# Patient Record
Sex: MALE | Race: WHITE | NOT HISPANIC OR LATINO | Employment: FULL TIME | URBAN - METROPOLITAN AREA
[De-identification: names, ages, dates, MRNs, and addresses within clinical notes are randomized per-mention and may not be internally consistent; named-entity substitution may affect disease eponyms.]

---

## 2019-03-26 ENCOUNTER — LAB VISIT (OUTPATIENT)
Dept: LAB | Facility: HOSPITAL | Age: 39
End: 2019-03-26
Attending: INTERNAL MEDICINE
Payer: COMMERCIAL

## 2019-03-26 ENCOUNTER — OFFICE VISIT (OUTPATIENT)
Dept: INTERNAL MEDICINE | Facility: CLINIC | Age: 39
End: 2019-03-26
Payer: COMMERCIAL

## 2019-03-26 VITALS
WEIGHT: 257.69 LBS | BODY MASS INDEX: 42.89 KG/M2 | HEART RATE: 89 BPM | DIASTOLIC BLOOD PRESSURE: 76 MMHG | RESPIRATION RATE: 16 BRPM | SYSTOLIC BLOOD PRESSURE: 114 MMHG | TEMPERATURE: 99 F | OXYGEN SATURATION: 94 %

## 2019-03-26 DIAGNOSIS — Z00.00 ANNUAL PHYSICAL EXAM: ICD-10-CM

## 2019-03-26 DIAGNOSIS — Z00.00 ANNUAL PHYSICAL EXAM: Primary | ICD-10-CM

## 2019-03-26 DIAGNOSIS — Z23 NEED FOR DIPHTHERIA-TETANUS-PERTUSSIS (TDAP) VACCINE: ICD-10-CM

## 2019-03-26 DIAGNOSIS — J45.20 MILD INTERMITTENT ASTHMA WITHOUT COMPLICATION: ICD-10-CM

## 2019-03-26 DIAGNOSIS — H57.9 EYE DISORDER: ICD-10-CM

## 2019-03-26 DIAGNOSIS — G47.33 OSA (OBSTRUCTIVE SLEEP APNEA): ICD-10-CM

## 2019-03-26 DIAGNOSIS — Z76.89 ENCOUNTER TO ESTABLISH CARE: ICD-10-CM

## 2019-03-26 DIAGNOSIS — K21.9 GASTROESOPHAGEAL REFLUX DISEASE, ESOPHAGITIS PRESENCE NOT SPECIFIED: ICD-10-CM

## 2019-03-26 LAB
ALBUMIN SERPL BCP-MCNC: 4 G/DL (ref 3.5–5.2)
ALP SERPL-CCNC: 96 U/L (ref 55–135)
ALT SERPL W/O P-5'-P-CCNC: 129 U/L (ref 10–44)
ANION GAP SERPL CALC-SCNC: 9 MMOL/L (ref 8–16)
AST SERPL-CCNC: 62 U/L (ref 10–40)
BASOPHILS # BLD AUTO: 0.08 K/UL (ref 0–0.2)
BASOPHILS NFR BLD: 1.1 % (ref 0–1.9)
BILIRUB SERPL-MCNC: 0.7 MG/DL (ref 0.1–1)
BUN SERPL-MCNC: 13 MG/DL (ref 6–20)
CALCIUM SERPL-MCNC: 10.1 MG/DL (ref 8.7–10.5)
CHLORIDE SERPL-SCNC: 105 MMOL/L (ref 95–110)
CHOLEST SERPL-MCNC: 236 MG/DL (ref 120–199)
CHOLEST/HDLC SERPL: 5.8 {RATIO} (ref 2–5)
CO2 SERPL-SCNC: 27 MMOL/L (ref 23–29)
CREAT SERPL-MCNC: 0.8 MG/DL (ref 0.5–1.4)
DIFFERENTIAL METHOD: ABNORMAL
EOSINOPHIL # BLD AUTO: 0.3 K/UL (ref 0–0.5)
EOSINOPHIL NFR BLD: 4.2 % (ref 0–8)
ERYTHROCYTE [DISTWIDTH] IN BLOOD BY AUTOMATED COUNT: 12.8 % (ref 11.5–14.5)
EST. GFR  (AFRICAN AMERICAN): >60 ML/MIN/1.73 M^2
EST. GFR  (NON AFRICAN AMERICAN): >60 ML/MIN/1.73 M^2
ESTIMATED AVG GLUCOSE: 120 MG/DL (ref 68–131)
GLUCOSE SERPL-MCNC: 104 MG/DL (ref 70–110)
HBA1C MFR BLD HPLC: 5.8 % (ref 4–5.6)
HCT VFR BLD AUTO: 50.7 % (ref 40–54)
HDLC SERPL-MCNC: 41 MG/DL (ref 40–75)
HDLC SERPL: 17.4 % (ref 20–50)
HGB BLD-MCNC: 16.8 G/DL (ref 14–18)
IMM GRANULOCYTES # BLD AUTO: 0.05 K/UL (ref 0–0.04)
IMM GRANULOCYTES NFR BLD AUTO: 0.7 % (ref 0–0.5)
LDLC SERPL CALC-MCNC: ABNORMAL MG/DL (ref 63–159)
LYMPHOCYTES # BLD AUTO: 2.1 K/UL (ref 1–4.8)
LYMPHOCYTES NFR BLD: 27.6 % (ref 18–48)
MCH RBC QN AUTO: 30.2 PG (ref 27–31)
MCHC RBC AUTO-ENTMCNC: 33.1 G/DL (ref 32–36)
MCV RBC AUTO: 91 FL (ref 82–98)
MONOCYTES # BLD AUTO: 0.8 K/UL (ref 0.3–1)
MONOCYTES NFR BLD: 10.3 % (ref 4–15)
NEUTROPHILS # BLD AUTO: 4.3 K/UL (ref 1.8–7.7)
NEUTROPHILS NFR BLD: 56.1 % (ref 38–73)
NONHDLC SERPL-MCNC: 195 MG/DL
NRBC BLD-RTO: 0 /100 WBC
PLATELET # BLD AUTO: 244 K/UL (ref 150–350)
PMV BLD AUTO: 10.7 FL (ref 9.2–12.9)
POTASSIUM SERPL-SCNC: 4.3 MMOL/L (ref 3.5–5.1)
PROT SERPL-MCNC: 7.4 G/DL (ref 6–8.4)
RBC # BLD AUTO: 5.56 M/UL (ref 4.6–6.2)
SODIUM SERPL-SCNC: 141 MMOL/L (ref 136–145)
TRIGL SERPL-MCNC: 448 MG/DL (ref 30–150)
TSH SERPL DL<=0.005 MIU/L-ACNC: 1.48 UIU/ML (ref 0.4–4)
WBC # BLD AUTO: 7.6 K/UL (ref 3.9–12.7)

## 2019-03-26 PROCEDURE — 99385 PR PREVENTIVE VISIT,NEW,18-39: ICD-10-PCS | Mod: 25,S$GLB,, | Performed by: INTERNAL MEDICINE

## 2019-03-26 PROCEDURE — 90686 IIV4 VACC NO PRSV 0.5 ML IM: CPT | Mod: S$GLB,,, | Performed by: INTERNAL MEDICINE

## 2019-03-26 PROCEDURE — 90471 FLU VACCINE (QUAD) GREATER THAN OR EQUAL TO 3YO PRESERVATIVE FREE IM: ICD-10-PCS | Mod: S$GLB,,, | Performed by: INTERNAL MEDICINE

## 2019-03-26 PROCEDURE — 99385 PREV VISIT NEW AGE 18-39: CPT | Mod: 25,S$GLB,, | Performed by: INTERNAL MEDICINE

## 2019-03-26 PROCEDURE — 90686 FLU VACCINE (QUAD) GREATER THAN OR EQUAL TO 3YO PRESERVATIVE FREE IM: ICD-10-PCS | Mod: S$GLB,,, | Performed by: INTERNAL MEDICINE

## 2019-03-26 PROCEDURE — 36415 COLL VENOUS BLD VENIPUNCTURE: CPT | Mod: PO

## 2019-03-26 PROCEDURE — 80061 LIPID PANEL: CPT

## 2019-03-26 PROCEDURE — 85025 COMPLETE CBC W/AUTO DIFF WBC: CPT

## 2019-03-26 PROCEDURE — 84443 ASSAY THYROID STIM HORMONE: CPT

## 2019-03-26 PROCEDURE — 99999 PR PBB SHADOW E&M-EST. PATIENT-LVL III: CPT | Mod: PBBFAC,,, | Performed by: INTERNAL MEDICINE

## 2019-03-26 PROCEDURE — 99999 PR PBB SHADOW E&M-EST. PATIENT-LVL III: ICD-10-PCS | Mod: PBBFAC,,, | Performed by: INTERNAL MEDICINE

## 2019-03-26 PROCEDURE — 80053 COMPREHEN METABOLIC PANEL: CPT

## 2019-03-26 PROCEDURE — 90471 IMMUNIZATION ADMIN: CPT | Mod: S$GLB,,, | Performed by: INTERNAL MEDICINE

## 2019-03-26 PROCEDURE — 83036 HEMOGLOBIN GLYCOSYLATED A1C: CPT

## 2019-03-26 RX ORDER — PANTOPRAZOLE SODIUM 40 MG/1
TABLET, DELAYED RELEASE ORAL
Refills: 2 | COMMUNITY
Start: 2019-03-14 | End: 2019-03-26 | Stop reason: SDUPTHER

## 2019-03-26 RX ORDER — LOTEPREDNOL ETABONATE 5 MG/G
GEL OPHTHALMIC
Refills: 1 | COMMUNITY
Start: 2019-02-02 | End: 2020-02-06

## 2019-03-26 RX ORDER — PANTOPRAZOLE SODIUM 40 MG/1
40 TABLET, DELAYED RELEASE ORAL
Qty: 90 TABLET | Refills: 3 | Status: SHIPPED | OUTPATIENT
Start: 2019-03-26 | End: 2019-07-10 | Stop reason: SDUPTHER

## 2019-03-26 RX ORDER — CLONAZEPAM 0.5 MG/1
TABLET ORAL
Refills: 2 | COMMUNITY
Start: 2019-03-11 | End: 2020-02-06

## 2019-03-26 RX ORDER — OLOPATADINE HYDROCHLORIDE 1 MG/ML
SOLUTION/ DROPS OPHTHALMIC
Refills: 1 | COMMUNITY
Start: 2019-01-07 | End: 2020-02-06

## 2019-03-26 RX ORDER — OMEPRAZOLE 40 MG/1
40 CAPSULE, DELAYED RELEASE ORAL DAILY
COMMUNITY
End: 2019-03-26

## 2019-03-26 RX ORDER — ALBUTEROL SULFATE 90 UG/1
1-2 AEROSOL, METERED RESPIRATORY (INHALATION) EVERY 4 HOURS PRN
Qty: 18 G | Refills: 11 | Status: SHIPPED | OUTPATIENT
Start: 2019-03-26 | End: 2020-04-08

## 2019-03-26 NOTE — PROGRESS NOTES
Subjective:       Patient ID: Hermelindo Garza III is a 38 y.o. male.    Chief Complaint: Annual Exam and Establish Care    HPI    38 y.o. male here for annual exam. Previous PCP was Dr. Lehman    Health Maintenance:   Lipid disorders/ASCVD risk: due    DM: A1c due      Vaccines:   Influenza (yearly) due   Tetanus (every 10 yrs - 1st tdap) due          Medical Problems:  1. LUBA: CPAP 12  2. GERD: pantoprazole 40mg daily  3. Asthma: intermittent, albuterol prn    GI: Dr. Steve Ace with [facility] - GERD  Psych: Dr. Daynaa Robertson- ADHD  Eye doctor: Dr. Xander Salinas w/ [facility]  Past Medical History:   Diagnosis Date    ADD (attention deficit disorder) 5/9/2012    Asthma 5/9/2012    Esophageal ulcer     GERD (gastroesophageal reflux disease) 5/9/2012    Kidney stones 5/2014    Lumbar disc disease 5/9/2012    LUBA (obstructive sleep apnea)     Renal calculi 5/9/2012     Past Surgical History:   Procedure Laterality Date    APPENDECTOMY  2006    COLONOSCOPY  01/22/2019    internal hemorrhoids, o/w normal - repeat at age 52    EGD (ESOPHAGOGASTRODUODENOSCOPY) N/A 5/29/2013    Performed by Robert Perea MD at [facility] (4TH FLR)    ESOPHAGOGASTRODUODENOSCOPY  01/22/2019    LA grade B esophagitis; esophageal stenosis- dilated; gastritis; H pylori pathology negative    LUMBAR LAMINECTOMY  2010     Family History   Problem Relation Age of Onset    Hypertension Mother     Transient ischemic attack Mother     Sleep apnea Mother     Chronic back pain Father     MARTITA disease Father     Sleep apnea Father     Brain cancer Maternal Grandmother     Diabetes Maternal Grandfather     Breast cancer Paternal Grandmother      Social History     Socioeconomic History    Marital status:      Spouse name: Not on file    Number of children: Not on file    Years of education: Not on file    Highest education level: Not on file   Occupational History    Not on file   Social Needs    Financial  resource strain: Not on file    Food insecurity:     Worry: Not on file     Inability: Not on file    Transportation needs:     Medical: Not on file     Non-medical: Not on file   Tobacco Use    Smoking status: Passive Smoke Exposure - Never Smoker    Smokeless tobacco: Never Used   Substance and Sexual Activity    Alcohol use: Yes     Alcohol/week: 0.0 oz     Comment: Occasional    Drug use: No    Sexual activity: Yes   Lifestyle    Physical activity:     Days per week: Not on file     Minutes per session: Not on file    Stress: Not on file   Relationships    Social connections:     Talks on phone: Not on file     Gets together: Not on file     Attends Hinduism service: Not on file     Active member of club or organization: Not on file     Attends meetings of clubs or organizations: Not on file     Relationship status: Not on file    Intimate partner violence:     Fear of current or ex partner: Not on file     Emotionally abused: Not on file     Physically abused: Not on file     Forced sexual activity: Not on file   Other Topics Concern    Not on file   Social History Narrative    Not on file     Review of patient's allergies indicates:  No Known Allergies    Current Outpatient Medications:     clonazePAM (KLONOPIN) 0.5 MG tablet, TK 1 T PO QHS PRF SLP, Disp: , Rfl: 2    dexmethylphenidate (FOCALIN) 10 MG tablet, Take 10 mg by mouth 3 (three) times daily. , Disp: , Rfl: 0    pantoprazole (PROTONIX) 40 MG tablet, Take 1 tablet (40 mg total) by mouth before breakfast., Disp: 90 tablet, Rfl: 3    albuterol (VENTOLIN HFA) 90 mcg/actuation inhaler, Inhale 1-2 puffs into the lungs every 4 (four) hours as needed for Wheezing or Shortness of Breath. Rescue, Disp: 18 g, Rfl: 11    diphth,pertus,acell,,tetanus (BOOSTRIX) 2.5-8-5 Lf-mcg-Lf/0.5mL Susp, Inject 0.5 mLs into the muscle once. for 1 dose, Disp: 0.5 mL, Rfl: 0    LOTEMAX 0.5 % DrpG, INT 1 DROP IN OU QID, Disp: , Rfl: 1    olopatadine (PATANOL)  0.1 % ophthalmic solution, INT 1 GTT INTO OU BID, Disp: , Rfl: 1    Review of Systems   Constitutional: Negative for chills and fever.   HENT: Negative for sinus pain and trouble swallowing.    Eyes: Positive for discharge, redness and itching.   Respiratory: Negative for cough and shortness of breath.    Cardiovascular: Negative for chest pain and leg swelling.   Gastrointestinal: Negative for abdominal pain, blood in stool, constipation and diarrhea.   Genitourinary: Negative for difficulty urinating, dysuria and frequency.   Musculoskeletal: Negative for gait problem and joint swelling.   Skin: Negative for pallor, rash and wound.   Neurological: Negative for numbness and headaches.       Objective:        Vitals:    03/26/19 0752   BP: 114/76   Pulse: 89   Resp: 16   Temp: 99.3 °F (37.4 °C)   TempSrc: Oral   SpO2: (!) 94%   Weight: 116.9 kg (257 lb 11.5 oz)       Body mass index is 42.89 kg/m².    Physical Exam   Constitutional: He is oriented to person, place, and time. He appears well-developed. No distress.   HENT:   Head: Normocephalic and atraumatic.   Right Ear: Tympanic membrane normal.   Left Ear: Tympanic membrane normal.   Nose: Nose normal.   Mouth/Throat: Oropharynx is clear and moist.   Eyes: Conjunctivae and EOM are normal.   Neck: Normal range of motion. Neck supple. No tracheal deviation present. No thyromegaly present.   Cardiovascular: Normal rate, regular rhythm, normal heart sounds and intact distal pulses.   Pulmonary/Chest: Effort normal and breath sounds normal.   Abdominal: Soft. Bowel sounds are normal. He exhibits no distension. There is no tenderness.   Musculoskeletal: Normal range of motion. He exhibits no edema.   Lymphadenopathy:     He has no cervical adenopathy.   Neurological: He is alert and oriented to person, place, and time. No sensory deficit.   Skin: Skin is warm and dry. He is not diaphoretic. No cyanosis. Nails show no clubbing.   Psychiatric: He has a normal mood and  affect. His behavior is normal. Judgment normal.       Assessment:     1. Annual physical exam    2. Encounter to establish care    3. Gastroesophageal reflux disease, esophagitis presence not specified    4. Mild intermittent asthma without complication    5. LUBA (obstructive sleep apnea)    6. Need for diphtheria-tetanus-pertussis (Tdap) vaccine    7. Eye disorder           Plan:         1. Annual physical exam  - CBC auto differential; Future  - Comprehensive metabolic panel; Future  - Hemoglobin A1c; Future  - Lipid panel; Future  - TSH; Future  - Influenza - Quadrivalent (3 years & older) (PF)    2. Encounter to establish care  - reviewed healthcare maintenance and pt's chronic medical problems.   - records from PCP and eye doctor requested. Reviewed records from colonoscopy and EGD.     3. Gastroesophageal reflux disease, esophagitis presence not specified  - pantoprazole (PROTONIX) 40 MG tablet; Take 1 tablet (40 mg total) by mouth before breakfast.  Dispense: 90 tablet; Refill: 3    4. Mild intermittent asthma without complication  - albuterol (VENTOLIN HFA) 90 mcg/actuation inhaler; Inhale 1-2 puffs into the lungs every 4 (four) hours as needed for Wheezing or Shortness of Breath. Rescue  Dispense: 18 g; Refill: 11    5. LUBA (obstructive sleep apnea)  - continue CPAP    6. Need for diphtheria-tetanus-pertussis (Tdap) vaccine  - diphth,pertus,acell,,tetanus (BOOSTRIX) 2.5-8-5 Lf-mcg-Lf/0.5mL Susp; Inject 0.5 mLs into the muscle once. for 1 dose  Dispense: 0.5 mL; Refill: 0    7. Eye disorder  - records requested from eye doctor. He has tried different eye drops without relief, CT showed orbital fat and he reports thyroid testing was normal. He continues to have redness, discharge, itching. Will review records and pursue further testing as indicated.   - He denies CPAP mask leakage and reports intermittent nature of eye symptoms would not correlate if there was CPAP mask leakage anyway.  - consider autoimmune  testing if not already performed.       rtc 3 mo for f/u eye disorder, GERD     Patient note was created using MModal Dictation.  Any errors in syntax or even information may not have been identified and edited on initial review prior to signing this note.

## 2019-03-27 ENCOUNTER — PATIENT MESSAGE (OUTPATIENT)
Dept: INTERNAL MEDICINE | Facility: CLINIC | Age: 39
End: 2019-03-27

## 2019-03-27 DIAGNOSIS — E78.1 HYPERTRIGLYCERIDEMIA: ICD-10-CM

## 2019-03-27 DIAGNOSIS — R74.8 ELEVATED LIVER ENZYMES: ICD-10-CM

## 2019-03-27 DIAGNOSIS — E78.2 MIXED HYPERLIPIDEMIA: ICD-10-CM

## 2019-03-27 DIAGNOSIS — R73.03 PREDIABETES: ICD-10-CM

## 2019-03-27 NOTE — PATIENT INSTRUCTIONS
Prediabetes  You have been diagnosed with prediabetes. This means that the level of sugar (glucose) in your blood is too high. If you have prediabetes, you are at risk for developing type 2 diabetes. Type 2 diabetes is diagnosed when the level of glucose in the blood reaches a certain high level. With prediabetes, it hasnt reached this point yet, but it is higher than normal. It is vital to make lifestyle changes to lower your blood sugar, improve your health, and prevent diabetes. This sheet will tell you more.      Why worry about prediabetes?  Prediabetes is a disease where the bodys cells have trouble using glucose in the blood for energy. As a result, too much glucose stays in the blood and can affect how your heart and blood vessels work. Without changes in diet and lifestyle, the problem can get worse. Once you have type 2 diabetes, it is chronic (ongoing) and needs to be managed for the rest of your life. Diabetes can harm the body and your health by damaging organs, such as your eyes and kidneys. It makes you more likely to have heart disease. And it can damage nerves and blood vessels.  Who is a risk for prediabetes?  The exact cause of prediabetes is not clear. But certain risk factors make a person more likely to have it. These include:  · A family history of type 2 diabetes  · Being overweight  · Being over age 45  · Have hypertension or elevated cholesterol   · Having had gestational diabetes  · Not being physically active  · Being ,  American, , , , or   Diagnosing prediabetes  Prediabetes may have no symptoms or you may have some of the symptoms of diabetes. The diagnosis is made with a blood test. You may have one or more of these blood tests:   · Fasting glucose test. Blood is taken and tested after you have fasted (not eaten) for at least 8 hours. A normal test result is 99 milligrams per deciliter (mg/dL) or lower.  Prediabetes is 100 mg/dL to 125 mg/dL. Diabetes is 126 mg/dL or higher.  · Glucose tolerance test. Your blood sugar is measured before and after you drink a very sugary liquid. A normal test result is 139 milligrams per deciliter (mg/dL) or lower. Prediabetes is 140 mg/dL to 199 mg/dL. Diabetes is 200 mg/dL or higher.  · Hemoglobin A1c (HbA1c). Your HbA1c is normal if it is below 5.7%. Prediabetes is 5.7% to 6.4%. Diabetes is 6.5% or higher.   Treating prediabetes  The best way to treat prediabetes is to lose at least 5% to 7% of your current weight and be more physically active by getting at least 150 minutes a week of physical activity. When sitting for long periods of time, get up for short sessions of light activity every 30 minutes. These changes help the bodys cells use blood sugar better. Even a small amount of weight loss can help. Work with your healthcare provider to make a plan to eat well and be more active. Keep in mind that small changes can add up. Other changes in your lifestyle (or even taking certain medicines, such as metformin) may make you less likely to develop diabetes. Your healthcare provider can talk with you about these.  Follow-up  If it is untreated, prediabetes can turn into diabetes. This is a serious health condition. Take steps to stop this from happening. Follow the treatment plan you have been given. You may have your blood glucose tested again in about 12 to 18 months.  Symptoms of diabetes  Let your healthcare provider know if you have any of the following:  · Always feel very tired  · Feel very thirsty or hungry much of the time  · Have to urinate often  · Lose weight for no reason  · Feel numbness or tingling in your fingers or toes  · Have cuts or bruises that dont seem to heal  · Have blurry vision   Date Last Reviewed: 5/1/2016  © 0724-7437 [employer]. [address] [place] [address], [place] [address]. All rights reserved. This information is not intended as a  substitute for professional medical care. Always follow your healthcare professional's instructions.

## 2019-03-27 NOTE — TELEPHONE ENCOUNTER
Please have referral coordinator schedule liver ultrasound.  Repeat fasting labs prior to 3 mo f/u

## 2019-03-28 ENCOUNTER — PATIENT MESSAGE (OUTPATIENT)
Dept: INTERNAL MEDICINE | Facility: CLINIC | Age: 39
End: 2019-03-28

## 2019-04-03 ENCOUNTER — PATIENT MESSAGE (OUTPATIENT)
Dept: INTERNAL MEDICINE | Facility: CLINIC | Age: 39
End: 2019-04-03

## 2019-04-03 ENCOUNTER — HOSPITAL ENCOUNTER (OUTPATIENT)
Dept: RADIOLOGY | Facility: HOSPITAL | Age: 39
Discharge: HOME | End: 2019-04-03
Attending: INTERNAL MEDICINE
Payer: COMMERCIAL

## 2019-04-03 ENCOUNTER — TELEPHONE (OUTPATIENT)
Dept: INTERNAL MEDICINE | Facility: CLINIC | Age: 39
End: 2019-04-03

## 2019-04-03 DIAGNOSIS — H16.209 KERATOCONJUNCTIVITIS, UNSPECIFIED LATERALITY: Primary | ICD-10-CM

## 2019-04-03 DIAGNOSIS — R74.8 ELEVATED LIVER ENZYMES: ICD-10-CM

## 2019-04-03 DIAGNOSIS — K76.9 HEPATIC LESION: Primary | ICD-10-CM

## 2019-04-03 PROCEDURE — 76705 ECHO EXAM OF ABDOMEN: CPT | Mod: TC

## 2019-04-03 PROCEDURE — 76705 ECHO EXAM OF ABDOMEN: CPT | Mod: 26,,, | Performed by: RADIOLOGY

## 2019-04-03 PROCEDURE — 76705 US ABDOMEN LIMITED: ICD-10-PCS | Mod: 26,,, | Performed by: RADIOLOGY

## 2019-05-31 ENCOUNTER — OFFICE VISIT (OUTPATIENT)
Dept: URGENT CARE | Facility: CLINIC | Age: 39
End: 2019-05-31
Payer: COMMERCIAL

## 2019-05-31 VITALS
TEMPERATURE: 97 F | WEIGHT: 257 LBS | HEART RATE: 65 BPM | DIASTOLIC BLOOD PRESSURE: 87 MMHG | BODY MASS INDEX: 42.82 KG/M2 | OXYGEN SATURATION: 97 % | HEIGHT: 65 IN | SYSTOLIC BLOOD PRESSURE: 124 MMHG

## 2019-05-31 DIAGNOSIS — S93.602A FOOT SPRAIN, LEFT, INITIAL ENCOUNTER: Primary | ICD-10-CM

## 2019-05-31 DIAGNOSIS — M79.672 LEFT FOOT PAIN: ICD-10-CM

## 2019-05-31 PROCEDURE — 99214 PR OFFICE/OUTPT VISIT, EST, LEVL IV, 30-39 MIN: ICD-10-PCS | Mod: S$GLB,,, | Performed by: FAMILY MEDICINE

## 2019-05-31 PROCEDURE — 3008F PR BODY MASS INDEX (BMI) DOCUMENTED: ICD-10-PCS | Mod: CPTII,S$GLB,, | Performed by: FAMILY MEDICINE

## 2019-05-31 PROCEDURE — 73630 X-RAY EXAM OF FOOT: CPT | Mod: FY,LT,S$GLB, | Performed by: RADIOLOGY

## 2019-05-31 PROCEDURE — 99214 OFFICE O/P EST MOD 30 MIN: CPT | Mod: S$GLB,,, | Performed by: FAMILY MEDICINE

## 2019-05-31 PROCEDURE — 73630 XR FOOT COMPLETE 3 VIEW LEFT: ICD-10-PCS | Mod: FY,LT,S$GLB, | Performed by: RADIOLOGY

## 2019-05-31 PROCEDURE — 3008F BODY MASS INDEX DOCD: CPT | Mod: CPTII,S$GLB,, | Performed by: FAMILY MEDICINE

## 2019-05-31 RX ORDER — IBUPROFEN 800 MG/1
800 TABLET ORAL 3 TIMES DAILY
Qty: 30 TABLET | Refills: 0 | Status: SHIPPED | OUTPATIENT
Start: 2019-05-31 | End: 2019-06-10

## 2019-05-31 NOTE — PATIENT INSTRUCTIONS
Foot Sprain    A sprain is a stretching or tearing of the ligaments that hold a joint together. There are no broken bones. Sprains generally take from 3-6 weeks to heal. A sprain may be treated with a splint, walking cast, or special boot. Mild sprains may not need any additional support.  Home care  The following guidelines will help you care for your injury at home:  · Keep your leg elevated when sitting or lying down. This is very important during the first 48 hours to reduce swelling. Stay off the injured foot as much as possible until you can walk on it without pain. If needed, you may use crutches during the first week for this purpose. Crutches can be rented at many pharmacies or surgical/orthopedic supply stores.  · You may be given a cast shoe to wear to prevent movement in your foot. If not, you can use a sandal or any shoe that does not put pressure on the injured area until the swelling and pain go away. If using a sandal, be careful not to hit your foot against anything, since another injury could make the sprain worse.  · Apply an ice pack over the injured area for 15 to 20 minutes every 3 to 6 hours. You should do this for the first 24 to 48 hours. You can make an ice pack by filling a plastic bag that seals at the top with ice cubes and then wrapping it with a thin towel. Continue to use ice packs for relief of pain and swelling as needed. As the ice melts, avoid getting any wrap, splint, or cast wet. After 48 hours, apply heat from a warm shower or bath for 20 minutes several times daily. Alternating ice and heat may also be helpful.  · You may use over-the-counter pain medicine to control pain, unless another medicine was prescribed. If you have chronic liver or kidney disease or ever had a stomach ulcer or GI bleeding, talk with your healthcare provider before using these medicines.  · If you were given a splint or cast, keep it dry. Bathe with your splint or cast well out of the water,  protected with 2 large plastic bags, rubber-banded at the top end. If a fiberglass splint or cast gets wet, you can dry it with a hair dryer.  · You may return to sports after healing, when you can run without pain.  Follow-up care  Follow up with your healthcare provider as directed. Sometimes fractures dont show up on the first X-ray. Bruises and sprains can sometimes hurt as much as a fracture. These injuries can take time to heal completely. If your symptoms dont improve or they get worse, talk with your healthcare provider. You may need a repeat X-ray.  When to seek medical advice  Call your healthcare provider right away if any of these occur:  · The plaster cast or splint gets wet or soft  · The fiberglass cast or splint gets wet and does not dry for 24 hours  · Pain or swelling increases, or redness appears  · A bad odor comes from within the cast  · Fever of 100.4°F (38°C) or above lasting for 24 to 48 hours  · Toes on the injured foot become cold, blue, numb, or tingly  Date Last Reviewed: 11/20/2015  © 6453-0141 [employer]. [address] [place] [address]. All rights reserved. This information is not intended as a substitute for professional medical care. Always follow your healthcare professional's instructions.    Follow up with your doctor in a few days as needed.  Return to the urgent care or go to the ER if symptoms get worse.    Memo Yuen MD

## 2019-05-31 NOTE — PROGRESS NOTES
"Subjective:       Patient ID: Hermelindo Garza III is a 38 y.o. male.    Vitals:  height is 5' 5" (1.651 m) and weight is 116.6 kg (257 lb). His oral temperature is 97.3 °F (36.3 °C). His blood pressure is 124/87 and his pulse is 65. His oxygen saturation is 97%.     Chief Complaint: Foot Injury    Foot Injury    The incident occurred 1 to 3 hours ago. The incident occurred at home. The injury mechanism was an inversion injury. The pain is present in the left foot. The quality of the pain is described as burning. The pain is at a severity of 6/10. The pain is moderate. The pain has been fluctuating since onset. He reports no foreign bodies present. The symptoms are aggravated by palpation, weight bearing and movement. He has tried ice for the symptoms. The treatment provided no relief.       Constitution: Negative for fatigue.   HENT: Negative for facial swelling and facial trauma.    Neck: Negative for neck stiffness.   Cardiovascular: Negative for chest trauma.   Eyes: Negative for eye trauma, double vision and blurred vision.   Gastrointestinal: Negative for abdominal trauma, abdominal pain and rectal bleeding.   Genitourinary: Negative for hematuria, genital trauma and pelvic pain.   Musculoskeletal: Positive for pain and trauma. Negative for joint swelling, abnormal ROM of joint and pain with walking.   Skin: Negative for color change, wound, abrasion, laceration and erythema.   Neurological: Negative for dizziness, history of vertigo, light-headedness, coordination disturbances, altered mental status and loss of consciousness.   Hematologic/Lymphatic: Negative for history of bleeding disorder.   Psychiatric/Behavioral: Negative for altered mental status.       Objective:      Physical Exam   Constitutional: He is oriented to person, place, and time. He appears well-developed and well-nourished.   HENT:   Head: Normocephalic and atraumatic.   Eyes: Pupils are equal, round, and reactive to light. EOM are " normal.   Neck: Normal range of motion. Neck supple.   Cardiovascular: Normal rate, regular rhythm and normal heart sounds. Exam reveals no gallop and no friction rub.   No murmur heard.  Pulmonary/Chest: Breath sounds normal. No respiratory distress. He has no wheezes. He has no rales.   Abdominal: Soft. Bowel sounds are normal. He exhibits no distension. There is no tenderness.   Musculoskeletal:        Feet:    Left foot has swelling, tenderness, mild warmth at the left 4th and 5th cuboid metatarsal joints.   Lymphadenopathy:     He has no cervical adenopathy.   Neurological: He is alert and oriented to person, place, and time. No cranial nerve deficit. He exhibits normal muscle tone. Coordination normal.   Skin: Skin is warm. Capillary refill takes less than 2 seconds. No rash noted. No erythema. No pallor.   Psychiatric: He has a normal mood and affect. His behavior is normal. Judgment and thought content normal.   Vitals reviewed.      Assessment:       1. Foot sprain, left, initial encounter    2. Left foot pain        Plan:         Foot sprain, left, initial encounter  -     ORTHOPEDIC BRACING FOR HOME USE - LOWER EXTREMITY  -     ibuprofen (ADVIL,MOTRIN) 800 MG tablet; Take 1 tablet (800 mg total) by mouth 3 (three) times daily. for 10 days  Dispense: 30 tablet; Refill: 0    Left foot pain  -     X-Ray Foot Complete Left; Future; Expected date: 05/31/2019  -     ibuprofen (ADVIL,MOTRIN) 800 MG tablet; Take 1 tablet (800 mg total) by mouth 3 (three) times daily. for 10 days  Dispense: 30 tablet; Refill: 0          Patient Instructions     Foot Sprain    A sprain is a stretching or tearing of the ligaments that hold a joint together. There are no broken bones. Sprains generally take from 3-6 weeks to heal. A sprain may be treated with a splint, walking cast, or special boot. Mild sprains may not need any additional support.  Home care  The following guidelines will help you care for your injury at  home:  · Keep your leg elevated when sitting or lying down. This is very important during the first 48 hours to reduce swelling. Stay off the injured foot as much as possible until you can walk on it without pain. If needed, you may use crutches during the first week for this purpose. Crutches can be rented at many pharmacies or surgical/orthopedic supply stores.  · You may be given a cast shoe to wear to prevent movement in your foot. If not, you can use a sandal or any shoe that does not put pressure on the injured area until the swelling and pain go away. If using a sandal, be careful not to hit your foot against anything, since another injury could make the sprain worse.  · Apply an ice pack over the injured area for 15 to 20 minutes every 3 to 6 hours. You should do this for the first 24 to 48 hours. You can make an ice pack by filling a plastic bag that seals at the top with ice cubes and then wrapping it with a thin towel. Continue to use ice packs for relief of pain and swelling as needed. As the ice melts, avoid getting any wrap, splint, or cast wet. After 48 hours, apply heat from a warm shower or bath for 20 minutes several times daily. Alternating ice and heat may also be helpful.  · You may use over-the-counter pain medicine to control pain, unless another medicine was prescribed. If you have chronic liver or kidney disease or ever had a stomach ulcer or GI bleeding, talk with your healthcare provider before using these medicines.  · If you were given a splint or cast, keep it dry. Bathe with your splint or cast well out of the water, protected with 2 large plastic bags, rubber-banded at the top end. If a fiberglass splint or cast gets wet, you can dry it with a hair dryer.  · You may return to sports after healing, when you can run without pain.  Follow-up care  Follow up with your healthcare provider as directed. Sometimes fractures dont show up on the first X-ray. Bruises and sprains can sometimes  hurt as much as a fracture. These injuries can take time to heal completely. If your symptoms dont improve or they get worse, talk with your healthcare provider. You may need a repeat X-ray.  When to seek medical advice  Call your healthcare provider right away if any of these occur:  · The plaster cast or splint gets wet or soft  · The fiberglass cast or splint gets wet and does not dry for 24 hours  · Pain or swelling increases, or redness appears  · A bad odor comes from within the cast  · Fever of 100.4°F (38°C) or above lasting for 24 to 48 hours  · Toes on the injured foot become cold, blue, numb, or tingly  Date Last Reviewed: 11/20/2015  © 7022-3922 [employer]. [address] [place] [address], [place] [address]. All rights reserved. This information is not intended as a substitute for professional medical care. Always follow your healthcare professional's instructions.    Follow up with your doctor in a few days as needed.  Return to the urgent care or go to the ER if symptoms get worse.    Memo Yuen MD

## 2019-06-25 ENCOUNTER — LAB VISIT (OUTPATIENT)
Dept: LAB | Facility: HOSPITAL | Age: 39
End: 2019-06-25
Attending: INTERNAL MEDICINE
Payer: COMMERCIAL

## 2019-06-25 DIAGNOSIS — E78.1 HYPERTRIGLYCERIDEMIA: ICD-10-CM

## 2019-06-25 DIAGNOSIS — R73.03 PREDIABETES: ICD-10-CM

## 2019-06-25 DIAGNOSIS — R74.8 ELEVATED LIVER ENZYMES: ICD-10-CM

## 2019-06-25 DIAGNOSIS — E78.2 MIXED HYPERLIPIDEMIA: ICD-10-CM

## 2019-06-25 DIAGNOSIS — H16.209 KERATOCONJUNCTIVITIS, UNSPECIFIED LATERALITY: ICD-10-CM

## 2019-06-25 LAB
ALBUMIN SERPL BCP-MCNC: 3.9 G/DL (ref 3.5–5.2)
ALP SERPL-CCNC: 102 U/L (ref 55–135)
ALT SERPL W/O P-5'-P-CCNC: 101 U/L (ref 10–44)
ANION GAP SERPL CALC-SCNC: 10 MMOL/L (ref 8–16)
AST SERPL-CCNC: 37 U/L (ref 10–40)
BILIRUB SERPL-MCNC: 0.4 MG/DL (ref 0.1–1)
BUN SERPL-MCNC: 13 MG/DL (ref 6–20)
CALCIUM SERPL-MCNC: 9.2 MG/DL (ref 8.7–10.5)
CHLORIDE SERPL-SCNC: 103 MMOL/L (ref 95–110)
CHOLEST SERPL-MCNC: 210 MG/DL (ref 120–199)
CHOLEST/HDLC SERPL: 5 {RATIO} (ref 2–5)
CO2 SERPL-SCNC: 26 MMOL/L (ref 23–29)
CREAT SERPL-MCNC: 0.8 MG/DL (ref 0.5–1.4)
EST. GFR  (AFRICAN AMERICAN): >60 ML/MIN/1.73 M^2
EST. GFR  (NON AFRICAN AMERICAN): >60 ML/MIN/1.73 M^2
ESTIMATED AVG GLUCOSE: 111 MG/DL (ref 68–131)
GLUCOSE SERPL-MCNC: 113 MG/DL (ref 70–110)
HBA1C MFR BLD HPLC: 5.5 % (ref 4–5.6)
HDLC SERPL-MCNC: 42 MG/DL (ref 40–75)
HDLC SERPL: 20 % (ref 20–50)
LDLC SERPL CALC-MCNC: 109.4 MG/DL (ref 63–159)
NONHDLC SERPL-MCNC: 168 MG/DL
POTASSIUM SERPL-SCNC: 3.7 MMOL/L (ref 3.5–5.1)
PROT SERPL-MCNC: 7 G/DL (ref 6–8.4)
SODIUM SERPL-SCNC: 139 MMOL/L (ref 136–145)
TRIGL SERPL-MCNC: 293 MG/DL (ref 30–150)

## 2019-06-25 PROCEDURE — 83701 LIPOPROTEIN BLD HR FRACTION: CPT

## 2019-06-25 PROCEDURE — 86038 ANTINUCLEAR ANTIBODIES: CPT

## 2019-06-25 PROCEDURE — 80053 COMPREHEN METABOLIC PANEL: CPT

## 2019-06-25 PROCEDURE — 86235 NUCLEAR ANTIGEN ANTIBODY: CPT | Mod: 59

## 2019-06-25 PROCEDURE — 83036 HEMOGLOBIN GLYCOSYLATED A1C: CPT

## 2019-06-25 PROCEDURE — 36415 COLL VENOUS BLD VENIPUNCTURE: CPT | Mod: PO

## 2019-06-25 PROCEDURE — 86235 NUCLEAR ANTIGEN ANTIBODY: CPT

## 2019-06-25 PROCEDURE — 80061 LIPID PANEL: CPT

## 2019-06-26 LAB
ANA SER QL IF: NORMAL
ANTI-SSA ANTIBODY: 0.69 EU (ref 0–19.99)
ANTI-SSA INTERPRETATION: NEGATIVE
ANTI-SSB ANTIBODY: 1.98 EU (ref 0–19.99)
ANTI-SSB INTERPRETATION: NEGATIVE

## 2019-06-27 LAB — LDLC SERPL-MCNC: 132 MG/DL

## 2019-07-10 ENCOUNTER — OFFICE VISIT (OUTPATIENT)
Dept: INTERNAL MEDICINE | Facility: CLINIC | Age: 39
End: 2019-07-10
Payer: COMMERCIAL

## 2019-07-10 VITALS
OXYGEN SATURATION: 94 % | TEMPERATURE: 99 F | HEART RATE: 74 BPM | RESPIRATION RATE: 15 BRPM | WEIGHT: 246.06 LBS | BODY MASS INDEX: 40.94 KG/M2 | SYSTOLIC BLOOD PRESSURE: 116 MMHG | DIASTOLIC BLOOD PRESSURE: 72 MMHG

## 2019-07-10 DIAGNOSIS — R73.03 PREDIABETES: ICD-10-CM

## 2019-07-10 DIAGNOSIS — R16.2 HEPATOSPLENOMEGALY: ICD-10-CM

## 2019-07-10 DIAGNOSIS — E66.01 MORBID OBESITY: ICD-10-CM

## 2019-07-10 DIAGNOSIS — K21.9 GASTROESOPHAGEAL REFLUX DISEASE, ESOPHAGITIS PRESENCE NOT SPECIFIED: ICD-10-CM

## 2019-07-10 DIAGNOSIS — L21.9 SEBORRHEIC DERMATITIS OF SCALP: ICD-10-CM

## 2019-07-10 DIAGNOSIS — R74.8 ELEVATED LIVER ENZYMES: ICD-10-CM

## 2019-07-10 DIAGNOSIS — K75.81 NASH (NONALCOHOLIC STEATOHEPATITIS): ICD-10-CM

## 2019-07-10 DIAGNOSIS — E78.1 HYPERTRIGLYCERIDEMIA: Primary | ICD-10-CM

## 2019-07-10 PROCEDURE — 3008F PR BODY MASS INDEX (BMI) DOCUMENTED: ICD-10-PCS | Mod: CPTII,S$GLB,, | Performed by: INTERNAL MEDICINE

## 2019-07-10 PROCEDURE — 3008F BODY MASS INDEX DOCD: CPT | Mod: CPTII,S$GLB,, | Performed by: INTERNAL MEDICINE

## 2019-07-10 PROCEDURE — 99214 OFFICE O/P EST MOD 30 MIN: CPT | Mod: S$GLB,,, | Performed by: INTERNAL MEDICINE

## 2019-07-10 PROCEDURE — 99214 PR OFFICE/OUTPT VISIT, EST, LEVL IV, 30-39 MIN: ICD-10-PCS | Mod: S$GLB,,, | Performed by: INTERNAL MEDICINE

## 2019-07-10 PROCEDURE — 99999 PR PBB SHADOW E&M-EST. PATIENT-LVL IV: CPT | Mod: PBBFAC,,, | Performed by: INTERNAL MEDICINE

## 2019-07-10 PROCEDURE — 99999 PR PBB SHADOW E&M-EST. PATIENT-LVL IV: ICD-10-PCS | Mod: PBBFAC,,, | Performed by: INTERNAL MEDICINE

## 2019-07-10 RX ORDER — KETOCONAZOLE 20 MG/ML
SHAMPOO, SUSPENSION TOPICAL
Qty: 120 ML | Refills: 3 | Status: SHIPPED | OUTPATIENT
Start: 2019-07-11 | End: 2020-02-07 | Stop reason: SDUPTHER

## 2019-07-10 RX ORDER — PANTOPRAZOLE SODIUM 40 MG/1
40 TABLET, DELAYED RELEASE ORAL 2 TIMES DAILY
Qty: 180 TABLET | Refills: 3 | Status: SHIPPED | OUTPATIENT
Start: 2019-07-10 | End: 2020-02-09 | Stop reason: ALTCHOICE

## 2019-07-10 NOTE — PROGRESS NOTES
Subjective:       Patient ID: Hermelindo Garza III is a 39 y.o. male.    Chief Complaint: Follow-up    HPI    39 y.o. male w/ morbid obesity presents for follow up of chronic medical problems:     1. LUBA: CPAP 12  2. GERD: pantoprazole 40mg daily  3. Asthma: intermittent, albuterol prn  4. Pre-DM: improved on recent labs  5: SHEIKH: liver enzymes improving.   6. Elevated triglycerides: improved  7. Morbid obesity: he has lost 11 pounds since last clinic visit. He was walking daily, until he sprained ankle. He plans to resume his walking regimen.      Review of Systems   Constitutional: Negative for activity change and unexpected weight change.   HENT: Negative for hearing loss, rhinorrhea and trouble swallowing.    Eyes: Positive for discharge. Negative for visual disturbance.   Respiratory: Negative for chest tightness and wheezing.    Cardiovascular: Negative for chest pain and palpitations.   Gastrointestinal: Negative for blood in stool, constipation, diarrhea and vomiting.   Endocrine: Negative for polydipsia and polyuria.   Genitourinary: Negative for difficulty urinating, hematuria and urgency.   Musculoskeletal: Negative for arthralgias, joint swelling and neck pain.   Neurological: Negative for weakness and headaches.   Psychiatric/Behavioral: Negative for confusion and dysphoric mood.       Objective:        Vitals:    07/10/19 0732   BP: 116/72   Pulse: 74   Resp: 15   Temp: 98.6 °F (37 °C)   TempSrc: Oral   SpO2: (!) 94%   Weight: 111.6 kg (246 lb 0.5 oz)       Body mass index is 40.94 kg/m².    Physical Exam   Constitutional: He is oriented to person, place, and time. He appears well-developed and well-nourished. No distress.   HENT:   Head: Normocephalic and atraumatic.   Nose: Nose normal.   Eyes: Conjunctivae and EOM are normal. Right eye exhibits no discharge. Left eye exhibits no discharge.   Neck: Normal range of motion. Neck supple.   Cardiovascular: Normal rate, regular rhythm, normal heart  sounds and intact distal pulses.   Pulmonary/Chest: Effort normal and breath sounds normal.   Abdominal: Soft. Bowel sounds are normal.   Musculoskeletal: He exhibits no edema.   Neurological: He is alert and oriented to person, place, and time.   Skin: Skin is warm and dry. He is not diaphoretic. No erythema.   Psychiatric: He has a normal mood and affect. His behavior is normal. Thought content normal.       Assessment:     1. Hypertriglyceridemia    2. Elevated liver enzymes    3. Hepatosplenomegaly    4. SHEIKH (nonalcoholic steatohepatitis)    5. Gastroesophageal reflux disease, esophagitis presence not specified    6. Seborrheic dermatitis of scalp    7. Morbid obesity    8. Prediabetes           Plan:         1. Hypertriglyceridemia  - Ambulatory Referral to Bariatric Medicine  - Lipid panel; Future    2. Elevated liver enzymes  - Ambulatory Referral to Hepatology  - CBC auto differential; Future  - Comprehensive metabolic panel; Future    3. Hepatosplenomegaly  - Ambulatory Referral to Hepatology  - CBC auto differential; Future  - Comprehensive metabolic panel; Future    4. SHEIKH (nonalcoholic steatohepatitis)  - Ambulatory Referral to Hepatology  - Ambulatory Referral to Bariatric Medicine  - CBC auto differential; Future  - Comprehensive metabolic panel; Future    5. Gastroesophageal reflux disease, esophagitis presence not specified  - Ambulatory Referral to Hepatology  - pantoprazole (PROTONIX) 40 MG tablet; Take 1 tablet (40 mg total) by mouth 2 (two) times daily.  Dispense: 180 tablet; Refill: 3- discussed r/b of long term PPI- ideally we wean as symptoms improve  - CBC auto differential; Future    6. Seborrheic dermatitis of scalp  - ketoconazole (NIZORAL) 2 % shampoo; Apply topically twice a week.  Dispense: 120 mL; Refill: 3    7. Morbid obesity  - Ambulatory Referral to Bariatric Medicine    8. Prediabetes  - resolved on recent labs  - Hemoglobin A1c; Future    rtc 6 mo w/ fasting labs  prior       Patient note was created using MModal Dictation.  Any errors in syntax or even information may not have been identified and edited on initial review prior to signing this note.

## 2019-07-12 ENCOUNTER — DOCUMENTATION ONLY (OUTPATIENT)
Dept: TRANSPLANT | Facility: CLINIC | Age: 39
End: 2019-07-12

## 2019-07-12 NOTE — LETTER
July 12, 2019    Hermelindo Garza III  [address] [place] [id number]      Dear Hermelindo Garza III:    Your doctor has referred you to the [facility]. We are sending this letter to remind you to make an appointment with us to complete the referral process.     Please call us at [phone] to schedule an appointment. We look forward to seeing you soon.     If you received a call and have been scheduled, please disregard this letter.       Sincerely,        [facility]   [address] [place] [id number]  [phone]

## 2019-07-12 NOTE — NURSING
Pt records reviewed.   Pt will be referred to Hepatology.  Elevated liver enzymes  Hepatosplenomegaly  SHEIKH (nonalcoholic steatohepatitis)  Gastroesophageal reflux disease, esophagitis presence not specified  Initial referral received  from the workque.   Referring Provider/diagnosis  Melody Beck MD      Referral letter sent to patient.

## 2019-07-17 ENCOUNTER — PATIENT MESSAGE (OUTPATIENT)
Dept: INTERNAL MEDICINE | Facility: CLINIC | Age: 39
End: 2019-07-17

## 2019-07-18 ENCOUNTER — DOCUMENTATION ONLY (OUTPATIENT)
Dept: BARIATRICS | Facility: CLINIC | Age: 39
End: 2019-07-18

## 2019-07-19 NOTE — PROGRESS NOTES
[facility] KEY Staff             Please cancel 08/07/2019 appointment.     Thanks    Previous Messages      ----- Message -----   From: Emilie Sellers   Sent: 7/10/2019  10:55 AM   To: Osvaldo Buckley   Subject: MRN [id number]                                       ref#[id number] [email or web address] Bariatric benefits are excluded under this plan.

## 2019-07-25 ENCOUNTER — TELEPHONE (OUTPATIENT)
Dept: HEPATOLOGY | Facility: CLINIC | Age: 39
End: 2019-07-25

## 2019-07-25 NOTE — TELEPHONE ENCOUNTER
----- Message from Aleksandra Moon MA sent at 7/23/2019  4:56 PM CDT -----  Contact: Pt      ----- Message -----  From: Antoinette MUELLER August  Sent: 7/23/2019   4:53 PM  To: Jesus Galvan Staff        Reason for call: Calling to Reschedule appt scheduled on 08/01/2019        Communication Preference: [phone]    Additional Information:

## 2019-07-25 NOTE — TELEPHONE ENCOUNTER
MA called patient back reschedule his appt per patient request mailed new appt reminder to patient.

## 2019-07-30 ENCOUNTER — PATIENT MESSAGE (OUTPATIENT)
Dept: INTERNAL MEDICINE | Facility: CLINIC | Age: 39
End: 2019-07-30

## 2019-07-30 ENCOUNTER — OFFICE VISIT (OUTPATIENT)
Dept: URGENT CARE | Facility: CLINIC | Age: 39
End: 2019-07-30
Payer: COMMERCIAL

## 2019-07-30 VITALS
HEIGHT: 65 IN | TEMPERATURE: 98 F | RESPIRATION RATE: 15 BRPM | SYSTOLIC BLOOD PRESSURE: 135 MMHG | WEIGHT: 246 LBS | DIASTOLIC BLOOD PRESSURE: 84 MMHG | OXYGEN SATURATION: 96 % | BODY MASS INDEX: 40.98 KG/M2 | HEART RATE: 89 BPM
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
## 2019-07-30 PROCEDURE — 99214 PR OFFICE/OUTPT VISIT, EST, LEVL IV, 30-39 MIN: ICD-10-PCS | Mod: S$GLB,,, | Performed by: NURSE PRACTITIONER

## 2019-07-30 PROCEDURE — 99214 OFFICE O/P EST MOD 30 MIN: CPT | Mod: S$GLB,,, | Performed by: NURSE PRACTITIONER

## 2019-07-30 PROCEDURE — 3008F PR BODY MASS INDEX (BMI) DOCUMENTED: ICD-10-PCS | Mod: CPTII,S$GLB,, | Performed by: NURSE PRACTITIONER

## 2019-07-30 PROCEDURE — 3008F BODY MASS INDEX DOCD: CPT | Mod: CPTII,S$GLB,, | Performed by: NURSE PRACTITIONER

## 2019-07-30 RX ORDER — CEPHALEXIN 500 MG/1
500 CAPSULE ORAL EVERY 6 HOURS
Qty: 28 CAPSULE | Refills: 0 | Status: ON HOLD | OUTPATIENT
Start: 2019-07-30 | End: 2019-08-03 | Stop reason: HOSPADM

## 2019-07-30 NOTE — PATIENT INSTRUCTIONS
Infected Insect Bite or Sting    When an insect stings you, it injects venom. When an insect bites you, it does not. Stings and bites may cause a local reaction. Or they may cause a reaction that affects your whole body. Bites and stings may become infected. Signs of infection include redness, warmth, pain, drainage of pus, and swelling. Infections will need treatment with antibiotics and should get better over the next 10 days.  Home care  The following will help you care for your bite or sting at home:  · If a stinger is still in your skin, it will need to be removed. Don't use tweezers. Gently scrape the stinger from the side with a firm object such as the side of a credit card. This will loosen it and remove it from your skin.  · If itching is a problem, applying ice packs to the sting area will help.  · Wash the area with soap and water at least 3 times a day. Apply a topical antibiotic cream or ointment.  · You can use an over-the counter antihistamine unless your doctor has given you a prescription antihistamine. You may use antihistamines to reduce itching if large areas of the skin are involved. Use lower doses during the daytime and higher doses at bedtime since the drug may make you sleepy. Don't use an antihistamine if you have glaucoma or if you are a man with trouble urinating due to an enlarged prostate. Some antihistamines cause less drowsiness and are a good choice for daytime use.  · If oral antibiotics have been prescribed, be sure to take them as directed until they are all finished.  · You may use over-the-counter pain medicine to control pain, unless another pain medicine was prescribed. Talk with your doctor before using acetaminophen or ibuprofen if you have chronic liver or kidney disease. Also talk with your doctor if you have ever had a stomach ulcer or gastrointestinal bleeding.  Follow-up care  Follow up with your healthcare provider, or as advised if you don't get better over the next  2 days or if your symptoms get worse.  Call 911  Call 911 if any of these occur:  · Swelling of the face, eyelids, mouth, throat, or tongue  · Difficulty swallowing or breathing  When to seek medical advice  Call your healthcare provider right away if any of these occur:  · Spreading areas of redness or swelling  · Fever of 100.4°F (38°C) or higher, or as directed by your healthcare provider  · Increased local pain  · Headache, fever, chills, muscle or joint aching, or vomiting,  · New rash  Date Last Reviewed: 10/1/2016  © 5739-4162 [employer]. [address] [place] [address]. All rights reserved. This information is not intended as a substitute for professional medical care. Always follow your healthcare professional's instructions.      -7 days of antibiotics.  -NSAIDS as needed for pain and swelling.  -Ice and compression to the affected area.  -Follow up with Internal medicine doctor.  Please follow up with your Primary care provider within 2-5 days if your signs and symptoms have not resolved or worsen.     If your condition worsens or fails to improve we recommend that you receive another evaluation at the emergency room immediately or contact your primary medical clinic to discuss your concerns.   You must understand that you have received an Urgent Care treatment only and that you may be released before all of your medical problems are known or treated. You, the patient, will arrange for follow up care as instructed.

## 2019-07-30 NOTE — PROGRESS NOTES
"Subjective:       Patient ID: Hermelindo Garza III is a 39 y.o. male.    Vitals:  height is 5' 5" (1.651 m) and weight is 111.6 kg (246 lb). His oral temperature is 97.6 °F (36.4 °C). His blood pressure is 135/84 and his pulse is 89. His respiration is 15 and oxygen saturation is 96%.     Chief Complaint: Insect Bite    The patient presents to clinic today with complaints of worsening left forearm swelling. Patient is unsure for possible bug bit him.  Patient has been in touch with Internal Medicine doctor was advised to come in for further evaluation.  Patient reports that the symptoms have been intermittently worsening over the last 2 days.  Patient is concerned about the worsening redness and swelling to the left lateral elbow.  Patient has full range of motion in the left elbow.  Denies any fever or chills.  Patient denies any trauma to the left elbow.    Insect Bite   This is a new problem. The current episode started in the past 7 days. The problem occurs constantly. The problem has been gradually worsening. Pertinent negatives include no arthralgias, chills, coughing, fever, joint swelling or sore throat. The symptoms are aggravated by bending. He has tried nothing for the symptoms.       Constitution: Negative for chills and fever.   HENT: Negative for facial swelling and sore throat.    Neck: Negative for painful lymph nodes.   Eyes: Negative for eye itching and eyelid swelling.   Respiratory: Negative for cough.    Musculoskeletal: Negative for joint pain and joint swelling.   Skin: Positive for erythema. Negative for color change, pale, wound, abrasion, laceration, lesion, skin thickening/induration, puncture wound, abscess, avulsion and hives.   Allergic/Immunologic: Negative for environmental allergies, immunocompromised state and hives.   Hematologic/Lymphatic: Negative for swollen lymph nodes.       Objective:      Physical Exam   Constitutional: He is oriented to person, place, and time. He " appears well-developed and well-nourished.   HENT:   Head: Normocephalic and atraumatic. Head is without abrasion, without contusion and without laceration.   Right Ear: External ear normal.   Left Ear: External ear normal.   Nose: Nose normal.   Mouth/Throat: Oropharynx is clear and moist.   Eyes: Pupils are equal, round, and reactive to light. Conjunctivae, EOM and lids are normal.   Neck: Trachea normal, full passive range of motion without pain and phonation normal. Neck supple.   Cardiovascular: Normal rate, regular rhythm and normal heart sounds.   Pulses:       Radial pulses are 2+ on the right side, and 2+ on the left side.   Pulmonary/Chest: Effort normal and breath sounds normal. No stridor. No respiratory distress.   Musculoskeletal: Normal range of motion.   Neurological: He is alert and oriented to person, place, and time.   Skin: Skin is warm, dry and intact. Capillary refill takes less than 2 seconds. No abrasion, no bruising, no burn, no ecchymosis, no laceration, no lesion and no rash noted. There is erythema.        No fluctuance present to the left lateral elbow.  There is an area of induration.  The area is TTP.  There is surrounding erythema and swelling extending down the left forearm.  Patient has full range of motion in the left elbow.   Psychiatric: He has a normal mood and affect. His speech is normal and behavior is normal. Judgment and thought content normal. Cognition and memory are normal.   Nursing note and vitals reviewed.      Assessment:       1. Bug bite with infection, initial encounter        Plan:       Concerns about a possible left arm cellulitis.  Differential diagnosis include tendinitis.  The patient is started on antibiotics due to concern of worsening infection.  Patient is advised on the use of NSAIDs and ice with compression to the affected elbow.  Advised follow-up with primary care doctor.  Verbalizes understanding and is in agreement.    Bug bite with infection,  initial encounter  -     cephALEXin (KEFLEX) 500 MG capsule; Take 1 capsule (500 mg total) by mouth every 6 (six) hours. for 7 days  Dispense: 28 capsule; Refill: 0      Patient Instructions     Infected Insect Bite or Sting    When an insect stings you, it injects venom. When an insect bites you, it does not. Stings and bites may cause a local reaction. Or they may cause a reaction that affects your whole body. Bites and stings may become infected. Signs of infection include redness, warmth, pain, drainage of pus, and swelling. Infections will need treatment with antibiotics and should get better over the next 10 days.  Home care  The following will help you care for your bite or sting at home:  · If a stinger is still in your skin, it will need to be removed. Don't use tweezers. Gently scrape the stinger from the side with a firm object such as the side of a credit card. This will loosen it and remove it from your skin.  · If itching is a problem, applying ice packs to the sting area will help.  · Wash the area with soap and water at least 3 times a day. Apply a topical antibiotic cream or ointment.  · You can use an over-the counter antihistamine unless your doctor has given you a prescription antihistamine. You may use antihistamines to reduce itching if large areas of the skin are involved. Use lower doses during the daytime and higher doses at bedtime since the drug may make you sleepy. Don't use an antihistamine if you have glaucoma or if you are a man with trouble urinating due to an enlarged prostate. Some antihistamines cause less drowsiness and are a good choice for daytime use.  · If oral antibiotics have been prescribed, be sure to take them as directed until they are all finished.  · You may use over-the-counter pain medicine to control pain, unless another pain medicine was prescribed. Talk with your doctor before using acetaminophen or ibuprofen if you have chronic liver or kidney disease. Also talk  with your doctor if you have ever had a stomach ulcer or gastrointestinal bleeding.  Follow-up care  Follow up with your healthcare provider, or as advised if you don't get better over the next 2 days or if your symptoms get worse.  Call 911  Call 911 if any of these occur:  · Swelling of the face, eyelids, mouth, throat, or tongue  · Difficulty swallowing or breathing  When to seek medical advice  Call your healthcare provider right away if any of these occur:  · Spreading areas of redness or swelling  · Fever of 100.4°F (38°C) or higher, or as directed by your healthcare provider  · Increased local pain  · Headache, fever, chills, muscle or joint aching, or vomiting,  · New rash  Date Last Reviewed: 10/1/2016  © 6428-0843 [employer]. [address] [place] [address], [place] [address]. All rights reserved. This information is not intended as a substitute for professional medical care. Always follow your healthcare professional's instructions.      -7 days of antibiotics.  -NSAIDS as needed for pain and swelling.  -Ice and compression to the affected area.  -Follow up with Internal medicine doctor.  Please follow up with your Primary care provider within 2-5 days if your signs and symptoms have not resolved or worsen.     If your condition worsens or fails to improve we recommend that you receive another evaluation at the emergency room immediately or contact your primary medical clinic to discuss your concerns.   You must understand that you have received an Urgent Care treatment only and that you may be released before all of your medical problems are known or treated. You, the patient, will arrange for follow up care as instructed.

## 2019-07-31 ENCOUNTER — TELEPHONE (OUTPATIENT)
Dept: INTERNAL MEDICINE | Facility: CLINIC | Age: 39
End: 2019-07-31

## 2019-07-31 NOTE — TELEPHONE ENCOUNTER
----- Message from Zoë Brownlee sent at 7/31/2019  7:23 AM CDT -----  Contact: patient [phone] or work [phone]  Pt went to urgent care with a possible bug bite on top area of left arm. He was given antibiotics and this morning, it isn't any better and looks the redness may have spread to lower part of left arm.  It is sensitive and warm to the touch. Patient wants to know if you think he needs to be seen today or should he wait another day to see if it gets better.

## 2019-07-31 NOTE — TELEPHONE ENCOUNTER
Give the antibiotics some time to work. If redness continues to spread, will see him in clinic tomorrow. Apply ice to affected area. Take ibuprofen or naproxen for anti-inflammatory and pain relief.   ER if severe pain, swelling worsens, or develops numbness/tingling.

## 2019-08-01 ENCOUNTER — OFFICE VISIT (OUTPATIENT)
Dept: INTERNAL MEDICINE | Facility: CLINIC | Age: 39
End: 2019-08-01
Payer: COMMERCIAL

## 2019-08-01 ENCOUNTER — HOSPITAL ENCOUNTER (OUTPATIENT)
Facility: HOSPITAL | Age: 39
Discharge: HOME | End: 2019-08-03
Attending: EMERGENCY MEDICINE | Admitting: INTERNAL MEDICINE
Payer: COMMERCIAL

## 2019-08-01 ENCOUNTER — NURSE TRIAGE (OUTPATIENT)
Dept: ADMINISTRATIVE | Facility: CLINIC | Age: 39
End: 2019-08-01

## 2019-08-01 VITALS
SYSTOLIC BLOOD PRESSURE: 128 MMHG | WEIGHT: 250.25 LBS | HEART RATE: 104 BPM | BODY MASS INDEX: 41.69 KG/M2 | DIASTOLIC BLOOD PRESSURE: 98 MMHG | OXYGEN SATURATION: 94 % | HEIGHT: 65 IN

## 2019-08-01 DIAGNOSIS — L03.114 CELLULITIS OF LEFT ARM: Primary | ICD-10-CM

## 2019-08-01 DIAGNOSIS — L03.114 CELLULITIS OF LEFT ARM: ICD-10-CM

## 2019-08-01 DIAGNOSIS — G47.33 OSA (OBSTRUCTIVE SLEEP APNEA): ICD-10-CM

## 2019-08-01 LAB
ALBUMIN SERPL BCP-MCNC: 4.3 G/DL (ref 3.5–5.2)
ALP SERPL-CCNC: 109 U/L (ref 55–135)
ALT SERPL W/O P-5'-P-CCNC: 68 U/L (ref 10–44)
ANION GAP SERPL CALC-SCNC: 11 MMOL/L (ref 8–16)
AST SERPL-CCNC: 29 U/L (ref 10–40)
BASOPHILS # BLD AUTO: 0.06 K/UL (ref 0–0.2)
BASOPHILS NFR BLD: 0.6 % (ref 0–1.9)
BILIRUB SERPL-MCNC: 0.8 MG/DL (ref 0.1–1)
BUN SERPL-MCNC: 12 MG/DL (ref 6–20)
CALCIUM SERPL-MCNC: 10.5 MG/DL (ref 8.7–10.5)
CHLORIDE SERPL-SCNC: 102 MMOL/L (ref 95–110)
CO2 SERPL-SCNC: 27 MMOL/L (ref 23–29)
CREAT SERPL-MCNC: 0.8 MG/DL (ref 0.5–1.4)
DIFFERENTIAL METHOD: NORMAL
EOSINOPHIL # BLD AUTO: 0.1 K/UL (ref 0–0.5)
EOSINOPHIL NFR BLD: 1.1 % (ref 0–8)
ERYTHROCYTE [DISTWIDTH] IN BLOOD BY AUTOMATED COUNT: 13.1 % (ref 11.5–14.5)
EST. GFR  (AFRICAN AMERICAN): >60 ML/MIN/1.73 M^2
EST. GFR  (NON AFRICAN AMERICAN): >60 ML/MIN/1.73 M^2
GLUCOSE SERPL-MCNC: 92 MG/DL (ref 70–110)
HCT VFR BLD AUTO: 50.5 % (ref 40–54)
HGB BLD-MCNC: 16.5 G/DL (ref 14–18)
IMM GRANULOCYTES # BLD AUTO: 0.01 K/UL (ref 0–0.04)
IMM GRANULOCYTES NFR BLD AUTO: 0.1 % (ref 0–0.5)
LACTATE SERPL-SCNC: 1.1 MMOL/L (ref 0.5–2.2)
LYMPHOCYTES # BLD AUTO: 2 K/UL (ref 1–4.8)
LYMPHOCYTES NFR BLD: 20.7 % (ref 18–48)
MCH RBC QN AUTO: 30.2 PG (ref 27–31)
MCHC RBC AUTO-ENTMCNC: 32.7 G/DL (ref 32–36)
MCV RBC AUTO: 92 FL (ref 82–98)
MONOCYTES # BLD AUTO: 0.8 K/UL (ref 0.3–1)
MONOCYTES NFR BLD: 7.8 % (ref 4–15)
NEUTROPHILS # BLD AUTO: 6.8 K/UL (ref 1.8–7.7)
NEUTROPHILS NFR BLD: 69.7 % (ref 38–73)
NRBC BLD-RTO: 0 /100 WBC
PLATELET # BLD AUTO: 216 K/UL (ref 150–350)
PMV BLD AUTO: 10 FL (ref 9.2–12.9)
POTASSIUM SERPL-SCNC: 3.8 MMOL/L (ref 3.5–5.1)
PROT SERPL-MCNC: 8.6 G/DL (ref 6–8.4)
RBC # BLD AUTO: 5.47 M/UL (ref 4.6–6.2)
SODIUM SERPL-SCNC: 140 MMOL/L (ref 136–145)
WBC # BLD AUTO: 9.72 K/UL (ref 3.9–12.7)

## 2019-08-01 PROCEDURE — 99285 EMERGENCY DEPT VISIT HI MDM: CPT | Mod: 25

## 2019-08-01 PROCEDURE — 99213 OFFICE O/P EST LOW 20 MIN: CPT | Mod: S$GLB,,, | Performed by: STUDENT IN AN ORGANIZED HEALTH CARE EDUCATION/TRAINING PROGRAM

## 2019-08-01 PROCEDURE — 80053 COMPREHEN METABOLIC PANEL: CPT

## 2019-08-01 PROCEDURE — 63600175 PHARM REV CODE 636 W HCPCS: Performed by: INTERNAL MEDICINE

## 2019-08-01 PROCEDURE — 85025 COMPLETE CBC W/AUTO DIFF WBC: CPT

## 2019-08-01 PROCEDURE — 99213 PR OFFICE/OUTPT VISIT, EST, LEVL III, 20-29 MIN: ICD-10-PCS | Mod: S$GLB,,, | Performed by: STUDENT IN AN ORGANIZED HEALTH CARE EDUCATION/TRAINING PROGRAM

## 2019-08-01 PROCEDURE — G0378 HOSPITAL OBSERVATION PER HR: HCPCS

## 2019-08-01 PROCEDURE — 87040 BLOOD CULTURE FOR BACTERIA: CPT

## 2019-08-01 PROCEDURE — 99284 EMERGENCY DEPT VISIT MOD MDM: CPT | Mod: ,,, | Performed by: EMERGENCY MEDICINE

## 2019-08-01 PROCEDURE — 3008F BODY MASS INDEX DOCD: CPT | Mod: CPTII,S$GLB,, | Performed by: STUDENT IN AN ORGANIZED HEALTH CARE EDUCATION/TRAINING PROGRAM

## 2019-08-01 PROCEDURE — 99999 PR PBB SHADOW E&M-EST. PATIENT-LVL III: ICD-10-PCS | Mod: PBBFAC,,, | Performed by: STUDENT IN AN ORGANIZED HEALTH CARE EDUCATION/TRAINING PROGRAM

## 2019-08-01 PROCEDURE — 96375 TX/PRO/DX INJ NEW DRUG ADDON: CPT

## 2019-08-01 PROCEDURE — 83605 ASSAY OF LACTIC ACID: CPT

## 2019-08-01 PROCEDURE — 63600175 PHARM REV CODE 636 W HCPCS: Performed by: EMERGENCY MEDICINE

## 2019-08-01 PROCEDURE — 99999 PR PBB SHADOW E&M-EST. PATIENT-LVL III: CPT | Mod: PBBFAC,,, | Performed by: STUDENT IN AN ORGANIZED HEALTH CARE EDUCATION/TRAINING PROGRAM

## 2019-08-01 PROCEDURE — 96365 THER/PROPH/DIAG IV INF INIT: CPT

## 2019-08-01 PROCEDURE — 25000003 PHARM REV CODE 250: Performed by: INTERNAL MEDICINE

## 2019-08-01 PROCEDURE — 3008F PR BODY MASS INDEX (BMI) DOCUMENTED: ICD-10-PCS | Mod: CPTII,S$GLB,, | Performed by: STUDENT IN AN ORGANIZED HEALTH CARE EDUCATION/TRAINING PROGRAM

## 2019-08-01 PROCEDURE — 99284 PR EMERGENCY DEPT VISIT,LEVEL IV: ICD-10-PCS | Mod: ,,, | Performed by: EMERGENCY MEDICINE

## 2019-08-01 RX ORDER — IPRATROPIUM BROMIDE AND ALBUTEROL SULFATE 2.5; .5 MG/3ML; MG/3ML
3 SOLUTION RESPIRATORY (INHALATION) EVERY 4 HOURS PRN
Status: DISCONTINUED | OUTPATIENT
Start: 2019-08-01 | End: 2019-08-03 | Stop reason: HOSPADM

## 2019-08-01 RX ORDER — VANCOMYCIN 2 GRAM/500 ML IN 0.9 % SODIUM CHLORIDE INTRAVENOUS
2000
Status: DISCONTINUED | OUTPATIENT
Start: 2019-08-02 | End: 2019-08-03 | Stop reason: HOSPADM

## 2019-08-01 RX ORDER — CETIRIZINE HYDROCHLORIDE 10 MG/1
10 TABLET ORAL DAILY
Status: DISCONTINUED | OUTPATIENT
Start: 2019-08-01 | End: 2019-08-03 | Stop reason: HOSPADM

## 2019-08-01 RX ORDER — ACETAMINOPHEN 325 MG/1
650 TABLET ORAL EVERY 4 HOURS PRN
Status: DISCONTINUED | OUTPATIENT
Start: 2019-08-01 | End: 2019-08-03 | Stop reason: HOSPADM

## 2019-08-01 RX ORDER — IBUPROFEN 200 MG
16 TABLET ORAL
Status: DISCONTINUED | OUTPATIENT
Start: 2019-08-01 | End: 2019-08-03 | Stop reason: HOSPADM

## 2019-08-01 RX ORDER — LOTEPREDNOL ETABONATE 5 MG/ML
1 SUSPENSION/ DROPS OPHTHALMIC 4 TIMES DAILY
Status: DISCONTINUED | OUTPATIENT
Start: 2019-08-02 | End: 2019-08-03 | Stop reason: HOSPADM

## 2019-08-01 RX ORDER — OXYCODONE HYDROCHLORIDE 5 MG/1
5 TABLET ORAL EVERY 6 HOURS PRN
Status: DISCONTINUED | OUTPATIENT
Start: 2019-08-01 | End: 2019-08-03 | Stop reason: HOSPADM

## 2019-08-01 RX ORDER — PROMETHAZINE HYDROCHLORIDE 25 MG/1
25 TABLET ORAL EVERY 6 HOURS PRN
Status: DISCONTINUED | OUTPATIENT
Start: 2019-08-01 | End: 2019-08-03 | Stop reason: HOSPADM

## 2019-08-01 RX ORDER — IBUPROFEN 200 MG
24 TABLET ORAL
Status: DISCONTINUED | OUTPATIENT
Start: 2019-08-01 | End: 2019-08-03 | Stop reason: HOSPADM

## 2019-08-01 RX ORDER — AMOXICILLIN 250 MG
1 CAPSULE ORAL 2 TIMES DAILY
Status: DISCONTINUED | OUTPATIENT
Start: 2019-08-01 | End: 2019-08-03 | Stop reason: HOSPADM

## 2019-08-01 RX ORDER — GLUCAGON 1 MG
1 KIT INJECTION
Status: DISCONTINUED | OUTPATIENT
Start: 2019-08-01 | End: 2019-08-03 | Stop reason: HOSPADM

## 2019-08-01 RX ORDER — ENOXAPARIN SODIUM 100 MG/ML
40 INJECTION SUBCUTANEOUS EVERY 24 HOURS
Status: DISCONTINUED | OUTPATIENT
Start: 2019-08-01 | End: 2019-08-03 | Stop reason: HOSPADM

## 2019-08-01 RX ORDER — OLOPATADINE HYDROCHLORIDE 1 MG/ML
1 SOLUTION/ DROPS OPHTHALMIC 2 TIMES DAILY
Status: DISCONTINUED | OUTPATIENT
Start: 2019-08-01 | End: 2019-08-02 | Stop reason: CLARIF

## 2019-08-01 RX ORDER — PANTOPRAZOLE SODIUM 40 MG/1
40 TABLET, DELAYED RELEASE ORAL 2 TIMES DAILY
Status: DISCONTINUED | OUTPATIENT
Start: 2019-08-01 | End: 2019-08-03 | Stop reason: HOSPADM

## 2019-08-01 RX ORDER — DEXTROSE MONOHYDRATE 100 MG/ML
12.5 INJECTION, SOLUTION INTRAVENOUS
Status: DISCONTINUED | OUTPATIENT
Start: 2019-08-01 | End: 2019-08-03 | Stop reason: HOSPADM

## 2019-08-01 RX ORDER — DIPHENHYDRAMINE HCL 25 MG
25 CAPSULE ORAL EVERY 6 HOURS PRN
Status: DISCONTINUED | OUTPATIENT
Start: 2019-08-01 | End: 2019-08-03 | Stop reason: HOSPADM

## 2019-08-01 RX ORDER — DEXTROSE MONOHYDRATE 100 MG/ML
25 INJECTION, SOLUTION INTRAVENOUS
Status: DISCONTINUED | OUTPATIENT
Start: 2019-08-01 | End: 2019-08-03 | Stop reason: HOSPADM

## 2019-08-01 RX ORDER — RAMELTEON 8 MG/1
8 TABLET ORAL NIGHTLY PRN
Status: DISCONTINUED | OUTPATIENT
Start: 2019-08-01 | End: 2019-08-03 | Stop reason: HOSPADM

## 2019-08-01 RX ORDER — ONDANSETRON 8 MG/1
8 TABLET, ORALLY DISINTEGRATING ORAL EVERY 8 HOURS PRN
Status: DISCONTINUED | OUTPATIENT
Start: 2019-08-01 | End: 2019-08-03 | Stop reason: HOSPADM

## 2019-08-01 RX ORDER — CLONAZEPAM 0.5 MG/1
0.5 TABLET ORAL 2 TIMES DAILY PRN
Status: DISCONTINUED | OUTPATIENT
Start: 2019-08-01 | End: 2019-08-03 | Stop reason: HOSPADM

## 2019-08-01 RX ORDER — SODIUM CHLORIDE 0.9 % (FLUSH) 0.9 %
10 SYRINGE (ML) INJECTION
Status: DISCONTINUED | OUTPATIENT
Start: 2019-08-01 | End: 2019-08-03 | Stop reason: HOSPADM

## 2019-08-01 RX ORDER — METHYLPHENIDATE HYDROCHLORIDE 5 MG/1
10 TABLET ORAL
Status: DISCONTINUED | OUTPATIENT
Start: 2019-08-02 | End: 2019-08-02

## 2019-08-01 RX ORDER — SODIUM CHLORIDE 0.9 % (FLUSH) 0.9 %
10 SYRINGE (ML) INJECTION
Status: DISCONTINUED | OUTPATIENT
Start: 2019-08-01 | End: 2019-08-01

## 2019-08-01 RX ORDER — DIPHENHYDRAMINE HYDROCHLORIDE 50 MG/ML
25 INJECTION INTRAMUSCULAR; INTRAVENOUS ONCE
Status: COMPLETED | OUTPATIENT
Start: 2019-08-01 | End: 2019-08-01

## 2019-08-01 RX ORDER — VANCOMYCIN 2 GRAM/500 ML IN 0.9 % SODIUM CHLORIDE INTRAVENOUS
2000
Status: COMPLETED | OUTPATIENT
Start: 2019-08-01 | End: 2019-08-01

## 2019-08-01 RX ADMIN — DIPHENHYDRAMINE HYDROCHLORIDE 25 MG: 50 INJECTION INTRAMUSCULAR; INTRAVENOUS at 09:08

## 2019-08-01 RX ADMIN — CEFTRIAXONE SODIUM 2 G: 2 INJECTION, POWDER, FOR SOLUTION INTRAMUSCULAR; INTRAVENOUS at 06:08

## 2019-08-01 RX ADMIN — PANTOPRAZOLE SODIUM 40 MG: 40 TABLET, DELAYED RELEASE ORAL at 10:08

## 2019-08-01 RX ADMIN — SENNOSIDES,DOCUSATE SODIUM 1 TABLET: 8.6; 5 TABLET, FILM COATED ORAL at 10:08

## 2019-08-01 RX ADMIN — CETIRIZINE HYDROCHLORIDE 10 MG: 10 TABLET, FILM COATED ORAL at 10:08

## 2019-08-01 RX ADMIN — OLOPATADINE HYDROCHLORIDE 1 DROP: 1 SOLUTION/ DROPS OPHTHALMIC at 10:08

## 2019-08-01 RX ADMIN — VANCOMYCIN HYDROCHLORIDE 2000 MG: 100 INJECTION, POWDER, LYOPHILIZED, FOR SOLUTION INTRAVENOUS at 07:08

## 2019-08-01 NOTE — ED PROVIDER NOTES
Encounter Date: 8/1/2019    SCRIBE #1 NOTE: I, Jacob Mitchell, am scribing for, and in the presence of,  Dr. Kimbrough. I have scribed the entire note.       History     Chief Complaint   Patient presents with    Cellulitis     to left arm. Swelling, pain and redness began Tuesday. Sent for abx.     Patient is a 39 year old male who presents to the ED with a complaint of cellulitis to the left arm. States swelling, pain and redness. Symptoms noticed Tuesday. Today noticed that had began to swell to his hand and that redness increased. Patient has no drug allergies. Has been taking antibiotics since the incident. Denies trouble swallowing. No previous episodes with these symptoms. Pmhx of GERD, renal calculi, ADD, lumbar disc disease, esophageal ulcer, kidney stones, and LUBA.    The history is provided by the patient.     Review of patient's allergies indicates:  No Known Allergies  Past Medical History:   Diagnosis Date    ADD (attention deficit disorder) 5/9/2012    Asthma 5/9/2012    Esophageal ulcer     GERD (gastroesophageal reflux disease) 5/9/2012    Kidney stones 5/2014    Lumbar disc disease 5/9/2012    LUBA (obstructive sleep apnea)     Renal calculi 5/9/2012     Past Surgical History:   Procedure Laterality Date    APPENDECTOMY  2006    COLONOSCOPY  01/22/2019    internal hemorrhoids, o/w normal - repeat at age 52    EGD (ESOPHAGOGASTRODUODENOSCOPY) N/A 5/29/2013    [facility] ([facility]    ESOPHAGOGASTRODUODENOSCOPY  01/22/2019    LA grade B esophagitis; esophageal stenosis- dilated; gastritis; H pylori pathology negative    LUMBAR LAMINECTOMY  2010     Family History   Problem Relation Age of Onset    Hypertension Mother     Transient ischemic attack Mother     Sleep apnea Mother     Chronic back pain Father     MARTITA disease Father     Sleep apnea Father     Brain cancer Maternal Grandmother     Diabetes Maternal Grandfather     Breast cancer Paternal  Grandmother      Social History     Tobacco Use    Smoking status: Passive Smoke Exposure - Never Smoker    Smokeless tobacco: Never Used   Substance Use Topics    Alcohol use: Yes     Alcohol/week: 0.0 oz     Frequency: 2-3 times a week     Drinks per session: 1 or 2     Binge frequency: Less than monthly     Comment: Occasional    Drug use: No     Review of Systems   HENT: Negative for trouble swallowing.    Musculoskeletal:        Left arm pain.   Skin: Positive for color change (erythema).   All other systems reviewed and are negative.      Physical Exam     Initial Vitals [08/01/19 1648]   BP Pulse Resp Temp SpO2   (!) 150/103 96 14 98.9 °F (37.2 °C) 96 %      MAP       --         Physical Exam    Vitals reviewed.  Constitutional: He appears well-developed and well-nourished. No distress.   HENT:   Head: Normocephalic and atraumatic.   Mouth/Throat: No oral lesions.   Eyes: EOM are normal. Pupils are equal, round, and reactive to light.   Neck: No tracheal deviation present. No JVD present.   Cardiovascular: Normal rate, regular rhythm, normal heart sounds and intact distal pulses.   Pulmonary/Chest: Breath sounds normal. No stridor. No respiratory distress.   Abdominal: Soft. Bowel sounds are normal. He exhibits no distension. There is no tenderness.   Obese   Musculoskeletal: Normal range of motion. He exhibits no edema.   Neurological: He is alert and oriented to person, place, and time. GCS score is 15. GCS eye subscore is 4. GCS verbal subscore is 5. GCS motor subscore is 6.   Skin:   Well demarcated erythema noted to track from proximal upper arm to wrist with associated swelling with mild tenderness and without associated abcess or bony deformity.   Psychiatric: His behavior is normal. Thought content normal.         ED Course   Procedures  Labs Reviewed   COMPREHENSIVE METABOLIC PANEL - Abnormal; Notable for the following components:       Result Value    Total Protein 8.6 (*)     ALT 68 (*)     All  other components within normal limits   CULTURE, BLOOD   CULTURE, BLOOD   CBC W/ AUTO DIFFERENTIAL   LACTIC ACID, PLASMA             Medical Decision Making:   History:   Old Medical Records: I decided to obtain old medical records.  Differential Diagnosis:   Cellulitis vs bacteremia vs hyperglycemia vs rash  Clinical Tests:   Lab Tests: Ordered and Reviewed            Scribe Attestation:   Scribe #1: I performed the above scribed service and the documentation accurately describes the services I performed. I attest to the accuracy of the note.    Attending Attestation:             Attending ED Notes:   Laboratory evaluation does not reveal evidence of significant underlying hematologic or metabolic disorder.  Blood cultures have been obtained, and IV doses of Rocephin and vancomycin have been administered.  Findings and concerns have been discussed with internal medicine on call, and the patient will be placed in observation under the care in fair condition for further therapy and management.             Clinical Impression:       ICD-10-CM ICD-9-CM   1. Cellulitis of left arm L03.114 682.3         Disposition:   Disposition: Placed in Observation  Condition: Fair  IM                        Samir Kimbrough MD  08/01/19 6427

## 2019-08-01 NOTE — PROGRESS NOTES
I have seen the patient, reviewed the house staff's history and physical, assessment and plan. I have personally interviewed and reexamined the patient at bedside and agree with the findings, assessment and plan.     On examination patient's left arm is erythematous from approximately 2 in proximal to the olecranon and extending distally to the fingers.  Approximately 3/4 circumferential and seems to be most concentrated or at least possibly initiated at the olecranon bursa.  There is some bogginess posterior laterally.  He has full range of motion to the wrist and elbow.  Since he has been on oral antibiotics and this is worsening I recommended treatment at the emergency room to consider a dose of IV antibiotics, additionally since his pulse is slightly elevated.  Another consideration would be gout, patient has no history there of.

## 2019-08-01 NOTE — PROGRESS NOTES
Hermelindo Garza III  1980        Subjective     Chief Complaint: Left arm rash for 1 week.    History of Present Illness:  Mr. Hermelindo Garza III is a 39 y.o. male with PMH of asthma, LUBA on CPAP, morbid obesity and pre DM, SHEIKH and GERD came in to the clinic for evaluation of Lt arm rash that started 1 week ago.    Patient was at his usual state of health until 7 days ago when she noticed Lt forearm rash. It started below his Lt elbow, about 4 cm in diameter, non itchy and non tender. He reports initial improvement of that rash then it started to worsen with increasing red rash involving his Lt forearm. That prompted him to visit Urgent clinic on 7/30 and was thought he had infected bug bite and was prescribed cephALEXin (KEFLEX) 500 MG capsule by mouth every 6 (six) hours for a 7 day-course.  Patient reports no improvement of his symptoms and decided to come today to the clinic as he started noticing increased temperature of his Forearm, and increasing sweating of Lt forearm and hand. He still reports no itchiness, tenderness, joint pain, fever, chills, SOB, night sweats, or lightheadedness.     Patient reports no recent travel however, he admits felting he was bit on the day of symptoms start by a possible rash but not sure. He also admits swimming at his friend pool in [place] the day of symptoms start. Reports clean water and never had any issues swimming there before.       Review of Systems   Constitutional: Negative for chills, diaphoresis, fever and malaise/fatigue.   HENT: Negative for congestion, sinus pain and sore throat.    Eyes: Negative for blurred vision and double vision.   Respiratory: Negative for cough, sputum production, shortness of breath and wheezing.    Cardiovascular: Negative for chest pain, orthopnea and leg swelling.   Gastrointestinal: Negative for abdominal pain, constipation, diarrhea, nausea and vomiting.   Genitourinary: Negative for dysuria, flank pain,  hematuria and urgency.   Musculoskeletal: Negative for joint pain, myalgias and neck pain.   Skin: Positive for rash. Negative for itching.   Neurological: Positive for sensory change (decrease sensation over left hand). Negative for dizziness, tremors, focal weakness, loss of consciousness and headaches.   Psychiatric/Behavioral: Negative for depression. The patient is nervous/anxious.        PAST HISTORY:     Past Medical History:   Diagnosis Date    ADD (attention deficit disorder) 5/9/2012    Asthma 5/9/2012    Esophageal ulcer     GERD (gastroesophageal reflux disease) 5/9/2012    Kidney stones 5/2014    Lumbar disc disease 5/9/2012    LUBA (obstructive sleep apnea)     Renal calculi 5/9/2012       Past Surgical History:   Procedure Laterality Date    APPENDECTOMY  2006    COLONOSCOPY  01/22/2019    internal hemorrhoids, o/w normal - repeat at age 52    EGD (ESOPHAGOGASTRODUODENOSCOPY) N/A 5/29/2013    Performed by [facility] at [facility] ([facility] F[facility])    ESOPHAGOGASTRODUODENOSCOPY  01/22/2019    [place] grade B esophagitis; esophageal stenosis- dilated; gastritis; H pylori pathology negative    LUMBAR LAMINECTOMY  2010       Family History   Problem Relation Age of Onset    Hypertension Mother     Transient ischemic attack Mother     Sleep apnea Mother     Chronic back pain Father     MARTITA disease Father     Sleep apnea Father     Brain cancer Maternal Grandmother     Diabetes Maternal Grandfather     Breast cancer Paternal Grandmother        Social History     Socioeconomic History    Marital status:      Spouse name: Not on file    Number of children: Not on file    Years of education: Not on file    Highest education level: Not on file   Occupational History    Not on file   Social Needs    Financial resource strain: Not very hard    Food insecurity:     Worry: Never true     Inability: Never true    Transportation needs:     Medical: No     Non-medical: No   Tobacco Use     Smoking status: Passive Smoke Exposure - Never Smoker    Smokeless tobacco: Never Used   Substance and Sexual Activity    Alcohol use: Yes     Alcohol/week: 0.0 oz     Frequency: 2-3 times a week     Drinks per session: 1 or 2     Binge frequency: Less than monthly     Comment: Occasional    Drug use: No    Sexual activity: Yes   Lifestyle    Physical activity:     Days per week: 3 days     Minutes per session: 30 min    Stress: Only a little   Relationships    Social connections:     Talks on phone: More than three times a week     Gets together: Once a week     Attends Jew service: Not on file     Active member of club or organization: No     Attends meetings of clubs or organizations: Never     Relationship status:    Other Topics Concern    Not on file   Social History Narrative    Not on file       MEDICATIONS & ALLERGIES:     Current Outpatient Medications on File Prior to Visit   Medication Sig    albuterol (VENTOLIN HFA) 90 mcg/actuation inhaler Inhale 1-2 puffs into the lungs every 4 (four) hours as needed for Wheezing or Shortness of Breath. Rescue    cephALEXin (KEFLEX) 500 MG capsule Take 1 capsule (500 mg total) by mouth every 6 (six) hours. for 7 days    clonazePAM (KLONOPIN) 0.5 MG tablet TK 1 T PO QHS PRF SLP    dexmethylphenidate (FOCALIN) 10 MG tablet Take 10 mg by mouth 3 (three) times daily.     ketoconazole (NIZORAL) 2 % shampoo Apply topically twice a week.    LOTEMAX 0.5 % DrpG INT 1 DROP IN OU QID    olopatadine (PATANOL) 0.1 % ophthalmic solution INT 1 GTT INTO OU BID    pantoprazole (PROTONIX) 40 MG tablet Take 1 tablet (40 mg total) by mouth 2 (two) times daily.     No current facility-administered medications on file prior to visit.        Review of patient's allergies indicates:  No Known Allergies    OBJECTIVE:     Vital Signs:  Vitals:    08/01/19 1550   BP: (!) 128/98   BP Location: Right arm   Patient Position: Sitting   BP Method: Large (Manual)  "  Pulse: 104   SpO2: (!) 94%   Weight: 113.5 kg (250 lb 3.6 oz)   Height: 5' 5" (1.651 m)       Body mass index is 41.64 kg/m².     Physical Exam:  General:  Well developed, well nourished, no acute distress  Head: Normocephalic, atraumatic  Eyes: PERRL, EOMI, Red conjunctiva    Throat: No posterior pharyngeal erythema or exudate, no tonsillar exudate  Neck: supple, normal ROM, no thyromegaly   CVS:  RRR, S1 and S2 normal, no murmurs, rubs, gallops, 2+ peripheral pulses  Resp:  Lungs clear to auscultation, no wheezes, rales, rhonchi, cough  GI:  Abdomen soft, non-tender, non-distended, normoactive bowel sounds  MSK:  No muscle atrophy, cyanosis, peripheral edema   Skin:  No rashes, ulcers, erythema  Neuro:  CNII-XII grossly intact, no focal deficits noted  Psych:  Appropriate mood and affect, normal judgement    Left upper extremity: Positive for redness involving entire LUE from the lower half of arm downward with swelling (pitting). No tenderness noted. Redness is blanchable. Increased warmth. Intact pulses and intact ROM of entire joints. No skin breaks.    Laboratory  Lab Results   Component Value Date    WBC 7.60 03/26/2019    HGB 16.8 03/26/2019    HCT 50.7 03/26/2019    MCV 91 03/26/2019     03/26/2019     Lab Results   Component Value Date     (H) 06/25/2019     06/25/2019    K 3.7 06/25/2019     06/25/2019    CO2 26 06/25/2019    BUN 13 06/25/2019    CREATININE 0.8 06/25/2019    CALCIUM 9.2 06/25/2019    MG 2.1 08/28/2013     No results found for: INR, PROTIME  Lab Results   Component Value Date    HGBA1C 5.5 06/25/2019         ASSESSMENT & PLAN:   Mr. Hermelindo Garza III is a 39 y.o. male with PMH of asthma, LUBA on CPAP, morbid obesity and pre DM, SHEIKH and GERD came in to the clinic for evaluation of Lt arm rash that started 1 week ago.    Cellulitis of left arm   Patient presented with Lt forearm redness and swelling that started 1 week ago. Symptoms worsened quickly " despite taking Cephalexin 2 days ago. Rash is uniform and swelling is pitting. Pulses are intact. No fever. Vitals are stable with only abnormal HR at 104. No recent travel, hospital admissions, skin breaks or diabetes.    -     Refer to Emergency Dept for possible IV antibiotics          Saundra Low MD  Internal Medicine PGY1  [facility]  [address] [place] [address]  [phone]

## 2019-08-01 NOTE — ED TRIAGE NOTES
Patient is a 40 yo white male who present from PCP for IV antibiotics after failed outpatient therapy for cellulitis. Patient reports he was bitten or scratched by something last Thursday (unsure what really happened or when) and noticed some redness on his elbow that worsened over the weekend. States he saw his PCP and was started on antibiotics and was taking them as directed, but today redness and swelling expanded to his entire forearm and states his arm became hot. States he called his PCP today for follow-up and was told to come to the ER for antibiotic therapy and was told he may need admitted. Patient denies any chest pain, abdominal pain, nausea, vomiting, fever, or chills.

## 2019-08-01 NOTE — TELEPHONE ENCOUNTER
"Was seen in [facility] on Tuesday for a bump on his elbow, he was placed on abx, He started the Keflex on Tuesday evening, estimates he's had 5-6 doses at this point.    Rates pain 1/10, more tingly and sensitive skin than painful, and better than yesterday.  It is warm to touch and has an indurated area about 1 inch, which is smaller than previously, but the redness and swelling are extending down his arm into his wrist and hand now, he describes a red line.  Unable to put his watch on due to swelling.  Advised he be evaluated within 4 hours, scheduled appt at "[facility] for 3:30 today, advised ice and otc antiinflammatory until seen by md.    Reason for Disposition   [1] Taking antibiotic > 24 hours AND [2] red streak (or line) runs from area of infection    Protocols used: CELLULITIS ON ANTIBIOTIC FOLLOW-UP CALL-A-      "

## 2019-08-02 PROBLEM — Z88.9 HISTORY OF MULTIPLE ALLERGIES: Status: ACTIVE | Noted: 2019-08-02

## 2019-08-02 PROBLEM — J45.909 ASTHMA: Status: ACTIVE | Noted: 2019-08-02

## 2019-08-02 LAB
ALBUMIN SERPL BCP-MCNC: 3.7 G/DL (ref 3.5–5.2)
ALP SERPL-CCNC: 91 U/L (ref 55–135)
ALT SERPL W/O P-5'-P-CCNC: 63 U/L (ref 10–44)
ANION GAP SERPL CALC-SCNC: 10 MMOL/L (ref 8–16)
AST SERPL-CCNC: 28 U/L (ref 10–40)
BASOPHILS # BLD AUTO: 0.07 K/UL (ref 0–0.2)
BASOPHILS NFR BLD: 0.9 % (ref 0–1.9)
BILIRUB SERPL-MCNC: 0.4 MG/DL (ref 0.1–1)
BUN SERPL-MCNC: 12 MG/DL (ref 6–20)
CALCIUM SERPL-MCNC: 9.6 MG/DL (ref 8.7–10.5)
CHLORIDE SERPL-SCNC: 105 MMOL/L (ref 95–110)
CO2 SERPL-SCNC: 23 MMOL/L (ref 23–29)
CREAT SERPL-MCNC: 0.8 MG/DL (ref 0.5–1.4)
DIFFERENTIAL METHOD: NORMAL
EOSINOPHIL # BLD AUTO: 0.3 K/UL (ref 0–0.5)
EOSINOPHIL NFR BLD: 3.3 % (ref 0–8)
ERYTHROCYTE [DISTWIDTH] IN BLOOD BY AUTOMATED COUNT: 13.2 % (ref 11.5–14.5)
EST. GFR  (AFRICAN AMERICAN): >60 ML/MIN/1.73 M^2
EST. GFR  (NON AFRICAN AMERICAN): >60 ML/MIN/1.73 M^2
ESTIMATED AVG GLUCOSE: 114 MG/DL (ref 68–131)
GLUCOSE SERPL-MCNC: 119 MG/DL (ref 70–110)
HBA1C MFR BLD HPLC: 5.6 % (ref 4–5.6)
HCT VFR BLD AUTO: 46 % (ref 40–54)
HGB BLD-MCNC: 15 G/DL (ref 14–18)
HIV 1+2 AB+HIV1 P24 AG SERPL QL IA: NEGATIVE
IMM GRANULOCYTES # BLD AUTO: 0.02 K/UL (ref 0–0.04)
IMM GRANULOCYTES NFR BLD AUTO: 0.3 % (ref 0–0.5)
LYMPHOCYTES # BLD AUTO: 1.9 K/UL (ref 1–4.8)
LYMPHOCYTES NFR BLD: 25.7 % (ref 18–48)
MAGNESIUM SERPL-MCNC: 2.3 MG/DL (ref 1.6–2.6)
MCH RBC QN AUTO: 30.3 PG (ref 27–31)
MCHC RBC AUTO-ENTMCNC: 32.6 G/DL (ref 32–36)
MCV RBC AUTO: 93 FL (ref 82–98)
MONOCYTES # BLD AUTO: 0.9 K/UL (ref 0.3–1)
MONOCYTES NFR BLD: 12.1 % (ref 4–15)
NEUTROPHILS # BLD AUTO: 4.3 K/UL (ref 1.8–7.7)
NEUTROPHILS NFR BLD: 57.7 % (ref 38–73)
NRBC BLD-RTO: 0 /100 WBC
PHOSPHATE SERPL-MCNC: 3 MG/DL (ref 2.7–4.5)
PLATELET # BLD AUTO: 211 K/UL (ref 150–350)
PMV BLD AUTO: 10 FL (ref 9.2–12.9)
POTASSIUM SERPL-SCNC: 4.1 MMOL/L (ref 3.5–5.1)
PROT SERPL-MCNC: 7.2 G/DL (ref 6–8.4)
RBC # BLD AUTO: 4.95 M/UL (ref 4.6–6.2)
SODIUM SERPL-SCNC: 138 MMOL/L (ref 136–145)
WBC # BLD AUTO: 7.51 K/UL (ref 3.9–12.7)

## 2019-08-02 PROCEDURE — 85025 COMPLETE CBC W/AUTO DIFF WBC: CPT

## 2019-08-02 PROCEDURE — 83036 HEMOGLOBIN GLYCOSYLATED A1C: CPT

## 2019-08-02 PROCEDURE — 83735 ASSAY OF MAGNESIUM: CPT

## 2019-08-02 PROCEDURE — 63700000 PHARM REV CODE 250 ALT 637 W/O HCPCS: Performed by: INTERNAL MEDICINE

## 2019-08-02 PROCEDURE — 25000003 PHARM REV CODE 250: Performed by: PHYSICIAN ASSISTANT

## 2019-08-02 PROCEDURE — 86060 ANTISTREPTOLYSIN O TITER: CPT

## 2019-08-02 PROCEDURE — 99226 PR SUBSEQUENT OBSERVATION CARE,LEVEL III: ICD-10-PCS | Mod: ,,, | Performed by: PHYSICIAN ASSISTANT

## 2019-08-02 PROCEDURE — 84100 ASSAY OF PHOSPHORUS: CPT

## 2019-08-02 PROCEDURE — 63600175 PHARM REV CODE 636 W HCPCS: Performed by: INTERNAL MEDICINE

## 2019-08-02 PROCEDURE — G0378 HOSPITAL OBSERVATION PER HR: HCPCS

## 2019-08-02 PROCEDURE — 80053 COMPREHEN METABOLIC PANEL: CPT

## 2019-08-02 PROCEDURE — 63600175 PHARM REV CODE 636 W HCPCS: Performed by: PHYSICIAN ASSISTANT

## 2019-08-02 PROCEDURE — 25000003 PHARM REV CODE 250: Performed by: INTERNAL MEDICINE

## 2019-08-02 PROCEDURE — 36415 COLL VENOUS BLD VENIPUNCTURE: CPT

## 2019-08-02 PROCEDURE — 99226 PR SUBSEQUENT OBSERVATION CARE,LEVEL III: CPT | Mod: ,,, | Performed by: PHYSICIAN ASSISTANT

## 2019-08-02 PROCEDURE — 86703 HIV-1/HIV-2 1 RESULT ANTBDY: CPT

## 2019-08-02 RX ORDER — OLOPATADINE HYDROCHLORIDE 2 MG/ML
1 SOLUTION/ DROPS OPHTHALMIC DAILY
Status: DISCONTINUED | OUTPATIENT
Start: 2019-08-02 | End: 2019-08-03 | Stop reason: HOSPADM

## 2019-08-02 RX ORDER — DIPHENHYDRAMINE HYDROCHLORIDE 50 MG/ML
25 INJECTION INTRAMUSCULAR; INTRAVENOUS ONCE
Status: COMPLETED | OUTPATIENT
Start: 2019-08-02 | End: 2019-08-02

## 2019-08-02 RX ADMIN — SENNOSIDES,DOCUSATE SODIUM 1 TABLET: 8.6; 5 TABLET, FILM COATED ORAL at 10:08

## 2019-08-02 RX ADMIN — LOTEPREDNOL ETABONATE 1 DROP: 5 SUSPENSION/ DROPS OPHTHALMIC at 05:08

## 2019-08-02 RX ADMIN — DIPHENHYDRAMINE HYDROCHLORIDE 25 MG: 50 INJECTION, SOLUTION INTRAMUSCULAR; INTRAVENOUS at 11:08

## 2019-08-02 RX ADMIN — VANCOMYCIN HYDROCHLORIDE 2000 MG: 100 INJECTION, POWDER, LYOPHILIZED, FOR SOLUTION INTRAVENOUS at 10:08

## 2019-08-02 RX ADMIN — LOTEPREDNOL ETABONATE 1 DROP: 5 SUSPENSION/ DROPS OPHTHALMIC at 11:08

## 2019-08-02 RX ADMIN — PANTOPRAZOLE SODIUM 40 MG: 40 TABLET, DELAYED RELEASE ORAL at 08:08

## 2019-08-02 RX ADMIN — CEFTRIAXONE 2 G: 2 INJECTION, POWDER, FOR SOLUTION INTRAMUSCULAR; INTRAVENOUS at 05:08

## 2019-08-02 RX ADMIN — RAMELTEON 8 MG: 8 TABLET, FILM COATED ORAL at 08:08

## 2019-08-02 RX ADMIN — PANTOPRAZOLE SODIUM 40 MG: 40 TABLET, DELAYED RELEASE ORAL at 10:08

## 2019-08-02 RX ADMIN — OLOPATADINE HYDROCHLORIDE 1 DROP: 2 SOLUTION OPHTHALMIC at 01:08

## 2019-08-02 RX ADMIN — DIPHENHYDRAMINE HYDROCHLORIDE 25 MG: 25 CAPSULE ORAL at 08:08

## 2019-08-02 RX ADMIN — ACETAMINOPHEN 650 MG: 325 TABLET ORAL at 06:08

## 2019-08-02 RX ADMIN — CETIRIZINE HYDROCHLORIDE 10 MG: 10 TABLET, FILM COATED ORAL at 10:08

## 2019-08-02 RX ADMIN — SENNOSIDES,DOCUSATE SODIUM 1 TABLET: 8.6; 5 TABLET, FILM COATED ORAL at 08:08

## 2019-08-02 RX ADMIN — VANCOMYCIN HYDROCHLORIDE 2000 MG: 100 INJECTION, POWDER, LYOPHILIZED, FOR SOLUTION INTRAVENOUS at 11:08

## 2019-08-02 RX ADMIN — LOTEPREDNOL ETABONATE 1 DROP: 5 SUSPENSION/ DROPS OPHTHALMIC at 08:08

## 2019-08-02 NOTE — ASSESSMENT & PLAN NOTE
Asthma  -olopatadine 0.1 % ophthalmic solution 1 drop, 1 drop, Both Eyes, BID  -loteprednol 0.5 % ophthalmic suspension 1 drop, 1 drop, Both Eyes, QID  -diphenhydrAMINE capsule 25 mg, 25 mg, Oral, Q6H PRN  -cetirizine tablet 10 mg, 10 mg, Oral, Daily  -albuterol-ipratropium 2.5 mg-0.5 mg/3 mL nebulizer solution 3 mL, 3 mL, Nebulization, Q4H PRN

## 2019-08-02 NOTE — HPI
Mr. Garza is a very nice 40yo man with no major past medical history except asthma and LUBA, exacerbated by frequent allergies.  He was doing well until this past Monday when he noticed some mild discomfort and redness to his left arm just below the elbow. He frequently puts his elbows on the table and scrapes them, so he didn't give it much thought.  Around this same time he had also been swimming in a pool (chlorinated - no exposure to fresh, salty or brackish water).  Tuesday he awoke with diffuse swelling and redness to his distal arm with more pain and discomfort.  He has not ever had any fever or chills, however.  He went to the urgent care clinic that Tuesday and was given a Rx for Keflex, which he has been taking as prescribed since that time.  Despite taking the antibiotic, the redness has progressively worsened and become more circumferential.  He called his PCP for advice, who recommended he come to the ED.

## 2019-08-02 NOTE — PLAN OF CARE
Problem: Adult Inpatient Plan of Care  Goal: Plan of Care Review  Outcome: Ongoing (interventions implemented as appropriate)  Plan of care reviewed. Pt verbalized understanding. Prn benadryl given as ordered. Bed in low position. Call bell within reach. Side rails up appropriately. No acute events overnight. Arm elevated per orders. Nadn; will report off to oncoming shift.

## 2019-08-02 NOTE — PLAN OF CARE
08/02/19 1403   Discharge Assessment   Assessment Type Discharge Planning Assessment   Confirmed/corrected address and phone number on facesheet? Yes   Assessment information obtained from? Patient;Medical Record   Expected Length of Stay (days) 1   Communicated expected length of stay with patient/caregiver yes   Prior to hospitilization cognitive status: Alert/Oriented   Prior to hospitalization functional status: Independent   Current cognitive status: Alert/Oriented   Current Functional Status: Independent   Lives With spouse   Able to Return to Prior Arrangements yes   Is patient able to care for self after discharge? Yes   Patient's perception of discharge disposition home or selfcare   Readmission Within the Last 30 Days no previous admission in last 30 days   Patient currently being followed by outpatient case management? No   Patient currently receives any other outside agency services? No   Equipment Currently Used at Home CPAP   Do you have any problems affording any of your prescribed medications? TBD   Is the patient taking medications as prescribed? yes   Does the patient have transportation home? Yes   Transportation Anticipated car, drives self   Does the patient receive services at the Coumadin Clinic? No   Discharge Plan A Home with family   DME Needed Upon Discharge  none   Patient/Family in Agreement with Plan yes   Placed in Obs for L Arm cellulitis. Lives with spouse and is independent in his ADLs. Plan is to DC home. No DC needs identified.

## 2019-08-02 NOTE — ASSESSMENT & PLAN NOTE
-cefTRIAXone (ROCEPHIN) 2 g in dextrose 5 % 50 mL IVPB, 2 g, Intravenous, Q24H  -Vancomycin 2 g in 0.9% sodium chloride 500 mL IVPB, 2,000 mg, Intravenous, Q12H  -oxyCODONE immediate release tablet 5 mg, 5 mg, Oral, Q6H PRN  -HIV negative and ASO Ab pending  -Elevate arm  -No systemic signs of sepsis

## 2019-08-02 NOTE — ED NOTES
"Right eye redness and swelling noted, nasal drainage noted, pt states " I have really bad allergies and I have been having a bad attack today. "   "

## 2019-08-02 NOTE — PROGRESS NOTES
[facility] Hospital Medicine  Progress Note    Patient Name: Hermelindo Garza III  MRN: [id number]  Patient Class: OP- Observation   Admission Date: 8/1/2019  Length of Stay: 0 days  Attending Physician: MIKE Davis MD  Primary Care Provider: Melody Beck MD    [facility] Medicine Team: [facility] F Luciana Garcia PA-C    Subjective:     Principal Problem:Cellulitis of left arm      HPI:  Mr. Garza is a very nice 40yo man with no major past medical history except asthma and LUBA, exacerbated by frequent allergies.  He was doing well until this past Monday when he noticed some mild discomfort and redness to his left arm just below the elbow. He frequently puts his elbows on the table and scrapes them, so he didn't give it much thought.  Around this same time he had also been swimming in a pool (chlorinated - no exposure to fresh, salty or brackish water).  Tuesday he awoke with diffuse swelling and redness to his distal arm with more pain and discomfort.  He has not ever had any fever or chills, however.  He went to the urgent care clinic that Tuesday and was given a Rx for Keflex, which he has been taking as prescribed since that time.  Despite taking the antibiotic, the redness has progressively worsened and become more circumferential.  He called his PCP for advice, who recommended he come to the ED.      Overview/Hospital Course:  Patient was started on Rocephin and Vancomycin for worsening cellulitis. CBC, CMP, A1c, and lactate were not significant for any acute infection or electrolyte abnormalities. HIV Ab, ASO Ab, and blood culture are pending. Patient reports worsening eye pain, redness, and teary discharge for which home prescriptions were ordered.     Dispo: Possibly discharge tomorrow pending improvement of the cellulitis    Interval History: Patient reports the cellulitis is improving. He noted decreased swelling of the left arm and hand. Patient does reports  worsening eye pain, swelling, and teary discharge.     Review of Systems   Constitutional: Negative for chills and fever.   HENT: Positive for congestion, rhinorrhea and sinus pressure. Negative for ear pain, sinus pain, sneezing and sore throat.    Eyes: Positive for photophobia, pain, discharge, redness and itching. Negative for visual disturbance.   Respiratory: Negative for cough and shortness of breath.    Cardiovascular: Negative for chest pain and palpitations.   Skin: Positive for color change and rash.        Red, warm, rash on the left arm (improving)   Neurological: Negative for dizziness and light-headedness.     Objective:     Vital Signs (Most Recent):  Temp: 98.3 °F (36.8 °C) (08/02/19 0708)  Pulse: 87 (08/02/19 0708)  Resp: 17 (08/02/19 0708)  BP: 125/64 (08/02/19 0708)  SpO2: (!) 94 % (08/02/19 0708) Vital Signs (24h Range):  Temp:  [98.3 °F (36.8 °C)-98.9 °F (37.2 °C)] 98.3 °F (36.8 °C)  Pulse:  [] 87  Resp:  [14-18] 17  SpO2:  [94 %-96 %] 94 %  BP: (121-160)/() 125/64     Weight: 113.2 kg (249 lb 9 oz)  Body mass index is 41.53 kg/m².    Intake/Output Summary (Last 24 hours) at 8/2/2019 1043  Last data filed at 8/2/2019 0800  Gross per 24 hour   Intake 240 ml   Output --   Net 240 ml      Physical Exam   Constitutional: He is oriented to person, place, and time. He appears well-developed and well-nourished. No distress.   Eyes: EOM are normal. Right eye exhibits discharge. Left eye exhibits discharge. Right conjunctiva is injected. Left conjunctiva is injected.   Bilateral eyes swollen and injected   Cardiovascular: Normal rate, regular rhythm, normal heart sounds and intact distal pulses.   Pulmonary/Chest: Effort normal and breath sounds normal.   Abdominal: He exhibits no distension. There is no tenderness.   Neurological: He is alert and oriented to person, place, and time.   Skin: Skin is warm. Rash noted. He is not diaphoretic. There is erythema.   Patient's left arm from the  elbow down to the dorsum of the hand is erythematous, edematous, and warm. There is no decrease in range of motion of the elbow, wrist, or fingers noted. No tenderness on exam.    Psychiatric: He has a normal mood and affect. His behavior is normal. Judgment and thought content normal.       Significant Labs:   A1C:   Recent Labs   Lab 03/26/19  0904 06/25/19  0726 08/02/19  0606   HGBA1C 5.8* 5.5 5.6     CBC:   Recent Labs   Lab 08/01/19  1802 08/02/19  0606   WBC 9.72 7.51   HGB 16.5 15.0   HCT 50.5 46.0    211     CMP:   Recent Labs   Lab 08/01/19  1802 08/02/19  0606    138   K 3.8 4.1    105   CO2 27 23   GLU 92 119*   BUN 12 12   CREATININE 0.8 0.8   CALCIUM 10.5 9.6   PROT 8.6* 7.2   ALBUMIN 4.3 3.7   BILITOT 0.8 0.4   ALKPHOS 109 91   AST 29 28   ALT 68* 63*   ANIONGAP 11 10   EGFRNONAA >60.0 >60.0     Lactic Acid:   Recent Labs   Lab 08/01/19  1803   LACTATE 1.1     All pertinent labs within the past 24 hours have been reviewed.    Significant Imaging: I have reviewed all pertinent imaging results/findings within the past 24 hours.      Assessment/Plan:      * Cellulitis of left arm  -cefTRIAXone (ROCEPHIN) 2 g in dextrose 5 % 50 mL IVPB, 2 g, Intravenous, Q24H  -Vancomycin 2 g in 0.9% sodium chloride 500 mL IVPB, 2,000 mg, Intravenous, Q12H  -oxyCODONE immediate release tablet 5 mg, 5 mg, Oral, Q6H PRN  -HIV negative and ASO Ab pending  -Elevate arm  -No systemic signs of sepsis    History of multiple allergies  Asthma  -olopatadine 0.1 % ophthalmic solution 1 drop, 1 drop, Both Eyes, BID  -loteprednol 0.5 % ophthalmic suspension 1 drop, 1 drop, Both Eyes, QID  -diphenhydrAMINE capsule 25 mg, 25 mg, Oral, Q6H PRN  -cetirizine tablet 10 mg, 10 mg, Oral, Daily  -albuterol-ipratropium 2.5 mg-0.5 mg/3 mL nebulizer solution 3 mL, 3 mL, Nebulization, Q4H PRN    LUBA (obstructive sleep apnea)  Obesity  -Encourage weight loss    ADD (attention deficit disorder)  -methylphenidate HCl tablet 10 mg,  10 mg, Oral, TID WM  -clonazePAM tablet 0.5 mg, 0.5 mg, Oral, BID PRN    GERD (gastroesophageal reflux disease)  -pantoprazole EC tablet 40 mg, 40 mg, Oral, BID      VTE Risk Mitigation (From admission, onward)        Ordered     enoxaparin injection 40 mg  Daily      08/01/19 2137     Place DOMINGO hose  Until discontinued      08/01/19 2137     Place sequential compression device  Until discontinued      08/01/19 2137     IP VTE HIGH RISK PATIENT  Once      08/01/19 2137                Luciana Garcia PA-C  Department of Hospital Medicine   [facility]

## 2019-08-02 NOTE — HOSPITAL COURSE
Patient was started on Rocephin and Vancomycin for worsening cellulitis. CBC, CMP, A1c, and lactate were not significant for any acute infection or electrolyte abnormalities. HIV Ab, ASO Ab, and blood culture are pending. Patient reports worsening eye pain, redness, and teary discharge for which home prescriptions were ordered. Eye symptoms improved with drops. Patient transitioned to Bactrim DS to complete 10 days of antibiotic therapy on discharge.

## 2019-08-02 NOTE — PROGRESS NOTES
Pharmacokinetic Initial Assessment: IV Vancomycin    Assessment/Plan:    Patient received at 2000mg dose in the ED followed by a maintenance dose of vancomycin 2000mg IV every 12 hours  Desired empiric serum trough concentration is 10 to 20 mcg/mL.  Draw vancomycin trough level 30 min prior to fourth dose on 8/03 at approximately 0930  Pharmacy will continue to follow and monitor vancomycin.      Please contact pharmacy at extension [phone] with any questions regarding this assessment.     Thank you for the consult,   Apple Henry     Patient brief summary:  Hermelindo Garza III is a 39 y.o. male initiated on antimicrobial therapy with IV Vancomycin for treatment of suspected skin & soft tissue    Drug Allergies:   Review of patient's allergies indicates:  No Known Allergies    Actual Body Weight:   113.2kg    Renal Function:   Estimated Creatinine Clearance: 147 mL/min (based on SCr of 0.8 mg/dL).,     Dialysis Method (if applicable):  NA    CBC (last 72 hours):  Recent Labs   Lab Result Units 08/01/19  1802   WBC K/uL 9.72   Hemoglobin g/dL 16.5   Hematocrit % 50.5   Platelets K/uL 216   Gran% % 69.7   Lymph% % 20.7   Mono% % 7.8   Eosinophil% % 1.1   Basophil% % 0.6   Differential Method  Automated       Metabolic Panel (last 72 hours):  Recent Labs   Lab Result Units 08/01/19  1802   Sodium mmol/L 140   Potassium mmol/L 3.8   Chloride mmol/L 102   CO2 mmol/L 27   Glucose mg/dL 92   BUN, Bld mg/dL 12   Creatinine mg/dL 0.8   Albumin g/dL 4.3   Total Bilirubin mg/dL 0.8   Alkaline Phosphatase U/L 109   AST U/L 29   ALT U/L 68*       Drug levels (last 3 results):  No results for input(s): VANCOMYCINRA, VANCOMYCINPE, VANCOMYCINTR in the last 72 hours.    Microbiologic Results:  Microbiology Results (last 7 days)     Procedure Component Value Units Date/Time    Blood culture #1 [[id number]] Collected:  08/01/19 1802    Order Status:  Sent Specimen:  Blood from Peripheral, Antecubital, Right Updated:  08/01/19  1812    Blood culture #2 [[id number]] Collected:  08/01/19 1802    Order Status:  Sent Specimen:  Blood from Peripheral, Hand, Right Updated:  08/01/19 1812

## 2019-08-02 NOTE — NURSING
Rec'd pt to floor via wheelchair. Pt placed in bed. Vss. Pt denies cp, sob, or nausea at this time. No direct needs voiced at this time. Left arm swollen and red. Marked for reference. No direct needs voiced at this time. Nadn; will monitor. See full assessment.

## 2019-08-02 NOTE — ASSESSMENT & PLAN NOTE
-methylphenidate HCl tablet 10 mg, 10 mg, Oral, TID WM  -clonazePAM tablet 0.5 mg, 0.5 mg, Oral, BID PRN

## 2019-08-02 NOTE — H&P
[facility] Medicine  History & Physical    Patient Name: Hermelindo Garza III  MRN: [id number]  Admission Date: 8/1/2019  Attending Physician: MIKE Davis MD   Primary Care Provider: Melody Beck MD    [facility] Medicine Team: [facility] F TREVIN Davis MD     Patient information was obtained from patient, past medical records and ER records.     Subjective:     Principal Problem: Left arm cellulitis    Chief Complaint:   Chief Complaint   Patient presents with    Cellulitis     to left arm. Swelling, pain and redness began Tuesday. Sent for abx.        HPI: Mr. Garza is a very nice 40yo man with no major past medical history except asthma and LUBA, exacerbated by frequent allergies.  He was doing well until this past Monday when he noticed some mild discomfort and redness to his left arm just below the elbow. He frequently puts his elbows on the table and scrapes them, so he didn't give it much thought.  Around this same time he had also been swimming in a pool (chlorinated - no exposure to fresh, salty or brackish water).  Tuesday he awoke with diffuse swelling and redness to his distal arm with more pain and discomfort.  He has not ever had any fever or chills, however.  He went to the urgent care clinic that Tuesday and was given a Rx for Keflex, which he has been taking as prescribed since that time.  Despite taking the antibiotic, the redness has progressively worsened and become more circumferential.  He called his PCP for advice, who recommended he come to the ED.      Past Medical History:   Diagnosis Date    ADD (attention deficit disorder) 5/9/2012    Asthma 5/9/2012    Esophageal ulcer     GERD (gastroesophageal reflux disease) 5/9/2012    Kidney stones 5/2014    Lumbar disc disease 5/9/2012    LUBA (obstructive sleep apnea)     Renal calculi 5/9/2012       Past Surgical History:   Procedure Laterality Date    APPENDECTOMY  2006    COLONOSCOPY   01/22/2019    internal hemorrhoids, o/w normal - repeat at age 52    EGD (ESOPHAGOGASTRODUODENOSCOPY) N/A 5/29/2013    Performed by Robert Perea MD at [facility] ([address] [place] [address])    ESOPHAGOGASTRODUODENOSCOPY  01/22/2019    LA grade B esophagitis; esophageal stenosis- dilated; gastritis; H pylori pathology negative    LUMBAR LAMINECTOMY  2010       Review of patient's allergies indicates:  No Known Allergies    No current facility-administered medications on file prior to encounter.      Current Outpatient Medications on File Prior to Encounter   Medication Sig    cephALEXin (KEFLEX) 500 MG capsule Take 1 capsule (500 mg total) by mouth every 6 (six) hours. for 7 days    pantoprazole (PROTONIX) 40 MG tablet Take 1 tablet (40 mg total) by mouth 2 (two) times daily.    albuterol (VENTOLIN HFA) 90 mcg/actuation inhaler Inhale 1-2 puffs into the lungs every 4 (four) hours as needed for Wheezing or Shortness of Breath. Rescue    clonazePAM (KLONOPIN) 0.5 MG tablet TK 1 T PO QHS PRF SLP    dexmethylphenidate (FOCALIN) 10 MG tablet Take 10 mg by mouth 3 (three) times daily.     ketoconazole (NIZORAL) 2 % shampoo Apply topically twice a week.    LOTEMAX 0.5 % DrpG INT 1 DROP IN OU QID    olopatadine (PATANOL) 0.1 % ophthalmic solution INT 1 GTT INTO OU BID     Family History     Problem Relation (Age of Onset)    Brain cancer Maternal Grandmother    Breast cancer Paternal Grandmother    Chronic back pain Father    Diabetes Maternal Grandfather    MARTITA disease Father    Hypertension Mother    Sleep apnea Mother, Father    Transient ischemic attack Mother        Tobacco Use    Smoking status: Passive Smoke Exposure - Never Smoker    Smokeless tobacco: Never Used   Substance and Sexual Activity    Alcohol use: Yes     Alcohol/week: 0.0 oz     Frequency: 2-3 times a week     Drinks per session: 1 or 2     Binge frequency: Less than monthly     Comment: Occasional    Drug use: No    Sexual activity: Yes     Review  of Systems   Constitutional: Negative for chills, fatigue and fever.   HENT: Positive for congestion, sinus pressure, sinus pain, sneezing, sore throat and voice change.    Eyes: Positive for redness and itching.   Respiratory: Negative for cough and shortness of breath.    Cardiovascular: Negative for chest pain.   Gastrointestinal: Negative for abdominal pain, constipation, diarrhea, nausea and vomiting.   Endocrine: Positive for heat intolerance. Negative for cold intolerance.   Genitourinary: Negative for dysuria.   Musculoskeletal: Negative for myalgias.   Skin: Positive for color change and rash.   Allergic/Immunologic: Negative for immunocompromised state.   Neurological: Negative for dizziness and weakness.   Hematological: Does not bruise/bleed easily.   Psychiatric/Behavioral: Negative for confusion. The patient is not nervous/anxious.      Objective:     Vital Signs (Most Recent):  Temp: 98.4 °F (36.9 °C) (08/01/19 2127)  Pulse: 100 (08/01/19 2127)  Resp: 18 (08/01/19 2127)  BP: (!) 160/98 (08/01/19 2127)  SpO2: 95 % (08/01/19 2127) Vital Signs (24h Range):  Temp:  [98.4 °F (36.9 °C)-98.9 °F (37.2 °C)] 98.4 °F (36.9 °C)  Pulse:  [] 100  Resp:  [14-18] 18  SpO2:  [94 %-96 %] 95 %  BP: (128-160)/() 160/98     Weight: 113.2 kg (249 lb 9 oz)  Body mass index is 41.53 kg/m².    Physical Exam   Constitutional: He is oriented to person, place, and time. He appears well-developed and well-nourished.  Non-toxic appearance. He does not have a sickly appearance. He does not appear ill. No distress.   HENT:   Head: Normocephalic and atraumatic.   Nose: Nose normal.   Mouth/Throat: Oropharynx is clear and moist. No oropharyngeal exudate.   Diffuse facial flushing and plethoric    Eyes: Pupils are equal, round, and reactive to light. EOM are normal. Right eye exhibits no discharge. Left eye exhibits no discharge. Right conjunctiva is injected. Left conjunctiva is injected. No scleral icterus.   Neck: Normal  range of motion. Neck supple. No JVD present. No tracheal deviation present. No thyromegaly present.   Cardiovascular: Normal rate, regular rhythm, normal heart sounds and intact distal pulses. Exam reveals no gallop and no friction rub.   No murmur heard.  Pulmonary/Chest: Effort normal and breath sounds normal. No accessory muscle usage or stridor. No tachypnea and no bradypnea. No respiratory distress. He has no wheezes. He has no rhonchi. He has no rales. He exhibits no tenderness.   Abdominal: Soft. Bowel sounds are normal. He exhibits no distension and no mass. There is no tenderness. There is no rebound and no guarding.   Genitourinary:   Genitourinary Comments: No [place] in place   Musculoskeletal: Normal range of motion. He exhibits no edema or tenderness.   Lymphadenopathy:     He has no cervical adenopathy.   Diffuse edema to left arm   Neurological: He is alert and oriented to person, place, and time. He displays normal reflexes. No cranial nerve deficit. He exhibits normal muscle tone. Coordination normal. GCS eye subscore is 4. GCS verbal subscore is 5. GCS motor subscore is 6.   Skin: Skin is warm and dry. No rash noted. No erythema. No pallor.   His left arm has diffuse erythema to the entire forearm extending from the elbow region to the wrist and over the dorsum of the hand.  He has no david erythema over the elbow, however, and no decreased or painful range of motion to the wrist, fingers or elbow.  The entire left arm is edematous.   Psychiatric: He has a normal mood and affect. His speech is normal and behavior is normal. Judgment and thought content normal. Cognition and memory are normal.   Nursing note and vitals reviewed.        CRANIAL NERVES     CN III, IV, VI   Pupils are equal, round, and reactive to light.  Extraocular motions are normal.       Significant Labs:   Recent Results (from the past 24 hour(s))   CBC auto differential    Collection Time: 08/01/19  6:02 PM   Result Value Ref  Range    WBC 9.72 3.90 - 12.70 K/uL    RBC 5.47 4.60 - 6.20 M/uL    Hemoglobin 16.5 14.0 - 18.0 g/dL    Hematocrit 50.5 40.0 - 54.0 %    Mean Corpuscular Volume 92 82 - 98 fL    Mean Corpuscular Hemoglobin 30.2 27.0 - 31.0 pg    Mean Corpuscular Hemoglobin Conc 32.7 32.0 - 36.0 g/dL    RDW 13.1 11.5 - 14.5 %    Platelets 216 150 - 350 K/uL    MPV 10.0 9.2 - 12.9 fL    Immature Granulocytes 0.1 0.0 - 0.5 %    Gran # (ANC) 6.8 1.8 - 7.7 K/uL    Immature Grans (Abs) 0.01 0.00 - 0.04 K/uL    Lymph # 2.0 1.0 - 4.8 K/uL    Mono # 0.8 0.3 - 1.0 K/uL    Eos # 0.1 0.0 - 0.5 K/uL    Baso # 0.06 0.00 - 0.20 K/uL    nRBC 0 0 /100 WBC    Gran% 69.7 38.0 - 73.0 %    Lymph% 20.7 18.0 - 48.0 %    Mono% 7.8 4.0 - 15.0 %    Eosinophil% 1.1 0.0 - 8.0 %    Basophil% 0.6 0.0 - 1.9 %    Differential Method Automated    Comprehensive metabolic panel    Collection Time: 08/01/19  6:02 PM   Result Value Ref Range    Sodium 140 136 - 145 mmol/L    Potassium 3.8 3.5 - 5.1 mmol/L    Chloride 102 95 - 110 mmol/L    CO2 27 23 - 29 mmol/L    Glucose 92 70 - 110 mg/dL    BUN, Bld 12 6 - 20 mg/dL    Creatinine 0.8 0.5 - 1.4 mg/dL    Calcium 10.5 8.7 - 10.5 mg/dL    Total Protein 8.6 (H) 6.0 - 8.4 g/dL    Albumin 4.3 3.5 - 5.2 g/dL    Total Bilirubin 0.8 0.1 - 1.0 mg/dL    Alkaline Phosphatase 109 55 - 135 U/L    AST 29 10 - 40 U/L    ALT 68 (H) 10 - 44 U/L    Anion Gap 11 8 - 16 mmol/L    eGFR if African American >60.0 >60 mL/min/1.73 m^2    eGFR if non African American >60.0 >60 mL/min/1.73 m^2   Lactic acid, plasma    Collection Time: 08/01/19  6:03 PM   Result Value Ref Range    Lactate (Lactic Acid) 1.1 0.5 - 2.2 mmol/L       Significant Imaging:   No radiographs done in the ED    Assessment/Plan:      Cellulitis of the left arm  -cefTRIAXone (ROCEPHIN) 2 g in dextrose 5 % 50 mL IVPB, 2 g, Intravenous, Q24H  -ancomycin 2 g in 0.9% sodium chloride 500 mL IVPB, 2,000 mg, Intravenous, Q12H  -oxyCODONE immediate release tablet 5 mg, 5 mg, Oral, Q6H  PRN  -Check HIV and ASO Ab  -Elevate arm  -No systemic signs of sepsis    Obesity  Obstructive sleep apnea  -Encourage weight loss    GERD  -pantoprazole EC tablet 40 mg, 40 mg, Oral, BID    Allergies and asthma  -olopatadine 0.1 % ophthalmic solution 1 drop, 1 drop, Both Eyes, BID  -loteprednol 0.5 % ophthalmic suspension 1 drop, 1 drop, Both Eyes, QID  -diphenhydrAMINE capsule 25 mg, 25 mg, Oral, Q6H PRN  -cetirizine tablet 10 mg, 10 mg, Oral, Daily  -albuterol-ipratropium 2.5 mg-0.5 mg/3 mL nebulizer solution 3 mL, 3 mL, Nebulization, Q4H PRN    ADHD  -methylphenidate HCl tablet 10 mg, 10 mg, Oral, TID WM  -clonazePAM tablet 0.5 mg, 0.5 mg, Oral, BID PRN    VTE Risk Mitigation (From admission, onward)        Ordered     enoxaparin injection 40 mg  Daily      08/01/19 2137     Place DOMINGO hose  Until discontinued      08/01/19 2137     Place sequential compression device  Until discontinued      08/01/19 2137     IP VTE HIGH RISK PATIENT  Once      08/01/19 2137            TREVIN Davis MD  Department of Hospital Medicine   [facility]

## 2019-08-02 NOTE — SUBJECTIVE & OBJECTIVE
Interval History: Patient reports the cellulitis is improving. He noted decreased swelling of the left arm and hand. Patient does reports worsening eye pain, swelling, and teary discharge.     Review of Systems   Constitutional: Negative for chills and fever.   HENT: Positive for congestion, rhinorrhea and sinus pressure. Negative for ear pain, sinus pain, sneezing and sore throat.    Eyes: Positive for photophobia, pain, discharge, redness and itching. Negative for visual disturbance.   Respiratory: Negative for cough and shortness of breath.    Cardiovascular: Negative for chest pain and palpitations.   Skin: Positive for color change and rash.        Red, warm, rash on the left arm (improving)   Neurological: Negative for dizziness and light-headedness.     Objective:     Vital Signs (Most Recent):  Temp: 98.3 °F (36.8 °C) (08/02/19 0708)  Pulse: 87 (08/02/19 0708)  Resp: 17 (08/02/19 0708)  BP: 125/64 (08/02/19 0708)  SpO2: (!) 94 % (08/02/19 0708) Vital Signs (24h Range):  Temp:  [98.3 °F (36.8 °C)-98.9 °F (37.2 °C)] 98.3 °F (36.8 °C)  Pulse:  [] 87  Resp:  [14-18] 17  SpO2:  [94 %-96 %] 94 %  BP: (121-160)/() 125/64     Weight: 113.2 kg (249 lb 9 oz)  Body mass index is 41.53 kg/m².    Intake/Output Summary (Last 24 hours) at 8/2/2019 1043  Last data filed at 8/2/2019 0800  Gross per 24 hour   Intake 240 ml   Output --   Net 240 ml      Physical Exam   Constitutional: He is oriented to person, place, and time. He appears well-developed and well-nourished. No distress.   Eyes: EOM are normal. Right eye exhibits discharge. Left eye exhibits discharge. Right conjunctiva is injected. Left conjunctiva is injected.   Bilateral eyes swollen and injected   Cardiovascular: Normal rate, regular rhythm, normal heart sounds and intact distal pulses.   Pulmonary/Chest: Effort normal and breath sounds normal.   Abdominal: He exhibits no distension. There is no tenderness.   Neurological: He is alert and oriented  to person, place, and time.   Skin: Skin is warm. Rash noted. He is not diaphoretic. There is erythema.   Patient's left arm from the elbow down to the dorsum of the hand is erythematous, edematous, and warm. There is no decrease in range of motion of the elbow, wrist, or fingers noted. No tenderness on exam.    Psychiatric: He has a normal mood and affect. His behavior is normal. Judgment and thought content normal.       Significant Labs:   A1C:   Recent Labs   Lab 03/26/19  0904 06/25/19  0726 08/02/19  0606   HGBA1C 5.8* 5.5 5.6     CBC:   Recent Labs   Lab 08/01/19  1802 08/02/19  0606   WBC 9.72 7.51   HGB 16.5 15.0   HCT 50.5 46.0    211     CMP:   Recent Labs   Lab 08/01/19  1802 08/02/19  0606    138   K 3.8 4.1    105   CO2 27 23   GLU 92 119*   BUN 12 12   CREATININE 0.8 0.8   CALCIUM 10.5 9.6   PROT 8.6* 7.2   ALBUMIN 4.3 3.7   BILITOT 0.8 0.4   ALKPHOS 109 91   AST 29 28   ALT 68* 63*   ANIONGAP 11 10   EGFRNONAA >60.0 >60.0     Lactic Acid:   Recent Labs   Lab 08/01/19  1803   LACTATE 1.1     All pertinent labs within the past 24 hours have been reviewed.    Significant Imaging: I have reviewed all pertinent imaging results/findings within the past 24 hours.

## 2019-08-03 VITALS
HEIGHT: 65 IN | RESPIRATION RATE: 18 BRPM | SYSTOLIC BLOOD PRESSURE: 145 MMHG | BODY MASS INDEX: 41.58 KG/M2 | DIASTOLIC BLOOD PRESSURE: 80 MMHG | OXYGEN SATURATION: 96 % | HEART RATE: 82 BPM | WEIGHT: 249.56 LBS | TEMPERATURE: 99 F

## 2019-08-03 LAB
ALBUMIN SERPL BCP-MCNC: 3.3 G/DL (ref 3.5–5.2)
ALP SERPL-CCNC: 91 U/L (ref 55–135)
ALT SERPL W/O P-5'-P-CCNC: 66 U/L (ref 10–44)
ANION GAP SERPL CALC-SCNC: 9 MMOL/L (ref 8–16)
AST SERPL-CCNC: 34 U/L (ref 10–40)
BASOPHILS # BLD AUTO: 0.05 K/UL (ref 0–0.2)
BASOPHILS NFR BLD: 0.7 % (ref 0–1.9)
BILIRUB SERPL-MCNC: 0.5 MG/DL (ref 0.1–1)
BUN SERPL-MCNC: 12 MG/DL (ref 6–20)
CALCIUM SERPL-MCNC: 9.3 MG/DL (ref 8.7–10.5)
CHLORIDE SERPL-SCNC: 107 MMOL/L (ref 95–110)
CO2 SERPL-SCNC: 24 MMOL/L (ref 23–29)
CREAT SERPL-MCNC: 0.8 MG/DL (ref 0.5–1.4)
DIFFERENTIAL METHOD: NORMAL
EOSINOPHIL # BLD AUTO: 0.3 K/UL (ref 0–0.5)
EOSINOPHIL NFR BLD: 4.9 % (ref 0–8)
ERYTHROCYTE [DISTWIDTH] IN BLOOD BY AUTOMATED COUNT: 13.2 % (ref 11.5–14.5)
EST. GFR  (AFRICAN AMERICAN): >60 ML/MIN/1.73 M^2
EST. GFR  (NON AFRICAN AMERICAN): >60 ML/MIN/1.73 M^2
GLUCOSE SERPL-MCNC: 104 MG/DL (ref 70–110)
HCT VFR BLD AUTO: 47.1 % (ref 40–54)
HGB BLD-MCNC: 15.2 G/DL (ref 14–18)
IMM GRANULOCYTES # BLD AUTO: 0.01 K/UL (ref 0–0.04)
IMM GRANULOCYTES NFR BLD AUTO: 0.1 % (ref 0–0.5)
LYMPHOCYTES # BLD AUTO: 2 K/UL (ref 1–4.8)
LYMPHOCYTES NFR BLD: 28.4 % (ref 18–48)
MAGNESIUM SERPL-MCNC: 2.2 MG/DL (ref 1.6–2.6)
MCH RBC QN AUTO: 30.5 PG (ref 27–31)
MCHC RBC AUTO-ENTMCNC: 32.3 G/DL (ref 32–36)
MCV RBC AUTO: 95 FL (ref 82–98)
MONOCYTES # BLD AUTO: 0.8 K/UL (ref 0.3–1)
MONOCYTES NFR BLD: 11.1 % (ref 4–15)
NEUTROPHILS # BLD AUTO: 3.8 K/UL (ref 1.8–7.7)
NEUTROPHILS NFR BLD: 54.8 % (ref 38–73)
NRBC BLD-RTO: 0 /100 WBC
PHOSPHATE SERPL-MCNC: 3.5 MG/DL (ref 2.7–4.5)
PLATELET # BLD AUTO: 224 K/UL (ref 150–350)
PMV BLD AUTO: 10.2 FL (ref 9.2–12.9)
POTASSIUM SERPL-SCNC: 4.4 MMOL/L (ref 3.5–5.1)
PROT SERPL-MCNC: 6.8 G/DL (ref 6–8.4)
RBC # BLD AUTO: 4.98 M/UL (ref 4.6–6.2)
SODIUM SERPL-SCNC: 140 MMOL/L (ref 136–145)
VANCOMYCIN TROUGH SERPL-MCNC: 11.7 UG/ML (ref 10–22)
WBC # BLD AUTO: 6.93 K/UL (ref 3.9–12.7)

## 2019-08-03 PROCEDURE — 84100 ASSAY OF PHOSPHORUS: CPT

## 2019-08-03 PROCEDURE — 83735 ASSAY OF MAGNESIUM: CPT

## 2019-08-03 PROCEDURE — 63600175 PHARM REV CODE 636 W HCPCS: Performed by: INTERNAL MEDICINE

## 2019-08-03 PROCEDURE — 99217 PR OBSERVATION CARE DISCHARGE: CPT | Mod: ,,, | Performed by: PHYSICIAN ASSISTANT

## 2019-08-03 PROCEDURE — 85025 COMPLETE CBC W/AUTO DIFF WBC: CPT

## 2019-08-03 PROCEDURE — G0378 HOSPITAL OBSERVATION PER HR: HCPCS

## 2019-08-03 PROCEDURE — 80053 COMPREHEN METABOLIC PANEL: CPT

## 2019-08-03 PROCEDURE — 25000003 PHARM REV CODE 250: Performed by: INTERNAL MEDICINE

## 2019-08-03 PROCEDURE — 80202 ASSAY OF VANCOMYCIN: CPT

## 2019-08-03 PROCEDURE — 36415 COLL VENOUS BLD VENIPUNCTURE: CPT

## 2019-08-03 PROCEDURE — 99217 PR OBSERVATION CARE DISCHARGE: ICD-10-PCS | Mod: ,,, | Performed by: PHYSICIAN ASSISTANT

## 2019-08-03 PROCEDURE — 63700000 PHARM REV CODE 250 ALT 637 W/O HCPCS: Performed by: INTERNAL MEDICINE

## 2019-08-03 RX ORDER — SULFAMETHOXAZOLE AND TRIMETHOPRIM 800; 160 MG/1; MG/1
1 TABLET ORAL 2 TIMES DAILY
Qty: 16 TABLET | Refills: 0 | Status: SHIPPED | OUTPATIENT
Start: 2019-08-03 | End: 2019-08-11

## 2019-08-03 RX ADMIN — PANTOPRAZOLE SODIUM 40 MG: 40 TABLET, DELAYED RELEASE ORAL at 08:08

## 2019-08-03 RX ADMIN — VANCOMYCIN HYDROCHLORIDE 2000 MG: 100 INJECTION, POWDER, LYOPHILIZED, FOR SOLUTION INTRAVENOUS at 11:08

## 2019-08-03 RX ADMIN — LOTEPREDNOL ETABONATE 1 DROP: 5 SUSPENSION/ DROPS OPHTHALMIC at 08:08

## 2019-08-03 RX ADMIN — SENNOSIDES,DOCUSATE SODIUM 1 TABLET: 8.6; 5 TABLET, FILM COATED ORAL at 08:08

## 2019-08-03 RX ADMIN — OLOPATADINE HYDROCHLORIDE 1 DROP: 2 SOLUTION OPHTHALMIC at 08:08

## 2019-08-03 RX ADMIN — CETIRIZINE HYDROCHLORIDE 10 MG: 10 TABLET, FILM COATED ORAL at 08:08

## 2019-08-03 NOTE — PLAN OF CARE
Problem: Adult Inpatient Plan of Care  Goal: Plan of Care Review  Outcome: Ongoing (interventions implemented as appropriate)  Pt with no falls or injuries this shift. Pt afebrile. Continues on iv antibiotics for cellulitis to LUE.

## 2019-08-03 NOTE — NURSING
Pt verbalized understanding to discharge instruction; removed PIV; pt awaiting family for departure.

## 2019-08-03 NOTE — PROGRESS NOTES
Pharmacokinetic Assessment Follow Up: IV Vancomycin    Vancomycin serum concentration assessment(s):    The trough level was drawned correctly and can be used to guide therapy at this time.  Vancomycin Trough =11.7    Vancomycin Regimen Plan    Continue regimen to Vancomycin 2000 mg mg IV every 12hour with next serum trough concentration measured at 0930 prior to next dose on 8/5/19    Pharmacy will continue to follow and monitor vancomycin.    Please contact pharmacy at extension [phone] for questions regarding this assessment.    Thank you for the consult,   Shivani Vizcaino     Patient brief summary:  Hermelindo Garza III is a 39 y.o. male initiated on antimicrobial therapy with IV Vancomycin for treatment of suspected skin & soft tissue  (cellulitis of left arm)    Drug Allergies:   Review of patient's allergies indicates:  No Known Allergies    Actual Body Weight:   113.2 kg    Renal Function:   Estimated Creatinine Clearance: 147 mL/min (based on SCr of 0.8 mg/dL).,       CBC (last 72 hours):  Recent Labs   Lab Result Units 08/01/19 1802 08/02/19 0606 08/03/19  0440   WBC K/uL 9.72 7.51 6.93   Hemoglobin g/dL 16.5 15.0 15.2   Hemoglobin A1C %  --  5.6  --    Hematocrit % 50.5 46.0 47.1   Platelets K/uL 216 211 224   Gran% % 69.7 57.7 54.8   Lymph% % 20.7 25.7 28.4   Mono% % 7.8 12.1 11.1   Eosinophil% % 1.1 3.3 4.9   Basophil% % 0.6 0.9 0.7   Differential Method  Automated Automated Automated       Metabolic Panel (last 72 hours):  Recent Labs   Lab Result Units 08/01/19 1802 08/02/19  0606 08/03/19  0440   Sodium mmol/L 140 138 140   Potassium mmol/L 3.8 4.1 4.4   Chloride mmol/L 102 105 107   CO2 mmol/L 27 23 24   Glucose mg/dL 92 119* 104   BUN, Bld mg/dL 12 12 12   Creatinine mg/dL 0.8 0.8 0.8   Albumin g/dL 4.3 3.7 3.3*   Total Bilirubin mg/dL 0.8 0.4 0.5   Alkaline Phosphatase U/L 109 91 91   AST U/L 29 28 34   ALT U/L 68* 63* 66*   Magnesium mg/dL  --  2.3 2.2   Phosphorus mg/dL  --  3.0 3.5        Vancomycin Administrations:  vancomycin given in the last 96 hours                   vancomycin 2 g in 0.9% sodium chloride 500 mL IVPB (mg) 2,000 mg New Bag 08/03/19 1131     2,000 mg New Bag 08/02/19 2258     2,000 mg New Bag  1135    vancomycin 2 g in 0.9% sodium chloride 500 mL IVPB (mg) 2,000 mg New Bag 08/01/19 1942                Drug levels (last 3 results):  Recent Labs   Lab Result Units 08/03/19  1028   Vancomycin-Trough ug/mL 11.7       Microbiologic Results:  Microbiology Results (last 7 days)     Procedure Component Value Units Date/Time    Blood culture #2 [[id number]] Collected:  08/01/19 1802    Order Status:  Completed Specimen:  Blood from Peripheral, Hand, Right Updated:  08/02/19 2012     Blood Culture, Routine No Growth to date      No Growth to date    Narrative:       Blood Culture #2    Blood culture #1 [[id number]] Collected:  08/01/19 1802    Order Status:  Completed Specimen:  Blood from Peripheral, Antecubital, Right Updated:  08/02/19 2012     Blood Culture, Routine No Growth to date      No Growth to date    Narrative:       Blood Culture #1

## 2019-08-03 NOTE — NURSING
Pt transferred to room 347A via w/c. Pt accompanied by PCT and family. All personal belongings, chart and meds sent with pt. Report given to Suzan MONTES.

## 2019-08-03 NOTE — PLAN OF CARE
Problem: Adult Inpatient Plan of Care  Goal: Plan of Care Review  Outcome: Ongoing (interventions implemented as appropriate)  Will continue to monitor pt's safety, s/s of infection.

## 2019-08-04 ENCOUNTER — PATIENT MESSAGE (OUTPATIENT)
Dept: INTERNAL MEDICINE | Facility: CLINIC | Age: 39
End: 2019-08-04

## 2019-08-04 NOTE — ASSESSMENT & PLAN NOTE
-cefTRIAXone (ROCEPHIN) 2 g in dextrose 5 % 50 mL IVPB, 2 g, Intravenous, Q24H  -Vancomycin 2 g in 0.9% sodium chloride 500 mL IVPB, 2,000 mg, Intravenous, Q12H  -oxyCODONE immediate release tablet 5 mg, 5 mg, Oral, Q6H PRN  -HIV negative and ASO Ab pending  -Elevate arm  -No systemic signs of sepsis  - transitioned to Bactrim DS for 10 days of antibiotic therapy  - improved

## 2019-08-04 NOTE — DISCHARGE SUMMARY
[facility] Hospital Medicine  Discharge Summary      Patient Name: Hermelindo Garza III  MRN: [id number]  Admission Date: 8/1/2019  Hospital Length of Stay: 0 days  Discharge Date and Time: 8/3/2019  2:00 PM  Attending Physician: No att. providers found   Discharging Provider: Luciana Garcia PA-C  Primary Care Provider: Melody Beck MD  [facility] Medicine Team: [facility] HOSP MED F Luciana Garcia PA-C    HPI:   Mr. Garza is a very nice 40yo man with no major past medical history except asthma and LUBA, exacerbated by frequent allergies.  He was doing well until this past Monday when he noticed some mild discomfort and redness to his left arm just below the elbow. He frequently puts his elbows on the table and scrapes them, so he didn't give it much thought.  Around this same time he had also been swimming in a pool (chlorinated - no exposure to fresh, salty or brackish water).  Tuesday he awoke with diffuse swelling and redness to his distal arm with more pain and discomfort.  He has not ever had any fever or chills, however.  He went to the urgent care clinic that Tuesday and was given a Rx for Keflex, which he has been taking as prescribed since that time.  Despite taking the antibiotic, the redness has progressively worsened and become more circumferential.  He called his PCP for advice, who recommended he come to the ED.      * No surgery found *      Hospital Course:   Patient was started on Rocephin and Vancomycin for worsening cellulitis. CBC, CMP, A1c, and lactate were not significant for any acute infection or electrolyte abnormalities. HIV Ab, ASO Ab, and blood culture are pending. Patient reports worsening eye pain, redness, and teary discharge for which home prescriptions were ordered. Eye symptoms improved with drops. Patient transitioned to Bactrim DS to complete 10 days of antibiotic therapy on discharge.     Consults:     * Cellulitis of left arm  -cefTRIAXone  (ROCEPHIN) 2 g in dextrose 5 % 50 mL IVPB, 2 g, Intravenous, Q24H  -Vancomycin 2 g in 0.9% sodium chloride 500 mL IVPB, 2,000 mg, Intravenous, Q12H  -oxyCODONE immediate release tablet 5 mg, 5 mg, Oral, Q6H PRN  -HIV negative and ASO Ab pending  -Elevate arm  -No systemic signs of sepsis  - transitioned to Bactrim DS for 10 days of antibiotic therapy  - improved    History of multiple allergies  Asthma  -olopatadine 0.1 % ophthalmic solution 1 drop, 1 drop, Both Eyes, BID  -loteprednol 0.5 % ophthalmic suspension 1 drop, 1 drop, Both Eyes, QID  -diphenhydrAMINE capsule 25 mg, 25 mg, Oral, Q6H PRN  -cetirizine tablet 10 mg, 10 mg, Oral, Daily  -albuterol-ipratropium 2.5 mg-0.5 mg/3 mL nebulizer solution 3 mL, 3 mL, Nebulization, Q4H PRN    LUBA (obstructive sleep apnea)  Obesity  -Encourage weight loss    ADD (attention deficit disorder)  -methylphenidate HCl tablet 10 mg, 10 mg, Oral, TID WM  -clonazePAM tablet 0.5 mg, 0.5 mg, Oral, BID PRN    GERD (gastroesophageal reflux disease)  -pantoprazole EC tablet 40 mg, 40 mg, Oral, BID      Final Active Diagnoses:    Diagnosis Date Noted POA    PRINCIPAL PROBLEM:  Cellulitis of left arm [L03.114] 08/01/2019 Yes    History of multiple allergies [Z91.89] 08/02/2019 Yes    Asthma [J45.909] 08/02/2019 Yes    LUBA (obstructive sleep apnea) [G47.33] 08/12/2013 Yes    ADD (attention deficit disorder) [F98.8] 05/09/2012 Yes    GERD (gastroesophageal reflux disease) [K21.9] 05/09/2012 Yes      Problems Resolved During this Admission:       Discharged Condition: good    Disposition: Home or Self Care    Follow Up:  Follow-up Information     Melody Beck MD.    Specialty:  Internal Medicine  Why:  If symptoms worsen  Contact information:  [address] [facility]  [place] [address]  [phone]                 Patient Instructions:      Notify your health care provider if you experience any of the following:  persistent nausea and vomiting or diarrhea     Notify your health care  provider if you experience any of the following:  severe uncontrolled pain     Notify your health care provider if you experience any of the following:  redness, tenderness, or signs of infection (pain, swelling, redness, odor or green/yellow discharge around incision site)     Notify your health care provider if you experience any of the following:  temperature >100.4     Activity as tolerated       Significant Diagnostic Studies: Labs: All labs within the past 24 hours have been reviewed    Pending Diagnostic Studies:     Procedure Component Value Units Date/Time    Antistreptolysin O titer [[id number]] Collected:  08/02/19 0606    Order Status:  Sent Lab Status:  In process Updated:  08/02/19 0636    Specimen:  Blood     VANCOMYCIN, TROUGH before 3rd dose [[id number]] Collected:  08/03/19 1028    Order Status:  Sent Lab Status:  In process Updated:  08/03/19 1028    Specimen:  Blood          Medications:  Reconciled Home Medications:      Medication List      START taking these medications    sulfamethoxazole-trimethoprim 800-160mg 800-160 mg Tab  Commonly known as:  BACTRIM DS  Take 1 tablet by mouth 2 (two) times daily. for 8 days        CONTINUE taking these medications    albuterol 90 mcg/actuation inhaler  Commonly known as:  VENTOLIN HFA  Inhale 1-2 puffs into the lungs every 4 (four) hours as needed for Wheezing or Shortness of Breath. Rescue     clonazePAM 0.5 MG tablet  Commonly known as:  KLONOPIN  TK 1 T PO QHS PRF SLP     dexmethylphenidate 10 MG tablet  Commonly known as:  FOCALIN  Take 10 mg by mouth 3 (three) times daily.     ketoconazole 2 % shampoo  Commonly known as:  NIZORAL  Apply topically twice a week.     LOTEMAX 0.5 % Drpg  Generic drug:  loteprednol etabonate  INT 1 DROP IN OU QID     olopatadine 0.1 % ophthalmic solution  Commonly known as:  PATANOL  INT 1 GTT INTO OU BID     pantoprazole 40 MG tablet  Commonly known as:  PROTONIX  Take 1 tablet (40 mg total) by mouth 2 (two) times  daily.        STOP taking these medications    cephALEXin 500 MG capsule  Commonly known as:  KEFLEX            Indwelling Lines/Drains at time of discharge:   Lines/Drains/Airways          None          Time spent on the discharge of patient: 32 minutes  Patient was seen and examined on the date of discharge and determined to be suitable for discharge.         Luciana Garcia PA-C  Department of Hospital Medicine  [facility]

## 2019-08-05 LAB — ASO AB SERPL-ACNC: 75 IU/ML

## 2019-08-06 LAB
BACTERIA BLD CULT: NORMAL
BACTERIA BLD CULT: NORMAL

## 2019-12-13 ENCOUNTER — PATIENT MESSAGE (OUTPATIENT)
Dept: INTERNAL MEDICINE | Facility: CLINIC | Age: 39
End: 2019-12-13

## 2019-12-14 RX ORDER — KETOCONAZOLE 20 MG/G
CREAM TOPICAL 2 TIMES DAILY
Qty: 1 TUBE | Refills: 0 | Status: SHIPPED | OUTPATIENT
Start: 2019-12-14 | End: 2020-04-23

## 2019-12-27 ENCOUNTER — PATIENT MESSAGE (OUTPATIENT)
Dept: INTERNAL MEDICINE | Facility: CLINIC | Age: 39
End: 2019-12-27

## 2020-01-02 ENCOUNTER — OFFICE VISIT (OUTPATIENT)
Dept: URGENT CARE | Facility: CLINIC | Age: 40
End: 2020-01-02
Payer: COMMERCIAL

## 2020-01-02 VITALS
TEMPERATURE: 97 F | OXYGEN SATURATION: 97 % | DIASTOLIC BLOOD PRESSURE: 80 MMHG | HEIGHT: 65 IN | BODY MASS INDEX: 41.65 KG/M2 | WEIGHT: 250 LBS | SYSTOLIC BLOOD PRESSURE: 136 MMHG | HEART RATE: 92 BPM

## 2020-01-02 DIAGNOSIS — J06.9 ACUTE URI: Primary | ICD-10-CM

## 2020-01-02 DIAGNOSIS — J01.90 ACUTE NON-RECURRENT SINUSITIS, UNSPECIFIED LOCATION: ICD-10-CM

## 2020-01-02 LAB
CTP QC/QA: YES
CTP QC/QA: YES
FLUAV AG NPH QL: NEGATIVE
FLUBV AG NPH QL: NEGATIVE
S PYO RRNA THROAT QL PROBE: NEGATIVE

## 2020-01-02 PROCEDURE — 99214 OFFICE O/P EST MOD 30 MIN: CPT | Mod: 25,S$GLB,, | Performed by: FAMILY MEDICINE

## 2020-01-02 PROCEDURE — 87804 INFLUENZA ASSAY W/OPTIC: CPT | Mod: QW,S$GLB,, | Performed by: FAMILY MEDICINE

## 2020-01-02 PROCEDURE — 87880 POCT RAPID STREP A: ICD-10-PCS | Mod: QW,S$GLB,, | Performed by: FAMILY MEDICINE

## 2020-01-02 PROCEDURE — 96372 PR INJECTION,THERAP/PROPH/DIAG2ST, IM OR SUBCUT: ICD-10-PCS | Mod: S$GLB,,, | Performed by: FAMILY MEDICINE

## 2020-01-02 PROCEDURE — 87804 POCT INFLUENZA A/B: ICD-10-PCS | Mod: QW,S$GLB,, | Performed by: FAMILY MEDICINE

## 2020-01-02 PROCEDURE — 99214 PR OFFICE/OUTPT VISIT, EST, LEVL IV, 30-39 MIN: ICD-10-PCS | Mod: 25,S$GLB,, | Performed by: FAMILY MEDICINE

## 2020-01-02 PROCEDURE — 87880 STREP A ASSAY W/OPTIC: CPT | Mod: QW,S$GLB,, | Performed by: FAMILY MEDICINE

## 2020-01-02 PROCEDURE — 96372 THER/PROPH/DIAG INJ SC/IM: CPT | Mod: S$GLB,,, | Performed by: FAMILY MEDICINE

## 2020-01-02 RX ORDER — AMOXICILLIN 875 MG/1
875 TABLET, FILM COATED ORAL 2 TIMES DAILY
Qty: 20 TABLET | Refills: 0 | Status: SHIPPED | OUTPATIENT
Start: 2020-01-02 | End: 2020-01-12

## 2020-01-02 RX ORDER — BETAMETHASONE SODIUM PHOSPHATE AND BETAMETHASONE ACETATE 3; 3 MG/ML; MG/ML
6 INJECTION, SUSPENSION INTRA-ARTICULAR; INTRALESIONAL; INTRAMUSCULAR; SOFT TISSUE
Status: COMPLETED | OUTPATIENT
Start: 2020-01-02 | End: 2020-01-02

## 2020-01-02 RX ADMIN — BETAMETHASONE SODIUM PHOSPHATE AND BETAMETHASONE ACETATE 6 MG: 3; 3 INJECTION, SUSPENSION INTRA-ARTICULAR; INTRALESIONAL; INTRAMUSCULAR; SOFT TISSUE at 09:01

## 2020-01-02 NOTE — PATIENT INSTRUCTIONS
PLEASE READ YOUR DISCHARGE INSTRUCTIONS ENTIRELY AS IT CONTAINS IMPORTANT INFORMATION.    Please return here or go to the Emergency Department for any concerns or worsening of condition.    If you were prescribed antibiotics, please take them to completion.      If not allergic, please take over the counter Tylenol (Acetaminophen) and/or Motrin (Ibuprofen) as directed for control of pain and/or fever.  Please follow up with your primary care doctor or specialist as needed.  Soak wound as discussed and apply warm compresses frequently    If you smoke, please stop smoking.    Please return or see your primary care doctor if you develop new or worsening symptoms.     Please arrange follow up with your primary medical clinic as soon as possible. You must understand that you've received an Urgent Care treatment only and that you may be released before all of your medical problems are known or treated. You, the patient, will arrange for follow up as instructed. If your symptoms worsen or fail to improve you should go to the Emergency Room.  Sinusitis (Antibiotic Treatment)    The sinuses are air-filled spaces within the bones of the face. They connect to the inside of the nose. Sinusitis is an inflammation of the tissue lining the sinus cavity. Sinus inflammation can occur during a cold. It can also be due to allergies to pollens and other particles in the air. Sinusitis can cause symptoms of sinus congestion and fullness. A sinus infection causes fever, headache and facial pain. There is often green or yellow drainage from the nose or into the back of the throat (post-nasal drip). You have been given antibiotics to treat this condition.  Home care:  · Take the full course of antibiotics as instructed. Do not stop taking them, even if you feel better.  · Drink plenty of water, hot tea, and other liquids. This may help thin mucus. It also may promote sinus drainage.  · Heat may help soothe painful areas of the face. Use a  towel soaked in hot water. Or,  the shower and direct the hot spray onto your face. Using a vaporizer along with a menthol rub at night may also help.   · An expectorant containing guaifenesin may help thin the mucus and promote drainage from the sinuses.  · Over-the-counter decongestants may be used unless a similar medicine was prescribed. Nasal sprays work the fastest. Use one that contains phenylephrine or oxymetazoline. First blow the nose gently. Then use the spray. Do not use these medicines more often than directed on the label or symptoms may get worse. You may also use tablets containing pseudoephedrine. Avoid products that combine ingredients, because side effects may be increased. Read labels. You can also ask the pharmacist for help. (NOTE: Persons with high blood pressure should not use decongestants. They can raise blood pressure.)  · Over-the-counter antihistamines may help if allergies contributed to your sinusitis.    · Do not use nasal rinses or irrigation during an acute sinus infection, unless told to by your health care provider. Rinsing may spread the infection to other sinuses.  · Use acetaminophen or ibuprofen to control pain, unless another pain medicine was prescribed. (If you have chronic liver or kidney disease or ever had a stomach ulcer, talk with your doctor before using these medicines. Aspirin should never be used in anyone under 18 years of age who is ill with a fever. It may cause severe liver damage.)  · Don't smoke. This can worsen symptoms.  Follow-up care  Follow up with your healthcare provider or our staff if you are not improving within the next week.  When to seek medical advice  Call your healthcare provider if any of these occur:  · Facial pain or headache becoming more severe  · Stiff neck  · Unusual drowsiness or confusion  · Swelling of the forehead or eyelids  · Vision problems, including blurred or double vision  · Fever of 100.4ºF (38ºC) or higher, or as  directed by your healthcare provider  · Seizure  · Breathing problems  · Symptoms not resolving within 10 days  Date Last Reviewed: 4/13/2015  © 8721-5571 [employer]. [address] [place] [address], [place] [address]. All rights reserved. This information is not intended as a substitute for professional medical care. Always follow your healthcare professional's instructions.

## 2020-01-02 NOTE — PROGRESS NOTES
"Subjective:       Patient ID: Hermelindo Garza III is a 39 y.o. male.    Vitals:  height is 5' 5" (1.651 m) and weight is 113.4 kg (250 lb). His oral temperature is 97.1 °F (36.2 °C). His blood pressure is 136/80 and his pulse is 92. His oxygen saturation is 97%.     Chief Complaint: URI    [age] years old male came with complaint of sore throat, running nose, cough, body aches, mild fever/chills for 1 day.  Denies chest pain, shortness of breath, severe headache, dizziness, weakness, abdominal pain, nausea/vomiting    URI    This is a new problem. The current episode started yesterday. The problem has been unchanged. There has been no fever. Associated symptoms include congestion, coughing, headaches, a plugged ear sensation, rhinorrhea, sinus pain, sneezing and a sore throat. Pertinent negatives include no abdominal pain, chest pain, diarrhea, dysuria, ear pain, joint pain, joint swelling, nausea, neck pain, rash, swollen glands, vomiting or wheezing. He has tried nothing for the symptoms.       Constitution: Negative for chills, sweating, fatigue and fever.   HENT: Positive for congestion, sinus pain and sore throat. Negative for ear pain, sinus pressure and voice change.    Neck: Negative for neck pain and painful lymph nodes.   Cardiovascular: Negative for chest pain.   Eyes: Negative for eye redness.   Respiratory: Positive for cough. Negative for chest tightness, sputum production, bloody sputum, COPD, shortness of breath, stridor, wheezing and asthma.    Gastrointestinal: Negative for abdominal pain, nausea, vomiting and diarrhea.   Genitourinary: Negative for dysuria.   Musculoskeletal: Negative for muscle ache.   Skin: Negative for rash.   Allergic/Immunologic: Positive for sneezing. Negative for seasonal allergies and asthma.   Neurological: Positive for headaches.   Hematologic/Lymphatic: Negative for swollen lymph nodes.       Objective:      Physical Exam   Constitutional: He is oriented to " person, place, and time. He appears well-developed and well-nourished. He is cooperative.  Non-toxic appearance. He does not appear ill. No distress.   HENT:   Head: Normocephalic and atraumatic.   Right Ear: Hearing, tympanic membrane, external ear and ear canal normal.   Left Ear: Hearing, tympanic membrane, external ear and ear canal normal.   Nose: No mucosal edema, rhinorrhea or nasal deformity. No epistaxis. Right sinus exhibits no maxillary sinus tenderness and no frontal sinus tenderness. Left sinus exhibits no maxillary sinus tenderness and no frontal sinus tenderness.   Mouth/Throat: Uvula is midline and mucous membranes are normal. No trismus in the jaw. Normal dentition. No uvula swelling. No posterior oropharyngeal erythema.   Positive clear rhinorrhea.  Positive mild erythem pharyngeal without tonsillar swelling or exudates.  No lymph   Eyes: Conjunctivae and lids are normal. Right eye exhibits no discharge. Left eye exhibits no discharge. No scleral icterus.   Neck: Trachea normal, normal range of motion, full passive range of motion without pain and phonation normal. Neck supple.   Cardiovascular: Normal rate, regular rhythm, normal heart sounds, intact distal pulses and normal pulses.   Pulmonary/Chest: Effort normal and breath sounds normal. No stridor. No respiratory distress. He has no wheezes. He has no rales. He exhibits no tenderness.   Abdominal: Soft. Normal appearance and bowel sounds are normal. He exhibits no distension, no pulsatile midline mass and no mass. There is no tenderness.   Musculoskeletal: Normal range of motion. He exhibits no edema or deformity.   Neurological: He is alert and oriented to person, place, and time. He exhibits normal muscle tone. Coordination normal.   Skin: Skin is warm, dry, intact, not diaphoretic and not pale.   Psychiatric: He has a normal mood and affect. His speech is normal and behavior is normal. Judgment and thought content normal. Cognition and  memory are normal.   Nursing note and vitals reviewed.        Assessment:       1. Acute URI    2. Acute non-recurrent sinusitis, unspecified location        Plan:         Acute URI  -     POCT rapid strep A  -     POCT Influenza A/B    Acute non-recurrent sinusitis, unspecified location    Other orders  -     betamethasone acetate-betamethasone sodium phosphate injection 6 mg  -     amoxicillin (AMOXIL) 875 MG tablet; Take 1 tablet (875 mg total) by mouth 2 (two) times daily. for 10 days  Dispense: 20 tablet; Refill: 0        PLEASE READ YOUR DISCHARGE INSTRUCTIONS ENTIRELY AS IT CONTAINS IMPORTANT INFORMATION.    Please return here or go to the Emergency Department for any concerns or worsening of condition.    If you were prescribed antibiotics, please take them to completion.      If not allergic, please take over the counter Tylenol (Acetaminophen) and/or Motrin (Ibuprofen) as directed for control of pain and/or fever.  Please follow up with your primary care doctor or specialist as needed.  Soak wound as discussed and apply warm compresses frequently    If you smoke, please stop smoking.    Please return or see your primary care doctor if you develop new or worsening symptoms.     Please arrange follow up with your primary medical clinic as soon as possible. You must understand that you've received an Urgent Care treatment only and that you may be released before all of your medical problems are known or treated. You, the patient, will arrange for follow up as instructed. If your symptoms worsen or fail to improve you should go to the Emergency Room.  Sinusitis (Antibiotic Treatment)    The sinuses are air-filled spaces within the bones of the face. They connect to the inside of the nose. Sinusitis is an inflammation of the tissue lining the sinus cavity. Sinus inflammation can occur during a cold. It can also be due to allergies to pollens and other particles in the air. Sinusitis can cause symptoms of sinus  congestion and fullness. A sinus infection causes fever, headache and facial pain. There is often green or yellow drainage from the nose or into the back of the throat (post-nasal drip). You have been given antibiotics to treat this condition.  Home care:  · Take the full course of antibiotics as instructed. Do not stop taking them, even if you feel better.  · Drink plenty of water, hot tea, and other liquids. This may help thin mucus. It also may promote sinus drainage.  · Heat may help soothe painful areas of the face. Use a towel soaked in hot water. Or,  the shower and direct the hot spray onto your face. Using a vaporizer along with a menthol rub at night may also help.   · An expectorant containing guaifenesin may help thin the mucus and promote drainage from the sinuses.  · Over-the-counter decongestants may be used unless a similar medicine was prescribed. Nasal sprays work the fastest. Use one that contains phenylephrine or oxymetazoline. First blow the nose gently. Then use the spray. Do not use these medicines more often than directed on the label or symptoms may get worse. You may also use tablets containing pseudoephedrine. Avoid products that combine ingredients, because side effects may be increased. Read labels. You can also ask the pharmacist for help. (NOTE: Persons with high blood pressure should not use decongestants. They can raise blood pressure.)  · Over-the-counter antihistamines may help if allergies contributed to your sinusitis.    · Do not use nasal rinses or irrigation during an acute sinus infection, unless told to by your health care provider. Rinsing may spread the infection to other sinuses.  · Use acetaminophen or ibuprofen to control pain, unless another pain medicine was prescribed. (If you have chronic liver or kidney disease or ever had a stomach ulcer, talk with your doctor before using these medicines. Aspirin should never be used in anyone under 18 years of age who is  ill with a fever. It may cause severe liver damage.)  · Don't smoke. This can worsen symptoms.  Follow-up care  Follow up with your healthcare provider or our staff if you are not improving within the next week.  When to seek medical advice  Call your healthcare provider if any of these occur:  · Facial pain or headache becoming more severe  · Stiff neck  · Unusual drowsiness or confusion  · Swelling of the forehead or eyelids  · Vision problems, including blurred or double vision  · Fever of 100.4ºF (38ºC) or higher, or as directed by your healthcare provider  · Seizure  · Breathing problems  · Symptoms not resolving within 10 days  Date Last Reviewed: 4/13/2015  © 9511-2056 [employer]. [address] [place] [address], [place] [address]. All rights reserved. This information is not intended as a substitute for professional medical care. Always follow your healthcare professional's instructions.      Follow up with PCP within next 2-5 days.  If symptoms get worse, go to ER for further evaluation.

## 2020-02-06 ENCOUNTER — TELEPHONE (OUTPATIENT)
Dept: INTERNAL MEDICINE | Facility: CLINIC | Age: 40
End: 2020-02-06

## 2020-02-06 ENCOUNTER — OFFICE VISIT (OUTPATIENT)
Dept: INTERNAL MEDICINE | Facility: CLINIC | Age: 40
End: 2020-02-06
Payer: COMMERCIAL

## 2020-02-06 VITALS
RESPIRATION RATE: 16 BRPM | TEMPERATURE: 99 F | HEART RATE: 95 BPM | DIASTOLIC BLOOD PRESSURE: 76 MMHG | WEIGHT: 262.13 LBS | SYSTOLIC BLOOD PRESSURE: 134 MMHG | BODY MASS INDEX: 43.67 KG/M2 | HEIGHT: 65 IN

## 2020-02-06 DIAGNOSIS — K75.81 NASH (NONALCOHOLIC STEATOHEPATITIS): Primary | ICD-10-CM

## 2020-02-06 DIAGNOSIS — Z00.00 ANNUAL PHYSICAL EXAM: Primary | ICD-10-CM

## 2020-02-06 DIAGNOSIS — R68.89 FLU-LIKE SYMPTOMS: ICD-10-CM

## 2020-02-06 DIAGNOSIS — E78.2 MIXED HYPERLIPIDEMIA: ICD-10-CM

## 2020-02-06 DIAGNOSIS — E66.01 MORBID OBESITY: ICD-10-CM

## 2020-02-06 DIAGNOSIS — R73.03 PREDIABETES: ICD-10-CM

## 2020-02-06 DIAGNOSIS — K52.9 GASTROENTERITIS: ICD-10-CM

## 2020-02-06 LAB
INFLUENZA A, MOLECULAR: NEGATIVE
INFLUENZA B, MOLECULAR: NEGATIVE
SPECIMEN SOURCE: NORMAL

## 2020-02-06 PROCEDURE — 3008F BODY MASS INDEX DOCD: CPT | Mod: CPTII,S$GLB,, | Performed by: INTERNAL MEDICINE

## 2020-02-06 PROCEDURE — 99214 OFFICE O/P EST MOD 30 MIN: CPT | Mod: S$GLB,,, | Performed by: INTERNAL MEDICINE

## 2020-02-06 PROCEDURE — 99999 PR PBB SHADOW E&M-EST. PATIENT-LVL III: ICD-10-PCS | Mod: PBBFAC,,, | Performed by: INTERNAL MEDICINE

## 2020-02-06 PROCEDURE — 99999 PR PBB SHADOW E&M-EST. PATIENT-LVL III: CPT | Mod: PBBFAC,,, | Performed by: INTERNAL MEDICINE

## 2020-02-06 PROCEDURE — 3008F PR BODY MASS INDEX (BMI) DOCUMENTED: ICD-10-PCS | Mod: CPTII,S$GLB,, | Performed by: INTERNAL MEDICINE

## 2020-02-06 PROCEDURE — 87502 INFLUENZA DNA AMP PROBE: CPT | Mod: PO

## 2020-02-06 PROCEDURE — 99214 PR OFFICE/OUTPT VISIT, EST, LEVL IV, 30-39 MIN: ICD-10-PCS | Mod: S$GLB,,, | Performed by: INTERNAL MEDICINE

## 2020-02-06 RX ORDER — ONDANSETRON 4 MG/1
4 TABLET, FILM COATED ORAL EVERY 6 HOURS PRN
Qty: 30 TABLET | Refills: 0 | Status: SHIPPED | OUTPATIENT
Start: 2020-02-06 | End: 2021-01-26

## 2020-02-06 NOTE — TELEPHONE ENCOUNTER
----- Message from Melody Beck MD sent at 2/6/2020  3:39 PM CST -----  Results released to patient portal.

## 2020-02-06 NOTE — TELEPHONE ENCOUNTER
----- Message from Apple White sent at 2/6/2020  2:03 PM CST -----  Doctor appointment and lab have been scheduled.  Please link lab orders to the lab appointment.  Date of doctor appointment:  8/6  Date of lab appointment:  7/30  Physical or EP: Physical  Comments:

## 2020-02-06 NOTE — PROGRESS NOTES
"Subjective:       Patient ID: Hermelindo Garza III is a 39 y.o. male.    Chief Complaint: Follow-up; Dizziness; Generalized Body Aches; Nausea; and Emesis    HPI    39 y.o. male w/ morbid obesity presents for follow up of chronic medical problems:     1. LUBA: CPAP 12  2. GERD: pantoprazole 40mg daily  3. Asthma: intermittent, albuterol prn  4. Pre-DM: improved on recent labs  5: SHEIKH: liver enzymes improving.   6. Elevated triglycerides: improved  7. Morbid obesity: he reports gaining back weight, especially this past month. He plans to resume a healthy diet and exercise.     He also reports N/V/D started this morning. It is associated with chills, body aches, and fatigue. He denies blood in stool. He did receive the flu vaccine this season. He denies eating unusual foods.      Review of Systems   Constitutional: Positive for chills, fatigue and unexpected weight change. Negative for activity change.   HENT: Positive for congestion. Negative for hearing loss, rhinorrhea and trouble swallowing.    Eyes: Positive for discharge. Negative for visual disturbance.   Respiratory: Negative for chest tightness and wheezing.    Cardiovascular: Negative for chest pain and palpitations.   Gastrointestinal: Positive for diarrhea, nausea and vomiting. Negative for blood in stool and constipation.   Endocrine: Negative for polydipsia and polyuria.   Genitourinary: Negative for difficulty urinating, hematuria and urgency.   Musculoskeletal: Positive for myalgias. Negative for arthralgias, joint swelling and neck pain.   Neurological: Positive for dizziness. Negative for weakness and headaches.   Psychiatric/Behavioral: Negative for confusion and dysphoric mood.       Objective:        Vitals:    02/06/20 1342 02/06/20 1352   BP: (!) 140/78 134/76   Pulse: 95    Resp: 16    Temp: 98.9 °F (37.2 °C)    TempSrc: Oral    Weight: 118.9 kg (262 lb 2 oz)    Height: 5' 5" (1.651 m)        Body mass index is 43.62 kg/m².    Physical " Exam   Constitutional: He is oriented to person, place, and time. He appears well-developed and well-nourished. No distress.   HENT:   Head: Normocephalic and atraumatic.   Nose: Nose normal.   Eyes: Conjunctivae and EOM are normal. Right eye exhibits no discharge. Left eye exhibits no discharge.   Neck: Normal range of motion. Neck supple.   Cardiovascular: Normal rate, regular rhythm, normal heart sounds and intact distal pulses.   Pulmonary/Chest: Effort normal and breath sounds normal.   Abdominal: Soft. Bowel sounds are normal. He exhibits no distension. There is no tenderness. There is no guarding.   Musculoskeletal: Normal range of motion. He exhibits no edema.   Neurological: He is alert and oriented to person, place, and time.   Skin: Skin is warm and dry. He is not diaphoretic. No erythema.   Psychiatric: He has a normal mood and affect. His behavior is normal. Thought content normal.       Assessment:     1. SHEIKH (nonalcoholic steatohepatitis)    2. Prediabetes    3. Mixed hyperlipidemia    4. Flu-like symptoms    5. Gastroenteritis    6. Morbid obesity           Plan:         1. SHEIKH (nonalcoholic steatohepatitis)  - Comprehensive metabolic panel; Future    2. Prediabetes  - Hemoglobin A1c; Future    3. Mixed hyperlipidemia  - Lipid panel; Future    4. Flu-like symptoms  - Influenza A & B by Molecular    5. Gastroenteritis  - encouraged oral hydration. [place] food as tolerated.   - ondansetron (ZOFRAN) 4 MG tablet; Take 1 tablet (4 mg total) by mouth every 6 (six) hours as needed for Nausea.  Dispense: 30 tablet; Refill: 0    6. Morbid obesity  - encouraged exercise and diet, he plans to resume healthy lifestyle interventions.     rtc 6 mo EPP and f/u above       Patient note was created using [employer] Dictation.  Any errors in syntax or even information may not have been identified and edited on initial review prior to signing this note.

## 2020-02-06 NOTE — PATIENT INSTRUCTIONS

## 2020-02-07 ENCOUNTER — PATIENT MESSAGE (OUTPATIENT)
Dept: INTERNAL MEDICINE | Facility: CLINIC | Age: 40
End: 2020-02-07

## 2020-02-07 DIAGNOSIS — L21.9 SEBORRHEIC DERMATITIS OF SCALP: ICD-10-CM

## 2020-02-07 DIAGNOSIS — K21.9 GASTROESOPHAGEAL REFLUX DISEASE, ESOPHAGITIS PRESENCE NOT SPECIFIED: Primary | ICD-10-CM

## 2020-02-09 RX ORDER — ESOMEPRAZOLE MAGNESIUM 40 MG/1
40 CAPSULE, DELAYED RELEASE ORAL
Qty: 60 CAPSULE | Refills: 5 | Status: SHIPPED | OUTPATIENT
Start: 2020-02-09 | End: 2020-02-11 | Stop reason: ALTCHOICE

## 2020-02-09 RX ORDER — KETOCONAZOLE 20 MG/ML
SHAMPOO, SUSPENSION TOPICAL
Qty: 120 ML | Refills: 3 | Status: SHIPPED | OUTPATIENT
Start: 2020-02-10 | End: 2021-03-30

## 2020-02-10 ENCOUNTER — PATIENT MESSAGE (OUTPATIENT)
Dept: INTERNAL MEDICINE | Facility: CLINIC | Age: 40
End: 2020-02-10

## 2020-02-10 DIAGNOSIS — K21.9 GASTROESOPHAGEAL REFLUX DISEASE, ESOPHAGITIS PRESENCE NOT SPECIFIED: Primary | ICD-10-CM

## 2020-02-11 RX ORDER — ESOMEPRAZOLE MAGNESIUM 40 MG/1
40 GRANULE, DELAYED RELEASE ORAL
Qty: 60 EACH | Refills: 11 | Status: ON HOLD | OUTPATIENT
Start: 2020-02-11 | End: 2023-09-27 | Stop reason: HOSPADM

## 2020-02-11 NOTE — TELEPHONE ENCOUNTER
Sent msg via portal that Dr Beck sent different nexium Rx to pharm to trigger a PA to be done. Let him know that he can use otc nexium in the mean time.

## 2020-02-11 NOTE — TELEPHONE ENCOUNTER
We received a denial for pt's nexium and he is asking for something that his ins may cover    Please advise

## 2020-02-11 NOTE — TELEPHONE ENCOUNTER
nexium prescription changed to a different capsule, this should trigger prior auth with his insurance. Recommend otc nexium in the mean time

## 2020-02-16 ENCOUNTER — PATIENT MESSAGE (OUTPATIENT)
Dept: INTERNAL MEDICINE | Facility: CLINIC | Age: 40
End: 2020-02-16

## 2020-02-19 ENCOUNTER — LAB VISIT (OUTPATIENT)
Dept: LAB | Facility: HOSPITAL | Age: 40
End: 2020-02-19
Attending: INTERNAL MEDICINE
Payer: COMMERCIAL

## 2020-02-19 ENCOUNTER — PATIENT MESSAGE (OUTPATIENT)
Dept: INTERNAL MEDICINE | Facility: CLINIC | Age: 40
End: 2020-02-19

## 2020-02-19 DIAGNOSIS — K21.9 GASTROESOPHAGEAL REFLUX DISEASE, ESOPHAGITIS PRESENCE NOT SPECIFIED: ICD-10-CM

## 2020-02-19 DIAGNOSIS — E78.2 MIXED HYPERLIPIDEMIA: ICD-10-CM

## 2020-02-19 DIAGNOSIS — K75.81 NASH (NONALCOHOLIC STEATOHEPATITIS): ICD-10-CM

## 2020-02-19 DIAGNOSIS — R73.03 PREDIABETES: ICD-10-CM

## 2020-02-19 LAB
ALBUMIN SERPL BCP-MCNC: 3.9 G/DL (ref 3.5–5.2)
ALP SERPL-CCNC: 88 U/L (ref 55–135)
ALT SERPL W/O P-5'-P-CCNC: 102 U/L (ref 10–44)
ANION GAP SERPL CALC-SCNC: 10 MMOL/L (ref 8–16)
AST SERPL-CCNC: 49 U/L (ref 10–40)
BILIRUB SERPL-MCNC: 0.7 MG/DL (ref 0.1–1)
BUN SERPL-MCNC: 16 MG/DL (ref 6–20)
CALCIUM SERPL-MCNC: 9.1 MG/DL (ref 8.7–10.5)
CHLORIDE SERPL-SCNC: 107 MMOL/L (ref 95–110)
CHOLEST SERPL-MCNC: 205 MG/DL (ref 120–199)
CHOLEST/HDLC SERPL: 5.5 {RATIO} (ref 2–5)
CO2 SERPL-SCNC: 26 MMOL/L (ref 23–29)
CREAT SERPL-MCNC: 0.9 MG/DL (ref 0.5–1.4)
EST. GFR  (AFRICAN AMERICAN): >60 ML/MIN/1.73 M^2
EST. GFR  (NON AFRICAN AMERICAN): >60 ML/MIN/1.73 M^2
ESTIMATED AVG GLUCOSE: 123 MG/DL (ref 68–131)
GLUCOSE SERPL-MCNC: 110 MG/DL (ref 70–110)
HBA1C MFR BLD HPLC: 5.9 % (ref 4–5.6)
HDLC SERPL-MCNC: 37 MG/DL (ref 40–75)
HDLC SERPL: 18 % (ref 20–50)
LDLC SERPL CALC-MCNC: 91 MG/DL (ref 63–159)
MAGNESIUM SERPL-MCNC: 2.1 MG/DL (ref 1.6–2.6)
NONHDLC SERPL-MCNC: 168 MG/DL
POTASSIUM SERPL-SCNC: 4.6 MMOL/L (ref 3.5–5.1)
PROT SERPL-MCNC: 7 G/DL (ref 6–8.4)
SODIUM SERPL-SCNC: 143 MMOL/L (ref 136–145)
TRIGL SERPL-MCNC: 385 MG/DL (ref 30–150)
VIT B12 SERPL-MCNC: 585 PG/ML (ref 210–950)

## 2020-02-19 PROCEDURE — 80053 COMPREHEN METABOLIC PANEL: CPT

## 2020-02-19 PROCEDURE — 83036 HEMOGLOBIN GLYCOSYLATED A1C: CPT

## 2020-02-19 PROCEDURE — 82607 VITAMIN B-12: CPT

## 2020-02-19 PROCEDURE — 83735 ASSAY OF MAGNESIUM: CPT

## 2020-02-19 PROCEDURE — 36415 COLL VENOUS BLD VENIPUNCTURE: CPT | Mod: PO

## 2020-02-19 PROCEDURE — 80061 LIPID PANEL: CPT

## 2020-04-05 ENCOUNTER — PATIENT MESSAGE (OUTPATIENT)
Dept: INTERNAL MEDICINE | Facility: CLINIC | Age: 40
End: 2020-04-05

## 2020-04-06 ENCOUNTER — PATIENT MESSAGE (OUTPATIENT)
Dept: INTERNAL MEDICINE | Facility: CLINIC | Age: 40
End: 2020-04-06

## 2020-04-08 DIAGNOSIS — J45.20 MILD INTERMITTENT ASTHMA WITHOUT COMPLICATION: ICD-10-CM

## 2020-04-08 RX ORDER — ALBUTEROL SULFATE 90 UG/1
AEROSOL, METERED RESPIRATORY (INHALATION)
Qty: 18 G | Refills: 11 | Status: SHIPPED | OUTPATIENT
Start: 2020-04-08 | End: 2020-11-16 | Stop reason: SDUPTHER

## 2020-04-09 ENCOUNTER — PATIENT MESSAGE (OUTPATIENT)
Dept: INTERNAL MEDICINE | Facility: CLINIC | Age: 40
End: 2020-04-09

## 2020-04-10 ENCOUNTER — OFFICE VISIT (OUTPATIENT)
Dept: PRIMARY CARE CLINIC | Facility: CLINIC | Age: 40
End: 2020-04-10
Payer: COMMERCIAL

## 2020-04-10 ENCOUNTER — PATIENT MESSAGE (OUTPATIENT)
Dept: PRIMARY CARE CLINIC | Facility: CLINIC | Age: 40
End: 2020-04-10

## 2020-04-10 DIAGNOSIS — J01.90 ACUTE NON-RECURRENT SINUSITIS, UNSPECIFIED LOCATION: ICD-10-CM

## 2020-04-10 DIAGNOSIS — H92.01 RIGHT EAR PAIN: Primary | ICD-10-CM

## 2020-04-10 PROCEDURE — 99213 PR OFFICE/OUTPT VISIT, EST, LEVL III, 20-29 MIN: ICD-10-PCS | Mod: GT,,, | Performed by: INTERNAL MEDICINE

## 2020-04-10 PROCEDURE — 99213 OFFICE O/P EST LOW 20 MIN: CPT | Mod: GT,,, | Performed by: INTERNAL MEDICINE

## 2020-04-10 RX ORDER — PREDNISONE 20 MG/1
20 TABLET ORAL 2 TIMES DAILY
Qty: 8 TABLET | Refills: 0 | Status: SHIPPED | OUTPATIENT
Start: 2020-04-10 | End: 2020-04-14

## 2020-04-10 RX ORDER — AMOXICILLIN AND CLAVULANATE POTASSIUM 875; 125 MG/1; MG/1
1 TABLET, FILM COATED ORAL EVERY 12 HOURS
Qty: 20 TABLET | Refills: 0 | Status: SHIPPED | OUTPATIENT
Start: 2020-04-10 | End: 2020-05-31

## 2020-04-10 NOTE — PROGRESS NOTES
Subjective:    The patient location is: home  The chief complaint leading to consultation is: rt ear pain  Visit type: Virtual visit with synchronous audio and video  Total time spent with patient: 10 minutes  Each patient to whom he or she provides medical services by telemedicine is:  (1) informed of the relationship between the physician and patient and the respective role of any other health care provider with respect to management of the patient; and (2) notified that he or she may decline to receive medical services by telemedicine and may withdraw from such care at any time.    Notes:   Patient ID: Hermelindo Garza III is a 39 y.o. male.  Patient was seen telemedicine physical exam limited and the blood pressure and temperature not available  Chief Complaint: No chief complaint on file.    HPI  patient without any major medical problem he complained that the right side of his face in the area of day posterior right jaw anterior right ear with the discomfort not severe he also complained of mild sore throat no coughing no fever no short of breath or chest pain he deny worsening of his symptom when biting on his teeth or pulling on the right ear lobe  Review of Systems    Objective:      Physical Exam   Constitutional: He is oriented to person, place, and time. He appears well-developed and well-nourished. No distress.   HENT:   Subjective tenderness patient point to the area that causing discomfort in the posterior of the mandible and anterior to the right ear lobe no erythema no swelling   Neck: Neck supple.   Pulmonary/Chest: Effort normal.   Neurological: He is alert and oriented to person, place, and time.   Psychiatric: He has a normal mood and affect. His behavior is normal. Judgment and thought content normal.       Assessment:       1. Right ear pain    2. Acute non-recurrent sinusitis, unspecified location        Plan:       Right ear pain  Comments:  Could be TMJ or parotitis will try p.o.  antibiotic and a short course of oral steroid  Orders:  -     predniSONE (DELTASONE) 20 MG tablet; Take 1 tablet (20 mg total) by mouth 2 (two) times daily. for 4 days  Dispense: 8 tablet; Refill: 0    Acute non-recurrent sinusitis, unspecified location  -     amoxicillin-clavulanate 875-125mg (AUGMENTIN) 875-125 mg per tablet; Take 1 tablet by mouth every 12 (twelve) hours.  Dispense: 20 tablet; Refill: 0

## 2020-04-18 ENCOUNTER — PATIENT MESSAGE (OUTPATIENT)
Dept: INTERNAL MEDICINE | Facility: CLINIC | Age: 40
End: 2020-04-18

## 2020-04-18 ENCOUNTER — OFFICE VISIT (OUTPATIENT)
Dept: FAMILY MEDICINE | Facility: CLINIC | Age: 40
End: 2020-04-18
Payer: COMMERCIAL

## 2020-04-18 DIAGNOSIS — L23.7 ALLERGIC CONTACT DERMATITIS DUE TO PLANT: Primary | ICD-10-CM

## 2020-04-18 PROCEDURE — 99214 PR OFFICE/OUTPT VISIT, EST, LEVL IV, 30-39 MIN: ICD-10-PCS | Mod: 95,,, | Performed by: NURSE PRACTITIONER

## 2020-04-18 PROCEDURE — 99214 OFFICE O/P EST MOD 30 MIN: CPT | Mod: 95,,, | Performed by: NURSE PRACTITIONER

## 2020-04-18 RX ORDER — HYDROCORTISONE 25 MG/G
CREAM TOPICAL 2 TIMES DAILY
Qty: 28 G | Refills: 0 | Status: SHIPPED | OUTPATIENT
Start: 2020-04-18 | End: 2021-02-21 | Stop reason: SDUPTHER

## 2020-04-18 RX ORDER — HYDROXYZINE HYDROCHLORIDE 25 MG/1
25 TABLET, FILM COATED ORAL 3 TIMES DAILY PRN
Qty: 30 TABLET | Refills: 0 | Status: SHIPPED | OUTPATIENT
Start: 2020-04-18 | End: 2020-04-28

## 2020-04-18 NOTE — PATIENT INSTRUCTIONS
"  Contact Dermatitis  Contact dermatitis is a skin rash caused by something that touches the skin and makes it irritated and inflamed. Your skin may be red, swollen, dry, and may be cracked. Blisters may form and ooze. The rash will itch.  Contact dermatitis can form on the face and neck, backs of hands, forearms, genitals, and lower legs.  People can get contact dermatitis from lots of sources. These include:  · Plants such as poison ivy, oak, or sumac  · Chemicals in hair dyes and rinses, soaps, solvents, waxes, fingernail polish, and deodorants   · Jewelry or watchbands made of nickel  Contact dermatitis is not passed from person to person.  Talk with your healthcare provider about what may have caused the rash. A type of allergy testing called "patch testing" may be used to discover what you are allergic to. You will need to avoid the source of your rash in the future to prevent it from coming back.  Treatment is done to relieve itching and prevent the rash from coming back. The rash should go away in a few days to a few weeks.  Home care  Your healthcare provider may prescribe medicine to relieve swelling and itching. Follow all instructions when using these medicines.  General care:  · Avoid anything that heats up your skin, such as hot showers or baths, or direct sunlight. This can make itching worse.  · Apply cold compresses to soothe your sores to help relieve your symptoms. Do this for 30 minutes 3 to 4 times a day. You can make a cold compress by soaking a cloth in cold water. Squeeze out excess water. You can add colloidal oatmeal to the water to help reduce itching. For severe itching in a small area, apply an ice pack wrapped in a thin towel. Do this for 20 minutes 3 to 4 times a day.  · You can also try wet dressings. One way to do this is to wear a wet piece of clothing under a dry one. Wear a damp shirt under a dry shirt if your upper body is affected. This can relieve itching and prevent you from " scratching the affected area.  · You can also help relieve large areas of itching by taking a lukewarm bath with colloidal oatmeal added to the water.  · Use hydrocortisone cream for redness and irritation, unless another medicine was prescribed. You can also use benzocaine anesthetic cream or spray. Calamine lotion can also relieve mild symptoms.  · Use oral diphenhydramine to help reduce itching. You can buy this antihistamine at drug and grocery stores. It can make you sleepy, so use lower doses during the daytime. Or you can use loratadine. This is an antihistamine that will not make you sleepy. Do not use diphenhydramine if you have glaucoma or have trouble urinating due to an enlarged prostate.  · If a plant causes your rash, make sure to wash your skin and the clothes you were wearing when you came into contact with the plant. This is to wash away the plant oils that gave you the rash and prevent more or worse symptoms.  · Stay away from the substance or object that causes your symptoms. If you cant avoid it, wear gloves or some other type of protection.  Follow-up care  Follow up with your healthcare provider, or as advised.    When to seek medical advice at the Urgent Care:    Call your healthcare provider right away if any of these occur:  · Spreading of the rash to other parts of your body  · Redness or swelling that gets worse  · Pain that gets worse  · Foul-smelling fluid leaking from the skin  · Yellow-brown crusts on the open blisters    When to call 911 or go to the ER:  · Severe swelling of your face, eyelids, mouth, throat or tongue  · Trouble urinating due to swelling in the genital area  · Fever of 100.4°F (38°C) or higher    Date Last Reviewed: 9/1/2016  © 4877-1503 [employer]. [address] [place] [address], [place] [address]. All rights reserved. This information is not intended as a substitute for professional medical care. Always follow your healthcare professional's  instructions.    Use the hydrocortisone cream twice a day after applying a cool compress to the skin to soften the surface of the rash and allow it to absorb the cream better.      If you like, you can use benadryl (diphenydramine) cream, as well.  Separate the applications by 2 hours.  Again apply a cool compress to the rash before applying the cream.    Use the hydroxyzine 3x daily as needed for itching.  If it makes you too drowsy to take during the daytime, use it at bedtime to try to reduce the scratching and reduce the risk of infection.    If you are not better in 3-4 days, if worse or you have concerns or questions, please do not hesitate to call.  If you have any questions, please do not hesitate to call.  You can reach us at [phone] Monday through Friday 7 a.m. to 6:30 p.m., Saturday 9 a.m. to 4:30 p.m. and Sunday 9 a.m to 2:30 p.m.  Or call Dr. Ramírez's office.    Thank you for using the [facility]!    ELISABETH Colorado, CNP, FNP-BC  [facility]    To rate your experience with JUAN CARLOS [place], click on the link below:      [email or web address]

## 2020-04-18 NOTE — Clinical Note
Melody Beck MD,  I saw your patient today in [facility]. If you have any questions, please do not hesitate to contact me.  Thank you!  JUAN CARLOS [place], [facility]

## 2020-04-18 NOTE — TELEPHONE ENCOUNTER
Recommend virtual visit as picture is indicative of possible infection and would be best to visualize to determine diagnosis and tx plan

## 2020-04-18 NOTE — TELEPHONE ENCOUNTER
PLease advise re: pt's hand ( redness & line)  Picture included. Please advise if he needs a visit.  Thank you

## 2020-04-18 NOTE — PROGRESS NOTES
Hermelindo Garza III is a 39 y.o. male patient of Melody Beck MD who presents to the clinic today for No chief complaint on file.  .    HPI    Pt, who is not known to me, reports a new problem to me:  Rash on the side of the left hand that's itchy and red.  He is concerned because it's spreading, meaning the redness is getting wider, but it is not increasing in length.  She describes the rash as looking like a little string of tiny beads in a red line.  He is not ill.  He has not had any new exposures, except picking herbs from the garden.  He is concerned because he had cellulitis a year ago on the left elbow and was in the hospital for it.    These symptoms began 3 days ago and status is worse, widening in width.     Pt denies the following symptoms:  Fever, feeling ill    Aggravating factors include scratching .    Relieving factors include nothing .    OTC Medications tried are hydrocortisone and tea tree oil.    Prescription medications taken for symptoms are none.    Pertinent medical history:  H/o cellulitis of the left elbow a year ago    ROS    Constitutional:  No fever, not ill.    Skin:  See chief complaint/HPI.    All other ROS neg.      Past Medical History:   Diagnosis Date    ADD (attention deficit disorder) 5/9/2012    Asthma 5/9/2012    Esophageal ulcer     GERD (gastroesophageal reflux disease) 5/9/2012    Kidney stones 5/2014    Lumbar disc disease 5/9/2012    LUBA (obstructive sleep apnea)     Renal calculi 5/9/2012       Current Outpatient Medications:     amoxicillin-clavulanate 875-125mg (AUGMENTIN) 875-125 mg per tablet, Take 1 tablet by mouth every 12 (twelve) hours., Disp: 20 tablet, Rfl: 0    dexmethylphenidate (FOCALIN) 10 MG tablet, Take 10 mg by mouth 3 (three) times daily. , Disp: , Rfl: 0    esomeprazole (NEXIUM) 40 mg GrPS, Take 40 mg by mouth 2 (two) times daily before meals., Disp: 60 each, Rfl: 11    ketoconazole (NIZORAL) 2 % cream, Apply topically 2 (two)  times daily., Disp: 1 Tube, Rfl: 0    ketoconazole (NIZORAL) 2 % shampoo, Apply topically twice a week., Disp: 120 mL, Rfl: 3    ondansetron (ZOFRAN) 4 MG tablet, Take 1 tablet (4 mg total) by mouth every 6 (six) hours as needed for Nausea., Disp: 30 tablet, Rfl: 0    VENTOLIN HFA 90 mcg/actuation inhaler, INHALE 1 TO 2 PUFFS INTO THE LUNGS EVERY 4 HOURS AS NEEDED FOR WHEEZING SHORTNESS OF BREATH, OR RESCUE, Disp: 18 g, Rfl: 11    Pt is not a smoker.    PHYSICAL EXAM    Alert, coop 39 y.o. male patient in no acute distress, is not ill-appearing.    There were no vitals filed for this visit.  Medications & PMH all reviewed today.    PE  Vitals: Not taken per patient  Gen:   Well developed, well-nourished in NAD  Psych:   Normal behavior, normal affect.  HENT:   Normocephalic, atraumatic,  Resp:   Normal respiratory effort  No retractions  No increased work of breathing  No audible wheezes  CV:   No selene oral cyanosis  Skin:   No observed bruises    Skin:  Color on fingertips pink.            A/an erythematous, papular rash with pruritis and small round lesions is located on the medial side of the left hand.  There is a/an erythematous base.  Lesions are distributed in a linear fashion over an area small area vertically on the medial side of the left hand.         Psych:  Responds appropriately throughout the visit.               Relaxed.  Well-groomed    Allergic contact dermatitis due to plant  -     hydrocortisone 2.5 % cream; Apply topically 2 (two) times daily. for 10 days  Dispense: 28 g; Refill: 0  -     hydroxyzine HCL (ATARAX) 25 MG tablet; Take 1 tablet (25 mg total) by mouth 3 (three) times daily as needed for Itching.  Dispense: 30 tablet; Refill: 0      Pt today presents with an erythematous, pruritic rash of very small papules in a linear distribution consistent with allergic contact dermatitis, possibly from a plant contact..    This is a new problem to me.  No work up is planned.        Pt advised  to perform comfort measures recommended on patient instruction sheet .    If not better in 3-4 days, the patient is advised to call here, the PCP office, [facility] or go to URGENT CARE/ER.  If worse or concerns, the patient is advised to call here, the PCP office, [facility] or go to URGENT CARE/ER.  Explained exam findings, diagnosis and treatment plan to patient .  Questions answered and patient states understanding.

## 2020-04-23 RX ORDER — KETOCONAZOLE 20 MG/G
CREAM TOPICAL 2 TIMES DAILY
Qty: 15 G | Refills: 0 | Status: SHIPPED | OUTPATIENT
Start: 2020-04-23 | End: 2021-02-21 | Stop reason: SDUPTHER

## 2020-05-06 ENCOUNTER — PATIENT MESSAGE (OUTPATIENT)
Dept: INTERNAL MEDICINE | Facility: CLINIC | Age: 40
End: 2020-05-06

## 2020-05-06 RX ORDER — LOTEPREDNOL ETABONATE 5 MG/G
GEL OPHTHALMIC
Refills: 1 | OUTPATIENT
Start: 2020-05-06

## 2020-05-06 NOTE — TELEPHONE ENCOUNTER
Dr kumar has never prescribed these drops; he should contact his eye doc for a refill. We do not generally prescribe steroidal eye drops

## 2020-05-23 ENCOUNTER — PATIENT MESSAGE (OUTPATIENT)
Dept: INTERNAL MEDICINE | Facility: CLINIC | Age: 40
End: 2020-05-23

## 2020-05-23 DIAGNOSIS — E66.01 MORBID OBESITY WITH BMI OF 40.0-44.9, ADULT: Primary | ICD-10-CM

## 2020-05-31 ENCOUNTER — OFFICE VISIT (OUTPATIENT)
Dept: INTERNAL MEDICINE | Facility: CLINIC | Age: 40
End: 2020-05-31
Payer: COMMERCIAL

## 2020-05-31 DIAGNOSIS — H92.01 RIGHT EAR PAIN: Primary | ICD-10-CM

## 2020-05-31 PROCEDURE — 99213 PR OFFICE/OUTPT VISIT, EST, LEVL III, 20-29 MIN: ICD-10-PCS | Mod: 95,,, | Performed by: FAMILY MEDICINE

## 2020-05-31 PROCEDURE — 99213 OFFICE O/P EST LOW 20 MIN: CPT | Mod: 95,,, | Performed by: FAMILY MEDICINE

## 2020-05-31 RX ORDER — AMOXICILLIN AND CLAVULANATE POTASSIUM 875; 125 MG/1; MG/1
1 TABLET, FILM COATED ORAL 2 TIMES DAILY
Qty: 20 TABLET | Refills: 0 | Status: SHIPPED | OUTPATIENT
Start: 2020-05-31 | End: 2020-06-10

## 2020-05-31 RX ORDER — FLUTICASONE PROPIONATE 50 MCG
2 SPRAY, SUSPENSION (ML) NASAL DAILY
Qty: 16 G | Refills: 11 | Status: SHIPPED | OUTPATIENT
Start: 2020-05-31 | End: 2020-11-16 | Stop reason: SDUPTHER

## 2020-05-31 NOTE — PROGRESS NOTES
Subjective:       Patient ID: Hermelindo Garza III is a 39 y.o. male.    Chief Complaint: Otalgia    The patient location is: Home  The chief complaint leading to consultation is:  Ear pain; sinus pain    Visit type: audiovisual    Face to Face time with patient:  10 min  20 minutes of total time spent on the encounter, which includes face to face time and non-face to face time preparing to see the patient (eg, review of tests), Obtaining and/or reviewing separately obtained history, Documenting clinical information in the electronic or other health record, Independently interpreting results (not separately reported) and communicating results to the patient/family/caregiver, or Care coordination (not separately reported).         Each patient to whom he or she provides medical services by telemedicine is:  (1) informed of the relationship between the physician and patient and the respective role of any other health care provider with respect to management of the patient; and (2) notified that he or she may decline to receive medical services by telemedicine and may withdraw from such care at any time.    Notes:  39-year-old white male patient of Dr. Bcek but new patient to me is evaluated through telemedicine secondary to concerns of possible sinus infection verses ear infection.  He reports that approximately 1 month ago he began noticing ear pain that was exacerbated by jaw movement.  He was evaluated by his dentist as he believes he does grind his teeth and was given a bite guard which did help improve symptoms; however, the pain persisted and he was seen and diagnosed with an ear infection which did improve with Augmentin.  Yesterday after swimming he noted some ear pain and after using swimmer's ear drops he noted a large amount of brown wax draining from his ear.  He has also noted increased nasal congestion, scratchy throat, and sneezing for the past week.  He uses Allegra or Benadryl rarely for these  symptoms.    Review of Systems   Constitutional: Negative for appetite change, chills, fatigue and fever.   HENT: Positive for ear pain (right), postnasal drip, rhinorrhea, sneezing and sore throat (scratchy). Negative for congestion, hearing loss, sinus pressure and tinnitus.    Eyes: Negative for redness, itching and visual disturbance.   Respiratory: Negative for cough, chest tightness and shortness of breath.    Cardiovascular: Negative for chest pain and palpitations.   Gastrointestinal: Negative for abdominal pain, constipation, diarrhea, nausea and vomiting.   Genitourinary: Negative for decreased urine volume, difficulty urinating, dysuria, frequency, hematuria and urgency.   Musculoskeletal: Negative for back pain, myalgias, neck pain and neck stiffness.   Skin: Negative for rash.   Neurological: Negative for dizziness, light-headedness and headaches.   Psychiatric/Behavioral: Negative.        Objective:      Visit performed through video.  No physical exam performed.  Assessment:       1. Right ear pain        Plan:         Right ear pain  -     amoxicillin-clavulanate 875-125mg (AUGMENTIN) 875-125 mg per tablet; Take 1 tablet by mouth 2 (two) times daily. for 10 days  Dispense: 20 tablet; Refill: 0  -     fluticasone propionate (FLONASE) 50 mcg/actuation nasal spray; 2 sprays (100 mcg total) by Each Nostril route once daily.  Dispense: 16 g; Refill: 11      Continue Allegra nightly.  Tylenol or ibuprofen as needed for pain.  Follow-up as needed if symptoms persist or worsen.

## 2020-06-16 ENCOUNTER — PATIENT MESSAGE (OUTPATIENT)
Dept: INTERNAL MEDICINE | Facility: CLINIC | Age: 40
End: 2020-06-16

## 2020-06-21 ENCOUNTER — PATIENT MESSAGE (OUTPATIENT)
Dept: INTERNAL MEDICINE | Facility: CLINIC | Age: 40
End: 2020-06-21

## 2020-06-22 ENCOUNTER — PATIENT MESSAGE (OUTPATIENT)
Dept: INTERNAL MEDICINE | Facility: CLINIC | Age: 40
End: 2020-06-22

## 2020-06-29 ENCOUNTER — PATIENT OUTREACH (OUTPATIENT)
Dept: ADMINISTRATIVE | Facility: OTHER | Age: 40
End: 2020-06-29

## 2020-06-30 ENCOUNTER — OFFICE VISIT (OUTPATIENT)
Dept: SLEEP MEDICINE | Facility: CLINIC | Age: 40
End: 2020-06-30
Payer: COMMERCIAL

## 2020-06-30 DIAGNOSIS — R63.5 WEIGHT GAIN: ICD-10-CM

## 2020-06-30 DIAGNOSIS — E66.01 SEVERE OBESITY (BMI >= 40): ICD-10-CM

## 2020-06-30 DIAGNOSIS — G47.33 OSA (OBSTRUCTIVE SLEEP APNEA): Primary | ICD-10-CM

## 2020-06-30 PROCEDURE — 99202 PR OFFICE/OUTPT VISIT, NEW, LEVL II, 15-29 MIN: ICD-10-PCS | Mod: 95,,, | Performed by: NURSE PRACTITIONER

## 2020-06-30 PROCEDURE — 99202 OFFICE O/P NEW SF 15 MIN: CPT | Mod: 95,,, | Performed by: NURSE PRACTITIONER

## 2020-06-30 NOTE — PROGRESS NOTES
Chart reviewed.   Immunizations: Triggered Imm Registry     Orders placed: n/a  Upcoming appts to satisfy KATEY topics: n/a

## 2020-06-30 NOTE — Clinical Note
Babs, pt has very severe LUBA and having breakthrough symptoms on current CPAP. Would you mind helping expedite his set up at \[facility]\"" for new APAP? Thank you, sv

## 2020-06-30 NOTE — PATIENT INSTRUCTIONS
Pending insurance approval/authorization, [facility] will call you regarding cost (if any) and scheduling set up appointment in approximately 7 - 10 business days. If you have not heard from them after that time frame, please call them directly at [phone] Ext [phone] to follow-up     After initial set up of CPAP, you have a 30-day window to exchange your first mask at no additional cost.     After obtaining your CPAP machine, please make a follow-up appointment to see me back in Sleep Clinic approximately 5 weeks from the date you received your machine. This appointment is recommended no sooner than 31 days from set up date, but no later than 90 days.     Please bring your CPAP machine, cord, and mask to all Sleep Clinic appointments. The reason for this is so I can gather information from the machine and make adjustments to either mask and/or machine in real time should you be having any issues, without having to return at a later time to address the problem.

## 2020-06-30 NOTE — PROGRESS NOTES
"The patient location is: home   The chief complaint leading to consultation is: "Need new cpap. Been told I am snoring again wearing old one".    Visit type: audiovisual    Face to Face time with patient: 19 minutes of total time spent on the encounter, which includes face to face time and non-face to face time preparing to see the patient (eg, review of tests), Obtaining and/or reviewing separately obtained history, Documenting clinical information in the electronic or other health record, Independently interpreting results (not separately reported) and communicating results to the patient/family/caregiver, or Care coordination (not separately reported).     Each patient to whom he or she provides medical services by telemedicine is:  (1) informed of the relationship between the physician and patient and the respective role of any other health care provider with respect to management of the patient; and (2) notified that he or she may decline to receive medical services by telemedicine and may withdraw from such care at any time.    Heremlindo Garza III  was seen as a new patient for the management of obstructive sleep apnea.    06/30/2020 LINDA Hendricks NP: Initial HISTORY OF PRESENT ILLNESS: Hermelindo Garza III is a 40 y.o. male is here for sleep evaluation.       Previously established pt of [facility], last seen 09/2013. Was set up with CPAP after split PSG. Unaware follow-ups recommended.   Reports nightly use of CPAP 12 cm which resolves air gasping and unrefreshing sleep    reporting breakthrough snoring, despite pt keeping up with changing mask/headgear regularly  Reports 50-lb weight gain since 2013 PSG and experiencing air hunger     Current PAP machine does not have up to date data on [employer], last upload 2013.     07/29/2013 Split  lb The overall AHI was 77.0 with an oxygen irvin of 77.0%. Effective control of sleep disordered breathing was seen during supine REM stage sleep at a " pressure of 12 cm of water.       PAST MEDICAL HISTORY:    Active Ambulatory Problems     Diagnosis Date Noted    GERD (gastroesophageal reflux disease) 05/09/2012    Renal calculi 05/09/2012    Mild intermittent asthma without complication 05/09/2012    ADD (attention deficit disorder) 05/09/2012    Lumbar disc disease 05/09/2012    LUBA (obstructive sleep apnea) 08/12/2013    Prediabetes 03/27/2019    Mixed hyperlipidemia 03/27/2019    Hypertriglyceridemia 03/27/2019    Elevated liver enzymes 03/27/2019    SHEIKH (nonalcoholic steatohepatitis) 07/10/2019    Hepatosplenomegaly 07/10/2019    Cellulitis of left arm 08/01/2019    History of multiple allergies 08/02/2019    Asthma 08/02/2019    Morbid obesity 02/06/2020     Resolved Ambulatory Problems     Diagnosis Date Noted    Dysphagia, oropharyngeal phase 05/28/2013     Past Medical History:   Diagnosis Date    Asthma 5/9/2012    Esophageal ulcer     Kidney stones 5/2014                PAST SURGICAL HISTORY:    Past Surgical History:   Procedure Laterality Date    APPENDECTOMY  2006    COLONOSCOPY  01/22/2019    internal hemorrhoids, o/w normal - repeat at age 52    ESOPHAGOGASTRODUODENOSCOPY  01/22/2019    LA grade B esophagitis; esophageal stenosis- dilated; gastritis; H pylori pathology negative    LUMBAR LAMINECTOMY  2010         FAMILY HISTORY:                Family History   Problem Relation Age of Onset    Hypertension Mother     Transient ischemic attack Mother     Sleep apnea Mother     Chronic back pain Father     MARTITA disease Father     Sleep apnea Father     Brain cancer Maternal Grandmother     Diabetes Maternal Grandfather     Breast cancer Paternal Grandmother        SOCIAL HISTORY:          Tobacco:   Social History     Tobacco Use   Smoking Status Passive Smoke Exposure - Never Smoker   Smokeless Tobacco Never Used       Alcohol use:    Social History     Substance and Sexual Activity   Alcohol Use Yes     Alcohol/week: 0.0 standard drinks    Frequency: 2-3 times a week    Drinks per session: 1 or 2    Binge frequency: Never    Comment: Occasional                 ALLERGIES:  Review of patient's allergies indicates:  No Known Allergies    CURRENT MEDICATIONS:    Current Outpatient Medications   Medication Sig Dispense Refill    dexmethylphenidate (FOCALIN) 10 MG tablet Take 10 mg by mouth 3 (three) times daily.   0    esomeprazole (NEXIUM) 40 mg GrPS Take 40 mg by mouth 2 (two) times daily before meals. 60 each 11    fluticasone propionate (FLONASE) 50 mcg/actuation nasal spray 2 sprays (100 mcg total) by Each Nostril route once daily. 16 g 11    hydrocortisone 2.5 % cream Apply topically 2 (two) times daily. for 10 days 28 g 0    ketoconazole (NIZORAL) 2 % cream Apply topically 2 (two) times daily. Apply to to affected area bid 15 g 0    ketoconazole (NIZORAL) 2 % shampoo Apply topically twice a week. 120 mL 3    ondansetron (ZOFRAN) 4 MG tablet Take 1 tablet (4 mg total) by mouth every 6 (six) hours as needed for Nausea. 30 tablet 0    VENTOLIN HFA 90 mcg/actuation inhaler INHALE 1 TO 2 PUFFS INTO THE LUNGS EVERY 4 HOURS AS NEEDED FOR WHEEZING SHORTNESS OF BREATH, OR RESCUE 18 g 11     No current facility-administered medications for this visit.                   REVIEW OF SYSTEMS:     Sleep related symptoms as per HPI.  CONST:Reports weight gain - 50 lb since 2013 PSG     HEENT: Sometimes sinus congestion  PULM: Denies dyspnea  CARD:  Denies palpitations   GI:  Denies acid reflux  : Denies polyuria  NEURO: Denies headaches  PSYCH: Denies mood disturbance  HEME: Denies anemia    Otherwise, a balance of 10 systems reviewed is negative.                                   ASSESSMENT:    Obstructive sleep apnea, severe by AHI. The patient symptomatically has snoring, air gasping, and unrefreshing sleep which improves with nightly CPAP use. Currently having breakthrough snoring associated with air hunger on  fixed CPAP 12 cm after 50-lb weight gain.  The patient is subjectively adherent on CPAP and experiencing symptomatic benefit. Severe LUBA would best be treated by replacing current CPAP machine with auto-capable and AHI-capable unit vs dedicated titration at this time.     Medical co-morbidities: ADD, asthma, GERD, and obesity.      Obesity,  discussed relationship between LUBA and weight     Environmental allergies, stable, daily Flonase     PLAN:    Treatment:  -APAP 12 - 20 cm @ [facility]. RTC 31 - 90 days after PAP . Pt to make f/u appt approximately 5 weeks from set up date either via [facility] or by contacting Sleep Clinic  -If patient has continued LUBA breakthrough symptoms despite APAP, then consider an in-lab titration sleep study in order to determine optimal fixed CPAP setting.    Education: During our discussion today, we talked about the etiology of obstructive sleep apnea as well as the potential ramifications of untreated sleep apnea, which could include daytime sleepiness, hypertension, heart disease and/or stroke. We discussed potential treatment options, which could include weight loss, body positioning (pillow or [facility] [employer]), continuous positive airway pressure (CPAP), OA, EPAP, or referral for surgical consideration.     Precautions: The patient was advised to abstain from driving should they feel sleepy  or drowsy.

## 2020-07-06 ENCOUNTER — PATIENT MESSAGE (OUTPATIENT)
Dept: SLEEP MEDICINE | Facility: CLINIC | Age: 40
End: 2020-07-06

## 2020-07-30 ENCOUNTER — LAB VISIT (OUTPATIENT)
Dept: LAB | Facility: HOSPITAL | Age: 40
End: 2020-07-30
Attending: INTERNAL MEDICINE
Payer: COMMERCIAL

## 2020-07-30 DIAGNOSIS — Z00.00 ANNUAL PHYSICAL EXAM: ICD-10-CM

## 2020-07-30 LAB
25(OH)D3+25(OH)D2 SERPL-MCNC: 17 NG/ML (ref 30–96)
ALBUMIN SERPL BCP-MCNC: 3.8 G/DL (ref 3.5–5.2)
ALP SERPL-CCNC: 81 U/L (ref 55–135)
ALT SERPL W/O P-5'-P-CCNC: 126 U/L (ref 10–44)
ANION GAP SERPL CALC-SCNC: 11 MMOL/L (ref 8–16)
AST SERPL-CCNC: 61 U/L (ref 10–40)
BASOPHILS # BLD AUTO: 0.05 K/UL (ref 0–0.2)
BASOPHILS NFR BLD: 0.8 % (ref 0–1.9)
BILIRUB SERPL-MCNC: 0.6 MG/DL (ref 0.1–1)
BUN SERPL-MCNC: 16 MG/DL (ref 6–20)
CALCIUM SERPL-MCNC: 9.7 MG/DL (ref 8.7–10.5)
CHLORIDE SERPL-SCNC: 105 MMOL/L (ref 95–110)
CHOLEST SERPL-MCNC: 229 MG/DL (ref 120–199)
CHOLEST/HDLC SERPL: 5.9 {RATIO} (ref 2–5)
CO2 SERPL-SCNC: 24 MMOL/L (ref 23–29)
CREAT SERPL-MCNC: 0.8 MG/DL (ref 0.5–1.4)
DIFFERENTIAL METHOD: NORMAL
EOSINOPHIL # BLD AUTO: 0.3 K/UL (ref 0–0.5)
EOSINOPHIL NFR BLD: 5.2 % (ref 0–8)
ERYTHROCYTE [DISTWIDTH] IN BLOOD BY AUTOMATED COUNT: 13.3 % (ref 11.5–14.5)
EST. GFR  (AFRICAN AMERICAN): >60 ML/MIN/1.73 M^2
EST. GFR  (NON AFRICAN AMERICAN): >60 ML/MIN/1.73 M^2
ESTIMATED AVG GLUCOSE: 143 MG/DL (ref 68–131)
GLUCOSE SERPL-MCNC: 138 MG/DL (ref 70–110)
HBA1C MFR BLD HPLC: 6.6 % (ref 4–5.6)
HCT VFR BLD AUTO: 48.2 % (ref 40–54)
HDLC SERPL-MCNC: 39 MG/DL (ref 40–75)
HDLC SERPL: 17 % (ref 20–50)
HGB BLD-MCNC: 15.7 G/DL (ref 14–18)
IMM GRANULOCYTES # BLD AUTO: 0.03 K/UL (ref 0–0.04)
IMM GRANULOCYTES NFR BLD AUTO: 0.5 % (ref 0–0.5)
LDLC SERPL CALC-MCNC: ABNORMAL MG/DL (ref 63–159)
LYMPHOCYTES # BLD AUTO: 2.2 K/UL (ref 1–4.8)
LYMPHOCYTES NFR BLD: 35.4 % (ref 18–48)
MCH RBC QN AUTO: 30.3 PG (ref 27–31)
MCHC RBC AUTO-ENTMCNC: 32.6 G/DL (ref 32–36)
MCV RBC AUTO: 93 FL (ref 82–98)
MONOCYTES # BLD AUTO: 0.8 K/UL (ref 0.3–1)
MONOCYTES NFR BLD: 12.1 % (ref 4–15)
NEUTROPHILS # BLD AUTO: 2.9 K/UL (ref 1.8–7.7)
NEUTROPHILS NFR BLD: 46 % (ref 38–73)
NONHDLC SERPL-MCNC: 190 MG/DL
NRBC BLD-RTO: 0 /100 WBC
PLATELET # BLD AUTO: 221 K/UL (ref 150–350)
PMV BLD AUTO: 10.5 FL (ref 9.2–12.9)
POTASSIUM SERPL-SCNC: 4.3 MMOL/L (ref 3.5–5.1)
PROT SERPL-MCNC: 6.9 G/DL (ref 6–8.4)
RBC # BLD AUTO: 5.18 M/UL (ref 4.6–6.2)
SODIUM SERPL-SCNC: 140 MMOL/L (ref 136–145)
TRIGL SERPL-MCNC: 586 MG/DL (ref 30–150)
TSH SERPL DL<=0.005 MIU/L-ACNC: 2.3 UIU/ML (ref 0.4–4)
WBC # BLD AUTO: 6.3 K/UL (ref 3.9–12.7)

## 2020-07-30 PROCEDURE — 80061 LIPID PANEL: CPT

## 2020-07-30 PROCEDURE — 84443 ASSAY THYROID STIM HORMONE: CPT

## 2020-07-30 PROCEDURE — 82306 VITAMIN D 25 HYDROXY: CPT

## 2020-07-30 PROCEDURE — 85025 COMPLETE CBC W/AUTO DIFF WBC: CPT

## 2020-07-30 PROCEDURE — 83036 HEMOGLOBIN GLYCOSYLATED A1C: CPT

## 2020-07-30 PROCEDURE — 36415 COLL VENOUS BLD VENIPUNCTURE: CPT | Mod: PO

## 2020-07-30 PROCEDURE — 80053 COMPREHEN METABOLIC PANEL: CPT

## 2020-08-11 ENCOUNTER — PATIENT OUTREACH (OUTPATIENT)
Dept: ADMINISTRATIVE | Facility: OTHER | Age: 40
End: 2020-08-11

## 2020-08-12 ENCOUNTER — PATIENT MESSAGE (OUTPATIENT)
Dept: INTERNAL MEDICINE | Facility: CLINIC | Age: 40
End: 2020-08-12

## 2020-08-12 ENCOUNTER — OFFICE VISIT (OUTPATIENT)
Dept: SLEEP MEDICINE | Facility: CLINIC | Age: 40
End: 2020-08-12
Payer: COMMERCIAL

## 2020-08-12 DIAGNOSIS — G47.33 OSA (OBSTRUCTIVE SLEEP APNEA): Primary | ICD-10-CM

## 2020-08-12 PROCEDURE — 99212 PR OFFICE/OUTPT VISIT, EST, LEVL II, 10-19 MIN: ICD-10-PCS | Mod: 95,,, | Performed by: NURSE PRACTITIONER

## 2020-08-12 PROCEDURE — 99212 OFFICE O/P EST SF 10 MIN: CPT | Mod: 95,,, | Performed by: NURSE PRACTITIONER

## 2020-08-13 ENCOUNTER — PATIENT MESSAGE (OUTPATIENT)
Dept: INTERNAL MEDICINE | Facility: CLINIC | Age: 40
End: 2020-08-13

## 2020-08-17 ENCOUNTER — PATIENT MESSAGE (OUTPATIENT)
Dept: INTERNAL MEDICINE | Facility: CLINIC | Age: 40
End: 2020-08-17

## 2020-08-17 NOTE — TELEPHONE ENCOUNTER
He doesn't need testing right now since he is asymptomatic. If her results are positive and he still doesn't have symptoms then can get tested at community site. If he develops symptoms then will need testing

## 2020-10-18 ENCOUNTER — PATIENT OUTREACH (OUTPATIENT)
Dept: ADMINISTRATIVE | Facility: OTHER | Age: 40
End: 2020-10-18

## 2020-10-19 ENCOUNTER — OFFICE VISIT (OUTPATIENT)
Dept: DERMATOLOGY | Facility: CLINIC | Age: 40
End: 2020-10-19
Payer: COMMERCIAL

## 2020-10-19 DIAGNOSIS — L21.9 SEBORRHEIC DERMATITIS: ICD-10-CM

## 2020-10-19 DIAGNOSIS — L73.9 FOLLICULITIS: ICD-10-CM

## 2020-10-19 DIAGNOSIS — B07.9 WART OF FACE: Primary | ICD-10-CM

## 2020-10-19 DIAGNOSIS — L91.8 SKIN TAGS, MULTIPLE ACQUIRED: ICD-10-CM

## 2020-10-19 PROCEDURE — 99999 PR PBB SHADOW E&M-EST. PATIENT-LVL III: CPT | Mod: PBBFAC,,, | Performed by: DERMATOLOGY

## 2020-10-19 PROCEDURE — 17110 DESTRUCTION B9 LES UP TO 14: CPT | Mod: S$GLB,,, | Performed by: DERMATOLOGY

## 2020-10-19 PROCEDURE — 99203 OFFICE O/P NEW LOW 30 MIN: CPT | Mod: 25,S$GLB,, | Performed by: DERMATOLOGY

## 2020-10-19 PROCEDURE — 99999 PR PBB SHADOW E&M-EST. PATIENT-LVL III: ICD-10-PCS | Mod: PBBFAC,,, | Performed by: DERMATOLOGY

## 2020-10-19 PROCEDURE — 99203 PR OFFICE/OUTPT VISIT, NEW, LEVL III, 30-44 MIN: ICD-10-PCS | Mod: 25,S$GLB,, | Performed by: DERMATOLOGY

## 2020-10-19 PROCEDURE — 17110 PR DESTRUCTION BENIGN LESIONS UP TO 14: ICD-10-PCS | Mod: S$GLB,,, | Performed by: DERMATOLOGY

## 2020-10-19 NOTE — PROGRESS NOTES
Subjective:       Patient ID:  Hermelindo Garza III is a 40 y.o. male who presents for   Chief Complaint   Patient presents with    Growth     face     Patient is a 41 yo male present for skin tags on face    Also rash for years on the face and nose and beard.      Review of Systems   Constitutional: Negative for fever, chills, weight loss, weight gain, fatigue, night sweats and malaise.   Respiratory: Negative for cough and shortness of breath.    Gastrointestinal: Negative for nausea, vomiting, abdominal pain, diarrhea and constipation.   Skin: Positive for itching, rash, dry skin and activity-related sunscreen use. Negative for daily sunscreen use, recent sunburn and wears hat.   Hematologic/Lymphatic: Does not bruise/bleed easily.        Objective:    Physical Exam   Constitutional: He appears well-developed and well-nourished.   Eyes: No conjunctival no injection.   Neurological: He is alert and oriented to person, place, and time.   Psychiatric: He has a normal mood and affect.   Skin:   Areas Examined (abnormalities noted in diagram):   Scalp / Hair Palpated and Inspected  Head / Face Inspection Performed  RUE Inspected  LUE Inspection Performed                   Diagram Legend     Erythematous scaling macule/papule c/w actinic keratosis       Vascular papule c/w angioma      Pigmented verrucoid papule/plaque c/w seborrheic keratosis      Yellow umbilicated papule c/w sebaceous hyperplasia      Irregularly shaped tan macule c/w lentigo     1-2 mm smooth white papules consistent with Milia      Movable subcutaneous cyst with punctum c/w epidermal inclusion cyst      Subcutaneous movable cyst c/w pilar cyst      Firm pink to brown papule c/w dermatofibroma      Pedunculated fleshy papule(s) c/w skin tag(s)      Evenly pigmented macule c/w junctional nevus     Mildly variegated pigmented, slightly irregular-bordered macule c/w mildly atypical nevus      Flesh colored to evenly pigmented papule c/w  intradermal nevus       Pink pearly papule/plaque c/w basal cell carcinoma      Erythematous hyperkeratotic cursted plaque c/w SCC      Surgical scar with no sign of skin cancer recurrence      Open and closed comedones      Inflammatory papules and pustules      Verrucoid papule consistent consistent with wart     Erythematous eczematous patches and plaques     Dystrophic onycholytic nail with subungual debris c/w onychomycosis     Umbilicated papule    Erythematous-base heme-crusted tan verrucoid plaque consistent with inflamed seborrheic keratosis     Erythematous Silvery Scaling Plaque c/w Psoriasis     See annotation      Assessment / Plan:        Wart of face  Discussed with patient the etiology and pathogenesis of the disease or skin lesion(s) and possible treatments and aggravators.    Reviewed with patient different treatment options and associated risks.  Cryosurgery procedure note:    Verbal consent from the patient is obtained including, but not limited to, risk of hypopigmentation/hyperpigmentation, scar, recurrence of lesion. Liquid nitrogen cryosurgery is applied to 2 verruca with prior paring, as detailed in the physical exam, to produce a freeze injury. 2 consecutive freeze thaw cycles are applied to each verruca. The patient is aware that blisters (possibly blood blisters) may form.    Folliculitis  Discussed with patient the etiology and pathogenesis of the disease or skin lesion(s) and possible treatments and aggravators.    Reviewed with patient different treatment options and associated risks.  Proper application of medications and or care for affected area(s) and condition(s) reviewed.  Patient to start using sulfur bar soap for the face or affected area 1-2 x day.  For scalp usage lather from the soap to be applied to the roots of the scalp and allow to sit for 3-5 minutes regularly.  Same instructions for other affected areas.    Seborrheic dermatitis  Discussed with patient the etiology and  pathogenesis of the disease or skin lesion(s) and possible treatments and aggravators.    Chronic nature of this condition discussed with patient.  Proper application of medications and or care for affected area(s) and condition(s) reviewed.  Patient to start using sulfur bar soap for the face or affected area 1-2 x day.  For scalp usage lather from the soap to be applied to the roots of the scalp and allow to sit for 3-5 minutes regularly.  Same instructions for other affected areas.  Pt's keto cr qd-bid regularly.  Pt's hc prn flare for quick calming.  No hot water bathing reviewed.    Skin tags, multiple acquired  Discussed with patient the benign nature of these lesions and that no treatment is indicated.    Prn cosmetic hyfrecation of 3 tags on the l upper arm and r elbow crease.  Costs $150.  Scar and recurrence reviewed.      Written instructions provided to the patient or guardian today.  No Hot showers 2 Sulfur Bar Soap (least ingredients) scalp, Ears, Daily Let soap sit for 3 to 5 mins. Still wash face daily. Continue to use keto cream on stubborn areas when flared up.           Follow up if symptoms worsen or fail to improve.

## 2020-10-19 NOTE — LETTER
October 19, 2020      Melody Beck MD  [address] [facility]  [place] [address]           [facility] Dermatology [address]  [address]  [facility] [address]  Phone: [phone]  Fax: [phone]          Patient: Hermelindo Garza III   MR Number: [id number]   YOB: 1980   Date of Visit: 10/19/2020       Dear Dr. Melody Beck:    Thank you for referring Hermelindo Garza to me for evaluation. Attached you will find relevant portions of my assessment and plan of care.    If you have questions, please do not hesitate to call me. I look forward to following Hermelindo Garza along with you.    Sincerely,    Xander Terrell MD    Enclosure  CC:  No Recipients    If you would like to receive this communication electronically, please contact [email or web address] or [phone] to request more information on [employer] Link access.    For providers and/or their staff who would like to refer a patient to [facility], please contact us through our one-stop-shop provider referral line, [facility], at [phone].    If you feel you have received this communication in error or would no longer like to receive these types of communications, please e-mail [email or web address]

## 2020-10-19 NOTE — PATIENT INSTRUCTIONS
No Hot showers 2 Sulfur Bar Soap (least ingredients) scalp, Ears, Daily Let soap sit for 3 to 5 mins. Still wash face daily. Continue to use keto cream on stubborn areas when flared up.

## 2020-11-16 ENCOUNTER — PATIENT OUTREACH (OUTPATIENT)
Dept: ADMINISTRATIVE | Facility: HOSPITAL | Age: 40
End: 2020-11-16

## 2020-11-16 ENCOUNTER — OFFICE VISIT (OUTPATIENT)
Dept: INTERNAL MEDICINE | Facility: CLINIC | Age: 40
End: 2020-11-16
Payer: COMMERCIAL

## 2020-11-16 ENCOUNTER — TELEPHONE (OUTPATIENT)
Dept: INTERNAL MEDICINE | Facility: CLINIC | Age: 40
End: 2020-11-16

## 2020-11-16 ENCOUNTER — PATIENT MESSAGE (OUTPATIENT)
Dept: INTERNAL MEDICINE | Facility: CLINIC | Age: 40
End: 2020-11-16

## 2020-11-16 VITALS
TEMPERATURE: 98 F | BODY MASS INDEX: 46.8 KG/M2 | HEIGHT: 65 IN | RESPIRATION RATE: 18 BRPM | HEART RATE: 81 BPM | DIASTOLIC BLOOD PRESSURE: 88 MMHG | WEIGHT: 280.88 LBS | OXYGEN SATURATION: 98 % | SYSTOLIC BLOOD PRESSURE: 134 MMHG

## 2020-11-16 DIAGNOSIS — Z23 NEED FOR 23-POLYVALENT PNEUMOCOCCAL POLYSACCHARIDE VACCINE: ICD-10-CM

## 2020-11-16 DIAGNOSIS — E11.9 TYPE 2 DIABETES MELLITUS WITHOUT COMPLICATION, WITHOUT LONG-TERM CURRENT USE OF INSULIN: ICD-10-CM

## 2020-11-16 DIAGNOSIS — E11.9 TYPE 2 DIABETES MELLITUS WITHOUT COMPLICATION, WITHOUT LONG-TERM CURRENT USE OF INSULIN: Primary | ICD-10-CM

## 2020-11-16 DIAGNOSIS — K75.81 NASH (NONALCOHOLIC STEATOHEPATITIS): ICD-10-CM

## 2020-11-16 DIAGNOSIS — J45.20 MILD INTERMITTENT ASTHMA WITHOUT COMPLICATION: ICD-10-CM

## 2020-11-16 DIAGNOSIS — M62.830 BACK MUSCLE SPASM: Primary | ICD-10-CM

## 2020-11-16 DIAGNOSIS — E66.01 MORBID OBESITY: ICD-10-CM

## 2020-11-16 DIAGNOSIS — E78.2 MIXED HYPERLIPIDEMIA: ICD-10-CM

## 2020-11-16 DIAGNOSIS — Z88.9 HISTORY OF MULTIPLE ALLERGIES: ICD-10-CM

## 2020-11-16 DIAGNOSIS — Z00.00 ANNUAL PHYSICAL EXAM: Primary | ICD-10-CM

## 2020-11-16 DIAGNOSIS — E55.9 VITAMIN D INSUFFICIENCY: ICD-10-CM

## 2020-11-16 PROCEDURE — 90686 FLU VACCINE (QUAD) GREATER THAN OR EQUAL TO 3YO PRESERVATIVE FREE IM: ICD-10-PCS | Mod: S$GLB,,, | Performed by: INTERNAL MEDICINE

## 2020-11-16 PROCEDURE — 90732 PNEUMOCOCCAL POLYSACCHARIDE VACCINE 23-VALENT =>2YO SQ IM: ICD-10-PCS | Mod: S$GLB,,, | Performed by: INTERNAL MEDICINE

## 2020-11-16 PROCEDURE — 3044F HG A1C LEVEL LT 7.0%: CPT | Mod: CPTII,S$GLB,, | Performed by: INTERNAL MEDICINE

## 2020-11-16 PROCEDURE — 1126F PR PAIN SEVERITY QUANTIFIED, NO PAIN PRESENT: ICD-10-PCS | Mod: S$GLB,,, | Performed by: INTERNAL MEDICINE

## 2020-11-16 PROCEDURE — 3008F BODY MASS INDEX DOCD: CPT | Mod: CPTII,S$GLB,, | Performed by: INTERNAL MEDICINE

## 2020-11-16 PROCEDURE — 1126F AMNT PAIN NOTED NONE PRSNT: CPT | Mod: S$GLB,,, | Performed by: INTERNAL MEDICINE

## 2020-11-16 PROCEDURE — 90471 FLU VACCINE (QUAD) GREATER THAN OR EQUAL TO 3YO PRESERVATIVE FREE IM: ICD-10-PCS | Mod: S$GLB,,, | Performed by: INTERNAL MEDICINE

## 2020-11-16 PROCEDURE — 99395 PR PREVENTIVE VISIT,EST,18-39: ICD-10-PCS | Mod: 25,S$GLB,, | Performed by: INTERNAL MEDICINE

## 2020-11-16 PROCEDURE — 99395 PREV VISIT EST AGE 18-39: CPT | Mod: 25,S$GLB,, | Performed by: INTERNAL MEDICINE

## 2020-11-16 PROCEDURE — 90686 IIV4 VACC NO PRSV 0.5 ML IM: CPT | Mod: S$GLB,,, | Performed by: INTERNAL MEDICINE

## 2020-11-16 PROCEDURE — 90472 IMMUNIZATION ADMIN EACH ADD: CPT | Mod: S$GLB,,, | Performed by: INTERNAL MEDICINE

## 2020-11-16 PROCEDURE — 90732 PPSV23 VACC 2 YRS+ SUBQ/IM: CPT | Mod: S$GLB,,, | Performed by: INTERNAL MEDICINE

## 2020-11-16 PROCEDURE — 90471 IMMUNIZATION ADMIN: CPT | Mod: S$GLB,,, | Performed by: INTERNAL MEDICINE

## 2020-11-16 PROCEDURE — 3044F PR MOST RECENT HEMOGLOBIN A1C LEVEL <7.0%: ICD-10-PCS | Mod: CPTII,S$GLB,, | Performed by: INTERNAL MEDICINE

## 2020-11-16 PROCEDURE — 3008F PR BODY MASS INDEX (BMI) DOCUMENTED: ICD-10-PCS | Mod: CPTII,S$GLB,, | Performed by: INTERNAL MEDICINE

## 2020-11-16 PROCEDURE — 99999 PR PBB SHADOW E&M-EST. PATIENT-LVL V: CPT | Mod: PBBFAC,,, | Performed by: INTERNAL MEDICINE

## 2020-11-16 PROCEDURE — 90472 PNEUMOCOCCAL POLYSACCHARIDE VACCINE 23-VALENT =>2YO SQ IM: ICD-10-PCS | Mod: S$GLB,,, | Performed by: INTERNAL MEDICINE

## 2020-11-16 PROCEDURE — 99999 PR PBB SHADOW E&M-EST. PATIENT-LVL V: ICD-10-PCS | Mod: PBBFAC,,, | Performed by: INTERNAL MEDICINE

## 2020-11-16 RX ORDER — METHOCARBAMOL 500 MG/1
500 TABLET, FILM COATED ORAL 4 TIMES DAILY PRN
Qty: 60 TABLET | Refills: 2 | Status: SHIPPED | OUTPATIENT
Start: 2020-11-16 | End: 2021-09-02 | Stop reason: SDUPTHER

## 2020-11-16 RX ORDER — MONTELUKAST SODIUM 10 MG/1
10 TABLET ORAL NIGHTLY
Qty: 30 TABLET | Refills: 11 | Status: SHIPPED | OUTPATIENT
Start: 2020-11-16 | End: 2021-11-12

## 2020-11-16 RX ORDER — FLUTICASONE PROPIONATE 50 MCG
2 SPRAY, SUSPENSION (ML) NASAL DAILY
Qty: 16 G | Refills: 11 | Status: SHIPPED | OUTPATIENT
Start: 2020-11-16 | End: 2021-05-03

## 2020-11-16 RX ORDER — ALBUTEROL SULFATE 90 UG/1
AEROSOL, METERED RESPIRATORY (INHALATION)
Qty: 18 G | Refills: 11 | Status: SHIPPED | OUTPATIENT
Start: 2020-11-16 | End: 2021-09-02 | Stop reason: SDUPTHER

## 2020-11-16 NOTE — TELEPHONE ENCOUNTER
----- Message from Sathish Spaulding sent at 11/16/2020 11:19 AM CST -----  Good Morning,    Patient has 3 month follow up scheduled for 2/12/21  and labs scheduled for 2/5/21.

## 2020-11-16 NOTE — PROGRESS NOTES
Subjective:       Patient ID: Hermelindo Garza III is a 40 y.o. male.    Chief Complaint: Annual Exam and Immunizations (Flu Shot)    HPI    40 y.o. male here for annual exam.     Health Maintenance/ Screening:   Labs: annual utd, needs f/u labs    Vaccines:   Influenza: due   Tetanus: 2019    PPSV23: due     Medical Problems:  SHEIKH: low fat diet  DM2: new dx. [facility] eye.   HLD: lifestyle changes  Vit D insuff: on otc supplement, he thinks 1,000 IU daily      Past Medical History:   Diagnosis Date    ADD (attention deficit disorder) 5/9/2012    Asthma 5/9/2012    Esophageal ulcer     GERD (gastroesophageal reflux disease) 5/9/2012    Kidney stones 5/2014    Lumbar disc disease 5/9/2012    LUBA (obstructive sleep apnea)     Renal calculi 5/9/2012     Past Surgical History:   Procedure Laterality Date    APPENDECTOMY  2006    COLONOSCOPY  01/22/2019    internal hemorrhoids, o/w normal - repeat at age 52    ESOPHAGOGASTRODUODENOSCOPY  01/22/2019    LA grade B esophagitis; esophageal stenosis- dilated; gastritis; H pylori pathology negative    LUMBAR LAMINECTOMY  2010     Family History   Problem Relation Age of Onset    Hypertension Mother     Transient ischemic attack Mother     Sleep apnea Mother     Chronic back pain Father     MARTITA disease Father     Sleep apnea Father     Brain cancer Maternal Grandmother     Diabetes Maternal Grandfather     Breast cancer Paternal Grandmother      Social History     Socioeconomic History    Marital status:      Spouse name: Not on file    Number of children: Not on file    Years of education: Not on file    Highest education level: Not on file   Occupational History    Not on file   Social Needs    Financial resource strain: Not hard at all    Food insecurity     Worry: Never true     Inability: Never true    Transportation needs     Medical: No     Non-medical: No   Tobacco Use    Smoking status: Passive Smoke Exposure - Never Smoker     Smokeless tobacco: Never Used   Substance and Sexual Activity    Alcohol use: Yes     Alcohol/week: 0.0 standard drinks     Frequency: 2-3 times a week     Drinks per session: 1 or 2     Binge frequency: Never     Comment: Occasional    Drug use: No    Sexual activity: Yes   Lifestyle    Physical activity     Days per week: 2 days     Minutes per session: 30 min    Stress: To some extent   Relationships    Social connections     Talks on phone: More than three times a week     Gets together: Twice a week     Attends Buddhist service: Not on file     Active member of club or organization: No     Attends meetings of clubs or organizations: Patient refused     Relationship status:    Other Topics Concern    Not on file   Social History Narrative    Not on file     Review of patient's allergies indicates:  No Known Allergies    Current Outpatient Medications:     dexmethylphenidate (FOCALIN) 10 MG tablet, Take 10 mg by mouth 3 (three) times daily. , Disp: , Rfl: 0    esomeprazole (NEXIUM) 40 mg GrPS, Take 40 mg by mouth 2 (two) times daily before meals., Disp: 60 each, Rfl: 11    fluticasone propionate (FLONASE) 50 mcg/actuation nasal spray, 2 sprays (100 mcg total) by Each Nostril route once daily., Disp: 16 g, Rfl: 11    ketoconazole (NIZORAL) 2 % cream, Apply topically 2 (two) times daily. Apply to to affected area bid, Disp: 15 g, Rfl: 0    ketoconazole (NIZORAL) 2 % shampoo, Apply topically twice a week., Disp: 120 mL, Rfl: 3    VENTOLIN HFA 90 mcg/actuation inhaler, INHALE 1 TO 2 PUFFS INTO THE LUNGS EVERY 4 HOURS AS NEEDED FOR WHEEZING SHORTNESS OF BREATH, OR RESCUE, Disp: 18 g, Rfl: 11    hydrocortisone 2.5 % cream, Apply topically 2 (two) times daily. for 10 days, Disp: 28 g, Rfl: 0    montelukast (SINGULAIR) 10 mg tablet, Take 1 tablet (10 mg total) by mouth every evening., Disp: 30 tablet, Rfl: 11    ondansetron (ZOFRAN) 4 MG tablet, Take 1 tablet (4 mg total) by mouth every 6  "(six) hours as needed for Nausea. (Patient not taking: Reported on 11/16/2020), Disp: 30 tablet, Rfl: 0    semaglutide (OZEMPIC) 0.25 mg or 0.5 mg(2 mg/1.5 mL) PnIj, Inject 0.25 mg into the skin every 7 days., Disp: 1.5 mL, Rfl: 0      Review of Systems   Constitutional: Negative for diaphoresis and fever.   HENT: Negative for trouble swallowing.    Eyes: Positive for redness. Negative for discharge.   Respiratory: Negative for cough and shortness of breath.    Cardiovascular: Negative for chest pain and leg swelling.   Gastrointestinal: Negative for abdominal pain and blood in stool.   Genitourinary: Negative for difficulty urinating and dysuria.   Musculoskeletal: Negative for gait problem and joint swelling.   Skin: Negative for pallor and wound.   Allergic/Immunologic: Positive for environmental allergies.   Neurological: Negative for weakness and numbness.       Objective:        Vitals:    11/16/20 1021   BP: 134/88   BP Location: Right arm   Patient Position: Sitting   BP Method: Large (Manual)   Pulse: 81   Resp: 18   Temp: 97.9 °F (36.6 °C)   TempSrc: Temporal   SpO2: 98%   Weight: 127.4 kg (280 lb 13.9 oz)   Height: 5' 5" (1.651 m)       Body mass index is 46.74 kg/m².    Physical Exam  Constitutional:       General: He is not in acute distress.     Appearance: He is well-developed. He is not diaphoretic.   HENT:      Head: Normocephalic and atraumatic.      Right Ear: Tympanic membrane normal.      Left Ear: Tympanic membrane normal.   Eyes:      Conjunctiva/sclera: Conjunctivae normal.   Neck:      Musculoskeletal: Neck supple.      Thyroid: No thyromegaly.      Trachea: No tracheal deviation.   Cardiovascular:      Rate and Rhythm: Normal rate and regular rhythm.      Heart sounds: Normal heart sounds.   Pulmonary:      Effort: Pulmonary effort is normal.      Breath sounds: Normal breath sounds.   Abdominal:      General: Bowel sounds are normal. There is no distension.      Palpations: Abdomen is " soft.      Tenderness: There is no abdominal tenderness.   Musculoskeletal:      Right lower leg: No edema.      Left lower leg: No edema.   Lymphadenopathy:      Cervical: No cervical adenopathy.   Skin:     General: Skin is warm and dry.      Nails: There is no clubbing.     Neurological:      General: No focal deficit present.      Mental Status: He is alert. Mental status is at baseline.   Psychiatric:         Behavior: Behavior normal.         Judgment: Judgment normal.         Assessment:     1. Annual physical exam    2. Type 2 diabetes mellitus without complication, without long-term current use of insulin    3. SHEIKH (nonalcoholic steatohepatitis)    4. Mixed hyperlipidemia    5. Morbid obesity    6. Need for 23-polyvalent pneumococcal polysaccharide vaccine    7. Mild intermittent asthma without complication    8. History of multiple allergies           Plan:         1. Annual physical exam  - labs reviewed w/ pt  - flu vaccine today    2. Type 2 diabetes mellitus without complication, without long-term current use of insulin  - new dx  - eye exam w/ Dr. Salinas, but would like to see eye doctor with [facility].  - start low dose ozempic, pt to call in 2-4 weeks and if no side effects then can increase.   - Hemoglobin A1C; Future  - Microalbumin/Creatinine Ratio, Urine; Future  - semaglutide (OZEMPIC) 0.25 mg or 0.5 mg(2 mg/1.5 mL) PnIj; Inject 0.25 mg into the skin every 7 days.  Dispense: 1.5 mL; Refill: 0  - Ambulatory referral/consult to Diabetes Education; Future  - Ambulatory referral/consult to Optometry; Future    3. SHEIKH (nonalcoholic steatohepatitis)  - low fat diet  - Comprehensive Metabolic Panel; Future    4. Mixed hyperlipidemia  - will need statin w/ DM2 dx  - Lipid Panel; Future  - LDL Cholesterol, Direct Measurement; Future    5. Morbid obesity  - bariatric referral not covered by insurance.   - semaglutide (OZEMPIC) 0.25 mg or 0.5 mg(2 mg/1.5 mL) PnIj; Inject 0.25 mg into the skin every 7 days.   Dispense: 1.5 mL; Refill: 0    6. Need for 23-polyvalent pneumococcal polysaccharide vaccine  - Pneumococcal Polysaccharide Vaccine (23 Valent) (SQ/IM)    7. Mild intermittent asthma without complication  - VENTOLIN HFA 90 mcg/actuation inhaler; INHALE 1 TO 2 PUFFS INTO THE LUNGS EVERY 4 HOURS AS NEEDED FOR WHEEZING SHORTNESS OF BREATH, OR RESCUE  Dispense: 18 g; Refill: 11  - montelukast (SINGULAIR) 10 mg tablet; Take 1 tablet (10 mg total) by mouth every evening.  Dispense: 30 tablet; Refill: 11    8. History of multiple allergies  - fluticasone propionate (FLONASE) 50 mcg/actuation nasal spray; 2 sprays (100 mcg total) by Each Nostril route once daily.  Dispense: 16 g; Refill: 11  - montelukast (SINGULAIR) 10 mg tablet; Take 1 tablet (10 mg total) by mouth every evening.  Dispense: 30 tablet; Refill: 11    rtc 3 mo or sooner prn       Patient note was created using MModal Dictation.  Any errors in syntax or even information may not have been identified and edited on initial review prior to signing this note.

## 2020-11-16 NOTE — LETTER
AUTHORIZATION FOR RELEASE OF   CONFIDENTIAL INFORMATION    Dear Dr. Salinas,    We are seeing Hermelindo Garza III, date of birth 1980, in the clinic at [facility]. Melody Beck MD is the patient's PCP. Hermelindo Garza III has an outstanding lab/procedure at the time we reviewed his chart. In order to help keep his health information updated, he has authorized us to request the following medical record(s):        (  )  MAMMOGRAM                                      (  )  COLONOSCOPY      (  )  PAP SMEAR                                          (  )  OUTSIDE LAB RESULTS     (  )  DEXA SCAN                                          ( x )  EYE EXAM            (  )  FOOT EXAM                                          (  )  ENTIRE RECORD     (  )  OUTSIDE IMMUNIZATIONS                 (  )  _______________         Please fax records to [facility], Emily M Paulk, MD, [phone]     If you have any questions, please contact LUIS MIGUEL Curtis at [phone]          Patient Name: Hermelindo Garza III  : 1980  Patient Phone #: [phone]

## 2020-11-17 ENCOUNTER — PATIENT MESSAGE (OUTPATIENT)
Dept: INTERNAL MEDICINE | Facility: CLINIC | Age: 40
End: 2020-11-17

## 2020-11-20 ENCOUNTER — CLINICAL SUPPORT (OUTPATIENT)
Dept: DIABETES | Facility: CLINIC | Age: 40
End: 2020-11-20
Payer: COMMERCIAL

## 2020-11-20 ENCOUNTER — PATIENT MESSAGE (OUTPATIENT)
Dept: INTERNAL MEDICINE | Facility: CLINIC | Age: 40
End: 2020-11-20

## 2020-11-20 DIAGNOSIS — E11.9 TYPE 2 DIABETES MELLITUS WITHOUT COMPLICATION, WITHOUT LONG-TERM CURRENT USE OF INSULIN: ICD-10-CM

## 2020-11-20 PROCEDURE — 99999 PR PBB SHADOW E&M-EST. PATIENT-LVL II: CPT | Mod: PBBFAC,,, | Performed by: DIETITIAN, REGISTERED

## 2020-11-20 PROCEDURE — G0108 PR DIAB MANAGE TRN  PER INDIV: ICD-10-PCS | Mod: S$GLB,,, | Performed by: DIETITIAN, REGISTERED

## 2020-11-20 PROCEDURE — G0108 DIAB MANAGE TRN  PER INDIV: HCPCS | Mod: S$GLB,,, | Performed by: DIETITIAN, REGISTERED

## 2020-11-20 PROCEDURE — 99999 PR PBB SHADOW E&M-EST. PATIENT-LVL II: ICD-10-PCS | Mod: PBBFAC,,, | Performed by: DIETITIAN, REGISTERED

## 2020-11-20 NOTE — PROGRESS NOTES
Diabetes Education  Author: Sandhya Marquez RD, CDE  Date: 11/20/2020    Diabetes Care Management Summary  Diabetes Education Record Assessment/Progress: Initial     Diabetes Type  Diabetes Type : Type II    Diabetes History  Diabetes Diagnosis: 0-1 year  Current Treatment: Injectable    Health Maintenance was reviewed today with patient. Discussed with patient importance of routine eye exams, foot exams/foot care, blood work (i.e.: A1c, microalbumin, and lipid), dental visits, yearly flu vaccine, and pneumonia vaccine as indicated by PCP. Patient verbalized understanding.     Health Maintenance Topics with due status: Not Due       Topic Last Completion Date    TETANUS VACCINE 04/01/2019    Lipid Panel 07/30/2020     Health Maintenance Due   Topic Date Due    Hepatitis C Screening  1980       Nutrition  Meal Planning: 3 meals per day, snacks between meal, water, eats out seldom  What type of sweetener do you use?: none  What type of beverages do you drink?: water, diet soda/tea, other (see comments), milk(Coffee, u/s almond milk)    Monitoring   Self Monitoring : None - does not have a meter  Blood Glucose Logs: No  Do you use a personal continuous glucose monitor?: No  In the last month, how often have you had a low blood sugar reaction?: never  What are your symptoms of low blood sugar?: N/A  How do you treat low blood sugar?: N/A  Can you tell when your blood sugar is too high?: no  How do you treat high blood sugar?: None    Exercise   Exercise Type: walking(Walking the dog)    Current Diabetes Treatment   Current Treatment: Injectable    Social History  Preferred Learning Method: Face to Face  Primary Support: Self  Smoking Status: Never a Smoker  Alcohol Use: Rarely    Barriers to Change  Barriers to Change: None  Learning Challenges : None    Readiness to Learn   Readiness to Learn : Acceptance    Cultural Influences  Cultural Influences: No    Diabetes Education Assessment/Progress  Diabetes Disease  Process (diabetes disease process and treatment options): Discussion, Individual Session, Written Materials Provided, Comprehends Key Points  Nutrition (Incorporating nutritional management into one's lifestyle): Discussion, Individual Session, Written Materials Provided, Comprehends Key Points - reviewed CHO counting, label reading and addt'l resources to assist w/ CHO counting; plate method reviewed  Physical Activity (incorporating physical activity into one's lifestyle): Discussion, Individual Session, Written Materials Provided, Comprehends Key Points - reviewed goals and benefits  Medications (states correct name, dose, onset, peak, duration, side effects & timing of meds): Discussion, Individual Session, Written Materials Provided, Comprehends Key Points - reviewed medication regimen  Monitoring (monitoring blood glucose/other parameters & using results): Discussion, Individual Session, Written Materials Provided, Comprehends Key Points - reviewed SMBG schedule and BG goals; Rx request sent to PCP for meter/supplies  Acute Complications (preventing, detecting, and treating acute complications): Discussion, Individual Session, Written Materials Provided, Comprehends Key Points - reviewed s/s and treatment of hypoglycemia  Chronic Complications (preventing, detecting, and treating chronic complications): Discussion, Individual Session, Written Materials Provided, Comprehends Key Points - reviewed standards of care  Clinical (diabetes, other pertinent medical history, and relevant comorbidities reviewed during visit): Discussion, Individual Session, Comprehends Key Points  Cognitive (knowledge of self-management skills, functional health literacy): Discussion, Individual Session, Comprehends Key Points  Psychosocial (emotional response to diabetes): Discussion, Individual Session, Comprehends Key Points  Diabetes Distress and Support Systems: Not Covered/Deferred - new onset  Behavioral (readiness for change,  lifestyle practices, self-care behaviors): Discussion, Individual Session, Comprehends Key Points    Goals  Patient has selected/evaluated goals during today's session: Yes, selected  Monitoring: Set(Check BG 3-4 times a week)    Diabetes Care Plan/Intervention  Education Plan/Intervention: Individual Follow-Up DSMT    Diabetes Meal Plan  Carbohydrate Per Meal: 30-45g  Carbohydrate Per Snack : 15-20g    Today's Self-Management Care Plan was developed with the patient's input and is based on barriers identified during today's assessment.    The long and short-term goals in the care plan were written with the patient/caregiver's input. The patient has agreed to work toward these goals to improve his overall diabetes control.      The patient received a copy of today's self-management plan and verbalized understanding of the care plan, goals, and all of today's instructions.      The patient was encouraged to communicate with his physician and care team regarding his condition(s) and treatment.  I provided the patient with my contact information today and encouraged him to contact me via phone or patient portal as needed.     Education Units of Time   Time Spent: 60 min

## 2020-11-21 ENCOUNTER — LAB VISIT (OUTPATIENT)
Dept: LAB | Facility: HOSPITAL | Age: 40
End: 2020-11-21
Attending: INTERNAL MEDICINE
Payer: COMMERCIAL

## 2020-11-21 DIAGNOSIS — E78.2 MIXED HYPERLIPIDEMIA: ICD-10-CM

## 2020-11-21 DIAGNOSIS — K75.81 NASH (NONALCOHOLIC STEATOHEPATITIS): ICD-10-CM

## 2020-11-21 DIAGNOSIS — E11.9 TYPE 2 DIABETES MELLITUS WITHOUT COMPLICATION, WITHOUT LONG-TERM CURRENT USE OF INSULIN: ICD-10-CM

## 2020-11-21 LAB
ALBUMIN SERPL BCP-MCNC: 4.1 G/DL (ref 3.5–5.2)
ALP SERPL-CCNC: 83 U/L (ref 55–135)
ALT SERPL W/O P-5'-P-CCNC: 71 U/L (ref 10–44)
ANION GAP SERPL CALC-SCNC: 9 MMOL/L (ref 8–16)
AST SERPL-CCNC: 38 U/L (ref 10–40)
BILIRUB SERPL-MCNC: 0.5 MG/DL (ref 0.1–1)
BUN SERPL-MCNC: 18 MG/DL (ref 6–20)
CALCIUM SERPL-MCNC: 9.8 MG/DL (ref 8.7–10.5)
CHLORIDE SERPL-SCNC: 105 MMOL/L (ref 95–110)
CHOLEST SERPL-MCNC: 228 MG/DL (ref 120–199)
CHOLEST/HDLC SERPL: 6.5 {RATIO} (ref 2–5)
CO2 SERPL-SCNC: 28 MMOL/L (ref 23–29)
CREAT SERPL-MCNC: 1 MG/DL (ref 0.5–1.4)
EST. GFR  (AFRICAN AMERICAN): >60 ML/MIN/1.73 M^2
EST. GFR  (NON AFRICAN AMERICAN): >60 ML/MIN/1.73 M^2
ESTIMATED AVG GLUCOSE: 120 MG/DL (ref 68–131)
GLUCOSE SERPL-MCNC: 102 MG/DL (ref 70–110)
HBA1C MFR BLD HPLC: 5.8 % (ref 4–5.6)
HDLC SERPL-MCNC: 35 MG/DL (ref 40–75)
HDLC SERPL: 15.4 % (ref 20–50)
LDLC SERPL CALC-MCNC: 144.8 MG/DL (ref 63–159)
NONHDLC SERPL-MCNC: 193 MG/DL
POTASSIUM SERPL-SCNC: 4.2 MMOL/L (ref 3.5–5.1)
PROT SERPL-MCNC: 7.7 G/DL (ref 6–8.4)
SODIUM SERPL-SCNC: 142 MMOL/L (ref 136–145)
TRIGL SERPL-MCNC: 241 MG/DL (ref 30–150)

## 2020-11-21 PROCEDURE — 80053 COMPREHEN METABOLIC PANEL: CPT

## 2020-11-21 PROCEDURE — 83701 LIPOPROTEIN BLD HR FRACTION: CPT

## 2020-11-21 PROCEDURE — 83036 HEMOGLOBIN GLYCOSYLATED A1C: CPT

## 2020-11-21 PROCEDURE — 80061 LIPID PANEL: CPT

## 2020-11-21 PROCEDURE — 36415 COLL VENOUS BLD VENIPUNCTURE: CPT | Mod: PO

## 2020-11-22 ENCOUNTER — PATIENT MESSAGE (OUTPATIENT)
Dept: INTERNAL MEDICINE | Facility: CLINIC | Age: 40
End: 2020-11-22

## 2020-11-22 DIAGNOSIS — G25.81 RESTLESS LEG: ICD-10-CM

## 2020-11-22 DIAGNOSIS — E78.2 MIXED HYPERLIPIDEMIA: Primary | ICD-10-CM

## 2020-11-22 DIAGNOSIS — E11.9 TYPE 2 DIABETES MELLITUS WITHOUT COMPLICATION, WITHOUT LONG-TERM CURRENT USE OF INSULIN: ICD-10-CM

## 2020-11-22 RX ORDER — ATORVASTATIN CALCIUM 20 MG/1
20 TABLET, FILM COATED ORAL DAILY
Qty: 90 TABLET | Refills: 3 | Status: SHIPPED | OUTPATIENT
Start: 2020-11-22 | End: 2021-10-15 | Stop reason: SDUPTHER

## 2020-11-23 PROBLEM — G25.81 RESTLESS LEG: Status: ACTIVE | Noted: 2020-11-23

## 2020-11-23 RX ORDER — ROPINIROLE 0.25 MG/1
0.25 TABLET, FILM COATED ORAL NIGHTLY
Qty: 30 TABLET | Refills: 2 | Status: SHIPPED | OUTPATIENT
Start: 2020-11-23 | End: 2021-03-09

## 2020-11-25 LAB — LDLC SERPL-MCNC: 160 MG/DL

## 2020-11-30 ENCOUNTER — PATIENT MESSAGE (OUTPATIENT)
Dept: INTERNAL MEDICINE | Facility: CLINIC | Age: 40
End: 2020-11-30

## 2020-11-30 ENCOUNTER — OFFICE VISIT (OUTPATIENT)
Dept: OPTOMETRY | Facility: CLINIC | Age: 40
End: 2020-11-30
Payer: COMMERCIAL

## 2020-11-30 DIAGNOSIS — E66.01 MORBID OBESITY: ICD-10-CM

## 2020-11-30 DIAGNOSIS — E11.9 TYPE 2 DIABETES MELLITUS WITHOUT COMPLICATION, WITHOUT LONG-TERM CURRENT USE OF INSULIN: ICD-10-CM

## 2020-11-30 DIAGNOSIS — H02.886 MEIBOMIAN GLAND DYSFUNCTION (MGD) OF BOTH EYES: ICD-10-CM

## 2020-11-30 DIAGNOSIS — H10.45 CHRONIC ALLERGIC CONJUNCTIVITIS: ICD-10-CM

## 2020-11-30 DIAGNOSIS — H02.883 MEIBOMIAN GLAND DYSFUNCTION (MGD) OF BOTH EYES: ICD-10-CM

## 2020-11-30 DIAGNOSIS — L71.8 OCULAR ROSACEA: ICD-10-CM

## 2020-11-30 DIAGNOSIS — H40.033 ANATOMICAL NARROW ANGLE BORDERLINE GLAUCOMA OF BOTH EYES: Primary | ICD-10-CM

## 2020-11-30 PROBLEM — R73.03 PREDIABETES: Status: RESOLVED | Noted: 2019-03-27 | Resolved: 2020-11-30

## 2020-11-30 PROCEDURE — 92020 PR SPECIAL EYE EVAL,GONIOSCOPY: ICD-10-PCS | Mod: S$GLB,,, | Performed by: OPTOMETRIST

## 2020-11-30 PROCEDURE — 92004 COMPRE OPH EXAM NEW PT 1/>: CPT | Mod: S$GLB,,, | Performed by: OPTOMETRIST

## 2020-11-30 PROCEDURE — 99999 PR PBB SHADOW E&M-EST. PATIENT-LVL III: CPT | Mod: PBBFAC,,, | Performed by: OPTOMETRIST

## 2020-11-30 PROCEDURE — 99999 PR PBB SHADOW E&M-EST. PATIENT-LVL III: ICD-10-PCS | Mod: PBBFAC,,, | Performed by: OPTOMETRIST

## 2020-11-30 PROCEDURE — 92020 GONIOSCOPY: CPT | Mod: S$GLB,,, | Performed by: OPTOMETRIST

## 2020-11-30 PROCEDURE — 92004 PR EYE EXAM, NEW PATIENT,COMPREHESV: ICD-10-PCS | Mod: S$GLB,,, | Performed by: OPTOMETRIST

## 2020-11-30 RX ORDER — BRIMONIDINE TARTRATE 2 MG/ML
1 SOLUTION/ DROPS OPHTHALMIC 3 TIMES DAILY
Qty: 5 ML | Refills: 0 | Status: SHIPPED | OUTPATIENT
Start: 2020-11-30 | End: 2020-12-10

## 2020-11-30 NOTE — TELEPHONE ENCOUNTER
He can increase to ozempic 0.5mg weekly after the initial 4 doses of ozempic 0.25mg weekly. New prescription sent to pharmacy.

## 2020-11-30 NOTE — PROGRESS NOTES
HPI     Pt c/o severe redness, dryness, and irritation. Worsened by using CPAP   machine.   Pt unsure if he closes eyes full at night.   Pt reports occasionally feeling that he has to 'peel' his eye open   Noticing worse symptoms with high BP, when he has low water intake.   Used steroid drops in the past, reports these helped.     Type 2 DM, recently improved control  Hemoglobin A1C (%)       Date                     Value                 11/21/2020               5.8 (H)               07/30/2020               6.6 (H)               02/19/2020               5.9 (H)          ----------       Last edited by Em Sim, OD on 11/30/2020  4:55 PM. (History)         Assessment & Plan:   1. Anatomical narrow angle borderline glaucoma of both eyes  - Ambulatory referral/consult to Ophthalmology; Future  - brimonidine 0.2% (ALPHAGAN) 0.2 % Drop; Place 1 drop into both eyes 3 (three) times daily.  Dispense: 5 mL; Refill: 0    Narrow Angles on gonio today (faintly pigmented - difficult to discern structures), high IOP (24/28), normal ONH  Defer dilation today to avoid possible angle closure.   Start brimonidine TID for high pressures - avoiding Latanoprost d/t pt severe inflammatory dry eye complaints  Refer to Dr. Castro in 4-5 wks for narrow angle evaluation.     Gonioscopy     Gonioscopy (Zeiss, four mirror)       Right Left    Temporal SANDIE SANDIE    Nasal SANDIE SS    Superior SANDIE SANDIE    Inferior PTM CBB              2. Type 2 diabetes mellitus without complication, without long-term current use of insulin  - Ambulatory referral/consult to Optometry  No diabetic ret on exam today, but pt needs a full DFE after narrow angle evaluation.     3. Meibomian gland dysfunction (MGD) of both eyes  4. Chronic allergic conjunctivitis  5. Ocular rosacea  Meibomian gland dysfunction, Ocular rosacea, Lagophthalmos, and Chronic ocular allergies as primary cause of dryness, watering, itching, irritation, discomfort and redness complaints.    Possible auto-immune/Sjorgren's component, symptoms exacerbated by use of CPAP machine    Pt instructed to:  use gel/ointments at bedtime (recommended Celluvisc, Blink/Refresh/Systane GEL)  use OTC Artificial tears as needed (recommended Blink, Systane, or Refresh)  use warm compresses daily (recommended Andrea mask, Thermalon, TheraPearl)  increase Omega 3 intake (either via supplements or change in diet)  Use over the counter anti-histamine drops (recommended Alaway, Zaditor, Pataday)   Use a sleep mask/dry eye goggles at night    Monitor yearly or sooner PRN  Pt wishes to avoid Steroids if possible, though these have caused relief in the past. Consider adding in the future if necessary.       Addend 1/11/2021  Pt has been using PF ATs, warm compresses, and sleep goggles with limited improvement in symptoms.  Pt would benefit from starting Restasis (sister is already taking and noticing improvement).   Rx for Restasis entered today, pending prior authorization order.    ADDEND 1/25/2021  PA for Restasis denied. Pt wishes to continue with current therapy for the time being.   Can try again for Restasis in the future.

## 2020-11-30 NOTE — PATIENT INSTRUCTIONS
What Is Diabetic Retinopathy?    The retina is the light-sensitive part of the eye that allows you to see. High blood sugar can damage blood vessels of the retina and cause them to leak or bleed. This damage can lead to abnormal blood vessel growth. This condition is called diabetic retinopathy. You may not have symptoms early-on in the disease. Later in the disease process, you may notice floaters, blurred vision, or poor night vision. In the most severe cases, there may be partial or complete vision loss. Due to the severity of this condition, it is important to catch this disease early, before you notice any changes in your vision. We check for this disease through a yearly dilated eye exam.     Early cases of diabetic retinopathy can be treated by carefully controlling blood sugar, blood pressure, and cholesterol. Later, more severe, cases of diabetic retinopathy may be treated by laser treatments, surgery, or injections into the eye. Laser surgery can shrink abnormal blood vessels or close ones that are leaking. Medicines injected in the eye can help decrease swelling in the retina.    Managing Diabetes  · Take all medicines, including insulin or oral diabetic medicine, exactly as prescribed.  · Follow the diet advised by your healthcare provider. If you have high cholesterol, follow a low-fat, low-cholesterol diet.  · Monitor blood sugars as advised.  · Try to achieve your ideal weight.  · If you smoke, quit smoking. Tobacco use worsens the effect of diabetes on your blood vessels.  · If you have high blood pressure, consider buying an automatic blood pressure machine. These are available at most pharmacies. Use this to monitor your blood pressure. Report your blood pressure readings to your healthcare provider.  · Exercise regularly. Even taking frequent walks can help.     Further Reading:  American Academy of Ophthamology: Diabetic Retinopathy  [facility] [place]: Diabetic Retinopathy  [facility]:  Diabetic Retinopathy      Adapted from:  © 3032-1186 [employer], [employer]. [address] [place] [address], [place] [address]. All rights reserved. This information is not intended as a substitute for professional medical care. Always follow your healthcare professional's instructions.      What is Dry Eye and Meibomian Gland Deficiency?     The surface of our eyes are covered with a layer of tears. This tear film is composed of three layers: an oil (lipid) layer, a water layer, and a mucous layer. Dryness occurs when something goes wrong with any of these layers.                        If the first layer, the oil (lipid) layer, is affected the most common cause is meibomian gland deficiency. The meibomian glands are located in our eye lids and secrete the oil layer of the tear film. These glands secrete their contents most effectively each time we make a full blink. Every time we sit at a computer, or use a phone, we do not blink fully. These partial blinks do not allow the meibomian glands to secrete their oily contents. If they don't release their contents, the meibomian glands become clogged. Over long periods of time, this blockage can lead to atrophy and meibomian gland dropout. If the meibomian glands are clogged, there is no oil layer on the tear film. Without the oil layer, the water layer of the tears evaporates more quickly than usual. This rapid evaporation triggers reflex tearing, or watering, as the eye tries to quickly replace the lost tears.      If the second layer, the water layer, is affected the most common cause if an issue with the lacrimal gland. The lacrimal gland secretes the water portion of our tears. As we age, the lacrimal glands produces fewer and fewer tears. Other things, like autoimmune conditions, can also reduce the production of tears by the lacrimal gland.   If either our meibomian glands or our lacrimal gland are not working at their full potential, our eyes can often feel dry and  scratchy or irritated. Severe dryness can also cause blurry vision and redness. Dry eye is a chronic condition, which waxes and wanes throughout life. Constant management is needed to control symptoms.     Recommended Treatment   Treatment for Dry Eye differs for each patient. These are general recommendations which your doctor may discuss with you during your visit.      Artifical Tears  o Artifical tears, also called re-wetting or lubricating drops, are not prescribed medications and can be found over the counter. Feel free to use Artificial tears as much as you feel necessary. Frequent use of Artifical Tears rarely causes any problems. Any person with dry eye would benefit from using artificial tears at least three to four times a day. If using tears more than four times a day, please use a preservative-free artifical tear.     o Using a drop when first sitting down at the computer is a particularly good idea to prevent any discomfort. In general, using a drop before your eyes feel dry can also prevent the feelings of dryness and irritation. When looking for eye drops, name brands like Systane, Refresh, and Blink are always preferred. These brands contain fewer harmful preservatives and have been proven to be effective in treating Dry Eye.     o General Artifical Tear Recommendations (for use less than four times a day)            o Preservative-Free Artificial Tears (for use more than four times a day and for severe dry eye)              Warm Compresses  o Warm compresses placed on closed eyes for 5-10 minutes can help clear blocked meibomian glands and prevent meibomian gland dropout. After using a warm compress, you may notice your vision is slightly blurry. This is from the now melted and cleared debris, and should resolve quickly. Warm compresses work best when they are regular part of your daily routine, like brushing your teeth.   o Recommended brands:  Andrea, Thermalon, TheraPearl         The 20/20/20  Rule, or Take Breaks  o If you use computers all day, they could be contributing to your dry eye. Taking a break to blink, and look at a further distance, can help prevent dryness. This has the double benefit of helping with eye strain or computer vision syndrome.                  Omega-3 Fatty Acids  o There is research indicating that eating high levels of Omega-3 Fatty Acids can improve symptoms of severe dry eye. A good diet and exercise will improve your health overall, and adding some of these foods to your diet can improve symptoms of dryness. If changing your diet isn't an option, there are some over the counter nutritional supplements which will increase your Omega-3 levels and should help your dry eye symptoms.   o Foods with high levels of Omega-3's  - Fish ([place], Herring, Cod, Mackerel, Sardines)  - Oysters  - Flax seeds  - Dwaine seeds  - Walnuts  - Soybeans  - Brussel Sprouts  o Omega-3 Supplements: Nord[employer] Naturals     Gel Tears   o If you are waking up with dryness or irritation, it could be because your lids do not fully close at night. This small opening, called lagophthalmos, can lead to evaporation and dryness overnight. The best way to manage this dryness is to instill a gel or ointment drop at night. This drop is very thick and can leave some crustiness on your lids in the morning, but it will reduce morning discomfort and protect your eye over night.              Further Reading:   [employer]: Dry Eye Syndrome   American Optometric Association: Dry Eye   Dry Eye [place] (photo source)      What is Allergic Conjunctivitis or Ocular Allergies?     Just as allergens can irritate your lungs and nose, they can irritate the surface of your eyes. Ocular allergies can occur in conjunction with systemic allergies or on their own. Just like systemic allergies, ocular allergies are a chronic problem requiring regular maintenance and treatment. Symptoms of ocular allergies can include:  Redness, Itchiness, Burning, Watering and Swollen eyelids.     Recommended Treatment      Eye Drops: Over the Counter  o These are topical anti-histamines, similar to the one you take by mouth to treat allergies. It is best to avoid Visine, Clear eyes, and any other drop which contains Tetrahydrozoline HCl or Naphazoline as these drops may provide temporary relief without treating the underlying problem. If you have a severe round of allergic conjunctivitis, your doctor may prescribe a stronger eye drop not listed here to help control it.                           Lubricating drops or Artificial Tears  o Using over the counter Artificial Tears or Lubricating drops can also help by washing away allergens and preventing dry eye symptoms. For over the counter drops, name brands like Systane, Blink and Refresh typically work best.   Cool Compresses  o If your lids are swollen and itchy, a cool compress can provide temporary relief.    Remove allergens  o Just like with systemic allergens, frequent washing of pillow cases and use of air filters can help reduce ocular allergies.     Further Reading:   Allergic Conjuctivitis

## 2020-11-30 NOTE — PROGRESS NOTES
The patient location is: home   The chief complaint leading to consultation is: f/u after PAP      Visit type: audiovisual    Face to Face time with patient: 11  minutes of total time spent on the encounter, which includes face to face time and non-face to face time preparing to see the patient (eg, review of tests), Obtaining and/or reviewing separately obtained history, Documenting clinical information in the electronic or other health record, Independently interpreting results (not separately reported) and communicating results to the patient/family/caregiver, or Care coordination (not separately reported).     Each patient to whom he or she provides medical services by telemedicine is:  (1) informed of the relationship between the physician and patient and the respective role of any other health care provider with respect to management of the patient; and (2) notified that he or she may decline to receive medical services by telemedicine and may withdraw from such care at any time.    Hermelindo Garza III 40 y.o. year-old male returns for management of obstructive sleep apnea and CPAP equipment check.     08/12/2020 LINDA Hendricks NP: Pt returns after set up of PAP machine on 07/02/20 at [facility]. Breakthrough snoring and air gasping resolved with new APAP. Feels refreshed in AM. Likes new machine.  Denies mask leaks. Denies rainout, but if moisture > 3/5 has rainout, he prefers to have more moisture.  Denies pressure intolerance or air hunger. Denies congestion. Denies aerophagia.     CPAP Interrogation: Dreamstation APAP 12 - 20 cm, 30 days used, > 4 hrs: 100%, Predicted AHI: 0.8, 90%tile pressure: 16.1 cm    ESS - did not fill out.     06/30/2020 LINDA Hendricks NP: Initial HISTORY OF PRESENT ILLNESS: Hermelindo Garza III is a 40 y.o. male is here for sleep evaluation.        Previously established pt of Karthikey[facility] IRELAND, last seen 09/2013. Was set up with CPAP after split PSG. Unaware follow-ups  recommended.   Reports nightly use of CPAP 12 cm which resolves air gasping and unrefreshing sleep    reporting breakthrough snoring, despite pt keeping up with changing mask/headgear regularly  Reports 50-lb weight gain since 2013 PSG and experiencing air hunger      Current PAP machine does not have up to date data on [employer], last upload 2013.      07/29/2013 Split  lb The overall AHI was 77.0 with an oxygen irvin of 77.0%. Effective control of sleep disordered breathing was seen during supine REM stage sleep at a pressure of 12 cm of water.     Review of Systems: Sleep related symptoms as per HPI. Otherwise, a balance of 10 systems reviewed is negative.    Assessment:     Obstructive sleep apnea, severe by AHI. The patient symptomatically has snoring, air gasping, and unrefreshing sleep which resolves with nightly CPAP use. The patient is adherent on CPAP and experiencing symptomatic benefit.     Medical co-morbidities: ADD, asthma, GERD, and obesity.       Obesity,  previously discussed relationship between LUBA and weight      Environmental allergies, stable, daily Flonase         Plan:     Continue APAP 12-20 cm. Rx heated tubing for rainout.     Precautions: The patient was advised to abstain from driving should they feel sleepy or drowsy    RTC in 24 - < 36 months, sooner if needed.            Azathioprine Pregnancy And Lactation Text: This medication is Pregnancy Category D and isn't considered safe during pregnancy. It is unknown if this medication is excreted in breast milk.

## 2020-11-30 NOTE — Clinical Note
Hello,   I saw your patient for his Diabetic Eye Exam today. No Diabetic Retinopathy was noted on today's dilated eye exam.   Thank you,  -Em Sim OD

## 2020-11-30 NOTE — LETTER
November 30, 2020      Melody Beck MD  [address]  [place] [address]           [facility] [place] [facility] - Optometry 1st Fl [address].  [place] [address]  Phone: [phone]  Fax: [phone]          Patient: Hermelindo Garza III   MR Number: [id number]   YOB: 1980   Date of Visit: 11/30/2020       Dear Dr. Melody Beck:    Thank you for referring Hermelindo Garza to me for evaluation. Attached you will find relevant portions of my assessment and plan of care.    If you have questions, please do not hesitate to call me. I look forward to following Hermelindo Garza along with you.    Sincerely,    Em Sim, OD    Enclosure  CC:  No Recipients    If you would like to receive this communication electronically, please contact [email or web address] or [phone] to request more information on [employer] access.    For providers and/or their staff who would like to refer a patient to [facility], please contact us through our one-stop-shop provider referral line, [facility] , at [phone].    If you feel you have received this communication in error or would no longer like to receive these types of communications, please e-mail [email or web address]

## 2020-12-04 ENCOUNTER — PATIENT MESSAGE (OUTPATIENT)
Dept: INTERNAL MEDICINE | Facility: CLINIC | Age: 40
End: 2020-12-04

## 2020-12-04 NOTE — TELEPHONE ENCOUNTER
What range is he getting? Is he having symptoms of hypoglycemia (shakes, sweats, hunger, headache)? Is he fasting when he gets the low numbers or is it during the day such as after eating?  I added some information to the patient instructions tab

## 2020-12-04 NOTE — PATIENT INSTRUCTIONS
How to Check Your Blood Sugar    Keeping track of how much sugar (glucose) is in your blood is an important part of self-care when you have diabetes. This is also called self-monitoring of blood glucose (SMBG). To make sure your glucose and insulin are in balance, check your blood sugar as instructed by your healthcare provider. You may need to check your blood glucose levels at certain times every day. Or you may need to check them only a few times a week.  What you need  To check your blood sugar, make sure you have the following:   · A small pricking needle (lancing device)  · Test strips  · A glucose meter  · A notebook, chart, or log book and pen or pencil   Using a blood glucose meter  You can check your blood sugar at home, at work, and anywhere else. Your diabetes team will help you choose a blood glucose meter. A meter measures the amount of glucose in a tiny drop of blood. Youll use a device called a lancet to draw a drop of blood. Put the strip in the meter first. Then touch the test strip to the drop of blood. The meter then gives you a number (reading) that tells you the level of your blood sugar.  Aim for your target range  Your blood sugar should be in your target range--not too high and not too low. A target range is where your blood sugar level is healthiest. Staying in this range as much as possible will help lower your risk for health problems (complications). Your diabetes team will help you figure out the best target range for you. That range depends on many things. They include your age, other health problems, how well your diabetes is controlled, and how long you have had diabetes. In general, target ranges are:  · Control of blood glucose (hemoglobin A1c or A1c): generally, 7.0% or less. You will usually have this test at the lab.  · Before a meal (preprandial glucose): Between 80 and 130 mg/dL.  · One to 2 hours after a meal (postprandial glucose): Less than 180 mg/dL.  Track your  readings  Use a notebook, chart, or log book to keep track of your readings. Write down the date, time, and your blood sugar level numbers. This helps you see patterns, such as high blood sugar after eating certain foods. Take your log along when you see your healthcare provider. Your blood glucose levels will help your provider decide if he or she needs to make any changes to your management plan. To check your blood sugar, follow the steps below.  Step 1. Get ready  · Wash your hands with soap and warm (not hot) water.  · Follow all of the instructions that came with your glucometer. Be sure that your test strips were designed to be used with your meter and are not .   Step 2. Draw a drop of blood  · Prick the side of your finger with the lancet. Squeeze gently until you get a drop of blood. Squeezing too hard can cause an inaccurate reading.  · Put the lancet in a special sharps container. Ask your healthcare team where you can buy one or what you can use to throw away any sharps.  · If you are unable to get enough blood, hold your hand at your side and gently shake it.  Step 3. Place the drop on a strip  · Wait for the meter to show a message or symbol that it is time to test.  · Touch the test strip to the drop of blood.  · Be sure to follow the instructions included with meter.  Step 4. Read and record your results  · Wait for your meter to show the result.  · If you see an error message, recheck using a fresh strip and a fresh drop of blood. Also recheck if the glucose numbers aren't what you expect--too low without symptoms, or too high for no reason.  · Write the results in your log book.  Date Last Reviewed: 2016  © 7856-6098 [employer]. [address] [place] [address], [place] [address]. All rights reserved. This information is not intended as a substitute for professional medical care. Always follow your healthcare professional's  instructions.  ___________________________________________________________________________________      Hypoglycemia (Low Blood Sugar)     Fast-acting sugar includes a cup of nonfat milk.     Too little sugar (glucose) in your blood is called hypoglycemia or low blood sugar. Low blood sugar usually means anything lower than 70 mg/dL. Talk with your healthcare provider about your target range and what level is too low for you. Diabetes itself doesnt cause low blood sugar. But some of the treatments for diabetes, such as pills or insulin, may raise your risk for it. Low blood sugar may cause you to pass out or have a seizure. So always treat low blood sugar right away, but don't overeat.  Special note: Always carry a source of fast-acting sugar and a snack in case of hypoglycemia.   What you may notice  If you have low blood sugar, you may have one or more of these symptoms:  · Shakiness or dizziness  · Cold, clammy skin or sweating  · Feelings of hunger  · Headache  · Nervousness  · A hard, fast heartbeat  · Weakness  · Confusion or irritability  · Blurred vision  · Having nightmares or waking up confused or sweating  · Numbness or tingling in the lips or tongue  What you should do  Here are tips to follow if you have hypoglycemia:   · First check your blood sugar. If it is too low (out of your target range), eat or drink 15 to 20 grams of fast-acting sugar. This may be 3 to 4 glucose tablets, 4 ounces (half a cup) of fruit juice or regular (nondiet) soda, 8 ounces (1 cup) of fat-free milk, or 1 tablespoon of honey. Dont take more than this, or your blood sugar may go too high.  · Wait 15 minutes. Then recheck your blood sugar if you can.  · If your blood sugar is still too low, repeat the steps above and check your blood sugar again. If your blood sugar still has not returned to your target range, contact your healthcare provider or seek emergency care.  · Once your blood sugar returns to target range, eat a snack  or meal.  Preventing low blood sugar  Things you can do include the following:   · If your condition needs a strict treatment plan, eat your meals and snacks at the same times each day. Dont skip meals!  · If your treatment plan lets you change when you eat and what you eat, learn how to change the time and dose of your rapid-acting insulin to match this.   · Ask your healthcare provider if it is safe for you to drink alcohol. Never drink on an empty stomach.  · Take your medicine at the prescribed times.  · Always carry a source of fast-acting sugar and a snack when youre away from home.  Other things to do  Additional tips include the following:  · Carry a medical ID card, a compact USB drive, or wear a medical alert bracelet or necklace. It should say that you have diabetes. It should also say what to do if you pass out or have a seizure.  · Make sure your family, friends, and coworkers know the signs of low blood sugar. Tell them what to do if your blood sugar falls very low and you cant treat yourself.  · Keep a glucagon emergency kit handy. Be sure your family, friends, and coworkers know how and when to use it. Check it regularly and replace the glucagon before it expires.  · Talk with your health care team about other things you can do to prevent low blood sugar.     If you have unexplained hypoglycemia or hypoglycemia several times, call your healthcare provider.   Date Last Reviewed: 5/1/2016  © 2752-9809 [employer]. [address] [place] [address], [place] [address]. All rights reserved. This information is not intended as a substitute for professional medical care. Always follow your healthcare professional's instructions.      _____________________________________________________-      Using a Blood Sugar Log    You have diabetes. This means your body has trouble regulating a sugar called glucose. To help manage your diabetes, youll need to check your blood sugar level as directed by your  healthcare provider. Keeping a log of your blood sugar levels will help you track your blood sugar readings. Its a simple and easy way to see how well you are controlling your diabetes.  Checking your blood sugar level  You can check your blood sugar level with a blood glucose meter. Youll first prick the side of your finger with a tiny lancet to draw a tiny drop of blood onto the test strip. Some glucose meters let you use another place on your body to test. But these other places should not be used in some cases as they may be inaccurate. Follow the instructions for your glucose meter. And talk with your healthcare provider before doing the test on other places.  The strip goes into the meter first, then a drop of blood is placed on the tip of the strip. The meter then shows a reading that tells you the level of your blood sugar. Your readings should be in your target range as often as possible. This means not too high or too low. Staying in this range helps lower your risk for complications. Your healthcare provider will help you figure out the target range that is best for you.  Tracking your readings  Every time you check your blood sugar, use your log to keep track of your readings. Your meter will also probably have a memory feature that your healthcare provider can check at your next visit. You may be advised by your healthcare provider to check your blood sugar in the morning, at bedtime, and before and after meals. Be sure to write down all of your numbers. Also use your log to record things that might have affected your blood sugar. Some examples include being sick, certain medicines, being physically active, feeling stressed, or skipping meals.   Lessons learned from your readings  Tracking your blood sugar readings helps you see patterns. These patterns tell you how your actions affect your blood sugar. For instance, you may have higher numbers after eating certain foods or lower numbers after  exercise. They just help you understand how to stay in your target range more often, so that your diabetes remains in good control.  Sharing your log with your healthcare team  Bring your blood sugar log and glucose meter with you to all of your healthcare appointments. This can help your healthcare team make changes to your treatment plan, if needed. This may involve making changes in what you eat, what medicines you take, or how much you exercise.  To learn more  The resources below can help you learn more:  · American Diabetes Association 963-836-9121 www.diabetes.org  · [employer] 799-223-0985 www.lighthouse.org  · [employer] [place] 667-311-3096 www.nei.nih.gov  · [employer] 568-887-3472 www.hormone.org  Date Last Reviewed: 5/1/2016  © 6040-3283 [employer] [address] [place] [address], [place] [address]. All rights reserved. This information is not intended as a substitute for professional medical care. Always follow your healthcare professional's instructions.

## 2020-12-07 ENCOUNTER — PATIENT MESSAGE (OUTPATIENT)
Dept: OPTOMETRY | Facility: CLINIC | Age: 40
End: 2020-12-07

## 2020-12-07 ENCOUNTER — PATIENT MESSAGE (OUTPATIENT)
Dept: INTERNAL MEDICINE | Facility: CLINIC | Age: 40
End: 2020-12-07

## 2020-12-07 NOTE — TELEPHONE ENCOUNTER
ozempic is still on his med list. If his pharmacy is out of stock and unable to get supply in time for his next dose, I can send the rx to a different pharmacy - he can update me closer to that time.

## 2020-12-14 ENCOUNTER — PATIENT MESSAGE (OUTPATIENT)
Dept: INTERNAL MEDICINE | Facility: CLINIC | Age: 40
End: 2020-12-14

## 2020-12-23 ENCOUNTER — PATIENT MESSAGE (OUTPATIENT)
Dept: INTERNAL MEDICINE | Facility: CLINIC | Age: 40
End: 2020-12-23

## 2020-12-30 ENCOUNTER — PATIENT MESSAGE (OUTPATIENT)
Dept: INTERNAL MEDICINE | Facility: CLINIC | Age: 40
End: 2020-12-30

## 2020-12-31 NOTE — TELEPHONE ENCOUNTER
Pt needs apt in order for prescription medication. He can see any available provider in clinic today or can go to

## 2021-01-04 ENCOUNTER — PATIENT MESSAGE (OUTPATIENT)
Dept: INTERNAL MEDICINE | Facility: CLINIC | Age: 41
End: 2021-01-04

## 2021-01-07 ENCOUNTER — PATIENT MESSAGE (OUTPATIENT)
Dept: INTERNAL MEDICINE | Facility: CLINIC | Age: 41
End: 2021-01-07

## 2021-01-07 ENCOUNTER — PATIENT MESSAGE (OUTPATIENT)
Dept: OPTOMETRY | Facility: CLINIC | Age: 41
End: 2021-01-07

## 2021-01-07 DIAGNOSIS — E66.01 MORBID OBESITY: ICD-10-CM

## 2021-01-07 DIAGNOSIS — E11.9 TYPE 2 DIABETES MELLITUS WITHOUT COMPLICATION, WITHOUT LONG-TERM CURRENT USE OF INSULIN: ICD-10-CM

## 2021-01-07 RX ORDER — SEMAGLUTIDE 1.34 MG/ML
INJECTION, SOLUTION SUBCUTANEOUS
Qty: 1.5 ML | Refills: 0 | Status: SHIPPED | OUTPATIENT
Start: 2021-01-07 | End: 2021-01-11 | Stop reason: SDUPTHER

## 2021-01-11 ENCOUNTER — PATIENT MESSAGE (OUTPATIENT)
Dept: INTERNAL MEDICINE | Facility: CLINIC | Age: 41
End: 2021-01-11

## 2021-01-11 ENCOUNTER — OFFICE VISIT (OUTPATIENT)
Dept: SPORTS MEDICINE | Facility: CLINIC | Age: 41
End: 2021-01-11
Payer: COMMERCIAL

## 2021-01-11 ENCOUNTER — OFFICE VISIT (OUTPATIENT)
Dept: INTERNAL MEDICINE | Facility: CLINIC | Age: 41
End: 2021-01-11
Payer: COMMERCIAL

## 2021-01-11 ENCOUNTER — HOSPITAL ENCOUNTER (OUTPATIENT)
Dept: RADIOLOGY | Facility: HOSPITAL | Age: 41
Discharge: HOME | End: 2021-01-11
Attending: ORTHOPAEDIC SURGERY
Payer: COMMERCIAL

## 2021-01-11 ENCOUNTER — HOSPITAL ENCOUNTER (OUTPATIENT)
Dept: RADIOLOGY | Facility: HOSPITAL | Age: 41
Discharge: HOME | End: 2021-01-11
Attending: STUDENT IN AN ORGANIZED HEALTH CARE EDUCATION/TRAINING PROGRAM
Payer: COMMERCIAL

## 2021-01-11 VITALS
DIASTOLIC BLOOD PRESSURE: 76 MMHG | SYSTOLIC BLOOD PRESSURE: 122 MMHG | RESPIRATION RATE: 16 BRPM | HEIGHT: 65 IN | WEIGHT: 266.31 LBS | TEMPERATURE: 98 F | BODY MASS INDEX: 44.37 KG/M2 | HEART RATE: 72 BPM

## 2021-01-11 VITALS
WEIGHT: 267 LBS | BODY MASS INDEX: 44.48 KG/M2 | HEART RATE: 72 BPM | SYSTOLIC BLOOD PRESSURE: 122 MMHG | HEIGHT: 65 IN | DIASTOLIC BLOOD PRESSURE: 76 MMHG

## 2021-01-11 DIAGNOSIS — M54.2 NECK PAIN: ICD-10-CM

## 2021-01-11 DIAGNOSIS — M25.511 ACUTE PAIN OF RIGHT SHOULDER: Primary | ICD-10-CM

## 2021-01-11 DIAGNOSIS — M25.511 ACUTE PAIN OF RIGHT SHOULDER: ICD-10-CM

## 2021-01-11 DIAGNOSIS — R20.0 NUMBNESS AND TINGLING OF RIGHT ARM: ICD-10-CM

## 2021-01-11 DIAGNOSIS — M54.2 NECK PAIN: Primary | ICD-10-CM

## 2021-01-11 DIAGNOSIS — E66.01 MORBID OBESITY: ICD-10-CM

## 2021-01-11 DIAGNOSIS — E11.9 TYPE 2 DIABETES MELLITUS WITHOUT COMPLICATION, WITHOUT LONG-TERM CURRENT USE OF INSULIN: ICD-10-CM

## 2021-01-11 DIAGNOSIS — R20.2 NUMBNESS AND TINGLING OF RIGHT ARM: ICD-10-CM

## 2021-01-11 PROCEDURE — 73030 X-RAY EXAM OF SHOULDER: CPT | Mod: TC,RT

## 2021-01-11 PROCEDURE — 99999 PR PBB SHADOW E&M-EST. PATIENT-LVL V: CPT | Mod: PBBFAC,,, | Performed by: ORTHOPAEDIC SURGERY

## 2021-01-11 PROCEDURE — 99203 OFFICE O/P NEW LOW 30 MIN: CPT | Mod: S$GLB,,, | Performed by: ORTHOPAEDIC SURGERY

## 2021-01-11 PROCEDURE — 1125F PR PAIN SEVERITY QUANTIFIED, PAIN PRESENT: ICD-10-PCS | Mod: S$GLB,,, | Performed by: ORTHOPAEDIC SURGERY

## 2021-01-11 PROCEDURE — 72052 X-RAY EXAM NECK SPINE 6/>VWS: CPT | Mod: 26,,, | Performed by: RADIOLOGY

## 2021-01-11 PROCEDURE — 3008F BODY MASS INDEX DOCD: CPT | Mod: CPTII,S$GLB,, | Performed by: NURSE PRACTITIONER

## 2021-01-11 PROCEDURE — 1125F AMNT PAIN NOTED PAIN PRSNT: CPT | Mod: S$GLB,,, | Performed by: ORTHOPAEDIC SURGERY

## 2021-01-11 PROCEDURE — 3072F LOW RISK FOR RETINOPATHY: CPT | Mod: S$GLB,,, | Performed by: ORTHOPAEDIC SURGERY

## 2021-01-11 PROCEDURE — 72052 X-RAY EXAM NECK SPINE 6/>VWS: CPT | Mod: TC

## 2021-01-11 PROCEDURE — 3008F PR BODY MASS INDEX (BMI) DOCUMENTED: ICD-10-PCS | Mod: CPTII,S$GLB,, | Performed by: NURSE PRACTITIONER

## 2021-01-11 PROCEDURE — 99213 OFFICE O/P EST LOW 20 MIN: CPT | Mod: S$GLB,,, | Performed by: NURSE PRACTITIONER

## 2021-01-11 PROCEDURE — 99999 PR PBB SHADOW E&M-EST. PATIENT-LVL IV: ICD-10-PCS | Mod: PBBFAC,,, | Performed by: NURSE PRACTITIONER

## 2021-01-11 PROCEDURE — 1125F PR PAIN SEVERITY QUANTIFIED, PAIN PRESENT: ICD-10-PCS | Mod: S$GLB,,, | Performed by: NURSE PRACTITIONER

## 2021-01-11 PROCEDURE — 99999 PR PBB SHADOW E&M-EST. PATIENT-LVL V: ICD-10-PCS | Mod: PBBFAC,,, | Performed by: ORTHOPAEDIC SURGERY

## 2021-01-11 PROCEDURE — 99213 PR OFFICE/OUTPT VISIT, EST, LEVL III, 20-29 MIN: ICD-10-PCS | Mod: S$GLB,,, | Performed by: NURSE PRACTITIONER

## 2021-01-11 PROCEDURE — 99999 PR PBB SHADOW E&M-EST. PATIENT-LVL IV: CPT | Mod: PBBFAC,,, | Performed by: NURSE PRACTITIONER

## 2021-01-11 PROCEDURE — 3008F BODY MASS INDEX DOCD: CPT | Mod: CPTII,S$GLB,, | Performed by: ORTHOPAEDIC SURGERY

## 2021-01-11 PROCEDURE — 3072F LOW RISK FOR RETINOPATHY: CPT | Mod: S$GLB,,, | Performed by: NURSE PRACTITIONER

## 2021-01-11 PROCEDURE — 73030 XR SHOULDER COMPLETE 2 OR MORE VIEWS RIGHT: ICD-10-PCS | Mod: 26,RT,, | Performed by: RADIOLOGY

## 2021-01-11 PROCEDURE — 1125F AMNT PAIN NOTED PAIN PRSNT: CPT | Mod: S$GLB,,, | Performed by: NURSE PRACTITIONER

## 2021-01-11 PROCEDURE — 99203 PR OFFICE/OUTPT VISIT, NEW, LEVL III, 30-44 MIN: ICD-10-PCS | Mod: S$GLB,,, | Performed by: ORTHOPAEDIC SURGERY

## 2021-01-11 PROCEDURE — 3072F PR LOW RISK FOR RETINOPATHY: ICD-10-PCS | Mod: S$GLB,,, | Performed by: NURSE PRACTITIONER

## 2021-01-11 PROCEDURE — 73030 X-RAY EXAM OF SHOULDER: CPT | Mod: 26,RT,, | Performed by: RADIOLOGY

## 2021-01-11 PROCEDURE — 3008F PR BODY MASS INDEX (BMI) DOCUMENTED: ICD-10-PCS | Mod: CPTII,S$GLB,, | Performed by: ORTHOPAEDIC SURGERY

## 2021-01-11 PROCEDURE — 72052 XR CERVICAL SPINE 5 VIEW WITH FLEX AND EXT: ICD-10-PCS | Mod: 26,,, | Performed by: RADIOLOGY

## 2021-01-11 PROCEDURE — 3072F PR LOW RISK FOR RETINOPATHY: ICD-10-PCS | Mod: S$GLB,,, | Performed by: ORTHOPAEDIC SURGERY

## 2021-01-11 RX ORDER — SEMAGLUTIDE 1.34 MG/ML
INJECTION, SOLUTION SUBCUTANEOUS
Qty: 1.5 ML | Refills: 0 | Status: SHIPPED | OUTPATIENT
Start: 2021-01-11 | End: 2021-03-11 | Stop reason: SDUPTHER

## 2021-01-11 RX ORDER — TRAMADOL HYDROCHLORIDE 50 MG/1
50 TABLET ORAL EVERY 6 HOURS PRN
Qty: 28 EACH | Refills: 0 | Status: SHIPPED | OUTPATIENT
Start: 2021-01-11 | End: 2021-01-18

## 2021-01-11 RX ORDER — METHYLPREDNISOLONE 4 MG/1
TABLET ORAL
Qty: 1 PACKAGE | Refills: 0 | Status: SHIPPED | OUTPATIENT
Start: 2021-01-11 | End: 2021-01-26 | Stop reason: ALTCHOICE

## 2021-01-11 RX ORDER — CYCLOSPORINE 0.5 MG/ML
1 EMULSION OPHTHALMIC 2 TIMES DAILY
Qty: 60 EACH | Refills: 11 | Status: SHIPPED | OUTPATIENT
Start: 2021-01-11 | End: 2021-01-20 | Stop reason: SDUPTHER

## 2021-01-12 ENCOUNTER — HOSPITAL ENCOUNTER (OUTPATIENT)
Dept: RADIOLOGY | Facility: HOSPITAL | Age: 41
Discharge: HOME | End: 2021-01-12
Attending: STUDENT IN AN ORGANIZED HEALTH CARE EDUCATION/TRAINING PROGRAM
Payer: COMMERCIAL

## 2021-01-12 ENCOUNTER — TELEPHONE (OUTPATIENT)
Dept: SPORTS MEDICINE | Facility: CLINIC | Age: 41
End: 2021-01-12

## 2021-01-12 DIAGNOSIS — M54.12 CERVICAL RADICULOPATHY: Primary | ICD-10-CM

## 2021-01-12 DIAGNOSIS — M54.2 NECK PAIN: ICD-10-CM

## 2021-01-12 PROCEDURE — 72141 MRI NECK SPINE W/O DYE: CPT | Mod: TC

## 2021-01-12 PROCEDURE — 72141 MRI CERVICAL SPINE WITHOUT CONTRAST: ICD-10-PCS | Mod: 26,,, | Performed by: RADIOLOGY

## 2021-01-12 PROCEDURE — 72141 MRI NECK SPINE W/O DYE: CPT | Mod: 26,,, | Performed by: RADIOLOGY

## 2021-01-13 ENCOUNTER — PATIENT MESSAGE (OUTPATIENT)
Dept: SPORTS MEDICINE | Facility: CLINIC | Age: 41
End: 2021-01-13

## 2021-01-13 ENCOUNTER — PATIENT MESSAGE (OUTPATIENT)
Dept: OPTOMETRY | Facility: CLINIC | Age: 41
End: 2021-01-13

## 2021-01-18 ENCOUNTER — TELEPHONE (OUTPATIENT)
Dept: PHARMACY | Facility: CLINIC | Age: 41
End: 2021-01-18

## 2021-01-20 ENCOUNTER — PATIENT MESSAGE (OUTPATIENT)
Dept: ORTHOPEDICS | Facility: CLINIC | Age: 41
End: 2021-01-20

## 2021-01-20 ENCOUNTER — PATIENT MESSAGE (OUTPATIENT)
Dept: INTERNAL MEDICINE | Facility: CLINIC | Age: 41
End: 2021-01-20

## 2021-01-20 DIAGNOSIS — H02.883 MEIBOMIAN GLAND DYSFUNCTION (MGD) OF BOTH EYES: ICD-10-CM

## 2021-01-20 DIAGNOSIS — H02.886 MEIBOMIAN GLAND DYSFUNCTION (MGD) OF BOTH EYES: ICD-10-CM

## 2021-01-20 DIAGNOSIS — M54.12 CERVICAL RADICULAR PAIN: Primary | ICD-10-CM

## 2021-01-20 DIAGNOSIS — L71.8 OCULAR ROSACEA: ICD-10-CM

## 2021-01-20 DIAGNOSIS — H10.45 CHRONIC ALLERGIC CONJUNCTIVITIS: ICD-10-CM

## 2021-01-20 RX ORDER — GABAPENTIN 300 MG/1
300 CAPSULE ORAL NIGHTLY PRN
Qty: 30 CAPSULE | Refills: 1 | Status: SHIPPED | OUTPATIENT
Start: 2021-01-20 | End: 2021-02-12 | Stop reason: SDUPTHER

## 2021-01-22 RX ORDER — CYCLOSPORINE 0.5 MG/ML
1 EMULSION OPHTHALMIC 2 TIMES DAILY
Qty: 60 EACH | Refills: 11 | Status: SHIPPED | OUTPATIENT
Start: 2021-01-22 | End: 2021-05-03

## 2021-01-26 ENCOUNTER — PATIENT MESSAGE (OUTPATIENT)
Dept: OPHTHALMOLOGY | Facility: CLINIC | Age: 41
End: 2021-01-26

## 2021-01-26 ENCOUNTER — OFFICE VISIT (OUTPATIENT)
Dept: OPHTHALMOLOGY | Facility: CLINIC | Age: 41
End: 2021-01-26
Payer: COMMERCIAL

## 2021-01-26 DIAGNOSIS — H40.053 OCULAR HYPERTENSION, BILATERAL: Primary | ICD-10-CM

## 2021-01-26 DIAGNOSIS — H02.886 MEIBOMIAN GLAND DYSFUNCTION (MGD) OF BOTH EYES: ICD-10-CM

## 2021-01-26 DIAGNOSIS — H02.883 MEIBOMIAN GLAND DYSFUNCTION (MGD) OF BOTH EYES: ICD-10-CM

## 2021-01-26 DIAGNOSIS — H40.003 GLAUCOMA SUSPECT OF BOTH EYES: ICD-10-CM

## 2021-01-26 PROCEDURE — 92020 GONIOSCOPY: CPT | Mod: S$GLB,,, | Performed by: STUDENT IN AN ORGANIZED HEALTH CARE EDUCATION/TRAINING PROGRAM

## 2021-01-26 PROCEDURE — 2023F DILAT RTA XM W/O RTNOPTHY: CPT | Mod: S$GLB,,, | Performed by: OPHTHALMOLOGY

## 2021-01-26 PROCEDURE — 92250 FUNDUS PHOTOGRAPHY W/I&R: CPT | Mod: S$GLB,,, | Performed by: OPHTHALMOLOGY

## 2021-01-26 PROCEDURE — 92004 PR EYE EXAM, NEW PATIENT,COMPREHESV: ICD-10-PCS | Mod: S$GLB,,, | Performed by: OPHTHALMOLOGY

## 2021-01-26 PROCEDURE — 1126F AMNT PAIN NOTED NONE PRSNT: CPT | Mod: S$GLB,,, | Performed by: OPHTHALMOLOGY

## 2021-01-26 PROCEDURE — 2023F PR DILATED RETINAL EXAM W/O EVID OF RETINOPATHY: ICD-10-PCS | Mod: S$GLB,,, | Performed by: OPHTHALMOLOGY

## 2021-01-26 PROCEDURE — 92250 COLOR FUNDUS PHOTOGRAPHY - OU - BOTH EYES: ICD-10-PCS | Mod: S$GLB,,, | Performed by: OPHTHALMOLOGY

## 2021-01-26 PROCEDURE — 99999 PR PBB SHADOW E&M-EST. PATIENT-LVL III: ICD-10-PCS | Mod: PBBFAC,,, | Performed by: OPHTHALMOLOGY

## 2021-01-26 PROCEDURE — 76514 ECHO EXAM OF EYE THICKNESS: CPT | Mod: S$GLB,,, | Performed by: STUDENT IN AN ORGANIZED HEALTH CARE EDUCATION/TRAINING PROGRAM

## 2021-01-26 PROCEDURE — 92020 PR SPECIAL EYE EVAL,GONIOSCOPY: ICD-10-PCS | Mod: S$GLB,,, | Performed by: STUDENT IN AN ORGANIZED HEALTH CARE EDUCATION/TRAINING PROGRAM

## 2021-01-26 PROCEDURE — 76514 PR  US, EYE, FOR CORNEAL THICKNESS: ICD-10-PCS | Mod: S$GLB,,, | Performed by: STUDENT IN AN ORGANIZED HEALTH CARE EDUCATION/TRAINING PROGRAM

## 2021-01-26 PROCEDURE — 92004 COMPRE OPH EXAM NEW PT 1/>: CPT | Mod: S$GLB,,, | Performed by: OPHTHALMOLOGY

## 2021-01-26 PROCEDURE — 1126F PR PAIN SEVERITY QUANTIFIED, NO PAIN PRESENT: ICD-10-PCS | Mod: S$GLB,,, | Performed by: OPHTHALMOLOGY

## 2021-01-26 PROCEDURE — 99999 PR PBB SHADOW E&M-EST. PATIENT-LVL III: CPT | Mod: PBBFAC,,, | Performed by: OPHTHALMOLOGY

## 2021-01-26 RX ORDER — TIMOLOL MALEATE 5 MG/ML
1 SOLUTION/ DROPS OPHTHALMIC 2 TIMES DAILY
Qty: 15 ML | Refills: 1 | Status: SHIPPED | OUTPATIENT
Start: 2021-01-26 | End: 2021-03-30

## 2021-02-01 ENCOUNTER — OFFICE VISIT (OUTPATIENT)
Dept: ORTHOPEDICS | Facility: CLINIC | Age: 41
End: 2021-02-01
Payer: COMMERCIAL

## 2021-02-01 VITALS — BODY MASS INDEX: 43.49 KG/M2 | HEIGHT: 65 IN | WEIGHT: 261 LBS

## 2021-02-01 DIAGNOSIS — M54.12 CERVICAL RADICULOPATHY: ICD-10-CM

## 2021-02-01 PROCEDURE — 3044F PR MOST RECENT HEMOGLOBIN A1C LEVEL <7.0%: ICD-10-PCS | Mod: CPTII,S$GLB,, | Performed by: ORTHOPAEDIC SURGERY

## 2021-02-01 PROCEDURE — 99999 PR PBB SHADOW E&M-EST. PATIENT-LVL IV: ICD-10-PCS | Mod: PBBFAC,,, | Performed by: ORTHOPAEDIC SURGERY

## 2021-02-01 PROCEDURE — 3072F PR LOW RISK FOR RETINOPATHY: ICD-10-PCS | Mod: S$GLB,,, | Performed by: ORTHOPAEDIC SURGERY

## 2021-02-01 PROCEDURE — 1125F AMNT PAIN NOTED PAIN PRSNT: CPT | Mod: S$GLB,,, | Performed by: ORTHOPAEDIC SURGERY

## 2021-02-01 PROCEDURE — 1125F PR PAIN SEVERITY QUANTIFIED, PAIN PRESENT: ICD-10-PCS | Mod: S$GLB,,, | Performed by: ORTHOPAEDIC SURGERY

## 2021-02-01 PROCEDURE — 3072F LOW RISK FOR RETINOPATHY: CPT | Mod: S$GLB,,, | Performed by: ORTHOPAEDIC SURGERY

## 2021-02-01 PROCEDURE — 99204 OFFICE O/P NEW MOD 45 MIN: CPT | Mod: S$GLB,,, | Performed by: ORTHOPAEDIC SURGERY

## 2021-02-01 PROCEDURE — 99204 PR OFFICE/OUTPT VISIT, NEW, LEVL IV, 45-59 MIN: ICD-10-PCS | Mod: S$GLB,,, | Performed by: ORTHOPAEDIC SURGERY

## 2021-02-01 PROCEDURE — 3008F BODY MASS INDEX DOCD: CPT | Mod: CPTII,S$GLB,, | Performed by: ORTHOPAEDIC SURGERY

## 2021-02-01 PROCEDURE — 3044F HG A1C LEVEL LT 7.0%: CPT | Mod: CPTII,S$GLB,, | Performed by: ORTHOPAEDIC SURGERY

## 2021-02-01 PROCEDURE — 3008F PR BODY MASS INDEX (BMI) DOCUMENTED: ICD-10-PCS | Mod: CPTII,S$GLB,, | Performed by: ORTHOPAEDIC SURGERY

## 2021-02-01 PROCEDURE — 99999 PR PBB SHADOW E&M-EST. PATIENT-LVL IV: CPT | Mod: PBBFAC,,, | Performed by: ORTHOPAEDIC SURGERY

## 2021-02-02 ENCOUNTER — TELEPHONE (OUTPATIENT)
Dept: PAIN MEDICINE | Facility: OTHER | Age: 41
End: 2021-02-02

## 2021-02-02 ENCOUNTER — PATIENT MESSAGE (OUTPATIENT)
Dept: INTERNAL MEDICINE | Facility: CLINIC | Age: 41
End: 2021-02-02

## 2021-02-02 ENCOUNTER — PATIENT MESSAGE (OUTPATIENT)
Dept: PAIN MEDICINE | Facility: OTHER | Age: 41
End: 2021-02-02

## 2021-02-02 DIAGNOSIS — M54.12 CERVICAL RADICULOPATHY: Primary | ICD-10-CM

## 2021-02-05 ENCOUNTER — LAB VISIT (OUTPATIENT)
Dept: LAB | Facility: HOSPITAL | Age: 41
End: 2021-02-05
Attending: INTERNAL MEDICINE
Payer: COMMERCIAL

## 2021-02-05 ENCOUNTER — PATIENT MESSAGE (OUTPATIENT)
Dept: PAIN MEDICINE | Facility: OTHER | Age: 41
End: 2021-02-05

## 2021-02-05 DIAGNOSIS — E11.9 TYPE 2 DIABETES MELLITUS WITHOUT COMPLICATION, WITHOUT LONG-TERM CURRENT USE OF INSULIN: ICD-10-CM

## 2021-02-05 DIAGNOSIS — E55.9 VITAMIN D INSUFFICIENCY: ICD-10-CM

## 2021-02-05 DIAGNOSIS — K75.81 NASH (NONALCOHOLIC STEATOHEPATITIS): ICD-10-CM

## 2021-02-05 LAB
25(OH)D3+25(OH)D2 SERPL-MCNC: 50 NG/ML (ref 30–96)
ALBUMIN SERPL BCP-MCNC: 4.3 G/DL (ref 3.5–5.2)
ALP SERPL-CCNC: 126 U/L (ref 55–135)
ALT SERPL W/O P-5'-P-CCNC: 56 U/L (ref 10–44)
ANION GAP SERPL CALC-SCNC: 11 MMOL/L (ref 8–16)
AST SERPL-CCNC: 32 U/L (ref 10–40)
BILIRUB SERPL-MCNC: 0.6 MG/DL (ref 0.1–1)
BUN SERPL-MCNC: 15 MG/DL (ref 6–20)
CALCIUM SERPL-MCNC: 9.6 MG/DL (ref 8.7–10.5)
CHLORIDE SERPL-SCNC: 103 MMOL/L (ref 95–110)
CHOLEST SERPL-MCNC: 138 MG/DL (ref 120–199)
CHOLEST/HDLC SERPL: 4.2 {RATIO} (ref 2–5)
CO2 SERPL-SCNC: 26 MMOL/L (ref 23–29)
CREAT SERPL-MCNC: 0.8 MG/DL (ref 0.5–1.4)
EST. GFR  (AFRICAN AMERICAN): >60 ML/MIN/1.73 M^2
EST. GFR  (NON AFRICAN AMERICAN): >60 ML/MIN/1.73 M^2
ESTIMATED AVG GLUCOSE: 111 MG/DL (ref 68–131)
GLUCOSE SERPL-MCNC: 103 MG/DL (ref 70–110)
HBA1C MFR BLD: 5.5 % (ref 4–5.6)
HDLC SERPL-MCNC: 33 MG/DL (ref 40–75)
HDLC SERPL: 23.9 % (ref 20–50)
LDLC SERPL CALC-MCNC: 74 MG/DL (ref 63–159)
NONHDLC SERPL-MCNC: 105 MG/DL
POTASSIUM SERPL-SCNC: 4.9 MMOL/L (ref 3.5–5.1)
PROT SERPL-MCNC: 7.5 G/DL (ref 6–8.4)
SODIUM SERPL-SCNC: 140 MMOL/L (ref 136–145)
TRIGL SERPL-MCNC: 155 MG/DL (ref 30–150)

## 2021-02-05 PROCEDURE — 36415 COLL VENOUS BLD VENIPUNCTURE: CPT | Mod: PO

## 2021-02-05 PROCEDURE — 83036 HEMOGLOBIN GLYCOSYLATED A1C: CPT

## 2021-02-05 PROCEDURE — 82306 VITAMIN D 25 HYDROXY: CPT

## 2021-02-05 PROCEDURE — 80061 LIPID PANEL: CPT

## 2021-02-05 PROCEDURE — 80053 COMPREHEN METABOLIC PANEL: CPT

## 2021-02-09 ENCOUNTER — PATIENT MESSAGE (OUTPATIENT)
Dept: PAIN MEDICINE | Facility: OTHER | Age: 41
End: 2021-02-09

## 2021-02-10 ENCOUNTER — HOSPITAL ENCOUNTER (OUTPATIENT)
Facility: OTHER | Age: 41
Discharge: HOME | End: 2021-02-10
Attending: ANESTHESIOLOGY | Admitting: ANESTHESIOLOGY
Payer: COMMERCIAL

## 2021-02-10 ENCOUNTER — PATIENT MESSAGE (OUTPATIENT)
Dept: PAIN MEDICINE | Facility: OTHER | Age: 41
End: 2021-02-10

## 2021-02-10 VITALS
SYSTOLIC BLOOD PRESSURE: 160 MMHG | HEIGHT: 65 IN | HEART RATE: 78 BPM | BODY MASS INDEX: 42.49 KG/M2 | WEIGHT: 255 LBS | RESPIRATION RATE: 16 BRPM | OXYGEN SATURATION: 96 % | TEMPERATURE: 98 F | DIASTOLIC BLOOD PRESSURE: 80 MMHG

## 2021-02-10 DIAGNOSIS — G89.29 CHRONIC PAIN: ICD-10-CM

## 2021-02-10 DIAGNOSIS — M50.30 DDD (DEGENERATIVE DISC DISEASE), CERVICAL: Primary | ICD-10-CM

## 2021-02-10 DIAGNOSIS — M54.12 CERVICAL RADICULOPATHY: ICD-10-CM

## 2021-02-10 LAB — POCT GLUCOSE: 93 MG/DL (ref 70–110)

## 2021-02-10 PROCEDURE — 63600175 PHARM REV CODE 636 W HCPCS: Performed by: ANESTHESIOLOGY

## 2021-02-10 PROCEDURE — 62321 NJX INTERLAMINAR CRV/THRC: CPT | Performed by: ANESTHESIOLOGY

## 2021-02-10 PROCEDURE — 25000003 PHARM REV CODE 250: Performed by: STUDENT IN AN ORGANIZED HEALTH CARE EDUCATION/TRAINING PROGRAM

## 2021-02-10 PROCEDURE — 62321 NJX INTERLAMINAR CRV/THRC: CPT | Mod: ,,, | Performed by: ANESTHESIOLOGY

## 2021-02-10 PROCEDURE — 25000003 PHARM REV CODE 250: Performed by: ANESTHESIOLOGY

## 2021-02-10 PROCEDURE — 62321 PR INJ CERV/THORAC, W/GUIDANCE: ICD-10-PCS | Mod: ,,, | Performed by: ANESTHESIOLOGY

## 2021-02-10 PROCEDURE — 25500020 PHARM REV CODE 255: Performed by: ANESTHESIOLOGY

## 2021-02-10 PROCEDURE — 82947 ASSAY GLUCOSE BLOOD QUANT: CPT | Performed by: ANESTHESIOLOGY

## 2021-02-10 RX ORDER — MIDAZOLAM HYDROCHLORIDE 1 MG/ML
INJECTION INTRAMUSCULAR; INTRAVENOUS
Status: DISCONTINUED | OUTPATIENT
Start: 2021-02-10 | End: 2021-02-10 | Stop reason: HOSPADM

## 2021-02-10 RX ORDER — DEXAMETHASONE SODIUM PHOSPHATE 4 MG/ML
INJECTION, SOLUTION INTRA-ARTICULAR; INTRALESIONAL; INTRAMUSCULAR; INTRAVENOUS; SOFT TISSUE
Status: DISCONTINUED | OUTPATIENT
Start: 2021-02-10 | End: 2021-02-10 | Stop reason: HOSPADM

## 2021-02-10 RX ORDER — FENTANYL CITRATE 50 UG/ML
INJECTION, SOLUTION INTRAMUSCULAR; INTRAVENOUS
Status: DISCONTINUED | OUTPATIENT
Start: 2021-02-10 | End: 2021-02-10 | Stop reason: HOSPADM

## 2021-02-10 RX ORDER — LIDOCAINE HYDROCHLORIDE 10 MG/ML
INJECTION INFILTRATION; PERINEURAL
Status: DISCONTINUED | OUTPATIENT
Start: 2021-02-10 | End: 2021-02-10 | Stop reason: HOSPADM

## 2021-02-10 RX ORDER — SODIUM CHLORIDE 9 MG/ML
INJECTION, SOLUTION INTRAVENOUS CONTINUOUS
Status: DISCONTINUED | OUTPATIENT
Start: 2021-02-10 | End: 2021-02-10 | Stop reason: HOSPADM

## 2021-02-10 RX ORDER — LIDOCAINE HYDROCHLORIDE 5 MG/ML
INJECTION, SOLUTION INFILTRATION; INTRAVENOUS
Status: DISCONTINUED | OUTPATIENT
Start: 2021-02-10 | End: 2021-02-10 | Stop reason: HOSPADM

## 2021-02-10 RX ADMIN — SODIUM CHLORIDE: 0.9 INJECTION, SOLUTION INTRAVENOUS at 01:02

## 2021-02-12 ENCOUNTER — TELEPHONE (OUTPATIENT)
Dept: INTERNAL MEDICINE | Facility: CLINIC | Age: 41
End: 2021-02-12

## 2021-02-12 ENCOUNTER — OFFICE VISIT (OUTPATIENT)
Dept: INTERNAL MEDICINE | Facility: CLINIC | Age: 41
End: 2021-02-12
Payer: COMMERCIAL

## 2021-02-12 VITALS
DIASTOLIC BLOOD PRESSURE: 68 MMHG | HEIGHT: 65 IN | WEIGHT: 257.06 LBS | TEMPERATURE: 97 F | RESPIRATION RATE: 14 BRPM | HEART RATE: 68 BPM | SYSTOLIC BLOOD PRESSURE: 124 MMHG | BODY MASS INDEX: 42.83 KG/M2

## 2021-02-12 DIAGNOSIS — E78.2 MIXED HYPERLIPIDEMIA: ICD-10-CM

## 2021-02-12 DIAGNOSIS — M54.12 CERVICAL RADICULAR PAIN: ICD-10-CM

## 2021-02-12 DIAGNOSIS — E11.9 TYPE 2 DIABETES MELLITUS WITHOUT COMPLICATION, WITHOUT LONG-TERM CURRENT USE OF INSULIN: Primary | ICD-10-CM

## 2021-02-12 DIAGNOSIS — K75.81 NASH (NONALCOHOLIC STEATOHEPATITIS): ICD-10-CM

## 2021-02-12 DIAGNOSIS — E66.01 MORBID OBESITY: ICD-10-CM

## 2021-02-12 DIAGNOSIS — E55.9 VITAMIN D INSUFFICIENCY: Primary | ICD-10-CM

## 2021-02-12 DIAGNOSIS — E11.9 TYPE 2 DIABETES MELLITUS WITHOUT COMPLICATION, WITHOUT LONG-TERM CURRENT USE OF INSULIN: ICD-10-CM

## 2021-02-12 PROCEDURE — 99999 PR PBB SHADOW E&M-EST. PATIENT-LVL IV: CPT | Mod: PBBFAC,,, | Performed by: INTERNAL MEDICINE

## 2021-02-12 PROCEDURE — 99214 OFFICE O/P EST MOD 30 MIN: CPT | Mod: S$GLB,,, | Performed by: INTERNAL MEDICINE

## 2021-02-12 PROCEDURE — 3072F PR LOW RISK FOR RETINOPATHY: ICD-10-PCS | Mod: S$GLB,,, | Performed by: INTERNAL MEDICINE

## 2021-02-12 PROCEDURE — 3008F BODY MASS INDEX DOCD: CPT | Mod: CPTII,S$GLB,, | Performed by: INTERNAL MEDICINE

## 2021-02-12 PROCEDURE — 3044F PR MOST RECENT HEMOGLOBIN A1C LEVEL <7.0%: ICD-10-PCS | Mod: CPTII,S$GLB,, | Performed by: INTERNAL MEDICINE

## 2021-02-12 PROCEDURE — 1126F PR PAIN SEVERITY QUANTIFIED, NO PAIN PRESENT: ICD-10-PCS | Mod: S$GLB,,, | Performed by: INTERNAL MEDICINE

## 2021-02-12 PROCEDURE — 1126F AMNT PAIN NOTED NONE PRSNT: CPT | Mod: S$GLB,,, | Performed by: INTERNAL MEDICINE

## 2021-02-12 PROCEDURE — 99999 PR PBB SHADOW E&M-EST. PATIENT-LVL IV: ICD-10-PCS | Mod: PBBFAC,,, | Performed by: INTERNAL MEDICINE

## 2021-02-12 PROCEDURE — 3044F HG A1C LEVEL LT 7.0%: CPT | Mod: CPTII,S$GLB,, | Performed by: INTERNAL MEDICINE

## 2021-02-12 PROCEDURE — 99214 PR OFFICE/OUTPT VISIT, EST, LEVL IV, 30-39 MIN: ICD-10-PCS | Mod: S$GLB,,, | Performed by: INTERNAL MEDICINE

## 2021-02-12 PROCEDURE — 3072F LOW RISK FOR RETINOPATHY: CPT | Mod: S$GLB,,, | Performed by: INTERNAL MEDICINE

## 2021-02-12 PROCEDURE — 3008F PR BODY MASS INDEX (BMI) DOCUMENTED: ICD-10-PCS | Mod: CPTII,S$GLB,, | Performed by: INTERNAL MEDICINE

## 2021-02-12 RX ORDER — GABAPENTIN 300 MG/1
600 CAPSULE ORAL NIGHTLY PRN
Qty: 60 CAPSULE | Refills: 3 | Status: SHIPPED | OUTPATIENT
Start: 2021-02-12 | End: 2021-06-15

## 2021-02-21 ENCOUNTER — PATIENT MESSAGE (OUTPATIENT)
Dept: INTERNAL MEDICINE | Facility: CLINIC | Age: 41
End: 2021-02-21

## 2021-02-21 DIAGNOSIS — L23.7 ALLERGIC CONTACT DERMATITIS DUE TO PLANT: ICD-10-CM

## 2021-02-21 RX ORDER — KETOCONAZOLE 20 MG/G
CREAM TOPICAL 2 TIMES DAILY
Qty: 15 G | Refills: 0 | Status: SHIPPED | OUTPATIENT
Start: 2021-02-21 | End: 2021-05-03 | Stop reason: SDUPTHER

## 2021-02-21 RX ORDER — HYDROCORTISONE 25 MG/G
CREAM TOPICAL 2 TIMES DAILY
Qty: 28 G | Refills: 0 | Status: SHIPPED | OUTPATIENT
Start: 2021-02-21 | End: 2022-05-09

## 2021-03-02 ENCOUNTER — PATIENT MESSAGE (OUTPATIENT)
Dept: INTERNAL MEDICINE | Facility: CLINIC | Age: 41
End: 2021-03-02

## 2021-03-02 DIAGNOSIS — M25.522 LEFT ELBOW PAIN: Primary | ICD-10-CM

## 2021-03-09 ENCOUNTER — PATIENT MESSAGE (OUTPATIENT)
Dept: INTERNAL MEDICINE | Facility: CLINIC | Age: 41
End: 2021-03-09

## 2021-03-10 ENCOUNTER — PATIENT MESSAGE (OUTPATIENT)
Dept: INTERNAL MEDICINE | Facility: CLINIC | Age: 41
End: 2021-03-10

## 2021-03-11 ENCOUNTER — PATIENT MESSAGE (OUTPATIENT)
Dept: INTERNAL MEDICINE | Facility: CLINIC | Age: 41
End: 2021-03-11

## 2021-03-11 DIAGNOSIS — E11.9 TYPE 2 DIABETES MELLITUS WITHOUT COMPLICATION, WITHOUT LONG-TERM CURRENT USE OF INSULIN: ICD-10-CM

## 2021-03-11 DIAGNOSIS — E66.01 MORBID OBESITY: ICD-10-CM

## 2021-03-11 RX ORDER — SEMAGLUTIDE 1.34 MG/ML
INJECTION, SOLUTION SUBCUTANEOUS
Qty: 1.5 ML | Refills: 5 | Status: SHIPPED | OUTPATIENT
Start: 2021-03-11 | End: 2021-08-11

## 2021-03-12 DIAGNOSIS — M25.522 LEFT ELBOW PAIN: Primary | ICD-10-CM

## 2021-03-15 ENCOUNTER — HOSPITAL ENCOUNTER (OUTPATIENT)
Dept: RADIOLOGY | Facility: HOSPITAL | Age: 41
Discharge: HOME | End: 2021-03-15
Attending: ORTHOPAEDIC SURGERY
Payer: COMMERCIAL

## 2021-03-15 ENCOUNTER — OFFICE VISIT (OUTPATIENT)
Dept: ORTHOPEDICS | Facility: CLINIC | Age: 41
End: 2021-03-15
Payer: COMMERCIAL

## 2021-03-15 ENCOUNTER — PATIENT MESSAGE (OUTPATIENT)
Dept: ORTHOPEDICS | Facility: CLINIC | Age: 41
End: 2021-03-15

## 2021-03-15 VITALS — BODY MASS INDEX: 43.16 KG/M2 | HEIGHT: 65 IN | WEIGHT: 259.06 LBS

## 2021-03-15 DIAGNOSIS — G56.03 CARPAL TUNNEL SYNDROME, BILATERAL: ICD-10-CM

## 2021-03-15 DIAGNOSIS — M77.12 LEFT LATERAL EPICONDYLITIS: Primary | ICD-10-CM

## 2021-03-15 DIAGNOSIS — M25.522 LEFT ELBOW PAIN: ICD-10-CM

## 2021-03-15 PROCEDURE — 20526 PR INJECT CARPAL TUNNEL: ICD-10-PCS | Mod: RT,S$GLB,, | Performed by: ORTHOPAEDIC SURGERY

## 2021-03-15 PROCEDURE — 99204 OFFICE O/P NEW MOD 45 MIN: CPT | Mod: 25,S$GLB,, | Performed by: ORTHOPAEDIC SURGERY

## 2021-03-15 PROCEDURE — 3072F PR LOW RISK FOR RETINOPATHY: ICD-10-PCS | Mod: S$GLB,,, | Performed by: ORTHOPAEDIC SURGERY

## 2021-03-15 PROCEDURE — 73080 X-RAY EXAM OF ELBOW: CPT | Mod: TC,PO,LT

## 2021-03-15 PROCEDURE — 3072F LOW RISK FOR RETINOPATHY: CPT | Mod: S$GLB,,, | Performed by: ORTHOPAEDIC SURGERY

## 2021-03-15 PROCEDURE — 1125F AMNT PAIN NOTED PAIN PRSNT: CPT | Mod: S$GLB,,, | Performed by: ORTHOPAEDIC SURGERY

## 2021-03-15 PROCEDURE — 1125F PR PAIN SEVERITY QUANTIFIED, PAIN PRESENT: ICD-10-PCS | Mod: S$GLB,,, | Performed by: ORTHOPAEDIC SURGERY

## 2021-03-15 PROCEDURE — 99204 PR OFFICE/OUTPT VISIT, NEW, LEVL IV, 45-59 MIN: ICD-10-PCS | Mod: 25,S$GLB,, | Performed by: ORTHOPAEDIC SURGERY

## 2021-03-15 PROCEDURE — 3008F PR BODY MASS INDEX (BMI) DOCUMENTED: ICD-10-PCS | Mod: CPTII,S$GLB,, | Performed by: ORTHOPAEDIC SURGERY

## 2021-03-15 PROCEDURE — 73080 XR ELBOW COMPLETE 3 VIEW LEFT: ICD-10-PCS | Mod: 26,LT,, | Performed by: RADIOLOGY

## 2021-03-15 PROCEDURE — 20526 THER INJECTION CARP TUNNEL: CPT | Mod: RT,S$GLB,, | Performed by: ORTHOPAEDIC SURGERY

## 2021-03-15 PROCEDURE — 99999 PR PBB SHADOW E&M-EST. PATIENT-LVL V: CPT | Mod: PBBFAC,,, | Performed by: ORTHOPAEDIC SURGERY

## 2021-03-15 PROCEDURE — 99999 PR PBB SHADOW E&M-EST. PATIENT-LVL V: ICD-10-PCS | Mod: PBBFAC,,, | Performed by: ORTHOPAEDIC SURGERY

## 2021-03-15 PROCEDURE — 3008F BODY MASS INDEX DOCD: CPT | Mod: CPTII,S$GLB,, | Performed by: ORTHOPAEDIC SURGERY

## 2021-03-15 PROCEDURE — 73080 X-RAY EXAM OF ELBOW: CPT | Mod: 26,LT,, | Performed by: RADIOLOGY

## 2021-03-15 RX ADMIN — METHYLPREDNISOLONE ACETATE 40 MG: 40 INJECTION, SUSPENSION INTRA-ARTICULAR; INTRALESIONAL; INTRAMUSCULAR; SOFT TISSUE at 11:03

## 2021-03-18 ENCOUNTER — PATIENT MESSAGE (OUTPATIENT)
Dept: ORTHOPEDICS | Facility: CLINIC | Age: 41
End: 2021-03-18

## 2021-03-18 ENCOUNTER — CLINICAL SUPPORT (OUTPATIENT)
Dept: REHABILITATION | Facility: HOSPITAL | Age: 41
End: 2021-03-18
Attending: ORTHOPAEDIC SURGERY
Payer: COMMERCIAL

## 2021-03-18 DIAGNOSIS — M25.522 PAIN IN LEFT ELBOW: ICD-10-CM

## 2021-03-18 DIAGNOSIS — M77.12 LEFT LATERAL EPICONDYLITIS: ICD-10-CM

## 2021-03-18 PROCEDURE — 97535 SELF CARE MNGMENT TRAINING: CPT | Mod: PO

## 2021-03-18 PROCEDURE — 97140 MANUAL THERAPY 1/> REGIONS: CPT | Mod: PO

## 2021-03-18 PROCEDURE — 97165 OT EVAL LOW COMPLEX 30 MIN: CPT | Mod: PO

## 2021-03-21 PROBLEM — M25.522 PAIN IN LEFT ELBOW: Status: ACTIVE | Noted: 2021-03-18

## 2021-03-22 RX ORDER — METHYLPREDNISOLONE ACETATE 40 MG/ML
40 INJECTION, SUSPENSION INTRA-ARTICULAR; INTRALESIONAL; INTRAMUSCULAR; SOFT TISSUE
Status: DISCONTINUED | OUTPATIENT
Start: 2021-03-15 | End: 2021-03-22 | Stop reason: HOSPADM

## 2021-03-30 ENCOUNTER — CLINICAL SUPPORT (OUTPATIENT)
Dept: OPHTHALMOLOGY | Facility: CLINIC | Age: 41
End: 2021-03-30
Payer: COMMERCIAL

## 2021-03-30 ENCOUNTER — OFFICE VISIT (OUTPATIENT)
Dept: OPHTHALMOLOGY | Facility: CLINIC | Age: 41
End: 2021-03-30
Payer: COMMERCIAL

## 2021-03-30 DIAGNOSIS — H02.883 MEIBOMIAN GLAND DYSFUNCTION (MGD) OF BOTH EYES: ICD-10-CM

## 2021-03-30 DIAGNOSIS — H40.003 GLAUCOMA SUSPECT OF BOTH EYES: ICD-10-CM

## 2021-03-30 DIAGNOSIS — H40.053 OCULAR HYPERTENSION, BILATERAL: Primary | ICD-10-CM

## 2021-03-30 DIAGNOSIS — H02.886 MEIBOMIAN GLAND DYSFUNCTION (MGD) OF BOTH EYES: ICD-10-CM

## 2021-03-30 PROCEDURE — 92012 PR EYE EXAM, EST PATIENT,INTERMED: ICD-10-PCS | Mod: S$GLB,,, | Performed by: OPHTHALMOLOGY

## 2021-03-30 PROCEDURE — 92083 EXTENDED VISUAL FIELD XM: CPT | Mod: S$GLB,,, | Performed by: OPHTHALMOLOGY

## 2021-03-30 PROCEDURE — 2023F PR DILATED RETINAL EXAM W/O EVID OF RETINOPATHY: ICD-10-PCS | Mod: S$GLB,,, | Performed by: OPHTHALMOLOGY

## 2021-03-30 PROCEDURE — 99999 PR PBB SHADOW E&M-EST. PATIENT-LVL II: CPT | Mod: PBBFAC,,, | Performed by: OPHTHALMOLOGY

## 2021-03-30 PROCEDURE — 1126F AMNT PAIN NOTED NONE PRSNT: CPT | Mod: S$GLB,,, | Performed by: OPHTHALMOLOGY

## 2021-03-30 PROCEDURE — 92083 HUMPHREY VISUAL FIELD - OU - BOTH EYES: ICD-10-PCS | Mod: S$GLB,,, | Performed by: OPHTHALMOLOGY

## 2021-03-30 PROCEDURE — 2023F DILAT RTA XM W/O RTNOPTHY: CPT | Mod: S$GLB,,, | Performed by: OPHTHALMOLOGY

## 2021-03-30 PROCEDURE — 99999 PR PBB SHADOW E&M-EST. PATIENT-LVL II: ICD-10-PCS | Mod: PBBFAC,,, | Performed by: OPHTHALMOLOGY

## 2021-03-30 PROCEDURE — 92133 CPTRZD OPH DX IMG PST SGM ON: CPT | Mod: S$GLB,,, | Performed by: OPHTHALMOLOGY

## 2021-03-30 PROCEDURE — 92020 PR SPECIAL EYE EVAL,GONIOSCOPY: ICD-10-PCS | Mod: S$GLB,,, | Performed by: OPHTHALMOLOGY

## 2021-03-30 PROCEDURE — 92012 INTRM OPH EXAM EST PATIENT: CPT | Mod: S$GLB,,, | Performed by: OPHTHALMOLOGY

## 2021-03-30 PROCEDURE — 92133 POSTERIOR SEGMENT OCT OPTIC NERVE(OCULAR COHERENCE TOMOGRAPHY) - OU - BOTH EYES: ICD-10-PCS | Mod: S$GLB,,, | Performed by: OPHTHALMOLOGY

## 2021-03-30 PROCEDURE — 92020 GONIOSCOPY: CPT | Mod: S$GLB,,, | Performed by: OPHTHALMOLOGY

## 2021-03-30 PROCEDURE — 1126F PR PAIN SEVERITY QUANTIFIED, NO PAIN PRESENT: ICD-10-PCS | Mod: S$GLB,,, | Performed by: OPHTHALMOLOGY

## 2021-03-30 RX ORDER — LATANOPROST 50 UG/ML
1 SOLUTION/ DROPS OPHTHALMIC NIGHTLY
Qty: 3 BOTTLE | Refills: 12 | Status: SHIPPED | OUTPATIENT
Start: 2021-03-30 | End: 2022-05-12

## 2021-04-07 ENCOUNTER — CLINICAL SUPPORT (OUTPATIENT)
Dept: REHABILITATION | Facility: HOSPITAL | Age: 41
End: 2021-04-07
Attending: ORTHOPAEDIC SURGERY
Payer: COMMERCIAL

## 2021-04-07 ENCOUNTER — PATIENT MESSAGE (OUTPATIENT)
Dept: INTERNAL MEDICINE | Facility: CLINIC | Age: 41
End: 2021-04-07

## 2021-04-07 DIAGNOSIS — M25.522 PAIN IN LEFT ELBOW: ICD-10-CM

## 2021-04-07 PROCEDURE — 97530 THERAPEUTIC ACTIVITIES: CPT | Mod: PO

## 2021-04-13 ENCOUNTER — PATIENT MESSAGE (OUTPATIENT)
Dept: INTERNAL MEDICINE | Facility: CLINIC | Age: 41
End: 2021-04-13

## 2021-04-13 DIAGNOSIS — R11.2 NAUSEA AND VOMITING, INTRACTABILITY OF VOMITING NOT SPECIFIED, UNSPECIFIED VOMITING TYPE: Primary | ICD-10-CM

## 2021-04-13 RX ORDER — PROMETHAZINE HYDROCHLORIDE 25 MG/1
25 TABLET ORAL EVERY 6 HOURS PRN
Qty: 30 TABLET | Refills: 1 | Status: SHIPPED | OUTPATIENT
Start: 2021-04-13 | End: 2021-05-03

## 2021-04-18 ENCOUNTER — PATIENT MESSAGE (OUTPATIENT)
Dept: ORTHOPEDICS | Facility: CLINIC | Age: 41
End: 2021-04-18

## 2021-04-20 ENCOUNTER — CLINICAL SUPPORT (OUTPATIENT)
Dept: REHABILITATION | Facility: HOSPITAL | Age: 41
End: 2021-04-20
Attending: ORTHOPAEDIC SURGERY
Payer: COMMERCIAL

## 2021-04-20 DIAGNOSIS — M25.522 PAIN IN LEFT ELBOW: ICD-10-CM

## 2021-04-20 PROCEDURE — 97022 WHIRLPOOL THERAPY: CPT | Mod: PO

## 2021-04-20 PROCEDURE — 97110 THERAPEUTIC EXERCISES: CPT | Mod: PO

## 2021-04-20 PROCEDURE — 97140 MANUAL THERAPY 1/> REGIONS: CPT | Mod: PO

## 2021-05-03 ENCOUNTER — PATIENT MESSAGE (OUTPATIENT)
Dept: INTERNAL MEDICINE | Facility: CLINIC | Age: 41
End: 2021-05-03

## 2021-05-03 ENCOUNTER — OFFICE VISIT (OUTPATIENT)
Dept: ORTHOPEDICS | Facility: CLINIC | Age: 41
End: 2021-05-03
Payer: COMMERCIAL

## 2021-05-03 ENCOUNTER — PATIENT MESSAGE (OUTPATIENT)
Dept: ORTHOPEDICS | Facility: CLINIC | Age: 41
End: 2021-05-03

## 2021-05-03 VITALS — WEIGHT: 260.13 LBS | BODY MASS INDEX: 43.29 KG/M2

## 2021-05-03 DIAGNOSIS — L21.9 SEBORRHEIC DERMATITIS OF SCALP: ICD-10-CM

## 2021-05-03 DIAGNOSIS — M54.12 CERVICAL RADICULOPATHY: ICD-10-CM

## 2021-05-03 DIAGNOSIS — G56.03 CARPAL TUNNEL SYNDROME, BILATERAL: ICD-10-CM

## 2021-05-03 DIAGNOSIS — M25.522 LEFT ELBOW PAIN: ICD-10-CM

## 2021-05-03 DIAGNOSIS — M77.12 LEFT LATERAL EPICONDYLITIS: Primary | ICD-10-CM

## 2021-05-03 PROCEDURE — 99213 PR OFFICE/OUTPT VISIT, EST, LEVL III, 20-29 MIN: ICD-10-PCS | Mod: S$GLB,,, | Performed by: ORTHOPAEDIC SURGERY

## 2021-05-03 PROCEDURE — 3072F LOW RISK FOR RETINOPATHY: CPT | Mod: S$GLB,,, | Performed by: ORTHOPAEDIC SURGERY

## 2021-05-03 PROCEDURE — 99999 PR PBB SHADOW E&M-EST. PATIENT-LVL III: ICD-10-PCS | Mod: PBBFAC,,, | Performed by: ORTHOPAEDIC SURGERY

## 2021-05-03 PROCEDURE — 3072F PR LOW RISK FOR RETINOPATHY: ICD-10-PCS | Mod: S$GLB,,, | Performed by: ORTHOPAEDIC SURGERY

## 2021-05-03 PROCEDURE — 99999 PR PBB SHADOW E&M-EST. PATIENT-LVL III: CPT | Mod: PBBFAC,,, | Performed by: ORTHOPAEDIC SURGERY

## 2021-05-03 PROCEDURE — 3008F PR BODY MASS INDEX (BMI) DOCUMENTED: ICD-10-PCS | Mod: CPTII,S$GLB,, | Performed by: ORTHOPAEDIC SURGERY

## 2021-05-03 PROCEDURE — 99213 OFFICE O/P EST LOW 20 MIN: CPT | Mod: S$GLB,,, | Performed by: ORTHOPAEDIC SURGERY

## 2021-05-03 PROCEDURE — 1125F AMNT PAIN NOTED PAIN PRSNT: CPT | Mod: S$GLB,,, | Performed by: ORTHOPAEDIC SURGERY

## 2021-05-03 PROCEDURE — 3008F BODY MASS INDEX DOCD: CPT | Mod: CPTII,S$GLB,, | Performed by: ORTHOPAEDIC SURGERY

## 2021-05-03 PROCEDURE — 1125F PR PAIN SEVERITY QUANTIFIED, PAIN PRESENT: ICD-10-PCS | Mod: S$GLB,,, | Performed by: ORTHOPAEDIC SURGERY

## 2021-05-03 RX ORDER — KETOCONAZOLE 20 MG/G
CREAM TOPICAL 2 TIMES DAILY
Qty: 15 G | Refills: 0 | Status: SHIPPED | OUTPATIENT
Start: 2021-05-03 | End: 2022-05-09

## 2021-05-03 RX ORDER — KETOCONAZOLE 20 MG/ML
SHAMPOO, SUSPENSION TOPICAL
Qty: 120 ML | Refills: 3 | Status: SHIPPED | OUTPATIENT
Start: 2021-05-03 | End: 2022-12-06

## 2021-05-05 ENCOUNTER — PATIENT MESSAGE (OUTPATIENT)
Dept: INTERNAL MEDICINE | Facility: CLINIC | Age: 41
End: 2021-05-05

## 2021-05-06 ENCOUNTER — OFFICE VISIT (OUTPATIENT)
Dept: GASTROENTEROLOGY | Facility: CLINIC | Age: 41
End: 2021-05-06
Payer: COMMERCIAL

## 2021-05-06 ENCOUNTER — PATIENT MESSAGE (OUTPATIENT)
Dept: INTERNAL MEDICINE | Facility: CLINIC | Age: 41
End: 2021-05-06

## 2021-05-06 DIAGNOSIS — R11.0 NAUSEA: Primary | ICD-10-CM

## 2021-05-06 DIAGNOSIS — R79.89 ELEVATED LFTS: ICD-10-CM

## 2021-05-06 DIAGNOSIS — K62.5 RECTAL BLEEDING: ICD-10-CM

## 2021-05-06 PROCEDURE — 99204 PR OFFICE/OUTPT VISIT, NEW, LEVL IV, 45-59 MIN: ICD-10-PCS | Mod: 95,,, | Performed by: INTERNAL MEDICINE

## 2021-05-06 PROCEDURE — 3072F PR LOW RISK FOR RETINOPATHY: ICD-10-PCS | Mod: 95,,, | Performed by: INTERNAL MEDICINE

## 2021-05-06 PROCEDURE — 3072F LOW RISK FOR RETINOPATHY: CPT | Mod: 95,,, | Performed by: INTERNAL MEDICINE

## 2021-05-06 PROCEDURE — 99204 OFFICE O/P NEW MOD 45 MIN: CPT | Mod: 95,,, | Performed by: INTERNAL MEDICINE

## 2021-05-07 ENCOUNTER — PATIENT MESSAGE (OUTPATIENT)
Dept: GASTROENTEROLOGY | Facility: CLINIC | Age: 41
End: 2021-05-07

## 2021-05-07 ENCOUNTER — PATIENT MESSAGE (OUTPATIENT)
Dept: INTERNAL MEDICINE | Facility: CLINIC | Age: 41
End: 2021-05-07

## 2021-05-07 ENCOUNTER — CLINICAL SUPPORT (OUTPATIENT)
Dept: REHABILITATION | Facility: HOSPITAL | Age: 41
End: 2021-05-07
Attending: ORTHOPAEDIC SURGERY
Payer: COMMERCIAL

## 2021-05-07 DIAGNOSIS — Z01.818 PRE-OP TESTING: ICD-10-CM

## 2021-05-07 DIAGNOSIS — M54.2 NECK PAIN: Primary | ICD-10-CM

## 2021-05-07 DIAGNOSIS — M54.12 CERVICAL RADICULOPATHY: ICD-10-CM

## 2021-05-07 DIAGNOSIS — R52 REFERRED PAIN: ICD-10-CM

## 2021-05-07 DIAGNOSIS — K21.9 GASTROESOPHAGEAL REFLUX DISEASE, UNSPECIFIED WHETHER ESOPHAGITIS PRESENT: Primary | ICD-10-CM

## 2021-05-07 PROCEDURE — 97161 PT EVAL LOW COMPLEX 20 MIN: CPT | Mod: PO

## 2021-05-07 PROCEDURE — 97140 MANUAL THERAPY 1/> REGIONS: CPT | Mod: PO

## 2021-05-07 RX ORDER — ONDANSETRON 4 MG/1
4 TABLET, ORALLY DISINTEGRATING ORAL EVERY 6 HOURS PRN
Qty: 30 TABLET | Refills: 3 | Status: SHIPPED | OUTPATIENT
Start: 2021-05-07 | End: 2022-04-12 | Stop reason: SDUPTHER

## 2021-05-15 ENCOUNTER — LAB VISIT (OUTPATIENT)
Dept: INTERNAL MEDICINE | Facility: CLINIC | Age: 41
End: 2021-05-15
Payer: COMMERCIAL

## 2021-05-15 DIAGNOSIS — Z01.818 PRE-OP TESTING: ICD-10-CM

## 2021-05-15 PROCEDURE — U0005 INFEC AGEN DETEC AMPLI PROBE: HCPCS | Performed by: INTERNAL MEDICINE

## 2021-05-15 PROCEDURE — U0003 INFECTIOUS AGENT DETECTION BY NUCLEIC ACID (DNA OR RNA); SEVERE ACUTE RESPIRATORY SYNDROME CORONAVIRUS 2 (SARS-COV-2) (CORONAVIRUS DISEASE [COVID-19]), AMPLIFIED PROBE TECHNIQUE, MAKING USE OF HIGH THROUGHPUT TECHNOLOGIES AS DESCRIBED BY CMS-2020-01-R: HCPCS | Performed by: INTERNAL MEDICINE

## 2021-05-16 LAB — SARS-COV-2 RNA RESP QL NAA+PROBE: NOT DETECTED

## 2021-05-17 ENCOUNTER — PATIENT MESSAGE (OUTPATIENT)
Dept: ENDOSCOPY | Facility: HOSPITAL | Age: 41
End: 2021-05-17

## 2021-05-18 ENCOUNTER — ANESTHESIA EVENT (OUTPATIENT)
Dept: ENDOSCOPY | Facility: HOSPITAL | Age: 41
End: 2021-05-18
Payer: COMMERCIAL

## 2021-05-18 ENCOUNTER — ANESTHESIA (OUTPATIENT)
Dept: ENDOSCOPY | Facility: HOSPITAL | Age: 41
End: 2021-05-18
Payer: COMMERCIAL

## 2021-05-18 ENCOUNTER — HOSPITAL ENCOUNTER (OUTPATIENT)
Facility: HOSPITAL | Age: 41
Discharge: HOME | End: 2021-05-18
Attending: INTERNAL MEDICINE | Admitting: INTERNAL MEDICINE
Payer: COMMERCIAL

## 2021-05-18 DIAGNOSIS — K21.9 GERD (GASTROESOPHAGEAL REFLUX DISEASE): ICD-10-CM

## 2021-05-18 PROCEDURE — D9220A PRA ANESTHESIA: Mod: ANES,,, | Performed by: ANESTHESIOLOGY

## 2021-05-18 PROCEDURE — 63600175 PHARM REV CODE 636 W HCPCS: Performed by: REGISTERED NURSE

## 2021-05-18 PROCEDURE — 88305 TISSUE EXAM BY PATHOLOGIST: CPT | Mod: 59 | Performed by: PATHOLOGY

## 2021-05-18 PROCEDURE — 43239 PR EGD, FLEX, W/BIOPSY, SGL/MULTI: ICD-10-PCS | Mod: ,,, | Performed by: INTERNAL MEDICINE

## 2021-05-18 PROCEDURE — 25000003 PHARM REV CODE 250: Performed by: REGISTERED NURSE

## 2021-05-18 PROCEDURE — D9220A PRA ANESTHESIA: ICD-10-PCS | Mod: ANES,,, | Performed by: ANESTHESIOLOGY

## 2021-05-18 PROCEDURE — 25000003 PHARM REV CODE 250: Performed by: INTERNAL MEDICINE

## 2021-05-18 PROCEDURE — 88305 TISSUE EXAM BY PATHOLOGIST: ICD-10-PCS | Mod: 26,,, | Performed by: PATHOLOGY

## 2021-05-18 PROCEDURE — D9220A PRA ANESTHESIA: Mod: CRNA,,, | Performed by: REGISTERED NURSE

## 2021-05-18 PROCEDURE — 43239 EGD BIOPSY SINGLE/MULTIPLE: CPT | Mod: ,,, | Performed by: INTERNAL MEDICINE

## 2021-05-18 PROCEDURE — 37000008 HC ANESTHESIA 1ST 15 MINUTES: Performed by: INTERNAL MEDICINE

## 2021-05-18 PROCEDURE — D9220A PRA ANESTHESIA: ICD-10-PCS | Mod: CRNA,,, | Performed by: REGISTERED NURSE

## 2021-05-18 PROCEDURE — 88305 TISSUE EXAM BY PATHOLOGIST: CPT | Mod: 26,,, | Performed by: PATHOLOGY

## 2021-05-18 PROCEDURE — 43239 EGD BIOPSY SINGLE/MULTIPLE: CPT | Performed by: INTERNAL MEDICINE

## 2021-05-18 PROCEDURE — 27201012 HC FORCEPS, HOT/COLD, DISP: Performed by: INTERNAL MEDICINE

## 2021-05-18 RX ORDER — PROPOFOL 10 MG/ML
VIAL (ML) INTRAVENOUS
Status: DISCONTINUED | OUTPATIENT
Start: 2021-05-18 | End: 2021-05-18

## 2021-05-18 RX ORDER — SODIUM CHLORIDE 9 MG/ML
INJECTION, SOLUTION INTRAVENOUS CONTINUOUS
Status: DISCONTINUED | OUTPATIENT
Start: 2021-05-18 | End: 2021-05-18 | Stop reason: HOSPADM

## 2021-05-18 RX ORDER — LIDOCAINE HYDROCHLORIDE 20 MG/ML
INJECTION INTRAVENOUS
Status: DISCONTINUED | OUTPATIENT
Start: 2021-05-18 | End: 2021-05-18

## 2021-05-18 RX ORDER — FENTANYL CITRATE 50 UG/ML
INJECTION, SOLUTION INTRAMUSCULAR; INTRAVENOUS
Status: DISCONTINUED | OUTPATIENT
Start: 2021-05-18 | End: 2021-05-18

## 2021-05-18 RX ADMIN — PROPOFOL 50 MG: 10 INJECTION, EMULSION INTRAVENOUS at 09:05

## 2021-05-18 RX ADMIN — FENTANYL CITRATE 100 MCG: 50 INJECTION, SOLUTION INTRAMUSCULAR; INTRAVENOUS at 09:05

## 2021-05-18 RX ADMIN — PROPOFOL 70 MG: 10 INJECTION, EMULSION INTRAVENOUS at 09:05

## 2021-05-18 RX ADMIN — LIDOCAINE HYDROCHLORIDE 100 MG: 20 INJECTION, SOLUTION INTRAVENOUS at 09:05

## 2021-05-18 RX ADMIN — SODIUM CHLORIDE: 0.9 INJECTION, SOLUTION INTRAVENOUS at 08:05

## 2021-05-19 ENCOUNTER — PATIENT MESSAGE (OUTPATIENT)
Dept: GASTROENTEROLOGY | Facility: CLINIC | Age: 41
End: 2021-05-19

## 2021-05-19 ENCOUNTER — TELEPHONE (OUTPATIENT)
Dept: GASTROENTEROLOGY | Facility: CLINIC | Age: 41
End: 2021-05-19

## 2021-05-19 VITALS
WEIGHT: 255 LBS | TEMPERATURE: 99 F | OXYGEN SATURATION: 96 % | HEART RATE: 62 BPM | BODY MASS INDEX: 42.49 KG/M2 | SYSTOLIC BLOOD PRESSURE: 102 MMHG | DIASTOLIC BLOOD PRESSURE: 58 MMHG | HEIGHT: 65 IN | RESPIRATION RATE: 15 BRPM

## 2021-05-21 LAB
FINAL PATHOLOGIC DIAGNOSIS: NORMAL
GROSS: NORMAL
Lab: NORMAL

## 2021-05-24 ENCOUNTER — PATIENT MESSAGE (OUTPATIENT)
Dept: GASTROENTEROLOGY | Facility: CLINIC | Age: 41
End: 2021-05-24

## 2021-05-28 ENCOUNTER — TELEPHONE (OUTPATIENT)
Dept: ORTHOPEDICS | Facility: CLINIC | Age: 41
End: 2021-05-28

## 2021-06-22 ENCOUNTER — OFFICE VISIT (OUTPATIENT)
Dept: OPHTHALMOLOGY | Facility: CLINIC | Age: 41
End: 2021-06-22
Payer: COMMERCIAL

## 2021-06-22 DIAGNOSIS — H02.886 MEIBOMIAN GLAND DYSFUNCTION (MGD) OF BOTH EYES: ICD-10-CM

## 2021-06-22 DIAGNOSIS — H40.053 OCULAR HYPERTENSION, BILATERAL: ICD-10-CM

## 2021-06-22 DIAGNOSIS — H40.053 BORDERLINE GLAUCOMA OF BOTH EYES WITH OCULAR HYPERTENSION: Primary | ICD-10-CM

## 2021-06-22 DIAGNOSIS — H02.883 MEIBOMIAN GLAND DYSFUNCTION (MGD) OF BOTH EYES: ICD-10-CM

## 2021-06-22 PROCEDURE — 92012 PR EYE EXAM, EST PATIENT,INTERMED: ICD-10-PCS | Mod: S$GLB,,, | Performed by: OPHTHALMOLOGY

## 2021-06-22 PROCEDURE — 1126F AMNT PAIN NOTED NONE PRSNT: CPT | Mod: S$GLB,,, | Performed by: OPHTHALMOLOGY

## 2021-06-22 PROCEDURE — 1126F PR PAIN SEVERITY QUANTIFIED, NO PAIN PRESENT: ICD-10-PCS | Mod: S$GLB,,, | Performed by: OPHTHALMOLOGY

## 2021-06-22 PROCEDURE — 99999 PR PBB SHADOW E&M-EST. PATIENT-LVL III: CPT | Mod: PBBFAC,,, | Performed by: OPHTHALMOLOGY

## 2021-06-22 PROCEDURE — 99999 PR PBB SHADOW E&M-EST. PATIENT-LVL III: ICD-10-PCS | Mod: PBBFAC,,, | Performed by: OPHTHALMOLOGY

## 2021-06-22 PROCEDURE — 92012 INTRM OPH EXAM EST PATIENT: CPT | Mod: S$GLB,,, | Performed by: OPHTHALMOLOGY

## 2021-07-07 ENCOUNTER — OFFICE VISIT (OUTPATIENT)
Dept: ORTHOPEDICS | Facility: CLINIC | Age: 41
End: 2021-07-07
Payer: COMMERCIAL

## 2021-07-07 VITALS — BODY MASS INDEX: 43.22 KG/M2 | WEIGHT: 259.69 LBS

## 2021-07-07 DIAGNOSIS — M54.12 CERVICAL RADICULOPATHY: ICD-10-CM

## 2021-07-07 DIAGNOSIS — M77.12 LEFT LATERAL EPICONDYLITIS: ICD-10-CM

## 2021-07-07 DIAGNOSIS — G56.03 CARPAL TUNNEL SYNDROME, BILATERAL: Primary | ICD-10-CM

## 2021-07-07 PROCEDURE — 3008F BODY MASS INDEX DOCD: CPT | Mod: CPTII,S$GLB,, | Performed by: ORTHOPAEDIC SURGERY

## 2021-07-07 PROCEDURE — 99999 PR PBB SHADOW E&M-EST. PATIENT-LVL III: CPT | Mod: PBBFAC,,, | Performed by: ORTHOPAEDIC SURGERY

## 2021-07-07 PROCEDURE — 99999 PR PBB SHADOW E&M-EST. PATIENT-LVL III: ICD-10-PCS | Mod: PBBFAC,,, | Performed by: ORTHOPAEDIC SURGERY

## 2021-07-07 PROCEDURE — 99213 OFFICE O/P EST LOW 20 MIN: CPT | Mod: S$GLB,,, | Performed by: ORTHOPAEDIC SURGERY

## 2021-07-07 PROCEDURE — 1125F AMNT PAIN NOTED PAIN PRSNT: CPT | Mod: S$GLB,,, | Performed by: ORTHOPAEDIC SURGERY

## 2021-07-07 PROCEDURE — 1125F PR PAIN SEVERITY QUANTIFIED, PAIN PRESENT: ICD-10-PCS | Mod: S$GLB,,, | Performed by: ORTHOPAEDIC SURGERY

## 2021-07-07 PROCEDURE — 99213 PR OFFICE/OUTPT VISIT, EST, LEVL III, 20-29 MIN: ICD-10-PCS | Mod: S$GLB,,, | Performed by: ORTHOPAEDIC SURGERY

## 2021-07-07 PROCEDURE — 3008F PR BODY MASS INDEX (BMI) DOCUMENTED: ICD-10-PCS | Mod: CPTII,S$GLB,, | Performed by: ORTHOPAEDIC SURGERY

## 2021-07-07 PROCEDURE — 3072F PR LOW RISK FOR RETINOPATHY: ICD-10-PCS | Mod: S$GLB,,, | Performed by: ORTHOPAEDIC SURGERY

## 2021-07-07 PROCEDURE — 3072F LOW RISK FOR RETINOPATHY: CPT | Mod: S$GLB,,, | Performed by: ORTHOPAEDIC SURGERY

## 2021-07-12 ENCOUNTER — PATIENT MESSAGE (OUTPATIENT)
Dept: SLEEP MEDICINE | Facility: CLINIC | Age: 41
End: 2021-07-12

## 2021-08-11 DIAGNOSIS — E66.01 MORBID OBESITY: ICD-10-CM

## 2021-08-11 DIAGNOSIS — E11.9 TYPE 2 DIABETES MELLITUS WITHOUT COMPLICATION, WITHOUT LONG-TERM CURRENT USE OF INSULIN: ICD-10-CM

## 2021-08-11 RX ORDER — SEMAGLUTIDE 1.34 MG/ML
INJECTION, SOLUTION SUBCUTANEOUS
Qty: 2 PEN | Refills: 0 | Status: SHIPPED | OUTPATIENT
Start: 2021-08-11 | End: 2021-09-02 | Stop reason: SDUPTHER

## 2021-08-13 ENCOUNTER — LAB VISIT (OUTPATIENT)
Dept: LAB | Facility: HOSPITAL | Age: 41
End: 2021-08-13
Attending: INTERNAL MEDICINE
Payer: COMMERCIAL

## 2021-08-13 ENCOUNTER — TELEPHONE (OUTPATIENT)
Dept: INTERNAL MEDICINE | Facility: CLINIC | Age: 41
End: 2021-08-13

## 2021-08-13 DIAGNOSIS — E11.9 TYPE 2 DIABETES MELLITUS WITHOUT COMPLICATION, WITHOUT LONG-TERM CURRENT USE OF INSULIN: ICD-10-CM

## 2021-08-13 DIAGNOSIS — E55.9 VITAMIN D INSUFFICIENCY: ICD-10-CM

## 2021-08-13 DIAGNOSIS — K75.81 NASH (NONALCOHOLIC STEATOHEPATITIS): ICD-10-CM

## 2021-08-13 DIAGNOSIS — E78.2 MIXED HYPERLIPIDEMIA: ICD-10-CM

## 2021-08-13 LAB
25(OH)D3+25(OH)D2 SERPL-MCNC: 46 NG/ML (ref 30–96)
ALBUMIN SERPL BCP-MCNC: 4.4 G/DL (ref 3.5–5.2)
ALP SERPL-CCNC: 108 U/L (ref 55–135)
ALT SERPL W/O P-5'-P-CCNC: 61 U/L (ref 10–44)
ANION GAP SERPL CALC-SCNC: 9 MMOL/L (ref 8–16)
AST SERPL-CCNC: 38 U/L (ref 10–40)
BILIRUB SERPL-MCNC: 0.6 MG/DL (ref 0.1–1)
BUN SERPL-MCNC: 14 MG/DL (ref 6–20)
CALCIUM SERPL-MCNC: 9.9 MG/DL (ref 8.7–10.5)
CHLORIDE SERPL-SCNC: 103 MMOL/L (ref 95–110)
CHOLEST SERPL-MCNC: 140 MG/DL (ref 120–199)
CHOLEST/HDLC SERPL: 5 {RATIO} (ref 2–5)
CO2 SERPL-SCNC: 26 MMOL/L (ref 23–29)
CREAT SERPL-MCNC: 0.8 MG/DL (ref 0.5–1.4)
EST. GFR  (AFRICAN AMERICAN): >60 ML/MIN/1.73 M^2
EST. GFR  (NON AFRICAN AMERICAN): >60 ML/MIN/1.73 M^2
ESTIMATED AVG GLUCOSE: 103 MG/DL (ref 68–131)
GLUCOSE SERPL-MCNC: 104 MG/DL (ref 70–110)
HBA1C MFR BLD: 5.2 % (ref 4–5.6)
HDLC SERPL-MCNC: 28 MG/DL (ref 40–75)
HDLC SERPL: 20 % (ref 20–50)
LDLC SERPL CALC-MCNC: 70.8 MG/DL (ref 63–159)
NONHDLC SERPL-MCNC: 112 MG/DL
POTASSIUM SERPL-SCNC: 4.7 MMOL/L (ref 3.5–5.1)
PROT SERPL-MCNC: 7.6 G/DL (ref 6–8.4)
SODIUM SERPL-SCNC: 138 MMOL/L (ref 136–145)
TRIGL SERPL-MCNC: 206 MG/DL (ref 30–150)

## 2021-08-13 PROCEDURE — 36415 COLL VENOUS BLD VENIPUNCTURE: CPT | Mod: PO | Performed by: INTERNAL MEDICINE

## 2021-08-13 PROCEDURE — 83036 HEMOGLOBIN GLYCOSYLATED A1C: CPT | Performed by: INTERNAL MEDICINE

## 2021-08-13 PROCEDURE — 80061 LIPID PANEL: CPT | Performed by: INTERNAL MEDICINE

## 2021-08-13 PROCEDURE — 82306 VITAMIN D 25 HYDROXY: CPT | Performed by: INTERNAL MEDICINE

## 2021-08-13 PROCEDURE — 80053 COMPREHEN METABOLIC PANEL: CPT | Performed by: INTERNAL MEDICINE

## 2021-08-23 ENCOUNTER — OFFICE VISIT (OUTPATIENT)
Dept: SURGERY | Facility: CLINIC | Age: 41
End: 2021-08-23
Payer: COMMERCIAL

## 2021-08-23 VITALS
HEART RATE: 80 BPM | BODY MASS INDEX: 43.34 KG/M2 | DIASTOLIC BLOOD PRESSURE: 80 MMHG | OXYGEN SATURATION: 95 % | SYSTOLIC BLOOD PRESSURE: 127 MMHG | WEIGHT: 260.13 LBS | RESPIRATION RATE: 16 BRPM | HEIGHT: 65 IN

## 2021-08-23 DIAGNOSIS — K21.9 GERD (GASTROESOPHAGEAL REFLUX DISEASE): ICD-10-CM

## 2021-08-23 PROCEDURE — 1159F MED LIST DOCD IN RCRD: CPT | Mod: CPTII,S$GLB,, | Performed by: SURGERY

## 2021-08-23 PROCEDURE — 3079F PR MOST RECENT DIASTOLIC BLOOD PRESSURE 80-89 MM HG: ICD-10-PCS | Mod: CPTII,S$GLB,, | Performed by: SURGERY

## 2021-08-23 PROCEDURE — 3074F PR MOST RECENT SYSTOLIC BLOOD PRESSURE < 130 MM HG: ICD-10-PCS | Mod: CPTII,S$GLB,, | Performed by: SURGERY

## 2021-08-23 PROCEDURE — 3074F SYST BP LT 130 MM HG: CPT | Mod: CPTII,S$GLB,, | Performed by: SURGERY

## 2021-08-23 PROCEDURE — 99999 PR PBB SHADOW E&M-EST. PATIENT-LVL V: CPT | Mod: PBBFAC,,, | Performed by: SURGERY

## 2021-08-23 PROCEDURE — 3008F BODY MASS INDEX DOCD: CPT | Mod: CPTII,S$GLB,, | Performed by: SURGERY

## 2021-08-23 PROCEDURE — 99204 PR OFFICE/OUTPT VISIT, NEW, LEVL IV, 45-59 MIN: ICD-10-PCS | Mod: S$GLB,,, | Performed by: SURGERY

## 2021-08-23 PROCEDURE — 1126F AMNT PAIN NOTED NONE PRSNT: CPT | Mod: CPTII,S$GLB,, | Performed by: SURGERY

## 2021-08-23 PROCEDURE — 3044F HG A1C LEVEL LT 7.0%: CPT | Mod: CPTII,S$GLB,, | Performed by: SURGERY

## 2021-08-23 PROCEDURE — 1126F PR PAIN SEVERITY QUANTIFIED, NO PAIN PRESENT: ICD-10-PCS | Mod: CPTII,S$GLB,, | Performed by: SURGERY

## 2021-08-23 PROCEDURE — 3008F PR BODY MASS INDEX (BMI) DOCUMENTED: ICD-10-PCS | Mod: CPTII,S$GLB,, | Performed by: SURGERY

## 2021-08-23 PROCEDURE — 3072F PR LOW RISK FOR RETINOPATHY: ICD-10-PCS | Mod: CPTII,S$GLB,, | Performed by: SURGERY

## 2021-08-23 PROCEDURE — 1159F PR MEDICATION LIST DOCUMENTED IN MEDICAL RECORD: ICD-10-PCS | Mod: CPTII,S$GLB,, | Performed by: SURGERY

## 2021-08-23 PROCEDURE — 1160F PR REVIEW ALL MEDS BY PRESCRIBER/CLIN PHARMACIST DOCUMENTED: ICD-10-PCS | Mod: CPTII,S$GLB,, | Performed by: SURGERY

## 2021-08-23 PROCEDURE — 99999 PR PBB SHADOW E&M-EST. PATIENT-LVL V: ICD-10-PCS | Mod: PBBFAC,,, | Performed by: SURGERY

## 2021-08-23 PROCEDURE — 3044F PR MOST RECENT HEMOGLOBIN A1C LEVEL <7.0%: ICD-10-PCS | Mod: CPTII,S$GLB,, | Performed by: SURGERY

## 2021-08-23 PROCEDURE — 1160F RVW MEDS BY RX/DR IN RCRD: CPT | Mod: CPTII,S$GLB,, | Performed by: SURGERY

## 2021-08-23 PROCEDURE — 99204 OFFICE O/P NEW MOD 45 MIN: CPT | Mod: S$GLB,,, | Performed by: SURGERY

## 2021-08-23 PROCEDURE — 3079F DIAST BP 80-89 MM HG: CPT | Mod: CPTII,S$GLB,, | Performed by: SURGERY

## 2021-08-23 PROCEDURE — 3072F LOW RISK FOR RETINOPATHY: CPT | Mod: CPTII,S$GLB,, | Performed by: SURGERY

## 2021-08-26 ENCOUNTER — HOSPITAL ENCOUNTER (OUTPATIENT)
Dept: RADIOLOGY | Facility: HOSPITAL | Age: 41
Discharge: HOME | End: 2021-08-26
Attending: SURGERY
Payer: COMMERCIAL

## 2021-08-26 DIAGNOSIS — K21.9 GERD (GASTROESOPHAGEAL REFLUX DISEASE): ICD-10-CM

## 2021-08-26 PROCEDURE — A9698 NON-RAD CONTRAST MATERIALNOC: HCPCS | Performed by: SURGERY

## 2021-08-26 PROCEDURE — 74240 X-RAY XM UPR GI TRC 1CNTRST: CPT | Mod: TC,FY

## 2021-08-26 PROCEDURE — 74240 X-RAY XM UPR GI TRC 1CNTRST: CPT | Mod: 26,,, | Performed by: RADIOLOGY

## 2021-08-26 PROCEDURE — 74240 FL UPPER GI TO INCLUDE ESOPHAGRAM: ICD-10-PCS | Mod: 26,,, | Performed by: RADIOLOGY

## 2021-08-26 PROCEDURE — 25500020 PHARM REV CODE 255: Performed by: SURGERY

## 2021-08-26 RX ADMIN — BARIUM SULFATE 340 ML: 980 POWDER, FOR SUSPENSION ORAL at 09:08

## 2021-08-26 RX ADMIN — BARIUM SULFATE 710 ML: 0.6 SUSPENSION ORAL at 09:08

## 2021-08-27 ENCOUNTER — PATIENT MESSAGE (OUTPATIENT)
Dept: SURGERY | Facility: CLINIC | Age: 41
End: 2021-08-27

## 2021-09-01 ENCOUNTER — PATIENT MESSAGE (OUTPATIENT)
Dept: INTERNAL MEDICINE | Facility: CLINIC | Age: 41
End: 2021-09-01

## 2021-09-01 DIAGNOSIS — E66.01 MORBID OBESITY: ICD-10-CM

## 2021-09-01 DIAGNOSIS — M62.830 BACK MUSCLE SPASM: ICD-10-CM

## 2021-09-01 DIAGNOSIS — E11.9 TYPE 2 DIABETES MELLITUS WITHOUT COMPLICATION, WITHOUT LONG-TERM CURRENT USE OF INSULIN: ICD-10-CM

## 2021-09-01 DIAGNOSIS — J45.20 MILD INTERMITTENT ASTHMA WITHOUT COMPLICATION: ICD-10-CM

## 2021-09-01 DIAGNOSIS — M54.12 CERVICAL RADICULAR PAIN: ICD-10-CM

## 2021-09-02 RX ORDER — SEMAGLUTIDE 1.34 MG/ML
INJECTION, SOLUTION SUBCUTANEOUS
Qty: 2 PEN | Refills: 0 | Status: SHIPPED | OUTPATIENT
Start: 2021-09-02 | End: 2021-10-10

## 2021-09-02 RX ORDER — ALBUTEROL SULFATE 90 UG/1
AEROSOL, METERED RESPIRATORY (INHALATION)
Qty: 18 G | Refills: 11 | Status: SHIPPED | OUTPATIENT
Start: 2021-09-02 | End: 2021-10-25

## 2021-09-02 RX ORDER — METHOCARBAMOL 500 MG/1
500 TABLET, FILM COATED ORAL 4 TIMES DAILY PRN
Qty: 60 TABLET | Refills: 2 | Status: ON HOLD | OUTPATIENT
Start: 2021-09-02 | End: 2021-09-21 | Stop reason: HOSPADM

## 2021-09-02 RX ORDER — GABAPENTIN 300 MG/1
CAPSULE ORAL
Qty: 60 CAPSULE | Refills: 3 | Status: SHIPPED | OUTPATIENT
Start: 2021-09-02 | End: 2021-10-25

## 2021-09-14 ENCOUNTER — PATIENT MESSAGE (OUTPATIENT)
Dept: INTERNAL MEDICINE | Facility: CLINIC | Age: 41
End: 2021-09-14

## 2021-09-15 ENCOUNTER — PATIENT MESSAGE (OUTPATIENT)
Dept: INTERNAL MEDICINE | Facility: CLINIC | Age: 41
End: 2021-09-15

## 2021-09-17 ENCOUNTER — NURSE TRIAGE (OUTPATIENT)
Dept: ADMINISTRATIVE | Facility: CLINIC | Age: 41
End: 2021-09-17

## 2021-09-17 ENCOUNTER — PATIENT MESSAGE (OUTPATIENT)
Dept: INTERNAL MEDICINE | Facility: CLINIC | Age: 41
End: 2021-09-17

## 2021-09-17 ENCOUNTER — OFFICE VISIT (OUTPATIENT)
Dept: URGENT CARE | Facility: CLINIC | Age: 41
End: 2021-09-17
Payer: COMMERCIAL

## 2021-09-17 ENCOUNTER — PATIENT OUTREACH (OUTPATIENT)
Dept: ADMINISTRATIVE | Facility: OTHER | Age: 41
End: 2021-09-17

## 2021-09-17 VITALS
OXYGEN SATURATION: 96 % | DIASTOLIC BLOOD PRESSURE: 100 MMHG | WEIGHT: 260 LBS | HEIGHT: 65 IN | RESPIRATION RATE: 16 BRPM | BODY MASS INDEX: 43.32 KG/M2 | TEMPERATURE: 98 F | HEART RATE: 95 BPM | SYSTOLIC BLOOD PRESSURE: 168 MMHG

## 2021-09-17 DIAGNOSIS — M51.9 LUMBAR DISC DISEASE: Primary | ICD-10-CM

## 2021-09-17 DIAGNOSIS — M54.42 ACUTE LEFT-SIDED LOW BACK PAIN WITH LEFT-SIDED SCIATICA: Primary | ICD-10-CM

## 2021-09-17 PROCEDURE — 3008F BODY MASS INDEX DOCD: CPT | Mod: CPTII,S$GLB,, | Performed by: FAMILY MEDICINE

## 2021-09-17 PROCEDURE — 1159F PR MEDICATION LIST DOCUMENTED IN MEDICAL RECORD: ICD-10-PCS | Mod: CPTII,S$GLB,, | Performed by: FAMILY MEDICINE

## 2021-09-17 PROCEDURE — 3080F DIAST BP >= 90 MM HG: CPT | Mod: CPTII,S$GLB,, | Performed by: FAMILY MEDICINE

## 2021-09-17 PROCEDURE — 3044F PR MOST RECENT HEMOGLOBIN A1C LEVEL <7.0%: ICD-10-PCS | Mod: CPTII,S$GLB,, | Performed by: FAMILY MEDICINE

## 2021-09-17 PROCEDURE — 3077F SYST BP >= 140 MM HG: CPT | Mod: CPTII,S$GLB,, | Performed by: FAMILY MEDICINE

## 2021-09-17 PROCEDURE — 3080F PR MOST RECENT DIASTOLIC BLOOD PRESSURE >= 90 MM HG: ICD-10-PCS | Mod: CPTII,S$GLB,, | Performed by: FAMILY MEDICINE

## 2021-09-17 PROCEDURE — 99214 OFFICE O/P EST MOD 30 MIN: CPT | Mod: 25,S$GLB,, | Performed by: FAMILY MEDICINE

## 2021-09-17 PROCEDURE — 99214 PR OFFICE/OUTPT VISIT, EST, LEVL IV, 30-39 MIN: ICD-10-PCS | Mod: 25,S$GLB,, | Performed by: FAMILY MEDICINE

## 2021-09-17 PROCEDURE — 3077F PR MOST RECENT SYSTOLIC BLOOD PRESSURE >= 140 MM HG: ICD-10-PCS | Mod: CPTII,S$GLB,, | Performed by: FAMILY MEDICINE

## 2021-09-17 PROCEDURE — 3044F HG A1C LEVEL LT 7.0%: CPT | Mod: CPTII,S$GLB,, | Performed by: FAMILY MEDICINE

## 2021-09-17 PROCEDURE — 1160F RVW MEDS BY RX/DR IN RCRD: CPT | Mod: CPTII,S$GLB,, | Performed by: FAMILY MEDICINE

## 2021-09-17 PROCEDURE — 96372 THER/PROPH/DIAG INJ SC/IM: CPT | Mod: S$GLB,,, | Performed by: FAMILY MEDICINE

## 2021-09-17 PROCEDURE — 1159F MED LIST DOCD IN RCRD: CPT | Mod: CPTII,S$GLB,, | Performed by: FAMILY MEDICINE

## 2021-09-17 PROCEDURE — 3008F PR BODY MASS INDEX (BMI) DOCUMENTED: ICD-10-PCS | Mod: CPTII,S$GLB,, | Performed by: FAMILY MEDICINE

## 2021-09-17 PROCEDURE — 96372 PR INJECTION,THERAP/PROPH/DIAG2ST, IM OR SUBCUT: ICD-10-PCS | Mod: S$GLB,,, | Performed by: FAMILY MEDICINE

## 2021-09-17 PROCEDURE — 3072F PR LOW RISK FOR RETINOPATHY: ICD-10-PCS | Mod: CPTII,S$GLB,, | Performed by: FAMILY MEDICINE

## 2021-09-17 PROCEDURE — 3072F LOW RISK FOR RETINOPATHY: CPT | Mod: CPTII,S$GLB,, | Performed by: FAMILY MEDICINE

## 2021-09-17 PROCEDURE — 1160F PR REVIEW ALL MEDS BY PRESCRIBER/CLIN PHARMACIST DOCUMENTED: ICD-10-PCS | Mod: CPTII,S$GLB,, | Performed by: FAMILY MEDICINE

## 2021-09-17 RX ORDER — KETOROLAC TROMETHAMINE 30 MG/ML
60 INJECTION, SOLUTION INTRAMUSCULAR; INTRAVENOUS
Status: COMPLETED | OUTPATIENT
Start: 2021-09-17 | End: 2021-09-17

## 2021-09-17 RX ORDER — CYCLOBENZAPRINE HCL 10 MG
10 TABLET ORAL 3 TIMES DAILY PRN
Qty: 30 TABLET | Refills: 0 | Status: ON HOLD | OUTPATIENT
Start: 2021-09-17 | End: 2021-09-21 | Stop reason: HOSPADM

## 2021-09-17 RX ADMIN — KETOROLAC TROMETHAMINE 60 MG: 30 INJECTION, SOLUTION INTRAMUSCULAR; INTRAVENOUS at 02:09

## 2021-09-18 ENCOUNTER — PATIENT MESSAGE (OUTPATIENT)
Dept: INTERNAL MEDICINE | Facility: CLINIC | Age: 41
End: 2021-09-18

## 2021-09-19 ENCOUNTER — HOSPITAL ENCOUNTER (OUTPATIENT)
Facility: HOSPITAL | Age: 41
Discharge: HOME | DRG: 519 | End: 2021-09-21
Attending: EMERGENCY MEDICINE | Admitting: NEUROLOGICAL SURGERY
Payer: COMMERCIAL

## 2021-09-19 DIAGNOSIS — M51.9 LUMBAR DISC DISEASE: ICD-10-CM

## 2021-09-19 DIAGNOSIS — M51.16 LUMBAR DISC DISEASE WITH RADICULOPATHY: Primary | ICD-10-CM

## 2021-09-19 DIAGNOSIS — R00.0 TACHYCARDIA: ICD-10-CM

## 2021-09-19 PROBLEM — M54.16 RADICULOPATHY, LUMBAR REGION: Status: ACTIVE | Noted: 2021-09-14

## 2021-09-19 PROBLEM — E78.5 HYPERLIPIDEMIA, UNSPECIFIED: Status: ACTIVE | Noted: 2019-03-27

## 2021-09-19 PROBLEM — M50.10 CERVICAL DISC DISORDER WITH RADICULOPATHY, UNSPECIFIED CERVICAL REGION: Status: ACTIVE | Noted: 2021-09-14

## 2021-09-19 PROBLEM — E66.9 OBESITY, UNSPECIFIED: Status: ACTIVE | Noted: 2020-02-06

## 2021-09-19 PROBLEM — Z87.442 HISTORY OF URINARY STONE: Status: ACTIVE | Noted: 2021-09-14

## 2021-09-19 LAB
ABO + RH BLD: NORMAL
ALBUMIN SERPL BCP-MCNC: 4.5 G/DL (ref 3.5–5.2)
ALP SERPL-CCNC: 107 U/L (ref 55–135)
ALT SERPL W/O P-5'-P-CCNC: 47 U/L (ref 10–44)
ANION GAP SERPL CALC-SCNC: 13 MMOL/L (ref 8–16)
APTT BLDCRRT: 26.4 SEC (ref 21–32)
AST SERPL-CCNC: 19 U/L (ref 10–40)
BASOPHILS # BLD AUTO: 0.08 K/UL (ref 0–0.2)
BASOPHILS NFR BLD: 0.7 % (ref 0–1.9)
BILIRUB SERPL-MCNC: 0.7 MG/DL (ref 0.1–1)
BILIRUB UR QL STRIP: NEGATIVE
BLD GP AB SCN CELLS X3 SERPL QL: NORMAL
BLD PROD TYP BPU: NORMAL
BLOOD UNIT EXPIRATION DATE: NORMAL
BLOOD UNIT TYPE CODE: 600
BLOOD UNIT TYPE: NORMAL
BUN SERPL-MCNC: 22 MG/DL (ref 6–20)
CALCIUM SERPL-MCNC: 9.9 MG/DL (ref 8.7–10.5)
CHLORIDE SERPL-SCNC: 104 MMOL/L (ref 95–110)
CLARITY UR REFRACT.AUTO: CLEAR
CO2 SERPL-SCNC: 24 MMOL/L (ref 23–29)
CODING SYSTEM: NORMAL
COLOR UR AUTO: YELLOW
CREAT SERPL-MCNC: 0.8 MG/DL (ref 0.5–1.4)
CTP QC/QA: YES
DIFFERENTIAL METHOD: ABNORMAL
DISPENSE STATUS: NORMAL
EOSINOPHIL # BLD AUTO: 0 K/UL (ref 0–0.5)
EOSINOPHIL NFR BLD: 0.3 % (ref 0–8)
ERYTHROCYTE [DISTWIDTH] IN BLOOD BY AUTOMATED COUNT: 12.7 % (ref 11.5–14.5)
EST. GFR  (AFRICAN AMERICAN): >60 ML/MIN/1.73 M^2
EST. GFR  (NON AFRICAN AMERICAN): >60 ML/MIN/1.73 M^2
GLUCOSE SERPL-MCNC: 115 MG/DL (ref 70–110)
GLUCOSE UR QL STRIP: NEGATIVE
HCT VFR BLD AUTO: 55.3 % (ref 40–54)
HGB BLD-MCNC: 18.4 G/DL (ref 14–18)
HGB UR QL STRIP: NEGATIVE
IMM GRANULOCYTES # BLD AUTO: 0.06 K/UL (ref 0–0.04)
IMM GRANULOCYTES NFR BLD AUTO: 0.5 % (ref 0–0.5)
INR PPP: 1 (ref 0.8–1.2)
KETONES UR QL STRIP: NEGATIVE
LEUKOCYTE ESTERASE UR QL STRIP: NEGATIVE
LYMPHOCYTES # BLD AUTO: 2.8 K/UL (ref 1–4.8)
LYMPHOCYTES NFR BLD: 23.1 % (ref 18–48)
MCH RBC QN AUTO: 29.9 PG (ref 27–31)
MCHC RBC AUTO-ENTMCNC: 33.3 G/DL (ref 32–36)
MCV RBC AUTO: 90 FL (ref 82–98)
MONOCYTES # BLD AUTO: 0.8 K/UL (ref 0.3–1)
MONOCYTES NFR BLD: 7 % (ref 4–15)
NEUTROPHILS # BLD AUTO: 8.2 K/UL (ref 1.8–7.7)
NEUTROPHILS NFR BLD: 68.4 % (ref 38–73)
NITRITE UR QL STRIP: NEGATIVE
NRBC BLD-RTO: 0 /100 WBC
PH UR STRIP: 6 [PH] (ref 5–8)
PLATELET # BLD AUTO: 226 K/UL (ref 150–450)
PMV BLD AUTO: 9.7 FL (ref 9.2–12.9)
POTASSIUM SERPL-SCNC: 4 MMOL/L (ref 3.5–5.1)
PROT SERPL-MCNC: 7.8 G/DL (ref 6–8.4)
PROT UR QL STRIP: NEGATIVE
PROTHROMBIN TIME: 11.2 SEC (ref 9–12.5)
RBC # BLD AUTO: 6.16 M/UL (ref 4.6–6.2)
SARS-COV-2 RDRP RESP QL NAA+PROBE: NEGATIVE
SODIUM SERPL-SCNC: 141 MMOL/L (ref 136–145)
SP GR UR STRIP: 1.02 (ref 1–1.03)
TRANS PLATPHERESIS VOL PATIENT: NORMAL ML
URN SPEC COLLECT METH UR: NORMAL
WBC # BLD AUTO: 11.93 K/UL (ref 3.9–12.7)

## 2021-09-19 PROCEDURE — 86900 BLOOD TYPING SEROLOGIC ABO: CPT | Performed by: STUDENT IN AN ORGANIZED HEALTH CARE EDUCATION/TRAINING PROGRAM

## 2021-09-19 PROCEDURE — 96374 THER/PROPH/DIAG INJ IV PUSH: CPT

## 2021-09-19 PROCEDURE — 96375 TX/PRO/DX INJ NEW DRUG ADDON: CPT

## 2021-09-19 PROCEDURE — 93010 EKG 12-LEAD: ICD-10-PCS | Mod: ,,, | Performed by: INTERNAL MEDICINE

## 2021-09-19 PROCEDURE — 85610 PROTHROMBIN TIME: CPT | Performed by: EMERGENCY MEDICINE

## 2021-09-19 PROCEDURE — 81003 URINALYSIS AUTO W/O SCOPE: CPT | Performed by: PHYSICIAN ASSISTANT

## 2021-09-19 PROCEDURE — P9035 PLATELET PHERES LEUKOREDUCED: HCPCS | Performed by: STUDENT IN AN ORGANIZED HEALTH CARE EDUCATION/TRAINING PROGRAM

## 2021-09-19 PROCEDURE — 93005 ELECTROCARDIOGRAM TRACING: CPT

## 2021-09-19 PROCEDURE — 25000003 PHARM REV CODE 250: Performed by: STUDENT IN AN ORGANIZED HEALTH CARE EDUCATION/TRAINING PROGRAM

## 2021-09-19 PROCEDURE — 96376 TX/PRO/DX INJ SAME DRUG ADON: CPT

## 2021-09-19 PROCEDURE — G0378 HOSPITAL OBSERVATION PER HR: HCPCS

## 2021-09-19 PROCEDURE — 25000003 PHARM REV CODE 250: Performed by: PHYSICIAN ASSISTANT

## 2021-09-19 PROCEDURE — 96361 HYDRATE IV INFUSION ADD-ON: CPT

## 2021-09-19 PROCEDURE — 99285 PR EMERGENCY DEPT VISIT,LEVEL V: ICD-10-PCS | Mod: CS,,, | Performed by: PHYSICIAN ASSISTANT

## 2021-09-19 PROCEDURE — 63600175 PHARM REV CODE 636 W HCPCS: Performed by: PHYSICIAN ASSISTANT

## 2021-09-19 PROCEDURE — 36415 COLL VENOUS BLD VENIPUNCTURE: CPT | Performed by: NEUROLOGICAL SURGERY

## 2021-09-19 PROCEDURE — 63600175 PHARM REV CODE 636 W HCPCS: Performed by: STUDENT IN AN ORGANIZED HEALTH CARE EDUCATION/TRAINING PROGRAM

## 2021-09-19 PROCEDURE — 99285 EMERGENCY DEPT VISIT HI MDM: CPT | Mod: CS,,, | Performed by: PHYSICIAN ASSISTANT

## 2021-09-19 PROCEDURE — 93010 ELECTROCARDIOGRAM REPORT: CPT | Mod: ,,, | Performed by: INTERNAL MEDICINE

## 2021-09-19 PROCEDURE — 80053 COMPREHEN METABOLIC PANEL: CPT | Performed by: PHYSICIAN ASSISTANT

## 2021-09-19 PROCEDURE — U0002 COVID-19 LAB TEST NON-CDC: HCPCS | Performed by: PHYSICIAN ASSISTANT

## 2021-09-19 PROCEDURE — 36430 TRANSFUSION BLD/BLD COMPNT: CPT

## 2021-09-19 PROCEDURE — 99285 EMERGENCY DEPT VISIT HI MDM: CPT | Mod: 25

## 2021-09-19 PROCEDURE — 85025 COMPLETE CBC W/AUTO DIFF WBC: CPT | Performed by: PHYSICIAN ASSISTANT

## 2021-09-19 PROCEDURE — 11000001 HC ACUTE MED/SURG PRIVATE ROOM

## 2021-09-19 PROCEDURE — 85730 THROMBOPLASTIN TIME PARTIAL: CPT | Performed by: EMERGENCY MEDICINE

## 2021-09-19 RX ORDER — HYDROCODONE BITARTRATE AND ACETAMINOPHEN 5; 325 MG/1; MG/1
1 TABLET ORAL EVERY 4 HOURS PRN
Status: DISCONTINUED | OUTPATIENT
Start: 2021-09-19 | End: 2021-09-20

## 2021-09-19 RX ORDER — MORPHINE SULFATE 4 MG/ML
8 INJECTION, SOLUTION INTRAMUSCULAR; INTRAVENOUS
Status: COMPLETED | OUTPATIENT
Start: 2021-09-19 | End: 2021-09-19

## 2021-09-19 RX ORDER — FENTANYL CITRATE 50 UG/ML
100 INJECTION, SOLUTION INTRAMUSCULAR; INTRAVENOUS
Status: COMPLETED | OUTPATIENT
Start: 2021-09-19 | End: 2021-09-19

## 2021-09-19 RX ORDER — SODIUM CHLORIDE 9 MG/ML
INJECTION, SOLUTION INTRAVENOUS CONTINUOUS
Status: DISCONTINUED | OUTPATIENT
Start: 2021-09-20 | End: 2021-09-20

## 2021-09-19 RX ORDER — METHOCARBAMOL 500 MG/1
1000 TABLET, FILM COATED ORAL
Status: COMPLETED | OUTPATIENT
Start: 2021-09-19 | End: 2021-09-19

## 2021-09-19 RX ORDER — PREGABALIN 75 MG/1
75 CAPSULE ORAL 2 TIMES DAILY
Status: DISCONTINUED | OUTPATIENT
Start: 2021-09-20 | End: 2021-09-20

## 2021-09-19 RX ORDER — POLYETHYLENE GLYCOL 3350 17 G/17G
17 POWDER, FOR SOLUTION ORAL DAILY PRN
Status: DISCONTINUED | OUTPATIENT
Start: 2021-09-19 | End: 2021-09-21 | Stop reason: HOSPADM

## 2021-09-19 RX ORDER — HYDROMORPHONE HYDROCHLORIDE 1 MG/ML
1 INJECTION, SOLUTION INTRAMUSCULAR; INTRAVENOUS; SUBCUTANEOUS
Status: COMPLETED | OUTPATIENT
Start: 2021-09-19 | End: 2021-09-19

## 2021-09-19 RX ORDER — IBUPROFEN 200 MG
16 TABLET ORAL
Status: DISCONTINUED | OUTPATIENT
Start: 2021-09-19 | End: 2021-09-21 | Stop reason: HOSPADM

## 2021-09-19 RX ORDER — GLUCAGON 1 MG
1 KIT INJECTION
Status: DISCONTINUED | OUTPATIENT
Start: 2021-09-19 | End: 2021-09-21 | Stop reason: HOSPADM

## 2021-09-19 RX ORDER — AMOXICILLIN 250 MG
2 CAPSULE ORAL NIGHTLY
Status: DISCONTINUED | OUTPATIENT
Start: 2021-09-19 | End: 2021-09-21 | Stop reason: HOSPADM

## 2021-09-19 RX ORDER — MONTELUKAST SODIUM 10 MG/1
10 TABLET ORAL NIGHTLY
Status: DISCONTINUED | OUTPATIENT
Start: 2021-09-19 | End: 2021-09-21 | Stop reason: HOSPADM

## 2021-09-19 RX ORDER — ATORVASTATIN CALCIUM 20 MG/1
20 TABLET, FILM COATED ORAL DAILY
Status: DISCONTINUED | OUTPATIENT
Start: 2021-09-20 | End: 2021-09-21 | Stop reason: HOSPADM

## 2021-09-19 RX ORDER — METHOCARBAMOL 750 MG/1
750 TABLET, FILM COATED ORAL 3 TIMES DAILY PRN
Status: DISCONTINUED | OUTPATIENT
Start: 2021-09-19 | End: 2021-09-21 | Stop reason: HOSPADM

## 2021-09-19 RX ORDER — ALBUTEROL SULFATE 90 UG/1
1 AEROSOL, METERED RESPIRATORY (INHALATION) EVERY 6 HOURS PRN
Status: DISCONTINUED | OUTPATIENT
Start: 2021-09-19 | End: 2021-09-20

## 2021-09-19 RX ORDER — INSULIN ASPART 100 [IU]/ML
0-5 INJECTION, SOLUTION INTRAVENOUS; SUBCUTANEOUS
Status: DISCONTINUED | OUTPATIENT
Start: 2021-09-19 | End: 2021-09-21 | Stop reason: HOSPADM

## 2021-09-19 RX ORDER — TALC
6 POWDER (GRAM) TOPICAL NIGHTLY PRN
Status: DISCONTINUED | OUTPATIENT
Start: 2021-09-19 | End: 2021-09-19

## 2021-09-19 RX ORDER — MORPHINE SULFATE 15 MG/1
15 TABLET ORAL
Status: COMPLETED | OUTPATIENT
Start: 2021-09-19 | End: 2021-09-19

## 2021-09-19 RX ORDER — PREGABALIN 75 MG/1
75 CAPSULE ORAL
Status: COMPLETED | OUTPATIENT
Start: 2021-09-19 | End: 2021-09-19

## 2021-09-19 RX ORDER — HYDROCODONE BITARTRATE AND ACETAMINOPHEN 500; 5 MG/1; MG/1
TABLET ORAL
Status: DISCONTINUED | OUTPATIENT
Start: 2021-09-19 | End: 2021-09-21 | Stop reason: HOSPADM

## 2021-09-19 RX ORDER — TALC
9 POWDER (GRAM) TOPICAL NIGHTLY PRN
Status: DISCONTINUED | OUTPATIENT
Start: 2021-09-19 | End: 2021-09-21 | Stop reason: HOSPADM

## 2021-09-19 RX ORDER — DEXAMETHASONE SODIUM PHOSPHATE 4 MG/ML
8 INJECTION, SOLUTION INTRA-ARTICULAR; INTRALESIONAL; INTRAMUSCULAR; INTRAVENOUS; SOFT TISSUE
Status: COMPLETED | OUTPATIENT
Start: 2021-09-19 | End: 2021-09-19

## 2021-09-19 RX ORDER — ONDANSETRON 8 MG/1
8 TABLET, ORALLY DISINTEGRATING ORAL EVERY 6 HOURS PRN
Status: DISCONTINUED | OUTPATIENT
Start: 2021-09-19 | End: 2021-09-21 | Stop reason: HOSPADM

## 2021-09-19 RX ORDER — DIPHENHYDRAMINE HCL 25 MG
25 CAPSULE ORAL ONCE
Status: COMPLETED | OUTPATIENT
Start: 2021-09-19 | End: 2021-09-19

## 2021-09-19 RX ORDER — METHYLPREDNISOLONE 4 MG/1
TABLET ORAL
Status: ON HOLD | COMMUNITY
Start: 2021-09-14 | End: 2021-09-21 | Stop reason: HOSPADM

## 2021-09-19 RX ORDER — KETOROLAC TROMETHAMINE 30 MG/ML
10 INJECTION, SOLUTION INTRAMUSCULAR; INTRAVENOUS
Status: COMPLETED | OUTPATIENT
Start: 2021-09-19 | End: 2021-09-19

## 2021-09-19 RX ORDER — HYDROMORPHONE HYDROCHLORIDE 1 MG/ML
1 INJECTION, SOLUTION INTRAMUSCULAR; INTRAVENOUS; SUBCUTANEOUS
Status: DISCONTINUED | OUTPATIENT
Start: 2021-09-19 | End: 2021-09-21 | Stop reason: HOSPADM

## 2021-09-19 RX ORDER — IBUPROFEN 200 MG
24 TABLET ORAL
Status: DISCONTINUED | OUTPATIENT
Start: 2021-09-19 | End: 2021-09-21 | Stop reason: HOSPADM

## 2021-09-19 RX ORDER — LIDOCAINE 50 MG/G
PATCH TOPICAL
COMMUNITY
Start: 2021-09-14 | End: 2021-10-15

## 2021-09-19 RX ORDER — ONDANSETRON 2 MG/ML
8 INJECTION INTRAMUSCULAR; INTRAVENOUS
Status: COMPLETED | OUTPATIENT
Start: 2021-09-19 | End: 2021-09-19

## 2021-09-19 RX ORDER — ACETAMINOPHEN 500 MG
1000 TABLET ORAL
Status: COMPLETED | OUTPATIENT
Start: 2021-09-19 | End: 2021-09-19

## 2021-09-19 RX ADMIN — HYDROMORPHONE HYDROCHLORIDE 1 MG: 1 INJECTION, SOLUTION INTRAMUSCULAR; INTRAVENOUS; SUBCUTANEOUS at 01:09

## 2021-09-19 RX ADMIN — KETOROLAC TROMETHAMINE 10 MG: 30 INJECTION, SOLUTION INTRAMUSCULAR; INTRAVENOUS at 12:09

## 2021-09-19 RX ADMIN — DIPHENHYDRAMINE HYDROCHLORIDE 25 MG: 25 CAPSULE ORAL at 11:09

## 2021-09-19 RX ADMIN — DOCUSATE SODIUM 50MG AND SENNOSIDES 8.6MG 2 TABLET: 8.6; 5 TABLET, FILM COATED ORAL at 10:09

## 2021-09-19 RX ADMIN — PREGABALIN 75 MG: 75 CAPSULE ORAL at 06:09

## 2021-09-19 RX ADMIN — ACETAMINOPHEN 1000 MG: 500 TABLET ORAL at 03:09

## 2021-09-19 RX ADMIN — MORPHINE SULFATE 8 MG: 4 INJECTION INTRAVENOUS at 12:09

## 2021-09-19 RX ADMIN — ONDANSETRON 8 MG: 2 INJECTION INTRAMUSCULAR; INTRAVENOUS at 12:09

## 2021-09-19 RX ADMIN — METHOCARBAMOL 1000 MG: 500 TABLET ORAL at 06:09

## 2021-09-19 RX ADMIN — MORPHINE SULFATE 15 MG: 15 TABLET ORAL at 03:09

## 2021-09-19 RX ADMIN — FENTANYL CITRATE 100 MCG: 50 INJECTION INTRAMUSCULAR; INTRAVENOUS at 03:09

## 2021-09-19 RX ADMIN — DEXAMETHASONE SODIUM PHOSPHATE 8 MG: 4 INJECTION INTRA-ARTICULAR; INTRALESIONAL; INTRAMUSCULAR; INTRAVENOUS; SOFT TISSUE at 03:09

## 2021-09-19 RX ADMIN — HYDROMORPHONE HYDROCHLORIDE 1 MG: 1 INJECTION, SOLUTION INTRAMUSCULAR; INTRAVENOUS; SUBCUTANEOUS at 08:09

## 2021-09-19 RX ADMIN — HYDROCODONE BITARTRATE AND ACETAMINOPHEN 1 TABLET: 5; 325 TABLET ORAL at 10:09

## 2021-09-20 ENCOUNTER — ANESTHESIA (OUTPATIENT)
Dept: SURGERY | Facility: HOSPITAL | Age: 41
DRG: 519 | End: 2021-09-20
Payer: COMMERCIAL

## 2021-09-20 ENCOUNTER — ANESTHESIA EVENT (OUTPATIENT)
Dept: SURGERY | Facility: HOSPITAL | Age: 41
DRG: 519 | End: 2021-09-20
Payer: COMMERCIAL

## 2021-09-20 PROBLEM — M51.16 LUMBAR DISC DISEASE WITH RADICULOPATHY: Status: ACTIVE | Noted: 2021-09-20

## 2021-09-20 LAB
ANION GAP SERPL CALC-SCNC: 11 MMOL/L (ref 8–16)
BASOPHILS # BLD AUTO: 0.02 K/UL (ref 0–0.2)
BASOPHILS NFR BLD: 0.2 % (ref 0–1.9)
BUN SERPL-MCNC: 17 MG/DL (ref 6–20)
CALCIUM SERPL-MCNC: 9.3 MG/DL (ref 8.7–10.5)
CHLORIDE SERPL-SCNC: 106 MMOL/L (ref 95–110)
CO2 SERPL-SCNC: 21 MMOL/L (ref 23–29)
CREAT SERPL-MCNC: 0.7 MG/DL (ref 0.5–1.4)
DIFFERENTIAL METHOD: NORMAL
EOSINOPHIL # BLD AUTO: 0 K/UL (ref 0–0.5)
EOSINOPHIL NFR BLD: 0 % (ref 0–8)
ERYTHROCYTE [DISTWIDTH] IN BLOOD BY AUTOMATED COUNT: 12.6 % (ref 11.5–14.5)
EST. GFR  (AFRICAN AMERICAN): >60 ML/MIN/1.73 M^2
EST. GFR  (NON AFRICAN AMERICAN): >60 ML/MIN/1.73 M^2
GLUCOSE SERPL-MCNC: 86 MG/DL (ref 70–110)
HCT VFR BLD AUTO: 48.8 % (ref 40–54)
HGB BLD-MCNC: 16.5 G/DL (ref 14–18)
IMM GRANULOCYTES # BLD AUTO: 0.04 K/UL (ref 0–0.04)
IMM GRANULOCYTES NFR BLD AUTO: 0.4 % (ref 0–0.5)
LYMPHOCYTES # BLD AUTO: 2.4 K/UL (ref 1–4.8)
LYMPHOCYTES NFR BLD: 24.6 % (ref 18–48)
MCH RBC QN AUTO: 30 PG (ref 27–31)
MCHC RBC AUTO-ENTMCNC: 33.8 G/DL (ref 32–36)
MCV RBC AUTO: 89 FL (ref 82–98)
MONOCYTES # BLD AUTO: 0.8 K/UL (ref 0.3–1)
MONOCYTES NFR BLD: 8.6 % (ref 4–15)
NEUTROPHILS # BLD AUTO: 6.3 K/UL (ref 1.8–7.7)
NEUTROPHILS NFR BLD: 66.2 % (ref 38–73)
NRBC BLD-RTO: 0 /100 WBC
PLATELET # BLD AUTO: 241 K/UL (ref 150–450)
PMV BLD AUTO: 9.8 FL (ref 9.2–12.9)
POCT GLUCOSE: 90 MG/DL (ref 70–110)
POCT GLUCOSE: 92 MG/DL (ref 70–110)
POCT GLUCOSE: 97 MG/DL (ref 70–110)
POCT GLUCOSE: 98 MG/DL (ref 70–110)
POTASSIUM SERPL-SCNC: 4.1 MMOL/L (ref 3.5–5.1)
RBC # BLD AUTO: 5.5 M/UL (ref 4.6–6.2)
SODIUM SERPL-SCNC: 138 MMOL/L (ref 136–145)
WBC # BLD AUTO: 9.55 K/UL (ref 3.9–12.7)

## 2021-09-20 PROCEDURE — 99233 PR SUBSEQUENT HOSPITAL CARE,LEVL III: ICD-10-PCS | Mod: ,,, | Performed by: PHYSICIAN ASSISTANT

## 2021-09-20 PROCEDURE — 63047 PR LAMINEC/FACETECT/FORAMIN,LUMBAR 1 SEG: ICD-10-PCS | Mod: ,,, | Performed by: NEUROLOGICAL SURGERY

## 2021-09-20 PROCEDURE — 25000003 PHARM REV CODE 250: Performed by: NEUROLOGICAL SURGERY

## 2021-09-20 PROCEDURE — D9220A PRA ANESTHESIA: ICD-10-PCS | Mod: CRNA,,, | Performed by: NURSE ANESTHETIST, CERTIFIED REGISTERED

## 2021-09-20 PROCEDURE — 12000002 HC ACUTE/MED SURGE SEMI-PRIVATE ROOM

## 2021-09-20 PROCEDURE — 99233 SBSQ HOSP IP/OBS HIGH 50: CPT | Mod: ,,, | Performed by: PHYSICIAN ASSISTANT

## 2021-09-20 PROCEDURE — 63600175 PHARM REV CODE 636 W HCPCS: Performed by: NURSE ANESTHETIST, CERTIFIED REGISTERED

## 2021-09-20 PROCEDURE — 25000242 PHARM REV CODE 250 ALT 637 W/ HCPCS: Performed by: NURSE ANESTHETIST, CERTIFIED REGISTERED

## 2021-09-20 PROCEDURE — 71000039 HC RECOVERY, EACH ADD'L HOUR: Performed by: NEUROLOGICAL SURGERY

## 2021-09-20 PROCEDURE — 71000033 HC RECOVERY, INTIAL HOUR: Performed by: NEUROLOGICAL SURGERY

## 2021-09-20 PROCEDURE — 27201423 OPTIME MED/SURG SUP & DEVICES STERILE SUPPLY: Performed by: NEUROLOGICAL SURGERY

## 2021-09-20 PROCEDURE — 96376 TX/PRO/DX INJ SAME DRUG ADON: CPT | Performed by: EMERGENCY MEDICINE

## 2021-09-20 PROCEDURE — 25000003 PHARM REV CODE 250: Performed by: STUDENT IN AN ORGANIZED HEALTH CARE EDUCATION/TRAINING PROGRAM

## 2021-09-20 PROCEDURE — 63600175 PHARM REV CODE 636 W HCPCS: Performed by: ANESTHESIOLOGY

## 2021-09-20 PROCEDURE — G0378 HOSPITAL OBSERVATION PER HR: HCPCS

## 2021-09-20 PROCEDURE — D9220A PRA ANESTHESIA: Mod: CRNA,,, | Performed by: NURSE ANESTHETIST, CERTIFIED REGISTERED

## 2021-09-20 PROCEDURE — 37000008 HC ANESTHESIA 1ST 15 MINUTES: Performed by: NEUROLOGICAL SURGERY

## 2021-09-20 PROCEDURE — 63047 LAM FACETEC & FORAMOT LUMBAR: CPT | Mod: ,,, | Performed by: NEUROLOGICAL SURGERY

## 2021-09-20 PROCEDURE — 37000009 HC ANESTHESIA EA ADD 15 MINS: Performed by: NEUROLOGICAL SURGERY

## 2021-09-20 PROCEDURE — 36415 COLL VENOUS BLD VENIPUNCTURE: CPT | Performed by: STUDENT IN AN ORGANIZED HEALTH CARE EDUCATION/TRAINING PROGRAM

## 2021-09-20 PROCEDURE — 36000711: Performed by: NEUROLOGICAL SURGERY

## 2021-09-20 PROCEDURE — 71000016 HC POSTOP RECOV ADDL HR: Performed by: NEUROLOGICAL SURGERY

## 2021-09-20 PROCEDURE — 71000015 HC POSTOP RECOV 1ST HR: Performed by: NEUROLOGICAL SURGERY

## 2021-09-20 PROCEDURE — 82962 GLUCOSE BLOOD TEST: CPT | Performed by: NEUROLOGICAL SURGERY

## 2021-09-20 PROCEDURE — 80048 BASIC METABOLIC PNL TOTAL CA: CPT | Performed by: STUDENT IN AN ORGANIZED HEALTH CARE EDUCATION/TRAINING PROGRAM

## 2021-09-20 PROCEDURE — 63600175 PHARM REV CODE 636 W HCPCS: Performed by: STUDENT IN AN ORGANIZED HEALTH CARE EDUCATION/TRAINING PROGRAM

## 2021-09-20 PROCEDURE — 36000710: Performed by: NEUROLOGICAL SURGERY

## 2021-09-20 PROCEDURE — 63600175 PHARM REV CODE 636 W HCPCS: Performed by: NEUROLOGICAL SURGERY

## 2021-09-20 PROCEDURE — D9220A PRA ANESTHESIA: ICD-10-PCS | Mod: ANES,,, | Performed by: ANESTHESIOLOGY

## 2021-09-20 PROCEDURE — 85025 COMPLETE CBC W/AUTO DIFF WBC: CPT | Performed by: STUDENT IN AN ORGANIZED HEALTH CARE EDUCATION/TRAINING PROGRAM

## 2021-09-20 PROCEDURE — 25000003 PHARM REV CODE 250: Performed by: NURSE ANESTHETIST, CERTIFIED REGISTERED

## 2021-09-20 PROCEDURE — D9220A PRA ANESTHESIA: Mod: ANES,,, | Performed by: ANESTHESIOLOGY

## 2021-09-20 RX ORDER — ZOLPIDEM TARTRATE 5 MG/1
5 TABLET ORAL NIGHTLY PRN
Status: DISCONTINUED | OUTPATIENT
Start: 2021-09-20 | End: 2021-09-21 | Stop reason: HOSPADM

## 2021-09-20 RX ORDER — AMOXICILLIN 250 MG
2 CAPSULE ORAL NIGHTLY PRN
Status: DISCONTINUED | OUTPATIENT
Start: 2021-09-20 | End: 2021-09-21 | Stop reason: HOSPADM

## 2021-09-20 RX ORDER — MAG HYDROX/ALUMINUM HYD/SIMETH 200-200-20
30 SUSPENSION, ORAL (FINAL DOSE FORM) ORAL EVERY 4 HOURS PRN
Status: DISCONTINUED | OUTPATIENT
Start: 2021-09-20 | End: 2021-09-21 | Stop reason: HOSPADM

## 2021-09-20 RX ORDER — ALBUTEROL SULFATE 90 UG/1
AEROSOL, METERED RESPIRATORY (INHALATION)
Status: DISCONTINUED | OUTPATIENT
Start: 2021-09-20 | End: 2021-09-20

## 2021-09-20 RX ORDER — PHENYLEPHRINE HCL IN 0.9% NACL 1 MG/10 ML
SYRINGE (ML) INTRAVENOUS
Status: DISCONTINUED | OUTPATIENT
Start: 2021-09-20 | End: 2021-09-20

## 2021-09-20 RX ORDER — PROCHLORPERAZINE EDISYLATE 5 MG/ML
5 INJECTION INTRAMUSCULAR; INTRAVENOUS EVERY 6 HOURS PRN
Status: DISCONTINUED | OUTPATIENT
Start: 2021-09-20 | End: 2021-09-21 | Stop reason: HOSPADM

## 2021-09-20 RX ORDER — METHOCARBAMOL 750 MG/1
750 TABLET, FILM COATED ORAL 4 TIMES DAILY
Status: DISCONTINUED | OUTPATIENT
Start: 2021-09-21 | End: 2021-09-21 | Stop reason: HOSPADM

## 2021-09-20 RX ORDER — ONDANSETRON 2 MG/ML
INJECTION INTRAMUSCULAR; INTRAVENOUS
Status: DISCONTINUED | OUTPATIENT
Start: 2021-09-20 | End: 2021-09-20

## 2021-09-20 RX ORDER — SUCCINYLCHOLINE CHLORIDE 20 MG/ML
INJECTION INTRAMUSCULAR; INTRAVENOUS
Status: DISCONTINUED | OUTPATIENT
Start: 2021-09-20 | End: 2021-09-20

## 2021-09-20 RX ORDER — HYDROCODONE BITARTRATE AND ACETAMINOPHEN 10; 325 MG/1; MG/1
1 TABLET ORAL EVERY 4 HOURS PRN
Status: DISCONTINUED | OUTPATIENT
Start: 2021-09-20 | End: 2021-09-21 | Stop reason: HOSPADM

## 2021-09-20 RX ORDER — PREGABALIN 150 MG/1
150 CAPSULE ORAL 2 TIMES DAILY
Status: DISCONTINUED | OUTPATIENT
Start: 2021-09-20 | End: 2021-09-21 | Stop reason: HOSPADM

## 2021-09-20 RX ORDER — GENTAMICIN SULFATE 40 MG/ML
INJECTION, SOLUTION INTRAMUSCULAR; INTRAVENOUS
Status: DISCONTINUED | OUTPATIENT
Start: 2021-09-20 | End: 2021-09-20 | Stop reason: HOSPADM

## 2021-09-20 RX ORDER — FENTANYL CITRATE 50 UG/ML
INJECTION, SOLUTION INTRAMUSCULAR; INTRAVENOUS
Status: DISCONTINUED | OUTPATIENT
Start: 2021-09-20 | End: 2021-09-20

## 2021-09-20 RX ORDER — KETAMINE HCL IN 0.9 % NACL 50 MG/5 ML
SYRINGE (ML) INTRAVENOUS
Status: DISCONTINUED | OUTPATIENT
Start: 2021-09-20 | End: 2021-09-20

## 2021-09-20 RX ORDER — MUPIROCIN 20 MG/G
OINTMENT TOPICAL 2 TIMES DAILY
Status: DISCONTINUED | OUTPATIENT
Start: 2021-09-20 | End: 2021-09-21 | Stop reason: HOSPADM

## 2021-09-20 RX ORDER — PROPOFOL 10 MG/ML
VIAL (ML) INTRAVENOUS
Status: DISCONTINUED | OUTPATIENT
Start: 2021-09-20 | End: 2021-09-20

## 2021-09-20 RX ORDER — DEXMEDETOMIDINE HYDROCHLORIDE 100 UG/ML
INJECTION, SOLUTION INTRAVENOUS
Status: DISCONTINUED | OUTPATIENT
Start: 2021-09-20 | End: 2021-09-20

## 2021-09-20 RX ORDER — OXYCODONE AND ACETAMINOPHEN 10; 325 MG/1; MG/1
1 TABLET ORAL EVERY 4 HOURS PRN
Status: DISCONTINUED | OUTPATIENT
Start: 2021-09-20 | End: 2021-09-21 | Stop reason: HOSPADM

## 2021-09-20 RX ORDER — SODIUM CHLORIDE 9 MG/ML
INJECTION, SOLUTION INTRAVENOUS CONTINUOUS
Status: DISCONTINUED | OUTPATIENT
Start: 2021-09-20 | End: 2021-09-21 | Stop reason: HOSPADM

## 2021-09-20 RX ORDER — METHYLPREDNISOLONE ACETATE 40 MG/ML
INJECTION, SUSPENSION INTRA-ARTICULAR; INTRALESIONAL; INTRAMUSCULAR; SOFT TISSUE
Status: DISCONTINUED | OUTPATIENT
Start: 2021-09-20 | End: 2021-09-20 | Stop reason: HOSPADM

## 2021-09-20 RX ORDER — HEPARIN SODIUM 5000 [USP'U]/ML
5000 INJECTION, SOLUTION INTRAVENOUS; SUBCUTANEOUS EVERY 8 HOURS
Status: DISCONTINUED | OUTPATIENT
Start: 2021-09-20 | End: 2021-09-21 | Stop reason: HOSPADM

## 2021-09-20 RX ORDER — FENTANYL CITRATE 50 UG/ML
50 INJECTION, SOLUTION INTRAMUSCULAR; INTRAVENOUS EVERY 5 MIN PRN
Status: DISCONTINUED | OUTPATIENT
Start: 2021-09-20 | End: 2021-09-21 | Stop reason: HOSPADM

## 2021-09-20 RX ORDER — ONDANSETRON 2 MG/ML
4 INJECTION INTRAMUSCULAR; INTRAVENOUS DAILY PRN
Status: DISCONTINUED | OUTPATIENT
Start: 2021-09-20 | End: 2021-09-21 | Stop reason: HOSPADM

## 2021-09-20 RX ORDER — REMIFENTANIL HYDROCHLORIDE 1 MG/ML
INJECTION, POWDER, LYOPHILIZED, FOR SOLUTION INTRAVENOUS CONTINUOUS PRN
Status: DISCONTINUED | OUTPATIENT
Start: 2021-09-20 | End: 2021-09-20

## 2021-09-20 RX ORDER — SODIUM CHLORIDE 0.9 % (FLUSH) 0.9 %
3 SYRINGE (ML) INJECTION
Status: DISCONTINUED | OUTPATIENT
Start: 2021-09-20 | End: 2021-09-21 | Stop reason: HOSPADM

## 2021-09-20 RX ORDER — MIDAZOLAM HYDROCHLORIDE 1 MG/ML
INJECTION, SOLUTION INTRAMUSCULAR; INTRAVENOUS
Status: DISCONTINUED | OUTPATIENT
Start: 2021-09-20 | End: 2021-09-20

## 2021-09-20 RX ORDER — CEFAZOLIN SODIUM 1 G/3ML
INJECTION, POWDER, FOR SOLUTION INTRAMUSCULAR; INTRAVENOUS
Status: DISCONTINUED | OUTPATIENT
Start: 2021-09-20 | End: 2021-09-20

## 2021-09-20 RX ORDER — FAMOTIDINE 20 MG/1
20 TABLET, FILM COATED ORAL 2 TIMES DAILY
Status: DISCONTINUED | OUTPATIENT
Start: 2021-09-20 | End: 2021-09-21 | Stop reason: HOSPADM

## 2021-09-20 RX ORDER — OXYCODONE AND ACETAMINOPHEN 5; 325 MG/1; MG/1
1 TABLET ORAL EVERY 4 HOURS PRN
Status: DISCONTINUED | OUTPATIENT
Start: 2021-09-20 | End: 2021-09-21 | Stop reason: HOSPADM

## 2021-09-20 RX ORDER — MORPHINE SULFATE 2 MG/ML
2 INJECTION, SOLUTION INTRAMUSCULAR; INTRAVENOUS
Status: DISCONTINUED | OUTPATIENT
Start: 2021-09-20 | End: 2021-09-21 | Stop reason: HOSPADM

## 2021-09-20 RX ORDER — ROCURONIUM BROMIDE 10 MG/ML
INJECTION, SOLUTION INTRAVENOUS
Status: DISCONTINUED | OUTPATIENT
Start: 2021-09-20 | End: 2021-09-20

## 2021-09-20 RX ORDER — LIDOCAINE HYDROCHLORIDE 20 MG/ML
INJECTION, SOLUTION EPIDURAL; INFILTRATION; INTRACAUDAL; PERINEURAL
Status: DISCONTINUED | OUTPATIENT
Start: 2021-09-20 | End: 2021-09-20

## 2021-09-20 RX ORDER — HYDROMORPHONE HYDROCHLORIDE 1 MG/ML
0.2 INJECTION, SOLUTION INTRAMUSCULAR; INTRAVENOUS; SUBCUTANEOUS EVERY 5 MIN PRN
Status: DISCONTINUED | OUTPATIENT
Start: 2021-09-20 | End: 2021-09-21 | Stop reason: HOSPADM

## 2021-09-20 RX ADMIN — CEFAZOLIN 2 G: 330 INJECTION, POWDER, FOR SOLUTION INTRAMUSCULAR; INTRAVENOUS at 08:09

## 2021-09-20 RX ADMIN — ALBUTEROL SULFATE 4 PUFF: 108 INHALANT RESPIRATORY (INHALATION) at 08:09

## 2021-09-20 RX ADMIN — HYDROMORPHONE HYDROCHLORIDE 0.2 MG: 1 INJECTION, SOLUTION INTRAMUSCULAR; INTRAVENOUS; SUBCUTANEOUS at 11:09

## 2021-09-20 RX ADMIN — Medication 30 MG: at 08:09

## 2021-09-20 RX ADMIN — ATORVASTATIN CALCIUM 20 MG: 20 TABLET, FILM COATED ORAL at 08:09

## 2021-09-20 RX ADMIN — ROCURONIUM BROMIDE 5 MG: 10 INJECTION, SOLUTION INTRAVENOUS at 08:09

## 2021-09-20 RX ADMIN — DEXMEDETOMIDINE HYDROCHLORIDE 12 MCG: 100 INJECTION, SOLUTION INTRAVENOUS at 08:09

## 2021-09-20 RX ADMIN — HYDROCODONE BITARTRATE AND ACETAMINOPHEN 1 TABLET: 10; 325 TABLET ORAL at 11:09

## 2021-09-20 RX ADMIN — FENTANYL CITRATE 50 MCG: 50 INJECTION INTRAMUSCULAR; INTRAVENOUS at 08:09

## 2021-09-20 RX ADMIN — HYDROMORPHONE HYDROCHLORIDE 1 MG: 1 INJECTION, SOLUTION INTRAMUSCULAR; INTRAVENOUS; SUBCUTANEOUS at 01:09

## 2021-09-20 RX ADMIN — HYDROMORPHONE HYDROCHLORIDE 1 MG: 1 INJECTION, SOLUTION INTRAMUSCULAR; INTRAVENOUS; SUBCUTANEOUS at 04:09

## 2021-09-20 RX ADMIN — Medication 100 MCG: at 09:09

## 2021-09-20 RX ADMIN — Medication 16 G: at 12:09

## 2021-09-20 RX ADMIN — ONDANSETRON 4 MG: 2 INJECTION INTRAMUSCULAR; INTRAVENOUS at 10:09

## 2021-09-20 RX ADMIN — PROPOFOL 200 MG: 10 INJECTION, EMULSION INTRAVENOUS at 08:09

## 2021-09-20 RX ADMIN — Medication 100 MCG: at 08:09

## 2021-09-20 RX ADMIN — LIDOCAINE HYDROCHLORIDE 100 MG: 20 INJECTION, SOLUTION EPIDURAL; INFILTRATION; INTRACAUDAL at 08:09

## 2021-09-20 RX ADMIN — HYDROMORPHONE HYDROCHLORIDE 1 MG: 1 INJECTION, SOLUTION INTRAMUSCULAR; INTRAVENOUS; SUBCUTANEOUS at 05:09

## 2021-09-20 RX ADMIN — Medication 20 MG: at 09:09

## 2021-09-20 RX ADMIN — SODIUM CHLORIDE: 0.9 INJECTION, SOLUTION INTRAVENOUS at 11:09

## 2021-09-20 RX ADMIN — HYDROCODONE BITARTRATE AND ACETAMINOPHEN 1 TABLET: 5; 325 TABLET ORAL at 02:09

## 2021-09-20 RX ADMIN — HYDROCODONE BITARTRATE AND ACETAMINOPHEN 1 TABLET: 5; 325 TABLET ORAL at 07:09

## 2021-09-20 RX ADMIN — ALBUTEROL SULFATE 4 PUFF: 108 INHALANT RESPIRATORY (INHALATION) at 10:09

## 2021-09-20 RX ADMIN — SODIUM CHLORIDE: 0.9 INJECTION, SOLUTION INTRAVENOUS at 01:09

## 2021-09-20 RX ADMIN — METHOCARBAMOL 750 MG: 750 TABLET ORAL at 02:09

## 2021-09-20 RX ADMIN — REMIFENTANIL HYDROCHLORIDE 0.15 MCG/KG/MIN: 1 INJECTION, POWDER, LYOPHILIZED, FOR SOLUTION INTRAVENOUS at 08:09

## 2021-09-20 RX ADMIN — HYDROMORPHONE HYDROCHLORIDE 1 MG: 1 INJECTION, SOLUTION INTRAMUSCULAR; INTRAVENOUS; SUBCUTANEOUS at 10:09

## 2021-09-20 RX ADMIN — PROPOFOL 150 MCG/KG/MIN: 10 INJECTION, EMULSION INTRAVENOUS at 08:09

## 2021-09-20 RX ADMIN — PREGABALIN 75 MG: 75 CAPSULE ORAL at 08:09

## 2021-09-20 RX ADMIN — FAMOTIDINE 20 MG: 20 TABLET ORAL at 11:09

## 2021-09-20 RX ADMIN — MIDAZOLAM 2 MG: 1 INJECTION INTRAMUSCULAR; INTRAVENOUS at 08:09

## 2021-09-20 RX ADMIN — SUCCINYLCHOLINE CHLORIDE 200 MG: 20 INJECTION, SOLUTION INTRAMUSCULAR; INTRAVENOUS; PARENTERAL at 08:09

## 2021-09-20 RX ADMIN — OXYCODONE AND ACETAMINOPHEN 1 TABLET: 10; 325 TABLET ORAL at 04:09

## 2021-09-20 RX ADMIN — HYDROCODONE BITARTRATE AND ACETAMINOPHEN 1 TABLET: 5; 325 TABLET ORAL at 11:09

## 2021-09-20 RX ADMIN — METHOCARBAMOL 750 MG: 750 TABLET ORAL at 11:09

## 2021-09-21 ENCOUNTER — TELEPHONE (OUTPATIENT)
Dept: NEUROSURGERY | Facility: CLINIC | Age: 41
End: 2021-09-21

## 2021-09-21 ENCOUNTER — TELEPHONE (OUTPATIENT)
Dept: PHARMACY | Facility: CLINIC | Age: 41
End: 2021-09-21

## 2021-09-21 VITALS
HEIGHT: 65 IN | RESPIRATION RATE: 12 BRPM | OXYGEN SATURATION: 94 % | TEMPERATURE: 98 F | HEART RATE: 88 BPM | DIASTOLIC BLOOD PRESSURE: 73 MMHG | SYSTOLIC BLOOD PRESSURE: 138 MMHG | WEIGHT: 254.44 LBS | BODY MASS INDEX: 42.39 KG/M2

## 2021-09-21 LAB
POCT GLUCOSE: 136 MG/DL (ref 70–110)
POCT GLUCOSE: 142 MG/DL (ref 70–110)

## 2021-09-21 PROCEDURE — 99239 PR HOSPITAL DISCHARGE DAY,>30 MIN: ICD-10-PCS | Mod: ,,, | Performed by: PHYSICIAN ASSISTANT

## 2021-09-21 PROCEDURE — 94799 UNLISTED PULMONARY SVC/PX: CPT

## 2021-09-21 PROCEDURE — 97161 PT EVAL LOW COMPLEX 20 MIN: CPT

## 2021-09-21 PROCEDURE — 96376 TX/PRO/DX INJ SAME DRUG ADON: CPT | Performed by: EMERGENCY MEDICINE

## 2021-09-21 PROCEDURE — G0378 HOSPITAL OBSERVATION PER HR: HCPCS

## 2021-09-21 PROCEDURE — 63600175 PHARM REV CODE 636 W HCPCS: Performed by: STUDENT IN AN ORGANIZED HEALTH CARE EDUCATION/TRAINING PROGRAM

## 2021-09-21 PROCEDURE — 99900035 HC TECH TIME PER 15 MIN (STAT)

## 2021-09-21 PROCEDURE — 25000003 PHARM REV CODE 250: Performed by: STUDENT IN AN ORGANIZED HEALTH CARE EDUCATION/TRAINING PROGRAM

## 2021-09-21 PROCEDURE — 99239 HOSP IP/OBS DSCHRG MGMT >30: CPT | Mod: ,,, | Performed by: PHYSICIAN ASSISTANT

## 2021-09-21 PROCEDURE — 63600175 PHARM REV CODE 636 W HCPCS: Performed by: ANESTHESIOLOGY

## 2021-09-21 PROCEDURE — 97165 OT EVAL LOW COMPLEX 30 MIN: CPT

## 2021-09-21 PROCEDURE — 11000001 HC ACUTE MED/SURG PRIVATE ROOM

## 2021-09-21 PROCEDURE — 25000003 PHARM REV CODE 250: Performed by: NEUROLOGICAL SURGERY

## 2021-09-21 PROCEDURE — 97116 GAIT TRAINING THERAPY: CPT

## 2021-09-21 RX ORDER — METHOCARBAMOL 750 MG/1
750 TABLET, FILM COATED ORAL 3 TIMES DAILY PRN
Qty: 90 TABLET | Refills: 0 | Status: SHIPPED | OUTPATIENT
Start: 2021-09-21 | End: 2021-09-21

## 2021-09-21 RX ORDER — HYDROCODONE BITARTRATE AND ACETAMINOPHEN 10; 325 MG/1; MG/1
1 TABLET ORAL EVERY 4 HOURS PRN
Qty: 60 TABLET | Refills: 0 | Status: SHIPPED | OUTPATIENT
Start: 2021-09-21 | End: 2021-09-21

## 2021-09-21 RX ORDER — PREGABALIN 150 MG/1
150 CAPSULE ORAL 2 TIMES DAILY
Qty: 60 CAPSULE | Refills: 6 | Status: SHIPPED | OUTPATIENT
Start: 2021-09-21 | End: 2021-09-21

## 2021-09-21 RX ORDER — ZOLPIDEM TARTRATE 5 MG/1
5 TABLET ORAL NIGHTLY PRN
Qty: 5 TABLET | Refills: 0 | Status: SHIPPED | OUTPATIENT
Start: 2021-09-21 | End: 2021-10-15 | Stop reason: SDUPTHER

## 2021-09-21 RX ORDER — HYDROCODONE BITARTRATE AND ACETAMINOPHEN 10; 325 MG/1; MG/1
1 TABLET ORAL EVERY 4 HOURS PRN
Qty: 60 TABLET | Refills: 0 | Status: SHIPPED | OUTPATIENT
Start: 2021-09-21 | End: 2021-10-04 | Stop reason: DRUGHIGH

## 2021-09-21 RX ORDER — PREGABALIN 150 MG/1
150 CAPSULE ORAL 2 TIMES DAILY
Qty: 60 CAPSULE | Refills: 5 | Status: SHIPPED | OUTPATIENT
Start: 2021-09-21 | End: 2021-10-15

## 2021-09-21 RX ORDER — METHOCARBAMOL 750 MG/1
750 TABLET, FILM COATED ORAL 3 TIMES DAILY PRN
Qty: 90 TABLET | Refills: 0 | Status: SHIPPED | OUTPATIENT
Start: 2021-09-21 | End: 2021-12-07 | Stop reason: SDUPTHER

## 2021-09-21 RX ADMIN — FAMOTIDINE 20 MG: 20 TABLET ORAL at 09:09

## 2021-09-21 RX ADMIN — OXYCODONE AND ACETAMINOPHEN 1 TABLET: 10; 325 TABLET ORAL at 04:09

## 2021-09-21 RX ADMIN — METHOCARBAMOL 750 MG: 750 TABLET ORAL at 06:09

## 2021-09-21 RX ADMIN — HYDROCODONE BITARTRATE AND ACETAMINOPHEN 1 TABLET: 10; 325 TABLET ORAL at 11:09

## 2021-09-21 RX ADMIN — METHOCARBAMOL 750 MG: 750 TABLET ORAL at 12:09

## 2021-09-21 RX ADMIN — HYDROMORPHONE HYDROCHLORIDE 0.2 MG: 1 INJECTION, SOLUTION INTRAMUSCULAR; INTRAVENOUS; SUBCUTANEOUS at 12:09

## 2021-09-21 RX ADMIN — METHOCARBAMOL 750 MG: 750 TABLET ORAL at 09:09

## 2021-09-21 RX ADMIN — HYDROMORPHONE HYDROCHLORIDE 1 MG: 1 INJECTION, SOLUTION INTRAMUSCULAR; INTRAVENOUS; SUBCUTANEOUS at 09:09

## 2021-09-21 RX ADMIN — ZOLPIDEM TARTRATE 5 MG: 5 TABLET ORAL at 12:09

## 2021-09-21 RX ADMIN — OXYCODONE HYDROCHLORIDE AND ACETAMINOPHEN 1 TABLET: 5; 325 TABLET ORAL at 12:09

## 2021-09-21 RX ADMIN — HEPARIN SODIUM 5000 UNITS: 5000 INJECTION INTRAVENOUS; SUBCUTANEOUS at 05:09

## 2021-09-21 RX ADMIN — MUPIROCIN: 20 OINTMENT TOPICAL at 09:09

## 2021-09-21 RX ADMIN — PREGABALIN 150 MG: 150 CAPSULE ORAL at 09:09

## 2021-09-21 RX ADMIN — POLYETHYLENE GLYCOL 3350 17 G: 17 POWDER, FOR SOLUTION ORAL at 12:09

## 2021-09-21 RX ADMIN — ALUMINUM HYDROXIDE, MAGNESIUM HYDROXIDE, AND SIMETHICONE 30 ML: 200; 200; 20 SUSPENSION ORAL at 12:09

## 2021-09-21 RX ADMIN — ATORVASTATIN CALCIUM 20 MG: 20 TABLET, FILM COATED ORAL at 09:09

## 2021-09-23 ENCOUNTER — PATIENT MESSAGE (OUTPATIENT)
Dept: NEUROSURGERY | Facility: CLINIC | Age: 41
End: 2021-09-23

## 2021-10-01 ENCOUNTER — IMMUNIZATION (OUTPATIENT)
Dept: PRIMARY CARE CLINIC | Facility: CLINIC | Age: 41
End: 2021-10-01
Payer: COMMERCIAL

## 2021-10-01 DIAGNOSIS — Z23 NEED FOR VACCINATION: Primary | ICD-10-CM

## 2021-10-01 PROCEDURE — 91300 COVID-19, MRNA, LNP-S, PF, 30 MCG/0.3 ML DOSE VACCINE: CPT | Mod: PBBFAC | Performed by: INTERNAL MEDICINE

## 2021-10-01 PROCEDURE — 0003A COVID-19, MRNA, LNP-S, PF, 30 MCG/0.3 ML DOSE VACCINE: CPT | Mod: CV19,PBBFAC | Performed by: INTERNAL MEDICINE

## 2021-10-02 ENCOUNTER — PATIENT MESSAGE (OUTPATIENT)
Dept: SLEEP MEDICINE | Facility: CLINIC | Age: 41
End: 2021-10-02

## 2021-10-04 ENCOUNTER — CLINICAL SUPPORT (OUTPATIENT)
Dept: NEUROSURGERY | Facility: CLINIC | Age: 41
End: 2021-10-04
Payer: COMMERCIAL

## 2021-10-04 VITALS — HEART RATE: 83 BPM | TEMPERATURE: 99 F | SYSTOLIC BLOOD PRESSURE: 124 MMHG | DIASTOLIC BLOOD PRESSURE: 79 MMHG

## 2021-10-04 DIAGNOSIS — Z98.890 STATUS POST LAMINECTOMY: Primary | ICD-10-CM

## 2021-10-04 PROCEDURE — 99999 PR PBB SHADOW E&M-EST. PATIENT-LVL III: CPT | Mod: PBBFAC,,,

## 2021-10-04 PROCEDURE — 99999 PR PBB SHADOW E&M-EST. PATIENT-LVL III: ICD-10-PCS | Mod: PBBFAC,,,

## 2021-10-04 RX ORDER — HYDROCODONE BITARTRATE AND ACETAMINOPHEN 5; 325 MG/1; MG/1
1 TABLET ORAL EVERY 8 HOURS PRN
Qty: 30 TABLET | Refills: 0 | Status: SHIPPED | OUTPATIENT
Start: 2021-10-04 | End: 2021-10-15

## 2021-10-07 ENCOUNTER — NURSE TRIAGE (OUTPATIENT)
Dept: ADMINISTRATIVE | Facility: CLINIC | Age: 41
End: 2021-10-07

## 2021-10-09 DIAGNOSIS — E66.01 MORBID OBESITY: ICD-10-CM

## 2021-10-09 DIAGNOSIS — E11.9 TYPE 2 DIABETES MELLITUS WITHOUT COMPLICATION, WITHOUT LONG-TERM CURRENT USE OF INSULIN: ICD-10-CM

## 2021-10-10 RX ORDER — SEMAGLUTIDE 1.34 MG/ML
INJECTION, SOLUTION SUBCUTANEOUS
Qty: 1 PEN | Refills: 2 | Status: SHIPPED | OUTPATIENT
Start: 2021-10-10 | End: 2022-01-04

## 2021-10-15 ENCOUNTER — LAB VISIT (OUTPATIENT)
Dept: LAB | Facility: HOSPITAL | Age: 41
End: 2021-10-15
Attending: INTERNAL MEDICINE
Payer: COMMERCIAL

## 2021-10-15 ENCOUNTER — TELEPHONE (OUTPATIENT)
Dept: INTERNAL MEDICINE | Facility: CLINIC | Age: 41
End: 2021-10-15

## 2021-10-15 ENCOUNTER — OFFICE VISIT (OUTPATIENT)
Dept: INTERNAL MEDICINE | Facility: CLINIC | Age: 41
End: 2021-10-15
Payer: COMMERCIAL

## 2021-10-15 VITALS
BODY MASS INDEX: 40.73 KG/M2 | WEIGHT: 244.5 LBS | TEMPERATURE: 98 F | HEART RATE: 87 BPM | OXYGEN SATURATION: 95 % | SYSTOLIC BLOOD PRESSURE: 130 MMHG | RESPIRATION RATE: 17 BRPM | DIASTOLIC BLOOD PRESSURE: 78 MMHG | HEIGHT: 65 IN

## 2021-10-15 DIAGNOSIS — G47.33 OSA (OBSTRUCTIVE SLEEP APNEA): ICD-10-CM

## 2021-10-15 DIAGNOSIS — E78.2 MIXED HYPERLIPIDEMIA: ICD-10-CM

## 2021-10-15 DIAGNOSIS — Z00.00 ANNUAL PHYSICAL EXAM: Primary | ICD-10-CM

## 2021-10-15 DIAGNOSIS — K75.81 NASH (NONALCOHOLIC STEATOHEPATITIS): ICD-10-CM

## 2021-10-15 DIAGNOSIS — E11.9 TYPE 2 DIABETES MELLITUS WITHOUT COMPLICATION, WITHOUT LONG-TERM CURRENT USE OF INSULIN: Primary | ICD-10-CM

## 2021-10-15 DIAGNOSIS — G47.00 INSOMNIA, UNSPECIFIED TYPE: ICD-10-CM

## 2021-10-15 DIAGNOSIS — E55.9 VITAMIN D INSUFFICIENCY: ICD-10-CM

## 2021-10-15 DIAGNOSIS — E11.9 TYPE 2 DIABETES MELLITUS WITHOUT COMPLICATION, WITHOUT LONG-TERM CURRENT USE OF INSULIN: ICD-10-CM

## 2021-10-15 LAB
ALBUMIN/CREAT UR: 11.8 UG/MG (ref 0–30)
CREAT UR-MCNC: 119 MG/DL (ref 23–375)
MICROALBUMIN UR DL<=1MG/L-MCNC: 14 UG/ML

## 2021-10-15 PROCEDURE — 1160F RVW MEDS BY RX/DR IN RCRD: CPT | Mod: CPTII,S$GLB,, | Performed by: INTERNAL MEDICINE

## 2021-10-15 PROCEDURE — 3072F PR LOW RISK FOR RETINOPATHY: ICD-10-PCS | Mod: CPTII,S$GLB,, | Performed by: INTERNAL MEDICINE

## 2021-10-15 PROCEDURE — 99999 PR PBB SHADOW E&M-EST. PATIENT-LVL V: CPT | Mod: PBBFAC,,, | Performed by: INTERNAL MEDICINE

## 2021-10-15 PROCEDURE — 3008F BODY MASS INDEX DOCD: CPT | Mod: CPTII,S$GLB,, | Performed by: INTERNAL MEDICINE

## 2021-10-15 PROCEDURE — 3078F PR MOST RECENT DIASTOLIC BLOOD PRESSURE < 80 MM HG: ICD-10-PCS | Mod: CPTII,S$GLB,, | Performed by: INTERNAL MEDICINE

## 2021-10-15 PROCEDURE — 3072F LOW RISK FOR RETINOPATHY: CPT | Mod: CPTII,S$GLB,, | Performed by: INTERNAL MEDICINE

## 2021-10-15 PROCEDURE — 1159F MED LIST DOCD IN RCRD: CPT | Mod: CPTII,S$GLB,, | Performed by: INTERNAL MEDICINE

## 2021-10-15 PROCEDURE — 3044F HG A1C LEVEL LT 7.0%: CPT | Mod: CPTII,S$GLB,, | Performed by: INTERNAL MEDICINE

## 2021-10-15 PROCEDURE — 1160F PR REVIEW ALL MEDS BY PRESCRIBER/CLIN PHARMACIST DOCUMENTED: ICD-10-PCS | Mod: CPTII,S$GLB,, | Performed by: INTERNAL MEDICINE

## 2021-10-15 PROCEDURE — 3008F PR BODY MASS INDEX (BMI) DOCUMENTED: ICD-10-PCS | Mod: CPTII,S$GLB,, | Performed by: INTERNAL MEDICINE

## 2021-10-15 PROCEDURE — 3044F PR MOST RECENT HEMOGLOBIN A1C LEVEL <7.0%: ICD-10-PCS | Mod: CPTII,S$GLB,, | Performed by: INTERNAL MEDICINE

## 2021-10-15 PROCEDURE — 3075F PR MOST RECENT SYSTOLIC BLOOD PRESS GE 130-139MM HG: ICD-10-PCS | Mod: CPTII,S$GLB,, | Performed by: INTERNAL MEDICINE

## 2021-10-15 PROCEDURE — 3078F DIAST BP <80 MM HG: CPT | Mod: CPTII,S$GLB,, | Performed by: INTERNAL MEDICINE

## 2021-10-15 PROCEDURE — 1111F PR DISCHARGE MEDS RECONCILED W/ CURRENT OUTPATIENT MED LIST: ICD-10-PCS | Mod: CPTII,S$GLB,, | Performed by: INTERNAL MEDICINE

## 2021-10-15 PROCEDURE — 3075F SYST BP GE 130 - 139MM HG: CPT | Mod: CPTII,S$GLB,, | Performed by: INTERNAL MEDICINE

## 2021-10-15 PROCEDURE — 1111F DSCHRG MED/CURRENT MED MERGE: CPT | Mod: CPTII,S$GLB,, | Performed by: INTERNAL MEDICINE

## 2021-10-15 PROCEDURE — 99215 OFFICE O/P EST HI 40 MIN: CPT | Mod: S$GLB,,, | Performed by: INTERNAL MEDICINE

## 2021-10-15 PROCEDURE — 99215 PR OFFICE/OUTPT VISIT, EST, LEVL V, 40-54 MIN: ICD-10-PCS | Mod: S$GLB,,, | Performed by: INTERNAL MEDICINE

## 2021-10-15 PROCEDURE — 1159F PR MEDICATION LIST DOCUMENTED IN MEDICAL RECORD: ICD-10-PCS | Mod: CPTII,S$GLB,, | Performed by: INTERNAL MEDICINE

## 2021-10-15 PROCEDURE — 82570 ASSAY OF URINE CREATININE: CPT | Performed by: INTERNAL MEDICINE

## 2021-10-15 PROCEDURE — 99999 PR PBB SHADOW E&M-EST. PATIENT-LVL V: ICD-10-PCS | Mod: PBBFAC,,, | Performed by: INTERNAL MEDICINE

## 2021-10-15 RX ORDER — ZOLPIDEM TARTRATE 5 MG/1
5 TABLET ORAL NIGHTLY PRN
Qty: 30 TABLET | Refills: 1 | Status: SHIPPED | OUTPATIENT
Start: 2021-10-15 | End: 2022-09-09 | Stop reason: SDUPTHER

## 2021-10-15 RX ORDER — ATORVASTATIN CALCIUM 20 MG/1
20 TABLET, FILM COATED ORAL DAILY
Qty: 90 TABLET | Refills: 3 | Status: SHIPPED | OUTPATIENT
Start: 2021-10-15 | End: 2021-11-16

## 2021-10-19 ENCOUNTER — PATIENT MESSAGE (OUTPATIENT)
Dept: SLEEP MEDICINE | Facility: CLINIC | Age: 41
End: 2021-10-19
Payer: COMMERCIAL

## 2021-10-19 ENCOUNTER — PATIENT MESSAGE (OUTPATIENT)
Dept: INTERNAL MEDICINE | Facility: CLINIC | Age: 41
End: 2021-10-19

## 2021-10-24 DIAGNOSIS — M54.12 CERVICAL RADICULAR PAIN: ICD-10-CM

## 2021-10-24 DIAGNOSIS — J45.20 MILD INTERMITTENT ASTHMA WITHOUT COMPLICATION: ICD-10-CM

## 2021-10-25 ENCOUNTER — PATIENT MESSAGE (OUTPATIENT)
Dept: INTERNAL MEDICINE | Facility: CLINIC | Age: 41
End: 2021-10-25
Payer: COMMERCIAL

## 2021-10-25 RX ORDER — ALBUTEROL SULFATE 90 UG/1
AEROSOL, METERED RESPIRATORY (INHALATION)
Qty: 18 G | Refills: 11 | Status: SHIPPED | OUTPATIENT
Start: 2021-10-25 | End: 2022-01-12 | Stop reason: CLARIF

## 2021-10-25 RX ORDER — GABAPENTIN 300 MG/1
CAPSULE ORAL
Qty: 30 CAPSULE | Refills: 3 | Status: SHIPPED | OUTPATIENT
Start: 2021-10-25 | End: 2021-11-17

## 2021-10-26 ENCOUNTER — OFFICE VISIT (OUTPATIENT)
Dept: OPHTHALMOLOGY | Facility: CLINIC | Age: 41
End: 2021-10-26
Payer: COMMERCIAL

## 2021-10-26 DIAGNOSIS — H02.883 MEIBOMIAN GLAND DYSFUNCTION (MGD) OF BOTH EYES: ICD-10-CM

## 2021-10-26 DIAGNOSIS — H02.886 MEIBOMIAN GLAND DYSFUNCTION (MGD) OF BOTH EYES: ICD-10-CM

## 2021-10-26 DIAGNOSIS — H40.003 GLAUCOMA SUSPECT OF BOTH EYES: ICD-10-CM

## 2021-10-26 DIAGNOSIS — H40.053 OCULAR HYPERTENSION, BILATERAL: ICD-10-CM

## 2021-10-26 DIAGNOSIS — H40.053 BORDERLINE GLAUCOMA OF BOTH EYES WITH OCULAR HYPERTENSION: Primary | ICD-10-CM

## 2021-10-26 PROCEDURE — 3061F NEG MICROALBUMINURIA REV: CPT | Mod: CPTII,S$GLB,, | Performed by: OPHTHALMOLOGY

## 2021-10-26 PROCEDURE — 92012 INTRM OPH EXAM EST PATIENT: CPT | Mod: S$GLB,,, | Performed by: OPHTHALMOLOGY

## 2021-10-26 PROCEDURE — 92020 PR SPECIAL EYE EVAL,GONIOSCOPY: ICD-10-PCS | Mod: S$GLB,,, | Performed by: OPHTHALMOLOGY

## 2021-10-26 PROCEDURE — 3044F PR MOST RECENT HEMOGLOBIN A1C LEVEL <7.0%: ICD-10-PCS | Mod: CPTII,S$GLB,, | Performed by: OPHTHALMOLOGY

## 2021-10-26 PROCEDURE — 3061F PR NEG MICROALBUMINURIA RESULT DOCUMENTED/REVIEW: ICD-10-PCS | Mod: CPTII,S$GLB,, | Performed by: OPHTHALMOLOGY

## 2021-10-26 PROCEDURE — 1160F RVW MEDS BY RX/DR IN RCRD: CPT | Mod: CPTII,S$GLB,, | Performed by: OPHTHALMOLOGY

## 2021-10-26 PROCEDURE — 92020 GONIOSCOPY: CPT | Mod: S$GLB,,, | Performed by: OPHTHALMOLOGY

## 2021-10-26 PROCEDURE — 3066F PR DOCUMENTATION OF TREATMENT FOR NEPHROPATHY: ICD-10-PCS | Mod: CPTII,S$GLB,, | Performed by: OPHTHALMOLOGY

## 2021-10-26 PROCEDURE — 3066F NEPHROPATHY DOC TX: CPT | Mod: CPTII,S$GLB,, | Performed by: OPHTHALMOLOGY

## 2021-10-26 PROCEDURE — 3044F HG A1C LEVEL LT 7.0%: CPT | Mod: CPTII,S$GLB,, | Performed by: OPHTHALMOLOGY

## 2021-10-26 PROCEDURE — 1160F PR REVIEW ALL MEDS BY PRESCRIBER/CLIN PHARMACIST DOCUMENTED: ICD-10-PCS | Mod: CPTII,S$GLB,, | Performed by: OPHTHALMOLOGY

## 2021-10-26 PROCEDURE — 92012 PR EYE EXAM, EST PATIENT,INTERMED: ICD-10-PCS | Mod: S$GLB,,, | Performed by: OPHTHALMOLOGY

## 2021-10-26 PROCEDURE — 1159F MED LIST DOCD IN RCRD: CPT | Mod: CPTII,S$GLB,, | Performed by: OPHTHALMOLOGY

## 2021-10-26 PROCEDURE — 1159F PR MEDICATION LIST DOCUMENTED IN MEDICAL RECORD: ICD-10-PCS | Mod: CPTII,S$GLB,, | Performed by: OPHTHALMOLOGY

## 2021-10-26 PROCEDURE — 99999 PR PBB SHADOW E&M-EST. PATIENT-LVL III: ICD-10-PCS | Mod: PBBFAC,,, | Performed by: OPHTHALMOLOGY

## 2021-10-26 PROCEDURE — 99999 PR PBB SHADOW E&M-EST. PATIENT-LVL III: CPT | Mod: PBBFAC,,, | Performed by: OPHTHALMOLOGY

## 2021-11-01 ENCOUNTER — OFFICE VISIT (OUTPATIENT)
Dept: SLEEP MEDICINE | Facility: CLINIC | Age: 41
End: 2021-11-01
Payer: COMMERCIAL

## 2021-11-01 DIAGNOSIS — G47.00 INSOMNIA, UNSPECIFIED TYPE: ICD-10-CM

## 2021-11-01 DIAGNOSIS — G47.33 OSA (OBSTRUCTIVE SLEEP APNEA): Primary | ICD-10-CM

## 2021-11-01 PROCEDURE — 3044F HG A1C LEVEL LT 7.0%: CPT | Mod: CPTII,95,, | Performed by: NURSE PRACTITIONER

## 2021-11-01 PROCEDURE — 1160F PR REVIEW ALL MEDS BY PRESCRIBER/CLIN PHARMACIST DOCUMENTED: ICD-10-PCS | Mod: CPTII,95,, | Performed by: NURSE PRACTITIONER

## 2021-11-01 PROCEDURE — 1160F RVW MEDS BY RX/DR IN RCRD: CPT | Mod: CPTII,95,, | Performed by: NURSE PRACTITIONER

## 2021-11-01 PROCEDURE — 3066F PR DOCUMENTATION OF TREATMENT FOR NEPHROPATHY: ICD-10-PCS | Mod: CPTII,95,, | Performed by: NURSE PRACTITIONER

## 2021-11-01 PROCEDURE — 3061F NEG MICROALBUMINURIA REV: CPT | Mod: CPTII,95,, | Performed by: NURSE PRACTITIONER

## 2021-11-01 PROCEDURE — 3072F LOW RISK FOR RETINOPATHY: CPT | Mod: CPTII,95,, | Performed by: NURSE PRACTITIONER

## 2021-11-01 PROCEDURE — 3072F PR LOW RISK FOR RETINOPATHY: ICD-10-PCS | Mod: CPTII,95,, | Performed by: NURSE PRACTITIONER

## 2021-11-01 PROCEDURE — 1159F PR MEDICATION LIST DOCUMENTED IN MEDICAL RECORD: ICD-10-PCS | Mod: CPTII,95,, | Performed by: NURSE PRACTITIONER

## 2021-11-01 PROCEDURE — 99213 OFFICE O/P EST LOW 20 MIN: CPT | Mod: 95,,, | Performed by: NURSE PRACTITIONER

## 2021-11-01 PROCEDURE — 99213 PR OFFICE/OUTPT VISIT, EST, LEVL III, 20-29 MIN: ICD-10-PCS | Mod: 95,,, | Performed by: NURSE PRACTITIONER

## 2021-11-01 PROCEDURE — 1159F MED LIST DOCD IN RCRD: CPT | Mod: CPTII,95,, | Performed by: NURSE PRACTITIONER

## 2021-11-01 PROCEDURE — 3066F NEPHROPATHY DOC TX: CPT | Mod: CPTII,95,, | Performed by: NURSE PRACTITIONER

## 2021-11-01 PROCEDURE — 3044F PR MOST RECENT HEMOGLOBIN A1C LEVEL <7.0%: ICD-10-PCS | Mod: CPTII,95,, | Performed by: NURSE PRACTITIONER

## 2021-11-01 PROCEDURE — 3061F PR NEG MICROALBUMINURIA RESULT DOCUMENTED/REVIEW: ICD-10-PCS | Mod: CPTII,95,, | Performed by: NURSE PRACTITIONER

## 2021-11-08 ENCOUNTER — PATIENT MESSAGE (OUTPATIENT)
Dept: PSYCHIATRY | Facility: CLINIC | Age: 41
End: 2021-11-08
Payer: COMMERCIAL

## 2021-11-09 ENCOUNTER — PATIENT MESSAGE (OUTPATIENT)
Dept: PSYCHIATRY | Facility: CLINIC | Age: 41
End: 2021-11-09
Payer: COMMERCIAL

## 2021-11-17 ENCOUNTER — PATIENT MESSAGE (OUTPATIENT)
Dept: NEUROSURGERY | Facility: CLINIC | Age: 41
End: 2021-11-17

## 2021-11-17 ENCOUNTER — OFFICE VISIT (OUTPATIENT)
Dept: NEUROSURGERY | Facility: CLINIC | Age: 41
End: 2021-11-17
Payer: COMMERCIAL

## 2021-11-17 VITALS
SYSTOLIC BLOOD PRESSURE: 129 MMHG | HEART RATE: 87 BPM | BODY MASS INDEX: 41.53 KG/M2 | WEIGHT: 249.56 LBS | DIASTOLIC BLOOD PRESSURE: 84 MMHG | TEMPERATURE: 98 F

## 2021-11-17 DIAGNOSIS — M54.12 CERVICAL RADICULAR PAIN: ICD-10-CM

## 2021-11-17 DIAGNOSIS — Z98.890 S/P LUMBAR DISCECTOMY: Primary | ICD-10-CM

## 2021-11-17 PROBLEM — M54.16 RADICULOPATHY, LUMBAR REGION: Status: RESOLVED | Noted: 2021-09-14 | Resolved: 2021-11-17

## 2021-11-17 PROCEDURE — 3079F DIAST BP 80-89 MM HG: CPT | Mod: CPTII,S$GLB,, | Performed by: NEUROLOGICAL SURGERY

## 2021-11-17 PROCEDURE — 99999 PR PBB SHADOW E&M-EST. PATIENT-LVL III: CPT | Mod: PBBFAC,,, | Performed by: NEUROLOGICAL SURGERY

## 2021-11-17 PROCEDURE — 3066F NEPHROPATHY DOC TX: CPT | Mod: CPTII,S$GLB,, | Performed by: NEUROLOGICAL SURGERY

## 2021-11-17 PROCEDURE — 3074F PR MOST RECENT SYSTOLIC BLOOD PRESSURE < 130 MM HG: ICD-10-PCS | Mod: CPTII,S$GLB,, | Performed by: NEUROLOGICAL SURGERY

## 2021-11-17 PROCEDURE — 3061F PR NEG MICROALBUMINURIA RESULT DOCUMENTED/REVIEW: ICD-10-PCS | Mod: CPTII,S$GLB,, | Performed by: NEUROLOGICAL SURGERY

## 2021-11-17 PROCEDURE — 99999 PR PBB SHADOW E&M-EST. PATIENT-LVL III: ICD-10-PCS | Mod: PBBFAC,,, | Performed by: NEUROLOGICAL SURGERY

## 2021-11-17 PROCEDURE — 1159F PR MEDICATION LIST DOCUMENTED IN MEDICAL RECORD: ICD-10-PCS | Mod: CPTII,S$GLB,, | Performed by: NEUROLOGICAL SURGERY

## 2021-11-17 PROCEDURE — 99024 PR POST-OP FOLLOW-UP VISIT: ICD-10-PCS | Mod: S$GLB,,, | Performed by: NEUROLOGICAL SURGERY

## 2021-11-17 PROCEDURE — 1160F RVW MEDS BY RX/DR IN RCRD: CPT | Mod: CPTII,S$GLB,, | Performed by: NEUROLOGICAL SURGERY

## 2021-11-17 PROCEDURE — 3072F PR LOW RISK FOR RETINOPATHY: ICD-10-PCS | Mod: CPTII,S$GLB,, | Performed by: NEUROLOGICAL SURGERY

## 2021-11-17 PROCEDURE — 99024 POSTOP FOLLOW-UP VISIT: CPT | Mod: S$GLB,,, | Performed by: NEUROLOGICAL SURGERY

## 2021-11-17 PROCEDURE — 1160F PR REVIEW ALL MEDS BY PRESCRIBER/CLIN PHARMACIST DOCUMENTED: ICD-10-PCS | Mod: CPTII,S$GLB,, | Performed by: NEUROLOGICAL SURGERY

## 2021-11-17 PROCEDURE — 3044F PR MOST RECENT HEMOGLOBIN A1C LEVEL <7.0%: ICD-10-PCS | Mod: CPTII,S$GLB,, | Performed by: NEUROLOGICAL SURGERY

## 2021-11-17 PROCEDURE — 3008F PR BODY MASS INDEX (BMI) DOCUMENTED: ICD-10-PCS | Mod: CPTII,S$GLB,, | Performed by: NEUROLOGICAL SURGERY

## 2021-11-17 PROCEDURE — 3074F SYST BP LT 130 MM HG: CPT | Mod: CPTII,S$GLB,, | Performed by: NEUROLOGICAL SURGERY

## 2021-11-17 PROCEDURE — 3066F PR DOCUMENTATION OF TREATMENT FOR NEPHROPATHY: ICD-10-PCS | Mod: CPTII,S$GLB,, | Performed by: NEUROLOGICAL SURGERY

## 2021-11-17 PROCEDURE — 3061F NEG MICROALBUMINURIA REV: CPT | Mod: CPTII,S$GLB,, | Performed by: NEUROLOGICAL SURGERY

## 2021-11-17 PROCEDURE — 3008F BODY MASS INDEX DOCD: CPT | Mod: CPTII,S$GLB,, | Performed by: NEUROLOGICAL SURGERY

## 2021-11-17 PROCEDURE — 1159F MED LIST DOCD IN RCRD: CPT | Mod: CPTII,S$GLB,, | Performed by: NEUROLOGICAL SURGERY

## 2021-11-17 PROCEDURE — 3072F LOW RISK FOR RETINOPATHY: CPT | Mod: CPTII,S$GLB,, | Performed by: NEUROLOGICAL SURGERY

## 2021-11-17 PROCEDURE — 3044F HG A1C LEVEL LT 7.0%: CPT | Mod: CPTII,S$GLB,, | Performed by: NEUROLOGICAL SURGERY

## 2021-11-17 PROCEDURE — 3079F PR MOST RECENT DIASTOLIC BLOOD PRESSURE 80-89 MM HG: ICD-10-PCS | Mod: CPTII,S$GLB,, | Performed by: NEUROLOGICAL SURGERY

## 2021-11-17 RX ORDER — GABAPENTIN 300 MG/1
300 CAPSULE ORAL 3 TIMES DAILY
Qty: 90 CAPSULE | Refills: 3 | Status: SHIPPED | OUTPATIENT
Start: 2021-11-17 | End: 2022-05-27

## 2021-11-19 ENCOUNTER — PATIENT MESSAGE (OUTPATIENT)
Dept: NEUROSURGERY | Facility: CLINIC | Age: 41
End: 2021-11-19
Payer: COMMERCIAL

## 2021-11-27 ENCOUNTER — PATIENT MESSAGE (OUTPATIENT)
Dept: NEUROSURGERY | Facility: CLINIC | Age: 41
End: 2021-11-27
Payer: COMMERCIAL

## 2021-11-29 ENCOUNTER — OFFICE VISIT (OUTPATIENT)
Dept: PSYCHIATRY | Facility: CLINIC | Age: 41
End: 2021-11-29
Payer: COMMERCIAL

## 2021-11-29 DIAGNOSIS — G47.00 INSOMNIA, UNSPECIFIED TYPE: Primary | ICD-10-CM

## 2021-11-29 PROCEDURE — 3061F PR NEG MICROALBUMINURIA RESULT DOCUMENTED/REVIEW: ICD-10-PCS | Mod: CPTII,95,, | Performed by: PSYCHOLOGIST

## 2021-11-29 PROCEDURE — 3072F PR LOW RISK FOR RETINOPATHY: ICD-10-PCS | Mod: CPTII,95,, | Performed by: PSYCHOLOGIST

## 2021-11-29 PROCEDURE — 90791 PR PSYCHIATRIC DIAGNOSTIC EVALUATION: ICD-10-PCS | Mod: 95,,, | Performed by: PSYCHOLOGIST

## 2021-11-29 PROCEDURE — 3066F NEPHROPATHY DOC TX: CPT | Mod: CPTII,95,, | Performed by: PSYCHOLOGIST

## 2021-11-29 PROCEDURE — 3061F NEG MICROALBUMINURIA REV: CPT | Mod: CPTII,95,, | Performed by: PSYCHOLOGIST

## 2021-11-29 PROCEDURE — 3072F LOW RISK FOR RETINOPATHY: CPT | Mod: CPTII,95,, | Performed by: PSYCHOLOGIST

## 2021-11-29 PROCEDURE — 90791 PSYCH DIAGNOSTIC EVALUATION: CPT | Mod: 95,,, | Performed by: PSYCHOLOGIST

## 2021-11-29 PROCEDURE — 3066F PR DOCUMENTATION OF TREATMENT FOR NEPHROPATHY: ICD-10-PCS | Mod: CPTII,95,, | Performed by: PSYCHOLOGIST

## 2021-12-06 ENCOUNTER — OFFICE VISIT (OUTPATIENT)
Dept: PSYCHIATRY | Facility: CLINIC | Age: 41
End: 2021-12-06
Payer: COMMERCIAL

## 2021-12-06 DIAGNOSIS — G47.00 INSOMNIA, UNSPECIFIED TYPE: Primary | ICD-10-CM

## 2021-12-06 PROCEDURE — 3061F PR NEG MICROALBUMINURIA RESULT DOCUMENTED/REVIEW: ICD-10-PCS | Mod: CPTII,95,, | Performed by: PSYCHOLOGIST

## 2021-12-06 PROCEDURE — 90834 PR PSYCHOTHERAPY W/PATIENT, 45 MIN: ICD-10-PCS | Mod: 95,,, | Performed by: PSYCHOLOGIST

## 2021-12-06 PROCEDURE — 90834 PSYTX W PT 45 MINUTES: CPT | Mod: 95,,, | Performed by: PSYCHOLOGIST

## 2021-12-06 PROCEDURE — 3061F NEG MICROALBUMINURIA REV: CPT | Mod: CPTII,95,, | Performed by: PSYCHOLOGIST

## 2021-12-06 PROCEDURE — 3072F LOW RISK FOR RETINOPATHY: CPT | Mod: CPTII,95,, | Performed by: PSYCHOLOGIST

## 2021-12-06 PROCEDURE — 3066F PR DOCUMENTATION OF TREATMENT FOR NEPHROPATHY: ICD-10-PCS | Mod: CPTII,95,, | Performed by: PSYCHOLOGIST

## 2021-12-06 PROCEDURE — 3072F PR LOW RISK FOR RETINOPATHY: ICD-10-PCS | Mod: CPTII,95,, | Performed by: PSYCHOLOGIST

## 2021-12-06 PROCEDURE — 3066F NEPHROPATHY DOC TX: CPT | Mod: CPTII,95,, | Performed by: PSYCHOLOGIST

## 2021-12-07 ENCOUNTER — PATIENT MESSAGE (OUTPATIENT)
Dept: INTERNAL MEDICINE | Facility: CLINIC | Age: 41
End: 2021-12-07
Payer: COMMERCIAL

## 2021-12-07 RX ORDER — METHOCARBAMOL 750 MG/1
750 TABLET, FILM COATED ORAL 3 TIMES DAILY PRN
Qty: 90 TABLET | Refills: 0 | Status: SHIPPED | OUTPATIENT
Start: 2021-12-07 | End: 2022-02-20

## 2021-12-17 ENCOUNTER — OFFICE VISIT (OUTPATIENT)
Dept: PSYCHIATRY | Facility: CLINIC | Age: 41
End: 2021-12-17
Payer: COMMERCIAL

## 2021-12-17 DIAGNOSIS — G47.00 INSOMNIA, UNSPECIFIED TYPE: Primary | ICD-10-CM

## 2021-12-17 PROCEDURE — 3066F PR DOCUMENTATION OF TREATMENT FOR NEPHROPATHY: ICD-10-PCS | Mod: CPTII,95,, | Performed by: PSYCHOLOGIST

## 2021-12-17 PROCEDURE — 3061F PR NEG MICROALBUMINURIA RESULT DOCUMENTED/REVIEW: ICD-10-PCS | Mod: CPTII,95,, | Performed by: PSYCHOLOGIST

## 2021-12-17 PROCEDURE — 3072F LOW RISK FOR RETINOPATHY: CPT | Mod: CPTII,95,, | Performed by: PSYCHOLOGIST

## 2021-12-17 PROCEDURE — 90834 PSYTX W PT 45 MINUTES: CPT | Mod: 95,,, | Performed by: PSYCHOLOGIST

## 2021-12-17 PROCEDURE — 3072F PR LOW RISK FOR RETINOPATHY: ICD-10-PCS | Mod: CPTII,95,, | Performed by: PSYCHOLOGIST

## 2021-12-17 PROCEDURE — 3066F NEPHROPATHY DOC TX: CPT | Mod: CPTII,95,, | Performed by: PSYCHOLOGIST

## 2021-12-17 PROCEDURE — 3061F NEG MICROALBUMINURIA REV: CPT | Mod: CPTII,95,, | Performed by: PSYCHOLOGIST

## 2021-12-17 PROCEDURE — 90834 PR PSYCHOTHERAPY W/PATIENT, 45 MIN: ICD-10-PCS | Mod: 95,,, | Performed by: PSYCHOLOGIST

## 2021-12-21 ENCOUNTER — PATIENT MESSAGE (OUTPATIENT)
Dept: PSYCHIATRY | Facility: CLINIC | Age: 41
End: 2021-12-21
Payer: COMMERCIAL

## 2021-12-24 DIAGNOSIS — E11.9 DIABETES MELLITUS WITHOUT COMPLICATION: ICD-10-CM

## 2021-12-26 ENCOUNTER — PATIENT MESSAGE (OUTPATIENT)
Dept: INTERNAL MEDICINE | Facility: CLINIC | Age: 41
End: 2021-12-26
Payer: COMMERCIAL

## 2021-12-26 RX ORDER — CALCIUM CITRATE/VITAMIN D3 200MG-6.25
TABLET ORAL
Qty: 25 STRIP | Refills: 2 | Status: SHIPPED | OUTPATIENT
Start: 2021-12-26 | End: 2022-12-29

## 2021-12-29 ENCOUNTER — PATIENT MESSAGE (OUTPATIENT)
Dept: INTERNAL MEDICINE | Facility: CLINIC | Age: 41
End: 2021-12-29
Payer: COMMERCIAL

## 2021-12-29 DIAGNOSIS — L91.8 SKIN TAG: ICD-10-CM

## 2021-12-29 DIAGNOSIS — L30.9 ECZEMA, UNSPECIFIED TYPE: Primary | ICD-10-CM

## 2022-01-04 DIAGNOSIS — E11.9 TYPE 2 DIABETES MELLITUS WITHOUT COMPLICATION, WITHOUT LONG-TERM CURRENT USE OF INSULIN: ICD-10-CM

## 2022-01-04 DIAGNOSIS — E66.01 MORBID OBESITY: ICD-10-CM

## 2022-01-04 RX ORDER — SEMAGLUTIDE 1.34 MG/ML
INJECTION, SOLUTION SUBCUTANEOUS
Qty: 2 PEN | Refills: 0 | Status: SHIPPED | OUTPATIENT
Start: 2022-01-04 | End: 2022-02-07 | Stop reason: SDUPTHER

## 2022-01-04 NOTE — TELEPHONE ENCOUNTER
Refill Authorization Note   Hermelindo Garza  is requesting a refill authorization.  Brief Assessment and Rationale for Refill:  Approve     Medication Therapy Plan:       Medication Reconciliation Completed: No   Comments:   --->Care Gap information included below if applicable.   Orders Placed This Encounter    semaglutide (OZEMPIC) 0.25 mg or 0.5 mg(2 mg/1.5 mL) pen injector      Requested Prescriptions   Signed Prescriptions Disp Refills    semaglutide (OZEMPIC) 0.25 mg or 0.5 mg(2 mg/1.5 mL) pen injector 2 pen 0     Sig: INJECT 0.25 MG UNDER THE SKIN EVERY 7 DAYS       Endocrinology:  Diabetes - GLP-1 Receptor Agonists Passed - 1/4/2022  8:51 AM        Passed - Patient is at least 18 years old        Passed - Valid encounter within last 15 months     Recent Visits  Date Type Provider Dept   10/15/21 Office Visit Melody Beck MD [facility] Internal Medicine   02/12/21 Office Visit Melody Beck MD [facility] Internal Medicine   11/16/20 Office Visit Melody Beck MD [facility] Internal Medicine   02/06/20 Office Visit Mleody Beck MD [facility] Internal Medicine   Showing recent visits within past 720 days and meeting all other requirements  Future Appointments  No visits were found meeting these conditions.  Showing future appointments within next 150 days and meeting all other requirements      Future Appointments              In 2 days Coty Jean MD [place] [facility] - Dermatology, [place]    In 1 week LAB, [facility], [place]    In 2 weeks Melody Beck MD [facility] - Internal Medicine, [place]    In 1 month Naseem Mcnamara, DO [facility] - Neurosurgery 8th Fl, [facility]                Passed - HBA1C within 180 days     Lab Results   Component Value Date    HGBA1C 5.2 08/13/2021    HGBA1C 5.5 02/05/2021    HGBA1C 5.8 (H) 11/21/2020                  Appointments  past 12m or future 3m with PCP    Date Provider   Last Visit   10/15/2021 Melody Beck MD   Next Visit   1/18/2022 Melody MUELLER  MD [place]   ED visits in past 90 days: 0     Note composed:10:58 AM 01/04/2022

## 2022-01-04 NOTE — TELEPHONE ENCOUNTER
No new care gaps identified.  Powered by [employer]. Reference number: [id number].   1/04/2022 8:51:50 AM CST

## 2022-01-06 ENCOUNTER — OFFICE VISIT (OUTPATIENT)
Dept: PSYCHIATRY | Facility: CLINIC | Age: 42
End: 2022-01-06
Payer: COMMERCIAL

## 2022-01-06 ENCOUNTER — OFFICE VISIT (OUTPATIENT)
Dept: DERMATOLOGY | Facility: CLINIC | Age: 42
End: 2022-01-06
Payer: COMMERCIAL

## 2022-01-06 DIAGNOSIS — R20.9 SKIN SENSATION DISTURBANCE: Primary | ICD-10-CM

## 2022-01-06 DIAGNOSIS — Q82.8 KERATODERMA: ICD-10-CM

## 2022-01-06 DIAGNOSIS — G47.00 INSOMNIA, UNSPECIFIED TYPE: Primary | ICD-10-CM

## 2022-01-06 DIAGNOSIS — L21.9 SEBORRHEIC DERMATITIS, UNSPECIFIED: ICD-10-CM

## 2022-01-06 DIAGNOSIS — G25.81 RESTLESS LEG: ICD-10-CM

## 2022-01-06 DIAGNOSIS — L91.8 SKIN TAG: ICD-10-CM

## 2022-01-06 DIAGNOSIS — D48.5 NEOPLASM OF UNCERTAIN BEHAVIOR OF SKIN: ICD-10-CM

## 2022-01-06 PROCEDURE — 88305 TISSUE EXAM BY PATHOLOGIST: CPT | Mod: 26,,, | Performed by: PATHOLOGY

## 2022-01-06 PROCEDURE — 99999 PR PBB SHADOW E&M-EST. PATIENT-LVL IV: CPT | Mod: PBBFAC,,, | Performed by: DERMATOLOGY

## 2022-01-06 PROCEDURE — 1159F MED LIST DOCD IN RCRD: CPT | Mod: CPTII,S$GLB,, | Performed by: DERMATOLOGY

## 2022-01-06 PROCEDURE — 1160F PR REVIEW ALL MEDS BY PRESCRIBER/CLIN PHARMACIST DOCUMENTED: ICD-10-PCS | Mod: CPTII,S$GLB,, | Performed by: DERMATOLOGY

## 2022-01-06 PROCEDURE — 88342 CHG IMMUNOCYTOCHEMISTRY: ICD-10-PCS | Mod: 26,,, | Performed by: PATHOLOGY

## 2022-01-06 PROCEDURE — 1160F RVW MEDS BY RX/DR IN RCRD: CPT | Mod: CPTII,S$GLB,, | Performed by: DERMATOLOGY

## 2022-01-06 PROCEDURE — 11102 PR TANGENTIAL BIOPSY, SKIN, SINGLE LESION: ICD-10-PCS | Mod: 59,S$GLB,, | Performed by: DERMATOLOGY

## 2022-01-06 PROCEDURE — 88342 IMHCHEM/IMCYTCHM 1ST ANTB: CPT | Performed by: PATHOLOGY

## 2022-01-06 PROCEDURE — 99214 OFFICE O/P EST MOD 30 MIN: CPT | Mod: 25,S$GLB,, | Performed by: DERMATOLOGY

## 2022-01-06 PROCEDURE — 3072F PR LOW RISK FOR RETINOPATHY: ICD-10-PCS | Mod: CPTII,S$GLB,, | Performed by: DERMATOLOGY

## 2022-01-06 PROCEDURE — 11102 TANGNTL BX SKIN SINGLE LES: CPT | Mod: 59,S$GLB,, | Performed by: DERMATOLOGY

## 2022-01-06 PROCEDURE — 99214 PR OFFICE/OUTPT VISIT, EST, LEVL IV, 30-39 MIN: ICD-10-PCS | Mod: 25,S$GLB,, | Performed by: DERMATOLOGY

## 2022-01-06 PROCEDURE — 1159F PR MEDICATION LIST DOCUMENTED IN MEDICAL RECORD: ICD-10-PCS | Mod: CPTII,S$GLB,, | Performed by: DERMATOLOGY

## 2022-01-06 PROCEDURE — 88305 TISSUE EXAM BY PATHOLOGIST: ICD-10-PCS | Mod: 26,,, | Performed by: PATHOLOGY

## 2022-01-06 PROCEDURE — 90834 PR PSYCHOTHERAPY W/PATIENT, 45 MIN: ICD-10-PCS | Mod: 95,,, | Performed by: PSYCHOLOGIST

## 2022-01-06 PROCEDURE — 90834 PSYTX W PT 45 MINUTES: CPT | Mod: 95,,, | Performed by: PSYCHOLOGIST

## 2022-01-06 PROCEDURE — 99999 PR PBB SHADOW E&M-EST. PATIENT-LVL IV: ICD-10-PCS | Mod: PBBFAC,,, | Performed by: DERMATOLOGY

## 2022-01-06 PROCEDURE — 3072F LOW RISK FOR RETINOPATHY: CPT | Mod: CPTII,S$GLB,, | Performed by: DERMATOLOGY

## 2022-01-06 PROCEDURE — 11200 RMVL SKIN TAGS UP TO&INC 15: CPT | Mod: S$GLB,,, | Performed by: DERMATOLOGY

## 2022-01-06 PROCEDURE — 11200 PR REMOVAL OF SKIN TAGS, UP TO 15: ICD-10-PCS | Mod: S$GLB,,, | Performed by: DERMATOLOGY

## 2022-01-06 PROCEDURE — 3072F PR LOW RISK FOR RETINOPATHY: ICD-10-PCS | Mod: CPTII,95,, | Performed by: PSYCHOLOGIST

## 2022-01-06 PROCEDURE — 88342 IMHCHEM/IMCYTCHM 1ST ANTB: CPT | Mod: 26,,, | Performed by: PATHOLOGY

## 2022-01-06 PROCEDURE — 3072F LOW RISK FOR RETINOPATHY: CPT | Mod: CPTII,95,, | Performed by: PSYCHOLOGIST

## 2022-01-06 PROCEDURE — 88305 TISSUE EXAM BY PATHOLOGIST: CPT | Performed by: PATHOLOGY

## 2022-01-06 RX ORDER — KETOCONAZOLE 20 MG/ML
SHAMPOO, SUSPENSION TOPICAL
Qty: 120 ML | Refills: 5 | Status: SHIPPED | OUTPATIENT
Start: 2022-01-06 | End: 2022-12-06

## 2022-01-06 RX ORDER — FLUOCINONIDE TOPICAL SOLUTION USP, 0.05% 0.5 MG/ML
SOLUTION TOPICAL
Qty: 60 ML | Refills: 3 | Status: SHIPPED | OUTPATIENT
Start: 2022-01-06

## 2022-01-06 NOTE — PROGRESS NOTES
Individual Psychotherapy (PhD/LCSW)    1/6/2022    Site:  Telemed     The patient location is: [place], [place]   The chief complaint leading to consultation is: insomnia    Visit type: audiovisual    Face to Face time with patient: 40  45 minutes of total time spent on the encounter, which includes face to face time and non-face to face time preparing to see the patient (eg, review of tests), Obtaining and/or reviewing separately obtained history, Documenting clinical information in the electronic or other health record, Independently interpreting results (not separately reported) and communicating results to the patient/family/caregiver, or Care coordination (not separately reported).         Each patient to whom he or she provides medical services by telemedicine is:  (1) informed of the relationship between the physician and patient and the respective role of any other health care provider with respect to management of the patient; and (2) notified that he or she may decline to receive medical services by telemedicine and may withdraw from such care at any time.    Notes:     Therapeutic Intervention: Met with patient.  Outpatient - Behavior modifying psychotherapy 45 min - CPT code 11784    Chief complaint/reason for encounter: sleep     Interval history and content of current session:   Cognitive Behavioral Therapy for Insomnia (CBT-i), Session #3  Sleep Diary completed?  Yes  # of days completed:  7  SE:  95.6%  Session Focus:  Summarize and graph Sleep Diary  Discuss Sleep Restriction/Compression  Prescribed TTB: 10:30p  Prescribed TOB:  6a  Review progress with Stimulus Control and Sleep Hygiene  Introduce Relaxation Training     Practice Assignments:  1) Review treatment materials as needed.  2) Complete the Sleep Diary every day.  Fill it out within two hours of getting up.  3) Implement Stimulus Control and Sleep Hygiene strategies  4) Implement Sleep Restriction/Compression  5) Practice Relaxation  Training at least 20 minutes per day    Treatment plan:  · Target symptoms: insomnia  · Why chosen therapy is appropriate versus another modality: evidence based practice  · Outcome monitoring methods: self-report, checklist/rating scale  · Therapeutic intervention type: behavior modifying psychotherapy    Risk parameters:  Patient reports no suicidal ideation  Patient reports no homicidal ideation  Patient reports no self-injurious behavior  Patient reports no violent behavior    Verbal deficits: None    Patient's response to intervention:  The patient's response to intervention is accepting.    Progress toward goals and other mental status changes:  The patient's progress toward goals is good.    Diagnosis:     ICD-10-CM ICD-9-CM   1. Insomnia, unspecified type  G47.00 780.52       Plan:  individual psychotherapy    Return to clinic: 1 week    Length of Service (minutes): 45

## 2022-01-06 NOTE — PROGRESS NOTES
Subjective:       Patient ID:  Hermelindo Garza III is a 41 y.o. male who presents for   Chief Complaint   Patient presents with    Eczema     Scalp, face, and ears      Pt also has skin tags under arms and around  neck. Irritated by friction. Would like them removed  Does not like using creams all over.      Eczema - Initial  Affected locations: face, scalp, left ear and right ear  Duration: 3 years  Signs / symptoms: itching  Severity: mild  Timing: constant  Aggravated by: sweating (CPAP)  Relieving factors/Treatments tried: Rx topical steroids (sulfur soap worsened it)  Improvement on treatment: no relief        Review of Systems   Constitutional: Negative for fever, chills and fatigue.   Skin: Positive for itching, rash and dry skin. Negative for daily sunscreen use and recent sunburn.   Hematologic/Lymphatic: Does not bruise/bleed easily.        Objective:    Physical Exam   Constitutional: He appears well-developed and well-nourished. No distress.   Neurological: He is alert and oriented to person, place, and time. He is not disoriented.   Psychiatric: He has a normal mood and affect.   Skin:   Areas Examined (abnormalities noted in diagram):   Scalp / Hair Palpated and Inspected  Head / Face Inspection Performed  Abdomen Inspection Performed  Back Inspection Performed  LUE Inspection Performed                               Diagram Legend     Erythematous scaling macule/papule c/w actinic keratosis       Vascular papule c/w angioma      Pigmented verrucoid papule/plaque c/w seborrheic keratosis      Yellow umbilicated papule c/w sebaceous hyperplasia      Irregularly shaped tan macule c/w lentigo     1-2 mm smooth white papules consistent with Milia      Movable subcutaneous cyst with punctum c/w epidermal inclusion cyst      Subcutaneous movable cyst c/w pilar cyst      Firm pink to brown papule c/w dermatofibroma      Pedunculated fleshy papule(s) c/w skin tag(s)      Evenly pigmented macule c/w  junctional nevus     Mildly variegated pigmented, slightly irregular-bordered macule c/w mildly atypical nevus      Flesh colored to evenly pigmented papule c/w intradermal nevus       Pink pearly papule/plaque c/w basal cell carcinoma      Erythematous hyperkeratotic cursted plaque c/w SCC      Surgical scar with no sign of skin cancer recurrence      Open and closed comedones      Inflammatory papules and pustules      Verrucoid papule consistent consistent with wart     Erythematous eczematous patches and plaques     Dystrophic onycholytic nail with subungual debris c/w onychomycosis     Umbilicated papule    Erythematous-base heme-crusted tan verrucoid plaque consistent with inflamed seborrheic keratosis     Erythematous Silvery Scaling Plaque c/w Psoriasis     See annotation      Assessment / Plan:      Pathology Orders:     Normal Orders This Visit    Specimen to Pathology, Dermatology     Comments:    Number of Specimens:->1  ------------------------->-------------------------  Spec 1 Procedure:->Biopsy  Spec 1 Clinical Impression:->r/o atypical nevus  Spec 1 Source:->right mid back    Questions:    Procedure Type: Dermatology and skin neoplasms    Number of Specimens: 1    ------------------------: -------------------------    Spec 1 Procedure: Biopsy    Spec 1 Clinical Impression: r/o atypical nevus    Spec 1 Source: right mid back    Release to patient:         Skin sensation disturbance  Skin tag  -     Ambulatory referral/consult to Dermatology  Verbal consent obtained. 14 lesions removed with scissor snip removal after anesthesia with 1% lidocaine with epinephrine. Hemostasis achieved with aluminum chloride and hyfrecation. No complications.    Discussed with patient that this procedure may not be covered by insurance as it can be considered cosmetic and pt would like to pursue treatment.  He/she understands that he/she  may be responsible for the bill and agrees to pay.     Neoplasm of uncertain  behavior of skin   Shave biopsy procedure note:    Shave biopsy performed after verbal consent including risk of infection, scar, recurrence, need for additional treatment of site. Area prepped with alcohol, anesthetized with approximately 1.0cc of 1% lidocaine with epinephrine. Lesional tissue shaved with razor blade. Hemostasis achieved with application of aluminum chloride followed by hyfrecation. No complications. Dressing applied. Wound care explained.      -     Specimen to Pathology, Dermatology    Seborrheic dermatitis, unspecified  -     ketoconazole (NIZORAL) 2 % shampoo; Wash hair with medicated shampoo at least 2x/week - let sit on scalp at least 5 minutes prior to rinsing  Dispense: 120 mL; Refill: 5  -     fluocinonide (LIDEX) 0.05 % external solution; AAA scalp qday prn itching, scaling  Dispense: 60 mL; Refill: 3    Wash face with vanicream z bar    Keratoderma  Carmol 20 cream otc            Follow up for prn bx report, 2months skin check.

## 2022-01-07 RX ORDER — ROPINIROLE 0.25 MG/1
TABLET, FILM COATED ORAL
Qty: 30 TABLET | Refills: 2 | Status: SHIPPED | OUTPATIENT
Start: 2022-01-07 | End: 2022-10-13

## 2022-01-08 ENCOUNTER — PATIENT MESSAGE (OUTPATIENT)
Dept: DERMATOLOGY | Facility: CLINIC | Age: 42
End: 2022-01-08
Payer: COMMERCIAL

## 2022-01-11 ENCOUNTER — PATIENT MESSAGE (OUTPATIENT)
Dept: INTERNAL MEDICINE | Facility: CLINIC | Age: 42
End: 2022-01-11
Payer: COMMERCIAL

## 2022-01-12 RX ORDER — ALBUTEROL SULFATE 90 UG/1
2 AEROSOL, METERED RESPIRATORY (INHALATION) EVERY 6 HOURS PRN
Qty: 18 G | Refills: 5 | Status: SHIPPED | OUTPATIENT
Start: 2022-01-12 | End: 2022-06-20

## 2022-01-13 ENCOUNTER — OFFICE VISIT (OUTPATIENT)
Dept: PSYCHIATRY | Facility: CLINIC | Age: 42
End: 2022-01-13
Payer: COMMERCIAL

## 2022-01-13 DIAGNOSIS — G47.00 INSOMNIA, UNSPECIFIED TYPE: Primary | ICD-10-CM

## 2022-01-13 PROCEDURE — 3072F PR LOW RISK FOR RETINOPATHY: ICD-10-PCS | Mod: CPTII,95,, | Performed by: PSYCHOLOGIST

## 2022-01-13 PROCEDURE — 90834 PSYTX W PT 45 MINUTES: CPT | Mod: 95,,, | Performed by: PSYCHOLOGIST

## 2022-01-13 PROCEDURE — 3072F LOW RISK FOR RETINOPATHY: CPT | Mod: CPTII,95,, | Performed by: PSYCHOLOGIST

## 2022-01-13 PROCEDURE — 90834 PR PSYCHOTHERAPY W/PATIENT, 45 MIN: ICD-10-PCS | Mod: 95,,, | Performed by: PSYCHOLOGIST

## 2022-01-13 NOTE — PROGRESS NOTES
Individual Psychotherapy (PhD/LCSW)    1/13/2022    Site:  Telemed     The patient location is: [place], [place]   The chief complaint leading to consultation is: insomnia    Visit type: audiovisual    Face to Face time with patient: 40  45 minutes of total time spent on the encounter, which includes face to face time and non-face to face time preparing to see the patient (eg, review of tests), Obtaining and/or reviewing separately obtained history, Documenting clinical information in the electronic or other health record, Independently interpreting results (not separately reported) and communicating results to the patient/family/caregiver, or Care coordination (not separately reported).       Each patient to whom he or she provides medical services by telemedicine is:  (1) informed of the relationship between the physician and patient and the respective role of any other health care provider with respect to management of the patient; and (2) notified that he or she may decline to receive medical services by telemedicine and may withdraw from such care at any time.    Notes:     Therapeutic Intervention: Met with patient.  Outpatient - Behavior modifying psychotherapy 45 min - CPT code 84657    Chief complaint/reason for encounter: sleep     Interval history and content of current session:   Cognitive Behavioral Therapy for Insomnia (CBT-i), Session #4  Sleep Diary completed?  Yes  # of days completed: 7  SE:  93%    Session Focus:  Summarize and graph Sleep Diary  Discuss Sleep Restriction/Compression  Prescribed TTB:  11p  Prescribed TOB:  6a  Review progress with Stimulus Control and Sleep Hygiene  Review progress with Relaxation Training  Review Cognitive Restructuring     Practice Assignment:  1) Review treatment materials as needed.  2) Complete the Sleep Diary every day.  Fill it out within two hours of getting up.  3) Implement Stimulus Control and Sleep Hygiene strategies  4) Implement Sleep  Restriction/Compression  5) Practice Relaxation Training at least 20 minutes per day  6) Practice Cognitive Restructuring    Treatment plan:  · Target symptoms: insomnia  · Why chosen therapy is appropriate versus another modality: evidence based practice  · Outcome monitoring methods: self-report, checklist/rating scale  · Therapeutic intervention type: behavior modifying psychotherapy    Risk parameters:  Patient reports no suicidal ideation  Patient reports no homicidal ideation  Patient reports no self-injurious behavior  Patient reports no violent behavior    Verbal deficits: None    Patient's response to intervention:  The patient's response to intervention is accepting.    Progress toward goals and other mental status changes:  The patient's progress toward goals is good.    Diagnosis:     ICD-10-CM ICD-9-CM   1. Insomnia, unspecified type  G47.00 780.52     Plan:  individual psychotherapy    Return to clinic: 1 week    Length of Service (minutes): 45

## 2022-01-14 LAB
COMMENT: NORMAL
FINAL PATHOLOGIC DIAGNOSIS: NORMAL
GROSS: NORMAL
Lab: NORMAL
MICROSCOPIC EXAM: NORMAL

## 2022-01-17 ENCOUNTER — PATIENT MESSAGE (OUTPATIENT)
Dept: INTERNAL MEDICINE | Facility: CLINIC | Age: 42
End: 2022-01-17
Payer: COMMERCIAL

## 2022-01-19 ENCOUNTER — OFFICE VISIT (OUTPATIENT)
Dept: PSYCHIATRY | Facility: CLINIC | Age: 42
End: 2022-01-19
Payer: COMMERCIAL

## 2022-01-19 DIAGNOSIS — G47.00 INSOMNIA, UNSPECIFIED TYPE: Primary | ICD-10-CM

## 2022-01-19 PROCEDURE — 3072F PR LOW RISK FOR RETINOPATHY: ICD-10-PCS | Mod: CPTII,95,, | Performed by: PSYCHOLOGIST

## 2022-01-19 PROCEDURE — 90832 PSYTX W PT 30 MINUTES: CPT | Mod: 95,,, | Performed by: PSYCHOLOGIST

## 2022-01-19 PROCEDURE — 3072F LOW RISK FOR RETINOPATHY: CPT | Mod: CPTII,95,, | Performed by: PSYCHOLOGIST

## 2022-01-19 PROCEDURE — 90832 PR PSYCHOTHERAPY W/PATIENT, 30 MIN: ICD-10-PCS | Mod: 95,,, | Performed by: PSYCHOLOGIST

## 2022-01-19 NOTE — PROGRESS NOTES
Individual Psychotherapy (PhD/LCSW)    1/19/2022    Site:  Telemed     The patient location is: [place], [place]   The chief complaint leading to consultation is: insomnia    Visit type: audiovisual    Face to Face time with patient: 30  35 minutes of total time spent on the encounter, which includes face to face time and non-face to face time preparing to see the patient (eg, review of tests), Obtaining and/or reviewing separately obtained history, Documenting clinical information in the electronic or other health record, Independently interpreting results (not separately reported) and communicating results to the patient/family/caregiver, or Care coordination (not separately reported).       Each patient to whom he or she provides medical services by telemedicine is:  (1) informed of the relationship between the physician and patient and the respective role of any other health care provider with respect to management of the patient; and (2) notified that he or she may decline to receive medical services by telemedicine and may withdraw from such care at any time.    Notes:     Therapeutic Intervention: Met with patient.  Outpatient - Behavior modifying psychotherapy 30 min - CPT code 31995    Chief complaint/reason for encounter: sleep     Interval history and content of current session:   Cognitive Behavioral Therapy for Insomnia (CBT-i), Session #5  Sleep Diary completed?  Yes  # of days completed:  7  SE:  92%    Session Focus:  Summarize and graph Sleep Diary  Discuss Sleep Restriction/Compression  Review progress with Stimulus Control and Sleep Hygiene  Review progress with Relaxation Training  Review Cognitive Restructuring  Discuss Relapse Prevention    Plan:  1) Review and complete treatment materials as needed.  2) Contact Dr. Feldman or other provider for booster sessions if needed.  3) Treatment termination.  Follow-up with referring provider.    Treatment plan:  · Target symptoms: insomnia  · Why chosen  therapy is appropriate versus another modality: evidence based practice  · Outcome monitoring methods: self-report, checklist/rating scale  · Therapeutic intervention type: behavior modifying psychotherapy    Risk parameters:  Patient reports no suicidal ideation  Patient reports no homicidal ideation  Patient reports no self-injurious behavior  Patient reports no violent behavior    Verbal deficits: None    Patient's response to intervention:  The patient's response to intervention is accepting.    Progress toward goals and other mental status changes:  The patient's progress toward goals is good.    Diagnosis:     ICD-10-CM ICD-9-CM   1. Insomnia, unspecified type  G47.00 780.52     Plan:  individual psychotherapy    Return to clinic: as needed    Length of Service (minutes): 30

## 2022-01-20 ENCOUNTER — OFFICE VISIT (OUTPATIENT)
Dept: PSYCHIATRY | Facility: CLINIC | Age: 42
End: 2022-01-20
Payer: COMMERCIAL

## 2022-01-20 ENCOUNTER — PATIENT MESSAGE (OUTPATIENT)
Dept: PSYCHIATRY | Facility: CLINIC | Age: 42
End: 2022-01-20
Payer: COMMERCIAL

## 2022-01-20 ENCOUNTER — CLINICAL SUPPORT (OUTPATIENT)
Dept: PSYCHIATRY | Facility: CLINIC | Age: 42
End: 2022-01-20
Payer: COMMERCIAL

## 2022-01-20 VITALS
SYSTOLIC BLOOD PRESSURE: 134 MMHG | WEIGHT: 256.75 LBS | DIASTOLIC BLOOD PRESSURE: 63 MMHG | BODY MASS INDEX: 42.78 KG/M2 | HEIGHT: 65 IN | HEART RATE: 91 BPM

## 2022-01-20 DIAGNOSIS — R41.840 DIFFICULTY CONCENTRATING: Primary | ICD-10-CM

## 2022-01-20 DIAGNOSIS — G47.00 INSOMNIA, UNSPECIFIED TYPE: ICD-10-CM

## 2022-01-20 LAB
AMPHET+METHAMPHET UR QL: NEGATIVE
BARBITURATES UR QL SCN>200 NG/ML: NEGATIVE
BENZODIAZ UR QL SCN>200 NG/ML: NEGATIVE
BZE UR QL SCN: NEGATIVE
CANNABINOIDS UR QL SCN: NEGATIVE
CREAT UR-MCNC: 172 MG/DL (ref 23–375)
ETHANOL UR-MCNC: <10 MG/DL
METHADONE UR QL SCN>300 NG/ML: NEGATIVE
OPIATES UR QL SCN: NEGATIVE
PCP UR QL SCN>25 NG/ML: NEGATIVE
TOXICOLOGY INFORMATION: NORMAL

## 2022-01-20 PROCEDURE — 3072F LOW RISK FOR RETINOPATHY: CPT | Mod: CPTII,95,, | Performed by: NURSE PRACTITIONER

## 2022-01-20 PROCEDURE — 99999 PR PBB SHADOW E&M-EST. PATIENT-LVL II: CPT | Mod: PBBFAC,,,

## 2022-01-20 PROCEDURE — 90792 PR PSYCHIATRIC DIAGNOSTIC EVALUATION W/MEDICAL SERVICES: ICD-10-PCS | Mod: 95,,, | Performed by: NURSE PRACTITIONER

## 2022-01-20 PROCEDURE — 3072F PR LOW RISK FOR RETINOPATHY: ICD-10-PCS | Mod: CPTII,95,, | Performed by: NURSE PRACTITIONER

## 2022-01-20 PROCEDURE — 80307 DRUG TEST PRSMV CHEM ANLYZR: CPT | Performed by: NURSE PRACTITIONER

## 2022-01-20 PROCEDURE — 90792 PSYCH DIAG EVAL W/MED SRVCS: CPT | Mod: 95,,, | Performed by: NURSE PRACTITIONER

## 2022-01-20 PROCEDURE — 99999 PR PBB SHADOW E&M-EST. PATIENT-LVL II: ICD-10-PCS | Mod: PBBFAC,,,

## 2022-01-20 RX ORDER — DEXMETHYLPHENIDATE HYDROCHLORIDE 10 MG/1
10 TABLET ORAL 3 TIMES DAILY
Qty: 90 TABLET | Refills: 0 | Status: CANCELLED | OUTPATIENT
Start: 2022-01-20

## 2022-01-20 NOTE — PATIENT INSTRUCTIONS
Please call the department at [phone] to schedule a nurse visit for a urine drug screen with 48 to 72 hours of this visit - this can be completed at the 4th floor of the [facility] in the psychiatry department.     Please initial and sign the controlled substance agreement form that I have sent to you as well. Any further questions or concerns regarding this please let me know.               [facility] - DEPARTMENT OF PSYCHIATRY   NEW PATIENT ORIENTATION INFORMATION  OUTPATIENT SERVICES PSYCHIATRY CONTRACT    We appreciate the opportunity to participate in your medical care and hope the following protocols will make it easier for you to receive quality treatment in our department.    1. PUNCTUALITY: Your appointment is scheduled for a fixed amount of time reserved especially for you.  To get the benefit of your appointment, please arrive early enough to allow time for parking and registration.  If you are late for your appointment, your clinician is not able to offer additional time.  Please make every effort to be on time.  You may be asked to reschedule.    2. PAYMENT FOR SERVICES:   Payments are expected at the time of service.  Please contact [phone] if you need to resolve issues involving your account at [facility] or to set up a payment plan.    3. CANCELLATION / MISSED APPOINTMENTS:   In order to receive quality care, all appointments must be kept.  Appointment may be cancelled, ONLY by talking with an  at phone number [phone], between 8:00 a.m. and 5:00 p.m., Monday through Friday, at least 24 hours before your appointment time.  Your clinician reserves this time specifically for you, and if you will be unable to use it, it is necessary that you cancel in a timely manner.  If you do not give at least 24-hour notice of cancellation a fee may be assessed.  Please note that insurance does not cover no-show charges, so you will be billed directly.  If you are consistently  late, cancel, or do not show for your appointments, our department reserves the right to terminate treatment.    4. CALLING THE DEPARTMENT:   MESSAGES, SCHEDULE OR CANCEL APPOINTMENTS- In general you can reach the department by calling [phone], between 8:00 a.m. and 5:00 p.m., Monday through Friday, to schedule or cancel appointments or leave a message for your clinician.  It is advisable to schedule your visits far in advance to obtain the most convenient times for your appointments.   AFTER HOURS, WEEKEND OR HOLIDAYS- For urgent questions after hours, weekends and holidays, calling the department number [phone] will connect you to the [facility] On [facility] nursing staff or the Psychiatry Inpatient Unit.  The [facility] On [facility] nursing staff will speak with you and direct your call/care as necessary.   EMERGENCY-  In case of a crisis when there is a concern of harm to self or others, call 911 or the office [phone] between 8:00 a.m. and 5:00 p.m., Monday through Friday.  After hours, weekends or holidays, please call 911 or go to the Emergency Department where you can be thoroughly evaluated by a physician.    5. TEAM APPROACH:  Most patients receive therapy through our team system.  In the team system, your primary therapist will be a nurse practitioner, , psychologist, psychiatry resident or psychiatrist.  If your therapist is a , psychologist or psychiatry resident, please contact your primary therapist first in matters other than medications or acute medical problems.    6. PRESCRIPTION REFILLS:  Prescription refills must be done at your physician office visit.  You will be given a sufficient number of refills to last one extra month beyond your next appointment.  No additional refills will be approved beyond the original treatment plan.  After hours, sufficient medication may be approved to last until the next scheduled appointment. After hours requests for refills on  controlled substances will be declined by the psychiatrist on call, as he or she may not be familiar with your case.  Please work closely with your doctor so that you have sufficient medication until your next appointment.  Again, please note that no additional prescriptions will be approved per patient request over the phone.   No additional refills will be approved beyond the original treatment plan.   In certain exceptional situations, a phone consult appointment may be arranged, with appropriate charge, for the review and approval of prescription refills to last until the next scheduled appointment.    7. FOLLOW UP APPOINTMENTS:  Follow-up appointments can be made in person at the [facility] office, or by calling [phone], from 8am to 5pm, between Monday and Friday.  It is advisable to schedule your office visits far enough in advance to obtain the most convenient times for your appointments.    Revised Aug. 28, 2020 - F/OA1/Newpatient

## 2022-01-20 NOTE — PROGRESS NOTES
"1/20/2022 11:01 AM   Hermelindo Garza III   1980   [id number]           OUTPATIENT PSYCHIATRY INITIAL EVALUATION NOTE      Hermelindo Garza III, a 41 y.o. male, presenting for initial evaluation visit. Met with patient.    Reason for Encounter: Referral from Dr. Dayana Robertson. Patient complains of ADHD    History of Present Illness:     Today, Patient with hx of pre-glaucoma (eye drops for this), DM type 2 (well controlled with Ozempic), asthma (intermittent, mild), HLD, GERD, insomnia, sleep apnea, ADHD, lumbar discectomy, chronic back pain (managed with gabapentin), ozempic for weight loss.      Reports had previously been a patient of Dr. Dayana Robertson for past 12 to 13 years but was referred to this provider due to Dr. Robertson's [facility].     Patient reports a long history of ADHD, had trialed several medications per communications with Dr. Robertson however Focalin has been the most effective lately. Stopped this for a short while, but since working from home the need for this medication has increased.     Reports anxiety has been more increased since being depleted due to work constraints. Reports some worry late at night with rumination. Occurring randomly but "I do worry a lot."    Reports "I have a compulsion to control and things need to be the way I want them to be."     Also long history of LUBA, insomnia and recently completed BEBP program     Reports been implementing sleep hygiene via BEBP and CBT.     Reports 50# weight loss - this has helped with sleep apnea (settings have been reduced since losing weight).     Discussed to resume focalin IR at [facility] to [facility] pending UDS and CS form completion.    Denies SI/HI, AVH, racing thoughts, substance abuse    Psychosocial stressors: occupational, family, social pressures    Psychiatric Review Of Systems - Is patient experiencing or having changes in:    Symptoms of Depression: Reports no symptoms of depression Denies diminished mood or loss of " interest/anhedonia, irritability, diminished energy, change in sleep, change in appetite, diminished concentration or cognition or indecisiveness, excessive guilt or hopelessness or worthlessness and suicidal ideations -  Denies Passive/Active SI        Symptoms of GODWIN: Reports excessive anxiety/worry/fear, more days than not, about numerous issues, difficult to control, with restlessness, fatigue, poor concentration, irritability, muscle tension, sleep disturbance and causes functionally impairing distress Denies excessive anxiety/worry/fear and irritability  Onset was approximately several years ago. Symptoms have been gradually worsening since that time.      Symptoms of pradeep or hypomania: No elevated, expansive, or irritable mood with increased energy or activity; with inflated self-esteem or grandiosity, decreased need for sleep, increased rate of speech,  racing thoughts, distractibility, increased goal directed activity or PMA, risky/disinhibited behavior      Symptoms of psychosis: No hallucinations, delusions, disorganized thinking, disorganized behavior or abnormal motor behavior, or negative symptoms (diminshed emotional expression, avolition, anhedonia, alogia, asociality     Sleep:     Risk Parameters:  Patient reports no suicidal ideation  Patient reports no homicidal ideation  Patient reports no self-injurious behavior  Patient reports no violent behavior    Other symptoms    Symptoms of Panic Disorder: No recurrent panic attacks, precipitated or un-precipitated, source of worry and/or behavioral changes secondary; with or without agoraphobia    Symptoms of PTSD: No h/o trauma; re-experiencing/intrusive symptoms, avoidant behavior, negative alterations in cognition or mood, or hyperarousal symptoms; with or without dissociative symptoms.      Symptoms of OCD: No obsessions, compulsions or ruminations    Symptoms of Eating Disorders: No anorexia, bulimia or binging. Reports some issues with binge  "eating. Some emotional coping    Symptoms of ADHD: reports inattention or hyperactivity    Substance Use:   Denies       Psychotropic Medication Review:  Previous Trials-  Vyvanse - "didn't like it"  Concerta -   Adderall - "needed a higher dosage, negative impacts"  Focalin - has been effective since 2014  Current meds-    Previous Psychiatric Hospitalizations: none    HISTORY         Past Medical History:   Diagnosis Date    ADD (attention deficit disorder) 5/9/2012    Asthma 5/9/2012    Cervical disc disorder with radiculopathy, unspecified cervical region 9/14/2021    Esophageal ulcer     GERD (gastroesophageal reflux disease) 5/9/2012    Glaucoma     Kidney stones 5/2014    Lumbar disc disease 5/9/2012    Lumbar herniated disc 5/9/2012    LUBA (obstructive sleep apnea)     Radiculopathy, lumbar region 9/14/2021    Renal calculi 5/9/2012    Type 2 diabetes mellitus without complication, without long-term current use of insulin 11/16/2020         History of Seizures or TBI: denies    Past Surgical History:   Procedure Laterality Date    APPENDECTOMY  2006    COLONOSCOPY  01/22/2019    internal hemorrhoids, o/w normal - repeat at age 52    EPIDURAL STEROID INJECTION N/A 2/10/2021    Procedure: CERVICAL C6/7 NELI DIRECT REFERRAL;  Surgeon: Michi Escamilla MD;  Location: [facility];  Service: Pain Management;  Laterality: N/A;  NEEDS CONSENT    ESOPHAGOGASTRODUODENOSCOPY  01/22/2019    LA grade B esophagitis; esophageal stenosis- dilated; gastritis; H pylori pathology negative    ESOPHAGOGASTRODUODENOSCOPY N/A 5/18/2021    Procedure: EGD (ESOPHAGOGASTRODUODENOSCOPY);  Surgeon: Mariana Hector MD;  Location: [facility];  Service: Endoscopy;  Laterality: N/A;    LUMBAR LAMINECTOMY  2010    LUMBAR LAMINECTOMY WITH DISCECTOMY Left 9/20/2021    Procedure: LAMINECTOMY, SPINE, LUMBAR, WITH DISCECTOMY;  Surgeon: Naseem Mcnamara DO;  Location: [facility] OR [address] [place] [address];  Service: Neurosurgery;  Laterality: " Left;  MIS L3-4       Family History   Problem Relation Age of Onset    Hypertension Mother     Transient ischemic attack Mother     Sleep apnea Mother     Chronic back pain Father     MARTITA disease Father     Sleep apnea Father     No Known Problems Sister     No Known Problems Sister     Brain cancer Maternal Grandmother     Diabetes Maternal Grandfather     Breast cancer Paternal Grandmother     No Known Problems Brother     No Known Problems Maternal Aunt     No Known Problems Maternal Uncle     No Known Problems Paternal Aunt     No Known Problems Paternal Uncle     No Known Problems Paternal Grandfather     Amblyopia Neg Hx     Blindness Neg Hx     Cancer Neg Hx     Cataracts Neg Hx     Glaucoma Neg Hx     Macular degeneration Neg Hx     Retinal detachment Neg Hx     Strabismus Neg Hx     Stroke Neg Hx     Thyroid disease Neg Hx        Social History     Socioeconomic History    Marital status:    Tobacco Use    Smoking status: Passive Smoke Exposure - Never Smoker    Smokeless tobacco: Never Used   Substance and Sexual Activity    Alcohol use: Yes     Alcohol/week: 0.0 standard drinks     Comment: Occasional    Drug use: No    Sexual activity: Yes     Social Determinants of Health     Financial Resource Strain: Low Risk     Difficulty of Paying Living Expenses: Not very hard   Food Insecurity: No Food Insecurity    Worried About Running Out of Food in the Last Year: Never true    Ran Out of Food in the Last Year: Never true   Transportation Needs: No Transportation Needs    Lack of Transportation (Medical): No    Lack of Transportation (Non-Medical): No   Physical Activity: Insufficiently Active    Days of Exercise per Week: 5 days    Minutes of Exercise per Session: 20 min   Stress: Stress Concern Present    Feeling of Stress : Very much   Social Connections: Unknown    Frequency of Communication with Friends and Family: More than three times a week     Frequency of Social Gatherings with Friends and Family: More than three times a week    Active Member of Clubs or Organizations: No    Attends Club or Organization Meetings: Never    Marital Status:    Housing Stability: Low Risk     Unable to Pay for Housing in the Last Year: No    Number of Places Lived in the Last Year: 1    Unstable Housing in the Last Year: No       Psychiatric History:  ADHD was dx'd after college, assessment completed per Dr. Robertson about 12 to 13 years prior and has been on several medications since that time.     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 Normal childhood growing up.      since 2018, with partner since 2012. Happy marriage most of the time. Get along with mother and sisters.     No children. Currently live in home in [place]. ETOH twice weekly. No THC recently. No . No prior arrests or longterm time. Formal routine exercise, go to gym every weekday, walk, swim and light weight several times per week. One large cup of coffee (equivalent to 2 cups of coffee per day in the am).        Guns or other weapons in the home:      OBJECTIVE       Constitutional  Vitals:  Most recent vital signs, dated less than 90 days prior to this appointment, were reviewed.    There were no vitals filed for this visit.         Laboratory Data: Reviewed most recent     Medications:  Outpatient Encounter Medications as of 1/20/2022   Medication Sig Dispense Refill    albuterol (PROVENTIL HFA) 90 mcg/actuation inhaler Inhale 2 puffs into the lungs every 6 (six) hours as needed for Wheezing. Rescue 18 g 5     atorvastatin (LIPITOR) 20 MG tablet TAKE 1 TABLET(20 MG) BY MOUTH EVERY DAY 90 tablet 3    blood-glucose meter kit Use as instructed 1 each 0    ergocalciferol, vitamin D2, (VITAMIN D ORAL) Take 1 tablet by mouth every other day.      esomeprazole (NEXIUM) 40 mg GrPS Take 40 mg by mouth 2 (two) times daily before meals. 60 each 11    fluocinonide (LIDEX) 0.05 % external solution AAA scalp qday prn itching, scaling 60 mL 3    gabapentin (NEURONTIN) 300 MG capsule Take 1 capsule (300 mg total) by mouth 3 (three) times daily. 90 capsule 3    hydrocortisone 2.5 % cream Apply topically 2 (two) times daily. for 10 days 28 g 0    ketoconazole (NIZORAL) 2 % cream Apply topically 2 (two) times daily. Apply to to affected area bid 15 g 0    ketoconazole (NIZORAL) 2 % shampoo Apply topically twice a week. 120 mL 3    ketoconazole (NIZORAL) 2 % shampoo Wash hair with medicated shampoo at least 2x/week - let sit on scalp at least 5 minutes prior to rinsing 120 mL 5    lancets Misc 1 each by Misc.(Non-Drug; Combo Route) route Daily. 30 each 11    latanoprost 0.005 % ophthalmic solution Place 1 drop into both eyes every evening. This replaces the timolol 3 Bottle 12    montelukast (SINGULAIR) 10 mg tablet TAKE 1 TABLET(10 MG) BY MOUTH EVERY EVENING 30 tablet 11    ondansetron (ZOFRAN-ODT) 4 MG TbDL Take 1 tablet (4 mg total) by mouth every 6 (six) hours as needed (nausea). 30 tablet 3    rOPINIRole (REQUIP) 0.25 MG tablet TAKE 1 TABLET(0.25 MG) BY MOUTH EVERY EVENING 30 tablet 2    semaglutide (OZEMPIC) 0.25 mg or 0.5 mg(2 mg/1.5 mL) pen injector INJECT 0.25 MG UNDER THE SKIN EVERY 7 DAYS 2 pen 0    TRUE METRIX GLUCOSE TEST STRIP Strp USE TO TEST ONCE DAILY 25 strip 2    zolpidem (AMBIEN) 5 MG Tab Take 1 tablet (5 mg total) by mouth nightly as needed (insomnia). 30 tablet 1     No facility-administered encounter medications on file as of 1/20/2022.       Allergy:  Review of patient's allergies indicates:  No Known  "Allergies    Nutritional Screening: Considering the patient's height and weight, medications, medical history and preferences, should a referral be made to the dietitian? no    Review of Systems:  General: unremarkable, age appropriate  Resp:  No shortness of breath, hyperventilation or cough  Cvs:  No tachycardia or chest pain  Gi:  No nausea, vomiting, pain, constipation or diarrhea  Musculoskeletal:  No pain or stiffness of the joints  Muscle Strength/Tone:not examined  Neurological:  No weakness, sensory changes, seizures, confusion, memory loss, tremor or other abnormal movements   Gait & Station:non-ataxic    AIMS:  n/a *    Mental Status Exam:  Appearance: unremarkable, age appropriate, casually dressed  Behavior/Cooperation:appropriate friendly and cooperative   Speech: appropriate rate, volume and tone spontaneous   Language: uses words appropriately; NO aphasia or dysarthria  Mood: "  "  Affect:  full, congruent with mood and appropriate to situation/content.  Thought Process:  normal and logical  Thought Content: normal, no suicidality, no homicidality, delusions, or paranoia  Sensorium:  Awake  Alert and Oriented: x3 grossly intact  Memory: Intact to conversation both recent and remote  Attention/concentration: appropriate for age/education.   Insight: Intact  Judgment:Intact        Strengths and Liabilities: Strength: Patient accepts guidance/feedback, Strength: Patient is expressive/articulate., Strength: Patient is intelligent., Strength: Patient is motivated for change., Strength: Patient has positive support network., Strength: Patient has reasonable judgment., Liability: Patient has poor health., Liability: Patient lacks coping skills.    ASSESSMENT     Impression: ADHD, inattentive type                       LUBA                       Insomnia                       Anxiety and depression NOS        Treatment Goals:  Specify outcomes written in observable, behavioral terms:   Anxiety: acquiring " relapse prevention skills, reducing negative automatic thoughts, reducing physical symptoms of anxiety and reducing time spent worrying (<30 minutes/day)  ADHD: maintain focus, attention, emotional regulation, impulse control, improved work performance and improved self-confidence at home and in occupational setting    TREATMENT PLAN     · Medication Management:   · Continue Focalin IR 10 mg by mouth three times daily pending UDS and CS form completion  · Labs: reviewed most recent labs  · The treatment plan and follow up plan were reviewed with the patient.  · Discussed with patient informed consent, risks vs. benefits, alternative treatments, side effect profile and the inherent unpredictability of individual responses to these treatments. The patient expresses understanding of the above and displays the capacity to agree with this current plan and had no other questions.  · Encouraged Patient to keep future appointments.   · Take medications as prescribed and abstain from substance abuse.   · In the event of an emergency patient was advised to go to the emergency room.  · Referral for further treatment to social work team for psychotherapy    Return to Clinic:  3 months    > than 50% of total time spend on coordination of care and counseling   (which included pts differential diagnosis and prognosis for psychiatric conditions, risks, benefits of treatments, instructions and adherence to treatment plan, risk reduction, reviewing current psychiatric medication regimen, medical problems and social stressors. In addtion to possible discussion with other healthcare provider/s)    Add on Psychotherapy time: 0  Total Face to face time: 60mins    The patient location is: [place], at home  The chief complaint leading to consultation is: establish care    Visit type: audiovisual    Face to Face time with patient: 60 min  60 minutes of total time spent on the encounter, which includes face to face time and non-face to  face time preparing to see the patient (eg, review of tests), Obtaining and/or reviewing separately obtained history, Documenting clinical information in the electronic or other health record, Independently interpreting results (not separately reported) and communicating results to the patient/family/caregiver, or Care coordination (not separately reported).         Each patient to whom he or she provides medical services by telemedicine is:  (1) informed of the relationship between the physician and patient and the respective role of any other health care provider with respect to management of the patient; and (2) notified that he or she may decline to receive medical services by telemedicine and may withdraw from such care at any time.    Notes:     Tor Hernandez, MSN, APRN, PMHNP-BC  [facility]   1/20/2022 11:01 AM

## 2022-01-21 RX ORDER — DEXMETHYLPHENIDATE HYDROCHLORIDE 10 MG/1
10 TABLET ORAL 3 TIMES DAILY
Qty: 90 TABLET | Refills: 0 | Status: SHIPPED | OUTPATIENT
Start: 2022-01-21 | End: 2022-03-29

## 2022-01-21 RX ORDER — DEXMETHYLPHENIDATE HYDROCHLORIDE 10 MG/1
10 TABLET ORAL 3 TIMES DAILY
Qty: 90 TABLET | Refills: 0 | Status: SHIPPED | OUTPATIENT
Start: 2022-02-19 | End: 2022-03-29

## 2022-01-21 RX ORDER — DEXMETHYLPHENIDATE HYDROCHLORIDE 10 MG/1
10 TABLET ORAL 3 TIMES DAILY
Qty: 90 TABLET | Refills: 0 | Status: SHIPPED | OUTPATIENT
Start: 2022-03-17 | End: 2022-03-29

## 2022-02-02 DIAGNOSIS — E11.9 TYPE 2 DIABETES MELLITUS WITHOUT COMPLICATION, WITHOUT LONG-TERM CURRENT USE OF INSULIN: ICD-10-CM

## 2022-02-02 DIAGNOSIS — E66.01 MORBID OBESITY: ICD-10-CM

## 2022-02-03 NOTE — TELEPHONE ENCOUNTER
No new care gaps identified.  Powered by [employer]. Reference number: [id number].   2/02/2022 8:45:32 PM CST

## 2022-02-07 ENCOUNTER — PATIENT MESSAGE (OUTPATIENT)
Dept: INTERNAL MEDICINE | Facility: CLINIC | Age: 42
End: 2022-02-07
Payer: COMMERCIAL

## 2022-02-07 DIAGNOSIS — E11.9 TYPE 2 DIABETES MELLITUS WITHOUT COMPLICATION, WITHOUT LONG-TERM CURRENT USE OF INSULIN: ICD-10-CM

## 2022-02-07 DIAGNOSIS — E66.01 MORBID OBESITY: ICD-10-CM

## 2022-02-07 RX ORDER — SEMAGLUTIDE 1.34 MG/ML
INJECTION, SOLUTION SUBCUTANEOUS
Qty: 2 PEN | Refills: 2 | Status: SHIPPED | OUTPATIENT
Start: 2022-02-07 | End: 2022-02-08 | Stop reason: SDUPTHER

## 2022-02-07 NOTE — TELEPHONE ENCOUNTER
No new care gaps identified.  Powered by [employer]it. Reference number: [id number].   2/07/2022 9:15:29 AM CST

## 2022-02-08 ENCOUNTER — PATIENT MESSAGE (OUTPATIENT)
Dept: INTERNAL MEDICINE | Facility: CLINIC | Age: 42
End: 2022-02-08
Payer: COMMERCIAL

## 2022-02-08 DIAGNOSIS — E11.9 TYPE 2 DIABETES MELLITUS WITHOUT COMPLICATION, WITHOUT LONG-TERM CURRENT USE OF INSULIN: ICD-10-CM

## 2022-02-08 DIAGNOSIS — E66.01 MORBID OBESITY: ICD-10-CM

## 2022-02-08 RX ORDER — SEMAGLUTIDE 1.34 MG/ML
0.5 INJECTION, SOLUTION SUBCUTANEOUS
Qty: 2 PEN | Refills: 2 | Status: SHIPPED | OUTPATIENT
Start: 2022-02-08 | End: 2022-06-21 | Stop reason: SDUPTHER

## 2022-02-08 NOTE — TELEPHONE ENCOUNTER
No new care gaps identified.  Powered by [employer]. Reference number: [id number].   2/08/2022 11:59:26 AM CST

## 2022-02-08 NOTE — TELEPHONE ENCOUNTER
Please send pt's Ozempic Rx to [facility] so they can work on Prior Auth for this med

## 2022-02-10 ENCOUNTER — TELEPHONE (OUTPATIENT)
Dept: PHARMACY | Facility: CLINIC | Age: 42
End: 2022-02-10
Payer: COMMERCIAL

## 2022-02-11 ENCOUNTER — TELEPHONE (OUTPATIENT)
Dept: PHARMACY | Facility: CLINIC | Age: 42
End: 2022-02-11
Payer: COMMERCIAL

## 2022-02-14 RX ORDER — SEMAGLUTIDE 1.34 MG/ML
INJECTION, SOLUTION SUBCUTANEOUS
OUTPATIENT
Start: 2022-02-14

## 2022-02-14 NOTE — TELEPHONE ENCOUNTER
This medication has been reordered by the PCP already.  Will remove duplicate and close out encounter.   minimal

## 2022-02-16 ENCOUNTER — PATIENT MESSAGE (OUTPATIENT)
Dept: NEUROSURGERY | Facility: CLINIC | Age: 42
End: 2022-02-16

## 2022-02-17 ENCOUNTER — PATIENT MESSAGE (OUTPATIENT)
Dept: DERMATOLOGY | Facility: CLINIC | Age: 42
End: 2022-02-17
Payer: COMMERCIAL

## 2022-02-17 DIAGNOSIS — D03.59 MELANOMA IN SITU OF BACK: Primary | ICD-10-CM

## 2022-02-17 RX ORDER — DIAZEPAM 5 MG/1
5 TABLET ORAL EVERY 6 HOURS PRN
Qty: 2 TABLET | Refills: 0 | Status: SHIPPED | OUTPATIENT
Start: 2022-02-17 | End: 2022-05-12 | Stop reason: ALTCHOICE

## 2022-02-21 ENCOUNTER — PROCEDURE VISIT (OUTPATIENT)
Dept: DERMATOLOGY | Facility: CLINIC | Age: 42
End: 2022-02-21
Payer: COMMERCIAL

## 2022-02-21 VITALS
DIASTOLIC BLOOD PRESSURE: 82 MMHG | WEIGHT: 256 LBS | HEART RATE: 91 BPM | HEIGHT: 65 IN | SYSTOLIC BLOOD PRESSURE: 120 MMHG | BODY MASS INDEX: 42.65 KG/M2

## 2022-02-21 DIAGNOSIS — D03.59 MELANOMA IN SITU OF TRUNK: Primary | ICD-10-CM

## 2022-02-21 PROCEDURE — 13101 PR RECMPL WND TRUNK 2.6-7.5 CM: ICD-10-PCS | Mod: 51,S$GLB,, | Performed by: DERMATOLOGY

## 2022-02-21 PROCEDURE — 99499 NO LOS: ICD-10-PCS | Mod: S$GLB,,, | Performed by: DERMATOLOGY

## 2022-02-21 PROCEDURE — 13101 CMPLX RPR TRUNK 2.6-7.5 CM: CPT | Mod: 51,S$GLB,, | Performed by: DERMATOLOGY

## 2022-02-21 PROCEDURE — 11603 PR EXC SKIN MALIG 2.1-3 CM TRUNK,ARM,LEG: ICD-10-PCS | Mod: S$GLB,,, | Performed by: DERMATOLOGY

## 2022-02-21 PROCEDURE — 11603 EXC TR-EXT MAL+MARG 2.1-3 CM: CPT | Mod: S$GLB,,, | Performed by: DERMATOLOGY

## 2022-02-21 PROCEDURE — 99499 UNLISTED E&M SERVICE: CPT | Mod: S$GLB,,, | Performed by: DERMATOLOGY

## 2022-02-21 PROCEDURE — 88305 TISSUE EXAM BY PATHOLOGIST: CPT | Mod: 26,,, | Performed by: PATHOLOGY

## 2022-02-21 PROCEDURE — 88305 TISSUE EXAM BY PATHOLOGIST: CPT | Performed by: PATHOLOGY

## 2022-02-21 PROCEDURE — 88305 TISSUE EXAM BY PATHOLOGIST: ICD-10-PCS | Mod: 26,,, | Performed by: PATHOLOGY

## 2022-02-21 RX ORDER — HYDROCODONE BITARTRATE AND ACETAMINOPHEN 5; 325 MG/1; MG/1
1 TABLET ORAL EVERY 6 HOURS PRN
Qty: 10 TABLET | Refills: 0 | Status: SHIPPED | OUTPATIENT
Start: 2022-02-21 | End: 2022-07-02

## 2022-02-21 RX ORDER — CEPHALEXIN 500 MG/1
500 CAPSULE ORAL 3 TIMES DAILY
Qty: 30 CAPSULE | Refills: 0 | Status: SHIPPED | OUTPATIENT
Start: 2022-02-21 | End: 2022-03-03

## 2022-02-21 NOTE — PROGRESS NOTES
PROCEDURE: Wide local excision of melanoma in situ with complex linear repair    REFERRING MD: Coty Jean M.D.    SURGEON: Jerrod Leslie MD    ASSISTANTS: Dolores Yuen PA-C and Gisell Fleming Surg Charlene    ANESTHETIC: 21 cc 1% Lidocaine with Epinephrine 1:100,000    SURGICAL PREP: Hibiclens, prepped by Gisell Fleming Surg Charlene    PREOPERATIVE DIAGNOSIS: Melanoma in situ, path # OMS-    POSTOPERATIVE DIAGNOSIS: Melanoma in situ    PATHOLOGIC DIAGNOSIS: Pending    LOCATION: right mid back. Location verified with Dr. Jean's clinical photograph. Patient also verified location by looking at photo taken prior to procedure.     INITIAL LESION SIZE: 0.6 x 0.8 cm    DEFECT SIZE: 3.0 x 3.1 cm    THICKNESS OF MELANOMA: melanoma in situ    EXCISED MARGINS: 1 cm    DEPTH OF EXCISION DOWN TO FASCIA: Yes    PREPARATION:  The diagnosis, procedure, alternatives, benefits and risks, including but not limited to: drug reactions, pain, scar or cosmetic defect, local sensation disturbances, and/or recurrence of present condition were explained to the patient. The patient elected to proceed.    PROCEDURE:  The area involved by the melanoma was marked out with a surgical marking pen. The area of right mid back was prepped, draped, and anesthetized in the usual sterile fashion. Lesional tissue was carefully marked with at least 1 cm margins of clinically normal skin in all directions. A fusiform elliptical excision was performed with a #10 blade carried down completely through the dermis to the deep subcutaneous tissue plane just above fascia. A short suture was placed superiorly at the 12 o' clock and a long suture was placed right laterally at the 3 o' clock position. The lesion was then removed in total and submitted for histological margin evaluation. The operative site was then extensively undermined in all directions in the subcutaneous tissue plane. The wound was undermined to a distance at least the maximum width of the  "defect as measured perpendicular to the closure line along at least one entire edge of the defect, in this case 3 cm. Then, electrocoagulation was used to obtain hemostasis. Blood loss was minimal. The deep subcutaneous tissue plane was closed first with 3-0 Vicryl buried vertical mattress sutures in order to close off dead space. Then, a more superficial layer of 3-0 Vicryl and 4-0 Vicryl buried vertical mattress sutures was placed in the mid-dermal and subcutaneous tissue planes in order to approximate the wound edges. The cutaneous wound edges were closed using interrupted 4-0 Prolene sutures.    The patient tolerated the procedure well.    The area was cleaned and dressed appropriately and the patient was given wound care instructions, as well as an appointment for follow-up evaluation and suture removal in 14 days. Patient was placed on Norco 5-325 mg prn postop pain and Keflex 500 mg TID x 10 days.    LENGTH OF REPAIR: 7.3 cm    Vitals:    02/21/22 1331 02/21/22 1537   BP: 123/81 120/82   BP Location: Left arm Left arm   Patient Position: Sitting Sitting   BP Method: Large (Automatic) Large (Automatic)   Pulse: 77 91   Weight: 116.1 kg (256 lb)    Height: 5' 5" (1.651 m)          "

## 2022-02-25 LAB
FINAL PATHOLOGIC DIAGNOSIS: NORMAL
GROSS: NORMAL
Lab: NORMAL
MICROSCOPIC EXAM: NORMAL

## 2022-02-26 ENCOUNTER — PATIENT MESSAGE (OUTPATIENT)
Dept: DERMATOLOGY | Facility: CLINIC | Age: 42
End: 2022-02-26
Payer: COMMERCIAL

## 2022-02-26 ENCOUNTER — PATIENT MESSAGE (OUTPATIENT)
Dept: OPHTHALMOLOGY | Facility: CLINIC | Age: 42
End: 2022-02-26
Payer: COMMERCIAL

## 2022-02-28 ENCOUNTER — TELEPHONE (OUTPATIENT)
Dept: DERMATOLOGY | Facility: CLINIC | Age: 42
End: 2022-02-28
Payer: COMMERCIAL

## 2022-02-28 NOTE — TELEPHONE ENCOUNTER
Pt was informed with results from melanoma excision on back, all margins negative, no further tx needed. Pt verbally confirmed understanding with information given to him.

## 2022-02-28 NOTE — TELEPHONE ENCOUNTER
----- Message from Jerrod Leslie MD sent at 2/27/2022  5:02 PM CST -----  Regarding: Path results  Pl call patient with results of melanoma excision on back, all margins negative, no further treatment needed. Thanks

## 2022-03-07 ENCOUNTER — OFFICE VISIT (OUTPATIENT)
Dept: DERMATOLOGY | Facility: CLINIC | Age: 42
End: 2022-03-07
Payer: COMMERCIAL

## 2022-03-07 DIAGNOSIS — Z09 POSTOP CHECK: Primary | ICD-10-CM

## 2022-03-07 PROCEDURE — 99024 PR POST-OP FOLLOW-UP VISIT: ICD-10-PCS | Mod: S$GLB,,, | Performed by: DERMATOLOGY

## 2022-03-07 PROCEDURE — 99999 PR PBB SHADOW E&M-EST. PATIENT-LVL II: ICD-10-PCS | Mod: PBBFAC,,, | Performed by: DERMATOLOGY

## 2022-03-07 PROCEDURE — 99999 PR PBB SHADOW E&M-EST. PATIENT-LVL II: CPT | Mod: PBBFAC,,, | Performed by: DERMATOLOGY

## 2022-03-07 PROCEDURE — 3072F PR LOW RISK FOR RETINOPATHY: ICD-10-PCS | Mod: CPTII,S$GLB,, | Performed by: DERMATOLOGY

## 2022-03-07 PROCEDURE — 3072F LOW RISK FOR RETINOPATHY: CPT | Mod: CPTII,S$GLB,, | Performed by: DERMATOLOGY

## 2022-03-07 PROCEDURE — 99024 POSTOP FOLLOW-UP VISIT: CPT | Mod: S$GLB,,, | Performed by: DERMATOLOGY

## 2022-03-07 NOTE — PROGRESS NOTES
41 y.o. male patient is here for suture removal following Wide Local Excision.    Patient reports no problems.    WOUND PE:  The R mid back sutures intact. Wound healing well. Good skin edges. No signs or symptoms of infection.    IMPRESSION:  Healing operative site, MIS R mid back s/p Wide Local Excision with CLC, postop day #14, margins negative,     PLAN:  Sutures removed today by Gisell Fleming, Surg Tech. Steri-strips applied.  Continue wound care.  Keep moist with Aquaphor.  Disc negative margins and need for regular skin checks.     RTC:  In 3 months with Coty Jean M.D. for skin check or sooner if new concern arises.

## 2022-03-09 ENCOUNTER — PATIENT MESSAGE (OUTPATIENT)
Dept: NEUROSURGERY | Facility: CLINIC | Age: 42
End: 2022-03-09
Payer: COMMERCIAL

## 2022-03-16 ENCOUNTER — PATIENT MESSAGE (OUTPATIENT)
Dept: PSYCHIATRY | Facility: CLINIC | Age: 42
End: 2022-03-16
Payer: COMMERCIAL

## 2022-03-29 ENCOUNTER — OFFICE VISIT (OUTPATIENT)
Dept: PSYCHIATRY | Facility: CLINIC | Age: 42
End: 2022-03-29
Payer: COMMERCIAL

## 2022-03-29 DIAGNOSIS — G47.00 INSOMNIA, UNSPECIFIED TYPE: ICD-10-CM

## 2022-03-29 DIAGNOSIS — F90.0 ADHD (ATTENTION DEFICIT HYPERACTIVITY DISORDER), INATTENTIVE TYPE: Primary | ICD-10-CM

## 2022-03-29 PROCEDURE — 99214 PR OFFICE/OUTPT VISIT, EST, LEVL IV, 30-39 MIN: ICD-10-PCS | Mod: 95,,, | Performed by: NURSE PRACTITIONER

## 2022-03-29 PROCEDURE — 3072F LOW RISK FOR RETINOPATHY: CPT | Mod: CPTII,95,, | Performed by: NURSE PRACTITIONER

## 2022-03-29 PROCEDURE — 99214 OFFICE O/P EST MOD 30 MIN: CPT | Mod: 95,,, | Performed by: NURSE PRACTITIONER

## 2022-03-29 PROCEDURE — 3072F PR LOW RISK FOR RETINOPATHY: ICD-10-PCS | Mod: CPTII,95,, | Performed by: NURSE PRACTITIONER

## 2022-03-29 RX ORDER — TRAZODONE HYDROCHLORIDE 50 MG/1
50 TABLET ORAL NIGHTLY PRN
Qty: 30 TABLET | Refills: 2 | Status: SHIPPED | OUTPATIENT
Start: 2022-03-29 | End: 2022-06-23

## 2022-03-29 RX ORDER — DEXMETHYLPHENIDATE HYDROCHLORIDE 10 MG/1
10 TABLET ORAL 3 TIMES DAILY
Qty: 90 TABLET | Refills: 0 | Status: SHIPPED | OUTPATIENT
Start: 2022-04-15 | End: 2022-04-26

## 2022-03-29 RX ORDER — DEXMETHYLPHENIDATE HYDROCHLORIDE 10 MG/1
10 TABLET ORAL 3 TIMES DAILY
Qty: 90 TABLET | Refills: 0 | Status: SHIPPED | OUTPATIENT
Start: 2022-05-13 | End: 2022-06-23

## 2022-03-29 RX ORDER — DEXMETHYLPHENIDATE HYDROCHLORIDE 10 MG/1
10 TABLET ORAL 3 TIMES DAILY
Qty: 90 TABLET | Refills: 0 | Status: SHIPPED | OUTPATIENT
Start: 2022-06-11 | End: 2022-06-23

## 2022-03-29 NOTE — PROGRESS NOTES
"3/29/2022 11:37 AM   Hermelindo Garza III   1980   [id number]           OUTPATIENT PSYCHIATRY FOLLOW- UP VISIT    Reason for Encounter:  Hermelindo Garza III, a 41 y.o. male,who presents today for follow up of ADHD. Met with patient.    Interval History and Content of Current Session:    Today, Patient with hx of pre-glaucoma (eye drops for this), DM type 2 (well controlled with Ozempic), asthma (intermittent, mild), HLD, GERD, insomnia, sleep apnea, ADHD, lumbar discectomy, chronic back pain (managed with gabapentin), ozempic for weight loss.       Reports as follow up from last visit about 2 months prior - reports has been having some issues with struggling with sleep, "have to implement what I learned in therapy (BEBP)"    Reports work schedule has been difficult and "making me obsess about things." Reports short-staffed and "going 100 miles an hour one day and then crashing the next."     Reports "I have a compulsion to control and things need to be the way I want them to be."      Also long history of LUBA, insomnia and recently completed BEBP program      Reports been implementing sleep hygiene via BEBP and CBT.      Reports 50# weight loss - this has helped with sleep apnea (settings have been reduced since losing weight).     Discussed trial of trazodone for sleep, discussed risks, benefits, side effects, alternative options  - including priaprism discussed this visit.      Denies SI/HI, AVH, racing thoughts, substance abuse                    Psychosocial stressors:      Review Of Systems:     Medical Review Of Systems:  Constitutional: negative for fatigue and weight loss  Cardiovascular: negative  Gastrointestinal: negative    Psychiatric Review Of Systems:  sleep: no  appetite changes: no  weight changes: no  energy/anergy: no  interest/pleasure/anhedonia: no  somatic symptoms: no  libido: no  anxiety/panic: no  guilty/hopeless: no  S.I.B.s/risky behavior: no  any drugs: no  alcohol: no  "     Sleep: 6 to 7 hours per night    Risk Parameters:  Patient reports no suicidal ideation  Patient reports no homicidal ideation  Patient reports no self-injurious behavior  Patient reports no violent behavior      Current meds-    Compliance: yes    Side effects: None      Psychiatric, social, and family history reviewed this visit      Objective     ALL MEDICATIONS:    Current Outpatient Medications:     albuterol (PROVENTIL HFA) 90 mcg/actuation inhaler, Inhale 2 puffs into the lungs every 6 (six) hours as needed for Wheezing. Rescue, Disp: 18 g, Rfl: 5    atorvastatin (LIPITOR) 20 MG tablet, TAKE 1 TABLET(20 MG) BY MOUTH EVERY DAY, Disp: 90 tablet, Rfl: 3    blood-glucose meter kit, Use as instructed, Disp: 1 each, Rfl: 0    dexmethylphenidate (FOCALIN) 10 MG tablet, Take 1 tablet (10 mg total) by mouth 3 (three) times daily., Disp: 90 tablet, Rfl: 0    dexmethylphenidate (FOCALIN) 10 MG tablet, Take 1 tablet (10 mg total) by mouth 3 (three) times daily., Disp: 90 tablet, Rfl: 0    dexmethylphenidate (FOCALIN) 10 MG tablet, Take 1 tablet (10 mg total) by mouth 3 (three) times daily., Disp: 90 tablet, Rfl: 0    diazePAM (VALIUM) 5 MG tablet, Take 1 tablet (5 mg total) by mouth every 6 (six) hours as needed for Anxiety. Take 1 pill 30 minutes prior to surgery and bring remaining pill with you on day of surgery., Disp: 2 tablet, Rfl: 0    ergocalciferol, vitamin D2, (VITAMIN D ORAL), Take 1 tablet by mouth every other day., Disp: , Rfl:     esomeprazole (NEXIUM) 40 mg GrPS, Take 40 mg by mouth 2 (two) times daily before meals., Disp: 60 each, Rfl: 11    fluocinonide (LIDEX) 0.05 % external solution, AAA scalp qday prn itching, scaling, Disp: 60 mL, Rfl: 3    gabapentin (NEURONTIN) 300 MG capsule, Take 1 capsule (300 mg total) by mouth 3 (three) times daily., Disp: 90 capsule, Rfl: 3    HYDROcodone-acetaminophen (NORCO) 5-325 mg per tablet, Take 1 tablet by mouth every 6 (six) hours as needed for  Pain., Disp: 10 tablet, Rfl: 0    hydrocortisone 2.5 % cream, Apply topically 2 (two) times daily. for 10 days, Disp: 28 g, Rfl: 0    ketoconazole (NIZORAL) 2 % cream, Apply topically 2 (two) times daily. Apply to to affected area bid, Disp: 15 g, Rfl: 0    ketoconazole (NIZORAL) 2 % shampoo, Apply topically twice a week., Disp: 120 mL, Rfl: 3    ketoconazole (NIZORAL) 2 % shampoo, Wash hair with medicated shampoo at least 2x/week - let sit on scalp at least 5 minutes prior to rinsing, Disp: 120 mL, Rfl: 5    lancets Misc, 1 each by Misc.(Non-Drug; Combo Route) route Daily., Disp: 30 each, Rfl: 11    latanoprost 0.005 % ophthalmic solution, Place 1 drop into both eyes every evening. This replaces the timolol, Disp: 3 Bottle, Rfl: 12    methocarbamoL (ROBAXIN) 750 MG Tab, TAKE 1 TABLET(750 MG) BY MOUTH THREE TIMES DAILY AS NEEDED FOR MUSCLE SPASM, Disp: 90 tablet, Rfl: 0    montelukast (SINGULAIR) 10 mg tablet, TAKE 1 TABLET(10 MG) BY MOUTH EVERY EVENING, Disp: 30 tablet, Rfl: 11    ondansetron (ZOFRAN-ODT) 4 MG TbDL, Take 1 tablet (4 mg total) by mouth every 6 (six) hours as needed (nausea)., Disp: 30 tablet, Rfl: 3    rOPINIRole (REQUIP) 0.25 MG tablet, TAKE 1 TABLET(0.25 MG) BY MOUTH EVERY EVENING, Disp: 30 tablet, Rfl: 2    semaglutide (OZEMPIC) 0.25 mg or 0.5 mg(2 mg/1.5 mL) pen injector, Inject 0.5 mg into the skin every 7 days., Disp: 2 pen, Rfl: 2    TRUE METRIX GLUCOSE TEST STRIP Strp, USE TO TEST ONCE DAILY, Disp: 25 strip, Rfl: 2    zolpidem (AMBIEN) 5 MG Tab, Take 1 tablet (5 mg total) by mouth nightly as needed (insomnia)., Disp: 30 tablet, Rfl: 1    ALLERGIES:  Review of patient's allergies indicates:  No Known Allergies    RELEVANT LABS/STUDIES:    Lab Results   Component Value Date    WBC 9.55 09/20/2021    HGB 16.5 09/20/2021    HCT 48.8 09/20/2021    MCV 89 09/20/2021     09/20/2021     BMP  Lab Results   Component Value Date     09/20/2021    K 4.1 09/20/2021      09/20/2021    CO2 21 (L) 09/20/2021    BUN 17 09/20/2021    CREATININE 0.7 09/20/2021    CALCIUM 9.3 09/20/2021    ANIONGAP 11 09/20/2021    ESTGFRAFRICA >60.0 09/20/2021    EGFRNONAA >60.0 09/20/2021     Lab Results   Component Value Date    ALT 47 (H) 09/19/2021    AST 19 09/19/2021    ALKPHOS 107 09/19/2021    BILITOT 0.7 09/19/2021     Lab Results   Component Value Date    TSH 2.296 07/30/2020     Lab Results   Component Value Date    HGBA1C 5.2 08/13/2021       Constitutional  Vitals:  Most recent vital signs, dated less than 90 days prior to this appointment, were reviewed.    There were no vitals filed for this visit.         PHYSICAL EXAM  General: well developed, well nourished  Neurologic:   Gait: Normal   Psychomotor signs:  No involuntary movements or tremor  AIMS: none    PSYCHIATRIC EXAM:     Mental Status Exam:  Appearance: unremarkable, age appropriate  Behavior/Cooperation: normal, cooperative  Speech: normal tone, normal rate, normal pitch, normal volume  Language: uses words appropriately; NO aphasia or dysarthria  Mood: steady  Affect: full, congruent with mood and appropriate to content/situation  Thought Process: normal and logical  Thought Content: normal, no suicidality, no homicidality, delusions, or paranoia  Level of Consciousness: Alert and Oriented x3  Memory:  Intact  Attention/concentration: appropriate for age/education.   Fund of Knowledge: appears adequate  Insight: Intact  Judgment: Intact     Assessment and Diagnosis   Status/Progress: Based on the examination today, the patient's problem(s) is/are well controlled.  New problems have not been presented today.   Co-morbidities are complicating management of the primary condition.  There are no active rule-out diagnoses for this patient at this time.     General Impression:   ADHD, inattentive type  LUBA  Insomnia  Anxiety and depression NOS      Intervention/Counseling/Treatment Plan   · Medication Management:   · Continue Focalin  IR 10 mg by mouth three times daily  · Labs: reviewed most recent  · The treatment plan and follow up plan were reviewed with the patient.  · Discussed with patient informed consent, risks vs. benefits, alternative treatments, side effect profile and the inherent unpredictability of individual responses to these treatments. The patient expresses understanding of the above and displays the capacity to agree with this current plan and had no other questions.  · Encouraged Patient to keep future appointments.   · Take medications as prescribed and abstain from substance abuse.   · In the event of an emergency patient was advised to go to the emergency room.    Return to Clinic: 3 months    > than 50% of total time spend on coordination of care and counseling   (which included pts differential diagnosis and prognosis for psychiatric conditions, risks, benefits of treatments, instructions and adherence to treatment plan, risk reduction, reviewing current psychiatric medication regimen, medical problems and social stressors. In addtion to possible discussion with other healthcare provider/s)    Add on Psychotherapy time:0  Total Face time:20 min    The patient location is: [place], at home  The chief complaint leading to consultation is: med f/u    Visit type: audiovisual    Face to Face time with patient: 20 min  20 minutes of total time spent on the encounter, which includes face to face time and non-face to face time preparing to see the patient (eg, review of tests), Obtaining and/or reviewing separately obtained history, Documenting clinical information in the electronic or other health record, Independently interpreting results (not separately reported) and communicating results to the patient/family/caregiver, or Care coordination (not separately reported).         Each patient to whom he or she provides medical services by telemedicine is:  (1) informed of the relationship between the physician and patient and  the respective role of any other health care provider with respect to management of the patient; and (2) notified that he or she may decline to receive medical services by telemedicine and may withdraw from such care at any time.    Notes:     Tor Hernandez, MSN, APRN, PMHNP-BC [facility]   3/29/2022 11:37 AM

## 2022-04-01 ENCOUNTER — PATIENT MESSAGE (OUTPATIENT)
Dept: PSYCHIATRY | Facility: CLINIC | Age: 42
End: 2022-04-01
Payer: COMMERCIAL

## 2022-04-05 ENCOUNTER — LAB VISIT (OUTPATIENT)
Dept: LAB | Facility: HOSPITAL | Age: 42
End: 2022-04-05
Attending: INTERNAL MEDICINE
Payer: COMMERCIAL

## 2022-04-05 DIAGNOSIS — Z00.00 ANNUAL PHYSICAL EXAM: ICD-10-CM

## 2022-04-05 DIAGNOSIS — E55.9 VITAMIN D INSUFFICIENCY: ICD-10-CM

## 2022-04-05 LAB
25(OH)D3+25(OH)D2 SERPL-MCNC: 31 NG/ML (ref 30–96)
ALBUMIN SERPL BCP-MCNC: 4.1 G/DL (ref 3.5–5.2)
ALP SERPL-CCNC: 89 U/L (ref 55–135)
ALT SERPL W/O P-5'-P-CCNC: 43 U/L (ref 10–44)
ANION GAP SERPL CALC-SCNC: 12 MMOL/L (ref 8–16)
AST SERPL-CCNC: 32 U/L (ref 10–40)
BASOPHILS # BLD AUTO: 0.04 K/UL (ref 0–0.2)
BASOPHILS NFR BLD: 0.6 % (ref 0–1.9)
BILIRUB SERPL-MCNC: 0.9 MG/DL (ref 0.1–1)
BUN SERPL-MCNC: 13 MG/DL (ref 6–20)
CALCIUM SERPL-MCNC: 9.5 MG/DL (ref 8.7–10.5)
CHLORIDE SERPL-SCNC: 102 MMOL/L (ref 95–110)
CHOLEST SERPL-MCNC: 137 MG/DL (ref 120–199)
CHOLEST/HDLC SERPL: 3.9 {RATIO} (ref 2–5)
CO2 SERPL-SCNC: 23 MMOL/L (ref 23–29)
CREAT SERPL-MCNC: 0.7 MG/DL (ref 0.5–1.4)
DIFFERENTIAL METHOD: NORMAL
EOSINOPHIL # BLD AUTO: 0.2 K/UL (ref 0–0.5)
EOSINOPHIL NFR BLD: 3.1 % (ref 0–8)
ERYTHROCYTE [DISTWIDTH] IN BLOOD BY AUTOMATED COUNT: 13.3 % (ref 11.5–14.5)
EST. GFR  (AFRICAN AMERICAN): >60 ML/MIN/1.73 M^2
EST. GFR  (NON AFRICAN AMERICAN): >60 ML/MIN/1.73 M^2
ESTIMATED AVG GLUCOSE: 108 MG/DL (ref 68–131)
GLUCOSE SERPL-MCNC: 95 MG/DL (ref 70–110)
HBA1C MFR BLD: 5.4 % (ref 4–5.6)
HCT VFR BLD AUTO: 46.9 % (ref 40–54)
HDLC SERPL-MCNC: 35 MG/DL (ref 40–75)
HDLC SERPL: 25.5 % (ref 20–50)
HGB BLD-MCNC: 15.4 G/DL (ref 14–18)
IMM GRANULOCYTES # BLD AUTO: 0.02 K/UL (ref 0–0.04)
IMM GRANULOCYTES NFR BLD AUTO: 0.3 % (ref 0–0.5)
LDLC SERPL CALC-MCNC: 69 MG/DL (ref 63–159)
LYMPHOCYTES # BLD AUTO: 2.7 K/UL (ref 1–4.8)
LYMPHOCYTES NFR BLD: 39.8 % (ref 18–48)
MCH RBC QN AUTO: 30.1 PG (ref 27–31)
MCHC RBC AUTO-ENTMCNC: 32.8 G/DL (ref 32–36)
MCV RBC AUTO: 92 FL (ref 82–98)
MONOCYTES # BLD AUTO: 0.6 K/UL (ref 0.3–1)
MONOCYTES NFR BLD: 9.4 % (ref 4–15)
NEUTROPHILS # BLD AUTO: 3.2 K/UL (ref 1.8–7.7)
NEUTROPHILS NFR BLD: 46.8 % (ref 38–73)
NONHDLC SERPL-MCNC: 102 MG/DL
NRBC BLD-RTO: 0 /100 WBC
PLATELET # BLD AUTO: 221 K/UL (ref 150–450)
PMV BLD AUTO: 10.3 FL (ref 9.2–12.9)
POTASSIUM SERPL-SCNC: 4.3 MMOL/L (ref 3.5–5.1)
PROT SERPL-MCNC: 7 G/DL (ref 6–8.4)
RBC # BLD AUTO: 5.12 M/UL (ref 4.6–6.2)
SODIUM SERPL-SCNC: 137 MMOL/L (ref 136–145)
TRIGL SERPL-MCNC: 165 MG/DL (ref 30–150)
TSH SERPL DL<=0.005 MIU/L-ACNC: 2.09 UIU/ML (ref 0.4–4)
WBC # BLD AUTO: 6.79 K/UL (ref 3.9–12.7)

## 2022-04-05 PROCEDURE — 36415 COLL VENOUS BLD VENIPUNCTURE: CPT | Mod: PO | Performed by: INTERNAL MEDICINE

## 2022-04-05 PROCEDURE — 83036 HEMOGLOBIN GLYCOSYLATED A1C: CPT | Performed by: INTERNAL MEDICINE

## 2022-04-05 PROCEDURE — 84443 ASSAY THYROID STIM HORMONE: CPT | Performed by: INTERNAL MEDICINE

## 2022-04-05 PROCEDURE — 80061 LIPID PANEL: CPT | Performed by: INTERNAL MEDICINE

## 2022-04-05 PROCEDURE — 82306 VITAMIN D 25 HYDROXY: CPT | Performed by: INTERNAL MEDICINE

## 2022-04-05 PROCEDURE — 85025 COMPLETE CBC W/AUTO DIFF WBC: CPT | Performed by: INTERNAL MEDICINE

## 2022-04-05 PROCEDURE — 80053 COMPREHEN METABOLIC PANEL: CPT | Performed by: INTERNAL MEDICINE

## 2022-04-12 ENCOUNTER — OFFICE VISIT (OUTPATIENT)
Dept: INTERNAL MEDICINE | Facility: CLINIC | Age: 42
End: 2022-04-12
Payer: COMMERCIAL

## 2022-04-12 VITALS
SYSTOLIC BLOOD PRESSURE: 120 MMHG | DIASTOLIC BLOOD PRESSURE: 82 MMHG | OXYGEN SATURATION: 99 % | WEIGHT: 255.06 LBS | HEART RATE: 76 BPM | TEMPERATURE: 98 F | HEIGHT: 60 IN | RESPIRATION RATE: 18 BRPM | BODY MASS INDEX: 50.07 KG/M2

## 2022-04-12 DIAGNOSIS — E11.9 TYPE 2 DIABETES MELLITUS WITHOUT COMPLICATION, WITHOUT LONG-TERM CURRENT USE OF INSULIN: ICD-10-CM

## 2022-04-12 DIAGNOSIS — E78.2 MIXED HYPERLIPIDEMIA: ICD-10-CM

## 2022-04-12 DIAGNOSIS — Z87.898 HISTORY OF NAUSEA: ICD-10-CM

## 2022-04-12 DIAGNOSIS — E66.01 MORBID OBESITY: ICD-10-CM

## 2022-04-12 DIAGNOSIS — Z00.00 ANNUAL PHYSICAL EXAM: Primary | ICD-10-CM

## 2022-04-12 PROCEDURE — 3044F HG A1C LEVEL LT 7.0%: CPT | Mod: CPTII,S$GLB,, | Performed by: INTERNAL MEDICINE

## 2022-04-12 PROCEDURE — 3072F PR LOW RISK FOR RETINOPATHY: ICD-10-PCS | Mod: CPTII,S$GLB,, | Performed by: INTERNAL MEDICINE

## 2022-04-12 PROCEDURE — 1160F PR REVIEW ALL MEDS BY PRESCRIBER/CLIN PHARMACIST DOCUMENTED: ICD-10-PCS | Mod: CPTII,S$GLB,, | Performed by: INTERNAL MEDICINE

## 2022-04-12 PROCEDURE — 99999 PR PBB SHADOW E&M-EST. PATIENT-LVL V: ICD-10-PCS | Mod: PBBFAC,,, | Performed by: INTERNAL MEDICINE

## 2022-04-12 PROCEDURE — 3072F LOW RISK FOR RETINOPATHY: CPT | Mod: CPTII,S$GLB,, | Performed by: INTERNAL MEDICINE

## 2022-04-12 PROCEDURE — 99395 PREV VISIT EST AGE 18-39: CPT | Mod: S$GLB,,, | Performed by: INTERNAL MEDICINE

## 2022-04-12 PROCEDURE — 99999 PR PBB SHADOW E&M-EST. PATIENT-LVL V: CPT | Mod: PBBFAC,,, | Performed by: INTERNAL MEDICINE

## 2022-04-12 PROCEDURE — 3079F DIAST BP 80-89 MM HG: CPT | Mod: CPTII,S$GLB,, | Performed by: INTERNAL MEDICINE

## 2022-04-12 PROCEDURE — 3008F BODY MASS INDEX DOCD: CPT | Mod: CPTII,S$GLB,, | Performed by: INTERNAL MEDICINE

## 2022-04-12 PROCEDURE — 1160F RVW MEDS BY RX/DR IN RCRD: CPT | Mod: CPTII,S$GLB,, | Performed by: INTERNAL MEDICINE

## 2022-04-12 PROCEDURE — 1159F PR MEDICATION LIST DOCUMENTED IN MEDICAL RECORD: ICD-10-PCS | Mod: CPTII,S$GLB,, | Performed by: INTERNAL MEDICINE

## 2022-04-12 PROCEDURE — 3074F PR MOST RECENT SYSTOLIC BLOOD PRESSURE < 130 MM HG: ICD-10-PCS | Mod: CPTII,S$GLB,, | Performed by: INTERNAL MEDICINE

## 2022-04-12 PROCEDURE — 1159F MED LIST DOCD IN RCRD: CPT | Mod: CPTII,S$GLB,, | Performed by: INTERNAL MEDICINE

## 2022-04-12 PROCEDURE — 99395 PR PREVENTIVE VISIT,EST,18-39: ICD-10-PCS | Mod: S$GLB,,, | Performed by: INTERNAL MEDICINE

## 2022-04-12 PROCEDURE — 3074F SYST BP LT 130 MM HG: CPT | Mod: CPTII,S$GLB,, | Performed by: INTERNAL MEDICINE

## 2022-04-12 PROCEDURE — 3008F PR BODY MASS INDEX (BMI) DOCUMENTED: ICD-10-PCS | Mod: CPTII,S$GLB,, | Performed by: INTERNAL MEDICINE

## 2022-04-12 PROCEDURE — 3044F PR MOST RECENT HEMOGLOBIN A1C LEVEL <7.0%: ICD-10-PCS | Mod: CPTII,S$GLB,, | Performed by: INTERNAL MEDICINE

## 2022-04-12 PROCEDURE — 3079F PR MOST RECENT DIASTOLIC BLOOD PRESSURE 80-89 MM HG: ICD-10-PCS | Mod: CPTII,S$GLB,, | Performed by: INTERNAL MEDICINE

## 2022-04-12 RX ORDER — METHOCARBAMOL 750 MG/1
TABLET, FILM COATED ORAL
Qty: 90 TABLET | Refills: 0 | Status: SHIPPED | OUTPATIENT
Start: 2022-04-12 | End: 2022-04-19

## 2022-04-12 RX ORDER — ONDANSETRON 4 MG/1
4 TABLET, ORALLY DISINTEGRATING ORAL EVERY 6 HOURS PRN
Qty: 30 TABLET | Refills: 3 | Status: SHIPPED | OUTPATIENT
Start: 2022-04-12 | End: 2022-10-12 | Stop reason: SDUPTHER

## 2022-04-12 NOTE — PROGRESS NOTES
Subjective:       Patient ID: Hermelindo Garza III is a 41 y.o. male.    Chief Complaint: Annual Exam    HPI    41 y.o. male with Active Diagnosis Review (HCC)     Chronic             HCC 12 - Breast, Prostate, and Other Cancers and Tumors     Melanoma in situ of trunk [D03.59]     HCC 19 - Diabetes without Complication     Type 2 diabetes mellitus without complication, without long-term current   use of insulin [E11.9]     Type 2 diabetes mellitus without complications [E11.9]     HCC 22 - Morbid Obesity     Morbid obesity [E66.01]      - Chronic Obstructive Pulmonary Disease     Chronic obstructive pulmonary disease, unspecified [J44.9]           here for annual exam.     Health Maintenance/ Screening:   Labs: reviewed    Vaccines:   Influenza: utd   Tetanus: 2019    PPSV23: 2020   COVID19: utd    Health Maintenance Topics with due status: Not Due       Topic Last Completion Date    TETANUS VACCINE 04/01/2019    Diabetes Urine Screening 10/15/2021    Eye Exam 10/26/2021    Lipid Panel 04/05/2022    Hemoglobin A1c 04/05/2022       Health Maintenance Due   Topic Date Due    Hepatitis C Screening  Never done    Foot Exam  Never done         Medical Problems:    SHEIKH: low fat diet  DM2: ozempic 0.25mg q 7 days. [facility] eye- exam 06/2021. urine microalbumin/cr: 10/2021   HLD: atorvastatin 20mg  Vit D insuff: on otc supplement  LUBA: CPAP nightly  Restless leg syndrome: requip 0.25mg prn   Nausea: intermittent, zofran prn.     Past Medical History:   Diagnosis Date    ADD (attention deficit disorder) 05/09/2012    Asthma 05/09/2012    Cervical disc disorder with radiculopathy, unspecified cervical region 09/14/2021    Esophageal ulcer     GERD (gastroesophageal reflux disease) 05/09/2012    Glaucoma     Kidney stones 05/2014    Lumbar disc disease 05/09/2012    Lumbar herniated disc 05/09/2012    Melanoma     LUBA (obstructive sleep apnea)     Radiculopathy, lumbar region 09/14/2021    Renal  calculi 05/09/2012    Type 2 diabetes mellitus without complication, without long-term current use of insulin 11/16/2020     Past Surgical History:   Procedure Laterality Date    APPENDECTOMY  2006    COLONOSCOPY  01/22/2019    internal hemorrhoids, o/w normal - repeat at age 52    EPIDURAL STEROID INJECTION N/A 2/10/2021    Procedure: CERVICAL C6/7 NELI DIRECT REFERRAL;  Surgeon: Michi Escamilla MD;  Location: [facility]IN [facility];  Service: Pain Management;  Laterality: N/A;  NEEDS CONSENT    ESOPHAGOGASTRODUODENOSCOPY  01/22/2019    LA grade B esophagitis; esophageal stenosis- dilated; gastritis; H pylori pathology negative    ESOPHAGOGASTRODUODENOSCOPY N/A 5/18/2021    Procedure: EGD (ESOPHAGOGASTRODUODENOSCOPY);  Surgeon: Mariana Hector MD;  Location: [facility];  Service: Endoscopy;  Laterality: N/A;    LUMBAR LAMINECTOMY  2010    LUMBAR LAMINECTOMY WITH DISCECTOMY Left 9/20/2021    Procedure: LAMINECTOMY, SPINE, LUMBAR, WITH DISCECTOMY;  Surgeon: Naseem Mcnamara DO;  Location: [address];  Service: Neurosurgery;  Laterality: Left;  MIS L3-4     Family History   Problem Relation Age of Onset    Hypertension Mother     Transient ischemic attack Mother     Sleep apnea Mother     Chronic back pain Father     MARTITA disease Father     Sleep apnea Father     No Known Problems Sister     No Known Problems Sister     Brain cancer Maternal Grandmother     Diabetes Maternal Grandfather     Breast cancer Paternal Grandmother     No Known Problems Brother     No Known Problems Maternal Aunt     No Known Problems Maternal Uncle     No Known Problems Paternal Aunt     No Known Problems Paternal Uncle     No Known Problems Paternal Grandfather     Amblyopia Neg Hx     Blindness Neg Hx     Cancer Neg Hx     Cataracts Neg Hx     Glaucoma Neg Hx     Macular degeneration Neg Hx     Retinal detachment Neg Hx     Strabismus Neg Hx     Stroke Neg Hx     Thyroid disease Neg Hx      Social History      Socioeconomic History    Marital status:    Tobacco Use    Smoking status: Passive Smoke Exposure - Never Smoker    Smokeless tobacco: Never Used   Substance and Sexual Activity    Alcohol use: Yes     Alcohol/week: 0.0 standard drinks     Comment: Occasional    Drug use: No    Sexual activity: Yes     Social Determinants of Health     Financial Resource Strain: Low Risk     Difficulty of Paying Living Expenses: Not hard at all   Food Insecurity: No Food Insecurity    Worried About Running Out of Food in the Last Year: Never true    Ran Out of Food in the Last Year: Never true   Transportation Needs: No Transportation Needs    Lack of Transportation (Medical): No    Lack of Transportation (Non-Medical): No   Physical Activity: Sufficiently Active    Days of Exercise per Week: 5 days    Minutes of Exercise per Session: 40 min   Stress: Stress Concern Present    Feeling of Stress : Rather much   Social Connections: Unknown    Frequency of Communication with Friends and Family: Three times a week    Frequency of Social Gatherings with Friends and Family: Twice a week    Active Member of Clubs or Organizations: No    Attends Club or Organization Meetings: Never    Marital Status:    Housing Stability: Low Risk     Unable to Pay for Housing in the Last Year: No    Number of Places Lived in the Last Year: 1    Unstable Housing in the Last Year: No     Review of patient's allergies indicates:  No Known Allergies    Current Outpatient Medications:     albuterol (PROVENTIL HFA) 90 mcg/actuation inhaler, Inhale 2 puffs into the lungs every 6 (six) hours as needed for Wheezing. Rescue, Disp: 18 g, Rfl: 5    atorvastatin (LIPITOR) 20 MG tablet, TAKE 1 TABLET(20 MG) BY MOUTH EVERY DAY, Disp: 90 tablet, Rfl: 3    [START ON 4/15/2022] dexmethylphenidate (FOCALIN) 10 MG tablet, Take 1 tablet (10 mg total) by mouth 3 (three) times daily., Disp: 90 tablet, Rfl: 0    [START ON 5/13/2022]  dexmethylphenidate (FOCALIN) 10 MG tablet, Take 1 tablet (10 mg total) by mouth 3 (three) times daily., Disp: 90 tablet, Rfl: 0    [START ON 6/11/2022] dexmethylphenidate (FOCALIN) 10 MG tablet, Take 1 tablet (10 mg total) by mouth 3 (three) times daily., Disp: 90 tablet, Rfl: 0    ergocalciferol, vitamin D2, (VITAMIN D ORAL), Take 1 tablet by mouth every other day., Disp: , Rfl:     esomeprazole (NEXIUM) 40 mg GrPS, Take 40 mg by mouth 2 (two) times daily before meals., Disp: 60 each, Rfl: 11    fluocinonide (LIDEX) 0.05 % external solution, AAA scalp qday prn itching, scaling, Disp: 60 mL, Rfl: 3    gabapentin (NEURONTIN) 300 MG capsule, Take 1 capsule (300 mg total) by mouth 3 (three) times daily., Disp: 90 capsule, Rfl: 3    HYDROcodone-acetaminophen (NORCO) 5-325 mg per tablet, Take 1 tablet by mouth every 6 (six) hours as needed for Pain., Disp: 10 tablet, Rfl: 0    ketoconazole (NIZORAL) 2 % cream, Apply topically 2 (two) times daily. Apply to to affected area bid, Disp: 15 g, Rfl: 0    ketoconazole (NIZORAL) 2 % shampoo, Apply topically twice a week., Disp: 120 mL, Rfl: 3    ketoconazole (NIZORAL) 2 % shampoo, Wash hair with medicated shampoo at least 2x/week - let sit on scalp at least 5 minutes prior to rinsing, Disp: 120 mL, Rfl: 5    lancets Misc, 1 each by Misc.(Non-Drug; Combo Route) route Daily., Disp: 30 each, Rfl: 11    montelukast (SINGULAIR) 10 mg tablet, TAKE 1 TABLET(10 MG) BY MOUTH EVERY EVENING, Disp: 30 tablet, Rfl: 11    rOPINIRole (REQUIP) 0.25 MG tablet, TAKE 1 TABLET(0.25 MG) BY MOUTH EVERY EVENING, Disp: 30 tablet, Rfl: 2    semaglutide (OZEMPIC) 0.25 mg or 0.5 mg(2 mg/1.5 mL) pen injector, Inject 0.5 mg into the skin every 7 days., Disp: 2 pen, Rfl: 2    traZODone (DESYREL) 50 MG tablet, Take 1 tablet (50 mg total) by mouth nightly as needed for Insomnia., Disp: 30 tablet, Rfl: 2    TRUE METRIX GLUCOSE TEST STRIP Strp, USE TO TEST ONCE DAILY, Disp: 25 strip, Rfl: 2     zolpidem (AMBIEN) 5 MG Tab, Take 1 tablet (5 mg total) by mouth nightly as needed (insomnia)., Disp: 30 tablet, Rfl: 1    blood-glucose meter kit, Use as instructed, Disp: 1 each, Rfl: 0    diazePAM (VALIUM) 5 MG tablet, Take 1 tablet (5 mg total) by mouth every 6 (six) hours as needed for Anxiety. Take 1 pill 30 minutes prior to surgery and bring remaining pill with you on day of surgery., Disp: 2 tablet, Rfl: 0    hydrocortisone 2.5 % cream, Apply topically 2 (two) times daily. for 10 days, Disp: 28 g, Rfl: 0    latanoprost 0.005 % ophthalmic solution, Place 1 drop into both eyes every evening. This replaces the timolol, Disp: 3 Bottle, Rfl: 12    methocarbamoL (ROBAXIN) 750 MG Tab, TAKE 1 TABLET(750 MG) BY MOUTH THREE TIMES DAILY AS NEEDED FOR MUSCLE SPASM, Disp: 90 tablet, Rfl: 0    ondansetron (ZOFRAN-ODT) 4 MG TbDL, Take 1 tablet (4 mg total) by mouth every 6 (six) hours as needed (nausea)., Disp: 30 tablet, Rfl: 3      Review of Systems   Constitutional: Negative for diaphoresis and fever.   HENT: Negative for trouble swallowing.    Respiratory: Negative for cough and shortness of breath.    Cardiovascular: Negative for chest pain and leg swelling.   Gastrointestinal: Positive for nausea. Negative for abdominal pain and blood in stool.   Genitourinary: Negative for difficulty urinating and dysuria.   Musculoskeletal: Positive for arthralgias and back pain.   Skin: Negative for pallor.   Neurological: Positive for numbness. Negative for syncope.   Psychiatric/Behavioral: Positive for sleep disturbance.       Objective:        Vitals:    04/12/22 1020   BP: 120/82   BP Location: Left arm   Patient Position: Sitting   BP Method: Large (Manual)   Pulse: 76   Resp: 18   Temp: 98.1 °F (36.7 °C)   TempSrc: Temporal   SpO2: 99%   Weight: 115.7 kg (255 lb 1.2 oz)   Height: 5' (1.524 m)       Body mass index is 49.82 kg/m².    Physical Exam  Constitutional:       General: He is not in acute distress.      Appearance: He is well-developed. He is not diaphoretic.   HENT:      Head: Normocephalic and atraumatic.      Right Ear: External ear normal.      Left Ear: External ear normal.   Eyes:      Conjunctiva/sclera: Conjunctivae normal.   Cardiovascular:      Rate and Rhythm: Normal rate and regular rhythm.      Pulses:           Dorsalis pedis pulses are 1+ on the right side and 1+ on the left side.   Pulmonary:      Effort: Pulmonary effort is normal.      Breath sounds: Normal breath sounds.   Abdominal:      General: Bowel sounds are normal.      Palpations: Abdomen is soft.   Musculoskeletal:      Cervical back: Neck supple. No rigidity.      Right lower leg: No edema.      Left lower leg: No edema.   Feet:      Right foot:      Protective Sensation: 4 sites tested. 4 sites sensed.      Skin integrity: Callus present. No ulcer or skin breakdown.      Left foot:      Protective Sensation: 4 sites tested. 4 sites sensed.      Skin integrity: Callus present. No ulcer or skin breakdown.   Skin:     General: Skin is warm and dry.      Nails: There is no clubbing.   Neurological:      Mental Status: He is alert. Mental status is at baseline.      Gait: Gait normal.   Psychiatric:         Behavior: Behavior normal.         Judgment: Judgment normal.         Assessment:     1. Annual physical exam    2. Type 2 diabetes mellitus without complication, without long-term current use of insulin    3. Mixed hyperlipidemia    4. History of nausea    5. Morbid obesity           Plan:         1. Annual physical exam  - labs reviewed  - immunizations discussed, scheduled for covid vaccine booster    2. Type 2 diabetes mellitus without complication, without long-term current use of insulin  - stable, continue current meds  - eye exam utd  - foot exam today    3. Mixed hyperlipidemia  - cont statin    4. History of nausea  - refill zofran prn  - ondansetron (ZOFRAN-ODT) 4 MG TbDL; Take 1 tablet (4 mg total) by mouth every 6 (six)  hours as needed (nausea).  Dispense: 30 tablet; Refill: 3    5. Morbid obesity  - chronic, stable. Cont to work on lifestyle changes for weight loss.       Unless there are intervening problems, Follow up in about 6 months (around 10/12/2022) for follow up, diabetes.      Patient note was created using MModal Dictation.  Any errors in syntax or even information may not have been identified and edited on initial review prior to signing this note.

## 2022-04-13 ENCOUNTER — PATIENT MESSAGE (OUTPATIENT)
Dept: NEUROSURGERY | Facility: CLINIC | Age: 42
End: 2022-04-13
Payer: COMMERCIAL

## 2022-04-19 ENCOUNTER — OFFICE VISIT (OUTPATIENT)
Dept: NEUROSURGERY | Facility: CLINIC | Age: 42
End: 2022-04-19
Payer: COMMERCIAL

## 2022-04-19 VITALS
WEIGHT: 255 LBS | SYSTOLIC BLOOD PRESSURE: 127 MMHG | HEIGHT: 60 IN | HEART RATE: 81 BPM | DIASTOLIC BLOOD PRESSURE: 80 MMHG | BODY MASS INDEX: 50.06 KG/M2

## 2022-04-19 DIAGNOSIS — M51.16 LUMBAR DISC DISEASE WITH RADICULOPATHY: Primary | ICD-10-CM

## 2022-04-19 DIAGNOSIS — M54.12 CERVICAL RADICULAR PAIN: ICD-10-CM

## 2022-04-19 DIAGNOSIS — Z98.890 S/P LUMBAR DISCECTOMY: ICD-10-CM

## 2022-04-19 PROCEDURE — 99214 OFFICE O/P EST MOD 30 MIN: CPT | Mod: S$GLB,,, | Performed by: PHYSICIAN ASSISTANT

## 2022-04-19 PROCEDURE — 99999 PR PBB SHADOW E&M-EST. PATIENT-LVL V: CPT | Mod: PBBFAC,,, | Performed by: PHYSICIAN ASSISTANT

## 2022-04-19 PROCEDURE — 3072F PR LOW RISK FOR RETINOPATHY: ICD-10-PCS | Mod: CPTII,S$GLB,, | Performed by: PHYSICIAN ASSISTANT

## 2022-04-19 PROCEDURE — 3044F PR MOST RECENT HEMOGLOBIN A1C LEVEL <7.0%: ICD-10-PCS | Mod: CPTII,S$GLB,, | Performed by: PHYSICIAN ASSISTANT

## 2022-04-19 PROCEDURE — 3008F BODY MASS INDEX DOCD: CPT | Mod: CPTII,S$GLB,, | Performed by: PHYSICIAN ASSISTANT

## 2022-04-19 PROCEDURE — 3079F PR MOST RECENT DIASTOLIC BLOOD PRESSURE 80-89 MM HG: ICD-10-PCS | Mod: CPTII,S$GLB,, | Performed by: PHYSICIAN ASSISTANT

## 2022-04-19 PROCEDURE — 99214 PR OFFICE/OUTPT VISIT, EST, LEVL IV, 30-39 MIN: ICD-10-PCS | Mod: S$GLB,,, | Performed by: PHYSICIAN ASSISTANT

## 2022-04-19 PROCEDURE — 3072F LOW RISK FOR RETINOPATHY: CPT | Mod: CPTII,S$GLB,, | Performed by: PHYSICIAN ASSISTANT

## 2022-04-19 PROCEDURE — 3044F HG A1C LEVEL LT 7.0%: CPT | Mod: CPTII,S$GLB,, | Performed by: PHYSICIAN ASSISTANT

## 2022-04-19 PROCEDURE — 3079F DIAST BP 80-89 MM HG: CPT | Mod: CPTII,S$GLB,, | Performed by: PHYSICIAN ASSISTANT

## 2022-04-19 PROCEDURE — 3008F PR BODY MASS INDEX (BMI) DOCUMENTED: ICD-10-PCS | Mod: CPTII,S$GLB,, | Performed by: PHYSICIAN ASSISTANT

## 2022-04-19 PROCEDURE — 3074F PR MOST RECENT SYSTOLIC BLOOD PRESSURE < 130 MM HG: ICD-10-PCS | Mod: CPTII,S$GLB,, | Performed by: PHYSICIAN ASSISTANT

## 2022-04-19 PROCEDURE — 99999 PR PBB SHADOW E&M-EST. PATIENT-LVL V: ICD-10-PCS | Mod: PBBFAC,,, | Performed by: PHYSICIAN ASSISTANT

## 2022-04-19 PROCEDURE — 3074F SYST BP LT 130 MM HG: CPT | Mod: CPTII,S$GLB,, | Performed by: PHYSICIAN ASSISTANT

## 2022-04-19 RX ORDER — CYCLOBENZAPRINE HCL 10 MG
10 TABLET ORAL 3 TIMES DAILY PRN
Qty: 40 TABLET | Refills: 0 | Status: SHIPPED | OUTPATIENT
Start: 2022-04-19 | End: 2022-06-29

## 2022-04-19 NOTE — PROGRESS NOTES
Neurosurgery  Established Patient    SUBJECTIVE:     History of Present Illness:  41 y.o.-year-old male with h/o L MIS L3-4 lami/disc on 9/20/21.  Patient presents with increased back and neck pain since Sunday night.  He reports that he was at home Sunday night and began to develope increased pain in his low back and as well as his neck that was accompanied by pain radiating down his left leg into the top of the left foot as well as pain that primarily in the right upper extremity and traveling down into the thumb.  He denies weakness, balance difficulty, or clumsiness of his hands.  Denies bowel bladder dysfunction.  Patient has taken methocarbamol without, he had been off of gabapentin however he restarted days ago.  He has grabs the pain as a tightness across the low back in the paraspinous area.  A similar type sensation in the neck paraspinous area going into the bilateral scapula.    Review of patient's allergies indicates:  No Known Allergies    Current Outpatient Medications   Medication Sig Dispense Refill    albuterol (PROVENTIL HFA) 90 mcg/actuation inhaler Inhale 2 puffs into the lungs every 6 (six) hours as needed for Wheezing. Rescue 18 g 5    atorvastatin (LIPITOR) 20 MG tablet TAKE 1 TABLET(20 MG) BY MOUTH EVERY DAY 90 tablet 3    dexmethylphenidate (FOCALIN) 10 MG tablet Take 1 tablet (10 mg total) by mouth 3 (three) times daily. 90 tablet 0    [START ON 5/13/2022] dexmethylphenidate (FOCALIN) 10 MG tablet Take 1 tablet (10 mg total) by mouth 3 (three) times daily. 90 tablet 0    [START ON 6/11/2022] dexmethylphenidate (FOCALIN) 10 MG tablet Take 1 tablet (10 mg total) by mouth 3 (three) times daily. 90 tablet 0    ergocalciferol, vitamin D2, (VITAMIN D ORAL) Take 1 tablet by mouth every other day.      esomeprazole (NEXIUM) 40 mg GrPS Take 40 mg by mouth 2 (two) times daily before meals. 60 each 11    fluocinonide (LIDEX) 0.05 % external solution AAA scalp qday prn itching, scaling 60 mL 3     gabapentin (NEURONTIN) 300 MG capsule Take 1 capsule (300 mg total) by mouth 3 (three) times daily. 90 capsule 3    HYDROcodone-acetaminophen (NORCO) 5-325 mg per tablet Take 1 tablet by mouth every 6 (six) hours as needed for Pain. 10 tablet 0    ketoconazole (NIZORAL) 2 % cream Apply topically 2 (two) times daily. Apply to to affected area bid 15 g 0    ketoconazole (NIZORAL) 2 % shampoo Apply topically twice a week. 120 mL 3    ketoconazole (NIZORAL) 2 % shampoo Wash hair with medicated shampoo at least 2x/week - let sit on scalp at least 5 minutes prior to rinsing 120 mL 5    lancets Misc 1 each by Misc.(Non-Drug; Combo Route) route Daily. 30 each 11    montelukast (SINGULAIR) 10 mg tablet TAKE 1 TABLET(10 MG) BY MOUTH EVERY EVENING 30 tablet 11    ondansetron (ZOFRAN-ODT) 4 MG TbDL Take 1 tablet (4 mg total) by mouth every 6 (six) hours as needed (nausea). 30 tablet 3    rOPINIRole (REQUIP) 0.25 MG tablet TAKE 1 TABLET(0.25 MG) BY MOUTH EVERY EVENING 30 tablet 2    semaglutide (OZEMPIC) 0.25 mg or 0.5 mg(2 mg/1.5 mL) pen injector Inject 0.5 mg into the skin every 7 days. 2 pen 2    traZODone (DESYREL) 50 MG tablet Take 1 tablet (50 mg total) by mouth nightly as needed for Insomnia. 30 tablet 2    TRUE METRIX GLUCOSE TEST STRIP Strp USE TO TEST ONCE DAILY 25 strip 2    blood-glucose meter kit Use as instructed 1 each 0    cyclobenzaprine (FLEXERIL) 10 MG tablet Take 1 tablet (10 mg total) by mouth 3 (three) times daily as needed for Muscle spasms. 40 tablet 0    diazePAM (VALIUM) 5 MG tablet Take 1 tablet (5 mg total) by mouth every 6 (six) hours as needed for Anxiety. Take 1 pill 30 minutes prior to surgery and bring remaining pill with you on day of surgery. 2 tablet 0    hydrocortisone 2.5 % cream Apply topically 2 (two) times daily. for 10 days 28 g 0    latanoprost 0.005 % ophthalmic solution Place 1 drop into both eyes every evening. This replaces the timolol 3 Bottle 12    zolpidem  (AMBIEN) 5 MG Tab Take 1 tablet (5 mg total) by mouth nightly as needed (insomnia). (Patient not taking: Reported on 4/19/2022) 30 tablet 1     No current facility-administered medications for this visit.       Past Medical History:   Diagnosis Date    ADD (attention deficit disorder) 05/09/2012    Asthma 05/09/2012    Cervical disc disorder with radiculopathy, unspecified cervical region 09/14/2021    Esophageal ulcer     GERD (gastroesophageal reflux disease) 05/09/2012    Glaucoma     Kidney stones 05/2014    Lumbar disc disease 05/09/2012    Lumbar herniated disc 05/09/2012    Melanoma     LUBA (obstructive sleep apnea)     Radiculopathy, lumbar region 09/14/2021    Renal calculi 05/09/2012    Type 2 diabetes mellitus without complication, without long-term current use of insulin 11/16/2020     Past Surgical History:   Procedure Laterality Date    APPENDECTOMY  2006    COLONOSCOPY  01/22/2019    internal hemorrhoids, o/w normal - repeat at age 52    EPIDURAL STEROID INJECTION N/A 2/10/2021    Procedure: CERVICAL C6/7 NELI DIRECT REFERRAL;  Surgeon: Michi Escamilla MD;  Location[facility];  Service: Pain Management;  Laterality: N/A;  NEEDS CONSENT    ESOPHAGOGASTRODUODENOSCOPY  01/22/2019    LA grade B esophagitis; esophageal stenosis- dilated; gastritis; H pylori pathology negative    ESOPHAGOGASTRODUODENOSCOPY N/A 5/18/2021    Procedure: EGD (ESOPHAGOGASTRODUODENOSCOPY);  Surgeon: Mariana Hector MD;  [facility];  Service: Endoscopy;  Laterality: N/A;    LUMBAR LAMINECTOMY  2010    LUMBAR LAMINECTOMY WITH DISCECTOMY Left 9/20/2021    Procedure: LAMINECTOMY, SPINE, LUMBAR, WITH DISCECTOMY;  Surgeon: Naseem Mcnamara DO;  Location: [facility] OR [address] [place] [address];  Service: Neurosurgery;  Laterality: Left;  MIS L3-4     Family History     Problem Relation (Age of Onset)    Brain cancer Maternal Grandmother    Breast cancer Paternal Grandmother    Chronic back pain Father    Diabetes  Maternal Grandfather    MARTITA disease Father    Hypertension Mother    No Known Problems Sister, Sister, Brother, Maternal Aunt, Maternal Uncle, Paternal Aunt, Paternal Uncle, Paternal Grandfather    Sleep apnea Mother, Father    Transient ischemic attack Mother        Social History     Socioeconomic History    Marital status:    Tobacco Use    Smoking status: Passive Smoke Exposure - Never Smoker    Smokeless tobacco: Never Used   Substance and Sexual Activity    Alcohol use: Yes     Alcohol/week: 0.0 standard drinks     Comment: Occasional    Drug use: No    Sexual activity: Yes     Social Determinants of Health     Financial Resource Strain: Low Risk     Difficulty of Paying Living Expenses: Not hard at all   Food Insecurity: No Food Insecurity    Worried About Running Out of Food in the Last Year: Never true    Ran Out of Food in the Last Year: Never true   Transportation Needs: No Transportation Needs    Lack of Transportation (Medical): No    Lack of Transportation (Non-Medical): No   Physical Activity: Sufficiently Active    Days of Exercise per Week: 5 days    Minutes of Exercise per Session: 40 min   Stress: Stress Concern Present    Feeling of Stress : Rather much   Social Connections: Unknown    Frequency of Communication with Friends and Family: Three times a week    Frequency of Social Gatherings with Friends and Family: Twice a week    Active Member of Clubs or Organizations: No    Attends Club or Organization Meetings: Never    Marital Status:    Housing Stability: Low Risk     Unable to Pay for Housing in the Last Year: No    Number of Places Lived in the Last Year: 1    Unstable Housing in the Last Year: No       Review of Systems  Constitutional: no fever or chills  Eyes: no visual changes  ENT: no nasal congestion or sore throat  Respiratory: no cough or shortness of breath  Cardiovascular: no chest pain or palpitations  Gastrointestinal: no nausea or  vomiting  Genitourinary: no hematuria or dysuria  Integument/Breast: no rash or pruritis  Hematologic/Lymphatic: no easy bruising or lymphadenopathy  Musculoskeletal: no arthralgias or myalgias  Neurological: no seizures or tremors    OBJECTIVE:     Vital Signs  Pulse: 81  BP: 127/80  Pain Score:   5  Height: 5' (152.4 cm)  Weight: 115.7 kg (255 lb)  Body mass index is 49.8 kg/m².    Neurosurgery Physical Exam  General: well developed, well nourished, no distress  Neurologic: Alert and oriented. Thought content appropriate.  GCS: Motor: 6/Verbal: 5/Eyes: 4 GCS Total: 15  Cranial nerves: II-XII grossly intact  Neck: supple, without obvious masses or lesions  Skin: grossly intact in all 4 extremities without obvious rashes or lesions  Gait - normal  Tandem Gait - No difficulty         Able to walk on heels & toes  Cervical ROM - full  Lumbar ROM - full  Motor Strength: No focal numbness or weakness  Strength  Deltoids Triceps Biceps Wrist Extension Wrist Flexion Hand    Upper: R 5/5 5/5 5/5 5/5 5/5 5/5    L 5/5 5/5 5/5 5/5 5/5 5/5     Iliopsoas Quadriceps Knee  Flexion Tibialis  anterior Gastro- cnemius EHL   Lower: R 5/5 5/5 5/5 5/5 5/5 5/5    L 5/5 5/5 5/5 5/5 5/5 5/5   Sensory: intact to light touch B/L UE and LE  DTR's - 2 + and symmetric in UE and LE  Patricia's - Negative        Ankle Clonus - Negative          Tinel's - Negative Phalen's - Negative      Diagnostic Results:  No recent    ASSESSMENT/PLAN:     41 y.o.-year-old male with h/o L MIS L3-4 lami/disc on 9/20/21. Patient is neurologically intact without focal weakness.  Does have history of cervical stenosis and disc disease with varying degress of foraminal stenosis worst at C5-6 on right from a year ago.  No clinical signs of myelopathy.  Pt is currently symptomatic from muscle spasms and exacerbation of radicular pain.  As this has been going on for only 2 days and patient is neurologically intact, will treat muscle spasms, dc reobaxin start  flexeril 10 mg TID prn.  Advised to take ibuprofen as well for the next week. Can restart gabapentin 300mg tid.  If patient does not improve will get updated MRI.  Plan to f/u in 3-4 weeks.  If symptoms are not improving he was encouraged to call the clinic with any questions or concerns.     Ata Car Jr. LAXMI  [facility]  Department of Neurosurgery        Note dictated with voice recognition software, please excuse any grammatical errors.

## 2022-04-20 ENCOUNTER — OFFICE VISIT (OUTPATIENT)
Dept: DERMATOLOGY | Facility: CLINIC | Age: 42
End: 2022-04-20
Payer: COMMERCIAL

## 2022-04-20 DIAGNOSIS — Z86.006 HISTORY OF MELANOMA IN SITU: ICD-10-CM

## 2022-04-20 DIAGNOSIS — L81.4 LENTIGO: Primary | ICD-10-CM

## 2022-04-20 DIAGNOSIS — L57.0 AK (ACTINIC KERATOSIS): ICD-10-CM

## 2022-04-20 DIAGNOSIS — L82.1 SK (SEBORRHEIC KERATOSIS): ICD-10-CM

## 2022-04-20 DIAGNOSIS — D22.9 NEVUS: ICD-10-CM

## 2022-04-20 DIAGNOSIS — L90.5 SCAR: ICD-10-CM

## 2022-04-20 PROCEDURE — 99999 PR PBB SHADOW E&M-EST. PATIENT-LVL III: ICD-10-PCS | Mod: PBBFAC,,, | Performed by: DERMATOLOGY

## 2022-04-20 PROCEDURE — 1159F PR MEDICATION LIST DOCUMENTED IN MEDICAL RECORD: ICD-10-PCS | Mod: CPTII,S$GLB,, | Performed by: DERMATOLOGY

## 2022-04-20 PROCEDURE — 99213 PR OFFICE/OUTPT VISIT, EST, LEVL III, 20-29 MIN: ICD-10-PCS | Mod: 25,S$GLB,, | Performed by: DERMATOLOGY

## 2022-04-20 PROCEDURE — 1159F MED LIST DOCD IN RCRD: CPT | Mod: CPTII,S$GLB,, | Performed by: DERMATOLOGY

## 2022-04-20 PROCEDURE — 17000 DESTRUCT PREMALG LESION: CPT | Mod: S$GLB,,, | Performed by: DERMATOLOGY

## 2022-04-20 PROCEDURE — 99999 PR PBB SHADOW E&M-EST. PATIENT-LVL III: CPT | Mod: PBBFAC,,, | Performed by: DERMATOLOGY

## 2022-04-20 PROCEDURE — 17000 PR DESTRUCTION(LASER SURGERY,CRYOSURGERY,CHEMOSURGERY),PREMALIGNANT LESIONS,FIRST LESION: ICD-10-PCS | Mod: S$GLB,,, | Performed by: DERMATOLOGY

## 2022-04-20 PROCEDURE — 3044F PR MOST RECENT HEMOGLOBIN A1C LEVEL <7.0%: ICD-10-PCS | Mod: CPTII,S$GLB,, | Performed by: DERMATOLOGY

## 2022-04-20 PROCEDURE — 99213 OFFICE O/P EST LOW 20 MIN: CPT | Mod: 25,S$GLB,, | Performed by: DERMATOLOGY

## 2022-04-20 PROCEDURE — 3072F LOW RISK FOR RETINOPATHY: CPT | Mod: CPTII,S$GLB,, | Performed by: DERMATOLOGY

## 2022-04-20 PROCEDURE — 3072F PR LOW RISK FOR RETINOPATHY: ICD-10-PCS | Mod: CPTII,S$GLB,, | Performed by: DERMATOLOGY

## 2022-04-20 PROCEDURE — 3044F HG A1C LEVEL LT 7.0%: CPT | Mod: CPTII,S$GLB,, | Performed by: DERMATOLOGY

## 2022-04-20 NOTE — PROGRESS NOTES
"  Subjective:       Patient ID:  Hermelindo Garza III is a 41 y.o. male who presents for   Chief Complaint   Patient presents with    Skin Check     Patient is here today for a "mole" check.   Pt has a history of  moderate sun exposure in the past.   Pt recalls several blistering sunburns in the past- yes  Pt has history of tanning bed use- yes  Pt has  had moles removed in the past- yes, MIS R mid back  Pt has history of melanoma in first degree relatives-  Father NMSC  This is a high risk patient here to check for the development of new lesions.    Pt denies any new or changing lesions today       Review of Systems   Constitutional: Negative for fever, chills, weight loss, fatigue, night sweats and malaise.   HENT: Negative for headaches.    Respiratory: Negative for cough and shortness of breath.    Gastrointestinal: Negative for indigestion.   Skin: Positive for activity-related sunscreen use. Negative for daily sunscreen use, tendency to form keloidal scars, recent sunburn and wears hat.   Neurological: Negative for headaches.   Hematologic/Lymphatic: Negative for adenopathy. Does not bruise/bleed easily.        Objective:    Physical Exam   Constitutional: He appears well-developed and well-nourished. No distress.   Neurological: He is alert and oriented to person, place, and time. He is not disoriented.   Psychiatric: He has a normal mood and affect.   Skin:   Areas Examined (abnormalities noted in diagram):   Scalp / Hair Palpated and Inspected  Head / Face Inspection Performed  Neck Inspection Performed  Chest / Axilla Inspection Performed  Abdomen Inspection Performed  Genitals / Buttocks / Groin Inspection Performed  Back Inspection Performed  RUE Inspected  LUE Inspection Performed  RLE Inspected  LLE Inspection Performed  Nails and Digits Inspection Performed                       Diagram Legend     Erythematous scaling macule/papule c/w actinic keratosis       Vascular papule c/w angioma      " Pigmented verrucoid papule/plaque c/w seborrheic keratosis      Yellow umbilicated papule c/w sebaceous hyperplasia      Irregularly shaped tan macule c/w lentigo     1-2 mm smooth white papules consistent with Milia      Movable subcutaneous cyst with punctum c/w epidermal inclusion cyst      Subcutaneous movable cyst c/w pilar cyst      Firm pink to brown papule c/w dermatofibroma      Pedunculated fleshy papule(s) c/w skin tag(s)      Evenly pigmented macule c/w junctional nevus     Mildly variegated pigmented, slightly irregular-bordered macule c/w mildly atypical nevus      Flesh colored to evenly pigmented papule c/w intradermal nevus       Pink pearly papule/plaque c/w basal cell carcinoma      Erythematous hyperkeratotic cursted plaque c/w SCC      Surgical scar with no sign of skin cancer recurrence      Open and closed comedones      Inflammatory papules and pustules      Verrucoid papule consistent consistent with wart     Erythematous eczematous patches and plaques     Dystrophic onycholytic nail with subungual debris c/w onychomycosis     Umbilicated papule    Erythematous-base heme-crusted tan verrucoid plaque consistent with inflamed seborrheic keratosis     Erythematous Silvery Scaling Plaque c/w Psoriasis     See annotation      Assessment / Plan:        Lentigo  This is a benign hyperpigmented sun induced lesion. Recommend daily sun protection/avoidance and use of at least SPF 30, broad spectrum sunscreen (OTC drug) will reduce the number of new lesions. Treatment of these benign lesions are considered cosmetic.    The nature of sun-induced photo-aging and skin cancers is discussed.  Sun avoidance, protective clothing, and the use of 30-SPF sunscreens is advised. Observe closely for skin damage/changes, and call if such occurs.    Nevus  Discussed ABCDE's of nevi.  Monitor for new mole or moles that are becoming bigger, darker, irritated, or developing irregular borders. Brochure provided.  Instructed patient to observe lesion(s) for changes and follow up in clinic if changes are noted. Patient to monitor skin at home for new or changing lesions.     SK (seborrheic keratosis)  These are benign inherited growths without a malignant potential. Reassurance given to patient. No treatment is necessary.     History of melanoma in situ- R mid back 1/2022  Scar  Area of previous melanoma in situ examined. Site well healed with no signs of recurrence.    Total body skin examination performed today including at least 12 points as noted in physical examination. No lesions suspicious for malignancy noted.    Recommend daily sun protection/avoidance, use of at least SPF 30, broad spectrum sunscreen (OTC drug), skin self examinations, and routine physician surveillance to optimize early detection    AK (actinic keratosis)  Cryosurgery Procedure Note    Verbal consent from the patient is obtained including, but not limited to, risk of hypopigmentation/hyperpigmentation, scar, recurrence of lesion. The patient is aware of the precancerous quality and need for treatment of these lesions. Liquid nitrogen cryosurgery is applied to the 1 actinic keratoses, as detailed in the physical exam, to produce a freeze injury. The patient is aware that blisters may form and is instructed on wound care with gentle cleansing and use of vaseline ointment to keep moist until healed. The patient is supplied a handout on cryosurgery and is instructed to call if lesions do not completely resolve.             Follow up in about 3 months (around 7/20/2022) for recheck R medial shoulder nevus for changes.

## 2022-04-26 ENCOUNTER — CLINICAL SUPPORT (OUTPATIENT)
Dept: OPHTHALMOLOGY | Facility: CLINIC | Age: 42
End: 2022-04-26
Payer: COMMERCIAL

## 2022-04-26 ENCOUNTER — OFFICE VISIT (OUTPATIENT)
Dept: OPHTHALMOLOGY | Facility: CLINIC | Age: 42
End: 2022-04-26
Payer: COMMERCIAL

## 2022-04-26 DIAGNOSIS — H40.053 BORDERLINE GLAUCOMA OF BOTH EYES WITH OCULAR HYPERTENSION: ICD-10-CM

## 2022-04-26 DIAGNOSIS — H02.883 MEIBOMIAN GLAND DYSFUNCTION (MGD) OF BOTH EYES: ICD-10-CM

## 2022-04-26 DIAGNOSIS — H40.053 OCULAR HYPERTENSION, BILATERAL: ICD-10-CM

## 2022-04-26 DIAGNOSIS — H02.886 MEIBOMIAN GLAND DYSFUNCTION (MGD) OF BOTH EYES: ICD-10-CM

## 2022-04-26 DIAGNOSIS — H40.053 BORDERLINE GLAUCOMA OF BOTH EYES WITH OCULAR HYPERTENSION: Primary | ICD-10-CM

## 2022-04-26 PROCEDURE — 1159F MED LIST DOCD IN RCRD: CPT | Mod: CPTII,S$GLB,, | Performed by: OPHTHALMOLOGY

## 2022-04-26 PROCEDURE — 2023F DILAT RTA XM W/O RTNOPTHY: CPT | Mod: CPTII,S$GLB,, | Performed by: OPHTHALMOLOGY

## 2022-04-26 PROCEDURE — 92014 PR EYE EXAM, EST PATIENT,COMPREHESV: ICD-10-PCS | Mod: S$GLB,,, | Performed by: OPHTHALMOLOGY

## 2022-04-26 PROCEDURE — 1159F PR MEDICATION LIST DOCUMENTED IN MEDICAL RECORD: ICD-10-PCS | Mod: CPTII,S$GLB,, | Performed by: OPHTHALMOLOGY

## 2022-04-26 PROCEDURE — 3044F PR MOST RECENT HEMOGLOBIN A1C LEVEL <7.0%: ICD-10-PCS | Mod: CPTII,S$GLB,, | Performed by: OPHTHALMOLOGY

## 2022-04-26 PROCEDURE — 3044F HG A1C LEVEL LT 7.0%: CPT | Mod: CPTII,S$GLB,, | Performed by: OPHTHALMOLOGY

## 2022-04-26 PROCEDURE — 99999 PR PBB SHADOW E&M-EST. PATIENT-LVL IV: CPT | Mod: PBBFAC,,, | Performed by: OPHTHALMOLOGY

## 2022-04-26 PROCEDURE — 2023F PR DILATED RETINAL EXAM W/O EVID OF RETINOPATHY: ICD-10-PCS | Mod: CPTII,S$GLB,, | Performed by: OPHTHALMOLOGY

## 2022-04-26 PROCEDURE — 1160F RVW MEDS BY RX/DR IN RCRD: CPT | Mod: CPTII,S$GLB,, | Performed by: OPHTHALMOLOGY

## 2022-04-26 PROCEDURE — 1160F PR REVIEW ALL MEDS BY PRESCRIBER/CLIN PHARMACIST DOCUMENTED: ICD-10-PCS | Mod: CPTII,S$GLB,, | Performed by: OPHTHALMOLOGY

## 2022-04-26 PROCEDURE — 92014 COMPRE OPH EXAM EST PT 1/>: CPT | Mod: S$GLB,,, | Performed by: OPHTHALMOLOGY

## 2022-04-26 PROCEDURE — 99999 PR PBB SHADOW E&M-EST. PATIENT-LVL IV: ICD-10-PCS | Mod: PBBFAC,,, | Performed by: OPHTHALMOLOGY

## 2022-04-26 NOTE — PROGRESS NOTES
HPI     DLS: 10/26/2021    Pt here for HVF review/OCT;  Pt states he was seen by his determologolist and Dr. Shirley would like for   pt to be checked for Melanoma of the eyes.  Pt states no eye pain or discomfort.     Meds;  Latanoprost QHS OU   AT's PRN OU   Refresh PM PRN OU    1. OHT   2. MGD   3. Ocular Rosacea   4. Type 2 DM no     Last edited by Emma Couch on 4/26/2022 11:01 AM. (History)            Assessment /Plan     For exam results, see Encounter Report.    Borderline glaucoma of both eyes with ocular hypertension    Ocular hypertension, bilateral    Meibomian gland dysfunction (MGD) of both eyes       Glaucoma (type and duration)    OHT   First HVF   3/2021    First photos   1/2021    Treatment / Drops started   Timolol            Family history    Mother (required LPI), grandmother        Glaucoma meds    Brimonidine - not effective        H/O adverse rxn to glaucoma drops    none        LASERS    none        GLAUCOMA SURGERIES    none        OTHER EYE SURGERIES    none        CDR    0.4 // 0.2        Tbase    25-26 // 26-28         Tmax    26 // 28            Ttarget    ?             HVF    2 test 2021 to  2022 -near  Full - ? Hint paracentral changes od // full os        Gonio    2-3+  OU - very lightly pigmented TM - slight ant insertion - poor slt candidate         CCT   570 // 583        OCT    2 test 2021 to 2022 - RNFL - nl od // nl os        HRT    ?? test 20? to 20? - MR - ? od // ? os        Disc photos    1/26/2021    - Ttoday    14/16  - Test done today    IOP // DFE // OCT ONH OU // 24-2 OU    MGD / ocular rosacea  - Tried to get restasis in the past, difficult with insurance - will try alternatives first  - Difficulty with WC- time/job   - Start Ocusoft eyelid wipes  - Cont AT QID  - If ocusoft not effective, will try doxy         PLAN    GS with CDR asymmetry and FH+  Brimonidine not effective OU - no IOP chance - stop  Blue  eyes - latanoprost - may cause iris darkening   Mild asthma,  COPD - discussed, will try timolol BID OU- if bronchospasm stop - has NOT had a bronchospasm - but is on mult meds for asthma - prefer to use latanoprost if tolerated       Gonio +2-3 with VERY pale TM and a slight ant insertion - poor slt candidate    rec trial of latanoprost - may cause iris to darken,   Consider topical REBEKAH - but stings a lot    Consider SLT - poor candidate - very pale TM with a slight ant insertion    No response to brimonidine     F/U 4-6  months - IOP // Gonio

## 2022-05-03 ENCOUNTER — PATIENT MESSAGE (OUTPATIENT)
Dept: INTERNAL MEDICINE | Facility: CLINIC | Age: 42
End: 2022-05-03
Payer: COMMERCIAL

## 2022-05-03 NOTE — TELEPHONE ENCOUNTER
Recommend home covid test to be safe. Can take flonase, antihistamine otc in addition to saline spray/rinse. Send portal message w/ update if covid negative and no improvement w/ otc meds, then can send rx to his pharm.

## 2022-05-04 ENCOUNTER — PATIENT MESSAGE (OUTPATIENT)
Dept: INTERNAL MEDICINE | Facility: CLINIC | Age: 42
End: 2022-05-04
Payer: COMMERCIAL

## 2022-05-04 DIAGNOSIS — J32.9 SINUSITIS, UNSPECIFIED CHRONICITY, UNSPECIFIED LOCATION: Primary | ICD-10-CM

## 2022-05-04 DIAGNOSIS — J02.9 PHARYNGITIS, UNSPECIFIED ETIOLOGY: ICD-10-CM

## 2022-05-04 RX ORDER — AMOXICILLIN AND CLAVULANATE POTASSIUM 875; 125 MG/1; MG/1
1 TABLET, FILM COATED ORAL EVERY 12 HOURS
Qty: 10 TABLET | Refills: 0 | Status: SHIPPED | OUTPATIENT
Start: 2022-05-04 | End: 2022-05-09

## 2022-05-09 DIAGNOSIS — L23.7 ALLERGIC CONTACT DERMATITIS DUE TO PLANT: ICD-10-CM

## 2022-05-09 DIAGNOSIS — L21.9 SEBORRHEIC DERMATITIS OF SCALP: ICD-10-CM

## 2022-05-09 RX ORDER — KETOCONAZOLE 20 MG/G
CREAM TOPICAL
Qty: 15 G | Refills: 0 | Status: SHIPPED | OUTPATIENT
Start: 2022-05-09 | End: 2023-01-30 | Stop reason: SDUPTHER

## 2022-05-09 RX ORDER — HYDROCORTISONE 25 MG/G
CREAM TOPICAL
Qty: 30 G | Refills: 0 | Status: SHIPPED | OUTPATIENT
Start: 2022-05-09 | End: 2023-06-30 | Stop reason: SDUPTHER

## 2022-05-09 NOTE — TELEPHONE ENCOUNTER
No new care gaps identified.  [facility] Embedded Care Gaps. Reference number: [id number]. 5/09/2022   9:03:03 AM [id number]

## 2022-05-11 ENCOUNTER — TELEPHONE (OUTPATIENT)
Dept: NEUROSURGERY | Facility: CLINIC | Age: 42
End: 2022-05-11

## 2022-05-11 ENCOUNTER — OFFICE VISIT (OUTPATIENT)
Dept: NEUROSURGERY | Facility: CLINIC | Age: 42
End: 2022-05-11
Payer: COMMERCIAL

## 2022-05-11 ENCOUNTER — PATIENT MESSAGE (OUTPATIENT)
Dept: NEUROSURGERY | Facility: CLINIC | Age: 42
End: 2022-05-11

## 2022-05-11 DIAGNOSIS — M50.10 CERVICAL DISC DISORDER WITH RADICULOPATHY, UNSPECIFIED CERVICAL REGION: Primary | ICD-10-CM

## 2022-05-11 DIAGNOSIS — M54.16 LUMBAR RADICULOPATHY, CHRONIC: ICD-10-CM

## 2022-05-11 DIAGNOSIS — Z98.890 S/P LUMBAR DISCECTOMY: ICD-10-CM

## 2022-05-11 DIAGNOSIS — M54.9 DORSALGIA, UNSPECIFIED: ICD-10-CM

## 2022-05-11 PROCEDURE — 3072F PR LOW RISK FOR RETINOPATHY: ICD-10-PCS | Mod: CPTII,95,, | Performed by: NEUROLOGICAL SURGERY

## 2022-05-11 PROCEDURE — 99215 OFFICE O/P EST HI 40 MIN: CPT | Mod: 95,,, | Performed by: NEUROLOGICAL SURGERY

## 2022-05-11 PROCEDURE — 3072F LOW RISK FOR RETINOPATHY: CPT | Mod: CPTII,95,, | Performed by: NEUROLOGICAL SURGERY

## 2022-05-11 PROCEDURE — 99215 PR OFFICE/OUTPT VISIT, EST, LEVL V, 40-54 MIN: ICD-10-PCS | Mod: 95,,, | Performed by: NEUROLOGICAL SURGERY

## 2022-05-11 PROCEDURE — 3044F PR MOST RECENT HEMOGLOBIN A1C LEVEL <7.0%: ICD-10-PCS | Mod: CPTII,95,, | Performed by: NEUROLOGICAL SURGERY

## 2022-05-11 PROCEDURE — 3044F HG A1C LEVEL LT 7.0%: CPT | Mod: CPTII,95,, | Performed by: NEUROLOGICAL SURGERY

## 2022-05-11 PROCEDURE — 1160F RVW MEDS BY RX/DR IN RCRD: CPT | Mod: CPTII,95,, | Performed by: NEUROLOGICAL SURGERY

## 2022-05-11 PROCEDURE — 1160F PR REVIEW ALL MEDS BY PRESCRIBER/CLIN PHARMACIST DOCUMENTED: ICD-10-PCS | Mod: CPTII,95,, | Performed by: NEUROLOGICAL SURGERY

## 2022-05-11 PROCEDURE — 1159F PR MEDICATION LIST DOCUMENTED IN MEDICAL RECORD: ICD-10-PCS | Mod: CPTII,95,, | Performed by: NEUROLOGICAL SURGERY

## 2022-05-11 PROCEDURE — 1159F MED LIST DOCD IN RCRD: CPT | Mod: CPTII,95,, | Performed by: NEUROLOGICAL SURGERY

## 2022-05-11 NOTE — PROGRESS NOTES
The patient location is: home  The chief complaint leading to consultation is: arm and leg pain    Visit type: audiovisual    Face to Face time with patient: 20  30 minutes of total time spent on the encounter, which includes face to face time and non-face to face time preparing to see the patient (eg, review of tests), Obtaining and/or reviewing separately obtained history, Documenting clinical information in the electronic or other health record, Independently interpreting results (not separately reported) and communicating results to the patient/family/caregiver, or Care coordination (not separately reported).         Each patient to whom he or she provides medical services by telemedicine is:  (1) informed of the relationship between the physician and patient and the respective role of any other health care provider with respect to management of the patient; and (2) notified that he or she may decline to receive medical services by telemedicine and may withdraw from such care at any time.    Notes:   CHIEF COMPLAINT:  8m postop f/u  New arm pain   Return of leg pain    INTERVAL HISTORY (5/11/22):  Returns with new arm pain that radiates from base of neck to bilateral shoulders and down the volar thumb on R.  Stops at shoulder/upper arm on L.  Triggered by certain neck positions - has to use flat pillow or will have sxs.  + tingling to R hand and L shoulder.  Also reports clumsiness in hand with dropping items and poor fine motor control.  But denies overt weakness.  Feels tight through neck, chest and shoulder regions.    Also has discomfort in L leg along anterolateral thigh and describes as burning and itching.  Notes L leg weakness in back around thigh and knee.  Worse in am and foot feels like it fell asleep and is clumsy.  Occasionally has pain in L big toe.    Takes gabapentin 1 tab in am and 2 tab in pm.  He does HEP with occasional pool workout. Not particularly interested in PT.  Has not trialed  antiinflammatories.    No b/b dysfunction.    HPI:    Hermelindo Garza III is a 41 y.o.-year-old male who presents today for post-operative follow-up s/p L MIS L3-4 lami/disc on 9/20/21.  Overall patient is significantly improved.  He has significant improvement in his back and leg pain.  He still has some mild tingling in his shins that radiate down to the feet.  This is made worse when he is not active.  He takes gabapentin 600 mg at night which helps.  He would like to increase that to more times during the day.  He denies weakness.      ROS:  As stated in the above HPI    PE:  There were no vitals filed for this visit.    No neuro deficits observed    FROM PRIOR VISIT:    AAOX3  NAD  CN 2-12 intact     Strength:      Deltoids Biceps Triceps Wrist Ext. Wrist Flex. Hand    RUE 5 5 5 5 5 5   LUE 5 5 5 5 5 5    Hip Flex. Knee Flex. Knee Ext. Dorsi Flex Plantar Flex EHL   RLE 5 5 5 5 5 5   LLE 5 5 5 5 5 5     Sensation:  Intact to light touch (All 4 extremities)    Gait:  normal    Lumbar incision:  Well-healed    IMAGING:  All imaging reviewed by me.    Csp MRI, 1/12/21:  1. Loss of lordosis  2. Diffuse spondylosis  3. Multi-level DDD with moderate NFS  4. Severe left neural foraminal narrowing at C6-7  5. Severe right neural foraminal narrowing at C5-6 and C7-T1    Lsp MRI, 9/19/21:  1. Diffuse DDD and spondylosis  2. Large L L3-4 HNP  3. L L4-5 moderate NFS  4. R L5-S1 moderate NFS      ASSESSMENT:   Problem List Items Addressed This Visit        Neuro    Cervical disc disorder with radiculopathy, unspecified cervical region - Primary    Relevant Orders    MRI Cervical Spine Without Contrast    Lumbar radiculopathy, chronic    Relevant Orders    MRI Lumbar Spine Without Contrast    S/P lumbar discectomy      Other Visit Diagnoses     Dorsalgia, unspecified        Relevant Orders    MRI Lumbar Spine Without Contrast        1. S/p L MIS L3-4 lami/disc for severe radiculopathy.    2. Left anterolateral thigh  pain and paresthesias.  3. Left leg weakness  4. Bilateral arm pain and paresthesia    Patient recovered well from his urgent diskectomy and had near complete resolution of his symptoms.  The radiculopathy has resolved however he is now approximately 8 months postop and reports left anterolateral thigh burning paresthesias and itching.  He also notes that he has some weakness in the posterior aspect of his left leg that is intermittent.  The additionally he reports pain in bilateral shoulders that radiates to the hand on the right and the shoulder on the left with associated tingling.  Other than gabapentin, he has not tried many conservative measures.  He has a home exercise program and declined PT due to the difficulty fitting and into his schedule.  He has not tried anti-inflammatories.  Given the progressively worsened nature of the cervical lumbar radicular-type pain, I will obtain a cervical lumbar MRI.  His constellation symptoms could be due to musculoskeletal/myofascial strain however given the diffuse and advanced nature of degeneration in both the cervical lumbar regions on prior MRIs, I will obtain new MRIs to better evaluate.      PLAN:   - ordered cervical lumbar MRI (will call)  - declined PT at this time, continue home exercise program  - return to clinic to be determined    Time spent on this encounter: 40 minutes. This includes face-to-face time and non-face to face time preparing to see the patient (eg, review of tests), obtaining and/or reviewing separately obtained history, documenting clinical information in the electronic or other health record, independently interpreting results and communicating results to the patient/family/caregiver, or care coordinator.

## 2022-05-12 DIAGNOSIS — M54.16 LUMBAR RADICULOPATHY, CHRONIC: ICD-10-CM

## 2022-05-12 DIAGNOSIS — M54.12 CERVICAL RADICULAR PAIN: ICD-10-CM

## 2022-05-12 DIAGNOSIS — M50.10 CERVICAL DISC DISORDER WITH RADICULOPATHY, UNSPECIFIED CERVICAL REGION: Primary | ICD-10-CM

## 2022-05-12 DIAGNOSIS — M54.9 DORSALGIA, UNSPECIFIED: ICD-10-CM

## 2022-05-12 RX ORDER — LORAZEPAM 0.5 MG/1
0.5 TABLET ORAL
Qty: 2 TABLET | Refills: 0 | Status: SHIPPED | OUTPATIENT
Start: 2022-05-12 | End: 2023-04-12

## 2022-05-24 ENCOUNTER — TELEPHONE (OUTPATIENT)
Dept: NEUROSURGERY | Facility: CLINIC | Age: 42
End: 2022-05-24
Payer: COMMERCIAL

## 2022-05-24 ENCOUNTER — PATIENT MESSAGE (OUTPATIENT)
Dept: NEUROSURGERY | Facility: CLINIC | Age: 42
End: 2022-05-24
Payer: COMMERCIAL

## 2022-05-24 NOTE — TELEPHONE ENCOUNTER
MARANDA pt, apologized for MRI mis-schedule (7:15 am and 3:15 am). I rescheduled pt to 6/10/22 at 7:00 am at [facility], pt happy with info and did not have any further questions at this time.

## 2022-06-10 ENCOUNTER — HOSPITAL ENCOUNTER (OUTPATIENT)
Dept: RADIOLOGY | Facility: HOSPITAL | Age: 42
Discharge: HOME | End: 2022-06-10
Attending: NEUROLOGICAL SURGERY
Payer: COMMERCIAL

## 2022-06-10 DIAGNOSIS — M54.16 LUMBAR RADICULOPATHY, CHRONIC: ICD-10-CM

## 2022-06-10 DIAGNOSIS — M54.9 DORSALGIA, UNSPECIFIED: ICD-10-CM

## 2022-06-16 ENCOUNTER — HOSPITAL ENCOUNTER (OUTPATIENT)
Dept: RADIOLOGY | Facility: HOSPITAL | Age: 42
Discharge: HOME | End: 2022-06-16
Attending: NEUROLOGICAL SURGERY
Payer: COMMERCIAL

## 2022-06-16 DIAGNOSIS — M50.10 CERVICAL DISC DISORDER WITH RADICULOPATHY, UNSPECIFIED CERVICAL REGION: ICD-10-CM

## 2022-06-16 PROCEDURE — 72148 MRI LUMBAR SPINE WITHOUT CONTRAST: ICD-10-PCS | Mod: 26,,, | Performed by: INTERNAL MEDICINE

## 2022-06-16 PROCEDURE — 72148 MRI LUMBAR SPINE W/O DYE: CPT | Mod: TC

## 2022-06-16 PROCEDURE — 72141 MRI NECK SPINE W/O DYE: CPT | Mod: 26,,, | Performed by: INTERNAL MEDICINE

## 2022-06-16 PROCEDURE — 72141 MRI NECK SPINE W/O DYE: CPT | Mod: TC

## 2022-06-16 PROCEDURE — 72141 MRI CERVICAL SPINE WITHOUT CONTRAST: ICD-10-PCS | Mod: 26,,, | Performed by: INTERNAL MEDICINE

## 2022-06-16 PROCEDURE — 72148 MRI LUMBAR SPINE W/O DYE: CPT | Mod: 26,,, | Performed by: INTERNAL MEDICINE

## 2022-06-20 RX ORDER — ALBUTEROL SULFATE 90 UG/1
AEROSOL, METERED RESPIRATORY (INHALATION)
Qty: 8.5 G | Refills: 0 | Status: SHIPPED | OUTPATIENT
Start: 2022-06-20 | End: 2022-09-13 | Stop reason: SDUPTHER

## 2022-06-21 ENCOUNTER — OFFICE VISIT (OUTPATIENT)
Dept: URGENT CARE | Facility: CLINIC | Age: 42
End: 2022-06-21
Payer: COMMERCIAL

## 2022-06-21 ENCOUNTER — PATIENT MESSAGE (OUTPATIENT)
Dept: INTERNAL MEDICINE | Facility: CLINIC | Age: 42
End: 2022-06-21
Payer: COMMERCIAL

## 2022-06-21 VITALS
SYSTOLIC BLOOD PRESSURE: 151 MMHG | DIASTOLIC BLOOD PRESSURE: 90 MMHG | BODY MASS INDEX: 41.82 KG/M2 | RESPIRATION RATE: 18 BRPM | HEART RATE: 108 BPM | OXYGEN SATURATION: 95 % | TEMPERATURE: 99 F | HEIGHT: 65 IN | WEIGHT: 251 LBS

## 2022-06-21 DIAGNOSIS — R05.9 COUGH: ICD-10-CM

## 2022-06-21 DIAGNOSIS — E66.01 MORBID OBESITY: ICD-10-CM

## 2022-06-21 DIAGNOSIS — E11.9 TYPE 2 DIABETES MELLITUS WITHOUT COMPLICATION, WITHOUT LONG-TERM CURRENT USE OF INSULIN: ICD-10-CM

## 2022-06-21 DIAGNOSIS — B34.9 ACUTE VIRAL SYNDROME: Primary | ICD-10-CM

## 2022-06-21 LAB
CTP QC/QA: YES
CTP QC/QA: YES
POC MOLECULAR INFLUENZA A AGN: NEGATIVE
POC MOLECULAR INFLUENZA B AGN: NEGATIVE
SARS-COV-2 RDRP RESP QL NAA+PROBE: NEGATIVE

## 2022-06-21 PROCEDURE — 3044F PR MOST RECENT HEMOGLOBIN A1C LEVEL <7.0%: ICD-10-PCS | Mod: CPTII,S$GLB,, | Performed by: NURSE PRACTITIONER

## 2022-06-21 PROCEDURE — 3008F BODY MASS INDEX DOCD: CPT | Mod: CPTII,S$GLB,, | Performed by: NURSE PRACTITIONER

## 2022-06-21 PROCEDURE — 3072F LOW RISK FOR RETINOPATHY: CPT | Mod: CPTII,S$GLB,, | Performed by: NURSE PRACTITIONER

## 2022-06-21 PROCEDURE — 3008F PR BODY MASS INDEX (BMI) DOCUMENTED: ICD-10-PCS | Mod: CPTII,S$GLB,, | Performed by: NURSE PRACTITIONER

## 2022-06-21 PROCEDURE — 3077F SYST BP >= 140 MM HG: CPT | Mod: CPTII,S$GLB,, | Performed by: NURSE PRACTITIONER

## 2022-06-21 PROCEDURE — 87502 POCT INFLUENZA A/B MOLECULAR: ICD-10-PCS | Mod: QW,S$GLB,, | Performed by: NURSE PRACTITIONER

## 2022-06-21 PROCEDURE — 1160F RVW MEDS BY RX/DR IN RCRD: CPT | Mod: CPTII,S$GLB,, | Performed by: NURSE PRACTITIONER

## 2022-06-21 PROCEDURE — 1159F PR MEDICATION LIST DOCUMENTED IN MEDICAL RECORD: ICD-10-PCS | Mod: CPTII,S$GLB,, | Performed by: NURSE PRACTITIONER

## 2022-06-21 PROCEDURE — 3044F HG A1C LEVEL LT 7.0%: CPT | Mod: CPTII,S$GLB,, | Performed by: NURSE PRACTITIONER

## 2022-06-21 PROCEDURE — 99213 OFFICE O/P EST LOW 20 MIN: CPT | Mod: S$GLB,,, | Performed by: NURSE PRACTITIONER

## 2022-06-21 PROCEDURE — 3072F PR LOW RISK FOR RETINOPATHY: ICD-10-PCS | Mod: CPTII,S$GLB,, | Performed by: NURSE PRACTITIONER

## 2022-06-21 PROCEDURE — 3080F PR MOST RECENT DIASTOLIC BLOOD PRESSURE >= 90 MM HG: ICD-10-PCS | Mod: CPTII,S$GLB,, | Performed by: NURSE PRACTITIONER

## 2022-06-21 PROCEDURE — U0002: ICD-10-PCS | Mod: QW,S$GLB,, | Performed by: NURSE PRACTITIONER

## 2022-06-21 PROCEDURE — 1159F MED LIST DOCD IN RCRD: CPT | Mod: CPTII,S$GLB,, | Performed by: NURSE PRACTITIONER

## 2022-06-21 PROCEDURE — 1160F PR REVIEW ALL MEDS BY PRESCRIBER/CLIN PHARMACIST DOCUMENTED: ICD-10-PCS | Mod: CPTII,S$GLB,, | Performed by: NURSE PRACTITIONER

## 2022-06-21 PROCEDURE — 87502 INFLUENZA DNA AMP PROBE: CPT | Mod: QW,S$GLB,, | Performed by: NURSE PRACTITIONER

## 2022-06-21 PROCEDURE — 3077F PR MOST RECENT SYSTOLIC BLOOD PRESSURE >= 140 MM HG: ICD-10-PCS | Mod: CPTII,S$GLB,, | Performed by: NURSE PRACTITIONER

## 2022-06-21 PROCEDURE — 3080F DIAST BP >= 90 MM HG: CPT | Mod: CPTII,S$GLB,, | Performed by: NURSE PRACTITIONER

## 2022-06-21 PROCEDURE — U0002 COVID-19 LAB TEST NON-CDC: HCPCS | Mod: QW,S$GLB,, | Performed by: NURSE PRACTITIONER

## 2022-06-21 PROCEDURE — 99213 PR OFFICE/OUTPT VISIT, EST, LEVL III, 20-29 MIN: ICD-10-PCS | Mod: S$GLB,,, | Performed by: NURSE PRACTITIONER

## 2022-06-21 RX ORDER — SEMAGLUTIDE 1.34 MG/ML
0.5 INJECTION, SOLUTION SUBCUTANEOUS
Qty: 2 PEN | Refills: 5 | Status: SHIPPED | OUTPATIENT
Start: 2022-06-21 | End: 2022-09-07 | Stop reason: SDUPTHER

## 2022-06-21 RX ORDER — BENZONATATE 200 MG/1
200 CAPSULE ORAL 3 TIMES DAILY PRN
Qty: 30 CAPSULE | Refills: 0 | Status: SHIPPED | OUTPATIENT
Start: 2022-06-21 | End: 2022-07-01

## 2022-06-21 NOTE — PROGRESS NOTES
"Subjective:       Patient ID: Hermelindo Garza III is a 42 y.o. male.    Vitals:  height is 5' 5" (1.651 m) and weight is 113.9 kg (251 lb). His oral temperature is 98.5 °F (36.9 °C). His blood pressure is 151/90 (abnormal) and his pulse is 108. His respiration is 18 and oxygen saturation is 95%.     Chief Complaint: Cough    Pt presents to urgent care cough , diarrhea, body aches, sweats, chills, sore throat, headaches, chest congestion, nasal congestion & sneezing since Saturday.   Pt has been taking otc cough meds.   Provider note below:  This is a 42 y.o. male who presents today with a chief complaint of cough, diarrhea, body aches, chills, sore throat and nasal congestion and sneezing started on Saturday, patient reports he is vaccinated, denies fever, body aches, denies  wheezing or shortness of breath, denies nausea, vomiting,or abdominal pain, denies chest pain or dizziness positional lightheadedness, denies  trouble swallowing, denies loss of taste or smell, or any other symptoms        Cough  This is a new problem. The current episode started in the past 7 days. The problem has been gradually worsening. The problem occurs constantly. The cough is productive of purulent sputum. Associated symptoms include chills, myalgias, nasal congestion, a sore throat and sweats. Pertinent negatives include no postnasal drip, shortness of breath or wheezing. Nothing aggravates the symptoms. He has tried OTC cough suppressant for the symptoms. The treatment provided no relief. His past medical history is significant for asthma. There is no history of environmental allergies.       Constitution: Positive for chills. Negative for sweating and fatigue.   HENT: Positive for sore throat. Negative for postnasal drip, sinus pain and sinus pressure.    Respiratory: Positive for cough. Negative for chest tightness, shortness of breath and wheezing.    Gastrointestinal: Positive for diarrhea. Negative for abdominal pain, " vomiting and constipation.   Musculoskeletal: Positive for muscle ache.   Allergic/Immunologic: Negative for environmental allergies and seasonal allergies.   Neurological: Negative for dizziness and light-headedness.       Past Medical History:   Diagnosis Date    ADD (attention deficit disorder) 05/09/2012    Asthma 05/09/2012    Cervical disc disorder with radiculopathy, unspecified cervical region 09/14/2021    Esophageal ulcer     GERD (gastroesophageal reflux disease) 05/09/2012    Glaucoma     Kidney stones 05/2014    Lumbar disc disease 05/09/2012    Lumbar herniated disc 05/09/2012    Malignant melanoma of skin, unspecified 01/06/2022    in situ right mid back    Melanoma 01/2022    in situ R mid back     LUBA (obstructive sleep apnea)     Radiculopathy, lumbar region 09/14/2021    Renal calculi 05/09/2012    Type 2 diabetes mellitus without complication, without long-term current use of insulin 11/16/2020       Objective:      Physical Exam   Constitutional: He is oriented to person, place, and time. He appears well-developed. He is cooperative.  Non-toxic appearance. He does not appear ill. No distress.   HENT:   Head: Normocephalic and atraumatic.   Ears:   Right Ear: Hearing, tympanic membrane, external ear and ear canal normal.   Left Ear: Hearing, tympanic membrane, external ear and ear canal normal.   Nose: Nose normal. No mucosal edema, rhinorrhea or nasal deformity. No epistaxis. Right sinus exhibits no maxillary sinus tenderness and no frontal sinus tenderness. Left sinus exhibits no maxillary sinus tenderness and no frontal sinus tenderness.   Mouth/Throat: Uvula is midline, oropharynx is clear and moist and mucous membranes are normal. No trismus in the jaw. Normal dentition. No uvula swelling. No oropharyngeal exudate, posterior oropharyngeal edema, posterior oropharyngeal erythema, tonsillar abscesses or cobblestoning.   Eyes: Conjunctivae and lids are normal. No scleral icterus.    Neck: Trachea normal and phonation normal. Neck supple. No edema present. No erythema present. No neck rigidity present.   Cardiovascular: Normal rate, regular rhythm, normal heart sounds and normal pulses.   Pulmonary/Chest: Effort normal and breath sounds normal. No stridor. No respiratory distress. He has no decreased breath sounds. He has no wheezes. He has no rhonchi. He has no rales.   Abdominal: Normal appearance.   Musculoskeletal: Normal range of motion.         General: No deformity. Normal range of motion.   Lymphadenopathy:     He has no cervical adenopathy.   Neurological: He is alert and oriented to person, place, and time. He exhibits normal muscle tone. Coordination normal.   Skin: Skin is warm, dry, intact, not diaphoretic and not pale.   Psychiatric: His speech is normal and behavior is normal. Judgment and thought content normal.   Nursing note and vitals reviewed.        Results for orders placed or performed in visit on 06/21/22   POCT COVID-19 Rapid Screening   Result Value Ref Range    POC Rapid COVID Negative Negative     Acceptable Yes    POCT Influenza A/B MOLECULAR   Result Value Ref Range    POC Molecular Influenza A Ag Negative Negative, Not Reported    POC Molecular Influenza B Ag Negative Negative, Not Reported     Acceptable Yes          Patient in no acute distress.  Vitals reassuring.  Discussed results/diagnosis/plan in depth with patient in clinic. Strict precautions given to patient to monitor for worsening signs and symptoms. Advised to follow up with primary.All questions answered. Strict ER precautions given. If your symptoms worsens or fail to improve you should go to the Emergency Room. Discharge and follow-up instructions given verbally/printed. Discharge and follow-up instructions discussed with the patient who expressed understanding and willingness to comply with my recommendations.Patient voiced understanding and in agreement with current  treatment plan.     Please be advised this text was dictated with [employer] software and may contain errors due to translation.      Assessment:       1. Acute viral syndrome    2. Cough          Plan:         Acute viral syndrome    Cough  -     POCT COVID-19 Rapid Screening  -     POCT Influenza A/B MOLECULAR    Other orders  -     benzonatate (TESSALON) 200 MG capsule; Take 1 capsule (200 mg total) by mouth 3 (three) times daily as needed for Cough.  Dispense: 30 capsule; Refill: 0           Medical Decision Making:   Clinical Tests:   Lab Tests: Reviewed  Urgent Care Management:  Patient in no acute distress.  Vitals reassuring.  Negative COVID-19 and flu test results discussed with patient detail.  Detailed education provided about COVID 19 precautions and recommendations as per CDC guidelines.  Medication prescribed and over-the-counter medication discussed.Patient voiced understanding and in agreement with current treatment plan.          Patient Instructions   PLEASE READ YOUR DISCHARGE INSTRUCTIONS ENTIRELY AS IT CONTAINS IMPORTANT INFORMATION.      Please drink plenty of fluids.    Please get plenty of rest.    Please return here or go to the Emergency Department for any concerns or worsening of condition.    Please take an over the counter antihistamine medication (allegra/Claritin/Zyrtec) of your choice as directed.    Try an over the counter decongestant like Mucinex D or Sudafed. You buy this behind the pharmacy counter    If you do have Hypertension or palpitations, it is safe to take Coricidin HBP for relief of sinus symptoms.    If not allergic, please take over the counter Tylenol (Acetaminophen) and/or Motrin (Ibuprofen) as directed for control of pain and/or fever.  Please follow up with your primary care doctor or specialist as needed.    Sore throat recommendations: Warm fluids, warm salt water gargles, throat lozenges, tea, honey, soup, rest, hydration.    Use over the counter flonase: one spray  each nostril twice daily OR two sprays each nostril once daily.     If you  smoke, please stop smoking.      Please return or see your primary care doctor if you develop new or worsening symptoms.     Please arrange follow up with your primary medical clinic as soon as possible. You must understand that you've received an Urgent Care treatment only and that you may be released before all of your medical problems are known or treated. You, the patient, will arrange for follow up as instructed. If your symptoms worsen or fail to improve you should go to the Emergency Room.

## 2022-06-22 ENCOUNTER — PATIENT MESSAGE (OUTPATIENT)
Dept: INTERNAL MEDICINE | Facility: CLINIC | Age: 42
End: 2022-06-22
Payer: COMMERCIAL

## 2022-06-23 ENCOUNTER — OFFICE VISIT (OUTPATIENT)
Dept: PSYCHIATRY | Facility: CLINIC | Age: 42
End: 2022-06-23
Payer: COMMERCIAL

## 2022-06-23 ENCOUNTER — TELEPHONE (OUTPATIENT)
Dept: NEUROSURGERY | Facility: CLINIC | Age: 42
End: 2022-06-23
Payer: COMMERCIAL

## 2022-06-23 DIAGNOSIS — F90.0 ADHD (ATTENTION DEFICIT HYPERACTIVITY DISORDER), INATTENTIVE TYPE: ICD-10-CM

## 2022-06-23 DIAGNOSIS — G47.00 INSOMNIA, UNSPECIFIED TYPE: Primary | ICD-10-CM

## 2022-06-23 PROCEDURE — 99214 PR OFFICE/OUTPT VISIT, EST, LEVL IV, 30-39 MIN: ICD-10-PCS | Mod: 95,,, | Performed by: NURSE PRACTITIONER

## 2022-06-23 PROCEDURE — 99214 OFFICE O/P EST MOD 30 MIN: CPT | Mod: 95,,, | Performed by: NURSE PRACTITIONER

## 2022-06-23 PROCEDURE — 3072F LOW RISK FOR RETINOPATHY: CPT | Mod: CPTII,95,, | Performed by: NURSE PRACTITIONER

## 2022-06-23 PROCEDURE — 3072F PR LOW RISK FOR RETINOPATHY: ICD-10-PCS | Mod: CPTII,95,, | Performed by: NURSE PRACTITIONER

## 2022-06-23 PROCEDURE — 3044F HG A1C LEVEL LT 7.0%: CPT | Mod: CPTII,95,, | Performed by: NURSE PRACTITIONER

## 2022-06-23 PROCEDURE — 3044F PR MOST RECENT HEMOGLOBIN A1C LEVEL <7.0%: ICD-10-PCS | Mod: CPTII,95,, | Performed by: NURSE PRACTITIONER

## 2022-06-23 RX ORDER — ZOLPIDEM TARTRATE 12.5 MG/1
12.5 TABLET, FILM COATED, EXTENDED RELEASE ORAL NIGHTLY PRN
Qty: 30 TABLET | Refills: 0 | Status: SHIPPED | OUTPATIENT
Start: 2022-06-23 | End: 2022-09-08 | Stop reason: SDUPTHER

## 2022-06-23 RX ORDER — DEXMETHYLPHENIDATE HYDROCHLORIDE 10 MG/1
10 TABLET ORAL 3 TIMES DAILY
Qty: 90 TABLET | Refills: 0 | Status: SHIPPED | OUTPATIENT
Start: 2022-08-19 | End: 2023-02-08

## 2022-06-23 RX ORDER — DEXMETHYLPHENIDATE HYDROCHLORIDE 10 MG/1
10 TABLET ORAL 3 TIMES DAILY
Qty: 90 TABLET | Refills: 0 | Status: SHIPPED | OUTPATIENT
Start: 2022-07-21 | End: 2022-10-19 | Stop reason: SDUPTHER

## 2022-06-23 NOTE — TELEPHONE ENCOUNTER
MARANDA pt, offered appt with Dr. Mcnamara. Pt would like appt to be virtual if possible. I will check with Dr. Mcnamara and call back.     ----- Message from Naseem Mcnamara, DO sent at 6/17/2022 10:38 AM CDT -----  Let's get him back in to clinic to review his MRI and see how he is doing.    Lumbar MRI looks good.  Cervical MRI shows some tightness around nerves and spinal cord that might contribute to some of his symptoms.

## 2022-06-23 NOTE — TELEPHONE ENCOUNTER
Wouldn't recommend steroid. Symptoms consistent with viral infection, typically lasts 7-10 days with gradual improvement

## 2022-06-23 NOTE — PROGRESS NOTES
6/23/2022 11:30 AM   Hermelindo Garza III   1980   [id number]                                                                   OUTPATIENT PSYCHIATRY FOLLOW- UP VISIT     Reason for Encounter:  Hermelindo Garza III, a 41 y.o. male,who presents today for follow up of ADHD. Met with patient.     Interval History and Content of Current Session:     Today, Patient with hx of pre-glaucoma (eye drops for this), DM type 2 (well controlled with Ozempic), asthma (intermittent, mild), HLD, GERD, insomnia, sleep apnea, ADHD, lumbar discectomy, chronic back pain (managed with gabapentin), ozempic for weight loss.       Reports as follow up from last visit about 3 months prior. Reports has been taking Focalin about 2 to 3 times per week, continues to be effective.     Discussed trial of Ambien CR at 12.5 mg daily as needed for sleep.      Denies SI/HI, AVH, racing thoughts, substance abuse. Negative UDS in 1/2022 and as expected.                       Psychosocial stressors: occupational, social, family pressures        Review Of Systems:      Medical Review Of Systems:  Constitutional: negative for fatigue and weight loss  Cardiovascular: negative  Gastrointestinal: negative     Psychiatric Review Of Systems:  sleep: no  appetite changes: no  weight changes: no  energy/anergy: no  interest/pleasure/anhedonia: no  somatic symptoms: no  libido: no  anxiety/panic: no  guilty/hopeless: no  S.I.B.s/risky behavior: no  any drugs: no  alcohol: no       Sleep: 6 to 7 hours per night     Risk Parameters:  Patient reports no suicidal ideation  Patient reports no homicidal ideation  Patient reports no self-injurious behavior  Patient reports no violent behavior        Current meds-     Compliance: yes     Side effects: None        Psychiatric, social, and family history reviewed this visit        Objective      ALL MEDICATIONS:     Current Outpatient Medications:     albuterol (PROVENTIL HFA) 90 mcg/actuation inhaler, Inhale  2 puffs into the lungs every 6 (six) hours as needed for Wheezing. Rescue, Disp: 18 g, Rfl: 5    atorvastatin (LIPITOR) 20 MG tablet, TAKE 1 TABLET(20 MG) BY MOUTH EVERY DAY, Disp: 90 tablet, Rfl: 3    blood-glucose meter kit, Use as instructed, Disp: 1 each, Rfl: 0    dexmethylphenidate (FOCALIN) 10 MG tablet, Take 1 tablet (10 mg total) by mouth 3 (three) times daily., Disp: 90 tablet, Rfl: 0    dexmethylphenidate (FOCALIN) 10 MG tablet, Take 1 tablet (10 mg total) by mouth 3 (three) times daily., Disp: 90 tablet, Rfl: 0    dexmethylphenidate (FOCALIN) 10 MG tablet, Take 1 tablet (10 mg total) by mouth 3 (three) times daily., Disp: 90 tablet, Rfl: 0    diazePAM (VALIUM) 5 MG tablet, Take 1 tablet (5 mg total) by mouth every 6 (six) hours as needed for Anxiety. Take 1 pill 30 minutes prior to surgery and bring remaining pill with you on day of surgery., Disp: 2 tablet, Rfl: 0    ergocalciferol, vitamin D2, (VITAMIN D ORAL), Take 1 tablet by mouth every other day., Disp: , Rfl:     esomeprazole (NEXIUM) 40 mg GrPS, Take 40 mg by mouth 2 (two) times daily before meals., Disp: 60 each, Rfl: 11    fluocinonide (LIDEX) 0.05 % external solution, AAA scalp qday prn itching, scaling, Disp: 60 mL, Rfl: 3    gabapentin (NEURONTIN) 300 MG capsule, Take 1 capsule (300 mg total) by mouth 3 (three) times daily., Disp: 90 capsule, Rfl: 3    HYDROcodone-acetaminophen (NORCO) 5-325 mg per tablet, Take 1 tablet by mouth every 6 (six) hours as needed for Pain., Disp: 10 tablet, Rfl: 0    hydrocortisone 2.5 % cream, Apply topically 2 (two) times daily. for 10 days, Disp: 28 g, Rfl: 0    ketoconazole (NIZORAL) 2 % cream, Apply topically 2 (two) times daily. Apply to to affected area bid, Disp: 15 g, Rfl: 0    ketoconazole (NIZORAL) 2 % shampoo, Apply topically twice a week., Disp: 120 mL, Rfl: 3    ketoconazole (NIZORAL) 2 % shampoo, Wash hair with medicated shampoo at least 2x/week - let sit on scalp at least 5 minutes  prior to rinsing, Disp: 120 mL, Rfl: 5    lancets Misc, 1 each by Misc.(Non-Drug; Combo Route) route Daily., Disp: 30 each, Rfl: 11    latanoprost 0.005 % ophthalmic solution, Place 1 drop into both eyes every evening. This replaces the timolol, Disp: 3 Bottle, Rfl: 12    methocarbamoL (ROBAXIN) 750 MG Tab, TAKE 1 TABLET(750 MG) BY MOUTH THREE TIMES DAILY AS NEEDED FOR MUSCLE SPASM, Disp: 90 tablet, Rfl: 0    montelukast (SINGULAIR) 10 mg tablet, TAKE 1 TABLET(10 MG) BY MOUTH EVERY EVENING, Disp: 30 tablet, Rfl: 11    ondansetron (ZOFRAN-ODT) 4 MG TbDL, Take 1 tablet (4 mg total) by mouth every 6 (six) hours as needed (nausea)., Disp: 30 tablet, Rfl: 3    rOPINIRole (REQUIP) 0.25 MG tablet, TAKE 1 TABLET(0.25 MG) BY MOUTH EVERY EVENING, Disp: 30 tablet, Rfl: 2    semaglutide (OZEMPIC) 0.25 mg or 0.5 mg(2 mg/1.5 mL) pen injector, Inject 0.5 mg into the skin every 7 days., Disp: 2 pen, Rfl: 2    TRUE METRIX GLUCOSE TEST STRIP Strp, USE TO TEST ONCE DAILY, Disp: 25 strip, Rfl: 2    zolpidem (AMBIEN) 5 MG Tab, Take 1 tablet (5 mg total) by mouth nightly as needed (insomnia)., Disp: 30 tablet, Rfl: 1     ALLERGIES:  Review of patient's allergies indicates:  No Known Allergies     RELEVANT LABS/STUDIES:          Lab Results   Component Value Date     WBC 9.55 09/20/2021     HGB 16.5 09/20/2021     HCT 48.8 09/20/2021     MCV 89 09/20/2021      09/20/2021      BMP        Lab Results   Component Value Date      09/20/2021     K 4.1 09/20/2021      09/20/2021     CO2 21 (L) 09/20/2021     BUN 17 09/20/2021     CREATININE 0.7 09/20/2021     CALCIUM 9.3 09/20/2021     ANIONGAP 11 09/20/2021     ESTGFRAFRICA >60.0 09/20/2021     EGFRNONAA >60.0 09/20/2021            Lab Results   Component Value Date     ALT 47 (H) 09/19/2021     AST 19 09/19/2021     ALKPHOS 107 09/19/2021     BILITOT 0.7 09/19/2021            Lab Results   Component Value Date     TSH 2.296 07/30/2020            Lab Results    Component Value Date     HGBA1C 5.2 08/13/2021         Constitutional  Vitals:  Most recent vital signs, dated less than 90 days prior to this appointment, were reviewed.    There were no vitals filed for this visit.            PHYSICAL EXAM  General: well developed, well nourished  Neurologic:   Gait: Normal   Psychomotor signs:  No involuntary movements or tremor  AIMS: none     PSYCHIATRIC EXAM:     Mental Status Exam:  Appearance: unremarkable, age appropriate  Behavior/Cooperation: normal, cooperative  Speech: normal tone, normal rate, normal pitch, normal volume  Language: uses words appropriately; NO aphasia or dysarthria  Mood: steady  Affect: full, congruent with mood and appropriate to content/situation  Thought Process: normal and logical  Thought Content: normal, no suicidality, no homicidality, delusions, or paranoia  Level of Consciousness: Alert and Oriented x3  Memory:  Intact  Attention/concentration: appropriate for age/education.   Fund of Knowledge: appears adequate  Insight: Intact  Judgment: Intact      Assessment and Diagnosis   Status/Progress: Based on the examination today, the patient's problem(s) is/are well controlled.  New problems have not been presented today.   Co-morbidities are complicating management of the primary condition.  There are no active rule-out diagnoses for this patient at this time.      General Impression:   ADHD, inattentive type  LUBA  Insomnia  Anxiety and depression NOS        Intervention/Counseling/Treatment Plan   · Medication Management:   · Continue Focalin IR 10 mg by mouth three times daily  · Discontinue trazodone 50 mg by mouth once daily as needed at bedtime  · Start Ambien CR 12.5 mg by mouth once nightly as needed, discussed risks including memory loss, nighttime amnesiac episodes, vivid/lucid dreaming.   · Labs: reviewed most recent - reviewed CMP, CBC from 4/2022  · The treatment plan and follow up plan were reviewed with the patient.  · Discussed  with patient informed consent, risks vs. benefits, alternative treatments, side effect profile and the inherent unpredictability of individual responses to these treatments. The patient expresses understanding of the above and displays the capacity to agree with this current plan and had no other questions.  · Encouraged Patient to keep future appointments.   · Take medications as prescribed and abstain from substance abuse.   · In the event of an emergency patient was advised to go to the emergency room.     Return to Clinic: 3 months     > than 50% of total time spend on coordination of care and counseling   (which included pts differential diagnosis and prognosis for psychiatric conditions, risks, benefits of treatments, instructions and adherence to treatment plan, risk reduction, reviewing current psychiatric medication regimen, medical problems and social stressors. In addtion to possible discussion with other healthcare provider/s)     Add on Psychotherapy time:0  Total Face time:20 min     The patient location is: [place], at home  The chief complaint leading to consultation is: med f/u     Visit type: audiovisual     Face to Face time with patient: 20 min  20 minutes of total time spent on the encounter, which includes face to face time and non-face to face time preparing to see the patient (eg, review of tests), Obtaining and/or reviewing separately obtained history, Documenting clinical information in the electronic or other health record, Independently interpreting results (not separately reported) and communicating results to the patient/family/caregiver, or Care coordination (not separately reported).            Each patient to whom he or she provides medical services by telemedicine is:  (1) informed of the relationship between the physician and patient and the respective role of any other health care provider with respect to management of the patient; and (2) notified that he or she may decline to  receive medical services by telemedicine and may withdraw from such care at any time.     Notes:      Tor Hernandez, MSN, APRN, PMHNP-BC [facility]   6/23/2022 9:00 AM

## 2022-06-24 ENCOUNTER — TELEPHONE (OUTPATIENT)
Dept: NEUROSURGERY | Facility: CLINIC | Age: 42
End: 2022-06-24
Payer: COMMERCIAL

## 2022-06-24 NOTE — TELEPHONE ENCOUNTER
SW pt, he does need in-person visit. Pt scheduled 6/29/22. Pt happy with date and time, no further questions at this time.     ----- Message from Naseem Mcnamara DO sent at 6/23/2022  5:05 PM CDT -----  I would like to see him to get a neuro exam    ----- Message -----  From: Jennifer Nunez MA  Sent: 6/23/2022   4:49 PM CDT  To: Naseem Mcnamara, DO    He would like it to be virtual again if possible? He's ok with coming in if you need him to.     ----- Message -----  From: Naseem Mcnamara DO  Sent: 6/17/2022  10:40 AM CDT  To: Jennifer Nunez MA    Let's get him back in to clinic to review his MRI and see how he is doing.    Lumbar MRI looks good.  Cervical MRI shows some tightness around nerves and spinal cord that might contribute to some of his symptoms.

## 2022-06-28 ENCOUNTER — PATIENT MESSAGE (OUTPATIENT)
Dept: INTERNAL MEDICINE | Facility: CLINIC | Age: 42
End: 2022-06-28
Payer: COMMERCIAL

## 2022-06-29 ENCOUNTER — OFFICE VISIT (OUTPATIENT)
Dept: NEUROSURGERY | Facility: CLINIC | Age: 42
End: 2022-06-29
Payer: COMMERCIAL

## 2022-06-29 VITALS
DIASTOLIC BLOOD PRESSURE: 78 MMHG | TEMPERATURE: 98 F | HEIGHT: 65 IN | BODY MASS INDEX: 41.84 KG/M2 | HEART RATE: 90 BPM | WEIGHT: 251.13 LBS | SYSTOLIC BLOOD PRESSURE: 130 MMHG

## 2022-06-29 DIAGNOSIS — M47.12 CERVICAL SPONDYLOSIS WITH MYELOPATHY: ICD-10-CM

## 2022-06-29 DIAGNOSIS — Z98.890 S/P LUMBAR DISCECTOMY: Primary | ICD-10-CM

## 2022-06-29 PROCEDURE — 99999 PR PBB SHADOW E&M-EST. PATIENT-LVL V: ICD-10-PCS | Mod: PBBFAC,,, | Performed by: NEUROLOGICAL SURGERY

## 2022-06-29 PROCEDURE — 3008F PR BODY MASS INDEX (BMI) DOCUMENTED: ICD-10-PCS | Mod: CPTII,S$GLB,, | Performed by: NEUROLOGICAL SURGERY

## 2022-06-29 PROCEDURE — 99215 PR OFFICE/OUTPT VISIT, EST, LEVL V, 40-54 MIN: ICD-10-PCS | Mod: S$GLB,,, | Performed by: NEUROLOGICAL SURGERY

## 2022-06-29 PROCEDURE — 99215 OFFICE O/P EST HI 40 MIN: CPT | Mod: S$GLB,,, | Performed by: NEUROLOGICAL SURGERY

## 2022-06-29 PROCEDURE — 1160F RVW MEDS BY RX/DR IN RCRD: CPT | Mod: CPTII,S$GLB,, | Performed by: NEUROLOGICAL SURGERY

## 2022-06-29 PROCEDURE — 3044F HG A1C LEVEL LT 7.0%: CPT | Mod: CPTII,S$GLB,, | Performed by: NEUROLOGICAL SURGERY

## 2022-06-29 PROCEDURE — 3075F SYST BP GE 130 - 139MM HG: CPT | Mod: CPTII,S$GLB,, | Performed by: NEUROLOGICAL SURGERY

## 2022-06-29 PROCEDURE — 3078F DIAST BP <80 MM HG: CPT | Mod: CPTII,S$GLB,, | Performed by: NEUROLOGICAL SURGERY

## 2022-06-29 PROCEDURE — 99999 PR PBB SHADOW E&M-EST. PATIENT-LVL V: CPT | Mod: PBBFAC,,, | Performed by: NEUROLOGICAL SURGERY

## 2022-06-29 PROCEDURE — 3008F BODY MASS INDEX DOCD: CPT | Mod: CPTII,S$GLB,, | Performed by: NEUROLOGICAL SURGERY

## 2022-06-29 PROCEDURE — 3075F PR MOST RECENT SYSTOLIC BLOOD PRESS GE 130-139MM HG: ICD-10-PCS | Mod: CPTII,S$GLB,, | Performed by: NEUROLOGICAL SURGERY

## 2022-06-29 PROCEDURE — 1159F MED LIST DOCD IN RCRD: CPT | Mod: CPTII,S$GLB,, | Performed by: NEUROLOGICAL SURGERY

## 2022-06-29 PROCEDURE — 3072F LOW RISK FOR RETINOPATHY: CPT | Mod: CPTII,S$GLB,, | Performed by: NEUROLOGICAL SURGERY

## 2022-06-29 PROCEDURE — 1160F PR REVIEW ALL MEDS BY PRESCRIBER/CLIN PHARMACIST DOCUMENTED: ICD-10-PCS | Mod: CPTII,S$GLB,, | Performed by: NEUROLOGICAL SURGERY

## 2022-06-29 PROCEDURE — 1159F PR MEDICATION LIST DOCUMENTED IN MEDICAL RECORD: ICD-10-PCS | Mod: CPTII,S$GLB,, | Performed by: NEUROLOGICAL SURGERY

## 2022-06-29 PROCEDURE — 3072F PR LOW RISK FOR RETINOPATHY: ICD-10-PCS | Mod: CPTII,S$GLB,, | Performed by: NEUROLOGICAL SURGERY

## 2022-06-29 PROCEDURE — 3078F PR MOST RECENT DIASTOLIC BLOOD PRESSURE < 80 MM HG: ICD-10-PCS | Mod: CPTII,S$GLB,, | Performed by: NEUROLOGICAL SURGERY

## 2022-06-29 PROCEDURE — 3044F PR MOST RECENT HEMOGLOBIN A1C LEVEL <7.0%: ICD-10-PCS | Mod: CPTII,S$GLB,, | Performed by: NEUROLOGICAL SURGERY

## 2022-06-29 RX ORDER — METHOCARBAMOL 750 MG/1
750 TABLET, FILM COATED ORAL 3 TIMES DAILY
Qty: 30 TABLET | Refills: 0 | Status: SHIPPED | OUTPATIENT
Start: 2022-06-29 | End: 2022-08-24 | Stop reason: SDUPTHER

## 2022-06-29 NOTE — PROGRESS NOTES
CHIEF COMPLAINT:  9m postop f/u  Review MRIs    INTERVAL HISTORY (6/29/22):  Returns to review recent cervical MRI.    Always has discomfort in neck with pain radiating into R shoulder and occasionally into 4+5th digits as well as L shoulder.  He does endorse clumsiness with hands (dropping items, difficulty with bottle caps, not buttoning) and feels out of balance.  Occasionally has urinary urgency/frequency.    L thigh pain is decreased but worse in am.  Also intermittent pain in back of leg and foot.  Also notes some weakness and pain in ankle.    INTERVAL HISTORY (5/11/22):  Returns with new arm pain that radiates from base of neck to bilateral shoulders and down the volar thumb on R.  Stops at shoulder/upper arm on L.  Triggered by certain neck positions - has to use flat pillow or will have sxs.  + tingling to R hand and L shoulder.  Also reports clumsiness in hand with dropping items and poor fine motor control.  But denies overt weakness.  Feels tight through neck, chest and shoulder regions.    Also has discomfort in L leg along anterolateral thigh and describes as burning and itching.  Notes L leg weakness in back around thigh and knee.  Worse in am and foot feels like it fell asleep and is clumsy.  Occasionally has pain in L big toe.    Takes gabapentin 1 tab in am and 2 tab in pm.  He does HEP with occasional pool workout. Not particularly interested in PT.  Has not trialed antiinflammatories.    No b/b dysfunction.    HPI:    Hermelindo ELIAS Greg SELIN is a 42 y.o.-year-old male who presents today for post-operative follow-up s/p L MIS L3-4 lami/disc on 9/20/21.  Overall patient is significantly improved.  He has significant improvement in his back and leg pain.  He still has some mild tingling in his shins that radiate down to the feet.  This is made worse when he is not active.  He takes gabapentin 600 mg at night which helps.  He would like to increase that to more times during the day.  He denies  weakness.      ROS:  As stated in the above HPI    PE:  Vitals:    06/29/22 1104   BP: 130/78   Pulse: 90   Temp: 98.2 °F (36.8 °C)       No neuro deficits observed    FROM PRIOR VISIT:    AAOX3  NAD  CN 2-12 intact     Strength:      Deltoids Biceps Triceps Wrist Ext. Wrist Flex. Hand    RUE 5 5 5 5 5 5   LUE 5 5 5 5 5 5    Hip Flex. Knee Flex. Knee Ext. Dorsi Flex Plantar Flex EHL   RLE 5 5 5 5 5 5   LLE 5 5 5 5 5 5     Sensation:  Intact to light touch (All 4 extremities)    Gait:  normal    Lumbar incision:  Well-healed    IMAGING:  All imaging reviewed by me.    Cervical MRI, 6/16/22:  1. Diffuse spondylosis and hypertrophied PLL  2. Severe stenosis at C4-5 and C5-6 with cord compression  3. Moderate stenosis at C3-4 and C6-7    Lumbar MRI, 6/16/22:  1. No recurrent HNP  2. Mild degen changes    Csp MRI, 1/12/21:  1. Loss of lordosis  2. Diffuse spondylosis  3. Multi-level DDD with moderate NFS  4. Severe left neural foraminal narrowing at C6-7  5. Severe right neural foraminal narrowing at C5-6 and C7-T1    Lsp MRI, 9/19/21:  1. Diffuse DDD and spondylosis  2. Large L L3-4 HNP  3. L L4-5 moderate NFS  4. R L5-S1 moderate NFS      ASSESSMENT:   Problem List Items Addressed This Visit        Neuro    Cervical spondylosis with myelopathy    Relevant Orders    Ambulatory referral/consult to Orthopedics    X-Ray Cervical Spine AP Lat with Flexion  Extension    S/P lumbar discectomy - Primary    Relevant Orders    Ambulatory referral/consult to Orthopedics    X-Ray Cervical Spine AP Lat with Flexion  Extension        1. S/p L MIS L3-4 lami/disc for severe radiculopathy.  Return of leg pain has improved.  MRI does not show recurrent HNP.   2. Cervical spondylosis with early myelopathy and radiculopathy.      Although he recently reported return of left anterolateral thigh burning paresthesias and itching as well as some weakness in the posterior aspect of his left leg it has improved since last visit and is  intermittent.  New MRI did not show any recurrent HNP.    Pain in bilateral shoulders/hand on the right and shoulder on the left with associated tingling has remained stable. New MRI shows diffuse spondylosis with cord compression at C4-5 and C5-6.  He does reports subtle signs of myelopathy (clumsiness and imbalance) but does not feel that he is ready to consider surgery.  He likely has early myelopathy but we will continue to monitor for progression.  He is a candidate for ACDFs in he progresses. Nurick grade 1.      PLAN:   - RTC in 6m with flex/ex  - Referral to ortho re: ankle and hand (patient's request)    Time spent on this encounter: 60 minutes. This includes face-to-face time and non-face to face time preparing to see the patient (eg, review of tests), obtaining and/or reviewing separately obtained history, documenting clinical information in the electronic or other health record, independently interpreting results and communicating results to the patient/family/caregiver, or care coordinator.

## 2022-07-01 ENCOUNTER — PATIENT MESSAGE (OUTPATIENT)
Dept: INTERNAL MEDICINE | Facility: CLINIC | Age: 42
End: 2022-07-01
Payer: COMMERCIAL

## 2022-07-02 ENCOUNTER — OFFICE VISIT (OUTPATIENT)
Dept: INTERNAL MEDICINE | Facility: CLINIC | Age: 42
End: 2022-07-02
Payer: COMMERCIAL

## 2022-07-02 VITALS
HEART RATE: 93 BPM | WEIGHT: 250.69 LBS | DIASTOLIC BLOOD PRESSURE: 60 MMHG | OXYGEN SATURATION: 94 % | SYSTOLIC BLOOD PRESSURE: 124 MMHG | TEMPERATURE: 98 F | RESPIRATION RATE: 16 BRPM | BODY MASS INDEX: 41.77 KG/M2 | HEIGHT: 65 IN

## 2022-07-02 DIAGNOSIS — J18.9 PNEUMONIA OF RIGHT LOWER LOBE DUE TO INFECTIOUS ORGANISM: Primary | ICD-10-CM

## 2022-07-02 PROCEDURE — 3078F PR MOST RECENT DIASTOLIC BLOOD PRESSURE < 80 MM HG: ICD-10-PCS | Mod: CPTII,S$GLB,, | Performed by: INTERNAL MEDICINE

## 2022-07-02 PROCEDURE — 3044F HG A1C LEVEL LT 7.0%: CPT | Mod: CPTII,S$GLB,, | Performed by: INTERNAL MEDICINE

## 2022-07-02 PROCEDURE — 96372 THER/PROPH/DIAG INJ SC/IM: CPT | Mod: S$GLB,,, | Performed by: INTERNAL MEDICINE

## 2022-07-02 PROCEDURE — 99214 PR OFFICE/OUTPT VISIT, EST, LEVL IV, 30-39 MIN: ICD-10-PCS | Mod: 25,S$GLB,, | Performed by: INTERNAL MEDICINE

## 2022-07-02 PROCEDURE — 3072F PR LOW RISK FOR RETINOPATHY: ICD-10-PCS | Mod: CPTII,S$GLB,, | Performed by: INTERNAL MEDICINE

## 2022-07-02 PROCEDURE — 3078F DIAST BP <80 MM HG: CPT | Mod: CPTII,S$GLB,, | Performed by: INTERNAL MEDICINE

## 2022-07-02 PROCEDURE — 99999 PR PBB SHADOW E&M-EST. PATIENT-LVL III: ICD-10-PCS | Mod: PBBFAC,,, | Performed by: INTERNAL MEDICINE

## 2022-07-02 PROCEDURE — 96372 PR INJECTION,THERAP/PROPH/DIAG2ST, IM OR SUBCUT: ICD-10-PCS | Mod: S$GLB,,, | Performed by: INTERNAL MEDICINE

## 2022-07-02 PROCEDURE — 3074F PR MOST RECENT SYSTOLIC BLOOD PRESSURE < 130 MM HG: ICD-10-PCS | Mod: CPTII,S$GLB,, | Performed by: INTERNAL MEDICINE

## 2022-07-02 PROCEDURE — 3008F BODY MASS INDEX DOCD: CPT | Mod: CPTII,S$GLB,, | Performed by: INTERNAL MEDICINE

## 2022-07-02 PROCEDURE — 3008F PR BODY MASS INDEX (BMI) DOCUMENTED: ICD-10-PCS | Mod: CPTII,S$GLB,, | Performed by: INTERNAL MEDICINE

## 2022-07-02 PROCEDURE — 3074F SYST BP LT 130 MM HG: CPT | Mod: CPTII,S$GLB,, | Performed by: INTERNAL MEDICINE

## 2022-07-02 PROCEDURE — 99999 PR PBB SHADOW E&M-EST. PATIENT-LVL III: CPT | Mod: PBBFAC,,, | Performed by: INTERNAL MEDICINE

## 2022-07-02 PROCEDURE — 1160F RVW MEDS BY RX/DR IN RCRD: CPT | Mod: CPTII,S$GLB,, | Performed by: INTERNAL MEDICINE

## 2022-07-02 PROCEDURE — 99214 OFFICE O/P EST MOD 30 MIN: CPT | Mod: 25,S$GLB,, | Performed by: INTERNAL MEDICINE

## 2022-07-02 PROCEDURE — 3072F LOW RISK FOR RETINOPATHY: CPT | Mod: CPTII,S$GLB,, | Performed by: INTERNAL MEDICINE

## 2022-07-02 PROCEDURE — 3044F PR MOST RECENT HEMOGLOBIN A1C LEVEL <7.0%: ICD-10-PCS | Mod: CPTII,S$GLB,, | Performed by: INTERNAL MEDICINE

## 2022-07-02 PROCEDURE — 1160F PR REVIEW ALL MEDS BY PRESCRIBER/CLIN PHARMACIST DOCUMENTED: ICD-10-PCS | Mod: CPTII,S$GLB,, | Performed by: INTERNAL MEDICINE

## 2022-07-02 PROCEDURE — 1159F MED LIST DOCD IN RCRD: CPT | Mod: CPTII,S$GLB,, | Performed by: INTERNAL MEDICINE

## 2022-07-02 PROCEDURE — 1159F PR MEDICATION LIST DOCUMENTED IN MEDICAL RECORD: ICD-10-PCS | Mod: CPTII,S$GLB,, | Performed by: INTERNAL MEDICINE

## 2022-07-02 RX ORDER — AZITHROMYCIN 250 MG/1
TABLET, FILM COATED ORAL
Qty: 6 TABLET | Refills: 0 | Status: SHIPPED | OUTPATIENT
Start: 2022-07-02 | End: 2022-07-07 | Stop reason: SDUPTHER

## 2022-07-02 NOTE — PROGRESS NOTES
Subjective:       Patient ID: Hermelindo Garza III is a 42 y.o. male.    Chief Complaint: Fever, Chest Congestion (Mucus green-white /), and Wheezing    HPI     42-year-old male here for evaluation of chest congestion for weeks. He thinks he caught RSV from his babies.  He is leaving for [place] in a week an a half.  He has been sick for the last two weeks.  He tested for COVID x2 and was negative - UC Wednesday of last week and Monday and Tuesday this week at home.   He had fevers and chills to begin with.  He was given tessalon perles.  He is better today than he was two days ago.  He had left over augmentin that he took.  He is using his inhaler a lot more than normal (he has asthma).  In the am and evenings, it is bad.      Review of Systems      Objective:      Physical Exam  Vitals reviewed.   Constitutional:       Appearance: He is well-developed.   HENT:      Head: Normocephalic and atraumatic.      Mouth/Throat:      Pharynx: No oropharyngeal exudate.   Eyes:      General: No scleral icterus.        Right eye: No discharge.         Left eye: No discharge.      Pupils: Pupils are equal, round, and reactive to light.   Neck:      Thyroid: No thyromegaly.      Trachea: No tracheal deviation.   Cardiovascular:      Rate and Rhythm: Normal rate and regular rhythm.      Heart sounds: Normal heart sounds. No murmur heard.    No friction rub. No gallop.   Pulmonary:      Effort: Pulmonary effort is normal. No respiratory distress.      Breath sounds: Examination of the right-lower field reveals rhonchi. Wheezing (scattered) and rhonchi present. No rales.   Chest:      Chest wall: No tenderness.   Abdominal:      General: Bowel sounds are normal. There is no distension.      Palpations: Abdomen is soft. There is no mass.      Tenderness: There is no abdominal tenderness. There is no guarding or rebound.   Musculoskeletal:         General: No tenderness. Normal range of motion.      Cervical back: Normal range of  motion and neck supple.   Skin:     General: Skin is warm and dry.      Coloration: Skin is not pale.      Findings: No erythema or rash.   Neurological:      Mental Status: He is alert and oriented to person, place, and time.   Psychiatric:         Behavior: Behavior normal.         Assessment:       1. Pneumonia of right lower lobe due to infectious organism      Plan:       1.  Zpack prescribed.  Solu-Medrol 125 mg IM x1.  Advised to call back in 2 or 3 days if not feeling better.  Would prescribe cefdinir twice daily for 7 days.

## 2022-07-04 ENCOUNTER — PATIENT MESSAGE (OUTPATIENT)
Dept: INTERNAL MEDICINE | Facility: CLINIC | Age: 42
End: 2022-07-04
Payer: COMMERCIAL

## 2022-07-05 RX ORDER — CEFDINIR 300 MG/1
300 CAPSULE ORAL 2 TIMES DAILY
Qty: 14 CAPSULE | Refills: 0 | Status: SHIPPED | OUTPATIENT
Start: 2022-07-05 | End: 2022-07-07 | Stop reason: SDUPTHER

## 2022-07-05 NOTE — TELEPHONE ENCOUNTER
"See email, pt was seen Saturday, dx with pneumonia and given a z-pack. Please advise he reports still coughing and trying to get phlegm up but states he " feels better but still has congestion"  "

## 2022-07-07 ENCOUNTER — OFFICE VISIT (OUTPATIENT)
Dept: DERMATOLOGY | Facility: CLINIC | Age: 42
End: 2022-07-07
Payer: COMMERCIAL

## 2022-07-07 ENCOUNTER — PATIENT MESSAGE (OUTPATIENT)
Dept: INTERNAL MEDICINE | Facility: CLINIC | Age: 42
End: 2022-07-07
Payer: COMMERCIAL

## 2022-07-07 DIAGNOSIS — D22.9 NEVUS: ICD-10-CM

## 2022-07-07 DIAGNOSIS — L81.4 LENTIGO: ICD-10-CM

## 2022-07-07 DIAGNOSIS — D18.01 CHERRY ANGIOMA: ICD-10-CM

## 2022-07-07 DIAGNOSIS — L90.5 SCAR: ICD-10-CM

## 2022-07-07 DIAGNOSIS — Q82.8 KERATODERMA: ICD-10-CM

## 2022-07-07 DIAGNOSIS — L82.1 SK (SEBORRHEIC KERATOSIS): ICD-10-CM

## 2022-07-07 DIAGNOSIS — Z86.006 HISTORY OF MELANOMA IN SITU: ICD-10-CM

## 2022-07-07 DIAGNOSIS — D48.5 NEOPLASM OF UNCERTAIN BEHAVIOR OF SKIN: Primary | ICD-10-CM

## 2022-07-07 PROCEDURE — 88305 TISSUE EXAM BY PATHOLOGIST: ICD-10-PCS | Mod: 26,,, | Performed by: PATHOLOGY

## 2022-07-07 PROCEDURE — 88305 TISSUE EXAM BY PATHOLOGIST: CPT | Mod: 26,,, | Performed by: PATHOLOGY

## 2022-07-07 PROCEDURE — 3072F PR LOW RISK FOR RETINOPATHY: ICD-10-PCS | Mod: CPTII,S$GLB,, | Performed by: DERMATOLOGY

## 2022-07-07 PROCEDURE — 99999 PR PBB SHADOW E&M-EST. PATIENT-LVL III: CPT | Mod: PBBFAC,,, | Performed by: DERMATOLOGY

## 2022-07-07 PROCEDURE — 1159F MED LIST DOCD IN RCRD: CPT | Mod: CPTII,S$GLB,, | Performed by: DERMATOLOGY

## 2022-07-07 PROCEDURE — 88342 IMHCHEM/IMCYTCHM 1ST ANTB: CPT | Mod: 26,,, | Performed by: PATHOLOGY

## 2022-07-07 PROCEDURE — 1160F RVW MEDS BY RX/DR IN RCRD: CPT | Mod: CPTII,S$GLB,, | Performed by: DERMATOLOGY

## 2022-07-07 PROCEDURE — 88342 IMHCHEM/IMCYTCHM 1ST ANTB: CPT | Performed by: PATHOLOGY

## 2022-07-07 PROCEDURE — 1160F PR REVIEW ALL MEDS BY PRESCRIBER/CLIN PHARMACIST DOCUMENTED: ICD-10-PCS | Mod: CPTII,S$GLB,, | Performed by: DERMATOLOGY

## 2022-07-07 PROCEDURE — 88305 TISSUE EXAM BY PATHOLOGIST: CPT | Performed by: PATHOLOGY

## 2022-07-07 PROCEDURE — 11102 PR TANGENTIAL BIOPSY, SKIN, SINGLE LESION: ICD-10-PCS | Mod: S$GLB,,, | Performed by: DERMATOLOGY

## 2022-07-07 PROCEDURE — 88342 CHG IMMUNOCYTOCHEMISTRY: ICD-10-PCS | Mod: 26,,, | Performed by: PATHOLOGY

## 2022-07-07 PROCEDURE — 99213 PR OFFICE/OUTPT VISIT, EST, LEVL III, 20-29 MIN: ICD-10-PCS | Mod: 25,S$GLB,, | Performed by: DERMATOLOGY

## 2022-07-07 PROCEDURE — 11102 TANGNTL BX SKIN SINGLE LES: CPT | Mod: S$GLB,,, | Performed by: DERMATOLOGY

## 2022-07-07 PROCEDURE — 1159F PR MEDICATION LIST DOCUMENTED IN MEDICAL RECORD: ICD-10-PCS | Mod: CPTII,S$GLB,, | Performed by: DERMATOLOGY

## 2022-07-07 PROCEDURE — 99999 PR PBB SHADOW E&M-EST. PATIENT-LVL III: ICD-10-PCS | Mod: PBBFAC,,, | Performed by: DERMATOLOGY

## 2022-07-07 PROCEDURE — 3044F HG A1C LEVEL LT 7.0%: CPT | Mod: CPTII,S$GLB,, | Performed by: DERMATOLOGY

## 2022-07-07 PROCEDURE — 3044F PR MOST RECENT HEMOGLOBIN A1C LEVEL <7.0%: ICD-10-PCS | Mod: CPTII,S$GLB,, | Performed by: DERMATOLOGY

## 2022-07-07 PROCEDURE — 3072F LOW RISK FOR RETINOPATHY: CPT | Mod: CPTII,S$GLB,, | Performed by: DERMATOLOGY

## 2022-07-07 PROCEDURE — 99213 OFFICE O/P EST LOW 20 MIN: CPT | Mod: 25,S$GLB,, | Performed by: DERMATOLOGY

## 2022-07-07 RX ORDER — UREA 40 %
CREAM (GRAM) TOPICAL
Qty: 60 G | Refills: 3 | Status: SHIPPED | OUTPATIENT
Start: 2022-07-07 | End: 2022-09-09 | Stop reason: SDUPTHER

## 2022-07-07 RX ORDER — AZITHROMYCIN 250 MG/1
TABLET, FILM COATED ORAL
Qty: 6 TABLET | Refills: 0 | Status: SHIPPED | OUTPATIENT
Start: 2022-07-07 | End: 2022-08-05 | Stop reason: CLARIF

## 2022-07-07 RX ORDER — CEFDINIR 300 MG/1
300 CAPSULE ORAL 2 TIMES DAILY
Qty: 14 CAPSULE | Refills: 0 | Status: SHIPPED | OUTPATIENT
Start: 2022-07-07 | End: 2022-07-14

## 2022-07-07 NOTE — PROGRESS NOTES
"  Subjective:       Patient ID:  Hermelindo Garza III is a 42 y.o. male who presents for   Chief Complaint   Patient presents with    Skin Check     TBSE     Patient is here today for a "mole" check.   Pt has a history of  moderate sun exposure in the past.   Pt recalls several blistering sunburns in the past- yes  Pt has history of tanning bed use- yes  Pt has  had moles removed in the past- yes, MIS R mid back  Pt has history of melanoma in first degree relatives-  Father NMSC    This is a high risk patient here to check for the development of new lesions.    Pt denies any new or changing lesions today       Review of Systems   Skin: Positive for activity-related sunscreen use. Negative for daily sunscreen use and tendency to form keloidal scars.   Hematologic/Lymphatic: Does not bruise/bleed easily.        Objective:    Physical Exam   Constitutional: He appears well-developed and well-nourished. No distress.   Lymphadenopathy: No supraclavicular adenopathy is present.     He has no cervical adenopathy.     He has no axillary adenopathy.   Neurological: He is alert and oriented to person, place, and time. He is not disoriented.   Psychiatric: He has a normal mood and affect.   Skin:   Areas Examined (abnormalities noted in diagram):   Scalp / Hair Palpated and Inspected  Head / Face Inspection Performed  Neck Inspection Performed  Chest / Axilla Inspection Performed  Abdomen Inspection Performed  Genitals / Buttocks / Groin Inspection Performed  Back Inspection Performed  RUE Inspected  LUE Inspection Performed  RLE Inspected  LLE Inspection Performed  Nails and Digits Inspection Performed                                       Diagram Legend     Erythematous scaling macule/papule c/w actinic keratosis       Vascular papule c/w angioma      Pigmented verrucoid papule/plaque c/w seborrheic keratosis      Yellow umbilicated papule c/w sebaceous hyperplasia      Irregularly shaped tan macule c/w lentigo     1-2 " mm smooth white papules consistent with Milia      Movable subcutaneous cyst with punctum c/w epidermal inclusion cyst      Subcutaneous movable cyst c/w pilar cyst      Firm pink to brown papule c/w dermatofibroma      Pedunculated fleshy papule(s) c/w skin tag(s)      Evenly pigmented macule c/w junctional nevus     Mildly variegated pigmented, slightly irregular-bordered macule c/w mildly atypical nevus      Flesh colored to evenly pigmented papule c/w intradermal nevus       Pink pearly papule/plaque c/w basal cell carcinoma      Erythematous hyperkeratotic cursted plaque c/w SCC      Surgical scar with no sign of skin cancer recurrence      Open and closed comedones      Inflammatory papules and pustules      Verrucoid papule consistent consistent with wart     Erythematous eczematous patches and plaques     Dystrophic onycholytic nail with subungual debris c/w onychomycosis     Umbilicated papule    Erythematous-base heme-crusted tan verrucoid plaque consistent with inflamed seborrheic keratosis     Erythematous Silvery Scaling Plaque c/w Psoriasis     See annotation      Assessment / Plan:      Pathology Orders:     Normal Orders This Visit    Specimen to Pathology, Dermatology     Questions:    Procedure Type: Dermatology and skin neoplasms    Number of Specimens: 1    ------------------------: -------------------------    Spec 1 Procedure: Biopsy    Spec 1 Clinical Impression: r/o atypical nevus    Spec 1 Source: right medial shoulder    Release to patient:         Neoplasm of uncertain behavior of skin  Shave biopsy procedure note:    Shave biopsy performed after verbal consent including risk of infection, scar, recurrence, need for additional treatment of site. Area prepped with alcohol, anesthetized with approximately 1.0cc of 1% lidocaine with epinephrine. Lesional tissue shaved with razor blade. Hemostasis achieved with application of aluminum chloride followed by hyfrecation. No complications.  Dressing applied. Wound care explained.      -     Specimen to Pathology, Dermatology    Lentigo  This is a benign hyperpigmented sun induced lesion. Recommend daily sun protection/avoidance and use of at least SPF 30, broad spectrum sunscreen (OTC drug) will reduce the number of new lesions. Treatment of these benign lesions are considered cosmetic.    The nature of sun-induced photo-aging and skin cancers is discussed.  Sun avoidance, protective clothing, and the use of 30-SPF sunscreens is advised. Observe closely for skin damage/changes, and call if such occurs.    Nevus  Discussed ABCDE's of nevi.  Monitor for new mole or moles that are becoming bigger, darker, irritated, or developing irregular borders. Brochure provided. Instructed patient to observe lesion(s) for changes and follow up in clinic if changes are noted. Patient to monitor skin at home for new or changing lesions.     Cherry angioma  These are benign vascular lesions that are inherited.  Treatment is not necessary.    SK (seborrheic keratosis)  These are benign inherited growths without a malignant potential. Reassurance given to patient. No treatment is necessary.     History of melanoma in situ- 2/2022- r back   Scar  Area of previous melanoma in situ  examined. Site well healed with no signs of recurrence.    Total body skin examination performed today including at least 12 points as noted in physical examination. No lesions suspicious for malignancy noted.    Recommend daily sun protection/avoidance, use of at least SPF 30, broad spectrum sunscreen (OTC drug), skin self examinations, and routine physician surveillance to optimize early detection    Keratoderma  -     urea (CARMOL) 40 % Crea; Apply to affected area on feet qhs after bath or shower  Dispense: 60 g; Refill: 3             Follow up in about 3 months (around 10/7/2022) for TBSE.

## 2022-07-07 NOTE — PATIENT INSTRUCTIONS
Shave Biopsy Wound Care    Your doctor has performed a shave biopsy today.  A band aid and vaseline ointment has been placed over the site.  This should remain in place for NO LONGER THAN 48 hours.  It is fine to remove the bandaid after 24 hours, if the area is no longer bleeding. It is recommended that you keep the area dry (do not wet)) for the first 24 hours.  After 24 hours, wash the area with warm soap and water and apply Vaseline jelly.  Many patients prefer to use Neosporin or Bacitracin ointment.  This is acceptable; however, know that you can develop an allergy to this medication even if you have used it safely for years.  It is important to keep the area moist.  Letting it dry out and get air slows healing time, and will worsen the scar.        If you notice increasing redness, tenderness, pain, or yellow drainage at the biopsy site, please notify your doctor.  These are signs of an infection.    If your biopsy site is bleeding, apply firm pressure for 15 minutes straight.  Repeat for another 15 minutes, if it is still bleeding.   If the surgical site continues to bleed, then please contact your doctor.      For [facility] users:   You will receive your biopsy results in [facility] as soon as they are available. Please be assured that your physician/provider will review your results and will then determine what further treatment, evaluation, or planning is required. You should be contacted by your physician's/provider's office within 5 business days of receiving your results; If not, please reach out to directly. This is one more way [facility] is putting you first.     [facility] [place] [address]/ [phone] FAX/ [email or web address]

## 2022-07-08 NOTE — TELEPHONE ENCOUNTER
"The patients " Chest is better today. Im hoping it continues to get better but will go to urgent care if tightness comes back. Yesterday had me worried"        Please advise, Thank you.    "

## 2022-07-14 LAB
FINAL PATHOLOGIC DIAGNOSIS: NORMAL
GROSS: NORMAL
Lab: NORMAL
MICROSCOPIC EXAM: NORMAL

## 2022-07-15 ENCOUNTER — PATIENT MESSAGE (OUTPATIENT)
Dept: DERMATOLOGY | Facility: CLINIC | Age: 42
End: 2022-07-15
Payer: COMMERCIAL

## 2022-07-19 ENCOUNTER — PATIENT MESSAGE (OUTPATIENT)
Dept: ORTHOPEDICS | Facility: CLINIC | Age: 42
End: 2022-07-19
Payer: COMMERCIAL

## 2022-08-05 ENCOUNTER — HOSPITAL ENCOUNTER (OUTPATIENT)
Dept: RADIOLOGY | Facility: HOSPITAL | Age: 42
Discharge: HOME | End: 2022-08-05
Attending: PODIATRIST
Payer: COMMERCIAL

## 2022-08-05 ENCOUNTER — OFFICE VISIT (OUTPATIENT)
Dept: PODIATRY | Facility: CLINIC | Age: 42
End: 2022-08-05
Payer: COMMERCIAL

## 2022-08-05 VITALS
BODY MASS INDEX: 41.71 KG/M2 | DIASTOLIC BLOOD PRESSURE: 71 MMHG | SYSTOLIC BLOOD PRESSURE: 126 MMHG | HEIGHT: 65 IN | HEART RATE: 74 BPM

## 2022-08-05 DIAGNOSIS — M79.671 FOOT PAIN, BILATERAL: Primary | ICD-10-CM

## 2022-08-05 DIAGNOSIS — M79.672 FOOT PAIN, BILATERAL: Primary | ICD-10-CM

## 2022-08-05 DIAGNOSIS — M79.671 FOOT PAIN, BILATERAL: ICD-10-CM

## 2022-08-05 DIAGNOSIS — M21.6X1 PRONATION OF BOTH FEET: ICD-10-CM

## 2022-08-05 DIAGNOSIS — M21.6X2 PRONATION OF BOTH FEET: ICD-10-CM

## 2022-08-05 DIAGNOSIS — M79.672 FOOT PAIN, BILATERAL: ICD-10-CM

## 2022-08-05 DIAGNOSIS — M24.573 EQUINUS CONTRACTURE OF ANKLE: ICD-10-CM

## 2022-08-05 PROCEDURE — 3074F SYST BP LT 130 MM HG: CPT | Mod: CPTII,S$GLB,, | Performed by: PODIATRIST

## 2022-08-05 PROCEDURE — 3072F LOW RISK FOR RETINOPATHY: CPT | Mod: CPTII,S$GLB,, | Performed by: PODIATRIST

## 2022-08-05 PROCEDURE — 73630 X-RAY EXAM OF FOOT: CPT | Mod: TC,50,PN

## 2022-08-05 PROCEDURE — 73630 X-RAY EXAM OF FOOT: CPT | Mod: 26,50,, | Performed by: RADIOLOGY

## 2022-08-05 PROCEDURE — 3078F DIAST BP <80 MM HG: CPT | Mod: CPTII,S$GLB,, | Performed by: PODIATRIST

## 2022-08-05 PROCEDURE — 1160F RVW MEDS BY RX/DR IN RCRD: CPT | Mod: CPTII,S$GLB,, | Performed by: PODIATRIST

## 2022-08-05 PROCEDURE — 3008F PR BODY MASS INDEX (BMI) DOCUMENTED: ICD-10-PCS | Mod: CPTII,S$GLB,, | Performed by: PODIATRIST

## 2022-08-05 PROCEDURE — 99203 PR OFFICE/OUTPT VISIT, NEW, LEVL III, 30-44 MIN: ICD-10-PCS | Mod: S$GLB,,, | Performed by: PODIATRIST

## 2022-08-05 PROCEDURE — 3044F HG A1C LEVEL LT 7.0%: CPT | Mod: CPTII,S$GLB,, | Performed by: PODIATRIST

## 2022-08-05 PROCEDURE — 3078F PR MOST RECENT DIASTOLIC BLOOD PRESSURE < 80 MM HG: ICD-10-PCS | Mod: CPTII,S$GLB,, | Performed by: PODIATRIST

## 2022-08-05 PROCEDURE — 99999 PR PBB SHADOW E&M-EST. PATIENT-LVL V: ICD-10-PCS | Mod: PBBFAC,,, | Performed by: PODIATRIST

## 2022-08-05 PROCEDURE — 99999 PR PBB SHADOW E&M-EST. PATIENT-LVL V: CPT | Mod: PBBFAC,,, | Performed by: PODIATRIST

## 2022-08-05 PROCEDURE — 1159F PR MEDICATION LIST DOCUMENTED IN MEDICAL RECORD: ICD-10-PCS | Mod: CPTII,S$GLB,, | Performed by: PODIATRIST

## 2022-08-05 PROCEDURE — 3072F PR LOW RISK FOR RETINOPATHY: ICD-10-PCS | Mod: CPTII,S$GLB,, | Performed by: PODIATRIST

## 2022-08-05 PROCEDURE — 1160F PR REVIEW ALL MEDS BY PRESCRIBER/CLIN PHARMACIST DOCUMENTED: ICD-10-PCS | Mod: CPTII,S$GLB,, | Performed by: PODIATRIST

## 2022-08-05 PROCEDURE — 3074F PR MOST RECENT SYSTOLIC BLOOD PRESSURE < 130 MM HG: ICD-10-PCS | Mod: CPTII,S$GLB,, | Performed by: PODIATRIST

## 2022-08-05 PROCEDURE — 99203 OFFICE O/P NEW LOW 30 MIN: CPT | Mod: S$GLB,,, | Performed by: PODIATRIST

## 2022-08-05 PROCEDURE — 1159F MED LIST DOCD IN RCRD: CPT | Mod: CPTII,S$GLB,, | Performed by: PODIATRIST

## 2022-08-05 PROCEDURE — 3044F PR MOST RECENT HEMOGLOBIN A1C LEVEL <7.0%: ICD-10-PCS | Mod: CPTII,S$GLB,, | Performed by: PODIATRIST

## 2022-08-05 PROCEDURE — 73630 XR FOOT COMPLETE 3 VIEW BILATERAL: ICD-10-PCS | Mod: 26,50,, | Performed by: RADIOLOGY

## 2022-08-05 PROCEDURE — 3008F BODY MASS INDEX DOCD: CPT | Mod: CPTII,S$GLB,, | Performed by: PODIATRIST

## 2022-08-05 RX ORDER — MELOXICAM 15 MG/1
15 TABLET ORAL DAILY
Qty: 30 TABLET | Refills: 1 | Status: SHIPPED | OUTPATIENT
Start: 2022-08-05 | End: 2023-04-12 | Stop reason: SDUPTHER

## 2022-08-05 NOTE — PROGRESS NOTES
Subjective:      Patient ID: Hermelindo Garza III is a 42 y.o. male.    Chief Complaint: Foot Problem (Bilateral, Hammertoe left 2nd toe, no xray), Foot Pain (left), Callouses, and Diabetes Mellitus    Presents complaining of pain to both feet that occurred while he was on vacation since he was walking approximately 10 miles per day.  Relates that the pain is worse 1st thing in the morning when he stands and walks on the foot however will improve as he moves around.  No significant pain at rest.  Denies any trauma.  Relates that he was wearing [employer] tennis shoes but developed blisters on his toes which since have resolved.  He is wearing [employer] recovery slides which helps somewhat.  Patient's attempting to exercise more for weight loss purposes.    Associated risks, benefits and postop course discussed. No guarantees given or implied.    Past Medical History:   Diagnosis Date    ADD (attention deficit disorder) 05/09/2012    Asthma 05/09/2012    Cervical disc disorder with radiculopathy, unspecified cervical region 09/14/2021    Cervical spondylosis with myelopathy 6/29/2022    Esophageal ulcer     GERD (gastroesophageal reflux disease) 05/09/2012    Glaucoma     Kidney stones 05/2014    Lumbar disc disease 05/09/2012    Lumbar herniated disc 05/09/2012    Malignant melanoma of skin, unspecified 01/06/2022    in situ right mid back    Melanoma 01/2022    in situ R mid back     LUBA (obstructive sleep apnea)     Radiculopathy, lumbar region 09/14/2021    Renal calculi 05/09/2012    Type 2 diabetes mellitus without complication, without long-term current use of insulin 11/16/2020       Past Surgical History:   Procedure Laterality Date    APPENDECTOMY  2006    COLONOSCOPY  01/22/2019    internal hemorrhoids, o/w normal - repeat at age 52    EPIDURAL STEROID INJECTION N/A 2/10/2021    Procedure: CERVICAL C6/7 NELI DIRECT REFERRAL;  Surgeon: Michi Escamilla MD;  Location: [facility];  Service:  Pain Management;  Laterality: N/A;  NEEDS CONSENT    ESOPHAGOGASTRODUODENOSCOPY  01/22/2019    LA grade B esophagitis; esophageal stenosis- dilated; gastritis; H pylori pathology negative    ESOPHAGOGASTRODUODENOSCOPY N/A 5/18/2021    Procedure: EGD (ESOPHAGOGASTRODUODENOSCOPY);  Surgeon: Mariana Hector MD;  [facility];  Service: Endoscopy;  Laterality: N/A;    LUMBAR LAMINECTOMY  2010    LUMBAR LAMINECTOMY WITH DISCECTOMY Left 9/20/2021    Procedure: LAMINECTOMY, SPINE, LUMBAR, WITH DISCECTOMY;  Surgeon: Naseem Mcnamara DO;  Location: [address];  Service: Neurosurgery;  Laterality: Left;  MIS L3-4       Family History   Problem Relation Age of Onset    Hypertension Mother     Transient ischemic attack Mother     Sleep apnea Mother     Chronic back pain Father     MARTITA disease Father     Sleep apnea Father     No Known Problems Sister     No Known Problems Sister     Brain cancer Maternal Grandmother     Diabetes Maternal Grandfather     Breast cancer Paternal Grandmother     No Known Problems Brother     No Known Problems Maternal Aunt     No Known Problems Maternal Uncle     No Known Problems Paternal Aunt     No Known Problems Paternal Uncle     No Known Problems Paternal Grandfather     Amblyopia Neg Hx     Blindness Neg Hx     Cancer Neg Hx     Cataracts Neg Hx     Glaucoma Neg Hx     Macular degeneration Neg Hx     Retinal detachment Neg Hx     Strabismus Neg Hx     Stroke Neg Hx     Thyroid disease Neg Hx        Social History     Socioeconomic History    Marital status:    Tobacco Use    Smoking status: Never Smoker    Smokeless tobacco: Never Used   Substance and Sexual Activity    Alcohol use: Yes     Alcohol/week: 0.0 standard drinks     Comment: Occasional    Drug use: No    Sexual activity: Yes     Social Determinants of Health     Financial Resource Strain: Low Risk     Difficulty of Paying Living Expenses: Not hard at all   Food  Insecurity: No Food Insecurity    Worried About Running Out of Food in the Last Year: Never true    Ran Out of Food in the Last Year: Never true   Transportation Needs: No Transportation Needs    Lack of Transportation (Medical): No    Lack of Transportation (Non-Medical): No   Physical Activity: Sufficiently Active    Days of Exercise per Week: 5 days    Minutes of Exercise per Session: 30 min   Stress: Stress Concern Present    Feeling of Stress : To some extent   Social Connections: Unknown    Frequency of Communication with Friends and Family: More than three times a week    Frequency of Social Gatherings with Friends and Family: Three times a week    Active Member of Clubs or Organizations: No    Attends Club or Organization Meetings: Patient refused    Marital Status:    Housing Stability: Low Risk     Unable to Pay for Housing in the Last Year: No    Number of Places Lived in the Last Year: 1    Unstable Housing in the Last Year: No       Current Outpatient Medications   Medication Sig Dispense Refill    albuterol (PROVENTIL/VENTOLIN HFA) 90 mcg/actuation inhaler INHALE 2 PUFFS INTO THE LUNGS EVERY 6 HOURS AS NEEDED WHEEZING, RESCUE 8.5 g 0    atorvastatin (LIPITOR) 20 MG tablet TAKE 1 TABLET(20 MG) BY MOUTH EVERY DAY 90 tablet 3    azithromycin (ZITHROMAX Z-HARRIS) 250 MG tablet As directed 6 tablet 0    blood-glucose meter kit Use as instructed 1 each 0    dexmethylphenidate (FOCALIN) 10 MG tablet Take 1 tablet (10 mg total) by mouth 3 (three) times daily. 90 tablet 0    [START ON 8/19/2022] dexmethylphenidate (FOCALIN) 10 MG tablet Take 1 tablet (10 mg total) by mouth 3 (three) times daily. 90 tablet 0    ergocalciferol, vitamin D2, (VITAMIN D ORAL) Take 1 tablet by mouth every other day.      esomeprazole (NEXIUM) 40 mg GrPS Take 40 mg by mouth 2 (two) times daily before meals. 60 each 11    fluocinonide (LIDEX) 0.05 % external solution AAA scalp qday prn itching, scaling 60 mL  3    gabapentin (NEURONTIN) 300 MG capsule TAKE 1 CAPSULE(300 MG) BY MOUTH THREE TIMES DAILY 90 capsule 3    hydrocortisone 2.5 % cream APPLY EXTERNALLY TO THE AFFECTED AREA TWICE DAILY FOR 10 DAYS 30 g 0    ketoconazole (NIZORAL) 2 % cream APPLY EXTERNALLY TO THE AFFECTED AREA TWICE DAILY 15 g 0    ketoconazole (NIZORAL) 2 % shampoo Apply topically twice a week. 120 mL 3    ketoconazole (NIZORAL) 2 % shampoo Wash hair with medicated shampoo at least 2x/week - let sit on scalp at least 5 minutes prior to rinsing 120 mL 5    latanoprost 0.005 % ophthalmic solution INSTILL 1 DROP INTO BOTH EYES EVERY EVENING. THIS REPLACES TIMOLOL 7.5 mL 3    montelukast (SINGULAIR) 10 mg tablet TAKE 1 TABLET(10 MG) BY MOUTH EVERY EVENING 30 tablet 11    ondansetron (ZOFRAN-ODT) 4 MG TbDL Take 1 tablet (4 mg total) by mouth every 6 (six) hours as needed (nausea). 30 tablet 3    rOPINIRole (REQUIP) 0.25 MG tablet TAKE 1 TABLET(0.25 MG) BY MOUTH EVERY EVENING 30 tablet 2    semaglutide (OZEMPIC) 0.25 mg or 0.5 mg(2 mg/1.5 mL) pen injector Inject 0.5 mg into the skin every 7 days. 2 pen 5    TRUE METRIX GLUCOSE TEST STRIP Strp USE TO TEST ONCE DAILY 25 strip 2    ULTRA THIN LANCETS 30 gauge Misc USE ONCE DAILY AS DIRECTED 100 each 0    urea (CARMOL) 40 % Crea Apply to affected area on feet qhs after bath or shower 60 g 3    zolpidem (AMBIEN CR) 12.5 MG CR tablet Take 1 tablet (12.5 mg total) by mouth nightly as needed for Insomnia. 30 tablet 0    zolpidem (AMBIEN) 5 MG Tab Take 1 tablet (5 mg total) by mouth nightly as needed (insomnia). 30 tablet 1    LORazepam (ATIVAN) 0.5 MG tablet Take 1 tablet (0.5 mg total) by mouth as needed for Anxiety (Take 1 tablet by mouth 30 minutes prior to scan. Take 2nd tablet, if needed, just before scan.). 2 tablet 0    meloxicam (MOBIC) 15 MG tablet Take 1 tablet (15 mg total) by mouth once daily. 30 tablet 1     No current facility-administered medications for this visit.       Review  of patient's allergies indicates:  No Known Allergies      Review of Systems   Constitutional: Negative for chills, fever and malaise/fatigue.   HENT: Negative for congestion and hearing loss.    Cardiovascular: Negative for chest pain, claudication and leg swelling.   Respiratory: Negative for cough and shortness of breath.    Skin: Negative for color change, itching and poor wound healing.   Musculoskeletal: Negative for back pain, joint pain, muscle cramps and muscle weakness.   Gastrointestinal: Negative for nausea and vomiting.   Neurological: Negative for numbness, paresthesias and weakness.   Psychiatric/Behavioral: Negative for altered mental status.           Objective:      Physical Exam  Constitutional:       General: He is not in acute distress.     Appearance: He is obese. He is not ill-appearing.   Cardiovascular:      Pulses:           Dorsalis pedis pulses are 2+ on the right side and 2+ on the left side.        Posterior tibial pulses are 2+ on the right side and 2+ on the left side.      Comments: No lower extremity edema bilateral.  Skin temp is warm to foot bilateral.  Normal hair growth distribution bilateral lower extremity.  No rubor on dependency bilateral foot.  Skin temp is warm to foot bilateral.  Musculoskeletal:      Comments: Mild pain on palpation overlying the base of 5th metatarsal with palpable large in of the abductor digiti quinti high along the lateral border the foot bilateral.    The patient has a flexible pes [place] valgus foot type with equinus contracture of negative 10° with the knee extended bilateral.  There is no pain with range of motion or manual muscle strength testing bilateral foot ankle.  There is mild pain on palpation along the course of posterior tibial tendon at the distal 1/3 however no pain with active resisted inversion of the foot bilateral.  There is no significant localized edema, warmth for discoloration to the foot bilateral.  No significant digital  deformity noted bilateral foot.   Skin:     General: Skin is warm.      Capillary Refill: Capillary refill takes less than 2 seconds.      Findings: No ecchymosis or erythema.      Nails: There is no clubbing.      Comments: No open lesions or macerations of foot bilateral.   Neurological:      Mental Status: He is alert.               Assessment:       Encounter Diagnoses   Name Primary?    Foot pain, bilateral Yes    Pronation of both feet     Equinus contracture of ankle          Plan:       Hermelindo was seen today for foot problem, foot pain, callouses and diabetes mellitus.    Diagnoses and all orders for this visit:    Foot pain, bilateral  -     X-Ray Foot Complete 3 view Bilateral; Future    Pronation of both feet    Equinus contracture of ankle    Other orders  -     meloxicam (MOBIC) 15 MG tablet; Take 1 tablet (15 mg total) by mouth once daily.      I counseled the patient on his conditions, their implications and medical management.    Highly recommended the patient obtain motion control tennis shoes which will help with the pronation to the foot bilateral.  We also discussed aggressive stretching his calf muscles up to 3 times daily for 3 repetitions on each side per the instructions.  We discussed avoiding barefoot walking recommending a small heel at all times.    Discussed recovery slides with adequate support for around his home and avoiding barefoot walking.    May take anti-inflammatories needed throughout the day in addition to rest, ice and elevation.    Recommend referral physical therapy however patient declined at this time.    Baseline weight-bearing bilateral foot x-ray to assess for significant underlying osseous pathology.    RTC p.r.n. as discussed.    A portion of this note was generated by voice recognition software and may contain spelling and grammar errors.    .

## 2022-08-05 NOTE — PATIENT INSTRUCTIONS
This exercise is designed to stretch and strengthen your feet and ankles. Before beginning the exercise, read through all the instructions. While exercising, breathe normally and dont bounce. If you feel any pain, stop the exercise. If pain persists, inform your healthcare provider:  Stand an arms length away from a wall. Place the palms of your hands on the wall. Step forward about 12 inches with your ______ foot.  Keeping toes pointed forward and both heels on the floor, bend both knees and lean forward. Hold for __30____ seconds. Relax.  Repeat ___3___ times. Do ___2-3___ sets a day.  Date Last Reviewed: 8/16/2015  © 3238-7133 [employer]. [address] [place] [address], [place] [address]. All rights reserved. This information is not intended as a substitute for professional medical care. Always follow your healthcare professional's instructions.

## 2022-08-06 ENCOUNTER — PATIENT MESSAGE (OUTPATIENT)
Dept: PODIATRY | Facility: CLINIC | Age: 42
End: 2022-08-06
Payer: COMMERCIAL

## 2022-08-08 ENCOUNTER — PATIENT MESSAGE (OUTPATIENT)
Dept: PODIATRY | Facility: CLINIC | Age: 42
End: 2022-08-08
Payer: COMMERCIAL

## 2022-08-22 ENCOUNTER — OFFICE VISIT (OUTPATIENT)
Dept: URGENT CARE | Facility: CLINIC | Age: 42
End: 2022-08-22
Payer: COMMERCIAL

## 2022-08-22 ENCOUNTER — PATIENT MESSAGE (OUTPATIENT)
Dept: INTERNAL MEDICINE | Facility: CLINIC | Age: 42
End: 2022-08-22
Payer: COMMERCIAL

## 2022-08-22 VITALS
TEMPERATURE: 99 F | DIASTOLIC BLOOD PRESSURE: 82 MMHG | HEIGHT: 65 IN | WEIGHT: 245 LBS | RESPIRATION RATE: 20 BRPM | OXYGEN SATURATION: 95 % | HEART RATE: 84 BPM | SYSTOLIC BLOOD PRESSURE: 126 MMHG | BODY MASS INDEX: 40.82 KG/M2

## 2022-08-22 DIAGNOSIS — S20.212A RIB CONTUSION, LEFT, INITIAL ENCOUNTER: ICD-10-CM

## 2022-08-22 DIAGNOSIS — R07.9 LEFT-SIDED CHEST PAIN: Primary | ICD-10-CM

## 2022-08-22 PROCEDURE — 3079F PR MOST RECENT DIASTOLIC BLOOD PRESSURE 80-89 MM HG: ICD-10-PCS | Mod: CPTII,S$GLB,, | Performed by: FAMILY MEDICINE

## 2022-08-22 PROCEDURE — 1160F RVW MEDS BY RX/DR IN RCRD: CPT | Mod: CPTII,S$GLB,, | Performed by: FAMILY MEDICINE

## 2022-08-22 PROCEDURE — 71101 X-RAY EXAM UNILAT RIBS/CHEST: CPT | Mod: FY,LT,S$GLB, | Performed by: RADIOLOGY

## 2022-08-22 PROCEDURE — 1159F MED LIST DOCD IN RCRD: CPT | Mod: CPTII,S$GLB,, | Performed by: FAMILY MEDICINE

## 2022-08-22 PROCEDURE — 71101 XR RIBS MIN 3 VIEWS W/ PA CHEST LEFT: ICD-10-PCS | Mod: FY,LT,S$GLB, | Performed by: RADIOLOGY

## 2022-08-22 PROCEDURE — 99213 OFFICE O/P EST LOW 20 MIN: CPT | Mod: S$GLB,,, | Performed by: FAMILY MEDICINE

## 2022-08-22 PROCEDURE — 3079F DIAST BP 80-89 MM HG: CPT | Mod: CPTII,S$GLB,, | Performed by: FAMILY MEDICINE

## 2022-08-22 PROCEDURE — 3074F PR MOST RECENT SYSTOLIC BLOOD PRESSURE < 130 MM HG: ICD-10-PCS | Mod: CPTII,S$GLB,, | Performed by: FAMILY MEDICINE

## 2022-08-22 PROCEDURE — 3072F LOW RISK FOR RETINOPATHY: CPT | Mod: CPTII,S$GLB,, | Performed by: FAMILY MEDICINE

## 2022-08-22 PROCEDURE — 3074F SYST BP LT 130 MM HG: CPT | Mod: CPTII,S$GLB,, | Performed by: FAMILY MEDICINE

## 2022-08-22 PROCEDURE — 1160F PR REVIEW ALL MEDS BY PRESCRIBER/CLIN PHARMACIST DOCUMENTED: ICD-10-PCS | Mod: CPTII,S$GLB,, | Performed by: FAMILY MEDICINE

## 2022-08-22 PROCEDURE — 3072F PR LOW RISK FOR RETINOPATHY: ICD-10-PCS | Mod: CPTII,S$GLB,, | Performed by: FAMILY MEDICINE

## 2022-08-22 PROCEDURE — 3008F PR BODY MASS INDEX (BMI) DOCUMENTED: ICD-10-PCS | Mod: CPTII,S$GLB,, | Performed by: FAMILY MEDICINE

## 2022-08-22 PROCEDURE — 99213 PR OFFICE/OUTPT VISIT, EST, LEVL III, 20-29 MIN: ICD-10-PCS | Mod: S$GLB,,, | Performed by: FAMILY MEDICINE

## 2022-08-22 PROCEDURE — 1159F PR MEDICATION LIST DOCUMENTED IN MEDICAL RECORD: ICD-10-PCS | Mod: CPTII,S$GLB,, | Performed by: FAMILY MEDICINE

## 2022-08-22 PROCEDURE — 3044F PR MOST RECENT HEMOGLOBIN A1C LEVEL <7.0%: ICD-10-PCS | Mod: CPTII,S$GLB,, | Performed by: FAMILY MEDICINE

## 2022-08-22 PROCEDURE — 3008F BODY MASS INDEX DOCD: CPT | Mod: CPTII,S$GLB,, | Performed by: FAMILY MEDICINE

## 2022-08-22 PROCEDURE — 3044F HG A1C LEVEL LT 7.0%: CPT | Mod: CPTII,S$GLB,, | Performed by: FAMILY MEDICINE

## 2022-08-22 NOTE — PATIENT INSTRUCTIONS
XR RIB LEFT W/ PA CHEST  No acute rib fractures identified     Ok to take ibuprofen 800 mg as needed for pain every 4-6 hrs for 1 week  Ok to use methocarbamol 750 mg in your possession  Ok to use warm compresses    Follow up with primary care if no improvement or worsening symptoms.

## 2022-08-22 NOTE — PROGRESS NOTES
"Subjective:       Patient ID: Hermelindo Garza III is a 42 y.o. male.    Vitals:  height is 5' 5" (1.651 m) and weight is 111.1 kg (245 lb). His oral temperature is 98.8 °F (37.1 °C). His blood pressure is 126/82 and his pulse is 84. His respiration is 20 and oxygen saturation is 95%.     Chief Complaint: Chest Pain (Left rib/chest injury from a fall )    This is a 42 y.o. male who presents today with a chief complaint of a fall that injure his left side of his chest/ribs. Pt explain that on Friday he fell in his shower and injured his left side of cheat and he used otc pain med's for pain but no relief.      Chest Pain   This is a new problem. The current episode started in the past 7 days. The problem occurs constantly. The problem has been gradually worsening. The pain is at a severity of 6/10. The pain is moderate. The quality of the pain is described as tightness and sharp. The pain radiates to the mid back. Associated symptoms include back pain. Pertinent negatives include no cough, fever, nausea, numbness, palpitations, shortness of breath or vomiting. The pain is aggravated by lifting and movement. He has tried acetaminophen for the symptoms. The treatment provided no relief.       Constitution: Negative for activity change, appetite change, chills, fatigue, fever and generalized weakness.   HENT: Negative for ear discharge, facial swelling, sinus pressure, sore throat, trouble swallowing and voice change.    Neck: Negative for neck stiffness and painful lymph nodes.   Cardiovascular: Positive for chest pain. Negative for leg swelling, palpitations and sob on exertion.   Eyes: Negative for vision loss.   Respiratory: Negative for chest tightness, cough and shortness of breath.    Gastrointestinal: Negative for nausea and vomiting.   Genitourinary: Negative for dysuria.   Musculoskeletal: Positive for pain, arthritis and back pain.   Skin: Negative for rash.   Neurological: Negative for passing out, " "disorientation, altered mental status and numbness.   Hematologic/Lymphatic: Negative for swollen lymph nodes.   Psychiatric/Behavioral: Negative for altered mental status, disorientation and confusion.       Objective:       Vitals:    08/22/22 1051   BP: 126/82   Pulse: 84   Resp: 20   Temp: 98.8 °F (37.1 °C)   TempSrc: Oral   SpO2: 95%   Weight: 111.1 kg (245 lb)   Height: 5' 5" (1.651 m)     Physical Exam   Constitutional: He is oriented to person, place, and time.  Non-toxic appearance. He does not appear ill. No distress.   HENT:   Head: Atraumatic.   Eyes: Conjunctivae are normal.   Neck: Neck supple.   Cardiovascular: Normal rate, regular rhythm, normal heart sounds and normal pulses.   Pulmonary/Chest: Effort normal and breath sounds normal. He exhibits tenderness (left posterior ribs 9-10th).   Abdominal: Bowel sounds are normal. Soft.   Neurological: He is alert and oriented to person, place, and time.   Skin: Skin is not diaphoretic.   Psychiatric: Judgment and thought content normal.      X-Ray Foot Complete 3 view Bilateral    Result Date: 8/5/2022  EXAMINATION: XR FOOT COMPLETE 3 VIEW BILATERAL CLINICAL HISTORY: Pain in right foot TECHNIQUE: AP, lateral, and oblique views of both feet were performed. COMPARISON: Left foot radiograph dated 05/31/2019 FINDINGS: No acute fracture, dislocation, or osseous destruction.  Lisfranc intervals appear congruent.     As above. Electronically signed by: Jacob Enrique Date:    08/05/2022 Time:    15:59    XR RIB LEFT W/ PA CHEST    Result Date: 8/22/2022  EXAMINATION: XR RIBS MIN 3 VIEWS W/ PA CHEST LEFT CLINICAL HISTORY: Chest pain, unspecified TECHNIQUE: Chest PA view and the left ribs COMPARISON: None FINDINGS: Heart size normal.  The lungs are clear.  No pleural effusion. No definite acute rib fractures identified     See above Electronically signed by: Xander Rubio MD Date:    08/22/2022 Time:    11:41      Assessment:       1. Left-sided chest pain    2. " Rib contusion, left, initial encounter          Plan:         1. Left-sided chest pain  -     XR RIB LEFT W/ PA CHEST; Future; Expected date: 08/22/2022: I reviewed X-ray and I couldn't be certain of a fracture. Awaited official report from radiology- no fracture identified.    2. Rib contusion, left, initial encounter  Patient states he has all his medication at home, declined Rx    Patient Instructions     XR RIB LEFT W/ PA CHEST  No acute rib fractures identified     Ok to take ibuprofen 800 mg as needed for pain every 4-6 hrs for 1 week  Ok to use methocarbamol 750 mg in your possession  Ok to use warm compresses    Follow up with primary care if no improvement or worsening symptoms.           Bruised Rib Discharge Instructions   About this topic   A bruised rib is also known as a rib contusion. Your ribs are a group of bones that wrap around your chest. They protect the organs inside your chest like your heart and lungs. Most broken or bruised ribs happen when you fall onto your chest or are hit on the chest. A severe cough or doing the same motion over and over can also cause bruised or broken ribs. It can take 6 to 8 weeks for broken or bruised ribs to heal and be free of pain.  What care is needed at home?   · Ask your doctor what you need to do when you go home. Make sure you ask questions if you do not understand what the doctor says.  · Take 10 to 15 slow deep breaths at least 4 times each day. This will lower your chances of getting a lung infection. Use your incentive spirometer as you were told if you were given one. An incentive spirometer is a tool that measures how deeply you can breathe in.  · Hold a pillow to your chest when you take deep breaths, sneeze, cough, or laugh. This will help ease the pain in your ribs.  · It may hurt less to sleep in a reclined position. You can also sleep with your head and shoulders propped up on pillows.  · You may want to take medicines like ibuprofen or naproxen  for swelling and pain. These are nonsteroidal anti-inflammatory drugs (NSAIDS).  · Ice may help you ease pain and swelling.  · Place an ice pack or a bag of frozen vegetables wrapped in a towel over the painful part. Never put ice right on the skin. Do not leave the ice on more than 10 to 15 minutes at a time. Use for the first 24 to 48 hours after an injury.  · If you smoke, try to quit. Broken bones take longer to heal if you smoke.  What follow-up care is needed?   Your doctor may ask you to make visits to the office to check on your progress. Be sure to keep these visits.  What drugs may be needed?   The doctor may order drugs to:  · Help with pain and swelling  Will physical activity be limited?   It may take up to 6 to 8 weeks for your rib to heal. You should not do physical activity that makes your rib hurt more. If you run, work out, or play sports, you may not be able to do those things until your health problem gets better.  What problems could happen?   Chest infection, like pneumonia, due to not being able to take deep breaths.  What can be done to prevent this health problem?   · Wear protective gear when playing contact sports.  · Decrease your chance of falling. Move anything that may cause you to trip, clean up spills right away, and always have good lighting. Use railings on stairs.  · Always wear a seat belt. Drive safely. Obey speed limits. Do not drink and drive.  When do I need to call the doctor?   · It is getting harder and harder to breathe.  · You are so short of breath you cannot talk in a full sentence.  · You develop severe pain in your chest, back, or neck.  · You start to cough up blood or yellow or green mucus.  · You have a fever of 100.4°F (38°C) or higher or chills.  · You are very weak or light-headed or feel like you might pass out.  · You have very bad pain that is not helped by your medicines.  Teach Back: Helping You Understand   The Teach Back Method helps you understand the  information we are giving you. After you talk with the staff, tell them in your own words what you learned. This helps to make sure the staff has described each thing clearly. It also helps to explain things that may have been confusing. Before going home, make sure you can do these:  · I can tell you about my pain.  · I can tell you what may help ease my pain.  · I can tell you when I can go back to my normal activities.  Where can I learn more?   NHS Choices  http://www.nhs.uk/conditions/rib-injuries/pages/introduction.aspx   Last Reviewed Date   2021-06-10  Consumer Information Use and Disclaimer   This information is not specific medical advice and does not replace information you receive from your health care provider. This is only a brief summary of general information. It does NOT include all information about conditions, illnesses, injuries, tests, procedures, treatments, therapies, discharge instructions or life-style choices that may apply to you. You must talk with your health care provider for complete information about your health and treatment options. This information should not be used to decide whether or not to accept your health care providers advice, instructions or recommendations. Only your health care provider has the knowledge and training to provide advice that is right for you.  Copyright   Copyright © 2021 [employer] and its affiliates and/or licensors. All rights reserved.

## 2022-08-22 NOTE — TELEPHONE ENCOUNTER
He needs urgent evaluation in person- recommend UC or ED to check vital signs, complete physical exam, possibly imaging.

## 2022-08-24 ENCOUNTER — PATIENT MESSAGE (OUTPATIENT)
Dept: INTERNAL MEDICINE | Facility: CLINIC | Age: 42
End: 2022-08-24
Payer: COMMERCIAL

## 2022-08-25 RX ORDER — METHOCARBAMOL 750 MG/1
750 TABLET, FILM COATED ORAL 3 TIMES DAILY
Qty: 30 TABLET | Refills: 0 | Status: SHIPPED | OUTPATIENT
Start: 2022-08-25 | End: 2022-09-04

## 2022-08-25 NOTE — TELEPHONE ENCOUNTER
Care Due:                  Date            Visit Type   Department     Provider  --------------------------------------------------------------------------------                                EP -                              PRIMARY      [facility] INTERNAL  Last Visit: 04-      CARE ([facility])   MEDICINE       Melody Beck                              [facility]                              PRIMARY      [facility] INTERNAL  Next Visit: 10-      [facility] ([facility])   [facility]       Melody Beck                                                            Last  Test          Frequency    Reason                     Performed    Due Date  --------------------------------------------------------------------------------    HBA1C.......  6 months...  semaglutide..............  04-   10-    [facility] Embedded Care Gaps. Reference number: [id number]. 8/24/2022   7:53:09 PM CDT

## 2022-08-30 DIAGNOSIS — M25.531 RIGHT WRIST PAIN: Primary | ICD-10-CM

## 2022-08-31 ENCOUNTER — HOSPITAL ENCOUNTER (OUTPATIENT)
Dept: RADIOLOGY | Facility: HOSPITAL | Age: 42
Discharge: HOME | End: 2022-08-31
Attending: ORTHOPAEDIC SURGERY
Payer: COMMERCIAL

## 2022-08-31 ENCOUNTER — OFFICE VISIT (OUTPATIENT)
Dept: ORTHOPEDICS | Facility: CLINIC | Age: 42
End: 2022-08-31
Payer: COMMERCIAL

## 2022-08-31 VITALS — WEIGHT: 245 LBS | BODY MASS INDEX: 40.82 KG/M2 | HEIGHT: 65 IN

## 2022-08-31 DIAGNOSIS — G56.01 RIGHT CARPAL TUNNEL SYNDROME: ICD-10-CM

## 2022-08-31 DIAGNOSIS — M25.531 RIGHT WRIST PAIN: ICD-10-CM

## 2022-08-31 DIAGNOSIS — G56.02 LEFT CARPAL TUNNEL SYNDROME: ICD-10-CM

## 2022-08-31 DIAGNOSIS — M67.431 GANGLION CYST OF VOLAR ASPECT OF RIGHT WRIST: ICD-10-CM

## 2022-08-31 DIAGNOSIS — M54.12 CERVICAL RADICULOPATHY: Primary | ICD-10-CM

## 2022-08-31 PROCEDURE — 73110 XR WRIST COMPLETE 3 VIEWS RIGHT: ICD-10-PCS | Mod: 26,RT,, | Performed by: RADIOLOGY

## 2022-08-31 PROCEDURE — 99214 PR OFFICE/OUTPT VISIT, EST, LEVL IV, 30-39 MIN: ICD-10-PCS | Mod: S$GLB,,, | Performed by: ORTHOPAEDIC SURGERY

## 2022-08-31 PROCEDURE — 99999 PR PBB SHADOW E&M-EST. PATIENT-LVL V: CPT | Mod: PBBFAC,,, | Performed by: ORTHOPAEDIC SURGERY

## 2022-08-31 PROCEDURE — 3044F HG A1C LEVEL LT 7.0%: CPT | Mod: CPTII,S$GLB,, | Performed by: ORTHOPAEDIC SURGERY

## 2022-08-31 PROCEDURE — 3008F BODY MASS INDEX DOCD: CPT | Mod: CPTII,S$GLB,, | Performed by: ORTHOPAEDIC SURGERY

## 2022-08-31 PROCEDURE — 73110 X-RAY EXAM OF WRIST: CPT | Mod: 26,RT,, | Performed by: RADIOLOGY

## 2022-08-31 PROCEDURE — 3008F PR BODY MASS INDEX (BMI) DOCUMENTED: ICD-10-PCS | Mod: CPTII,S$GLB,, | Performed by: ORTHOPAEDIC SURGERY

## 2022-08-31 PROCEDURE — 73110 X-RAY EXAM OF WRIST: CPT | Mod: TC,PO,RT

## 2022-08-31 PROCEDURE — 3044F PR MOST RECENT HEMOGLOBIN A1C LEVEL <7.0%: ICD-10-PCS | Mod: CPTII,S$GLB,, | Performed by: ORTHOPAEDIC SURGERY

## 2022-08-31 PROCEDURE — 3072F LOW RISK FOR RETINOPATHY: CPT | Mod: CPTII,S$GLB,, | Performed by: ORTHOPAEDIC SURGERY

## 2022-08-31 PROCEDURE — 99214 OFFICE O/P EST MOD 30 MIN: CPT | Mod: S$GLB,,, | Performed by: ORTHOPAEDIC SURGERY

## 2022-08-31 PROCEDURE — 99999 PR PBB SHADOW E&M-EST. PATIENT-LVL V: ICD-10-PCS | Mod: PBBFAC,,, | Performed by: ORTHOPAEDIC SURGERY

## 2022-08-31 PROCEDURE — 3072F PR LOW RISK FOR RETINOPATHY: ICD-10-PCS | Mod: CPTII,S$GLB,, | Performed by: ORTHOPAEDIC SURGERY

## 2022-08-31 NOTE — PROGRESS NOTES
"Hermelindo Garza III presents for follow up evaluation of   Encounter Diagnoses   Name Primary?    Cervical radiculopathy Yes    Right wrist pain     Right carpal tunnel syndrome     Left carpal tunnel syndrome     Ganglion cyst of volar aspect of right wrist      Patient reports continued pain of his right wrist 3/10. He reports that he feels that he is failing conservative treatment and his wrist pain is increasing. He would like to discuss other options at this time. Of note he is also experiencing pain of his left wrist. EMG/NCS from 07/09/2020 showed bilateral moderate carpal tunnel syndrome at that time.    In the past few weeks he has had weakness in the right hand especially with pinching and turning, he feels he is losing strength.  He also notes a new right wrist mass that has changed in size.    Vitals:    08/31/22 1008   Weight: 111.1 kg (245 lb)   Height: 5' 5" (1.651 m)   PainSc:   3       PE:    AA&O x 4.  NAD  HEENT:  NCAT, sclera nonicteric  Lungs:  Respirations are equal and unlabored.  CV:  2+ bilateral upper and lower extremity pulses.  MSK:  Positive compression, positive Tinel's and Phalen's bilateral wrists.  Right volar wrist ganglion, smooth compressible, well circumscribed. Neurovascularly intact bilaterally.  5/5 thenar and intrinsic musculature strength.  Full range of motion hands, wrists and elbows.    A/P:  Bilateral carpal tunnel syndrome, right wrist ganglion, chronic right hand pain    We have discussed conservative vs. surgical treatment as well as risks, benefits and alternatives for bilateral carpal tunnel syndrome, right wrist ganglion, chronic right hand pain.  Conservative measures have been exhausted and he would like to proceed with surgery. Surgery would include right carpal tunnel release, right wrist ganglion excision, radiocarpal arthrotomy, left carpal tunnel steroid injection. Consent signed today in clinic. Light use of the hand will be indicated for the first " 4-6 weeks.     We have discussed risks of hand surgery which include but are not limited to blood clots in the legs that can travel to the lungs (pulmonary embolism). Pulmonary embolism can cause shortness of breath, chest pain, and even shock. Other risks include urinary tract infection, nausea and vomiting (usually related to pain medication), chronic pain, bleeding, nerve damage, blood vessel injury, scarring and infection of the hand which can require re-operation. Furthermore, the risks of anesthesia include potential heart, lung, kidney, and liver damage.  Informed consent was obtained.  The patient understands and would like to proceed with surgery in the near future.        Christen Marshall MD    Please be aware that this note has been generated with the assistance of [facility] voice-to-text.  Please excuse any spelling or grammatical errors.

## 2022-08-31 NOTE — H&P (VIEW-ONLY)
"Hermelindo Garza III presents for follow up evaluation of   Encounter Diagnoses   Name Primary?    Cervical radiculopathy Yes    Right wrist pain     Right carpal tunnel syndrome     Left carpal tunnel syndrome     Ganglion cyst of volar aspect of right wrist      Patient reports continued pain of his right wrist 3/10. He reports that he feels that he is failing conservative treatment and his wrist pain is increasing. He would like to discuss other options at this time. Of note he is also experiencing pain of his left wrist. EMG/NCS from 07/09/2020 showed bilateral moderate carpal tunnel syndrome at that time.    In the past few weeks he has had weakness in the right hand especially with pinching and turning, he feels he is losing strength.  He also notes a new right wrist mass that has changed in size.    Vitals:    08/31/22 1008   Weight: 111.1 kg (245 lb)   Height: 5' 5" (1.651 m)   PainSc:   3       PE:    AA&O x 4.  NAD  HEENT:  NCAT, sclera nonicteric  Lungs:  Respirations are equal and unlabored.  CV:  2+ bilateral upper and lower extremity pulses.  MSK:  Positive compression, positive Tinel's and Phalen's bilateral wrists.  Right volar wrist ganglion, smooth compressible, well circumscribed. Neurovascularly intact bilaterally.  5/5 thenar and intrinsic musculature strength.  Full range of motion hands, wrists and elbows.    A/P:  Bilateral carpal tunnel syndrome, right wrist ganglion, chronic right hand pain    We have discussed conservative vs. surgical treatment as well as risks, benefits and alternatives for bilateral carpal tunnel syndrome, right wrist ganglion, chronic right hand pain.  Conservative measures have been exhausted and he would like to proceed with surgery. Surgery would include right carpal tunnel release, right wrist ganglion excision, radiocarpal arthrotomy, left carpal tunnel steroid injection. Consent signed today in clinic. Light use of the hand will be indicated for the first " 4-6 weeks.     We have discussed risks of hand surgery which include but are not limited to blood clots in the legs that can travel to the lungs (pulmonary embolism). Pulmonary embolism can cause shortness of breath, chest pain, and even shock. Other risks include urinary tract infection, nausea and vomiting (usually related to pain medication), chronic pain, bleeding, nerve damage, blood vessel injury, scarring and infection of the hand which can require re-operation. Furthermore, the risks of anesthesia include potential heart, lung, kidney, and liver damage.  Informed consent was obtained.  The patient understands and would like to proceed with surgery in the near future.        Christen Marshall MD    Please be aware that this note has been generated with the assistance of [facility] voice-to-text.  Please excuse any spelling or grammatical errors.

## 2022-09-03 ENCOUNTER — PATIENT MESSAGE (OUTPATIENT)
Dept: SURGERY | Facility: HOSPITAL | Age: 42
End: 2022-09-03
Payer: COMMERCIAL

## 2022-09-06 ENCOUNTER — ANESTHESIA EVENT (OUTPATIENT)
Dept: SURGERY | Facility: HOSPITAL | Age: 42
End: 2022-09-06
Payer: COMMERCIAL

## 2022-09-06 NOTE — ANESTHESIA PAT ROS NOTE
Chart review complete. Patient's medical history reviewed.  OK to proceed at [facility].  Danita Foreman MD

## 2022-09-07 ENCOUNTER — PATIENT MESSAGE (OUTPATIENT)
Dept: PSYCHIATRY | Facility: CLINIC | Age: 42
End: 2022-09-07
Payer: COMMERCIAL

## 2022-09-07 ENCOUNTER — PATIENT MESSAGE (OUTPATIENT)
Dept: ADMINISTRATIVE | Facility: OTHER | Age: 42
End: 2022-09-07
Payer: COMMERCIAL

## 2022-09-07 DIAGNOSIS — E66.01 MORBID OBESITY: ICD-10-CM

## 2022-09-07 DIAGNOSIS — E11.9 TYPE 2 DIABETES MELLITUS WITHOUT COMPLICATION, WITHOUT LONG-TERM CURRENT USE OF INSULIN: ICD-10-CM

## 2022-09-08 ENCOUNTER — HOSPITAL ENCOUNTER (OUTPATIENT)
Dept: RADIOLOGY | Facility: HOSPITAL | Age: 42
Discharge: HOME | End: 2022-09-08
Attending: NEUROLOGICAL SURGERY
Payer: COMMERCIAL

## 2022-09-08 DIAGNOSIS — M47.12 CERVICAL SPONDYLOSIS WITH MYELOPATHY: ICD-10-CM

## 2022-09-08 DIAGNOSIS — Z98.890 S/P LUMBAR DISCECTOMY: ICD-10-CM

## 2022-09-08 PROCEDURE — 72050 X-RAY EXAM NECK SPINE 4/5VWS: CPT | Mod: TC,FY

## 2022-09-08 PROCEDURE — 72050 X-RAY EXAM NECK SPINE 4/5VWS: CPT | Mod: 26,,, | Performed by: RADIOLOGY

## 2022-09-08 PROCEDURE — 72050 XR CERVICAL SPINE AP LAT WITH FLEX EXTEN: ICD-10-PCS | Mod: 26,,, | Performed by: RADIOLOGY

## 2022-09-08 RX ORDER — ZOLPIDEM TARTRATE 12.5 MG/1
12.5 TABLET, FILM COATED, EXTENDED RELEASE ORAL NIGHTLY PRN
Qty: 30 TABLET | Refills: 0 | Status: SHIPPED | OUTPATIENT
Start: 2022-09-08 | End: 2022-10-19

## 2022-09-08 RX ORDER — SEMAGLUTIDE 1.34 MG/ML
0.5 INJECTION, SOLUTION SUBCUTANEOUS
Qty: 2 PEN | Refills: 5 | Status: SHIPPED | OUTPATIENT
Start: 2022-09-08 | End: 2023-05-26

## 2022-09-08 NOTE — TELEPHONE ENCOUNTER
No new care gaps identified.  [facility] Embedded Care Gaps. Reference number: [id number]. 9/07/2022   9:47:44 PM CDT

## 2022-09-09 DIAGNOSIS — G47.00 INSOMNIA, UNSPECIFIED TYPE: ICD-10-CM

## 2022-09-09 RX ORDER — ZOLPIDEM TARTRATE 5 MG/1
5 TABLET ORAL NIGHTLY PRN
Qty: 30 TABLET | Refills: 1 | Status: SHIPPED | OUTPATIENT
Start: 2022-09-09 | End: 2022-10-19

## 2022-09-12 ENCOUNTER — PATIENT MESSAGE (OUTPATIENT)
Dept: NEUROSURGERY | Facility: HOSPITAL | Age: 42
End: 2022-09-12
Payer: COMMERCIAL

## 2022-09-13 ENCOUNTER — PATIENT MESSAGE (OUTPATIENT)
Dept: SURGERY | Facility: HOSPITAL | Age: 42
End: 2022-09-13
Payer: COMMERCIAL

## 2022-09-13 RX ORDER — ALBUTEROL SULFATE 90 UG/1
AEROSOL, METERED RESPIRATORY (INHALATION)
Qty: 8.5 G | Refills: 0 | Status: SHIPPED | OUTPATIENT
Start: 2022-09-13 | End: 2022-10-12 | Stop reason: SDUPTHER

## 2022-09-14 ENCOUNTER — TELEPHONE (OUTPATIENT)
Dept: ORTHOPEDICS | Facility: CLINIC | Age: 42
End: 2022-09-14
Payer: COMMERCIAL

## 2022-09-14 RX ORDER — HYDROCODONE BITARTRATE AND ACETAMINOPHEN 5; 325 MG/1; MG/1
1 TABLET ORAL EVERY 6 HOURS PRN
Qty: 25 TABLET | Refills: 0 | Status: SHIPPED | OUTPATIENT
Start: 2022-09-14 | End: 2022-12-06

## 2022-09-14 RX ORDER — PROMETHAZINE HYDROCHLORIDE 25 MG/1
25 TABLET ORAL EVERY 4 HOURS
Qty: 25 TABLET | Refills: 0 | Status: SHIPPED | OUTPATIENT
Start: 2022-09-14 | End: 2023-07-04

## 2022-09-14 NOTE — TELEPHONE ENCOUNTER
Spoke to patient to inform them of their surgery arrival time (5 am), MIRANDA, [address] [facility]:   Verbalized understanding of instructions for pre-wash, and reviewed NPO after midnight.   Confirmed call in regards to medication instructions from anesthesia.   Confirmed patient was NOT using a rideshare service for surgery arrival or  transportation.   Discussed removing any toe nail polish if applicable   Discussed DME  has been delivered/picked up.    Discussed with patient recovery after surgery and going back to work. Depending on length of time on pain medication - typically a week but could be less.     Discussed left CET elbow injection. Told him it was too late to send in authorization for the injection. Dr. Marshall will discuss tomorrow prior to surgery.           Sanket Bonilla MS, OTC   Sports Medicine Assistant   [facility]  (P) [phone]  (F) [phone]

## 2022-09-15 ENCOUNTER — ANESTHESIA (OUTPATIENT)
Dept: SURGERY | Facility: HOSPITAL | Age: 42
End: 2022-09-15
Payer: COMMERCIAL

## 2022-09-15 ENCOUNTER — HOSPITAL ENCOUNTER (OUTPATIENT)
Facility: HOSPITAL | Age: 42
Discharge: HOME | End: 2022-09-15
Attending: ORTHOPAEDIC SURGERY | Admitting: ORTHOPAEDIC SURGERY
Payer: COMMERCIAL

## 2022-09-15 ENCOUNTER — PATIENT MESSAGE (OUTPATIENT)
Dept: SURGERY | Facility: HOSPITAL | Age: 42
End: 2022-09-15
Payer: COMMERCIAL

## 2022-09-15 VITALS
BODY MASS INDEX: 39.99 KG/M2 | WEIGHT: 240 LBS | OXYGEN SATURATION: 94 % | HEIGHT: 65 IN | RESPIRATION RATE: 20 BRPM | HEART RATE: 69 BPM | DIASTOLIC BLOOD PRESSURE: 61 MMHG | SYSTOLIC BLOOD PRESSURE: 117 MMHG | TEMPERATURE: 98 F

## 2022-09-15 DIAGNOSIS — M67.431 GANGLION CYST OF DORSUM OF RIGHT WRIST: ICD-10-CM

## 2022-09-15 DIAGNOSIS — G56.03 BILATERAL CARPAL TUNNEL SYNDROME: Primary | ICD-10-CM

## 2022-09-15 DIAGNOSIS — M67.40 GANGLION CYST: ICD-10-CM

## 2022-09-15 LAB — POCT GLUCOSE: 115 MG/DL (ref 70–110)

## 2022-09-15 PROCEDURE — 64415 NJX AA&/STRD BRCH PLXS IMG: CPT | Mod: 59,RT,, | Performed by: ANESTHESIOLOGY

## 2022-09-15 PROCEDURE — 25000003 PHARM REV CODE 250: Performed by: NURSE ANESTHETIST, CERTIFIED REGISTERED

## 2022-09-15 PROCEDURE — 88304 TISSUE EXAM BY PATHOLOGIST: CPT | Performed by: PATHOLOGY

## 2022-09-15 PROCEDURE — 76942 ECHO GUIDE FOR BIOPSY: CPT | Mod: 26,,, | Performed by: ANESTHESIOLOGY

## 2022-09-15 PROCEDURE — 27201423 OPTIME MED/SURG SUP & DEVICES STERILE SUPPLY: Performed by: ORTHOPAEDIC SURGERY

## 2022-09-15 PROCEDURE — 64415 PR NERVE BLOCK INJ, ANES/STEROID, BRACHIAL PLEXUS, INCL IMAG GUIDANCE: ICD-10-PCS | Mod: 59,RT,, | Performed by: ANESTHESIOLOGY

## 2022-09-15 PROCEDURE — 87075 CULTR BACTERIA EXCEPT BLOOD: CPT | Performed by: ORTHOPAEDIC SURGERY

## 2022-09-15 PROCEDURE — 25000003 PHARM REV CODE 250: Performed by: STUDENT IN AN ORGANIZED HEALTH CARE EDUCATION/TRAINING PROGRAM

## 2022-09-15 PROCEDURE — 64721 PR REVISE MEDIAN N/CARPAL TUNNEL SURG: ICD-10-PCS | Mod: RT,,, | Performed by: ORTHOPAEDIC SURGERY

## 2022-09-15 PROCEDURE — 71000033 HC RECOVERY, INTIAL HOUR: Performed by: ORTHOPAEDIC SURGERY

## 2022-09-15 PROCEDURE — 87102 FUNGUS ISOLATION CULTURE: CPT | Performed by: ORTHOPAEDIC SURGERY

## 2022-09-15 PROCEDURE — 71000015 HC POSTOP RECOV 1ST HR: Performed by: ORTHOPAEDIC SURGERY

## 2022-09-15 PROCEDURE — 36000707: Performed by: ORTHOPAEDIC SURGERY

## 2022-09-15 PROCEDURE — 76942 ECHO GUIDE FOR BIOPSY: CPT | Performed by: ANESTHESIOLOGY

## 2022-09-15 PROCEDURE — 20550 PR INJECT TENDON SHEATH/LIGAMENT: ICD-10-PCS | Mod: 59,51,LT, | Performed by: ORTHOPAEDIC SURGERY

## 2022-09-15 PROCEDURE — 63600175 PHARM REV CODE 636 W HCPCS: Performed by: ORTHOPAEDIC SURGERY

## 2022-09-15 PROCEDURE — 71000039 HC RECOVERY, EACH ADD'L HOUR: Performed by: ORTHOPAEDIC SURGERY

## 2022-09-15 PROCEDURE — 88304 PR  SURG PATH,LEVEL III: ICD-10-PCS | Mod: 26,,, | Performed by: PATHOLOGY

## 2022-09-15 PROCEDURE — 20526 THER INJECTION CARP TUNNEL: CPT | Mod: 59,51,LT, | Performed by: ORTHOPAEDIC SURGERY

## 2022-09-15 PROCEDURE — 76942 PR U/S GUIDANCE FOR NEEDLE GUIDANCE: ICD-10-PCS | Mod: 26,,, | Performed by: ANESTHESIOLOGY

## 2022-09-15 PROCEDURE — 94761 N-INVAS EAR/PLS OXIMETRY MLT: CPT

## 2022-09-15 PROCEDURE — 25115 PR RAD EXCIS WRIST SYNOV/TENDON,FLEXOR: ICD-10-PCS | Mod: 59,51,RT, | Performed by: ORTHOPAEDIC SURGERY

## 2022-09-15 PROCEDURE — 87206 SMEAR FLUORESCENT/ACID STAI: CPT | Performed by: ORTHOPAEDIC SURGERY

## 2022-09-15 PROCEDURE — D9220A PRA ANESTHESIA: ICD-10-PCS | Mod: CRNA,,, | Performed by: NURSE ANESTHETIST, CERTIFIED REGISTERED

## 2022-09-15 PROCEDURE — 64721 CARPAL TUNNEL SURGERY: CPT | Mod: RT,,, | Performed by: ORTHOPAEDIC SURGERY

## 2022-09-15 PROCEDURE — 37000008 HC ANESTHESIA 1ST 15 MINUTES: Performed by: ORTHOPAEDIC SURGERY

## 2022-09-15 PROCEDURE — 25115 REMOVE WRIST/FOREARM LESION: CPT | Mod: 59,51,RT, | Performed by: ORTHOPAEDIC SURGERY

## 2022-09-15 PROCEDURE — D9220A PRA ANESTHESIA: Mod: CRNA,,, | Performed by: NURSE ANESTHETIST, CERTIFIED REGISTERED

## 2022-09-15 PROCEDURE — 20526 PR INJECT CARPAL TUNNEL: ICD-10-PCS | Mod: 59,51,LT, | Performed by: ORTHOPAEDIC SURGERY

## 2022-09-15 PROCEDURE — 25000003 PHARM REV CODE 250: Performed by: ORTHOPAEDIC SURGERY

## 2022-09-15 PROCEDURE — 87116 MYCOBACTERIA CULTURE: CPT | Performed by: ORTHOPAEDIC SURGERY

## 2022-09-15 PROCEDURE — 99900035 HC TECH TIME PER 15 MIN (STAT)

## 2022-09-15 PROCEDURE — 63600175 PHARM REV CODE 636 W HCPCS: Performed by: NURSE ANESTHETIST, CERTIFIED REGISTERED

## 2022-09-15 PROCEDURE — D9220A PRA ANESTHESIA: ICD-10-PCS | Mod: ANES,,, | Performed by: ANESTHESIOLOGY

## 2022-09-15 PROCEDURE — 37000009 HC ANESTHESIA EA ADD 15 MINS: Performed by: ORTHOPAEDIC SURGERY

## 2022-09-15 PROCEDURE — 87070 CULTURE OTHR SPECIMN AEROBIC: CPT | Performed by: ORTHOPAEDIC SURGERY

## 2022-09-15 PROCEDURE — 36000706: Performed by: ORTHOPAEDIC SURGERY

## 2022-09-15 PROCEDURE — 88304 TISSUE EXAM BY PATHOLOGIST: CPT | Mod: 26,,, | Performed by: PATHOLOGY

## 2022-09-15 PROCEDURE — 27200750 HC INSULATED NEEDLE/ STIMUPLEX: Performed by: ANESTHESIOLOGY

## 2022-09-15 PROCEDURE — 20550 NJX 1 TENDON SHEATH/LIGAMENT: CPT | Mod: 59,51,LT, | Performed by: ORTHOPAEDIC SURGERY

## 2022-09-15 PROCEDURE — D9220A PRA ANESTHESIA: Mod: ANES,,, | Performed by: ANESTHESIOLOGY

## 2022-09-15 PROCEDURE — 63600175 PHARM REV CODE 636 W HCPCS: Performed by: STUDENT IN AN ORGANIZED HEALTH CARE EDUCATION/TRAINING PROGRAM

## 2022-09-15 RX ORDER — CEFAZOLIN SODIUM 2 G/50ML
2 SOLUTION INTRAVENOUS
Status: DISCONTINUED | OUTPATIENT
Start: 2022-09-15 | End: 2023-04-12

## 2022-09-15 RX ORDER — BACITRACIN ZINC 500 [USP'U]/G
OINTMENT TOPICAL
Status: DISCONTINUED | OUTPATIENT
Start: 2022-09-15 | End: 2022-09-15 | Stop reason: HOSPADM

## 2022-09-15 RX ORDER — SODIUM CHLORIDE 0.9 % (FLUSH) 0.9 %
3 SYRINGE (ML) INJECTION
Status: DISCONTINUED | OUTPATIENT
Start: 2022-09-15 | End: 2023-04-12

## 2022-09-15 RX ORDER — SODIUM CHLORIDE 0.9 % (FLUSH) 0.9 %
10 SYRINGE (ML) INJECTION
Status: DISCONTINUED | OUTPATIENT
Start: 2022-09-15 | End: 2022-09-15 | Stop reason: HOSPADM

## 2022-09-15 RX ORDER — CELECOXIB 200 MG/1
400 CAPSULE ORAL ONCE
Status: COMPLETED | OUTPATIENT
Start: 2022-09-15 | End: 2022-09-15

## 2022-09-15 RX ORDER — DEXMEDETOMIDINE HYDROCHLORIDE 100 UG/ML
INJECTION, SOLUTION INTRAVENOUS
Status: DISCONTINUED | OUTPATIENT
Start: 2022-09-15 | End: 2022-09-15

## 2022-09-15 RX ORDER — FENTANYL CITRATE 50 UG/ML
100 INJECTION, SOLUTION INTRAMUSCULAR; INTRAVENOUS EVERY 5 MIN PRN
Status: DISCONTINUED | OUTPATIENT
Start: 2022-09-15 | End: 2023-04-12

## 2022-09-15 RX ORDER — ACETAMINOPHEN 500 MG
1000 TABLET ORAL ONCE
Status: COMPLETED | OUTPATIENT
Start: 2022-09-15 | End: 2022-09-15

## 2022-09-15 RX ORDER — CEFAZOLIN SODIUM 1 G/3ML
INJECTION, POWDER, FOR SOLUTION INTRAMUSCULAR; INTRAVENOUS
Status: DISCONTINUED | OUTPATIENT
Start: 2022-09-15 | End: 2022-09-15

## 2022-09-15 RX ORDER — ROPIVACAINE HYDROCHLORIDE 5 MG/ML
INJECTION, SOLUTION EPIDURAL; INFILTRATION; PERINEURAL
Status: DISCONTINUED
Start: 2022-09-15 | End: 2022-09-15 | Stop reason: HOSPADM

## 2022-09-15 RX ORDER — LIDOCAINE HYDROCHLORIDE 10 MG/ML
INJECTION, SOLUTION INTRAVENOUS
Status: DISCONTINUED | OUTPATIENT
Start: 2022-09-15 | End: 2022-09-15

## 2022-09-15 RX ORDER — FENTANYL CITRATE 50 UG/ML
25 INJECTION, SOLUTION INTRAMUSCULAR; INTRAVENOUS EVERY 5 MIN PRN
Status: DISCONTINUED | OUTPATIENT
Start: 2022-09-15 | End: 2022-09-15 | Stop reason: HOSPADM

## 2022-09-15 RX ORDER — MUPIROCIN 20 MG/G
OINTMENT TOPICAL
Status: DISPENSED | OUTPATIENT
Start: 2022-09-15

## 2022-09-15 RX ORDER — MIDAZOLAM HYDROCHLORIDE 1 MG/ML
INJECTION INTRAMUSCULAR; INTRAVENOUS
Status: DISCONTINUED | OUTPATIENT
Start: 2022-09-15 | End: 2022-09-15

## 2022-09-15 RX ORDER — ACETAMINOPHEN 325 MG/1
650 TABLET ORAL EVERY 6 HOURS PRN
Status: ON HOLD | COMMUNITY
End: 2024-03-15 | Stop reason: HOSPADM

## 2022-09-15 RX ORDER — BUPIVACAINE HYDROCHLORIDE 2.5 MG/ML
INJECTION, SOLUTION EPIDURAL; INFILTRATION; INTRACAUDAL
Status: DISCONTINUED | OUTPATIENT
Start: 2022-09-15 | End: 2022-09-15 | Stop reason: HOSPADM

## 2022-09-15 RX ORDER — SODIUM CHLORIDE 9 MG/ML
INJECTION, SOLUTION INTRAVENOUS CONTINUOUS
Status: DISCONTINUED | OUTPATIENT
Start: 2022-09-15 | End: 2022-09-15 | Stop reason: HOSPADM

## 2022-09-15 RX ORDER — ONDANSETRON 2 MG/ML
INJECTION INTRAMUSCULAR; INTRAVENOUS
Status: DISCONTINUED | OUTPATIENT
Start: 2022-09-15 | End: 2022-09-15

## 2022-09-15 RX ORDER — MUPIROCIN 20 MG/G
OINTMENT TOPICAL 2 TIMES DAILY
Status: DISCONTINUED | OUTPATIENT
Start: 2022-09-15 | End: 2022-09-15 | Stop reason: HOSPADM

## 2022-09-15 RX ORDER — DEXAMETHASONE SODIUM PHOSPHATE 4 MG/ML
INJECTION, SOLUTION INTRA-ARTICULAR; INTRALESIONAL; INTRAMUSCULAR; INTRAVENOUS; SOFT TISSUE
Status: DISCONTINUED | OUTPATIENT
Start: 2022-09-15 | End: 2022-09-15

## 2022-09-15 RX ORDER — PROPOFOL 10 MG/ML
VIAL (ML) INTRAVENOUS CONTINUOUS PRN
Status: DISCONTINUED | OUTPATIENT
Start: 2022-09-15 | End: 2022-09-15

## 2022-09-15 RX ORDER — ONDANSETRON 2 MG/ML
4 INJECTION INTRAMUSCULAR; INTRAVENOUS ONCE AS NEEDED
Status: DISCONTINUED | OUTPATIENT
Start: 2022-09-15 | End: 2022-09-15 | Stop reason: HOSPADM

## 2022-09-15 RX ORDER — FAMOTIDINE 10 MG/ML
INJECTION INTRAVENOUS
Status: DISCONTINUED | OUTPATIENT
Start: 2022-09-15 | End: 2022-09-15

## 2022-09-15 RX ORDER — LIDOCAINE HYDROCHLORIDE 10 MG/ML
INJECTION, SOLUTION EPIDURAL; INFILTRATION; INTRACAUDAL; PERINEURAL
Status: DISCONTINUED | OUTPATIENT
Start: 2022-09-15 | End: 2022-09-15 | Stop reason: HOSPADM

## 2022-09-15 RX ORDER — MIDAZOLAM HYDROCHLORIDE 1 MG/ML
2 INJECTION INTRAMUSCULAR; INTRAVENOUS
Status: DISCONTINUED | OUTPATIENT
Start: 2022-09-15 | End: 2023-04-12

## 2022-09-15 RX ORDER — METHYLPREDNISOLONE ACETATE 40 MG/ML
INJECTION, SUSPENSION INTRA-ARTICULAR; INTRALESIONAL; INTRAMUSCULAR; SOFT TISSUE
Status: DISCONTINUED | OUTPATIENT
Start: 2022-09-15 | End: 2022-09-15 | Stop reason: HOSPADM

## 2022-09-15 RX ADMIN — ACETAMINOPHEN 1000 MG: 500 TABLET ORAL at 05:09

## 2022-09-15 RX ADMIN — MIDAZOLAM HYDROCHLORIDE 2 MG: 1 INJECTION, SOLUTION INTRAMUSCULAR; INTRAVENOUS at 07:09

## 2022-09-15 RX ADMIN — DEXMEDETOMIDINE HYDROCHLORIDE 25 MCG: 100 INJECTION, SOLUTION INTRAVENOUS at 07:09

## 2022-09-15 RX ADMIN — FAMOTIDINE 20 MG: 10 INJECTION, SOLUTION INTRAVENOUS at 07:09

## 2022-09-15 RX ADMIN — LIDOCAINE HYDROCHLORIDE 100 MG: 10 INJECTION, SOLUTION INTRAVENOUS at 07:09

## 2022-09-15 RX ADMIN — ONDANSETRON 4 MG: 2 INJECTION INTRAMUSCULAR; INTRAVENOUS at 07:09

## 2022-09-15 RX ADMIN — PROPOFOL 75 MCG/KG/MIN: 10 INJECTION, EMULSION INTRAVENOUS at 07:09

## 2022-09-15 RX ADMIN — MIDAZOLAM HYDROCHLORIDE 2 MG: 1 INJECTION, SOLUTION INTRAMUSCULAR; INTRAVENOUS at 06:09

## 2022-09-15 RX ADMIN — CELECOXIB 400 MG: 200 CAPSULE ORAL at 05:09

## 2022-09-15 RX ADMIN — FENTANYL CITRATE 100 MCG: 50 INJECTION, SOLUTION INTRAMUSCULAR; INTRAVENOUS at 06:09

## 2022-09-15 RX ADMIN — SODIUM CHLORIDE, SODIUM GLUCONATE, SODIUM ACETATE, POTASSIUM CHLORIDE, MAGNESIUM CHLORIDE, SODIUM PHOSPHATE, DIBASIC, AND POTASSIUM PHOSPHATE: .53; .5; .37; .037; .03; .012; .00082 INJECTION, SOLUTION INTRAVENOUS at 07:09

## 2022-09-15 RX ADMIN — DEXAMETHASONE SODIUM PHOSPHATE 4 MG: 4 INJECTION, SOLUTION INTRAMUSCULAR; INTRAVENOUS at 07:09

## 2022-09-15 RX ADMIN — SODIUM CHLORIDE: 0.9 INJECTION, SOLUTION INTRAVENOUS at 05:09

## 2022-09-15 RX ADMIN — SODIUM CHLORIDE: 9 INJECTION, SOLUTION INTRAVENOUS at 06:09

## 2022-09-15 RX ADMIN — MUPIROCIN: 20 OINTMENT TOPICAL at 05:09

## 2022-09-15 RX ADMIN — CEFAZOLIN 2 G: 330 INJECTION, POWDER, FOR SOLUTION INTRAMUSCULAR; INTRAVENOUS at 07:09

## 2022-09-15 NOTE — DISCHARGE SUMMARY
[facility] - Surgery (Hospital)  Discharge Note  Short Stay    Procedure(s) (LRB):  RELEASE, CARPAL TUNNEL (Right)  EXCISION, GANGLION CYST, WRIST (Right)  ARTHROTOMY, HAND RADIOCARPAL (Right)  INJECTION, STEROID LEFT WRIST AND LEFT LATERAL ELBOW (Left)    OUTCOME: Patient tolerated treatment/procedure well without complication and is now ready for discharge.    DISPOSITION: Home or Self Care    FINAL DIAGNOSIS:  <principal problem not specified>    FOLLOWUP: In clinic    DISCHARGE INSTRUCTIONS:    Discharge Procedure Orders   Diet general     Call MD for:  temperature >100.4     Call MD for:  persistent nausea and vomiting     Call MD for:  severe uncontrolled pain     Call MD for:  difficulty breathing, headache or visual disturbances     Call MD for:  redness, tenderness, or signs of infection (pain, swelling, redness, odor or green/yellow discharge around incision site)     Call MD for:  hives     Call MD for:  persistent dizziness or light-headedness     Call MD for:  extreme fatigue     Keep surgical extremity elevated     Ice to affected area     Lifting restrictions     No driving, operating heavy equipment or signing legal documents while taking pain medication.     Leave dressing on - Keep it clean, dry, and intact until clinic visit         Clinical Reference Documents Added to Patient Instructions         Document    CARPAL TUNNEL RELEASE (ENGLISH)    CARPAL TUNNEL SYNDROME DISCHARGE INSTRUCTIONS (ENGLISH)    GANGLION CYST (ENGLISH)    GANGLION CYST DISCHARGE INSTRUCTIONS (ENGLISH)            TIME SPENT ON DISCHARGE: 20 minutes

## 2022-09-15 NOTE — PATIENT INSTRUCTIONS
Take pain medications as prescribed  Do not remove dressing  Elevate arm  Keep dressing dry  No heavy lifting  Follow up with Dr. Marshall within 1-2 weeks  Call office for any issues

## 2022-09-15 NOTE — ANESTHESIA PROCEDURE NOTES
Peripheral Block    Patient location during procedure: pre-op   Block not for primary anesthetic.  Reason for block: at surgeon's request and post-op pain management   Post-op Pain Location: right wrist   Start time: 9/15/2022 6:55 AM  Timeout: 9/15/2022 6:54 AM   End time: 9/15/2022 6:58 AM    Staffing  Authorizing Provider: Dacia Capone MD  Performing Provider: Dacia Capone MD    Preanesthetic Checklist  Completed: patient identified, IV checked, site marked, risks and benefits discussed, surgical consent, monitors and equipment checked, pre-op evaluation and timeout performed  Peripheral Block  Patient position: supine  Prep: ChloraPrep  Patient monitoring: heart rate, cardiac monitor, continuous pulse ox, continuous capnometry and frequent blood pressure checks  Block type: supraclavicular  Laterality: right  Injection technique: single shot  Needle  Needle type: Stimuplex   Needle gauge: 22 G  Needle length: 2 in  Needle localization: anatomical landmarks and ultrasound guidance   -ultrasound image captured on disc.  Assessment  Injection assessment: negative aspiration, negative parasthesia and local visualized surrounding nerve  Paresthesia pain: none  Heart rate change: no  Slow fractionated injection: yes  Pain Tolerance: comfortable throughout block and no complaints      Additional Notes  VSS.  [facility] RN monitoring vitals throughout procedure.  Patient tolerated procedure well.

## 2022-09-15 NOTE — PLAN OF CARE
Pre op complete. Patient resting comfortably. Call light in reach. Dad not in facility but will be patient's ride home. Belongings in locker. Patient blood sugar of 115.

## 2022-09-15 NOTE — ANESTHESIA PREPROCEDURE EVALUATION
09/15/2022  Hermelindo Garza III is a 42 y.o., male   Pre-operative evaluation for Procedure(s) (LRB):  RELEASE, CARPAL TUNNEL (Right)  EXCISION, GANGLION CYST, WRIST (Right)  ARTHROTOMY, HAND (Right)  INJECTION, STEROID (Left)    Hermelindo Garza III is a 42 y.o. male     Patient Active Problem List   Diagnosis    Gastro-esophageal reflux disease without esophagitis    Renal calculi    Mild intermittent asthma without complication    ADD (attention deficit disorder)    LUBA (obstructive sleep apnea)    Hyperlipidemia, unspecified    Hypertriglyceridemia    Elevated liver enzymes    SHEIKH (nonalcoholic steatohepatitis)    Hepatosplenomegaly    Cellulitis of left arm    History of multiple allergies    Unspecified asthma, uncomplicated    Obesity, unspecified    Type 2 diabetes mellitus without complications    Vitamin D insufficiency    Restless leg    Chronic pain    Pain in left elbow    Cervical disc disorder with radiculopathy, unspecified cervical region    History of urinary stone    Lumbar radiculopathy, chronic    Tachycardia    S/P lumbar discectomy    Melanoma in situ of trunk    Morbid obesity    Cervical spondylosis with myelopathy       Review of patient's allergies indicates:  No Known Allergies    No current facility-administered medications on file prior to encounter.     Current Outpatient Medications on File Prior to Encounter   Medication Sig Dispense Refill    acetaminophen (TYLENOL) 325 MG tablet Take 650 mg by mouth every 6 (six) hours as needed for Pain.      atorvastatin (LIPITOR) 20 MG tablet TAKE 1 TABLET(20 MG) BY MOUTH EVERY DAY 90 tablet 3    blood-glucose meter kit Use as instructed 1 each 0    dexmethylphenidate (FOCALIN) 10 MG tablet Take 1 tablet (10 mg total) by mouth 3 (three) times daily. 90 tablet 0    dexmethylphenidate (FOCALIN) 10  MG tablet Take 1 tablet (10 mg total) by mouth 3 (three) times daily. 90 tablet 0    ergocalciferol, vitamin D2, (VITAMIN D ORAL) Take 1 tablet by mouth every other day.      esomeprazole (NEXIUM) 40 mg GrPS Take 40 mg by mouth 2 (two) times daily before meals. 60 each 11    fluocinonide (LIDEX) 0.05 % external solution AAA scalp qday prn itching, scaling 60 mL 3    gabapentin (NEURONTIN) 300 MG capsule TAKE 1 CAPSULE(300 MG) BY MOUTH THREE TIMES DAILY 90 capsule 3    hydrocortisone 2.5 % cream APPLY EXTERNALLY TO THE AFFECTED AREA TWICE DAILY FOR 10 DAYS 30 g 0    ketoconazole (NIZORAL) 2 % cream APPLY EXTERNALLY TO THE AFFECTED AREA TWICE DAILY 15 g 0    ketoconazole (NIZORAL) 2 % shampoo Apply topically twice a week. 120 mL 3    ketoconazole (NIZORAL) 2 % shampoo Wash hair with medicated shampoo at least 2x/week - let sit on scalp at least 5 minutes prior to rinsing 120 mL 5    latanoprost 0.005 % ophthalmic solution INSTILL 1 DROP INTO BOTH EYES EVERY EVENING. THIS REPLACES TIMOLOL 7.5 mL 3    meloxicam (MOBIC) 15 MG tablet Take 1 tablet (15 mg total) by mouth once daily. 30 tablet 1    montelukast (SINGULAIR) 10 mg tablet TAKE 1 TABLET(10 MG) BY MOUTH EVERY EVENING 30 tablet 11    ondansetron (ZOFRAN-ODT) 4 MG TbDL Take 1 tablet (4 mg total) by mouth every 6 (six) hours as needed (nausea). 30 tablet 3    rOPINIRole (REQUIP) 0.25 MG tablet TAKE 1 TABLET(0.25 MG) BY MOUTH EVERY EVENING 30 tablet 2    TRUE METRIX GLUCOSE TEST STRIP Strp USE TO TEST ONCE DAILY 25 strip 2    LORazepam (ATIVAN) 0.5 MG tablet Take 1 tablet (0.5 mg total) by mouth as needed for Anxiety (Take 1 tablet by mouth 30 minutes prior to scan. Take 2nd tablet, if needed, just before scan.). 2 tablet 0       Past Surgical History:   Procedure Laterality Date    APPENDECTOMY  2006    COLONOSCOPY  01/22/2019    internal hemorrhoids, o/w normal - repeat at age 52    EPIDURAL STEROID INJECTION N/A 2/10/2021    Procedure: CERVICAL  C6/7 NELI DIRECT REFERRAL;  Surgeon: Michi Escamilla MD;  Location: [facility] PAIN [facility]GT;  Service: Pain Management;  Laterality: N/A;  NEEDS CONSENT    ESOPHAGOGASTRODUODENOSCOPY  01/22/2019    LA grade B esophagitis; esophageal stenosis- dilated; gastritis; H pylori pathology negative    ESOPHAGOGASTRODUODENOSCOPY N/A 5/18/2021    Procedure: EGD (ESOPHAGOGASTRODUODENOSCOPY);  Surgeon: Mariana Hector MD;  Location: [facility];  Service: Endoscopy;  Laterality: N/A;    LUMBAR LAMINECTOMY  2010    LUMBAR LAMINECTOMY WITH DISCECTOMY Left 9/20/2021    Procedure: LAMINECTOMY, SPINE, LUMBAR, WITH DISCECTOMY;  Surgeon: Naseem Mcnamara DO;  Location: [facility] OR [address] [place] [address];  Service: Neurosurgery;  Laterality: Left;  MIS L3-4       Pre-op Assessment    I have reviewed the Patient Summary Reports.     I have reviewed the Nursing Notes. I have reviewed the NPO Status.   I have reviewed the Medications.     Review of Systems  Anesthesia Hx:  No problems with previous Anesthesia Denies Hx of Anesthetic complications  History of prior surgery of interest to airway management or planning: Denies Family Hx of Anesthesia complications.   Denies Personal Hx of Anesthesia complications.   Social:  No Alcohol Use, Non-Smoker    Hematology/Oncology:  Hematology Normal   Oncology Normal     EENT/Dental:EENT/Dental Normal   Cardiovascular:  Cardiovascular Normal Exercise tolerance: good     Pulmonary:   Asthma moderate Sleep Apnea    Renal/:   renal calculi    Hepatic/GI:   PUD, GERD, well controlled    Musculoskeletal:   Arthritis     Neurological:  Neurology Normal    Endocrine:   Diabetes, type 2  Morbid Obesity / BMI > 40  Psych:  Psychiatric Normal           Physical Exam  General: Well nourished, Cooperative, Alert and Oriented    Airway:  Mallampati: III   Mouth Opening: Normal  TM Distance: Normal  Tongue: Normal  Neck ROM: Normal ROM    Dental:  Intact    Chest/Lungs:  Normal Respiratory Rate  expiratory wheezes    Heart:  Rate:  Normal  Rhythm: Regular Rhythm        Anesthesia Plan  Type of Anesthesia, risks & benefits discussed:    Anesthesia Type: MAC, Regional, Gen Natural Airway  Intra-op Monitoring Plan: Standard ASA Monitors  Post Op Pain Control Plan: multimodal analgesia and IV/PO Opioids PRN  Induction:  IV  Airway Plan: , Post-Induction  Informed Consent: Informed consent signed with the Patient and all parties understand the risks and agree with anesthesia plan.  All questions answered. Patient consented to blood products? No  ASA Score: 3  Day of Surgery Review of History & Physical: H&P Update referred to the surgeon/provider.    Ready For Surgery From Anesthesia Perspective.     .

## 2022-09-15 NOTE — BRIEF OP NOTE
OPERATIVE NOTE    DATE: 09/15/2022 TIME: 8:51 AM      PRE-OPERATIVE DIAGNOSIS: Right carpal tunnel syndrome [G56.01]  Left carpal tunnel syndrome [G56.02]  Ganglion cyst of volar aspect of right wrist [M67.431]     POST-OPERATIVE DIAGNOSIS:  Right carpal tunnel syndrome [G56.01]  Left carpal tunnel syndrome [G56.02]  Ganglion cyst of volar aspect of right wrist [M67.431]     PROCEDURE: Right carpal tunnel release,  Left carpal tunnel injection, Left lateral elbow injection, Removal of Ganglion cyst of volar aspect of right wrist    ANESTHESIA TYPE: regional, sedation    TISSUE REMOVED: Yes - ganglion cyst, tenosynovitis    COMPLICATIONS: No    BLOOD LOSS: minimal    FINDINGS: Right carpal tunnel syndrome, left lateral epicondylitis,  Left carpal tunnel syndrome, Ganglion cyst of volar aspect of right wrist encompassing the FCR tendon, FCR tenosynovitis, complete release of median nerve along right carpal tunnel     ASSISTANT SURGEON: Chong Padron PGY6

## 2022-09-15 NOTE — ANESTHESIA POSTPROCEDURE EVALUATION
Anesthesia Post Evaluation    Patient: Hermelindo Garza III    Procedure(s) Performed: Procedure(s) (LRB):  RELEASE, CARPAL TUNNEL (Right)  EXCISION, GANGLION CYST, WRIST (Right)  ARTHROTOMY, HAND RADIOCARPAL (Right)  INJECTION, STEROID LEFT WRIST AND LEFT LATERAL ELBOW (Left)  FLEXOR TENOSYNOVECTOMY (Right)    Final Anesthesia Type: general      Patient location during evaluation: PACU  Patient participation: Yes- Able to Participate  Level of consciousness: awake and alert and oriented  Post-procedure vital signs: reviewed and stable  Pain management: adequate  Airway patency: patent    PONV status at discharge: No PONV  Anesthetic complications: no      Cardiovascular status: blood pressure returned to baseline and hemodynamically stable  Respiratory status: unassisted, spontaneous ventilation and room air  Hydration status: euvolemic  Follow-up not needed.          Vitals Value Taken Time   /61 09/15/22 0931   Temp 36.7 °C (98 °F) 09/15/22 0930   Pulse 68 09/15/22 0932   Resp 27 09/15/22 0931   SpO2 94 % 09/15/22 0932   Vitals shown include unvalidated device data.      Event Time   Out of Recovery 09:45:00         Pain/Shani Score: Pain Rating Prior to Med Admin: 1 (9/15/2022  6:55 AM)  Shani Score: 10 (9/15/2022  8:45 AM)

## 2022-09-15 NOTE — OP NOTE
[facility])  Surgery Department  Operative Note    SUMMARY     Date of Procedure: 9/15/2022     Procedure:    Right volar wrist ganglion excision, cpt 26238  2. Flexor tenosynovectomy of FCR tendon, cpt 34116  3. Right carpal tunnel release, cpt 40274  4. Right wrist TMC joint arthrotomy, cpt 30037  5. Left carpal tunnel steroid injection, cpt 31302  6. Left lateral epicondyle injection elbow, cpt 87691    Surgeon(s) and Role:     * Christen Marshall MD - Primary    Assisting Surgeon: Chong Padron MD    Pre-Operative Diagnosis: Right carpal tunnel syndrome [G56.01]  Left carpal tunnel syndrome [G56.02]  Ganglion cyst of volar aspect of right wrist [M67.431]  Left lateral epicondylitis    Post-Operative Diagnosis: Post-Op Diagnosis Codes:     * Right carpal tunnel syndrome [G56.01]     * Left carpal tunnel syndrome [G56.02]     * Ganglion cyst of volar aspect of right wrist [M67.431]  Right FCR tendon flexor tenosynovitis  Left lateral epicondylitis    Anesthesia: Regional    IIndication for Procedure: 43 yo male with right volar wrist ganglion as well as bilateral carpal tunnel syndrome that has failed conservative management.  Risks and benefits of the procedure were discussed with the patient and informed consent was obtained.    Description of the Findings of the Procedure: The patient was seen in the preoperative holding area and the right hand was marked. The patient was taken to the OR, placed supine on the table, and a padded hand table was used. After monitored anesthesia care was admitted without difficulty, a time-out procedure was performed identifying the patient, the operative site and the procedure to be performed.  40 mg of methylprednisolone was sterilely injected into the left carpal tunnel as well as the left lateral epicondyle tendon origin at the elbow. A tourniquet was placed on the arm and the upper extremity was prepped and draped in standard sterile fashion. The right upper  extremity was elevated and exsanguinated with an Esmarch bandage, and the tourniquet was inflated to 250 mm Hg. . A 2 cm longitudinal incision was made over the right wrist volarly.  Littler scissors were used to dissect down to the radial artery and venae comitantes, these were retracted and protected. The ganglion wrapped around the FCR tendon and the root eminated from the thumb TMC joint. The FCR sheath was incised and a TMC arthrotomy was made. The ganglion as well as the stalk emanating from the joint was removed in its entirety. It measured 8x8mm and was sent for permanent pathological specimen.  There was a copious amount of FCR tendonitis. This was cultured and sharply excised with a scalpel as well as a rongeur. The tendon was frayed slightly longitudinally and its fibers slightly , but intact. The neurovascular bundles were identified and protected throughout the case. The wound was then irrigated with normal saline using a bulb syringe.    A 2 cm incision was made over the right carpal tunnel.  The palmar fascia was sharply incised.  Sarah Beth retractors were used to expose the transverse carpal ligament, this was sharply incised.  A carpal tunnel skid was placed underneath the transverse carpal ligament proximally, and the proximal transverse carpal ligament as well as the distal forearm fascia was sharply released. The median nerve was found to be significantly compressed through the carpal tunnel. The carpal tunnel skid was replaced distally, and the transverse carpal ligament was released under direct visualization.  The median nerve was found to be completely decompressed. The neurovascular bundles were identified and protected throughout the case. The wound was then irrigated with normal saline using a bulb syringe.   All instrument, sponge and needle counts were correct.  4-0 monocryl was used to close the incisions in a subcutaneous and subcuticular manner. Dermabonc, adaptic, 4x4, cast  padding and coban were used to dress the hand. The tourniquet was deflated and the hand and fingers were pink and well-perfused.  The patient tolerated the procedure well.  He was taken awake, alert and in good condition to the recovery room.    Complications: No    Estimated Blood Loss (EBL): minimal           Specimens: none           Condition: Good    Disposition: PACU - hemodynamically stable.    Attestation: I was present and scrubbed for the entire procedure.

## 2022-09-15 NOTE — PLAN OF CARE
Patient ready to be discharged. VSS and in no distress.    Instructions given, patient verbalizes understanding. Pain assessed and addressed. Tolerates liquids by mouth with no nausea and vomiting.

## 2022-09-15 NOTE — TRANSFER OF CARE
"Anesthesia Transfer of Care Note    Patient: Hermelindo Garza III    Procedure(s) Performed: Procedure(s) (LRB):  RELEASE, CARPAL TUNNEL (Right)  EXCISION, GANGLION CYST, WRIST (Right)  ARTHROTOMY, HAND RADIOCARPAL (Right)  INJECTION, STEROID LEFT WRIST AND LEFT LATERAL ELBOW (Left)    Patient location: PACU    Anesthesia Type: general and regional    Transport from OR: Transported from OR on room air with adequate spontaneous ventilation. Transported from OR on 6-10 L/min O2 by face mask with adequate spontaneous ventilation    Post pain: adequate analgesia    Post assessment: no apparent anesthetic complications and tolerated procedure well    Post vital signs: stable    Level of consciousness: awake    Nausea/Vomiting: no nausea/vomiting    Complications: none    Transfer of care protocol was followed      Last vitals:   Visit Vitals  BP (!) 171/97 (BP Location: Left arm, Patient Position: Lying)   Pulse 73   Temp 36.8 °C (98.2 °F) (Oral)   Resp 14   Ht 5' 5" (1.651 m)   Wt 108.9 kg (240 lb)   SpO2 98%   BMI 39.94 kg/m²     "

## 2022-09-18 LAB — BACTERIA SPEC AEROBE CULT: NO GROWTH

## 2022-09-22 DIAGNOSIS — Z98.890 POST-OPERATIVE STATE: Primary | ICD-10-CM

## 2022-09-22 LAB
BACTERIA SPEC ANAEROBE CULT: NORMAL
FINAL PATHOLOGIC DIAGNOSIS: NORMAL
GROSS: NORMAL
Lab: NORMAL

## 2022-09-26 ENCOUNTER — OFFICE VISIT (OUTPATIENT)
Dept: ORTHOPEDICS | Facility: CLINIC | Age: 42
End: 2022-09-26
Payer: COMMERCIAL

## 2022-09-26 ENCOUNTER — HOSPITAL ENCOUNTER (OUTPATIENT)
Dept: RADIOLOGY | Facility: HOSPITAL | Age: 42
Discharge: HOME | End: 2022-09-26
Attending: ORTHOPAEDIC SURGERY
Payer: COMMERCIAL

## 2022-09-26 DIAGNOSIS — M77.12 LEFT LATERAL EPICONDYLITIS: ICD-10-CM

## 2022-09-26 DIAGNOSIS — Z98.890 POST-OPERATIVE STATE: ICD-10-CM

## 2022-09-26 DIAGNOSIS — G56.01 RIGHT CARPAL TUNNEL SYNDROME: Primary | ICD-10-CM

## 2022-09-26 DIAGNOSIS — M65.9 FCR (FLEXOR CARPI RADIALIS) TENOSYNOVITIS: ICD-10-CM

## 2022-09-26 DIAGNOSIS — G56.02 LEFT CARPAL TUNNEL SYNDROME: ICD-10-CM

## 2022-09-26 DIAGNOSIS — M67.431 GANGLION CYST OF VOLAR ASPECT OF RIGHT WRIST: ICD-10-CM

## 2022-09-26 PROCEDURE — 3044F HG A1C LEVEL LT 7.0%: CPT | Mod: CPTII,S$GLB,, | Performed by: ORTHOPAEDIC SURGERY

## 2022-09-26 PROCEDURE — 1159F PR MEDICATION LIST DOCUMENTED IN MEDICAL RECORD: ICD-10-PCS | Mod: CPTII,S$GLB,, | Performed by: ORTHOPAEDIC SURGERY

## 2022-09-26 PROCEDURE — 1160F PR REVIEW ALL MEDS BY PRESCRIBER/CLIN PHARMACIST DOCUMENTED: ICD-10-PCS | Mod: CPTII,S$GLB,, | Performed by: ORTHOPAEDIC SURGERY

## 2022-09-26 PROCEDURE — 99999 PR PBB SHADOW E&M-EST. PATIENT-LVL II: CPT | Mod: PBBFAC,,, | Performed by: ORTHOPAEDIC SURGERY

## 2022-09-26 PROCEDURE — 3044F PR MOST RECENT HEMOGLOBIN A1C LEVEL <7.0%: ICD-10-PCS | Mod: CPTII,S$GLB,, | Performed by: ORTHOPAEDIC SURGERY

## 2022-09-26 PROCEDURE — 99024 PR POST-OP FOLLOW-UP VISIT: ICD-10-PCS | Mod: S$GLB,,, | Performed by: ORTHOPAEDIC SURGERY

## 2022-09-26 PROCEDURE — 1160F RVW MEDS BY RX/DR IN RCRD: CPT | Mod: CPTII,S$GLB,, | Performed by: ORTHOPAEDIC SURGERY

## 2022-09-26 PROCEDURE — 99024 POSTOP FOLLOW-UP VISIT: CPT | Mod: S$GLB,,, | Performed by: ORTHOPAEDIC SURGERY

## 2022-09-26 PROCEDURE — 99999 PR PBB SHADOW E&M-EST. PATIENT-LVL II: ICD-10-PCS | Mod: PBBFAC,,, | Performed by: ORTHOPAEDIC SURGERY

## 2022-09-26 PROCEDURE — 3072F PR LOW RISK FOR RETINOPATHY: ICD-10-PCS | Mod: CPTII,S$GLB,, | Performed by: ORTHOPAEDIC SURGERY

## 2022-09-26 PROCEDURE — 1159F MED LIST DOCD IN RCRD: CPT | Mod: CPTII,S$GLB,, | Performed by: ORTHOPAEDIC SURGERY

## 2022-09-26 PROCEDURE — 3072F LOW RISK FOR RETINOPATHY: CPT | Mod: CPTII,S$GLB,, | Performed by: ORTHOPAEDIC SURGERY

## 2022-09-26 NOTE — PROGRESS NOTES
Hermelindo Garza III presents for post-operative evaluation of   Encounter Diagnoses   Name Primary?    Right carpal tunnel syndrome Yes    Left carpal tunnel syndrome     Ganglion cyst of volar aspect of right wrist     Left lateral epicondylitis     FCR (flexor carpi radialis) tenosynovitis      The patient is now 11 days s/p   Release, Carpal Tunnel - Right    Excision, Ganglion Cyst, Wrist - Right    Arthrotomy, Hand Radiocarpal - Right    Injection, Steroid Left Wrist And Left Lateral Elbow - Left    Flexor Tenosynovectomy - Right    Overall the patient reports doing well.  The patient reports appropriate postoperative soreness with well controlled overall pain.      PE:    AA&O x 4.  NAD  HEENT:  NCAT, sclera nonicteric  Lungs:  Respirations are equal and unlabored.  CV:  2+ bilateral upper and lower extremity pulses.  MSK: The wound is healing well with no signs of erythema or warmth.  There is no drainage.  All sutures were removed today. Neurovascularly intact and has 5/5 thenar and intrinsic strength.    A/P: Status post above, doing well  1) Continue with light use of the hand  2) F/U 6 weeks  3) Call with any questions/concerns in the interim        Christen Marhsall MD    Please be aware that this note has been generated with the assistance of [facility] voice-to-text.  Please excuse any spelling or grammatical errors.

## 2022-09-30 NOTE — ADDENDUM NOTE
Addendum  created 09/30/22 1404 by Dacia Capone MD    Clinical Note Signed, Intraprocedure Blocks edited, Intraprocedure Event edited, Intraprocedure Staff edited, SmartForm saved

## 2022-10-03 ENCOUNTER — OFFICE VISIT (OUTPATIENT)
Dept: DERMATOLOGY | Facility: CLINIC | Age: 42
End: 2022-10-03
Payer: COMMERCIAL

## 2022-10-03 DIAGNOSIS — Z86.006 HISTORY OF MELANOMA IN SITU: ICD-10-CM

## 2022-10-03 DIAGNOSIS — L90.5 SCAR: ICD-10-CM

## 2022-10-03 DIAGNOSIS — L91.8 SKIN TAG: ICD-10-CM

## 2022-10-03 DIAGNOSIS — D18.01 CHERRY ANGIOMA: ICD-10-CM

## 2022-10-03 DIAGNOSIS — D22.9 NEVUS: Primary | ICD-10-CM

## 2022-10-03 DIAGNOSIS — L82.1 SK (SEBORRHEIC KERATOSIS): ICD-10-CM

## 2022-10-03 DIAGNOSIS — L81.4 LENTIGO: ICD-10-CM

## 2022-10-03 PROCEDURE — 1159F MED LIST DOCD IN RCRD: CPT | Mod: CPTII,S$GLB,, | Performed by: DERMATOLOGY

## 2022-10-03 PROCEDURE — 1159F PR MEDICATION LIST DOCUMENTED IN MEDICAL RECORD: ICD-10-PCS | Mod: CPTII,S$GLB,, | Performed by: DERMATOLOGY

## 2022-10-03 PROCEDURE — 99213 OFFICE O/P EST LOW 20 MIN: CPT | Mod: S$GLB,,, | Performed by: DERMATOLOGY

## 2022-10-03 PROCEDURE — 3072F PR LOW RISK FOR RETINOPATHY: ICD-10-PCS | Mod: CPTII,S$GLB,, | Performed by: DERMATOLOGY

## 2022-10-03 PROCEDURE — 3072F LOW RISK FOR RETINOPATHY: CPT | Mod: CPTII,S$GLB,, | Performed by: DERMATOLOGY

## 2022-10-03 PROCEDURE — 99999 PR PBB SHADOW E&M-EST. PATIENT-LVL II: CPT | Mod: PBBFAC,,, | Performed by: DERMATOLOGY

## 2022-10-03 PROCEDURE — 3044F HG A1C LEVEL LT 7.0%: CPT | Mod: CPTII,S$GLB,, | Performed by: DERMATOLOGY

## 2022-10-03 PROCEDURE — 1160F PR REVIEW ALL MEDS BY PRESCRIBER/CLIN PHARMACIST DOCUMENTED: ICD-10-PCS | Mod: CPTII,S$GLB,, | Performed by: DERMATOLOGY

## 2022-10-03 PROCEDURE — 1160F RVW MEDS BY RX/DR IN RCRD: CPT | Mod: CPTII,S$GLB,, | Performed by: DERMATOLOGY

## 2022-10-03 PROCEDURE — 3044F PR MOST RECENT HEMOGLOBIN A1C LEVEL <7.0%: ICD-10-PCS | Mod: CPTII,S$GLB,, | Performed by: DERMATOLOGY

## 2022-10-03 PROCEDURE — 99213 PR OFFICE/OUTPT VISIT, EST, LEVL III, 20-29 MIN: ICD-10-PCS | Mod: S$GLB,,, | Performed by: DERMATOLOGY

## 2022-10-03 PROCEDURE — 99999 PR PBB SHADOW E&M-EST. PATIENT-LVL II: ICD-10-PCS | Mod: PBBFAC,,, | Performed by: DERMATOLOGY

## 2022-10-03 NOTE — PROGRESS NOTES
"  Subjective:       Patient ID:  Hermelindo Garza III is a 42 y.o. male who presents for   Chief Complaint   Patient presents with    Skin Check     Patient is here today for a "mole" check.   Pt has a history of  moderate sun exposure in the past.   Pt recalls several blistering sunburns in the past- yes  Pt has history of tanning bed use- yes  Pt has  had moles removed in the past- yes, MIS R mid back 2/2022  Pt has history of melanoma in first degree relatives-  Father NMSC    This is a high risk patient here to check for the development of new lesions.    Pt denies any new or changing lesions today     Review of Systems   Skin:  Positive for activity-related sunscreen use. Negative for daily sunscreen use and tendency to form keloidal scars.   Hematologic/Lymphatic: Does not bruise/bleed easily.      Objective:    Physical Exam   Constitutional: He appears well-developed and well-nourished. No distress.   Lymphadenopathy: No supraclavicular adenopathy is present.     He has no cervical adenopathy.     He has no axillary adenopathy.   Neurological: He is alert and oriented to person, place, and time. He is not disoriented.   Psychiatric: He has a normal mood and affect.   Skin:   Areas Examined (abnormalities noted in diagram):   Scalp / Hair Palpated and Inspected  Head / Face Inspection Performed  Neck Inspection Performed  Chest / Axilla Inspection Performed  Abdomen Inspection Performed  Genitals / Buttocks / Groin Inspection Performed  Back Inspection Performed  RUE Inspected  LUE Inspection Performed  RLE Inspected  LLE Inspection Performed  Nails and Digits Inspection Performed                             Diagram Legend     Erythematous scaling macule/papule c/w actinic keratosis       Vascular papule c/w angioma      Pigmented verrucoid papule/plaque c/w seborrheic keratosis      Yellow umbilicated papule c/w sebaceous hyperplasia      Irregularly shaped tan macule c/w lentigo     1-2 mm smooth white " papules consistent with Milia      Movable subcutaneous cyst with punctum c/w epidermal inclusion cyst      Subcutaneous movable cyst c/w pilar cyst      Firm pink to brown papule c/w dermatofibroma      Pedunculated fleshy papule(s) c/w skin tag(s)      Evenly pigmented macule c/w junctional nevus     Mildly variegated pigmented, slightly irregular-bordered macule c/w mildly atypical nevus      Flesh colored to evenly pigmented papule c/w intradermal nevus       Pink pearly papule/plaque c/w basal cell carcinoma      Erythematous hyperkeratotic cursted plaque c/w SCC      Surgical scar with no sign of skin cancer recurrence      Open and closed comedones      Inflammatory papules and pustules      Verrucoid papule consistent consistent with wart     Erythematous eczematous patches and plaques     Dystrophic onycholytic nail with subungual debris c/w onychomycosis     Umbilicated papule    Erythematous-base heme-crusted tan verrucoid plaque consistent with inflamed seborrheic keratosis     Erythematous Silvery Scaling Plaque c/w Psoriasis     See annotation      Assessment / Plan:        Nevus  Discussed ABCDE's of nevi.  Monitor for new mole or moles that are becoming bigger, darker, irritated, or developing irregular borders. Brochure provided. Instructed patient to observe lesion(s) for changes and follow up in clinic if changes are noted. Patient to monitor skin at home for new or changing lesions.     Cherry angioma  These are benign vascular lesions that are inherited.  Treatment is not necessary.    Lentigo  This is a benign hyperpigmented sun induced lesion. Recommend daily sun protection/avoidance and use of at least SPF 30, broad spectrum sunscreen (OTC drug) will reduce the number of new lesions. Treatment of these benign lesions are considered cosmetic.  The nature of sun-induced photo-aging and skin cancers is discussed.  Sun avoidance, protective clothing, and the use of 30-SPF sunscreens is advised.  Observe closely for skin damage/changes, and call if such occurs.    SK (seborrheic keratosis)  These are benign inherited growths without a malignant potential. Reassurance given to patient. No treatment is necessary.     Skin tag  Reassurance given to patient. No treatment is necessary.   Treatment of benign, asymptomatic lesions may be considered cosmetic.    History of melanoma in situ- R mid  back 2/2022  Scar  Area of previous melanoma  in situ examined. Site well healed with no signs of recurrence.    Total body skin examination performed today including at least 12 points as noted in physical examination. No lesions suspicious for malignancy noted.    Recommend daily sun protection/avoidance, use of at least SPF 30, broad spectrum sunscreen (OTC drug), skin self examinations, and routine physician surveillance to optimize early detection           Follow up in about 3 months (around 1/3/2023) for TBSE.

## 2022-10-04 DIAGNOSIS — Z00.00 ANNUAL PHYSICAL EXAM: Primary | ICD-10-CM

## 2022-10-05 ENCOUNTER — LAB VISIT (OUTPATIENT)
Dept: LAB | Facility: HOSPITAL | Age: 42
End: 2022-10-05
Payer: COMMERCIAL

## 2022-10-05 DIAGNOSIS — Z00.00 ANNUAL PHYSICAL EXAM: ICD-10-CM

## 2022-10-05 LAB
ALBUMIN SERPL BCP-MCNC: 4.4 G/DL (ref 3.5–5.2)
ALP SERPL-CCNC: 129 U/L (ref 55–135)
ALT SERPL W/O P-5'-P-CCNC: 40 U/L (ref 10–44)
ANION GAP SERPL CALC-SCNC: 15 MMOL/L (ref 8–16)
AST SERPL-CCNC: 25 U/L (ref 10–40)
BASOPHILS # BLD AUTO: 0.05 K/UL (ref 0–0.2)
BASOPHILS NFR BLD: 0.8 % (ref 0–1.9)
BILIRUB SERPL-MCNC: 1.1 MG/DL (ref 0.1–1)
BUN SERPL-MCNC: 15 MG/DL (ref 6–20)
CALCIUM SERPL-MCNC: 9.9 MG/DL (ref 8.7–10.5)
CHLORIDE SERPL-SCNC: 101 MMOL/L (ref 95–110)
CHOLEST SERPL-MCNC: 178 MG/DL (ref 120–199)
CHOLEST/HDLC SERPL: 4.1 {RATIO} (ref 2–5)
CO2 SERPL-SCNC: 23 MMOL/L (ref 23–29)
COMPLEXED PSA SERPL-MCNC: 0.18 NG/ML (ref 0–4)
CREAT SERPL-MCNC: 0.8 MG/DL (ref 0.5–1.4)
DIFFERENTIAL METHOD: NORMAL
EOSINOPHIL # BLD AUTO: 0.2 K/UL (ref 0–0.5)
EOSINOPHIL NFR BLD: 3.1 % (ref 0–8)
ERYTHROCYTE [DISTWIDTH] IN BLOOD BY AUTOMATED COUNT: 13 % (ref 11.5–14.5)
EST. GFR  (NO RACE VARIABLE): >60 ML/MIN/1.73 M^2
ESTIMATED AVG GLUCOSE: 108 MG/DL (ref 68–131)
GLUCOSE SERPL-MCNC: 86 MG/DL (ref 70–110)
HBA1C MFR BLD: 5.4 % (ref 4–5.6)
HCT VFR BLD AUTO: 50.7 % (ref 40–54)
HDLC SERPL-MCNC: 43 MG/DL (ref 40–75)
HDLC SERPL: 24.2 % (ref 20–50)
HGB BLD-MCNC: 16.4 G/DL (ref 14–18)
IMM GRANULOCYTES # BLD AUTO: 0.01 K/UL (ref 0–0.04)
IMM GRANULOCYTES NFR BLD AUTO: 0.2 % (ref 0–0.5)
LDLC SERPL CALC-MCNC: 81.6 MG/DL (ref 63–159)
LYMPHOCYTES # BLD AUTO: 2.7 K/UL (ref 1–4.8)
LYMPHOCYTES NFR BLD: 41.3 % (ref 18–48)
MCH RBC QN AUTO: 30.1 PG (ref 27–31)
MCHC RBC AUTO-ENTMCNC: 32.3 G/DL (ref 32–36)
MCV RBC AUTO: 93 FL (ref 82–98)
MONOCYTES # BLD AUTO: 0.5 K/UL (ref 0.3–1)
MONOCYTES NFR BLD: 7.7 % (ref 4–15)
NEUTROPHILS # BLD AUTO: 3.1 K/UL (ref 1.8–7.7)
NEUTROPHILS NFR BLD: 46.9 % (ref 38–73)
NONHDLC SERPL-MCNC: 135 MG/DL
NRBC BLD-RTO: 0 /100 WBC
PLATELET # BLD AUTO: 220 K/UL (ref 150–450)
PMV BLD AUTO: 10.4 FL (ref 9.2–12.9)
POTASSIUM SERPL-SCNC: 4.3 MMOL/L (ref 3.5–5.1)
PROT SERPL-MCNC: 7.8 G/DL (ref 6–8.4)
RBC # BLD AUTO: 5.44 M/UL (ref 4.6–6.2)
SODIUM SERPL-SCNC: 139 MMOL/L (ref 136–145)
TRIGL SERPL-MCNC: 267 MG/DL (ref 30–150)
TSH SERPL DL<=0.005 MIU/L-ACNC: 2.07 UIU/ML (ref 0.4–4)
WBC # BLD AUTO: 6.49 K/UL (ref 3.9–12.7)

## 2022-10-05 PROCEDURE — 83036 HEMOGLOBIN GLYCOSYLATED A1C: CPT | Performed by: INTERNAL MEDICINE

## 2022-10-05 PROCEDURE — 36415 COLL VENOUS BLD VENIPUNCTURE: CPT | Mod: PO | Performed by: INTERNAL MEDICINE

## 2022-10-05 PROCEDURE — 84153 ASSAY OF PSA TOTAL: CPT | Performed by: INTERNAL MEDICINE

## 2022-10-05 PROCEDURE — 84443 ASSAY THYROID STIM HORMONE: CPT | Performed by: INTERNAL MEDICINE

## 2022-10-05 PROCEDURE — 85025 COMPLETE CBC W/AUTO DIFF WBC: CPT | Performed by: INTERNAL MEDICINE

## 2022-10-05 PROCEDURE — 80053 COMPREHEN METABOLIC PANEL: CPT | Performed by: INTERNAL MEDICINE

## 2022-10-05 PROCEDURE — 80061 LIPID PANEL: CPT | Performed by: INTERNAL MEDICINE

## 2022-10-12 ENCOUNTER — OFFICE VISIT (OUTPATIENT)
Dept: INTERNAL MEDICINE | Facility: CLINIC | Age: 42
End: 2022-10-12
Payer: COMMERCIAL

## 2022-10-12 ENCOUNTER — LAB VISIT (OUTPATIENT)
Dept: LAB | Facility: HOSPITAL | Age: 42
End: 2022-10-12
Attending: INTERNAL MEDICINE
Payer: COMMERCIAL

## 2022-10-12 VITALS
WEIGHT: 243.81 LBS | HEART RATE: 86 BPM | DIASTOLIC BLOOD PRESSURE: 78 MMHG | BODY MASS INDEX: 40.62 KG/M2 | SYSTOLIC BLOOD PRESSURE: 130 MMHG | RESPIRATION RATE: 17 BRPM | OXYGEN SATURATION: 95 % | HEIGHT: 65 IN

## 2022-10-12 DIAGNOSIS — E78.2 MIXED HYPERLIPIDEMIA: ICD-10-CM

## 2022-10-12 DIAGNOSIS — E11.9 TYPE 2 DIABETES MELLITUS WITHOUT COMPLICATION, WITHOUT LONG-TERM CURRENT USE OF INSULIN: Primary | ICD-10-CM

## 2022-10-12 DIAGNOSIS — M62.830 MUSCLE SPASM OF BACK: ICD-10-CM

## 2022-10-12 DIAGNOSIS — Z87.898 HISTORY OF NAUSEA: ICD-10-CM

## 2022-10-12 DIAGNOSIS — Z88.9 HISTORY OF MULTIPLE ALLERGIES: ICD-10-CM

## 2022-10-12 DIAGNOSIS — J45.20 MILD INTERMITTENT ASTHMA WITHOUT COMPLICATION: ICD-10-CM

## 2022-10-12 DIAGNOSIS — G25.81 RESTLESS LEG: ICD-10-CM

## 2022-10-12 DIAGNOSIS — E11.9 TYPE 2 DIABETES MELLITUS WITHOUT COMPLICATION, WITHOUT LONG-TERM CURRENT USE OF INSULIN: ICD-10-CM

## 2022-10-12 LAB
ALBUMIN/CREAT UR: 15 UG/MG (ref 0–30)
CREAT UR-MCNC: 107 MG/DL (ref 23–375)
MICROALBUMIN UR DL<=1MG/L-MCNC: 16 UG/ML

## 2022-10-12 PROCEDURE — 99214 OFFICE O/P EST MOD 30 MIN: CPT | Mod: S$GLB,,, | Performed by: INTERNAL MEDICINE

## 2022-10-12 PROCEDURE — 3008F BODY MASS INDEX DOCD: CPT | Mod: CPTII,S$GLB,, | Performed by: INTERNAL MEDICINE

## 2022-10-12 PROCEDURE — 1160F PR REVIEW ALL MEDS BY PRESCRIBER/CLIN PHARMACIST DOCUMENTED: ICD-10-PCS | Mod: CPTII,S$GLB,, | Performed by: INTERNAL MEDICINE

## 2022-10-12 PROCEDURE — 99214 PR OFFICE/OUTPT VISIT, EST, LEVL IV, 30-39 MIN: ICD-10-PCS | Mod: S$GLB,,, | Performed by: INTERNAL MEDICINE

## 2022-10-12 PROCEDURE — 3075F PR MOST RECENT SYSTOLIC BLOOD PRESS GE 130-139MM HG: ICD-10-PCS | Mod: CPTII,S$GLB,, | Performed by: INTERNAL MEDICINE

## 2022-10-12 PROCEDURE — 3072F PR LOW RISK FOR RETINOPATHY: ICD-10-PCS | Mod: CPTII,S$GLB,, | Performed by: INTERNAL MEDICINE

## 2022-10-12 PROCEDURE — 3078F PR MOST RECENT DIASTOLIC BLOOD PRESSURE < 80 MM HG: ICD-10-PCS | Mod: CPTII,S$GLB,, | Performed by: INTERNAL MEDICINE

## 2022-10-12 PROCEDURE — 82570 ASSAY OF URINE CREATININE: CPT | Performed by: INTERNAL MEDICINE

## 2022-10-12 PROCEDURE — 3044F HG A1C LEVEL LT 7.0%: CPT | Mod: CPTII,S$GLB,, | Performed by: INTERNAL MEDICINE

## 2022-10-12 PROCEDURE — 99999 PR PBB SHADOW E&M-EST. PATIENT-LVL V: CPT | Mod: PBBFAC,,, | Performed by: INTERNAL MEDICINE

## 2022-10-12 PROCEDURE — 99999 PR PBB SHADOW E&M-EST. PATIENT-LVL V: ICD-10-PCS | Mod: PBBFAC,,, | Performed by: INTERNAL MEDICINE

## 2022-10-12 PROCEDURE — 3072F LOW RISK FOR RETINOPATHY: CPT | Mod: CPTII,S$GLB,, | Performed by: INTERNAL MEDICINE

## 2022-10-12 PROCEDURE — 3044F PR MOST RECENT HEMOGLOBIN A1C LEVEL <7.0%: ICD-10-PCS | Mod: CPTII,S$GLB,, | Performed by: INTERNAL MEDICINE

## 2022-10-12 PROCEDURE — 3075F SYST BP GE 130 - 139MM HG: CPT | Mod: CPTII,S$GLB,, | Performed by: INTERNAL MEDICINE

## 2022-10-12 PROCEDURE — 3078F DIAST BP <80 MM HG: CPT | Mod: CPTII,S$GLB,, | Performed by: INTERNAL MEDICINE

## 2022-10-12 PROCEDURE — 1159F MED LIST DOCD IN RCRD: CPT | Mod: CPTII,S$GLB,, | Performed by: INTERNAL MEDICINE

## 2022-10-12 PROCEDURE — 1160F RVW MEDS BY RX/DR IN RCRD: CPT | Mod: CPTII,S$GLB,, | Performed by: INTERNAL MEDICINE

## 2022-10-12 PROCEDURE — 82043 UR ALBUMIN QUANTITATIVE: CPT | Performed by: INTERNAL MEDICINE

## 2022-10-12 PROCEDURE — 3008F PR BODY MASS INDEX (BMI) DOCUMENTED: ICD-10-PCS | Mod: CPTII,S$GLB,, | Performed by: INTERNAL MEDICINE

## 2022-10-12 PROCEDURE — 1159F PR MEDICATION LIST DOCUMENTED IN MEDICAL RECORD: ICD-10-PCS | Mod: CPTII,S$GLB,, | Performed by: INTERNAL MEDICINE

## 2022-10-12 RX ORDER — ONDANSETRON 4 MG/1
4 TABLET, ORALLY DISINTEGRATING ORAL EVERY 6 HOURS PRN
Qty: 30 TABLET | Refills: 3 | Status: SHIPPED | OUTPATIENT
Start: 2022-10-12 | End: 2023-07-04

## 2022-10-12 RX ORDER — ALBUTEROL SULFATE 90 UG/1
AEROSOL, METERED RESPIRATORY (INHALATION)
Qty: 8.5 G | Refills: 5 | Status: SHIPPED | OUTPATIENT
Start: 2022-10-12 | End: 2023-06-30 | Stop reason: SDUPTHER

## 2022-10-12 RX ORDER — ATORVASTATIN CALCIUM 20 MG/1
20 TABLET, FILM COATED ORAL DAILY
Qty: 90 TABLET | Refills: 3 | Status: SHIPPED | OUTPATIENT
Start: 2022-10-12 | End: 2022-11-23

## 2022-10-12 RX ORDER — MONTELUKAST SODIUM 10 MG/1
10 TABLET ORAL NIGHTLY
Qty: 90 TABLET | Refills: 3 | Status: SHIPPED | OUTPATIENT
Start: 2022-10-12 | End: 2023-12-04 | Stop reason: SDUPTHER

## 2022-10-12 RX ORDER — METHOCARBAMOL 750 MG/1
TABLET, FILM COATED ORAL
Qty: 90 TABLET | Refills: 2 | Status: SHIPPED | OUTPATIENT
Start: 2022-10-12 | End: 2023-04-20

## 2022-10-12 NOTE — PROGRESS NOTES
"Subjective:       Patient ID: Hermelindo Garza III is a 42 y.o. male.    Chief Complaint: Follow-up (6 Mos Follow up ) and Medication Refill (Zofran & Methocarbamol )    HPI    42 y.o. male presents for follow up of chronic medical problems. Doing well recently- had RSV pneumonia, ortho wrist surgery- recovering from those and getting back to baseline. He and his , Christopher, recently went to [place].     SHEIKH: low fat diet  DM2: ozempic 0.5mg q 7 days. Eye exam 04/2022. urine microalbumin/cr: 10/2021- due  HLD: atorvastatin 20mg  Vit D insuff: on otc supplement  LUBA: CPAP nightly  Restless leg syndrome: requip 0.25mg prn   Nausea: intermittent, zofran prn.   Back muscle spasm: methocarbamol prn- takes med few times a day for a few days when has flare- not daily use  Allergies/asthma: singulair; albuterol prn    Review of Systems   Constitutional:  Negative for diaphoresis and fever.   HENT:  Negative for trouble swallowing.    Respiratory:  Negative for cough and shortness of breath.    Cardiovascular:  Negative for chest pain and leg swelling.   Gastrointestinal:  Negative for abdominal pain and blood in stool.   Genitourinary:  Negative for decreased urine volume and difficulty urinating.   Musculoskeletal:  Positive for arthralgias, back pain and myalgias.   Skin:  Negative for pallor.   Neurological:  Negative for syncope.     Objective:        Vitals:    10/12/22 0901   BP: 130/78   BP Location: Right arm   Patient Position: Sitting   BP Method: Large (Manual)   Pulse: 86   Resp: 17   SpO2: 95%   Weight: 110.6 kg (243 lb 13.3 oz)   Height: 5' 5" (1.651 m)       Body mass index is 40.58 kg/m².    Physical Exam  Constitutional:       General: He is not in acute distress.     Appearance: He is well-developed. He is not diaphoretic.   HENT:      Head: Normocephalic and atraumatic.      Right Ear: External ear normal.      Left Ear: External ear normal.   Eyes:      Conjunctiva/sclera: Conjunctivae normal. "   Cardiovascular:      Rate and Rhythm: Normal rate and regular rhythm.   Pulmonary:      Effort: Pulmonary effort is normal.      Breath sounds: Normal breath sounds.   Abdominal:      General: Bowel sounds are normal.      Palpations: Abdomen is soft.   Musculoskeletal:      Cervical back: Neck supple. No rigidity.      Right lower leg: No edema.      Left lower leg: No edema.   Skin:     General: Skin is warm and dry.      Nails: There is no clubbing.   Neurological:      Mental Status: He is alert. Mental status is at baseline.   Psychiatric:         Behavior: Behavior normal.         Judgment: Judgment normal.       Assessment:     1. Type 2 diabetes mellitus without complication, without long-term current use of insulin    2. Mixed hyperlipidemia    3. History of nausea    4. Mild intermittent asthma without complication    5. History of multiple allergies    6. Muscle spasm of back           Plan:         1. Type 2 diabetes mellitus without complication, without long-term current use of insulin  - cont ozempic  - Microalbumin/Creatinine Ratio, Urine; Future  - atorvastatin (LIPITOR) 20 MG tablet; Take 1 tablet (20 mg total) by mouth once daily.  Dispense: 90 tablet; Refill: 3    2. Mixed hyperlipidemia    - atorvastatin (LIPITOR) 20 MG tablet; Take 1 tablet (20 mg total) by mouth once daily.  Dispense: 90 tablet; Refill: 3    3. History of nausea    - ondansetron (ZOFRAN-ODT) 4 MG TbDL; Take 1 tablet (4 mg total) by mouth every 6 (six) hours as needed (nausea).  Dispense: 30 tablet; Refill: 3    4. Mild intermittent asthma without complication    - albuterol (PROVENTIL/VENTOLIN HFA) 90 mcg/actuation inhaler; RescueINHALE 2 PUFFS INTO THE LUNGS EVERY 6 HOURS AS NEEDED WHEEZING, RESCUE Strength: 90 mcg/actuation  Dispense: 8.5 g; Refill: 5  - montelukast (SINGULAIR) 10 mg tablet; Take 1 tablet (10 mg total) by mouth every evening.  Dispense: 90 tablet; Refill: 3    5. History of multiple allergies    -  montelukast (SINGULAIR) 10 mg tablet; Take 1 tablet (10 mg total) by mouth every evening.  Dispense: 90 tablet; Refill: 3    6. Muscle spasm of back  - uses med prn flares. No med side effect. Refill rx  - methocarbamoL (ROBAXIN) 750 MG Tab; TAKE 1 TABLET(750 MG) BY MOUTH THREE TIMES DAILY AS NEEDED FOR MUSCLE SPASM  Dispense: 90 tablet; Refill: 2    Unless there are intervening problems, Follow up in about 6 months (around 4/12/2023) for annual; est care.      Patient note was created using MModal Dictation.  Any errors in syntax or even information may not have been identified and edited on initial review prior to signing this note.

## 2022-10-13 RX ORDER — ROPINIROLE 0.25 MG/1
TABLET, FILM COATED ORAL
Qty: 90 TABLET | Refills: 2 | Status: SHIPPED | OUTPATIENT
Start: 2022-10-13 | End: 2023-11-06 | Stop reason: SDUPTHER

## 2022-10-18 LAB — FUNGUS SPEC CULT: NORMAL

## 2022-10-19 ENCOUNTER — OFFICE VISIT (OUTPATIENT)
Dept: PSYCHIATRY | Facility: CLINIC | Age: 42
End: 2022-10-19
Payer: COMMERCIAL

## 2022-10-19 DIAGNOSIS — G47.00 INSOMNIA, UNSPECIFIED TYPE: ICD-10-CM

## 2022-10-19 PROCEDURE — 3061F NEG MICROALBUMINURIA REV: CPT | Mod: CPTII,95,S$GLB, | Performed by: NURSE PRACTITIONER

## 2022-10-19 PROCEDURE — 3072F LOW RISK FOR RETINOPATHY: CPT | Mod: CPTII,95,S$GLB, | Performed by: NURSE PRACTITIONER

## 2022-10-19 PROCEDURE — 99999 PR PBB SHADOW E&M-EST. PATIENT-LVL I: ICD-10-PCS | Mod: PBBFAC,,, | Performed by: NURSE PRACTITIONER

## 2022-10-19 PROCEDURE — 3066F PR DOCUMENTATION OF TREATMENT FOR NEPHROPATHY: ICD-10-PCS | Mod: CPTII,95,S$GLB, | Performed by: NURSE PRACTITIONER

## 2022-10-19 PROCEDURE — 99999 PR PBB SHADOW E&M-EST. PATIENT-LVL I: CPT | Mod: PBBFAC,,, | Performed by: NURSE PRACTITIONER

## 2022-10-19 PROCEDURE — 3044F HG A1C LEVEL LT 7.0%: CPT | Mod: CPTII,95,S$GLB, | Performed by: NURSE PRACTITIONER

## 2022-10-19 PROCEDURE — 99214 OFFICE O/P EST MOD 30 MIN: CPT | Mod: 95,S$GLB,, | Performed by: NURSE PRACTITIONER

## 2022-10-19 PROCEDURE — 3061F PR NEG MICROALBUMINURIA RESULT DOCUMENTED/REVIEW: ICD-10-PCS | Mod: CPTII,95,S$GLB, | Performed by: NURSE PRACTITIONER

## 2022-10-19 PROCEDURE — 3044F PR MOST RECENT HEMOGLOBIN A1C LEVEL <7.0%: ICD-10-PCS | Mod: CPTII,95,S$GLB, | Performed by: NURSE PRACTITIONER

## 2022-10-19 PROCEDURE — 3066F NEPHROPATHY DOC TX: CPT | Mod: CPTII,95,S$GLB, | Performed by: NURSE PRACTITIONER

## 2022-10-19 PROCEDURE — 99214 PR OFFICE/OUTPT VISIT, EST, LEVL IV, 30-39 MIN: ICD-10-PCS | Mod: 95,S$GLB,, | Performed by: NURSE PRACTITIONER

## 2022-10-19 PROCEDURE — 3072F PR LOW RISK FOR RETINOPATHY: ICD-10-PCS | Mod: CPTII,95,S$GLB, | Performed by: NURSE PRACTITIONER

## 2022-10-19 RX ORDER — ZOLPIDEM TARTRATE 10 MG/1
10 TABLET ORAL NIGHTLY PRN
Qty: 30 TABLET | Refills: 2 | Status: SHIPPED | OUTPATIENT
Start: 2022-10-19 | End: 2023-03-29 | Stop reason: SDUPTHER

## 2022-10-19 RX ORDER — DEXMETHYLPHENIDATE HYDROCHLORIDE 10 MG/1
10 TABLET ORAL 3 TIMES DAILY
Qty: 90 TABLET | Refills: 0 | Status: SHIPPED | OUTPATIENT
Start: 2022-12-15 | End: 2023-02-08

## 2022-10-19 RX ORDER — DEXMETHYLPHENIDATE HYDROCHLORIDE 10 MG/1
10 TABLET ORAL 3 TIMES DAILY
Qty: 90 TABLET | Refills: 0 | Status: SHIPPED | OUTPATIENT
Start: 2022-10-19 | End: 2023-01-27

## 2022-10-19 RX ORDER — DEXMETHYLPHENIDATE HYDROCHLORIDE 10 MG/1
10 TABLET ORAL 3 TIMES DAILY
Qty: 90 TABLET | Refills: 0 | Status: SHIPPED | OUTPATIENT
Start: 2022-11-17 | End: 2023-02-08

## 2022-10-19 NOTE — PROGRESS NOTES
10/19/2022 8:00 AM  Hermelindo Garza III   1980   [id number]                                                                   OUTPATIENT PSYCHIATRY FOLLOW- UP VISIT     Reason for Encounter:  Hermelindo Garza III, a 42 y.o. male,who presents today for follow up of ADHD. Met with patient.     Interval History and Content of Current Session:     Today, Patient with hx of pre-glaucoma (eye drops for this), DM type 2 (well controlled with Ozempic), asthma (intermittent, mild), HLD, GERD, insomnia, sleep apnea, ADHD, lumbar discectomy, chronic back pain (managed with gabapentin), ozempic for weight loss.       Reports as follow up from last visit about 3 months prior. Reports has been taking Focalin about 2 to 3 times per week, continues to be effective. No focus, attention issues. Reports      Reports had been having issues with falling asleep, and 10 mg of Ambien has been helpful.     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      Denies SI/HI, AVH, racing thoughts, substance abuse. Negative UDS in 1/2022 and as expected.                       Psychosocial stressors: occupational, social, family pressures        Review Of Systems:      Medical Review Of Systems:  Constitutional: negative for fatigue and weight loss  Cardiovascular: negative  Gastrointestinal: negative     Psychiatric Review Of Systems:  sleep: no  appetite changes: no  weight changes: no  energy/anergy: no  interest/pleasure/anhedonia: no  somatic symptoms: no  libido: no  anxiety/panic: no  guilty/hopeless: no  S.I.B.s/risky behavior: no  any drugs: no  alcohol: no       Sleep: 6 to 7 hours per night     Risk Parameters:  Patient reports no suicidal ideation  Patient reports no homicidal ideation  Patient reports no self-injurious behavior  Patient reports no violent behavior        Current meds-     Compliance: yes     Side effects: None        Psychiatric, social, and family history  reviewed this visit        Objective      ALL MEDICATIONS:     Current Outpatient Medications:     albuterol (PROVENTIL HFA) 90 mcg/actuation inhaler, Inhale 2 puffs into the lungs every 6 (six) hours as needed for Wheezing. Rescue, Disp: 18 g, Rfl: 5    atorvastatin (LIPITOR) 20 MG tablet, TAKE 1 TABLET(20 MG) BY MOUTH EVERY DAY, Disp: 90 tablet, Rfl: 3    blood-glucose meter kit, Use as instructed, Disp: 1 each, Rfl: 0    dexmethylphenidate (FOCALIN) 10 MG tablet, Take 1 tablet (10 mg total) by mouth 3 (three) times daily., Disp: 90 tablet, Rfl: 0    dexmethylphenidate (FOCALIN) 10 MG tablet, Take 1 tablet (10 mg total) by mouth 3 (three) times daily., Disp: 90 tablet, Rfl: 0    dexmethylphenidate (FOCALIN) 10 MG tablet, Take 1 tablet (10 mg total) by mouth 3 (three) times daily., Disp: 90 tablet, Rfl: 0    diazePAM (VALIUM) 5 MG tablet, Take 1 tablet (5 mg total) by mouth every 6 (six) hours as needed for Anxiety. Take 1 pill 30 minutes prior to surgery and bring remaining pill with you on day of surgery., Disp: 2 tablet, Rfl: 0    ergocalciferol, vitamin D2, (VITAMIN D ORAL), Take 1 tablet by mouth every other day., Disp: , Rfl:     esomeprazole (NEXIUM) 40 mg GrPS, Take 40 mg by mouth 2 (two) times daily before meals., Disp: 60 each, Rfl: 11    fluocinonide (LIDEX) 0.05 % external solution, AAA scalp qday prn itching, scaling, Disp: 60 mL, Rfl: 3    gabapentin (NEURONTIN) 300 MG capsule, Take 1 capsule (300 mg total) by mouth 3 (three) times daily., Disp: 90 capsule, Rfl: 3    HYDROcodone-acetaminophen (NORCO) 5-325 mg per tablet, Take 1 tablet by mouth every 6 (six) hours as needed for Pain., Disp: 10 tablet, Rfl: 0    hydrocortisone 2.5 % cream, Apply topically 2 (two) times daily. for 10 days, Disp: 28 g, Rfl: 0    ketoconazole (NIZORAL) 2 % cream, Apply topically 2 (two) times daily. Apply to to affected area bid, Disp: 15 g, Rfl: 0    ketoconazole (NIZORAL) 2 % shampoo, Apply topically twice a week., Disp:  120 mL, Rfl: 3    ketoconazole (NIZORAL) 2 % shampoo, Wash hair with medicated shampoo at least 2x/week - let sit on scalp at least 5 minutes prior to rinsing, Disp: 120 mL, Rfl: 5    lancets Misc, 1 each by Misc.(Non-Drug; Combo Route) route Daily., Disp: 30 each, Rfl: 11    latanoprost 0.005 % ophthalmic solution, Place 1 drop into both eyes every evening. This replaces the timolol, Disp: 3 Bottle, Rfl: 12    methocarbamoL (ROBAXIN) 750 MG Tab, TAKE 1 TABLET(750 MG) BY MOUTH THREE TIMES DAILY AS NEEDED FOR MUSCLE SPASM, Disp: 90 tablet, Rfl: 0    montelukast (SINGULAIR) 10 mg tablet, TAKE 1 TABLET(10 MG) BY MOUTH EVERY EVENING, Disp: 30 tablet, Rfl: 11    ondansetron (ZOFRAN-ODT) 4 MG TbDL, Take 1 tablet (4 mg total) by mouth every 6 (six) hours as needed (nausea)., Disp: 30 tablet, Rfl: 3    rOPINIRole (REQUIP) 0.25 MG tablet, TAKE 1 TABLET(0.25 MG) BY MOUTH EVERY EVENING, Disp: 30 tablet, Rfl: 2    semaglutide (OZEMPIC) 0.25 mg or 0.5 mg(2 mg/1.5 mL) pen injector, Inject 0.5 mg into the skin every 7 days., Disp: 2 pen, Rfl: 2    TRUE METRIX GLUCOSE TEST STRIP Strp, USE TO TEST ONCE DAILY, Disp: 25 strip, Rfl: 2    zolpidem (AMBIEN) 5 MG Tab, Take 1 tablet (5 mg total) by mouth nightly as needed (insomnia)., Disp: 30 tablet, Rfl: 1     ALLERGIES:  Review of patient's allergies indicates:  No Known Allergies     RELEVANT LABS/STUDIES:              Lab Results   Component Value Date     WBC 9.55 09/20/2021     HGB 16.5 09/20/2021     HCT 48.8 09/20/2021     MCV 89 09/20/2021      09/20/2021      BMP            Lab Results   Component Value Date      09/20/2021     K 4.1 09/20/2021      09/20/2021     CO2 21 (L) 09/20/2021     BUN 17 09/20/2021     CREATININE 0.7 09/20/2021     CALCIUM 9.3 09/20/2021     ANIONGAP 11 09/20/2021     ESTGFRAFRICA >60.0 09/20/2021     EGFRNONAA >60.0 09/20/2021                Lab Results   Component Value Date     ALT 47 (H) 09/19/2021     AST 19 09/19/2021      ALKPHOS 107 09/19/2021     BILITOT 0.7 09/19/2021                Lab Results   Component Value Date     TSH 2.296 07/30/2020                Lab Results   Component Value Date     HGBA1C 5.2 08/13/2021         Constitutional  Vitals:  Most recent vital signs, dated less than 90 days prior to this appointment, were reviewed.    There were no vitals filed for this visit.            PHYSICAL EXAM  General: well developed, well nourished  Neurologic:   Gait: Normal   Psychomotor signs:  No involuntary movements or tremor  AIMS: none     PSYCHIATRIC EXAM:     Mental Status Exam:  Appearance: unremarkable, age appropriate  Behavior/Cooperation: normal, cooperative  Speech: normal tone, normal rate, normal pitch, normal volume  Language: uses words appropriately; NO aphasia or dysarthria  Mood: steady  Affect: full, congruent with mood and appropriate to content/situation  Thought Process: normal and logical  Thought Content: normal, no suicidality, no homicidality, delusions, or paranoia  Level of Consciousness: Alert and Oriented x3  Memory:  Intact  Attention/concentration: appropriate for age/education.   Fund of Knowledge: appears adequate  Insight: Intact  Judgment: Intact      Assessment and Diagnosis   Status/Progress: Based on the examination today, the patient's problem(s) is/are well controlled.  New problems have not been presented today.   Co-morbidities are complicating management of the primary condition.  There are no active rule-out diagnoses for this patient at this time.      General Impression:   ADHD, inattentive type  LUBA  Insomnia  Anxiety and depression NOS        Intervention/Counseling/Treatment Plan   Medication Management:   Continue Focalin IR 10 mg by mouth three times daily  Continue Ambien 10 mg by mouth once daily  Labs: reviewed most recent - reviewed CMP, CBC from 4/2022  The treatment plan and follow up plan were reviewed with the patient.  Discussed with patient informed consent, risks  vs. benefits, alternative treatments, side effect profile and the inherent unpredictability of individual responses to these treatments. The patient expresses understanding of the above and displays the capacity to agree with this current plan and had no other questions.  Encouraged Patient to keep future appointments.   Take medications as prescribed and abstain from substance abuse.   In the event of an emergency patient was advised to go to the emergency room.     Return to Clinic: 3 months     > than 50% of total time spend on coordination of care and counseling   (which included pts differential diagnosis and prognosis for psychiatric conditions, risks, benefits of treatments, instructions and adherence to treatment plan, risk reduction, reviewing current psychiatric medication regimen, medical problems and social stressors. In addtion to possible discussion with other healthcare provider/s)     Add on Psychotherapy time:0  Total Face time:20 min     The patient location is: [place], at home  The chief complaint leading to consultation is: med f/u     Visit type: audiovisual     Face to Face time with patient: 20 min  20 minutes of total time spent on the encounter, which includes face to face time and non-face to face time preparing to see the patient (eg, review of tests), Obtaining and/or reviewing separately obtained history, Documenting clinical information in the electronic or other health record, Independently interpreting results (not separately reported) and communicating results to the patient/family/caregiver, or Care coordination (not separately reported).            Each patient to whom he or she provides medical services by telemedicine is:  (1) informed of the relationship between the physician and patient and the respective role of any other health care provider with respect to management of the patient; and (2) notified that he or she may decline to receive medical services by telemedicine  and may withdraw from such care at any time.     Notes:      Tor Hernandez, MSN, APRN, PMHNP-BC [facility]   10/19/2022 8:00 AM

## 2022-11-03 LAB
ACID FAST MOD KINY STN SPEC: NORMAL
MYCOBACTERIUM SPEC QL CULT: NORMAL

## 2022-11-06 NOTE — PROGRESS NOTES
HPI    DLS: 4/26/2022    Pt here for 6 Month Check;  Pt states no eye pain or discomfort.     Meds:  Latanoprost QHS OU   AT's PRN OU   Refresh PM PRN OU     1. OHT   2. MGD   3. Ocular Rosacea   4. Type 2 DM no       Last edited by Emma Couch on 11/8/2022  8:43 AM.            Assessment /Plan     For exam results, see Encounter Report.    Borderline glaucoma of both eyes with ocular hypertension    Ocular hypertension, bilateral    Meibomian gland dysfunction (MGD) of both eyes         Glaucoma (type and duration)    OHT   First HVF   3/2021    First photos   1/2021    Treatment / Drops started   Timolol            Family history    Mother (required LPI), grandmother        Glaucoma meds    Brimonidine - not effective        H/O adverse rxn to glaucoma drops    none        LASERS    none        GLAUCOMA SURGERIES    none        OTHER EYE SURGERIES    none        CDR    0.4 // 0.2        Tbase    25-26  // 26-28         Tmax    26 // 28            Ttarget    ?             HVF    2 test 2021 to  2022 -near  Full - ? Hint paracentral changes od // full os        Gonio    2-3+  OU - very lightly pigmented TM - slight ant insertion - poor slt candidate         CCT   570 // 583        OCT    2 test 2021 to 2022 - RNFL - nl od // nl os        HRT    ?? test 20? to 20? - MR - ? od // ? os        Disc photos    1/26/2021    - Ttoday    15//14  - Test done today  iop gonio     MGD / ocular rosacea  - Tried to get restasis in the past, difficult with insurance - will try alternatives first  - Difficulty with WC- time/job   - Start Ocusoft eyelid wipes  - Cont AT QID  - If ocusoft not effective, will try doxy     Ok to use zaditor bid prn allergies         PLAN    GS with CDR asymmetry and FH+  Brimonidine not effective OU - no IOP chance - stop  Blue  eyes - latanoprost - may cause iris darkening   Mild asthma, COPD - discussed, will try timolol BID OU- if bronchospasm stop - has NOT had a bronchospasm - but is on mult  meds for asthma - prefer to use latanoprost if tolerated       Gonio +2-3 with VERY pale TM and a slight ant insertion - poor slt candidate    rec trial of latanoprost - may cause iris to darken,   Consider topical REBEKAH - but stings a lot    Consider SLT - poor candidate - very pale TM with a slight ant insertion    No response to brimonidine     F/U 4 months - DFE OCT HVF // alt q 4 months with HVF and q 6 months for IOP and goni

## 2022-11-08 ENCOUNTER — OFFICE VISIT (OUTPATIENT)
Dept: OPHTHALMOLOGY | Facility: CLINIC | Age: 42
End: 2022-11-08
Payer: COMMERCIAL

## 2022-11-08 DIAGNOSIS — H40.053 BORDERLINE GLAUCOMA OF BOTH EYES WITH OCULAR HYPERTENSION: Primary | ICD-10-CM

## 2022-11-08 DIAGNOSIS — H40.053 OCULAR HYPERTENSION, BILATERAL: ICD-10-CM

## 2022-11-08 DIAGNOSIS — H02.883 MEIBOMIAN GLAND DYSFUNCTION (MGD) OF BOTH EYES: ICD-10-CM

## 2022-11-08 DIAGNOSIS — H02.886 MEIBOMIAN GLAND DYSFUNCTION (MGD) OF BOTH EYES: ICD-10-CM

## 2022-11-08 PROCEDURE — 92020 GONIOSCOPY: CPT | Mod: S$GLB,,, | Performed by: OPHTHALMOLOGY

## 2022-11-08 PROCEDURE — 1160F RVW MEDS BY RX/DR IN RCRD: CPT | Mod: CPTII,S$GLB,, | Performed by: OPHTHALMOLOGY

## 2022-11-08 PROCEDURE — 92012 INTRM OPH EXAM EST PATIENT: CPT | Mod: S$GLB,,, | Performed by: OPHTHALMOLOGY

## 2022-11-08 PROCEDURE — 3061F PR NEG MICROALBUMINURIA RESULT DOCUMENTED/REVIEW: ICD-10-PCS | Mod: CPTII,S$GLB,, | Performed by: OPHTHALMOLOGY

## 2022-11-08 PROCEDURE — 3066F PR DOCUMENTATION OF TREATMENT FOR NEPHROPATHY: ICD-10-PCS | Mod: CPTII,S$GLB,, | Performed by: OPHTHALMOLOGY

## 2022-11-08 PROCEDURE — 99999 PR PBB SHADOW E&M-EST. PATIENT-LVL IV: CPT | Mod: PBBFAC,,, | Performed by: OPHTHALMOLOGY

## 2022-11-08 PROCEDURE — 1159F PR MEDICATION LIST DOCUMENTED IN MEDICAL RECORD: ICD-10-PCS | Mod: CPTII,S$GLB,, | Performed by: OPHTHALMOLOGY

## 2022-11-08 PROCEDURE — 1160F PR REVIEW ALL MEDS BY PRESCRIBER/CLIN PHARMACIST DOCUMENTED: ICD-10-PCS | Mod: CPTII,S$GLB,, | Performed by: OPHTHALMOLOGY

## 2022-11-08 PROCEDURE — 3061F NEG MICROALBUMINURIA REV: CPT | Mod: CPTII,S$GLB,, | Performed by: OPHTHALMOLOGY

## 2022-11-08 PROCEDURE — 92020 PR SPECIAL EYE EVAL,GONIOSCOPY: ICD-10-PCS | Mod: S$GLB,,, | Performed by: OPHTHALMOLOGY

## 2022-11-08 PROCEDURE — 92012 PR EYE EXAM, EST PATIENT,INTERMED: ICD-10-PCS | Mod: S$GLB,,, | Performed by: OPHTHALMOLOGY

## 2022-11-08 PROCEDURE — 3044F HG A1C LEVEL LT 7.0%: CPT | Mod: CPTII,S$GLB,, | Performed by: OPHTHALMOLOGY

## 2022-11-08 PROCEDURE — 3044F PR MOST RECENT HEMOGLOBIN A1C LEVEL <7.0%: ICD-10-PCS | Mod: CPTII,S$GLB,, | Performed by: OPHTHALMOLOGY

## 2022-11-08 PROCEDURE — 99999 PR PBB SHADOW E&M-EST. PATIENT-LVL IV: ICD-10-PCS | Mod: PBBFAC,,, | Performed by: OPHTHALMOLOGY

## 2022-11-08 PROCEDURE — 1159F MED LIST DOCD IN RCRD: CPT | Mod: CPTII,S$GLB,, | Performed by: OPHTHALMOLOGY

## 2022-11-08 PROCEDURE — 3066F NEPHROPATHY DOC TX: CPT | Mod: CPTII,S$GLB,, | Performed by: OPHTHALMOLOGY

## 2022-11-09 ENCOUNTER — OFFICE VISIT (OUTPATIENT)
Dept: ORTHOPEDICS | Facility: CLINIC | Age: 42
End: 2022-11-09
Payer: COMMERCIAL

## 2022-11-09 DIAGNOSIS — M67.431 GANGLION CYST OF VOLAR ASPECT OF RIGHT WRIST: ICD-10-CM

## 2022-11-09 DIAGNOSIS — M77.12 LEFT LATERAL EPICONDYLITIS: ICD-10-CM

## 2022-11-09 DIAGNOSIS — G56.01 RIGHT CARPAL TUNNEL SYNDROME: Primary | ICD-10-CM

## 2022-11-09 DIAGNOSIS — G56.02 LEFT CARPAL TUNNEL SYNDROME: ICD-10-CM

## 2022-11-09 PROCEDURE — 3072F LOW RISK FOR RETINOPATHY: CPT | Mod: CPTII,S$GLB,, | Performed by: ORTHOPAEDIC SURGERY

## 2022-11-09 PROCEDURE — 3066F NEPHROPATHY DOC TX: CPT | Mod: CPTII,S$GLB,, | Performed by: ORTHOPAEDIC SURGERY

## 2022-11-09 PROCEDURE — 99024 PR POST-OP FOLLOW-UP VISIT: ICD-10-PCS | Mod: S$GLB,,, | Performed by: ORTHOPAEDIC SURGERY

## 2022-11-09 PROCEDURE — 1160F RVW MEDS BY RX/DR IN RCRD: CPT | Mod: CPTII,S$GLB,, | Performed by: ORTHOPAEDIC SURGERY

## 2022-11-09 PROCEDURE — 99024 POSTOP FOLLOW-UP VISIT: CPT | Mod: S$GLB,,, | Performed by: ORTHOPAEDIC SURGERY

## 2022-11-09 PROCEDURE — 3044F PR MOST RECENT HEMOGLOBIN A1C LEVEL <7.0%: ICD-10-PCS | Mod: CPTII,S$GLB,, | Performed by: ORTHOPAEDIC SURGERY

## 2022-11-09 PROCEDURE — 3061F NEG MICROALBUMINURIA REV: CPT | Mod: CPTII,S$GLB,, | Performed by: ORTHOPAEDIC SURGERY

## 2022-11-09 PROCEDURE — 3061F PR NEG MICROALBUMINURIA RESULT DOCUMENTED/REVIEW: ICD-10-PCS | Mod: CPTII,S$GLB,, | Performed by: ORTHOPAEDIC SURGERY

## 2022-11-09 PROCEDURE — 3066F PR DOCUMENTATION OF TREATMENT FOR NEPHROPATHY: ICD-10-PCS | Mod: CPTII,S$GLB,, | Performed by: ORTHOPAEDIC SURGERY

## 2022-11-09 PROCEDURE — 99999 PR PBB SHADOW E&M-EST. PATIENT-LVL IV: CPT | Mod: PBBFAC,,, | Performed by: ORTHOPAEDIC SURGERY

## 2022-11-09 PROCEDURE — 99999 PR PBB SHADOW E&M-EST. PATIENT-LVL IV: ICD-10-PCS | Mod: PBBFAC,,, | Performed by: ORTHOPAEDIC SURGERY

## 2022-11-09 PROCEDURE — 1160F PR REVIEW ALL MEDS BY PRESCRIBER/CLIN PHARMACIST DOCUMENTED: ICD-10-PCS | Mod: CPTII,S$GLB,, | Performed by: ORTHOPAEDIC SURGERY

## 2022-11-09 PROCEDURE — 3044F HG A1C LEVEL LT 7.0%: CPT | Mod: CPTII,S$GLB,, | Performed by: ORTHOPAEDIC SURGERY

## 2022-11-09 PROCEDURE — 1159F MED LIST DOCD IN RCRD: CPT | Mod: CPTII,S$GLB,, | Performed by: ORTHOPAEDIC SURGERY

## 2022-11-09 PROCEDURE — 3072F PR LOW RISK FOR RETINOPATHY: ICD-10-PCS | Mod: CPTII,S$GLB,, | Performed by: ORTHOPAEDIC SURGERY

## 2022-11-09 PROCEDURE — 1159F PR MEDICATION LIST DOCUMENTED IN MEDICAL RECORD: ICD-10-PCS | Mod: CPTII,S$GLB,, | Performed by: ORTHOPAEDIC SURGERY

## 2022-11-09 NOTE — PROGRESS NOTES
Hermelindo Garza III presents for post-operative evaluation, he is 7 weeks  s/p   Release, Carpal Tunnel - Right    Excision, Ganglion Cyst, Wrist - Right    Arthrotomy, Hand Radiocarpal - Right    Injection, Steroid Left Wrist And Left Lateral Elbow - Left    Flexor Tenosynovectomy - Right      Overall the patient reports doing well, he reports that the steroid injections on the left helped.  He states that the right side is doing well, he has no numbness and his strength is improving.    PE:    AA&O x 4.  NAD  HEENT:  NCAT, sclera nonicteric  Lungs:  Respirations are equal and unlabored.  CV:  2+ bilateral upper and lower extremity pulses.  MSK: The incisions are well healed. Neurovascularly intact and has 5/5 thenar and intrinsic strength.  Full wrist, finger and elbow range of motion bilaterally    A/P: Status post above, doing well  1) Continue with increased activity with the hand   2) F/U PRN  3) Call with any questions/concerns in the interim        Christen Marshall MD    Please be aware that this note has been generated with the assistance of [facility] voice-to-text.  Please excuse any spelling or grammatical errors.

## 2022-11-11 ENCOUNTER — PATIENT MESSAGE (OUTPATIENT)
Dept: OPHTHALMOLOGY | Facility: CLINIC | Age: 42
End: 2022-11-11
Payer: COMMERCIAL

## 2022-11-25 ENCOUNTER — PATIENT MESSAGE (OUTPATIENT)
Dept: DERMATOLOGY | Facility: CLINIC | Age: 42
End: 2022-11-25
Payer: COMMERCIAL

## 2022-11-28 ENCOUNTER — PATIENT MESSAGE (OUTPATIENT)
Dept: INTERNAL MEDICINE | Facility: CLINIC | Age: 42
End: 2022-11-28
Payer: COMMERCIAL

## 2022-12-05 ENCOUNTER — TELEPHONE (OUTPATIENT)
Dept: NEUROSURGERY | Facility: CLINIC | Age: 42
End: 2022-12-05
Payer: COMMERCIAL

## 2022-12-05 ENCOUNTER — PATIENT MESSAGE (OUTPATIENT)
Dept: NEUROSURGERY | Facility: CLINIC | Age: 42
End: 2022-12-05
Payer: COMMERCIAL

## 2022-12-05 ENCOUNTER — PATIENT MESSAGE (OUTPATIENT)
Dept: INTERNAL MEDICINE | Facility: CLINIC | Age: 42
End: 2022-12-05
Payer: COMMERCIAL

## 2022-12-05 DIAGNOSIS — M50.10 CERVICAL DISC DISORDER WITH RADICULOPATHY, UNSPECIFIED CERVICAL REGION: ICD-10-CM

## 2022-12-05 DIAGNOSIS — M47.12 CERVICAL SPONDYLOSIS WITH MYELOPATHY: Primary | ICD-10-CM

## 2022-12-05 DIAGNOSIS — M54.16 LUMBAR RADICULOPATHY, CHRONIC: ICD-10-CM

## 2022-12-05 DIAGNOSIS — Z98.890 S/P LUMBAR DISCECTOMY: Primary | ICD-10-CM

## 2022-12-05 DIAGNOSIS — Z98.890 S/P LUMBAR DISCECTOMY: ICD-10-CM

## 2022-12-06 ENCOUNTER — LAB VISIT (OUTPATIENT)
Dept: LAB | Facility: HOSPITAL | Age: 42
End: 2022-12-06
Payer: COMMERCIAL

## 2022-12-06 ENCOUNTER — OFFICE VISIT (OUTPATIENT)
Dept: ALLERGY | Facility: CLINIC | Age: 42
End: 2022-12-06
Payer: COMMERCIAL

## 2022-12-06 VITALS — BODY MASS INDEX: 40.04 KG/M2 | HEIGHT: 65 IN | WEIGHT: 240.31 LBS

## 2022-12-06 DIAGNOSIS — J31.0 CHRONIC RHINITIS: Primary | ICD-10-CM

## 2022-12-06 DIAGNOSIS — H10.423 SIMPLE CHRONIC CONJUNCTIVITIS OF BOTH EYES: ICD-10-CM

## 2022-12-06 DIAGNOSIS — J45.20 MILD INTERMITTENT ASTHMA WITHOUT COMPLICATION: ICD-10-CM

## 2022-12-06 DIAGNOSIS — T78.1XXD POLLEN-FOOD ALLERGY, SUBSEQUENT ENCOUNTER: ICD-10-CM

## 2022-12-06 DIAGNOSIS — J31.0 CHRONIC RHINITIS: ICD-10-CM

## 2022-12-06 PROCEDURE — 3066F NEPHROPATHY DOC TX: CPT | Mod: CPTII,S$GLB,, | Performed by: ALLERGY & IMMUNOLOGY

## 2022-12-06 PROCEDURE — 3061F NEG MICROALBUMINURIA REV: CPT | Mod: CPTII,S$GLB,, | Performed by: ALLERGY & IMMUNOLOGY

## 2022-12-06 PROCEDURE — 3008F BODY MASS INDEX DOCD: CPT | Mod: CPTII,S$GLB,, | Performed by: ALLERGY & IMMUNOLOGY

## 2022-12-06 PROCEDURE — 86003 ALLG SPEC IGE CRUDE XTRC EA: CPT | Mod: 59 | Performed by: ALLERGY & IMMUNOLOGY

## 2022-12-06 PROCEDURE — 1159F PR MEDICATION LIST DOCUMENTED IN MEDICAL RECORD: ICD-10-PCS | Mod: CPTII,S$GLB,, | Performed by: ALLERGY & IMMUNOLOGY

## 2022-12-06 PROCEDURE — 3072F PR LOW RISK FOR RETINOPATHY: ICD-10-PCS | Mod: CPTII,S$GLB,, | Performed by: ALLERGY & IMMUNOLOGY

## 2022-12-06 PROCEDURE — 86003 ALLG SPEC IGE CRUDE XTRC EA: CPT | Performed by: ALLERGY & IMMUNOLOGY

## 2022-12-06 PROCEDURE — 99999 PR PBB SHADOW E&M-EST. PATIENT-LVL IV: ICD-10-PCS | Mod: PBBFAC,,, | Performed by: ALLERGY & IMMUNOLOGY

## 2022-12-06 PROCEDURE — 99204 OFFICE O/P NEW MOD 45 MIN: CPT | Mod: S$GLB,,, | Performed by: ALLERGY & IMMUNOLOGY

## 2022-12-06 PROCEDURE — 1160F RVW MEDS BY RX/DR IN RCRD: CPT | Mod: CPTII,S$GLB,, | Performed by: ALLERGY & IMMUNOLOGY

## 2022-12-06 PROCEDURE — 3044F PR MOST RECENT HEMOGLOBIN A1C LEVEL <7.0%: ICD-10-PCS | Mod: CPTII,S$GLB,, | Performed by: ALLERGY & IMMUNOLOGY

## 2022-12-06 PROCEDURE — 3072F LOW RISK FOR RETINOPATHY: CPT | Mod: CPTII,S$GLB,, | Performed by: ALLERGY & IMMUNOLOGY

## 2022-12-06 PROCEDURE — 99204 PR OFFICE/OUTPT VISIT, NEW, LEVL IV, 45-59 MIN: ICD-10-PCS | Mod: S$GLB,,, | Performed by: ALLERGY & IMMUNOLOGY

## 2022-12-06 PROCEDURE — 99999 PR PBB SHADOW E&M-EST. PATIENT-LVL IV: CPT | Mod: PBBFAC,,, | Performed by: ALLERGY & IMMUNOLOGY

## 2022-12-06 PROCEDURE — 3066F PR DOCUMENTATION OF TREATMENT FOR NEPHROPATHY: ICD-10-PCS | Mod: CPTII,S$GLB,, | Performed by: ALLERGY & IMMUNOLOGY

## 2022-12-06 PROCEDURE — 3008F PR BODY MASS INDEX (BMI) DOCUMENTED: ICD-10-PCS | Mod: CPTII,S$GLB,, | Performed by: ALLERGY & IMMUNOLOGY

## 2022-12-06 PROCEDURE — 1159F MED LIST DOCD IN RCRD: CPT | Mod: CPTII,S$GLB,, | Performed by: ALLERGY & IMMUNOLOGY

## 2022-12-06 PROCEDURE — 86008 ALLG SPEC IGE RECOMB EA: CPT | Mod: 59 | Performed by: ALLERGY & IMMUNOLOGY

## 2022-12-06 PROCEDURE — 1160F PR REVIEW ALL MEDS BY PRESCRIBER/CLIN PHARMACIST DOCUMENTED: ICD-10-PCS | Mod: CPTII,S$GLB,, | Performed by: ALLERGY & IMMUNOLOGY

## 2022-12-06 PROCEDURE — 3061F PR NEG MICROALBUMINURIA RESULT DOCUMENTED/REVIEW: ICD-10-PCS | Mod: CPTII,S$GLB,, | Performed by: ALLERGY & IMMUNOLOGY

## 2022-12-06 PROCEDURE — 3044F HG A1C LEVEL LT 7.0%: CPT | Mod: CPTII,S$GLB,, | Performed by: ALLERGY & IMMUNOLOGY

## 2022-12-06 PROCEDURE — 36415 COLL VENOUS BLD VENIPUNCTURE: CPT | Mod: PO | Performed by: ALLERGY & IMMUNOLOGY

## 2022-12-06 RX ORDER — AZELASTINE 1 MG/ML
2 SPRAY, METERED NASAL 2 TIMES DAILY
Qty: 30 ML | Refills: 12 | Status: SHIPPED | OUTPATIENT
Start: 2022-12-06 | End: 2023-03-29 | Stop reason: SDUPTHER

## 2022-12-06 RX ORDER — MINERAL OIL
180 ENEMA (ML) RECTAL DAILY
COMMUNITY
End: 2024-03-26

## 2022-12-06 RX ORDER — FLUTICASONE PROPIONATE 50 MCG
1 SPRAY, SUSPENSION (ML) NASAL DAILY
Status: ON HOLD | COMMUNITY
End: 2023-07-05

## 2022-12-06 NOTE — PROGRESS NOTES
Subjective:       Patient ID: Hermelindo Garza III is a 42 y.o. male.    Chief Complaint:  Allergies (Sneezing , itchy eyes worse at night. )      41 yo man presents for new patient evaluation of possible allergies. She states as child had bad allergy and asthma and did shots and seemed to work. All went away for long time. Over past year or son has issues. About 6 pm he gets sneeze fit, no runny nose then itchy watery eyes then nose swells and very congested. this causes him to feel SOB and use albuterol. Occ has patches if itch on skin as well. No drainage. No season worse but is mostly at night about 6 pm. He works from home so is home all day and not occurring day. He feels has been worse since  when got new dog, (had dg prior but that one  and got another). If he uses Flonase and allegra daily this seems to help but as soon as stops symptoms come back. He has elevated eye pressure so told not to use Flonase daily. He sleeps with CPAP and congestion makes it hard to use. Has asthma is on montelukast daily, not sure If it does much. Albuterol prn helps. No eczema. He does notice oral mouth hot, burning, tingling and occ lip swelling with raw avocado, banana and some nuts like pecan or walnut right out of shell. No H/O hives with any food. No insect or latex allergy. No ENT suregry      Environmental History: see history section for home environment  Review of Systems   Constitutional:  Negative for activity change, appetite change, chills, fatigue, fever and unexpected weight change.   HENT:  Positive for congestion, sinus pressure and sneezing. Negative for ear discharge, ear pain, facial swelling, hearing loss, mouth sores, nosebleeds, postnasal drip, rhinorrhea, sore throat, tinnitus, trouble swallowing and voice change.    Eyes:  Positive for discharge and itching. Negative for redness and visual disturbance.   Respiratory:  Positive for chest tightness and shortness of breath. Negative for cough  and wheezing.    Cardiovascular:  Negative for chest pain, palpitations and leg swelling.   Gastrointestinal:  Negative for abdominal distention, abdominal pain, constipation, diarrhea, nausea and vomiting.   Genitourinary:  Negative for difficulty urinating.   Musculoskeletal:  Negative for arthralgias, back pain, joint swelling and myalgias.   Skin:  Negative for color change, pallor and rash.   Neurological:  Negative for dizziness, tremors, speech difficulty, weakness, light-headedness and headaches.   Hematological:  Negative for adenopathy. Does not bruise/bleed easily.   Psychiatric/Behavioral:  Negative for agitation, confusion, decreased concentration and sleep disturbance. The patient is not nervous/anxious.       Objective:      Physical Exam  Vitals and nursing note reviewed.   Constitutional:       General: He is not in acute distress.     Appearance: Normal appearance. He is not ill-appearing.   HENT:      Head: Normocephalic and atraumatic.      Right Ear: External ear normal.      Left Ear: External ear normal.      Nose: No rhinorrhea.   Eyes:      General:         Right eye: No discharge.         Left eye: No discharge.      Conjunctiva/sclera: Conjunctivae normal.   Cardiovascular:      Rate and Rhythm: Normal rate and regular rhythm.   Pulmonary:      Effort: Pulmonary effort is normal. No respiratory distress.   Abdominal:      General: There is no distension.   Musculoskeletal:         General: Normal range of motion.   Skin:     General: Skin is warm and dry.      Findings: No erythema or rash.   Neurological:      Mental Status: He is alert and oriented to person, place, and time.   Psychiatric:         Mood and Affect: Mood normal.         Behavior: Behavior normal.         Thought Content: Thought content normal.         Judgment: Judgment normal.       Laboratory:   none performed   Assessment:       1. Chronic rhinitis    2. Simple chronic conjunctivitis of both eyes    3. Pollen-food  allergy, subsequent encounter    4. Mild intermittent asthma without complication         Plan:       Advised nasal symptoms can be allergic rhinitis vs chronic non allergic zarate end immunocaps to eval  Trial azelastine 2 SEN IBD as this will not affect eye pressure  Can continue fexofenadine daily  Albuterol 2 puff every 4 hours as needed  Advised reaction to avocado, banana and nuts is oral allergy syndrome likely related to ragweed  Phone review

## 2022-12-08 ENCOUNTER — TELEPHONE (OUTPATIENT)
Dept: NEUROSURGERY | Facility: CLINIC | Age: 42
End: 2022-12-08
Payer: COMMERCIAL

## 2022-12-08 DIAGNOSIS — Z98.890 S/P LUMBAR DISCECTOMY: Primary | ICD-10-CM

## 2022-12-08 DIAGNOSIS — M54.16 LUMBAR RADICULOPATHY, CHRONIC: ICD-10-CM

## 2022-12-09 LAB
A ALTERNATA IGE QN: 2.76 KU/L
A FUMIGATUS IGE QN: 0.31 KU/L
ALLERGEN CHAETOMIUM GLOBOSUM IGE: <0.1 KU/L
ALLERGEN WALNUT TREE IGE: 0.68 KU/L
ALLERGEN WHITE PINE TREE IGE: <0.1 KU/L
ALLERGY INTERPRETATION: NORMAL
ALMOND IGE QN: <0.1 KU/L
AVOCADO IGE QN: <0.1 KU/L
BAHIA GRASS IGE QN: 0.29 KU/L
BALD CYPRESS IGE QN: <0.1 KU/L
BANANA IGE QN: <0.1 KU/L
BERMUDA GRASS IGE QN: 0.12 KU/L
BRAZIL NUT IGE QN: <0.1 KU/L
C HERBARUM IGE QN: 1.17 KU/L
C LUNATA IGE QN: 1.34 KU/L
CASHEW NUT IGE QN: <0.1 KU/L
CAT DANDER IGE QN: 1.53 KU/L
CHAETOMIUM GLOB. CLASS: NORMAL
COCONUT IGE QN: <0.1 KU/L
COMMON RAGWEED IGE QN: 0.31 KU/L
COTTONWOOD IGE QN: 0.2 KU/L
D FARINAE IGE QN: <0.1 KU/L
D PTERONYSS IGE QN: <0.1 KU/L
DEPRECATED A ALTERNATA IGE RAST QL: ABNORMAL
DEPRECATED A FUMIGATUS IGE RAST QL: ABNORMAL
DEPRECATED ALMOND IGE RAST QL: NORMAL
DEPRECATED AVOCADO IGE RAST QL: NORMAL
DEPRECATED BAHIA GRASS IGE RAST QL: ABNORMAL
DEPRECATED BALD CYPRESS IGE RAST QL: NORMAL
DEPRECATED BANANA IGE RAST QL: NORMAL
DEPRECATED BERMUDA GRASS IGE RAST QL: ABNORMAL
DEPRECATED BRAZIL NUT IGE RAST QL: NORMAL
DEPRECATED C HERBARUM IGE RAST QL: ABNORMAL
DEPRECATED C LUNATA IGE RAST QL: ABNORMAL
DEPRECATED CASHEW NUT IGE RAST QL: NORMAL
DEPRECATED CAT DANDER IGE RAST QL: ABNORMAL
DEPRECATED COCONUT IGE RAST QL: NORMAL
DEPRECATED COMMON RAGWEED IGE RAST QL: ABNORMAL
DEPRECATED COTTONWOOD IGE RAST QL: ABNORMAL
DEPRECATED D FARINAE IGE RAST QL: NORMAL
DEPRECATED D PTERONYSS IGE RAST QL: NORMAL
DEPRECATED DOG DANDER IGE RAST QL: ABNORMAL
DEPRECATED ELDER IGE RAST QL: ABNORMAL
DEPRECATED ENGL PLANTAIN IGE RAST QL: ABNORMAL
DEPRECATED HAZELNUT IGE RAST QL: NORMAL
DEPRECATED HORSE DANDER IGE RAST QL: ABNORMAL
DEPRECATED JOHNSON GRASS IGE RAST QL: ABNORMAL
DEPRECATED LONDON PLANE IGE RAST QL: ABNORMAL
DEPRECATED MACADAMIA IGE RAST QL: NORMAL
DEPRECATED MUGWORT IGE RAST QL: ABNORMAL
DEPRECATED P NOTATUM IGE RAST QL: ABNORMAL
DEPRECATED PEANUT (RARA H) 2 IGE RAST QL: NORMAL
DEPRECATED PEANUT (RARA H) 2 IGE RAST QL: NORMAL
DEPRECATED PEANUT (RARA H) 3 IGE RAST QL: NORMAL
DEPRECATED PEANUT (RARA H) 6 IGE RAST QL: NORMAL
DEPRECATED PEANUT (RARA H) 8 IGE RAST QL: NORMAL
DEPRECATED PEANUT IGE RAST QL: ABNORMAL
DEPRECATED PECAN/HICK NUT IGE RAST QL: NORMAL
DEPRECATED PECAN/HICK TREE IGE RAST QL: ABNORMAL
DEPRECATED PISTACHIO IGE RAST QL: NORMAL
DEPRECATED ROACH IGE RAST QL: NORMAL
DEPRECATED S ROSTRATA IGE RAST QL: ABNORMAL
DEPRECATED SALTWORT IGE RAST QL: ABNORMAL
DEPRECATED SILVER BIRCH IGE RAST QL: ABNORMAL
DEPRECATED TIMOTHY IGE RAST QL: ABNORMAL
DEPRECATED WALNUT IGE RAST QL: NORMAL
DEPRECATED WEST RAGWEED IGE RAST QL: ABNORMAL
DEPRECATED WHITE OAK IGE RAST QL: ABNORMAL
DEPRECATED WILLOW IGE RAST QL: ABNORMAL
DOG DANDER IGE QN: 0.37 KU/L
ELDER IGE QN: 0.23 KU/L
ENGL PLANTAIN IGE QN: 0.29 KU/L
HAZELNUT IGE QN: <0.1 KU/L
HORSE DANDER IGE QN: 0.32 KU/L
JOHNSON GRASS IGE QN: 0.11 KU/L
LONDON PLANE IGE QN: 0.45 KU/L
MACADAMIA IGE QN: <0.1 KU/L
MUGWORT IGE QN: 0.13 KU/L
P NOTATUM IGE QN: 0.22 KU/L
PEANUT (RARA H) 1 IGE QN: <0.1 KU/L
PEANUT (RARA H) 2 IGE QN: <0.1 KU/L
PEANUT (RARA H) 3 IGE QN: <0.1 KU/L
PEANUT (RARA H) 6 IGE QN: <0.1 KU/L
PEANUT (RARA H) 8 IGE QN: <0.1 KU/L
PEANUT (RARA H) 9 IGE QN: <0.1 KU/L
PEANUT (RARA H) 9 IGE QN: NORMAL
PEANUT IGE QN: 0.19 KU/L
PECAN/HICK NUT IGE QN: <0.1 KU/L
PECAN/HICK TREE IGE QN: 0.11 KU/L
PISTACHIO IGE QN: <0.1 KU/L
ROACH IGE QN: <0.1 KU/L
S ROSTRATA IGE QN: 1.95 KU/L
SALTWORT IGE QN: 0.25 KU/L
SILVER BIRCH IGE QN: 0.11 KU/L
TIMOTHY IGE QN: 0.94 KU/L
WALNUT IGE QN: <0.1 KU/L
WALNUT TREE CLASS: ABNORMAL
WEST RAGWEED IGE QN: 0.33 KU/L
WHITE OAK IGE QN: 0.19 KU/L
WHITE PINE CLASS: NORMAL
WILLOW IGE QN: 0.22 KU/L

## 2022-12-12 ENCOUNTER — PATIENT MESSAGE (OUTPATIENT)
Dept: ALLERGY | Facility: CLINIC | Age: 42
End: 2022-12-12
Payer: COMMERCIAL

## 2022-12-13 ENCOUNTER — CLINICAL SUPPORT (OUTPATIENT)
Dept: REHABILITATION | Facility: HOSPITAL | Age: 42
End: 2022-12-13
Attending: NEUROLOGICAL SURGERY
Payer: COMMERCIAL

## 2022-12-13 ENCOUNTER — HOSPITAL ENCOUNTER (OUTPATIENT)
Dept: RADIOLOGY | Facility: HOSPITAL | Age: 42
Discharge: HOME | End: 2022-12-13
Attending: NEUROLOGICAL SURGERY
Payer: COMMERCIAL

## 2022-12-13 DIAGNOSIS — Z98.890 S/P LUMBAR DISCECTOMY: Primary | ICD-10-CM

## 2022-12-13 DIAGNOSIS — M54.16 LUMBAR RADICULOPATHY, CHRONIC: ICD-10-CM

## 2022-12-13 DIAGNOSIS — Z98.890 S/P LUMBAR DISCECTOMY: ICD-10-CM

## 2022-12-13 DIAGNOSIS — M50.10 CERVICAL DISC DISORDER WITH RADICULOPATHY, UNSPECIFIED CERVICAL REGION: ICD-10-CM

## 2022-12-13 DIAGNOSIS — M47.12 CERVICAL SPONDYLOSIS WITH MYELOPATHY: ICD-10-CM

## 2022-12-13 PROCEDURE — 72110 XR LUMBAR SPINE AP AND LAT WITH FLEX/EXT: ICD-10-PCS | Mod: 26,,, | Performed by: RADIOLOGY

## 2022-12-13 PROCEDURE — 72050 X-RAY EXAM NECK SPINE 4/5VWS: CPT | Mod: 26,,, | Performed by: RADIOLOGY

## 2022-12-13 PROCEDURE — 72110 X-RAY EXAM L-2 SPINE 4/>VWS: CPT | Mod: 26,,, | Performed by: RADIOLOGY

## 2022-12-13 PROCEDURE — 97162 PT EVAL MOD COMPLEX 30 MIN: CPT | Mod: PO

## 2022-12-13 PROCEDURE — 72050 XR CERVICAL SPINE AP LAT WITH FLEX EXTEN: ICD-10-PCS | Mod: 26,,, | Performed by: RADIOLOGY

## 2022-12-13 PROCEDURE — 97110 THERAPEUTIC EXERCISES: CPT | Mod: PO

## 2022-12-13 PROCEDURE — 72110 X-RAY EXAM L-2 SPINE 4/>VWS: CPT | Mod: TC,FY

## 2022-12-13 PROCEDURE — 72050 X-RAY EXAM NECK SPINE 4/5VWS: CPT | Mod: TC,FY

## 2022-12-13 NOTE — PLAN OF CARE
[facility]   Physical Therapy Initial Evaluation     Date: 12/13/2022   Name: Hermelindo Garza III  Clinic Number: [id number]    Therapy Diagnosis:   Encounter Diagnoses   Name Primary?    S/P lumbar discectomy Yes    Lumbar radiculopathy, chronic      Physician: Naseem Mcnamara DO    Physician Orders: PT Eval and Treat   Medical Diagnosis from Referral: s/p lumbar discectomy  Evaluation Date: 12/13/2022  Authorization Period Expiration: 12/8/2023  Plan of Care Expiration: 2/21/2023   Progress Note Due: 1/13/2023  Visit # / Visits authorized: 1 / pending   FOTO: 1 / TBD    Precautions: Standard     Time In: 12:30PM  Time Out: 1:10  Total Appointment Time (timed & untimed codes): 40 minutes      SUBJECTIVE     Date of onset: chronic    History of current condition - Hermelindo reports: that he has a laminectomy in 2010 and a recent MIS of L3-4 in 2021 and that he has global arthritis and degeneration. He reports that recently if he has a day that he is more sedentary that he is very tight and feels globally restricted. He tries to walk daily and work out sometimes - but he reports he hurts a lot after gym and exercise sessions about 24 hours after. He sits at a desk all day for work. He denies numbness and tingling in his lower extremity. He has to sleep elevated due to global spinal discomfort. He reports global weakness in lower extremities. He feels most restricted in the morning. He feels like he is getting worse.     HPI Dr. Mcnamara:   Hermelindo Garza III is a 42 y.o.-year-old male who presents today for post-operative follow-up s/p L MIS L3-4 lami/disc on 9/20/21.  Overall patient is significantly improved.  He has significant improvement in his back and leg pain.  He still has some mild tingling in his shins that radiate down to the feet.  This is made worse when he is not active.  He takes gabapentin 600 mg at night which helps.  He would like to increase that to more times  during the day.  He denies weakness.      Falls: None    Imaging, X-Ray (12/13/2022)  FINDINGS:  Patient had previous a L3/L4 hemilaminectomy on the left side.  Mild DJD and mild lumbar scoliosis.  The lumbar intervertebral disc spaces are slightly narrowed.  Few mm L2/L3 and L3/L4 retrolisthesis noted.  No fracture or dislocation.  No bone destruction identified    Prior Therapy: None  Occupation: Desk work  Prior Level of Function: chronic pain  Current Level of Function: restricted walking, standing, sitting    Pain:  Current 3/10, worst 7/10, best 1/10   Location: bilateral back  and buttocks    Description: Aching, Dull, and Tight  Aggravating Factors: Sitting, Standing, Laying, Walking, Morning, Flexing, Lifting, and Getting out of bed/chair  Easing Factors:  pool and stretching    Non-Mechanical Origin:  Night Pain?  No  Hx of Cancer?  No  Trauma?  No  Sudden bowel/bladder changes?  No  Bone Density compromise?  No    Patients goals: improve back pain and tightness      Medical History:   Past Medical History:   Diagnosis Date    ADD (attention deficit disorder) 05/09/2012    Allergy     Asthma 05/09/2012    Cervical disc disorder with radiculopathy, unspecified cervical region 09/14/2021    Cervical spondylosis with myelopathy 06/29/2022    Eczema     Esophageal ulcer     GERD (gastroesophageal reflux disease) 05/09/2012    Glaucoma     Kidney stones 05/2014    Lumbar disc disease 05/09/2012    Lumbar herniated disc 05/09/2012    Malignant melanoma of skin, unspecified 01/06/2022    in situ right mid back    Melanoma 01/2022    in situ R mid back     LUBA (obstructive sleep apnea)     Radiculopathy, lumbar region 09/14/2021    Renal calculi 05/09/2012    Type 2 diabetes mellitus without complication, without long-term current use of insulin 11/16/2020       Surgical History:   Hemrelindo Garza III  has a past surgical history that includes Lumbar laminectomy (2010); Esophagogastroduodenoscopy  "(01/22/2019); Colonoscopy (01/22/2019); Appendectomy (2006); Epidural steroid injection (N/A, 02/10/2021); Esophagogastroduodenoscopy (N/A, 05/18/2021); Lumbar laminectomy with discectomy (Left, 09/20/2021); Carpal tunnel release (Right, 09/15/2022); Excision of ganglion of wrist (Right, 09/15/2022); Hand Arthrotomy (Right, 09/15/2022); Injection of steroid (Left, 09/15/2022); Flexor tendon repair (Right, 09/15/2022); and Tympanostomy tube placement.    Medications:   Hermelindo has a current medication list which includes the following prescription(s): acetaminophen, albuterol, atorvastatin, azelastine, blood-glucose meter, dexmethylphenidate, dexmethylphenidate, dexmethylphenidate, [START ON 12/15/2022] dexmethylphenidate, ergocalciferol (vitamin d2), esomeprazole, fexofenadine, fluocinonide, fluticasone propionate, gabapentin, hydrocortisone, ketoconazole, latanoprost, lorazepam, meloxicam, methocarbamol, montelukast, ondansetron, promethazine, ropinirole, ozempic, true metrix glucose test strip, ultra thin lancets, urea, and zolpidem, and the following Facility-Administered Medications: cefazolin 2 g/50ml dextrose ivpb, fentanyl, midazolam, mupirocin, and sodium chloride 0.9%.    Allergies:   Review of patient's allergies indicates:  No Known Allergies     OBJECTIVE     MOVEMENT EXAMINATION   Flexion 50% knee level    Repeated Flexion Not Tested    Extension Full and painless   Repeated Extension Not Tested    Right Side Bending Full and painless - "stretch"   Left Side Bending Full and painless - "stretch"   Right Rotation Full and painless   Left Rotation Full and painless    Right Quadrant Not Tested    Left Quadrant Not Tested      LOWER EXTREMITY STRENGTH    LEFT RIGHT   Knee Extension 5/5 5/5   Knee Flexion 4+/5 4+/5     Hip Flexion 4/5 4/5   Hip Abduction 4/5 4/5   Hip Extension 4+/5 4+/5   Hip ER 4/5 4/5   Hip IR 4/5 4/5   Heel Raise 5/5 5/5   Ankle Dorsiflexion 5/5 5/5     FLEXIBILITY   Hamstring  < 75 " degs increased low back pain and hamstring stretch   Prone Quadriceps 3-4 fists from buttocks   Hip Extension Within normal limits    Hip Internal Rotation Increased bilateral numbness and tingling    Hip External Rotation Within normal limits    Piriformis 25% increased pain bilaterally    Dorsiflexion Not Tested      SPECIAL TESTS    LEFT RIGHT   Hip Scour (hip involvement) - -   Straight Leg Raise (passive) - -   Active Straight Leg Raise  - -   Constantine + psoas tightness + psoas tightness   Obers Not Tested  Not Tested    Prone Instability Not Tested  Not Tested    JAGDISH + +   Slump - -     PASSIVE ACCESSORY SEGMENTAL TESTING:  Hypomobility at thoracolumbar junction    GAIT: Hermelindo ambulates with no assistive device with independently.     Limitation/Restriction for FOTO Survey    Therapist reviewed FOTO scores for Hermelindo Garza III on 12/13/2022.   FOTO documents entered into [employer] - see Media section.    Limitation Score: TBD%       TREATMENT     Total Treatment time (time-based codes) separate from Evaluation: 15 minutes      Hermelindo received the treatments listed below:      therapeutic exercises to develop strength, endurance, ROM, flexibility, posture, and core stabilization for 15 minutes including:  Sidelying Open Book Thoracic Lumbar Rotation and Extension -  2 sets - 10 reps  Supine Lower Trunk Rotation - 3 sets - 10 reps  Supine Posterior Pelvic Tilt - 2 sets - 10 reps - 5 hold  Supine Hamstring Stretch with Strap -3 sets - 30s hold  Supine Piriformis Stretch with Foot on Ground - sets - 30s hold      PATIENT EDUCATION AND HOME EXERCISES     Education provided:   - home exercise program   - hourly standing breaks   - importance and role of core control  - Pt/family was provided educational information, including: role of PT, goals for PT, scheduling - pt verbalized understanding. Discussed insurance limitations with pt.     Written Home Exercises Provided: yes. Exercises were reviewed and  Hermelindo was able to demonstrate them prior to the end of the session.  Hermelindo demonstrated good  understanding of the education provided. See EMR under Patient Instructions for exercises provided during therapy sessions.    ASSESSMENT     Hermelindo is a 42 y.o. male referred to outpatient Physical Therapy with a medical diagnosis of chronic low back pain with history of laminectomy in 2010 and a recent MIS of L3-4 in 2021 . Patient presents with restricted pain free lumbar ROM, core and proximal hip weakness, and hypomobility in thoracolumbar junction. Patient is very sedentary during the day sitting 8+ hours for work. He is eager to workout at the gym and know the correct things to do to not injure himself.     Medical necessity is demonstrated by the following IMPAIRMENTS/PROBLEMS:  -Decreased function (JOSE R)   -Increased pain (NPS)   -Decreased pain free lumbar AROM   -Impaired standing posture  -Decreased LE strength (MMT)   -Decreased LE mobility   -Decreased core stability (plank)  -Decreased participation in regular exercise routine    Intervention strategies designed to address these impairments will be instrumental in achieving the stated functional goals, including her ability to safely perform mobility tasks. Based on the examination, the patient's rehabilitation potential to achieve functional goals is good.    Patient prognosis is Good.   Patient will benefit from skilled outpatient Physical Therapy to address the deficits stated above and in the chart below, provide patient /family education, and to maximize patientt's level of independence.     Plan of care discussed with patient: Yes  Patient's spiritual, cultural and educational needs considered and patient is agreeable to the plan of care and goals as stated below:     Anticipated Barriers for therapy: chronic pain    Medical Necessity is demonstrated by the following  History  Co-morbidities and personal factors that may impact the plan of care  Co-morbidities:   ADD (attention deficit disorder)    Allergy    Asthma    Cervical disc disorder with radiculopathy, unspecified cervical region    Cervical spondylosis with myelopathy    Eczema    Esophageal ulcer    GERD (gastroesophageal reflux disease)    Glaucoma    Kidney stones    Lumbar disc disease    Lumbar herniated disc    Malignant melanoma of skin, unspecified    in situ right mid back   Melanoma    in situ R mid back    LUBA (obstructive sleep apnea)    Radiculopathy, lumbar region    Renal calculi    Type 2 diabetes mellitus without complication, without long-term current use of insulin      Personal Factors:   no deficits     moderate   Examination  Body Structures and Functions, activity limitations and participation restrictions that may impact the plan of care Body Regions:   back  lower extremities  trunk    Body Systems:    gross symmetry  ROM  strength  gross coordinated movement  balance  gait  transfers  transitions    Participation Restrictions:   Patient can not participate in pain free community activities.     Activity limitations:   Learning and applying knowledge  no deficits    General Tasks and Commands  no deficits    Communication  no deficits    Mobility  lifting and carrying objects  walking  moving around using equipment (WC)    Self care  no deficits       moderate   Clinical Presentation evolving clinical presentation with changing clinical characteristics moderate   Decision Making/ Complexity Score: moderate     Goals:  Short Term Goals: 4 weeks   1. Patient demonstrates independence with HEP.   2. Patient demonstrates independence with Postural Awareness.   3. Patient will be able to preform lumbar ROM pain-free    Long Term Goals: 8 weeks   1. Patient will demonstrate independence with advanced home exercise program in order to participate in a safe gym routine.  2. Patient demonstrates increased strength BLE's to 4+/5 or greater to improve tolerance to functional  activities.   3. Patient will reports pain <3/10 in the morning.   4. Patient will be able to demonstrate picking up 25# from the floor with good form in order to prevent further injury.     PLAN   Plan of care Certification: 12/13/2022 to 2/21/2023 .    Outpatient Physical Therapy 2 times weekly for 10 weeks to include the following interventions: Aquatic Therapy, Cervical/Lumbar Traction, Electrical Stimulation  , Gait Training, Manual Therapy, Moist Heat/ Ice, Neuromuscular Re-ed, Patient Education, Self Care, Therapeutic Activities, and Therapeutic Exercise.     Claudette Dent, PT    Pt may be seen by PTA as part of the rehabilitation team.     Therapist: Claudette Dent, PT  12/13/2022    I CERTIFY THE NEED FOR THESE SERVICES FURNISHED UNDER THIS PLAN OF TREATMENT AND WHILE UNDER MY CARE   Physician's comments:     Physician's Signature: ___________________________________________________

## 2022-12-21 ENCOUNTER — CLINICAL SUPPORT (OUTPATIENT)
Dept: REHABILITATION | Facility: HOSPITAL | Age: 42
End: 2022-12-21
Attending: HOSPITALIST
Payer: COMMERCIAL

## 2022-12-21 DIAGNOSIS — Z98.890 S/P LUMBAR DISCECTOMY: Primary | ICD-10-CM

## 2022-12-21 DIAGNOSIS — M54.16 LUMBAR RADICULOPATHY, CHRONIC: ICD-10-CM

## 2022-12-21 PROCEDURE — 97110 THERAPEUTIC EXERCISES: CPT | Mod: PO

## 2022-12-21 NOTE — PROGRESS NOTES
"[facility]   Physical Therapy Treatment Note     Name: Hermelindo Garza III  Clinic Number: [id number]    Therapy Diagnosis:   Encounter Diagnoses   Name Primary?    S/P lumbar discectomy Yes    Lumbar radiculopathy, chronic      Physician: No ref. provider found    Visit Date: 12/21/2022    Physician Orders: PT Eval and Treat   Medical Diagnosis from Referral: s/p lumbar discectomy  Evaluation Date: 12/13/2022  Authorization Period Expiration: 12/8/2023  Plan of Care Expiration: 2/21/2023   Progress Note Due: 1/13/2023  Visit # / Visits authorized: 2 / pending   FOTO: 1 / TBD     Precautions: Standard     PTA Visit #: 0/5     Time In: 10:15AM   Time Out: 11:00AM  Total Billable Time: 45 minutes    SUBJECTIVE     Pt reports: his back has been bothering him some the past couple days. He feels like the stretching is helping. He has preformed his home exercise program and walked 1.5 miles already this morning.   He was compliant with home exercise program.  Response to previous treatment: None  Functional change: None     Pain: 3/10  Location: bilateral back      OBJECTIVE     Objective Measures updated at progress report unless specified.     Treatment     Hermelindo received the treatments listed below:      therapeutic exercises to develop strength, endurance, ROM, flexibility, posture, and core stabilization for 45 minutes including:  Recumbent Bike 5 minutes level 4  Sidelying Open Book Thoracic Lumbar Rotation and Extension -  2 sets - 10 reps  Supine Lower Trunk Rotation with Stability Ball - 3 sets - 10 reps  Cat Cow x20  Thread the Needle x20  Pancho Pose (3 directions) 3x30"  Constantine Stretch with Strap 3x30s  Supine Posterior Pelvic Tilt - 2 sets - 10 reps - 5 hold  Supine Posterior Pelvic Tilt with Alternating March x20  Supine Bridge with Adduction Squeeze 3x8  Sidelying Hip Abduction 3x8   Supine Clamshell GTB 3x8    neuromuscular re-education activities to improve: Coordination, " Proprioception, and Posture for 0 minutes. The following activities were included:  None    therapeutic activities to improve functional performance for 0  minutes, including:  None    Patient Education and Home Exercises     Home Exercises Provided and Patient Education Provided     Education provided:   - home exercise program     Written Home Exercises Provided: yes. Exercises were reviewed and Hermelindo was able to demonstrate them prior to the end of the session.  Hermelindo demonstrated good  understanding of the education provided. See EMR under Patient Instructions for exercises provided during therapy sessions    ASSESSMENT     Today's session focused on improving lumbar ROM and stiffness as this remains patients primary complaint. Patient with moderate to severely limited hip flexors. Demonstrates weakness and easy fatigability to hip abductor exercises. Will continue to progress as tolerated.     Hermelindo Is progressing well towards his goals.   Pt prognosis is Good.     Pt will continue to benefit from skilled outpatient physical therapy to address the deficits listed in the problem list box on initial evaluation, provide pt/family education and to maximize pt's level of independence in the home and community environment.     Pt's spiritual, cultural and educational needs considered and pt agreeable to plan of care and goals.     Anticipated barriers to physical therapy: chronic Pain    Goals:   Short Term Goals: 4 weeks   1. Patient demonstrates independence with HEP.   2. Patient demonstrates independence with Postural Awareness.   3. Patient will be able to preform lumbar ROM pain-free     Long Term Goals: 8 weeks   1. Patient will demonstrate independence with advanced home exercise program in order to participate in a safe gym routine.  2. Patient demonstrates increased strength BLE's to 4+/5 or greater to improve tolerance to functional activities.   3. Patient will reports pain <3/10 in the morning.    4. Patient will be able to demonstrate picking up 25# from the floor with good form in order to prevent further injury.        PLAN     Plan of care Certification: 12/13/2022 to 2/21/2023 .     Outpatient Physical Therapy 2 times weekly for 10 weeks to include the following interventions: Aquatic Therapy, Cervical/Lumbar Traction, Electrical Stimulation  , Gait Training, Manual Therapy, Moist Heat/ Ice, Neuromuscular Re-ed, Patient Education, Self Care, Therapeutic Activities, and Therapeutic Exercise.      Claudette Dent, PT    Claudette Dent, PT

## 2022-12-25 ENCOUNTER — PATIENT MESSAGE (OUTPATIENT)
Dept: PODIATRY | Facility: CLINIC | Age: 42
End: 2022-12-25
Payer: COMMERCIAL

## 2022-12-25 ENCOUNTER — PATIENT MESSAGE (OUTPATIENT)
Dept: ALLERGY | Facility: CLINIC | Age: 42
End: 2022-12-25
Payer: COMMERCIAL

## 2022-12-25 DIAGNOSIS — G89.29 CHRONIC FOOT PAIN, LEFT: Primary | ICD-10-CM

## 2022-12-25 DIAGNOSIS — M79.672 CHRONIC FOOT PAIN, LEFT: Primary | ICD-10-CM

## 2022-12-30 ENCOUNTER — HOSPITAL ENCOUNTER (OUTPATIENT)
Dept: RADIOLOGY | Facility: HOSPITAL | Age: 42
Discharge: HOME | End: 2022-12-30
Attending: PODIATRIST
Payer: COMMERCIAL

## 2022-12-30 ENCOUNTER — PATIENT MESSAGE (OUTPATIENT)
Dept: PODIATRY | Facility: CLINIC | Age: 42
End: 2022-12-30
Payer: COMMERCIAL

## 2022-12-30 DIAGNOSIS — G89.29 CHRONIC FOOT PAIN, LEFT: ICD-10-CM

## 2022-12-30 DIAGNOSIS — M79.672 CHRONIC FOOT PAIN, LEFT: ICD-10-CM

## 2022-12-30 PROCEDURE — 73630 X-RAY EXAM OF FOOT: CPT | Mod: 26,LT,, | Performed by: RADIOLOGY

## 2022-12-30 PROCEDURE — 73630 X-RAY EXAM OF FOOT: CPT | Mod: TC,FY,LT

## 2022-12-30 PROCEDURE — 73610 X-RAY EXAM OF ANKLE: CPT | Mod: 26,LT,, | Performed by: RADIOLOGY

## 2022-12-30 PROCEDURE — 73610 XR ANKLE COMPLETE 3 VIEW LEFT: ICD-10-PCS | Mod: 26,LT,, | Performed by: RADIOLOGY

## 2022-12-30 PROCEDURE — 73610 X-RAY EXAM OF ANKLE: CPT | Mod: TC,FY,LT

## 2022-12-30 PROCEDURE — 73630 XR FOOT COMPLETE 3 VIEW LEFT: ICD-10-PCS | Mod: 26,LT,, | Performed by: RADIOLOGY

## 2023-01-04 ENCOUNTER — CLINICAL SUPPORT (OUTPATIENT)
Dept: REHABILITATION | Facility: HOSPITAL | Age: 43
End: 2023-01-04
Attending: NEUROLOGICAL SURGERY
Payer: COMMERCIAL

## 2023-01-04 ENCOUNTER — OFFICE VISIT (OUTPATIENT)
Dept: NEUROSURGERY | Facility: CLINIC | Age: 43
End: 2023-01-04
Payer: COMMERCIAL

## 2023-01-04 VITALS
BODY MASS INDEX: 40.53 KG/M2 | SYSTOLIC BLOOD PRESSURE: 117 MMHG | HEART RATE: 65 BPM | HEIGHT: 65 IN | WEIGHT: 243.25 LBS | DIASTOLIC BLOOD PRESSURE: 75 MMHG

## 2023-01-04 DIAGNOSIS — M54.16 LUMBAR RADICULOPATHY, CHRONIC: ICD-10-CM

## 2023-01-04 DIAGNOSIS — Z98.890 S/P LUMBAR DISCECTOMY: Primary | ICD-10-CM

## 2023-01-04 DIAGNOSIS — M47.12 CERVICAL SPONDYLOSIS WITH MYELOPATHY: Primary | ICD-10-CM

## 2023-01-04 PROCEDURE — 99999 PR PBB SHADOW E&M-EST. PATIENT-LVL V: ICD-10-PCS | Mod: PBBFAC,,, | Performed by: NEUROLOGICAL SURGERY

## 2023-01-04 PROCEDURE — 3078F PR MOST RECENT DIASTOLIC BLOOD PRESSURE < 80 MM HG: ICD-10-PCS | Mod: CPTII,S$GLB,, | Performed by: NEUROLOGICAL SURGERY

## 2023-01-04 PROCEDURE — 99999 PR PBB SHADOW E&M-EST. PATIENT-LVL V: CPT | Mod: PBBFAC,,, | Performed by: NEUROLOGICAL SURGERY

## 2023-01-04 PROCEDURE — 3074F PR MOST RECENT SYSTOLIC BLOOD PRESSURE < 130 MM HG: ICD-10-PCS | Mod: CPTII,S$GLB,, | Performed by: NEUROLOGICAL SURGERY

## 2023-01-04 PROCEDURE — 3074F SYST BP LT 130 MM HG: CPT | Mod: CPTII,S$GLB,, | Performed by: NEUROLOGICAL SURGERY

## 2023-01-04 PROCEDURE — 1160F RVW MEDS BY RX/DR IN RCRD: CPT | Mod: CPTII,S$GLB,, | Performed by: NEUROLOGICAL SURGERY

## 2023-01-04 PROCEDURE — 3072F LOW RISK FOR RETINOPATHY: CPT | Mod: CPTII,S$GLB,, | Performed by: NEUROLOGICAL SURGERY

## 2023-01-04 PROCEDURE — 3008F PR BODY MASS INDEX (BMI) DOCUMENTED: ICD-10-PCS | Mod: CPTII,S$GLB,, | Performed by: NEUROLOGICAL SURGERY

## 2023-01-04 PROCEDURE — 97110 THERAPEUTIC EXERCISES: CPT | Mod: PO

## 2023-01-04 PROCEDURE — 99214 OFFICE O/P EST MOD 30 MIN: CPT | Mod: S$GLB,,, | Performed by: NEUROLOGICAL SURGERY

## 2023-01-04 PROCEDURE — 3008F BODY MASS INDEX DOCD: CPT | Mod: CPTII,S$GLB,, | Performed by: NEUROLOGICAL SURGERY

## 2023-01-04 PROCEDURE — 1160F PR REVIEW ALL MEDS BY PRESCRIBER/CLIN PHARMACIST DOCUMENTED: ICD-10-PCS | Mod: CPTII,S$GLB,, | Performed by: NEUROLOGICAL SURGERY

## 2023-01-04 PROCEDURE — 3072F PR LOW RISK FOR RETINOPATHY: ICD-10-PCS | Mod: CPTII,S$GLB,, | Performed by: NEUROLOGICAL SURGERY

## 2023-01-04 PROCEDURE — 1159F MED LIST DOCD IN RCRD: CPT | Mod: CPTII,S$GLB,, | Performed by: NEUROLOGICAL SURGERY

## 2023-01-04 PROCEDURE — 1159F PR MEDICATION LIST DOCUMENTED IN MEDICAL RECORD: ICD-10-PCS | Mod: CPTII,S$GLB,, | Performed by: NEUROLOGICAL SURGERY

## 2023-01-04 PROCEDURE — 99214 PR OFFICE/OUTPT VISIT, EST, LEVL IV, 30-39 MIN: ICD-10-PCS | Mod: S$GLB,,, | Performed by: NEUROLOGICAL SURGERY

## 2023-01-04 PROCEDURE — 3078F DIAST BP <80 MM HG: CPT | Mod: CPTII,S$GLB,, | Performed by: NEUROLOGICAL SURGERY

## 2023-01-04 NOTE — PROGRESS NOTES
"[facility]   Physical Therapy Treatment Note     Name: Hermelindo Garza III  Clinic Number: [id number]    Therapy Diagnosis:   Encounter Diagnoses   Name Primary?    S/P lumbar discectomy Yes    Lumbar radiculopathy, chronic      Physician: Naseem Mcnamara DO    Visit Date: 1/4/2023    Physician Orders: PT Eval and Treat   Medical Diagnosis from Referral: s/p lumbar discectomy  Evaluation Date: 12/13/2022  Authorization Period Expiration: 12/8/2023  Plan of Care Expiration: 2/21/2023   Progress Note Due: 1/13/2023  Visit # / Visits authorized: 1 / 40  FOTO: 1 / TBD     Precautions: Standard     PTA Visit #: 0/5     Time In: 10:15AM   Time Out: 11:00AM  Total Billable Time: 45 minutes    SUBJECTIVE     Pt reports: overall feeling improvement. He still gets tight after prolonged sitting or lying down. Its worse at night.   He was compliant with home exercise program.  Response to previous treatment: None  Functional change: None     Pain: 0/10  Location: bilateral back      OBJECTIVE     Objective Measures updated at progress report unless specified.     Treatment     Hermelindo received the treatments listed below:      therapeutic exercises to develop strength, endurance, ROM, flexibility, posture, and core stabilization for 45 minutes including:  Recumbent Bike 5 minutes level 4  Hamstring Stretch with Strap 3x30s  Supine Lower Trunk Rotation with Stability Ball - 3 sets - 10 reps  Supine Posterior Pelvic Tilt - 3 sets - 10 reps - 5 hold  Supine Posterior Pelvic Tilt with Alternating March 3x10  Supine Posterior Pelvic Tilt with Alternating Leg Extensions 3x10  Dead Bug with Stability Ball 3x10    Matrix Hip 3-Way 40# x15  [employer]      Home exercise program   Sidelying Open Book Thoracic Lumbar Rotation and Extension -  2 sets - 10 reps  Cat Cow x20  Thread the Needle x20  Pancho Pose (3 directions) 3x30"  Constantine Stretch with Strap 3x30s  Supine Bridge with Adduction Squeeze " 3x8  Sidelying Hip Abduction 3x8   Supine Clamshell GTB 3x8    neuromuscular re-education activities to improve: Coordination, Proprioception, and Posture for 0 minutes. The following activities were included:  None    therapeutic activities to improve functional performance for 0  minutes, including:  None    Patient Education and Home Exercises     Home Exercises Provided and Patient Education Provided     Education provided:   - home exercise program     Written Home Exercises Provided: yes. Exercises were reviewed and Hermelindo was able to demonstrate them prior to the end of the session.  Hermelindo demonstrated good  understanding of the education provided. See EMR under Patient Instructions for exercises provided during therapy sessions    ASSESSMENT     Patient has been very compliant with home exercise program with combination of stretching and strengthening - overall endorsing an global improvement in symptoms. Per patient request today's session focused on core control - patient easily fatigues with core movement and requires frequent cues for breathing. Patient demonstrates improved body awareness and control with weighted hip strengthening - able to complete without complaints of low back pain.     Hermelindo Is progressing well towards his goals.   Pt prognosis is Good.     Pt will continue to benefit from skilled outpatient physical therapy to address the deficits listed in the problem list box on initial evaluation, provide pt/family education and to maximize pt's level of independence in the home and community environment.     Pt's spiritual, cultural and educational needs considered and pt agreeable to plan of care and goals.     Anticipated barriers to physical therapy: chronic Pain    Goals:   Short Term Goals: 4 weeks   1. Patient demonstrates independence with HEP.   2. Patient demonstrates independence with Postural Awareness.   3. Patient will be able to preform lumbar ROM pain-free     Long Term  Goals: 8 weeks   1. Patient will demonstrate independence with advanced home exercise program in order to participate in a safe gym routine.  2. Patient demonstrates increased strength BLE's to 4+/5 or greater to improve tolerance to functional activities.   3. Patient will reports pain <3/10 in the morning.   4. Patient will be able to demonstrate picking up 25# from the floor with good form in order to prevent further injury.        PLAN     Plan of care Certification: 12/13/2022 to 2/21/2023 .     Outpatient Physical Therapy 2 times weekly for 10 weeks to include the following interventions: Aquatic Therapy, Cervical/Lumbar Traction, Electrical Stimulation  , Gait Training, Manual Therapy, Moist Heat/ Ice, Neuromuscular Re-ed, Patient Education, Self Care, Therapeutic Activities, and Therapeutic Exercise.      Claudette Dent, PT    Claudette Dent, PT

## 2023-01-04 NOTE — PROGRESS NOTES
CHIEF COMPLAINT:  Cervical stenosis    INTERVAL HISTORY (1/4/23):  Patient returns for continued surveillance of cervical myelopathy.  His symptoms have not changed significantly however he is attending physical therapy which he finds is helping some.  He complains of continued numbness and tingling in his arms and hands while sleeping.  He feels like it is exacerbated or triggered by neck extension and has to sleep with his head propped forward.  He also gets some pain that radiates from his neck to the lateral shoulders.  He still gets occasional tingling in his big toe otherwise feels like the symptoms related to his acute hernia disc have resolved.  He also notes that he generally feels more arthritic type pain which will be exacerbated by exertion.  Finds himself with diffuse pain and achiness more than usual.    INTERVAL HISTORY (6/29/22):  Returns to review recent cervical MRI.    Always has discomfort in neck with pain radiating into R shoulder and occasionally into 4+5th digits as well as L shoulder.  He does endorse clumsiness with hands (dropping items, difficulty with bottle caps, not buttoning) and feels out of balance.  Occasionally has urinary urgency/frequency.    L thigh pain is decreased but worse in am.  Also intermittent pain in back of leg and foot.  Also notes some weakness and pain in ankle.    INTERVAL HISTORY (5/11/22):  Returns with new arm pain that radiates from base of neck to bilateral shoulders and down the volar thumb on R.  Stops at shoulder/upper arm on L.  Triggered by certain neck positions - has to use flat pillow or will have sxs.  + tingling to R hand and L shoulder.  Also reports clumsiness in hand with dropping items and poor fine motor control.  But denies overt weakness.  Feels tight through neck, chest and shoulder regions.    Also has discomfort in L leg along anterolateral thigh and describes as burning and itching.  Notes L leg weakness in back around thigh and knee.   Worse in am and foot feels like it fell asleep and is clumsy.  Occasionally has pain in L big toe.    Takes gabapentin 1 tab in am and 2 tab in pm.  He does HEP with occasional pool workout. Not particularly interested in PT.  Has not trialed antiinflammatories.    No b/b dysfunction.    HPI:    Hermelindo Garza III is a 42 y.o.-year-old male who presents today for post-operative follow-up s/p L MIS L3-4 lami/disc on 9/20/21.  Overall patient is significantly improved.  He has significant improvement in his back and leg pain.  He still has some mild tingling in his shins that radiate down to the feet.  This is made worse when he is not active.  He takes gabapentin 600 mg at night which helps.  He would like to increase that to more times during the day.  He denies weakness.      ROS:  As stated in the above HPI    PE:  Vitals:    01/04/23 1432   BP: 117/75   Pulse: 65       No neuro deficits observed    FROM PRIOR VISIT:    AAOX3  NAD  CN 2-12 intact     Strength:      Deltoids Biceps Triceps Wrist Ext. Wrist Flex. Hand    RUE 5 5 5 5 5 5   LUE 5 5 5 5 5 5    Hip Flex. Knee Flex. Knee Ext. Dorsi Flex Plantar Flex EHL   RLE 5 5 5 5 5 5   LLE 5 5 5 5 5 5     Sensation:  Intact to light touch (All 4 extremities)    Gait:  normal    Lumbar incision:  Well-healed    IMAGING:  All imaging reviewed by me.    Cspine flex/ex, 12/13/22:  Diffuse spondylosis  Good alignment  No dynamic instability    Cervical MRI, 6/16/22:  1. Diffuse spondylosis and hypertrophied PLL  2. Severe stenosis at C4-5 and C5-6 with cord compression  3. Moderate stenosis at C3-4 and C6-7    Lumbar MRI, 6/16/22:  1. No recurrent HNP  2. Mild degen changes    Csp MRI, 1/12/21:  1. Loss of lordosis  2. Diffuse spondylosis  3. Multi-level DDD with moderate NFS  4. Severe left neural foraminal narrowing at C6-7  5. Severe right neural foraminal narrowing at C5-6 and C7-T1    Lsp MRI, 9/19/21:  1. Diffuse DDD and spondylosis  2. Large L L3-4  HNP  3. L L4-5 moderate NFS  4. R L5-S1 moderate NFS      ASSESSMENT:   Problem List Items Addressed This Visit          Neuro    Cervical spondylosis with myelopathy - Primary     1. S/p L MIS L3-4 lami/disc for severe radiculopathy.    2. Cervical spondylosis with early myelopathy and radiculopathy.      Pain in bilateral shoulders/hand on the right and shoulder on the left with associated tingling has remained stable. PT and stretching helping. MRI shows diffuse spondylosis with cord compression at C4-5 and C5-6.  He does reports subtle signs of myelopathy (clumsiness and imbalance) but still is ready to consider surgery.  He likely has early myelopathy but we will continue to monitor for progression.  He is a candidate for ACDFs in he progresses. Nurick grade 1.      PLAN:   - RTC as needed (he knows to contact clinic if sxs worsen)  - F/u with PCP re: arthritic type pain (may need to refer to rheum)  - Cont PT    Time spent on this encounter: 30 minutes. This includes face-to-face time and non-face to face time preparing to see the patient (eg, review of tests), obtaining and/or reviewing separately obtained history, documenting clinical information in the electronic or other health record, independently interpreting results and communicating results to the patient/family/caregiver, or care coordinator.

## 2023-01-09 ENCOUNTER — PATIENT MESSAGE (OUTPATIENT)
Dept: ADMINISTRATIVE | Facility: OTHER | Age: 43
End: 2023-01-09
Payer: COMMERCIAL

## 2023-01-09 ENCOUNTER — OFFICE VISIT (OUTPATIENT)
Dept: DERMATOLOGY | Facility: CLINIC | Age: 43
End: 2023-01-09
Payer: COMMERCIAL

## 2023-01-09 DIAGNOSIS — D18.01 CHERRY ANGIOMA: ICD-10-CM

## 2023-01-09 DIAGNOSIS — L90.5 SCAR: ICD-10-CM

## 2023-01-09 DIAGNOSIS — L81.4 LENTIGO: ICD-10-CM

## 2023-01-09 DIAGNOSIS — D48.5 NEOPLASM OF UNCERTAIN BEHAVIOR OF SKIN: Primary | ICD-10-CM

## 2023-01-09 DIAGNOSIS — L91.8 SKIN TAG: ICD-10-CM

## 2023-01-09 DIAGNOSIS — D22.9 NEVUS: ICD-10-CM

## 2023-01-09 DIAGNOSIS — L82.1 SK (SEBORRHEIC KERATOSIS): ICD-10-CM

## 2023-01-09 DIAGNOSIS — Z86.006 HISTORY OF MELANOMA IN SITU: ICD-10-CM

## 2023-01-09 PROCEDURE — 88342 IMHCHEM/IMCYTCHM 1ST ANTB: CPT | Performed by: PATHOLOGY

## 2023-01-09 PROCEDURE — 99999 PR PBB SHADOW E&M-EST. PATIENT-LVL V: CPT | Mod: PBBFAC,,, | Performed by: DERMATOLOGY

## 2023-01-09 PROCEDURE — 88342 IMHCHEM/IMCYTCHM 1ST ANTB: CPT | Mod: 26,,, | Performed by: PATHOLOGY

## 2023-01-09 PROCEDURE — 99213 PR OFFICE/OUTPT VISIT, EST, LEVL III, 20-29 MIN: ICD-10-PCS | Mod: 25,S$GLB,, | Performed by: DERMATOLOGY

## 2023-01-09 PROCEDURE — 1159F MED LIST DOCD IN RCRD: CPT | Mod: CPTII,S$GLB,, | Performed by: DERMATOLOGY

## 2023-01-09 PROCEDURE — 1159F PR MEDICATION LIST DOCUMENTED IN MEDICAL RECORD: ICD-10-PCS | Mod: CPTII,S$GLB,, | Performed by: DERMATOLOGY

## 2023-01-09 PROCEDURE — 3072F LOW RISK FOR RETINOPATHY: CPT | Mod: CPTII,S$GLB,, | Performed by: DERMATOLOGY

## 2023-01-09 PROCEDURE — 99213 OFFICE O/P EST LOW 20 MIN: CPT | Mod: 25,S$GLB,, | Performed by: DERMATOLOGY

## 2023-01-09 PROCEDURE — 88342 CHG IMMUNOCYTOCHEMISTRY: ICD-10-PCS | Mod: 26,,, | Performed by: PATHOLOGY

## 2023-01-09 PROCEDURE — 3072F PR LOW RISK FOR RETINOPATHY: ICD-10-PCS | Mod: CPTII,S$GLB,, | Performed by: DERMATOLOGY

## 2023-01-09 PROCEDURE — 88305 TISSUE EXAM BY PATHOLOGIST: CPT | Performed by: PATHOLOGY

## 2023-01-09 PROCEDURE — 1160F RVW MEDS BY RX/DR IN RCRD: CPT | Mod: CPTII,S$GLB,, | Performed by: DERMATOLOGY

## 2023-01-09 PROCEDURE — 88305 TISSUE EXAM BY PATHOLOGIST: ICD-10-PCS | Mod: 26,,, | Performed by: PATHOLOGY

## 2023-01-09 PROCEDURE — 1160F PR REVIEW ALL MEDS BY PRESCRIBER/CLIN PHARMACIST DOCUMENTED: ICD-10-PCS | Mod: CPTII,S$GLB,, | Performed by: DERMATOLOGY

## 2023-01-09 PROCEDURE — 99999 PR PBB SHADOW E&M-EST. PATIENT-LVL V: ICD-10-PCS | Mod: PBBFAC,,, | Performed by: DERMATOLOGY

## 2023-01-09 PROCEDURE — 11102 PR TANGENTIAL BIOPSY, SKIN, SINGLE LESION: ICD-10-PCS | Mod: S$GLB,,, | Performed by: DERMATOLOGY

## 2023-01-09 PROCEDURE — 11102 TANGNTL BX SKIN SINGLE LES: CPT | Mod: S$GLB,,, | Performed by: DERMATOLOGY

## 2023-01-09 PROCEDURE — 88305 TISSUE EXAM BY PATHOLOGIST: CPT | Mod: 26,,, | Performed by: PATHOLOGY

## 2023-01-09 NOTE — PROGRESS NOTES
"  Subjective:       Patient ID:  Hermelindo Garza III is a 42 y.o. male who presents for   Chief Complaint   Patient presents with    Skin Check     TBSE     Patient is here today for a "mole" check.   Pt has a history of  moderate sun exposure in the past.   Pt recalls several blistering sunburns in the past- yes  Pt has history of tanning bed use- yes  Pt has  had moles removed in the past- yes, MIS R mid back 2/2022  Pt has history of melanoma in first degree relatives-  Father NMSC     This is a high risk patient here to check for the development of new lesions.    Pt denies any new or changing lesions today       Review of Systems   Constitutional:  Negative for fever, chills, weight loss, fatigue, night sweats and malaise.   HENT:  Negative for headaches.    Respiratory:  Negative for cough and shortness of breath.    Gastrointestinal:  Negative for indigestion.   Skin:  Positive for daily sunscreen use and activity-related sunscreen use. Negative for tendency to form keloidal scars, recent sunburn and wears hat.   Neurological:  Negative for headaches.   Hematologic/Lymphatic: Negative for adenopathy. Does not bruise/bleed easily.      Objective:    Physical Exam   Constitutional: He appears well-developed and well-nourished. No distress.   Lymphadenopathy: No supraclavicular adenopathy is present.     He has no cervical adenopathy.     He has no axillary adenopathy.   Neurological: He is alert and oriented to person, place, and time. He is not disoriented.   Psychiatric: He has a normal mood and affect.   Skin:   Areas Examined (abnormalities noted in diagram):   Scalp / Hair Palpated and Inspected  Head / Face Inspection Performed  Neck Inspection Performed  Chest / Axilla Inspection Performed  Abdomen Inspection Performed  Genitals / Buttocks / Groin Inspection Performed  Back Inspection Performed  RUE Inspected  LUE Inspection Performed  RLE Inspected  LLE Inspection Performed  Nails and Digits " Inspection Performed                                   Diagram Legend     Erythematous scaling macule/papule c/w actinic keratosis       Vascular papule c/w angioma      Pigmented verrucoid papule/plaque c/w seborrheic keratosis      Yellow umbilicated papule c/w sebaceous hyperplasia      Irregularly shaped tan macule c/w lentigo     1-2 mm smooth white papules consistent with Milia      Movable subcutaneous cyst with punctum c/w epidermal inclusion cyst      Subcutaneous movable cyst c/w pilar cyst      Firm pink to brown papule c/w dermatofibroma      Pedunculated fleshy papule(s) c/w skin tag(s)      Evenly pigmented macule c/w junctional nevus     Mildly variegated pigmented, slightly irregular-bordered macule c/w mildly atypical nevus      Flesh colored to evenly pigmented papule c/w intradermal nevus       Pink pearly papule/plaque c/w basal cell carcinoma      Erythematous hyperkeratotic cursted plaque c/w SCC      Surgical scar with no sign of skin cancer recurrence      Open and closed comedones      Inflammatory papules and pustules      Verrucoid papule consistent consistent with wart     Erythematous eczematous patches and plaques     Dystrophic onycholytic nail with subungual debris c/w onychomycosis     Umbilicated papule    Erythematous-base heme-crusted tan verrucoid plaque consistent with inflamed seborrheic keratosis     Erythematous Silvery Scaling Plaque c/w Psoriasis     See annotation      Assessment / Plan:      Pathology Orders:       Normal Orders This Visit    Specimen to Pathology, Dermatology     Comments:    Number of Specimens:->1  ------------------------->-------------------------  Spec 1 Procedure:->Biopsy  Spec 1 Clinical Impression:->r/o atypical nevus  Spec 1 Source:->mid upper back    Questions:    Procedure Type: Dermatology and skin neoplasms    Number of Specimens: 1    ------------------------: -------------------------    Spec 1 Procedure: Biopsy    Spec 1 Clinical  Impression: r/o atypical nevus    Spec 1 Source: mid upper back    Release to patient:           Neoplasm of uncertain behavior of skin  Shave biopsy procedure note:    Shave biopsy performed after verbal consent including risk of infection, scar, recurrence, need for additional treatment of site. Area prepped with alcohol, anesthetized with approximately 1.0cc of 1% lidocaine with epinephrine. Lesional tissue shaved with razor blade. Hemostasis achieved with application of aluminum chloride followed by hyfrecation. No complications. Dressing applied. Wound care explained.    -     Specimen to Pathology, Dermatology    Lentigo  This is a benign hyperpigmented sun induced lesion. Recommend daily sun protection/avoidance and use of at least SPF 30, broad spectrum sunscreen (OTC drug) will reduce the number of new lesions. Treatment of these benign lesions are considered cosmetic.  The nature of sun-induced photo-aging and skin cancers is discussed.  Sun avoidance, protective clothing, and the use of 30-SPF sunscreens is advised. Observe closely for skin damage/changes, and call if such occurs.    Nevus  Discussed ABCDE's of nevi.  Monitor for new mole or moles that are becoming bigger, darker, irritated, or developing irregular borders. Brochure provided. Instructed patient to observe lesion(s) for changes and follow up in clinic if changes are noted. Patient to monitor skin at home for new or changing lesions.     Cherry angioma  These are benign vascular lesions that are inherited.  Treatment is not necessary.    SK (seborrheic keratosis)  These are benign inherited growths without a malignant potential. Reassurance given to patient. No treatment is necessary.     Skin tag  Reassurance given to patient. No treatment is necessary.   Treatment of benign, asymptomatic lesions may be considered cosmetic.      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 Site well healed  with no signs of recurrence.    Total body skin examination performed today including at least 12 points as noted in physical examination. Suspicious lesions noted.    Recommend daily sun protection/avoidance, use of at least SPF 30, broad spectrum sunscreen (OTC drug), skin self examinations, and routine physician surveillance to optimize early detection             Follow up in 6 months (on 7/9/2023) for prn bx report, TBSE.

## 2023-01-09 NOTE — PATIENT INSTRUCTIONS
Shave Biopsy Wound Care    Your doctor has performed a shave biopsy today.  A band aid and vaseline ointment has been placed over the site.  This should remain in place for NO LONGER THAN 48 hours.  It is fine to remove the bandaid after 24 hours, if the area is no longer bleeding. It is recommended that you keep the area dry (do not wet)) for the first 24 hours.  After 24 hours, wash the area with warm soap and water and apply Vaseline jelly.  Many patients prefer to use Neosporin or Bacitracin ointment.  This is acceptable; however, know that you can develop an allergy to this medication even if you have used it safely for years.  It is important to keep the area moist.  Letting it dry out and get air slows healing time, and will worsen the scar.        If you notice increasing redness, tenderness, pain, or yellow drainage at the biopsy site, please notify your doctor.  These are signs of an infection.    If your biopsy site is bleeding, apply firm pressure for 15 minutes straight.  Repeat for another 15 minutes, if it is still bleeding.   If the surgical site continues to bleed, then please contact your doctor.      For [facility] users:   You will receive your biopsy results in [facility] as soon as they are available. Please be assured that your physician/provider will review your results and will then determine what further treatment, evaluation, or planning is required. You should be contacted by your physician's/provider's office within 5 business days of receiving your results; If not, please reach out to directly. This is one more way [employer] is putting you first.     [facility] [place] [address]/ [phone] FAX/ [email or web address]      Shave Biopsy Wound Care    Your doctor has performed a shave biopsy today.  A band aid and vaseline ointment has been placed over the site.  This should remain in place for NO LONGER THAN 48 hours.  It is fine to remove the bandaid after 24  hours, if the area is no longer bleeding. It is recommended that you keep the area dry (do not wet)) for the first 24 hours.  After 24 hours, wash the area with warm soap and water and apply Vaseline jelly.  Many patients prefer to use Neosporin or Bacitracin ointment.  This is acceptable; however, know that you can develop an allergy to this medication even if you have used it safely for years.  It is important to keep the area moist.  Letting it dry out and get air slows healing time, and will worsen the scar.        If you notice increasing redness, tenderness, pain, or yellow drainage at the biopsy site, please notify your doctor.  These are signs of an infection.    If your biopsy site is bleeding, apply firm pressure for 15 minutes straight.  Repeat for another 15 minutes, if it is still bleeding.   If the surgical site continues to bleed, then please contact your doctor.      For [employer] users:   You will receive your biopsy results in [facility] as soon as they are available. Please be assured that your physician/provider will review your results and will then determine what further treatment, evaluation, or planning is required. You should be contacted by your physician's/provider's office within 5 business days of receiving your results; If not, please reach out to directly. This is one more way [facility] is putting you first.     [facility] [place] [address]/ [phone] FAX/ [email or web address]

## 2023-01-09 NOTE — PROGRESS NOTES
"  Subjective:       Patient ID:  Hermelindo Garza III is a 42 y.o. male who presents for   Chief Complaint   Patient presents with    Skin Check     TBSE     Patient is here today for a TBSE.    Pt denies any new or changing lesions today.     H/o mm in situ - 1/2022 - R mid back     atient is here today for a "mole" check.   Pt has a history of  moderate sun exposure in the past.   Pt recalls several blistering sunburns in the past- yes  Pt has history of tanning bed use- yes  Pt has  had moles removed in the past- yes, MIS R mid back 2/2022  Pt has history of melanoma in first degree relatives-  Father melanoma      Review of Systems   Skin:  Positive for daily sunscreen use (when going outside) and activity-related sunscreen use. Negative for tendency to form keloidal scars.   Hematologic/Lymphatic: Does not bruise/bleed easily.      Objective:    Physical Exam   Constitutional: He appears well-developed and well-nourished. No distress.   Lymphadenopathy: No supraclavicular adenopathy is present.     He has no cervical adenopathy.     He has no axillary adenopathy.   Neurological: He is alert and oriented to person, place, and time. He is not disoriented.   Psychiatric: He has a normal mood and affect.   Skin:   Areas Examined (abnormalities noted in diagram):   Scalp / Hair Palpated and Inspected  Head / Face Inspection Performed  Neck Inspection Performed  Chest / Axilla Inspection Performed  Abdomen Inspection Performed  Genitals / Buttocks / Groin Inspection Performed  Back Inspection Performed  RUE Inspected  LUE Inspection Performed  RLE Inspected  LLE Inspection Performed  Nails and Digits Inspection Performed                             Diagram Legend     Erythematous scaling macule/papule c/w actinic keratosis       Vascular papule c/w angioma      Pigmented verrucoid papule/plaque c/w seborrheic keratosis      Yellow umbilicated papule c/w sebaceous hyperplasia      Irregularly shaped tan macule " c/w lentigo     1-2 mm smooth white papules consistent with Milia      Movable subcutaneous cyst with punctum c/w epidermal inclusion cyst      Subcutaneous movable cyst c/w pilar cyst      Firm pink to brown papule c/w dermatofibroma      Pedunculated fleshy papule(s) c/w skin tag(s)      Evenly pigmented macule c/w junctional nevus     Mildly variegated pigmented, slightly irregular-bordered macule c/w mildly atypical nevus      Flesh colored to evenly pigmented papule c/w intradermal nevus       Pink pearly papule/plaque c/w basal cell carcinoma      Erythematous hyperkeratotic cursted plaque c/w SCC      Surgical scar with no sign of skin cancer recurrence      Open and closed comedones      Inflammatory papules and pustules      Verrucoid papule consistent consistent with wart     Erythematous eczematous patches and plaques     Dystrophic onycholytic nail with subungual debris c/w onychomycosis     Umbilicated papule    Erythematous-base heme-crusted tan verrucoid plaque consistent with inflamed seborrheic keratosis     Erythematous Silvery Scaling Plaque c/w Psoriasis     See annotation      Assessment / Plan:        There are no diagnoses linked to this encounter.         No follow-ups on file.

## 2023-01-11 ENCOUNTER — CLINICAL SUPPORT (OUTPATIENT)
Dept: REHABILITATION | Facility: HOSPITAL | Age: 43
End: 2023-01-11
Attending: NEUROLOGICAL SURGERY
Payer: COMMERCIAL

## 2023-01-11 DIAGNOSIS — Z98.890 S/P LUMBAR DISCECTOMY: ICD-10-CM

## 2023-01-11 DIAGNOSIS — M54.16 LUMBAR RADICULOPATHY, CHRONIC: Primary | ICD-10-CM

## 2023-01-11 PROCEDURE — 97110 THERAPEUTIC EXERCISES: CPT | Mod: PO

## 2023-01-11 NOTE — PROGRESS NOTES
"[facility]   Physical Therapy Treatment / Progress Note     Name: Hermelindo Garza III  Clinic Number: [id number]    Therapy Diagnosis:   Encounter Diagnoses   Name Primary?    Lumbar radiculopathy, chronic Yes    S/P lumbar discectomy      Physician: Naseem Mcnamara DO    Visit Date: 1/11/2023    Physician Orders: PT Eval and Treat   Medical Diagnosis from Referral: s/p lumbar discectomy  Evaluation Date: 12/13/2022  Authorization Period Expiration: 12/8/2023  Plan of Care Expiration: 2/21/2023   Progress Note Due: 2/11/2023  Visit # / Visits authorized: 2 / 40  FOTO: 1 / TBD     Precautions: Standard     PTA Visit #: 0/5     Time In: 11:50 AM (patient arrived 5 minutes late)   Time Out: 11:00AM  Total Billable Time: 45 minutes    SUBJECTIVE     Pt reports: that overall he is feeling a lot of improvement since starting physical therapy. He would like to work on his upper postural muscle for 1-2 visits and   He was compliant with home exercise program.  Response to previous treatment: None  Functional change: None     Pain: 0/10  Location: bilateral back      OBJECTIVE     Objective Measures updated at progress report unless specified.     Treatment     Hermelindo received the treatments listed below:      therapeutic exercises to develop strength, endurance, ROM, flexibility, posture, and core stabilization for 45 minutes including:  Recumbent Bike 5 minutes level 4  Paloff Press 7# x20  Diagonal Chops 7# x20  Reverse Chops 7# x20  Rows 13# 3x10  Lat Pull Down 13# 2x10  Chin Tucks 30x5"  Upper trapezius stretch 3x30s  Levator scapulae Stretch 3x30s    Home exercise program   Sidelying Open Book Thoracic Lumbar Rotation and Extension -  2 sets - 10 reps  Cat Cow x20  Thread the Needle x20  Pancho Pose (3 directions) 3x30"  Constantine Stretch with Strap 3x30s  Supine Bridge with Adduction Squeeze 3x8  Sidelying Hip Abduction 3x8   Supine Clamshell GTB 3x8  Hamstring Stretch with Strap " 3x30s  Supine Lower Trunk Rotation with Stability Ball - 3 sets - 10 reps  Supine Posterior Pelvic Tilt - 3 sets - 10 reps - 5 hold  Supine Posterior Pelvic Tilt with Alternating March 3x10  Supine Posterior Pelvic Tilt with Alternating Leg Extensions 3x10  Dead Bug with Stability Ball 3x10    neuromuscular re-education activities to improve: Coordination, Proprioception, and Posture for 0 minutes. The following activities were included:  None    therapeutic activities to improve functional performance for 0  minutes, including:  None    Patient Education and Home Exercises     Home Exercises Provided and Patient Education Provided     Education provided:   - home exercise program     Written Home Exercises Provided: yes. Exercises were reviewed and Hermelindo was able to demonstrate them prior to the end of the session.  Hermelindo demonstrated good  understanding of the education provided. See EMR under Patient Instructions for exercises provided during therapy sessions    ASSESSMENT     Mr. Garza has attended 5 Out patient physical therapy sessions to address chronic low back pain and neck pain. Since starting therapy he reports a global improvement in back pain and ability to self manage his condition with advanced stretching and strengthening program. Patient overall improving with tolerance to activity and pain free prolonged sitting and standing. He is also actively participating in daily exercise outside of formal home exercise program. Patient would like to address postural concerns as it relates to low back pain and neck pain for 1-2 sessions.     Hermelindo Is progressing well towards his goals.   Pt prognosis is Good.     Pt will continue to benefit from skilled outpatient physical therapy to address the deficits listed in the problem list box on initial evaluation, provide pt/family education and to maximize pt's level of independence in the home and community environment.     Pt's spiritual, cultural and  educational needs considered and pt agreeable to plan of care and goals.     Anticipated barriers to physical therapy: chronic Pain    Goals:   Short Term Goals: 4 weeks   1. Patient demonstrates independence with HEP. - goal met 1/11/2023  2. Patient demonstrates independence with Postural Awareness. - goal met 1/11/2023  3. Patient will be able to preform lumbar ROM pain-free - goal met 1/11/2023     Long Term Goals: 8 weeks   1. Patient will demonstrate independence with advanced home exercise program in order to participate in a safe gym routine.- goal progressing 1/11/2023  2. Patient demonstrates increased strength BLE's to 4+/5 or greater to improve tolerance to functional activities. - goal progressing 1/11/2023  3. Patient will reports pain <3/10 in the morning. - goal met 1/11/2023  4. Patient will be able to demonstrate picking up 25# from the floor with good form in order to prevent further injury. - goal met 1/11/2023       PLAN     Plan of care Certification: 12/13/2022 to 2/21/2023 .     Outpatient Physical Therapy 2 times weekly for 10 weeks to include the following interventions: Aquatic Therapy, Cervical/Lumbar Traction, Electrical Stimulation  , Gait Training, Manual Therapy, Moist Heat/ Ice, Neuromuscular Re-ed, Patient Education, Self Care, Therapeutic Activities, and Therapeutic Exercise.      Claudette Dent, PT    Claudette Dent, PT

## 2023-01-17 LAB
FINAL PATHOLOGIC DIAGNOSIS: NORMAL
GROSS: NORMAL
Lab: NORMAL
MICROSCOPIC EXAM: NORMAL

## 2023-01-27 ENCOUNTER — OFFICE VISIT (OUTPATIENT)
Dept: PODIATRY | Facility: CLINIC | Age: 43
End: 2023-01-27
Payer: COMMERCIAL

## 2023-01-27 VITALS
SYSTOLIC BLOOD PRESSURE: 111 MMHG | WEIGHT: 243 LBS | BODY MASS INDEX: 40.48 KG/M2 | HEART RATE: 76 BPM | DIASTOLIC BLOOD PRESSURE: 75 MMHG | HEIGHT: 65 IN

## 2023-01-27 DIAGNOSIS — L21.9 SEBORRHEIC DERMATITIS OF SCALP: ICD-10-CM

## 2023-01-27 DIAGNOSIS — M79.672 CHRONIC FOOT PAIN, LEFT: Primary | ICD-10-CM

## 2023-01-27 DIAGNOSIS — G89.29 CHRONIC FOOT PAIN, LEFT: Primary | ICD-10-CM

## 2023-01-27 DIAGNOSIS — M76.72 PERONEAL TENDONITIS, LEFT: ICD-10-CM

## 2023-01-27 PROCEDURE — 3074F PR MOST RECENT SYSTOLIC BLOOD PRESSURE < 130 MM HG: ICD-10-PCS | Mod: CPTII,S$GLB,, | Performed by: PODIATRIST

## 2023-01-27 PROCEDURE — 3008F BODY MASS INDEX DOCD: CPT | Mod: CPTII,S$GLB,, | Performed by: PODIATRIST

## 2023-01-27 PROCEDURE — 3074F SYST BP LT 130 MM HG: CPT | Mod: CPTII,S$GLB,, | Performed by: PODIATRIST

## 2023-01-27 PROCEDURE — 3008F PR BODY MASS INDEX (BMI) DOCUMENTED: ICD-10-PCS | Mod: CPTII,S$GLB,, | Performed by: PODIATRIST

## 2023-01-27 PROCEDURE — 1159F PR MEDICATION LIST DOCUMENTED IN MEDICAL RECORD: ICD-10-PCS | Mod: CPTII,S$GLB,, | Performed by: PODIATRIST

## 2023-01-27 PROCEDURE — 99214 OFFICE O/P EST MOD 30 MIN: CPT | Mod: S$GLB,,, | Performed by: PODIATRIST

## 2023-01-27 PROCEDURE — 99214 PR OFFICE/OUTPT VISIT, EST, LEVL IV, 30-39 MIN: ICD-10-PCS | Mod: S$GLB,,, | Performed by: PODIATRIST

## 2023-01-27 PROCEDURE — 3072F PR LOW RISK FOR RETINOPATHY: ICD-10-PCS | Mod: CPTII,S$GLB,, | Performed by: PODIATRIST

## 2023-01-27 PROCEDURE — 99999 PR PBB SHADOW E&M-EST. PATIENT-LVL III: ICD-10-PCS | Mod: PBBFAC,,, | Performed by: PODIATRIST

## 2023-01-27 PROCEDURE — 3078F PR MOST RECENT DIASTOLIC BLOOD PRESSURE < 80 MM HG: ICD-10-PCS | Mod: CPTII,S$GLB,, | Performed by: PODIATRIST

## 2023-01-27 PROCEDURE — 1160F PR REVIEW ALL MEDS BY PRESCRIBER/CLIN PHARMACIST DOCUMENTED: ICD-10-PCS | Mod: CPTII,S$GLB,, | Performed by: PODIATRIST

## 2023-01-27 PROCEDURE — 1159F MED LIST DOCD IN RCRD: CPT | Mod: CPTII,S$GLB,, | Performed by: PODIATRIST

## 2023-01-27 PROCEDURE — 3072F LOW RISK FOR RETINOPATHY: CPT | Mod: CPTII,S$GLB,, | Performed by: PODIATRIST

## 2023-01-27 PROCEDURE — 1160F RVW MEDS BY RX/DR IN RCRD: CPT | Mod: CPTII,S$GLB,, | Performed by: PODIATRIST

## 2023-01-27 PROCEDURE — 3078F DIAST BP <80 MM HG: CPT | Mod: CPTII,S$GLB,, | Performed by: PODIATRIST

## 2023-01-27 PROCEDURE — 99999 PR PBB SHADOW E&M-EST. PATIENT-LVL III: CPT | Mod: PBBFAC,,, | Performed by: PODIATRIST

## 2023-01-27 NOTE — PROGRESS NOTES
Subjective:      Patient ID: Hermelindo Garza III is a 42 y.o. male.    Chief Complaint: Foot Pain (Follow up bilateral foot pain)    Presents complaining of pain to both feet that occurred while he was on vacation since he was walking approximately 10 miles per day.  Relates that the pain is worse 1st thing in the morning when he stands and walks on the foot however will improve as he moves around.  No significant pain at rest.  Denies any trauma.  Relates that he was wearing [employer] tennis shoes but developed blisters on his toes which since have resolved.  He is wearing [employer] recovery slides which helps somewhat.  Patient's attempting to exercise more for weight loss purposes.    01/27/23: Returns for pain in both feet, left worse than right. Pain had begun to improve after last visit but then began to worsen again over the past 1-2 months when he began increasing physical activity. He continues to exercise regularly. He works at a desk.     Past Medical History:   Diagnosis Date    ADD (attention deficit disorder) 05/09/2012    Allergy     Asthma 05/09/2012    Cervical disc disorder with radiculopathy, unspecified cervical region 09/14/2021    Cervical spondylosis with myelopathy 06/29/2022    Eczema     Esophageal ulcer     GERD (gastroesophageal reflux disease) 05/09/2012    Glaucoma     Kidney stones 05/2014    Lumbar disc disease 05/09/2012    Lumbar herniated disc 05/09/2012    Malignant melanoma of skin, unspecified 01/06/2022    in situ right mid back    Melanoma 01/2022    in situ R mid back     LUBA (obstructive sleep apnea)     Radiculopathy, lumbar region 09/14/2021    Renal calculi 05/09/2012    Type 2 diabetes mellitus without complication, without long-term current use of insulin 11/16/2020       Past Surgical History:   Procedure Laterality Date    APPENDECTOMY  2006    CARPAL TUNNEL RELEASE Right 09/15/2022    Procedure: RELEASE, CARPAL TUNNEL;  Surgeon: Christen Marshall MD;  Location: [facility] OR;   Service: Orthopedics;  Laterality: Right;    COLONOSCOPY  01/22/2019    internal hemorrhoids, o/w normal - repeat at age 52    EPIDURAL STEROID INJECTION N/A 02/10/2021    Procedure: CERVICAL C6/7 NELI DIRECT REFERRAL;  Surgeon: Michi Escamilla MD;  Location: [facility] PAIN T;  Service: Pain Management;  Laterality: N/A;  NEEDS CONSENT    ESOPHAGOGASTRODUODENOSCOPY  01/22/2019    LA grade B esophagitis; esophageal stenosis- dilated; gastritis; H pylori pathology negative    ESOPHAGOGASTRODUODENOSCOPY N/A 05/18/2021    Procedure: EGD (ESOPHAGOGASTRODUODENOSCOPY);  Surgeon: Mariana Hector MD;  Location: [facility];  Service: Endoscopy;  Laterality: N/A;    EXCISION OF GANGLION OF WRIST Right 09/15/2022    Procedure: EXCISION, GANGLION CYST, WRIST;  Surgeon: Christen Marshall MD;  Location: [facility];  Service: Orthopedics;  Laterality: Right;    FLEXOR TENDON REPAIR Right 09/15/2022    Procedure: FLEXOR TENOSYNOVECTOMY;  Surgeon: Christen Marshall MD;  Location: [facility] OR;  Service: Orthopedics;  Laterality: Right;    HAND ARTHROTOMY Right 09/15/2022    Procedure: ARTHROTOMY, HAND RADIOCARPAL;  Surgeon: Christen Marshall MD;  Location: [facility] OR;  Service: Orthopedics;  Laterality: Right;  Radiocarpal    INJECTION OF STEROID Left 09/15/2022    Procedure: INJECTION, STEROID LEFT WRIST AND LEFT LATERAL ELBOW;  Surgeon: Christen Marshall MD;  Location: [facility] OR;  Service: Orthopedics;  Laterality: Left;  Left Carpal Tunnel CSI    LUMBAR LAMINECTOMY  2010    LUMBAR LAMINECTOMY WITH DISCECTOMY Left 09/20/2021    Procedure: LAMINECTOMY, SPINE, LUMBAR, WITH DISCECTOMY;  Surgeon: Naseem Mcnamara DO;  Location: [address];  Service: Neurosurgery;  Laterality: Left;  MIS L3-4    TYMPANOSTOMY TUBE PLACEMENT         Family History   Problem Relation Age of Onset    Allergic rhinitis Mother     Hypertension Mother     Transient ischemic attack Mother     Sleep apnea Mother     Allergic rhinitis Father     Asthma Father     Chronic back  pain Father     MARTITA disease Father     Sleep apnea Father     Melanoma Father     Allergic rhinitis Sister     Asthma Sister     Allergic rhinitis Sister     Asthma Sister     No Known Problems Brother     No Known Problems Maternal Aunt     No Known Problems Maternal Uncle     No Known Problems Paternal Aunt     No Known Problems Paternal Uncle     Brain cancer Maternal Grandmother     Diabetes Maternal Grandfather     Breast cancer Paternal Grandmother     No Known Problems Paternal Grandfather     Amblyopia Neg Hx     Blindness Neg Hx     Cancer Neg Hx     Cataracts Neg Hx     Glaucoma Neg Hx     Macular degeneration Neg Hx     Retinal detachment Neg Hx     Strabismus Neg Hx     Stroke Neg Hx     Thyroid disease Neg Hx     Allergies Neg Hx     Angioedema Neg Hx        Social History     Socioeconomic History    Marital status:    Tobacco Use    Smoking status: Never     Passive exposure: Never    Smokeless tobacco: Never   Substance and Sexual Activity    Alcohol use: Yes     Alcohol/week: 0.0 standard drinks     Comment: Occasional    Drug use: No    Sexual activity: Yes     Social Determinants of Health     Financial Resource Strain: Low Risk     Difficulty of Paying Living Expenses: Not hard at all   Food Insecurity: No Food Insecurity    Worried About Running Out of Food in the Last Year: Never true    Ran Out of Food in the Last Year: Never true   Transportation Needs: No Transportation Needs    Lack of Transportation (Medical): No    Lack of Transportation (Non-Medical): No   Physical Activity: Sufficiently Active    Days of Exercise per Week: 5 days    Minutes of Exercise per Session: 30 min   Stress: Stress Concern Present    Feeling of Stress : To some extent   Social Connections: Unknown    Frequency of Communication with Friends and Family: More than three times a week    Frequency of Social Gatherings with Friends and Family: Three times a week    Active Member of Clubs or Organizations: No     Attends Club or Organization Meetings: Patient refused    Marital Status:    Housing Stability: Low Risk     Unable to Pay for Housing in the Last Year: No    Number of Places Lived in the Last Year: 1    Unstable Housing in the Last Year: No       Current Outpatient Medications   Medication Sig Dispense Refill    acetaminophen (TYLENOL) 325 MG tablet Take 650 mg by mouth every 6 (six) hours as needed for Pain.      albuterol (PROVENTIL/VENTOLIN HFA) 90 mcg/actuation inhaler RescueINHALE 2 PUFFS INTO THE LUNGS EVERY 6 HOURS AS NEEDED WHEEZING, RESCUE Strength: 90 mcg/actuation 8.5 g 5    atorvastatin (LIPITOR) 20 MG tablet TAKE 1 TABLET(20 MG) BY MOUTH EVERY DAY 90 tablet 1    azelastine (ASTELIN) 137 mcg (0.1 %) nasal spray 2 sprays (274 mcg total) by Nasal route 2 (two) times daily. 30 mL 12    dexmethylphenidate (FOCALIN) 10 MG tablet Take 1 tablet (10 mg total) by mouth 3 (three) times daily. 90 tablet 0    dexmethylphenidate (FOCALIN) 10 MG tablet Take 1 tablet (10 mg total) by mouth 3 (three) times daily. 90 tablet 0    dexmethylphenidate (FOCALIN) 10 MG tablet Take 1 tablet (10 mg total) by mouth 3 (three) times daily. 90 tablet 0    ergocalciferol, vitamin D2, (VITAMIN D ORAL) Take 1 tablet by mouth every other day.      esomeprazole (NEXIUM) 40 mg GrPS Take 40 mg by mouth 2 (two) times daily before meals. 60 each 11    fexofenadine (ALLEGRA) 180 MG tablet Take 180 mg by mouth once daily.      fluocinonide (LIDEX) 0.05 % external solution AAA scalp qday prn itching, scaling 60 mL 3    fluticasone propionate (FLONASE) 50 mcg/actuation nasal spray 1 spray by Each Nostril route once daily.      gabapentin (NEURONTIN) 300 MG capsule TAKE 1 CAPSULE(300 MG) BY MOUTH THREE TIMES DAILY 90 capsule 3    hydrocortisone 2.5 % cream APPLY EXTERNALLY TO THE AFFECTED AREA TWICE DAILY FOR 10 DAYS 30 g 0    ketoconazole (NIZORAL) 2 % cream APPLY EXTERNALLY TO THE AFFECTED AREA TWICE DAILY 15 g 0    latanoprost 0.005 %  ophthalmic solution INSTILL 1 DROP INTO BOTH EYES EVERY EVENING. THIS REPLACES TIMOLOL 7.5 mL 3    meloxicam (MOBIC) 15 MG tablet Take 1 tablet (15 mg total) by mouth once daily. 30 tablet 1    methocarbamoL (ROBAXIN) 750 MG Tab TAKE 1 TABLET(750 MG) BY MOUTH THREE TIMES DAILY AS NEEDED FOR MUSCLE SPASM 90 tablet 2    montelukast (SINGULAIR) 10 mg tablet Take 1 tablet (10 mg total) by mouth every evening. 90 tablet 3    ondansetron (ZOFRAN-ODT) 4 MG TbDL Take 1 tablet (4 mg total) by mouth every 6 (six) hours as needed (nausea). 30 tablet 3    promethazine (PHENERGAN) 25 MG tablet Take 1 tablet (25 mg total) by mouth every 4 (four) hours. 25 tablet 0    rOPINIRole (REQUIP) 0.25 MG tablet TAKE 1 TABLET(0.25 MG) BY MOUTH EVERY EVENING 90 tablet 2    semaglutide (OZEMPIC) 0.25 mg or 0.5 mg(2 mg/1.5 mL) pen injector Inject 0.5 mg into the skin every 7 days. 2 pen 5    TRUE METRIX GLUCOSE TEST STRIP Strp USE TO TEST ONCE DAILY 25 strip 2    ULTRA THIN LANCETS 30 gauge Misc USE TO TEST BLOOD SUGAR EVERY DAY AS DIRECTED 100 each 3    urea (CARMOL) 40 % Crea Apply to affected area on feet qhs after bath or shower 60 g 3    zolpidem (AMBIEN) 10 mg Tab Take 1 tablet (10 mg total) by mouth nightly as needed (insomnia). 30 tablet 2    blood-glucose meter kit Use as instructed 1 each 0    dexmethylphenidate (FOCALIN) 10 MG tablet Take 1 tablet (10 mg total) by mouth 3 (three) times daily. 90 tablet 0    LORazepam (ATIVAN) 0.5 MG tablet Take 1 tablet (0.5 mg total) by mouth as needed for Anxiety (Take 1 tablet by mouth 30 minutes prior to scan. Take 2nd tablet, if needed, just before scan.). 2 tablet 0     No current facility-administered medications for this visit.     Facility-Administered Medications Ordered in Other Visits   Medication Dose Route Frequency Provider Last Rate Last Admin    cefazolin (ANCEF) 2 gram in dextrose 5% 50 mL IVPB (premix)  2 g Intravenous On Call Procedure Chong Padron MD        fentaNYL 50 mcg/mL  injection 100 mcg  100 mcg Intravenous Q5 Min PRN Ishan Lucas MD   100 mcg at 09/15/22 0655    midazolam (VERSED) 1 mg/mL injection 2 mg  2 mg Intravenous PRN Ishan Lucas MD   2 mg at 09/15/22 0655    mupirocin 2 % ointment   Nasal On Call Procedure Chong Padron MD   Given at 09/15/22 0553    sodium chloride 0.9% flush 3 mL  3 mL Intravenous PRN Chong Padron MD           Review of patient's allergies indicates:  No Known Allergies      Review of Systems   Constitutional: Negative for chills, fever and malaise/fatigue.   HENT:  Negative for congestion and hearing loss.    Cardiovascular:  Negative for chest pain, claudication and leg swelling.   Respiratory:  Negative for cough and shortness of breath.    Skin:  Negative for color change, itching and poor wound healing.   Musculoskeletal:  Negative for back pain, joint pain, muscle cramps and muscle weakness.   Gastrointestinal:  Negative for nausea and vomiting.   Neurological:  Negative for numbness, paresthesias and weakness.   Psychiatric/Behavioral:  Negative for altered mental status.          Objective:      Physical Exam  Constitutional:       General: He is not in acute distress.     Appearance: He is obese. He is not ill-appearing.   Cardiovascular:      Pulses:           Dorsalis pedis pulses are 2+ on the right side and 2+ on the left side.        Posterior tibial pulses are 2+ on the right side and 2+ on the left side.      Comments: No lower extremity edema bilateral.  Skin temp is warm to foot bilateral.  Normal hair growth distribution bilateral lower extremity.  No rubor on dependency bilateral foot.  Skin temp is warm to foot bilateral.  Musculoskeletal:      Comments: Moderate pain on palpation overlying the base of left 5th metatarsal, also on palpation of distal peroneus brevis tendon.  Mild pain on palpation overlying the dorsal lateral midfoot in the region of the cuboid and extensor digitorum brevis muscle belly left foot.  Pain  not reproduced on piano key test or resisted eversion-abduction left foot. Hypertrophic abductor digiti quinti along the lateral border the foot bilateral.    The patient has a flexible pes Somers valgus foot type with equinus contracture of negative 10° with the knee extended bilateral.  Moderate forefoot varus bilateral.  There is no pain with range of motion or manual muscle strength testing bilateral foot ankle.  There is no significant localized edema, warmth for discoloration to the foot bilateral.  No significant digital deformity noted bilateral foot.   Skin:     General: Skin is warm.      Capillary Refill: Capillary refill takes less than 2 seconds.      Findings: No ecchymosis or erythema.      Nails: There is no clubbing.      Comments: No open lesions or macerations of foot bilateral.   Neurological:      Mental Status: He is alert.             Assessment:       Encounter Diagnoses   Name Primary?    Chronic foot pain, left Yes    Peroneal tendonitis, left            Plan:       Hermelindo was seen today for foot pain.    Diagnoses and all orders for this visit:    Chronic foot pain, left  -     MRI Foot (Midfoot) Left Without Contrast; Future    Peroneal tendonitis, left        I counseled the patient on his conditions, their implications and medical management.    Reviewed previous left foot weight-bearing x-ray noting no fracture or dislocation.  High clinical suspicion for stress fracture to the 5th metatarsal base and over the cuboid with possible partial tear of the peroneus brevis tendon.  MRI ordered to further assess.    Dispensed, fitted and applied tall Cam boot to left lower extremity to help offload and allow for protective weight-bearing.  Patient may remove at rest per discussion.    RTC within 6 weeks or p.r.n. as discussed.  We will modify the plan based off the MRI results.  If no evidence of a stress fracture or tear of the peroneus brevis tendon then we will consider referral physical  therapy.    A portion of this note was generated by voice recognition software and may contain spelling and grammar errors.    .

## 2023-01-30 ENCOUNTER — PATIENT MESSAGE (OUTPATIENT)
Dept: INTERNAL MEDICINE | Facility: CLINIC | Age: 43
End: 2023-01-30
Payer: COMMERCIAL

## 2023-01-30 DIAGNOSIS — L21.9 SEBORRHEIC DERMATITIS OF SCALP: ICD-10-CM

## 2023-01-30 RX ORDER — KETOCONAZOLE 20 MG/G
CREAM TOPICAL
Qty: 15 G | Refills: 0 | Status: ON HOLD | OUTPATIENT
Start: 2023-01-30 | End: 2023-09-27 | Stop reason: HOSPADM

## 2023-01-30 RX ORDER — KETOCONAZOLE 20 MG/G
CREAM TOPICAL
Qty: 15 G | Refills: 0 | Status: SHIPPED | OUTPATIENT
Start: 2023-01-30

## 2023-01-31 ENCOUNTER — PATIENT MESSAGE (OUTPATIENT)
Dept: PODIATRY | Facility: CLINIC | Age: 43
End: 2023-01-31
Payer: COMMERCIAL

## 2023-02-08 ENCOUNTER — PATIENT MESSAGE (OUTPATIENT)
Dept: PSYCHIATRY | Facility: CLINIC | Age: 43
End: 2023-02-08

## 2023-02-08 ENCOUNTER — OFFICE VISIT (OUTPATIENT)
Dept: PSYCHIATRY | Facility: CLINIC | Age: 43
End: 2023-02-08
Payer: COMMERCIAL

## 2023-02-08 DIAGNOSIS — G47.00 INSOMNIA, UNSPECIFIED TYPE: ICD-10-CM

## 2023-02-08 PROCEDURE — 99214 PR OFFICE/OUTPT VISIT, EST, LEVL IV, 30-39 MIN: ICD-10-PCS | Mod: 95,,, | Performed by: NURSE PRACTITIONER

## 2023-02-08 PROCEDURE — 3072F PR LOW RISK FOR RETINOPATHY: ICD-10-PCS | Mod: CPTII,95,, | Performed by: NURSE PRACTITIONER

## 2023-02-08 PROCEDURE — 99214 OFFICE O/P EST MOD 30 MIN: CPT | Mod: 95,,, | Performed by: NURSE PRACTITIONER

## 2023-02-08 PROCEDURE — 3072F LOW RISK FOR RETINOPATHY: CPT | Mod: CPTII,95,, | Performed by: NURSE PRACTITIONER

## 2023-02-08 RX ORDER — DEXMETHYLPHENIDATE HYDROCHLORIDE 10 MG/1
10 TABLET ORAL 3 TIMES DAILY
Qty: 270 TABLET | Refills: 0 | Status: SHIPPED | OUTPATIENT
Start: 2023-02-08 | End: 2023-03-10 | Stop reason: SDUPTHER

## 2023-02-08 RX ORDER — ZOLPIDEM TARTRATE 12.5 MG/1
12.5 TABLET, FILM COATED, EXTENDED RELEASE ORAL NIGHTLY PRN
Qty: 30 TABLET | Refills: 0 | Status: SHIPPED | OUTPATIENT
Start: 2023-02-08 | End: 2023-08-24

## 2023-02-08 NOTE — PROGRESS NOTES
10/19/2022 8:00 AM  Hermelindo Garza III   1980   [id number]                                                                   OUTPATIENT PSYCHIATRY FOLLOW- UP VISIT     Reason for Encounter:  Hermelindo Garza III, a 42 y.o. male,who presents today for follow up of ADHD. Met with patient.     Interval History and Content of Current Session:     Today, Patient with hx of pre-glaucoma (eye drops for this), DM type 2 (well controlled with Ozempic), asthma (intermittent, mild), HLD, GERD, insomnia, sleep apnea, ADHD, lumbar discectomy, chronic back pain (managed with gabapentin), ozempic for weight loss.       Reports as follow up from last visit about 3 months prior. Reports has been taking Focalin about 2 to 3 times per week, continues to be effective. No focus, attention issues.      Reports had been having issues with falling asleep, and 10 mg of Ambien has been helpful.      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      Denies SI/HI, AVH, racing thoughts, substance abuse.         Psychosocial stressors: occupational, social, family pressures        Review Of Systems:      Medical Review Of Systems:  Constitutional: negative for fatigue and weight loss  Cardiovascular: negative  Gastrointestinal: negative     Psychiatric Review Of Systems:  sleep: no  appetite changes: no  weight changes: no  energy/anergy: no  interest/pleasure/anhedonia: no  somatic symptoms: no  libido: no  anxiety/panic: no  guilty/hopeless: no  S.I.B.s/risky behavior: no  any drugs: no  alcohol: no       Sleep: 6 to 7 hours per night     Risk Parameters:  Patient reports no suicidal ideation  Patient reports no homicidal ideation  Patient reports no self-injurious behavior  Patient reports no violent behavior        Current meds-     Compliance: yes     Side effects: None        Psychiatric, social, and family history reviewed this visit        Objective      ALL MEDICATIONS:      Current Outpatient Medications:     albuterol (PROVENTIL HFA) 90 mcg/actuation inhaler, Inhale 2 puffs into the lungs every 6 (six) hours as needed for Wheezing. Rescue, Disp: 18 g, Rfl: 5    atorvastatin (LIPITOR) 20 MG tablet, TAKE 1 TABLET(20 MG) BY MOUTH EVERY DAY, Disp: 90 tablet, Rfl: 3    blood-glucose meter kit, Use as instructed, Disp: 1 each, Rfl: 0    dexmethylphenidate (FOCALIN) 10 MG tablet, Take 1 tablet (10 mg total) by mouth 3 (three) times daily., Disp: 90 tablet, Rfl: 0    dexmethylphenidate (FOCALIN) 10 MG tablet, Take 1 tablet (10 mg total) by mouth 3 (three) times daily., Disp: 90 tablet, Rfl: 0    dexmethylphenidate (FOCALIN) 10 MG tablet, Take 1 tablet (10 mg total) by mouth 3 (three) times daily., Disp: 90 tablet, Rfl: 0    diazePAM (VALIUM) 5 MG tablet, Take 1 tablet (5 mg total) by mouth every 6 (six) hours as needed for Anxiety. Take 1 pill 30 minutes prior to surgery and bring remaining pill with you on day of surgery., Disp: 2 tablet, Rfl: 0    ergocalciferol, vitamin D2, (VITAMIN D ORAL), Take 1 tablet by mouth every other day., Disp: , Rfl:     esomeprazole (NEXIUM) 40 mg GrPS, Take 40 mg by mouth 2 (two) times daily before meals., Disp: 60 each, Rfl: 11    fluocinonide (LIDEX) 0.05 % external solution, AAA scalp qday prn itching, scaling, Disp: 60 mL, Rfl: 3    gabapentin (NEURONTIN) 300 MG capsule, Take 1 capsule (300 mg total) by mouth 3 (three) times daily., Disp: 90 capsule, Rfl: 3    HYDROcodone-acetaminophen (NORCO) 5-325 mg per tablet, Take 1 tablet by mouth every 6 (six) hours as needed for Pain., Disp: 10 tablet, Rfl: 0    hydrocortisone 2.5 % cream, Apply topically 2 (two) times daily. for 10 days, Disp: 28 g, Rfl: 0    ketoconazole (NIZORAL) 2 % cream, Apply topically 2 (two) times daily. Apply to to affected area bid, Disp: 15 g, Rfl: 0    ketoconazole (NIZORAL) 2 % shampoo, Apply topically twice a week., Disp: 120 mL, Rfl: 3    ketoconazole (NIZORAL) 2 % shampoo, Wash  hair with medicated shampoo at least 2x/week - let sit on scalp at least 5 minutes prior to rinsing, Disp: 120 mL, Rfl: 5    lancets Misc, 1 each by Misc.(Non-Drug; Combo Route) route Daily., Disp: 30 each, Rfl: 11    latanoprost 0.005 % ophthalmic solution, Place 1 drop into both eyes every evening. This replaces the timolol, Disp: 3 Bottle, Rfl: 12    methocarbamoL (ROBAXIN) 750 MG Tab, TAKE 1 TABLET(750 MG) BY MOUTH THREE TIMES DAILY AS NEEDED FOR MUSCLE SPASM, Disp: 90 tablet, Rfl: 0    montelukast (SINGULAIR) 10 mg tablet, TAKE 1 TABLET(10 MG) BY MOUTH EVERY EVENING, Disp: 30 tablet, Rfl: 11    ondansetron (ZOFRAN-ODT) 4 MG TbDL, Take 1 tablet (4 mg total) by mouth every 6 (six) hours as needed (nausea)., Disp: 30 tablet, Rfl: 3    rOPINIRole (REQUIP) 0.25 MG tablet, TAKE 1 TABLET(0.25 MG) BY MOUTH EVERY EVENING, Disp: 30 tablet, Rfl: 2    semaglutide (OZEMPIC) 0.25 mg or 0.5 mg(2 mg/1.5 mL) pen injector, Inject 0.5 mg into the skin every 7 days., Disp: 2 pen, Rfl: 2    TRUE METRIX GLUCOSE TEST STRIP Strp, USE TO TEST ONCE DAILY, Disp: 25 strip, Rfl: 2    zolpidem (AMBIEN) 5 MG Tab, Take 1 tablet (5 mg total) by mouth nightly as needed (insomnia)., Disp: 30 tablet, Rfl: 1     ALLERGIES:  Review of patient's allergies indicates:  No Known Allergies     RELEVANT LABS/STUDIES:              Lab Results   Component Value Date     WBC 9.55 09/20/2021     HGB 16.5 09/20/2021     HCT 48.8 09/20/2021     MCV 89 09/20/2021      09/20/2021      BMP            Lab Results   Component Value Date      09/20/2021     K 4.1 09/20/2021      09/20/2021     CO2 21 (L) 09/20/2021     BUN 17 09/20/2021     CREATININE 0.7 09/20/2021     CALCIUM 9.3 09/20/2021     ANIONGAP 11 09/20/2021     ESTGFRAFRICA >60.0 09/20/2021     EGFRNONAA >60.0 09/20/2021                Lab Results   Component Value Date     ALT 47 (H) 09/19/2021     AST 19 09/19/2021     ALKPHOS 107 09/19/2021     BILITOT 0.7 09/19/2021                 Lab Results   Component Value Date     TSH 2.296 07/30/2020                Lab Results   Component Value Date     HGBA1C 5.2 08/13/2021         Constitutional  Vitals:  Most recent vital signs, dated less than 90 days prior to this appointment, were reviewed.    There were no vitals filed for this visit.            PHYSICAL EXAM  General: well developed, well nourished  Neurologic:   Gait: Normal   Psychomotor signs:  No involuntary movements or tremor  AIMS: none     PSYCHIATRIC EXAM:     Mental Status Exam:  Appearance: unremarkable, age appropriate  Behavior/Cooperation: normal, cooperative  Speech: normal tone, normal rate, normal pitch, normal volume  Language: uses words appropriately; NO aphasia or dysarthria  Mood: steady  Affect: full, congruent with mood and appropriate to content/situation  Thought Process: normal and logical  Thought Content: normal, no suicidality, no homicidality, delusions, or paranoia  Level of Consciousness: Alert and Oriented x3  Memory:  Intact  Attention/concentration: appropriate for age/education.   Fund of Knowledge: appears adequate  Insight: Intact  Judgment: Intact      Assessment and Diagnosis   Status/Progress: Based on the examination today, the patient's problem(s) is/are well controlled.  New problems have not been presented today.   Co-morbidities are complicating management of the primary condition.  There are no active rule-out diagnoses for this patient at this time.      General Impression:   ADHD, inattentive type  LUBA  Insomnia  Anxiety and depression NOS        Intervention/Counseling/Treatment Plan   Medication Management:   Continue Focalin IR 10 mg by mouth three times daily  Continue Ambien 10 mg by mouth once daily  Labs: reviewed most recent - reviewed CMP, CBC from 4/2022  The treatment plan and follow up plan were reviewed with the patient.  Discussed with patient informed consent, risks vs. benefits, alternative treatments, side effect profile and  the inherent unpredictability of individual responses to these treatments. The patient expresses understanding of the above and displays the capacity to agree with this current plan and had no other questions.  Encouraged Patient to keep future appointments.   Take medications as prescribed and abstain from substance abuse.   In the event of an emergency patient was advised to go to the emergency room.     Return to Clinic: 3 months     > than 50% of total time spend on coordination of care and counseling   (which included pts differential diagnosis and prognosis for psychiatric conditions, risks, benefits of treatments, instructions and adherence to treatment plan, risk reduction, reviewing current psychiatric medication regimen, medical problems and social stressors. In addtion to possible discussion with other healthcare provider/s)     Add on Psychotherapy time:0  Total Face time:20 min     The patient location is: [place], at home  The chief complaint leading to consultation is: med f/u     Visit type: audiovisual     Face to Face time with patient: 20 min  20 minutes of total time spent on the encounter, which includes face to face time and non-face to face time preparing to see the patient (eg, review of tests), Obtaining and/or reviewing separately obtained history, Documenting clinical information in the electronic or other health record, Independently interpreting results (not separately reported) and communicating results to the patient/family/caregiver, or Care coordination (not separately reported).            Each patient to whom he or she provides medical services by telemedicine is:  (1) informed of the relationship between the physician and patient and the respective role of any other health care provider with respect to management of the patient; and (2) notified that he or she may decline to receive medical services by telemedicine and may withdraw from such care at any time.     Notes:       Tor Hernandez, MSN, APRN, PMHNP-BC  [facility]   10/19/2022 8:00 AM

## 2023-02-22 ENCOUNTER — HOSPITAL ENCOUNTER (OUTPATIENT)
Dept: RADIOLOGY | Facility: HOSPITAL | Age: 43
Discharge: HOME | End: 2023-02-22
Attending: PODIATRIST
Payer: COMMERCIAL

## 2023-02-22 DIAGNOSIS — M79.672 CHRONIC FOOT PAIN, LEFT: ICD-10-CM

## 2023-02-22 DIAGNOSIS — G89.29 CHRONIC FOOT PAIN, LEFT: ICD-10-CM

## 2023-02-22 PROCEDURE — 73718 MRI LOWER EXTREMITY W/O DYE: CPT | Mod: TC,LT

## 2023-02-22 PROCEDURE — 73718 MRI LOWER EXTREMITY W/O DYE: CPT | Mod: 26,LT,, | Performed by: RADIOLOGY

## 2023-02-22 PROCEDURE — 73718 MRI FOOT (MIDFOOT) LEFT WITHOUT CONTRAST: ICD-10-PCS | Mod: 26,LT,, | Performed by: RADIOLOGY

## 2023-02-28 ENCOUNTER — OFFICE VISIT (OUTPATIENT)
Dept: URGENT CARE | Facility: CLINIC | Age: 43
End: 2023-02-28
Payer: COMMERCIAL

## 2023-02-28 VITALS
TEMPERATURE: 99 F | DIASTOLIC BLOOD PRESSURE: 84 MMHG | HEIGHT: 65 IN | OXYGEN SATURATION: 97 % | SYSTOLIC BLOOD PRESSURE: 126 MMHG | RESPIRATION RATE: 20 BRPM | HEART RATE: 86 BPM | BODY MASS INDEX: 39.99 KG/M2 | WEIGHT: 240 LBS

## 2023-02-28 DIAGNOSIS — R05.1 ACUTE COUGH: ICD-10-CM

## 2023-02-28 DIAGNOSIS — Z87.09 HISTORY OF ASTHMA: ICD-10-CM

## 2023-02-28 DIAGNOSIS — R53.83 FATIGUE, UNSPECIFIED TYPE: Primary | ICD-10-CM

## 2023-02-28 LAB
CTP QC/QA: YES
SARS-COV-2 AG RESP QL IA.RAPID: NEGATIVE

## 2023-02-28 PROCEDURE — 1159F MED LIST DOCD IN RCRD: CPT | Mod: CPTII,S$GLB,,

## 2023-02-28 PROCEDURE — 3008F BODY MASS INDEX DOCD: CPT | Mod: CPTII,S$GLB,,

## 2023-02-28 PROCEDURE — 87811 SARS CORONAVIRUS 2 ANTIGEN POCT, MANUAL READ: ICD-10-PCS | Mod: QW,S$GLB,,

## 2023-02-28 PROCEDURE — 1160F PR REVIEW ALL MEDS BY PRESCRIBER/CLIN PHARMACIST DOCUMENTED: ICD-10-PCS | Mod: CPTII,S$GLB,,

## 2023-02-28 PROCEDURE — 1159F PR MEDICATION LIST DOCUMENTED IN MEDICAL RECORD: ICD-10-PCS | Mod: CPTII,S$GLB,,

## 2023-02-28 PROCEDURE — 1160F RVW MEDS BY RX/DR IN RCRD: CPT | Mod: CPTII,S$GLB,,

## 2023-02-28 PROCEDURE — 87811 SARS-COV-2 COVID19 W/OPTIC: CPT | Mod: QW,S$GLB,,

## 2023-02-28 PROCEDURE — 3008F PR BODY MASS INDEX (BMI) DOCUMENTED: ICD-10-PCS | Mod: CPTII,S$GLB,,

## 2023-02-28 PROCEDURE — 3074F PR MOST RECENT SYSTOLIC BLOOD PRESSURE < 130 MM HG: ICD-10-PCS | Mod: CPTII,S$GLB,,

## 2023-02-28 PROCEDURE — 3079F DIAST BP 80-89 MM HG: CPT | Mod: CPTII,S$GLB,,

## 2023-02-28 PROCEDURE — 99213 PR OFFICE/OUTPT VISIT, EST, LEVL III, 20-29 MIN: ICD-10-PCS | Mod: S$GLB,,,

## 2023-02-28 PROCEDURE — 3072F PR LOW RISK FOR RETINOPATHY: ICD-10-PCS | Mod: CPTII,S$GLB,,

## 2023-02-28 PROCEDURE — 99213 OFFICE O/P EST LOW 20 MIN: CPT | Mod: S$GLB,,,

## 2023-02-28 PROCEDURE — 3072F LOW RISK FOR RETINOPATHY: CPT | Mod: CPTII,S$GLB,,

## 2023-02-28 PROCEDURE — 3074F SYST BP LT 130 MM HG: CPT | Mod: CPTII,S$GLB,,

## 2023-02-28 PROCEDURE — 3079F PR MOST RECENT DIASTOLIC BLOOD PRESSURE 80-89 MM HG: ICD-10-PCS | Mod: CPTII,S$GLB,,

## 2023-02-28 RX ORDER — BENZONATATE 200 MG/1
200 CAPSULE ORAL 3 TIMES DAILY PRN
Qty: 21 CAPSULE | Refills: 0 | Status: SHIPPED | OUTPATIENT
Start: 2023-02-28 | End: 2023-03-07

## 2023-02-28 RX ORDER — PROMETHAZINE HYDROCHLORIDE AND DEXTROMETHORPHAN HYDROBROMIDE 6.25; 15 MG/5ML; MG/5ML
5 SYRUP ORAL NIGHTLY PRN
Qty: 118 ML | Refills: 0 | Status: SHIPPED | OUTPATIENT
Start: 2023-02-28 | End: 2023-03-02 | Stop reason: SDUPTHER

## 2023-02-28 NOTE — PROGRESS NOTES
"Subjective:       Patient ID: Hermelindo Garza III is a 42 y.o. male.    Vitals:  height is 5' 5" (1.651 m) and weight is 108.9 kg (240 lb). His temperature is 99 °F (37.2 °C). His blood pressure is 126/84 and his pulse is 86. His respiration is 20 and oxygen saturation is 97%.     Chief Complaint: Fever    This is a 42 y.o. male who presents today with a chief complaint of feeling feverish, chills, generalized weakness, fatigue, and dry cough x 2 days ago. States he feels like there is a tickle in his throat. Treated with tylenol, last dose 3 hours ago. Hx of asthma and has needed to use rescue inhaler. States mom was having similar URI symptoms. Denies sore throat, worsening SOB, chest pain. NKDA.      Fever   This is a new problem. The current episode started in the past 7 days. The problem occurs constantly. The problem has been gradually worsening. His temperature was unmeasured prior to arrival. Associated symptoms include coughing. Pertinent negatives include no chest pain, congestion, ear pain, sore throat or wheezing. He has tried acetaminophen for the symptoms. The treatment provided mild relief.   Risk factors: sick contacts      Constitution: Positive for sweating, fatigue, fever and generalized weakness.   HENT:  Negative for ear pain, ear discharge, congestion, postnasal drip, sinus pain, sinus pressure, sore throat, trouble swallowing and voice change.    Cardiovascular:  Negative for chest pain.   Eyes:  Negative for eye itching and eye redness.   Respiratory:  Positive for cough and asthma. Negative for sputum production, bloody sputum, shortness of breath and wheezing.    Allergic/Immunologic: Positive for asthma. Negative for sneezing.     Objective:      Vitals:    02/28/23 1107   BP: 126/84   Pulse: 86   Resp: 20   Temp: 99 °F (37.2 °C)       Physical Exam   Constitutional: He is oriented to person, place, and time. He appears well-developed. He is cooperative.  Non-toxic appearance. He " does not appear ill. No distress.   HENT:   Head: Normocephalic and atraumatic.   Ears:   Right Ear: Hearing, tympanic membrane, external ear and ear canal normal.   Left Ear: Hearing, tympanic membrane, external ear and ear canal normal.   Nose: Nose normal. No mucosal edema, rhinorrhea or nasal deformity. No epistaxis. Right sinus exhibits no maxillary sinus tenderness and no frontal sinus tenderness. Left sinus exhibits no maxillary sinus tenderness and no frontal sinus tenderness.   Mouth/Throat: Uvula is midline, oropharynx is clear and moist and mucous membranes are normal. Mucous membranes are moist. No trismus in the jaw. Normal dentition. No uvula swelling. No oropharyngeal exudate, posterior oropharyngeal edema or posterior oropharyngeal erythema.   Eyes: Conjunctivae and lids are normal. Pupils are equal, round, and reactive to light. Right eye exhibits no discharge. Left eye exhibits no discharge. No scleral icterus.   Neck: Trachea normal and phonation normal. Neck supple. No edema present. No erythema present. No neck rigidity present.   Cardiovascular: Normal rate, regular rhythm, normal heart sounds and normal pulses.   Pulmonary/Chest: Effort normal and breath sounds normal. No respiratory distress. He has no decreased breath sounds. He has no wheezes. He has no rhonchi. He has no rales.   Abdominal: Normal appearance.   Musculoskeletal: Normal range of motion.         General: No deformity. Normal range of motion.   Lymphadenopathy:     He has no cervical adenopathy.   Neurological: He is alert and oriented to person, place, and time. He exhibits normal muscle tone. Coordination normal.   Skin: Skin is warm, dry, intact, not diaphoretic and not pale.   Psychiatric: His speech is normal and behavior is normal. Judgment and thought content normal.   Nursing note and vitals reviewed.      Assessment:       1. Fatigue, unspecified type    2. Acute cough          Results for orders placed or performed  in visit on 02/28/23   SARS Coronavirus 2 Antigen, POCT Manual Read   Result Value Ref Range    SARS Coronavirus 2 Antigen Negative Negative     Acceptable Yes        Plan:         Fatigue, unspecified type  -     SARS Coronavirus 2 Antigen, POCT Manual Read    Acute cough  -     benzonatate (TESSALON) 200 MG capsule; Take 1 capsule (200 mg total) by mouth 3 (three) times daily as needed for Cough.  Dispense: 21 capsule; Refill: 0  -     promethazine-dextromethorphan (PROMETHAZINE-DM) 6.25-15 mg/5 mL Syrp; Take 5 mLs by mouth nightly as needed (cough).  Dispense: 118 mL; Refill: 0         Patient Instructions   Reviewed negative COVID-19 virus test with patient who verbalized understanding.  Advised patient that symptoms are indicative of an upper respiratory infection which is viral in nature and should be treated symptomatically.  We discussed over-the-counter medications as well as home remedies to help with current symptoms.  We also discussed a wait and see antibiotic plan which the patient verbalized understanding.  Patient educational handouts also included in discharge paperwork for patient who verbalized understanding agrees with plan of care.  They deny any further questions or concerns at this time.  Patient exits exam room in no acute distress.      PLEASE READ YOUR DISCHARGE INSTRUCTIONS ENTIRELY AS IT CONTAINS IMPORTANT INFORMATION.      - Please drink plenty of fluids.  - Please get plenty of rest.  - You can take plain Mucinex (guaifenesin) 1200 mg twice a day to help loosen mucous.   - Use over the counter Flonase as directed  Please return here or go to the Emergency Department for any concerns or worsening of condition.  - Please take an over the counter antihistamine medication (Allegra/Claritin/Zyrtec/Xyzal) of your choice as directed. These are antihistamines that can help with runny nose, nasal congestion, sneezing, and helps to dry up post-nasal drip, which usually causes sore  throat and cough.    -If you do NOT have high blood pressure, you may use a decongestant form (D)  of this medication (ie. Claritin- D, zyrtec-D, allegra-D, Mucinex-D) or if you do not take the D form, you can take sudafed (pseudoephedrine) over the counter, which is a decongestant. Do NOT take two decongestant (D) medications at the same time (such as mucinex-D and claritin-D or plain sudafed and claritin D). Dextromethorphan (DM) is a cough suppressant over the counter (ie. mucinex DM, robitussin, delsym; dayquil/nyquil has DM as well.)    If you do have Hypertension or palpitations, it is safe to take Coricidin HBP for relief of sinus symptoms.    - If not allergic, please take over the counter Tylenol (Acetaminophen) and/or Motrin (Ibuprofen) as directed for control of pain and/or fever.  Avoid tylenol if you have a history of liver disease. Do not take ibuprofen if you have a history of GI bleeding, kidney disease, or if you take blood thinners.  Please follow up with your primary care doctor or specialist as needed.    -IF YOU RECEIVED PRESCRIPTION COUGH SUPPRESSANTS: Take prescription cough meds (pills) as prescribed; take prescription cough syrup at night as needed for cough.  Do not take both the prescribed cough pills and syrup at the same time or within 6 hours of each other.  Do not take the cough syrup with any other sedative medication as it can can cause drowsiness. Do not operate any heavy machinery, drink or drive while taking the cough syrup.    Try taking half a dose first of the cough syrup to see how it affects you.     Sore throat recommendations: Warm fluids, warm salt water gargles, throat lozenges, tea, honey, soup, rest, hydration.    Use over the counter flonase: one spray each nostril twice daily OR two sprays each nostril once daily for sinus congestion and postnasal drip. This is a steroid nasal spray that works locally over time to decrease the inflammation in your nose/sinuses and  help with allergic symptoms. This is not an quick- relief spray like afrin, but it works well if used daily.  Discontinue if you develop nose bleed    Sinus rinses DO NOT USE TAP WATER, if you must, water must be a rolling boil for 1 minute, let it cool, then use.  May use distilled water, or over the counter nasal saline rinses.  Vics vapor rub in shower to help open nasal passages.  May use nasal gel to keep passages moisturized.  May use Nasal saline sprays during the day for added relief of congestion.   For those who go to the gym, please do not use the sauna or steam room now to clear sinuses.    If you  smoke, please stop smoking.      Please return or see your primary care doctor if you develop new or worsening symptoms.     Please arrange follow up with your primary medical clinic as soon as possible. You must understand that you've received an Urgent Care treatment only and that you may be released before all of your medical problems are known or treated. You, the patient, will arrange for follow up as instructed. If your symptoms worsen or fail to improve you should go to the Emergency Room.

## 2023-03-02 ENCOUNTER — PATIENT MESSAGE (OUTPATIENT)
Dept: INTERNAL MEDICINE | Facility: CLINIC | Age: 43
End: 2023-03-02

## 2023-03-02 ENCOUNTER — PATIENT MESSAGE (OUTPATIENT)
Dept: INTERNAL MEDICINE | Facility: CLINIC | Age: 43
End: 2023-03-02
Payer: COMMERCIAL

## 2023-03-02 ENCOUNTER — OFFICE VISIT (OUTPATIENT)
Dept: INTERNAL MEDICINE | Facility: CLINIC | Age: 43
End: 2023-03-02
Payer: COMMERCIAL

## 2023-03-02 ENCOUNTER — HOSPITAL ENCOUNTER (OUTPATIENT)
Dept: RADIOLOGY | Facility: HOSPITAL | Age: 43
Discharge: HOME | End: 2023-03-02
Attending: INTERNAL MEDICINE
Payer: COMMERCIAL

## 2023-03-02 ENCOUNTER — TELEPHONE (OUTPATIENT)
Dept: INTERNAL MEDICINE | Facility: CLINIC | Age: 43
End: 2023-03-02
Payer: COMMERCIAL

## 2023-03-02 VITALS
HEART RATE: 82 BPM | WEIGHT: 239.19 LBS | OXYGEN SATURATION: 96 % | DIASTOLIC BLOOD PRESSURE: 68 MMHG | SYSTOLIC BLOOD PRESSURE: 132 MMHG | HEIGHT: 65 IN | BODY MASS INDEX: 39.85 KG/M2

## 2023-03-02 DIAGNOSIS — E11.9 TYPE 2 DIABETES MELLITUS WITHOUT COMPLICATION, WITHOUT LONG-TERM CURRENT USE OF INSULIN: ICD-10-CM

## 2023-03-02 DIAGNOSIS — E11.9 TYPE 2 DIABETES MELLITUS WITHOUT COMPLICATION, WITHOUT LONG-TERM CURRENT USE OF INSULIN: Primary | ICD-10-CM

## 2023-03-02 DIAGNOSIS — R05.1 ACUTE COUGH: ICD-10-CM

## 2023-03-02 DIAGNOSIS — J45.909 ACUTE ASTHMATIC BRONCHITIS: ICD-10-CM

## 2023-03-02 PROCEDURE — 3075F PR MOST RECENT SYSTOLIC BLOOD PRESS GE 130-139MM HG: ICD-10-PCS | Mod: CPTII,S$GLB,, | Performed by: INTERNAL MEDICINE

## 2023-03-02 PROCEDURE — 87502 INFLUENZA DNA AMP PROBE: CPT | Mod: QW,S$GLB,, | Performed by: INTERNAL MEDICINE

## 2023-03-02 PROCEDURE — 3072F LOW RISK FOR RETINOPATHY: CPT | Mod: CPTII,S$GLB,, | Performed by: INTERNAL MEDICINE

## 2023-03-02 PROCEDURE — 1160F PR REVIEW ALL MEDS BY PRESCRIBER/CLIN PHARMACIST DOCUMENTED: ICD-10-PCS | Mod: CPTII,S$GLB,, | Performed by: INTERNAL MEDICINE

## 2023-03-02 PROCEDURE — 3008F PR BODY MASS INDEX (BMI) DOCUMENTED: ICD-10-PCS | Mod: CPTII,S$GLB,, | Performed by: INTERNAL MEDICINE

## 2023-03-02 PROCEDURE — 71046 X-RAY EXAM CHEST 2 VIEWS: CPT | Mod: TC

## 2023-03-02 PROCEDURE — 87635: ICD-10-PCS | Mod: QW,S$GLB,, | Performed by: INTERNAL MEDICINE

## 2023-03-02 PROCEDURE — 1160F RVW MEDS BY RX/DR IN RCRD: CPT | Mod: CPTII,S$GLB,, | Performed by: INTERNAL MEDICINE

## 2023-03-02 PROCEDURE — 87502 POCT INFLUENZA A/B MOLECULAR: ICD-10-PCS | Mod: QW,S$GLB,, | Performed by: INTERNAL MEDICINE

## 2023-03-02 PROCEDURE — 99999 PR PBB SHADOW E&M-EST. PATIENT-LVL V: CPT | Mod: PBBFAC,,, | Performed by: INTERNAL MEDICINE

## 2023-03-02 PROCEDURE — 1159F PR MEDICATION LIST DOCUMENTED IN MEDICAL RECORD: ICD-10-PCS | Mod: CPTII,S$GLB,, | Performed by: INTERNAL MEDICINE

## 2023-03-02 PROCEDURE — 3078F PR MOST RECENT DIASTOLIC BLOOD PRESSURE < 80 MM HG: ICD-10-PCS | Mod: CPTII,S$GLB,, | Performed by: INTERNAL MEDICINE

## 2023-03-02 PROCEDURE — 3075F SYST BP GE 130 - 139MM HG: CPT | Mod: CPTII,S$GLB,, | Performed by: INTERNAL MEDICINE

## 2023-03-02 PROCEDURE — 99214 OFFICE O/P EST MOD 30 MIN: CPT | Mod: S$GLB,,, | Performed by: INTERNAL MEDICINE

## 2023-03-02 PROCEDURE — 71046 XR CHEST PA AND LATERAL: ICD-10-PCS | Mod: 26,,, | Performed by: RADIOLOGY

## 2023-03-02 PROCEDURE — 3008F BODY MASS INDEX DOCD: CPT | Mod: CPTII,S$GLB,, | Performed by: INTERNAL MEDICINE

## 2023-03-02 PROCEDURE — 99999 PR PBB SHADOW E&M-EST. PATIENT-LVL V: ICD-10-PCS | Mod: PBBFAC,,, | Performed by: INTERNAL MEDICINE

## 2023-03-02 PROCEDURE — 99214 PR OFFICE/OUTPT VISIT, EST, LEVL IV, 30-39 MIN: ICD-10-PCS | Mod: S$GLB,,, | Performed by: INTERNAL MEDICINE

## 2023-03-02 PROCEDURE — 1159F MED LIST DOCD IN RCRD: CPT | Mod: CPTII,S$GLB,, | Performed by: INTERNAL MEDICINE

## 2023-03-02 PROCEDURE — 87635 SARS-COV-2 COVID-19 AMP PRB: CPT | Mod: QW,S$GLB,, | Performed by: INTERNAL MEDICINE

## 2023-03-02 PROCEDURE — 3078F DIAST BP <80 MM HG: CPT | Mod: CPTII,S$GLB,, | Performed by: INTERNAL MEDICINE

## 2023-03-02 PROCEDURE — 71046 X-RAY EXAM CHEST 2 VIEWS: CPT | Mod: 26,,, | Performed by: RADIOLOGY

## 2023-03-02 PROCEDURE — 3072F PR LOW RISK FOR RETINOPATHY: ICD-10-PCS | Mod: CPTII,S$GLB,, | Performed by: INTERNAL MEDICINE

## 2023-03-02 RX ORDER — PROMETHAZINE HYDROCHLORIDE AND CODEINE PHOSPHATE 6.25; 1 MG/5ML; MG/5ML
5 SOLUTION ORAL EVERY 4 HOURS PRN
Qty: 118 ML | Refills: 0 | Status: SHIPPED | OUTPATIENT
Start: 2023-03-02 | End: 2023-03-02

## 2023-03-02 RX ORDER — PROMETHAZINE HYDROCHLORIDE AND DEXTROMETHORPHAN HYDROBROMIDE 6.25; 15 MG/5ML; MG/5ML
5 SYRUP ORAL NIGHTLY PRN
Qty: 118 ML | Refills: 0 | Status: SHIPPED | OUTPATIENT
Start: 2023-03-02 | End: 2023-03-12

## 2023-03-02 RX ORDER — PREDNISONE 20 MG/1
TABLET ORAL
Qty: 10 TABLET | Refills: 0 | Status: SHIPPED | OUTPATIENT
Start: 2023-03-02 | End: 2023-04-12 | Stop reason: ALTCHOICE

## 2023-03-02 NOTE — TELEPHONE ENCOUNTER
Pls call - covid, flu neg  Cxr - no pneumonia    COdeine cough syrup no available.  Will callin what he had last time

## 2023-03-02 NOTE — TELEPHONE ENCOUNTER
----- Message from Cecille Fraser MA sent at 3/2/2023  2:54 PM CST -----  Contact: Talya [phone]    ----- Message -----  From: Domenica Powers  Sent: 3/2/2023   2:53 PM CST  To: Rosa VELAZQUE[facility] Staff    Pharmacy is calling to clarify an RX.  RX name:  promethazine-codeine 6.25-10 mg/5 ml (PHENERGAN WITH CODEINE) 6.25-10 mg/5 mL syrup  What do they need to clarify:  He said this was taken off the market/ No big name pharmacies can carry this anymore    .[id number] - [place], [place] - [address] AT [facility] [address]  Phone:  [phone] Fax:  [phone]

## 2023-03-02 NOTE — PROGRESS NOTES
"Subjective:       Patient ID: Hermelindo Garza III is a 42 y.o. male.    Chief Complaint: Cough, Nasal Congestion, and Fever    HPI  Temp of 102 x 4 days.  Monday cough, Tuesday fever - max yesterday.  Non stop coughing   Coughing up dark green/brown sputum.    Chest painful.       Wheezing.  Has asthma.        Non smoker.     Sinus congested.  Not much drainage.    She is using albuterol every few hours.         Felt like he was " gonna die "this am - sweating, coughing.        Tuesday - covid negative        RSV pneumonia in October.    Whole family sick now - all covid negative..      DM .  Last a1c 5.4.  Review of Systems   Constitutional:  Positive for fatigue and fever. Negative for activity change and unexpected weight change.   HENT:  Positive for nasal congestion, rhinorrhea and sinus pressure/congestion.    Respiratory:  Positive for cough. Negative for chest tightness and shortness of breath.    Cardiovascular:  Negative for chest pain and leg swelling.   Gastrointestinal:  Negative for abdominal pain.   Neurological:  Negative for headaches.   Psychiatric/Behavioral:  Negative for dysphoric mood.        Objective:      Physical Exam  Constitutional:       General: He is not in acute distress.     Appearance: He is well-developed. He is not ill-appearing, toxic-appearing or diaphoretic.   HENT:      Right Ear: Tympanic membrane and ear canal normal.      Left Ear: Tympanic membrane and ear canal normal.      Nose: Congestion present.      Mouth/Throat:      Pharynx: No oropharyngeal exudate.   Eyes:      General:         Right eye: No discharge.         Left eye: No discharge.   Cardiovascular:      Rate and Rhythm: Normal rate and regular rhythm.   Pulmonary:      Effort: Pulmonary effort is normal. No respiratory distress.      Breath sounds: Wheezing (with forced expiration) present. No rales.   Musculoskeletal:      Cervical back: Normal range of motion and neck supple.      Right lower leg: No " edema.      Left lower leg: No edema.   Lymphadenopathy:      Cervical: No cervical adenopathy.   Neurological:      Mental Status: He is alert and oriented to person, place, and time.   Psychiatric:         Behavior: Behavior normal.       Assessment:       Problem List Items Addressed This Visit       Type 2 diabetes mellitus without complications - Primary    Relevant Orders    X-Ray Chest PA And Lateral (Completed)    POCT COVID-19 Rapid Screening (Completed)     Other Visit Diagnoses       Acute asthmatic bronchitis        Relevant Orders    POCT COVID-19 Rapid Screening (Completed)    POCT Influenza A/B Molecular (Completed)            Plan:       Hermelindo was seen today for cough, nasal congestion and fever.    Diagnoses and all orders for this visit:    Type 2 diabetes mellitus without complication, without long-term current use of insulin  -     X-Ray Chest PA And Lateral; Future  -     POCT COVID-19 Rapid Screening    Acute asthmatic bronchitis  -     POCT COVID-19 Rapid Screening  -     Discontinue: promethazine-codeine 6.25-10 mg/5 ml (PHENERGAN WITH CODEINE) 6.25-10 mg/5 mL syrup; Take 5 mLs by mouth every 4 (four) hours as needed for Cough.  -     POCT Influenza A/B Molecular    Other orders  -     predniSONE (DELTASONE) 20 MG tablet; 2 tabs po q day x 5 days       Addendum - covid, flu screens, cxr all normal.    Therefore - symptomatic care.  Call if no better.

## 2023-03-03 NOTE — TELEPHONE ENCOUNTER
Called and spoke to pt. Relayed message from PCP, pt verbalized understanding. Pt states he is improving and feeling a little better today.

## 2023-03-10 ENCOUNTER — PATIENT MESSAGE (OUTPATIENT)
Dept: PSYCHIATRY | Facility: CLINIC | Age: 43
End: 2023-03-10
Payer: COMMERCIAL

## 2023-03-10 RX ORDER — DEXMETHYLPHENIDATE HYDROCHLORIDE 10 MG/1
10 TABLET ORAL 3 TIMES DAILY
Qty: 90 TABLET | Refills: 0 | Status: SHIPPED | OUTPATIENT
Start: 2023-03-10 | End: 2023-08-24

## 2023-03-10 RX ORDER — DEXMETHYLPHENIDATE HYDROCHLORIDE 10 MG/1
10 TABLET ORAL 3 TIMES DAILY
Qty: 90 TABLET | Refills: 0 | Status: SHIPPED | OUTPATIENT
Start: 2023-04-08 | End: 2023-06-08 | Stop reason: SDUPTHER

## 2023-03-10 NOTE — PROGRESS NOTES
HPI    Pt here for 6 Month Check;    DLS: 11/08/2022 by Dr. TABITHA Castro MD      CC: Pt states : no new changes to reports and  vision seems stable    -blurred va  -eye pain  -diplopia  -flashes/floaters  -headaches  -curtain/shadow/veils    Eye Meds:  Latanoprost QHS OU   AT's PRN OU   Refresh PM PRN OU     POHx:   1. OHT   2. MGD   3. Ocular Rosacea   4. Type 2 DM no DR      Last edited by Madelyn Washington MA on 3/14/2023 10:09 AM.            Assessment /Plan     For exam results, see Encounter Report.    Borderline glaucoma of both eyes with ocular hypertension    Open angle with borderline findings and high glaucoma risk in both eyes    Meibomian gland dysfunction (MGD) of both eyes    Ocular rosacea           Glaucoma (type and duration)    OHT   First HVF   3/2021    First photos   1/2021    Treatment / Drops started   Timolol            Family history    Mother (required LPI), grandmother        Glaucoma meds    Brimonidine - not effective        H/O adverse rxn to glaucoma drops    none        LASERS    none        GLAUCOMA SURGERIES    none        OTHER EYE SURGERIES    none        CDR    0.4 // 0.2        Tbase    25-26 // 26-28         Tmax    26 // 28            Ttarget    ?             HVF    2 test 2021 to  2022 -near  Full - ? Hint paracentral changes od // full os   NEW BASELINE - 1 test 2023 to 2023 - full od // full os         Gonio    2-3+  OU - very lightly pigmented TM - slight ant insertion - poor slt candidate         CCT   570 // 583        OCT    2 test 2021 to 2022 - RNFL - nl od // nl os        Disc photos    1/26/2021    - Ttoday    16/18  - Test done today  iop // HVF // DFE / OCT     MGD / ocular rosacea  - Tried to get restasis in the past, difficult with insurance - will try alternatives first  - Difficulty with WC- time/job   - Start Ocusoft eyelid wipes  - Cont AT QID  - If ocusoft not effective, will try doxy     Ok to use zaditor bid prn allergies         PLAN    GS with CDR  asymmetry and FH+  Brimonidine not effective OU - no IOP change - stop  Blue  eyes - latanoprost - may cause iris darkening   Mild asthma, COPD - discussed, will try timolol BID OU- if bronchospasm stop - has NOT had a bronchospasm - but is on mult meds for asthma - prefer to use latanoprost if tolerated       Gonio +2-3 with VERY pale TM and a slight ant insertion - poor slt candidate    rec trial of latanoprost - may cause iris to darken,   Consider topical REBEKAH - but stings a lot    Consider SLT - poor candidate - very pale TM with a slight ant insertion    No response to brimonidine     F/U 6 months for IOP and gonio

## 2023-03-11 ENCOUNTER — PATIENT MESSAGE (OUTPATIENT)
Dept: ORTHOPEDICS | Facility: CLINIC | Age: 43
End: 2023-03-11
Payer: COMMERCIAL

## 2023-03-14 ENCOUNTER — OFFICE VISIT (OUTPATIENT)
Dept: OPHTHALMOLOGY | Facility: CLINIC | Age: 43
End: 2023-03-14
Payer: COMMERCIAL

## 2023-03-14 ENCOUNTER — CLINICAL SUPPORT (OUTPATIENT)
Dept: OPHTHALMOLOGY | Facility: CLINIC | Age: 43
End: 2023-03-14
Payer: COMMERCIAL

## 2023-03-14 DIAGNOSIS — H02.883 MEIBOMIAN GLAND DYSFUNCTION (MGD) OF BOTH EYES: ICD-10-CM

## 2023-03-14 DIAGNOSIS — H02.886 MEIBOMIAN GLAND DYSFUNCTION (MGD) OF BOTH EYES: ICD-10-CM

## 2023-03-14 DIAGNOSIS — H40.053 BORDERLINE GLAUCOMA OF BOTH EYES WITH OCULAR HYPERTENSION: Primary | ICD-10-CM

## 2023-03-14 DIAGNOSIS — H40.023 OPEN ANGLE WITH BORDERLINE FINDINGS AND HIGH GLAUCOMA RISK IN BOTH EYES: ICD-10-CM

## 2023-03-14 DIAGNOSIS — L71.8 OCULAR ROSACEA: ICD-10-CM

## 2023-03-14 DIAGNOSIS — H40.053 OCULAR HYPERTENSION, BILATERAL: ICD-10-CM

## 2023-03-14 DIAGNOSIS — H40.053 BORDERLINE GLAUCOMA OF BOTH EYES WITH OCULAR HYPERTENSION: ICD-10-CM

## 2023-03-14 PROCEDURE — 2023F PR DILATED RETINAL EXAM W/O EVID OF RETINOPATHY: ICD-10-PCS | Mod: CPTII,S$GLB,, | Performed by: OPHTHALMOLOGY

## 2023-03-14 PROCEDURE — 99214 PR OFFICE/OUTPT VISIT, EST, LEVL IV, 30-39 MIN: ICD-10-PCS | Mod: S$GLB,,, | Performed by: OPHTHALMOLOGY

## 2023-03-14 PROCEDURE — 1159F PR MEDICATION LIST DOCUMENTED IN MEDICAL RECORD: ICD-10-PCS | Mod: CPTII,S$GLB,, | Performed by: OPHTHALMOLOGY

## 2023-03-14 PROCEDURE — 1159F MED LIST DOCD IN RCRD: CPT | Mod: CPTII,S$GLB,, | Performed by: OPHTHALMOLOGY

## 2023-03-14 PROCEDURE — 99999 PR PBB SHADOW E&M-EST. PATIENT-LVL IV: ICD-10-PCS | Mod: PBBFAC,,, | Performed by: OPHTHALMOLOGY

## 2023-03-14 PROCEDURE — 99214 OFFICE O/P EST MOD 30 MIN: CPT | Mod: S$GLB,,, | Performed by: OPHTHALMOLOGY

## 2023-03-14 PROCEDURE — 1160F RVW MEDS BY RX/DR IN RCRD: CPT | Mod: CPTII,S$GLB,, | Performed by: OPHTHALMOLOGY

## 2023-03-14 PROCEDURE — 2023F DILAT RTA XM W/O RTNOPTHY: CPT | Mod: CPTII,S$GLB,, | Performed by: OPHTHALMOLOGY

## 2023-03-14 PROCEDURE — 99999 PR PBB SHADOW E&M-EST. PATIENT-LVL IV: CPT | Mod: PBBFAC,,, | Performed by: OPHTHALMOLOGY

## 2023-03-14 PROCEDURE — 1160F PR REVIEW ALL MEDS BY PRESCRIBER/CLIN PHARMACIST DOCUMENTED: ICD-10-PCS | Mod: CPTII,S$GLB,, | Performed by: OPHTHALMOLOGY

## 2023-03-14 NOTE — PROGRESS NOTES
HVF done ou. Rel/fix/coop. Good ou./chart checked for latex allergy.. plano/od plano/os-[facility]

## 2023-03-17 ENCOUNTER — TELEPHONE (OUTPATIENT)
Dept: ORTHOPEDICS | Facility: CLINIC | Age: 43
End: 2023-03-17
Payer: COMMERCIAL

## 2023-03-24 ENCOUNTER — PATIENT MESSAGE (OUTPATIENT)
Dept: ALLERGY | Facility: CLINIC | Age: 43
End: 2023-03-24
Payer: COMMERCIAL

## 2023-03-27 ENCOUNTER — OFFICE VISIT (OUTPATIENT)
Dept: PODIATRY | Facility: CLINIC | Age: 43
End: 2023-03-27
Payer: COMMERCIAL

## 2023-03-27 VITALS
SYSTOLIC BLOOD PRESSURE: 123 MMHG | HEIGHT: 65 IN | WEIGHT: 246.5 LBS | BODY MASS INDEX: 41.07 KG/M2 | TEMPERATURE: 98 F | DIASTOLIC BLOOD PRESSURE: 82 MMHG | HEART RATE: 81 BPM | OXYGEN SATURATION: 96 % | RESPIRATION RATE: 18 BRPM

## 2023-03-27 DIAGNOSIS — M20.42 HAMMERTOE OF LEFT FOOT: Primary | ICD-10-CM

## 2023-03-27 DIAGNOSIS — M20.62 TOE DEFORMITY, LEFT: ICD-10-CM

## 2023-03-27 DIAGNOSIS — L84 CORN OR CALLUS: ICD-10-CM

## 2023-03-27 PROCEDURE — 3079F DIAST BP 80-89 MM HG: CPT | Mod: CPTII,S$GLB,, | Performed by: PODIATRIST

## 2023-03-27 PROCEDURE — 3008F BODY MASS INDEX DOCD: CPT | Mod: CPTII,S$GLB,, | Performed by: PODIATRIST

## 2023-03-27 PROCEDURE — 3074F PR MOST RECENT SYSTOLIC BLOOD PRESSURE < 130 MM HG: ICD-10-PCS | Mod: CPTII,S$GLB,, | Performed by: PODIATRIST

## 2023-03-27 PROCEDURE — 1159F PR MEDICATION LIST DOCUMENTED IN MEDICAL RECORD: ICD-10-PCS | Mod: CPTII,S$GLB,, | Performed by: PODIATRIST

## 2023-03-27 PROCEDURE — 99999 PR PBB SHADOW E&M-EST. PATIENT-LVL III: ICD-10-PCS | Mod: PBBFAC,,, | Performed by: PODIATRIST

## 2023-03-27 PROCEDURE — 3074F SYST BP LT 130 MM HG: CPT | Mod: CPTII,S$GLB,, | Performed by: PODIATRIST

## 2023-03-27 PROCEDURE — 1160F RVW MEDS BY RX/DR IN RCRD: CPT | Mod: CPTII,S$GLB,, | Performed by: PODIATRIST

## 2023-03-27 PROCEDURE — 3072F LOW RISK FOR RETINOPATHY: CPT | Mod: CPTII,S$GLB,, | Performed by: PODIATRIST

## 2023-03-27 PROCEDURE — 99213 OFFICE O/P EST LOW 20 MIN: CPT | Mod: S$GLB,,, | Performed by: PODIATRIST

## 2023-03-27 PROCEDURE — 1160F PR REVIEW ALL MEDS BY PRESCRIBER/CLIN PHARMACIST DOCUMENTED: ICD-10-PCS | Mod: CPTII,S$GLB,, | Performed by: PODIATRIST

## 2023-03-27 PROCEDURE — 3072F PR LOW RISK FOR RETINOPATHY: ICD-10-PCS | Mod: CPTII,S$GLB,, | Performed by: PODIATRIST

## 2023-03-27 PROCEDURE — 3008F PR BODY MASS INDEX (BMI) DOCUMENTED: ICD-10-PCS | Mod: CPTII,S$GLB,, | Performed by: PODIATRIST

## 2023-03-27 PROCEDURE — 99999 PR PBB SHADOW E&M-EST. PATIENT-LVL III: CPT | Mod: PBBFAC,,, | Performed by: PODIATRIST

## 2023-03-27 PROCEDURE — 1159F MED LIST DOCD IN RCRD: CPT | Mod: CPTII,S$GLB,, | Performed by: PODIATRIST

## 2023-03-27 PROCEDURE — 99213 PR OFFICE/OUTPT VISIT, EST, LEVL III, 20-29 MIN: ICD-10-PCS | Mod: S$GLB,,, | Performed by: PODIATRIST

## 2023-03-27 PROCEDURE — 3079F PR MOST RECENT DIASTOLIC BLOOD PRESSURE 80-89 MM HG: ICD-10-PCS | Mod: CPTII,S$GLB,, | Performed by: PODIATRIST

## 2023-03-27 NOTE — PROGRESS NOTES
Subjective:      Patient ID: Hermelindo Garza III is a 42 y.o. male.    Chief Complaint: Hammer Toe (Left foot)    Presents complaining of hammertoe on the L 2nd toe that rubs on the big toe causing occasional corns and blisters. Has tried toe spacers and toe socks which have not helped. He is inquiring about additional treatment options and possible surgery in future and would like to get information on this. Most recently 2 weeks ago he developed a blister in the are which eventually healed. States he has difficulty every time he tries to exercise. Wears [place] wide, soft toe box shoes which help but not fully. No other pedal concerns at this time.      Past Medical History:   Diagnosis Date    ADD (attention deficit disorder) 05/09/2012    Allergy     Asthma 05/09/2012    Cervical disc disorder with radiculopathy, unspecified cervical region 09/14/2021    Cervical spondylosis with myelopathy 06/29/2022    Eczema     Esophageal ulcer     GERD (gastroesophageal reflux disease) 05/09/2012    Glaucoma     Kidney stones 05/2014    Lumbar disc disease 05/09/2012    Lumbar herniated disc 05/09/2012    Malignant melanoma of skin, unspecified 01/06/2022    in situ right mid back    Melanoma 01/2022    in situ R mid back     LUBA (obstructive sleep apnea)     Radiculopathy, lumbar region 09/14/2021    Renal calculi 05/09/2012    Type 2 diabetes mellitus without complication, without long-term current use of insulin 11/16/2020       Past Surgical History:   Procedure Laterality Date    APPENDECTOMY  2006    CARPAL TUNNEL RELEASE Right 09/15/2022    Procedure: RELEASE, CARPAL TUNNEL;  Surgeon: Christen Marshall MD;  Location: [facility] OR;  Service: Orthopedics;  Laterality: Right;    COLONOSCOPY  01/22/2019    internal hemorrhoids, o/w normal - repeat at age 52    EPIDURAL STEROID INJECTION N/A 02/10/2021    Procedure: CERVICAL C6/7 NELI DIRECT REFERRAL;  Surgeon: Michi Escamilla MD;  Location: [facility] PAIN MGT;  Service: Pain  Management;  Laterality: N/A;  NEEDS CONSENT    ESOPHAGOGASTRODUODENOSCOPY  01/22/2019    LA grade B esophagitis; esophageal stenosis- dilated; gastritis; H pylori pathology negative    ESOPHAGOGASTRODUODENOSCOPY N/A 05/18/2021    Procedure: EGD (ESOPHAGOGASTRODUODENOSCOPY);  Surgeon: Mariana Hector MD;  Location: [facility];  Service: Endoscopy;  Laterality: N/A;    EXCISION OF GANGLION OF WRIST Right 09/15/2022    Procedure: EXCISION, GANGLION CYST, WRIST;  Surgeon: Christen Marshall MD;  Location: [facility];  Service: Orthopedics;  Laterality: Right;    FLEXOR TENDON REPAIR Right 09/15/2022    Procedure: FLEXOR TENOSYNOVECTOMY;  Surgeon: Christen Marshall MD;  Location: [facility];  Service: Orthopedics;  Laterality: Right;    HAND ARTHROTOMY Right 09/15/2022    Procedure: ARTHROTOMY, HAND RADIOCARPAL;  Surgeon: Christen Marshall MD;  Location: [facility] OR;  Service: Orthopedics;  Laterality: Right;  Radiocarpal    INJECTION OF STEROID Left 09/15/2022    Procedure: INJECTION, STEROID LEFT WRIST AND LEFT LATERAL ELBOW;  Surgeon: Christen Marshall MD;  Location: [facility];  Service: Orthopedics;  Laterality: Left;  Left Carpal Tunnel CSI    LUMBAR LAMINECTOMY  2010    LUMBAR LAMINECTOMY WITH DISCECTOMY Left 09/20/2021    Procedure: LAMINECTOMY, SPINE, LUMBAR, WITH DISCECTOMY;  Surgeon: Naseem Mcnamara DO;  Location: [address];  Service: Neurosurgery;  Laterality: Left;  MIS L3-4    TYMPANOSTOMY TUBE PLACEMENT         Family History   Problem Relation Age of Onset    Allergic rhinitis Mother     Hypertension Mother     Transient ischemic attack Mother     Sleep apnea Mother     Allergic rhinitis Father     Asthma Father     Chronic back pain Father     MARTITA disease Father     Sleep apnea Father     Melanoma Father     Allergic rhinitis Sister     Asthma Sister     Allergic rhinitis Sister     Asthma Sister     No Known Problems Brother     No Known Problems Maternal Aunt     No Known Problems Maternal Uncle     No Known  Problems Paternal Aunt     No Known Problems Paternal Uncle     Brain cancer Maternal Grandmother     Diabetes Maternal Grandfather     Breast cancer Paternal Grandmother     No Known Problems Paternal Grandfather     Amblyopia Neg Hx     Blindness Neg Hx     Cancer Neg Hx     Cataracts Neg Hx     Glaucoma Neg Hx     Macular degeneration Neg Hx     Retinal detachment Neg Hx     Strabismus Neg Hx     Stroke Neg Hx     Thyroid disease Neg Hx     Allergies Neg Hx     Angioedema Neg Hx        Social History     Socioeconomic History    Marital status:    Tobacco Use    Smoking status: Never     Passive exposure: Never    Smokeless tobacco: Never   Substance and Sexual Activity    Alcohol use: Yes     Alcohol/week: 0.0 standard drinks     Comment: Occasional    Drug use: No    Sexual activity: Yes     Social Determinants of Health     Financial Resource Strain: Low Risk     Difficulty of Paying Living Expenses: Not hard at all   Food Insecurity: No Food Insecurity    Worried About Running Out of Food in the Last Year: Never true    Ran Out of Food in the Last Year: Never true   Transportation Needs: No Transportation Needs    Lack of Transportation (Medical): No    Lack of Transportation (Non-Medical): No   Physical Activity: Sufficiently Active    Days of Exercise per Week: 5 days    Minutes of Exercise per Session: 30 min   Stress: Stress Concern Present    Feeling of Stress : To some extent   Social Connections: Unknown    Frequency of Communication with Friends and Family: More than three times a week    Frequency of Social Gatherings with Friends and Family: Three times a week    Active Member of Clubs or Organizations: No    Attends Club or Organization Meetings: Patient refused    Marital Status:    Housing Stability: Low Risk     Unable to Pay for Housing in the Last Year: No    Number of Places Lived in the Last Year: 1    Unstable Housing in the Last Year: No       Current Outpatient Medications    Medication Sig Dispense Refill    acetaminophen (TYLENOL) 325 MG tablet Take 650 mg by mouth every 6 (six) hours as needed for Pain.      albuterol (PROVENTIL/VENTOLIN HFA) 90 mcg/actuation inhaler RescueINHALE 2 PUFFS INTO THE LUNGS EVERY 6 HOURS AS NEEDED WHEEZING, RESCUE Strength: 90 mcg/actuation 8.5 g 5    atorvastatin (LIPITOR) 20 MG tablet TAKE 1 TABLET(20 MG) BY MOUTH EVERY DAY 90 tablet 1    azelastine (ASTELIN) 137 mcg (0.1 %) nasal spray 2 sprays (274 mcg total) by Nasal route 2 (two) times daily. 30 mL 12    dexmethylphenidate (FOCALIN) 10 MG tablet Take 1 tablet (10 mg total) by mouth 3 (three) times daily. 90 tablet 0    [START ON 4/8/2023] dexmethylphenidate (FOCALIN) 10 MG tablet Take 1 tablet (10 mg total) by mouth 3 (three) times daily. 90 tablet 0    ergocalciferol, vitamin D2, (VITAMIN D ORAL) Take 1 tablet by mouth every other day.      esomeprazole (NEXIUM) 40 mg GrPS Take 40 mg by mouth 2 (two) times daily before meals. 60 each 11    fexofenadine (ALLEGRA) 180 MG tablet Take 180 mg by mouth once daily.      fluocinonide (LIDEX) 0.05 % external solution AAA scalp qday prn itching, scaling 60 mL 3    fluticasone propionate (FLONASE) 50 mcg/actuation nasal spray 1 spray by Each Nostril route once daily.      gabapentin (NEURONTIN) 300 MG capsule TAKE 1 CAPSULE(300 MG) BY MOUTH THREE TIMES DAILY 90 capsule 3    hydrocortisone 2.5 % cream APPLY EXTERNALLY TO THE AFFECTED AREA TWICE DAILY FOR 10 DAYS 30 g 0    ketoconazole (NIZORAL) 2 % cream APPLY TOPICALLY TO THE AFFECTED AREA TWICE DAILY 15 g 0    ketoconazole (NIZORAL) 2 % cream APPLY EXTERNALLY TO THE AFFECTED AREA TWICE DAILY  Strength: 2 % 15 g 0    latanoprost 0.005 % ophthalmic solution INSTILL 1 DROP INTO BOTH EYES EVERY EVENING. THIS REPLACES TIMOLOL 7.5 mL 3    meloxicam (MOBIC) 15 MG tablet Take 1 tablet (15 mg total) by mouth once daily. 30 tablet 1    methocarbamoL (ROBAXIN) 750 MG Tab TAKE 1 TABLET(750 MG) BY MOUTH THREE TIMES  DAILY AS NEEDED FOR MUSCLE SPASM 90 tablet 2    montelukast (SINGULAIR) 10 mg tablet Take 1 tablet (10 mg total) by mouth every evening. 90 tablet 3    ondansetron (ZOFRAN-ODT) 4 MG TbDL Take 1 tablet (4 mg total) by mouth every 6 (six) hours as needed (nausea). 30 tablet 3    predniSONE (DELTASONE) 20 MG tablet 2 tabs po q day x 5 days 10 tablet 0    promethazine (PHENERGAN) 25 MG tablet Take 1 tablet (25 mg total) by mouth every 4 (four) hours. 25 tablet 0    rOPINIRole (REQUIP) 0.25 MG tablet TAKE 1 TABLET(0.25 MG) BY MOUTH EVERY EVENING 90 tablet 2    semaglutide (OZEMPIC) 0.25 mg or 0.5 mg(2 mg/1.5 mL) pen injector Inject 0.5 mg into the skin every 7 days. 2 pen 5    TRUE METRIX GLUCOSE TEST STRIP Strp USE TO TEST ONCE DAILY 25 strip 2    ULTRA THIN LANCETS 30 gauge Misc USE TO TEST BLOOD SUGAR EVERY DAY AS DIRECTED 100 each 3    urea (CARMOL) 40 % Crea Apply to affected area on feet qhs after bath or shower 60 g 3    zolpidem (AMBIEN CR) 12.5 MG CR tablet Take 1 tablet (12.5 mg total) by mouth nightly as needed for Insomnia. 30 tablet 0    zolpidem (AMBIEN) 10 mg Tab Take 1 tablet (10 mg total) by mouth nightly as needed (insomnia). 30 tablet 2    blood-glucose meter kit Use as instructed 1 each 0    LORazepam (ATIVAN) 0.5 MG tablet Take 1 tablet (0.5 mg total) by mouth as needed for Anxiety (Take 1 tablet by mouth 30 minutes prior to scan. Take 2nd tablet, if needed, just before scan.). 2 tablet 0     No current facility-administered medications for this visit.     Facility-Administered Medications Ordered in Other Visits   Medication Dose Route Frequency Provider Last Rate Last Admin    cefazolin (ANCEF) 2 gram in dextrose 5% 50 mL IVPB (premix)  2 g Intravenous On Call Procedure Chong Padron MD        fentaNYL 50 mcg/mL injection 100 mcg  100 mcg Intravenous Q5 Min PRN Ishan Lucas MD   100 mcg at 09/15/22 0655    midazolam (VERSED) 1 mg/mL injection 2 mg  2 mg Intravenous PRN Ishan Lucas MD   2 mg  at 09/15/22 0655    mupirocin 2 % ointment   Nasal On Call Procedure Chong Padron MD   Given at 09/15/22 0553    sodium chloride 0.9% flush 3 mL  3 mL Intravenous PRN Chong Padron MD           Review of patient's allergies indicates:  No Known Allergies      Review of Systems   Constitutional: Negative for chills and fever.   Cardiovascular:  Negative for chest pain, claudication and leg swelling.   Respiratory:  Negative for cough and shortness of breath.    Skin:  Positive for dry skin and suspicious lesions. Negative for nail changes.   Musculoskeletal:         L 2nd toe deformity and lesion   Gastrointestinal:  Negative for nausea and vomiting.   Neurological:  Negative for numbness and paresthesias.   Psychiatric/Behavioral:  Negative for altered mental status.          Objective:      Physical Exam  Constitutional:       General: He is not in acute distress.     Appearance: He is obese. He is not ill-appearing.   Cardiovascular:      Pulses:           Dorsalis pedis pulses are 2+ on the right side and 2+ on the left side.        Posterior tibial pulses are 2+ on the right side and 2+ on the left side.      Comments: No lower extremity edema bilateral.  Skin temp is warm to foot bilateral.  Normal hair growth distribution bilateral lower extremity.  No rubor on dependency bilateral foot.  Skin temp is warm to foot bilateral.  Musculoskeletal:      Comments: L 2nd toe with hammertoe deformity (DF contracture of MTPJ, PF contracture of PIPJ and DIPJ with DIPJ which also has lateral deviation creating prominent medial aspect of joint under skin lesion, see skin section). All reducible except DIPJ lateral deviation which is rigid. Mild pain on palpation of medial DIPJ over skin lesion.    Skin:     General: Skin is warm.      Capillary Refill: Capillary refill takes less than 2 seconds.      Findings: No ecchymosis or erythema.      Nails: There is no clubbing.      Comments: Hyperkeratotic lesion noted to  medial L 2nd meliza DIPJ. Skin lines present to lesion/s. No signs of deep tissue injury. No open lesion or wound noted.       Neurological:      Mental Status: He is alert.      Sensory: No sensory deficit.             Assessment:       Encounter Diagnoses   Name Primary?    Hammertoe of left foot Yes    Toe deformity, left     Corn or callus              Plan:       Hermelindo was seen today for hammer toe.    Diagnoses and all orders for this visit:    Hammertoe of left foot    Toe deformity, left    Corn or callus          I counseled the patient on his conditions, their implications and medical management.    Conservative care discussion:   Wide, soft toe box shoes recommended. Silicone toe sleeves recommended for painful hammertoe and corn as directed. Additional ones can be purchased from [employer] or local pharmacy.     Surgical discussion:  Briefly discussed surgical intervention for hammertoe (PIPJ and DIPJ fusion with K-wire fixation). Discussed perioperative expectations and post op protocol. Recommended to exhaust all conservative care options prior to surgery. Patient will try above treatment plan and follow up as scheduled for further discussion.     RTC 3-4 weeks for further assessment/recommendations, sooner PRN

## 2023-04-04 ENCOUNTER — TELEPHONE (OUTPATIENT)
Dept: INTERNAL MEDICINE | Facility: CLINIC | Age: 43
End: 2023-04-04
Payer: COMMERCIAL

## 2023-04-04 DIAGNOSIS — E78.2 MIXED HYPERLIPIDEMIA: ICD-10-CM

## 2023-04-04 DIAGNOSIS — Z00.00 ANNUAL PHYSICAL EXAM: ICD-10-CM

## 2023-04-04 DIAGNOSIS — E78.1 HYPERTRIGLYCERIDEMIA: ICD-10-CM

## 2023-04-04 DIAGNOSIS — E11.9 TYPE 2 DIABETES MELLITUS WITHOUT COMPLICATION, WITHOUT LONG-TERM CURRENT USE OF INSULIN: Primary | ICD-10-CM

## 2023-04-04 NOTE — TELEPHONE ENCOUNTER
----- Message from Laly Braden sent at 4/4/2023  2:50 PM CDT -----  Pt comes in for labs tomorrow and we have no orders in.

## 2023-04-05 ENCOUNTER — LAB VISIT (OUTPATIENT)
Dept: LAB | Facility: HOSPITAL | Age: 43
End: 2023-04-05
Payer: COMMERCIAL

## 2023-04-05 ENCOUNTER — PATIENT MESSAGE (OUTPATIENT)
Dept: PSYCHIATRY | Facility: CLINIC | Age: 43
End: 2023-04-05
Payer: COMMERCIAL

## 2023-04-05 DIAGNOSIS — E78.1 HYPERTRIGLYCERIDEMIA: ICD-10-CM

## 2023-04-05 DIAGNOSIS — Z00.00 ANNUAL PHYSICAL EXAM: ICD-10-CM

## 2023-04-05 DIAGNOSIS — E78.2 MIXED HYPERLIPIDEMIA: ICD-10-CM

## 2023-04-05 DIAGNOSIS — E11.9 TYPE 2 DIABETES MELLITUS WITHOUT COMPLICATION, WITHOUT LONG-TERM CURRENT USE OF INSULIN: ICD-10-CM

## 2023-04-05 LAB
ALBUMIN SERPL BCP-MCNC: 4.3 G/DL (ref 3.5–5.2)
ALP SERPL-CCNC: 104 U/L (ref 55–135)
ALT SERPL W/O P-5'-P-CCNC: 36 U/L (ref 10–44)
ANION GAP SERPL CALC-SCNC: 17 MMOL/L (ref 8–16)
AST SERPL-CCNC: 28 U/L (ref 10–40)
BASOPHILS # BLD AUTO: 0.04 K/UL (ref 0–0.2)
BASOPHILS NFR BLD: 0.7 % (ref 0–1.9)
BILIRUB SERPL-MCNC: 0.9 MG/DL (ref 0.1–1)
BUN SERPL-MCNC: 18 MG/DL (ref 6–20)
CALCIUM SERPL-MCNC: 10 MG/DL (ref 8.7–10.5)
CHLORIDE SERPL-SCNC: 103 MMOL/L (ref 95–110)
CHOLEST SERPL-MCNC: 131 MG/DL (ref 120–199)
CHOLEST/HDLC SERPL: 4 {RATIO} (ref 2–5)
CO2 SERPL-SCNC: 22 MMOL/L (ref 23–29)
CREAT SERPL-MCNC: 0.8 MG/DL (ref 0.5–1.4)
DIFFERENTIAL METHOD: NORMAL
EOSINOPHIL # BLD AUTO: 0.2 K/UL (ref 0–0.5)
EOSINOPHIL NFR BLD: 3.9 % (ref 0–8)
ERYTHROCYTE [DISTWIDTH] IN BLOOD BY AUTOMATED COUNT: 13.1 % (ref 11.5–14.5)
EST. GFR  (NO RACE VARIABLE): >60 ML/MIN/1.73 M^2
ESTIMATED AVG GLUCOSE: 111 MG/DL (ref 68–131)
GLUCOSE SERPL-MCNC: 98 MG/DL (ref 70–110)
HBA1C MFR BLD: 5.5 % (ref 4–5.6)
HCT VFR BLD AUTO: 49 % (ref 40–54)
HDLC SERPL-MCNC: 33 MG/DL (ref 40–75)
HDLC SERPL: 25.2 % (ref 20–50)
HGB BLD-MCNC: 16 G/DL (ref 14–18)
IMM GRANULOCYTES # BLD AUTO: 0.01 K/UL (ref 0–0.04)
IMM GRANULOCYTES NFR BLD AUTO: 0.2 % (ref 0–0.5)
LDLC SERPL CALC-MCNC: 67.4 MG/DL (ref 63–159)
LYMPHOCYTES # BLD AUTO: 2.7 K/UL (ref 1–4.8)
LYMPHOCYTES NFR BLD: 44.3 % (ref 18–48)
MCH RBC QN AUTO: 29 PG (ref 27–31)
MCHC RBC AUTO-ENTMCNC: 32.7 G/DL (ref 32–36)
MCV RBC AUTO: 89 FL (ref 82–98)
MONOCYTES # BLD AUTO: 0.5 K/UL (ref 0.3–1)
MONOCYTES NFR BLD: 8.1 % (ref 4–15)
NEUTROPHILS # BLD AUTO: 2.6 K/UL (ref 1.8–7.7)
NEUTROPHILS NFR BLD: 42.8 % (ref 38–73)
NONHDLC SERPL-MCNC: 98 MG/DL
NRBC BLD-RTO: 0 /100 WBC
PLATELET # BLD AUTO: 241 K/UL (ref 150–450)
PMV BLD AUTO: 10.4 FL (ref 9.2–12.9)
POTASSIUM SERPL-SCNC: 4.6 MMOL/L (ref 3.5–5.1)
PROT SERPL-MCNC: 7.3 G/DL (ref 6–8.4)
RBC # BLD AUTO: 5.52 M/UL (ref 4.6–6.2)
SODIUM SERPL-SCNC: 142 MMOL/L (ref 136–145)
TRIGL SERPL-MCNC: 153 MG/DL (ref 30–150)
TSH SERPL DL<=0.005 MIU/L-ACNC: 1.66 UIU/ML (ref 0.4–4)
WBC # BLD AUTO: 6.14 K/UL (ref 3.9–12.7)

## 2023-04-05 PROCEDURE — 80053 COMPREHEN METABOLIC PANEL: CPT | Performed by: HOSPITALIST

## 2023-04-05 PROCEDURE — 83036 HEMOGLOBIN GLYCOSYLATED A1C: CPT | Performed by: HOSPITALIST

## 2023-04-05 PROCEDURE — 80061 LIPID PANEL: CPT | Performed by: HOSPITALIST

## 2023-04-05 PROCEDURE — 84443 ASSAY THYROID STIM HORMONE: CPT | Performed by: HOSPITALIST

## 2023-04-05 PROCEDURE — 85025 COMPLETE CBC W/AUTO DIFF WBC: CPT | Performed by: HOSPITALIST

## 2023-04-05 PROCEDURE — 36415 COLL VENOUS BLD VENIPUNCTURE: CPT | Mod: PO | Performed by: HOSPITALIST

## 2023-04-11 ENCOUNTER — PATIENT MESSAGE (OUTPATIENT)
Dept: ADMINISTRATIVE | Facility: OTHER | Age: 43
End: 2023-04-11
Payer: COMMERCIAL

## 2023-04-12 ENCOUNTER — LAB VISIT (OUTPATIENT)
Dept: LAB | Facility: HOSPITAL | Age: 43
End: 2023-04-12
Attending: HOSPITALIST
Payer: COMMERCIAL

## 2023-04-12 ENCOUNTER — OFFICE VISIT (OUTPATIENT)
Dept: INTERNAL MEDICINE | Facility: CLINIC | Age: 43
End: 2023-04-12
Payer: COMMERCIAL

## 2023-04-12 VITALS
DIASTOLIC BLOOD PRESSURE: 80 MMHG | HEIGHT: 65 IN | WEIGHT: 244.69 LBS | OXYGEN SATURATION: 99 % | SYSTOLIC BLOOD PRESSURE: 130 MMHG | HEART RATE: 80 BPM | TEMPERATURE: 98 F | BODY MASS INDEX: 40.77 KG/M2 | RESPIRATION RATE: 16 BRPM

## 2023-04-12 DIAGNOSIS — K21.9 GASTRO-ESOPHAGEAL REFLUX DISEASE WITHOUT ESOPHAGITIS: ICD-10-CM

## 2023-04-12 DIAGNOSIS — Z86.006 HISTORY OF MELANOMA IN SITU: ICD-10-CM

## 2023-04-12 DIAGNOSIS — F41.9 ANXIETY: ICD-10-CM

## 2023-04-12 DIAGNOSIS — M47.12 CERVICAL SPONDYLOSIS WITH MYELOPATHY: ICD-10-CM

## 2023-04-12 DIAGNOSIS — E78.2 MIXED HYPERLIPIDEMIA: ICD-10-CM

## 2023-04-12 DIAGNOSIS — F98.8 ATTENTION DEFICIT DISORDER, UNSPECIFIED HYPERACTIVITY PRESENCE: ICD-10-CM

## 2023-04-12 DIAGNOSIS — E66.01 MORBID OBESITY: ICD-10-CM

## 2023-04-12 DIAGNOSIS — G47.33 OSA (OBSTRUCTIVE SLEEP APNEA): ICD-10-CM

## 2023-04-12 DIAGNOSIS — E11.9 TYPE 2 DIABETES MELLITUS WITHOUT COMPLICATION, WITHOUT LONG-TERM CURRENT USE OF INSULIN: ICD-10-CM

## 2023-04-12 DIAGNOSIS — E55.9 VITAMIN D INSUFFICIENCY: ICD-10-CM

## 2023-04-12 DIAGNOSIS — J45.20 MILD INTERMITTENT ASTHMA WITHOUT COMPLICATION: ICD-10-CM

## 2023-04-12 DIAGNOSIS — R11.0 NAUSEA: ICD-10-CM

## 2023-04-12 DIAGNOSIS — K75.81 NASH (NONALCOHOLIC STEATOHEPATITIS): ICD-10-CM

## 2023-04-12 DIAGNOSIS — Z00.00 ANNUAL PHYSICAL EXAM: Primary | ICD-10-CM

## 2023-04-12 PROBLEM — D03.59 MELANOMA IN SITU OF TRUNK: Status: RESOLVED | Noted: 2022-02-21 | Resolved: 2023-04-12

## 2023-04-12 LAB
CCP AB SER IA-ACNC: <0.5 U/ML
CRP SERPL-MCNC: 3.8 MG/L (ref 0–8.2)
ERYTHROCYTE [SEDIMENTATION RATE] IN BLOOD BY PHOTOMETRIC METHOD: 30 MM/HR (ref 0–23)
RHEUMATOID FACT SERPL-ACNC: <13 IU/ML (ref 0–15)

## 2023-04-12 PROCEDURE — 3075F PR MOST RECENT SYSTOLIC BLOOD PRESS GE 130-139MM HG: ICD-10-PCS | Mod: CPTII,S$GLB,, | Performed by: HOSPITALIST

## 2023-04-12 PROCEDURE — 99396 PR PREVENTIVE VISIT,EST,40-64: ICD-10-PCS | Mod: S$GLB,,, | Performed by: HOSPITALIST

## 2023-04-12 PROCEDURE — 3072F LOW RISK FOR RETINOPATHY: CPT | Mod: CPTII,S$GLB,, | Performed by: HOSPITALIST

## 2023-04-12 PROCEDURE — 1160F PR REVIEW ALL MEDS BY PRESCRIBER/CLIN PHARMACIST DOCUMENTED: ICD-10-PCS | Mod: CPTII,S$GLB,, | Performed by: HOSPITALIST

## 2023-04-12 PROCEDURE — 99999 PR PBB SHADOW E&M-EST. PATIENT-LVL IV: ICD-10-PCS | Mod: PBBFAC,,, | Performed by: HOSPITALIST

## 2023-04-12 PROCEDURE — 36415 COLL VENOUS BLD VENIPUNCTURE: CPT | Mod: PO | Performed by: HOSPITALIST

## 2023-04-12 PROCEDURE — 1159F MED LIST DOCD IN RCRD: CPT | Mod: CPTII,S$GLB,, | Performed by: HOSPITALIST

## 2023-04-12 PROCEDURE — 1159F PR MEDICATION LIST DOCUMENTED IN MEDICAL RECORD: ICD-10-PCS | Mod: CPTII,S$GLB,, | Performed by: HOSPITALIST

## 2023-04-12 PROCEDURE — 3008F BODY MASS INDEX DOCD: CPT | Mod: CPTII,S$GLB,, | Performed by: HOSPITALIST

## 2023-04-12 PROCEDURE — 99396 PREV VISIT EST AGE 40-64: CPT | Mod: S$GLB,,, | Performed by: HOSPITALIST

## 2023-04-12 PROCEDURE — 86038 ANTINUCLEAR ANTIBODIES: CPT | Performed by: HOSPITALIST

## 2023-04-12 PROCEDURE — 85652 RBC SED RATE AUTOMATED: CPT | Performed by: HOSPITALIST

## 2023-04-12 PROCEDURE — 1160F RVW MEDS BY RX/DR IN RCRD: CPT | Mod: CPTII,S$GLB,, | Performed by: HOSPITALIST

## 2023-04-12 PROCEDURE — 3044F PR MOST RECENT HEMOGLOBIN A1C LEVEL <7.0%: ICD-10-PCS | Mod: CPTII,S$GLB,, | Performed by: HOSPITALIST

## 2023-04-12 PROCEDURE — 3044F HG A1C LEVEL LT 7.0%: CPT | Mod: CPTII,S$GLB,, | Performed by: HOSPITALIST

## 2023-04-12 PROCEDURE — 86431 RHEUMATOID FACTOR QUANT: CPT | Performed by: HOSPITALIST

## 2023-04-12 PROCEDURE — 3075F SYST BP GE 130 - 139MM HG: CPT | Mod: CPTII,S$GLB,, | Performed by: HOSPITALIST

## 2023-04-12 PROCEDURE — 99999 PR PBB SHADOW E&M-EST. PATIENT-LVL IV: CPT | Mod: PBBFAC,,, | Performed by: HOSPITALIST

## 2023-04-12 PROCEDURE — 3008F PR BODY MASS INDEX (BMI) DOCUMENTED: ICD-10-PCS | Mod: CPTII,S$GLB,, | Performed by: HOSPITALIST

## 2023-04-12 PROCEDURE — 86200 CCP ANTIBODY: CPT | Performed by: HOSPITALIST

## 2023-04-12 PROCEDURE — 3079F DIAST BP 80-89 MM HG: CPT | Mod: CPTII,S$GLB,, | Performed by: HOSPITALIST

## 2023-04-12 PROCEDURE — 3072F PR LOW RISK FOR RETINOPATHY: ICD-10-PCS | Mod: CPTII,S$GLB,, | Performed by: HOSPITALIST

## 2023-04-12 PROCEDURE — 86140 C-REACTIVE PROTEIN: CPT | Performed by: HOSPITALIST

## 2023-04-12 PROCEDURE — 3079F PR MOST RECENT DIASTOLIC BLOOD PRESSURE 80-89 MM HG: ICD-10-PCS | Mod: CPTII,S$GLB,, | Performed by: HOSPITALIST

## 2023-04-12 RX ORDER — MELOXICAM 15 MG/1
15 TABLET ORAL DAILY
Qty: 30 TABLET | Refills: 2 | Status: SHIPPED | OUTPATIENT
Start: 2023-04-12 | End: 2023-06-29

## 2023-04-12 RX ORDER — ESCITALOPRAM OXALATE 10 MG/1
10 TABLET ORAL DAILY
Qty: 30 TABLET | Refills: 11 | Status: SHIPPED | OUTPATIENT
Start: 2023-04-12 | End: 2023-06-08

## 2023-04-12 NOTE — PROGRESS NOTES
Subjective:     @Patient ID: Hermelindo Garza III is a 42 y.o. male.    Chief Complaint: No chief complaint on file.    HPI    43 yo male with DM2, HLD, obesity, GERD, SHEIKH, LUBA, ADHD, asthma, spondylosis, hx of melanoma in situ presents for annual. Pt is new to me. Last PCP Dr Beck.    DM2: ozempic 0.5 mg weekly. Is interested in stopping medication as A1c controlled and has had increasing nausea. Not sure if ozempic is causing it since he has been on it for years.   HLD: not on medication  LUBA: uses cpap  GERD: nexium 40 mg bid  Cervical spondylosis with myelopathy: continues to have chronic neck pain with radiation down both arms (now L side increased) also endorses weakness. Does see a spine surgeon. Reports he was told to also r/o rheumatoid arthritis. Pt denies having joint swelling   ADHD: on focalin. Sees psychiatry.   Anxiety: reports increased anxiety over the past few months. Having trouble sleeping. Has not been on SSRI      Lipid disorders/ASCVD risk (ages >/= 45 or >/= 20 if increased risk ): ordered  Eye exam:      Vaccines:   Influenza (yearly) utd    Tetanus (every 10 yrs - 1st tdap) utd 2019   Covid19: utd   Pna 23: done    Exercise: exercises regularly        Review of Systems   Constitutional:  Negative for chills and fever.   HENT:  Negative for congestion and sore throat.    Eyes:  Negative for pain and visual disturbance.   Respiratory:  Negative for cough and shortness of breath.    Cardiovascular:  Negative for chest pain and leg swelling.   Gastrointestinal:  Negative for abdominal pain, nausea and vomiting.   Endocrine: Negative for polydipsia and polyuria.   Genitourinary:  Negative for difficulty urinating and dysuria.   Musculoskeletal:  Positive for neck pain.   Skin:  Negative for rash and wound.   Neurological:  Negative for weakness and headaches.   Psychiatric/Behavioral:  Negative for agitation. The patient is nervous/anxious.    Past medical history, surgical history, and  family medical history reviewed and updated as appropriate.    Medications and allergies reviewed.     Objective:     There were no vitals filed for this visit.  There is no height or weight on file to calculate BMI.  Physical Exam  Vitals reviewed.   Constitutional:       General: He is not in acute distress.     Appearance: Normal appearance. He is well-developed.   HENT:      Head: Normocephalic and atraumatic.      Right Ear: Tympanic membrane normal.      Left Ear: Tympanic membrane normal.      Mouth/Throat:      Mouth: Mucous membranes are moist.      Pharynx: No oropharyngeal exudate.   Eyes:      General:         Right eye: No discharge.         Left eye: No discharge.      Conjunctiva/sclera: Conjunctivae normal.   Cardiovascular:      Rate and Rhythm: Normal rate and regular rhythm.      Heart sounds: No murmur heard.    No friction rub.   Pulmonary:      Effort: Pulmonary effort is normal.      Breath sounds: Normal breath sounds.   Abdominal:      General: Bowel sounds are normal. There is no distension.      Palpations: Abdomen is soft.      Tenderness: There is no abdominal tenderness.   Musculoskeletal:         General: Normal range of motion.      Cervical back: Normal range of motion and neck supple.      Right lower leg: No edema.      Left lower leg: No edema.   Lymphadenopathy:      Cervical: No cervical adenopathy.   Skin:     General: Skin is warm and dry.   Neurological:      Mental Status: He is alert and oriented to person, place, and time.   Psychiatric:         Mood and Affect: Mood normal.         Behavior: Behavior normal.       Lab Results   Component Value Date    WBC 6.14 04/05/2023    HGB 16.0 04/05/2023    HCT 49.0 04/05/2023     04/05/2023    CHOL 131 04/05/2023    TRIG 153 (H) 04/05/2023    HDL 33 (L) 04/05/2023    ALT 36 04/05/2023    AST 28 04/05/2023     04/05/2023    K 4.6 04/05/2023     04/05/2023    CREATININE 0.8 04/05/2023    BUN 18 04/05/2023    CO2 22  (L) 04/05/2023    TSH 1.657 04/05/2023    PSA 0.18 10/05/2022    INR 1.0 09/19/2021    HGBA1C 5.5 04/05/2023       Assessment:     1. Annual physical exam    2. Type 2 diabetes mellitus without complication, without long-term current use of insulin    3. Mixed hyperlipidemia    4. SHEIKH (nonalcoholic steatohepatitis)    5. LUBA (obstructive sleep apnea)    6. Vitamin D insufficiency    7. Morbid obesity    8. Mild intermittent asthma without complication    9. Gastro-esophageal reflux disease without esophagitis    10. Attention deficit disorder, unspecified hyperactivity presence    11. Anxiety    12. Cervical spondylosis with myelopathy    13. Nausea    14. History of melanoma in situ      Plan:   Diagnoses and all orders for this visit:    Annual physical exam  - labs ordered     Type 2 diabetes mellitus without complication, without long-term current use of insulin  - Stable.  Recommend to hold ozempic x 2 weeks and monitor if nausea improves     Mixed hyperlipidemia  - Stable. Continue home meds     SHEIKH (nonalcoholic steatohepatitis)  - Stable. Continue to monitor     LUBA (obstructive sleep apnea)  - stable. Continue home cpap     Vitamin D insufficiency  - Stable. Continue home meds     Morbid obesity  - pt aware of following healthy diet and exercise     Mild intermittent asthma without complication  - Stable. Continue home meds     Gastro-esophageal reflux disease without esophagitis  - Stable. Continue home meds     Attention deficit disorder, unspecified hyperactivity presence  - Stable. Continue home meds     Anxiety  - start Lexapro. Pt will update MD   -     Ambulatory referral/consult to Psychiatry; Future    Cervical spondylosis with myelopathy  -     RHEUMATOID FACTOR; Future  -     Sedimentation rate; Future  -     C-reactive protein; Future  -     GREGORY; Future  -     Cyclic citrul peptide antibody, IgG; Future    Nausea  - see dm2    History of melanoma in situ  - continue f/u with  dermatology    Other orders  -     EScitalopram oxalate (LEXAPRO) 10 MG tablet; Take 1 tablet (10 mg total) by mouth once daily.  -     meloxicam (MOBIC) 15 MG tablet; Take 1 tablet (15 mg total) by mouth once daily. For pain           Suzi Sanches MD  Internal Medicine    4/12/2023

## 2023-04-13 ENCOUNTER — PATIENT MESSAGE (OUTPATIENT)
Dept: ALLERGY | Facility: CLINIC | Age: 43
End: 2023-04-13

## 2023-04-13 ENCOUNTER — OFFICE VISIT (OUTPATIENT)
Dept: ALLERGY | Facility: CLINIC | Age: 43
End: 2023-04-13
Payer: COMMERCIAL

## 2023-04-13 VITALS — HEIGHT: 65 IN | BODY MASS INDEX: 40.91 KG/M2 | WEIGHT: 245.56 LBS

## 2023-04-13 DIAGNOSIS — H10.13 ALLERGIC CONJUNCTIVITIS, BILATERAL: ICD-10-CM

## 2023-04-13 DIAGNOSIS — T78.1XXD POLLEN-FOOD ALLERGY, SUBSEQUENT ENCOUNTER: ICD-10-CM

## 2023-04-13 DIAGNOSIS — J45.20 MILD INTERMITTENT ASTHMA WITHOUT COMPLICATION: ICD-10-CM

## 2023-04-13 DIAGNOSIS — J30.9 CHRONIC ALLERGIC RHINITIS: Primary | ICD-10-CM

## 2023-04-13 LAB — ANA SER QL IF: NORMAL

## 2023-04-13 PROCEDURE — 3008F PR BODY MASS INDEX (BMI) DOCUMENTED: ICD-10-PCS | Mod: CPTII,S$GLB,, | Performed by: ALLERGY & IMMUNOLOGY

## 2023-04-13 PROCEDURE — 1159F MED LIST DOCD IN RCRD: CPT | Mod: CPTII,S$GLB,, | Performed by: ALLERGY & IMMUNOLOGY

## 2023-04-13 PROCEDURE — 3072F PR LOW RISK FOR RETINOPATHY: ICD-10-PCS | Mod: CPTII,S$GLB,, | Performed by: ALLERGY & IMMUNOLOGY

## 2023-04-13 PROCEDURE — 1160F RVW MEDS BY RX/DR IN RCRD: CPT | Mod: CPTII,S$GLB,, | Performed by: ALLERGY & IMMUNOLOGY

## 2023-04-13 PROCEDURE — 3044F PR MOST RECENT HEMOGLOBIN A1C LEVEL <7.0%: ICD-10-PCS | Mod: CPTII,S$GLB,, | Performed by: ALLERGY & IMMUNOLOGY

## 2023-04-13 PROCEDURE — 99999 PR PBB SHADOW E&M-EST. PATIENT-LVL IV: ICD-10-PCS | Mod: PBBFAC,,, | Performed by: ALLERGY & IMMUNOLOGY

## 2023-04-13 PROCEDURE — 1160F PR REVIEW ALL MEDS BY PRESCRIBER/CLIN PHARMACIST DOCUMENTED: ICD-10-PCS | Mod: CPTII,S$GLB,, | Performed by: ALLERGY & IMMUNOLOGY

## 2023-04-13 PROCEDURE — 1159F PR MEDICATION LIST DOCUMENTED IN MEDICAL RECORD: ICD-10-PCS | Mod: CPTII,S$GLB,, | Performed by: ALLERGY & IMMUNOLOGY

## 2023-04-13 PROCEDURE — 3044F HG A1C LEVEL LT 7.0%: CPT | Mod: CPTII,S$GLB,, | Performed by: ALLERGY & IMMUNOLOGY

## 2023-04-13 PROCEDURE — 99999 PR PBB SHADOW E&M-EST. PATIENT-LVL IV: CPT | Mod: PBBFAC,,, | Performed by: ALLERGY & IMMUNOLOGY

## 2023-04-13 PROCEDURE — 99214 PR OFFICE/OUTPT VISIT, EST, LEVL IV, 30-39 MIN: ICD-10-PCS | Mod: S$GLB,,, | Performed by: ALLERGY & IMMUNOLOGY

## 2023-04-13 PROCEDURE — 3008F BODY MASS INDEX DOCD: CPT | Mod: CPTII,S$GLB,, | Performed by: ALLERGY & IMMUNOLOGY

## 2023-04-13 PROCEDURE — 3072F LOW RISK FOR RETINOPATHY: CPT | Mod: CPTII,S$GLB,, | Performed by: ALLERGY & IMMUNOLOGY

## 2023-04-13 PROCEDURE — 99214 OFFICE O/P EST MOD 30 MIN: CPT | Mod: S$GLB,,, | Performed by: ALLERGY & IMMUNOLOGY

## 2023-04-13 NOTE — PROGRESS NOTES
Subjective:       Patient ID: Hermelindo Garza III is a 42 y.o. male.    Chief Complaint:  No chief complaint on file.      43 yo man presents for continued evaluation of allergic rhinitis and conjunctivitis, asthma and oral allergy syndrome. He was last seen 2022. He had immunocaps then with positives to cat, dog, grass, mold and trees. He is on fexofenadine daily prn, azelastine 2 SEN BID prn and albuterol as needed. He uses nasal spray every evening for most part. He uses albuterol maybe once a week some weeks and then sometimes 2 times per day, varies. He has nasal congestion, worse at night. Has lot of NPD. Has itchy red eyes. He is interested in IT    Prior History taken 2022: new patient evaluation of possible allergies. She states as child had bad allergy and asthma and did shots and seemed to work. All went away for long time. Over past year or son has issues. About 6 pm he gets sneeze fit, no runny nose then itchy watery eyes then nose swells and very congested. this causes him to feel SOB and use albuterol. Occ has patches if itch on skin as well. No drainage. No season worse but is mostly at night about 6 pm. He works from home so is home all day and not occurring day. He feels has been worse since  when got new dog, (had dg prior but that one  and got another). If he uses Flonase and allegra daily this seems to help but as soon as stops symptoms come back. He has elevated eye pressure so told not to use Flonase daily. He sleeps with CPAP and congestion makes it hard to use. Has asthma is on montelukast daily, not sure If it does much. Albuterol prn helps. No eczema. He does notice oral mouth hot, burning, tingling and occ lip swelling with raw avocado, banana and some nuts like pecan or walnut right out of shell. No H/O hives with any food. No insect or latex allergy. No ENT surgery      Environmental History: see history section for home environment  Review of Systems    Constitutional:  Negative for activity change, appetite change, chills, fatigue, fever and unexpected weight change.   HENT:  Positive for congestion, sinus pressure and sneezing. Negative for ear discharge, ear pain, facial swelling, hearing loss, mouth sores, nosebleeds, postnasal drip, rhinorrhea, sore throat, tinnitus, trouble swallowing and voice change.    Eyes:  Positive for discharge and itching. Negative for redness and visual disturbance.   Respiratory:  Positive for chest tightness and shortness of breath. Negative for cough and wheezing.    Cardiovascular:  Negative for chest pain, palpitations and leg swelling.   Gastrointestinal:  Negative for abdominal distention, abdominal pain, constipation, diarrhea, nausea and vomiting.   Genitourinary:  Negative for difficulty urinating.   Musculoskeletal:  Negative for arthralgias, back pain, joint swelling and myalgias.   Skin:  Negative for color change, pallor and rash.   Neurological:  Negative for dizziness, tremors, speech difficulty, weakness, light-headedness and headaches.   Hematological:  Negative for adenopathy. Does not bruise/bleed easily.   Psychiatric/Behavioral:  Negative for agitation, confusion, decreased concentration and sleep disturbance. The patient is not nervous/anxious.       Objective:      Physical Exam  Vitals and nursing note reviewed.   Constitutional:       General: He is not in acute distress.     Appearance: Normal appearance. He is not ill-appearing.   HENT:      Head: Normocephalic and atraumatic.      Right Ear: External ear normal.      Left Ear: External ear normal.      Nose: No rhinorrhea.   Eyes:      General:         Right eye: No discharge.         Left eye: No discharge.      Conjunctiva/sclera: Conjunctivae normal.   Cardiovascular:      Rate and Rhythm: Normal rate and regular rhythm.   Pulmonary:      Effort: Pulmonary effort is normal. No respiratory distress.   Abdominal:      General: There is no distension.    Musculoskeletal:         General: Normal range of motion.   Skin:     General: Skin is warm and dry.      Findings: No erythema or rash.   Neurological:      Mental Status: He is alert and oriented to person, place, and time.   Psychiatric:         Mood and Affect: Mood normal.         Behavior: Behavior normal.         Thought Content: Thought content normal.         Judgment: Judgment normal.       Laboratory:   none performed   Assessment:       1. Chronic allergic rhinitis    2. Allergic conjunctivitis, bilateral    3. Pollen-food allergy, subsequent encounter    4. Mild intermittent asthma without complication         Plan:       Cat and dog avoidance, measures reviewed  azelastine 2 SEN BID as this will not affect eye pressure  Can continue fexofenadine daily  Albuterol 2 puff every 4 hours as needed  Advised reaction to avocado, banana and nuts is oral allergy syndrome likely related to ragweed  Patient interested in allergy shots.  All risks and benefits discussed.  Protocol discussed in detail including need to remain in clinic for 30 minutes after injections.  All questions answered, handouts with details of allergy shots provided and informed consent signed and witnessed.  Patient advised to remain off beta blockers while on allergy shots and to notify injection clinic and MD of any new medications starts.

## 2023-04-17 ENCOUNTER — OFFICE VISIT (OUTPATIENT)
Dept: ORTHOPEDICS | Facility: CLINIC | Age: 43
End: 2023-04-17
Payer: COMMERCIAL

## 2023-04-17 ENCOUNTER — HOSPITAL ENCOUNTER (OUTPATIENT)
Dept: RADIOLOGY | Facility: HOSPITAL | Age: 43
Discharge: HOME | End: 2023-04-17
Attending: ORTHOPAEDIC SURGERY
Payer: COMMERCIAL

## 2023-04-17 ENCOUNTER — PATIENT MESSAGE (OUTPATIENT)
Dept: INTERNAL MEDICINE | Facility: CLINIC | Age: 43
End: 2023-04-17
Payer: COMMERCIAL

## 2023-04-17 DIAGNOSIS — M54.16 LUMBAR RADICULOPATHY, CHRONIC: ICD-10-CM

## 2023-04-17 DIAGNOSIS — M79.641 RIGHT HAND PAIN: Primary | ICD-10-CM

## 2023-04-17 DIAGNOSIS — M77.12 LEFT LATERAL EPICONDYLITIS: ICD-10-CM

## 2023-04-17 DIAGNOSIS — M67.431 GANGLION CYST OF VOLAR ASPECT OF RIGHT WRIST: ICD-10-CM

## 2023-04-17 DIAGNOSIS — M50.10 CERVICAL DISC DISORDER WITH RADICULOPATHY, UNSPECIFIED CERVICAL REGION: ICD-10-CM

## 2023-04-17 DIAGNOSIS — M18.11 PRIMARY OSTEOARTHRITIS OF FIRST CARPOMETACARPAL JOINT OF RIGHT HAND: Primary | ICD-10-CM

## 2023-04-17 DIAGNOSIS — M75.82 ROTATOR CUFF TENDONITIS, LEFT: ICD-10-CM

## 2023-04-17 DIAGNOSIS — G89.4 CHRONIC PAIN SYNDROME: Primary | ICD-10-CM

## 2023-04-17 DIAGNOSIS — M54.12 CERVICAL RADICULOPATHY: ICD-10-CM

## 2023-04-17 DIAGNOSIS — M79.641 RIGHT HAND PAIN: ICD-10-CM

## 2023-04-17 PROCEDURE — 20605 DRAIN/INJ JOINT/BURSA W/O US: CPT | Mod: LT,S$GLB,, | Performed by: ORTHOPAEDIC SURGERY

## 2023-04-17 PROCEDURE — 99999 PR PBB SHADOW E&M-EST. PATIENT-LVL IV: CPT | Mod: PBBFAC,,, | Performed by: ORTHOPAEDIC SURGERY

## 2023-04-17 PROCEDURE — 3044F PR MOST RECENT HEMOGLOBIN A1C LEVEL <7.0%: ICD-10-PCS | Mod: CPTII,S$GLB,, | Performed by: ORTHOPAEDIC SURGERY

## 2023-04-17 PROCEDURE — 3072F LOW RISK FOR RETINOPATHY: CPT | Mod: CPTII,S$GLB,, | Performed by: ORTHOPAEDIC SURGERY

## 2023-04-17 PROCEDURE — 20600 DRAIN/INJ JOINT/BURSA W/O US: CPT | Mod: 51,XS,RT,S$GLB | Performed by: ORTHOPAEDIC SURGERY

## 2023-04-17 PROCEDURE — 3044F HG A1C LEVEL LT 7.0%: CPT | Mod: CPTII,S$GLB,, | Performed by: ORTHOPAEDIC SURGERY

## 2023-04-17 PROCEDURE — 73130 XR HAND COMPLETE 3 VIEW RIGHT: ICD-10-PCS | Mod: 26,RT,, | Performed by: RADIOLOGY

## 2023-04-17 PROCEDURE — 20600 SMALL JOINT ASPIRATION/INJECTION: R THUMB CMC: ICD-10-PCS | Mod: 51,XS,RT,S$GLB | Performed by: ORTHOPAEDIC SURGERY

## 2023-04-17 PROCEDURE — 20605 INTERMEDIATE JOINT ASPIRATION/INJECTION: ICD-10-PCS | Mod: LT,S$GLB,, | Performed by: ORTHOPAEDIC SURGERY

## 2023-04-17 PROCEDURE — 99214 PR OFFICE/OUTPT VISIT, EST, LEVL IV, 30-39 MIN: ICD-10-PCS | Mod: 25,S$GLB,, | Performed by: ORTHOPAEDIC SURGERY

## 2023-04-17 PROCEDURE — 99214 OFFICE O/P EST MOD 30 MIN: CPT | Mod: 25,S$GLB,, | Performed by: ORTHOPAEDIC SURGERY

## 2023-04-17 PROCEDURE — 99999 PR PBB SHADOW E&M-EST. PATIENT-LVL IV: ICD-10-PCS | Mod: PBBFAC,,, | Performed by: ORTHOPAEDIC SURGERY

## 2023-04-17 PROCEDURE — 73130 X-RAY EXAM OF HAND: CPT | Mod: TC,RT

## 2023-04-17 PROCEDURE — 73130 X-RAY EXAM OF HAND: CPT | Mod: 26,RT,, | Performed by: RADIOLOGY

## 2023-04-17 PROCEDURE — 3072F PR LOW RISK FOR RETINOPATHY: ICD-10-PCS | Mod: CPTII,S$GLB,, | Performed by: ORTHOPAEDIC SURGERY

## 2023-04-17 RX ADMIN — METHYLPREDNISOLONE ACETATE 40 MG: 40 INJECTION, SUSPENSION INTRA-ARTICULAR; INTRALESIONAL; INTRAMUSCULAR; SOFT TISSUE at 01:04

## 2023-04-17 NOTE — TELEPHONE ENCOUNTER
Per patient; Do my labs show no RA? And would you suggest pain management next or something else?     Please advise.

## 2023-04-17 NOTE — PROCEDURES
Small Joint Aspiration/Injection: R thumb CMC    Date/Time: 4/17/2023 1:00 PM  Performed by: Christen Marshall MD  Authorized by: Christen Marshall MD     Consent Done?:  Yes (Verbal)  Indications:  Pain  Timeout: prior to procedure the correct patient, procedure, and site was verified    Prep: patient was prepped and draped in usual sterile fashion      Local anesthesia used?: Yes    Anesthesia:  Local infiltration  Local anesthetic:  Lidocaine 1% without epinephrine  Location:  Thumb  Site:  R thumb CMC  Needle size:  25 G  Medications:  40 mg methylPREDNISolone acetate 40 mg/mL  Patient tolerance:  Patient tolerated the procedure well with no immediate complications  L lateral epicondyleIntermediate Joint Aspiration/Injection    Date/Time: 4/17/2023 1:00 PM  Performed by: Chrsiten Marshall MD  Authorized by: Christen Marshall MD     Consent Done?:  Yes (Verbal)  Indications:  Pain  Timeout: Prior to procedure the correct patient, procedure, and site was verified      Location:  Elbow  Prep: Patient was prepped and draped in usual sterile fashion    Needle size:  25 G  Medications:  40 mg methylPREDNISolone acetate 40 mg/mL  Patient tolerance:  Patient tolerated the procedure well with no immediate complications

## 2023-04-17 NOTE — PROGRESS NOTES
Hermelindo Garza III presents for follow up evaluation of   Encounter Diagnoses   Name Primary?    Ganglion cyst of volar aspect of right wrist     Left lateral epicondylitis     Cervical radiculopathy     Rotator cuff tendonitis, left     Primary osteoarthritis of first carpometacarpal joint of right hand Yes     He reports pain in the left elbow that is getting worse.  He also reports pain in the right basilar thumb joint with gripping.  He also reports increasing pain in the left shoulder that is making it difficult to sleep.  He has pain with overhead activities.    PE:    AA&O x 4.  NAD  HEENT:  NCAT, sclera nonicteric  Lungs:  Respirations are equal and unlabored.  CV:  2+ bilateral upper and lower extremity pulses.  MSK:  Tender to palpation left lateral epicondyle, pain with resisted wrist extension.  Positive grind test right basilar thumb joint.  Neurovascularly intact bilaterally.  5/5 thenar and intrinsic musculature strength.  Full range of motion hands, wrists and elbows.    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       A/P:  Left lateral epicondylitis, right basilar thumb arthritis, left rotator cuff tendinitis  1) We have discussed the natural history of basilar thumb arthritis and lateral epicondylitis including treatment options such as splinting, oral and topical anti-inflammatories, cortisone injections and surgery. I have given a right thumb 3D brace for comfort.    -I have offered him a selective injection. I have explained the risks, benefits, and alternatives of the procedure in detail.  The patient voices understanding and all questions have been answered. The patient agrees to proceed as planned. So  after a sterile prep of the skin in the normal fashion the left lateral epicondyle and right thumb CMC joint was injected using a 25 gauge needle with a combination of 1cc 1% plain lidocaine and 40 mg of methylprednisolone.  The patient is cautioned and immediate relief of pain is secondary to the local anesthetic and will be temporary.  After the anesthetic wears off there may be a increase in pain that may last for a few hours or a few days and they should use ice to help alleviate this flair up of pain. Patient tolerated the procedure well.    I have referred him to therapy for ASTYM, extensor tendon glides, rotator cuff strengthening and scapular stabilization. I have instructed him on activity modification for lateral epicondylitis.  I will see him back in 6-8 weeks, sooner for any worsening of symptoms.            Christen Marshall MD    Please be aware that this note has been generated with the assistance of bernice voice-to-text.  Please excuse any spelling or grammatical errors.

## 2023-04-20 ENCOUNTER — OFFICE VISIT (OUTPATIENT)
Dept: PAIN MEDICINE | Facility: CLINIC | Age: 43
End: 2023-04-20
Payer: COMMERCIAL

## 2023-04-20 VITALS
SYSTOLIC BLOOD PRESSURE: 136 MMHG | WEIGHT: 245.56 LBS | DIASTOLIC BLOOD PRESSURE: 86 MMHG | BODY MASS INDEX: 40.87 KG/M2 | HEART RATE: 77 BPM

## 2023-04-20 DIAGNOSIS — M54.16 LUMBAR RADICULOPATHY, CHRONIC: ICD-10-CM

## 2023-04-20 DIAGNOSIS — M54.12 CERVICAL RADICULOPATHY: Primary | ICD-10-CM

## 2023-04-20 DIAGNOSIS — M50.10 CERVICAL DISC DISORDER WITH RADICULOPATHY, UNSPECIFIED CERVICAL REGION: ICD-10-CM

## 2023-04-20 DIAGNOSIS — J45.20 MILD INTERMITTENT ASTHMA WITHOUT COMPLICATION: ICD-10-CM

## 2023-04-20 DIAGNOSIS — G89.4 CHRONIC PAIN SYNDROME: ICD-10-CM

## 2023-04-20 DIAGNOSIS — M54.2 MYOFASCIAL NECK PAIN: ICD-10-CM

## 2023-04-20 PROCEDURE — 3075F PR MOST RECENT SYSTOLIC BLOOD PRESS GE 130-139MM HG: ICD-10-PCS | Mod: CPTII,S$GLB,, | Performed by: STUDENT IN AN ORGANIZED HEALTH CARE EDUCATION/TRAINING PROGRAM

## 2023-04-20 PROCEDURE — 99204 PR OFFICE/OUTPT VISIT, NEW, LEVL IV, 45-59 MIN: ICD-10-PCS | Mod: S$GLB,,, | Performed by: STUDENT IN AN ORGANIZED HEALTH CARE EDUCATION/TRAINING PROGRAM

## 2023-04-20 PROCEDURE — 99999 PR PBB SHADOW E&M-EST. PATIENT-LVL III: CPT | Mod: PBBFAC,,, | Performed by: STUDENT IN AN ORGANIZED HEALTH CARE EDUCATION/TRAINING PROGRAM

## 2023-04-20 PROCEDURE — 3075F SYST BP GE 130 - 139MM HG: CPT | Mod: CPTII,S$GLB,, | Performed by: STUDENT IN AN ORGANIZED HEALTH CARE EDUCATION/TRAINING PROGRAM

## 2023-04-20 PROCEDURE — 3044F PR MOST RECENT HEMOGLOBIN A1C LEVEL <7.0%: ICD-10-PCS | Mod: CPTII,S$GLB,, | Performed by: STUDENT IN AN ORGANIZED HEALTH CARE EDUCATION/TRAINING PROGRAM

## 2023-04-20 PROCEDURE — 3008F BODY MASS INDEX DOCD: CPT | Mod: CPTII,S$GLB,, | Performed by: STUDENT IN AN ORGANIZED HEALTH CARE EDUCATION/TRAINING PROGRAM

## 2023-04-20 PROCEDURE — 3072F LOW RISK FOR RETINOPATHY: CPT | Mod: CPTII,S$GLB,, | Performed by: STUDENT IN AN ORGANIZED HEALTH CARE EDUCATION/TRAINING PROGRAM

## 2023-04-20 PROCEDURE — 3044F HG A1C LEVEL LT 7.0%: CPT | Mod: CPTII,S$GLB,, | Performed by: STUDENT IN AN ORGANIZED HEALTH CARE EDUCATION/TRAINING PROGRAM

## 2023-04-20 PROCEDURE — 99999 PR PBB SHADOW E&M-EST. PATIENT-LVL III: ICD-10-PCS | Mod: PBBFAC,,, | Performed by: STUDENT IN AN ORGANIZED HEALTH CARE EDUCATION/TRAINING PROGRAM

## 2023-04-20 PROCEDURE — 3008F PR BODY MASS INDEX (BMI) DOCUMENTED: ICD-10-PCS | Mod: CPTII,S$GLB,, | Performed by: STUDENT IN AN ORGANIZED HEALTH CARE EDUCATION/TRAINING PROGRAM

## 2023-04-20 PROCEDURE — 1159F MED LIST DOCD IN RCRD: CPT | Mod: CPTII,S$GLB,, | Performed by: STUDENT IN AN ORGANIZED HEALTH CARE EDUCATION/TRAINING PROGRAM

## 2023-04-20 PROCEDURE — 1159F PR MEDICATION LIST DOCUMENTED IN MEDICAL RECORD: ICD-10-PCS | Mod: CPTII,S$GLB,, | Performed by: STUDENT IN AN ORGANIZED HEALTH CARE EDUCATION/TRAINING PROGRAM

## 2023-04-20 PROCEDURE — 99204 OFFICE O/P NEW MOD 45 MIN: CPT | Mod: S$GLB,,, | Performed by: STUDENT IN AN ORGANIZED HEALTH CARE EDUCATION/TRAINING PROGRAM

## 2023-04-20 PROCEDURE — 3072F PR LOW RISK FOR RETINOPATHY: ICD-10-PCS | Mod: CPTII,S$GLB,, | Performed by: STUDENT IN AN ORGANIZED HEALTH CARE EDUCATION/TRAINING PROGRAM

## 2023-04-20 PROCEDURE — 3079F PR MOST RECENT DIASTOLIC BLOOD PRESSURE 80-89 MM HG: ICD-10-PCS | Mod: CPTII,S$GLB,, | Performed by: STUDENT IN AN ORGANIZED HEALTH CARE EDUCATION/TRAINING PROGRAM

## 2023-04-20 PROCEDURE — 3079F DIAST BP 80-89 MM HG: CPT | Mod: CPTII,S$GLB,, | Performed by: STUDENT IN AN ORGANIZED HEALTH CARE EDUCATION/TRAINING PROGRAM

## 2023-04-20 RX ORDER — CYCLOBENZAPRINE HCL 10 MG
10 TABLET ORAL 3 TIMES DAILY PRN
Qty: 90 TABLET | Refills: 2 | Status: SHIPPED | OUTPATIENT
Start: 2023-04-20 | End: 2023-05-20

## 2023-04-20 NOTE — PROGRESS NOTES
[facility] New Patient Evaluation    Referred by: Dr. Suzi Sanches   Reason for referral: Cervical disc disorder with radiculopathy, unspecified cervical region  Lumbar radiculopathy, chronic  Chronic pain syndrome     CC: No chief complaint on file.    Last 3 PDI Scores 4/20/2023   Pain Disability Index (PDI) 17       Subjective:   Hermelindo Garza III is a 42 y.o. male who presents complaining of both neck and back pain.  Today he reports his neck pain is most bothersome.  He reports muscle tightness and pain in the left trapezius and shoulder area.  He reports pain that radiates into the bilateral upper extremities with numbness and tingling.  He has been evaluated by Dr. Sierra with Neurosurgery who noted patient chris  candidate for ACDFs in he progresses.  Pt reports undergoing a cervical epidural steroid injection with Dr. Escamilla 02/10/2021 on with several months of relief.  He also reports low back pain.  He is s/p Left L3-4 hemilaminotomy, medial facetectomy, and foraminotomy for microdiscectomy with Dr Mcnamara on 9/19/2021.  He reports his back pain improved after surgery.  Patient reports he is previously completed physical therapy and is now actively continuing with his exercise program in the gym.  He also reports trying to lose weight and reports intentional  weight loss of approximately 55 pounds since 2020.    Location: back, shoulder, and neck   Onset: 2 years  Current Pain Score: 5/10  Daily Pain of Range: 5-6/10  Quality: Aching, Tight, Tingling, Deep, Numb, Electric, Hot, and Cold  Radiation: neck and back  Worsened by: lying down, sitting, standing for more than 3 minutes, and walking for more than 6 minutes  Improved by: medications    Patient denies night fever/night sweats, urinary incontinence, bowel incontinence, significant weight loss, significant motor weakness, and loss of sensations.    Previous Interventions:  - 02/10/2021 -  C6/7 CERVICAL EPIDURAL STEROID INJECTION  - Dr Escamilla - 4 months of pain relief.     Previous Therapies:  PT/OT: yes   Chiropractor:   HEP: yes  Relevant Surgery: yes    - Reports L4/L5 surgery at age 31 yo   - 09/19/21 - Left MIS L3-4 hemilaminotomy, medial facetectomy, and foraminotomy for microdiscectomy with Dr Mcnamara.   Previous Medications:   - NSAIDS: Meloxicam  - Muscle Relaxants: Robaxin, Flexeril  - TCAs:   - SNRIs:   - Topicals:   - Anticonvulsants: Lyrica - reports RL sxs so stopped; Gabapentin   - Opioids:   - Adjuvants: Tylenol    Current Pain Medications:  Gabapentin 300 mg QHS  Meloxicam  Tylenol       Review of Systems:  ROS    GENERAL:  No weight loss, malaise or fevers. +obesity +DM  HEENT:   No recent changes in vision or hearing  NECK:  No difficulty with swallowing. No stridor.   RESPIRATORY:  Negative for cough, wheezing or shortness of breath, patient denies any recent URI. +Asthma  CARDIOVASCULAR:  Negative for chest pain, leg swelling or palpitations.  GI/:  Negative for abdominal discomfort, blood in stools or black stools or change in bowel habits.   MUSCULOSKELETAL:  See HPI.  SKIN:  Negative for lesions, rash, and itching.  PSYCH:  No mood disorder or recent psychosocial stressors.  +anxiety +ADD  HEMATOLOGY/LYMPHOLOGY:  Negative for prolonged bleeding, bruising easily or swollen nodes.  Patient is not currently taking any anti-coagulants  NEURO:   No history of headaches, syncope, paralysis, seizures or tremors.  All other reviewed and negative other than HPI.    History:  Current medications, allergies, medical history, surgical history,   family history, and social history were reviewed in the chart as marked.    Full Medication List:    Current Outpatient Medications:     acetaminophen (TYLENOL) 325 MG tablet, Take 650 mg by mouth every 6 (six) hours as needed for Pain., Disp: , Rfl:     albuterol (PROVENTIL/VENTOLIN HFA) 90 mcg/actuation inhaler, RescueINHALE 2 PUFFS INTO THE LUNGS EVERY 6 HOURS AS NEEDED WHEEZING,  RESCUE Strength: 90 mcg/actuation, Disp: 8.5 g, Rfl: 5    atorvastatin (LIPITOR) 20 MG tablet, TAKE 1 TABLET(20 MG) BY MOUTH EVERY DAY, Disp: 90 tablet, Rfl: 1    azelastine (ASTELIN) 137 mcg (0.1 %) nasal spray, 2 sprays (274 mcg total) by Nasal route 2 (two) times daily., Disp: 30 mL, Rfl: 9    dexmethylphenidate (FOCALIN) 10 MG tablet, Take 1 tablet (10 mg total) by mouth 3 (three) times daily., Disp: 90 tablet, Rfl: 0    dexmethylphenidate (FOCALIN) 10 MG tablet, Take 1 tablet (10 mg total) by mouth 3 (three) times daily., Disp: 90 tablet, Rfl: 0    ergocalciferol, vitamin D2, (VITAMIN D ORAL), Take 1 tablet by mouth every other day., Disp: , Rfl:     EScitalopram oxalate (LEXAPRO) 10 MG tablet, Take 1 tablet (10 mg total) by mouth once daily., Disp: 30 tablet, Rfl: 11    esomeprazole (NEXIUM) 40 mg GrPS, Take 40 mg by mouth 2 (two) times daily before meals., Disp: 60 each, Rfl: 11    fexofenadine (ALLEGRA) 180 MG tablet, Take 180 mg by mouth once daily., Disp: , Rfl:     fluocinonide (LIDEX) 0.05 % external solution, AAA scalp qday prn itching, scaling, Disp: 60 mL, Rfl: 3    fluticasone propionate (FLONASE) 50 mcg/actuation nasal spray, 1 spray by Each Nostril route once daily., Disp: , Rfl:     gabapentin (NEURONTIN) 300 MG capsule, TAKE 1 CAPSULE(300 MG) BY MOUTH THREE TIMES DAILY, Disp: 90 capsule, Rfl: 3    hydrocortisone 2.5 % cream, APPLY EXTERNALLY TO THE AFFECTED AREA TWICE DAILY FOR 10 DAYS, Disp: 30 g, Rfl: 0    ketoconazole (NIZORAL) 2 % cream, APPLY TOPICALLY TO THE AFFECTED AREA TWICE DAILY, Disp: 15 g, Rfl: 0    ketoconazole (NIZORAL) 2 % cream, APPLY EXTERNALLY TO THE AFFECTED AREA TWICE DAILY Strength: 2 %, Disp: 15 g, Rfl: 0    latanoprost 0.005 % ophthalmic solution, Place 1 drop into both eyes every evening., Disp: 7.5 mL, Rfl: 3    meloxicam (MOBIC) 15 MG tablet, Take 1 tablet (15 mg total) by mouth once daily. For pain, Disp: 30 tablet, Rfl: 2    montelukast (SINGULAIR) 10 mg tablet, Take  1 tablet (10 mg total) by mouth every evening., Disp: 90 tablet, Rfl: 3    ondansetron (ZOFRAN-ODT) 4 MG TbDL, Take 1 tablet (4 mg total) by mouth every 6 (six) hours as needed (nausea)., Disp: 30 tablet, Rfl: 3    promethazine (PHENERGAN) 25 MG tablet, Take 1 tablet (25 mg total) by mouth every 4 (four) hours., Disp: 25 tablet, Rfl: 0    rOPINIRole (REQUIP) 0.25 MG tablet, TAKE 1 TABLET(0.25 MG) BY MOUTH EVERY EVENING, Disp: 90 tablet, Rfl: 2    semaglutide (OZEMPIC) 0.25 mg or 0.5 mg(2 mg/1.5 mL) pen injector, Inject 0.5 mg into the skin every 7 days., Disp: 2 pen, Rfl: 5    TRUE METRIX GLUCOSE TEST STRIP Strp, USE TO TEST ONCE DAILY, Disp: 25 strip, Rfl: 2    ULTRA THIN LANCETS 30 gauge Misc, USE TO TEST BLOOD SUGAR EVERY DAY AS DIRECTED, Disp: 100 each, Rfl: 3    urea (CARMOL) 40 % Crea, Apply to affected area on feet qhs after bath or shower, Disp: 60 g, Rfl: 3    zolpidem (AMBIEN CR) 12.5 MG CR tablet, Take 1 tablet (12.5 mg total) by mouth nightly as needed for Insomnia., Disp: 30 tablet, Rfl: 0    zolpidem (AMBIEN) 10 mg Tab, Take 1 tablet (10 mg total) by mouth nightly as needed (insomnia)., Disp: 30 tablet, Rfl: 2    blood-glucose meter kit, Use as instructed, Disp: 1 each, Rfl: 0    cyclobenzaprine (FLEXERIL) 10 MG tablet, Take 1 tablet (10 mg total) by mouth 3 (three) times daily as needed for Muscle spasms., Disp: 90 tablet, Rfl: 2  No current facility-administered medications for this visit.    Facility-Administered Medications Ordered in Other Visits:     mupirocin 2 % ointment, , Nasal, On Call Procedure, Chong Padron MD, Given at 09/15/22 0587     Allergies:  Patient has no known allergies.     Medical History:   has a past medical history of ADD (attention deficit disorder) (05/09/2012), Allergy, Anxiety (4/12/2023), Asthma (05/09/2012), Cervical disc disorder with radiculopathy, unspecified cervical region (09/14/2021), Cervical spondylosis with myelopathy (06/29/2022), Eczema, Esophageal  ulcer, GERD (gastroesophageal reflux disease) (05/09/2012), Glaucoma, Kidney stones (05/2014), Lumbar disc disease (05/09/2012), Lumbar herniated disc (05/09/2012), Malignant melanoma of skin, unspecified (01/06/2022), Melanoma (01/2022), LUBA (obstructive sleep apnea), Radiculopathy, lumbar region (09/14/2021), Renal calculi (05/09/2012), and Type 2 diabetes mellitus without complication, without long-term current use of insulin (11/16/2020).    Surgical History:   has a past surgical history that includes Lumbar laminectomy (2010); Esophagogastroduodenoscopy (01/22/2019); Colonoscopy (01/22/2019); Appendectomy (2006); Epidural steroid injection (N/A, 02/10/2021); Esophagogastroduodenoscopy (N/A, 05/18/2021); Lumbar laminectomy with discectomy (Left, 09/20/2021); Carpal tunnel release (Right, 09/15/2022); Excision of ganglion of wrist (Right, 09/15/2022); Hand Arthrotomy (Right, 09/15/2022); Injection of steroid (Left, 09/15/2022); Flexor tendon repair (Right, 09/15/2022); and Tympanostomy tube placement.    Family History:  family history includes Allergic rhinitis in his father, mother, sister, and sister; Asthma in his father, sister, and sister; Brain cancer in his maternal grandmother; Breast cancer in his paternal grandmother; Chronic back pain in his father; Diabetes in his maternal grandfather; MARTITA disease in his father; Hypertension in his mother; Melanoma in his father; No Known Problems in his brother, maternal aunt, maternal uncle, paternal aunt, paternal grandfather, and paternal uncle; Sleep apnea in his father and mother; Transient ischemic attack in his mother.    Social History:   reports that he quit smoking about 5 years ago. His smoking use included cigarettes. He has never been exposed to tobacco smoke. He has never used smokeless tobacco. He reports current alcohol use. He reports that he does not use drugs.    Physical Exam:  Vitals:    04/20/23 1029   BP: 136/86   Pulse: 77   Weight: 111.4 kg  (245 lb 9.5 oz)   PainSc:   5   PainLoc: Back       GENERAL: Well appearing, in no acute distress, alert and oriented x3.  PSYCH:  Mood and affect appropriate.  SKIN: Skin color, texture, turgor normal, no rashes or lesions.  HEAD/FACE:  Normocephalic, atraumatic. Cranial nerves grossly intact.  NECK: Supple.  Reports pain with extension of the neck and lateral rotation.  Spurling's positive.  Tenderness and tightness noted over the left trapezius muscle.    CV: RRR with palpation of the radial artery.  PULM: No evidence of respiratory difficulty, symmetric chest rise.  GI:  Soft and non-distended.  MSK:  Lumbar surgical incision is well healed.  Strength and sensation is intact in bilateral lower extremities.  Peripheral joint ROM is full and pain free without obvious instability or laxity in all four extremities. No deformities, edema, or skin discoloration.  No atrophy or tone abnormalities are noted.   NEURO: Bilateral upper and lower extremity coordination and strength is symmetric.  No loss of sensation is noted.  MENTAL STATUS: A x O x 3, good concentration, speech is fluent and goal directed  MOTOR: 5/5 in all muscle groups  GAIT: normal. Ambulates unassisted.    Imaging:  XR CERVICAL SPINE AP LAT WITH FLEX EXTEN     CLINICAL HISTORY:  Other spondylosis with myelopathy, cervical region     TECHNIQUE:  Three views of the cervical spine plus flexion and extension views were performed.     COMPARISON:  September 8, 2022     FINDINGS:  As before, suboptimal visualization of the cervicothoracic junction due to superimposition of shoulder and chest wall soft tissues.  Straightening of normal cervical lordosis.  No acute fractures.  Unremarkable predental space.  No widening prevertebral soft tissues.  No anterior or retrolisthesis.  Flexion and extension views demonstrate no instability.  Reconfirmed areas of anterior longitudinal ligament calcification adjacent to C4-C5 and C5-C6 disc.  Reconfirmed findings of  uncovertebral spurring and endplate osteophytes at preserved C4-C5 level and mildly narrowed C5-C6 level and mild to moderately narrowed C6-C7 level.  Some stable scattered mild facet arthropathic changes.  Intact odontoid tip and unremarkable C1-C2 articulation.  Cervical spine findings do not appear significantly changed to earlier exam.     Electronically signed by: Giovanny Busby  Date:                                            12/13/2022      MRI CERVICAL SPINE WITHOUT CONTRAST     CLINICAL HISTORY:  Arm pain, paresthesias, clumsiness;.  Cervical disc disorder with radiculopathy, unspecified cervical region     TECHNIQUE:  Multiplanar, multisequence MR images of the cervical spine were acquired without the administration of contrast.     COMPARISON:  Cervical spine MRI 01/12/2021     FINDINGS:  Straightening of the normal cervical lordosis.  No spondylolisthesis.     No compression fractures.  No marrow replacing lesions.     Multilevel degenerative changes with disc desiccation and disc space narrowing, described in detail below.  No bone marrow edema.     Visualized structures in the posterior fossa are unremarkable. The cervical spinal cord is unremarkable.     Paraspinal soft tissues are unremarkable.     SIGNIFICANT FINDINGS BY LEVEL:     C2-3: Small disc osteophyte complex with central annular fissure.  No canal or foraminal stenosis.     C3-4: Disc osteophyte complex with superimposed central extrusion.  Mild canal stenosis with partial effacement of the thecal sac.  Mild bilateral foraminal stenosis.     C4-5: Disc osteophyte complex.  Moderate canal stenosis with near complete effacement of the thecal sac and flattening of the cervical cord.  Moderate bilateral foraminal stenosis.     C5-6: Disc osteophyte complex, eccentric to the right.  Severe canal stenosis with complete effacement of the thecal sac and flattening of the cervical cord.  Severe right and moderate left foraminal stenosis.     C6-7:  Disc osteophyte complex.  Moderate canal stenosis.  Moderate bilateral foraminal stenosis.     C7-T1: Disc osteophyte complex with superimposed right foraminal extrusion.  Mild canal stenosis.  Mild right foraminal stenosis.     Impression:     Multilevel degenerative changes, most advanced at C5-6 where there is severe canal stenosis and severe right and moderate left foraminal stenosis.  Overall, degenerative changes have progressed from 01/12/2021.     This report was flagged in Epic as abnormal.     Electronically signed by: Horace Quiroz  Date:                                            06/16/2022      XR LUMBAR SPINE AP AND LAT WITH FLEX/EXT     CLINICAL HISTORY:  Other specified postprocedural states     TECHNIQUE:  AP and lateral views as well as lateral flexion and extension images are performed through the lumbar spine.     COMPARISON:  Non 06/16/2022 MRI of the lumbar spine e     FINDINGS:  Patient had previous a L3/L4 hemilaminectomy on the left side.     Mild DJD and mild lumbar scoliosis.  The lumbar intervertebral disc spaces are slightly narrowed.  Few mm L2/L3 and L3/L4 retrolisthesis noted.  No fracture or dislocation.  No bone destruction identified     Electronically signed by: Xander Rubio MD  Date:                                            12/13/2022    MRI LUMBAR SPINE WITHOUT CONTRAST     CLINICAL HISTORY:  Lumbar radiculopathy, symptoms persist with conservative treatment;Low back pain, prior surgery, new symptoms; Dorsalgia, unspecified     TECHNIQUE:  Multiplanar, multisequence MR images were acquired from the thoracolumbar junction to the sacrum without the administration of contrast.     COMPARISON:  MRI lumbar spine 09/19/2021     FINDINGS:  Sequences are degraded by patient motion artifact.     Transitional lumbosacral anatomy with lumbarization of S1.     Postoperative changes from left L3-4 hemilaminectomy and micro discectomy.  Mild protrusion of the thecal sac and cauda equina nerve  roots into the laminectomy defect.  No fluid collections in the operative bed.     No spondylolisthesis.     No compression fractures.  Small fat containing lesion in the L3 vertebral body, likely a hemangioma.     Multilevel degenerative changes with disc desiccation and disc space narrowing, described in detail below.  No significant bone marrow edema.     The conus medullaris has a normal appearance and terminates at the L2 level.  Cauda equina nerve roots are unremarkable.     Paraspinal muscle atrophy.     SIGNIFICANT FINDINGS BY LEVEL:     T12-L1: Mild disc bulge.  Minimal canal stenosis.  No foraminal stenosis.     L1-2: Left facet arthropathy.  No canal or foraminal stenosis.     L2-3: Disc bulge.  Bilateral facet arthropathy and ligamentum flavum thickening.  Mild canal stenosis.  Mild left foraminal stenosis.     L3-4: Residual disc bulge versus granulation tissue.  The previous disc fragment migrating inferiorly is no longer seen.  Bilateral facet arthropathy and ligamentum flavum thickening.  Mild canal stenosis with narrowing of both subarticular zones and abutment of the left descending L4 nerve root.  Mild right and moderate left foraminal stenosis.     L4-5: Disc bulge.  Bilateral facet arthropathy and ligamentum flavum thickening.  Mild canal stenosis.  Moderate bilateral foraminal stenosis.     L5-S1: Disc bulge.  Bilateral facet arthropathy and ligamentum flavum thickening.  Mild canal stenosis.  Moderate bilateral foraminal stenosis.     Impression:     Postoperative changes from left L3-4 hemilaminectomy and micro discectomy.  The previous disc fragment migrating inferiorly is no longer seen.  There is a residual disc bulge/granulation tissue contributing to mild canal stenosis and moderate left foraminal stenosis.     Small meningocele at the laminectomy defect.     Degenerative changes elsewhere in the spine resulting in mild canal stenosis and moderate foraminal stenosis at multiple levels as  described above.        Electronically signed by: Horace Quiroz  Date:                                            06/16/2022      Labs:  BMP  Lab Results   Component Value Date     04/05/2023    K 4.6 04/05/2023     04/05/2023    CO2 22 (L) 04/05/2023    BUN 18 04/05/2023    CREATININE 0.8 04/05/2023    CALCIUM 10.0 04/05/2023    ANIONGAP 17 (H) 04/05/2023    EGFRNORACEVR >60.0 04/05/2023     Lab Results   Component Value Date    ALT 36 04/05/2023    AST 28 04/05/2023    ALKPHOS 104 04/05/2023    BILITOT 0.9 04/05/2023     Lab Results   Component Value Date    WBC 6.14 04/05/2023    HGB 16.0 04/05/2023    HCT 49.0 04/05/2023    MCV 89 04/05/2023     04/05/2023             Assessment:  Problem List Items Addressed This Visit          Neuro    Chronic pain    Cervical disc disorder with radiculopathy, unspecified cervical region    Lumbar radiculopathy, chronic       Pulmonary    Mild intermittent asthma without complication     Other Visit Diagnoses       Cervical radiculopathy    -  Primary    Myofascial neck pain              04/20/2032-  Hermelindo Garza III is a 42 y.o. male who  has a past medical history of ADD (attention deficit disorder) (05/09/2012), Allergy, Anxiety (4/12/2023), Asthma (05/09/2012), Cervical disc disorder with radiculopathy, unspecified cervical region (09/14/2021), Cervical spondylosis with myelopathy (06/29/2022), Eczema, Esophageal ulcer, GERD (gastroesophageal reflux disease) (05/09/2012), Glaucoma, Kidney stones (05/2014), Lumbar disc disease (05/09/2012), Lumbar herniated disc (05/09/2012), Malignant melanoma of skin, unspecified (01/06/2022), Melanoma (01/2022), LUBA (obstructive sleep apnea), Radiculopathy, lumbar region (09/14/2021), Renal calculi (05/09/2012), and Type 2 diabetes mellitus without complication, without long-term current use of insulin (11/16/2020).  By history and examination this patient has chronicneck pain with bilateral radiculopathy as  well as low back pain s/p 9/19/2021 Left L3-4 hemilaminotomy, medial facetectomy, and foraminotomy for microdiscectomy with Dr Mcnamara with some improvement. The underlying cause cause is facet arthritis, degenerative disc disease, foraminal stenosis, central canal stenosis, muscle dysfunction, and deconditioning.  MRI of the cervical spine was significant for multilevel degenerative changes, most advanced at C5-6 where there is severe canal stenosis and severe right and moderate left foraminal stenosis. MRI of the Lumbar spine post surgery was significant for residual disc bulge/granulation tissue contributing to mild canal stenosis and moderate left foraminal stenosis at L3/L4 and degenerative changes elsewhere in the spine resulting in mild canal stenosis and moderate foraminal stenosis at multiple levels.  We discussed the underlying diagnoses and multiple treatment options including non-opioid medications, interventional procedures, physical therapy, home exercise, core muscle enhancement, and weight loss.  He previously underwent a cervical steroid injection with several months of relief.  Plan for repeat of cervical epidural steroid injection.  The risks and benefits of each treatment option were discussed and all questions were answered.        Treatment Plan:   Procedures:  Schedule patient for repeat cervical epidural steroid injection at C7/T1 as this previously provided him with several months of pain relief.  PT/OT/HEP: I have stressed the importance of physical activity and a home exercise plan to help with pain and improve health.  He is previously completed physical therapy.  Continue with home exercise program.  I have provided the patient with a home exercise packet for neck strengthening and stretching.    Medications:   - Flexeril 10 mg t.i.d. p.r.n.  - increase gabapentin to 600 mg q.h.s.    - continue with meloxicam 15 mg q.d.   - continue with p.r.n. Tylenol   -  Reviewed and consistent with  medication use as prescribed.  Imaging:  Reviewed and discussed with the patient.  Follow Up: RTC 4 weeks postprocedure.    Lorraine Patel DO   Interventional Pain Medicine / Anesthesiology    Disclaimer: This note was partly generated using dictation software which may occasionally result in transcription errors.

## 2023-04-24 ENCOUNTER — OFFICE VISIT (OUTPATIENT)
Dept: PODIATRY | Facility: CLINIC | Age: 43
End: 2023-04-24
Payer: COMMERCIAL

## 2023-04-24 VITALS
WEIGHT: 244.06 LBS | HEART RATE: 67 BPM | RESPIRATION RATE: 18 BRPM | SYSTOLIC BLOOD PRESSURE: 127 MMHG | OXYGEN SATURATION: 97 % | DIASTOLIC BLOOD PRESSURE: 85 MMHG | TEMPERATURE: 99 F | BODY MASS INDEX: 40.66 KG/M2 | HEIGHT: 65 IN

## 2023-04-24 DIAGNOSIS — L84 CORN OR CALLUS: ICD-10-CM

## 2023-04-24 DIAGNOSIS — M20.42 HAMMERTOE OF LEFT FOOT: Primary | ICD-10-CM

## 2023-04-24 PROCEDURE — 99999 PR PBB SHADOW E&M-EST. PATIENT-LVL V: ICD-10-PCS | Mod: PBBFAC,,, | Performed by: PODIATRIST

## 2023-04-24 PROCEDURE — 99999 PR PBB SHADOW E&M-EST. PATIENT-LVL V: CPT | Mod: PBBFAC,,, | Performed by: PODIATRIST

## 2023-04-24 PROCEDURE — 1159F MED LIST DOCD IN RCRD: CPT | Mod: CPTII,S$GLB,, | Performed by: PODIATRIST

## 2023-04-24 PROCEDURE — 3074F PR MOST RECENT SYSTOLIC BLOOD PRESSURE < 130 MM HG: ICD-10-PCS | Mod: CPTII,S$GLB,, | Performed by: PODIATRIST

## 2023-04-24 PROCEDURE — 1159F PR MEDICATION LIST DOCUMENTED IN MEDICAL RECORD: ICD-10-PCS | Mod: CPTII,S$GLB,, | Performed by: PODIATRIST

## 2023-04-24 PROCEDURE — 3072F LOW RISK FOR RETINOPATHY: CPT | Mod: CPTII,S$GLB,, | Performed by: PODIATRIST

## 2023-04-24 PROCEDURE — 3044F PR MOST RECENT HEMOGLOBIN A1C LEVEL <7.0%: ICD-10-PCS | Mod: CPTII,S$GLB,, | Performed by: PODIATRIST

## 2023-04-24 PROCEDURE — 3079F PR MOST RECENT DIASTOLIC BLOOD PRESSURE 80-89 MM HG: ICD-10-PCS | Mod: CPTII,S$GLB,, | Performed by: PODIATRIST

## 2023-04-24 PROCEDURE — 3008F BODY MASS INDEX DOCD: CPT | Mod: CPTII,S$GLB,, | Performed by: PODIATRIST

## 2023-04-24 PROCEDURE — 99213 PR OFFICE/OUTPT VISIT, EST, LEVL III, 20-29 MIN: ICD-10-PCS | Mod: S$GLB,,, | Performed by: PODIATRIST

## 2023-04-24 PROCEDURE — 1160F RVW MEDS BY RX/DR IN RCRD: CPT | Mod: CPTII,S$GLB,, | Performed by: PODIATRIST

## 2023-04-24 PROCEDURE — 3044F HG A1C LEVEL LT 7.0%: CPT | Mod: CPTII,S$GLB,, | Performed by: PODIATRIST

## 2023-04-24 PROCEDURE — 3072F PR LOW RISK FOR RETINOPATHY: ICD-10-PCS | Mod: CPTII,S$GLB,, | Performed by: PODIATRIST

## 2023-04-24 PROCEDURE — 3079F DIAST BP 80-89 MM HG: CPT | Mod: CPTII,S$GLB,, | Performed by: PODIATRIST

## 2023-04-24 PROCEDURE — 3008F PR BODY MASS INDEX (BMI) DOCUMENTED: ICD-10-PCS | Mod: CPTII,S$GLB,, | Performed by: PODIATRIST

## 2023-04-24 PROCEDURE — 99213 OFFICE O/P EST LOW 20 MIN: CPT | Mod: S$GLB,,, | Performed by: PODIATRIST

## 2023-04-24 PROCEDURE — 3074F SYST BP LT 130 MM HG: CPT | Mod: CPTII,S$GLB,, | Performed by: PODIATRIST

## 2023-04-24 PROCEDURE — 1160F PR REVIEW ALL MEDS BY PRESCRIBER/CLIN PHARMACIST DOCUMENTED: ICD-10-PCS | Mod: CPTII,S$GLB,, | Performed by: PODIATRIST

## 2023-04-24 RX ORDER — METHYLPREDNISOLONE ACETATE 40 MG/ML
40 INJECTION, SUSPENSION INTRA-ARTICULAR; INTRALESIONAL; INTRAMUSCULAR; SOFT TISSUE
Status: DISCONTINUED | OUTPATIENT
Start: 2023-04-17 | End: 2023-04-24 | Stop reason: HOSPADM

## 2023-04-24 NOTE — PROGRESS NOTES
"Subjective:      Patient ID: Hermelindo Garza III is a 42 y.o. male.    Chief Complaint: Follow-up (Left foot hammer toe)    Presents complaining of hammertoe on the L 2nd toe that rubs on the big toe causing occasional corns and blisters. Has tried toe spacers and toe socks which have not helped. He is inquiring about additional treatment options and possible surgery in future and would like to get information on this. Most recently 2 weeks ago he developed a blister in the are which eventually healed. States he has difficulty every time he tries to exercise. Wears [place] wide, soft toe box shoes which help but not fully. No other pedal concerns at this time.      04/24/2023: follow up for L 2nd toe corn/callus secondary to hammertoe. Silicone toe sleeve has helped a lot. Pain is minimal now. Here for further assessment and dicussion of surgery in the future if symptoms fail to remain resolved.     Vitals:    04/24/23 1136   BP: 127/85   Pulse: 67   Resp: 18   Temp: 98.7 °F (37.1 °C)   SpO2: 97%   Weight: 110.7 kg (244 lb 0.8 oz)   Height: 5' 5" (1.651 m)   PainSc: 0-No pain         Past Medical History:   Diagnosis Date    ADD (attention deficit disorder) 05/09/2012    Allergy     Anxiety 4/12/2023    Asthma 05/09/2012    Cervical disc disorder with radiculopathy, unspecified cervical region 09/14/2021    Cervical spondylosis with myelopathy 06/29/2022    Eczema     Esophageal ulcer     GERD (gastroesophageal reflux disease) 05/09/2012    Glaucoma     Kidney stones 05/2014    Lumbar disc disease 05/09/2012    Lumbar herniated disc 05/09/2012    Malignant melanoma of skin, unspecified 01/06/2022    in situ right mid back    Melanoma 01/2022    in situ R mid back     LUBA (obstructive sleep apnea)     Radiculopathy, lumbar region 09/14/2021    Renal calculi 05/09/2012    Type 2 diabetes mellitus without complication, without long-term current use of insulin 11/16/2020       Past Surgical History:   Procedure " Laterality Date    APPENDECTOMY  2006    CARPAL TUNNEL RELEASE Right 09/15/2022    Procedure: RELEASE, CARPAL TUNNEL;  Surgeon: Christen Marshall MD;  Location: [facility] OR;  Service: Orthopedics;  Laterality: Right;    COLONOSCOPY  01/22/2019    internal hemorrhoids, o/w normal - repeat at age 52    EPIDURAL STEROID INJECTION N/A 02/10/2021    Procedure: CERVICAL C6/7 NELI DIRECT REFERRAL;  Surgeon: Michi Escamilla MD;  Location: [facility]IN [facility]GT;  Service: Pain Management;  Laterality: N/A;  NEEDS CONSENT    ESOPHAGOGASTRODUODENOSCOPY  01/22/2019    LA grade B esophagitis; esophageal stenosis- dilated; gastritis; H pylori pathology negative    ESOPHAGOGASTRODUODENOSCOPY N/A 05/18/2021    Procedure: EGD (ESOPHAGOGASTRODUODENOSCOPY);  Surgeon: Mariana Hector MD;  Location: [facility];  Service: Endoscopy;  Laterality: N/A;    EXCISION OF GANGLION OF WRIST Right 09/15/2022    Procedure: EXCISION, GANGLION CYST, WRIST;  Surgeon: Christen Marshall MD;  Location: [facility] OR;  Service: Orthopedics;  Laterality: Right;    FLEXOR TENDON REPAIR Right 09/15/2022    Procedure: FLEXOR TENOSYNOVECTOMY;  Surgeon: Christen Marshall MD;  Location: [facility] OR;  Service: Orthopedics;  Laterality: Right;    HAND ARTHROTOMY Right 09/15/2022    Procedure: ARTHROTOMY, HAND RADIOCARPAL;  Surgeon: Christen Marshall MD;  Location: [facility] OR;  Service: Orthopedics;  Laterality: Right;  Radiocarpal    INJECTION OF STEROID Left 09/15/2022    Procedure: INJECTION, STEROID LEFT WRIST AND LEFT LATERAL ELBOW;  Surgeon: Christen Marshall MD;  Location: [facility] OR;  Service: Orthopedics;  Laterality: Left;  Left Carpal Tunnel CSI    LUMBAR LAMINECTOMY  2010    LUMBAR LAMINECTOMY WITH DISCECTOMY Left 09/20/2021    Procedure: LAMINECTOMY, SPINE, LUMBAR, WITH DISCECTOMY;  Surgeon: Naseem Mcnamara DO;  Location: [address];  Service: Neurosurgery;  Laterality: Left;  MIS L3-4    TYMPANOSTOMY TUBE PLACEMENT         Family History   Problem Relation Age of Onset     Allergic rhinitis Mother     Hypertension Mother     Transient ischemic attack Mother     Sleep apnea Mother     Allergic rhinitis Father     Asthma Father     Chronic back pain Father     MARTITA disease Father     Sleep apnea Father     Melanoma Father     Allergic rhinitis Sister     Asthma Sister     Allergic rhinitis Sister     Asthma Sister     No Known Problems Brother     No Known Problems Maternal Aunt     No Known Problems Maternal Uncle     No Known Problems Paternal Aunt     No Known Problems Paternal Uncle     Brain cancer Maternal Grandmother     Diabetes Maternal Grandfather     Breast cancer Paternal Grandmother     No Known Problems Paternal Grandfather     Amblyopia Neg Hx     Blindness Neg Hx     Cancer Neg Hx     Cataracts Neg Hx     Glaucoma Neg Hx     Macular degeneration Neg Hx     Retinal detachment Neg Hx     Strabismus Neg Hx     Stroke Neg Hx     Thyroid disease Neg Hx     Allergies Neg Hx     Angioedema Neg Hx        Social History     Socioeconomic History    Marital status:    Tobacco Use    Smoking status: Former     Types: Cigarettes     Quit date: 2018     Years since quittin.0     Passive exposure: Never    Smokeless tobacco: Never   Substance and Sexual Activity    Alcohol use: Yes     Alcohol/week: 0.0 standard drinks     Comment: Occasional    Drug use: No    Sexual activity: Yes     Social Determinants of Health     Financial Resource Strain: Low Risk     Difficulty of Paying Living Expenses: Not hard at all   Food Insecurity: No Food Insecurity    Worried About Running Out of Food in the Last Year: Never true    Ran Out of Food in the Last Year: Never true   Transportation Needs: No Transportation Needs    Lack of Transportation (Medical): No    Lack of Transportation (Non-Medical): No   Physical Activity: Sufficiently Active    Days of Exercise per Week: 6 days    Minutes of Exercise per Session: 40 min   Stress: Stress Concern Present    Feeling of Stress : Very  much   Social Connections: Unknown    Frequency of Communication with Friends and Family: Three times a week    Frequency of Social Gatherings with Friends and Family: Once a week    Active Member of Clubs or Organizations: No    Attends Club or Organization Meetings: Never    Marital Status:    Housing Stability: Low Risk     Unable to Pay for Housing in the Last Year: No    Number of Places Lived in the Last Year: 1    Unstable Housing in the Last Year: No       Current Outpatient Medications   Medication Sig Dispense Refill    acetaminophen (TYLENOL) 325 MG tablet Take 650 mg by mouth every 6 (six) hours as needed for Pain.      albuterol (PROVENTIL/VENTOLIN HFA) 90 mcg/actuation inhaler RescueINHALE 2 PUFFS INTO THE LUNGS EVERY 6 HOURS AS NEEDED WHEEZING, RESCUE Strength: 90 mcg/actuation 8.5 g 5    atorvastatin (LIPITOR) 20 MG tablet TAKE 1 TABLET(20 MG) BY MOUTH EVERY DAY 90 tablet 1    azelastine (ASTELIN) 137 mcg (0.1 %) nasal spray 2 sprays (274 mcg total) by Nasal route 2 (two) times daily. 30 mL 9    cyclobenzaprine (FLEXERIL) 10 MG tablet Take 1 tablet (10 mg total) by mouth 3 (three) times daily as needed for Muscle spasms. 90 tablet 2    dexmethylphenidate (FOCALIN) 10 MG tablet Take 1 tablet (10 mg total) by mouth 3 (three) times daily. 90 tablet 0    dexmethylphenidate (FOCALIN) 10 MG tablet Take 1 tablet (10 mg total) by mouth 3 (three) times daily. 90 tablet 0    ergocalciferol, vitamin D2, (VITAMIN D ORAL) Take 1 tablet by mouth every other day.      EScitalopram oxalate (LEXAPRO) 10 MG tablet Take 1 tablet (10 mg total) by mouth once daily. 30 tablet 11    esomeprazole (NEXIUM) 40 mg GrPS Take 40 mg by mouth 2 (two) times daily before meals. 60 each 11    fexofenadine (ALLEGRA) 180 MG tablet Take 180 mg by mouth once daily.      fluocinonide (LIDEX) 0.05 % external solution AAA scalp qday prn itching, scaling 60 mL 3    fluticasone propionate (FLONASE) 50 mcg/actuation nasal spray 1 spray  by Each Nostril route once daily.      gabapentin (NEURONTIN) 300 MG capsule TAKE 1 CAPSULE(300 MG) BY MOUTH THREE TIMES DAILY 90 capsule 3    hydrocortisone 2.5 % cream APPLY EXTERNALLY TO THE AFFECTED AREA TWICE DAILY FOR 10 DAYS 30 g 0    ketoconazole (NIZORAL) 2 % cream APPLY TOPICALLY TO THE AFFECTED AREA TWICE DAILY 15 g 0    ketoconazole (NIZORAL) 2 % cream APPLY EXTERNALLY TO THE AFFECTED AREA TWICE DAILY  Strength: 2 % 15 g 0    latanoprost 0.005 % ophthalmic solution Place 1 drop into both eyes every evening. 7.5 mL 3    meloxicam (MOBIC) 15 MG tablet Take 1 tablet (15 mg total) by mouth once daily. For pain 30 tablet 2    montelukast (SINGULAIR) 10 mg tablet Take 1 tablet (10 mg total) by mouth every evening. 90 tablet 3    ondansetron (ZOFRAN-ODT) 4 MG TbDL Take 1 tablet (4 mg total) by mouth every 6 (six) hours as needed (nausea). 30 tablet 3    promethazine (PHENERGAN) 25 MG tablet Take 1 tablet (25 mg total) by mouth every 4 (four) hours. 25 tablet 0    rOPINIRole (REQUIP) 0.25 MG tablet TAKE 1 TABLET(0.25 MG) BY MOUTH EVERY EVENING 90 tablet 2    semaglutide (OZEMPIC) 0.25 mg or 0.5 mg(2 mg/1.5 mL) pen injector Inject 0.5 mg into the skin every 7 days. 2 pen 5    TRUE METRIX GLUCOSE TEST STRIP Strp USE TO TEST ONCE DAILY 25 strip 2    ULTRA THIN LANCETS 30 gauge Misc USE TO TEST BLOOD SUGAR EVERY DAY AS DIRECTED 100 each 3    urea (CARMOL) 40 % Crea Apply to affected area on feet qhs after bath or shower 60 g 3    zolpidem (AMBIEN CR) 12.5 MG CR tablet Take 1 tablet (12.5 mg total) by mouth nightly as needed for Insomnia. 30 tablet 0    zolpidem (AMBIEN) 10 mg Tab Take 1 tablet (10 mg total) by mouth nightly as needed (insomnia). 30 tablet 2    blood-glucose meter kit Use as instructed 1 each 0     No current facility-administered medications for this visit.     Facility-Administered Medications Ordered in Other Visits   Medication Dose Route Frequency Provider Last Rate Last Admin    mupirocin 2 %  ointment   Nasal On Call Procedure Chong Padron MD   Given at 09/15/22 0553       Review of patient's allergies indicates:  No Known Allergies      Review of Systems   Constitutional: Negative for chills and fever.   Cardiovascular:  Negative for chest pain, claudication and leg swelling.   Respiratory:  Negative for cough and shortness of breath.    Skin:  Positive for dry skin and suspicious lesions. Negative for nail changes.   Musculoskeletal:         L 2nd toe deformity and lesion   Gastrointestinal:  Negative for nausea and vomiting.   Neurological:  Negative for numbness and paresthesias.   Psychiatric/Behavioral:  Negative for altered mental status.          Objective:      Physical Exam  Constitutional:       General: He is not in acute distress.     Appearance: He is obese. He is not ill-appearing.   Cardiovascular:      Pulses:           Dorsalis pedis pulses are 2+ on the right side and 2+ on the left side.        Posterior tibial pulses are 2+ on the right side and 2+ on the left side.      Comments: No lower extremity edema bilateral.  Skin temp is warm to foot bilateral.  Normal hair growth distribution bilateral lower extremity.  No rubor on dependency bilateral foot.  Skin temp is warm to foot bilateral.  Musculoskeletal:      Comments: L 2nd toe with hammertoe deformity (DF contracture of MTPJ, PF contracture of PIPJ and DIPJ with DIPJ which also has lateral deviation creating prominent medial aspect of joint under skin lesion, see skin section). All reducible except DIPJ lateral deviation which is rigid. Mild pain on palpation of medial DIPJ over skin lesion.    Skin:     General: Skin is warm.      Capillary Refill: Capillary refill takes less than 2 seconds.      Findings: No ecchymosis or erythema.      Nails: There is no clubbing.      Comments: Hyperkeratotic lesion noted to medial L 2nd meliza DIPJ, plantar medial hallux IPJ b/l. Skin lines present to lesion/s. No signs of deep tissue  injury. No open lesion or wound noted.       Neurological:      Mental Status: He is alert.      Sensory: No sensory deficit.             Assessment:       Encounter Diagnoses   Name Primary?    Hammertoe of left foot Yes    Corn or callus              Plan:       Hermelindo was seen today for follow-up.    Diagnoses and all orders for this visit:    Hammertoe of left foot    Corn or callus          I counseled the patient on his conditions, their implications and medical management.    Conservative care discussion:   Wide, soft toe box shoes recommended. Silicone toe sleeves recommended for painful hammertoe and corn as directed. Additional ones can be purchased from [employer] or local pharmacy. Continue.     The affected area was cleansed with an alcohol prep pad. Next, utilizing a 5mm curette, the hyperkeratotic tissues were trimmed from planter medial hallux IPJ b/l and medial L 2nd toe DIPJ (trimmed as courtesy today), down to appropriate level of skin. Care was taken to remove any nucleated core from the center of the lesion. No pinpoint bleeding was encountered. The patient tolerated relief following this procedure.       Surgical discussion:  Briefly discussed surgical intervention for hammertoe (PIPJ and DIPJ fusion with K-wire fixation). Discussed perioperative expectations and post op protocol. Recommended to exhaust all conservative care options prior to surgery. Patient will try above treatment plan and follow up as scheduled for further discussion.     RTC 1-2 months for further assessment/recommendations, sooner PRN

## 2023-04-25 PROCEDURE — 95165 ANTIGEN THERAPY SERVICES: CPT | Mod: 59,S$GLB,, | Performed by: ALLERGY & IMMUNOLOGY

## 2023-04-25 PROCEDURE — 95165 PR PROFES SVC,IMMUNOTHER,SINGLE/MULT AGS: ICD-10-PCS | Mod: 59,S$GLB,, | Performed by: ALLERGY & IMMUNOLOGY

## 2023-04-26 ENCOUNTER — DOCUMENTATION ONLY (OUTPATIENT)
Dept: ALLERGY | Facility: CLINIC | Age: 43
End: 2023-04-26
Payer: COMMERCIAL

## 2023-04-26 ENCOUNTER — PATIENT MESSAGE (OUTPATIENT)
Dept: ALLERGY | Facility: CLINIC | Age: 43
End: 2023-04-26
Payer: COMMERCIAL

## 2023-04-26 NOTE — PROGRESS NOTES
Allergy vials set 1,2 and 3 of 3 received in [place]. Reached out to patient to schedule initial appointment in [place]. Awaiting a return response.  Vials activated in [place].

## 2023-04-27 ENCOUNTER — TELEPHONE (OUTPATIENT)
Dept: PAIN MEDICINE | Facility: CLINIC | Age: 43
End: 2023-04-27
Payer: COMMERCIAL

## 2023-04-27 DIAGNOSIS — M54.12 CERVICAL RADICULOPATHY: Primary | ICD-10-CM

## 2023-04-28 ENCOUNTER — TELEPHONE (OUTPATIENT)
Dept: ALLERGY | Facility: CLINIC | Age: 43
End: 2023-04-28
Payer: COMMERCIAL

## 2023-04-28 NOTE — TELEPHONE ENCOUNTER
----- Message from Aquiles Bullard LPN sent at 4/28/2023  1:07 PM CDT -----  Contact: pt    ----- Message -----  From: Sangeetha Gusman  Sent: 4/28/2023  12:14 PM CDT  To: Naheed ANDERSON Staff    Pt requesting call back RE: would like to r/s injection for Monday, he is unable to make today        Confirmed contact below:  Contact Name:Hermelindo Garza  Phone Number: [phone]

## 2023-05-01 ENCOUNTER — CLINICAL SUPPORT (OUTPATIENT)
Dept: INTERNAL MEDICINE | Facility: CLINIC | Age: 43
End: 2023-05-01
Payer: COMMERCIAL

## 2023-05-01 DIAGNOSIS — J30.9 CHRONIC ALLERGIC RHINITIS: Primary | ICD-10-CM

## 2023-05-01 PROCEDURE — 99999 PR PBB SHADOW E&M-EST. PATIENT-LVL I: CPT | Mod: PBBFAC,,,

## 2023-05-01 PROCEDURE — 95117 IMMUNOTHERAPY INJECTIONS: CPT | Mod: S$GLB,,, | Performed by: ALLERGY & IMMUNOLOGY

## 2023-05-01 PROCEDURE — 95117 PR IMMU2THERAPY, 2+ INJECTIONS: ICD-10-PCS | Mod: S$GLB,,, | Performed by: ALLERGY & IMMUNOLOGY

## 2023-05-01 PROCEDURE — 99999 PR PBB SHADOW E&M-EST. PATIENT-LVL I: ICD-10-PCS | Mod: PBBFAC,,,

## 2023-05-09 ENCOUNTER — PATIENT MESSAGE (OUTPATIENT)
Dept: ALLERGY | Facility: CLINIC | Age: 43
End: 2023-05-09
Payer: COMMERCIAL

## 2023-05-09 ENCOUNTER — TELEPHONE (OUTPATIENT)
Dept: ALLERGY | Facility: CLINIC | Age: 43
End: 2023-05-09
Payer: COMMERCIAL

## 2023-05-09 NOTE — TELEPHONE ENCOUNTER
----- Message from Sangeetha Gusman sent at 5/9/2023 12:09 PM CDT -----  Contact: pt  Pt requesting call back RE: returning call    Confirmed contact below:  Contact Name:Hermelindo Grge  Phone Number: [phone]

## 2023-05-10 ENCOUNTER — PATIENT MESSAGE (OUTPATIENT)
Dept: NEUROSURGERY | Facility: CLINIC | Age: 43
End: 2023-05-10
Payer: COMMERCIAL

## 2023-05-10 ENCOUNTER — CLINICAL SUPPORT (OUTPATIENT)
Dept: ALLERGY | Facility: CLINIC | Age: 43
End: 2023-05-10
Payer: COMMERCIAL

## 2023-05-10 DIAGNOSIS — J30.9 CHRONIC ALLERGIC RHINITIS: Primary | ICD-10-CM

## 2023-05-10 PROCEDURE — 95117 IMMUNOTHERAPY INJECTIONS: CPT | Mod: S$GLB,,, | Performed by: ALLERGY & IMMUNOLOGY

## 2023-05-10 PROCEDURE — 99999 PR PBB SHADOW E&M-EST. PATIENT-LVL I: CPT | Mod: PBBFAC,,,

## 2023-05-10 PROCEDURE — 99999 PR PBB SHADOW E&M-EST. PATIENT-LVL I: ICD-10-PCS | Mod: PBBFAC,,,

## 2023-05-10 PROCEDURE — 95117 PR IMMU2THERAPY, 2+ INJECTIONS: ICD-10-PCS | Mod: S$GLB,,, | Performed by: ALLERGY & IMMUNOLOGY

## 2023-05-11 ENCOUNTER — OFFICE VISIT (OUTPATIENT)
Dept: PSYCHIATRY | Facility: CLINIC | Age: 43
End: 2023-05-11
Payer: COMMERCIAL

## 2023-05-11 VITALS
HEART RATE: 73 BPM | HEIGHT: 65 IN | BODY MASS INDEX: 40.44 KG/M2 | WEIGHT: 242.75 LBS | SYSTOLIC BLOOD PRESSURE: 140 MMHG | DIASTOLIC BLOOD PRESSURE: 73 MMHG

## 2023-05-11 DIAGNOSIS — F39 UNSPECIFIED MOOD (AFFECTIVE) DISORDER: Primary | ICD-10-CM

## 2023-05-11 DIAGNOSIS — F90.0 ADHD (ATTENTION DEFICIT HYPERACTIVITY DISORDER), INATTENTIVE TYPE: ICD-10-CM

## 2023-05-11 DIAGNOSIS — F41.9 ANXIETY: ICD-10-CM

## 2023-05-11 PROCEDURE — 99215 PR OFFICE/OUTPT VISIT, EST, LEVL V, 40-54 MIN: ICD-10-PCS | Mod: S$GLB,,, | Performed by: PSYCHIATRY & NEUROLOGY

## 2023-05-11 PROCEDURE — 3044F HG A1C LEVEL LT 7.0%: CPT | Mod: CPTII,S$GLB,, | Performed by: PSYCHIATRY & NEUROLOGY

## 2023-05-11 PROCEDURE — 3077F PR MOST RECENT SYSTOLIC BLOOD PRESSURE >= 140 MM HG: ICD-10-PCS | Mod: CPTII,S$GLB,, | Performed by: PSYCHIATRY & NEUROLOGY

## 2023-05-11 PROCEDURE — 3008F BODY MASS INDEX DOCD: CPT | Mod: CPTII,S$GLB,, | Performed by: PSYCHIATRY & NEUROLOGY

## 2023-05-11 PROCEDURE — 3072F LOW RISK FOR RETINOPATHY: CPT | Mod: CPTII,S$GLB,, | Performed by: PSYCHIATRY & NEUROLOGY

## 2023-05-11 PROCEDURE — 3072F PR LOW RISK FOR RETINOPATHY: ICD-10-PCS | Mod: CPTII,S$GLB,, | Performed by: PSYCHIATRY & NEUROLOGY

## 2023-05-11 PROCEDURE — 3044F PR MOST RECENT HEMOGLOBIN A1C LEVEL <7.0%: ICD-10-PCS | Mod: CPTII,S$GLB,, | Performed by: PSYCHIATRY & NEUROLOGY

## 2023-05-11 PROCEDURE — 99417 PR PROLONGED SVC, OUTPT, W/WO DIRECT PT CONTACT,  EA ADDTL 15 MIN: ICD-10-PCS | Mod: S$GLB,,, | Performed by: PSYCHIATRY & NEUROLOGY

## 2023-05-11 PROCEDURE — 99999 PR PBB SHADOW E&M-EST. PATIENT-LVL III: ICD-10-PCS | Mod: PBBFAC,,, | Performed by: PSYCHIATRY & NEUROLOGY

## 2023-05-11 PROCEDURE — 99999 PR PBB SHADOW E&M-EST. PATIENT-LVL III: CPT | Mod: PBBFAC,,, | Performed by: PSYCHIATRY & NEUROLOGY

## 2023-05-11 PROCEDURE — 3078F DIAST BP <80 MM HG: CPT | Mod: CPTII,S$GLB,, | Performed by: PSYCHIATRY & NEUROLOGY

## 2023-05-11 PROCEDURE — 3008F PR BODY MASS INDEX (BMI) DOCUMENTED: ICD-10-PCS | Mod: CPTII,S$GLB,, | Performed by: PSYCHIATRY & NEUROLOGY

## 2023-05-11 PROCEDURE — 99417 PROLNG OP E/M EACH 15 MIN: CPT | Mod: S$GLB,,, | Performed by: PSYCHIATRY & NEUROLOGY

## 2023-05-11 PROCEDURE — 99215 OFFICE O/P EST HI 40 MIN: CPT | Mod: S$GLB,,, | Performed by: PSYCHIATRY & NEUROLOGY

## 2023-05-11 PROCEDURE — 3077F SYST BP >= 140 MM HG: CPT | Mod: CPTII,S$GLB,, | Performed by: PSYCHIATRY & NEUROLOGY

## 2023-05-11 PROCEDURE — 3078F PR MOST RECENT DIASTOLIC BLOOD PRESSURE < 80 MM HG: ICD-10-PCS | Mod: CPTII,S$GLB,, | Performed by: PSYCHIATRY & NEUROLOGY

## 2023-05-11 RX ORDER — LAMOTRIGINE 25 MG/1
TABLET ORAL
Qty: 49 TABLET | Refills: 0 | Status: SHIPPED | OUTPATIENT
Start: 2023-05-11 | End: 2023-06-08

## 2023-05-11 RX ORDER — LAMOTRIGINE 100 MG/1
TABLET ORAL
Qty: 30 TABLET | Refills: 1 | Status: SHIPPED | OUTPATIENT
Start: 2023-05-11 | End: 2023-06-08

## 2023-05-11 NOTE — PATIENT INSTRUCTIONS
PLAN:     FOLLOW UP  June 8 2023 2:30p  am IN CLINIC    Meds: add lamictal 25 mg and to advance to 100 mg as per instruction on bottle    Remains Lexapro 10 mg daily     References:     Relaxation stress reduction workbook: AMI Davenport PhD ( used: $7-10)    Feeling Good Website: Ashwin Anderson MD / www.Relify website (free) / alexey. PODCASTS    Anxiety &  phobia workbook by JORGE Carlisle PhD  (web retailers: used: $ 7-10)    [place]: Path to Better Sleep : https://www.veterantraining.[place].gov/insomnia/ (free)       Pt expressed appreciation for the visit today and did not have further question at this time though pt  was still informed to:     Call  if problems.    Call / Report Side Effects to Psyc MD     Encouraged to follow up with primary care / Gen Med MD for continued monitoring of general health and wellness.    Understanding was expressed; and no further concerns nor questions were raised at this time.     Remember healthy self care:   eat right  attempt adequate rest   HANDWASHING / encourage such alexey. During this corona virus time   walk or light exercise within reason and as your general med team approves  read or explore any of reference materials / homework mentioned  reach out (I.e.,  connect with)  others who nuture and bring out best in you  avoid risky behaviors    Keep your appointments:    IF you  cannot make your appt THEN please call [phone] or go online (via My Chart michael) to reschedule.    It is the responsibility of the patient to reschedule an appointment if an appointment has been canceled or missed.    Avoid  alcohol and illicit substances.  Look for the positive.  All is often relative-seek balance  Call sooner if needed : [phone]   Call 911 or go to Emergency Room  (ER)  if  any acute concerns

## 2023-05-11 NOTE — PROGRESS NOTES
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 (MOBIC) 15 MG tablet, Take 1 tablet (15 mg total) by mouth once daily. For pain, Disp: 30 tablet, Rfl: 2    montelukast (SINGULAIR) 10 mg tablet, Take 1 tablet (10 mg total) by mouth every evening., Disp: 90 tablet, Rfl: 3    ondansetron (ZOFRAN-ODT) 4 MG TbDL, Take 1 tablet (4 mg total) by mouth every 6 (six) hours as needed (nausea)., Disp: 30 tablet, Rfl: 3    promethazine (PHENERGAN) 25 MG tablet, Take 1 tablet (25 mg total) by mouth every 4 (four) hours., Disp: 25 tablet, Rfl: 0    rOPINIRole (REQUIP) 0.25 MG tablet, TAKE 1 TABLET(0.25 MG) BY MOUTH EVERY EVENING, Disp: 90 tablet, Rfl: 2    semaglutide (OZEMPIC) 0.25 mg or 0.5 mg (2 mg/3 mL) pen injector, Inject 0.5 mg into the skin every 7 days., Disp: 3 mL, Rfl: 0    semaglutide (OZEMPIC) 0.25 mg or 0.5 mg(2 mg/1.5 mL) pen injector, Inject 0.5 mg into the skin every 7 days., Disp: 2 pen, Rfl: 5    TRUE METRIX GLUCOSE TEST STRIP Strp, USE TO TEST ONCE DAILY, Disp: 25 strip, Rfl: 2    ULTRA THIN LANCETS 30 gauge Misc, USE TO TEST BLOOD SUGAR EVERY DAY AS DIRECTED, Disp: 100 each, Rfl: 3    urea (CARMOL) 40 % Crea, Apply to affected area on feet qhs after bath or shower, Disp: 60 g, Rfl: 3    zolpidem (AMBIEN CR) 12.5 MG CR tablet, Take 1 tablet (12.5 mg total) by mouth nightly as needed for Insomnia., Disp: 30  tablet, Rfl: 0    zolpidem (AMBIEN) 10 mg Tab, Take 1 tablet (10 mg total) by mouth nightly as needed (insomnia)., Disp: 30 tablet, Rfl: 2  No current facility-administered medications for this visit.    Facility-Administered Medications Ordered in Other Visits:     mupirocin 2 % ointment, , Nasal, On Call Procedure, Chong Padron MD, Given at 09/15/22 7821     Patient Instructions     PLAN:     FOLLOW UP  June 8 2023 2:30p  am IN CLINIC    Meds: add lamictal 25 mg and to advance to 100 mg as per instruction on bottle    Remains Lexapro 10 mg daily     References:     Relaxation stress reduction workbook: AMI Davenport PhD ( used: $7-10)    Feeling Good Website: Ashwin Anderson MD / www.[employer] website (free) / alexey. PODCASTS    Anxiety &  phobia workbook by JORGE Carlisle PhD  (web retailers: used: $ 7-10)    [place]: Path to Better Sleep : https://www.veterant[employer].[place].gov/insomnia/ (free)       Pt expressed appreciation for the visit today and did not have further question at this time though pt  was still informed to:     Call  if problems.    Call / Report Side Effects to Psyc MD     Encouraged to follow up with primary care / Gen Med MD for continued monitoring of general health and wellness.    Understanding was expressed; and no further concerns nor questions were raised at this time.     Remember healthy self care:   eat right  attempt adequate rest   HANDWASHING / encourage such alexey. During this corona virus time   walk or light exercise within reason and as your general med team approves  read or explore any of reference materials / homework mentioned  reach out (I.e.,  connect with)  others who nuture and bring out best in you  avoid risky behaviors    Keep your appointments:    IF you  cannot make your appt THEN please call [phone] or go online (via My Chart michael) to reschedule.    It is the responsibility of the patient to reschedule an appointment if an appointment has been canceled or  missed.    Avoid  alcohol and illicit substances.  Look for the positive.  All is often relative-seek balance  Call sooner if needed : [phone]   Call 911 or go to Emergency Room  (ER)  if  any acute concerns

## 2023-05-15 ENCOUNTER — CLINICAL SUPPORT (OUTPATIENT)
Dept: ALLERGY | Facility: CLINIC | Age: 43
End: 2023-05-15
Payer: COMMERCIAL

## 2023-05-15 DIAGNOSIS — J30.9 CHRONIC ALLERGIC RHINITIS: Primary | ICD-10-CM

## 2023-05-15 PROCEDURE — 99999 PR PBB SHADOW E&M-EST. PATIENT-LVL I: ICD-10-PCS | Mod: PBBFAC,,,

## 2023-05-15 PROCEDURE — 95117 IMMUNOTHERAPY INJECTIONS: CPT | Mod: S$GLB,,, | Performed by: ALLERGY & IMMUNOLOGY

## 2023-05-15 PROCEDURE — 99999 PR PBB SHADOW E&M-EST. PATIENT-LVL I: CPT | Mod: PBBFAC,,,

## 2023-05-15 PROCEDURE — 95117 PR IMMU2THERAPY, 2+ INJECTIONS: ICD-10-PCS | Mod: S$GLB,,, | Performed by: ALLERGY & IMMUNOLOGY

## 2023-05-22 ENCOUNTER — CLINICAL SUPPORT (OUTPATIENT)
Dept: REHABILITATION | Facility: HOSPITAL | Age: 43
End: 2023-05-22
Payer: COMMERCIAL

## 2023-05-22 DIAGNOSIS — M75.82 ROTATOR CUFF TENDONITIS, LEFT: ICD-10-CM

## 2023-05-22 DIAGNOSIS — M77.12 LEFT LATERAL EPICONDYLITIS: ICD-10-CM

## 2023-05-22 DIAGNOSIS — M54.12 CERVICAL RADICULOPATHY: ICD-10-CM

## 2023-05-22 PROCEDURE — 97140 MANUAL THERAPY 1/> REGIONS: CPT

## 2023-05-22 PROCEDURE — 97161 PT EVAL LOW COMPLEX 20 MIN: CPT

## 2023-05-22 PROCEDURE — 97110 THERAPEUTIC EXERCISES: CPT

## 2023-05-22 NOTE — PLAN OF CARE
[facility]   Physical Therapy Initial Evaluation      Name: Hermelindo Garza [facility]  Clinic Number: [id number]    Therapy Diagnosis:   Encounter Diagnoses   Name Primary?    Left lateral epicondylitis     Cervical radiculopathy     Rotator cuff tendonitis, left         Physician: Christen Marshall MD    Physician Orders: PT Eval and Treat   Medical Diagnosis from Referral: M77.12 (ICD-10-CM) - Left lateral epicondylitis M54.12 (ICD-10-CM) - Cervical radiculopathy M75.82 (ICD-10-CM) - Rotator cuff tendonitis, left   Evaluation Date: 5/22/2023  Authorization Period Expiration: 11/22/2023  Plan of Care Expiration: 8/22/2023  Progress Note Due: 6/22/2023  Visit # / Visits authorized: 1/ pending   FOTO: 1/3    Precautions: Standard     Time In: 1020 am  Time Out: 1100 am  Total Appointment Time (timed & untimed codes): 40 minutes    Subjective     Date of onset: within the last 6 months    History of current condition - Hermelindo reports: chronic shoulder and left elbow pain that comes in waves. He has had therapy for both in the past for the elbow but it only helped for a while. He is able to complete all things but has challenges with lifting and pulling activity. He can lift around 20 # without issues but will have an increase in soreness. He has DDD in the neck and back and when this is bad the shoulder and arm. 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     Falls: had a fall where he slipped and hit the side of the tub    Imaging: none:     Prior Therapy: yes for elbow not for shoulder  Social History: lives with    Occupation: insurance agency - sedentary  Prior Level of Function: has been challenged with standing and sitting long periods due to back pain  Current Level of Function: challenges maintaining positions and looking at screens at work and when performing exercises at the gym    Pain:  Current 2/10, worst  5/10, best 1/10   Location: left elbow , neck , and shoulder    Description: Aching, Grabbing, Tight, Sharp, and Shooting  Aggravating Factors: gym activity, pushing/pulling, working on the computer for long periods of time, being stationary for long periods of time  Easing Factors: massage and pain medication    Patients goals: be able to sit at work without pain increasing and reduce tension after exercises     Medical History:   Past Medical History:   Diagnosis Date    ADD (attention deficit disorder) 05/09/2012    Allergy     Anxiety 4/12/2023    Asthma 05/09/2012    Cervical disc disorder with radiculopathy, unspecified cervical region 09/14/2021    Cervical spondylosis with myelopathy 06/29/2022    Eczema     Esophageal ulcer     GERD (gastroesophageal reflux disease) 05/09/2012    Glaucoma     Kidney stones 05/2014    Lumbar disc disease 05/09/2012    Lumbar herniated disc 05/09/2012    Malignant melanoma of skin, unspecified 01/06/2022    in situ right mid back    Melanoma 01/2022    in situ R mid back     LUBA (obstructive sleep apnea)     Radiculopathy, lumbar region 09/14/2021    Renal calculi 05/09/2012    Type 2 diabetes mellitus without complication, without long-term current use of insulin 11/16/2020       Surgical History:   Hermelindo Garza III  has a past surgical history that includes Lumbar laminectomy (2010); Esophagogastroduodenoscopy (01/22/2019); Colonoscopy (01/22/2019); Appendectomy (2006); Epidural steroid injection (N/A, 02/10/2021); Esophagogastroduodenoscopy (N/A, 05/18/2021); Lumbar laminectomy with discectomy (Left, 09/20/2021); Carpal tunnel release (Right, 09/15/2022); Excision of ganglion of wrist (Right, 09/15/2022); Hand Arthrotomy (Right, 09/15/2022); Injection of steroid (Left, 09/15/2022); Flexor tendon repair (Right, 09/15/2022); and Tympanostomy tube placement.    Medications:   Hermelindo has a current medication list which includes the following prescription(s):  acetaminophen, albuterol, atorvastatin, azelastine, blood-glucose meter, dexmethylphenidate, dexmethylphenidate, ergocalciferol (vitamin d2), escitalopram oxalate, esomeprazole, fexofenadine, fluocinonide, fluticasone propionate, gabapentin, hydrocortisone, ketoconazole, ketoconazole, lamotrigine, lamotrigine, latanoprost, meloxicam, montelukast, ondansetron, promethazine, ropinirole, semaglutide, ozempic, true metrix glucose test strip, ultra thin lancets, urea, zolpidem, and zolpidem, and the following Facility-Administered Medications: mupirocin.    Allergies:   Review of patient's allergies indicates:  No Known Allergies     Objective        Observation: left shoulder elevated, bilateral guarded position in shoulders    Posture: bilateral rounded shoulders, slumped position    Cervical Range of Motion:    Degrees   Flexion 35   Extension 30 inc pain   Right Rotation 65   Left Rotation 65   Right Side Bend 35   Left Side Bend 28      Shoulder Range of Motion:   Shoulder Left Right   Flexion 140 140   Abduction 150 142   ER WFL T1   IR WFL L3        Special Tests:  Distraction Slight relief   Compression negative   Spurlings Discomfort in testing position     Joint Mobility: mod L>R side restrictions present in upward and downglides in the cervical spine, left first rib elevated, shoulder glenohumeral mobility WFL    Palpation: in sitting: increased tone present in left side cervical paraspinals and into the mid thoracic muscles and mid scapular complex, increased tone into the left side upper trap and levator scap  In supine: significant decrease in tone throughout the cervical spine and shoulder complex  Shoulder: increased tenderness over deltoid and pectoral complex on the left side    Flexibility: mod pec tightness bilaterally    Limitation/Restriction for FOTO  Survey    Therapist reviewed FOTO scores for Hermelindo Garza III on 5/22/2023.   FOTO documents entered into EPIC - see Media  section.    Limitation Score: 38%         Treatment     Total Treatment time (time-based codes) separate from Evaluation: 20 minutes     Hermelindo received the treatments listed below:      therapeutic exercises to develop strength, endurance, ROM, flexibility, posture, and core stabilization for 10 minutes including:  Education (see below)  Pec stretching    manual therapy techniques: Joint mobilizations, Manual traction, Myofacial release, Soft tissue Mobilization, and Friction Massage were applied to the: cervical spine and left shoulder for 10 minutes, including:  Manual traction  Left side upglides grade 2-3 C4-7  STM to left side paraspinals, scalenes and UT      Patient Education and Home Exercises     Education provided:   - education given on workplace setup for home and screen adjustment to prevent constant left side rotation, home exercise program, POC and goals of therapy    Written Home Exercises Provided: . Exercises were reviewed and Hermelindo was able to demonstrate them prior to the end of the session.  Hermelindo demonstrated good  understanding of the education provided. See EMR under Patient Instructions for exercises provided during therapy sessions.    Assessment     Hermelnido is a 42 y.o. male referred to outpatient Physical Therapy with a medical diagnosis of M77.12 (ICD-10-CM) - Left lateral epicondylitis M54.12 (ICD-10-CM) - Cervical radiculopathy M75.82 (ICD-10-CM) - Rotator cuff tendonitis, left . Patient presents with left side cervical radicular symptoms likely causing dysfunction and overuse of the left side rotator cuff complex. He had a significant difference between weight bearing and non weight bearing in the cervical spine musculature where his tightness almost resolved in a supine position when the muscles did not have to be engaged to properly stabilize the spine. He will need aggressive cervical spine mobilizations and functional strengthening of cervical stabilizers. His home work  setup creates an environment where he is in a constant state of left rotation and will benefit from a shift in monitors in order to maintain neutral positioning    Patient prognosis is Good.   Patient will benefit from skilled outpatient Physical Therapy to address the deficits stated above and in the chart below, provide patient /family education, and to maximize patientt's level of independence.     Plan of care discussed with patient: Yes  Patient's spiritual, cultural and educational needs considered and patient is agreeable to the plan of care and goals as stated below:     Anticipated Barriers for therapy: chronicity of condition and comorbidities    Medical Necessity is demonstrated by the following  History  Co-morbidities and personal factors that may impact the plan of care [x] LOW: no personal factors / co-morbidities  [] MODERATE: 1-2 personal factors / co-morbidities  [] HIGH: 3+ personal factors / co-morbidities    Moderate / High Support Documentation:      Examination  Body Structures and Functions, activity limitations and participation restrictions that may impact the plan of care [x] LOW: addressing 1-2 elements  [] MODERATE: 3+ elements  [] HIGH: 4+ elements (please support below)    Moderate / High Support Documentation:      Clinical Presentation [x] LOW: stable  [] MODERATE: Evolving  [] HIGH: Unstable     Decision Making/ Complexity Score: low       Goals:  Short Term Goals: 4 weeks   Formally assess lateral epicondylitis and set goals accordingly  Patient will show 10 degree improvement in cervical range of motion  Patient will show 50% reduction in cervical and scapular muscle tone  Pt will be able to sit > 30 minutes before onset of tightness    Long Term Goals: 8 weeks   Patient will be able work work > 2 hours before onset of symptoms  Patient will be able to life > 25 # overhead without an increase in pain  Patient will be able to perform > 60 minutes of exercises without increasing  tightness in the back and shoulder  50% FOTO score improvement  Plan     Plan of care Certification: 5/22/2023 to 8/22/2023.    Outpatient Physical Therapy 2 times weekly for 8 weeks to include the following interventions: Manual Therapy, Moist Heat/ Ice, Neuromuscular Re-ed, Patient Education, Self Care, Therapeutic Activities, and Therapeutic Exercise.     Sarah Acuña, PT

## 2023-05-24 ENCOUNTER — OFFICE VISIT (OUTPATIENT)
Dept: NEUROSURGERY | Facility: CLINIC | Age: 43
End: 2023-05-24
Payer: COMMERCIAL

## 2023-05-24 ENCOUNTER — CLINICAL SUPPORT (OUTPATIENT)
Dept: ALLERGY | Facility: CLINIC | Age: 43
End: 2023-05-24
Payer: COMMERCIAL

## 2023-05-24 ENCOUNTER — PATIENT MESSAGE (OUTPATIENT)
Dept: NEUROSURGERY | Facility: CLINIC | Age: 43
End: 2023-05-24

## 2023-05-24 DIAGNOSIS — J30.9 CHRONIC ALLERGIC RHINITIS: Primary | ICD-10-CM

## 2023-05-24 DIAGNOSIS — M54.16 LUMBAR RADICULOPATHY, CHRONIC: ICD-10-CM

## 2023-05-24 DIAGNOSIS — M54.16 LUMBAR RADICULOPATHY: ICD-10-CM

## 2023-05-24 DIAGNOSIS — M48.02 SPINAL STENOSIS, CERVICAL REGION: Primary | ICD-10-CM

## 2023-05-24 DIAGNOSIS — Z98.890 S/P LUMBAR DISCECTOMY: ICD-10-CM

## 2023-05-24 DIAGNOSIS — M54.12 CERVICAL RADICULOPATHY: ICD-10-CM

## 2023-05-24 PROCEDURE — 99999 PR PBB SHADOW E&M-EST. PATIENT-LVL I: ICD-10-PCS | Mod: PBBFAC,,,

## 2023-05-24 PROCEDURE — 95117 PR IMMU2THERAPY, 2+ INJECTIONS: ICD-10-PCS | Mod: S$GLB,,, | Performed by: ALLERGY & IMMUNOLOGY

## 2023-05-24 PROCEDURE — 99215 PR OFFICE/OUTPT VISIT, EST, LEVL V, 40-54 MIN: ICD-10-PCS | Mod: 95,,, | Performed by: NEUROLOGICAL SURGERY

## 2023-05-24 PROCEDURE — 3044F PR MOST RECENT HEMOGLOBIN A1C LEVEL <7.0%: ICD-10-PCS | Mod: CPTII,95,, | Performed by: NEUROLOGICAL SURGERY

## 2023-05-24 PROCEDURE — 1160F RVW MEDS BY RX/DR IN RCRD: CPT | Mod: CPTII,95,, | Performed by: NEUROLOGICAL SURGERY

## 2023-05-24 PROCEDURE — 1160F PR REVIEW ALL MEDS BY PRESCRIBER/CLIN PHARMACIST DOCUMENTED: ICD-10-PCS | Mod: CPTII,95,, | Performed by: NEUROLOGICAL SURGERY

## 2023-05-24 PROCEDURE — 3072F PR LOW RISK FOR RETINOPATHY: ICD-10-PCS | Mod: CPTII,95,, | Performed by: NEUROLOGICAL SURGERY

## 2023-05-24 PROCEDURE — 95117 IMMUNOTHERAPY INJECTIONS: CPT | Mod: S$GLB,,, | Performed by: ALLERGY & IMMUNOLOGY

## 2023-05-24 PROCEDURE — 99999 PR PBB SHADOW E&M-EST. PATIENT-LVL I: CPT | Mod: PBBFAC,,,

## 2023-05-24 PROCEDURE — 1159F MED LIST DOCD IN RCRD: CPT | Mod: CPTII,95,, | Performed by: NEUROLOGICAL SURGERY

## 2023-05-24 PROCEDURE — 1159F PR MEDICATION LIST DOCUMENTED IN MEDICAL RECORD: ICD-10-PCS | Mod: CPTII,95,, | Performed by: NEUROLOGICAL SURGERY

## 2023-05-24 PROCEDURE — 3044F HG A1C LEVEL LT 7.0%: CPT | Mod: CPTII,95,, | Performed by: NEUROLOGICAL SURGERY

## 2023-05-24 PROCEDURE — 3072F LOW RISK FOR RETINOPATHY: CPT | Mod: CPTII,95,, | Performed by: NEUROLOGICAL SURGERY

## 2023-05-24 PROCEDURE — 99215 OFFICE O/P EST HI 40 MIN: CPT | Mod: 95,,, | Performed by: NEUROLOGICAL SURGERY

## 2023-05-24 NOTE — PROGRESS NOTES
The patient location is: home  The chief complaint leading to consultation is: leg and arm pain    Visit type: audiovisual    Face to Face time with patient: 30  40 minutes of total time spent on the encounter, which includes face to face time and non-face to face time preparing to see the patient (eg, review of tests), Obtaining and/or reviewing separately obtained history, Documenting clinical information in the electronic or other health record, Independently interpreting results (not separately reported) and communicating results to the patient/family/caregiver, or Care coordination (not separately reported).         Each patient to whom he or she provides medical services by telemedicine is:  (1) informed of the relationship between the physician and patient and the respective role of any other health care provider with respect to management of the patient; and (2) notified that he or she may decline to receive medical services by telemedicine and may withdraw from such care at any time.    Notes:   CHIEF COMPLAINT:  Recurrent left leg pain and numbness  Recurrent right hand/finger pain and numbness    INTERVAL HISTORY (5/24/23):  Virtual visit conducted regarding recurrent left leg pain and numbness as well as right hand/finger pain and numbness.  Patient is reporting an increase in low back pain that is triggered when sitting down or walking.  He is also having radiating left leg pain that radiates to his left shin and occasionally outside part of his thigh.  He is having sharp pain in his left big toe.  He also notes tightness despite stretching in both hips and buttocks.  Reports that he often has to lie down and put support under his knees.  This has been ongoing for past couple of months.  Since he is had 2 prior lumbar surgeries he is concerned that he may have a recurrent herniated disc or new problem.  He has known cervical stenosis but has had an increase in numbness in his right hand including his  thumb and index finger.  He was given an epidural steroid shot which helped some but he is concerned about progression of his known cervical stenosis.    INTERVAL HISTORY (1/4/23):  Patient returns for continued surveillance of cervical myelopathy.  His symptoms have not changed significantly however he is attending physical therapy which he finds is helping some.  He complains of continued numbness and tingling in his arms and hands while sleeping.  He feels like it is exacerbated or triggered by neck extension and has to sleep with his head propped forward.  He also gets some pain that radiates from his neck to the lateral shoulders.  He still gets occasional tingling in his big toe otherwise feels like the symptoms related to his acute hernia disc have resolved.  He also notes that he generally feels more arthritic type pain which will be exacerbated by exertion.  Finds himself with diffuse pain and achiness more than usual.    INTERVAL HISTORY (6/29/22):  Returns to review recent cervical MRI.    Always has discomfort in neck with pain radiating into R shoulder and occasionally into 4+5th digits as well as L shoulder.  He does endorse clumsiness with hands (dropping items, difficulty with bottle caps, not buttoning) and feels out of balance.  Occasionally has urinary urgency/frequency.    L thigh pain is decreased but worse in am.  Also intermittent pain in back of leg and foot.  Also notes some weakness and pain in ankle.    INTERVAL HISTORY (5/11/22):  Returns with new arm pain that radiates from base of neck to bilateral shoulders and down the volar thumb on R.  Stops at shoulder/upper arm on L.  Triggered by certain neck positions - has to use flat pillow or will have sxs.  + tingling to R hand and L shoulder.  Also reports clumsiness in hand with dropping items and poor fine motor control.  But denies overt weakness.  Feels tight through neck, chest and shoulder regions.    Also has discomfort in L leg  along anterolateral thigh and describes as burning and itching.  Notes L leg weakness in back around thigh and knee.  Worse in am and foot feels like it fell asleep and is clumsy.  Occasionally has pain in L big toe.    Takes gabapentin 1 tab in am and 2 tab in pm.  He does HEP with occasional pool workout. Not particularly interested in PT.  Has not trialed antiinflammatories.    No b/b dysfunction.    HPI:    Hermelindo Garza III is a 42 y.o.-year-old male who presents today for post-operative follow-up s/p L MIS L3-4 lami/disc on 9/20/21.  Overall patient is significantly improved.  He has significant improvement in his back and leg pain.  He still has some mild tingling in his shins that radiate down to the feet.  This is made worse when he is not active.  He takes gabapentin 600 mg at night which helps.  He would like to increase that to more times during the day.  He denies weakness.      ROS:  As stated in the above HPI    PE:  No neuro deficits observed    FROM PRIOR VISIT:    AAOX3  NAD  CN 2-12 intact     Strength:      Deltoids Biceps Triceps Wrist Ext. Wrist Flex. Hand    RUE 5 5 5 5 5 5   LUE 5 5 5 5 5 5    Hip Flex. Knee Flex. Knee Ext. Dorsi Flex Plantar Flex EHL   RLE 5 5 5 5 5 5   LLE 5 5 5 5 5 5     Sensation:  Intact to light touch (All 4 extremities)    Gait:  normal    Lumbar incision:  Well-healed    IMAGING:  All imaging reviewed by me.    Cspine flex/ex, 12/13/22:  Diffuse spondylosis  Good alignment  No dynamic instability    Cervical MRI, 6/16/22:  1. Diffuse spondylosis and hypertrophied PLL  2. Severe stenosis at C4-5 and C5-6 with cord compression  3. Moderate stenosis at C3-4 and C6-7    Lumbar MRI, 6/16/22:  1. No recurrent HNP  2. Mild degen changes    Csp MRI, 1/12/21:  1. Loss of lordosis  2. Diffuse spondylosis  3. Multi-level DDD with moderate NFS  4. Severe left neural foraminal narrowing at C6-7  5. Severe right neural foraminal narrowing at C5-6 and C7-T1    Lsp MRI,  9/19/21:  1. Diffuse DDD and spondylosis  2. Large L L3-4 HNP  3. L L4-5 moderate NFS  4. R L5-S1 moderate NFS      ASSESSMENT:   Problem List Items Addressed This Visit          Neuro    Cervical radiculopathy    Lumbar radiculopathy, chronic    S/P lumbar discectomy     Other Visit Diagnoses       Spinal stenosis, cervical region    -  Primary    Relevant Orders    MRI Cervical Spine Without Contrast    Lumbar radiculopathy        Relevant Orders    MRI Lumbar Spine Without Contrast          1. S/p L MIS L3-4 lami/disc for severe radiculopathy.    2. Cervical spondylosis with early myelopathy and radiculopathy.      Recurrent left leg radiating pain involving lateral thigh, shin, and big toe with associated numbness.  He has undergone 2 prior lumbar surgeries therefore I will get MRI to r/o recurrent or new HNP.  He also has known cervical stenosis with mild radicular and myelopathic symptoms.  He reports exacerbation of R hand and finger paresthesias.  I will add a cervical MRI to r/o progression of stenosis.    PLAN:   - Ordered lumbar and cervical MRI's (will call)  - RTC TBD    Time spent on this encounter: 40 minutes. This includes face-to-face time and non-face to face time preparing to see the patient (eg, review of tests), obtaining and/or reviewing separately obtained history, documenting clinical information in the electronic or other health record, independently interpreting results and communicating results to the patient/family/caregiver, or care coordinator.

## 2023-05-29 ENCOUNTER — PATIENT MESSAGE (OUTPATIENT)
Dept: DERMATOLOGY | Facility: CLINIC | Age: 43
End: 2023-05-29
Payer: COMMERCIAL

## 2023-05-31 ENCOUNTER — CLINICAL SUPPORT (OUTPATIENT)
Dept: ALLERGY | Facility: CLINIC | Age: 43
End: 2023-05-31
Payer: COMMERCIAL

## 2023-05-31 DIAGNOSIS — J30.9 CHRONIC ALLERGIC RHINITIS: Primary | ICD-10-CM

## 2023-05-31 PROCEDURE — 99999 PR PBB SHADOW E&M-EST. PATIENT-LVL I: ICD-10-PCS | Mod: PBBFAC,,,

## 2023-05-31 PROCEDURE — 99999 PR PBB SHADOW E&M-EST. PATIENT-LVL I: CPT | Mod: PBBFAC,,,

## 2023-05-31 PROCEDURE — 95117 PR IMMU2THERAPY, 2+ INJECTIONS: ICD-10-PCS | Mod: S$GLB,,, | Performed by: ALLERGY & IMMUNOLOGY

## 2023-05-31 PROCEDURE — 95117 IMMUNOTHERAPY INJECTIONS: CPT | Mod: S$GLB,,, | Performed by: ALLERGY & IMMUNOLOGY

## 2023-06-01 ENCOUNTER — TELEPHONE (OUTPATIENT)
Dept: PODIATRY | Facility: CLINIC | Age: 43
End: 2023-06-01
Payer: COMMERCIAL

## 2023-06-01 ENCOUNTER — CLINICAL SUPPORT (OUTPATIENT)
Dept: REHABILITATION | Facility: HOSPITAL | Age: 43
End: 2023-06-01
Payer: COMMERCIAL

## 2023-06-01 ENCOUNTER — PATIENT MESSAGE (OUTPATIENT)
Dept: PAIN MEDICINE | Facility: CLINIC | Age: 43
End: 2023-06-01
Payer: COMMERCIAL

## 2023-06-01 ENCOUNTER — PATIENT MESSAGE (OUTPATIENT)
Dept: NEUROSURGERY | Facility: CLINIC | Age: 43
End: 2023-06-01
Payer: COMMERCIAL

## 2023-06-01 DIAGNOSIS — M54.12 CERVICAL RADICULOPATHY: ICD-10-CM

## 2023-06-01 DIAGNOSIS — M77.12 LEFT LATERAL EPICONDYLITIS: ICD-10-CM

## 2023-06-01 DIAGNOSIS — M75.82 ROTATOR CUFF TENDONITIS, LEFT: Primary | ICD-10-CM

## 2023-06-01 PROCEDURE — 97110 THERAPEUTIC EXERCISES: CPT

## 2023-06-01 PROCEDURE — 97140 MANUAL THERAPY 1/> REGIONS: CPT

## 2023-06-01 NOTE — TELEPHONE ENCOUNTER
"Called the number back, Kari provided, a gentleman answered the phone and ask me after he was talking without catching is breath that he need our fax number. Try to explain to him that we are Podiatry a footdoctor and that we have nothing to do with a C Pap. He ask me again to give him the fax number so they can fax the documents over to get filled out. Try to explain to him again, that we are the "footdoctor" and we have nothng to do with the C Pap, maybe the PCP. Then he ask me for the information where he has to send the information for the C Pap . I told him , that I'm not aware of that and that they need to check who send them the order. Conversation ended after a long time on the phone  "

## 2023-06-01 NOTE — TELEPHONE ENCOUNTER
----- Message from Gwen Vitale sent at 6/1/2023  3:25 PM CDT -----  Regarding: Ca Pap Request  Contact: Kari [phone]  Kari/ [facility] is calling about request that was sent to provider for C Pap supply Ref# [id number]  please call

## 2023-06-05 ENCOUNTER — OFFICE VISIT (OUTPATIENT)
Dept: DERMATOLOGY | Facility: CLINIC | Age: 43
End: 2023-06-05
Payer: COMMERCIAL

## 2023-06-05 DIAGNOSIS — L91.8 SKIN TAG: ICD-10-CM

## 2023-06-05 DIAGNOSIS — Z86.006 HISTORY OF MELANOMA IN SITU: ICD-10-CM

## 2023-06-05 DIAGNOSIS — L82.1 SK (SEBORRHEIC KERATOSIS): Primary | ICD-10-CM

## 2023-06-05 DIAGNOSIS — D22.9 NEVUS: ICD-10-CM

## 2023-06-05 DIAGNOSIS — R20.9 SKIN SENSATION DISTURBANCE: ICD-10-CM

## 2023-06-05 DIAGNOSIS — L81.4 LENTIGO: ICD-10-CM

## 2023-06-05 DIAGNOSIS — D18.01 CHERRY ANGIOMA: ICD-10-CM

## 2023-06-05 PROCEDURE — 11200 PR REMOVAL OF SKIN TAGS, UP TO 15: ICD-10-PCS | Mod: S$GLB,,, | Performed by: DERMATOLOGY

## 2023-06-05 PROCEDURE — 1159F MED LIST DOCD IN RCRD: CPT | Mod: CPTII,S$GLB,, | Performed by: DERMATOLOGY

## 2023-06-05 PROCEDURE — 11200 RMVL SKIN TAGS UP TO&INC 15: CPT | Mod: S$GLB,,, | Performed by: DERMATOLOGY

## 2023-06-05 PROCEDURE — 3072F LOW RISK FOR RETINOPATHY: CPT | Mod: CPTII,S$GLB,, | Performed by: DERMATOLOGY

## 2023-06-05 PROCEDURE — 99999 PR PBB SHADOW E&M-EST. PATIENT-LVL IV: ICD-10-PCS | Mod: PBBFAC,,, | Performed by: DERMATOLOGY

## 2023-06-05 PROCEDURE — 99213 OFFICE O/P EST LOW 20 MIN: CPT | Mod: 25,S$GLB,, | Performed by: DERMATOLOGY

## 2023-06-05 PROCEDURE — 3044F PR MOST RECENT HEMOGLOBIN A1C LEVEL <7.0%: ICD-10-PCS | Mod: CPTII,S$GLB,, | Performed by: DERMATOLOGY

## 2023-06-05 PROCEDURE — 3072F PR LOW RISK FOR RETINOPATHY: ICD-10-PCS | Mod: CPTII,S$GLB,, | Performed by: DERMATOLOGY

## 2023-06-05 PROCEDURE — 1160F PR REVIEW ALL MEDS BY PRESCRIBER/CLIN PHARMACIST DOCUMENTED: ICD-10-PCS | Mod: CPTII,S$GLB,, | Performed by: DERMATOLOGY

## 2023-06-05 PROCEDURE — 1160F RVW MEDS BY RX/DR IN RCRD: CPT | Mod: CPTII,S$GLB,, | Performed by: DERMATOLOGY

## 2023-06-05 PROCEDURE — 99999 PR PBB SHADOW E&M-EST. PATIENT-LVL IV: CPT | Mod: PBBFAC,,, | Performed by: DERMATOLOGY

## 2023-06-05 PROCEDURE — 99213 PR OFFICE/OUTPT VISIT, EST, LEVL III, 20-29 MIN: ICD-10-PCS | Mod: 25,S$GLB,, | Performed by: DERMATOLOGY

## 2023-06-05 PROCEDURE — 3044F HG A1C LEVEL LT 7.0%: CPT | Mod: CPTII,S$GLB,, | Performed by: DERMATOLOGY

## 2023-06-05 PROCEDURE — 1159F PR MEDICATION LIST DOCUMENTED IN MEDICAL RECORD: ICD-10-PCS | Mod: CPTII,S$GLB,, | Performed by: DERMATOLOGY

## 2023-06-05 NOTE — PROGRESS NOTES
"  Subjective:      Patient ID:  Hermelindo Garza III is a 42 y.o. male who presents for   Chief Complaint   Patient presents with    Skin Check     tbse    Skin Tags     Neck      Patient is here today for a "mole" check.   Pt has a history of  moderate sun exposure in the past.   Pt recalls several blistering sunburns in the past- yes  Pt has history of tanning bed use- yes  Pt has  had moles removed in the past- yes, MIS R mid back 2/2022  Pt has history of melanoma in first degree relatives-  Father NMSC     This is a high risk patient here to check for the development of new lesions.    Pt c.o skin tags to neck and under arms.  Irritated with clothing and working out; would like them removed.         Skin Tags    Review of Systems   Constitutional:  Negative for fever, chills, weight loss, fatigue, night sweats and malaise.   HENT:  Negative for headaches.    Respiratory:  Negative for cough and shortness of breath.    Gastrointestinal:  Negative for indigestion.   Skin:  Positive for daily sunscreen use and activity-related sunscreen use. Negative for tendency to form keloidal scars, recent sunburn and wears hat.   Neurological:  Negative for headaches.   Hematologic/Lymphatic: Negative for adenopathy. Does not bruise/bleed easily.     Objective:   Physical Exam   Constitutional: He appears well-developed and well-nourished. No distress.   Musculoskeletal: Lymphadenopathy:      Cervical: No cervical adenopathy.      Upper Body:   No axillary adenopathy present.No supraclavicular adenopathy is present.     Lymphadenopathy: No supraclavicular adenopathy is present.     He has no cervical adenopathy.     He has no axillary adenopathy.   Neurological: He is alert and oriented to person, place, and time. He is not disoriented.   Psychiatric: He has a normal mood and affect.   Skin:   Areas Examined (abnormalities noted in diagram):   Scalp / Hair Palpated and Inspected  Head / Face Inspection Performed  Neck " Inspection Performed  Chest / Axilla Inspection Performed  Abdomen Inspection Performed  Genitals / Buttocks / Groin Inspection Performed  Back Inspection Performed  RUE Inspected  LUE Inspection Performed  RLE Inspected  LLE Inspection Performed  Nails and Digits Inspection Performed                       Diagram Legend     Erythematous scaling macule/papule c/w actinic keratosis       Vascular papule c/w angioma      Pigmented verrucoid papule/plaque c/w seborrheic keratosis      Yellow umbilicated papule c/w sebaceous hyperplasia      Irregularly shaped tan macule c/w lentigo     1-2 mm smooth white papules consistent with Milia      Movable subcutaneous cyst with punctum c/w epidermal inclusion cyst      Subcutaneous movable cyst c/w pilar cyst      Firm pink to brown papule c/w dermatofibroma      Pedunculated fleshy papule(s) c/w skin tag(s)      Evenly pigmented macule c/w junctional nevus     Mildly variegated pigmented, slightly irregular-bordered macule c/w mildly atypical nevus      Flesh colored to evenly pigmented papule c/w intradermal nevus       Pink pearly papule/plaque c/w basal cell carcinoma      Erythematous hyperkeratotic cursted plaque c/w SCC      Surgical scar with no sign of skin cancer recurrence      Open and closed comedones      Inflammatory papules and pustules      Verrucoid papule consistent consistent with wart     Erythematous eczematous patches and plaques     Dystrophic onycholytic nail with subungual debris c/w onychomycosis     Umbilicated papule    Erythematous-base heme-crusted tan verrucoid plaque consistent with inflamed seborrheic keratosis     Erythematous Silvery Scaling Plaque c/w Psoriasis     See annotation      Assessment / Plan:        SK (seborrheic keratosis)  These are benign inherited growths without a malignant potential. Reassurance given to patient. No treatment is necessary.     Skin tag  Skin sensation disturbance  Verbal consent obtained. 10 lesions  removed with scissor snip removal after anesthesia with 1% lidocaine with epinephrine. Hemostasis achieved with aluminum chloride and hyfrecation. No complications.     Cherry angioma  These are benign vascular lesions that are inherited.  Treatment is not necessary.    Nevus  Discussed ABCDE's of nevi.  Monitor for new mole or moles that are becoming bigger, darker, irritated, or developing irregular borders. Brochure provided. Instructed patient to observe lesion(s) for changes and follow up in clinic if changes are noted. Patient to monitor skin at home for new or changing lesions.       Lentigo  This is a benign hyperpigmented sun induced lesion. Recommend daily sun protection/avoidance and use of at least SPF 30, broad spectrum sunscreen (OTC drug) will reduce the number of new lesions. Treatment of these benign lesions are considered cosmetic.  The nature of sun-induced photo-aging and skin cancers is discussed.  Sun avoidance, protective clothing, and the use of 30-SPF sunscreens is advised. Observe closely for skin damage/changes, and call if such occurs.    History of melanoma in situ- mid back 1/2022  Area of previous melanoma  in situ examined. Site well healed with no signs of recurrence.    Total body skin examination performed today including at least 12 points as noted in physical examination. No lesions suspicious for malignancy noted.    Recommend daily sun protection/avoidance, use of at least SPF 30, broad spectrum sunscreen (OTC drug), skin self examinations, and routine physician surveillance to optimize early detection           Follow up in about 6 months (around 12/5/2023) for TBSE.

## 2023-06-06 ENCOUNTER — TELEPHONE (OUTPATIENT)
Dept: PODIATRY | Facility: CLINIC | Age: 43
End: 2023-06-06
Payer: COMMERCIAL

## 2023-06-06 ENCOUNTER — PATIENT MESSAGE (OUTPATIENT)
Dept: ALLERGY | Facility: CLINIC | Age: 43
End: 2023-06-06
Payer: COMMERCIAL

## 2023-06-06 NOTE — TELEPHONE ENCOUNTER
Spoke with Rep for [facility]. She was advise that Dr. Lawson doesn't deal with C Pap machine. That they have to contact the pt PCP. Rep stated her understanding. Call ended.  ----- Message from Samuel Davenport sent at 6/6/2023 12:43 PM CDT -----  Type:  Needs Medical Advice    Who Called[facility] [place]   Would the patient rather a call back or a response via [facility]? call  Best Call Back Number: [phone]  Additional Information: Please call and confirm that script has been received for pt c/pap machine   Ref# [id number]

## 2023-06-07 ENCOUNTER — TELEPHONE (OUTPATIENT)
Dept: INTERNAL MEDICINE | Facility: CLINIC | Age: 43
End: 2023-06-07
Payer: COMMERCIAL

## 2023-06-07 NOTE — TELEPHONE ENCOUNTER
I spoke to [facility]. I have not received the orders.  Fax # given. The orders will be resent to us.    Once signed, I will fax back to [phone]

## 2023-06-07 NOTE — TELEPHONE ENCOUNTER
----- Message from Theodore Duckworth MA sent at 6/6/2023  1:36 PM CDT -----  Good afternoon     Please advise       Thank You   ----- Message -----  From: Samuel Davenport  Sent: 6/6/2023  12:45 PM CDT  To: Renny Whittaker Staff    Type:  Needs Medical Advice    Who Called: [facility]   Would the patient rather a call back or a response via [facility]? call  Best Call Back Number: [phone]  Additional Information: Please call and confirm that script has been received for pt c/pap machine   Ref# [id number]

## 2023-06-08 ENCOUNTER — OFFICE VISIT (OUTPATIENT)
Dept: PSYCHIATRY | Facility: CLINIC | Age: 43
End: 2023-06-08
Payer: COMMERCIAL

## 2023-06-08 VITALS
HEART RATE: 87 BPM | BODY MASS INDEX: 40.48 KG/M2 | WEIGHT: 243.25 LBS | DIASTOLIC BLOOD PRESSURE: 73 MMHG | SYSTOLIC BLOOD PRESSURE: 141 MMHG

## 2023-06-08 DIAGNOSIS — G47.00 INSOMNIA, UNSPECIFIED TYPE: ICD-10-CM

## 2023-06-08 DIAGNOSIS — M54.16 LUMBAR RADICULOPATHY, CHRONIC: ICD-10-CM

## 2023-06-08 DIAGNOSIS — E11.9 TYPE 2 DIABETES MELLITUS WITHOUT COMPLICATION, UNSPECIFIED WHETHER LONG TERM INSULIN USE: ICD-10-CM

## 2023-06-08 DIAGNOSIS — F41.9 ANXIETY: ICD-10-CM

## 2023-06-08 DIAGNOSIS — M54.12 CERVICAL RADICULOPATHY: ICD-10-CM

## 2023-06-08 DIAGNOSIS — Z87.442 HISTORY OF URINARY STONE: ICD-10-CM

## 2023-06-08 DIAGNOSIS — F39 UNSPECIFIED MOOD (AFFECTIVE) DISORDER: Primary | ICD-10-CM

## 2023-06-08 DIAGNOSIS — F90.0 ADHD (ATTENTION DEFICIT HYPERACTIVITY DISORDER), INATTENTIVE TYPE: ICD-10-CM

## 2023-06-08 PROCEDURE — 3072F PR LOW RISK FOR RETINOPATHY: ICD-10-PCS | Mod: CPTII,S$GLB,, | Performed by: PSYCHIATRY & NEUROLOGY

## 2023-06-08 PROCEDURE — 3008F PR BODY MASS INDEX (BMI) DOCUMENTED: ICD-10-PCS | Mod: CPTII,S$GLB,, | Performed by: PSYCHIATRY & NEUROLOGY

## 2023-06-08 PROCEDURE — 99999 PR PBB SHADOW E&M-EST. PATIENT-LVL II: ICD-10-PCS | Mod: PBBFAC,,, | Performed by: PSYCHIATRY & NEUROLOGY

## 2023-06-08 PROCEDURE — 3077F SYST BP >= 140 MM HG: CPT | Mod: CPTII,S$GLB,, | Performed by: PSYCHIATRY & NEUROLOGY

## 2023-06-08 PROCEDURE — 3044F HG A1C LEVEL LT 7.0%: CPT | Mod: CPTII,S$GLB,, | Performed by: PSYCHIATRY & NEUROLOGY

## 2023-06-08 PROCEDURE — 3072F LOW RISK FOR RETINOPATHY: CPT | Mod: CPTII,S$GLB,, | Performed by: PSYCHIATRY & NEUROLOGY

## 2023-06-08 PROCEDURE — 3008F BODY MASS INDEX DOCD: CPT | Mod: CPTII,S$GLB,, | Performed by: PSYCHIATRY & NEUROLOGY

## 2023-06-08 PROCEDURE — 3044F PR MOST RECENT HEMOGLOBIN A1C LEVEL <7.0%: ICD-10-PCS | Mod: CPTII,S$GLB,, | Performed by: PSYCHIATRY & NEUROLOGY

## 2023-06-08 PROCEDURE — 99215 PR OFFICE/OUTPT VISIT, EST, LEVL V, 40-54 MIN: ICD-10-PCS | Mod: S$GLB,,, | Performed by: PSYCHIATRY & NEUROLOGY

## 2023-06-08 PROCEDURE — 3078F PR MOST RECENT DIASTOLIC BLOOD PRESSURE < 80 MM HG: ICD-10-PCS | Mod: CPTII,S$GLB,, | Performed by: PSYCHIATRY & NEUROLOGY

## 2023-06-08 PROCEDURE — 3077F PR MOST RECENT SYSTOLIC BLOOD PRESSURE >= 140 MM HG: ICD-10-PCS | Mod: CPTII,S$GLB,, | Performed by: PSYCHIATRY & NEUROLOGY

## 2023-06-08 PROCEDURE — 99999 PR PBB SHADOW E&M-EST. PATIENT-LVL II: CPT | Mod: PBBFAC,,, | Performed by: PSYCHIATRY & NEUROLOGY

## 2023-06-08 PROCEDURE — 3078F DIAST BP <80 MM HG: CPT | Mod: CPTII,S$GLB,, | Performed by: PSYCHIATRY & NEUROLOGY

## 2023-06-08 PROCEDURE — 99215 OFFICE O/P EST HI 40 MIN: CPT | Mod: S$GLB,,, | Performed by: PSYCHIATRY & NEUROLOGY

## 2023-06-08 RX ORDER — LAMOTRIGINE 100 MG/1
150 TABLET ORAL NIGHTLY
Qty: 135 TABLET | Refills: 0 | Status: SHIPPED | OUTPATIENT
Start: 2023-06-08 | End: 2023-08-24 | Stop reason: SDUPTHER

## 2023-06-08 RX ORDER — DEXMETHYLPHENIDATE HYDROCHLORIDE 10 MG/1
10 TABLET ORAL 2 TIMES DAILY
Qty: 60 TABLET | Refills: 0 | Status: SHIPPED | OUTPATIENT
Start: 2023-06-08 | End: 2023-07-08

## 2023-06-08 RX ORDER — DEXMETHYLPHENIDATE HYDROCHLORIDE 10 MG/1
10 TABLET ORAL 2 TIMES DAILY
Qty: 60 TABLET | Refills: 0 | Status: SHIPPED | OUTPATIENT
Start: 2023-07-07 | End: 2023-08-24

## 2023-06-08 RX ORDER — ESCITALOPRAM OXALATE 5 MG/1
5 TABLET ORAL DAILY
Qty: 90 TABLET | Refills: 0 | Status: SHIPPED | OUTPATIENT
Start: 2023-06-08 | End: 2023-08-24 | Stop reason: SDUPTHER
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
## 2023-06-09 ENCOUNTER — PATIENT MESSAGE (OUTPATIENT)
Dept: ALLERGY | Facility: CLINIC | Age: 43
End: 2023-06-09
Payer: COMMERCIAL

## 2023-06-09 ENCOUNTER — OFFICE VISIT (OUTPATIENT)
Dept: URGENT CARE | Facility: CLINIC | Age: 43
End: 2023-06-09
Payer: COMMERCIAL

## 2023-06-09 VITALS
SYSTOLIC BLOOD PRESSURE: 129 MMHG | DIASTOLIC BLOOD PRESSURE: 85 MMHG | WEIGHT: 243 LBS | HEIGHT: 60 IN | BODY MASS INDEX: 47.71 KG/M2 | HEART RATE: 93 BPM | OXYGEN SATURATION: 96 % | TEMPERATURE: 98 F | RESPIRATION RATE: 18 BRPM

## 2023-06-09 DIAGNOSIS — N20.0 NEPHROLITHIASIS: Primary | ICD-10-CM

## 2023-06-09 DIAGNOSIS — R10.9 ABDOMINAL PAIN, UNSPECIFIED ABDOMINAL LOCATION: ICD-10-CM

## 2023-06-09 DIAGNOSIS — K59.00 CONSTIPATION, UNSPECIFIED CONSTIPATION TYPE: ICD-10-CM

## 2023-06-09 LAB
BILIRUB UR QL STRIP: NEGATIVE
GLUCOSE UR QL STRIP: NEGATIVE
KETONES UR QL STRIP: POSITIVE
LEUKOCYTE ESTERASE UR QL STRIP: NEGATIVE
PH, POC UA: 5
POC BLOOD, URINE: POSITIVE
POC NITRATES, URINE: NEGATIVE
PROT UR QL STRIP: POSITIVE
SP GR UR STRIP: 1.02 (ref 1–1.03)
UROBILINOGEN UR STRIP-ACNC: ABNORMAL (ref 0.3–2.2)

## 2023-06-09 PROCEDURE — 93005 ELECTROCARDIOGRAM TRACING: CPT | Mod: S$GLB,,,

## 2023-06-09 PROCEDURE — 96372 PR INJECTION,THERAP/PROPH/DIAG2ST, IM OR SUBCUT: ICD-10-PCS | Mod: S$GLB,,, | Performed by: FAMILY MEDICINE

## 2023-06-09 PROCEDURE — 96372 THER/PROPH/DIAG INJ SC/IM: CPT | Mod: S$GLB,,, | Performed by: FAMILY MEDICINE

## 2023-06-09 PROCEDURE — 93010 EKG 12-LEAD: ICD-10-PCS | Mod: S$GLB,,, | Performed by: INTERNAL MEDICINE

## 2023-06-09 PROCEDURE — 81003 URINALYSIS AUTO W/O SCOPE: CPT | Mod: QW,S$GLB,,

## 2023-06-09 PROCEDURE — 87086 URINE CULTURE/COLONY COUNT: CPT

## 2023-06-09 PROCEDURE — 93005 EKG 12-LEAD: ICD-10-PCS | Mod: S$GLB,,,

## 2023-06-09 PROCEDURE — 81003 POCT URINALYSIS, DIPSTICK, AUTOMATED, W/O SCOPE: ICD-10-PCS | Mod: QW,S$GLB,,

## 2023-06-09 PROCEDURE — 74019 RADEX ABDOMEN 2 VIEWS: CPT | Mod: FY,S$GLB,, | Performed by: RADIOLOGY

## 2023-06-09 PROCEDURE — 93010 ELECTROCARDIOGRAM REPORT: CPT | Mod: S$GLB,,, | Performed by: INTERNAL MEDICINE

## 2023-06-09 PROCEDURE — 99214 PR OFFICE/OUTPT VISIT, EST, LEVL IV, 30-39 MIN: ICD-10-PCS | Mod: 25,S$GLB,,

## 2023-06-09 PROCEDURE — 99214 OFFICE O/P EST MOD 30 MIN: CPT | Mod: 25,S$GLB,,

## 2023-06-09 PROCEDURE — 74019 XR ABDOMEN FLAT AND ERECT: ICD-10-PCS | Mod: FY,S$GLB,, | Performed by: RADIOLOGY

## 2023-06-09 RX ORDER — KETOROLAC TROMETHAMINE 30 MG/ML
30 INJECTION, SOLUTION INTRAMUSCULAR; INTRAVENOUS
Status: COMPLETED | OUTPATIENT
Start: 2023-06-09 | End: 2023-06-09

## 2023-06-09 RX ORDER — TAMSULOSIN HYDROCHLORIDE 0.4 MG/1
0.4 CAPSULE ORAL DAILY
Qty: 14 CAPSULE | Refills: 0 | Status: SHIPPED | OUTPATIENT
Start: 2023-06-09 | End: 2023-07-04

## 2023-06-09 RX ORDER — TRAMADOL HYDROCHLORIDE 50 MG/1
50 TABLET ORAL EVERY 6 HOURS
Qty: 16 TABLET | Refills: 0 | Status: SHIPPED | OUTPATIENT
Start: 2023-06-09 | End: 2023-06-13

## 2023-06-09 RX ORDER — POLYETHYLENE GLYCOL 3350 17 G/17G
17 POWDER, FOR SOLUTION ORAL DAILY
Qty: 7 PACKET | Refills: 0 | Status: SHIPPED | OUTPATIENT
Start: 2023-06-09 | End: 2023-06-16

## 2023-06-09 RX ADMIN — KETOROLAC TROMETHAMINE 30 MG: 30 INJECTION, SOLUTION INTRAMUSCULAR; INTRAVENOUS at 02:06

## 2023-06-09 NOTE — PROGRESS NOTES
Subjective:      Patient ID: Hermelindo Garza III is a 42 y.o. male.    Vitals:  height is 5' (1.524 m) and weight is 110.2 kg (243 lb). His oral temperature is 98.4 °F (36.9 °C). His blood pressure is 129/85 and his pulse is 93. His respiration is 18 and oxygen saturation is 96%.     Chief Complaint: Abdominal Pain    This is a 42 y.o. male who presents today with a chief complaint of abdominal pain. Patient has had abdominal pain since last night. Patient is also having lower back pain. Patient started to fill dizzy during triage. Pain fells like a constant cramp. Hx of kidney stones. This feels the same.  Patient denies any radiation of the pain.  Desire does not denies any shortness a breath or chest pain.  Patient does endorse palpitations.  History of appendectomy.      Abdominal Pain  This is a new problem. The current episode started yesterday. The onset quality is sudden. The problem has been gradually worsening. The pain is located in the generalized abdominal region. The pain is at a severity of 5/10. The quality of the pain is cramping. The abdominal pain radiates to the back. Associated symptoms include nausea. Pertinent negatives include no constipation, diarrhea or vomiting. Associated symptoms comments: dizziness. Nothing aggravates the pain. The pain is relieved by Nothing. He has tried antacids (Pepto) for the symptoms. The treatment provided mild relief. His past medical history is significant for abdominal surgery and GERD. Patient's medical history includes kidney stones.   Cardiovascular:  Positive for palpitations. Negative for chest trauma, chest pain, leg swelling, sob on exertion and passing out.   Respiratory:  Negative for shortness of breath.    Gastrointestinal:  Positive for abdominal pain, abdominal bloating, history of abdominal surgery and nausea. Negative for vomiting, constipation, diarrhea and heartburn.   Neurological:  Negative for disorientation and altered mental status.    Psychiatric/Behavioral:  Negative for altered mental status and disorientation.     Objective:     Physical Exam   Constitutional: He is oriented to person, place, and time. He appears well-developed.      Comments:Answers questions in complete sentences. Does not show any signs of distress or discoloration.        HENT:   Head: Normocephalic and atraumatic.   Ears:   Right Ear: External ear normal.   Left Ear: External ear normal.   Nose: Nose normal.   Mouth/Throat: Mucous membranes are normal.   Eyes: Conjunctivae and lids are normal.   Neck: Trachea normal. Neck supple.   Cardiovascular: Normal rate, regular rhythm and normal heart sounds.   Pulmonary/Chest: Effort normal and breath sounds normal. No respiratory distress.   Abdominal: Normal appearance and bowel sounds are normal. He exhibits distension. He exhibits no shifting dullness, no fluid wave, no abdominal bruit, no pulsatile midline mass and no mass. Soft. There is no splenomegaly. No signs of injury. There is generalized abdominal tenderness. There is no rebound, no guarding, no tenderness at McBurney's point, negative Singleton's sign, no left CVA tenderness, negative Rovsing's sign, negative psoas sign and no right CVA tenderness. No hernia.   Musculoskeletal: Normal range of motion.         General: Normal range of motion.   Neurological: He is alert and oriented to person, place, and time. He has normal strength.   Skin: Skin is warm, dry, intact, not diaphoretic and not pale.   Psychiatric: His speech is normal and behavior is normal. Judgment and thought content normal.   Nursing note and vitals reviewed.  Results for orders placed or performed in visit on 06/09/23   POCT Urinalysis, Dipstick, Automated, W/O Scope   Result Value Ref Range    POC Blood, Urine Positive (A) Negative    POC Bilirubin, Urine Negative Negative    POC Urobilinogen, Urine Norm 0.3 - 2.2    POC Ketones, Urine Positive (A) Negative    POC Protein, Urine Positive (A)  Negative    POC Nitrates, Urine Negative Negative    POC Glucose, Urine Negative Negative    pH, UA 5.0     POC Specific Gravity, Urine 1.025 1.003 - 1.029    POC Leukocytes, Urine Negative Negative     EKG: Normal sinus rhythm. No ST or T wave abnormalities noted.     XR ABDOMEN FLAT AND ERECT    Result Date: 6/9/2023  EXAMINATION: XR ABDOMEN FLAT AND ERECT CLINICAL HISTORY: Unspecified abdominal pain. TECHNIQUE: Flat and erect frontal views of the abdomen were performed. COMPARISON: 08/26/2021. FINDINGS: Bowel gas pattern is nonobstructive.  No evidence of pneumoperitoneum.  No large volume fecal burden.  No pathologic calcifications.  Surgical clip overlies the right lower quadrant.  Bones demonstrate degenerative changes.     Nonobstructive bowel gas pattern. Electronically signed by: Osbaldo Causey MD Date:    06/09/2023 Time:    14:57     Assessment:     1. Nephrolithiasis    2. Abdominal pain, unspecified abdominal location    3. Constipation, unspecified constipation type        Plan:   UA positive for 250 of blood.  Patient has history of kidney stones and this feels similar.  Likely nephrolithiasis causing symptoms.  Abdominal x-ray positive for constipation.  Will treat for constipation with MiraLax.  Will treat for nephrolithiasis with Flomax and will treat pain with a Toradol shot in clinic and a 4 day course tramadol for pain.  Patient given strainer in clinic.    Strict ED precautions for new or worsening symptoms.  Patient expressed understanding and agree with plan.    Nephrolithiasis  -     tamsulosin (FLOMAX) 0.4 mg Cap; Take 1 capsule (0.4 mg total) by mouth once daily. for 14 days  Dispense: 14 capsule; Refill: 0  -     ketorolac injection 30 mg  -     traMADoL (ULTRAM) 50 mg tablet; Take 1 tablet (50 mg total) by mouth every 6 (six) hours. for 4 days  Dispense: 16 tablet; Refill: 0  -     CULTURE, URINE    Abdominal pain, unspecified abdominal location  -     POCT Urinalysis, Dipstick,  Automated, W/O Scope  -     IN OFFICE EKG 12-LEAD (to Muse)  -     XR ABDOMEN FLAT AND ERECT; Future; Expected date: 06/09/2023  -     Ambulatory referral/consult to Urology    Constipation, unspecified constipation type  -     polyethylene glycol (GLYCOLAX) 17 gram PwPk; Take 17 g by mouth once daily. for 7 days  Dispense: 7 packet; Refill: 0                Patient Instructions   - Rest.    - Drink plenty of fluids.      - Today you have received a Toradol injection. DO NOT TAKE ANY NSAIDs (Ibuprofen, Motrin, Advil, Naproxen, etc.) for 24 hours after receiving the injection.     Today you have been given a narcotic. DO NOT operate heavy machinery while on this medication. Do not take other narcotics or drink alcohol while on this medication. Do not take more than the recommended dose. If you start having trouble breathing or develop an allergic reaction, go to the emergency room immediately.     - Acetaminophen (tylenol) or Ibuprofen (advil,motrin) as directed as needed for fever/pain. Avoid tylenol if you have a history of liver disease.   - Tylenol dosing for adults: [By mouth route, immediate-release form] Dose: 325-1000 mg by mouth every 4-6h as needed; Max: 1 g/4h and 4 g/day from all sources. [By mouth route, extended-release form] Dose: 650-1300 mg Extended Release by mouth every 8h as needed; Max: 4 g/day from all sources.     - You must understand that you have received an Urgent Care treatment only and that you may be released before all of your medical problems are known or treated.   - You, the patient, will arrange for follow up care as instructed.   - If your condition worsens or fails to improve we recommend that you receive another evaluation at the ER immediately or contact your PCP to discuss your concerns or return here.   - Follow up with your PCP or specialty clinic as directed in the next 1-2 weeks if not improved or as needed.  You can call [phone] to schedule an appointment with the  appropriate provider.    If your symptoms do not improve or worsen, go to the emergency room immediately.

## 2023-06-09 NOTE — PATIENT INSTRUCTIONS
- Rest.    - Drink plenty of fluids.      - Today you have received a Toradol injection. DO NOT TAKE ANY NSAIDs (Ibuprofen, Motrin, Advil, Naproxen, etc.) for 24 hours after receiving the injection.     Today you have been given a narcotic. DO NOT operate heavy machinery while on this medication. Do not take other narcotics or drink alcohol while on this medication. Do not take more than the recommended dose. If you start having trouble breathing or develop an allergic reaction, go to the emergency room immediately.     - Acetaminophen (tylenol) or Ibuprofen (advil,motrin) as directed as needed for fever/pain. Avoid tylenol if you have a history of liver disease.   - Tylenol dosing for adults: [By mouth route, immediate-release form] Dose: 325-1000 mg by mouth every 4-6h as needed; Max: 1 g/4h and 4 g/day from all sources. [By mouth route, extended-release form] Dose: 650-1300 mg Extended Release by mouth every 8h as needed; Max: 4 g/day from all sources.     - You must understand that you have received an Urgent Care treatment only and that you may be released before all of your medical problems are known or treated.   - You, the patient, will arrange for follow up care as instructed.   - If your condition worsens or fails to improve we recommend that you receive another evaluation at the ER immediately or contact your PCP to discuss your concerns or return here.   - Follow up with your PCP or specialty clinic as directed in the next 1-2 weeks if not improved or as needed.  You can call [phone] to schedule an appointment with the appropriate provider.    If your symptoms do not improve or worsen, go to the emergency room immediately.

## 2023-06-11 ENCOUNTER — TELEPHONE (OUTPATIENT)
Dept: URGENT CARE | Facility: CLINIC | Age: 43
End: 2023-06-11
Payer: COMMERCIAL

## 2023-06-11 LAB — BACTERIA UR CULT: NO GROWTH

## 2023-06-11 NOTE — TELEPHONE ENCOUNTER
Discussed negative urine culture results. Patient states he has not seen a stone but still having occasional pain although not as bad, states it feels more like bloated sensation. Patient concerned about constipation since he has not had a BM. Informed patient he can increase Miralax that he was taking until BM occurs or he can try fleet enema. XR results showed no obstruction. Patient agreeable to plan and had no further questions. Strict ER precautions given.

## 2023-06-12 PROBLEM — G47.00 INSOMNIA: Status: ACTIVE | Noted: 2023-06-12

## 2023-06-12 NOTE — PATIENT INSTRUCTIONS
PLAN:     FOLLOW UP  Aug 24 2023 10:30a    Meds: add lamictal 25 mg and to advance to 100 mg as per instruction on bottle    Remains Lexapro 10 mg daily     References:     Relaxation stress reduction workbook: AIM Davenport PhD ( used: $7-10)    Feeling Good Website: Ashwin Anderson MD / www.[employer] website (free) / alexey. PODCASTS    Anxiety &  phobia workbook by JORGE Carlisle PhD  (web retailers: used: $ 7-10)    [place]: Path to Better Sleep : https://www.veterantraining.[place].gov/insomnia/ (free)       Pt expressed appreciation for the visit today and did not have further question at this time though pt  was still informed to:     Call  if problems.    Call / Report Side Effects to Psyc MD     Encouraged to follow up with primary care / Gen Med MD for continued monitoring of general health and wellness.    Understanding was expressed; and no further concerns nor questions were raised at this time.     Remember healthy self care:   eat right  attempt adequate rest   HANDWASHING / encourage such alexey. During this corona virus time   walk or light exercise within reason and as your general med team approves  read or explore any of reference materials / homework mentioned  reach out (I.e.,  connect with)  others who nuture and bring out best in you  avoid risky behaviors    Keep your appointments:    IF you  cannot make your appt THEN please call [phone] or go online (via My Chart michael) to reschedule.    It is the responsibility of the patient to reschedule an appointment if an appointment has been canceled or missed.    Avoid  alcohol and illicit substances.  Look for the positive.  All is often relative-seek balance  Call sooner if needed : [phone]   Call 491 or go to Emergency Room  (ER)  if  any acute concerns

## 2023-06-12 NOTE — TELEPHONE ENCOUNTER
I recalled. I spoke to [facility]. I have not received the orders.  Fax # given. The orders will be resent to us.     Once signed, I will fax back to [phone]

## 2023-06-14 ENCOUNTER — CLINICAL SUPPORT (OUTPATIENT)
Dept: ALLERGY | Facility: CLINIC | Age: 43
End: 2023-06-14
Payer: COMMERCIAL

## 2023-06-14 DIAGNOSIS — J30.9 CHRONIC ALLERGIC RHINITIS: Primary | ICD-10-CM

## 2023-06-14 PROCEDURE — 95117 PR IMMU2THERAPY, 2+ INJECTIONS: ICD-10-PCS | Mod: S$GLB,,, | Performed by: ALLERGY & IMMUNOLOGY

## 2023-06-14 PROCEDURE — 99999 PR PBB SHADOW E&M-EST. PATIENT-LVL I: ICD-10-PCS | Mod: PBBFAC,,,

## 2023-06-14 PROCEDURE — 95117 IMMUNOTHERAPY INJECTIONS: CPT | Mod: S$GLB,,, | Performed by: ALLERGY & IMMUNOLOGY

## 2023-06-14 PROCEDURE — 99999 PR PBB SHADOW E&M-EST. PATIENT-LVL I: CPT | Mod: PBBFAC,,,

## 2023-06-15 ENCOUNTER — CLINICAL SUPPORT (OUTPATIENT)
Dept: REHABILITATION | Facility: HOSPITAL | Age: 43
End: 2023-06-15
Payer: COMMERCIAL

## 2023-06-15 DIAGNOSIS — M77.12 LEFT LATERAL EPICONDYLITIS: ICD-10-CM

## 2023-06-15 DIAGNOSIS — M75.82 ROTATOR CUFF TENDONITIS, LEFT: Primary | ICD-10-CM

## 2023-06-15 DIAGNOSIS — M54.12 CERVICAL RADICULOPATHY: ICD-10-CM

## 2023-06-15 PROCEDURE — 97110 THERAPEUTIC EXERCISES: CPT

## 2023-06-15 PROCEDURE — 97140 MANUAL THERAPY 1/> REGIONS: CPT

## 2023-06-15 NOTE — PROGRESS NOTES
"  [facility]   Physical Therapy Treatment Note      Name: Hermelindo Garza [facility]  Clinic Number: [id number]    Therapy Diagnosis:   Encounter Diagnoses   Name Primary?    Rotator cuff tendonitis, left Yes    Left lateral epicondylitis     Cervical radiculopathy      Physician: Christen Marshall MD    Visit Date: 6/15/2023    Physician Orders: PT Eval and Treat   Medical Diagnosis from Referral: M77.12 (ICD-10-CM) - Left lateral epicondylitis M54.12 (ICD-10-CM) - Cervical radiculopathy M75.82 (ICD-10-CM) - Rotator cuff tendonitis, left   Evaluation Date: 5/22/2023  Authorization Period Expiration: 11/22/2023  Plan of Care Expiration: 8/22/2023  Progress Note Due: 6/22/2023  Visit # / Visits authorized: 2/20 (+ eval)  FOTO: 1/3    PTA Visit #: 0/5     Time In: 1:00 PM  Time Out: 1:46 PM  Total Billable Time: 46 minutes (1MT, 2 TE)    Subjective     Pt reports: tenderness on top of left shoulder which makes bone feel bruised.  He was compliant with home exercise program.  Response to previous treatment: no adverse response to IE  Functional change: none reported    Pain: 3/10  Location: left neck / shoulder     Objective      Objective Measures updated at progress report unless specified.     Treatment     Hermelindo received the treatments listed below:      therapeutic exercises to develop strength, ROM, flexibility, and posture for 31 minutes including:  - cervical retraction 3" x 20  - supine no monies with red theraband 2 x 10 reps  - supine horizontal abduction with isometric chin tuck with red band 2 x 10 reps (on foam 1/2 roll)  - serratus punch 3# 2 x 10 reps  - pec stretch over 1/2 foam roll 2 min  - scapular retraction 2 x 12 reps with green band (cuing for posture)  - shoulder extension + scap retraction with green band 2 x 10 reps (cuing for posture)  - wall clock with yellow theraband x 5 each side  - wall day 2 x 10 reps    manual therapy techniques: Joint mobilizations, " Myofacial release, and Soft tissue Mobilization were applied to the: cervical spine for 15 minutes, including:  - Manual traction  - Left side upglides grade 2-3 C4-7  - STM to left side paraspinals, scalenes and UT      Patient Education and Home Exercises       Education provided:   - pt educated about proper posture, use of lumbar roll to assist with good head position over shoulders.    Written Home Exercises Provided: yes. Exercises were reviewed and Hermelindo was able to demonstrate them prior to the end of the session.  Hermelindo demonstrated good  understanding of the education provided. See EMR under Patient Instructions for exercises provided during therapy sessions    Assessment     Hermelindo tolerated session well, most upper extremity activity increase radicular symptoms into bilateral forearms/hands, left>right. .  Cuing focused on good posture during all exercises, in both cervical and lumbar spine. Decreased mobility of cervical spine noted during manual intervention.  Skilled PT remains appropriate to address impairments and progress towards goals set at time of evaluation.     Hermelindo Is progressing well towards his goals.   Pt prognosis is Good.     Pt will continue to benefit from skilled outpatient physical therapy to address the deficits listed in the problem list box on initial evaluation, provide pt/family education and to maximize pt's level of independence in the home and community environment.     Pt's spiritual, cultural and educational needs considered and pt agreeable to plan of care and goals.     Anticipated barriers to physical therapy: chronicity of condition and comorbidities    Goals:   Short Term Goals: 4 weeks   Formally assess lateral epicondylitis and set goals accordingly. progressing  Patient will show 10 degree improvement in cervical range of motion. (PROGRESSING, NOT MET)   Patient will show 50% reduction in cervical and scapular muscle tone. (PROGRESSING, NOT MET)   Pt will be  able to sit > 30 minutes before onset of tightness.(PROGRESSING, NOT MET)      Long Term Goals: 8 weeks   Patient will be able work work > 2 hours before onset of symptoms. (PROGRESSING, NOT MET)   Patient will be able to life > 25 # overhead without an increase in pain. (PROGRESSING, NOT MET)   Patient will be able to perform > 60 minutes of exercises without increasing tightness in the back and shoulder. (PROGRESSING, NOT MET)   50% FOTO score improvement. (PROGRESSING, NOT MET)     Plan     Progress cervical stabilization exercises as tolerated.    Libby Sung, PT

## 2023-06-16 ENCOUNTER — HOSPITAL ENCOUNTER (OUTPATIENT)
Dept: RADIOLOGY | Facility: HOSPITAL | Age: 43
Discharge: HOME | End: 2023-06-16
Attending: NEUROLOGICAL SURGERY
Payer: COMMERCIAL

## 2023-06-16 ENCOUNTER — PATIENT MESSAGE (OUTPATIENT)
Dept: NEUROSURGERY | Facility: HOSPITAL | Age: 43
End: 2023-06-16
Payer: COMMERCIAL

## 2023-06-16 ENCOUNTER — PATIENT MESSAGE (OUTPATIENT)
Dept: NEUROSURGERY | Facility: CLINIC | Age: 43
End: 2023-06-16
Payer: COMMERCIAL

## 2023-06-16 DIAGNOSIS — M48.02 SPINAL STENOSIS, CERVICAL REGION: ICD-10-CM

## 2023-06-16 DIAGNOSIS — M54.16 LUMBAR RADICULOPATHY: ICD-10-CM

## 2023-06-16 PROCEDURE — 72141 MRI CERVICAL SPINE WITHOUT CONTRAST: ICD-10-PCS | Mod: 26,,, | Performed by: INTERNAL MEDICINE

## 2023-06-16 PROCEDURE — 72148 MRI LUMBAR SPINE W/O DYE: CPT | Mod: 26,,, | Performed by: INTERNAL MEDICINE

## 2023-06-16 PROCEDURE — 72148 MRI LUMBAR SPINE WITHOUT CONTRAST: ICD-10-PCS | Mod: 26,,, | Performed by: INTERNAL MEDICINE

## 2023-06-16 PROCEDURE — 72148 MRI LUMBAR SPINE W/O DYE: CPT | Mod: TC

## 2023-06-16 PROCEDURE — 72141 MRI NECK SPINE W/O DYE: CPT | Mod: TC

## 2023-06-16 PROCEDURE — 72141 MRI NECK SPINE W/O DYE: CPT | Mod: 26,,, | Performed by: INTERNAL MEDICINE

## 2023-06-18 ENCOUNTER — PATIENT MESSAGE (OUTPATIENT)
Dept: PAIN MEDICINE | Facility: HOSPITAL | Age: 43
End: 2023-06-18
Payer: COMMERCIAL

## 2023-06-19 ENCOUNTER — PATIENT MESSAGE (OUTPATIENT)
Dept: PAIN MEDICINE | Facility: HOSPITAL | Age: 43
End: 2023-06-19
Payer: COMMERCIAL

## 2023-06-19 NOTE — TELEPHONE ENCOUNTER
Pt state he is having severe pain in his back. Pt wants to know is there anything you can do to ease his pain. Pt procedure is schedule for Cervical steroid injection on 6/28.

## 2023-06-20 ENCOUNTER — CLINICAL SUPPORT (OUTPATIENT)
Dept: ALLERGY | Facility: CLINIC | Age: 43
End: 2023-06-20
Payer: COMMERCIAL

## 2023-06-20 DIAGNOSIS — J30.9 CHRONIC ALLERGIC RHINITIS: Primary | ICD-10-CM

## 2023-06-20 PROCEDURE — 95117 IMMUNOTHERAPY INJECTIONS: CPT | Mod: S$GLB,,, | Performed by: ALLERGY & IMMUNOLOGY

## 2023-06-20 PROCEDURE — 99999 PR PBB SHADOW E&M-EST. PATIENT-LVL I: CPT | Mod: PBBFAC,,,

## 2023-06-20 PROCEDURE — 99999 PR PBB SHADOW E&M-EST. PATIENT-LVL I: ICD-10-PCS | Mod: PBBFAC,,,

## 2023-06-20 PROCEDURE — 95117 PR IMMU2THERAPY, 2+ INJECTIONS: ICD-10-PCS | Mod: S$GLB,,, | Performed by: ALLERGY & IMMUNOLOGY

## 2023-06-20 RX ORDER — METHYLPREDNISOLONE 4 MG/1
TABLET ORAL
Qty: 21 EACH | Refills: 0 | Status: SHIPPED | OUTPATIENT
Start: 2023-06-20 | End: 2023-06-29 | Stop reason: SDUPTHER

## 2023-06-22 ENCOUNTER — CLINICAL SUPPORT (OUTPATIENT)
Dept: REHABILITATION | Facility: HOSPITAL | Age: 43
End: 2023-06-22
Payer: COMMERCIAL

## 2023-06-22 ENCOUNTER — PATIENT MESSAGE (OUTPATIENT)
Dept: PAIN MEDICINE | Facility: HOSPITAL | Age: 43
End: 2023-06-22
Payer: COMMERCIAL

## 2023-06-22 DIAGNOSIS — M54.12 CERVICAL RADICULOPATHY: ICD-10-CM

## 2023-06-22 DIAGNOSIS — M75.82 ROTATOR CUFF TENDONITIS, LEFT: Primary | ICD-10-CM

## 2023-06-22 DIAGNOSIS — M77.12 LEFT LATERAL EPICONDYLITIS: ICD-10-CM

## 2023-06-22 PROCEDURE — 97140 MANUAL THERAPY 1/> REGIONS: CPT

## 2023-06-22 PROCEDURE — 97110 THERAPEUTIC EXERCISES: CPT

## 2023-06-22 NOTE — PROGRESS NOTES
"    [facility]   Physical Therapy Treatment Note      Name: Hermelindo Garza [facility]  Clinic Number: [id number]    Therapy Diagnosis:   Encounter Diagnoses   Name Primary?    Rotator cuff tendonitis, left Yes    Left lateral epicondylitis     Cervical radiculopathy      Physician: Christen Marshall MD    Visit Date: 6/22/2023    Physician Orders: PT Eval and Treat   Medical Diagnosis from Referral: M77.12 (ICD-10-CM) - Left lateral epicondylitis M54.12 (ICD-10-CM) - Cervical radiculopathy M75.82 (ICD-10-CM) - Rotator cuff tendonitis, left   Evaluation Date: 5/22/2023  Authorization Period Expiration: 11/22/2023  Plan of Care Expiration: 8/22/2023  Progress Note Due: 6/22/2023  Visit # / Visits authorized: 3/20 (+ eval)  FOTO: 1/3    PTA Visit #: 0/5     Time In: 1:02 PM  Time Out: 1:50 PM  Total Billable Time: 48 minutes (1MT, 2 TE)    Subjective     Pt reports: pt started steroid back two days ago due to increased low back pain, which he feels has improved both neck and low back pain. Pt is scheduled for cervical epidural on 6/28. He has also been going to the pool to exercise in the morning and feels that is helping his neck.  He was compliant with home exercise program.  Response to previous treatment: no adverse response to IE  Functional change: decrease in neck pain    Pain: 2/10  Location: left neck / shoulder     Objective      Objective Measures updated at progress report unless specified.     Treatment     Hermelindo received the treatments listed below:      therapeutic exercises to develop strength, ROM, flexibility, and posture for 31 minutes including:  - pec stretch over 1/2 foam roll 2 min  - cervical retraction 3" x 20  - supine no monies with red theraband 2 x 10 reps  - supine horizontal abduction with isometric chin tuck with red band 2 x 10 reps (on foam 1/2 roll)  - serratus punch 3# 2 x 10 reps  - chest press with shoulder flexion 2 x 10 reps with 3# bilaterally  - " scapular retraction 2 x 12 reps with green band (cuing for posture)  - shoulder extension + scap retraction with green band 2 x 10 reps (cuing for posture)  - wall clock with yellow theraband x 5 each side  - wall day 2 x 10 reps with yellow band 2 x 10 reps  - prone Y / T / I  2 x 10 reps bilaterally    manual therapy techniques: Joint mobilizations, Myofacial release, and Soft tissue Mobilization were applied to the: cervical spine for 10 minutes, including: (in prone)  - Left side upglides grade 2-3 C4-7, T1, T2  - STM to left side paraspinals, scalenes and UT      Patient Education and Home Exercises       Education provided:   - pt educated about proper posture, use of lumbar roll to assist with good head position over shoulders.    Written Home Exercises Provided: yes. Exercises were reviewed and Hermelindo was able to demonstrate them prior to the end of the session.  Hermelindo demonstrated good  understanding of the education provided. See EMR under Patient Instructions for exercises provided during therapy sessions    Assessment     Hermelindo tolerated session well, pt was able to tolerate progressions without increase in pain.  Pt had only one instance of radicular symptoms into left hand today when in prone position for manual therapy, which was resolved with adjustment of bed to allow for slight cervical flexion.  Pt maintaining good cervical posture with less need for cuing today.  Pt continues to have decreased cervical mobility but Decreased mobility of cervical spine noted during manual but it is improved since initiating therapy.  Decreased soft tissue tension noted in bilateral upper traps today.  Skilled PT remains appropriate to address impairments and progress towards goals set at time of evaluation.     Hermelindo Is progressing well towards his goals.   Pt prognosis is Good.     Pt will continue to benefit from skilled outpatient physical therapy to address the deficits listed in the problem list box  on initial evaluation, provide pt/family education and to maximize pt's level of independence in the home and community environment.     Pt's spiritual, cultural and educational needs considered and pt agreeable to plan of care and goals.     Anticipated barriers to physical therapy: chronicity of condition and comorbidities    Goals:   Short Term Goals: 4 weeks   Formally assess lateral epicondylitis and set goals accordingly. progressing  Patient will show 10 degree improvement in cervical range of motion. (PROGRESSING, NOT MET)   Patient will show 50% reduction in cervical and scapular muscle tone. (PROGRESSING, NOT MET)   Pt will be able to sit > 30 minutes before onset of tightness.(PROGRESSING, NOT MET)      Long Term Goals: 8 weeks   Patient will be able work work > 2 hours before onset of symptoms. (PROGRESSING, NOT MET)   Patient will be able to life > 25 # overhead without an increase in pain. (PROGRESSING, NOT MET)   Patient will be able to perform > 60 minutes of exercises without increasing tightness in the back and shoulder. (PROGRESSING, NOT MET)   50% FOTO score improvement. (PROGRESSING, NOT MET)     Plan     Progress cervical stabilization exercises as tolerated.    Libby Sung, PT

## 2023-06-26 ENCOUNTER — OFFICE VISIT (OUTPATIENT)
Dept: PODIATRY | Facility: CLINIC | Age: 43
End: 2023-06-26
Payer: COMMERCIAL

## 2023-06-26 VITALS
TEMPERATURE: 98 F | WEIGHT: 241.63 LBS | DIASTOLIC BLOOD PRESSURE: 76 MMHG | SYSTOLIC BLOOD PRESSURE: 124 MMHG | HEART RATE: 83 BPM | BODY MASS INDEX: 47.44 KG/M2 | HEIGHT: 60 IN

## 2023-06-26 DIAGNOSIS — M79.675 TOE PAIN, LEFT: ICD-10-CM

## 2023-06-26 DIAGNOSIS — L84 CORN OR CALLUS: ICD-10-CM

## 2023-06-26 DIAGNOSIS — M20.42 HAMMERTOE OF LEFT FOOT: Primary | ICD-10-CM

## 2023-06-26 PROCEDURE — 3008F BODY MASS INDEX DOCD: CPT | Mod: CPTII,S$GLB,, | Performed by: PODIATRIST

## 2023-06-26 PROCEDURE — 3074F SYST BP LT 130 MM HG: CPT | Mod: CPTII,S$GLB,, | Performed by: PODIATRIST

## 2023-06-26 PROCEDURE — 3074F PR MOST RECENT SYSTOLIC BLOOD PRESSURE < 130 MM HG: ICD-10-PCS | Mod: CPTII,S$GLB,, | Performed by: PODIATRIST

## 2023-06-26 PROCEDURE — 1159F MED LIST DOCD IN RCRD: CPT | Mod: CPTII,S$GLB,, | Performed by: PODIATRIST

## 2023-06-26 PROCEDURE — 99213 PR OFFICE/OUTPT VISIT, EST, LEVL III, 20-29 MIN: ICD-10-PCS | Mod: S$GLB,,, | Performed by: PODIATRIST

## 2023-06-26 PROCEDURE — 99213 OFFICE O/P EST LOW 20 MIN: CPT | Mod: S$GLB,,, | Performed by: PODIATRIST

## 2023-06-26 PROCEDURE — 3078F PR MOST RECENT DIASTOLIC BLOOD PRESSURE < 80 MM HG: ICD-10-PCS | Mod: CPTII,S$GLB,, | Performed by: PODIATRIST

## 2023-06-26 PROCEDURE — 3072F LOW RISK FOR RETINOPATHY: CPT | Mod: CPTII,S$GLB,, | Performed by: PODIATRIST

## 2023-06-26 PROCEDURE — 1159F PR MEDICATION LIST DOCUMENTED IN MEDICAL RECORD: ICD-10-PCS | Mod: CPTII,S$GLB,, | Performed by: PODIATRIST

## 2023-06-26 PROCEDURE — 3078F DIAST BP <80 MM HG: CPT | Mod: CPTII,S$GLB,, | Performed by: PODIATRIST

## 2023-06-26 PROCEDURE — 99999 PR PBB SHADOW E&M-EST. PATIENT-LVL V: ICD-10-PCS | Mod: PBBFAC,,, | Performed by: PODIATRIST

## 2023-06-26 PROCEDURE — 99999 PR PBB SHADOW E&M-EST. PATIENT-LVL V: CPT | Mod: PBBFAC,,, | Performed by: PODIATRIST

## 2023-06-26 PROCEDURE — 3044F PR MOST RECENT HEMOGLOBIN A1C LEVEL <7.0%: ICD-10-PCS | Mod: CPTII,S$GLB,, | Performed by: PODIATRIST

## 2023-06-26 PROCEDURE — 3044F HG A1C LEVEL LT 7.0%: CPT | Mod: CPTII,S$GLB,, | Performed by: PODIATRIST

## 2023-06-26 PROCEDURE — 1160F RVW MEDS BY RX/DR IN RCRD: CPT | Mod: CPTII,S$GLB,, | Performed by: PODIATRIST

## 2023-06-26 PROCEDURE — 3008F PR BODY MASS INDEX (BMI) DOCUMENTED: ICD-10-PCS | Mod: CPTII,S$GLB,, | Performed by: PODIATRIST

## 2023-06-26 PROCEDURE — 3072F PR LOW RISK FOR RETINOPATHY: ICD-10-PCS | Mod: CPTII,S$GLB,, | Performed by: PODIATRIST

## 2023-06-26 PROCEDURE — 1160F PR REVIEW ALL MEDS BY PRESCRIBER/CLIN PHARMACIST DOCUMENTED: ICD-10-PCS | Mod: CPTII,S$GLB,, | Performed by: PODIATRIST

## 2023-06-26 NOTE — PROGRESS NOTES
Subjective:      Patient ID: Hermelindo Garza III is a 43 y.o. male.    Chief Complaint: Follow-up (Left foot 2nd digit hammertoe)    Presents complaining of hammertoe on the L 2nd toe that rubs on the big toe causing occasional corns and blisters. Has tried toe spacers and toe socks which have not helped. He is inquiring about additional treatment options and possible surgery in future and would like to get information on this. Most recently 2 weeks ago he developed a blister in the are which eventually healed. States he has difficulty every time he tries to exercise. Wears [place] wide, soft toe box shoes which help but not fully. No other pedal concerns at this time.      04/24/2023: follow up for L 2nd toe corn/callus secondary to hammertoe. Silicone toe sleeve has helped a lot. Pain is minimal now. Here for further assessment and dicussion of surgery in the future if symptoms fail to remain resolved.     06/26/2023: follow up for L 2nd toe corn/callus and hammertoe. Doing better overall but still comes back and causes some pain. Previous use of silicone toe sleeves caused blistering. Would like to consider surgery to correct L 2nd hammertoe PIPJ/DIPJ in September.         Vitals:    06/26/23 0855   BP: 124/76   Pulse: 83   Temp: 98.3 °F (36.8 °C)   Weight: 109.6 kg (241 lb 10 oz)   Height: 5' (1.524 m)   PainSc: 0-No pain           Past Medical History:   Diagnosis Date    ADD (attention deficit disorder) 05/09/2012    Allergy     Anxiety 4/12/2023    Asthma 05/09/2012    Cervical disc disorder with radiculopathy, unspecified cervical region 09/14/2021    Cervical spondylosis with myelopathy 06/29/2022    Eczema     Esophageal ulcer     GERD (gastroesophageal reflux disease) 05/09/2012    Glaucoma     Kidney stones 05/2014    Lumbar disc disease 05/09/2012    Lumbar herniated disc 05/09/2012    Malignant melanoma of skin, unspecified 01/06/2022    in situ right mid back    Melanoma 01/2022    in situ R mid  back     LUBA (obstructive sleep apnea)     Radiculopathy, lumbar region 09/14/2021    Renal calculi 05/09/2012    Type 2 diabetes mellitus without complication, without long-term current use of insulin 11/16/2020       Past Surgical History:   Procedure Laterality Date    APPENDECTOMY  2006    CARPAL TUNNEL RELEASE Right 09/15/2022    Procedure: RELEASE, CARPAL TUNNEL;  Surgeon: Christen Marshall MD;  Location: [facility] OR;  Service: Orthopedics;  Laterality: Right;    COLONOSCOPY  01/22/2019    internal hemorrhoids, o/w normal - repeat at age 52    EPIDURAL STEROID INJECTION N/A 02/10/2021    Procedure: CERVICAL C6/7 NELI DIRECT REFERRAL;  Surgeon: Michi Escamilla MD;  Location: [facility];  Service: Pain Management;  Laterality: N/A;  NEEDS CONSENT    ESOPHAGOGASTRODUODENOSCOPY  01/22/2019    LA grade B esophagitis; esophageal stenosis- dilated; gastritis; H pylori pathology negative    ESOPHAGOGASTRODUODENOSCOPY N/A 05/18/2021    Procedure: EGD (ESOPHAGOGASTRODUODENOSCOPY);  Surgeon: Mariana Hector MD;  Location: [facility];  Service: Endoscopy;  Laterality: N/A;    EXCISION OF GANGLION OF WRIST Right 09/15/2022    Procedure: EXCISION, GANGLION CYST, WRIST;  Surgeon: Christen Marshall MD;  Location: [facility] OR;  Service: Orthopedics;  Laterality: Right;    FLEXOR TENDON REPAIR Right 09/15/2022    Procedure: FLEXOR TENOSYNOVECTOMY;  Surgeon: Christen Marshall MD;  Location: [facility] OR;  Service: Orthopedics;  Laterality: Right;    HAND ARTHROTOMY Right 09/15/2022    Procedure: ARTHROTOMY, HAND RADIOCARPAL;  Surgeon: Christen Marshall MD;  Location: [facility] OR;  Service: Orthopedics;  Laterality: Right;  Radiocarpal    INJECTION OF STEROID Left 09/15/2022    Procedure: INJECTION, STEROID LEFT WRIST AND LEFT LATERAL ELBOW;  Surgeon: Christen Marshall MD;  Location: [facility] OR;  Service: Orthopedics;  Laterality: Left;  Left Carpal Tunnel CSI    LUMBAR LAMINECTOMY  2010    LUMBAR LAMINECTOMY WITH DISCECTOMY Left 09/20/2021     Procedure: LAMINECTOMY, SPINE, LUMBAR, WITH DISCECTOMY;  Surgeon: Naseem Mcnamara DO;  Location: [facility] OR [address] [place] [address];  Service: Neurosurgery;  Laterality: Left;  MIS L3-4    TYMPANOSTOMY TUBE PLACEMENT         Family History   Problem Relation Age of Onset    Allergic rhinitis Mother     Hypertension Mother     Transient ischemic attack Mother     Sleep apnea Mother     Allergic rhinitis Father     Asthma Father     Chronic back pain Father     MARTITA disease Father     Sleep apnea Father     Melanoma Father     Allergic rhinitis Sister     Asthma Sister     Allergic rhinitis Sister     Asthma Sister     No Known Problems Brother     No Known Problems Maternal Aunt     No Known Problems Maternal Uncle     No Known Problems Paternal Aunt     No Known Problems Paternal Uncle     Brain cancer Maternal Grandmother     Diabetes Maternal Grandfather     Breast cancer Paternal Grandmother     No Known Problems Paternal Grandfather     Amblyopia Neg Hx     Blindness Neg Hx     Cancer Neg Hx     Cataracts Neg Hx     Glaucoma Neg Hx     Macular degeneration Neg Hx     Retinal detachment Neg Hx     Strabismus Neg Hx     Stroke Neg Hx     Thyroid disease Neg Hx     Allergies Neg Hx     Angioedema Neg Hx        Social History     Socioeconomic History    Marital status:    Tobacco Use    Smoking status: Former     Types: Cigarettes     Quit date: 2018     Years since quittin.2     Passive exposure: Never    Smokeless tobacco: Never   Substance and Sexual Activity    Alcohol use: Yes     Alcohol/week: 0.0 standard drinks     Comment: Occasional    Drug use: No    Sexual activity: Yes     Social Determinants of Health     Financial Resource Strain: Low Risk     Difficulty of Paying Living Expenses: Not very hard   Food Insecurity: No Food Insecurity    Worried About Running Out of Food in the Last Year: Never true    Ran Out of Food in the Last Year: Never true   Transportation Needs: No Transportation Needs    Lack of  Transportation (Medical): No    Lack of Transportation (Non-Medical): No   Physical Activity: Sufficiently Active    Days of Exercise per Week: 5 days    Minutes of Exercise per Session: 40 min   Stress: Stress Concern Present    Feeling of Stress : To some extent   Social Connections: Unknown    Frequency of Communication with Friends and Family: More than three times a week    Frequency of Social Gatherings with Friends and Family: Twice a week    Active Member of Clubs or Organizations: No    Attends Club or Organization Meetings: Patient refused    Marital Status:    Housing Stability: Low Risk     Unable to Pay for Housing in the Last Year: No    Number of Places Lived in the Last Year: 1    Unstable Housing in the Last Year: No       Current Outpatient Medications   Medication Sig Dispense Refill    acetaminophen (TYLENOL) 325 MG tablet Take 650 mg by mouth every 6 (six) hours as needed for Pain.      albuterol (PROVENTIL/VENTOLIN HFA) 90 mcg/actuation inhaler RescueINHALE 2 PUFFS INTO THE LUNGS EVERY 6 HOURS AS NEEDED WHEEZING, RESCUE Strength: 90 mcg/actuation 8.5 g 5    atorvastatin (LIPITOR) 20 MG tablet TAKE 1 TABLET(20 MG) BY MOUTH EVERY DAY 90 tablet 1    azelastine (ASTELIN) 137 mcg (0.1 %) nasal spray 2 sprays (274 mcg total) by Nasal route 2 (two) times daily. 30 mL 9    dexmethylphenidate (FOCALIN) 10 MG tablet Take 1 tablet (10 mg total) by mouth 3 (three) times daily. 90 tablet 0    dexmethylphenidate (FOCALIN) 10 MG tablet Take 1 tablet (10 mg total) by mouth 2 (two) times daily. 60 tablet 0    [START ON 7/7/2023] dexmethylphenidate (FOCALIN) 10 MG tablet Take 1 tablet (10 mg total) by mouth 2 (two) times daily. 60 tablet 0    ergocalciferol, vitamin D2, (VITAMIN D ORAL) Take 1 tablet by mouth every other day.      EScitalopram oxalate (LEXAPRO) 5 MG Tab Take 1 tablet (5 mg total) by mouth once daily. 90 tablet 0    esomeprazole (NEXIUM) 40 mg GrPS Take 40 mg by mouth 2 (two) times daily  before meals. 60 each 11    fexofenadine (ALLEGRA) 180 MG tablet Take 180 mg by mouth once daily.      fluocinonide (LIDEX) 0.05 % external solution AAA scalp qday prn itching, scaling 60 mL 3    fluticasone propionate (FLONASE) 50 mcg/actuation nasal spray 1 spray by Each Nostril route once daily.      gabapentin (NEURONTIN) 300 MG capsule Take 1 capsule (300 mg total) by mouth 3 (three) times daily. 90 capsule 3    hydrocortisone 2.5 % cream APPLY EXTERNALLY TO THE AFFECTED AREA TWICE DAILY FOR 10 DAYS 30 g 0    ketoconazole (NIZORAL) 2 % cream APPLY TOPICALLY TO THE AFFECTED AREA TWICE DAILY 15 g 0    ketoconazole (NIZORAL) 2 % cream APPLY EXTERNALLY TO THE AFFECTED AREA TWICE DAILY  Strength: 2 % 15 g 0    lamoTRIgine (LAMICTAL) 100 MG tablet Take 1.5 tablets (150 mg total) by mouth every evening. IF any RASH, STOP ALL Lamictal and Tell Psyc MD. 135 tablet 0    latanoprost 0.005 % ophthalmic solution Place 1 drop into both eyes every evening. 7.5 mL 3    meloxicam (MOBIC) 15 MG tablet Take 1 tablet (15 mg total) by mouth once daily. For pain 30 tablet 2    methylPREDNISolone (MEDROL DOSEPACK) 4 mg tablet use as directed 21 each 0    montelukast (SINGULAIR) 10 mg tablet Take 1 tablet (10 mg total) by mouth every evening. 90 tablet 3    ondansetron (ZOFRAN-ODT) 4 MG TbDL Take 1 tablet (4 mg total) by mouth every 6 (six) hours as needed (nausea). 30 tablet 3    promethazine (PHENERGAN) 25 MG tablet Take 1 tablet (25 mg total) by mouth every 4 (four) hours. 25 tablet 0    rOPINIRole (REQUIP) 0.25 MG tablet TAKE 1 TABLET(0.25 MG) BY MOUTH EVERY EVENING 90 tablet 2    semaglutide (OZEMPIC) 0.25 mg or 0.5 mg (2 mg/3 mL) pen injector inject 0.5mg into the skin every 7 days 3 each 0    TRUE METRIX GLUCOSE TEST STRIP Strp USE TO TEST ONCE DAILY 25 strip 2    ULTRA THIN LANCETS 30 gauge Misc USE TO TEST BLOOD SUGAR EVERY DAY AS DIRECTED 100 each 3    urea (CARMOL) 40 % Crea Apply to affected area on feet qhs after bath or  shower 60 g 3    zolpidem (AMBIEN CR) 12.5 MG CR tablet Take 1 tablet (12.5 mg total) by mouth nightly as needed for Insomnia. 30 tablet 0    zolpidem (AMBIEN) 10 mg Tab Take 1 tablet (10 mg total) by mouth nightly as needed (insomnia). 30 tablet 2    blood-glucose meter kit Use as instructed 1 each 0    semaglutide (OZEMPIC) 0.25 mg or 0.5 mg (2 mg/3 mL) pen injector Inject 0.5 mg into the skin every 7 days. 3 mL 0    tamsulosin (FLOMAX) 0.4 mg Cap Take 1 capsule (0.4 mg total) by mouth once daily. for 14 days 14 capsule 0     No current facility-administered medications for this visit.     Facility-Administered Medications Ordered in Other Visits   Medication Dose Route Frequency Provider Last Rate Last Admin    mupirocin 2 % ointment   Nasal On Call Procedure Chong Padron MD   Given at 09/15/22 0516       Review of patient's allergies indicates:  No Known Allergies      Review of Systems   Constitutional: Negative for chills and fever.   Cardiovascular:  Negative for chest pain, claudication and leg swelling.   Respiratory:  Negative for cough and shortness of breath.    Skin:  Positive for dry skin and suspicious lesions. Negative for nail changes.   Musculoskeletal:         L 2nd toe deformity and lesion   Gastrointestinal:  Negative for nausea and vomiting.   Neurological:  Negative for numbness and paresthesias.   Psychiatric/Behavioral:  Negative for altered mental status.          Objective:      Physical Exam  Constitutional:       General: He is not in acute distress.     Appearance: He is obese. He is not ill-appearing.   Cardiovascular:      Pulses:           Dorsalis pedis pulses are 2+ on the right side and 2+ on the left side.        Posterior tibial pulses are 2+ on the right side and 2+ on the left side.      Comments: No lower extremity edema bilateral.  Skin temp is warm to foot bilateral.  Normal hair growth distribution bilateral lower extremity.  No rubor on dependency bilateral foot.  Skin  temp is warm to foot bilateral.  Musculoskeletal:      Comments: L 2nd toe with hammertoe deformity (DF contracture of MTPJ, PF contracture of PIPJ and DIPJ with DIPJ which also has lateral deviation creating prominent medial aspect of joint under skin lesion, see skin section). All reducible except DIPJ lateral deviation which is rigid. Mild pain on palpation of medial DIPJ over skin lesion.    Skin:     General: Skin is warm.      Capillary Refill: Capillary refill takes less than 2 seconds.      Findings: No ecchymosis or erythema.      Nails: There is no clubbing.      Comments: Hyperkeratotic lesion noted to medial L 2nd meliza DIPJ, plantar medial hallux IPJ b/l. Skin lines present to lesion/s. No signs of deep tissue injury. No open lesion or wound noted.       Neurological:      Mental Status: He is alert.      Sensory: No sensory deficit.             Assessment:       Encounter Diagnoses   Name Primary?    Hammertoe of left foot Yes    Corn or callus     Toe pain, left                Plan:       Hermelindo was seen today for follow-up.    Diagnoses and all orders for this visit:    Hammertoe of left foot    Corn or callus    Toe pain, left            I counseled the patient on his conditions, their implications and medical management.    Conservative care discussion:   Wide, soft toe box shoes recommended. Silicone toe sleeves recommended for painful hammertoe and corn as directed. Additional ones can be purchased from [email or web address] or local pharmacy. Continue.     The affected area was cleansed with an alcohol prep pad. Next, utilizing a 5mm curette, the hyperkeratotic tissues were trimmed from planter medial hallux IPJ b/l and medial L 2nd toe DIPJ (trimmed as courtesy today), down to appropriate level of skin. Care was taken to remove any nucleated core from the center of the lesion. No pinpoint bleeding was encountered. The patient tolerated relief following this procedure.       Surgical discussion:  Once again  briefly discussed surgical intervention for hammertoe (PIPJ and DIPJ fusion with K-wire fixation). Discussed perioperative expectations and post op protocol. Recommended to exhaust all conservative care options prior to surgery. Patient will try above treatment plan and follow up as scheduled for further discussion.     RTC in August for possible surgery in September, sooner PRN

## 2023-06-27 ENCOUNTER — PATIENT MESSAGE (OUTPATIENT)
Dept: PAIN MEDICINE | Facility: HOSPITAL | Age: 43
End: 2023-06-27
Payer: COMMERCIAL

## 2023-06-27 ENCOUNTER — PATIENT MESSAGE (OUTPATIENT)
Dept: INTERNAL MEDICINE | Facility: CLINIC | Age: 43
End: 2023-06-27
Payer: COMMERCIAL

## 2023-06-27 NOTE — TELEPHONE ENCOUNTER
Per patient;  little bumps on the tip and around the sides of my tongue. Please advise on how to treat.

## 2023-06-27 NOTE — PRE-PROCEDURE INSTRUCTIONS
Arrival Location: [address] [place] [address]. Proceed to the second floor to check in with registration.  Arrival Time: 1320    Blood Thinners:  Pacemaker/ICD:  Recent Infections:  Wounds:  Antibiotics in the past 14 days:    Nothing to eat or drink starting 8 hours prior to your arrival time.  If you are diabetic, nothing to eat or drink starting 6 hours prior to your arrival time.     If you take medications for blood pressure, heart, thyroid &/or cholesterol; you may take them with a sip of water the morning of your procedure.     Refrain from drinking alcohol 24 hours prior to your procedure.     You will need a responsible adult to drive you home after your procedure. You cannot take an Uber, Lyft, taxi after post procedure without an adult to accompany you.     If you start to feel sick (fever, chills, coughing, etc) or start on any antibiotics, please contact Dr. Patel's office at [phone] to reschedule.

## 2023-06-28 ENCOUNTER — HOSPITAL ENCOUNTER (OUTPATIENT)
Facility: HOSPITAL | Age: 43
Discharge: HOME | End: 2023-06-28
Attending: STUDENT IN AN ORGANIZED HEALTH CARE EDUCATION/TRAINING PROGRAM | Admitting: STUDENT IN AN ORGANIZED HEALTH CARE EDUCATION/TRAINING PROGRAM
Payer: COMMERCIAL

## 2023-06-28 VITALS
OXYGEN SATURATION: 95 % | HEART RATE: 81 BPM | DIASTOLIC BLOOD PRESSURE: 61 MMHG | HEIGHT: 65 IN | SYSTOLIC BLOOD PRESSURE: 109 MMHG | TEMPERATURE: 98 F | BODY MASS INDEX: 39.65 KG/M2 | RESPIRATION RATE: 17 BRPM | WEIGHT: 238 LBS

## 2023-06-28 DIAGNOSIS — M54.12 CERVICAL RADICULOPATHY: Primary | ICD-10-CM

## 2023-06-28 DIAGNOSIS — G89.29 CHRONIC PAIN: ICD-10-CM

## 2023-06-28 DIAGNOSIS — M50.10 CERVICAL DISC DISORDER WITH RADICULOPATHY, UNSPECIFIED CERVICAL REGION: ICD-10-CM

## 2023-06-28 LAB — GLUCOSE SERPL-MCNC: 83 MG/DL (ref 70–110)

## 2023-06-28 PROCEDURE — 25000003 PHARM REV CODE 250: Performed by: STUDENT IN AN ORGANIZED HEALTH CARE EDUCATION/TRAINING PROGRAM

## 2023-06-28 PROCEDURE — 62321 NJX INTERLAMINAR CRV/THRC: CPT | Performed by: STUDENT IN AN ORGANIZED HEALTH CARE EDUCATION/TRAINING PROGRAM

## 2023-06-28 PROCEDURE — 25500020 PHARM REV CODE 255: Performed by: STUDENT IN AN ORGANIZED HEALTH CARE EDUCATION/TRAINING PROGRAM

## 2023-06-28 PROCEDURE — 63600175 PHARM REV CODE 636 W HCPCS: Performed by: STUDENT IN AN ORGANIZED HEALTH CARE EDUCATION/TRAINING PROGRAM

## 2023-06-28 PROCEDURE — 62321 PR INJ CERV/THORAC, W/GUIDANCE: ICD-10-PCS | Mod: ,,, | Performed by: STUDENT IN AN ORGANIZED HEALTH CARE EDUCATION/TRAINING PROGRAM

## 2023-06-28 PROCEDURE — 62321 NJX INTERLAMINAR CRV/THRC: CPT | Mod: ,,, | Performed by: STUDENT IN AN ORGANIZED HEALTH CARE EDUCATION/TRAINING PROGRAM

## 2023-06-28 PROCEDURE — 99152 MOD SED SAME PHYS/QHP 5/>YRS: CPT | Performed by: STUDENT IN AN ORGANIZED HEALTH CARE EDUCATION/TRAINING PROGRAM

## 2023-06-28 PROCEDURE — 82962 GLUCOSE BLOOD TEST: CPT | Performed by: STUDENT IN AN ORGANIZED HEALTH CARE EDUCATION/TRAINING PROGRAM

## 2023-06-28 RX ORDER — FENTANYL CITRATE 50 UG/ML
INJECTION, SOLUTION INTRAMUSCULAR; INTRAVENOUS
Status: DISCONTINUED | OUTPATIENT
Start: 2023-06-28 | End: 2023-06-28 | Stop reason: HOSPADM

## 2023-06-28 RX ORDER — ALPRAZOLAM 0.5 MG/1
0.5 TABLET, ORALLY DISINTEGRATING ORAL
Status: DISCONTINUED | OUTPATIENT
Start: 2023-06-28 | End: 2023-06-28 | Stop reason: HOSPADM

## 2023-06-28 RX ORDER — LIDOCAINE HYDROCHLORIDE 20 MG/ML
INJECTION, SOLUTION EPIDURAL; INFILTRATION; INTRACAUDAL; PERINEURAL
Status: DISCONTINUED | OUTPATIENT
Start: 2023-06-28 | End: 2023-06-28 | Stop reason: HOSPADM

## 2023-06-28 RX ORDER — DEXAMETHASONE SODIUM PHOSPHATE 10 MG/ML
INJECTION INTRAMUSCULAR; INTRAVENOUS
Status: DISCONTINUED | OUTPATIENT
Start: 2023-06-28 | End: 2023-06-28 | Stop reason: HOSPADM

## 2023-06-28 RX ORDER — SODIUM CHLORIDE 9 MG/ML
INJECTION, SOLUTION INTRAVENOUS CONTINUOUS
Status: CANCELLED | OUTPATIENT
Start: 2023-06-28

## 2023-06-28 RX ORDER — LIDOCAINE HYDROCHLORIDE 10 MG/ML
INJECTION, SOLUTION EPIDURAL; INFILTRATION; INTRACAUDAL; PERINEURAL
Status: DISCONTINUED | OUTPATIENT
Start: 2023-06-28 | End: 2023-06-28 | Stop reason: HOSPADM

## 2023-06-28 RX ORDER — MIDAZOLAM HYDROCHLORIDE 1 MG/ML
INJECTION INTRAMUSCULAR; INTRAVENOUS
Status: DISCONTINUED | OUTPATIENT
Start: 2023-06-28 | End: 2023-06-28 | Stop reason: HOSPADM

## 2023-06-28 NOTE — DISCHARGE SUMMARY
Discharge Note  Short Stay      SUMMARY     Admit Date: 6/28/2023    Attending Physician: Lorraine Patel      Discharge Physician: Lorraine Patel      Discharge Date: 6/28/2023 3:18 PM    Procedure(s) (LRB):  Injection-steroid-epidural-cervical C7-T1 (N/A)    Final Diagnosis: Cervical radiculopathy [M54.12]    Disposition: Home or self care    Patient Instructions:   Discharge Medication List as of 6/28/2023  2:38 PM        CONTINUE these medications which have NOT CHANGED    Details   acetaminophen (TYLENOL) 325 MG tablet Take 650 mg by mouth every 6 (six) hours as needed for Pain., Historical Med      albuterol (PROVENTIL/VENTOLIN HFA) 90 mcg/actuation inhaler RescueINHALE 2 PUFFS INTO THE LUNGS EVERY 6 HOURS AS NEEDED WHEEZING, RESCUE Strength: 90 mcg/actuation, Normal      atorvastatin (LIPITOR) 20 MG tablet TAKE 1 TABLET(20 MG) BY MOUTH EVERY DAY, Normal      azelastine (ASTELIN) 137 mcg (0.1 %) nasal spray 2 sprays (274 mcg total) by Nasal route 2 (two) times daily., Starting Wed 3/29/2023, Normal      blood-glucose meter kit Use as instructed, Normal      !! dexmethylphenidate (FOCALIN) 10 MG tablet Take 1 tablet (10 mg total) by mouth 3 (three) times daily., Starting Fri 3/10/2023, Normal      !! dexmethylphenidate (FOCALIN) 10 MG tablet Take 1 tablet (10 mg total) by mouth 2 (two) times daily., Starting Thu 6/8/2023, Until Sat 7/8/2023, Normal      !! dexmethylphenidate (FOCALIN) 10 MG tablet Take 1 tablet (10 mg total) by mouth 2 (two) times daily., Starting Fri 7/7/2023, Until Sun 8/6/2023, Normal      ergocalciferol, vitamin D2, (VITAMIN D ORAL) Take 1 tablet by mouth every other day., Historical Med      EScitalopram oxalate (LEXAPRO) 5 MG Tab Take 1 tablet (5 mg total) by mouth once daily., Starting Thu 6/8/2023, Until Wed 9/6/2023, Normal      esomeprazole (NEXIUM) 40 mg GrPS Take 40 mg by mouth 2 (two) times daily before meals., Starting Tue 2/11/2020, Normal      fexofenadine (ALLEGRA) 180 MG  tablet Take 180 mg by mouth once daily., Historical Med      fluocinonide (LIDEX) 0.05 % external solution AAA scalp qday prn itching, scaling, Normal      fluticasone propionate (FLONASE) 50 mcg/actuation nasal spray 1 spray by Each Nostril route once daily., Historical Med      gabapentin (NEURONTIN) 300 MG capsule Take 1 capsule (300 mg total) by mouth 3 (three) times daily., Starting Tue 5/16/2023, Normal      hydrocortisone 2.5 % cream APPLY EXTERNALLY TO THE AFFECTED AREA TWICE DAILY FOR 10 DAYS, Normal      !! ketoconazole (NIZORAL) 2 % cream APPLY TOPICALLY TO THE AFFECTED AREA TWICE DAILY, Normal      !! ketoconazole (NIZORAL) 2 % cream APPLY EXTERNALLY TO THE AFFECTED AREA TWICE DAILY  Strength: 2 %, Print      lamoTRIgine (LAMICTAL) 100 MG tablet Take 1.5 tablets (150 mg total) by mouth every evening. IF any RASH, STOP ALL Lamictal and Tell Psyc MD., Starting Thu 6/8/2023, Until Wed 9/6/2023, Normal      latanoprost 0.005 % ophthalmic solution Place 1 drop into both eyes every evening., Starting Thu 3/30/2023, Normal      meloxicam (MOBIC) 15 MG tablet Take 1 tablet (15 mg total) by mouth once daily. For pain, Starting Wed 4/12/2023, Normal      methylPREDNISolone (MEDROL DOSEPACK) 4 mg tablet use as directed, Normal      montelukast (SINGULAIR) 10 mg tablet Take 1 tablet (10 mg total) by mouth every evening., Starting Wed 10/12/2022, Normal      ondansetron (ZOFRAN-ODT) 4 MG TbDL Take 1 tablet (4 mg total) by mouth every 6 (six) hours as needed (nausea)., Starting Wed 10/12/2022, Normal      promethazine (PHENERGAN) 25 MG tablet Take 1 tablet (25 mg total) by mouth every 4 (four) hours., Starting Wed 9/14/2022, Normal      rOPINIRole (REQUIP) 0.25 MG tablet TAKE 1 TABLET(0.25 MG) BY MOUTH EVERY EVENING, Normal      !! semaglutide (OZEMPIC) 0.25 mg or 0.5 mg (2 mg/3 mL) pen injector Inject 0.5 mg into the skin every 7 days., Starting Wed 5/3/2023, Normal      !! semaglutide (OZEMPIC) 0.25 mg or 0.5 mg (2  mg/3 mL) pen injector inject 0.5mg into the skin every 7 days, Starting Thu 9/8/2022, Normal      tamsulosin (FLOMAX) 0.4 mg Cap Take 1 capsule (0.4 mg total) by mouth once daily. for 14 days, Starting Fri 6/9/2023, Until Fri 6/23/2023, Normal      TRUE METRIX GLUCOSE TEST STRIP Strp USE TO TEST ONCE DAILY, Normal      ULTRA THIN LANCETS 30 gauge Misc USE TO TEST BLOOD SUGAR EVERY DAY AS DIRECTED, Normal      urea (CARMOL) 40 % Crea Apply to affected area on feet qhs after bath or shower, Normal      zolpidem (AMBIEN CR) 12.5 MG CR tablet Take 1 tablet (12.5 mg total) by mouth nightly as needed for Insomnia., Starting Wed 2/8/2023, Normal      zolpidem (AMBIEN) 10 mg Tab Take 1 tablet (10 mg total) by mouth nightly as needed (insomnia)., Starting Wed 3/29/2023, Normal       !! - Potential duplicate medications found. Please discuss with provider.              Discharge Diagnosis: Cervical radiculopathy [M54.12]  Condition on Discharge: Stable with no complications to procedure   Diet on Discharge: Same as before.  Activity: as per instruction sheet.  Discharge to: Home with a responsible adult.  Follow up: 2-4 weeks       Please call my office or pager at [phone] if experienced any weakness or loss of sensation, fever > 101.5, pain uncontrolled with oral medications, persistent nausea/vomiting/or diarrhea, redness or drainage from the incisions, or any other worrisome concerns. If physician on call was not reached or could not communicate with our office for any reason please go to the nearest emergency department

## 2023-06-28 NOTE — DISCHARGE INSTRUCTIONS
Home Care Instructions Pain Management:    1.  DIET:    You may resume your normal diet today.    2.  BATHING:    You may shower with luke warm water.    3.  DRESSING:    You may remove your bandage today.    4.  ACTIVITY LEVEL:      You may resume your normal activities 24 hours after your procedure.    5.  MEDICATIONS:    You may resume your normal medications today.    6.  SPECIAL INSTRUCTIONS:    No heat to the injection site for 24 hours including bath or shower, heating pad, moist heat or hot tubs.    Use an ice pack to the injection site for any pain or discomfort.  Apply ice packs for 20 minute intervals as needed.    If you have received any sedatives by mouth today, you can not drive for 12 hours.    If you have received sedation through an IV, you can not drive for 24 hours.    PLEASE CALL YOUR DOCTOR FOR THE FOLLOWIN.  Redness or swelling around the injection site.  2.  Fever of 101 degrees.  3.  Drainage (pus) from the injection site.  4.  For any continuous bleeding (some dried blood over the incision is normal.)    FOR EMERGENCIES:    If any unusual problems or difficulties occur during clinic hours, call [phone] or dial [phone].    Follow up with with your physician in 2-3 weeks.

## 2023-06-28 NOTE — H&P
HPI  Patient presenting for Procedure(s) (LRB):  Injection-steroid-epidural-cervical C7-T1 (N/A)     Patient on Anti-coagulation No    No health changes since previous encounter    Past Medical History:   Diagnosis Date    ADD (attention deficit disorder) 05/09/2012    Allergy     Anxiety 4/12/2023    Asthma 05/09/2012    Cervical disc disorder with radiculopathy, unspecified cervical region 09/14/2021    Cervical spondylosis with myelopathy 06/29/2022    Eczema     Esophageal ulcer     GERD (gastroesophageal reflux disease) 05/09/2012    Glaucoma     Kidney stones 05/2014    Lumbar disc disease 05/09/2012    Lumbar herniated disc 05/09/2012    Malignant melanoma of skin, unspecified 01/06/2022    in situ right mid back    Melanoma 01/2022    in situ R mid back     LUBA (obstructive sleep apnea)     Radiculopathy, lumbar region 09/14/2021    Renal calculi 05/09/2012    Type 2 diabetes mellitus without complication, without long-term current use of insulin 11/16/2020     Past Surgical History:   Procedure Laterality Date    APPENDECTOMY  2006    CARPAL TUNNEL RELEASE Right 09/15/2022    Procedure: RELEASE, CARPAL TUNNEL;  Surgeon: Christen Marshall MD;  Location: [facility] OR;  Service: Orthopedics;  Laterality: Right;    COLONOSCOPY  01/22/2019    internal hemorrhoids, o/w normal - repeat at age 52    EPIDURAL STEROID INJECTION N/A 02/10/2021    Procedure: CERVICAL C6/7 NELI DIRECT REFERRAL;  Surgeon: Michi Escamilla MD;  [facility];  Service: Pain Management;  Laterality: N/A;  NEEDS CONSENT    ESOPHAGOGASTRODUODENOSCOPY  01/22/2019    LA grade B esophagitis; esophageal stenosis- dilated; gastritis; H pylori pathology negative    ESOPHAGOGASTRODUODENOSCOPY N/A 05/18/2021    Procedure: EGD (ESOPHAGOGASTRODUODENOSCOPY);  Surgeon: Mariana Hector MD;  Location: [facility];  Service: Endoscopy;  Laterality: N/A;    EXCISION OF GANGLION OF WRIST Right 09/15/2022    Procedure: EXCISION, GANGLION CYST, WRIST;   "Surgeon: Christen Marshall MD;  Location: [facility] OR;  Service: Orthopedics;  Laterality: Right;    FLEXOR TENDON REPAIR Right 09/15/2022    Procedure: FLEXOR TENOSYNOVECTOMY;  Surgeon: Christen Marshall MD;  Location: [facility] OR;  Service: Orthopedics;  Laterality: Right;    HAND ARTHROTOMY Right 09/15/2022    Procedure: ARTHROTOMY, HAND RADIOCARPAL;  Surgeon: Christen Marshall MD;  Location: [facility] OR;  Service: Orthopedics;  Laterality: Right;  Radiocarpal    INJECTION OF STEROID Left 09/15/2022    Procedure: INJECTION, STEROID LEFT WRIST AND LEFT LATERAL ELBOW;  Surgeon: Christen Marshall MD;  Location: [facility] OR;  Service: Orthopedics;  Laterality: Left;  Left Carpal Tunnel CSI    LUMBAR LAMINECTOMY  2010    LUMBAR LAMINECTOMY WITH DISCECTOMY Left 09/20/2021    Procedure: LAMINECTOMY, SPINE, LUMBAR, WITH DISCECTOMY;  Surgeon: Naseem Mcnamara DO;  Location: [address];  Service: Neurosurgery;  Laterality: Left;  MIS L3-4    TYMPANOSTOMY TUBE PLACEMENT       Review of patient's allergies indicates:  No Known Allergies   Current Facility-Administered Medications   Medication    alprazolam ODT dissolvable tablet 0.5 mg     Facility-Administered Medications Ordered in Other Encounters   Medication    mupirocin 2 % ointment       PMHx, PSHx, Allergies, Medications reviewed in epic    ROS negative except pain complaints in HPI    OBJECTIVE:    /74 (BP Location: Left arm, Patient Position: Sitting)   Pulse 79   Temp 98.8 °F (37.1 °C) (Temporal)   Resp 16   Ht 5' 5" (1.651 m)   Wt 108 kg (238 lb)   SpO2 95%   BMI 39.61 kg/m²     PHYSICAL EXAMINATION:    GENERAL: Well appearing, in no acute distress, alert and oriented x3.  PSYCH:  Mood and affect appropriate.  SKIN: Skin color, texture, turgor normal, no rashes or lesions which will impact the procedure.  CV: RRR with palpation of the radial artery.  PULM: No evidence of respiratory difficulty, symmetric chest rise. Clear to auscultation.  NEURO: Cranial nerves grossly " intact.    Plan:    Proceed with procedure as planned Procedure(s) (LRB):  Injection-steroid-epidural-cervical C7-T1 (N/A)    Lorraine Patel  06/28/2023

## 2023-06-28 NOTE — OP NOTE
Cervical Interlaminar Epidural Steroid Injection under Fluoroscopic Guidance    The procedure, risks, benefits, and options were discussed with the patient. There are no contraindications to the procedure. The patent expressed understanding and agreed to the procedure. Informed written consent was obtained prior to the start of the procedure and can be found in the patient's chart.     PATIENT NAME: Hermelindo Garza III   MRN: [id number]     DATE OF PROCEDURE: 06/28/2023    PROCEDURE: Cervical Interlaminar Epidural Steroid Injection C7/T1 under Fluoroscopic Guidance    PRE-OP DIAGNOSIS: Cervical radiculopathy [M54.12] Cervical radiculopathy [M54.12]    POST-OP DIAGNOSIS: Same    PHYSICIAN: Lorraine Patel DO     ASSISTANTS: None    MEDICATIONS INJECTED: Preservative-free Decadron 10mg with 2 cc of Lidocaine 1% MPF and preservative free normal saline    LOCAL ANESTHETIC INJECTED: Xylocaine 2%     SEDATION: Versed 2mg and Fentanyl 75mcg                                                                                                                                                                                     Conscious sedation ordered by M.D. Patient re-evaluation prior to administration of conscious sedation. No changes noted in patient's status from initial evaluation. The patient's vital signs were monitored by RN and patient remained hemodynamically stable throughout the procedure.    Event Time In   Sedation Start 1428   Sedation End 1439       ESTIMATED BLOOD LOSS: None    COMPLICATIONS: None    TECHNIQUE: Time-out was performed to identify the patient and procedure to be performed. With the patient laying in a prone position, the surgical area was prepped and draped in the usual sterile fashion using ChloraPrep and a fenestrated drape. The level was determined under fluoroscopy guidance. Skin anesthesia was achieved by injecting Lidocaine 2% over the injection site.  The interlaminar space was then  approached with a 20 gauge, 3.5 inch Tuohy needle that was introduced under fluoroscopic guidance with AP, lateral and/or contralateral oblique imaging. Once the Ligamentum flavum was encountered loss of resistance to air was used to enter the epidural space. With positive loss of resistance and negative aspiration for CSF or Blood, contrast dye  Omnipaque (240mg/mL) was injected to confirm placement and there was no vascular runoff. Then 5 mL of the medication mixture listed above was then injected slowly. Displacement of the radio opaque contrast after injection of the medication confirmed that the medication went into the area of the epidural space. The needles were removed, and bleeding was nil. A sterile dressing was applied. No specimens collected. The patient tolerated the procedure well.       The patient was monitored after the procedure in the recovery area. They were given post-procedure and discharge instructions to follow at home. The patient was discharged in a stable condition.    Lorraine Patel DO

## 2023-06-29 ENCOUNTER — PATIENT MESSAGE (OUTPATIENT)
Dept: PSYCHIATRY | Facility: CLINIC | Age: 43
End: 2023-06-29
Payer: COMMERCIAL

## 2023-06-29 ENCOUNTER — TELEPHONE (OUTPATIENT)
Dept: ALLERGY | Facility: CLINIC | Age: 43
End: 2023-06-29
Payer: COMMERCIAL

## 2023-06-29 ENCOUNTER — PATIENT MESSAGE (OUTPATIENT)
Dept: PAIN MEDICINE | Facility: CLINIC | Age: 43
End: 2023-06-29
Payer: COMMERCIAL

## 2023-06-29 ENCOUNTER — PATIENT MESSAGE (OUTPATIENT)
Dept: ALLERGY | Facility: CLINIC | Age: 43
End: 2023-06-29
Payer: COMMERCIAL

## 2023-06-29 RX ORDER — METHYLPREDNISOLONE 4 MG/1
TABLET ORAL
Qty: 21 EACH | Refills: 0 | Status: SHIPPED | OUTPATIENT
Start: 2023-06-29 | End: 2023-07-20

## 2023-06-29 RX ORDER — MELOXICAM 15 MG/1
TABLET ORAL
Qty: 30 TABLET | Refills: 2 | Status: SHIPPED | OUTPATIENT
Start: 2023-06-29 | End: 2023-07-04 | Stop reason: ALTCHOICE

## 2023-06-29 NOTE — TELEPHONE ENCOUNTER
I spoke to the patient and he is unable to come in. The patient will gurgle with warm salt water.  If not getting any better he will go to urgent care.

## 2023-06-30 ENCOUNTER — PATIENT MESSAGE (OUTPATIENT)
Dept: INTERNAL MEDICINE | Facility: CLINIC | Age: 43
End: 2023-06-30
Payer: COMMERCIAL

## 2023-06-30 ENCOUNTER — CLINICAL SUPPORT (OUTPATIENT)
Dept: ALLERGY | Facility: CLINIC | Age: 43
End: 2023-06-30
Payer: COMMERCIAL

## 2023-06-30 DIAGNOSIS — L23.7 ALLERGIC CONTACT DERMATITIS DUE TO PLANT: ICD-10-CM

## 2023-06-30 DIAGNOSIS — J45.20 MILD INTERMITTENT ASTHMA WITHOUT COMPLICATION: ICD-10-CM

## 2023-06-30 DIAGNOSIS — J30.9 CHRONIC ALLERGIC RHINITIS: Primary | ICD-10-CM

## 2023-06-30 PROCEDURE — 99999 PR PBB SHADOW E&M-EST. PATIENT-LVL I: CPT | Mod: PBBFAC,,,

## 2023-06-30 PROCEDURE — 95117 IMMUNOTHERAPY INJECTIONS: CPT | Mod: S$GLB,,, | Performed by: ALLERGY & IMMUNOLOGY

## 2023-06-30 PROCEDURE — 95117 PR IMMU2THERAPY, 2+ INJECTIONS: ICD-10-PCS | Mod: S$GLB,,, | Performed by: ALLERGY & IMMUNOLOGY

## 2023-06-30 PROCEDURE — 99999 PR PBB SHADOW E&M-EST. PATIENT-LVL I: ICD-10-PCS | Mod: PBBFAC,,,

## 2023-06-30 RX ORDER — HYDROCORTISONE 25 MG/G
CREAM TOPICAL
Qty: 30 G | Refills: 0 | Status: SHIPPED | OUTPATIENT
Start: 2023-06-30 | End: 2023-11-13 | Stop reason: SDUPTHER

## 2023-06-30 RX ORDER — ALBUTEROL SULFATE 90 UG/1
AEROSOL, METERED RESPIRATORY (INHALATION)
Qty: 8.5 G | Refills: 5 | Status: ON HOLD | OUTPATIENT
Start: 2023-06-30 | End: 2023-12-04 | Stop reason: SDUPTHER

## 2023-06-30 NOTE — TELEPHONE ENCOUNTER
No care due was identified.  [facility] Embedded Care Due Messages. Reference number: [id number].   6/30/2023 2:41:42 PM CDT

## 2023-07-04 ENCOUNTER — NURSE TRIAGE (OUTPATIENT)
Dept: ADMINISTRATIVE | Facility: CLINIC | Age: 43
End: 2023-07-04
Payer: COMMERCIAL

## 2023-07-04 ENCOUNTER — HOSPITAL ENCOUNTER (OUTPATIENT)
Facility: HOSPITAL | Age: 43
Discharge: HOME | End: 2023-07-05
Attending: EMERGENCY MEDICINE | Admitting: UROLOGY
Payer: COMMERCIAL

## 2023-07-04 ENCOUNTER — HOSPITAL ENCOUNTER (EMERGENCY)
Facility: HOSPITAL | Age: 43
Discharge: HOME | End: 2023-07-04
Attending: EMERGENCY MEDICINE
Payer: COMMERCIAL

## 2023-07-04 VITALS
WEIGHT: 239 LBS | SYSTOLIC BLOOD PRESSURE: 160 MMHG | HEIGHT: 65 IN | BODY MASS INDEX: 39.82 KG/M2 | RESPIRATION RATE: 18 BRPM | TEMPERATURE: 98 F | DIASTOLIC BLOOD PRESSURE: 65 MMHG | HEART RATE: 92 BPM | OXYGEN SATURATION: 98 %

## 2023-07-04 DIAGNOSIS — R11.2 NAUSEA AND VOMITING, UNSPECIFIED VOMITING TYPE: ICD-10-CM

## 2023-07-04 DIAGNOSIS — N23 RENAL COLIC ON LEFT SIDE: ICD-10-CM

## 2023-07-04 DIAGNOSIS — N13.2 URETERAL STONE WITH HYDRONEPHROSIS: ICD-10-CM

## 2023-07-04 DIAGNOSIS — N20.0 KIDNEY STONE ON LEFT SIDE: Primary | ICD-10-CM

## 2023-07-04 DIAGNOSIS — N20.1 URETERAL STONE: ICD-10-CM

## 2023-07-04 DIAGNOSIS — N23 RENAL COLIC: ICD-10-CM

## 2023-07-04 DIAGNOSIS — N20.0 RENAL STONE: Primary | ICD-10-CM

## 2023-07-04 LAB
ALBUMIN SERPL BCP-MCNC: 4.2 G/DL (ref 3.5–5.2)
ALP SERPL-CCNC: 94 U/L (ref 55–135)
ALT SERPL W/O P-5'-P-CCNC: 42 U/L (ref 10–44)
ANION GAP SERPL CALC-SCNC: 10 MMOL/L (ref 8–16)
AST SERPL-CCNC: 20 U/L (ref 10–40)
BASOPHILS # BLD AUTO: 0.04 K/UL (ref 0–0.2)
BASOPHILS NFR BLD: 0.4 % (ref 0–1.9)
BILIRUB SERPL-MCNC: 0.6 MG/DL (ref 0.1–1)
BILIRUB UR QL STRIP: NEGATIVE
BUN SERPL-MCNC: 21 MG/DL (ref 6–20)
BUN SERPL-MCNC: 23 MG/DL (ref 6–30)
CALCIUM SERPL-MCNC: 9.6 MG/DL (ref 8.7–10.5)
CHLORIDE SERPL-SCNC: 103 MMOL/L (ref 95–110)
CHLORIDE SERPL-SCNC: 107 MMOL/L (ref 95–110)
CLARITY UR REFRACT.AUTO: CLEAR
CO2 SERPL-SCNC: 25 MMOL/L (ref 23–29)
COLOR UR AUTO: YELLOW
CREAT SERPL-MCNC: 0.8 MG/DL (ref 0.5–1.4)
CREAT SERPL-MCNC: 1.1 MG/DL (ref 0.5–1.4)
DIFFERENTIAL METHOD: NORMAL
EOSINOPHIL # BLD AUTO: 0.2 K/UL (ref 0–0.5)
EOSINOPHIL NFR BLD: 1.7 % (ref 0–8)
ERYTHROCYTE [DISTWIDTH] IN BLOOD BY AUTOMATED COUNT: 13.3 % (ref 11.5–14.5)
EST. GFR  (NO RACE VARIABLE): >60 ML/MIN/1.73 M^2
GLUCOSE SERPL-MCNC: 107 MG/DL (ref 70–110)
GLUCOSE SERPL-MCNC: 137 MG/DL (ref 70–110)
GLUCOSE UR QL STRIP: NEGATIVE
HCT VFR BLD AUTO: 47.7 % (ref 40–54)
HCT VFR BLD CALC: 45 %PCV (ref 36–54)
HCV AB SERPL QL IA: NORMAL
HGB BLD-MCNC: 15.9 G/DL (ref 14–18)
HGB UR QL STRIP: ABNORMAL
HIV 1+2 AB+HIV1 P24 AG SERPL QL IA: NORMAL
IMM GRANULOCYTES # BLD AUTO: 0.03 K/UL (ref 0–0.04)
IMM GRANULOCYTES NFR BLD AUTO: 0.3 % (ref 0–0.5)
KETONES UR QL STRIP: ABNORMAL
LEUKOCYTE ESTERASE UR QL STRIP: NEGATIVE
LIPASE SERPL-CCNC: 41 U/L (ref 4–60)
LYMPHOCYTES # BLD AUTO: 2.9 K/UL (ref 1–4.8)
LYMPHOCYTES NFR BLD: 27.3 % (ref 18–48)
MCH RBC QN AUTO: 29.9 PG (ref 27–31)
MCHC RBC AUTO-ENTMCNC: 33.3 G/DL (ref 32–36)
MCV RBC AUTO: 90 FL (ref 82–98)
MICROSCOPIC COMMENT: ABNORMAL
MONOCYTES # BLD AUTO: 0.8 K/UL (ref 0.3–1)
MONOCYTES NFR BLD: 7.7 % (ref 4–15)
NEUTROPHILS # BLD AUTO: 6.6 K/UL (ref 1.8–7.7)
NEUTROPHILS NFR BLD: 62.6 % (ref 38–73)
NITRITE UR QL STRIP: NEGATIVE
NRBC BLD-RTO: 0 /100 WBC
PH UR STRIP: 5 [PH] (ref 5–8)
PLATELET # BLD AUTO: 196 K/UL (ref 150–450)
PMV BLD AUTO: 10 FL (ref 9.2–12.9)
POC IONIZED CALCIUM: 1.17 MMOL/L (ref 1.06–1.42)
POC TCO2 (MEASURED): 25 MMOL/L (ref 23–29)
POTASSIUM BLD-SCNC: 4 MMOL/L (ref 3.5–5.1)
POTASSIUM SERPL-SCNC: 4.4 MMOL/L (ref 3.5–5.1)
PROT SERPL-MCNC: 7.2 G/DL (ref 6–8.4)
PROT UR QL STRIP: NEGATIVE
RBC # BLD AUTO: 5.32 M/UL (ref 4.6–6.2)
RBC #/AREA URNS AUTO: 50 /HPF (ref 0–4)
SAMPLE: ABNORMAL
SODIUM BLD-SCNC: 139 MMOL/L (ref 136–145)
SODIUM SERPL-SCNC: 142 MMOL/L (ref 136–145)
SP GR UR STRIP: >=1.03 (ref 1–1.03)
URN SPEC COLLECT METH UR: ABNORMAL
WBC # BLD AUTO: 10.45 K/UL (ref 3.9–12.7)
WBC #/AREA URNS AUTO: 1 /HPF (ref 0–5)

## 2023-07-04 PROCEDURE — 80047 BASIC METABLC PNL IONIZED CA: CPT

## 2023-07-04 PROCEDURE — 96375 TX/PRO/DX INJ NEW DRUG ADDON: CPT

## 2023-07-04 PROCEDURE — 96374 THER/PROPH/DIAG INJ IV PUSH: CPT | Mod: 59

## 2023-07-04 PROCEDURE — 85025 COMPLETE CBC W/AUTO DIFF WBC: CPT | Mod: 91 | Performed by: EMERGENCY MEDICINE

## 2023-07-04 PROCEDURE — 25500020 PHARM REV CODE 255: Performed by: EMERGENCY MEDICINE

## 2023-07-04 PROCEDURE — 85025 COMPLETE CBC W/AUTO DIFF WBC: CPT | Performed by: PHYSICIAN ASSISTANT

## 2023-07-04 PROCEDURE — 96372 THER/PROPH/DIAG INJ SC/IM: CPT | Performed by: EMERGENCY MEDICINE

## 2023-07-04 PROCEDURE — 96361 HYDRATE IV INFUSION ADD-ON: CPT

## 2023-07-04 PROCEDURE — 86803 HEPATITIS C AB TEST: CPT | Performed by: PHYSICIAN ASSISTANT

## 2023-07-04 PROCEDURE — 81001 URINALYSIS AUTO W/SCOPE: CPT | Performed by: PHYSICIAN ASSISTANT

## 2023-07-04 PROCEDURE — 63600175 PHARM REV CODE 636 W HCPCS: Performed by: EMERGENCY MEDICINE

## 2023-07-04 PROCEDURE — 83690 ASSAY OF LIPASE: CPT | Performed by: PHYSICIAN ASSISTANT

## 2023-07-04 PROCEDURE — 87389 HIV-1 AG W/HIV-1&-2 AB AG IA: CPT | Performed by: PHYSICIAN ASSISTANT

## 2023-07-04 PROCEDURE — 80048 BASIC METABOLIC PNL TOTAL CA: CPT | Mod: XB | Performed by: EMERGENCY MEDICINE

## 2023-07-04 PROCEDURE — 25000003 PHARM REV CODE 250: Performed by: PHYSICIAN ASSISTANT

## 2023-07-04 PROCEDURE — 99285 EMERGENCY DEPT VISIT HI MDM: CPT | Mod: 25

## 2023-07-04 PROCEDURE — 96374 THER/PROPH/DIAG INJ IV PUSH: CPT

## 2023-07-04 PROCEDURE — 80053 COMPREHEN METABOLIC PANEL: CPT | Performed by: PHYSICIAN ASSISTANT

## 2023-07-04 PROCEDURE — 99285 EMERGENCY DEPT VISIT HI MDM: CPT | Mod: 25,27

## 2023-07-04 PROCEDURE — 25000003 PHARM REV CODE 250: Performed by: EMERGENCY MEDICINE

## 2023-07-04 PROCEDURE — 63600175 PHARM REV CODE 636 W HCPCS: Performed by: PHYSICIAN ASSISTANT

## 2023-07-04 RX ORDER — ONDANSETRON 4 MG/1
4 TABLET, ORALLY DISINTEGRATING ORAL EVERY 6 HOURS PRN
Qty: 10 TABLET | Refills: 0 | Status: SHIPPED | OUTPATIENT
Start: 2023-07-04 | End: 2023-08-24 | Stop reason: SDUPTHER

## 2023-07-04 RX ORDER — TAMSULOSIN HYDROCHLORIDE 0.4 MG/1
0.4 CAPSULE ORAL DAILY
Qty: 10 CAPSULE | Refills: 0 | Status: ON HOLD | OUTPATIENT
Start: 2023-07-04 | End: 2023-12-21 | Stop reason: CLARIF

## 2023-07-04 RX ORDER — KETOROLAC TROMETHAMINE 30 MG/ML
15 INJECTION, SOLUTION INTRAMUSCULAR; INTRAVENOUS
Status: COMPLETED | OUTPATIENT
Start: 2023-07-04 | End: 2023-07-04

## 2023-07-04 RX ORDER — MORPHINE SULFATE 2 MG/ML
6 INJECTION, SOLUTION INTRAMUSCULAR; INTRAVENOUS
Status: COMPLETED | OUTPATIENT
Start: 2023-07-04 | End: 2023-07-04

## 2023-07-04 RX ORDER — KETOROLAC TROMETHAMINE 10 MG/1
10 TABLET, FILM COATED ORAL EVERY 12 HOURS PRN
Qty: 14 TABLET | Refills: 0 | Status: SHIPPED | OUTPATIENT
Start: 2023-07-04 | End: 2023-07-11

## 2023-07-04 RX ORDER — ONDANSETRON 2 MG/ML
4 INJECTION INTRAMUSCULAR; INTRAVENOUS
Status: COMPLETED | OUTPATIENT
Start: 2023-07-04 | End: 2023-07-04

## 2023-07-04 RX ORDER — KETOROLAC TROMETHAMINE 30 MG/ML
10 INJECTION, SOLUTION INTRAMUSCULAR; INTRAVENOUS
Status: COMPLETED | OUTPATIENT
Start: 2023-07-04 | End: 2023-07-04

## 2023-07-04 RX ORDER — HYDROMORPHONE HYDROCHLORIDE 1 MG/ML
1 INJECTION, SOLUTION INTRAMUSCULAR; INTRAVENOUS; SUBCUTANEOUS
Status: COMPLETED | OUTPATIENT
Start: 2023-07-04 | End: 2023-07-04

## 2023-07-04 RX ORDER — HYDROCODONE BITARTRATE AND ACETAMINOPHEN 10; 325 MG/1; MG/1
1 TABLET ORAL EVERY 6 HOURS PRN
Qty: 12 TABLET | Refills: 0 | Status: SHIPPED | OUTPATIENT
Start: 2023-07-04 | End: 2023-07-07

## 2023-07-04 RX ORDER — PROMETHAZINE HYDROCHLORIDE 25 MG/1
25 TABLET ORAL
Status: COMPLETED | OUTPATIENT
Start: 2023-07-04 | End: 2023-07-04

## 2023-07-04 RX ORDER — TAMSULOSIN HYDROCHLORIDE 0.4 MG/1
0.4 CAPSULE ORAL
Status: COMPLETED | OUTPATIENT
Start: 2023-07-04 | End: 2023-07-04

## 2023-07-04 RX ADMIN — SODIUM CHLORIDE 1000 ML: 9 INJECTION, SOLUTION INTRAVENOUS at 11:07

## 2023-07-04 RX ADMIN — HYDROMORPHONE HYDROCHLORIDE 1 MG: 1 INJECTION, SOLUTION INTRAMUSCULAR; INTRAVENOUS; SUBCUTANEOUS at 02:07

## 2023-07-04 RX ADMIN — SODIUM CHLORIDE 1000 ML: 9 INJECTION, SOLUTION INTRAVENOUS at 10:07

## 2023-07-04 RX ADMIN — KETOROLAC TROMETHAMINE 15 MG: 30 INJECTION INTRAMUSCULAR; INTRAVENOUS at 10:07

## 2023-07-04 RX ADMIN — IOHEXOL 100 ML: 350 INJECTION, SOLUTION INTRAVENOUS at 01:07

## 2023-07-04 RX ADMIN — ONDANSETRON 4 MG: 2 INJECTION INTRAMUSCULAR; INTRAVENOUS at 11:07

## 2023-07-04 RX ADMIN — TAMSULOSIN HYDROCHLORIDE 0.4 MG: 0.4 CAPSULE ORAL at 10:07

## 2023-07-04 RX ADMIN — KETOROLAC TROMETHAMINE 10 MG: 30 INJECTION, SOLUTION INTRAMUSCULAR; INTRAVENOUS at 12:07

## 2023-07-04 RX ADMIN — MORPHINE SULFATE 6 MG: 2 INJECTION, SOLUTION INTRAMUSCULAR; INTRAVENOUS at 10:07

## 2023-07-04 RX ADMIN — TAMSULOSIN HYDROCHLORIDE 0.4 MG: 0.4 CAPSULE ORAL at 12:07

## 2023-07-04 RX ADMIN — PROMETHAZINE HYDROCHLORIDE 25 MG: 25 TABLET ORAL at 11:07

## 2023-07-04 RX ADMIN — MORPHINE SULFATE 6 MG: 2 INJECTION, SOLUTION INTRAMUSCULAR; INTRAVENOUS at 11:07

## 2023-07-04 RX ADMIN — ONDANSETRON 4 MG: 2 INJECTION INTRAMUSCULAR; INTRAVENOUS at 10:07

## 2023-07-04 NOTE — ED PROVIDER NOTES
Encounter Date: 7/4/2023       History     Chief Complaint   Patient presents with    Flank Pain     L side, hx kidney stones yrs ago     Patient is a 43-year-old male.  He presents to the ER for an emergent evaluation due to acute atraumatic left-sided flank pain that began earlier today.  He reports associated intermittent nausea, but denies any vomiting.  He states that the degree of pain is moderate to severe.  He states that the pain does radiate around to his left side.  He states that the pain course waxes and wanes.  He denies any mitigating or exacerbating factors.  He denies any known injury or trauma.  He states that symptoms feel similar to previous kidney stone.  No pre-arrival treatment.  He denies any additional symptoms or concerns at this time.    Review of patient's allergies indicates:  No Known Allergies  Past Medical History:   Diagnosis Date    ADD (attention deficit disorder) 05/09/2012    Allergy     Anxiety 4/12/2023    Asthma 05/09/2012    Cervical disc disorder with radiculopathy, unspecified cervical region 09/14/2021    Cervical spondylosis with myelopathy 06/29/2022    Eczema     Esophageal ulcer     GERD (gastroesophageal reflux disease) 05/09/2012    Glaucoma     Kidney stones 05/2014    Lumbar disc disease 05/09/2012    Lumbar herniated disc 05/09/2012    Malignant melanoma of skin, unspecified 01/06/2022    in situ right mid back    Melanoma 01/2022    in situ R mid back     LUBA (obstructive sleep apnea)     Radiculopathy, lumbar region 09/14/2021    Renal calculi 05/09/2012    Type 2 diabetes mellitus without complication, without long-term current use of insulin 11/16/2020     Past Surgical History:   Procedure Laterality Date    APPENDECTOMY  2006    CARPAL TUNNEL RELEASE Right 09/15/2022    Procedure: RELEASE, CARPAL TUNNEL;  Surgeon: Christen Marshall MD;  Location: [facility];  Service: Orthopedics;  Laterality: Right;    COLONOSCOPY  01/22/2019    internal hemorrhoids, o/w normal  - repeat at age 52    EPIDURAL STEROID INJECTION N/A 02/10/2021    Procedure: CERVICAL C6/7 NELI DIRECT REFERRAL;  Surgeon: Michi Escamilla MD;  Location: [facility] PAIN T;  Service: Pain Management;  Laterality: N/A;  NEEDS CONSENT    EPIDURAL STEROID INJECTION INTO CERVICAL SPINE N/A 6/28/2023    Procedure: Injection-steroid-epidural-cervical C7-T1;  Surgeon: Lorraine Patel DO;  Location: [facility] PAIN MANAGEMENT;  Service: Pain Management;  Laterality: N/A;  diabetic    ESOPHAGOGASTRODUODENOSCOPY  01/22/2019    LA grade B esophagitis; esophageal stenosis- dilated; gastritis; H pylori pathology negative    ESOPHAGOGASTRODUODENOSCOPY N/A 05/18/2021    Procedure: EGD (ESOPHAGOGASTRODUODENOSCOPY);  Surgeon: Mariana Hector MD;  Location: [facility];  Service: Endoscopy;  Laterality: N/A;    EXCISION OF GANGLION OF WRIST Right 09/15/2022    Procedure: EXCISION, GANGLION CYST, WRIST;  Surgeon: Christen Marshall MD;  Location: [facility] OR;  Service: Orthopedics;  Laterality: Right;    FLEXOR TENDON REPAIR Right 09/15/2022    Procedure: FLEXOR TENOSYNOVECTOMY;  Surgeon: Christen Marshall MD;  Location: [facility] OR;  Service: Orthopedics;  Laterality: Right;    HAND ARTHROTOMY Right 09/15/2022    Procedure: ARTHROTOMY, HAND RADIOCARPAL;  Surgeon: Christen Marshall MD;  Location: [facility] OR;  Service: Orthopedics;  Laterality: Right;  Radiocarpal    INJECTION OF STEROID Left 09/15/2022    Procedure: INJECTION, STEROID LEFT WRIST AND LEFT LATERAL ELBOW;  Surgeon: Christen Marshall MD;  Location: [facility] OR;  Service: Orthopedics;  Laterality: Left;  Left Carpal Tunnel CSI    LUMBAR LAMINECTOMY  2010    LUMBAR LAMINECTOMY WITH DISCECTOMY Left 09/20/2021    Procedure: LAMINECTOMY, SPINE, LUMBAR, WITH DISCECTOMY;  Surgeon: Naseem Mcnamara DO;  Location: [address];  Service: Neurosurgery;  Laterality: Left;  MIS L3-4    TYMPANOSTOMY TUBE PLACEMENT       Family History   Problem Relation Age of Onset    Allergic rhinitis Mother      Hypertension Mother     Transient ischemic attack Mother     Sleep apnea Mother     Allergic rhinitis Father     Asthma Father     Chronic back pain Father     MARTITA disease Father     Sleep apnea Father     Melanoma Father     Allergic rhinitis Sister     Asthma Sister     Allergic rhinitis Sister     Asthma Sister     No Known Problems Brother     No Known Problems Maternal Aunt     No Known Problems Maternal Uncle     No Known Problems Paternal Aunt     No Known Problems Paternal Uncle     Brain cancer Maternal Grandmother     Diabetes Maternal Grandfather     Breast cancer Paternal Grandmother     No Known Problems Paternal Grandfather     Amblyopia Neg Hx     Blindness Neg Hx     Cancer Neg Hx     Cataracts Neg Hx     Glaucoma Neg Hx     Macular degeneration Neg Hx     Retinal detachment Neg Hx     Strabismus Neg Hx     Stroke Neg Hx     Thyroid disease Neg Hx     Allergies Neg Hx     Angioedema Neg Hx      Social History     Tobacco Use    Smoking status: Former     Types: Cigarettes     Quit date: 2018     Years since quittin.2     Passive exposure: Never    Smokeless tobacco: Never   Substance Use Topics    Alcohol use: Yes     Alcohol/week: 0.0 standard drinks     Comment: Occasional    Drug use: No     Review of Systems   Constitutional:  Negative for chills, diaphoresis and fever.   Respiratory:  Negative for shortness of breath.    Cardiovascular:  Negative for chest pain.   Gastrointestinal:  Positive for nausea. Negative for abdominal pain, diarrhea and vomiting.   Genitourinary:  Positive for flank pain. Negative for decreased urine volume, difficulty urinating, dysuria, frequency, hematuria and testicular pain.   Musculoskeletal:  Positive for back pain. Negative for gait problem, myalgias and neck pain.   Skin:  Negative for color change, rash and wound.   Allergic/Immunologic: Negative for immunocompromised state.   Neurological:  Negative for dizziness, syncope, weakness, light-headedness,  numbness and headaches.   Hematological:  Negative for adenopathy.   Psychiatric/Behavioral:  Negative for confusion.      Physical Exam     Initial Vitals [07/04/23 1056]   BP Pulse Resp Temp SpO2   (!) 150/92 92 18 98.1 °F (36.7 °C) 96 %      MAP       --         Physical Exam    Nursing note and vitals reviewed.  Constitutional: He appears well-developed and well-nourished. He is not diaphoretic.   Alert and interactive.  Appears uncomfortable.   HENT:   Head: Normocephalic.   Eyes: Conjunctivae are normal. No scleral icterus.   Neck: Neck supple.   Cardiovascular:  Normal rate.           Pulmonary/Chest: No respiratory distress.   Abdominal: Abdomen is soft. He exhibits no distension. There is no abdominal tenderness.   Left lower quadrant abdominal pain reported, minimal tenderness to palpation present.  Soft, nondistended.  No guarding or rebound. There is no rebound and no guarding.   Musculoskeletal:         General: Normal range of motion.      Cervical back: Neck supple.      Comments: Left flank pain on palpation.  No midline spinal tenderness.     Neurological: He is alert and oriented to person, place, and time. He has normal strength. No sensory deficit.   Skin: Skin is warm and dry. No rash noted.   Psychiatric: He has a normal mood and affect. His behavior is normal.       ED Course   Procedures  Labs Reviewed   COMPREHENSIVE METABOLIC PANEL - Abnormal; Notable for the following components:       Result Value    BUN 21 (*)     All other components within normal limits   URINALYSIS, REFLEX TO URINE CULTURE - Abnormal; Notable for the following components:    Specific Gravity, UA >=1.030 (*)     Ketones, UA Trace (*)     Occult Blood UA 1+ (*)     All other components within normal limits    Narrative:     Specimen Source->Urine   URINALYSIS MICROSCOPIC - Abnormal; Notable for the following components:    RBC, UA 50 (*)     All other components within normal limits    Narrative:     Specimen  Source->Urine   HIV 1 / 2 ANTIBODY    Narrative:     Release to patient->Immediate   HEPATITIS C ANTIBODY    Narrative:     Release to patient->Immediate   CBC W/ AUTO DIFFERENTIAL   LIPASE     Results for orders placed or performed during the hospital encounter of 07/04/23   HIV 1/2 Ag/Ab (4th Gen)   Result Value Ref Range    HIV 1/2 Ag/Ab Non-reactive Non-reactive   Hepatitis C Antibody   Result Value Ref Range    Hepatitis C Ab Non-reactive Non-reactive   CBC auto differential   Result Value Ref Range    WBC 10.45 3.90 - 12.70 K/uL    RBC 5.32 4.60 - 6.20 M/uL    Hemoglobin 15.9 14.0 - 18.0 g/dL    Hematocrit 47.7 40.0 - 54.0 %    MCV 90 82 - 98 fL    MCH 29.9 27.0 - 31.0 pg    MCHC 33.3 32.0 - 36.0 g/dL    RDW 13.3 11.5 - 14.5 %    Platelets 196 150 - 450 K/uL    MPV 10.0 9.2 - 12.9 fL    Immature Granulocytes 0.3 0.0 - 0.5 %    Gran # (ANC) 6.6 1.8 - 7.7 K/uL    Immature Grans (Abs) 0.03 0.00 - 0.04 K/uL    Lymph # 2.9 1.0 - 4.8 K/uL    Mono # 0.8 0.3 - 1.0 K/uL    Eos # 0.2 0.0 - 0.5 K/uL    Baso # 0.04 0.00 - 0.20 K/uL    nRBC 0 0 /100 WBC    Gran % 62.6 38.0 - 73.0 %    Lymph % 27.3 18.0 - 48.0 %    Mono % 7.7 4.0 - 15.0 %    Eosinophil % 1.7 0.0 - 8.0 %    Basophil % 0.4 0.0 - 1.9 %    Differential Method Automated    Comprehensive metabolic panel   Result Value Ref Range    Sodium 142 136 - 145 mmol/L    Potassium 4.4 3.5 - 5.1 mmol/L    Chloride 107 95 - 110 mmol/L    CO2 25 23 - 29 mmol/L    Glucose 107 70 - 110 mg/dL    BUN 21 (H) 6 - 20 mg/dL    Creatinine 0.8 0.5 - 1.4 mg/dL    Calcium 9.6 8.7 - 10.5 mg/dL    Total Protein 7.2 6.0 - 8.4 g/dL    Albumin 4.2 3.5 - 5.2 g/dL    Total Bilirubin 0.6 0.1 - 1.0 mg/dL    Alkaline Phosphatase 94 55 - 135 U/L    AST 20 10 - 40 U/L    ALT 42 10 - 44 U/L    eGFR >60.0 >60 mL/min/1.73 m^2    Anion Gap 10 8 - 16 mmol/L   Lipase   Result Value Ref Range    Lipase 41 4 - 60 U/L   Urinalysis, Reflex to Urine Culture Urine, Clean Catch    Specimen: Urine   Result Value  Ref Range    Specimen UA Urine, Clean Catch     Color, UA Yellow Yellow, Straw, Yolis    Appearance, UA Clear Clear    pH, UA 5.0 5.0 - 8.0    Specific Gravity, UA >=1.030 (A) 1.005 - 1.030    Protein, UA Negative Negative    Glucose, UA Negative Negative    Ketones, UA Trace (A) Negative    Bilirubin (UA) Negative Negative    Occult Blood UA 1+ (A) Negative    Nitrite, UA Negative Negative    Leukocytes, UA Negative Negative   Urinalysis Microscopic   Result Value Ref Range    RBC, UA 50 (H) 0 - 4 /hpf    WBC, UA 1 0 - 5 /hpf    Microscopic Comment SEE COMMENT             Imaging Results               CT Abdomen Pelvis With Contrast (Final result)  Result time 07/04/23 14:03:18      Final result by Osbaldo Causey MD (07/04/23 14:03:18)                   Impression:      6 mm stone in the left ureterovesicular junction with moderate obstructive uropathy.    Hepatomegaly.    Colonic diverticulosis.    Additional incidental findings discussed in the body of the report.    This report was flagged in Epic as abnormal.      Electronically signed by: Osbaldo Causey MD  Date:    07/04/2023  Time:    14:03               Narrative:    EXAMINATION:  CT ABDOMEN PELVIS WITH CONTRAST    CLINICAL HISTORY:  LLQ abdominal pain;    TECHNIQUE:  Low dose axial images, sagittal and coronal reformations were obtained from the lung bases to the pubic symphysis following the IV administration of 100 mL of Omnipaque 350 .  Oral contrast was not given.    COMPARISON:  CT abdomen pelvis, 09/19/2021.    FINDINGS:  Lower Chest:    Lung bases are clear.  Heart size is normal.    Abdomen:    Liver is enlarged, measuring approximately 20 cm in craniocaudal length.  Possible mild hepatic steatosis.  No focal liver mass identified.  Gallbladder is unremarkable. No intrahepatic biliary ductal dilatation.    Spleen is at the upper limits of normal in size and otherwise unremarkable.  Adrenal glands and pancreas are unremarkable.    Subtle  asymmetric delayed enhancement of the left kidney when compared to the right side.  Asymmetric left perinephric fat stranding.  Moderate left hydroureteronephrosis to the level of a 6 mm stone at the left ureterovesicular junction.  Subcentimeter hypodensity in the right kidney likely a simple cyst but too small to definitively characterize.  No nonobstructing renal stones.  No right hydronephrosis.    No small bowel obstruction.  Scattered colonic diverticula without convincing findings of acute diverticulitis.  Appendix is not identified and may be surgically absent.    No pneumoperitoneum or organized fluid collection.    No bulky retroperitoneal lymphadenopathy.    Abdominal aorta is normal in caliber without significant atherosclerosis.    Portal, splenic, and superior mesenteric veins are patent.    Pelvis:    Urinary bladder is decompressed and demonstrates symmetric wall prominence.  6 mm stone at the left ureterovesicular junction as described above.  Rectum is unremarkable.  No significant pelvic free fluid.    Bones and soft tissues:    No aggressive osseous lesions.  Multilevel degenerative changes in the lumbar spine.  Extraperitoneal soft tissues are negative for acute finding.                                       Medications   sodium chloride 0.9% bolus 1,000 mL 1,000 mL (0 mLs Intravenous Stopped 7/4/23 1243)   morphine injection 6 mg (6 mg Intravenous Given 7/4/23 1153)   ondansetron injection 4 mg (4 mg Intravenous Given 7/4/23 1145)   tamsulosin 24 hr capsule 0.4 mg (0.4 mg Oral Given 7/4/23 1242)   ketorolac injection 9.999 mg (9.999 mg Intravenous Given 7/4/23 1242)   iohexoL (OMNIPAQUE 350) injection 100 mL (100 mLs Intravenous Given 7/4/23 1317)   HYDROmorphone injection 1 mg (1 mg Intramuscular Given 7/4/23 1458)     Medical Decision Making:   History:   Old Medical Records: I decided to obtain old medical records.  Initial Assessment:   43-year-old male presenting to the ER for an emergent  evaluation due to acute atraumatic left-sided flank pain that radiates around to left lower abdomen x1 day  Differential Diagnosis:   Renal lithiasis, renal colic, pyelonephritis, LINDA, AAA, lumbar radiculopathy, diverticulitis, etc.  Clinical Tests:   Lab Tests: Ordered and Reviewed  Radiological Study: Ordered and Reviewed  ED Management:  Vital signs reviewed, benign   Records reviewed   CT scan of abdomen and pelvis shows a 6 mm kidney stone located at the left UVJ with left-sided hydronephrosis  UA positive for microscopic hematuria, negative for UTI  Renal function preserved   Patient reports feeling better after treatment in the ER  Patient instructed to follow up with Urology this week for re-evaluation and further management  Urine strainer provided   Prescriptions for Norco, Toradol, Flomax, and Zofran provided  Patient instructed to follow up with his primary care physician within the next 1-2 days  Patient instructed to return to the ER promptly if unimproved or if worse in any way  Patient verbalized understanding and comfort with plan                        Clinical Impression:   Final diagnoses:  [N20.0] Kidney stone on left side (Primary)  [N23] Renal colic on left side        ED Disposition Condition    Discharge Stable          ED Prescriptions       Medication Sig Dispense Start Date End Date Auth. Provider    HYDROcodone-acetaminophen (NORCO)  mg per tablet Take 1 tablet by mouth every 6 (six) hours as needed for Pain. 12 tablet 7/4/2023 7/7/2023 Delbert Moise PA-C    ketorolac (TORADOL) 10 mg tablet Take 1 tablet (10 mg total) by mouth every 12 (twelve) hours as needed for Pain for up to 5 days 14 tablet 7/4/2023 7/11/2023 Delbert Moise PA-C    tamsulosin (FLOMAX) 0.4 mg Cap Take 1 capsule (0.4 mg total) by mouth once daily for 10 days 10 capsule 7/4/2023 7/14/2023 Delbert Moise PA-C    ondansetron (ZOFRAN-ODT) 4 MG TbDL Dissolve 1 tablet (4 mg total) by mouth every 6  (six) hours as needed (nausea). 10 tablet 7/4/2023 -- Delbert Moise PA-C          Follow-up Information       Follow up With Specialties Details Why Contact Info Additional Information    [facility] Urology Schedule an appointment as soon as possible for a visit  [facility] in the next 2-3 days for re-evaluation and further management. [address]  [facility] [id number] [phone] Main Building, 4th Floor Please park in [facility] and take [facility] elevator    Suzi Sanches MD Internal Medicine Schedule an appointment as soon as possible for a visit  Follow up with your primary care physician within the next 2-3 days [address] [facility] [address]  [phone]       [facility] - Emergency Dept Emergency Medicine  Return to the ER promptly if unimproved or if worse in any way [address]  [facility] [id number] [phone]              Delbert Moise PA-C  07/04/23 2050

## 2023-07-04 NOTE — ED NOTES
Patient identifiers verified and correct for  Mr Garza  C/C: Flank paiN SEE NN  APPEARANCE: awake and alert in NAD. PAIN  8/10  SKIN: warm, dry and intact. No breakdown or bruising.  MUSCULOSKELETAL: Patient moving all extremities spontaneously, no obvious swelling or deformities noted. Ambulates independently.  RESPIRATORY: Denies shortness of breath.Respirations unlabored.   CARDIAC: Denies CP, 2+ distal pulses; no peripheral edema  ABDOMEN: flank pain, intermittent, reports nausea, no current emesis   : voids spontaneously,reports difficulty urination x 2 days  Neurologic: AAO x 4; follows commands equal strength in all extremities; denies numbness/tingling. Denies dizziness  denies weakness

## 2023-07-05 ENCOUNTER — PATIENT MESSAGE (OUTPATIENT)
Dept: SURGERY | Facility: HOSPITAL | Age: 43
End: 2023-07-05
Payer: COMMERCIAL

## 2023-07-05 ENCOUNTER — ANESTHESIA (OUTPATIENT)
Dept: SURGERY | Facility: HOSPITAL | Age: 43
End: 2023-07-05
Payer: COMMERCIAL

## 2023-07-05 ENCOUNTER — ANESTHESIA EVENT (OUTPATIENT)
Dept: SURGERY | Facility: HOSPITAL | Age: 43
End: 2023-07-05
Payer: COMMERCIAL

## 2023-07-05 VITALS
WEIGHT: 249.13 LBS | HEIGHT: 65 IN | SYSTOLIC BLOOD PRESSURE: 150 MMHG | TEMPERATURE: 97 F | BODY MASS INDEX: 41.51 KG/M2 | HEART RATE: 82 BPM | DIASTOLIC BLOOD PRESSURE: 88 MMHG | OXYGEN SATURATION: 93 % | RESPIRATION RATE: 18 BRPM

## 2023-07-05 PROBLEM — N13.2 URETERAL STONE WITH HYDRONEPHROSIS: Status: ACTIVE | Noted: 2023-07-05

## 2023-07-05 LAB
ANION GAP SERPL CALC-SCNC: 12 MMOL/L (ref 8–16)
BASOPHILS # BLD AUTO: 0.05 K/UL (ref 0–0.2)
BASOPHILS NFR BLD: 0.4 % (ref 0–1.9)
BUN SERPL-MCNC: 21 MG/DL (ref 6–20)
CALCIUM SERPL-MCNC: 9.4 MG/DL (ref 8.7–10.5)
CHLORIDE SERPL-SCNC: 104 MMOL/L (ref 95–110)
CO2 SERPL-SCNC: 25 MMOL/L (ref 23–29)
CREAT SERPL-MCNC: 1 MG/DL (ref 0.5–1.4)
DIFFERENTIAL METHOD: ABNORMAL
EOSINOPHIL # BLD AUTO: 0 K/UL (ref 0–0.5)
EOSINOPHIL NFR BLD: 0.3 % (ref 0–8)
ERYTHROCYTE [DISTWIDTH] IN BLOOD BY AUTOMATED COUNT: 13.5 % (ref 11.5–14.5)
EST. GFR  (NO RACE VARIABLE): >60 ML/MIN/1.73 M^2
GLUCOSE SERPL-MCNC: 127 MG/DL (ref 70–110)
HCT VFR BLD AUTO: 45.2 % (ref 40–54)
HGB BLD-MCNC: 15.2 G/DL (ref 14–18)
IMM GRANULOCYTES # BLD AUTO: 0.04 K/UL (ref 0–0.04)
IMM GRANULOCYTES NFR BLD AUTO: 0.3 % (ref 0–0.5)
LYMPHOCYTES # BLD AUTO: 1.6 K/UL (ref 1–4.8)
LYMPHOCYTES NFR BLD: 12 % (ref 18–48)
MCH RBC QN AUTO: 29.8 PG (ref 27–31)
MCHC RBC AUTO-ENTMCNC: 33.6 G/DL (ref 32–36)
MCV RBC AUTO: 89 FL (ref 82–98)
MONOCYTES # BLD AUTO: 1 K/UL (ref 0.3–1)
MONOCYTES NFR BLD: 7.8 % (ref 4–15)
NEUTROPHILS # BLD AUTO: 10.5 K/UL (ref 1.8–7.7)
NEUTROPHILS NFR BLD: 79.2 % (ref 38–73)
NRBC BLD-RTO: 0 /100 WBC
PLATELET # BLD AUTO: 192 K/UL (ref 150–450)
PMV BLD AUTO: 10.2 FL (ref 9.2–12.9)
POCT GLUCOSE: 82 MG/DL (ref 70–110)
POTASSIUM SERPL-SCNC: 3.9 MMOL/L (ref 3.5–5.1)
RBC # BLD AUTO: 5.1 M/UL (ref 4.6–6.2)
SODIUM SERPL-SCNC: 141 MMOL/L (ref 136–145)
WBC # BLD AUTO: 13.22 K/UL (ref 3.9–12.7)

## 2023-07-05 PROCEDURE — 25000003 PHARM REV CODE 250: Performed by: STUDENT IN AN ORGANIZED HEALTH CARE EDUCATION/TRAINING PROGRAM

## 2023-07-05 PROCEDURE — 63600175 PHARM REV CODE 636 W HCPCS: Performed by: STUDENT IN AN ORGANIZED HEALTH CARE EDUCATION/TRAINING PROGRAM

## 2023-07-05 PROCEDURE — 71000044 HC DOSC ROUTINE RECOVERY FIRST HOUR: Performed by: UROLOGY

## 2023-07-05 PROCEDURE — 36000706: Performed by: UROLOGY

## 2023-07-05 PROCEDURE — 63600175 PHARM REV CODE 636 W HCPCS: Performed by: NURSE ANESTHETIST, CERTIFIED REGISTERED

## 2023-07-05 PROCEDURE — 52356 PR CYSTO/URETERO W/LITHOTRIPSY: ICD-10-PCS | Mod: LT,,, | Performed by: UROLOGY

## 2023-07-05 PROCEDURE — 37000008 HC ANESTHESIA 1ST 15 MINUTES: Performed by: UROLOGY

## 2023-07-05 PROCEDURE — D9220A PRA ANESTHESIA: ICD-10-PCS | Mod: CRNA,,, | Performed by: NURSE ANESTHETIST, CERTIFIED REGISTERED

## 2023-07-05 PROCEDURE — D9220A PRA ANESTHESIA: ICD-10-PCS | Mod: ANES,,, | Performed by: ANESTHESIOLOGY

## 2023-07-05 PROCEDURE — 71000015 HC POSTOP RECOV 1ST HR: Performed by: UROLOGY

## 2023-07-05 PROCEDURE — 25000003 PHARM REV CODE 250: Performed by: NURSE ANESTHETIST, CERTIFIED REGISTERED

## 2023-07-05 PROCEDURE — 96361 HYDRATE IV INFUSION ADD-ON: CPT | Mod: 59

## 2023-07-05 PROCEDURE — 36000707: Performed by: UROLOGY

## 2023-07-05 PROCEDURE — C2617 STENT, NON-COR, TEM W/O DEL: HCPCS | Performed by: UROLOGY

## 2023-07-05 PROCEDURE — 96376 TX/PRO/DX INJ SAME DRUG ADON: CPT

## 2023-07-05 PROCEDURE — 96376 TX/PRO/DX INJ SAME DRUG ADON: CPT | Mod: 59

## 2023-07-05 PROCEDURE — 63600175 PHARM REV CODE 636 W HCPCS: Performed by: EMERGENCY MEDICINE

## 2023-07-05 PROCEDURE — D9220A PRA ANESTHESIA: Mod: CRNA,,, | Performed by: NURSE ANESTHETIST, CERTIFIED REGISTERED

## 2023-07-05 PROCEDURE — G0378 HOSPITAL OBSERVATION PER HR: HCPCS

## 2023-07-05 PROCEDURE — 82365 CALCULUS SPECTROSCOPY: CPT | Performed by: UROLOGY

## 2023-07-05 PROCEDURE — 27201423 OPTIME MED/SURG SUP & DEVICES STERILE SUPPLY: Performed by: UROLOGY

## 2023-07-05 PROCEDURE — 52356 CYSTO/URETERO W/LITHOTRIPSY: CPT | Mod: LT,,, | Performed by: UROLOGY

## 2023-07-05 PROCEDURE — D9220A PRA ANESTHESIA: Mod: ANES,,, | Performed by: ANESTHESIOLOGY

## 2023-07-05 PROCEDURE — C1758 CATHETER, URETERAL: HCPCS | Performed by: UROLOGY

## 2023-07-05 PROCEDURE — 37000009 HC ANESTHESIA EA ADD 15 MINS: Performed by: UROLOGY

## 2023-07-05 PROCEDURE — C1769 GUIDE WIRE: HCPCS | Performed by: UROLOGY

## 2023-07-05 DEVICE — STENT URETERAL UNIV 6FR 24CM: Type: IMPLANTABLE DEVICE | Site: URETER | Status: FUNCTIONAL

## 2023-07-05 RX ORDER — PANTOPRAZOLE SODIUM 40 MG/1
40 TABLET, DELAYED RELEASE ORAL DAILY
Status: DISCONTINUED | OUTPATIENT
Start: 2023-07-05 | End: 2023-07-05 | Stop reason: HOSPADM

## 2023-07-05 RX ORDER — ESCITALOPRAM OXALATE 5 MG/1
5 TABLET ORAL DAILY
Status: DISCONTINUED | OUTPATIENT
Start: 2023-07-05 | End: 2023-07-05

## 2023-07-05 RX ORDER — MIDAZOLAM HYDROCHLORIDE 1 MG/ML
INJECTION, SOLUTION INTRAMUSCULAR; INTRAVENOUS
Status: DISCONTINUED | OUTPATIENT
Start: 2023-07-05 | End: 2023-07-05

## 2023-07-05 RX ORDER — KETOROLAC TROMETHAMINE 30 MG/ML
30 INJECTION, SOLUTION INTRAMUSCULAR; INTRAVENOUS EVERY 6 HOURS PRN
Status: DISCONTINUED | OUTPATIENT
Start: 2023-07-05 | End: 2023-07-05 | Stop reason: HOSPADM

## 2023-07-05 RX ORDER — DEXAMETHASONE SODIUM PHOSPHATE 4 MG/ML
INJECTION, SOLUTION INTRA-ARTICULAR; INTRALESIONAL; INTRAMUSCULAR; INTRAVENOUS; SOFT TISSUE
Status: DISCONTINUED | OUTPATIENT
Start: 2023-07-05 | End: 2023-07-05

## 2023-07-05 RX ORDER — ESCITALOPRAM OXALATE 5 MG/1
5 TABLET ORAL DAILY
Status: DISCONTINUED | OUTPATIENT
Start: 2023-07-05 | End: 2023-07-05 | Stop reason: HOSPADM

## 2023-07-05 RX ORDER — PROCHLORPERAZINE EDISYLATE 5 MG/ML
5 INJECTION INTRAMUSCULAR; INTRAVENOUS EVERY 6 HOURS PRN
Status: DISCONTINUED | OUTPATIENT
Start: 2023-07-05 | End: 2023-07-05 | Stop reason: HOSPADM

## 2023-07-05 RX ORDER — CEFAZOLIN SODIUM 1 G/3ML
INJECTION, POWDER, FOR SOLUTION INTRAMUSCULAR; INTRAVENOUS
Status: DISCONTINUED | OUTPATIENT
Start: 2023-07-05 | End: 2023-07-05

## 2023-07-05 RX ORDER — ONDANSETRON 2 MG/ML
INJECTION INTRAMUSCULAR; INTRAVENOUS
Status: DISCONTINUED | OUTPATIENT
Start: 2023-07-05 | End: 2023-07-05

## 2023-07-05 RX ORDER — ONDANSETRON 2 MG/ML
4 INJECTION INTRAMUSCULAR; INTRAVENOUS EVERY 8 HOURS PRN
Status: DISCONTINUED | OUTPATIENT
Start: 2023-07-05 | End: 2023-07-05 | Stop reason: HOSPADM

## 2023-07-05 RX ORDER — PROPOFOL 10 MG/ML
VIAL (ML) INTRAVENOUS
Status: DISCONTINUED | OUTPATIENT
Start: 2023-07-05 | End: 2023-07-05

## 2023-07-05 RX ORDER — ZOLPIDEM TARTRATE 5 MG/1
10 TABLET ORAL NIGHTLY PRN
Status: DISCONTINUED | OUTPATIENT
Start: 2023-07-05 | End: 2023-07-05 | Stop reason: HOSPADM

## 2023-07-05 RX ORDER — ACETAMINOPHEN 325 MG/1
650 TABLET ORAL EVERY 4 HOURS PRN
Status: DISCONTINUED | OUTPATIENT
Start: 2023-07-05 | End: 2023-07-05 | Stop reason: HOSPADM

## 2023-07-05 RX ORDER — SODIUM CHLORIDE 0.9 % (FLUSH) 0.9 %
10 SYRINGE (ML) INJECTION
Status: DISCONTINUED | OUTPATIENT
Start: 2023-07-05 | End: 2023-07-05 | Stop reason: HOSPADM

## 2023-07-05 RX ORDER — PHENYLEPHRINE HYDROCHLORIDE 10 MG/ML
INJECTION INTRAVENOUS
Status: DISCONTINUED | OUTPATIENT
Start: 2023-07-05 | End: 2023-07-05

## 2023-07-05 RX ORDER — OXYCODONE HYDROCHLORIDE 10 MG/1
10 TABLET ORAL EVERY 6 HOURS PRN
Status: DISCONTINUED | OUTPATIENT
Start: 2023-07-05 | End: 2023-07-05 | Stop reason: HOSPADM

## 2023-07-05 RX ORDER — MORPHINE SULFATE 2 MG/ML
6 INJECTION, SOLUTION INTRAMUSCULAR; INTRAVENOUS
Status: COMPLETED | OUTPATIENT
Start: 2023-07-05 | End: 2023-07-05

## 2023-07-05 RX ORDER — FENTANYL CITRATE 50 UG/ML
25 INJECTION, SOLUTION INTRAMUSCULAR; INTRAVENOUS EVERY 5 MIN PRN
Status: DISCONTINUED | OUTPATIENT
Start: 2023-07-05 | End: 2023-07-05 | Stop reason: HOSPADM

## 2023-07-05 RX ORDER — SUCCINYLCHOLINE CHLORIDE 20 MG/ML
INJECTION INTRAMUSCULAR; INTRAVENOUS
Status: DISCONTINUED | OUTPATIENT
Start: 2023-07-05 | End: 2023-07-05

## 2023-07-05 RX ORDER — OXYBUTYNIN CHLORIDE 5 MG/1
5 TABLET ORAL 3 TIMES DAILY
Qty: 15 TABLET | Refills: 0 | Status: ON HOLD | OUTPATIENT
Start: 2023-07-05 | End: 2023-09-27 | Stop reason: HOSPADM

## 2023-07-05 RX ORDER — ROCURONIUM BROMIDE 10 MG/ML
INJECTION, SOLUTION INTRAVENOUS
Status: DISCONTINUED | OUTPATIENT
Start: 2023-07-05 | End: 2023-07-05

## 2023-07-05 RX ORDER — LIDOCAINE HYDROCHLORIDE 20 MG/ML
INJECTION, SOLUTION EPIDURAL; INFILTRATION; INTRACAUDAL; PERINEURAL
Status: DISCONTINUED | OUTPATIENT
Start: 2023-07-05 | End: 2023-07-05

## 2023-07-05 RX ORDER — FENTANYL CITRATE 50 UG/ML
INJECTION, SOLUTION INTRAMUSCULAR; INTRAVENOUS
Status: DISCONTINUED | OUTPATIENT
Start: 2023-07-05 | End: 2023-07-05

## 2023-07-05 RX ORDER — SODIUM CHLORIDE 9 MG/ML
INJECTION, SOLUTION INTRAVENOUS CONTINUOUS
Status: DISCONTINUED | OUTPATIENT
Start: 2023-07-05 | End: 2023-07-05

## 2023-07-05 RX ORDER — TAMSULOSIN HYDROCHLORIDE 0.4 MG/1
0.4 CAPSULE ORAL DAILY
Status: DISCONTINUED | OUTPATIENT
Start: 2023-07-05 | End: 2023-07-05 | Stop reason: HOSPADM

## 2023-07-05 RX ADMIN — SODIUM CHLORIDE, SODIUM GLUCONATE, SODIUM ACETATE, POTASSIUM CHLORIDE, MAGNESIUM CHLORIDE, SODIUM PHOSPHATE, DIBASIC, AND POTASSIUM PHOSPHATE: .53; .5; .37; .037; .03; .012; .00082 INJECTION, SOLUTION INTRAVENOUS at 10:07

## 2023-07-05 RX ADMIN — LIDOCAINE HYDROCHLORIDE 100 MG: 20 INJECTION, SOLUTION EPIDURAL; INFILTRATION; INTRACAUDAL; PERINEURAL at 10:07

## 2023-07-05 RX ADMIN — KETOROLAC TROMETHAMINE 30 MG: 30 INJECTION, SOLUTION INTRAMUSCULAR; INTRAVENOUS at 02:07

## 2023-07-05 RX ADMIN — PHENYLEPHRINE HYDROCHLORIDE 100 MCG: 10 INJECTION INTRAVENOUS at 11:07

## 2023-07-05 RX ADMIN — PHENYLEPHRINE HYDROCHLORIDE 200 MCG: 10 INJECTION INTRAVENOUS at 11:07

## 2023-07-05 RX ADMIN — OXYCODONE HYDROCHLORIDE 10 MG: 5 TABLET ORAL at 01:07

## 2023-07-05 RX ADMIN — ROCURONIUM BROMIDE 5 MG: 10 INJECTION INTRAVENOUS at 10:07

## 2023-07-05 RX ADMIN — DEXAMETHASONE SODIUM PHOSPHATE 4 MG: 4 INJECTION, SOLUTION INTRAMUSCULAR; INTRAVENOUS at 11:07

## 2023-07-05 RX ADMIN — PANTOPRAZOLE SODIUM 40 MG: 40 TABLET, DELAYED RELEASE ORAL at 09:07

## 2023-07-05 RX ADMIN — SUCCINYLCHOLINE CHLORIDE 170 MG: 20 INJECTION, SOLUTION INTRAMUSCULAR; INTRAVENOUS at 10:07

## 2023-07-05 RX ADMIN — SODIUM CHLORIDE: 9 INJECTION, SOLUTION INTRAVENOUS at 02:07

## 2023-07-05 RX ADMIN — ESCITALOPRAM OXALATE 5 MG: 5 TABLET, FILM COATED ORAL at 09:07

## 2023-07-05 RX ADMIN — MORPHINE SULFATE 6 MG: 2 INJECTION, SOLUTION INTRAMUSCULAR; INTRAVENOUS at 12:07

## 2023-07-05 RX ADMIN — ONDANSETRON 4 MG: 2 INJECTION INTRAMUSCULAR; INTRAVENOUS at 11:07

## 2023-07-05 RX ADMIN — CEFAZOLIN 2 G: 330 INJECTION, POWDER, FOR SOLUTION INTRAMUSCULAR; INTRAVENOUS at 11:07

## 2023-07-05 RX ADMIN — MIDAZOLAM HYDROCHLORIDE 2 MG: 1 INJECTION, SOLUTION INTRAMUSCULAR; INTRAVENOUS at 10:07

## 2023-07-05 RX ADMIN — SODIUM CHLORIDE: 9 INJECTION, SOLUTION INTRAVENOUS at 07:07

## 2023-07-05 RX ADMIN — ONDANSETRON 4 MG: 2 INJECTION INTRAMUSCULAR; INTRAVENOUS at 09:07

## 2023-07-05 RX ADMIN — OXYCODONE HYDROCHLORIDE 10 MG: 5 TABLET ORAL at 07:07

## 2023-07-05 RX ADMIN — PROPOFOL 200 MG: 10 INJECTION, EMULSION INTRAVENOUS at 10:07

## 2023-07-05 RX ADMIN — TAMSULOSIN HYDROCHLORIDE 0.4 MG: 0.4 CAPSULE ORAL at 09:07

## 2023-07-05 RX ADMIN — ZOLPIDEM TARTRATE 10 MG: 5 TABLET ORAL at 02:07

## 2023-07-05 RX ADMIN — FENTANYL CITRATE 50 MCG: 50 INJECTION, SOLUTION INTRAMUSCULAR; INTRAVENOUS at 10:07

## 2023-07-05 NOTE — HPI
Hermelindo Garza III is a 43 y.o. M with pmh DM2, HTN, LUBA, nephrolithiasis presents to the ED for the second time in 24 hours for persistent left abdominal pain, nausea and vomiting. Urology consulted for 6mm L UVJ stone and hydronephrosis.    Patient with 2 days hx of severe LLQ pain and vomitting. Seen in the ED this morning and discharged home on MTOP after pain was controlled in the ED. Once home patient unable to take any po meds and had worsening LLQ pain and persistent vomiting. He returned to the ED later that night where he was given morphine x2, toradol x2, 2L bolus, and flomax with no resolution of symptoms. Denies hematuria, dysuria, fever, chills.    On assessment AFVSS, Cr 1.0 from 0.8 earlier today, wbc 13.2 from 10.5. Urine nitrite negative, no bacteria. CT shows 6mm L UVJ stone, L hydroureteronephrosis to the level of the UVJ stone, no stones or hydro present on the right, multiple phleboliths.

## 2023-07-05 NOTE — PLAN OF CARE
Problem: Adult Inpatient Plan of Care  Goal: Plan of Care Review  Outcome: Ongoing, Progressing  Goal: Patient-Specific Goal (Individualized)  Outcome: Ongoing, Progressing  Goal: Absence of Hospital-Acquired Illness or Injury  Outcome: Ongoing, Progressing  Intervention: Identify and Manage Fall Risk  Flowsheets (Taken 7/5/2023 0541)  Safety Promotion/Fall Prevention:   side rails raised x 2   commode/urinal/bedpan at bedside  Goal: Optimal Comfort and Wellbeing  Outcome: Ongoing, Progressing  Intervention: Monitor Pain and Promote Comfort  Flowsheets (Taken 7/5/2023 0541)  Pain Management Interventions: position adjusted  Goal: Readiness for Transition of Care  Outcome: Ongoing, Progressing     Problem: Adult Inpatient Plan of Care  Goal: Plan of Care Review  Outcome: Ongoing, Progressing     Problem: Adult Inpatient Plan of Care  Goal: Patient-Specific Goal (Individualized)  Outcome: Ongoing, Progressing     Problem: Adult Inpatient Plan of Care  Goal: Absence of Hospital-Acquired Illness or Injury  Outcome: Ongoing, Progressing  Intervention: Identify and Manage Fall Risk  Flowsheets (Taken 7/5/2023 0541)  Safety Promotion/Fall Prevention:   side rails raised x 2   commode/urinal/bedpan at bedside     Problem: Adult Inpatient Plan of Care  Goal: Absence of Hospital-Acquired Illness or Injury  Intervention: Identify and Manage Fall Risk  Flowsheets (Taken 7/5/2023 0541)  Safety Promotion/Fall Prevention:   side rails raised x 2   commode/urinal/bedpan at bedside     Problem: Adult Inpatient Plan of Care  Goal: Optimal Comfort and Wellbeing  Outcome: Ongoing, Progressing  Intervention: Monitor Pain and Promote Comfort  Flowsheets (Taken 7/5/2023 0541)  Pain Management Interventions: position adjusted     Problem: Adult Inpatient Plan of Care  Goal: Optimal Comfort and Wellbeing  Intervention: Monitor Pain and Promote Comfort  Flowsheets (Taken 7/5/2023 0541)  Pain Management Interventions: position adjusted      Problem: Adult Inpatient Plan of Care  Goal: Readiness for Transition of Care  Outcome: Ongoing, Progressing     Problem: Diabetes Comorbidity  Goal: Blood Glucose Level Within Targeted Range  Outcome: Ongoing, Progressing   PRN medication somewhat effective  for pain.  Explained plan of care, verbalized understanding.  Remained NPO. Educated pt .on new medicine . No injury during shift, Side rails up x 2, call light by bedside.  Pt will bring home CPAP  if staying overnight and home nexium

## 2023-07-05 NOTE — ED NOTES
I-STAT Chem-8+ Results:   Value Reference Range   Sodium 139 136-145 mmol/L   Potassium  4.0 3.5-5.1 mmol/L   Chloride 103  mmol/L   Ionized Calcium 1.17 1.06-1.42 mmol/L   CO2 (measured) 25 23-29 mmol/L   Glucose 137  mg/dL   BUN 23 6-30 mg/dL   Creatinine 1.1 0.5-1.4 mg/dL   Hematocrit 45 36-54%

## 2023-07-05 NOTE — NURSING
New admit report received from  Tiara GAMBOA RN @01:49. Room prepared awaiting patient arrival.

## 2023-07-05 NOTE — ED NOTES
Patient identifiers verified and correct for Hermelindo ELIAS Greg III.    LOC: The patient is awake, alert and oriented x 4. Pt is speaking appropriately, no slurred speech.  APPEARANCE: Patient appears in acute distress. Pt is clean and well groomed. No JVD visible. Pt reports pain level of 10.  SKIN: Skin is warm dry and intact, and color is consistent with ethnicity. No tenting observed and capillary refill <3 seconds. No clubbing noted to nail beds. No breakdown or brusing visible and mucus membranes moist and acyanotic.  MUSCULOSKELETAL: Full range of motion present in all extremities. Hand  equal and leg strength strong +2 bilaterally.  RESPIRATORY: Airway is open and patent. Respirations-unlabored, pt slightly tachypnic, equal bilaterally on inspiration and expiration. No accessory muscle use noted. Lungs clear to auscultation in all fields bilaterally anterior and posterior.   CARDIAC: Patient has regular heart rate and rhythm.  No peripheral edema noted, and patient has no c/o chest pain.  ABDOMEN: Pt reporting nausea, vomiting and 10/10 left lower abdominal pain.  NEUROLOGIC: Eyes open spontaneously and facial expression symmetrical. Pt behavior appropriate to situation, and pt follows commands.  Pt reports sensation present in all extremities when touched with a finger. No s/s of ischemic stroke. PERRLA  : No complaints of frequency, burning, urgency or blood in the urine.

## 2023-07-05 NOTE — PLAN OF CARE
[facility] - Surgery  Discharge Final Note    Primary Care Provider: Suzi Sanches MD    Expected Discharge Date: 7/5/2023    Final Discharge Note (most recent)       Final Note - 07/05/23 1512          Final Note    Assessment Type Final Discharge Note     Anticipated Discharge Disposition Home or Self Care     What phone number can be called within the next 1-3 days to see how you are doing after discharge? [phone]     Hospital Resources/Appts/Education Provided Provided patient/caregiver with written discharge plan information   per bedside nurse       Post-Acute Status    Discharge Delays None known at this time                     Important Message from Medicare      Patient medically ready for discharge to home with family. This CM team scheduled or requested necessary hospital follow-up appointments.  Family/patient aware of discharge.    Future Appointments   Date Time Provider Department Center   7/6/2023  9:15 AM Mandeep Mendez, PTA [facility] RHBOP [place]   7/6/2023  2:30 PM INJECTION, ALLERGY [facility] [place] [facility]   7/7/2023  8:20 AM Jay Walter MD [facility] URO [place]   7/11/2023 10:00 AM Mandeep Mendez, PTA [facility] [place]   7/12/2023 10:30 AM INJECTION, ALLERGY [facility]   7/12/2023  4:00 PM Naseem Mcnamara DO [facility] NEUROS8 [facility]   7/20/2023  9:15 AM Libby Sung, PT [facility] [place]   8/14/2023  8:45 AM Martha Champion DPM [facility] PODIA [place]   8/24/2023 10:00 AM INJECTION, ALLERGY [facility]   8/24/2023 10:30 AM Ahswin Donahue MD [facility] PSYCH [facility] [address]   10/12/2023  9:00 AM Suzi Sanches MD [facility] Primo Raya RN CM  Case Management  [id number]

## 2023-07-05 NOTE — ANESTHESIA PREPROCEDURE EVALUATION
[facility]  Anesthesia Pre-Operative Evaluation         Patient Name: Hermelindo Garza III  YOB: 1980  MRN: [id number]    SUBJECTIVE:     Pre-operative evaluation for Procedure(s) (LRB):  CYSTOSCOPY, WITH URETERAL STENT INSERTION (Left)     07/05/2023    Hermelindo Garza III is a 43 y.o. male w/ a significant PMHx of DM2, HTN, LUBA, and nephrolithiasis who presents due to persistent left abdominal pain, nausea and vomiting with 6mm ureteral stone and hydronephrosis.    Patient now presents for the above procedure(s).    TTE: None documented.    LDA:        Peripheral IV - Single Lumen 07/04/23 2211 20 G Anterior;Distal;Left Upper Arm (Active)   Site Assessment Clean;Dry;Intact 07/04/23 2211   Extremity Assessment Distal to IV No warmth;No swelling;No redness 07/04/23 2211   Line Status Infusing 07/05/23 0210   Dressing Status Clean;Dry;Intact 07/05/23 0210   Dressing Intervention Integrity maintained 07/05/23 0210   Number of days: 0       Prev airway:   Placement Date: 09/20/21; Placement Time: 2020 (created via procedure documentation); Method of Intubation: Video Laryngoscopy; Mask Ventilation: Not Attempted; Intubated: Postinduction; Blade: Wang #3; Airway Device Size: 7.5; Placement Verified By: Capnometry; Complicating Factors: None; Intubation Findings: Bilateral breath sounds; Securment: Lips; Complications: None; Removal Date: 09/20/21;  Removal Time: 2232    Drips:    sodium chloride 0.9% 200 mL/hr at 07/05/23 0247       Patient Active Problem List   Diagnosis    Gastro-esophageal reflux disease without esophagitis    Renal calculi    Mild intermittent asthma without complication    ADHD (attention deficit hyperactivity disorder), inattentive type    LUBA (obstructive sleep apnea)    Hyperlipidemia, unspecified    Hypertriglyceridemia    Elevated liver enzymes    SHEIKH (nonalcoholic steatohepatitis)    Hepatosplenomegaly    Cellulitis of left arm     History of multiple allergies    Unspecified asthma, uncomplicated    Obesity, unspecified    Type 2 diabetes mellitus without complications    Vitamin D insufficiency    Restless leg    Chronic pain    Pain in left elbow    Cervical disc disorder with radiculopathy, unspecified cervical region    History of urinary stone    Lumbar radiculopathy, chronic    Tachycardia    S/P lumbar discectomy    Morbid obesity    Cervical spondylosis with myelopathy    History of melanoma in situ    Anxiety    Mood Disorder Unspecifed:  Depressed THO also has some mood elevation  (8of 13 on MDQ): ex: irritability, more self confident, thoughts race. more active)     Rotator cuff tendonitis, left    Left lateral epicondylitis    Cervical radiculopathy    Insomnia    Ureteral stone with hydronephrosis       Review of patient's allergies indicates:  No Known Allergies    Current Inpatient Medications:   EScitalopram oxalate  5 mg Oral Daily    pantoprazole  40 mg Oral Daily    tamsulosin  0.4 mg Oral Daily       Current Facility-Administered Medications on File Prior to Encounter   Medication Dose Route Frequency Provider Last Rate Last Admin    mupirocin 2 % ointment   Nasal On Call Procedure Chong Padron MD   Given at 09/15/22 0534     Current Outpatient Medications on File Prior to Encounter   Medication Sig Dispense Refill    acetaminophen (TYLENOL) 325 MG tablet Take 650 mg by mouth every 6 (six) hours as needed for Pain.      albuterol (PROVENTIL/VENTOLIN HFA) 90 mcg/actuation inhaler RescueINHALE 2 PUFFS INTO THE LUNGS EVERY 6 HOURS AS NEEDED WHEEZING, RESCUE Strength: 90 mcg/actuation 8.5 g 5    atorvastatin (LIPITOR) 20 MG tablet TAKE 1 TABLET(20 MG) BY MOUTH EVERY DAY 90 tablet 1    azelastine (ASTELIN) 137 mcg (0.1 %) nasal spray 2 sprays (274 mcg total) by Nasal route 2 (two) times daily. 30 mL 9    dexmethylphenidate (FOCALIN) 10 MG tablet Take 1 tablet (10 mg total) by mouth 3 (three)  times daily. 90 tablet 0    dexmethylphenidate (FOCALIN) 10 MG tablet Take 1 tablet (10 mg total) by mouth 2 (two) times daily. 60 tablet 0    [START ON 7/7/2023] dexmethylphenidate (FOCALIN) 10 MG tablet Take 1 tablet (10 mg total) by mouth 2 (two) times daily. 60 tablet 0    ergocalciferol, vitamin D2, (VITAMIN D ORAL) Take 1 tablet by mouth every other day.      EScitalopram oxalate (LEXAPRO) 5 MG Tab Take 1 tablet (5 mg total) by mouth once daily. 90 tablet 0    esomeprazole (NEXIUM) 40 mg GrPS Take 40 mg by mouth 2 (two) times daily before meals. 60 each 11    fexofenadine (ALLEGRA) 180 MG tablet Take 180 mg by mouth once daily.      fluocinonide (LIDEX) 0.05 % external solution AAA scalp qday prn itching, scaling 60 mL 3    gabapentin (NEURONTIN) 300 MG capsule Take 1 capsule (300 mg total) by mouth 3 (three) times daily. 90 capsule 3    hydrocortisone 2.5 % cream APPLY EXTERNALLY TO THE AFFECTED AREA TWICE DAILY FOR 10 DAYS 30 g 0    ketoconazole (NIZORAL) 2 % cream APPLY TOPICALLY TO THE AFFECTED AREA TWICE DAILY 15 g 0    ketoconazole (NIZORAL) 2 % cream APPLY EXTERNALLY TO THE AFFECTED AREA TWICE DAILY  Strength: 2 % 15 g 0    ketorolac (TORADOL) 10 mg tablet Take 1 tablet (10 mg total) by mouth every 12 (twelve) hours as needed for Pain for up to 5 days 14 tablet 0    lamoTRIgine (LAMICTAL) 100 MG tablet Take 1.5 tablets (150 mg total) by mouth every evening. IF any RASH, STOP ALL Lamictal and Tell Psyc MD. 135 tablet 0    latanoprost 0.005 % ophthalmic solution Place 1 drop into both eyes every evening. 7.5 mL 3    methylPREDNISolone (MEDROL DOSEPACK) 4 mg tablet use as directed 21 each 0    montelukast (SINGULAIR) 10 mg tablet Take 1 tablet (10 mg total) by mouth every evening. 90 tablet 3    rOPINIRole (REQUIP) 0.25 MG tablet TAKE 1 TABLET(0.25 MG) BY MOUTH EVERY EVENING 90 tablet 2    semaglutide (OZEMPIC) 0.25 mg or 0.5 mg (2 mg/3 mL) pen injector inject 0.5mg into the skin every 7  days 3 each 0    tamsulosin (FLOMAX) 0.4 mg Cap Take 1 capsule (0.4 mg total) by mouth once daily for 10 days 10 capsule 0    urea (CARMOL) 40 % Crea Apply to affected area on feet qhs after bath or shower 60 g 3    zolpidem (AMBIEN) 10 mg Tab Take 1 tablet (10 mg total) by mouth nightly as needed (insomnia). 30 tablet 2    blood-glucose meter kit Use as instructed 1 each 0    HYDROcodone-acetaminophen (NORCO)  mg per tablet Take 1 tablet by mouth every 6 (six) hours as needed for Pain. 12 tablet 0    ondansetron (ZOFRAN-ODT) 4 MG TbDL Dissolve 1 tablet (4 mg total) by mouth every 6 (six) hours as needed (nausea). 10 tablet 0    semaglutide (OZEMPIC) 0.25 mg or 0.5 mg (2 mg/3 mL) pen injector Inject 0.5 mg into the skin every 7 days. 3 mL 0    TRUE METRIX GLUCOSE TEST STRIP Strp USE TO TEST ONCE DAILY 25 strip 2    ULTRA THIN LANCETS 30 gauge Misc USE TO TEST BLOOD SUGAR EVERY DAY AS DIRECTED 100 each 3    zolpidem (AMBIEN CR) 12.5 MG CR tablet Take 1 tablet (12.5 mg total) by mouth nightly as needed for Insomnia. 30 tablet 0       Past Surgical History:   Procedure Laterality Date    APPENDECTOMY  2006    CARPAL TUNNEL RELEASE Right 09/15/2022    Procedure: RELEASE, CARPAL TUNNEL;  Surgeon: Christen Marshall MD;  Location: [facility] OR;  Service: Orthopedics;  Laterality: Right;    COLONOSCOPY  01/22/2019    internal hemorrhoids, o/w normal - repeat at age 52    EPIDURAL STEROID INJECTION N/A 02/10/2021    Procedure: CERVICAL C6/7 NELI DIRECT REFERRAL;  Surgeon: Michi Escamilla MD;  Location: [facility] PAIN MGT;  Service: Pain Management;  Laterality: N/A;  NEEDS CONSENT    EPIDURAL STEROID INJECTION INTO CERVICAL SPINE N/A 6/28/2023    Procedure: Injection-steroid-epidural-cervical C7-T1;  Surgeon: Lorraine Ptael DO;  Location: [facility] PAIN MANAGEMENT;  Service: Pain Management;  Laterality: N/A;  diabetic    ESOPHAGOGASTRODUODENOSCOPY  01/22/2019    LA grade B esophagitis; esophageal stenosis-  dilated; gastritis; H pylori pathology negative    ESOPHAGOGASTRODUODENOSCOPY N/A 05/18/2021    Procedure: EGD (ESOPHAGOGASTRODUODENOSCOPY);  Surgeon: Mariana Hector MD;  Location: [facility];  Service: Endoscopy;  Laterality: N/A;    EXCISION OF GANGLION OF WRIST Right 09/15/2022    Procedure: EXCISION, GANGLION CYST, WRIST;  Surgeon: Christen Marshall MD;  Location: [facility] OR;  Service: Orthopedics;  Laterality: Right;    FLEXOR TENDON REPAIR Right 09/15/2022    Procedure: FLEXOR TENOSYNOVECTOMY;  Surgeon: Christen Marshall MD;  Location: [facility] OR;  Service: Orthopedics;  Laterality: Right;    HAND ARTHROTOMY Right 09/15/2022    Procedure: ARTHROTOMY, HAND RADIOCARPAL;  Surgeon: Christen Marshall MD;  Location: [facility] OR;  Service: Orthopedics;  Laterality: Right;  Radiocarpal    INJECTION OF STEROID Left 09/15/2022    Procedure: INJECTION, STEROID LEFT WRIST AND LEFT LATERAL ELBOW;  Surgeon: Christen Marshall MD;  Location: [facility] OR;  Service: Orthopedics;  Laterality: Left;  Left Carpal Tunnel CSI    LUMBAR LAMINECTOMY  2010    LUMBAR LAMINECTOMY WITH DISCECTOMY Left 09/20/2021    Procedure: LAMINECTOMY, SPINE, LUMBAR, WITH DISCECTOMY;  Surgeon: Naseem Mcnamara DO;  Location: [address];  Service: Neurosurgery;  Laterality: Left;  MIS L3-4    TYMPANOSTOMY TUBE PLACEMENT         OBJECTIVE:     Vital Signs Range (Last 24H):  Temp:  [36 °C (96.8 °F)-36.7 °C (98.1 °F)]   Pulse:  [65-92]   Resp:  [16-22]   BP: (147-165)/(65-92)   SpO2:  [91 %-98 %]       Significant Labs:  Lab Results   Component Value Date    WBC 13.22 (H) 07/04/2023    HGB 15.2 07/04/2023    HCT 45.2 07/04/2023     07/04/2023    CHOL 131 04/05/2023    TRIG 153 (H) 04/05/2023    HDL 33 (L) 04/05/2023    ALT 42 07/04/2023    AST 20 07/04/2023     07/04/2023    K 3.9 07/04/2023     07/04/2023    CREATININE 1.0 07/04/2023    BUN 21 (H) 07/04/2023    CO2 25 07/04/2023    TSH 1.657 04/05/2023    PSA 0.18 10/05/2022    INR 1.0  09/19/2021    HGBA1C 5.5 04/05/2023       Diagnostic Studies: No relevant studies.    EKG:   Results for orders placed or performed in visit on 06/09/23   IN OFFICE EKG 12-LEAD (to [place])    Collection Time: 06/09/23  2:12 PM    Narrative    Test Reason : R10.9,    Vent. Rate : 084 BPM     Atrial Rate : 084 BPM     P-R Int : 146 ms          QRS Dur : 090 ms      QT Int : 366 ms       P-R-T Axes : 058 025 035 degrees     QTc Int : 432 ms    Normal sinus rhythm  Normal ECG  When compared with ECG of 19-SEP-2021 12:00,  No significant change was found  Confirmed by Naseem Martinez MD ([id number]) on 6/9/2023 2:58:58 PM    Referred By: [email or web address]   SELF           Confirmed By:Naseem Martinez MD       ASSESSMENT/PLAN:                                                                                                                  07/05/2023  Hermelindo Garza III is a 43 y.o., male.      Pre-op Assessment    I have reviewed the Patient Summary Reports.     I have reviewed the Nursing Notes.    I have reviewed the Medications.     Review of Systems  Anesthesia Hx:  No problems with previous Anesthesia  History of prior surgery of interest to airway management or planning: Denies Family Hx of Anesthesia complications.   Denies Personal Hx of Anesthesia complications.   Hematology/Oncology:     Oncology Normal     Cardiovascular:   Denies Hypertension.   Denies Angina. ECG has been reviewed.    Pulmonary:   Asthma Denies Shortness of breath.  Denies Recent URI. Sleep Apnea    Renal/:   renal calculi    Hepatic/GI:   PUD, GERD Liver Disease, (SHEIKH)    Musculoskeletal:   Arthritis     Neurological:   Denies CVA. Denies Seizures.   Chronic Pain Syndrome (CBP)   Endocrine:   Diabetes    Psych:   Psychiatric History (ADHD)          Physical Exam  General: Well nourished, Cooperative, Alert and Oriented    Airway:  Mallampati: III   Mouth Opening: Normal  TM Distance: Normal  Tongue: Normal  Neck ROM: Normal  ROM    Dental:  Intact        Anesthesia Plan  Type of Anesthesia, risks & benefits discussed:    Anesthesia Type: Gen ETT  Intra-op Monitoring Plan: Standard ASA Monitors  Post Op Pain Control Plan: multimodal analgesia and IV/PO Opioids PRN  Induction:  IV  Airway Plan: Direct, Post-Induction  Informed Consent: Informed consent signed with the Patient and all parties understand the risks and agree with anesthesia plan.  All questions answered.   ASA Score: 3  Day of Surgery Review of History & Physical: H&P Update referred to the surgeon/provider.    Ready For Surgery From Anesthesia Perspective.     .

## 2023-07-05 NOTE — CONSULTS
[facility]  Urology  Consult Note    Patient Name: Hermelindo Garza III  MRN: [id number]  Admission Date: 7/4/2023  Hospital Length of Stay: 0   Code Status: Prior   Attending Provider: Luis Salas MD   Consulting Provider: Flaco Aguila MD  Primary Care Physician: Suzi Sanches MD  Principal Problem:<principal problem not specified>    Inpatient consult to Urology  Consult performed by: Flaco Aguila MD  Consult ordered by: Luis Salas MD  Reason for consult: Left UVJ stone          Subjective:     HPI:  Hermelindo Garza III is a 43 y.o. M with pmh DM2, HTN, LUBA, nephrolithiasis presents to the ED for the second time in 24 hours for persistent left abdominal pain, nausea and vomiting. Urology consulted for 6mm L UVJ stone and hydronephrosis.    Patient with 2 days hx of severe LLQ pain and vomitting. Seen in the ED this morning and discharged home on MTOP after pain was controlled in the ED. Once home patient unable to take any po meds and had worsening LLQ pain and persistent vomiting. He returned to the ED later that night where he was given morphine x2, toradol x2, 2L bolus, and flomax with no resolution of symptoms. Denies hematuria, dysuria, fever, chills.    On assessment AFVSS, Cr 1.0 from 0.8 earlier today, wbc 13.2 from 10.5. Urine nitrite negative, no bacteria. CT shows 6mm L UVJ stone, L hydroureteronephrosis to the level of the UVJ stone, no stones or hydro present on the right, multiple phleboliths. Stone seen on         Past Medical History:   Diagnosis Date    ADD (attention deficit disorder) 05/09/2012    Allergy     Anxiety 4/12/2023    Asthma 05/09/2012    Cervical disc disorder with radiculopathy, unspecified cervical region 09/14/2021    Cervical spondylosis with myelopathy 06/29/2022    Eczema     Esophageal ulcer     GERD (gastroesophageal reflux disease) 05/09/2012    Glaucoma     Kidney stones 05/2014    Lumbar disc disease 05/09/2012     Lumbar herniated disc 05/09/2012    Malignant melanoma of skin, unspecified 01/06/2022    in situ right mid back    Melanoma 01/2022    in situ R mid back     LUBA (obstructive sleep apnea)     Radiculopathy, lumbar region 09/14/2021    Renal calculi 05/09/2012    Type 2 diabetes mellitus without complication, without long-term current use of insulin 11/16/2020       Past Surgical History:   Procedure Laterality Date    APPENDECTOMY  2006    CARPAL TUNNEL RELEASE Right 09/15/2022    Procedure: RELEASE, CARPAL TUNNEL;  Surgeon: Christen Marshall MD;  Location: [facility] OR;  Service: Orthopedics;  Laterality: Right;    COLONOSCOPY  01/22/2019    internal hemorrhoids, o/w normal - repeat at age 52    EPIDURAL STEROID INJECTION N/A 02/10/2021    Procedure: CERVICAL C6/7 NELI DIRECT REFERRAL;  Surgeon: Michi Escamilla MD;  [facility] PAIN MGT;  Service: Pain Management;  Laterality: N/A;  NEEDS CONSENT    EPIDURAL STEROID INJECTION INTO CERVICAL SPINE N/A 6/28/2023    Procedure: Injection-steroid-epidural-cervical C7-T1;  Surgeon: Lorraine Patel DO;  Location: [facility] PAIN MANAGEMENT;  Service: Pain Management;  Laterality: N/A;  diabetic    ESOPHAGOGASTRODUODENOSCOPY  01/22/2019    LA grade B esophagitis; esophageal stenosis- dilated; gastritis; H pylori pathology negative    ESOPHAGOGASTRODUODENOSCOPY N/A 05/18/2021    Procedure: EGD (ESOPHAGOGASTRODUODENOSCOPY);  Surgeon: Mariana Hector MD;  Location: [facility];  Service: Endoscopy;  Laterality: N/A;    EXCISION OF GANGLION OF WRIST Right 09/15/2022    Procedure: EXCISION, GANGLION CYST, WRIST;  Surgeon: Christen Marshall MD;  Location: [facility] OR;  Service: Orthopedics;  Laterality: Right;    FLEXOR TENDON REPAIR Right 09/15/2022    Procedure: FLEXOR TENOSYNOVECTOMY;  Surgeon: Christen Marshall MD;  Location: [facility];  Service: Orthopedics;  Laterality: Right;    HAND ARTHROTOMY Right 09/15/2022    Procedure: ARTHROTOMY, HAND RADIOCARPAL;  Surgeon:  Christen Marshall MD;  Location: [facility] OR;  Service: Orthopedics;  Laterality: Right;  Radiocarpal    INJECTION OF STEROID Left 09/15/2022    Procedure: INJECTION, STEROID LEFT WRIST AND LEFT LATERAL ELBOW;  Surgeon: Christen Marshall MD;  Location: [facility] OR;  Service: Orthopedics;  Laterality: Left;  Left Carpal Tunnel CSI    LUMBAR LAMINECTOMY      LUMBAR LAMINECTOMY WITH DISCECTOMY Left 2021    Procedure: LAMINECTOMY, SPINE, LUMBAR, WITH DISCECTOMY;  Surgeon: Naseem Mcnamara DO;  Location: [address];  Service: Neurosurgery;  Laterality: Left;  MIS L3-4    TYMPANOSTOMY TUBE PLACEMENT         Review of patient's allergies indicates:  No Known Allergies    Family History       Problem Relation (Age of Onset)    Allergic rhinitis Mother, Father, Sister, Sister    Asthma Father, Sister, Sister    Brain cancer Maternal Grandmother    Breast cancer Paternal Grandmother    Chronic back pain Father    Diabetes Maternal Grandfather    MARTITA disease Father    Hypertension Mother    Melanoma Father    No Known Problems Brother, Maternal Aunt, Maternal Uncle, Paternal Aunt, Paternal Uncle, Paternal Grandfather    Sleep apnea Mother, Father    Transient ischemic attack Mother            Tobacco Use    Smoking status: Former     Types: Cigarettes     Quit date: 2018     Years since quittin.2     Passive exposure: Never    Smokeless tobacco: Never   Substance and Sexual Activity    Alcohol use: Yes     Alcohol/week: 0.0 standard drinks     Comment: Occasional    Drug use: No    Sexual activity: Yes       Review of Systems   Gastrointestinal:  Positive for nausea and vomiting.   Genitourinary:  Negative for hematuria.     Objective:     Temp:  [97.4 °F (36.3 °C)-98.1 °F (36.7 °C)] 97.9 °F (36.6 °C)  Pulse:  [71-92] 71  Resp:  [16-22] 18  SpO2:  [96 %-98 %] 97 %  BP: (150-160)/(65-92) 155/81  Weight: 108 kg (238 lb)  Body mass index is 39.61 kg/m².           Drains       None                    Physical  Exam  Constitutional:       Appearance: Normal appearance. He is obese.   HENT:      Head: Normocephalic.   Eyes:      Pupils: Pupils are equal, round, and reactive to light.   Cardiovascular:      Rate and Rhythm: Normal rate.   Pulmonary:      Effort: Pulmonary effort is normal.   Chest:      Chest wall: No tenderness.   Abdominal:      General: Abdomen is flat. There is no distension.      Palpations: Abdomen is soft.      Tenderness: There is abdominal tenderness (LLQ). There is left CVA tenderness. There is no right CVA tenderness.   Musculoskeletal:         General: Normal range of motion.      Cervical back: Normal range of motion.   Skin:     General: Skin is warm.   Neurological:      General: No focal deficit present.      Mental Status: He is alert and oriented to person, place, and time.   Psychiatric:         Mood and Affect: Mood normal.         Behavior: Behavior normal.        Significant Labs:    BMP:  Recent Labs   Lab 07/04/23  1141 07/04/23  2317    141   K 4.4 3.9    104   CO2 25 25   BUN 21* 21*   CREATININE 0.8 1.0   CALCIUM 9.6 9.4       CBC:  Recent Labs   Lab 07/04/23  1141 07/04/23  2217 07/04/23  2317   WBC 10.45  --  13.22*   HGB 15.9  --  15.2   HCT 47.7 45 45.2     --  192       All pertinent labs results from the past 24 hours have been reviewed.    Significant Imaging:  All pertinent imaging results/findings from the past 24 hours have been reviewed.                      Assessment and Plan:     Ureteral stone with hydronephrosis  -Admit to Urology under obs  -Plan for left ureteral stent 7/5/23  -NPO  -pain meds  -antiemetics  -flomax  -strain all urine  -please call Urology if pt becomes unstable or develops a temperature        VTE Risk Mitigation (From admission, onward)    None          Thank you for your consult.     Flaco Aguila MD  Urology  [facility] Dept

## 2023-07-05 NOTE — NURSING
"Paged resident on call for urology reference patient stated, " I need my Lamotrigine (home med) ordered." Urology notified   "

## 2023-07-05 NOTE — PLAN OF CARE
[facility] - [facility]  Initial Discharge Assessment       Primary Care Provider: Suzi Sanches MD    Admission Diagnosis: Ureteral stone with hydronephrosis [N13.2]    Admission Date: 7/4/2023  Expected Discharge Date: 7/6/2023    Transition of Care Barriers: None    Payor: [employer] / Plan: [facility] CHOICE PLUS / Product Type: Commercial /     Extended Emergency Contact Information  Primary Emergency Contact: Christopher Denson  Address: [address] [place] [address] [place]  Mobile Phone: [phone]  Relation: Spouse  Secondary Emergency Contact: Ivelisse Garza  Address: [address], [place] [id number] [place] [address]  Mobile Phone: [phone]  Relation: Mother    Discharge Plan A: Home with family  Discharge Plan B: Home    No Pharmacies Listed    Initial Assessment (most recent)       Adult Discharge Assessment - 07/05/23 1022          Discharge Assessment    Assessment Type Discharge Planning Assessment     Confirmed/corrected address, phone number and insurance Yes     Confirmed Demographics Correct on Facesheet     Source of Information patient     Reason For Admission ureteral stone with hydronephrosis     People in Home spouse     Facility Arrived From: home     Do you expect to return to your current living situation? Yes     Do you have help at home or someone to help you manage your care at home? Yes     Who are your caregiver(s) and their phone number(s)? spouse Christopher Denson [phone]     Prior to hospitilization cognitive status: Alert/Oriented     Current cognitive status: Alert/Oriented     Home Layout Able to live on 1st floor     Equipment Currently Used at Home none     Who is going to help you get home at discharge? family     How do you get to doctors appointments? family or friend will provide     Are you on dialysis? No     Do you take coumadin? No     Discharge Plan A Home with family     Discharge Plan B Home      DME Needed Upon Discharge  other (see comments)   TBD    Transition of Care Barriers None        Financial Resource Strain    How hard is it for you to pay for the very basics like food, housing, medical care, and heating? Not very hard        Housing Stability    In the last 12 months, was there a time when you were not able to pay the mortgage or rent on time? No     In the last 12 months, was there a time when you did not have a steady place to sleep or slept in a shelter (including now)? No        Transportation Needs    In the past 12 months, has lack of transportation kept you from medical appointments or from getting medications? No     In the past 12 months, has lack of transportation kept you from meetings, work, or from getting things needed for daily living? No        Food Insecurity    Within the past 12 months, you worried that your food would run out before you got the money to buy more. Never true     Within the past 12 months, the food you bought just didn't last and you didn't have money to get more. Never true        Social Connections    In a typical week, how many times do you talk on the phone with family, friends, or neighbors? More than three times a week     How often do you get together with friends or relatives? More than three times a week     Are you , , , , never , or living with a partner?                    Pt lives in a 1 story home with his spouse.  Currently uses a CPAP as DME, no HD/BT, no home O2.  Family will provide transportation home at time of d/c.    Karen Raya RN CM  Case Management  [id number]

## 2023-07-05 NOTE — OP NOTE
[facility]  Operative Note    Date: 07/05/2023    Pre-Op Diagnosis: left UVJ stone    Patient Active Problem List   Diagnosis    Gastro-esophageal reflux disease without esophagitis    Renal calculi    Mild intermittent asthma without complication    ADHD (attention deficit hyperactivity disorder), inattentive type    LUBA (obstructive sleep apnea)    Hyperlipidemia, unspecified    Hypertriglyceridemia    Elevated liver enzymes    SHEIKH (nonalcoholic steatohepatitis)    Hepatosplenomegaly    Cellulitis of left arm    History of multiple allergies    Unspecified asthma, uncomplicated    Obesity, unspecified    Type 2 diabetes mellitus without complications    Vitamin D insufficiency    Restless leg    Chronic pain    Pain in left elbow    Cervical disc disorder with radiculopathy, unspecified cervical region    History of urinary stone    Lumbar radiculopathy, chronic    Tachycardia    S/P lumbar discectomy    Morbid obesity    Cervical spondylosis with myelopathy    History of melanoma in situ    Anxiety    Mood Disorder Unspecifed:  Depressed THO also has some mood elevation  (8of 13 on MDQ): ex: irritability, more self confident, thoughts race. more active)     Rotator cuff tendonitis, left    Left lateral epicondylitis    Cervical radiculopathy    Insomnia    Ureteral stone with hydronephrosis       Post-Op Diagnosis: same    Procedure(s) Performed:   1.  Left ureteroscopy  2.  Cystoscopy  3.  Stent placement with trings  4.  Fluoro < 1 h    Specimen(s): ureteral stone    Staff Surgeon: Chuckie Hall MD  Residents: Sergey Anderson MD    Assistant Surgeon: ИВАН NEWMAN MD    Anesthesia: General endotracheal anesthesia    Indications: Hermelindo Garza III is a 43 y.o. male with a left ureteral stone, presenting for definitive stone management.  He currently does not have a JJ ureteral stent in place.      Findings: left UVJ stone lased and basket extracted  Left ureteral stent with strings    1  baskets were used throughout the case.      Estimated Blood Loss: min    Drains: 6 Fr x 24 cm JJ ureteral stent with strings    Procedure in detail:  After informed consent was obtained, the patient was brought the the cystoscopy suite and placed in the supine position.  SCDs were applied and working.  Anesthesia was administered.  The patient was then placed in the dorsal lithotomy position and prepped and draped in the usual sterile fashion.      A rigid cystoscope in a 22 Fr sheath was introduced into the patient's urethra.  This passed easily.  The entire urethra was visualized which showed no strictures or masses.  Formal cystoscopy was performed which revealed no masses or lesions suspicious for malignancy, no bladder stones, no bladder diverticuli, no trabeculations.  The ureteral orifices were visualized in the normal anatomic position bilaterally and efflux was visualized.      A motion wire was passed up the left ureteral orifice and up into the kidney.  This passed easily and placement was confirmed using fluoro.  The cystoscope was removed keeping the guidewire in place and the wire was secured to the drape.      An 8 Fr rigid ureteroscope was passed into the patient's bladder alongside the wire under direct vision.  It was then passed through the left ureteral orifice alongside the wire.  A stone was encountered at the level of UVJ.  A 272 micron laser fiber was passed through the ureteroscope.  The stone was fragmented using the laser.  The laser fiber was removed and a Nitinol tipless basket was introduced through the ureteroscope.  Stone fragments were removed and placed in the bladder.  The ureteroscope was removed keeping the wire in place.  A cystoscope was reinserted and the bladder was irrigated to remove the stone fragments.  The bladder was drained the cystoscope removed keeping the wire in place.      A 6 Fr x 24 cm JJ ureteral stent with strings was passed over the wire and up into the renal  pelvis using fluoro.  When the coil appeared to be in good position in the kidney and the radio-opaque marker of the pusher was at the inferior pubis, the wire was removed under continuous fluoro.  Good coils were seen in the kidney and the bladder using fluoro.      The patient tolerated the procedure well and was transferred to the recovery room in stable condition.      Disposition:  The patient will follow up with [facility] in 3 months with a renal u/s. Instructed to remove stent with strings on Monday, 7/10/23.

## 2023-07-05 NOTE — TRANSFER OF CARE
"Anesthesia Transfer of Care Note    Patient: Hermelindo Garza III    Procedure(s) Performed: Procedure(s) (LRB):  CYSTOSCOPY  INSERTION, STENT, URETER (Left)  URETEROSCOPY (Left)  LITHOTRIPSY, USING LASER (Left)  REMOVAL, CALCULUS, URETER, URETEROSCOPIC (Left)    Patient location: PACU    Anesthesia Type: general    Transport from OR: Transported from OR on 6-10 L/min O2 by face mask with adequate spontaneous ventilation    Post pain: adequate analgesia    Post assessment: no apparent anesthetic complications    Post vital signs: stable    Level of consciousness: awake and alert    Nausea/Vomiting: no nausea/vomiting    Complications: none    Transfer of care protocol was followed      Last vitals:   Visit Vitals  BP (!) 127/70 (BP Location: Right arm, Patient Position: Lying)   Pulse 103   Temp 36.6 °C (97.9 °F) (Oral)   Resp 18   Ht 5' 5" (1.651 m)   Wt 113 kg (249 lb 1.9 oz)   SpO2 (!) 94%   BMI 41.46 kg/m²     "

## 2023-07-05 NOTE — ANESTHESIA POSTPROCEDURE EVALUATION
Anesthesia Post Evaluation    Patient: Hermelindo Garza III    Procedure(s) Performed: Procedure(s) (LRB):  CYSTOSCOPY  INSERTION, STENT, URETER (Left)  URETEROSCOPY (Left)  LITHOTRIPSY, USING LASER (Left)  REMOVAL, CALCULUS, URETER, URETEROSCOPIC (Left)    Final Anesthesia Type: general      Patient location during evaluation: PACU  Patient participation: Yes- Able to Participate  Level of consciousness: awake and alert, awake and oriented  Post-procedure vital signs: reviewed and stable  Pain management: adequate  Airway patency: patent    PONV status at discharge: No PONV  Anesthetic complications: no      Cardiovascular status: blood pressure returned to baseline, stable and hemodynamically stable  Respiratory status: unassisted, spontaneous ventilation and room air  Hydration status: euvolemic  Follow-up not needed.          Vitals Value Taken Time   /70 07/05/23 1220   Temp 36.8 07/05/23 1449   Pulse 75 07/05/23 1246   Resp 61 07/05/23 1220   SpO2 98 % 07/05/23 1246   Vitals shown include unvalidated device data.      No case tracking events are documented in the log.      Pain/Shani Score: Pain Rating Prior to Med Admin: 4 (7/5/2023  7:22 AM)  Pain Rating Post Med Admin: 8 (7/5/2023  1:17 AM)

## 2023-07-05 NOTE — ASSESSMENT & PLAN NOTE
-Admit to Urology under obs  -Plan for left ureteral stent today  -NPO  -pain meds  -antiemetics  -flomax  -strain all urine  -please call Urology if pt becomes unstable or develops a temperature

## 2023-07-05 NOTE — PLAN OF CARE
Pt discharged to home via wheelchair. Pt given bedside medication  and copy of discharged papers instructions. Pt denies pain at this time. Pt educated on signs and symptoms of when to call the doctor. All belongings with the patient . Pt verbalized understanding.

## 2023-07-05 NOTE — NURSING
Pagecrys Hall's team, upon return to floor, patient is tearful, anxious, states he does not know what happened to him, what type of procedure he had, that no one has spoken to him, tried to review discharge medication and paperwork, patient was too tearful and this nurse notified him I would contact urology.

## 2023-07-05 NOTE — TELEPHONE ENCOUNTER
Pt family called in on behalf of pt. States pt was seen in ED today and diagnosed with kidney stone. Family states pt was told he would pass at home and has pain meds and antiemetics and flomax. Pt is still having severe pain, vomiting and not urinating much. Care advice per protocol. Advised ED now and to call back with concerns or worsening symptoms. Family verbalized understanding.     Reason for Disposition   [1] Unable to urinate (or only a few drops) > 4 hours AND [2] bladder feels very full (e.g., palpable bladder or strong urge to urinate)    Additional Information   Negative: Shock suspected (e.g., cold/pale/clammy skin, too weak to stand, low BP, rapid pulse)   Negative: Sounds like a life-threatening emergency to the triager    Protocols used: Kidney Stone Follow-up Call-A-

## 2023-07-05 NOTE — NURSING
Discharge instructions reviewed with patient and partner, including signs and symptoms to call physician or seek emergency care, medication direction reviewed, follow up appointment time frame , patient and partner verbalized understanding and hard copy of instructions given.

## 2023-07-05 NOTE — NURSING TRANSFER
Nursing Transfer Note      7/5/2023     Reason patient is being transferred:discharged from  post op    Transfer From:[facility] #21 to 540    Transfer via stretcherstretcher    Transfer with discharge meds    Transported by escort    Telemetry: no    Medicines sent yes    Any special needs or follow-up needed: discharge orders in Epic. Pt should pull stent on Mon    Chart send with patient: Yes    Notified POSS RN    Patient reassessed at:arrival to 540  1  Upon arrival to floor:

## 2023-07-05 NOTE — ED PROVIDER NOTES
Encounter Date: 7/4/2023       History     Chief Complaint   Patient presents with    Emesis     Seen earlier today for L flank pain and dx with L kidney stone. D/c home with Zofran and pain meds. States has not been able to take meds because vomiting everything up. Currently rates pain 10/10     43-year-old gentleman with past medical history of anxiety, kidney stones presents for evaluation of severe pain and vomiting.  He was seen earlier in the emergency department and diagnosed with a 6 mm kidney stone.  Patient was discharged with medications which he states that he was able to  from the pharmacy.  He states that he is tried taking the medications, but he has been having persistent vomiting and has not been able to tolerate the medicines.  He returns with severe pain.  He is not had any decreased urine output    The history is provided by the patient. The history is limited by the condition of the patient.   Review of patient's allergies indicates:  No Known Allergies  Past Medical History:   Diagnosis Date    ADD (attention deficit disorder) 05/09/2012    Allergy     Anxiety 4/12/2023    Asthma 05/09/2012    Cervical disc disorder with radiculopathy, unspecified cervical region 09/14/2021    Cervical spondylosis with myelopathy 06/29/2022    Eczema     Esophageal ulcer     GERD (gastroesophageal reflux disease) 05/09/2012    Glaucoma     Kidney stones 05/2014    Lumbar disc disease 05/09/2012    Lumbar herniated disc 05/09/2012    Malignant melanoma of skin, unspecified 01/06/2022    in situ right mid back    Melanoma 01/2022    in situ R mid back     LUBA (obstructive sleep apnea)     Radiculopathy, lumbar region 09/14/2021    Renal calculi 05/09/2012    Type 2 diabetes mellitus without complication, without long-term current use of insulin 11/16/2020     Past Surgical History:   Procedure Laterality Date    APPENDECTOMY  2006    CARPAL TUNNEL RELEASE Right 09/15/2022    Procedure: RELEASE, CARPAL  TUNNEL;  Surgeon: Christen Marshall MD;  Location: [facility] OR;  Service: Orthopedics;  Laterality: Right;    COLONOSCOPY  01/22/2019    internal hemorrhoids, o/w normal - repeat at age 52    EPIDURAL STEROID INJECTION N/A 02/10/2021    Procedure: CERVICAL C6/7 NELI DIRECT REFERRAL;  Surgeon: Michi Escamilla MD;  Location: [facility] PAIN MGT;  Service: Pain Management;  Laterality: N/A;  NEEDS CONSENT    EPIDURAL STEROID INJECTION INTO CERVICAL SPINE N/A 6/28/2023    Procedure: Injection-steroid-epidural-cervical C7-T1;  Surgeon: Lorraine Patel DO;  Location: [facility] PAIN MANAGEMENT;  Service: Pain Management;  Laterality: N/A;  diabetic    ESOPHAGOGASTRODUODENOSCOPY  01/22/2019    LA grade B esophagitis; esophageal stenosis- dilated; gastritis; H pylori pathology negative    ESOPHAGOGASTRODUODENOSCOPY N/A 05/18/2021    Procedure: EGD (ESOPHAGOGASTRODUODENOSCOPY);  Surgeon: Mariana Hector MD;  Location: [facility];  Service: Endoscopy;  Laterality: N/A;    EXCISION OF GANGLION OF WRIST Right 09/15/2022    Procedure: EXCISION, GANGLION CYST, WRIST;  Surgeon: Christen Marshall MD;  Location: [facility] OR;  Service: Orthopedics;  Laterality: Right;    FLEXOR TENDON REPAIR Right 09/15/2022    Procedure: FLEXOR TENOSYNOVECTOMY;  Surgeon: Christen Marshall MD;  Location: [facility] OR;  Service: Orthopedics;  Laterality: Right;    HAND ARTHROTOMY Right 09/15/2022    Procedure: ARTHROTOMY, HAND RADIOCARPAL;  Surgeon: Christen Marshall MD;  Location: [facility] OR;  Service: Orthopedics;  Laterality: Right;  Radiocarpal    INJECTION OF STEROID Left 09/15/2022    Procedure: INJECTION, STEROID LEFT WRIST AND LEFT LATERAL ELBOW;  Surgeon: Christen Marshall MD;  Location: [facility] OR;  Service: Orthopedics;  Laterality: Left;  Left Carpal Tunnel CSI    LUMBAR LAMINECTOMY  2010    LUMBAR LAMINECTOMY WITH DISCECTOMY Left 09/20/2021    Procedure: LAMINECTOMY, SPINE, LUMBAR, WITH DISCECTOMY;  Surgeon: Naseem Mcnamara DO;  [facility];  Service:  Neurosurgery;  Laterality: Left;  MIS L3-4    TYMPANOSTOMY TUBE PLACEMENT       Family History   Problem Relation Age of Onset    Allergic rhinitis Mother     Hypertension Mother     Transient ischemic attack Mother     Sleep apnea Mother     Allergic rhinitis Father     Asthma Father     Chronic back pain Father     MARTITA disease Father     Sleep apnea Father     Melanoma Father     Allergic rhinitis Sister     Asthma Sister     Allergic rhinitis Sister     Asthma Sister     No Known Problems Brother     No Known Problems Maternal Aunt     No Known Problems Maternal Uncle     No Known Problems Paternal Aunt     No Known Problems Paternal Uncle     Brain cancer Maternal Grandmother     Diabetes Maternal Grandfather     Breast cancer Paternal Grandmother     No Known Problems Paternal Grandfather     Amblyopia Neg Hx     Blindness Neg Hx     Cancer Neg Hx     Cataracts Neg Hx     Glaucoma Neg Hx     Macular degeneration Neg Hx     Retinal detachment Neg Hx     Strabismus Neg Hx     Stroke Neg Hx     Thyroid disease Neg Hx     Allergies Neg Hx     Angioedema Neg Hx      Social History     Tobacco Use    Smoking status: Former     Types: Cigarettes     Quit date: 2018     Years since quittin.2     Passive exposure: Never    Smokeless tobacco: Never   Substance Use Topics    Alcohol use: Yes     Alcohol/week: 0.0 standard drinks     Comment: Occasional    Drug use: No     Review of Systems    Physical Exam     Initial Vitals [23]   BP Pulse Resp Temp SpO2   (!) 151/89 88 (!) 22 97.4 °F (36.3 °C) 97 %      MAP       --         Physical Exam    Nursing note and vitals reviewed.  Constitutional: He appears well-developed and well-nourished. He appears distressed.   HENT:   Head: Normocephalic.   Eyes: Conjunctivae are normal.   Neck: Trachea normal.   Cardiovascular:  Normal rate, regular rhythm, intact distal pulses and normal pulses.           Pulmonary/Chest: Breath sounds normal. No tachypnea. No  respiratory distress.   Abdominal: Abdomen is soft. There is no abdominal tenderness.   Flank pain     Neurological: He is alert and oriented to person, place, and time.   Skin: Skin is warm.       ED Course   Procedures  Labs Reviewed   CBC W/ AUTO DIFFERENTIAL - Abnormal; Notable for the following components:       Result Value    WBC 13.22 (*)     Gran # (ANC) 10.5 (*)     Gran % 79.2 (*)     Lymph % 12.0 (*)     All other components within normal limits   BASIC METABOLIC PANEL - Abnormal; Notable for the following components:    Glucose 127 (*)     BUN 21 (*)     All other components within normal limits   ISTAT PROCEDURE - Abnormal; Notable for the following components:    POC Glucose 137 (*)     All other components within normal limits   ISTAT CHEM8          Imaging Results    None          Medications   ondansetron injection 4 mg (4 mg Intravenous Given 7/5/23 0917)   acetaminophen tablet 650 mg (has no administration in time range)   oxyCODONE immediate release tablet Tab 10 mg (10 mg Oral Given 7/5/23 0722)   0.9%  NaCl infusion ( Intravenous New Bag 7/5/23 0725)   tamsulosin 24 hr capsule 0.4 mg (0.4 mg Oral Given 7/5/23 0930)   ketorolac injection 30 mg (30 mg Intravenous Given by Other 7/5/23 0256)   prochlorperazine injection Soln 5 mg (has no administration in time range)   zolpidem tablet 10 mg (10 mg Oral Given 7/5/23 0247)   EScitalopram oxalate tablet 5 mg (5 mg Oral Given 7/5/23 0930)   pantoprazole EC tablet 40 mg (40 mg Oral Given 7/5/23 0930)   ondansetron injection 4 mg (4 mg Intravenous Given 7/4/23 2216)   ketorolac injection 15 mg (15 mg Intravenous Given 7/4/23 2219)   tamsulosin 24 hr capsule 0.4 mg (0.4 mg Oral Given 7/4/23 2243)   sodium chloride 0.9% bolus 1,000 mL 1,000 mL (0 mLs Intravenous Stopped 7/5/23 0027)   sodium chloride 0.9% bolus 1,000 mL 1,000 mL (0 mLs Intravenous Stopped 7/4/23 2332)   morphine injection 6 mg (6 mg Intravenous Given 7/4/23 2248)   promethazine tablet 25  mg (25 mg Oral Given 7/4/23 2315)   morphine injection 6 mg (6 mg Intravenous Given 7/5/23 0005)     Medical Decision Making:   Initial Assessment:   Emergent evaluation of vomiting and flank pain  Differential Diagnosis:   Obstructing renal stone, renal failure, dehydration  ED Management:  I reviewed the emergency department workup from earlier today and noted a 6 mm UVJ stone.  All renal function at that time.  Patient discharged on narcotic, Toradol, Zofran and Flomax.  He is having persistent vomiting.  Will attempt pain and nausea control.  Will discuss with urology.           ED Course as of 07/05/23 0944   Tue Jul 04, 2023 2156 Talked with Urology.  At this time there is no indication for emergent intervention.  They recommend IV fluids, Flomax, Toradol and Zofran. [HS]   2230 Renal function noted creatinine now 1.1 up from 0.8 earlier today [HS]   2250 Pain persistent despite Toradol.  Morphine given [HS]   2315 Nausea persistent despite Zofran.  Phenergan given [HS]   2334 Given his persistent symptoms, will recheck blood work to see if patient has a new leukocytosis or worsened renal function [HS]   2359 Patient says he is having   persistent pain  [HS]   Wed Jul 05, 2023   0018 WBC(!): 13.22  Increased from prior [HS]   0029 Creatinine: 1.0 [HS]   0029 Discussed with urology. Will come evaluate the patient  [HS]      ED Course User Index  [HS] Luis Salas MD                 Clinical Impression:   Final diagnoses:  [R11.2] Nausea and vomiting, unspecified vomiting type  [N23] Renal colic  [N20.0] Renal stone        ED Disposition Condition    Observation                 Luis Salas MD  07/05/23 7857

## 2023-07-05 NOTE — ASSESSMENT & PLAN NOTE
-Admit to Urology under obs  -Plan for left ureteral stent 7/5/23  -NPO  -pain meds  -antiemetics  -flomax  -strain all urine  -please call Urology if pt becomes unstable or develops a temperature

## 2023-07-05 NOTE — PATIENT INSTRUCTIONS
Remove stent by pulling strings on Monday morning, 7/10.     What to Expect After a Cystoscopy  For the next 24-48 hours, you may feel a mild burning when you urinate. This burning is normal and expected. Drink plenty of water to dilute the urine to help relieve the burning sensation. You may also see a small amount of blood in your urine after the procedure.    Unless you are already taking antibiotics, you may be given an antibiotic after the test to prevent infection.    Signs and Symptoms to Report  Call the [facility] at [phone] if you develop any of the following:  Fever of 101 degrees or higher  Chills or persistent bleeding  Inability to urinate

## 2023-07-05 NOTE — BRIEF OP NOTE
[facility] - [facility]  Brief Operative Note    Surgery Date: 7/5/2023     Surgeon(s) and Role:     * Chuckie Hall MD - Primary     * Khoa Anderson MD - Resident - Assisting     * Calvin Quigley MD - Resident - Assisting        Pre-op Diagnosis:  Ureteral stone with hydronephrosis [N13.2]    Patient Active Problem List   Diagnosis    Gastro-esophageal reflux disease without esophagitis    Renal calculi    Mild intermittent asthma without complication    ADHD (attention deficit hyperactivity disorder), inattentive type    LUBA (obstructive sleep apnea)    Hyperlipidemia, unspecified    Hypertriglyceridemia    Elevated liver enzymes    SHEIKH (nonalcoholic steatohepatitis)    Hepatosplenomegaly    Cellulitis of left arm    History of multiple allergies    Unspecified asthma, uncomplicated    Obesity, unspecified    Type 2 diabetes mellitus without complications    Vitamin D insufficiency    Restless leg    Chronic pain    Pain in left elbow    Cervical disc disorder with radiculopathy, unspecified cervical region    History of urinary stone    Lumbar radiculopathy, chronic    Tachycardia    S/P lumbar discectomy    Morbid obesity    Cervical spondylosis with myelopathy    History of melanoma in situ    Anxiety    Mood Disorder Unspecifed:  Depressed THO also has some mood elevation  (8of 13 on MDQ): ex: irritability, more self confident, thoughts race. more active)     Rotator cuff tendonitis, left    Left lateral epicondylitis    Cervical radiculopathy    Insomnia    Ureteral stone with hydronephrosis         Post-op Diagnosis:  Post-Op Diagnosis Codes:     * Ureteral stone with hydronephrosis [N13.2]    Procedure(s) (LRB):  CYSTOSCOPY  INSERTION, STENT, URETER (Left)  URETEROSCOPY (Left)  LITHOTRIPSY, USING LASER (Left)  REMOVAL, CALCULUS, URETER, URETEROSCOPIC (Left)    Anesthesia: General    Operative Findings: Left UVJ stone treated via laser lithotripsy and basket extraction.   Stent placement with  strings    Estimated Blood Loss: * No values recorded between 7/5/2023 11:04 AM and 7/5/2023 11:43 AM *         Specimens:   Specimen (24h ago, onward)      None              Discharge Note    OUTCOME: Patient tolerated treatment/procedure well without complication and is now ready for discharge.    DISPOSITION: Home or Self Care    FINAL DIAGNOSIS:  Ureteral stone with hydronephrosis    FOLLOWUP: In clinic    DISCHARGE INSTRUCTIONS:    Discharge Procedure Orders   Diet Adult Regular     Lifting restrictions     Notify your health care provider if you experience any of the following:  temperature >100.4     Notify your health care provider if you experience any of the following:  persistent nausea and vomiting or diarrhea     Notify your health care provider if you experience any of the following:  severe uncontrolled pain     Notify your health care provider if you experience any of the following:  redness, tenderness, or signs of infection (pain, swelling, redness, odor or green/yellow discharge around incision site)     Notify your health care provider if you experience any of the following:  difficulty breathing or increased cough     Notify your health care provider if you experience any of the following:  severe persistent headache     Notify your health care provider if you experience any of the following:  worsening rash     Notify your health care provider if you experience any of the following:  persistent dizziness, light-headedness, or visual disturbances     Notify your health care provider if you experience any of the following:  increased confusion or weakness     Notify your health care provider if you experience any of the following:     Activity as tolerated

## 2023-07-05 NOTE — SUBJECTIVE & OBJECTIVE
Past Medical History:   Diagnosis Date    ADD (attention deficit disorder) 05/09/2012    Allergy     Anxiety 4/12/2023    Asthma 05/09/2012    Cervical disc disorder with radiculopathy, unspecified cervical region 09/14/2021    Cervical spondylosis with myelopathy 06/29/2022    Eczema     Esophageal ulcer     GERD (gastroesophageal reflux disease) 05/09/2012    Glaucoma     Kidney stones 05/2014    Lumbar disc disease 05/09/2012    Lumbar herniated disc 05/09/2012    Malignant melanoma of skin, unspecified 01/06/2022    in situ right mid back    Melanoma 01/2022    in situ R mid back     LUBA (obstructive sleep apnea)     Radiculopathy, lumbar region 09/14/2021    Renal calculi 05/09/2012    Type 2 diabetes mellitus without complication, without long-term current use of insulin 11/16/2020       Past Surgical History:   Procedure Laterality Date    APPENDECTOMY  2006    CARPAL TUNNEL RELEASE Right 09/15/2022    Procedure: RELEASE, CARPAL TUNNEL;  Surgeon: Christen Marshall MD;  Location: [facility];  Service: Orthopedics;  Laterality: Right;    COLONOSCOPY  01/22/2019    internal hemorrhoids, o/w normal - repeat at age 52    EPIDURAL STEROID INJECTION N/A 02/10/2021    Procedure: CERVICAL C6/7 NELI DIRECT REFERRAL;  Surgeon: Michi Escamilla MD;  Location: [facility];  Service: Pain Management;  Laterality: N/A;  NEEDS CONSENT    EPIDURAL STEROID INJECTION INTO CERVICAL SPINE N/A 6/28/2023    Procedure: Injection-steroid-epidural-cervical C7-T1;  Surgeon: Lorraine Patel DO;  Location: [facility];  Service: Pain Management;  Laterality: N/A;  diabetic    ESOPHAGOGASTRODUODENOSCOPY  01/22/2019    LA grade B esophagitis; esophageal stenosis- dilated; gastritis; H pylori pathology negative    ESOPHAGOGASTRODUODENOSCOPY N/A 05/18/2021    Procedure: EGD (ESOPHAGOGASTRODUODENOSCOPY);  Surgeon: Mariana Hector MD;  Location: [facility];  Service: Endoscopy;  Laterality: N/A;    EXCISION OF GANGLION OF WRIST  Right 09/15/2022    Procedure: EXCISION, GANGLION CYST, WRIST;  Surgeon: Christen Marshall MD;  Location: [facility] OR;  Service: Orthopedics;  Laterality: Right;    FLEXOR TENDON REPAIR Right 09/15/2022    Procedure: FLEXOR TENOSYNOVECTOMY;  Surgeon: Christen Marshall MD;  Location: [facility] OR;  Service: Orthopedics;  Laterality: Right;    HAND ARTHROTOMY Right 09/15/2022    Procedure: ARTHROTOMY, HAND RADIOCARPAL;  Surgeon: Christen Marshall MD;  Location: [facility] OR;  Service: Orthopedics;  Laterality: Right;  Radiocarpal    INJECTION OF STEROID Left 09/15/2022    Procedure: INJECTION, STEROID LEFT WRIST AND LEFT LATERAL ELBOW;  Surgeon: Christen Marshall MD;  Location: [facility] OR;  Service: Orthopedics;  Laterality: Left;  Left Carpal Tunnel CSI    LUMBAR LAMINECTOMY      LUMBAR LAMINECTOMY WITH DISCECTOMY Left 2021    Procedure: LAMINECTOMY, SPINE, LUMBAR, WITH DISCECTOMY;  Surgeon: Naseem Mcnamara DO;  Location: [address];  Service: Neurosurgery;  Laterality: Left;  MIS L3-4    TYMPANOSTOMY TUBE PLACEMENT         Review of patient's allergies indicates:  No Known Allergies    Family History       Problem Relation (Age of Onset)    Allergic rhinitis Mother, Father, Sister, Sister    Asthma Father, Sister, Sister    Brain cancer Maternal Grandmother    Breast cancer Paternal Grandmother    Chronic back pain Father    Diabetes Maternal Grandfather    MARTITA disease Father    Hypertension Mother    Melanoma Father    No Known Problems Brother, Maternal Aunt, Maternal Uncle, Paternal Aunt, Paternal Uncle, Paternal Grandfather    Sleep apnea Mother, Father    Transient ischemic attack Mother            Tobacco Use    Smoking status: Former     Types: Cigarettes     Quit date: 2018     Years since quittin.2     Passive exposure: Never    Smokeless tobacco: Never   Substance and Sexual Activity    Alcohol use: Yes     Alcohol/week: 0.0 standard drinks     Comment: Occasional    Drug use: No    Sexual activity: Yes        Review of Systems   Gastrointestinal:  Positive for nausea and vomiting.   Genitourinary:  Negative for hematuria.     Objective:     Temp:  [97.4 °F (36.3 °C)-98.1 °F (36.7 °C)] 97.9 °F (36.6 °C)  Pulse:  [71-92] 71  Resp:  [16-22] 18  SpO2:  [96 %-98 %] 97 %  BP: (150-160)/(65-92) 155/81  Weight: 108 kg (238 lb)  Body mass index is 39.61 kg/m².           Drains       None                    Physical Exam  Constitutional:       Appearance: Normal appearance. He is obese.   HENT:      Head: Normocephalic.   Eyes:      Pupils: Pupils are equal, round, and reactive to light.   Cardiovascular:      Rate and Rhythm: Normal rate.   Pulmonary:      Effort: Pulmonary effort is normal.   Chest:      Chest wall: No tenderness.   Abdominal:      General: Abdomen is flat. There is no distension.      Palpations: Abdomen is soft.      Tenderness: There is abdominal tenderness (LLQ). There is left CVA tenderness. There is no right CVA tenderness.   Musculoskeletal:         General: Normal range of motion.      Cervical back: Normal range of motion.   Skin:     General: Skin is warm.   Neurological:      General: No focal deficit present.      Mental Status: He is alert and oriented to person, place, and time.   Psychiatric:         Mood and Affect: Mood normal.         Behavior: Behavior normal.        Significant Labs:    BMP:  Recent Labs   Lab 07/04/23  1141 07/04/23  2317    141   K 4.4 3.9    104   CO2 25 25   BUN 21* 21*   CREATININE 0.8 1.0   CALCIUM 9.6 9.4       CBC:  Recent Labs   Lab 07/04/23  1141 07/04/23  2217 07/04/23  2317   WBC 10.45  --  13.22*   HGB 15.9  --  15.2   HCT 47.7 45 45.2     --  192       All pertinent labs results from the past 24 hours have been reviewed.    Significant Imaging:  All pertinent imaging results/findings from the past 24 hours have been reviewed.

## 2023-07-05 NOTE — SUBJECTIVE & OBJECTIVE
Interval History: NAEO. AFVSS. Pain better controlled this am. Strained urine and didn't pass the stone. NPO. OR today    Review of Systems  Objective:     Temp:  [96.8 °F (36 °C)-98.1 °F (36.7 °C)] 97.2 °F (36.2 °C)  Pulse:  [65-92] 75  Resp:  [16-22] 20  SpO2:  [91 %-98 %] 94 %  BP: (135-165)/(65-92) 135/83     Body mass index is 41.46 kg/m².           Drains       None                    Physical Exam  Constitutional:       Appearance: Normal appearance. He is obese.   HENT:      Head: Normocephalic.   Eyes:      Pupils: Pupils are equal, round, and reactive to light.   Cardiovascular:      Rate and Rhythm: Normal rate.   Pulmonary:      Effort: Pulmonary effort is normal.   Chest:      Chest wall: No tenderness.   Abdominal:      General: Abdomen is flat. There is no distension.      Palpations: Abdomen is soft.      Tenderness: There is abdominal tenderness (LLQ (improved from yesterday)). There is no right CVA tenderness or left CVA tenderness.   Musculoskeletal:         General: Normal range of motion.      Cervical back: Normal range of motion.   Skin:     General: Skin is warm.   Neurological:      General: No focal deficit present.      Mental Status: He is alert and oriented to person, place, and time.   Psychiatric:         Mood and Affect: Mood normal.         Behavior: Behavior normal.         Significant Labs:    BMP:  Recent Labs   Lab 07/04/23  1141 07/04/23  2317    141   K 4.4 3.9    104   CO2 25 25   BUN 21* 21*   CREATININE 0.8 1.0   CALCIUM 9.6 9.4       CBC:   Recent Labs   Lab 07/04/23  1141 07/04/23  2217 07/04/23  2317   WBC 10.45  --  13.22*   HGB 15.9  --  15.2   HCT 47.7 45 45.2     --  192       All pertinent labs results from the past 24 hours have been reviewed.    Significant Imaging:  All pertinent imaging results/findings from the past 24 hours have been reviewed.

## 2023-07-05 NOTE — NURSING
Update: Patient arrived to the unit via wheelchair  with transport on room air .  VS and H2T exam completed and recorded in Epic. Admissions navigator completed. NAD noted. Fall/Safety precautions maintained. Call light and personal items within reach. Communication board updated. Pt is aware all urine  is to be strained

## 2023-07-05 NOTE — ED TRIAGE NOTES
Pt reports being dx with a kidney stone earlier today and d/c with meds around 15:00. Pt reports N/V since earlier today. Pt states he's unable to keep water down. Pt also reporting 10/10 left lower abdominal pain.

## 2023-07-06 ENCOUNTER — CLINICAL SUPPORT (OUTPATIENT)
Dept: ALLERGY | Facility: CLINIC | Age: 43
End: 2023-07-06
Payer: COMMERCIAL

## 2023-07-06 DIAGNOSIS — J30.9 CHRONIC ALLERGIC RHINITIS: Primary | ICD-10-CM

## 2023-07-06 PROCEDURE — 99999 PR PBB SHADOW E&M-EST. PATIENT-LVL I: ICD-10-PCS | Mod: PBBFAC,,,

## 2023-07-06 PROCEDURE — 95117 PR IMMU2THERAPY, 2+ INJECTIONS: ICD-10-PCS | Mod: S$GLB,,, | Performed by: STUDENT IN AN ORGANIZED HEALTH CARE EDUCATION/TRAINING PROGRAM

## 2023-07-06 PROCEDURE — 99999 PR PBB SHADOW E&M-EST. PATIENT-LVL I: CPT | Mod: PBBFAC,,,

## 2023-07-06 PROCEDURE — 95117 IMMUNOTHERAPY INJECTIONS: CPT | Mod: S$GLB,,, | Performed by: STUDENT IN AN ORGANIZED HEALTH CARE EDUCATION/TRAINING PROGRAM

## 2023-07-07 ENCOUNTER — OFFICE VISIT (OUTPATIENT)
Dept: UROLOGY | Facility: CLINIC | Age: 43
End: 2023-07-07
Payer: COMMERCIAL

## 2023-07-07 VITALS
HEIGHT: 65 IN | WEIGHT: 245.56 LBS | BODY MASS INDEX: 40.91 KG/M2 | SYSTOLIC BLOOD PRESSURE: 145 MMHG | HEART RATE: 85 BPM | DIASTOLIC BLOOD PRESSURE: 85 MMHG

## 2023-07-07 DIAGNOSIS — N20.1 CALCULUS OF URETER: Primary | ICD-10-CM

## 2023-07-07 PROCEDURE — 3079F DIAST BP 80-89 MM HG: CPT | Mod: CPTII,S$GLB,, | Performed by: UROLOGY

## 2023-07-07 PROCEDURE — 99214 OFFICE O/P EST MOD 30 MIN: CPT | Mod: S$GLB,,, | Performed by: UROLOGY

## 2023-07-07 PROCEDURE — 3008F PR BODY MASS INDEX (BMI) DOCUMENTED: ICD-10-PCS | Mod: CPTII,S$GLB,, | Performed by: UROLOGY

## 2023-07-07 PROCEDURE — 3072F LOW RISK FOR RETINOPATHY: CPT | Mod: CPTII,S$GLB,, | Performed by: UROLOGY

## 2023-07-07 PROCEDURE — 3077F PR MOST RECENT SYSTOLIC BLOOD PRESSURE >= 140 MM HG: ICD-10-PCS | Mod: CPTII,S$GLB,, | Performed by: UROLOGY

## 2023-07-07 PROCEDURE — 99999 PR PBB SHADOW E&M-EST. PATIENT-LVL V: ICD-10-PCS | Mod: PBBFAC,,, | Performed by: UROLOGY

## 2023-07-07 PROCEDURE — 3072F PR LOW RISK FOR RETINOPATHY: ICD-10-PCS | Mod: CPTII,S$GLB,, | Performed by: UROLOGY

## 2023-07-07 PROCEDURE — 1159F MED LIST DOCD IN RCRD: CPT | Mod: CPTII,S$GLB,, | Performed by: UROLOGY

## 2023-07-07 PROCEDURE — 3044F HG A1C LEVEL LT 7.0%: CPT | Mod: CPTII,S$GLB,, | Performed by: UROLOGY

## 2023-07-07 PROCEDURE — 3008F BODY MASS INDEX DOCD: CPT | Mod: CPTII,S$GLB,, | Performed by: UROLOGY

## 2023-07-07 PROCEDURE — 99214 PR OFFICE/OUTPT VISIT, EST, LEVL IV, 30-39 MIN: ICD-10-PCS | Mod: S$GLB,,, | Performed by: UROLOGY

## 2023-07-07 PROCEDURE — 1159F PR MEDICATION LIST DOCUMENTED IN MEDICAL RECORD: ICD-10-PCS | Mod: CPTII,S$GLB,, | Performed by: UROLOGY

## 2023-07-07 PROCEDURE — 3077F SYST BP >= 140 MM HG: CPT | Mod: CPTII,S$GLB,, | Performed by: UROLOGY

## 2023-07-07 PROCEDURE — 3044F PR MOST RECENT HEMOGLOBIN A1C LEVEL <7.0%: ICD-10-PCS | Mod: CPTII,S$GLB,, | Performed by: UROLOGY

## 2023-07-07 PROCEDURE — 3079F PR MOST RECENT DIASTOLIC BLOOD PRESSURE 80-89 MM HG: ICD-10-PCS | Mod: CPTII,S$GLB,, | Performed by: UROLOGY

## 2023-07-07 PROCEDURE — 99999 PR PBB SHADOW E&M-EST. PATIENT-LVL V: CPT | Mod: PBBFAC,,, | Performed by: UROLOGY

## 2023-07-07 NOTE — PROGRESS NOTES
[facility] U[facility]      Urology Urolithiasis Post-operative Note    7/7/2023    Referred by:  Diann Olvera PA-C    Procedure: Ureteroscopic stone extraction (left)  Time Since Procedure:  2 days    HPI: Hermelindo Garza III is a very pleasant 43 y.o. male following up for post-operative visit.  A ureteral stent was left in place, still in place, with externalized string . He reports doing well since surgery without new complaints.  Renal U/S scheduled .     A review of 10+ systems was conducted with pertinent positive and negative findings documented in HPI with all other systems reviewed and negative.    Past medical, family, surgical and social history reviewed as documented in chart with pertinent positive medical, family, surgical and social history detailed in HPI.    Exam Findings:    Const: no acute distress, conversant and alert  Eyes: anicteric, extraocular muscles intact  ENMT: normocephalic, Nl oral membranes  Cardio: no cyanosis, nl cap refill  Pulm: no tachypnea; no resp distress  Abd: soft, no tenderness  Musc: no laceration, no tenderness  Neuro: alert; oriented x 3  Skin: warm, dry; no petichiae  Psych: no anxiety; normal speech     Metabolic Evaluation:   I have made a recommendation to complete a metabolic evaluation including completion of 24-hour urine for metabolic stone risk. We will discuss the results of this on the next clinic visit.    Assessment/Plan:   Will have him complete metabolic workup on next visit.

## 2023-07-10 LAB
COMPN STONE: NORMAL
LABORATORY COMMENT REPORT: NORMAL
SPECIMEN SOURCE: NORMAL
STONE ANALYSIS IR-IMP: NORMAL

## 2023-07-12 ENCOUNTER — CLINICAL SUPPORT (OUTPATIENT)
Dept: ALLERGY | Facility: CLINIC | Age: 43
End: 2023-07-12
Payer: COMMERCIAL

## 2023-07-12 ENCOUNTER — PATIENT MESSAGE (OUTPATIENT)
Dept: NEUROSURGERY | Facility: CLINIC | Age: 43
End: 2023-07-12
Payer: COMMERCIAL

## 2023-07-12 ENCOUNTER — PATIENT MESSAGE (OUTPATIENT)
Dept: ALLERGY | Facility: CLINIC | Age: 43
End: 2023-07-12
Payer: COMMERCIAL

## 2023-07-12 DIAGNOSIS — J30.9 CHRONIC ALLERGIC RHINITIS: Primary | ICD-10-CM

## 2023-07-12 PROCEDURE — 99999 PR PBB SHADOW E&M-EST. PATIENT-LVL I: ICD-10-PCS | Mod: PBBFAC,,,

## 2023-07-12 PROCEDURE — 95117 IMMUNOTHERAPY INJECTIONS: CPT | Mod: S$GLB,,, | Performed by: ALLERGY & IMMUNOLOGY

## 2023-07-12 PROCEDURE — 95117 PR IMMU2THERAPY, 2+ INJECTIONS: ICD-10-PCS | Mod: S$GLB,,, | Performed by: ALLERGY & IMMUNOLOGY

## 2023-07-12 PROCEDURE — 99999 PR PBB SHADOW E&M-EST. PATIENT-LVL I: CPT | Mod: PBBFAC,,,

## 2023-07-19 RX ORDER — AZELASTINE 1 MG/ML
2 SPRAY, METERED NASAL 2 TIMES DAILY
Qty: 30 ML | Refills: 9 | Status: SHIPPED | OUTPATIENT
Start: 2023-07-19

## 2023-07-20 ENCOUNTER — CLINICAL SUPPORT (OUTPATIENT)
Dept: ALLERGY | Facility: CLINIC | Age: 43
End: 2023-07-20
Payer: COMMERCIAL

## 2023-07-20 DIAGNOSIS — J30.9 CHRONIC ALLERGIC RHINITIS: Primary | ICD-10-CM

## 2023-07-20 PROCEDURE — 95117 IMMUNOTHERAPY INJECTIONS: CPT | Mod: S$GLB,,, | Performed by: STUDENT IN AN ORGANIZED HEALTH CARE EDUCATION/TRAINING PROGRAM

## 2023-07-20 PROCEDURE — 99999 PR PBB SHADOW E&M-EST. PATIENT-LVL I: CPT | Mod: PBBFAC,,,

## 2023-07-20 PROCEDURE — 95117 PR IMMU2THERAPY, 2+ INJECTIONS: ICD-10-PCS | Mod: S$GLB,,, | Performed by: STUDENT IN AN ORGANIZED HEALTH CARE EDUCATION/TRAINING PROGRAM

## 2023-07-20 PROCEDURE — 99999 PR PBB SHADOW E&M-EST. PATIENT-LVL I: ICD-10-PCS | Mod: PBBFAC,,,

## 2023-07-24 ENCOUNTER — PATIENT MESSAGE (OUTPATIENT)
Dept: PODIATRY | Facility: CLINIC | Age: 43
End: 2023-07-24
Payer: COMMERCIAL

## 2023-07-24 ENCOUNTER — PATIENT MESSAGE (OUTPATIENT)
Dept: ALLERGY | Facility: CLINIC | Age: 43
End: 2023-07-24
Payer: COMMERCIAL

## 2023-07-24 ENCOUNTER — TELEPHONE (OUTPATIENT)
Dept: PODIATRY | Facility: CLINIC | Age: 43
End: 2023-07-24
Payer: COMMERCIAL

## 2023-07-25 ENCOUNTER — TELEPHONE (OUTPATIENT)
Dept: PODIATRY | Facility: CLINIC | Age: 43
End: 2023-07-25
Payer: COMMERCIAL

## 2023-07-26 ENCOUNTER — TELEPHONE (OUTPATIENT)
Dept: PODIATRY | Facility: CLINIC | Age: 43
End: 2023-07-26
Payer: COMMERCIAL

## 2023-07-26 ENCOUNTER — OFFICE VISIT (OUTPATIENT)
Dept: NEUROSURGERY | Facility: CLINIC | Age: 43
End: 2023-07-26
Payer: COMMERCIAL

## 2023-07-26 DIAGNOSIS — G89.29 CHRONIC LOW BACK PAIN, UNSPECIFIED BACK PAIN LATERALITY, UNSPECIFIED WHETHER SCIATICA PRESENT: ICD-10-CM

## 2023-07-26 DIAGNOSIS — M54.2 NECK PAIN: ICD-10-CM

## 2023-07-26 DIAGNOSIS — M54.50 CHRONIC LOW BACK PAIN, UNSPECIFIED BACK PAIN LATERALITY, UNSPECIFIED WHETHER SCIATICA PRESENT: ICD-10-CM

## 2023-07-26 DIAGNOSIS — Z98.890 S/P LUMBAR DISCECTOMY: ICD-10-CM

## 2023-07-26 DIAGNOSIS — M47.812 CERVICAL SPONDYLOSIS: ICD-10-CM

## 2023-07-26 DIAGNOSIS — M79.605 PAIN OF LEFT LOWER EXTREMITY: Primary | ICD-10-CM

## 2023-07-26 PROCEDURE — 99215 PR OFFICE/OUTPT VISIT, EST, LEVL V, 40-54 MIN: ICD-10-PCS | Mod: 95,,, | Performed by: NEUROLOGICAL SURGERY

## 2023-07-26 PROCEDURE — 1160F PR REVIEW ALL MEDS BY PRESCRIBER/CLIN PHARMACIST DOCUMENTED: ICD-10-PCS | Mod: CPTII,95,, | Performed by: NEUROLOGICAL SURGERY

## 2023-07-26 PROCEDURE — 1159F PR MEDICATION LIST DOCUMENTED IN MEDICAL RECORD: ICD-10-PCS | Mod: CPTII,95,, | Performed by: NEUROLOGICAL SURGERY

## 2023-07-26 PROCEDURE — 3044F HG A1C LEVEL LT 7.0%: CPT | Mod: CPTII,95,, | Performed by: NEUROLOGICAL SURGERY

## 2023-07-26 PROCEDURE — 3072F PR LOW RISK FOR RETINOPATHY: ICD-10-PCS | Mod: CPTII,95,, | Performed by: NEUROLOGICAL SURGERY

## 2023-07-26 PROCEDURE — 1159F MED LIST DOCD IN RCRD: CPT | Mod: CPTII,95,, | Performed by: NEUROLOGICAL SURGERY

## 2023-07-26 PROCEDURE — 3044F PR MOST RECENT HEMOGLOBIN A1C LEVEL <7.0%: ICD-10-PCS | Mod: CPTII,95,, | Performed by: NEUROLOGICAL SURGERY

## 2023-07-26 PROCEDURE — 3072F LOW RISK FOR RETINOPATHY: CPT | Mod: CPTII,95,, | Performed by: NEUROLOGICAL SURGERY

## 2023-07-26 PROCEDURE — 99215 OFFICE O/P EST HI 40 MIN: CPT | Mod: 95,,, | Performed by: NEUROLOGICAL SURGERY

## 2023-07-26 PROCEDURE — 1160F RVW MEDS BY RX/DR IN RCRD: CPT | Mod: CPTII,95,, | Performed by: NEUROLOGICAL SURGERY

## 2023-07-26 NOTE — PROGRESS NOTES
The patient location is: home  The chief complaint leading to consultation is: leg and arm pain    Visit type: audiovisual    Face to Face time with patient: 30  40 minutes of total time spent on the encounter, which includes face to face time and non-face to face time preparing to see the patient (eg, review of tests), Obtaining and/or reviewing separately obtained history, Documenting clinical information in the electronic or other health record, Independently interpreting results (not separately reported) and communicating results to the patient/family/caregiver, or Care coordination (not separately reported).         Each patient to whom he or she provides medical services by telemedicine is:  (1) informed of the relationship between the physician and patient and the respective role of any other health care provider with respect to management of the patient; and (2) notified that he or she may decline to receive medical services by telemedicine and may withdraw from such care at any time.    Notes:   CHIEF COMPLAINT:  Recurrent left leg pain and numbness  Recurrent right hand/finger pain and numbness    INTERVAL HISTORY (7/26/23):  Had an episode of significant neck pain approx 1 month ago. Took steroid pack and underwent C7-T1 IL NELI by Dr Patel (pain), which helped.      He also reports continued intermittent back and leg pain.  Pain with tingling that radiates up his shin to thigh and groin.  Also note some pain in big toe on left.  These sxs are currently stable/tolerable but he would like to discuss possibility of an NELI since they have helped with his neck.    INTERVAL HISTORY (5/24/23):  Virtual visit conducted regarding recurrent left leg pain and numbness as well as right hand/finger pain and numbness.  Patient is reporting an increase in low back pain that is triggered when sitting down or walking.  He is also having radiating left leg pain that radiates to his left shin and occasionally outside part  of his thigh.  He is having sharp pain in his left big toe.  He also notes tightness despite stretching in both hips and buttocks.  Reports that he often has to lie down and put support under his knees.  This has been ongoing for past couple of months.  Since he is had 2 prior lumbar surgeries he is concerned that he may have a recurrent herniated disc or new problem.  He has known cervical stenosis but has had an increase in numbness in his right hand including his thumb and index finger.  He was given an epidural steroid shot which helped some but he is concerned about progression of his known cervical stenosis.    INTERVAL HISTORY (1/4/23):  Patient returns for continued surveillance of cervical myelopathy.  His symptoms have not changed significantly however he is attending physical therapy which he finds is helping some.  He complains of continued numbness and tingling in his arms and hands while sleeping.  He feels like it is exacerbated or triggered by neck extension and has to sleep with his head propped forward.  He also gets some pain that radiates from his neck to the lateral shoulders.  He still gets occasional tingling in his big toe otherwise feels like the symptoms related to his acute hernia disc have resolved.  He also notes that he generally feels more arthritic type pain which will be exacerbated by exertion.  Finds himself with diffuse pain and achiness more than usual.    INTERVAL HISTORY (6/29/22):  Returns to review recent cervical MRI.    Always has discomfort in neck with pain radiating into R shoulder and occasionally into 4+5th digits as well as L shoulder.  He does endorse clumsiness with hands (dropping items, difficulty with bottle caps, not buttoning) and feels out of balance.  Occasionally has urinary urgency/frequency.    L thigh pain is decreased but worse in am.  Also intermittent pain in back of leg and foot.  Also notes some weakness and pain in ankle.    INTERVAL HISTORY  (5/11/22):  Returns with new arm pain that radiates from base of neck to bilateral shoulders and down the volar thumb on R.  Stops at shoulder/upper arm on L.  Triggered by certain neck positions - has to use flat pillow or will have sxs.  + tingling to R hand and L shoulder.  Also reports clumsiness in hand with dropping items and poor fine motor control.  But denies overt weakness.  Feels tight through neck, chest and shoulder regions.    Also has discomfort in L leg along anterolateral thigh and describes as burning and itching.  Notes L leg weakness in back around thigh and knee.  Worse in am and foot feels like it fell asleep and is clumsy.  Occasionally has pain in L big toe.    Takes gabapentin 1 tab in am and 2 tab in pm.  He does HEP with occasional pool workout. Not particularly interested in PT.  Has not trialed antiinflammatories.    No b/b dysfunction.    HPI:    Hermelindo Garza III is a 43 y.o.-year-old male who presents today for post-operative follow-up s/p L MIS L3-4 lami/disc on 9/20/21.  Overall patient is significantly improved.  He has significant improvement in his back and leg pain.  He still has some mild tingling in his shins that radiate down to the feet.  This is made worse when he is not active.  He takes gabapentin 600 mg at night which helps.  He would like to increase that to more times during the day.  He denies weakness.      ROS:  As stated in the above HPI    PE:  No neuro deficits observed    FROM PRIOR VISIT:    AAOX3  NAD  CN 2-12 intact     Strength:      Deltoids Biceps Triceps Wrist Ext. Wrist Flex. Hand    RUE 5 5 5 5 5 5   LUE 5 5 5 5 5 5    Hip Flex. Knee Flex. Knee Ext. Dorsi Flex Plantar Flex EHL   RLE 5 5 5 5 5 5   LLE 5 5 5 5 5 5     Sensation:  Intact to light touch (All 4 extremities)    Gait:  normal    Lumbar incision:  Well-healed    IMAGING:  All imaging reviewed by me.    Cervical MRI, 6/16/23:  1. Diffuse spondylosis and hypertrophied PLL  2. Moderate  stenosis at C4-5 with bilat NFS  3. Moderate stenosis at C5-6 with bilat NFS    Lumbar MRI, 6/16/23:  1. No recurrent HNP at L3-4  2. Mild-moderate bilat LRS at L3-4 and L4-5  3. Moderate bilat L5-S1 LRS    Cspine flex/ex, 12/13/22:  Diffuse spondylosis  Good alignment  No dynamic instability    Cervical MRI, 6/16/22:  1. Diffuse spondylosis and hypertrophied PLL  2. Severe stenosis at C4-5 and C5-6 with cord compression  3. Moderate stenosis at C3-4 and C6-7    Lumbar MRI, 6/16/22:  1. No recurrent HNP  2. Mild degen changes    Csp MRI, 1/12/21:  1. Loss of lordosis  2. Diffuse spondylosis  3. Multi-level DDD with moderate NFS  4. Severe left neural foraminal narrowing at C6-7  5. Severe right neural foraminal narrowing at C5-6 and C7-T1    Lsp MRI, 9/19/21:  1. Diffuse DDD and spondylosis  2. Large L L3-4 HNP  3. L L4-5 moderate NFS  4. R L5-S1 moderate NFS      ASSESSMENT:   Problem List Items Addressed This Visit          Neuro    Cervical spondylosis    S/P lumbar discectomy       Orthopedic    Chronic low back pain    Neck pain    Pain of left lower extremity - Primary       1. S/p L MIS L3-4 lami/disc for severe radiculopathy.  Resolved and stable. No recurrent HNP.  2. Cervical spondylosis. Neck pain improved with IL NELI.    Neck and arm pain are multi-factorial including muscle strain, spondylosis and NFS.  However, he is doing better following C7-T1 ILESI.  He is candidate for cervical surgery if his symptoms progress and match areas of nerve root compression but no surgery indicated at this time.  Given young age, he should exhaust all conservative modalities.    Although he has some moderate bilateral LRS at various levels in his lumbar spine, his leg pain does not match the specific dermatomes.  He is interested in undergoing NELI in lumbar spine since he's had success in cervical spine - I instructed him to d/w pain medicine.  Again, I would continue exhausting all conservative mesures.    PLAN:   - F/u  with pain clinic re: lumbar NELI  - RTC as needed (he know to contact clinic if sxs progress)    Time spent on this encounter: 40 minutes. This includes face-to-face time and non-face to face time preparing to see the patient (eg, review of tests), obtaining and/or reviewing separately obtained history, documenting clinical information in the electronic or other health record, independently interpreting results and communicating results to the patient/family/caregiver, or care coordinator.

## 2023-07-27 ENCOUNTER — PATIENT MESSAGE (OUTPATIENT)
Dept: ALLERGY | Facility: CLINIC | Age: 43
End: 2023-07-27
Payer: COMMERCIAL

## 2023-07-29 ENCOUNTER — PATIENT MESSAGE (OUTPATIENT)
Dept: PAIN MEDICINE | Facility: CLINIC | Age: 43
End: 2023-07-29
Payer: COMMERCIAL

## 2023-07-31 ENCOUNTER — PATIENT MESSAGE (OUTPATIENT)
Dept: PAIN MEDICINE | Facility: CLINIC | Age: 43
End: 2023-07-31
Payer: COMMERCIAL

## 2023-07-31 ENCOUNTER — OFFICE VISIT (OUTPATIENT)
Dept: PODIATRY | Facility: CLINIC | Age: 43
End: 2023-07-31
Payer: COMMERCIAL

## 2023-07-31 VITALS
OXYGEN SATURATION: 96 % | DIASTOLIC BLOOD PRESSURE: 75 MMHG | SYSTOLIC BLOOD PRESSURE: 110 MMHG | HEIGHT: 65 IN | WEIGHT: 245.38 LBS | RESPIRATION RATE: 18 BRPM | HEART RATE: 73 BPM | BODY MASS INDEX: 40.88 KG/M2 | TEMPERATURE: 99 F

## 2023-07-31 DIAGNOSIS — M77.42 METATARSALGIA, LEFT FOOT: ICD-10-CM

## 2023-07-31 DIAGNOSIS — M79.675 PAIN IN TOE OF LEFT FOOT: Primary | ICD-10-CM

## 2023-07-31 DIAGNOSIS — M20.42 HAMMERTOE OF LEFT FOOT: ICD-10-CM

## 2023-07-31 DIAGNOSIS — L84 CORN OR CALLUS: ICD-10-CM

## 2023-07-31 PROCEDURE — 99214 OFFICE O/P EST MOD 30 MIN: CPT | Mod: S$GLB,,, | Performed by: STUDENT IN AN ORGANIZED HEALTH CARE EDUCATION/TRAINING PROGRAM

## 2023-07-31 PROCEDURE — 3078F PR MOST RECENT DIASTOLIC BLOOD PRESSURE < 80 MM HG: ICD-10-PCS | Mod: CPTII,S$GLB,, | Performed by: STUDENT IN AN ORGANIZED HEALTH CARE EDUCATION/TRAINING PROGRAM

## 2023-07-31 PROCEDURE — 3044F PR MOST RECENT HEMOGLOBIN A1C LEVEL <7.0%: ICD-10-PCS | Mod: CPTII,S$GLB,, | Performed by: STUDENT IN AN ORGANIZED HEALTH CARE EDUCATION/TRAINING PROGRAM

## 2023-07-31 PROCEDURE — 1159F PR MEDICATION LIST DOCUMENTED IN MEDICAL RECORD: ICD-10-PCS | Mod: CPTII,S$GLB,, | Performed by: STUDENT IN AN ORGANIZED HEALTH CARE EDUCATION/TRAINING PROGRAM

## 2023-07-31 PROCEDURE — 99999 PR PBB SHADOW E&M-EST. PATIENT-LVL V: CPT | Mod: PBBFAC,,, | Performed by: STUDENT IN AN ORGANIZED HEALTH CARE EDUCATION/TRAINING PROGRAM

## 2023-07-31 PROCEDURE — 99999 PR PBB SHADOW E&M-EST. PATIENT-LVL V: ICD-10-PCS | Mod: PBBFAC,,, | Performed by: STUDENT IN AN ORGANIZED HEALTH CARE EDUCATION/TRAINING PROGRAM

## 2023-07-31 PROCEDURE — 3074F PR MOST RECENT SYSTOLIC BLOOD PRESSURE < 130 MM HG: ICD-10-PCS | Mod: CPTII,S$GLB,, | Performed by: STUDENT IN AN ORGANIZED HEALTH CARE EDUCATION/TRAINING PROGRAM

## 2023-07-31 PROCEDURE — 99214 PR OFFICE/OUTPT VISIT, EST, LEVL IV, 30-39 MIN: ICD-10-PCS | Mod: S$GLB,,, | Performed by: STUDENT IN AN ORGANIZED HEALTH CARE EDUCATION/TRAINING PROGRAM

## 2023-07-31 PROCEDURE — 3008F BODY MASS INDEX DOCD: CPT | Mod: CPTII,S$GLB,, | Performed by: STUDENT IN AN ORGANIZED HEALTH CARE EDUCATION/TRAINING PROGRAM

## 2023-07-31 PROCEDURE — 3078F DIAST BP <80 MM HG: CPT | Mod: CPTII,S$GLB,, | Performed by: STUDENT IN AN ORGANIZED HEALTH CARE EDUCATION/TRAINING PROGRAM

## 2023-07-31 PROCEDURE — 3044F HG A1C LEVEL LT 7.0%: CPT | Mod: CPTII,S$GLB,, | Performed by: STUDENT IN AN ORGANIZED HEALTH CARE EDUCATION/TRAINING PROGRAM

## 2023-07-31 PROCEDURE — 3008F PR BODY MASS INDEX (BMI) DOCUMENTED: ICD-10-PCS | Mod: CPTII,S$GLB,, | Performed by: STUDENT IN AN ORGANIZED HEALTH CARE EDUCATION/TRAINING PROGRAM

## 2023-07-31 PROCEDURE — 3072F LOW RISK FOR RETINOPATHY: CPT | Mod: CPTII,S$GLB,, | Performed by: STUDENT IN AN ORGANIZED HEALTH CARE EDUCATION/TRAINING PROGRAM

## 2023-07-31 PROCEDURE — 1159F MED LIST DOCD IN RCRD: CPT | Mod: CPTII,S$GLB,, | Performed by: STUDENT IN AN ORGANIZED HEALTH CARE EDUCATION/TRAINING PROGRAM

## 2023-07-31 PROCEDURE — 3072F PR LOW RISK FOR RETINOPATHY: ICD-10-PCS | Mod: CPTII,S$GLB,, | Performed by: STUDENT IN AN ORGANIZED HEALTH CARE EDUCATION/TRAINING PROGRAM

## 2023-07-31 PROCEDURE — 3074F SYST BP LT 130 MM HG: CPT | Mod: CPTII,S$GLB,, | Performed by: STUDENT IN AN ORGANIZED HEALTH CARE EDUCATION/TRAINING PROGRAM

## 2023-07-31 NOTE — TELEPHONE ENCOUNTER
Pt had VENECIA on 6/28. Pt wants to know if he can get the procedure for lumbar spine. Do you want him to come in to be evaluated before the case is put in.

## 2023-07-31 NOTE — PROGRESS NOTES
Subjective:     Patient    Hermelindo Garza III is a 43 y.o. male.    Problem    Presents with left 2nd toe that rubs on 1st toe causing blisters, callusing, and pain at medial left 2nd toe. He has attempted to resolve this issue by wearing bandages and toe spacers/sleeves but the issue has progressed over time and he continues to have pain and skin breakdown at the left 2nd toe. Also has wide feet and difficulty finding appropriately fitting footwear. He is now considering surgery to correct the left 2nd toe. He is also now having pain in the ball of the foot on the left. Denies numbness, tingling, burning or radiating pain in the feet.     History    History obtained from patient and review of medical records.     Past Medical History:   Diagnosis Date    ADD (attention deficit disorder) 05/09/2012    Allergy     Anxiety 4/12/2023    Asthma 05/09/2012    Cervical disc disorder with radiculopathy, unspecified cervical region 09/14/2021    Cervical spondylosis with myelopathy 06/29/2022    Eczema     Esophageal ulcer     GERD (gastroesophageal reflux disease) 05/09/2012    Glaucoma     Kidney stones 05/2014    Lumbar disc disease 05/09/2012    Lumbar herniated disc 05/09/2012    Malignant melanoma of skin, unspecified 01/06/2022    in situ right mid back    Melanoma 01/2022    in situ R mid back     LUBA (obstructive sleep apnea)     Radiculopathy, lumbar region 09/14/2021    Renal calculi 05/09/2012    Type 2 diabetes mellitus without complication, without long-term current use of insulin 11/16/2020       Past Surgical History:   Procedure Laterality Date    APPENDECTOMY  2006    CARPAL TUNNEL RELEASE Right 09/15/2022    Procedure: RELEASE, CARPAL TUNNEL;  Surgeon: Christen Marshall MD;  Location: [facility];  Service: Orthopedics;  Laterality: Right;    COLONOSCOPY  01/22/2019    internal hemorrhoids, o/w normal - repeat at age 52    CYSTOSCOPY  7/5/2023    Procedure: CYSTOSCOPY;  Surgeon: Chuckie Hall,  MD;  [address]  Service: Urology;;    EPIDURAL STEROID INJECTION N/A 02/10/2021    Procedure: CERVICAL C6/7 NELI DIRECT REFERRAL;  Surgeon: Michi Escamilla MD;  Location: [facility] PAIN MGT;  Service: Pain Management;  Laterality: N/A;  NEEDS CONSENT    EPIDURAL STEROID INJECTION INTO CERVICAL SPINE N/A 6/28/2023    Procedure: Injection-steroid-epidural-cervical C7-T1;  Surgeon: Lorraine Patel DO;  Location: [facility] PAIN MANAGEMENT;  Service: Pain Management;  Laterality: N/A;  diabetic    ESOPHAGOGASTRODUODENOSCOPY  01/22/2019    LA grade B esophagitis; esophageal stenosis- dilated; gastritis; H pylori pathology negative    ESOPHAGOGASTRODUODENOSCOPY N/A 05/18/2021    Procedure: EGD (ESOPHAGOGASTRODUODENOSCOPY);  Surgeon: Mariana Hector MD;  Location: [facility];  Service: Endoscopy;  Laterality: N/A;    EXCISION OF GANGLION OF WRIST Right 09/15/2022    Procedure: EXCISION, GANGLION CYST, WRIST;  Surgeon: Christen Marshall MD;  Location: [facility];  Service: Orthopedics;  Laterality: Right;    FLEXOR TENDON REPAIR Right 09/15/2022    Procedure: FLEXOR TENOSYNOVECTOMY;  Surgeon: Christen Marshall MD;  Location: [facility];  Service: Orthopedics;  Laterality: Right;    HAND ARTHROTOMY Right 09/15/2022    Procedure: ARTHROTOMY, HAND RADIOCARPAL;  Surgeon: Christen Marshall MD;  Location: [facility] OR;  Service: Orthopedics;  Laterality: Right;  Radiocarpal    INJECTION OF STEROID Left 09/15/2022    Procedure: INJECTION, STEROID LEFT WRIST AND LEFT LATERAL ELBOW;  Surgeon: Christne Marshall MD;  Location: [facility] OR;  Service: Orthopedics;  Laterality: Left;  Left Carpal Tunnel CSI    LASER LITHOTRIPSY Left 7/5/2023    Procedure: LITHOTRIPSY, USING LASER;  Surgeon: Chuckie Hall MD;  [address];  Service: Urology;  Laterality: Left;    LUMBAR LAMINECTOMY  2010    LUMBAR LAMINECTOMY WITH DISCECTOMY Left 09/20/2021    Procedure: LAMINECTOMY, SPINE, LUMBAR, WITH DISCECTOMY;  Surgeon: Naseem Mcnamara DO;   Location: [facility] OR 2ND FLR;  Service: Neurosurgery;  Laterality: Left;  MIS L3-4    TYMPANOSTOMY TUBE PLACEMENT      URETERAL STENT PLACEMENT Left 7/5/2023    Procedure: INSERTION, STENT, URETER;  Surgeon: Chuckie Hall MD;  Location: [facility] OR 1ST FLR;  Service: Urology;  Laterality: Left;    URETEROSCOPIC REMOVAL OF URETERIC CALCULUS Left 7/5/2023    Procedure: REMOVAL, CALCULUS, URETER, URETEROSCOPIC;  Surgeon: Chuckie Hall MD;  Location: [facility] OR 1ST FLR;  Service: Urology;  Laterality: Left;    URETEROSCOPY Left 7/5/2023    Procedure: URETEROSCOPY;  Surgeon: Chuckie Hall MD;  [facility];  Service: Urology;  Laterality: Left;        Objective:     Vitals  Wt Readings from Last 1 Encounters:   07/31/23 111.3 kg (245 lb 6 oz)     Temp Readings from Last 1 Encounters:   07/31/23 98.6 °F (37 °C)     BP Readings from Last 1 Encounters:   07/31/23 110/75     Pulse Readings from Last 1 Encounters:   07/31/23 73       Dermatological Exam    Skin:  Pedal hair growth, skin color, and skin texture normal on right  Pedal hair growth, skin color, and skin texture normal on left    Nails:  All nails normal in length, thickness, color    Vascular Exam    Arteries:  Posterior tibial artery palpable on right  Dorsalis pedis artery palpable on right  Posterior tibial artery palpable on left  Dorsalis pedis artery palpable on left    Veins:  Superficial veins unremarkable on right  Superficial veins unremarkable on left    Swelling:  None on right  None on left    Neurological Exam    Bethel touch test:  6/6 sites sensed, light touch intact    Provocation Sign:  + at left 3rd IM space    Tinel Sign:  - at all major ankle and foot nerves bilaterally    Slump Test:  - bilaterally    Musculoskeletal Exam    Footwear:  Casual on right  Casual on left    Gait Exam:   Ambulatory Status: Ambulatory  Gait: Antalgic  Assistive Devices: None    Foot Progression Angle:  Normal on right  Normal on left    Foot Posture  Index:  Right  Talar Head Palpation +1   TNJ Prominence +1   Medial Arch Congruence +2   Lateral Malleolar Curve +1   Relaxed Calcaneal Position +1   Forefoot Abduction/Adduction  0   +6 (Rectus) on right (normal is 1-7)    Left  Talar Head Palpation +1   TNJ Prominence +1   Medial Arch Congruence +2   Lateral Malleolar Curve +1   Relaxed Calcaneal Position +1   Forefoot Abduction/Adduction  0   +6 (Rectus) on left (normal is 1-7)    WB Digital Alignment:  MTPJ varus deformity at 1st toe on right  MTPJ varus deformity and DIPJ valgus/abduction  at 2nd toe on right  MTPJ varus deformity at 3rd toe on right  MTPJ varus deformity at 4th toe on right  MTPJ varus deformity, PIPJ varus deformity, and DIPJ flexion deformity at 5th toe on right  Normal at 1st toe on left  MTPJ varus deformity and DIPJ valgus/abduction  at 2nd toe on left  PIPJ varus deformity at 3rd toe on left  PIPJ varus deformity at 4th toe on left  PIPJ varus deformity at 5th toe on left     Right Lower Extremity Additional Findings:  Right foot and ankle function, strength, and range of motion unremarkable except as noted above.     Left Lower Extremity Additional Findings:  Pain on palpation of plantar 3rd and 4th metatarsal heads, worse at 4th metatarsal head. Also pain on deep palpation of 3rd IM space without radiation or neurological symptoms. Negative dorsal drawers at 3rd and 4th MTPJs.   Left foot and ankle function, strength, and range of motion unremarkable except as noted above.              Imaging and Other Tests    Imaging:  Independently reviewed and interpreted imaging, findings are as follows:   12/30/22 left foot X ray: mild skew foot with varus deformities at 1st-4th MTPJs, valgus deformity at 2nd DIPJ consistent with clinical findings.      Assessment:     Encounter Diagnoses   Name Primary?    Pain in toe of left foot Yes    Hammertoe of left foot     Corn or callus     Metatarsalgia, left foot         Plan:     I counseled the  patient on his conditions, their implications and medical management.    Left 2nd toe deformity with pain, callus, skin breakdown: chronic exacerbated  -Progressively worsening left 2nd toe deformity causing pain, callusing, skin breakdown as above.   -Surgical Decision Making  -Discussed further attempts at conservative care consisting of padding and appropriate footwear versus surgical intervention consisting of correction of left 2nd toe deformity. Informed him that this would not address his overall foot posture. Patient declined further conservative care and would like to proceed with surgical intervention.   -Reviewed potential risks, benefits, alternatives. Answered all questions. Risk factors include diabetes (well controlled). Written consent obtained.   -Discussed anticipated postop course including immediate postop weightbearing status which is WBAT in surgical shoe. Not driving foot, no driving restrictions.  No assistive device needed.     Left foot metatarsalgia: acute uncomplicated  -Discussed monitoring vs steroid injection. Patient would like to attempt steroid injection at site of worst pain, will perform during surgery.        Return to clinic 1 week after surgery, call sooner PRN.

## 2023-08-01 ENCOUNTER — PATIENT MESSAGE (OUTPATIENT)
Dept: SURGERY | Facility: HOSPITAL | Age: 43
End: 2023-08-01
Payer: COMMERCIAL

## 2023-08-02 ENCOUNTER — PATIENT MESSAGE (OUTPATIENT)
Dept: UROLOGY | Facility: CLINIC | Age: 43
End: 2023-08-02
Payer: COMMERCIAL

## 2023-08-02 ENCOUNTER — PATIENT MESSAGE (OUTPATIENT)
Dept: PAIN MEDICINE | Facility: CLINIC | Age: 43
End: 2023-08-02

## 2023-08-02 ENCOUNTER — OFFICE VISIT (OUTPATIENT)
Dept: PAIN MEDICINE | Facility: CLINIC | Age: 43
End: 2023-08-02
Payer: COMMERCIAL

## 2023-08-02 DIAGNOSIS — M51.36 DDD (DEGENERATIVE DISC DISEASE), LUMBAR: ICD-10-CM

## 2023-08-02 DIAGNOSIS — G89.4 CHRONIC PAIN SYNDROME: ICD-10-CM

## 2023-08-02 DIAGNOSIS — M54.16 LUMBAR RADICULOPATHY, CHRONIC: Primary | ICD-10-CM

## 2023-08-02 DIAGNOSIS — M48.061 NEUROFORAMINAL STENOSIS OF LUMBAR SPINE: ICD-10-CM

## 2023-08-02 PROCEDURE — 1159F PR MEDICATION LIST DOCUMENTED IN MEDICAL RECORD: ICD-10-PCS | Mod: CPTII,95,, | Performed by: NURSE PRACTITIONER

## 2023-08-02 PROCEDURE — 3072F LOW RISK FOR RETINOPATHY: CPT | Mod: CPTII,95,, | Performed by: NURSE PRACTITIONER

## 2023-08-02 PROCEDURE — 99214 PR OFFICE/OUTPT VISIT, EST, LEVL IV, 30-39 MIN: ICD-10-PCS | Mod: 95,,, | Performed by: NURSE PRACTITIONER

## 2023-08-02 PROCEDURE — 3044F HG A1C LEVEL LT 7.0%: CPT | Mod: CPTII,95,, | Performed by: NURSE PRACTITIONER

## 2023-08-02 PROCEDURE — 1160F RVW MEDS BY RX/DR IN RCRD: CPT | Mod: CPTII,95,, | Performed by: NURSE PRACTITIONER

## 2023-08-02 PROCEDURE — 3072F PR LOW RISK FOR RETINOPATHY: ICD-10-PCS | Mod: CPTII,95,, | Performed by: NURSE PRACTITIONER

## 2023-08-02 PROCEDURE — 1160F PR REVIEW ALL MEDS BY PRESCRIBER/CLIN PHARMACIST DOCUMENTED: ICD-10-PCS | Mod: CPTII,95,, | Performed by: NURSE PRACTITIONER

## 2023-08-02 PROCEDURE — 3044F PR MOST RECENT HEMOGLOBIN A1C LEVEL <7.0%: ICD-10-PCS | Mod: CPTII,95,, | Performed by: NURSE PRACTITIONER

## 2023-08-02 PROCEDURE — 1159F MED LIST DOCD IN RCRD: CPT | Mod: CPTII,95,, | Performed by: NURSE PRACTITIONER

## 2023-08-02 PROCEDURE — 99214 OFFICE O/P EST MOD 30 MIN: CPT | Mod: 95,,, | Performed by: NURSE PRACTITIONER

## 2023-08-02 NOTE — PROGRESS NOTES
[facility]  Patient Evaluation  telemedicine Encounter    Telemedicine Bundle:  This visit was completed during the Coronavirus Crisis to enhance patient safety.  The patient location is: patient's home  The chief complaint leading to consultation is: No chief complaint on file.  Visit type: Virtual visit with synchronous audio and video  Total time spent with patient: 20 minutes   Each patient to whom he or she provides medical services by telemedicine is:    (1) informed of the relationship between the physician and patient and the respective role of any other health care provider with respect to management of the patient  (2) notified that he or she may decline to receive medical services by telemedicine and may withdraw from such care at any time.      Referred by: No ref. provider found   Reason for referral: * No diagnoses found *     CC: No chief complaint on file.    Last 3 PDI Scores 4/20/2023   Pain Disability Index (PDI) 17       Subjective:   Hermelindo Garza III is a 43 y.o. male who presents complaining of both neck and back pain.  Today he reports his neck pain is most bothersome.  He reports muscle tightness and pain in the left trapezius and shoulder area.  He reports pain that radiates into the bilateral upper extremities with numbness and tingling.  He has been evaluated by Dr. Sierra with Neurosurgery who noted patient chris  candidate for ACDFs in he progresses.  Pt reports undergoing a cervical epidural steroid injection with Dr. Escamilla 02/10/2021 on with several months of relief.  He also reports low back pain.  He is s/p Left L3-4 hemilaminotomy, medial facetectomy, and foraminotomy for microdiscectomy with Dr Mcnamara on 9/19/2021.  He reports his back pain improved after surgery.  Patient reports he is previously completed physical therapy and is now actively continuing with his exercise program in the gym.  He also reports trying to lose weight and reports  intentional  weight loss of approximately 55 pounds since 2020.      Interval Updates:  08/02/2023-44 y/o male presents virtually, his chief complaint was reoccurring left leg pain and numbness, pain is described as tingling that radiates up his shin to his thigh and left groin.  He also reports some pain in the big toe on the left.  He has seen Neurosurgery since his last pain management clinic appointment he is interested in scheduling a lumbar NELI in effort to reduce some of his symptoms.  Profound weakness denies any bowel bladder dysfunction denies any saddle anesthesia denies any recent incident or trauma.  He does report that his current pain is disrupting his quality of life and affecting his ability to perform ADLs    Location: left leg and big toe, he also has a hx of low back, shoulder, and neck   Onset: 2 years  Current Pain Score: 5/10  Daily Pain of Range: 5-6/10  Quality: Aching, Tight, Tingling, Deep, Numb, Electric, Hot, and Cold  Radiation: neck and back  Worsened by: lying down, sitting, standing for more than 3 minutes, and walking for more than 6 minutes  Improved by: medications    Patient denies night fever/night sweats, urinary incontinence, bowel incontinence, significant weight loss, significant motor weakness, and loss of sensations.    Previous Interventions:  - 02/10/2021 -  C6/7 CERVICAL EPIDURAL STEROID INJECTION - Dr Escamilla - 4 months of pain relief.     Previous Therapies:  PT/OT: yes   Chiropractor:   HEP: yes  Relevant Surgery: yes    - Reports L4/L5 surgery at age 29 yo   - 09/19/21 - Left MIS L3-4 hemilaminotomy, medial facetectomy, and foraminotomy for microdiscectomy with Dr Mcnamara.   Previous Medications:   - NSAIDS: Meloxicam  - Muscle Relaxants: Robaxin, Flexeril  - TCAs:   - SNRIs:   - Topicals:   - Anticonvulsants: Lyrica - reports RL sxs so stopped; Gabapentin   - Opioids:   - Adjuvants: Tylenol    Current Pain Medications:  Gabapentin 300 mg QHS  Meloxicam  Tylenol        Review of Systems:  Review of Systems   Musculoskeletal:  Positive for back pain.     GENERAL:  No weight loss, malaise or fevers. +obesity +DM  HEENT:   No recent changes in vision or hearing  NECK:  No difficulty with swallowing. No stridor.   RESPIRATORY:  Negative for cough, wheezing or shortness of breath, patient denies any recent URI. +Asthma  CARDIOVASCULAR:  Negative for chest pain, leg swelling or palpitations.  GI/:  Negative for abdominal discomfort, blood in stools or black stools or change in bowel habits.   MUSCULOSKELETAL:  See HPI.  SKIN:  Negative for lesions, rash, and itching.  PSYCH:  No mood disorder or recent psychosocial stressors.  +anxiety +ADD  HEMATOLOGY/LYMPHOLOGY:  Negative for prolonged bleeding, bruising easily or swollen nodes.  Patient is not currently taking any anti-coagulants  NEURO:   No history of headaches, syncope, paralysis, seizures or tremors.  All other reviewed and negative other than HPI.    History:  Current medications, allergies, medical history, surgical history,   family history, and social history were reviewed in the chart as marked.    Full Medication List:    Current Outpatient Medications:     acetaminophen (TYLENOL) 325 MG tablet, Take 650 mg by mouth every 6 (six) hours as needed for Pain., Disp: , Rfl:     albuterol (PROVENTIL/VENTOLIN HFA) 90 mcg/actuation inhaler, RescueINHALE 2 PUFFS INTO THE LUNGS EVERY 6 HOURS AS NEEDED WHEEZING, RESCUE Strength: 90 mcg/actuation, Disp: 8.5 g, Rfl: 5    atorvastatin (LIPITOR) 20 MG tablet, TAKE 1 TABLET(20 MG) BY MOUTH EVERY DAY, Disp: 90 tablet, Rfl: 1    azelastine (ASTELIN) 137 mcg (0.1 %) nasal spray, 2 sprays (274 mcg total) by Nasal route 2 (two) times daily., Disp: 30 mL, Rfl: 9    blood-glucose meter kit, Use as instructed, Disp: 1 each, Rfl: 0    dexmethylphenidate (FOCALIN) 10 MG tablet, Take 1 tablet (10 mg total) by mouth 3 (three) times daily., Disp: 90 tablet, Rfl: 0    dexmethylphenidate  (FOCALIN) 10 MG tablet, Take 1 tablet (10 mg total) by mouth 2 (two) times daily., Disp: 60 tablet, Rfl: 0    ergocalciferol, vitamin D2, (VITAMIN D ORAL), Take 1 tablet by mouth every other day., Disp: , Rfl:     EScitalopram oxalate (LEXAPRO) 5 MG Tab, Take 1 tablet (5 mg total) by mouth once daily., Disp: 90 tablet, Rfl: 0    esomeprazole (NEXIUM) 40 mg GrPS, Take 40 mg by mouth 2 (two) times daily before meals., Disp: 60 each, Rfl: 11    fexofenadine (ALLEGRA) 180 MG tablet, Take 180 mg by mouth once daily., Disp: , Rfl:     fluocinonide (LIDEX) 0.05 % external solution, AAA scalp qday prn itching, scaling, Disp: 60 mL, Rfl: 3    gabapentin (NEURONTIN) 300 MG capsule, Take 1 capsule (300 mg total) by mouth 3 (three) times daily., Disp: 90 capsule, Rfl: 3    hydrocortisone 2.5 % cream, APPLY EXTERNALLY TO THE AFFECTED AREA TWICE DAILY FOR 10 DAYS, Disp: 30 g, Rfl: 0    ketoconazole (NIZORAL) 2 % cream, APPLY TOPICALLY TO THE AFFECTED AREA TWICE DAILY, Disp: 15 g, Rfl: 0    ketoconazole (NIZORAL) 2 % cream, APPLY EXTERNALLY TO THE AFFECTED AREA TWICE DAILY Strength: 2 %, Disp: 15 g, Rfl: 0    lamoTRIgine (LAMICTAL) 100 MG tablet, Take 1.5 tablets (150 mg total) by mouth every evening. IF any RASH, STOP ALL Lamictal and Tell Psyc MD., Disp: 135 tablet, Rfl: 0    latanoprost 0.005 % ophthalmic solution, Place 1 drop into both eyes every evening., Disp: 7.5 mL, Rfl: 3    montelukast (SINGULAIR) 10 mg tablet, Take 1 tablet (10 mg total) by mouth every evening., Disp: 90 tablet, Rfl: 3    ondansetron (ZOFRAN-ODT) 4 MG TbDL, Dissolve 1 tablet (4 mg total) by mouth every 6 (six) hours as needed (nausea)., Disp: 10 tablet, Rfl: 0    oxybutynin (DITROPAN) 5 MG Tab, Take 1 tablet (5 mg total) by mouth 3 (three) times daily., Disp: 15 tablet, Rfl: 0    rOPINIRole (REQUIP) 0.25 MG tablet, TAKE 1 TABLET(0.25 MG) BY MOUTH EVERY EVENING, Disp: 90 tablet, Rfl: 2    semaglutide (OZEMPIC) 0.25 mg or 0.5 mg (2 mg/3 mL) pen injector,  inject 0.5mg into the skin every 7 days, Disp: 3 each, Rfl: 0    tamsulosin (FLOMAX) 0.4 mg Cap, Take 1 capsule (0.4 mg total) by mouth once daily for 10 days, Disp: 10 capsule, Rfl: 0    TRUE METRIX GLUCOSE TEST STRIP Strp, USE TO TEST ONCE DAILY, Disp: 25 strip, Rfl: 2    ULTRA THIN LANCETS 30 gauge Misc, USE TO TEST BLOOD SUGAR EVERY DAY AS DIRECTED, Disp: 100 each, Rfl: 3    urea (CARMOL) 40 % Crea, Apply to affected area on feet qhs after bath or shower, Disp: 60 g, Rfl: 3    zolpidem (AMBIEN CR) 12.5 MG CR tablet, Take 1 tablet (12.5 mg total) by mouth nightly as needed for Insomnia., Disp: 30 tablet, Rfl: 0    zolpidem (AMBIEN) 10 mg Tab, Take 1 tablet (10 mg total) by mouth nightly as needed (insomnia)., Disp: 30 tablet, Rfl: 2  No current facility-administered medications for this visit.    Facility-Administered Medications Ordered in Other Visits:     mupirocin 2 % ointment, , Nasal, On Call Procedure, Chong Padron MD, Given at 09/15/22 0553     Allergies:  Patient has no known allergies.     Medical History:   has a past medical history of ADD (attention deficit disorder) (05/09/2012), Allergy, Anxiety (4/12/2023), Asthma (05/09/2012), Cervical disc disorder with radiculopathy, unspecified cervical region (09/14/2021), Cervical spondylosis with myelopathy (06/29/2022), Eczema, Esophageal ulcer, GERD (gastroesophageal reflux disease) (05/09/2012), Glaucoma, Kidney stones (05/2014), Lumbar disc disease (05/09/2012), Lumbar herniated disc (05/09/2012), Malignant melanoma of skin, unspecified (01/06/2022), Melanoma (01/2022), LUBA (obstructive sleep apnea), Radiculopathy, lumbar region (09/14/2021), Renal calculi (05/09/2012), and Type 2 diabetes mellitus without complication, without long-term current use of insulin (11/16/2020).    Surgical History:   has a past surgical history that includes Lumbar laminectomy (2010); Esophagogastroduodenoscopy (01/22/2019); Colonoscopy (01/22/2019); Appendectomy (2006);  Epidural steroid injection (N/A, 02/10/2021); Esophagogastroduodenoscopy (N/A, 05/18/2021); Lumbar laminectomy with discectomy (Left, 09/20/2021); Carpal tunnel release (Right, 09/15/2022); Excision of ganglion of wrist (Right, 09/15/2022); Hand Arthrotomy (Right, 09/15/2022); Injection of steroid (Left, 09/15/2022); Flexor tendon repair (Right, 09/15/2022); Tympanostomy tube placement; Epidural steroid injection into cervical spine (N/A, 6/28/2023); Cystoscopy (7/5/2023); Ureteral stent placement (Left, 7/5/2023); Ureteroscopy (Left, 7/5/2023); Laser lithotripsy (Left, 7/5/2023); and Ureteroscopic removal of ureteric calculus (Left, 7/5/2023).    Family History:  family history includes Allergic rhinitis in his father, mother, sister, and sister; Asthma in his father, sister, and sister; Brain cancer in his maternal grandmother; Breast cancer in his paternal grandmother; Chronic back pain in his father; Diabetes in his maternal grandfather; MARTITA disease in his father; Hypertension in his mother; Melanoma in his father; No Known Problems in his brother, maternal aunt, maternal uncle, paternal aunt, paternal grandfather, and paternal uncle; Sleep apnea in his father and mother; Transient ischemic attack in his mother.    Social History:   reports that he quit smoking about 5 years ago. His smoking use included cigarettes. He has never been exposed to tobacco smoke. He has never used smokeless tobacco. He reports current alcohol use. He reports that he does not use drugs.    Physical Exam:  There were no vitals filed for this visit.  GEN: No acute distress. Calm, comfortable  HENT: Normocephalic, atraumatic, moist mucous membranes  EYE: Anicteric sclera, non-injected.   CV: Non-diaphoretic.   RESP: Breathing comfortably. Chest expansion symmetric.  PSYCH: Pleasant mood and appropriate affect. Recent and remote memory intact.     Imaging:  XR CERVICAL SPINE AP LAT WITH FLEX EXTEN     CLINICAL HISTORY:  Other spondylosis  with myelopathy, cervical region     TECHNIQUE:  Three views of the cervical spine plus flexion and extension views were performed.     COMPARISON:  September 8, 2022     FINDINGS:  As before, suboptimal visualization of the cervicothoracic junction due to superimposition of shoulder and chest wall soft tissues.  Straightening of normal cervical lordosis.  No acute fractures.  Unremarkable predental space.  No widening prevertebral soft tissues.  No anterior or retrolisthesis.  Flexion and extension views demonstrate no instability.  Reconfirmed areas of anterior longitudinal ligament calcification adjacent to C4-C5 and C5-C6 disc.  Reconfirmed findings of uncovertebral spurring and endplate osteophytes at preserved C4-C5 level and mildly narrowed C5-C6 level and mild to moderately narrowed C6-C7 level.  Some stable scattered mild facet arthropathic changes.  Intact odontoid tip and unremarkable C1-C2 articulation.  Cervical spine findings do not appear significantly changed to earlier exam.     Electronically signed by: Giovanny Busby  Date:                                            12/13/2022      MRI CERVICAL SPINE WITHOUT CONTRAST     CLINICAL HISTORY:  Arm pain, paresthesias, clumsiness;.  Cervical disc disorder with radiculopathy, unspecified cervical region     TECHNIQUE:  Multiplanar, multisequence MR images of the cervical spine were acquired without the administration of contrast.     COMPARISON:  Cervical spine MRI 01/12/2021     FINDINGS:  Straightening of the normal cervical lordosis.  No spondylolisthesis.     No compression fractures.  No marrow replacing lesions.     Multilevel degenerative changes with disc desiccation and disc space narrowing, described in detail below.  No bone marrow edema.     Visualized structures in the posterior fossa are unremarkable. The cervical spinal cord is unremarkable.     Paraspinal soft tissues are unremarkable.     SIGNIFICANT FINDINGS BY LEVEL:     C2-3: Small disc  osteophyte complex with central annular fissure.  No canal or foraminal stenosis.     C3-4: Disc osteophyte complex with superimposed central extrusion.  Mild canal stenosis with partial effacement of the thecal sac.  Mild bilateral foraminal stenosis.     C4-5: Disc osteophyte complex.  Moderate canal stenosis with near complete effacement of the thecal sac and flattening of the cervical cord.  Moderate bilateral foraminal stenosis.     C5-6: Disc osteophyte complex, eccentric to the right.  Severe canal stenosis with complete effacement of the thecal sac and flattening of the cervical cord.  Severe right and moderate left foraminal stenosis.     C6-7: Disc osteophyte complex.  Moderate canal stenosis.  Moderate bilateral foraminal stenosis.     C7-T1: Disc osteophyte complex with superimposed right foraminal extrusion.  Mild canal stenosis.  Mild right foraminal stenosis.     Impression:     Multilevel degenerative changes, most advanced at C5-6 where there is severe canal stenosis and severe right and moderate left foraminal stenosis.  Overall, degenerative changes have progressed from 01/12/2021.     This report was flagged in Epic as abnormal.     Electronically signed by: Horace Quiroz  Date:                                            06/16/2022      XR LUMBAR SPINE AP AND LAT WITH FLEX/EXT     CLINICAL HISTORY:  Other specified postprocedural states     TECHNIQUE:  AP and lateral views as well as lateral flexion and extension images are performed through the lumbar spine.     COMPARISON:  Non 06/16/2022 MRI of the lumbar spine e     FINDINGS:  Patient had previous a L3/L4 hemilaminectomy on the left side.     Mild DJD and mild lumbar scoliosis.  The lumbar intervertebral disc spaces are slightly narrowed.  Few mm L2/L3 and L3/L4 retrolisthesis noted.  No fracture or dislocation.  No bone destruction identified     Electronically signed by: Xander Rubio MD  Date:                                             12/13/2022    MRI LUMBAR SPINE WITHOUT CONTRAST     CLINICAL HISTORY:  Lumbar radiculopathy, symptoms persist with conservative treatment;Low back pain, prior surgery, new symptoms; Dorsalgia, unspecified     TECHNIQUE:  Multiplanar, multisequence MR images were acquired from the thoracolumbar junction to the sacrum without the administration of contrast.     COMPARISON:  MRI lumbar spine 09/19/2021     FINDINGS:  Sequences are degraded by patient motion artifact.     Transitional lumbosacral anatomy with lumbarization of S1.     Postoperative changes from left L3-4 hemilaminectomy and micro discectomy.  Mild protrusion of the thecal sac and cauda equina nerve roots into the laminectomy defect.  No fluid collections in the operative bed.     No spondylolisthesis.     No compression fractures.  Small fat containing lesion in the L3 vertebral body, likely a hemangioma.     Multilevel degenerative changes with disc desiccation and disc space narrowing, described in detail below.  No significant bone marrow edema.     The conus medullaris has a normal appearance and terminates at the L2 level.  Cauda equina nerve roots are unremarkable.     Paraspinal muscle atrophy.     SIGNIFICANT FINDINGS BY LEVEL:     T12-L1: Mild disc bulge.  Minimal canal stenosis.  No foraminal stenosis.     L1-2: Left facet arthropathy.  No canal or foraminal stenosis.     L2-3: Disc bulge.  Bilateral facet arthropathy and ligamentum flavum thickening.  Mild canal stenosis.  Mild left foraminal stenosis.     L3-4: Residual disc bulge versus granulation tissue.  The previous disc fragment migrating inferiorly is no longer seen.  Bilateral facet arthropathy and ligamentum flavum thickening.  Mild canal stenosis with narrowing of both subarticular zones and abutment of the left descending L4 nerve root.  Mild right and moderate left foraminal stenosis.     L4-5: Disc bulge.  Bilateral facet arthropathy and ligamentum flavum thickening.  Mild canal  stenosis.  Moderate bilateral foraminal stenosis.     L5-S1: Disc bulge.  Bilateral facet arthropathy and ligamentum flavum thickening.  Mild canal stenosis.  Moderate bilateral foraminal stenosis.     Impression:     Postoperative changes from left L3-4 hemilaminectomy and micro discectomy.  The previous disc fragment migrating inferiorly is no longer seen.  There is a residual disc bulge/granulation tissue contributing to mild canal stenosis and moderate left foraminal stenosis.     Small meningocele at the laminectomy defect.     Degenerative changes elsewhere in the spine resulting in mild canal stenosis and moderate foraminal stenosis at multiple levels as described above.        Electronically signed by: Horace Quiroz  Date:                                            06/16/2022      Labs:  BMP  Lab Results   Component Value Date     07/04/2023    K 3.9 07/04/2023     07/04/2023    CO2 25 07/04/2023    BUN 21 (H) 07/04/2023    CREATININE 1.0 07/04/2023    CALCIUM 9.4 07/04/2023    ANIONGAP 12 07/04/2023    EGFRNORACEVR >60.0 07/04/2023     Lab Results   Component Value Date    ALT 42 07/04/2023    AST 20 07/04/2023    ALKPHOS 94 07/04/2023    BILITOT 0.6 07/04/2023     Lab Results   Component Value Date    WBC 13.22 (H) 07/04/2023    HGB 15.2 07/04/2023    HCT 45.2 07/04/2023    MCV 89 07/04/2023     07/04/2023             Assessment:  Problem List Items Addressed This Visit          Neuro    Chronic pain    Lumbar radiculopathy, chronic - Primary     Other Visit Diagnoses       DDD (degenerative disc disease), lumbar        Neuroforaminal stenosis of lumbar spine                04/20/2032-  Hermelindo Garza SELIN is a 43 y.o. male who  has a past medical history of ADD (attention deficit disorder) (05/09/2012), Allergy, Anxiety (4/12/2023), Asthma (05/09/2012), Cervical disc disorder with radiculopathy, unspecified cervical region (09/14/2021), Cervical spondylosis with myelopathy  (06/29/2022), Eczema, Esophageal ulcer, GERD (gastroesophageal reflux disease) (05/09/2012), Glaucoma, Kidney stones (05/2014), Lumbar disc disease (05/09/2012), Lumbar herniated disc (05/09/2012), Malignant melanoma of skin, unspecified (01/06/2022), Melanoma (01/2022), LUBA (obstructive sleep apnea), Radiculopathy, lumbar region (09/14/2021), Renal calculi (05/09/2012), and Type 2 diabetes mellitus without complication, without long-term current use of insulin (11/16/2020).  By history and examination this patient has chronicneck pain with bilateral radiculopathy as well as low back pain s/p 9/19/2021 Left L3-4 hemilaminotomy, medial facetectomy, and foraminotomy for microdiscectomy with Dr Mcnamara with some improvement. The underlying cause cause is facet arthritis, degenerative disc disease, foraminal stenosis, central canal stenosis, muscle dysfunction, and deconditioning.  MRI of the cervical spine was significant for multilevel degenerative changes, most advanced at C5-6 where there is severe canal stenosis and severe right and moderate left foraminal stenosis. MRI of the Lumbar spine post surgery was significant for residual disc bulge/granulation tissue contributing to mild canal stenosis and moderate left foraminal stenosis at L3/L4 and degenerative changes elsewhere in the spine resulting in mild canal stenosis and moderate foraminal stenosis at multiple levels.  We discussed the underlying diagnoses and multiple treatment options including non-opioid medications, interventional procedures, physical therapy, home exercise, core muscle enhancement, and weight loss.  He previously underwent a cervical steroid injection with several months of relief.  Plan for repeat of cervical epidural steroid injection.  The risks and benefits of each treatment option were discussed and all questions were answered.        8/2/2023- patient presents today virtually he does have a history of chronic neck pain with bilateral  radiculopathy as well as low back pain s/p 9/19/2021 Left L3-4 hemilaminotomy, medial facetectomy, and foraminotomy for microdiscectomy with Dr Mcnamara with some improvement. Today his chief complaint was left leg pain and numbness that is radiating, his lumbar MRI His lumbar MRI shows a disc bulge at L4-5 where he also has bilateral facet arthropathy and moderate bilateral foraminal stenosis, additionally at L5-S1 at L5-S1 he has bilateral facet arthropathy and moderate bilateral foraminal stenosis.  This could be the cause of his left leg pain.    Treatment Plan:   Procedures:  Scheduled for lumbar NELI targeting L4-5   PT/OT/HEP: I have stressed the importance of physical activity and a home exercise plan to help with pain and improve health.  He is previously completed physical therapy.  Continue with home exercise program.  I have provided the patient with a home exercise packet for neck strengthening and stretching.    Medications:   -Continue  Flexeril 10 mg t.i.d. p.r.n.  -Continue gabapentin to 600 mg q.h.s.    - continue with meloxicam 15 mg q.d.   - continue with p.r.n. Tylenol   -  Reviewed and consistent with medication use as prescribed.  Imaging:  Reviewed and discussed with the patient.  Follow Up: RTC 4 weeks postprocedure virtually or in clinic     JAY Syed  Interventional Pain Management    Disclaimer: This note was partly generated using dictation software which may occasionally result in transcription errors.

## 2023-08-03 ENCOUNTER — CLINICAL SUPPORT (OUTPATIENT)
Dept: ALLERGY | Facility: CLINIC | Age: 43
End: 2023-08-03
Payer: COMMERCIAL

## 2023-08-03 ENCOUNTER — PATIENT MESSAGE (OUTPATIENT)
Dept: PAIN MEDICINE | Facility: CLINIC | Age: 43
End: 2023-08-03
Payer: COMMERCIAL

## 2023-08-03 DIAGNOSIS — M54.16 LUMBAR RADICULOPATHY, CHRONIC: Primary | ICD-10-CM

## 2023-08-03 DIAGNOSIS — J30.9 CHRONIC ALLERGIC RHINITIS: Primary | ICD-10-CM

## 2023-08-03 DIAGNOSIS — M51.36 DDD (DEGENERATIVE DISC DISEASE), LUMBAR: ICD-10-CM

## 2023-08-03 PROCEDURE — 99999 PR PBB SHADOW E&M-EST. PATIENT-LVL I: ICD-10-PCS | Mod: PBBFAC,,,

## 2023-08-03 PROCEDURE — 95117 IMMUNOTHERAPY INJECTIONS: CPT | Mod: S$GLB,,, | Performed by: STUDENT IN AN ORGANIZED HEALTH CARE EDUCATION/TRAINING PROGRAM

## 2023-08-03 PROCEDURE — 95117 PR IMMU2THERAPY, 2+ INJECTIONS: ICD-10-PCS | Mod: S$GLB,,, | Performed by: STUDENT IN AN ORGANIZED HEALTH CARE EDUCATION/TRAINING PROGRAM

## 2023-08-03 PROCEDURE — 99999 PR PBB SHADOW E&M-EST. PATIENT-LVL I: CPT | Mod: PBBFAC,,,

## 2023-08-08 ENCOUNTER — CLINICAL SUPPORT (OUTPATIENT)
Dept: ALLERGY | Facility: CLINIC | Age: 43
End: 2023-08-08
Payer: COMMERCIAL

## 2023-08-08 DIAGNOSIS — J30.9 CHRONIC ALLERGIC RHINITIS: Primary | ICD-10-CM

## 2023-08-08 PROCEDURE — 95117 PR IMMU2THERAPY, 2+ INJECTIONS: ICD-10-PCS | Mod: S$GLB,,, | Performed by: ALLERGY & IMMUNOLOGY

## 2023-08-08 PROCEDURE — 99999 PR PBB SHADOW E&M-EST. PATIENT-LVL I: CPT | Mod: PBBFAC,,,

## 2023-08-08 PROCEDURE — 95117 IMMUNOTHERAPY INJECTIONS: CPT | Mod: S$GLB,,, | Performed by: ALLERGY & IMMUNOLOGY

## 2023-08-08 PROCEDURE — 99999 PR PBB SHADOW E&M-EST. PATIENT-LVL I: ICD-10-PCS | Mod: PBBFAC,,,

## 2023-08-09 ENCOUNTER — PATIENT MESSAGE (OUTPATIENT)
Dept: SURGERY | Facility: HOSPITAL | Age: 43
End: 2023-08-09
Payer: COMMERCIAL

## 2023-08-09 ENCOUNTER — ANESTHESIA EVENT (OUTPATIENT)
Dept: SURGERY | Facility: HOSPITAL | Age: 43
End: 2023-08-09
Payer: COMMERCIAL

## 2023-08-09 NOTE — ANESTHESIA PREPROCEDURE EVALUATION
08/09/2023  Hermelindo Garza III is a 43 y.o., male.      Pre-op Assessment    I have reviewed the Patient Summary Reports.    I have reviewed the NPO Status.   I have reviewed the Medications.     Review of Systems  Anesthesia Hx:  No problems with previous Anesthesia  Denies Family Hx of Anesthesia complications.   Denies Personal Hx of Anesthesia complications.   EENT/Dental:   chronic allergic rhinitis   Cardiovascular:   Exercise tolerance: good Dysrhythmias (tachycardia) hyperlipidemia    Pulmonary:   Asthma (albuterol use Q week) mild Sleep Apnea, CPAP    Renal/:   Chronic Renal Disease renal calculi    Hepatic/GI:   PUD, GERD, well controlled Liver Disease, Hepatitis    Musculoskeletal:   Arthritis  GERD (gastroesophageal reflux disease) Renal calculi  Asthma ADD (attention deficit disorder)  Lumbar disc disease Esophageal ulcer  Kidney stones LUBA (obstructive sleep apnea)  Type 2 diabetes mellitus without complication, without long-term current use of insulin Glaucoma  Cervical disc disorder with radiculopathy, unspecified cervical region Radiculopathy, lumbar region  Lumbar herniated disc Malignant melanoma of skin, unspecified  Melanoma Cervical spondylosis with myelopathy  Allergy Eczema  Anxiety     Surgical History    LUMBAR LAMINECTOMY ESOPHAGOGASTRODUODENOSCOPY  COLONOSCOPY APPENDECTOMY  EPIDURAL STEROID INJECTION ESOPHAGOGASTRODUODENOSCOPY  LUMBAR LAMINECTOMY WITH DISCECTOMY CARPAL TUNNEL RELEASE  EXCISION OF GANGLION OF WRIST HAND ARTHROTOMY  INJECTION OF STEROID FLEXOR TENDON REPAIR  TYMPANOSTOMY TUBE PLACEMENT EPIDURAL STEROID INJECTION INTO CERVICAL SPINE  CYSTOSCOPY URETERAL STENT PLACEMENT  URETEROSCOPY LASER LITHOTRIPSY  URETEROSCOPIC REMOVAL OF URETERIC CALCULUS    Spine Disorders: cervical and lumbar Disc disease, Degenerative disease and Chronic Pain    Neurological:    Neuromuscular Disease,    Endocrine:   Diabetes (ozempic use last week), well controlled, type 2  Morbid Obesity / BMI > 40  Psych:   Psychiatric History        GERD (gastroesophageal reflux disease) Renal calculi   Asthma ADD (attention deficit disorder)   Lumbar disc disease Esophageal ulcer   Kidney stones LUBA (obstructive sleep apnea)   Type 2 diabetes mellitus without complication, without long-term current use of insulin Glaucoma   Cervical disc disorder with radiculopathy, unspecified cervical region Radiculopathy, lumbar region   Lumbar herniated disc Malignant melanoma of skin, unspecified   Melanoma Cervical spondylosis with myelopathy   Allergy Eczema   Anxiety      Surgical History    LUMBAR LAMINECTOMY ESOPHAGOGASTRODUODENOSCOPY   COLONOSCOPY APPENDECTOMY   EPIDURAL STEROID INJECTION ESOPHAGOGASTRODUODENOSCOPY   LUMBAR LAMINECTOMY WITH DISCECTOMY CARPAL TUNNEL RELEASE   EXCISION OF GANGLION OF WRIST HAND ARTHROTOMY   INJECTION OF STEROID FLEXOR TENDON REPAIR   TYMPANOSTOMY TUBE PLACEMENT EPIDURAL STEROID INJECTION INTO CERVICAL SPINE   CYSTOSCOPY URETERAL STENT PLACEMENT   URETEROSCOPY LASER LITHOTRIPSY   URETEROSCOPIC REMOVAL OF URETERIC CALCULUS          Physical Exam  General: Cooperative, Well nourished, Oriented and Alert    Airway:  Mallampati: II   Mouth Opening: Small, but > 3cm  TM Distance: Normal  Tongue: Normal  Neck ROM: Extension Decreased, Extension Painful  Neck: Girth Increased    Dental:  Intact        Anesthesia Plan  Type of Anesthesia, risks & benefits discussed:    Anesthesia Type: Gen ETT  Intra-op Monitoring Plan: Standard ASA Monitors  Post Op Pain Control Plan: multimodal analgesia and IV/PO Opioids PRN  Induction:  IV  Airway Plan: Video  Informed Consent: Informed consent signed with the Patient and all parties understand the risks and agree with anesthesia plan.  All questions answered.   ASA Score: 3  Day of Surgery Review of History & Physical: H&P Update referred to the  surgeon/provider.I have interviewed and examined the patient. I have reviewed the patient's H&P dated: There are no significant changes. H&P completed by Anesthesiologist.  Anesthesia Plan Notes: Neutral neck position  RSI      Ready For Surgery From Anesthesia Perspective.     .

## 2023-08-10 ENCOUNTER — TELEPHONE (OUTPATIENT)
Dept: PODIATRY | Facility: CLINIC | Age: 43
End: 2023-08-10
Payer: COMMERCIAL

## 2023-08-10 ENCOUNTER — PATIENT MESSAGE (OUTPATIENT)
Dept: INTERNAL MEDICINE | Facility: CLINIC | Age: 43
End: 2023-08-10
Payer: COMMERCIAL

## 2023-08-10 DIAGNOSIS — E78.2 MIXED HYPERLIPIDEMIA: ICD-10-CM

## 2023-08-10 DIAGNOSIS — M20.40 HAMMER TOE, UNSPECIFIED LATERALITY: Primary | ICD-10-CM

## 2023-08-10 DIAGNOSIS — E11.9 TYPE 2 DIABETES MELLITUS WITHOUT COMPLICATION, WITHOUT LONG-TERM CURRENT USE OF INSULIN: Primary | ICD-10-CM

## 2023-08-10 NOTE — TELEPHONE ENCOUNTER
Pt scheduled for 6 month f/u 10-12-23  And is requesting labs prior to visit.  Labs ordered    Thanks

## 2023-08-11 ENCOUNTER — PATIENT MESSAGE (OUTPATIENT)
Dept: INTERNAL MEDICINE | Facility: CLINIC | Age: 43
End: 2023-08-11
Payer: COMMERCIAL

## 2023-08-11 DIAGNOSIS — G47.00 INSOMNIA, UNSPECIFIED TYPE: ICD-10-CM

## 2023-08-11 NOTE — PRE-PROCEDURE INSTRUCTIONS
The following was discussed with pt via phone and sent to pt portal. Pt verbalized understanding.    Dear Hermelindo ,    You are scheduled for a procedure with Dr. Back on 8/14/2023. Your scheduled arrival time is 5:00am.  This arrival time is roughly 2 hours before your anticipated procedure time to allow sufficient time for pre-op.  Please wear comfortable clothes.  Most patients do not need to change into a gown.  Please do not wear a dress.  This procedure will take place at the [facility] at the corner of [facility] and [facility].  It [facility] [place] next to [facility].  The address is:    [address] [place] [address].  MARCY Tillman [id number]    After entering the building, you will proceed to the second floor where you can check in with registration. You should take any medications that you routinely take for blood pressure (other than those listed below), heart medications, thyroid, cholesterol, etc.     If you wear contact lenses, please wear glasses to your procedure.    Your fasting instructions are as follow:  Nothing to eat or drink after midnight tonight. You may have small amounts of water to take medications in the morning. You MUST have a responsible adult to bring you home.      Sunday evening (8/13/2023) and Monday morning, please hold the following medications:  -Aspirin and Aspirin-containing products (Goody's powder, Excedrin)  -NSAIDs (Advil, Ibuprofen, Aleve, Diclofenac)  -Vitamins/Supplements  -Herbal remedies/Teas  -Stimulants (Adderall, Vyvanse, Adipex)  -Diabetic medication (Please bring with you day of procedure)  -FOCALIN  -VITAMIN D2  -CONTINUE HOLDING OZEMPIC  -HYDROCORTISONE CREAM  -KETOCONAZOLE CREAM  -CARMOL CREAM    -May take Tylenol      Sunday evening (8/13/2023) and Monday morning, take a shower using antibacterial soap (ex: Hibiclens or Dial antibacterial soap). DO NOT apply deodorant, lotion, cologne, or anything else to the skin.    If you  have any procedure-specific questions, please call your surgeon's office. Any other questions, don't hesitate to call at [phone].    Thanks,  SIMON Wilkins  Pre-Admit Dept

## 2023-08-14 ENCOUNTER — HOSPITAL ENCOUNTER (OUTPATIENT)
Facility: HOSPITAL | Age: 43
Discharge: HOME | End: 2023-08-14
Attending: STUDENT IN AN ORGANIZED HEALTH CARE EDUCATION/TRAINING PROGRAM | Admitting: STUDENT IN AN ORGANIZED HEALTH CARE EDUCATION/TRAINING PROGRAM
Payer: COMMERCIAL

## 2023-08-14 ENCOUNTER — ANESTHESIA (OUTPATIENT)
Dept: SURGERY | Facility: HOSPITAL | Age: 43
End: 2023-08-14
Payer: COMMERCIAL

## 2023-08-14 VITALS
WEIGHT: 238 LBS | OXYGEN SATURATION: 100 % | BODY MASS INDEX: 39.65 KG/M2 | TEMPERATURE: 98 F | HEIGHT: 65 IN | DIASTOLIC BLOOD PRESSURE: 57 MMHG | HEART RATE: 110 BPM | RESPIRATION RATE: 20 BRPM | SYSTOLIC BLOOD PRESSURE: 118 MMHG

## 2023-08-14 DIAGNOSIS — M79.605 PAIN OF LEFT LOWER EXTREMITY: Primary | ICD-10-CM

## 2023-08-14 DIAGNOSIS — M20.42 HAMMERTOE OF LEFT FOOT: ICD-10-CM

## 2023-08-14 LAB
GLUCOSE SERPL-MCNC: 98 MG/DL (ref 70–110)
POCT GLUCOSE: 151 MG/DL (ref 70–110)
POCT GLUCOSE: 98 MG/DL (ref 70–110)

## 2023-08-14 PROCEDURE — 28285 PR REPAIR OF HAMMERTOE,ONE: ICD-10-PCS | Mod: T1,,, | Performed by: STUDENT IN AN ORGANIZED HEALTH CARE EDUCATION/TRAINING PROGRAM

## 2023-08-14 PROCEDURE — 25000003 PHARM REV CODE 250: Performed by: ANESTHESIOLOGY

## 2023-08-14 PROCEDURE — 71000039 HC RECOVERY, EACH ADD'L HOUR: Performed by: STUDENT IN AN ORGANIZED HEALTH CARE EDUCATION/TRAINING PROGRAM

## 2023-08-14 PROCEDURE — 25000242 PHARM REV CODE 250 ALT 637 W/ HCPCS: Performed by: NURSE ANESTHETIST, CERTIFIED REGISTERED

## 2023-08-14 PROCEDURE — 20600 PR DRAIN/INJECT SMALL JOINT/BURSA: ICD-10-PCS | Mod: 59,LT,, | Performed by: STUDENT IN AN ORGANIZED HEALTH CARE EDUCATION/TRAINING PROGRAM

## 2023-08-14 PROCEDURE — 36000706: Performed by: STUDENT IN AN ORGANIZED HEALTH CARE EDUCATION/TRAINING PROGRAM

## 2023-08-14 PROCEDURE — C1713 ANCHOR/SCREW BN/BN,TIS/BN: HCPCS | Performed by: STUDENT IN AN ORGANIZED HEALTH CARE EDUCATION/TRAINING PROGRAM

## 2023-08-14 PROCEDURE — 36000707: Performed by: STUDENT IN AN ORGANIZED HEALTH CARE EDUCATION/TRAINING PROGRAM

## 2023-08-14 PROCEDURE — 94799 UNLISTED PULMONARY SVC/PX: CPT

## 2023-08-14 PROCEDURE — 28285 REPAIR OF HAMMERTOE: CPT | Mod: T1,,, | Performed by: STUDENT IN AN ORGANIZED HEALTH CARE EDUCATION/TRAINING PROGRAM

## 2023-08-14 PROCEDURE — 63600175 PHARM REV CODE 636 W HCPCS: Performed by: NURSE ANESTHETIST, CERTIFIED REGISTERED

## 2023-08-14 PROCEDURE — 63600175 PHARM REV CODE 636 W HCPCS: Performed by: ANESTHESIOLOGY

## 2023-08-14 PROCEDURE — 37000009 HC ANESTHESIA EA ADD 15 MINS: Performed by: STUDENT IN AN ORGANIZED HEALTH CARE EDUCATION/TRAINING PROGRAM

## 2023-08-14 PROCEDURE — 82962 GLUCOSE BLOOD TEST: CPT | Performed by: STUDENT IN AN ORGANIZED HEALTH CARE EDUCATION/TRAINING PROGRAM

## 2023-08-14 PROCEDURE — 94761 N-INVAS EAR/PLS OXIMETRY MLT: CPT

## 2023-08-14 PROCEDURE — 99900035 HC TECH TIME PER 15 MIN (STAT)

## 2023-08-14 PROCEDURE — 71000033 HC RECOVERY, INTIAL HOUR: Performed by: STUDENT IN AN ORGANIZED HEALTH CARE EDUCATION/TRAINING PROGRAM

## 2023-08-14 PROCEDURE — 25000003 PHARM REV CODE 250: Performed by: NURSE ANESTHETIST, CERTIFIED REGISTERED

## 2023-08-14 PROCEDURE — 37000008 HC ANESTHESIA 1ST 15 MINUTES: Performed by: STUDENT IN AN ORGANIZED HEALTH CARE EDUCATION/TRAINING PROGRAM

## 2023-08-14 PROCEDURE — D9220A PRA ANESTHESIA: ICD-10-PCS | Mod: ,,, | Performed by: NURSE ANESTHETIST, CERTIFIED REGISTERED

## 2023-08-14 PROCEDURE — D9220A PRA ANESTHESIA: Mod: ,,, | Performed by: NURSE ANESTHETIST, CERTIFIED REGISTERED

## 2023-08-14 PROCEDURE — 71000016 HC POSTOP RECOV ADDL HR: Performed by: STUDENT IN AN ORGANIZED HEALTH CARE EDUCATION/TRAINING PROGRAM

## 2023-08-14 PROCEDURE — 20600 DRAIN/INJ JOINT/BURSA W/O US: CPT | Mod: 59,LT,, | Performed by: STUDENT IN AN ORGANIZED HEALTH CARE EDUCATION/TRAINING PROGRAM

## 2023-08-14 PROCEDURE — 63600175 PHARM REV CODE 636 W HCPCS: Performed by: STUDENT IN AN ORGANIZED HEALTH CARE EDUCATION/TRAINING PROGRAM

## 2023-08-14 PROCEDURE — 71000015 HC POSTOP RECOV 1ST HR: Performed by: STUDENT IN AN ORGANIZED HEALTH CARE EDUCATION/TRAINING PROGRAM

## 2023-08-14 RX ORDER — LIDOCAINE HYDROCHLORIDE 10 MG/ML
1 INJECTION, SOLUTION EPIDURAL; INFILTRATION; INTRACAUDAL; PERINEURAL ONCE AS NEEDED
Status: DISCONTINUED | OUTPATIENT
Start: 2023-08-14 | End: 2023-08-14 | Stop reason: HOSPADM

## 2023-08-14 RX ORDER — FENTANYL CITRATE 50 UG/ML
INJECTION, SOLUTION INTRAMUSCULAR; INTRAVENOUS
Status: DISCONTINUED | OUTPATIENT
Start: 2023-08-14 | End: 2023-08-14

## 2023-08-14 RX ORDER — ACETAMINOPHEN 500 MG
1000 TABLET ORAL
Status: DISPENSED | OUTPATIENT
Start: 2023-08-14 | End: 2023-08-14

## 2023-08-14 RX ORDER — MORPHINE SULFATE 2 MG/ML
1 INJECTION, SOLUTION INTRAMUSCULAR; INTRAVENOUS
Status: COMPLETED | OUTPATIENT
Start: 2023-08-14 | End: 2023-08-14

## 2023-08-14 RX ORDER — DEXAMETHASONE SODIUM PHOSPHATE 4 MG/ML
INJECTION, SOLUTION INTRA-ARTICULAR; INTRALESIONAL; INTRAMUSCULAR; INTRAVENOUS; SOFT TISSUE
Status: DISCONTINUED | OUTPATIENT
Start: 2023-08-14 | End: 2023-08-14

## 2023-08-14 RX ORDER — ALBUTEROL SULFATE 90 UG/1
AEROSOL, METERED RESPIRATORY (INHALATION)
Status: DISCONTINUED | OUTPATIENT
Start: 2023-08-14 | End: 2023-08-14

## 2023-08-14 RX ORDER — HYDROCODONE BITARTRATE AND ACETAMINOPHEN 5; 325 MG/1; MG/1
1 TABLET ORAL ONCE AS NEEDED
Status: COMPLETED | OUTPATIENT
Start: 2023-08-14 | End: 2023-08-14

## 2023-08-14 RX ORDER — LIDOCAINE HYDROCHLORIDE 20 MG/ML
INJECTION INTRAVENOUS
Status: DISCONTINUED | OUTPATIENT
Start: 2023-08-14 | End: 2023-08-14

## 2023-08-14 RX ORDER — ONDANSETRON 2 MG/ML
4 INJECTION INTRAMUSCULAR; INTRAVENOUS ONCE AS NEEDED
Status: DISCONTINUED | OUTPATIENT
Start: 2023-08-14 | End: 2024-03-14

## 2023-08-14 RX ORDER — ONDANSETRON 2 MG/ML
INJECTION INTRAMUSCULAR; INTRAVENOUS
Status: DISCONTINUED | OUTPATIENT
Start: 2023-08-14 | End: 2023-08-14

## 2023-08-14 RX ORDER — PROPOFOL 10 MG/ML
VIAL (ML) INTRAVENOUS
Status: DISCONTINUED | OUTPATIENT
Start: 2023-08-14 | End: 2023-08-14

## 2023-08-14 RX ORDER — METOCLOPRAMIDE HYDROCHLORIDE 5 MG/ML
10 INJECTION INTRAMUSCULAR; INTRAVENOUS ONCE
Status: COMPLETED | OUTPATIENT
Start: 2023-08-14 | End: 2023-08-14

## 2023-08-14 RX ORDER — DEXAMETHASONE SODIUM PHOSPHATE 4 MG/ML
INJECTION, SOLUTION INTRA-ARTICULAR; INTRALESIONAL; INTRAMUSCULAR; INTRAVENOUS; SOFT TISSUE
Status: DISCONTINUED | OUTPATIENT
Start: 2023-08-14 | End: 2023-08-14 | Stop reason: HOSPADM

## 2023-08-14 RX ORDER — GABAPENTIN 300 MG/1
300 CAPSULE ORAL
Status: COMPLETED | OUTPATIENT
Start: 2023-08-14 | End: 2023-08-14

## 2023-08-14 RX ORDER — DEXAMETHASONE 4 MG/1
4 TABLET ORAL DAILY
Qty: 10 TABLET | Refills: 0 | Status: SHIPPED | OUTPATIENT
Start: 2023-08-14 | End: 2023-10-06 | Stop reason: ALTCHOICE

## 2023-08-14 RX ORDER — ROCURONIUM BROMIDE 10 MG/ML
INJECTION, SOLUTION INTRAVENOUS
Status: DISCONTINUED | OUTPATIENT
Start: 2023-08-14 | End: 2023-08-14

## 2023-08-14 RX ORDER — CEFAZOLIN SODIUM 1 G/3ML
INJECTION, POWDER, FOR SOLUTION INTRAMUSCULAR; INTRAVENOUS
Status: DISCONTINUED | OUTPATIENT
Start: 2023-08-14 | End: 2023-08-14

## 2023-08-14 RX ORDER — ZOLPIDEM TARTRATE 10 MG/1
10 TABLET ORAL NIGHTLY PRN
Qty: 30 TABLET | Refills: 0 | Status: SHIPPED | OUTPATIENT
Start: 2023-08-14 | End: 2023-08-24

## 2023-08-14 RX ORDER — TRAMADOL HYDROCHLORIDE 50 MG/1
50 TABLET ORAL EVERY 8 HOURS PRN
Qty: 21 TABLET | Refills: 0 | Status: SHIPPED | OUTPATIENT
Start: 2023-08-14 | End: 2023-08-14 | Stop reason: HOSPADM

## 2023-08-14 RX ORDER — SODIUM CITRATE AND CITRIC ACID MONOHYDRATE 334; 500 MG/5ML; MG/5ML
30 SOLUTION ORAL ONCE
Status: COMPLETED | OUTPATIENT
Start: 2023-08-14 | End: 2023-08-14

## 2023-08-14 RX ORDER — MORPHINE SULFATE 2 MG/ML
INJECTION, SOLUTION INTRAMUSCULAR; INTRAVENOUS
Status: DISCONTINUED
Start: 2023-08-14 | End: 2023-08-14 | Stop reason: HOSPADM

## 2023-08-14 RX ORDER — SUCCINYLCHOLINE CHLORIDE 20 MG/ML
INJECTION INTRAMUSCULAR; INTRAVENOUS
Status: DISCONTINUED | OUTPATIENT
Start: 2023-08-14 | End: 2023-08-14

## 2023-08-14 RX ORDER — BUPIVACAINE HYDROCHLORIDE 5 MG/ML
INJECTION, SOLUTION EPIDURAL; INTRACAUDAL
Status: DISCONTINUED | OUTPATIENT
Start: 2023-08-14 | End: 2023-08-14 | Stop reason: HOSPADM

## 2023-08-14 RX ORDER — SODIUM CHLORIDE 0.9 % (FLUSH) 0.9 %
10 SYRINGE (ML) INJECTION
Status: DISCONTINUED | OUTPATIENT
Start: 2023-08-14 | End: 2023-08-14 | Stop reason: HOSPADM

## 2023-08-14 RX ORDER — KETOROLAC TROMETHAMINE 30 MG/ML
30 INJECTION, SOLUTION INTRAMUSCULAR; INTRAVENOUS ONCE AS NEEDED
Status: COMPLETED | OUTPATIENT
Start: 2023-08-14 | End: 2023-08-14

## 2023-08-14 RX ORDER — FAMOTIDINE 10 MG/ML
20 INJECTION INTRAVENOUS
Status: COMPLETED | OUTPATIENT
Start: 2023-08-14 | End: 2023-08-14

## 2023-08-14 RX ORDER — HYDROCODONE BITARTRATE AND ACETAMINOPHEN 5; 325 MG/1; MG/1
1 TABLET ORAL EVERY 4 HOURS PRN
Status: DISCONTINUED | OUTPATIENT
Start: 2023-08-14 | End: 2023-08-14 | Stop reason: HOSPADM

## 2023-08-14 RX ORDER — MIDAZOLAM HYDROCHLORIDE 1 MG/ML
INJECTION, SOLUTION INTRAMUSCULAR; INTRAVENOUS
Status: DISCONTINUED | OUTPATIENT
Start: 2023-08-14 | End: 2023-08-14

## 2023-08-14 RX ORDER — SODIUM CHLORIDE 9 MG/ML
INJECTION, SOLUTION INTRAVENOUS CONTINUOUS
Status: DISCONTINUED | OUTPATIENT
Start: 2023-08-14 | End: 2023-08-14 | Stop reason: HOSPADM

## 2023-08-14 RX ORDER — OXYCODONE AND ACETAMINOPHEN 10; 325 MG/1; MG/1
1 TABLET ORAL EVERY 8 HOURS PRN
Qty: 21 TABLET | Refills: 0 | Status: ON HOLD | OUTPATIENT
Start: 2023-08-14 | End: 2023-09-27 | Stop reason: HOSPADM

## 2023-08-14 RX ORDER — PHENYLEPHRINE HYDROCHLORIDE 10 MG/ML
INJECTION INTRAVENOUS
Status: DISCONTINUED | OUTPATIENT
Start: 2023-08-14 | End: 2023-08-14

## 2023-08-14 RX ADMIN — ALBUTEROL SULFATE 8 PUFF: 108 INHALANT RESPIRATORY (INHALATION) at 07:08

## 2023-08-14 RX ADMIN — PHENYLEPHRINE HYDROCHLORIDE 100 MCG: 10 INJECTION INTRAVENOUS at 07:08

## 2023-08-14 RX ADMIN — FENTANYL CITRATE 50 MCG: 50 INJECTION, SOLUTION INTRAMUSCULAR; INTRAVENOUS at 07:08

## 2023-08-14 RX ADMIN — MORPHINE SULFATE 1 MG: 2 INJECTION, SOLUTION INTRAMUSCULAR; INTRAVENOUS at 11:08

## 2023-08-14 RX ADMIN — ROCURONIUM BROMIDE 10 MG: 10 INJECTION INTRAVENOUS at 07:08

## 2023-08-14 RX ADMIN — MORPHINE SULFATE 1 MG: 2 INJECTION, SOLUTION INTRAMUSCULAR; INTRAVENOUS at 10:08

## 2023-08-14 RX ADMIN — DEXAMETHASONE SODIUM PHOSPHATE 4 MG: 4 INJECTION, SOLUTION INTRAMUSCULAR; INTRAVENOUS at 07:08

## 2023-08-14 RX ADMIN — SUCCINYLCHOLINE CHLORIDE 180 MG: 20 INJECTION, SOLUTION INTRAMUSCULAR; INTRAVENOUS at 07:08

## 2023-08-14 RX ADMIN — PROPOFOL 200 MG: 10 INJECTION, EMULSION INTRAVENOUS at 07:08

## 2023-08-14 RX ADMIN — LIDOCAINE HYDROCHLORIDE 100 MG: 20 INJECTION INTRAVENOUS at 07:08

## 2023-08-14 RX ADMIN — ALBUTEROL SULFATE 16 PUFF: 108 INHALANT RESPIRATORY (INHALATION) at 07:08

## 2023-08-14 RX ADMIN — CEFAZOLIN 2 G: 330 INJECTION, POWDER, FOR SOLUTION INTRAMUSCULAR; INTRAVENOUS at 07:08

## 2023-08-14 RX ADMIN — GABAPENTIN 300 MG: 300 CAPSULE ORAL at 05:08

## 2023-08-14 RX ADMIN — MIDAZOLAM HYDROCHLORIDE 2 MG: 1 INJECTION, SOLUTION INTRAMUSCULAR; INTRAVENOUS at 06:08

## 2023-08-14 RX ADMIN — HYDROCODONE BITARTRATE AND ACETAMINOPHEN 1 TABLET: 5; 325 TABLET ORAL at 08:08

## 2023-08-14 RX ADMIN — ONDANSETRON 4 MG: 2 INJECTION INTRAMUSCULAR; INTRAVENOUS at 07:08

## 2023-08-14 RX ADMIN — SODIUM CITRATE AND CITRIC ACID MONOHYDRATE 30 ML: 500; 334 SOLUTION ORAL at 06:08

## 2023-08-14 RX ADMIN — METOCLOPRAMIDE 10 MG: 5 INJECTION, SOLUTION INTRAMUSCULAR; INTRAVENOUS at 06:08

## 2023-08-14 RX ADMIN — SODIUM CHLORIDE: 0.9 INJECTION, SOLUTION INTRAVENOUS at 06:08

## 2023-08-14 RX ADMIN — PHENYLEPHRINE HYDROCHLORIDE 100 MCG: 10 INJECTION INTRAVENOUS at 08:08

## 2023-08-14 RX ADMIN — KETOROLAC TROMETHAMINE 30 MG: 30 INJECTION, SOLUTION INTRAMUSCULAR; INTRAVENOUS at 09:08

## 2023-08-14 RX ADMIN — FAMOTIDINE 20 MG: 10 INJECTION, SOLUTION INTRAVENOUS at 06:08

## 2023-08-14 NOTE — PLAN OF CARE
Chart reviewed. Preop nursing care completed per orders. Safe surgery checklist complete. Pt denies any open wounds cuts or sores. Pt denies any metal in body. Belongings placed in PACU locker 4. Waiting for updated H&P; admission order; anesthesia consent; and site marked prior to surgery. Pt AAOX3, VSS on room air. Pt toileted, Bed locked in lowest position, Call light within reach. Pt denies any needs at this time.

## 2023-08-14 NOTE — H&P
Hermelindo Garza III is a 43 y.o. male.     Problem   7/31/23:  Presents with left 2nd toe that rubs on 1st toe causing blisters, callusing, and pain at medial left 2nd toe. He has attempted to resolve this issue by wearing bandages and toe spacers/sleeves but the issue has progressed over time and he continues to have pain and skin breakdown at the left 2nd toe. Also has wide feet and difficulty finding appropriately fitting footwear. He is now considering surgery to correct the left 2nd toe. He is also now having pain in the ball of the foot on the left. Denies numbness, tingling, burning or radiating pain in the feet.     8/14/23: Patient presents today for surgery for left 2nd hammertoe. Patient has no other questions today.     History     History obtained from patient and review of medical records.           Past Medical History:   Diagnosis Date    ADD (attention deficit disorder) 05/09/2012    Allergy      Anxiety 4/12/2023    Asthma 05/09/2012    Cervical disc disorder with radiculopathy, unspecified cervical region 09/14/2021    Cervical spondylosis with myelopathy 06/29/2022    Eczema      Esophageal ulcer      GERD (gastroesophageal reflux disease) 05/09/2012    Glaucoma      Kidney stones 05/2014    Lumbar disc disease 05/09/2012    Lumbar herniated disc 05/09/2012    Malignant melanoma of skin, unspecified 01/06/2022     in situ right mid back    Melanoma 01/2022     in situ R mid back     LUBA (obstructive sleep apnea)      Radiculopathy, lumbar region 09/14/2021    Renal calculi 05/09/2012    Type 2 diabetes mellitus without complication, without long-term current use of insulin 11/16/2020               Past Surgical History:   Procedure Laterality Date    APPENDECTOMY   2006    CARPAL TUNNEL RELEASE Right 09/15/2022     Procedure: RELEASE, CARPAL TUNNEL;  Surgeon: Christen Marshall MD;  Location: [facility];  Service: Orthopedics;  Laterality: Right;    COLONOSCOPY   01/22/2019     internal  hemorrhoids, o/w normal - repeat at age 52    CYSTOSCOPY   7/5/2023     Procedure: CYSTOSCOPY;  Surgeon: Chuckie Hall MD;  Location: [facility] OR [facility] FLR;  Service: Urology;;    EPIDURAL STEROID INJECTION N/A 02/10/2021     Procedure: CERVICAL C6/7 NELI DIRECT REFERRAL;  Surgeon: Michi Escamilla MD;  Location: [facility] PAIN MGT;  Service: Pain Management;  Laterality: N/A;  NEEDS CONSENT    EPIDURAL STEROID INJECTION INTO CERVICAL SPINE N/A 6/28/2023     Procedure: Injection-steroid-epidural-cervical C7-T1;  Surgeon: Lorraine Patel DO;  Location: [facility] PAIN MANAGEMENT;  Service: Pain Management;  Laterality: N/A;  diabetic    ESOPHAGOGASTRODUODENOSCOPY   01/22/2019     LA grade B esophagitis; esophageal stenosis- dilated; gastritis; H pylori pathology negative    ESOPHAGOGASTRODUODENOSCOPY N/A 05/18/2021     Procedure: EGD (ESOPHAGOGASTRODUODENOSCOPY);  Surgeon: Mariana Hector MD;  Location: [facility];  Service: Endoscopy;  Laterality: N/A;    EXCISION OF GANGLION OF WRIST Right 09/15/2022     Procedure: EXCISION, GANGLION CYST, WRIST;  Surgeon: Christen Marshall MD;  Location: [facility];  Service: Orthopedics;  Laterality: Right;    FLEXOR TENDON REPAIR Right 09/15/2022     Procedure: FLEXOR TENOSYNOVECTOMY;  Surgeon: Christen Marshall MD;  Location: [facility] OR;  Service: Orthopedics;  Laterality: Right;    HAND ARTHROTOMY Right 09/15/2022     Procedure: ARTHROTOMY, HAND RADIOCARPAL;  Surgeon: Christen Marshall MD;  Location: [facility] OR;  Service: Orthopedics;  Laterality: Right;  Radiocarpal    INJECTION OF STEROID Left 09/15/2022     Procedure: INJECTION, STEROID LEFT WRIST AND LEFT LATERAL ELBOW;  Surgeon: Christen Marshall MD;  Location: [facility] OR;  Service: Orthopedics;  Laterality: Left;  Left Carpal Tunnel CSI    LASER LITHOTRIPSY Left 7/5/2023     Procedure: LITHOTRIPSY, USING LASER;  Surgeon: Chuckie Hall MD;  Location: [facility] OR 1ST FLR;  Service: Urology;  Laterality: Left;    LUMBAR LAMINECTOMY   2010    LUMBAR  LAMINECTOMY WITH DISCECTOMY Left 09/20/2021     Procedure: LAMINECTOMY, SPINE, LUMBAR, WITH DISCECTOMY;  Surgeon: Naseem Mcnamara DO;  Location: [facility] OR 2ND FLR;  Service: Neurosurgery;  Laterality: Left;  MIS L3-4    TYMPANOSTOMY TUBE PLACEMENT        URETERAL STENT PLACEMENT Left 7/5/2023     Procedure: INSERTION, STENT, URETER;  Surgeon: Chuckie Hall MD;  Location: [facility] OR 1ST FLR;  Service: Urology;  Laterality: Left;    URETEROSCOPIC REMOVAL OF URETERIC CALCULUS Left 7/5/2023     Procedure: REMOVAL, CALCULUS, URETER, URETEROSCOPIC;  Surgeon: Chuckie Hall MD;  Location: [facility] OR 1ST FLR;  Service: Urology;  Laterality: Left;    URETEROSCOPY Left 7/5/2023     Procedure: URETEROSCOPY;  Surgeon: Chuckie Hall MD;  Location: [facility] OR 1ST FLR;  Service: Urology;  Laterality: Left;         Objective:      Vitals      Wt Readings from Last 1 Encounters:   07/31/23 111.3 kg (245 lb 6 oz)          Temp Readings from Last 1 Encounters:   07/31/23 98.6 °F (37 °C)          BP Readings from Last 1 Encounters:   07/31/23 110/75          Pulse Readings from Last 1 Encounters:   07/31/23 73         Dermatological Exam     Skin:  Pedal hair growth, skin color, and skin texture normal on right  Pedal hair growth, skin color, and skin texture normal on left     Nails:  All nails normal in length, thickness, color     Vascular Exam     Arteries:  Posterior tibial artery palpable on right  Dorsalis pedis artery palpable on right  Posterior tibial artery palpable on left  Dorsalis pedis artery palpable on left     Veins:  Superficial veins unremarkable on right  Superficial veins unremarkable on left     Swelling:  None on right  None on left     Neurological Exam     Duarte touch test:  6/6 sites sensed, light touch intact     Provocation Sign:  + at left 3rd IM space     Tinel Sign:  - at all major ankle and foot nerves bilaterally     Slump Test:  - bilaterally     Musculoskeletal Exam     Footwear:  Casual on  right  Casual on left     Gait Exam:   Ambulatory Status: Ambulatory  Gait: Antalgic  Assistive Devices: None     Foot Progression Angle:  Normal on right  Normal on left     Foot Posture Index:  Right  Talar Head Palpation +1   TNJ Prominence +1   Medial Arch Congruence +2   Lateral Malleolar Curve +1   Relaxed Calcaneal Position +1   Forefoot Abduction/Adduction  0   +6 (Rectus) on right (normal is 1-7)     Left  Talar Head Palpation +1   TNJ Prominence +1   Medial Arch Congruence +2   Lateral Malleolar Curve +1   Relaxed Calcaneal Position +1   Forefoot Abduction/Adduction  0   +6 (Rectus) on left (normal is 1-7)     WB Digital Alignment:  MTPJ varus deformity at 1st toe on right  MTPJ varus deformity and DIPJ valgus/abduction  at 2nd toe on right  MTPJ varus deformity at 3rd toe on right  MTPJ varus deformity at 4th toe on right  MTPJ varus deformity, PIPJ varus deformity, and DIPJ flexion deformity at 5th toe on right  Normal at 1st toe on left  MTPJ varus deformity and DIPJ valgus/abduction  at 2nd toe on left  PIPJ varus deformity at 3rd toe on left  PIPJ varus deformity at 4th toe on left  PIPJ varus deformity at 5th toe on left      Right Lower Extremity Additional Findings:  Right foot and ankle function, strength, and range of motion unremarkable except as noted above.      Left Lower Extremity Additional Findings:  Pain on palpation of plantar 3rd and 4th metatarsal heads, worse at 4th metatarsal head. Also pain on deep palpation of 3rd IM space without radiation or neurological symptoms. Negative dorsal drawers at 3rd and 4th MTPJs.   Left foot and ankle function, strength, and range of motion unremarkable except as noted above.                  Imaging and Other Tests     Imaging:  Independently reviewed and interpreted imaging, findings are as follows:   12/30/22 left foot X ray: mild skew foot with varus deformities at 1st-4th MTPJs, valgus deformity at 2nd DIPJ consistent with clinical findings.       Assessment:           Encounter Diagnoses   Name Primary?    Pain in toe of left foot Yes    Hammertoe of left foot      Corn or callus      Metatarsalgia, left foot           Plan:      I counseled the patient on his conditions, their implications and medical management.     Left 2nd toe deformity with pain, callus, skin breakdown: chronic exacerbated  -Progressively worsening left 2nd toe deformity causing pain, callusing, skin breakdown as above.   -Surgical Decision Making  -Discussed further attempts at conservative care consisting of padding and appropriate footwear versus surgical intervention consisting of correction of left 2nd toe deformity. Informed him that this would not address his overall foot posture.   -Reviewed potential risks, benefits, alternatives. Answered all questions. Risk factors include diabetes (well controlled). Written consent obtained.   - Patient presenting today for surgery  to left 2nd hamertoe  -Discussed anticipated postop course including immediate postop weightbearing status which is WBAT in surgical shoe. Not driving foot, no driving restrictions.  No assistive device needed.         Return to clinic 1 week after surgery, call sooner PRN.     Dannielle Boyer DPM PGY-2  Podiatric Medicine & Surgery  [facility]  Secure Chat Preferred  Mobile: [phone]  Pager: [phone]

## 2023-08-14 NOTE — PLAN OF CARE
Patient's , okay for discharge per anesthesia. Patient reports pain tolerable for discharge. Discharge instructions reviewed with patient and mother, verbalized understanding. IV dc'd with cath tip intact. Patient wheeled to car via staff.

## 2023-08-14 NOTE — OP NOTE
[facility] ([facility])  Operative Note      Procedure Date:   8/14/2023     Surgeons:   Surgeon(s) and Role:     * Ankush Back DPM - Primary       Dannielle Boyer DPM - Assist     Pre-Operative Diagnosis:   Hammertoe of left foot [M20.42]; left foot metatarsalgia    Indications:  Left 2nd toe deformity with DIPJ rubbing against 1st toe causing callusing, skin breakdown, and pain with ambulation; 2nd DIPJ arthroplasty is indicated to correct toe alignment and decrease rubbing of the toes. Also with left foot metatarsalgia concentrated around the 4th MTPJ; steroid injection indicated for pain in this area.     Consent:  Indications, potential risks, potential benefits, available alternatives reviewed. Patient was given the opportunity to ask questions, all questions answered. Written consent obtained.     Procedure:   CPT 81094 - Left 2nd DIPJ arthroplasty  CPT 48039 - Steroid injection left 4th MTPJ      Post-Operative Diagnosis:   Same as preoperative diagnosis    Surgery:     Anesthesia:   Local MAC    Procedure in Detail:   The patient was transported to the operating room on a stretcher and was transferred to the OR table in supine position. Anesthesia was induced.  A tourniquet was applied to the left ankle. 20 mL of 0.25% bupivacaine plain was locally infiltrated. The left lower limb was prepped and draped in a sterile manner. Tourniquet(s) inflated to 250 mmHg.     1 mL of a mixture of 2 mL 0.25% bupivacaine plain and 4 mg (1 mL) dexamethasone was injected directly into the 4th MTP, the remaining 2 mL of the mixture was infiltrated into the surrounding area.     Using a 15 blade a longitudinal incision was made dorsomedially at the 2nd DIPJ; sharp and blunt dissection carried down through the joint capsule. Thickened medial capsule was excised to debulk the area. The head of the middle phalanx was noted to have valgus angulation under fluoroscopy. Using an MIS mati under fluoroscopic  guidance the head of the middle phalanx was excised, primarily medially, to remove the valgus tilt. Resting and loaded posture of the toe improved. Using the MIS mati under fluoroscopic guidance the medial condyles of the head of the middle phalanx and base of the distal phalanx were shaved down to debulk the area. Irrigated with saline via bulb syringe. Layered primary closure performed using 3-0 vicryl and 3-0 nylon suture.     Dressed with xeroform, 4x4 gauze, cast padding, ACE wrap. Tourniquet(s) deflated.     Complications:  None    Estimated Blood Loss (EBL):   0 mL           Vendors:  [employer]    Implants:   * No implants in log *           Condition:   Good    Disposition:   PACU - hemodynamically stable.    Attestation:   I was present and scrubbed for the entire procedure.    Discharge:     OUTCOME:   Patient tolerated treatment/procedure well without complication and is now ready for discharge.    DISPOSITION:   Patient tolerated the procedure well.     DISCHARGE INSTRUCTIONS:    WBAT LLE in surgical shoe.   Pain medication as prescribed, wean off as tolerated.  Clinic followup in 1 week, to be scheduled.

## 2023-08-14 NOTE — TELEPHONE ENCOUNTER
No care due was identified.  [facility] Embedded Care Due Messages. Reference number: [id number].   8/14/2023 1:06:18 PM CDT

## 2023-08-14 NOTE — BRIEF OP NOTE
[facility] ([facility])  Brief Operative Note    Surgery Date: 8/14/2023     Surgeon(s) and Role:     * Ankush Back DPM - Primary       Dannielle Boyer DPM - Assist     Pre-op Diagnosis:  Hammertoe of left foot [M20.42]    Post-op Diagnosis:  Post-Op Diagnosis Codes:     * Hammertoe of left foot [M20.42]    Procedure: Left 2nd DIPJ arthroplasty, 4th MTPJ steroid injection    Anesthesia: Local MAC    Operative Findings: As above    Estimated Blood Loss: 0 mL         Specimens:   Specimen (24h ago, onward)      None              Discharge Note    OUTCOME: Patient tolerated treatment/procedure well without complication and is now ready for discharge.    DISPOSITION: Home or Self Care    FINAL DIAGNOSIS:  <principal problem not specified>    FOLLOWUP: In clinic    DISCHARGE INSTRUCTIONS:  No discharge procedures on file.

## 2023-08-14 NOTE — ANESTHESIA PROCEDURE NOTES
Intubation    Date/Time: 8/14/2023 7:11 AM    Performed by: Carmen Wolfe CRNA  Authorized by: Arron Rangel MD    Intubation:     Induction:  Intravenous    Intubated:  Postinduction    Mask Ventilation:  Not attempted    Attempts:  1    Attempted By:  CRNA    Method of Intubation:  Video laryngoscopy    Blade:  Wang 3    Laryngeal View Grade: Grade I - full view of cords      Difficult Airway Encountered?: No      Complications:  None    Airway Device:  Oral endotracheal tube    Airway Device Size:  7.5    Style/Cuff Inflation:  Cuffed (inflated to minimal occlusive pressure)    Tube secured:  23    Secured at:  The lips    Placement Verified By:  Capnometry    Complicating Factors:  None    Findings Post-Intubation:  BS equal bilateral  Notes:      Limited neck extension, full beard, large round face

## 2023-08-14 NOTE — DISCHARGE INSTRUCTIONS
Do not get dressing wet, cover to shower.   Wear surgical support shoe to walk, may remove to sleep.  Follow appt. will be scheduled via clinic.   Call MD with any concerns or questions.

## 2023-08-14 NOTE — TRANSFER OF CARE
"Anesthesia Transfer of Care Note    Patient: Hermelindo Garza III    Procedure(s) Performed: Procedure(s) (LRB):  CORRECTION, HAMMER TOE--  basic foot tray as well as a Reese microfree/microchoice tray. I also asked Sherry to bring an MIS mati. (Left)    Patient location: PACU    Anesthesia Type: general    Transport from OR: Transported from OR on 6-10 L/min O2 by face mask with adequate spontaneous ventilation    Post pain: adequate analgesia    Post assessment: no apparent anesthetic complications    Post vital signs: stable    Level of consciousness: awake    Nausea/Vomiting: no nausea/vomiting    Complications: none    Transfer of care protocol was followed      Last vitals:   Visit Vitals  /76 (BP Location: Left arm, Patient Position: Lying)   Pulse 82   Temp 37.2 °C (99 °F) (Temporal)   Resp 17   Ht 5' 5" (1.651 m)   Wt 108 kg (238 lb)   SpO2 (!) 94%   BMI 39.61 kg/m²     "

## 2023-08-14 NOTE — ANESTHESIA POSTPROCEDURE EVALUATION
Anesthesia Post Evaluation    Patient: Hermelindo Garza III    Procedure(s) Performed: Procedure(s) (LRB):  CORRECTION, HAMMER TOE--  basic foot tray as well as a Reese microfree/microchoice tray. I also asked [place] to bring an MIS mati. (Left)    Final Anesthesia Type: general      Patient location during evaluation: PACU  Patient participation: Yes- Able to Participate  Level of consciousness: awake and alert  Post-procedure vital signs: reviewed and stable  Pain management: adequate  Airway patency: patent    PONV status at discharge: No PONV  Anesthetic complications: no      Cardiovascular status: blood pressure returned to baseline  Respiratory status: unassisted, spontaneous ventilation and room air  Hydration status: euvolemic  Follow-up not needed.          Vitals Value Taken Time   /58 08/14/23 1002   Temp 36.7 °C (98.1 °F) 08/14/23 0820   Pulse 106 08/14/23 1009   Resp 12 08/14/23 1009   SpO2 94 % 08/14/23 1009   Vitals shown include unvalidated device data.      Event Time   Out of Recovery 09:21:00         Pain/Shani Score: Pain Rating Prior to Med Admin: 7 (8/14/2023  9:51 AM)  Pain Rating Post Med Admin: 4 (8/14/2023  9:23 AM)  Shani Score: 10 (8/14/2023  9:23 AM)

## 2023-08-15 ENCOUNTER — PATIENT MESSAGE (OUTPATIENT)
Dept: DERMATOLOGY | Facility: CLINIC | Age: 43
End: 2023-08-15
Payer: COMMERCIAL

## 2023-08-16 ENCOUNTER — PATIENT MESSAGE (OUTPATIENT)
Dept: PODIATRY | Facility: CLINIC | Age: 43
End: 2023-08-16
Payer: COMMERCIAL

## 2023-08-17 ENCOUNTER — CLINICAL SUPPORT (OUTPATIENT)
Dept: ALLERGY | Facility: CLINIC | Age: 43
End: 2023-08-17
Payer: COMMERCIAL

## 2023-08-17 DIAGNOSIS — J30.9 CHRONIC ALLERGIC RHINITIS: Primary | ICD-10-CM

## 2023-08-17 PROCEDURE — 99999 PR PBB SHADOW E&M-EST. PATIENT-LVL I: ICD-10-PCS | Mod: PBBFAC,,,

## 2023-08-17 PROCEDURE — 95117 PR IMMU2THERAPY, 2+ INJECTIONS: ICD-10-PCS | Mod: S$GLB,,, | Performed by: STUDENT IN AN ORGANIZED HEALTH CARE EDUCATION/TRAINING PROGRAM

## 2023-08-17 PROCEDURE — 99999 PR PBB SHADOW E&M-EST. PATIENT-LVL I: CPT | Mod: PBBFAC,,,

## 2023-08-17 PROCEDURE — 95117 IMMUNOTHERAPY INJECTIONS: CPT | Mod: S$GLB,,, | Performed by: STUDENT IN AN ORGANIZED HEALTH CARE EDUCATION/TRAINING PROGRAM

## 2023-08-18 ENCOUNTER — HOSPITAL ENCOUNTER (OUTPATIENT)
Dept: RADIOLOGY | Facility: HOSPITAL | Age: 43
Discharge: HOME | End: 2023-08-18
Attending: STUDENT IN AN ORGANIZED HEALTH CARE EDUCATION/TRAINING PROGRAM
Payer: COMMERCIAL

## 2023-08-18 DIAGNOSIS — M20.40 HAMMER TOE, UNSPECIFIED LATERALITY: ICD-10-CM

## 2023-08-18 PROCEDURE — 76000 FLUOROSCOPY <1 HR PHYS/QHP: CPT | Mod: TC

## 2023-08-21 ENCOUNTER — OFFICE VISIT (OUTPATIENT)
Dept: PODIATRY | Facility: CLINIC | Age: 43
End: 2023-08-21
Payer: COMMERCIAL

## 2023-08-21 ENCOUNTER — OFFICE VISIT (OUTPATIENT)
Dept: PLASTIC SURGERY | Facility: CLINIC | Age: 43
End: 2023-08-21

## 2023-08-21 VITALS
BODY MASS INDEX: 41.25 KG/M2 | HEIGHT: 65 IN | TEMPERATURE: 99 F | RESPIRATION RATE: 16 BRPM | SYSTOLIC BLOOD PRESSURE: 130 MMHG | OXYGEN SATURATION: 95 % | HEART RATE: 92 BPM | WEIGHT: 247.56 LBS | DIASTOLIC BLOOD PRESSURE: 77 MMHG

## 2023-08-21 DIAGNOSIS — Z41.1 ENCOUNTER FOR COSMETIC PROCEDURE: Primary | ICD-10-CM

## 2023-08-21 DIAGNOSIS — Z98.890 POST-OPERATIVE STATE: Primary | ICD-10-CM

## 2023-08-21 PROCEDURE — 3078F PR MOST RECENT DIASTOLIC BLOOD PRESSURE < 80 MM HG: ICD-10-PCS | Mod: CPTII,S$GLB,, | Performed by: STUDENT IN AN ORGANIZED HEALTH CARE EDUCATION/TRAINING PROGRAM

## 2023-08-21 PROCEDURE — 99499 UNLISTED E&M SERVICE: CPT | Mod: ,,, | Performed by: OTOLARYNGOLOGY

## 2023-08-21 PROCEDURE — 99024 POSTOP FOLLOW-UP VISIT: CPT | Mod: S$GLB,,, | Performed by: STUDENT IN AN ORGANIZED HEALTH CARE EDUCATION/TRAINING PROGRAM

## 2023-08-21 PROCEDURE — 3008F PR BODY MASS INDEX (BMI) DOCUMENTED: ICD-10-PCS | Mod: CPTII,S$GLB,, | Performed by: STUDENT IN AN ORGANIZED HEALTH CARE EDUCATION/TRAINING PROGRAM

## 2023-08-21 PROCEDURE — 99499 NO LOS: ICD-10-PCS | Mod: ,,, | Performed by: OTOLARYNGOLOGY

## 2023-08-21 PROCEDURE — 99999 PR PBB SHADOW E&M-EST. PATIENT-LVL V: ICD-10-PCS | Mod: PBBFAC,,, | Performed by: STUDENT IN AN ORGANIZED HEALTH CARE EDUCATION/TRAINING PROGRAM

## 2023-08-21 PROCEDURE — 99024 PR POST-OP FOLLOW-UP VISIT: ICD-10-PCS | Mod: S$GLB,,, | Performed by: STUDENT IN AN ORGANIZED HEALTH CARE EDUCATION/TRAINING PROGRAM

## 2023-08-21 PROCEDURE — 99999 PR PBB SHADOW E&M-EST. PATIENT-LVL V: CPT | Mod: PBBFAC,,, | Performed by: STUDENT IN AN ORGANIZED HEALTH CARE EDUCATION/TRAINING PROGRAM

## 2023-08-21 PROCEDURE — 1159F PR MEDICATION LIST DOCUMENTED IN MEDICAL RECORD: ICD-10-PCS | Mod: CPTII,S$GLB,, | Performed by: STUDENT IN AN ORGANIZED HEALTH CARE EDUCATION/TRAINING PROGRAM

## 2023-08-21 PROCEDURE — 3044F PR MOST RECENT HEMOGLOBIN A1C LEVEL <7.0%: ICD-10-PCS | Mod: CPTII,S$GLB,, | Performed by: STUDENT IN AN ORGANIZED HEALTH CARE EDUCATION/TRAINING PROGRAM

## 2023-08-21 PROCEDURE — 3044F HG A1C LEVEL LT 7.0%: CPT | Mod: CPTII,S$GLB,, | Performed by: STUDENT IN AN ORGANIZED HEALTH CARE EDUCATION/TRAINING PROGRAM

## 2023-08-21 PROCEDURE — 3078F DIAST BP <80 MM HG: CPT | Mod: CPTII,S$GLB,, | Performed by: STUDENT IN AN ORGANIZED HEALTH CARE EDUCATION/TRAINING PROGRAM

## 2023-08-21 PROCEDURE — 3008F BODY MASS INDEX DOCD: CPT | Mod: CPTII,S$GLB,, | Performed by: STUDENT IN AN ORGANIZED HEALTH CARE EDUCATION/TRAINING PROGRAM

## 2023-08-21 PROCEDURE — 3075F SYST BP GE 130 - 139MM HG: CPT | Mod: CPTII,S$GLB,, | Performed by: STUDENT IN AN ORGANIZED HEALTH CARE EDUCATION/TRAINING PROGRAM

## 2023-08-21 PROCEDURE — 1159F MED LIST DOCD IN RCRD: CPT | Mod: CPTII,S$GLB,, | Performed by: STUDENT IN AN ORGANIZED HEALTH CARE EDUCATION/TRAINING PROGRAM

## 2023-08-21 PROCEDURE — 3075F PR MOST RECENT SYSTOLIC BLOOD PRESS GE 130-139MM HG: ICD-10-PCS | Mod: CPTII,S$GLB,, | Performed by: STUDENT IN AN ORGANIZED HEALTH CARE EDUCATION/TRAINING PROGRAM

## 2023-08-21 NOTE — PROGRESS NOTES
Subjective:     Patient    Hermelindo Garza III is a 43 y.o. male.    Problem    07/31/23: Presents with left 2nd toe that rubs on 1st toe causing blisters, callusing, and pain at medial left 2nd toe. He has attempted to resolve this issue by wearing bandages and toe spacers/sleeves but the issue has progressed over time and he continues to have pain and skin breakdown at the left 2nd toe. Also has wide feet and difficulty finding appropriately fitting footwear. He is now considering surgery to correct the left 2nd toe. He is also now having pain in the ball of the foot on the left. Denies numbness, tingling, burning or radiating pain in the feet.     08/21/23: Presents 1 week s/p L 2nd toe arthroplasty and ostectomy. Pain improved significantly, does throb when he walks a lot. No major issues.     History    History obtained from patient and review of medical records.     Past Medical History:   Diagnosis Date    ADD (attention deficit disorder) 05/09/2012    Allergy     Anxiety 4/12/2023    Asthma 05/09/2012    Cervical disc disorder with radiculopathy, unspecified cervical region 09/14/2021    Cervical spondylosis with myelopathy 06/29/2022    Eczema     Esophageal ulcer     GERD (gastroesophageal reflux disease) 05/09/2012    Glaucoma     Kidney stones 05/2014    Lumbar disc disease 05/09/2012    Lumbar herniated disc 05/09/2012    Malignant melanoma of skin, unspecified 01/06/2022    in situ right mid back    Melanoma 01/2022    in situ R mid back     LUBA (obstructive sleep apnea)     Radiculopathy, lumbar region 09/14/2021    Renal calculi 05/09/2012    Type 2 diabetes mellitus without complication, without long-term current use of insulin 11/16/2020       Past Surgical History:   Procedure Laterality Date    APPENDECTOMY  2006    CARPAL TUNNEL RELEASE Right 09/15/2022    Procedure: RELEASE, CARPAL TUNNEL;  Surgeon: Christen Marshall MD;  Location: [facility];  Service: Orthopedics;  Laterality: Right;     COLONOSCOPY  01/22/2019    internal hemorrhoids, o/w normal - repeat at age 52    CORRECTION OF HAMMER TOE Left 8/14/2023    Procedure: CORRECTION, HAMMER TOE--  basic foot tray as well as a Reese microfree/microchoice tray. I also asked [place] to bring an MIS mati.;  Surgeon: Ankush Back, DPM;  Location: [facility] OR;  Service: Podiatry;  Laterality: Left;  reese drill, sagittal saw, foot set    CYSTOSCOPY  7/5/2023    Procedure: CYSTOSCOPY;  Surgeon: Chuckie Hall MD;  Location: [address] FLR;  Service: Urology;;    EPIDURAL STEROID INJECTION N/A 02/10/2021    Procedure: CERVICAL C6/7 NELI DIRECT REFERRAL;  Surgeon: Michi Escamilla MD;  Location: [facility] PAIN MGT;  Service: Pain Management;  Laterality: N/A;  NEEDS CONSENT    EPIDURAL STEROID INJECTION INTO CERVICAL SPINE N/A 6/28/2023    Procedure: Injection-steroid-epidural-cervical C7-T1;  Surgeon: Lorraine Patel DO;  Location: [facility] PAIN MANAGEMENT;  Service: Pain Management;  Laterality: N/A;  diabetic    ESOPHAGOGASTRODUODENOSCOPY  01/22/2019    LA grade B esophagitis; esophageal stenosis- dilated; gastritis; H pylori pathology negative    ESOPHAGOGASTRODUODENOSCOPY N/A 05/18/2021    Procedure: EGD (ESOPHAGOGASTRODUODENOSCOPY);  Surgeon: Mariana Hector MD;  Location: [facility];  Service: Endoscopy;  Laterality: N/A;    EXCISION OF GANGLION OF WRIST Right 09/15/2022    Procedure: EXCISION, GANGLION CYST, WRIST;  Surgeon: Christen Marshall MD;  Location: [facility] OR;  Service: Orthopedics;  Laterality: Right;    FLEXOR TENDON REPAIR Right 09/15/2022    Procedure: FLEXOR TENOSYNOVECTOMY;  Surgeon: Christen Marshall MD;  Location: [facility] OR;  Service: Orthopedics;  Laterality: Right;    HAND ARTHROTOMY Right 09/15/2022    Procedure: ARTHROTOMY, HAND RADIOCARPAL;  Surgeon: Christen Marshall MD;  Location: [facility] OR;  Service: Orthopedics;  Laterality: Right;  Radiocarpal    INJECTION OF STEROID Left 09/15/2022    Procedure: INJECTION, STEROID LEFT WRIST AND LEFT  LATERAL ELBOW;  Surgeon: Christen Marshall MD;  Location: [facility];  Service: Orthopedics;  Laterality: Left;  Left Carpal Tunnel CSI    LASER LITHOTRIPSY Left 7/5/2023    Procedure: LITHOTRIPSY, USING LASER;  Surgeon: Chuckie Hall MD;  Location: [facility] OR 1ST FLR;  Service: Urology;  Laterality: Left;    LUMBAR LAMINECTOMY  2010    LUMBAR LAMINECTOMY WITH DISCECTOMY Left 09/20/2021    Procedure: LAMINECTOMY, SPINE, LUMBAR, WITH DISCECTOMY;  Surgeon: Naseem Mcnamara DO;  Location: [facility] OR 2ND FLR;  Service: Neurosurgery;  Laterality: Left;  MIS L3-4    TYMPANOSTOMY TUBE PLACEMENT      URETERAL STENT PLACEMENT Left 7/5/2023    Procedure: INSERTION, STENT, URETER;  Surgeon: Chuckie Hall MD;  Location: [facility] OR 1ST FLR;  Service: Urology;  Laterality: Left;    URETEROSCOPIC REMOVAL OF URETERIC CALCULUS Left 7/5/2023    Procedure: REMOVAL, CALCULUS, URETER, URETEROSCOPIC;  Surgeon: Chuckie Hall MD;  Location: [facility] OR 1ST FLR;  Service: Urology;  Laterality: Left;    URETEROSCOPY Left 7/5/2023    Procedure: URETEROSCOPY;  Surgeon: Chuckie Hall MD;  Location: [facility] OR 1ST FLR;  Service: Urology;  Laterality: Left;        Objective:     Vitals  Wt Readings from Last 1 Encounters:   08/21/23 112.3 kg (247 lb 9.2 oz)     Temp Readings from Last 1 Encounters:   08/21/23 99.1 °F (37.3 °C)     BP Readings from Last 1 Encounters:   08/21/23 130/77     Pulse Readings from Last 1 Encounters:   08/21/23 92       Dermatological Exam    Skin:  Pedal hair growth, skin color, and skin texture normal on right  Pedal hair growth, skin color, and skin texture normal on left; 2nd toe incision healing well, sutures intact, not clinically infected    Nails:  All nails normal in length, thickness, color    Vascular Exam    Arteries:  Posterior tibial artery palpable on right  Dorsalis pedis artery palpable on right  Posterior tibial artery palpable on left  Dorsalis pedis artery palpable on left    Veins:  Superficial veins  unremarkable on right  Superficial veins unremarkable on left    Swelling:  None on right  None on left    Neurological Exam    [place] touch test:  6/6 sites sensed, light touch intact    [place]ocation Sign:  + at left 3rd IM space    Tinel Sign:  - at all major ankle and foot nerves bilaterally    Slump Test:  - bilaterally    Musculoskeletal Exam    Footwear:  Casual on right  Casual on left    Gait Exam:   Ambulatory Status: Ambulatory  Gait: Antalgic  Assistive Devices: None    Foot Progression Angle:  Normal on right  Normal on left    Foot Posture Index:  Right  Talar Head Palpation +1   TNJ Prominence +1   Medial Arch Congruence +2   Lateral Malleolar Curve +1   Relaxed Calcaneal Position +1   Forefoot Abduction/Adduction  0   +6 (Rectus) on right (normal is 1-7)    Left  Talar Head Palpation +1   TNJ Prominence +1   Medial Arch Congruence +2   Lateral Malleolar Curve +1   Relaxed Calcaneal Position +1   Forefoot Abduction/Adduction  0   +6 (Rectus) on left (normal is 1-7)    WB Digital Alignment:  MTPJ varus deformity at 1st toe on right  MTPJ varus deformity and DIPJ valgus/abduction  at 2nd toe on right  MTPJ varus deformity at 3rd toe on right  MTPJ varus deformity at 4th toe on right  MTPJ varus deformity, PIPJ varus deformity, and DIPJ flexion deformity at 5th toe on right  Normal at 1st toe on left  Rectus 2nd toe on left  PIPJ varus deformity at 3rd toe on left  PIPJ varus deformity at 4th toe on left  PIPJ varus deformity at 5th toe on left     Right Lower Extremity Additional Findings:  Right foot and ankle function, strength, and range of motion unremarkable except as noted above.     Left Lower Extremity Additional Findings:  Pain on palpation of plantar 3rd and 4th metatarsal heads improved. Negative dorsal drawers at 3rd and 4th MTPJs.   Left foot and ankle function, strength, and range of motion unremarkable except as noted above.      Imaging and Other Tests    Imagin22 left foot X  ray: mild skew foot with varus deformities at 1st-4th MTPJs, valgus deformity at 2nd DIPJ consistent with clinical findings.     Independently reviewed and interpreted imaging, findings are as follows: N/A     Assessment:     Encounter Diagnosis   Name Primary?    Post-operative state Yes          Plan:     I counseled the patient on his conditions, their implications and medical management.    Left 2nd toe deformity s/p arthroplasty and ostectomy: improving   -Continue meloxicam, acetaminophen, dexamethasone. Percocet PRN for breakthrough pain.   -Elevate LLE when at rest.  -Protected WB RLE in surgical shoe.     Left foot metatarsalgia: acute uncomplicated  -Monitoring.         Return to clinic in 1 week for suture removal, call sooner PRN.

## 2023-08-21 NOTE — PROGRESS NOTES
Patient who is here today for consult for cosmetic myomodulator treatment.   Add previously been treated by his dentist with myomodulator injections 3 or 4 months ago.  He still has a central horizontal forehead crease with functional muscle contracture remaining which he is unhappy with.  Discussed benefits and risks of botulinum toxin injections, including headache, weakness/paralysis of muscles, asymmetry, eyebrow/lid drooping, pain, bruising, swelling, infection, and rare risk of systemic botulism.  The patient was given the opportunity to ask any questions, and all questions were answered to the patient's satisfaction.  The patient verbalized understanding, elected to proceed, and signed a written informed consent.  Today have injected 30 of Dysport in his central frontalis musculature.     Patient tolerated well with no complications. She was instructed not to rub or massage the treated areas and that she should avoid lying down, bending upside down, and strenuous exercise for the rest of the day.  Instructed to wait two weeks to assess response before presenting for a touch up injection, if needed.

## 2023-08-23 ENCOUNTER — PATIENT MESSAGE (OUTPATIENT)
Dept: INTERNAL MEDICINE | Facility: CLINIC | Age: 43
End: 2023-08-23
Payer: COMMERCIAL

## 2023-08-24 ENCOUNTER — CLINICAL SUPPORT (OUTPATIENT)
Dept: ALLERGY | Facility: CLINIC | Age: 43
End: 2023-08-24
Payer: COMMERCIAL

## 2023-08-24 ENCOUNTER — PATIENT MESSAGE (OUTPATIENT)
Dept: PSYCHIATRY | Facility: CLINIC | Age: 43
End: 2023-08-24
Payer: COMMERCIAL

## 2023-08-24 ENCOUNTER — OFFICE VISIT (OUTPATIENT)
Dept: PSYCHIATRY | Facility: CLINIC | Age: 43
End: 2023-08-24
Payer: COMMERCIAL

## 2023-08-24 VITALS
HEART RATE: 81 BPM | WEIGHT: 244.81 LBS | DIASTOLIC BLOOD PRESSURE: 73 MMHG | SYSTOLIC BLOOD PRESSURE: 136 MMHG | BODY MASS INDEX: 40.74 KG/M2

## 2023-08-24 DIAGNOSIS — M54.12 CERVICAL RADICULOPATHY: ICD-10-CM

## 2023-08-24 DIAGNOSIS — M54.16 LUMBAR RADICULOPATHY, CHRONIC: ICD-10-CM

## 2023-08-24 DIAGNOSIS — G47.00 INSOMNIA, UNSPECIFIED TYPE: ICD-10-CM

## 2023-08-24 DIAGNOSIS — Z87.442 HISTORY OF URINARY STONE: ICD-10-CM

## 2023-08-24 DIAGNOSIS — F90.0 ADHD (ATTENTION DEFICIT HYPERACTIVITY DISORDER), INATTENTIVE TYPE: ICD-10-CM

## 2023-08-24 DIAGNOSIS — J30.9 CHRONIC ALLERGIC RHINITIS: Primary | ICD-10-CM

## 2023-08-24 DIAGNOSIS — E11.9 TYPE 2 DIABETES MELLITUS WITHOUT COMPLICATION, UNSPECIFIED WHETHER LONG TERM INSULIN USE: ICD-10-CM

## 2023-08-24 DIAGNOSIS — F41.9 ANXIETY: ICD-10-CM

## 2023-08-24 DIAGNOSIS — F39 UNSPECIFIED MOOD (AFFECTIVE) DISORDER: Primary | ICD-10-CM

## 2023-08-24 PROCEDURE — 99999 PR PBB SHADOW E&M-EST. PATIENT-LVL I: ICD-10-PCS | Mod: PBBFAC,,,

## 2023-08-24 PROCEDURE — 3075F SYST BP GE 130 - 139MM HG: CPT | Mod: CPTII,S$GLB,, | Performed by: PSYCHIATRY & NEUROLOGY

## 2023-08-24 PROCEDURE — 3075F PR MOST RECENT SYSTOLIC BLOOD PRESS GE 130-139MM HG: ICD-10-PCS | Mod: CPTII,S$GLB,, | Performed by: PSYCHIATRY & NEUROLOGY

## 2023-08-24 PROCEDURE — 99214 OFFICE O/P EST MOD 30 MIN: CPT | Mod: S$GLB,,, | Performed by: PSYCHIATRY & NEUROLOGY

## 2023-08-24 PROCEDURE — 99999 PR PBB SHADOW E&M-EST. PATIENT-LVL I: CPT | Mod: PBBFAC,,,

## 2023-08-24 PROCEDURE — 3078F DIAST BP <80 MM HG: CPT | Mod: CPTII,S$GLB,, | Performed by: PSYCHIATRY & NEUROLOGY

## 2023-08-24 PROCEDURE — 3078F PR MOST RECENT DIASTOLIC BLOOD PRESSURE < 80 MM HG: ICD-10-PCS | Mod: CPTII,S$GLB,, | Performed by: PSYCHIATRY & NEUROLOGY

## 2023-08-24 PROCEDURE — 3044F PR MOST RECENT HEMOGLOBIN A1C LEVEL <7.0%: ICD-10-PCS | Mod: CPTII,S$GLB,, | Performed by: PSYCHIATRY & NEUROLOGY

## 2023-08-24 PROCEDURE — 3008F BODY MASS INDEX DOCD: CPT | Mod: CPTII,S$GLB,, | Performed by: PSYCHIATRY & NEUROLOGY

## 2023-08-24 PROCEDURE — 99999 PR PBB SHADOW E&M-EST. PATIENT-LVL II: ICD-10-PCS | Mod: PBBFAC,,, | Performed by: PSYCHIATRY & NEUROLOGY

## 2023-08-24 PROCEDURE — 99214 PR OFFICE/OUTPT VISIT, EST, LEVL IV, 30-39 MIN: ICD-10-PCS | Mod: S$GLB,,, | Performed by: PSYCHIATRY & NEUROLOGY

## 2023-08-24 PROCEDURE — 95117 IMMUNOTHERAPY INJECTIONS: CPT | Mod: S$GLB,,, | Performed by: STUDENT IN AN ORGANIZED HEALTH CARE EDUCATION/TRAINING PROGRAM

## 2023-08-24 PROCEDURE — 99999 PR PBB SHADOW E&M-EST. PATIENT-LVL II: CPT | Mod: PBBFAC,,, | Performed by: PSYCHIATRY & NEUROLOGY

## 2023-08-24 PROCEDURE — 3044F HG A1C LEVEL LT 7.0%: CPT | Mod: CPTII,S$GLB,, | Performed by: PSYCHIATRY & NEUROLOGY

## 2023-08-24 PROCEDURE — 95117 PR IMMU2THERAPY, 2+ INJECTIONS: ICD-10-PCS | Mod: S$GLB,,, | Performed by: STUDENT IN AN ORGANIZED HEALTH CARE EDUCATION/TRAINING PROGRAM

## 2023-08-24 PROCEDURE — 3008F PR BODY MASS INDEX (BMI) DOCUMENTED: ICD-10-PCS | Mod: CPTII,S$GLB,, | Performed by: PSYCHIATRY & NEUROLOGY

## 2023-08-24 RX ORDER — DEXMETHYLPHENIDATE HYDROCHLORIDE 10 MG/1
10 TABLET ORAL 2 TIMES DAILY
Qty: 60 TABLET | Refills: 0 | Status: SHIPPED | OUTPATIENT
Start: 2023-08-24 | End: 2023-09-23

## 2023-08-24 RX ORDER — ONDANSETRON 4 MG/1
4 TABLET, ORALLY DISINTEGRATING ORAL EVERY 6 HOURS PRN
Qty: 30 TABLET | Refills: 0 | Status: SHIPPED | OUTPATIENT
Start: 2023-08-24 | End: 2023-11-06 | Stop reason: SDUPTHER

## 2023-08-24 RX ORDER — DEXMETHYLPHENIDATE HYDROCHLORIDE 10 MG/1
10 TABLET ORAL 2 TIMES DAILY
Qty: 60 TABLET | Refills: 0 | Status: SHIPPED | OUTPATIENT
Start: 2023-10-23 | End: 2023-11-08 | Stop reason: SDUPTHER

## 2023-08-24 RX ORDER — LORAZEPAM 0.5 MG/1
0.5 TABLET ORAL DAILY PRN
Qty: 30 TABLET | Refills: 2 | Status: SHIPPED | OUTPATIENT
Start: 2023-08-24 | End: 2023-11-08 | Stop reason: SDUPTHER

## 2023-08-24 RX ORDER — DEXMETHYLPHENIDATE HYDROCHLORIDE 10 MG/1
10 TABLET ORAL 2 TIMES DAILY
Qty: 60 TABLET | Refills: 0 | Status: SHIPPED | OUTPATIENT
Start: 2023-09-23 | End: 2023-10-23

## 2023-08-24 RX ORDER — ESCITALOPRAM OXALATE 10 MG/1
10 TABLET ORAL DAILY
Qty: 90 TABLET | Refills: 0 | Status: SHIPPED | OUTPATIENT
Start: 2023-08-24 | End: 2024-01-31

## 2023-08-24 RX ORDER — LAMOTRIGINE 100 MG/1
100 TABLET ORAL 2 TIMES DAILY
Qty: 180 TABLET | Refills: 1 | Status: SHIPPED | OUTPATIENT
Start: 2023-08-24 | End: 2023-11-08 | Stop reason: SDUPTHER

## 2023-08-24 RX ORDER — ESCITALOPRAM OXALATE 5 MG/1
5 TABLET ORAL DAILY
Qty: 90 TABLET | Refills: 0 | Status: SHIPPED | OUTPATIENT
Start: 2023-08-24 | End: 2023-08-24

## 2023-08-24 NOTE — PATIENT INSTRUCTIONS
"    PLAN:     [facility] flowed request to scheduling for Follow up Nov 8 2023 @ 10 am to 11 am EXTENDED THO PATIENT  NEEDS TO CONFIRM APPOINTMENT  by seeing such appointment  appear on their "My Chart" michael.      NOTE:  IF  appointment is not visible, THEN patient is  instructed to call [facility] ([facility] [address] [facility]) at:  [phone]  and coordinate appointment scheduling with . Understanding Expressed.      Meds: Lamictal advanced to 100 mg BID  IF any rash stop all lamictal    He asks to move from Ambien to low dose ativan 0.5 mg / reviewed risk benefit ; he expresses understanding    Lexapro revert to 10 mg daily     Encouraged to schedule  with counselor [phone]    References:     Relaxation stress reduction workbook: AMI Davenport PhD ( used: $7-10)    Feeling Good Website: Ashwin Anderson MD / www.[employer] website (free) / alexey. PODCASTS    Anxiety &  phobia workbook by JORGE Carlisle PhD  (web retailers: used: $ 7-10)    [place]: Path to Better Sleep : https://www.veterantraining.[place].gov/insomnia/ (free)       Pt expressed appreciation for the visit today and did not have further question at this time though pt  was still informed to:     Call  if problems.    Call / Report Side Effects to [facility]     Encouraged to follow up with primary care / Gen Med MD for continued monitoring of general health and wellness.    Understanding was expressed; and no further concerns nor questions were raised at this time.     Remember healthy self care:   eat right  attempt adequate rest   HANDWASHING / encourage such alexey. During this corona virus time   walk or light exercise within reason and as your general med team approves  read or explore any of reference materials / homework mentioned  reach out (I.e.,  connect with)  others who nuture and bring out best in you  avoid risky behaviors    Keep your appointments:    IF you  cannot make your appt THEN please call [phone] or go online (via My " Chart michael) to reschedule.    It is the responsibility of the patient to reschedule an appointment if an appointment has been canceled or missed.    Avoid  alcohol and illicit substances.  Look for the positive.  All is often relative-seek balance  Call sooner if needed : [phone]   Call 911 or go to Emergency Room  (ER)  if  any acute concerns

## 2023-08-24 NOTE — TELEPHONE ENCOUNTER
No care due was identified.  [facility] Embedded Care Due Messages. Reference number: [id number].   8/24/2023 9:31:00 AM CDT  
Pt is requesting zofran it was prescribed by a different provider but he is requesting a fill.   
98.2

## 2023-08-24 NOTE — PROGRESS NOTES
Hermelindo Garza III   1980 08/24/2023       Disclaimer: Evaluation and treatment is based on information presented to date. Any new information may affect assessment and findings.      S: Patient's Own Perception of Condition (& Side Effects) : none      O:      CURRENT PRESENTATION:     Last time saw was more upbeat / looking forward to  pool party  for his [birth year] his birthday at [place] ; says he developed a kidney stone / In July had kidney stone removed    As well later had hammer toe repair on  left foot  2nd toe    Seems to speak a bit more nervous lately and they than when seen her for perhaps related pain    As noted last time / said likes Lamictal; would like advance to 200 mg (via 100 mg BID); also will revert to Lexapro 10 mg from Lexapro  5 mg       Excerpt pain mngt  8-2-23: [facility] MD    Hermelindo Garza III is a 43 y.o. male who presents complaining of both neck and back pain.  Today he reports his neck pain is most bothersome.  He reports muscle tightness and pain in the left trapezius and shoulder area.  He reports pain that radiates into the bilateral upper extremities with numbness and tingling.  He has been evaluated by Dr. Sierra with Neurosurgery who noted patient chris  candidate for ACDFs in he progresses.  Pt reports undergoing a cervical epidural steroid injection with Dr. Escamilla 02/10/2021 on with several months of relief.  He also reports low back pain.  He is s/p Left L3-4 hemilaminotomy, medial facetectomy, and foraminotomy for microdiscectomy with Dr Mcnamara on 9/19/2021.  He reports his back pain improved after surgery.  Patient reports he is previously completed physical therapy and is now actively continuing with his exercise program in the gym.  He also reports trying to lose weight and reports intentional  weight loss of approximately 55 pounds since 2020.     08/02/2023-42 y/o male presents virtually, his chief complaint was reoccurring left leg  pain and numbness, pain is described as tingling that radiates up his shin to his thigh and left groin.  He also reports some pain in the big toe on the left.  He has seen Neurosurgery since his last pain management clinic appointment he is interested in scheduling a lumbar NELI in effort to reduce some of his symptoms.  Profound weakness denies any bowel bladder dysfunction denies any saddle anesthesia denies any recent incident or trauma.  He does report that his current pain is disrupting his quality of life and affecting his ability to perform ADLs     Location: left leg and big toe, he also has a hx of low back, shoulder, and neck   Onset: 2 years  Current Pain Score: 5/10  Daily Pain of Range: 5-6/10  Quality: Aching, Tight, Tingling, Deep, Numb, Electric, Hot, and Cold  Radiation: neck and back  Worsened by: lying down, sitting, standing for more than 3 minutes, and walking for more than 6 minutes  Improved by: medications     Patient denies night fever/night sweats, urinary incontinence, bowel incontinence, significant weight loss, significant motor weakness, and loss of sensations.     Previous Interventions:  - 02/10/2021 -  C6/7 CERVICAL EPIDURAL STEROID INJECTION - Dr Escamilla - 4 months of pain relief.      Previous Therapies:  PT/OT: yes   Chiropractor:   HEP: yes  Relevant Surgery: yes               - Reports L4/L5 surgery at age 31 yo              - 09/19/21 - Left MIS L3-4 hemilaminotomy, medial facetectomy, and foraminotomy for microdiscectomy with Dr Mcnamara.   Previous Medications:   - NSAIDS: Meloxicam  - Muscle Relaxants: Robaxin, Flexeril  - TCAs:   - SNRIs:   - Topicals:   - Anticonvulsants: Lyrica - reports RL sxs so stopped; Gabapentin   - Opioids:   - Adjuvants: Tylenol    Comments Medication: says ambien not working / asks to remove; says ativan  helps with anxiety insomnia / says has been on in past at low dose and helps ; asks to switch to that; risk benefit discussed; says not taking  opioids tho may have been Rx in past. Did caution him as to such; says he well aware     Constitutional Health Concerns:      Vitals:    08/24/23 1037   BP: 136/73   Pulse: 81        Wt Readings from Last 3 Encounters:   08/24/23 111 kg (244 lb 13.1 oz)   08/21/23 112.3 kg (247 lb 9.2 oz)   08/14/23 108 kg (238 lb)   ]     Laboratory Data  Admission on 08/14/2023, Discharged on 08/14/2023   Component Date Value Ref Range Status    POC Glucose 08/14/2023 98  70 - 110 MG/DL Final    POCT Glucose 08/14/2023 151 (H)  70 - 110 mg/dL Final    POCT Glucose 08/14/2023 98  70 - 110 mg/dL Final        Mental Status Exam:      Appearance: casual   Oriented: x 3   Attitude: cooperative   Eye Contact: good  Behavior: calm     Mood: anxious   Cognition: alert  Concentration: grossly intact   Affect: appropriate range      Anxiety: moderate  to at times significant      Thought Process: goal directed     Speech:       Volume : WNL       Quantity WNL       Quality: appears to openly answer questions      Threats: no SI / no HI     Psychosis: denies all      Estimate of Intellectual Function: average   Impulse Control: no thoughts of harm to self/ others      Musculoskeletal:  no tremor        Patient Active Problem List   Diagnosis    Gastro-esophageal reflux disease without esophagitis    Renal calculi    Mild intermittent asthma without complication    ADHD (attention deficit hyperactivity disorder), inattentive type    LUBA (obstructive sleep apnea)    Hyperlipidemia, unspecified    Hypertriglyceridemia    Elevated liver enzymes    SHEIKH (nonalcoholic steatohepatitis)    Hepatosplenomegaly    Cellulitis of left arm    History of multiple allergies    Unspecified asthma, uncomplicated    Obesity, unspecified    Type 2 diabetes mellitus without complications    Vitamin D insufficiency    Restless leg    Chronic pain    Pain in left elbow    Cervical disc disorder with radiculopathy, unspecified cervical region    History of urinary  stone    Lumbar radiculopathy, chronic    Tachycardia    S/P lumbar discectomy    Morbid obesity    Cervical spondylosis    History of melanoma in situ    Anxiety    Mood Disorder Unspecifed:  Depressed THO also has some mood elevation  (8of 13 on MDQ): ex: irritability, more self confident, thoughts race. more active)     Rotator cuff tendonitis, left    Left lateral epicondylitis    Cervical radiculopathy    Insomnia    Ureteral stone with hydronephrosis    Pain of left lower extremity    Neck pain    Chronic low back pain          Current Outpatient Medications:     acetaminophen (TYLENOL) 325 MG tablet, Take 650 mg by mouth every 6 (six) hours as needed for Pain., Disp: , Rfl:     albuterol (PROVENTIL/VENTOLIN HFA) 90 mcg/actuation inhaler, RescueINHALE 2 PUFFS INTO THE LUNGS EVERY 6 HOURS AS NEEDED WHEEZING, RESCUE Strength: 90 mcg/actuation, Disp: 8.5 g, Rfl: 5    atorvastatin (LIPITOR) 20 MG tablet, TAKE 1 TABLET(20 MG) BY MOUTH EVERY DAY, Disp: 90 tablet, Rfl: 1    azelastine (ASTELIN) 137 mcg (0.1 %) nasal spray, 2 sprays (274 mcg total) by Nasal route 2 (two) times daily., Disp: 30 mL, Rfl: 9    blood-glucose meter kit, Use as instructed, Disp: 1 each, Rfl: 0    dexAMETHasone (DECADRON) 4 MG Tab, Take 1 tablet (4 mg total) by mouth once daily., Disp: 10 tablet, Rfl: 0    dexmethylphenidate (FOCALIN) 10 MG tablet, Take 1 tablet (10 mg total) by mouth 2 (two) times daily., Disp: 60 tablet, Rfl: 0    [START ON 9/23/2023] dexmethylphenidate (FOCALIN) 10 MG tablet, Take 1 tablet (10 mg total) by mouth 2 (two) times daily., Disp: 60 tablet, Rfl: 0    [START ON 10/23/2023] dexmethylphenidate (FOCALIN) 10 MG tablet, Take 1 tablet (10 mg total) by mouth 2 (two) times daily., Disp: 60 tablet, Rfl: 0    ergocalciferol, vitamin D2, (VITAMIN D ORAL), Take 1 tablet by mouth every other day., Disp: , Rfl:     EScitalopram oxalate (LEXAPRO) 10 MG tablet, Take 1 tablet (10 mg total) by mouth once daily., Disp: 90 tablet,  Rfl: 0    esomeprazole (NEXIUM) 40 mg GrPS, Take 40 mg by mouth 2 (two) times daily before meals., Disp: 60 each, Rfl: 11    fexofenadine (ALLEGRA) 180 MG tablet, Take 180 mg by mouth once daily., Disp: , Rfl:     fluocinonide (LIDEX) 0.05 % external solution, AAA scalp qday prn itching, scaling, Disp: 60 mL, Rfl: 3    gabapentin (NEURONTIN) 300 MG capsule, Take 1 capsule (300 mg total) by mouth 3 (three) times daily., Disp: 90 capsule, Rfl: 3    hydrocortisone 2.5 % cream, APPLY EXTERNALLY TO THE AFFECTED AREA TWICE DAILY FOR 10 DAYS, Disp: 30 g, Rfl: 0    ketoconazole (NIZORAL) 2 % cream, APPLY TOPICALLY TO THE AFFECTED AREA TWICE DAILY, Disp: 15 g, Rfl: 0    ketoconazole (NIZORAL) 2 % cream, APPLY EXTERNALLY TO THE AFFECTED AREA TWICE DAILY Strength: 2 %, Disp: 15 g, Rfl: 0    lamoTRIgine (LAMICTAL) 100 MG tablet, Take 1 tablet (100 mg total) by mouth 2 (two) times daily. IF any RASH, STOP ALL Lamictal and Tell Psyc MD., Disp: 180 tablet, Rfl: 1    latanoprost 0.005 % ophthalmic solution, Place 1 drop into both eyes every evening., Disp: 7.5 mL, Rfl: 3    LORazepam (ATIVAN) 0.5 MG tablet, Take 1 tablet (0.5 mg total) by mouth daily as needed (SIGNIFICANT ANXIETY)., Disp: 30 tablet, Rfl: 2    montelukast (SINGULAIR) 10 mg tablet, Take 1 tablet (10 mg total) by mouth every evening., Disp: 90 tablet, Rfl: 3    ondansetron (ZOFRAN-ODT) 4 MG TbDL, Dissolve 1 tablet (4 mg total) by mouth every 6 (six) hours as needed (nausea)., Disp: 30 tablet, Rfl: 0    oxybutynin (DITROPAN) 5 MG Tab, Take 1 tablet (5 mg total) by mouth 3 (three) times daily., Disp: 15 tablet, Rfl: 0    oxyCODONE-acetaminophen (PERCOCET)  mg per tablet, Take 1 tablet by mouth every 8 (eight) hours as needed for Pain., Disp: 21 tablet, Rfl: 0    rOPINIRole (REQUIP) 0.25 MG tablet, TAKE 1 TABLET(0.25 MG) BY MOUTH EVERY EVENING, Disp: 90 tablet, Rfl: 2    semaglutide (OZEMPIC) 0.25 mg or 0.5 mg (2 mg/3 mL) pen injector, inject 0.5mg into the skin  "every 7 days, Disp: 3 each, Rfl: 0    tamsulosin (FLOMAX) 0.4 mg Cap, Take 1 capsule (0.4 mg total) by mouth once daily for 10 days, Disp: 10 capsule, Rfl: 0    TRUE METRIX GLUCOSE TEST STRIP Strp, USE TO TEST ONCE DAILY, Disp: 25 strip, Rfl: 2    ULTRA THIN LANCETS 30 gauge Misc, USE TO TEST BLOOD SUGAR EVERY DAY AS DIRECTED, Disp: 100 each, Rfl: 3    urea (CARMOL) 40 % Crea, Apply to affected area on feet qhs after bath or shower, Disp: 60 g, Rfl: 3  No current facility-administered medications for this visit.    Facility-Administered Medications Ordered in Other Visits:     mupirocin 2 % ointment, , Nasal, On Call Procedure, Chong Padron MD, Given at 09/15/22 0553    ondansetron injection 4 mg, 4 mg, Intravenous, Once PRN, Arron Rangel MD     Social History     Tobacco Use   Smoking Status Former    Current packs/day: 0.00    Types: Cigarettes    Quit date: 2018    Years since quittin.3    Passive exposure: Never   Smokeless Tobacco Never        Review of patient's allergies indicates:  No Known Allergies     ASSESSMENT:   Encounter Diagnoses   Name Primary?    Mood Disorder Unspecifed:  Depressed THO also has some mood elevation  (8of 13 on MDQ): ex: irritability, more self confident, thoughts race. more active)  Yes    Anxiety     ADHD (attention deficit hyperactivity disorder), inattentive type     Insomnia, unspecified type     Type 2 diabetes mellitus without complication, unspecified whether long term insulin use     Cervical radiculopathy     History of urinary stone     Lumbar radiculopathy, chronic          Patient Instructions       PLAN:     Go SELLERS flowed request to scheduling for Follow up 2023 @ 10 am to 11 am EXTENDED O PATIENT  NEEDS TO CONFIRM APPOINTMENT  by seeing such appointment  appear on their "My Chart" michael.      NOTE:  IF  appointment is not visible, THEN patient is  instructed to call [facility] ([facility]) at:  [phone]  and coordinate " appointment scheduling with . Understanding Expressed.      Meds: Lamictal advanced to 100 mg BID  IF any rash stop all lamictal    He asks to move from Ambien to low dose ativan 0.5 mg / reviewed risk benefit ; he expresses understanding    Lexapro revert to 10 mg daily     Encouraged to schedule  with counselor [phone]    References:     Relaxation stress reduction workbook: AMI Davenport PhD ( used: $7-10)    Feeling Good Website: Ashwin Anderson MD / www.[employer] website (free) / alexey. PODCASTS    Anxiety &  phobia workbook by JORGE Carlisle PhD  (web retailers: used: $ 7-10)    [place]: Path to Better Sleep : https://www.veterantraining.[place].gov/insomnia/ (free)       Pt expressed appreciation for the visit today and did not have further question at this time though pt  was still informed to:     Call  if problems.    Call / Report Side Effects to Psyc MD     Encouraged to follow up with primary care / Gen Med MD for continued monitoring of general health and wellness.    Understanding was expressed; and no further concerns nor questions were raised at this time.     Remember healthy self care:   eat right  attempt adequate rest   HANDWASHING / encourage such alexey. During this corona virus time   walk or light exercise within reason and as your general med team approves  read or explore any of reference materials / homework mentioned  reach out (I.e.,  connect with)  others who nuture and bring out best in you  avoid risky behaviors    Keep your appointments:    IF you  cannot make your appt THEN please call [phone] or go online (via My Chart michael) to reschedule.    It is the responsibility of the patient to reschedule an appointment if an appointment has been canceled or missed.    Avoid  alcohol and illicit substances.  Look for the positive.  All is often relative-seek balance  Call sooner if needed : [phone]   Call 911 or go to Emergency Room  (ER)  if  any acute concerns  >>  References:      Relaxation stress reduction workbook: AMI Davenport PhD ( used: $7-10)    Feeling Good Website: Ashwin Anderson MD / [email or web address] website (free) / alexey. PODCASTS    Anxiety &  phobia workbook by JORGE Carlisle PhD  (web retailers: used: $ 7-10)    [place]: Path to Better Sleep : https://www.veterantraining.[place]/insomnia/ (free)    [place] Quit with [place]: http://www.quitwithusla.org/    (and other resources as per counselor, if applicable)     Pt expressed appreciation for the visit today and did not have further question at this time though pt  was still informed to:     Call / Report Side Effects to Psyc MD     Encouraged to follow up with primary care / Gen Med MD for continued monitoring of general health and wellness.    Understanding was expressed; and no further concerns nor questions were raised at this time.     remember healthy self care:   eat right  attempt adequate rest   HANDWASHING / encourage such alexey. During this corona virus time   walk or light exercise within reason and as your general med team approves  read or explore any of reference materials / homework mentioned  reach out (I.e.,  connect with)  others who nuture and bring out best in you  avoid risky behaviors  keep your appointments  IF you  cannot make your appt THEN please call or go online to reschedule.  avoid  alcohol and illicit substances.  Look for the positive.  All is often relative-seek balance  Call [facility]  if questions : [phone]   Call 911 or go to Emergency Room  (ER)  if any acute concern    >> Background from intial viral SELLERS eval  <<    He reports  to male. No children      Has under grad and mster in [employer] science  from [employer] /      Works data mngt [employer]   Excerpt from [facility] Suzi Sanches MD note of 4-  :      43 yo male with DM2, HLD, obesity, GERD, SHEIKH, LUBA, ADHD, asthma, spondylosis, hx of melanoma in situ presents for annual. Pt is new to me. Last PCP   "Ebony.     DM2: ozempic 0.5 mg weekly. Is interested in stopping medication as A1c controlled and has had increasing nausea. Not sure if ozempic is causing it since he has been on it for years.   HLD: not on medication  LUBA: uses cpap  GERD: nexium 40 mg bid  Cervical spondylosis with myelopathy: continues to have chronic neck pain with radiation down both arms (now L side increased) also endorses weakness. Does see a spine surgeon. Reports he was told to also r/o rheumatoid arthritis. Pt denies having joint swelling   ADHD: on focalin. Sees psychiatry.   Anxiety: reports increased anxiety over the past few months. Having trouble sleeping. Has not been on SSRI      He was referred in as having started Lexapro 10 mg April 2023. Says tulio some tho clearly still cites anxiety.      In past saw [facility] Pregeant Psyc NP / of Excerpt 1-  Psyc ntake:     Patient with hx of pre-glaucoma (eye drops for this), DM type 2 (well controlled with Ozempic), asthma (intermittent, mild), HLD, GERD, insomnia, sleep apnea, ADHD, lumbar discectomy, chronic back pain (managed with gabapentin), ozempic for weight loss.       Reports had previously been a patient of Dr. Dayana Robertson for past 12 to 13 years but was referred to this provider due to Dr. Robertson's USP.      Patient reports a long history of ADHD, had trialed several medications per communications with Dr. Robertson however Focalin has been the most effective lately. Stopped this for a short while, but since working from home the need for this medication has increased.      Reports anxiety has been more increased since being depleted due to work constraints. Reports some worry late at night with rumination. Occurring randomly but "I do worry a lot."     Reports "I have a compulsion to control and things need to be the way I want them to be."      Also long history of LUBA, insomnia and recently completed BEBP program      Reports been implementing sleep hygiene via " BEBP and CBT.      Reports 50# weight loss - this has helped with sleep apnea (settings have been reduced since losing weight).      Discussed to resume focalin IR at [facility] to TID pending UDS and CS form completion.     Denies SI/HI, AVH, racing thoughts, substance abuse

## 2023-08-25 ENCOUNTER — PATIENT MESSAGE (OUTPATIENT)
Dept: PODIATRY | Facility: CLINIC | Age: 43
End: 2023-08-25
Payer: COMMERCIAL

## 2023-08-25 RX ORDER — SEMAGLUTIDE 0.68 MG/ML
0.5 INJECTION, SOLUTION SUBCUTANEOUS
Qty: 3 EACH | Refills: 1 | Status: SHIPPED | OUTPATIENT
Start: 2023-08-25 | End: 2024-02-14 | Stop reason: DRUGHIGH

## 2023-08-25 NOTE — TELEPHONE ENCOUNTER
No care due was identified.  [facility] Embedded Care Due Messages. Reference number: [id number].   8/25/2023 4:19:59 PM CDT

## 2023-08-28 ENCOUNTER — OFFICE VISIT (OUTPATIENT)
Dept: PODIATRY | Facility: CLINIC | Age: 43
End: 2023-08-28
Payer: COMMERCIAL

## 2023-08-28 VITALS
SYSTOLIC BLOOD PRESSURE: 144 MMHG | WEIGHT: 248.69 LBS | HEART RATE: 105 BPM | TEMPERATURE: 99 F | RESPIRATION RATE: 18 BRPM | OXYGEN SATURATION: 98 % | BODY MASS INDEX: 41.43 KG/M2 | HEIGHT: 65 IN | DIASTOLIC BLOOD PRESSURE: 86 MMHG

## 2023-08-28 DIAGNOSIS — Z98.890 POST-OPERATIVE STATE: Primary | ICD-10-CM

## 2023-08-28 PROCEDURE — 99999 PR PBB SHADOW E&M-EST. PATIENT-LVL V: CPT | Mod: PBBFAC,,, | Performed by: STUDENT IN AN ORGANIZED HEALTH CARE EDUCATION/TRAINING PROGRAM

## 2023-08-28 PROCEDURE — 99024 POSTOP FOLLOW-UP VISIT: CPT | Mod: S$GLB,,, | Performed by: STUDENT IN AN ORGANIZED HEALTH CARE EDUCATION/TRAINING PROGRAM

## 2023-08-28 PROCEDURE — 3008F PR BODY MASS INDEX (BMI) DOCUMENTED: ICD-10-PCS | Mod: CPTII,S$GLB,, | Performed by: STUDENT IN AN ORGANIZED HEALTH CARE EDUCATION/TRAINING PROGRAM

## 2023-08-28 PROCEDURE — 3079F DIAST BP 80-89 MM HG: CPT | Mod: CPTII,S$GLB,, | Performed by: STUDENT IN AN ORGANIZED HEALTH CARE EDUCATION/TRAINING PROGRAM

## 2023-08-28 PROCEDURE — 3008F BODY MASS INDEX DOCD: CPT | Mod: CPTII,S$GLB,, | Performed by: STUDENT IN AN ORGANIZED HEALTH CARE EDUCATION/TRAINING PROGRAM

## 2023-08-28 PROCEDURE — 1159F MED LIST DOCD IN RCRD: CPT | Mod: CPTII,S$GLB,, | Performed by: STUDENT IN AN ORGANIZED HEALTH CARE EDUCATION/TRAINING PROGRAM

## 2023-08-28 PROCEDURE — 3044F PR MOST RECENT HEMOGLOBIN A1C LEVEL <7.0%: ICD-10-PCS | Mod: CPTII,S$GLB,, | Performed by: STUDENT IN AN ORGANIZED HEALTH CARE EDUCATION/TRAINING PROGRAM

## 2023-08-28 PROCEDURE — 99024 PR POST-OP FOLLOW-UP VISIT: ICD-10-PCS | Mod: S$GLB,,, | Performed by: STUDENT IN AN ORGANIZED HEALTH CARE EDUCATION/TRAINING PROGRAM

## 2023-08-28 PROCEDURE — 3077F SYST BP >= 140 MM HG: CPT | Mod: CPTII,S$GLB,, | Performed by: STUDENT IN AN ORGANIZED HEALTH CARE EDUCATION/TRAINING PROGRAM

## 2023-08-28 PROCEDURE — 3077F PR MOST RECENT SYSTOLIC BLOOD PRESSURE >= 140 MM HG: ICD-10-PCS | Mod: CPTII,S$GLB,, | Performed by: STUDENT IN AN ORGANIZED HEALTH CARE EDUCATION/TRAINING PROGRAM

## 2023-08-28 PROCEDURE — 1159F PR MEDICATION LIST DOCUMENTED IN MEDICAL RECORD: ICD-10-PCS | Mod: CPTII,S$GLB,, | Performed by: STUDENT IN AN ORGANIZED HEALTH CARE EDUCATION/TRAINING PROGRAM

## 2023-08-28 PROCEDURE — 99999 PR PBB SHADOW E&M-EST. PATIENT-LVL V: ICD-10-PCS | Mod: PBBFAC,,, | Performed by: STUDENT IN AN ORGANIZED HEALTH CARE EDUCATION/TRAINING PROGRAM

## 2023-08-28 PROCEDURE — 3079F PR MOST RECENT DIASTOLIC BLOOD PRESSURE 80-89 MM HG: ICD-10-PCS | Mod: CPTII,S$GLB,, | Performed by: STUDENT IN AN ORGANIZED HEALTH CARE EDUCATION/TRAINING PROGRAM

## 2023-08-28 PROCEDURE — 3044F HG A1C LEVEL LT 7.0%: CPT | Mod: CPTII,S$GLB,, | Performed by: STUDENT IN AN ORGANIZED HEALTH CARE EDUCATION/TRAINING PROGRAM

## 2023-08-28 NOTE — PROGRESS NOTES
Subjective:     Patient    Hermelindo Garza III is a 43 y.o. male.    Problem    07/31/23: Presents with left 2nd toe that rubs on 1st toe causing blisters, callusing, and pain at medial left 2nd toe. He has attempted to resolve this issue by wearing bandages and toe spacers/sleeves but the issue has progressed over time and he continues to have pain and skin breakdown at the left 2nd toe. Also has wide feet and difficulty finding appropriately fitting footwear. He is now considering surgery to correct the left 2nd toe. He is also now having pain in the ball of the foot on the left. Denies numbness, tingling, burning or radiating pain in the feet.     08/21/23: Presents 1 week s/p L 2nd toe arthroplasty and ostectomy. Pain improved significantly, does throb when he walks a lot. No major issues.     08/28/23: Presents 2 weeks s/p L 2nd toe arthroplasty and ostectomy. Having minor numbness at base of left 2nd toe. Also reports no pain at left 4th MTPJ and no pain at left lateralfoot 5th metatarsal base area since surgery. No major concerns, would like to get back to physical activities.     History    History obtained from patient and review of medical records.     Past Medical History:   Diagnosis Date    ADD (attention deficit disorder) 05/09/2012    Allergy     Anxiety 4/12/2023    Asthma 05/09/2012    Cervical disc disorder with radiculopathy, unspecified cervical region 09/14/2021    Cervical spondylosis with myelopathy 06/29/2022    Eczema     Esophageal ulcer     GERD (gastroesophageal reflux disease) 05/09/2012    Glaucoma     Kidney stones 05/2014    Lumbar disc disease 05/09/2012    Lumbar herniated disc 05/09/2012    Malignant melanoma of skin, unspecified 01/06/2022    in situ right mid back    Melanoma 01/2022    in situ R mid back     LUBA (obstructive sleep apnea)     Radiculopathy, lumbar region 09/14/2021    Renal calculi 05/09/2012    Type 2 diabetes mellitus without complication, without  long-term current use of insulin 11/16/2020       Past Surgical History:   Procedure Laterality Date    APPENDECTOMY  2006    CARPAL TUNNEL RELEASE Right 09/15/2022    Procedure: RELEASE, CARPAL TUNNEL;  Surgeon: Christen Marshall MD;  Location: [facility] OR;  Service: Orthopedics;  Laterality: Right;    COLONOSCOPY  01/22/2019    internal hemorrhoids, o/w normal - repeat at age 52    CORRECTION OF HAMMER TOE Left 8/14/2023    Procedure: CORRECTION, HAMMER TOE--  basic foot tray as well as a Reese microfree/microchoice tray. I also asked [place] to bring an MIS mati.;  Surgeon: Ankush Back DPM;  [facility]: [facility] OR;  Service: Podiatry;  Laterality: Left;  reese drill, sagittal saw, foot set    CYSTOSCOPY  7/5/2023    Procedure: CYSTOSCOPY;  Surgeon: Chuckie Hall MD;  Location: [address];  Service: Urology;;    EPIDURAL STEROID INJECTION N/A 02/10/2021    Procedure: CERVICAL C6/7 NELI DIRECT REFERRAL;  Surgeon: Michi Escamilla MD;  Location: [facility] PAIN MGT;  Service: Pain Management;  Laterality: N/A;  NEEDS CONSENT    EPIDURAL STEROID INJECTION INTO CERVICAL SPINE N/A 6/28/2023    Procedure: Injection-steroid-epidural-cervical C7-T1;  Surgeon: Lorraine Patel DO;  Location: [facility] PAIN MANAGEMENT;  Service: Pain Management;  Laterality: N/A;  diabetic    ESOPHAGOGASTRODUODENOSCOPY  01/22/2019    LA grade B esophagitis; esophageal stenosis- dilated; gastritis; H pylori pathology negative    ESOPHAGOGASTRODUODENOSCOPY N/A 05/18/2021    Procedure: EGD (ESOPHAGOGASTRODUODENOSCOPY);  Surgeon: Mariana Hector MD;  Location: [facility];  Service: Endoscopy;  Laterality: N/A;    EXCISION OF GANGLION OF WRIST Right 09/15/2022    Procedure: EXCISION, GANGLION CYST, WRIST;  Surgeon: Christen Marshall MD;  Location: [facility];  Service: Orthopedics;  Laterality: Right;    FLEXOR TENDON REPAIR Right 09/15/2022    Procedure: FLEXOR TENOSYNOVECTOMY;  Surgeon: Christen Marshall MD;  Location: [facility];  Service: Orthopedics;   Laterality: Right;    HAND ARTHROTOMY Right 09/15/2022    Procedure: ARTHROTOMY, HAND RADIOCARPAL;  Surgeon: Christen Marshall MD;  Location: [facility] OR;  Service: Orthopedics;  Laterality: Right;  Radiocarpal    INJECTION OF STEROID Left 09/15/2022    Procedure: INJECTION, STEROID LEFT WRIST AND LEFT LATERAL ELBOW;  Surgeon: Christen Marshall MD;  Location: [facility] OR;  Service: Orthopedics;  Laterality: Left;  Left Carpal Tunnel CSI    LASER LITHOTRIPSY Left 7/5/2023    Procedure: LITHOTRIPSY, USING LASER;  Surgeon: Chuckie Hall MD;  Location: [facility] OR 1ST FLR;  Service: Urology;  Laterality: Left;    LUMBAR LAMINECTOMY  2010    LUMBAR LAMINECTOMY WITH DISCECTOMY Left 09/20/2021    Procedure: LAMINECTOMY, SPINE, LUMBAR, WITH DISCECTOMY;  Surgeon: Naseem Mcnamara DO;  Location: [facility] OR 2ND FLR;  Service: Neurosurgery;  Laterality: Left;  MIS L3-4    TYMPANOSTOMY TUBE PLACEMENT      URETERAL STENT PLACEMENT Left 7/5/2023    Procedure: INSERTION, STENT, URETER;  Surgeon: Chuckie Hall MD;  Location: [facility] OR 1ST FLR;  Service: Urology;  Laterality: Left;    URETEROSCOPIC REMOVAL OF URETERIC CALCULUS Left 7/5/2023    Procedure: REMOVAL, CALCULUS, URETER, URETEROSCOPIC;  Surgeon: Chuckie Hall MD;  [facility];  Service: Urology;  Laterality: Left;    URETEROSCOPY Left 7/5/2023    Procedure: URETEROSCOPY;  Surgeon: Chuckie Hall MD;  [facility];  Service: Urology;  Laterality: Left;        Objective:     Vitals  Wt Readings from Last 1 Encounters:   08/28/23 112.8 kg (248 lb 10.9 oz)     Temp Readings from Last 1 Encounters:   08/28/23 98.6 °F (37 °C)     BP Readings from Last 1 Encounters:   08/28/23 (!) 144/86     Pulse Readings from Last 1 Encounters:   08/28/23 105       Dermatological Exam    Skin:  Pedal hair growth, skin color, and skin texture normal on right  Pedal hair growth, skin color, and skin texture normal on left; 2nd toe incision healing well, sutures intact, not  clinically infected    Nails:  All nails normal in length, thickness, color    Vascular Exam    Arteries:  Posterior tibial artery palpable on right  Dorsalis pedis artery palpable on right  Posterior tibial artery palpable on left  Dorsalis pedis artery palpable on left    Veins:  Superficial veins unremarkable on right  Superficial veins unremarkable on left    Swelling:  None on right  None on left    Neurological Exam    [place] touch test:  6/6 sites sensed, light touch intact    [place] Sign:  + at left 3rd IM space    Tinel Sign:  - at all major ankle and foot nerves bilaterally    Slump Test:  - bilaterally    Musculoskeletal Exam    Footwear:  Casual on right  Casual on left    Gait Exam:   Ambulatory Status: Ambulatory  Gait: Antalgic  Assistive Devices: None    Foot Progression Angle:  Normal on right  Normal on left    Foot Posture Index:  Right  Talar Head Palpation +1   TNJ Prominence +1   Medial Arch Congruence +2   Lateral Malleolar Curve +1   Relaxed Calcaneal Position +1   Forefoot Abduction/Adduction  0   +6 (Rectus) on right (normal is 1-7)    Left  Talar Head Palpation +1   TNJ Prominence +1   Medial Arch Congruence +2   Lateral Malleolar Curve +1   Relaxed Calcaneal Position +1   Forefoot Abduction/Adduction  0   +6 (Rectus) on left (normal is 1-7)    WB Digital Alignment:  MTPJ varus deformity at 1st toe on right  MTPJ varus deformity and DIPJ valgus/abduction  at 2nd toe on right  MTPJ varus deformity at 3rd toe on right  MTPJ varus deformity at 4th toe on right  MTPJ varus deformity, PIPJ varus deformity, and DIPJ flexion deformity at 5th toe on right  Normal at 1st toe on left  Rectus 2nd toe on left except minor DIPJ valgus/abduction  PIPJ varus deformity at 3rd toe on left  PIPJ varus deformity at 4th toe on left  PIPJ varus deformity at 5th toe on left     Right Lower Extremity Additional Findings:  Right foot and ankle function, strength, and range of motion unremarkable except as  noted above.     Left Lower Extremity Additional Findings:  Pain on palpation of plantar 3rd and 4th metatarsal heads resolved. Negative dorsal drawers at 3rd and 4th MTPJs.   Left foot and ankle function, strength, and range of motion unremarkable except as noted above.              Imaging and Other Tests    Imagin22 left foot X ray: mild skew foot with varus deformities at 1st-4th MTPJs, valgus deformity at 2nd DIPJ consistent with clinical findings.     Independently reviewed and interpreted imaging, findings are as follows: N/A     Assessment:     Encounter Diagnosis   Name Primary?    Post-operative state Yes        Plan:     I counseled the patient on his conditions, their implications and medical management.    Diabetic foot without complications: chronic stable  -Diabetic foot exam performed.     Left 2nd toe deformity s/p arthroplasty and ostectomy: improving   -Sutures removed.   -OK to transition into standard footwear.  -OK to transition back to normal level of physical activity, transition should be very slow to minimize risk of injury.   -Elevate LLE as needed, pain medication as needed.      Left foot metatarsalgia: acute resolved  -Resolved for now. Monitoring.       Left foot 5th metatarsal base pain: acute resolved  -Resolved for now. Monitoring.     Return to clinic in 1-2 months for general followup, call sooner PRN.

## 2023-08-29 ENCOUNTER — PATIENT MESSAGE (OUTPATIENT)
Dept: ALLERGY | Facility: CLINIC | Age: 43
End: 2023-08-29
Payer: COMMERCIAL

## 2023-08-31 ENCOUNTER — CLINICAL SUPPORT (OUTPATIENT)
Dept: ALLERGY | Facility: CLINIC | Age: 43
End: 2023-08-31
Payer: COMMERCIAL

## 2023-08-31 DIAGNOSIS — J30.9 CHRONIC ALLERGIC RHINITIS: Primary | ICD-10-CM

## 2023-08-31 PROCEDURE — 95117 IMMUNOTHERAPY INJECTIONS: CPT | Mod: S$GLB,,, | Performed by: STUDENT IN AN ORGANIZED HEALTH CARE EDUCATION/TRAINING PROGRAM

## 2023-08-31 PROCEDURE — 95117 PR IMMU2THERAPY, 2+ INJECTIONS: ICD-10-PCS | Mod: S$GLB,,, | Performed by: STUDENT IN AN ORGANIZED HEALTH CARE EDUCATION/TRAINING PROGRAM

## 2023-08-31 PROCEDURE — 99999 PR PBB SHADOW E&M-EST. PATIENT-LVL I: ICD-10-PCS | Mod: PBBFAC,,,

## 2023-08-31 PROCEDURE — 99999 PR PBB SHADOW E&M-EST. PATIENT-LVL I: CPT | Mod: PBBFAC,,,

## 2023-09-01 ENCOUNTER — PATIENT MESSAGE (OUTPATIENT)
Dept: PLASTIC SURGERY | Facility: CLINIC | Age: 43
End: 2023-09-01
Payer: COMMERCIAL

## 2023-09-06 ENCOUNTER — PATIENT MESSAGE (OUTPATIENT)
Dept: ALLERGY | Facility: CLINIC | Age: 43
End: 2023-09-06
Payer: COMMERCIAL

## 2023-09-06 ENCOUNTER — PATIENT MESSAGE (OUTPATIENT)
Dept: PSYCHIATRY | Facility: CLINIC | Age: 43
End: 2023-09-06
Payer: COMMERCIAL

## 2023-09-07 ENCOUNTER — TELEPHONE (OUTPATIENT)
Dept: PAIN MEDICINE | Facility: CLINIC | Age: 43
End: 2023-09-07
Payer: COMMERCIAL

## 2023-09-08 ENCOUNTER — TELEPHONE (OUTPATIENT)
Dept: PAIN MEDICINE | Facility: CLINIC | Age: 43
End: 2023-09-08
Payer: COMMERCIAL

## 2023-09-08 DIAGNOSIS — M54.16 LUMBAR RADICULOPATHY, CHRONIC: Primary | ICD-10-CM

## 2023-09-09 DIAGNOSIS — M54.12 CERVICAL RADICULAR PAIN: ICD-10-CM

## 2023-09-11 ENCOUNTER — PATIENT MESSAGE (OUTPATIENT)
Dept: ADMINISTRATIVE | Facility: HOSPITAL | Age: 43
End: 2023-09-11
Payer: COMMERCIAL

## 2023-09-11 RX ORDER — GABAPENTIN 300 MG/1
300 CAPSULE ORAL 3 TIMES DAILY
Qty: 90 CAPSULE | Refills: 3 | Status: ON HOLD | OUTPATIENT
Start: 2023-09-11 | End: 2023-12-04 | Stop reason: SDUPTHER

## 2023-09-12 ENCOUNTER — CLINICAL SUPPORT (OUTPATIENT)
Dept: ALLERGY | Facility: CLINIC | Age: 43
End: 2023-09-12
Payer: COMMERCIAL

## 2023-09-12 DIAGNOSIS — J30.9 CHRONIC ALLERGIC RHINITIS: Primary | ICD-10-CM

## 2023-09-12 DIAGNOSIS — E11.9 DIABETES MELLITUS WITHOUT COMPLICATION: ICD-10-CM

## 2023-09-12 PROCEDURE — 95117 PR IMMU2THERAPY, 2+ INJECTIONS: ICD-10-PCS | Mod: S$GLB,,, | Performed by: ALLERGY & IMMUNOLOGY

## 2023-09-12 PROCEDURE — 99999 PR PBB SHADOW E&M-EST. PATIENT-LVL I: ICD-10-PCS | Mod: PBBFAC,,,

## 2023-09-12 PROCEDURE — 99999 PR PBB SHADOW E&M-EST. PATIENT-LVL I: CPT | Mod: PBBFAC,,,

## 2023-09-12 PROCEDURE — 95117 IMMUNOTHERAPY INJECTIONS: CPT | Mod: S$GLB,,, | Performed by: ALLERGY & IMMUNOLOGY

## 2023-09-19 ENCOUNTER — CLINICAL SUPPORT (OUTPATIENT)
Dept: ALLERGY | Facility: CLINIC | Age: 43
End: 2023-09-19
Payer: COMMERCIAL

## 2023-09-19 ENCOUNTER — PATIENT MESSAGE (OUTPATIENT)
Dept: PAIN MEDICINE | Facility: CLINIC | Age: 43
End: 2023-09-19
Payer: COMMERCIAL

## 2023-09-19 DIAGNOSIS — J30.9 CHRONIC ALLERGIC RHINITIS: Primary | ICD-10-CM

## 2023-09-19 PROCEDURE — 95117 PR IMMU2THERAPY, 2+ INJECTIONS: ICD-10-PCS | Mod: S$GLB,,, | Performed by: ALLERGY & IMMUNOLOGY

## 2023-09-19 PROCEDURE — 95117 IMMUNOTHERAPY INJECTIONS: CPT | Mod: S$GLB,,, | Performed by: ALLERGY & IMMUNOLOGY

## 2023-09-19 PROCEDURE — 99999 PR PBB SHADOW E&M-EST. PATIENT-LVL I: CPT | Mod: PBBFAC,,,

## 2023-09-19 PROCEDURE — 99999 PR PBB SHADOW E&M-EST. PATIENT-LVL I: ICD-10-PCS | Mod: PBBFAC,,,

## 2023-09-20 ENCOUNTER — PATIENT MESSAGE (OUTPATIENT)
Dept: NEUROSURGERY | Facility: CLINIC | Age: 43
End: 2023-09-20
Payer: COMMERCIAL

## 2023-09-20 RX ORDER — MELOXICAM 15 MG/1
TABLET ORAL
Qty: 30 TABLET | Refills: 2 | Status: SHIPPED | OUTPATIENT
Start: 2023-09-20 | End: 2024-04-02 | Stop reason: SDUPTHER

## 2023-09-21 ENCOUNTER — TELEPHONE (OUTPATIENT)
Dept: PAIN MEDICINE | Facility: CLINIC | Age: 43
End: 2023-09-21
Payer: COMMERCIAL

## 2023-09-26 ENCOUNTER — CLINICAL SUPPORT (OUTPATIENT)
Dept: ALLERGY | Facility: CLINIC | Age: 43
End: 2023-09-26
Payer: COMMERCIAL

## 2023-09-26 DIAGNOSIS — J30.9 CHRONIC ALLERGIC RHINITIS: Primary | ICD-10-CM

## 2023-09-26 PROCEDURE — 99999 PR PBB SHADOW E&M-EST. PATIENT-LVL I: ICD-10-PCS | Mod: PBBFAC,,,

## 2023-09-26 PROCEDURE — 99999 PR PBB SHADOW E&M-EST. PATIENT-LVL I: CPT | Mod: PBBFAC,,,

## 2023-09-26 PROCEDURE — 95117 PR IMMU2THERAPY, 2+ INJECTIONS: ICD-10-PCS | Mod: S$GLB,,, | Performed by: ALLERGY & IMMUNOLOGY

## 2023-09-26 PROCEDURE — 95117 IMMUNOTHERAPY INJECTIONS: CPT | Mod: S$GLB,,, | Performed by: ALLERGY & IMMUNOLOGY

## 2023-09-26 NOTE — PRE-PROCEDURE INSTRUCTIONS
The following was discussed with pt via phone and sent to pt portal. Pt verbalized understanding.      On the day of your pain procedure, please report to [facility] located at [address].  Please park in the lot in front of the building and enter at the main entrance. Proceed to the second floor for registration.    Arrival time: 7:00am    You may not drive home after your procedure. You may not leave alone in an uber, taxi, etc.  You must be discharged to a responsible adult. Please remain under adult supervision for 24 hours.   If possible, please have your visitor &/or ride home stay during your visit.   The surgeon should speak with your visitors after your procedure.  All visitors must be 18 years of age or older. Please limit your visitors to max of 2 people.    Your fasting instructions are as follow:  IV sedation. You should not eat for 8 hours and can only drink clear liquids (water or black coffee without cream/sugar) up until 2 hours before your scheduled time.  You CANNOT drive yourself and must have a .    HOLD the following medications the morning of your procedure:   -CONTINUE HOLDING OZEMPIC  -VITAMINS/SUPPLEMENTS    Shower the night before and the morning of your procedure with antibacterial soap.   Please do not use antibacterial soap to wash your face. Use your regular face soap.  Do not apply any products to your skin nor your hair after you shower the morning of your procedure.  Products include oils, powders, lotion, deodorant, make-up, or sunscreen.    Wear loose, comfortable clothing.  No jewelry. No contacts. Bring glasses if necessary.  If you have dentures, please bring a case.  Please leave all valuables at home.    If you start to feel sick (fever, chills, coughing, etc) or start on any antibiotics, please contact your doctor's office at [phone] to reschedule.     Thanks,  SIMON Wilkins  Pre-Admit Dept [facility]  [phone]

## 2023-09-27 ENCOUNTER — HOSPITAL ENCOUNTER (OUTPATIENT)
Facility: HOSPITAL | Age: 43
Discharge: HOME | End: 2023-09-27
Attending: ANESTHESIOLOGY | Admitting: ANESTHESIOLOGY
Payer: COMMERCIAL

## 2023-09-27 VITALS
SYSTOLIC BLOOD PRESSURE: 137 MMHG | DIASTOLIC BLOOD PRESSURE: 86 MMHG | HEART RATE: 79 BPM | RESPIRATION RATE: 14 BRPM | TEMPERATURE: 98 F | OXYGEN SATURATION: 95 %

## 2023-09-27 DIAGNOSIS — M54.16 LUMBAR RADICULOPATHY: ICD-10-CM

## 2023-09-27 DIAGNOSIS — M51.36 DDD (DEGENERATIVE DISC DISEASE), LUMBAR: Primary | ICD-10-CM

## 2023-09-27 DIAGNOSIS — G89.29 CHRONIC PAIN: ICD-10-CM

## 2023-09-27 LAB — POCT GLUCOSE: 75 MG/DL (ref 70–110)

## 2023-09-27 PROCEDURE — 25500020 PHARM REV CODE 255: Performed by: ANESTHESIOLOGY

## 2023-09-27 PROCEDURE — 62323 NJX INTERLAMINAR LMBR/SAC: CPT | Mod: ,,, | Performed by: ANESTHESIOLOGY

## 2023-09-27 PROCEDURE — 63600175 PHARM REV CODE 636 W HCPCS: Performed by: ANESTHESIOLOGY

## 2023-09-27 PROCEDURE — 25000003 PHARM REV CODE 250: Performed by: ANESTHESIOLOGY

## 2023-09-27 PROCEDURE — 62323 NJX INTERLAMINAR LMBR/SAC: CPT | Performed by: ANESTHESIOLOGY

## 2023-09-27 PROCEDURE — 82962 GLUCOSE BLOOD TEST: CPT | Performed by: ANESTHESIOLOGY

## 2023-09-27 PROCEDURE — 62323 PR INJ LUMBAR/SACRAL, W/IMAGING GUIDANCE: ICD-10-PCS | Mod: ,,, | Performed by: ANESTHESIOLOGY

## 2023-09-27 PROCEDURE — 63600175 PHARM REV CODE 636 W HCPCS: Performed by: STUDENT IN AN ORGANIZED HEALTH CARE EDUCATION/TRAINING PROGRAM

## 2023-09-27 RX ORDER — LIDOCAINE HYDROCHLORIDE 10 MG/ML
INJECTION, SOLUTION EPIDURAL; INFILTRATION; INTRACAUDAL; PERINEURAL
Status: DISCONTINUED | OUTPATIENT
Start: 2023-09-27 | End: 2023-09-27 | Stop reason: HOSPADM

## 2023-09-27 RX ORDER — FENTANYL CITRATE 50 UG/ML
25 INJECTION, SOLUTION INTRAMUSCULAR; INTRAVENOUS
Status: DISCONTINUED | OUTPATIENT
Start: 2023-09-27 | End: 2023-09-27 | Stop reason: HOSPADM

## 2023-09-27 RX ORDER — DEXAMETHASONE SODIUM PHOSPHATE 10 MG/ML
INJECTION INTRAMUSCULAR; INTRAVENOUS
Status: DISCONTINUED | OUTPATIENT
Start: 2023-09-27 | End: 2023-09-27 | Stop reason: HOSPADM

## 2023-09-27 RX ORDER — LIDOCAINE HYDROCHLORIDE 20 MG/ML
INJECTION, SOLUTION EPIDURAL; INFILTRATION; INTRACAUDAL; PERINEURAL
Status: DISCONTINUED | OUTPATIENT
Start: 2023-09-27 | End: 2023-09-27 | Stop reason: HOSPADM

## 2023-09-27 RX ORDER — MIDAZOLAM HYDROCHLORIDE 1 MG/ML
1 INJECTION INTRAMUSCULAR; INTRAVENOUS
Status: DISCONTINUED | OUTPATIENT
Start: 2023-09-27 | End: 2023-09-27 | Stop reason: HOSPADM

## 2023-09-27 RX ORDER — SODIUM CHLORIDE 9 MG/ML
INJECTION, SOLUTION INTRAVENOUS CONTINUOUS
Status: DISCONTINUED | OUTPATIENT
Start: 2023-09-27 | End: 2023-09-27 | Stop reason: HOSPADM

## 2023-09-27 NOTE — PLAN OF CARE
Pt in preop bay 21, VSS and IV inserted. Pt denies any open wounds on body or the use of any immunizations or antibiotics in the past 2 weeks. Pt ready to roll.

## 2023-09-27 NOTE — DISCHARGE SUMMARY
Discharge Note  Short Stay      SUMMARY     Admit Date: 9/27/2023    Attending Physician: Tirso Pickering MD      Discharge Physician: Junior Ramírez      Discharge Date: 9/27/2023 8:33 AM    Procedure(s) (LRB):  L4-5 NELI (N/A)    Final Diagnosis: Lumbar radiculopathy, chronic [M54.16]    Disposition: Home or self care    Patient Instructions:   Current Discharge Medication List        CONTINUE these medications which have NOT CHANGED    Details   acetaminophen (TYLENOL) 325 MG tablet Take 650 mg by mouth every 6 (six) hours as needed for Pain.      albuterol (PROVENTIL/VENTOLIN HFA) 90 mcg/actuation inhaler RescueINHALE 2 PUFFS INTO THE LUNGS EVERY 6 HOURS AS NEEDED WHEEZING, RESCUE Strength: 90 mcg/actuation  Qty: 8.5 g, Refills: 5    Associated Diagnoses: Mild intermittent asthma without complication      atorvastatin (LIPITOR) 20 MG tablet TAKE 1 TABLET(20 MG) BY MOUTH EVERY DAY  Qty: 90 tablet, Refills: 1    Associated Diagnoses: Type 2 diabetes mellitus without complication, without long-term current use of insulin; Mixed hyperlipidemia      azelastine (ASTELIN) 137 mcg (0.1 %) nasal spray 2 sprays (274 mcg total) by Nasal route 2 (two) times daily.  Qty: 30 mL, Refills: 9      !! dexmethylphenidate (FOCALIN) 10 MG tablet Take 1 tablet (10 mg total) by mouth 2 (two) times daily.  Qty: 60 tablet, Refills: 0    Associated Diagnoses: ADHD (attention deficit hyperactivity disorder), inattentive type      ergocalciferol, vitamin D2, (VITAMIN D ORAL) Take 1 tablet by mouth every other day.      EScitalopram oxalate (LEXAPRO) 10 MG tablet Take 1 tablet (10 mg total) by mouth once daily.  Qty: 90 tablet, Refills: 0    Associated Diagnoses: Unspecified mood (affective) disorder      fexofenadine (ALLEGRA) 180 MG tablet Take 180 mg by mouth once daily.      gabapentin (NEURONTIN) 300 MG capsule TAKE 1 CAPSULE(300 MG) BY MOUTH THREE TIMES DAILY  Qty: 90 capsule, Refills: 3    Associated Diagnoses: Cervical radicular pain       hydrocortisone 2.5 % cream APPLY EXTERNALLY TO THE AFFECTED AREA TWICE DAILY FOR 10 DAYS  Qty: 30 g, Refills: 0    Associated Diagnoses: Allergic contact dermatitis due to plant      ketoconazole (NIZORAL) 2 % cream APPLY TOPICALLY TO THE AFFECTED AREA TWICE DAILY  Qty: 15 g, Refills: 0    Associated Diagnoses: Seborrheic dermatitis of scalp      lamoTRIgine (LAMICTAL) 100 MG tablet Take 1 tablet (100 mg total) by mouth 2 (two) times daily. IF any RASH, STOP ALL Lamictal and Tell Psyc MD.  Qty: 180 tablet, Refills: 1    Associated Diagnoses: Unspecified mood (affective) disorder      latanoprost 0.005 % ophthalmic solution Place 1 drop into both eyes every evening.  Qty: 7.5 mL, Refills: 3    Associated Diagnoses: Ocular hypertension, bilateral      LORazepam (ATIVAN) 0.5 MG tablet Take 1 tablet (0.5 mg total) by mouth daily as needed (SIGNIFICANT ANXIETY).  Qty: 30 tablet, Refills: 2    Associated Diagnoses: Anxiety      meloxicam (MOBIC) 15 MG tablet TAKE 1 TABLET(15 MG) BY MOUTH EVERY DAY as needed FOR PAIN  Qty: 30 tablet, Refills: 2      montelukast (SINGULAIR) 10 mg tablet Take 1 tablet (10 mg total) by mouth every evening.  Qty: 90 tablet, Refills: 3    Associated Diagnoses: Mild intermittent asthma without complication; History of multiple allergies      ondansetron (ZOFRAN-ODT) 4 MG TbDL Dissolve 1 tablet (4 mg total) by mouth every 6 (six) hours as needed (nausea).  Qty: 30 tablet, Refills: 0      rOPINIRole (REQUIP) 0.25 MG tablet TAKE 1 TABLET(0.25 MG) BY MOUTH EVERY EVENING  Qty: 90 tablet, Refills: 2    Associated Diagnoses: Restless leg      blood sugar diagnostic (TRUE METRIX GLUCOSE TEST STRIP) Strp USE TO TEST ONCE DAILY  Qty: 30 strip, Refills: 5    Associated Diagnoses: Diabetes mellitus without complication      blood-glucose meter kit Use as instructed  Qty: 1 each, Refills: 0    Comments: Please provide meter covered by patient's insurance  Associated Diagnoses: Diabetes mellitus without  complication      dexAMETHasone (DECADRON) 4 MG Tab Take 1 tablet (4 mg total) by mouth once daily.  Qty: 10 tablet, Refills: 0      !! dexmethylphenidate (FOCALIN) 10 MG tablet Take 1 tablet (10 mg total) by mouth 2 (two) times daily.  Qty: 60 tablet, Refills: 0    Associated Diagnoses: ADHD (attention deficit hyperactivity disorder), inattentive type      fluocinonide (LIDEX) 0.05 % external solution AAA scalp qday prn itching, scaling  Qty: 60 mL, Refills: 3    Associated Diagnoses: Seborrheic dermatitis, unspecified      semaglutide (OZEMPIC) 0.25 mg or 0.5 mg (2 mg/3 mL) pen injector Inject 0.5 mg into the skin every 7 days.  Qty: 3 each, Refills: 1      tamsulosin (FLOMAX) 0.4 mg Cap Take 1 capsule (0.4 mg total) by mouth once daily for 10 days  Qty: 10 capsule, Refills: 0      ULTRA THIN LANCETS 30 gauge Misc USE TO TEST BLOOD SUGAR EVERY DAY AS DIRECTED  Qty: 100 each, Refills: 3    Associated Diagnoses: Diabetes mellitus without complication       !! - Potential duplicate medications found. Please discuss with provider.        STOP taking these medications       esomeprazole (NEXIUM) 40 mg GrPS Comments:   Reason for Stopping:         oxybutynin (DITROPAN) 5 MG Tab Comments:   Reason for Stopping:         oxyCODONE-acetaminophen (PERCOCET)  mg per tablet Comments:   Reason for Stopping:         urea (CARMOL) 40 % Crea Comments:   Reason for Stopping:                   Discharge Diagnosis: Lumbar radiculopathy, chronic [M54.16]  Condition on Discharge: Stable with no complications to procedure   Diet on Discharge: Same as before.  Activity: as per instruction sheet.  Discharge to: Home with a responsible adult.  Follow up: 2-4 weeks       Please call my office or pager at [phone] if experienced any weakness or loss of sensation, fever > 101.5, pain uncontrolled with oral medications, persistent nausea/vomiting/or diarrhea, redness or drainage from the incisions, or any other worrisome concerns. If  physician on call was not reached or could not communicate with our office for any reason please go to the nearest emergency department

## 2023-09-27 NOTE — H&P
HPI  Patient presenting for Procedure(s) (LRB):  L4-5 NELI (N/A)     Patient on Anti-coagulation No    No health changes since previous encounter    Past Medical History:   Diagnosis Date    ADD (attention deficit disorder) 05/09/2012    Allergy     Anxiety 4/12/2023    Asthma 05/09/2012    Cervical disc disorder with radiculopathy, unspecified cervical region 09/14/2021    Cervical spondylosis with myelopathy 06/29/2022    Eczema     Esophageal ulcer     GERD (gastroesophageal reflux disease) 05/09/2012    Glaucoma     Kidney stones 05/2014    Lumbar disc disease 05/09/2012    Lumbar herniated disc 05/09/2012    Malignant melanoma of skin, unspecified 01/06/2022    in situ right mid back    Melanoma 01/2022    in situ R mid back     LUBA (obstructive sleep apnea)     Radiculopathy, lumbar region 09/14/2021    Renal calculi 05/09/2012    Type 2 diabetes mellitus without complication, without long-term current use of insulin 11/16/2020     Past Surgical History:   Procedure Laterality Date    APPENDECTOMY  2006    CARPAL TUNNEL RELEASE Right 09/15/2022    Procedure: RELEASE, CARPAL TUNNEL;  Surgeon: Christen Marshall MD;  Location: [facility] OR;  Service: Orthopedics;  Laterality: Right;    COLONOSCOPY  01/22/2019    internal hemorrhoids, o/w normal - repeat at age 52    CORRECTION OF HAMMER TOE Left 8/14/2023    Procedure: CORRECTION, HAMMER TOE--  basic foot tray as well as a Reese microfree/microchoice tray. I also asked [place] to bring an MIS mati.;  Surgeon: Ankush Back DPM;  Location: [facility] OR;  Service: Podiatry;  Laterality: Left;  reese drill, sagittal saw, foot set    CYSTOSCOPY  7/5/2023    Procedure: CYSTOSCOPY;  Surgeon: Chuckie Hall MD;  [facility];  Service: Urology;;    EPIDURAL STEROID INJECTION N/A 02/10/2021    Procedure: CERVICAL C6/7 NELI DIRECT REFERRAL;  Surgeon: Michi Escamilla MD;  Location: [facility] PAIN MGT;  Service: Pain Management;  Laterality: N/A;  NEEDS CONSENT     EPIDURAL STEROID INJECTION INTO CERVICAL SPINE N/A 6/28/2023    Procedure: Injection-steroid-epidural-cervical C7-T1;  Surgeon: Lorraine Patel DO;  [facility]: [facility];  Service: Pain Management;  Laterality: N/A;  diabetic    ESOPHAGOGASTRODUODENOSCOPY  01/22/2019    LA grade B esophagitis; esophageal stenosis- dilated; gastritis; H pylori pathology negative    ESOPHAGOGASTRODUODENOSCOPY N/A 05/18/2021    Procedure: EGD (ESOPHAGOGASTRODUODENOSCOPY);  Surgeon: Mariana Hector MD;  Location: [facility] ENDO;  Service: Endoscopy;  Laterality: N/A;    EXCISION OF GANGLION OF WRIST Right 09/15/2022    Procedure: EXCISION, GANGLION CYST, WRIST;  Surgeon: Christen Marshall MD;  Location: [facility] OR;  Service: Orthopedics;  Laterality: Right;    FLEXOR TENDON REPAIR Right 09/15/2022    Procedure: FLEXOR TENOSYNOVECTOMY;  Surgeon: Christen Marshall MD;  Location: [facility] OR;  Service: Orthopedics;  Laterality: Right;    HAND ARTHROTOMY Right 09/15/2022    Procedure: ARTHROTOMY, HAND RADIOCARPAL;  Surgeon: Christen Marshall MD;  Location: [facility] OR;  Service: Orthopedics;  Laterality: Right;  Radiocarpal    INJECTION OF STEROID Left 09/15/2022    Procedure: INJECTION, STEROID LEFT WRIST AND LEFT LATERAL ELBOW;  Surgeon: Christen Marshall MD;  Location: [facility] OR;  Service: Orthopedics;  Laterality: Left;  Left Carpal Tunnel CSI    LASER LITHOTRIPSY Left 7/5/2023    Procedure: LITHOTRIPSY, USING LASER;  Surgeon: Chuckie Hall MD;  Location: [facility] OR 1ST FLR;  Service: Urology;  Laterality: Left;    LUMBAR LAMINECTOMY  2010    LUMBAR LAMINECTOMY WITH DISCECTOMY Left 09/20/2021    Procedure: LAMINECTOMY, SPINE, LUMBAR, WITH DISCECTOMY;  Surgeon: Naseem Mcnamara DO;  Location: [facility] OR 2ND FLR;  Service: Neurosurgery;  Laterality: Left;  MIS L3-4    TYMPANOSTOMY TUBE PLACEMENT      URETERAL STENT PLACEMENT Left 7/5/2023    Procedure: INSERTION, STENT, URETER;  Surgeon: Chuckie Hall MD;  Location: [facility] OR 1ST FLR;  Service:  Urology;  Laterality: Left;    URETEROSCOPIC REMOVAL OF URETERIC CALCULUS Left 7/5/2023    Procedure: REMOVAL, CALCULUS, URETER, URETEROSCOPIC;  Surgeon: Chuckie Hall MD;  Location: [facility] OR [address] [place] [address];  Service: Urology;  Laterality: Left;    URETEROSCOPY Left 7/5/2023    Procedure: URETEROSCOPY;  Surgeon: Chuckie Hall MD;  Location: [facility] OR [address] [place] [address];  Service: Urology;  Laterality: Left;     Review of patient's allergies indicates:  No Known Allergies   Current Facility-Administered Medications   Medication    0.9%  NaCl infusion    fentaNYL 50 mcg/mL injection 25 mcg    midazolam (VERSED) 1 mg/mL injection 1 mg     Facility-Administered Medications Ordered in Other Encounters   Medication    mupirocin 2 % ointment    ondansetron injection 4 mg       PMHx, PSHx, Allergies, Medications reviewed in epic    ROS negative except pain complaints in HPI    OBJECTIVE:    There were no vitals taken for this visit.    PHYSICAL EXAMINATION:    GENERAL: Well appearing, in no acute distress, alert and oriented x3.  PSYCH:  Mood and affect appropriate.  SKIN: Skin color, texture, turgor normal, no rashes or lesions which will impact the procedure.  CV: RRR with palpation of the radial artery.  PULM: No evidence of respiratory difficulty, symmetric chest rise. Clear to auscultation.  NEURO: Cranial nerves grossly intact.    Plan:    Proceed with procedure as planned Procedure(s) (LRB):  L4-5 NELI (N/A)    Junior Ramírez  09/27/2023

## 2023-09-27 NOTE — OP NOTE
Lumbar Interlaminar Epidural Steroid Injection under Fluoroscopic Guidance    The procedure, risks, benefits, and options were discussed with the patient. There are no contraindications to the procedure. The patent expressed understanding and agreed to the procedure. Informed written consent was obtained prior to the start of the procedure and can be found in the patient's chart.    PATIENT NAME: Hermelindo Garza III   MRN: [id number]     DATE OF PROCEDURE: 09/27/2023    PROCEDURE: Lumbar Interlaminar Epidural Steroid Injection L4/L5 under Fluoroscopic Guidance    PRE-OP DIAGNOSIS: Lumbar radiculopathy, chronic [M54.16] Lumbar radiculopathy [M54.16]    POST-OP DIAGNOSIS: Same    PHYSICIAN: Tirso Pickering MD    ASSISTANTS: Junior Ramírez M.D. ([facility])     MEDICATIONS INJECTED: Preservative-free Decadron 10mg with 4cc of Lidocaine 1% MPF and preservative free normal saline    LOCAL ANESTHETIC INJECTED: Xylocaine 2%     SEDATION: Versed 2mg and Fentanyl 150mcg                                                                                                                                                                                     Conscious sedation ordered by M.D. Patient re-evaluation prior to administration of conscious sedation. No changes noted in patient's status from initial evaluation. The patient's vital signs were monitored by RN and patient remained hemodynamically stable throughout the procedure.    Event Time In   Sedation Start 0819   Sedation End 0826       ESTIMATED BLOOD LOSS: None    COMPLICATIONS: None    TECHNIQUE: Time-out was performed to identify the patient and procedure to be performed. With the patient laying in a prone position, the surgical area was prepped and draped in the usual sterile fashion using ChloraPrep and a fenestrated drape. The level was determined under fluoroscopy guidance. Skin anesthesia was achieved by injecting Lidocaine 2% over the injection site.  The interlaminar space was then approached with a 20 gauge,  5 inch Tuohy needle that was introduced under fluoroscopic guidance in the AP, lateral and/or contralateral oblique imaging. Once the Ligamentum flavum was encountered loss of resistance to saline was used to enter the epidural space. With positive loss of resistance and negative aspiration for CSF or Blood, contrast dye Omnipaque (300mg/mL) was injected to confirm placement and there was no vascular runoff. 5 mL of the medication mixture listed above was injected slowly. Displacement of the radio opaque contrast after injection of the medication confirmed that the medication went into the area of the epidural space. The needles were removed and bleeding was nil. A sterile dressing was applied. No specimens collected. The patient tolerated the procedure well.       The patient was monitored after the procedure in the recovery area. They were given post-procedure and discharge instructions to follow at home. The patient was discharged in a stable condition.    Junior Ramírez MD    I reviewed and edited the fellow's note. I conducted my own interview and physical examination. I agree with the findings. I was present and supervising all critical portions of the procedure.    Tirso Pickering MD

## 2023-09-29 ENCOUNTER — PATIENT MESSAGE (OUTPATIENT)
Dept: PAIN MEDICINE | Facility: CLINIC | Age: 43
End: 2023-09-29
Payer: COMMERCIAL

## 2023-09-29 DIAGNOSIS — M54.12 CERVICAL RADICULOPATHY: Primary | ICD-10-CM

## 2023-09-29 DIAGNOSIS — M50.30 DDD (DEGENERATIVE DISC DISEASE), CERVICAL: ICD-10-CM

## 2023-10-02 NOTE — TELEPHONE ENCOUNTER
Pt notified if she start taking any antibiotics prior to procedure to please give us a call to reschedule procedure. Pt also was sent a [employer] message with instructions.

## 2023-10-03 ENCOUNTER — CLINICAL SUPPORT (OUTPATIENT)
Dept: ALLERGY | Facility: CLINIC | Age: 43
End: 2023-10-03
Payer: COMMERCIAL

## 2023-10-03 ENCOUNTER — PATIENT MESSAGE (OUTPATIENT)
Dept: PODIATRY | Facility: CLINIC | Age: 43
End: 2023-10-03
Payer: COMMERCIAL

## 2023-10-03 DIAGNOSIS — J30.9 CHRONIC ALLERGIC RHINITIS: Primary | ICD-10-CM

## 2023-10-03 PROCEDURE — 99999 PR PBB SHADOW E&M-EST. PATIENT-LVL I: ICD-10-PCS | Mod: PBBFAC,,,

## 2023-10-03 PROCEDURE — 99999 PR PBB SHADOW E&M-EST. PATIENT-LVL I: CPT | Mod: PBBFAC,,,

## 2023-10-03 PROCEDURE — 95117 IMMUNOTHERAPY INJECTIONS: CPT | Mod: S$GLB,,, | Performed by: ALLERGY & IMMUNOLOGY

## 2023-10-03 PROCEDURE — 95117 PR IMMU2THERAPY, 2+ INJECTIONS: ICD-10-PCS | Mod: S$GLB,,, | Performed by: ALLERGY & IMMUNOLOGY

## 2023-10-04 ENCOUNTER — PATIENT MESSAGE (OUTPATIENT)
Dept: ALLERGY | Facility: CLINIC | Age: 43
End: 2023-10-04
Payer: COMMERCIAL

## 2023-10-05 ENCOUNTER — HOSPITAL ENCOUNTER (OUTPATIENT)
Dept: RADIOLOGY | Facility: HOSPITAL | Age: 43
Discharge: HOME | End: 2023-10-05
Payer: COMMERCIAL

## 2023-10-05 DIAGNOSIS — N20.0 RENAL STONE: ICD-10-CM

## 2023-10-05 PROCEDURE — 76770 US EXAM ABDO BACK WALL COMP: CPT | Mod: TC

## 2023-10-05 PROCEDURE — 76770 US KIDNEY: ICD-10-PCS | Mod: 26,,, | Performed by: RADIOLOGY

## 2023-10-05 PROCEDURE — 76770 US EXAM ABDO BACK WALL COMP: CPT | Mod: 26,,, | Performed by: RADIOLOGY

## 2023-10-06 RX ORDER — METHYLPREDNISOLONE 4 MG/1
TABLET ORAL
Qty: 21 EACH | Refills: 0 | Status: SHIPPED | OUTPATIENT
Start: 2023-10-06 | End: 2023-10-27

## 2023-10-09 ENCOUNTER — PATIENT MESSAGE (OUTPATIENT)
Dept: INTERNAL MEDICINE | Facility: CLINIC | Age: 43
End: 2023-10-09
Payer: COMMERCIAL

## 2023-10-10 ENCOUNTER — OFFICE VISIT (OUTPATIENT)
Dept: PODIATRY | Facility: CLINIC | Age: 43
End: 2023-10-10
Payer: COMMERCIAL

## 2023-10-10 ENCOUNTER — OFFICE VISIT (OUTPATIENT)
Dept: UROLOGY | Facility: CLINIC | Age: 43
End: 2023-10-10
Payer: COMMERCIAL

## 2023-10-10 VITALS
WEIGHT: 252.19 LBS | DIASTOLIC BLOOD PRESSURE: 75 MMHG | TEMPERATURE: 99 F | RESPIRATION RATE: 18 BRPM | BODY MASS INDEX: 42.02 KG/M2 | HEART RATE: 59 BPM | SYSTOLIC BLOOD PRESSURE: 123 MMHG | HEIGHT: 65 IN | OXYGEN SATURATION: 98 %

## 2023-10-10 VITALS
BODY MASS INDEX: 41.87 KG/M2 | HEART RATE: 73 BPM | SYSTOLIC BLOOD PRESSURE: 128 MMHG | DIASTOLIC BLOOD PRESSURE: 83 MMHG | HEIGHT: 65 IN | WEIGHT: 251.31 LBS

## 2023-10-10 DIAGNOSIS — M54.9 DORSALGIA, UNSPECIFIED: Primary | ICD-10-CM

## 2023-10-10 DIAGNOSIS — L03.032 CELLULITIS OF TOE OF LEFT FOOT: Primary | ICD-10-CM

## 2023-10-10 DIAGNOSIS — N20.0 CALCULUS OF KIDNEY: Primary | ICD-10-CM

## 2023-10-10 PROCEDURE — 3044F HG A1C LEVEL LT 7.0%: CPT | Mod: CPTII,S$GLB,, | Performed by: STUDENT IN AN ORGANIZED HEALTH CARE EDUCATION/TRAINING PROGRAM

## 2023-10-10 PROCEDURE — 99024 PR POST-OP FOLLOW-UP VISIT: ICD-10-PCS | Mod: S$GLB,,, | Performed by: STUDENT IN AN ORGANIZED HEALTH CARE EDUCATION/TRAINING PROGRAM

## 2023-10-10 PROCEDURE — 99214 OFFICE O/P EST MOD 30 MIN: CPT | Mod: S$GLB,,, | Performed by: UROLOGY

## 2023-10-10 PROCEDURE — 99024 POSTOP FOLLOW-UP VISIT: CPT | Mod: S$GLB,,, | Performed by: STUDENT IN AN ORGANIZED HEALTH CARE EDUCATION/TRAINING PROGRAM

## 2023-10-10 PROCEDURE — 3008F BODY MASS INDEX DOCD: CPT | Mod: CPTII,S$GLB,, | Performed by: UROLOGY

## 2023-10-10 PROCEDURE — 3079F PR MOST RECENT DIASTOLIC BLOOD PRESSURE 80-89 MM HG: ICD-10-PCS | Mod: CPTII,S$GLB,, | Performed by: UROLOGY

## 2023-10-10 PROCEDURE — 3074F PR MOST RECENT SYSTOLIC BLOOD PRESSURE < 130 MM HG: ICD-10-PCS | Mod: CPTII,S$GLB,, | Performed by: UROLOGY

## 2023-10-10 PROCEDURE — 3074F SYST BP LT 130 MM HG: CPT | Mod: CPTII,S$GLB,, | Performed by: STUDENT IN AN ORGANIZED HEALTH CARE EDUCATION/TRAINING PROGRAM

## 2023-10-10 PROCEDURE — 1159F PR MEDICATION LIST DOCUMENTED IN MEDICAL RECORD: ICD-10-PCS | Mod: CPTII,S$GLB,, | Performed by: STUDENT IN AN ORGANIZED HEALTH CARE EDUCATION/TRAINING PROGRAM

## 2023-10-10 PROCEDURE — 3044F PR MOST RECENT HEMOGLOBIN A1C LEVEL <7.0%: ICD-10-PCS | Mod: CPTII,S$GLB,, | Performed by: STUDENT IN AN ORGANIZED HEALTH CARE EDUCATION/TRAINING PROGRAM

## 2023-10-10 PROCEDURE — 1159F MED LIST DOCD IN RCRD: CPT | Mod: CPTII,S$GLB,, | Performed by: STUDENT IN AN ORGANIZED HEALTH CARE EDUCATION/TRAINING PROGRAM

## 2023-10-10 PROCEDURE — 3078F DIAST BP <80 MM HG: CPT | Mod: CPTII,S$GLB,, | Performed by: STUDENT IN AN ORGANIZED HEALTH CARE EDUCATION/TRAINING PROGRAM

## 2023-10-10 PROCEDURE — 3044F PR MOST RECENT HEMOGLOBIN A1C LEVEL <7.0%: ICD-10-PCS | Mod: CPTII,S$GLB,, | Performed by: UROLOGY

## 2023-10-10 PROCEDURE — 1159F MED LIST DOCD IN RCRD: CPT | Mod: CPTII,S$GLB,, | Performed by: UROLOGY

## 2023-10-10 PROCEDURE — 99999 PR PBB SHADOW E&M-EST. PATIENT-LVL IV: CPT | Mod: PBBFAC,,, | Performed by: UROLOGY

## 2023-10-10 PROCEDURE — 3074F SYST BP LT 130 MM HG: CPT | Mod: CPTII,S$GLB,, | Performed by: UROLOGY

## 2023-10-10 PROCEDURE — 3074F PR MOST RECENT SYSTOLIC BLOOD PRESSURE < 130 MM HG: ICD-10-PCS | Mod: CPTII,S$GLB,, | Performed by: STUDENT IN AN ORGANIZED HEALTH CARE EDUCATION/TRAINING PROGRAM

## 2023-10-10 PROCEDURE — 99999 PR PBB SHADOW E&M-EST. PATIENT-LVL V: CPT | Mod: PBBFAC,,, | Performed by: STUDENT IN AN ORGANIZED HEALTH CARE EDUCATION/TRAINING PROGRAM

## 2023-10-10 PROCEDURE — 3008F PR BODY MASS INDEX (BMI) DOCUMENTED: ICD-10-PCS | Mod: CPTII,S$GLB,, | Performed by: UROLOGY

## 2023-10-10 PROCEDURE — 1159F PR MEDICATION LIST DOCUMENTED IN MEDICAL RECORD: ICD-10-PCS | Mod: CPTII,S$GLB,, | Performed by: UROLOGY

## 2023-10-10 PROCEDURE — 3078F PR MOST RECENT DIASTOLIC BLOOD PRESSURE < 80 MM HG: ICD-10-PCS | Mod: CPTII,S$GLB,, | Performed by: STUDENT IN AN ORGANIZED HEALTH CARE EDUCATION/TRAINING PROGRAM

## 2023-10-10 PROCEDURE — 99999 PR PBB SHADOW E&M-EST. PATIENT-LVL V: ICD-10-PCS | Mod: PBBFAC,,, | Performed by: STUDENT IN AN ORGANIZED HEALTH CARE EDUCATION/TRAINING PROGRAM

## 2023-10-10 PROCEDURE — 3079F DIAST BP 80-89 MM HG: CPT | Mod: CPTII,S$GLB,, | Performed by: UROLOGY

## 2023-10-10 PROCEDURE — 99214 PR OFFICE/OUTPT VISIT, EST, LEVL IV, 30-39 MIN: ICD-10-PCS | Mod: S$GLB,,, | Performed by: UROLOGY

## 2023-10-10 PROCEDURE — 99999 PR PBB SHADOW E&M-EST. PATIENT-LVL IV: ICD-10-PCS | Mod: PBBFAC,,, | Performed by: UROLOGY

## 2023-10-10 PROCEDURE — 3044F HG A1C LEVEL LT 7.0%: CPT | Mod: CPTII,S$GLB,, | Performed by: UROLOGY

## 2023-10-10 RX ORDER — DOXYCYCLINE 100 MG/1
100 CAPSULE ORAL 2 TIMES DAILY
Qty: 14 CAPSULE | Refills: 0 | Status: ON HOLD | OUTPATIENT
Start: 2023-10-10 | End: 2023-12-21 | Stop reason: CLARIF

## 2023-10-10 NOTE — PROGRESS NOTES
[facility]        Urology Urolithiasis Post-operative Note     10/10/2023     Referred by:  Diann Olvera PA-C     Procedure: Ureteroscopic stone extraction (left)  Time Since Procedure:  2 months     HPI: Hermelindo Garza III is a very pleasant 43 y.o. male following up for post-operative visit.  A ureteral stent was left in place, but was removed by him. A recent U/S is normal. He has formed stones every few years.      A review of 10+ systems was conducted with pertinent positive and negative findings documented in HPI with all other systems reviewed and negative.     Past medical, family, surgical and social history reviewed as documented in chart with pertinent positive medical, family, surgical and social history detailed in HPI.     Exam Findings:     Const: no acute distress, conversant and alert  Eyes: anicteric, extraocular muscles intact  ENMT: normocephalic, Nl oral membranes  Cardio: no cyanosis, nl cap refill  Pulm: no tachypnea; no resp distress  Abd: soft, no tenderness  Musc: no laceration, no tenderness  Neuro: alert; oriented x 3  Skin: warm, dry; no petichiae  Psych: no anxiety; normal speech      Metabolic Evaluation:   No 24 hour urine completed yet.      Assessment/Plan:   We discussed non-specific strategies to reduce stones including dietary modifications.

## 2023-10-10 NOTE — PROGRESS NOTES
Subjective:     Patient    Hermelindo Garza III is a 43 y.o. male.    Problem    07/31/23: Presents with left 2nd toe that rubs on 1st toe causing blisters, callusing, and pain at medial left 2nd toe. He has attempted to resolve this issue by wearing bandages and toe spacers/sleeves but the issue has progressed over time and he continues to have pain and skin breakdown at the left 2nd toe. Also has wide feet and difficulty finding appropriately fitting footwear. He is now considering surgery to correct the left 2nd toe. He is also now having pain in the ball of the foot on the left. Denies numbness, tingling, burning or radiating pain in the feet.     08/21/23: Presents 1 week s/p L 2nd toe arthroplasty and ostectomy. Pain improved significantly, does throb when he walks a lot. No major issues.     08/28/23: Presents 2 weeks s/p L 2nd toe arthroplasty and ostectomy. Having minor numbness at base of left 2nd toe. Also reports no pain at left 4th MTPJ and no pain at left lateralfoot 5th metatarsal base area since surgery. No major concerns, would like to get back to physical activities.     10/10/23: Presents with puffy discolored left 2nd toe, painful, feels like it may be infected. Has been wearing a toe sleeve on the toe since surgery.     History    History obtained from patient and review of medical records.     Past Medical History:   Diagnosis Date    ADD (attention deficit disorder) 05/09/2012    Allergy     Anxiety 4/12/2023    Asthma 05/09/2012    Cervical disc disorder with radiculopathy, unspecified cervical region 09/14/2021    Cervical spondylosis with myelopathy 06/29/2022    Eczema     Esophageal ulcer     GERD (gastroesophageal reflux disease) 05/09/2012    Glaucoma     Kidney stones 05/2014    Lumbar disc disease 05/09/2012    Lumbar herniated disc 05/09/2012    Malignant melanoma of skin, unspecified 01/06/2022    in situ right mid back    Melanoma 01/2022    in situ R mid back     LUBA  (obstructive sleep apnea)     Radiculopathy, lumbar region 09/14/2021    Renal calculi 05/09/2012    Type 2 diabetes mellitus without complication, without long-term current use of insulin 11/16/2020       Past Surgical History:   Procedure Laterality Date    APPENDECTOMY  2006    CARPAL TUNNEL RELEASE Right 09/15/2022    Procedure: RELEASE, CARPAL TUNNEL;  Surgeon: Christen Marshall MD;  Location: [facility] OR;  Service: Orthopedics;  Laterality: Right;    COLONOSCOPY  01/22/2019    internal hemorrhoids, o/w normal - repeat at age 52    CORRECTION OF HAMMER TOE Left 8/14/2023    Procedure: CORRECTION, HAMMER TOE--  basic foot tray as well as a Reese microfree/microchoice tray. I also asked [place] to bring an MIS mati.;  Surgeon: Ankush Back DPAMI;  Location: [facility] OR;  Service: Podiatry;  Laterality: Left;  reese drill, sagittal saw, foot set    CYSTOSCOPY  7/5/2023    Procedure: CYSTOSCOPY;  Surgeon: Chuckie Hall MD;  Location: [address];  Service: Urology;;    EPIDURAL STEROID INJECTION N/A 02/10/2021    Procedure: CERVICAL C6/7 NELI DIRECT REFERRAL;  Surgeon: Michi Escamilla MD;  Location: [facility] PAIN MGT;  Service: Pain Management;  Laterality: N/A;  NEEDS CONSENT    EPIDURAL STEROID INJECTION INTO CERVICAL SPINE N/A 6/28/2023    Procedure: Injection-steroid-epidural-cervical C7-T1;  Surgeon: Lorraine Patel DO;  Location: [facility] PAIN MANAGEMENT;  Service: Pain Management;  Laterality: N/A;  diabetic    EPIDURAL STEROID INJECTION INTO LUMBAR SPINE N/A 9/27/2023    Procedure: L4-5 NELI;  Surgeon: Tirso Pickering MD;  Location: [facility] PAIN MANAGEMENT;  Service: Pain Management;  Laterality: N/A;  15 mins    ESOPHAGOGASTRODUODENOSCOPY  01/22/2019    LA grade B esophagitis; esophageal stenosis- dilated; gastritis; H pylori pathology negative    ESOPHAGOGASTRODUODENOSCOPY N/A 05/18/2021    Procedure: EGD (ESOPHAGOGASTRODUODENOSCOPY);  Surgeon: Mariana Hector MD;  Location: [facility];  Service: Endoscopy;   Laterality: N/A;    EXCISION OF GANGLION OF WRIST Right 09/15/2022    Procedure: EXCISION, GANGLION CYST, WRIST;  Surgeon: Christen Marshall MD;  Location: [facility] OR;  Service: Orthopedics;  Laterality: Right;    FLEXOR TENDON REPAIR Right 09/15/2022    Procedure: FLEXOR TENOSYNOVECTOMY;  Surgeon: Christen Marshall MD;  Location: [facility] OR;  Service: Orthopedics;  Laterality: Right;    HAND ARTHROTOMY Right 09/15/2022    Procedure: ARTHROTOMY, HAND RADIOCARPAL;  Surgeon: Christen Marshall MD;  Location: [facility] OR;  Service: Orthopedics;  Laterality: Right;  Radiocarpal    INJECTION OF STEROID Left 09/15/2022    Procedure: INJECTION, STEROID LEFT WRIST AND LEFT LATERAL ELBOW;  Surgeon: Christen Marshall MD;  Location: [facility] OR;  Service: Orthopedics;  Laterality: Left;  Left Carpal Tunnel CSI    LASER LITHOTRIPSY Left 7/5/2023    Procedure: LITHOTRIPSY, USING LASER;  Surgeon: Chuckie Hall MD;  Location: [facility] OR 1ST FLR;  Service: Urology;  Laterality: Left;    LUMBAR LAMINECTOMY  2010    LUMBAR LAMINECTOMY WITH DISCECTOMY Left 09/20/2021    Procedure: LAMINECTOMY, SPINE, LUMBAR, WITH DISCECTOMY;  Surgeon: Naseem Mcnamara DO;  Location: [facility] OR 2ND FLR;  Service: Neurosurgery;  Laterality: Left;  MIS L3-4    TYMPANOSTOMY TUBE PLACEMENT      URETERAL STENT PLACEMENT Left 7/5/2023    Procedure: INSERTION, STENT, URETER;  Surgeon: Chuckie Hall MD;  Location: [facility] OR 1ST FLR;  Service: Urology;  Laterality: Left;    URETEROSCOPIC REMOVAL OF URETERIC CALCULUS Left 7/5/2023    Procedure: REMOVAL, CALCULUS, URETER, URETEROSCOPIC;  Surgeon: Chuckie Hall MD;  Location: [facility] OR 1ST FLR;  Service: Urology;  Laterality: Left;    URETEROSCOPY Left 7/5/2023    Procedure: URETEROSCOPY;  Surgeon: Chuckie Hall MD;  Location: [facility] OR 1ST FLR;  Service: Urology;  Laterality: Left;        Objective:     Vitals  Wt Readings from Last 1 Encounters:   10/10/23 114.4 kg (252 lb 3.3 oz)     Temp Readings from Last 1 Encounters:   10/10/23  99.3 °F (37.4 °C)     BP Readings from Last 1 Encounters:   10/10/23 123/75     Pulse Readings from Last 1 Encounters:   10/10/23 (!) 59       Dermatological Exam    Skin:  Pedal hair growth, skin color, and skin texture normal on right  Pedal hair growth, skin color, and skin texture normal on left; 2nd toe incision healed, blanchable erythema at dorsomedial 2nd toe from incision to proximal nail fold, mildly tender, may be fluctuant    Nails:  All nails normal in length, thickness, color    Vascular Exam    Arteries:  Posterior tibial artery palpable on right  Dorsalis pedis artery palpable on right  Posterior tibial artery palpable on left  Dorsalis pedis artery palpable on left    Veins:  Superficial veins unremarkable on right  Superficial veins unremarkable on left    Swelling:  None on right  None on left    Neurological Exam    [place] touch test:  6/6 sites sensed, light touch intact    [place] Sign:  + at left 3rd IM space    Tinel Sign:  - at all major ankle and foot nerves bilaterally    Slump Test:  - bilaterally    Musculoskeletal Exam    Footwear:  Casual on right  Casual on left    Gait Exam:   Ambulatory Status: Ambulatory  Gait: Antalgic  Assistive Devices: None    Foot Progression Angle:  Normal on right  Normal on left    Foot Posture Index:  Right  Talar Head Palpation +1   TNJ Prominence +1   Medial Arch Congruence +2   Lateral Malleolar Curve +1   Relaxed Calcaneal Position +1   Forefoot Abduction/Adduction  0   +6 (Rectus) on right (normal is 1-7)    Left  Talar Head Palpation +1   TNJ Prominence +1   Medial Arch Congruence +2   Lateral Malleolar Curve +1   Relaxed Calcaneal Position +1   Forefoot Abduction/Adduction  0   +6 (Rectus) on left (normal is 1-7)    WB Digital Alignment:  MTPJ varus deformity at 1st toe on right  MTPJ varus deformity and DIPJ valgus/abduction  at 2nd toe on right  MTPJ varus deformity at 3rd toe on right  MTPJ varus deformity at 4th toe on right  MTPJ varus  deformity, PIPJ varus deformity, and DIPJ flexion deformity at 5th toe on right  Normal at 1st toe on left  Rectus 2nd toe on left except minor DIPJ valgus/abduction  PIPJ varus deformity at 3rd toe on left  PIPJ varus deformity at 4th toe on left  PIPJ varus deformity at 5th toe on left     Right Lower Extremity Additional Findings:  Right foot and ankle function, strength, and range of motion unremarkable except as noted above.     Left Lower Extremity Additional Findings:  Pain on palpation of plantar 3rd and 4th metatarsal heads resolved. Negative dorsal drawers at 3rd and 4th MTPJs.   Left foot and ankle function, strength, and range of motion unremarkable except as noted above.         Imaging and Other Tests    Imagin22 left foot X ray: mild skew foot with varus deformities at 1st-4th MTPJs, valgus deformity at 2nd DIPJ consistent with clinical findings.     Independently reviewed and interpreted imaging, findings are as follows: N/A     Assessment:     Encounter Diagnosis   Name Primary?    Cellulitis of toe of left foot Yes        Plan:     I counseled the patient on his conditions, their implications and medical management.     Left 2nd toe cellulitis: acute  -Aspirated site using 18 gauge needle and 10 mL syringe, mild bloody fluid expressed without purulence.  -Ordered doxycycline.     Return to clinic if not improving in 1-2 weeks, call sooner PRN.

## 2023-10-11 ENCOUNTER — LAB VISIT (OUTPATIENT)
Dept: LAB | Facility: HOSPITAL | Age: 43
End: 2023-10-11
Payer: COMMERCIAL

## 2023-10-11 DIAGNOSIS — E78.2 MIXED HYPERLIPIDEMIA: ICD-10-CM

## 2023-10-11 DIAGNOSIS — E11.9 TYPE 2 DIABETES MELLITUS WITHOUT COMPLICATION, WITHOUT LONG-TERM CURRENT USE OF INSULIN: ICD-10-CM

## 2023-10-11 LAB
ALBUMIN SERPL BCP-MCNC: 4.3 G/DL (ref 3.5–5.2)
ALBUMIN/CREAT UR: NORMAL UG/MG (ref 0–30)
ALP SERPL-CCNC: 94 U/L (ref 55–135)
ALT SERPL W/O P-5'-P-CCNC: 39 U/L (ref 10–44)
ANION GAP SERPL CALC-SCNC: 10 MMOL/L (ref 8–16)
AST SERPL-CCNC: 24 U/L (ref 10–40)
BILIRUB SERPL-MCNC: 0.7 MG/DL (ref 0.1–1)
BUN SERPL-MCNC: 18 MG/DL (ref 6–20)
CALCIUM SERPL-MCNC: 9.5 MG/DL (ref 8.7–10.5)
CHLORIDE SERPL-SCNC: 102 MMOL/L (ref 95–110)
CHOLEST SERPL-MCNC: 169 MG/DL (ref 120–199)
CHOLEST/HDLC SERPL: 4.2 {RATIO} (ref 2–5)
CO2 SERPL-SCNC: 26 MMOL/L (ref 23–29)
CREAT SERPL-MCNC: 0.7 MG/DL (ref 0.5–1.4)
CREAT UR-MCNC: 31 MG/DL (ref 23–375)
EST. GFR  (NO RACE VARIABLE): >60 ML/MIN/1.73 M^2
ESTIMATED AVG GLUCOSE: 108 MG/DL (ref 68–131)
GLUCOSE SERPL-MCNC: 84 MG/DL (ref 70–110)
HBA1C MFR BLD: 5.4 % (ref 4–5.6)
HDLC SERPL-MCNC: 40 MG/DL (ref 40–75)
HDLC SERPL: 23.7 % (ref 20–50)
LDLC SERPL CALC-MCNC: 60 MG/DL (ref 63–159)
MICROALBUMIN UR DL<=1MG/L-MCNC: <5 UG/ML
NONHDLC SERPL-MCNC: 129 MG/DL
POTASSIUM SERPL-SCNC: 4.1 MMOL/L (ref 3.5–5.1)
PROT SERPL-MCNC: 7.1 G/DL (ref 6–8.4)
SODIUM SERPL-SCNC: 138 MMOL/L (ref 136–145)
TRIGL SERPL-MCNC: 345 MG/DL (ref 30–150)

## 2023-10-11 PROCEDURE — 82043 UR ALBUMIN QUANTITATIVE: CPT | Performed by: HOSPITALIST

## 2023-10-11 PROCEDURE — 80061 LIPID PANEL: CPT | Performed by: HOSPITALIST

## 2023-10-11 PROCEDURE — 80053 COMPREHEN METABOLIC PANEL: CPT | Performed by: HOSPITALIST

## 2023-10-11 PROCEDURE — 83036 HEMOGLOBIN GLYCOSYLATED A1C: CPT | Performed by: HOSPITALIST

## 2023-10-11 PROCEDURE — 36415 COLL VENOUS BLD VENIPUNCTURE: CPT | Mod: PO | Performed by: HOSPITALIST

## 2023-10-12 ENCOUNTER — OFFICE VISIT (OUTPATIENT)
Dept: GASTROENTEROLOGY | Facility: CLINIC | Age: 43
End: 2023-10-12
Payer: COMMERCIAL

## 2023-10-12 ENCOUNTER — OFFICE VISIT (OUTPATIENT)
Dept: INTERNAL MEDICINE | Facility: CLINIC | Age: 43
End: 2023-10-12
Payer: COMMERCIAL

## 2023-10-12 ENCOUNTER — PATIENT MESSAGE (OUTPATIENT)
Dept: NEUROSURGERY | Facility: CLINIC | Age: 43
End: 2023-10-12
Payer: COMMERCIAL

## 2023-10-12 ENCOUNTER — PATIENT MESSAGE (OUTPATIENT)
Dept: ALLERGY | Facility: CLINIC | Age: 43
End: 2023-10-12
Payer: COMMERCIAL

## 2023-10-12 VITALS
BODY MASS INDEX: 41.1 KG/M2 | HEART RATE: 76 BPM | DIASTOLIC BLOOD PRESSURE: 62 MMHG | SYSTOLIC BLOOD PRESSURE: 110 MMHG | TEMPERATURE: 98 F | RESPIRATION RATE: 16 BRPM | WEIGHT: 246.69 LBS | HEIGHT: 65 IN | OXYGEN SATURATION: 98 %

## 2023-10-12 DIAGNOSIS — E66.01 MORBID OBESITY: ICD-10-CM

## 2023-10-12 DIAGNOSIS — E78.5 HYPERLIPIDEMIA ASSOCIATED WITH TYPE 2 DIABETES MELLITUS: ICD-10-CM

## 2023-10-12 DIAGNOSIS — K92.1 BLOOD IN STOOL: ICD-10-CM

## 2023-10-12 DIAGNOSIS — E11.9 TYPE 2 DIABETES MELLITUS WITHOUT COMPLICATION, UNSPECIFIED WHETHER LONG TERM INSULIN USE: Primary | ICD-10-CM

## 2023-10-12 DIAGNOSIS — K75.81 NASH (NONALCOHOLIC STEATOHEPATITIS): ICD-10-CM

## 2023-10-12 DIAGNOSIS — E11.69 HYPERLIPIDEMIA ASSOCIATED WITH TYPE 2 DIABETES MELLITUS: ICD-10-CM

## 2023-10-12 DIAGNOSIS — K21.9 GASTROESOPHAGEAL REFLUX DISEASE, UNSPECIFIED WHETHER ESOPHAGITIS PRESENT: Primary | ICD-10-CM

## 2023-10-12 DIAGNOSIS — E11.9 TYPE 2 DIABETES MELLITUS WITHOUT COMPLICATION, UNSPECIFIED WHETHER LONG TERM INSULIN USE: ICD-10-CM

## 2023-10-12 DIAGNOSIS — Z00.00 ANNUAL PHYSICAL EXAM: Primary | ICD-10-CM

## 2023-10-12 PROCEDURE — 3008F BODY MASS INDEX DOCD: CPT | Mod: CPTII,S$GLB,, | Performed by: HOSPITALIST

## 2023-10-12 PROCEDURE — 99999 PR PBB SHADOW E&M-EST. PATIENT-LVL III: ICD-10-PCS | Mod: PBBFAC,,, | Performed by: HOSPITALIST

## 2023-10-12 PROCEDURE — 3074F SYST BP LT 130 MM HG: CPT | Mod: CPTII,S$GLB,, | Performed by: HOSPITALIST

## 2023-10-12 PROCEDURE — 3061F NEG MICROALBUMINURIA REV: CPT | Mod: CPTII,S$GLB,, | Performed by: HOSPITALIST

## 2023-10-12 PROCEDURE — 3044F HG A1C LEVEL LT 7.0%: CPT | Mod: CPTII,S$GLB,, | Performed by: HOSPITALIST

## 2023-10-12 PROCEDURE — 3061F NEG MICROALBUMINURIA REV: CPT | Mod: CPTII,95,,

## 2023-10-12 PROCEDURE — 3066F PR DOCUMENTATION OF TREATMENT FOR NEPHROPATHY: ICD-10-PCS | Mod: CPTII,95,,

## 2023-10-12 PROCEDURE — 99214 OFFICE O/P EST MOD 30 MIN: CPT | Mod: S$GLB,,, | Performed by: HOSPITALIST

## 2023-10-12 PROCEDURE — 99214 PR OFFICE/OUTPT VISIT, EST, LEVL IV, 30-39 MIN: ICD-10-PCS | Mod: S$GLB,,, | Performed by: HOSPITALIST

## 2023-10-12 PROCEDURE — 3066F NEPHROPATHY DOC TX: CPT | Mod: CPTII,95,,

## 2023-10-12 PROCEDURE — 3061F PR NEG MICROALBUMINURIA RESULT DOCUMENTED/REVIEW: ICD-10-PCS | Mod: CPTII,95,,

## 2023-10-12 PROCEDURE — 3061F PR NEG MICROALBUMINURIA RESULT DOCUMENTED/REVIEW: ICD-10-PCS | Mod: CPTII,S$GLB,, | Performed by: HOSPITALIST

## 2023-10-12 PROCEDURE — 3044F HG A1C LEVEL LT 7.0%: CPT | Mod: CPTII,95,,

## 2023-10-12 PROCEDURE — 3066F NEPHROPATHY DOC TX: CPT | Mod: CPTII,S$GLB,, | Performed by: HOSPITALIST

## 2023-10-12 PROCEDURE — 3078F PR MOST RECENT DIASTOLIC BLOOD PRESSURE < 80 MM HG: ICD-10-PCS | Mod: CPTII,S$GLB,, | Performed by: HOSPITALIST

## 2023-10-12 PROCEDURE — 99214 PR OFFICE/OUTPT VISIT, EST, LEVL IV, 30-39 MIN: ICD-10-PCS | Mod: 95,,,

## 2023-10-12 PROCEDURE — 3066F PR DOCUMENTATION OF TREATMENT FOR NEPHROPATHY: ICD-10-PCS | Mod: CPTII,S$GLB,, | Performed by: HOSPITALIST

## 2023-10-12 PROCEDURE — 3044F PR MOST RECENT HEMOGLOBIN A1C LEVEL <7.0%: ICD-10-PCS | Mod: CPTII,95,,

## 2023-10-12 PROCEDURE — 3008F PR BODY MASS INDEX (BMI) DOCUMENTED: ICD-10-PCS | Mod: CPTII,S$GLB,, | Performed by: HOSPITALIST

## 2023-10-12 PROCEDURE — 99214 OFFICE O/P EST MOD 30 MIN: CPT | Mod: 95,,,

## 2023-10-12 PROCEDURE — 3074F PR MOST RECENT SYSTOLIC BLOOD PRESSURE < 130 MM HG: ICD-10-PCS | Mod: CPTII,S$GLB,, | Performed by: HOSPITALIST

## 2023-10-12 PROCEDURE — 3044F PR MOST RECENT HEMOGLOBIN A1C LEVEL <7.0%: ICD-10-PCS | Mod: CPTII,S$GLB,, | Performed by: HOSPITALIST

## 2023-10-12 PROCEDURE — 1159F MED LIST DOCD IN RCRD: CPT | Mod: CPTII,S$GLB,, | Performed by: HOSPITALIST

## 2023-10-12 PROCEDURE — 1160F PR REVIEW ALL MEDS BY PRESCRIBER/CLIN PHARMACIST DOCUMENTED: ICD-10-PCS | Mod: CPTII,S$GLB,, | Performed by: HOSPITALIST

## 2023-10-12 PROCEDURE — 1159F PR MEDICATION LIST DOCUMENTED IN MEDICAL RECORD: ICD-10-PCS | Mod: CPTII,S$GLB,, | Performed by: HOSPITALIST

## 2023-10-12 PROCEDURE — 3078F DIAST BP <80 MM HG: CPT | Mod: CPTII,S$GLB,, | Performed by: HOSPITALIST

## 2023-10-12 PROCEDURE — 1160F RVW MEDS BY RX/DR IN RCRD: CPT | Mod: CPTII,S$GLB,, | Performed by: HOSPITALIST

## 2023-10-12 PROCEDURE — 99999 PR PBB SHADOW E&M-EST. PATIENT-LVL III: CPT | Mod: PBBFAC,,, | Performed by: HOSPITALIST

## 2023-10-12 RX ORDER — SODIUM PICOSULFATE, MAGNESIUM OXIDE, AND ANHYDROUS CITRIC ACID 10; 3.5; 12 MG/160ML; G/160ML; G/160ML
LIQUID ORAL
Qty: 320 ML | Refills: 0 | Status: ON HOLD | OUTPATIENT
Start: 2023-10-12 | End: 2024-03-15 | Stop reason: HOSPADM

## 2023-10-12 RX ORDER — CYCLOBENZAPRINE HCL 10 MG
10 TABLET ORAL 3 TIMES DAILY
COMMUNITY
Start: 2023-08-02

## 2023-10-12 NOTE — PROGRESS NOTES
GASTROENTEROLOGY CLINIC NOTE    Reason for visit: The primary encounter diagnosis was Gastroesophageal reflux disease, unspecified whether esophagitis present. A diagnosis of Blood in stool was also pertinent to this visit.  Referring provider/PCP: Suzi Sanches MD    The patient location is: [place]  Visit type: audiovisual  Face to Face time with patient: 8 mins  19 minutes of total time spent on the encounter, which includes face to face time and non-face to face time preparing to see the patient (eg, review of tests), Obtaining and/or reviewing separately obtained history, Documenting clinical information in the electronic or other health record, Independently interpreting results (not separately reported) and communicating results to the patient/family/caregiver, or Care coordination (not separately reported).     HPI:  Hermelindo Garza III is a 43 y.o. male presents today for blood in stool. He reports intermittently seeing blood in his stool but recently lasted for about a week. Blood was bright red in color, in toilet bowl. He denies any abd pain, but did have abd cramping. He reports having alternating BM's, sometimes normal, sometimes constipation, sometimes diarrhea. He had normal BM's when he saw the blood  last. He reports taking fiber and miralax as needed.He denies any FH of CRC or IBD. He has longstanding GERD, taking nexium currently. Now with intermittent breakthrough symptoms. He was referred for linx procedure in the past, but didn't want to undergo at that time.     Prior Endoscopy:  EGD: 2021 with Dr. Hector:    - Normal esophagus. Biopsied.                - Erythematous mucosa in the antrum. Biopsied.                - Normal examined duodenum.   Colon:    (Portions of this note were dictated using voice recognition software and may contain dictation related errors in spelling/grammar/syntax not found on text review)    Review of Systems   Gastrointestinal:  Positive for blood  in stool and heartburn. Negative for abdominal pain.       Past Medical History: has a past medical history of ADD (attention deficit disorder), Allergy, Anxiety, Asthma, Cervical disc disorder with radiculopathy, unspecified cervical region, Cervical spondylosis with myelopathy, Eczema, Esophageal ulcer, GERD (gastroesophageal reflux disease), Glaucoma, Kidney stones, Lumbar disc disease, Lumbar herniated disc, Malignant melanoma of skin, unspecified, Melanoma, LUBA (obstructive sleep apnea), Radiculopathy, lumbar region, Renal calculi, and Type 2 diabetes mellitus without complication, without long-term current use of insulin.    Past Surgical History: has a past surgical history that includes Lumbar laminectomy (2010); Esophagogastroduodenoscopy (01/22/2019); Colonoscopy (01/22/2019); Appendectomy (2006); Epidural steroid injection (N/A, 02/10/2021); Esophagogastroduodenoscopy (N/A, 05/18/2021); Lumbar laminectomy with discectomy (Left, 09/20/2021); Carpal tunnel release (Right, 09/15/2022); Excision of ganglion of wrist (Right, 09/15/2022); Hand Arthrotomy (Right, 09/15/2022); Injection of steroid (Left, 09/15/2022); Flexor tendon repair (Right, 09/15/2022); Tympanostomy tube placement; Epidural steroid injection into cervical spine (N/A, 6/28/2023); Cystoscopy (7/5/2023); Ureteral stent placement (Left, 7/5/2023); Ureteroscopy (Left, 7/5/2023); Laser lithotripsy (Left, 7/5/2023); Ureteroscopic removal of ureteric calculus (Left, 7/5/2023); Correction of hammer toe (Left, 8/14/2023); and Epidural steroid injection into lumbar spine (N/A, 9/27/2023).    Home medications:   Current Outpatient Medications on File Prior to Visit   Medication Sig Dispense Refill    acetaminophen (TYLENOL) 325 MG tablet Take 650 mg by mouth every 6 (six) hours as needed for Pain.      albuterol (PROVENTIL/VENTOLIN HFA) 90 mcg/actuation inhaler RescueINHALE 2 PUFFS INTO THE LUNGS EVERY 6 HOURS AS NEEDED WHEEZING, RESCUE Strength: 90  mcg/actuation 8.5 g 5    atorvastatin (LIPITOR) 20 MG tablet TAKE 1 TABLET(20 MG) BY MOUTH EVERY DAY 90 tablet 1    azelastine (ASTELIN) 137 mcg (0.1 %) nasal spray 2 sprays (274 mcg total) by Nasal route 2 (two) times daily. 30 mL 9    blood sugar diagnostic (TRUE METRIX GLUCOSE TEST STRIP) Strp USE TO TEST ONCE DAILY 30 strip 5    blood-glucose meter kit Use as instructed 1 each 0    cyclobenzaprine (FLEXERIL) 10 MG tablet Take 10 mg by mouth 3 (three) times daily.      dexmethylphenidate (FOCALIN) 10 MG tablet Take 1 tablet (10 mg total) by mouth 2 (two) times daily. 60 tablet 0    [START ON 10/23/2023] dexmethylphenidate (FOCALIN) 10 MG tablet Take 1 tablet (10 mg total) by mouth 2 (two) times daily. 60 tablet 0    doxycycline (VIBRAMYCIN) 100 MG Cap Take 1 capsule (100 mg total) by mouth 2 (two) times daily. 14 capsule 0    ergocalciferol, vitamin D2, (VITAMIN D ORAL) Take 1 tablet by mouth every other day.      EScitalopram oxalate (LEXAPRO) 10 MG tablet Take 1 tablet (10 mg total) by mouth once daily. 90 tablet 0    fexofenadine (ALLEGRA) 180 MG tablet Take 180 mg by mouth once daily.      fluocinonide (LIDEX) 0.05 % external solution AAA scalp qday prn itching, scaling 60 mL 3    gabapentin (NEURONTIN) 300 MG capsule TAKE 1 CAPSULE(300 MG) BY MOUTH THREE TIMES DAILY 90 capsule 3    hydrocortisone 2.5 % cream APPLY EXTERNALLY TO THE AFFECTED AREA TWICE DAILY FOR 10 DAYS 30 g 0    ketoconazole (NIZORAL) 2 % cream APPLY TOPICALLY TO THE AFFECTED AREA TWICE DAILY 15 g 0    lamoTRIgine (LAMICTAL) 100 MG tablet Take 1 tablet (100 mg total) by mouth 2 (two) times daily. IF any RASH, STOP ALL Lamictal and Tell Psyc MD. 180 tablet 1    latanoprost 0.005 % ophthalmic solution Place 1 drop into both eyes every evening. 7.5 mL 3    LORazepam (ATIVAN) 0.5 MG tablet Take 1 tablet (0.5 mg total) by mouth daily as needed (SIGNIFICANT ANXIETY). 30 tablet 2    meloxicam (MOBIC) 15 MG tablet TAKE 1 TABLET(15 MG) BY MOUTH EVERY  DAY as needed FOR PAIN 30 tablet 2    methylPREDNISolone (MEDROL DOSEPACK) 4 mg tablet use as directed 21 each 0    montelukast (SINGULAIR) 10 mg tablet Take 1 tablet (10 mg total) by mouth every evening. 90 tablet 3    ondansetron (ZOFRAN-ODT) 4 MG TbDL Dissolve 1 tablet (4 mg total) by mouth every 6 (six) hours as needed (nausea). 30 tablet 0    rOPINIRole (REQUIP) 0.25 MG tablet TAKE 1 TABLET(0.25 MG) BY MOUTH EVERY EVENING 90 tablet 2    semaglutide (OZEMPIC) 0.25 mg or 0.5 mg (2 mg/3 mL) pen injector Inject 0.5 mg into the skin every 7 days. 3 each 1    tamsulosin (FLOMAX) 0.4 mg Cap Take 1 capsule (0.4 mg total) by mouth once daily for 10 days 10 capsule 0    ULTRA THIN LANCETS 30 gauge Misc USE TO TEST BLOOD SUGAR EVERY DAY AS DIRECTED 100 each 3     Current Facility-Administered Medications on File Prior to Visit   Medication Dose Route Frequency Provider Last Rate Last Admin    mupirocin 2 % ointment   Nasal On Call Procedure Chong Padron MD   Given at 09/15/22 0553    ondansetron injection 4 mg  4 mg Intravenous Once PRN Arron Rangel MD         Each patient to whom he or she provides medical services by telemedicine is:  (1) informed of the relationship between the physician and patient and the respective role of any other health care provider with respect to management of the patient; and (2) notified that he or she may decline to receive medical services by telemedicine and may withdraw from such care at any time.    Vital signs:  There were no vitals taken for this visit.    Physical Exam  Vitals reviewed: limited d/t telemedicine visit.   Constitutional:       Appearance: Normal appearance.   Neurological:      Mental Status: He is alert.       I have reviewed associated labs, imaging and notes.     Assessment:  1. Gastroesophageal reflux disease, unspecified whether esophagitis present    2. Blood in stool    GERD, longstnading   Previously controlled with nexium 40mg   Now breakthrough  symptoms  Last EGD in 2021- was recommended to undergo linx procedure, didn't want to pursue at that time  Blood in stool   Intermittent previously   Recently had 1 week long with blood in stool   Normal BM's at that time    Schedule colonoscopy for further evaluation of blood in stool. He would like to complete EGD to see if he needs to further undergo surgery workup for reflux. Will add on to upcoming colonoscopy.     Plan:  Orders Placed This Encounter    Case Request Endoscopy: COLONOSCOPY, EGD (ESOPHAGOGASTRODUODENOSCOPY)     Continue nexium as previously prescribed  Schedule EGD/colonoscopy    Lindsey Ray, NP [facility]

## 2023-10-12 NOTE — PATIENT INSTRUCTIONS
CLENPIQ Instructions    You are scheduled for a colonoscopy with Dr. AMAYA on TBD at [facility] located at [address] [place] [address].  Check in at the admit desk, first floor of the hospital (which is the building on the left).     You will receive a call 2-3 days before your colonoscopy to tell you the time to arrive.  If you have not received a call by the day before your procedure, call the Endoscopy Lab at [phone].    To ensure that your test is accurate and complete, you MUST follow these instructions listed below.  If you have any questions, please call our office at [phone].  Plan on being at the hospital for your procedure for 3-4 hours. Please contact the office at [phone] two days prior to procedure date if reschedule is needed.    1.  Follow a CLEAR LIQUID DIET for the entire day before your scheduled colonoscopy.  This means no solid food the entire day starting when you wake.  You may have as much of the clear liquids as you want throughout the day.   CLEAR LIQUID DIET:   - Avoid Red, Orange, Purple, and/or Blue food coloring   - NO DAIRY   - You can have:  Coffee with sugar (no creamer), tea, water, soda, apple or white grape juice, chicken or beef broth/bouillon (no meat, noodles, or veggies), green/yellow popsicles, green/yellow Jell-O, lemonade.    2.  AT 5 pm the evening before your colonoscopy, OPEN ONE (1) BOTTLE OF CLENPIQ AND DRINK THE ENTIRE BOTTLE.  DRINK FIVE (5) 8-OUNCE GLASSES OF WATER (40 OUNCES TOTAL) OVER THE NEXT FIVE (5) HOURS.     3.  The endoscopy department will call you 2 days before your colonoscopy to tell you the exact time to arrive, AND to tell you the exact time to drink the 2nd portion of your prep (which will be FIVE HOURS BEFORE YOUR ARRIVAL TIME).  At this time given to you, OPEN THE OTHER ONE (1) BOTTLE OF CLENPIQ AND DRINK THE ENTIRE BOTTLE.  DRINK THREE (3) 8-OUNCE GLASSES OF WATER (24 OUNCES TOTAL) OVER THE NEXT THREE (3) HOURS. Once this is  complete, you can not have anything else by mouth!    4.  You must have someone with you to DRIVE YOU HOME since you will be receiving IV sedation for the colonoscopy.    5.  It is ok to take your heart, blood pressure, and seizure medications in the morning of your test with a SIP of water.  Hold other medications until after your procedure.  Do NOT have anything else to eat or drink the morning of your colonoscopy.  It is ok to brush your teeth.    6.  If you are on blood thinners THAT YOU HAVE BEEN INSTRUCTED TO HOLD BY YOUR DOCTOR FOR THIS PROCEDURE, then do NOT take this the morning of your colonoscopy.  Do NOT stop these medications on your own, they must be approved to be held by your doctor.  Your colonoscopy can NOT be done if you are on these medications.  Examples of blood thinners include: Coumadin, Aggrenox, Plavix, Pradaxa, Reapro, Pletal, Xarelto, Ticagrelor, Brilinta, Eliquis, and high dose aspirin (325 mg).  You do not have to stop baby aspirin 81 mg.    7.  IF YOU ARE DIABETIC:  NO INSULIN OR ORAL MEDICATIONS THE MORNING OF THE COLONOSCOPY.  TAKE ONLY HALF THE DOSE OF YOUR INSULIN THE DAY BEFORE THE COLONOSCOPY.  DO NOT TAKE ANY ORAL DIABETIC MEDICATIONS THE DAY BEFORE THE COLONOSCOPY.  IF YOU ARE AN INSULIN DEPENDENT DIABETIC WITH UNSTABLE BLOOD SUGARS, NOTIFY YOUR PRIMARY CARE PHYSICIAN FOR INSTRUCTIONS.    8. Please hold any GLP-1 medications prior to the procedure: Dulaglutide Trulicity(hold week prior), Exenatide Byetta (hold the morning of procedure), Semaglutide Ozempic (hold week prior), Liraglutide Victoza, Saxenda(hold week prior), Lixisenatide Adlyxin (hold the morning of procedure), Semaglutide Rybelsus (hold the morning of procedure), Tirzepatide Mounjaro (hold week prior)

## 2023-10-12 NOTE — PROGRESS NOTES
"  Subjective:     @Patient ID: Hermelindo Garza III is a 43 y.o. male.    Chief Complaint: Follow-up    42 yo male with DM2, HLD, obesity, GERD, SHEIKH, LUBA, ADHD, asthma, spondylosis, hx of melanoma in situ presents for:      DM2: ozempic 0.5 mg weekly.   HLD: lipitor 20 mg qday    Follow-up  Pertinent negatives include no abdominal pain, arthralgias, chest pain, chills, congestion, coughing, fever, headaches, nausea, rash, sore throat, vomiting or weakness.         Review of Systems   Constitutional:  Negative for chills and fever.   HENT:  Negative for congestion and sore throat.    Eyes:  Negative for pain and visual disturbance.   Respiratory:  Negative for cough and shortness of breath.    Cardiovascular:  Negative for chest pain and leg swelling.   Gastrointestinal:  Negative for abdominal pain, nausea and vomiting.   Endocrine: Negative for polydipsia and polyuria.   Genitourinary:  Negative for difficulty urinating and dysuria.   Musculoskeletal:  Negative for arthralgias and back pain.   Skin:  Negative for rash and wound.   Neurological:  Negative for weakness and headaches.   Psychiatric/Behavioral:  Negative for agitation and confusion.      Past medical history, surgical history, and family medical history reviewed and updated as appropriate.    Medications and allergies reviewed.     Objective:     Vitals:    10/12/23 0852   BP: 110/62   BP Location: Right arm   Patient Position: Sitting   BP Method: Large (Manual)   Pulse: 76   Resp: 16   Temp: 97.7 °F (36.5 °C)   TempSrc: Temporal   SpO2: 98%   Weight: 111.9 kg (246 lb 11.1 oz)   Height: 5' 5" (1.651 m)     Body mass index is 41.05 kg/m².  Physical Exam  Constitutional:       Appearance: Normal appearance.   HENT:      Head: Normocephalic and atraumatic.   Eyes:      General:         Right eye: No discharge.         Left eye: No discharge.      Conjunctiva/sclera: Conjunctivae normal.   Cardiovascular:      Rate and Rhythm: Normal rate and " regular rhythm.      Heart sounds: No murmur heard.  Pulmonary:      Effort: Pulmonary effort is normal.      Breath sounds: Normal breath sounds.   Musculoskeletal:         General: Normal range of motion.      Cervical back: Normal range of motion and neck supple.   Skin:     General: Skin is warm and dry.   Neurological:      Mental Status: He is alert and oriented to person, place, and time.   Psychiatric:         Mood and Affect: Mood normal.         Behavior: Behavior normal.         Lab Results   Component Value Date    WBC 13.22 (H) 07/04/2023    HGB 15.2 07/04/2023    HCT 45.2 07/04/2023     07/04/2023    CHOL 169 10/11/2023    TRIG 345 (H) 10/11/2023    HDL 40 10/11/2023    ALT 39 10/11/2023    AST 24 10/11/2023     10/11/2023    K 4.1 10/11/2023     10/11/2023    CREATININE 0.7 10/11/2023    BUN 18 10/11/2023    CO2 26 10/11/2023    TSH 1.657 04/05/2023    PSA 0.18 10/05/2022    INR 1.0 09/19/2021    HGBA1C 5.4 10/11/2023       Assessment:     1. Type 2 diabetes mellitus without complication, unspecified whether long term insulin use    2. Hyperlipidemia associated with type 2 diabetes mellitus      Plan:   Hermelindo was seen today for follow-up.    Diagnoses and all orders for this visit:    Type 2 diabetes mellitus without complication, unspecified whether long term insulin use\  Hyperlipidemia associated with type 2 diabetes mellitus  - Stable. Continue home meds   - Reviewed lab results in clinic       Suzi Sanches MD  Internal Medicine

## 2023-10-16 ENCOUNTER — TELEPHONE (OUTPATIENT)
Dept: GASTROENTEROLOGY | Facility: CLINIC | Age: 43
End: 2023-10-16
Payer: COMMERCIAL

## 2023-10-16 NOTE — TELEPHONE ENCOUNTER
Spoke with pt. Scheduled double for 12/21/23 wit Dr Johnson. Sent instructions through the portal. Advised pt to hold ozempic a week before procedure.

## 2023-10-16 NOTE — TELEPHONE ENCOUNTER
----- Message from Sarah Wing NP sent at 10/13/2023 10:39 AM CDT -----  Steven,     Can you schedule him for double in December-- will send clenpiq to his pharmacy just let me know when you book.     Thanks,

## 2023-10-19 ENCOUNTER — CLINICAL SUPPORT (OUTPATIENT)
Dept: ALLERGY | Facility: CLINIC | Age: 43
End: 2023-10-19
Payer: COMMERCIAL

## 2023-10-19 DIAGNOSIS — J30.9 CHRONIC ALLERGIC RHINITIS: Primary | ICD-10-CM

## 2023-10-19 PROCEDURE — 99999 PR PBB SHADOW E&M-EST. PATIENT-LVL I: CPT | Mod: PBBFAC,,,

## 2023-10-19 PROCEDURE — 95117 IMMUNOTHERAPY INJECTIONS: CPT | Mod: S$GLB,,, | Performed by: ALLERGY & IMMUNOLOGY

## 2023-10-19 PROCEDURE — 99999 PR PBB SHADOW E&M-EST. PATIENT-LVL I: ICD-10-PCS | Mod: PBBFAC,,,

## 2023-10-19 PROCEDURE — 95117 PR IMMU2THERAPY, 2+ INJECTIONS: ICD-10-PCS | Mod: S$GLB,,, | Performed by: ALLERGY & IMMUNOLOGY

## 2023-10-23 ENCOUNTER — HOSPITAL ENCOUNTER (OUTPATIENT)
Dept: RADIOLOGY | Facility: HOSPITAL | Age: 43
Discharge: HOME | End: 2023-10-23
Attending: NEUROLOGICAL SURGERY
Payer: COMMERCIAL

## 2023-10-23 DIAGNOSIS — M54.9 DORSALGIA, UNSPECIFIED: ICD-10-CM

## 2023-10-23 PROCEDURE — 72148 MRI LUMBAR SPINE W/O DYE: CPT | Mod: 26,,, | Performed by: RADIOLOGY

## 2023-10-23 PROCEDURE — 72148 MRI LUMBAR SPINE WITHOUT CONTRAST: ICD-10-PCS | Mod: 26,,, | Performed by: RADIOLOGY

## 2023-10-23 PROCEDURE — 72148 MRI LUMBAR SPINE W/O DYE: CPT | Mod: TC

## 2023-10-24 ENCOUNTER — CLINICAL SUPPORT (OUTPATIENT)
Dept: ALLERGY | Facility: CLINIC | Age: 43
End: 2023-10-24
Payer: COMMERCIAL

## 2023-10-24 DIAGNOSIS — J30.9 CHRONIC ALLERGIC RHINITIS: Primary | ICD-10-CM

## 2023-10-24 PROCEDURE — 99999 PR PBB SHADOW E&M-EST. PATIENT-LVL I: CPT | Mod: PBBFAC,,,

## 2023-10-24 PROCEDURE — 99999 PR PBB SHADOW E&M-EST. PATIENT-LVL I: ICD-10-PCS | Mod: PBBFAC,,,

## 2023-10-24 PROCEDURE — 95117 PR IMMU2THERAPY, 2+ INJECTIONS: ICD-10-PCS | Mod: S$GLB,,, | Performed by: ALLERGY & IMMUNOLOGY

## 2023-10-24 PROCEDURE — 95117 IMMUNOTHERAPY INJECTIONS: CPT | Mod: S$GLB,,, | Performed by: ALLERGY & IMMUNOLOGY

## 2023-10-26 ENCOUNTER — PATIENT MESSAGE (OUTPATIENT)
Dept: NEUROSURGERY | Facility: HOSPITAL | Age: 43
End: 2023-10-26
Payer: COMMERCIAL

## 2023-10-31 ENCOUNTER — CLINICAL SUPPORT (OUTPATIENT)
Dept: ALLERGY | Facility: CLINIC | Age: 43
End: 2023-10-31
Payer: COMMERCIAL

## 2023-10-31 DIAGNOSIS — J30.9 CHRONIC ALLERGIC RHINITIS: Primary | ICD-10-CM

## 2023-10-31 PROCEDURE — 99999 PR PBB SHADOW E&M-EST. PATIENT-LVL I: CPT | Mod: PBBFAC,,,

## 2023-10-31 PROCEDURE — 95117 IMMUNOTHERAPY INJECTIONS: CPT | Mod: S$GLB,,, | Performed by: ALLERGY & IMMUNOLOGY

## 2023-10-31 PROCEDURE — 99999 PR PBB SHADOW E&M-EST. PATIENT-LVL I: ICD-10-PCS | Mod: PBBFAC,,,

## 2023-10-31 PROCEDURE — 95117 PR IMMU2THERAPY, 2+ INJECTIONS: ICD-10-PCS | Mod: S$GLB,,, | Performed by: ALLERGY & IMMUNOLOGY

## 2023-11-02 NOTE — PRE-PROCEDURE INSTRUCTIONS
The following was discussed with pt via phone and sent to pt portal. Pt verbalized understanding.    On the day of your pain procedure, please report to [facility] [address] [facility].     Please park in the lot in front of the building and enter at the main entrance. Proceed to the second floor for registration.     Arrival time: 12:40 pm  *Refrain from drinking any alcohol  *You may not drive home after your procedure.  *Please insure that you have pre-planned your ride home.    *You may not leave alone in an uber, taxi, etc. if you have received sedating medications by mouth or by vein  *You must be discharged to a responsible adult.   *Please remain under adult supervision for 24 hours.   If possible, please have your visitor &/or ride home stay during your visit.   The surgeon should speak with your visitors after your procedure.  All visitors must be 18 years of age or older. Please limit your visitors to max of 2 people.     Day of Procedure  No food, no drink 8 hours prior to your arrival time, or 6 hours if diabetic.      *You should take any medications that you routinely take for blood pressure, heart medications, thyroid, cholesterol, etc with small sips of water.   *If you are a diabetic, do not take your medication if you will be fasting, but bring it with you.   *Please plan on being here for roughly 2 hours.   *Hold vitamins and supplements the morning of your procedure.  *Hold any Anticoagulants (ex. Aspirin, goody or BC powder, Coumadin, Eliquis, Heparin, Plavix, Lovenox, Xarelto, etc.)     Shower the night before and the morning of your procedure with antibacterial soap.   Please do not use antibacterial soap to wash your face. Use your regular face soap.  Do not apply any products to your skin nor your hair after you shower the morning of your procedure.         Products include: lotions, oils, ointments, creams, gels, powders, lotion, deodorant, make-up, perfume/cologne,  after shave and sunscreen.     Wear loose, comfortable clothing.  No jewelry. No contacts. Bring glasses if necessary.  If you have dentures, please bring a case.  Please leave all jewelry and valuables at home.     If you start to feel sick (fever, chills, coughing, etc) or start on any antibiotics, please contact your doctor's office at [phone] to reschedule.      Thanks,  Catina, LPN [facility]  Pre-Admit Dept

## 2023-11-03 ENCOUNTER — PATIENT MESSAGE (OUTPATIENT)
Dept: PAIN MEDICINE | Facility: CLINIC | Age: 43
End: 2023-11-03
Payer: COMMERCIAL

## 2023-11-03 ENCOUNTER — HOSPITAL ENCOUNTER (OUTPATIENT)
Facility: HOSPITAL | Age: 43
Discharge: HOME | End: 2023-11-03
Attending: STUDENT IN AN ORGANIZED HEALTH CARE EDUCATION/TRAINING PROGRAM | Admitting: STUDENT IN AN ORGANIZED HEALTH CARE EDUCATION/TRAINING PROGRAM
Payer: COMMERCIAL

## 2023-11-03 VITALS
WEIGHT: 240 LBS | SYSTOLIC BLOOD PRESSURE: 136 MMHG | HEIGHT: 65 IN | HEART RATE: 81 BPM | RESPIRATION RATE: 18 BRPM | DIASTOLIC BLOOD PRESSURE: 78 MMHG | TEMPERATURE: 98 F | BODY MASS INDEX: 39.99 KG/M2 | OXYGEN SATURATION: 94 %

## 2023-11-03 DIAGNOSIS — M54.12 CERVICAL RADICULOPATHY: Primary | ICD-10-CM

## 2023-11-03 DIAGNOSIS — M79.641 RIGHT HAND PAIN: Primary | ICD-10-CM

## 2023-11-03 DIAGNOSIS — G89.29 CHRONIC PAIN: ICD-10-CM

## 2023-11-03 LAB — POCT GLUCOSE: 98 MG/DL (ref 70–110)

## 2023-11-03 PROCEDURE — 25000003 PHARM REV CODE 250: Performed by: STUDENT IN AN ORGANIZED HEALTH CARE EDUCATION/TRAINING PROGRAM

## 2023-11-03 PROCEDURE — 62321 NJX INTERLAMINAR CRV/THRC: CPT | Performed by: STUDENT IN AN ORGANIZED HEALTH CARE EDUCATION/TRAINING PROGRAM

## 2023-11-03 PROCEDURE — 62321 PR INJ CERV/THORAC, W/GUIDANCE: ICD-10-PCS | Mod: ,,, | Performed by: STUDENT IN AN ORGANIZED HEALTH CARE EDUCATION/TRAINING PROGRAM

## 2023-11-03 PROCEDURE — 62321 NJX INTERLAMINAR CRV/THRC: CPT | Mod: ,,, | Performed by: STUDENT IN AN ORGANIZED HEALTH CARE EDUCATION/TRAINING PROGRAM

## 2023-11-03 PROCEDURE — 82962 GLUCOSE BLOOD TEST: CPT | Performed by: STUDENT IN AN ORGANIZED HEALTH CARE EDUCATION/TRAINING PROGRAM

## 2023-11-03 PROCEDURE — 63600175 PHARM REV CODE 636 W HCPCS: Performed by: STUDENT IN AN ORGANIZED HEALTH CARE EDUCATION/TRAINING PROGRAM

## 2023-11-03 RX ORDER — LIDOCAINE HYDROCHLORIDE 20 MG/ML
INJECTION, SOLUTION EPIDURAL; INFILTRATION; INTRACAUDAL; PERINEURAL
Status: DISCONTINUED | OUTPATIENT
Start: 2023-11-03 | End: 2023-11-03 | Stop reason: HOSPADM

## 2023-11-03 RX ORDER — SODIUM CHLORIDE 9 MG/ML
500 INJECTION, SOLUTION INTRAVENOUS CONTINUOUS
Status: ACTIVE | OUTPATIENT
Start: 2023-11-03

## 2023-11-03 RX ORDER — LIDOCAINE HYDROCHLORIDE 10 MG/ML
1 INJECTION, SOLUTION EPIDURAL; INFILTRATION; INTRACAUDAL; PERINEURAL ONCE
Status: DISCONTINUED | OUTPATIENT
Start: 2023-11-03 | End: 2023-11-03

## 2023-11-03 RX ORDER — FENTANYL CITRATE 50 UG/ML
INJECTION, SOLUTION INTRAMUSCULAR; INTRAVENOUS
Status: DISCONTINUED | OUTPATIENT
Start: 2023-11-03 | End: 2023-11-03 | Stop reason: HOSPADM

## 2023-11-03 RX ORDER — DEXAMETHASONE SODIUM PHOSPHATE 10 MG/ML
INJECTION INTRAMUSCULAR; INTRAVENOUS
Status: DISCONTINUED | OUTPATIENT
Start: 2023-11-03 | End: 2023-11-03 | Stop reason: HOSPADM

## 2023-11-03 RX ORDER — LIDOCAINE HYDROCHLORIDE 10 MG/ML
INJECTION, SOLUTION EPIDURAL; INFILTRATION; INTRACAUDAL; PERINEURAL
Status: DISCONTINUED | OUTPATIENT
Start: 2023-11-03 | End: 2023-11-03 | Stop reason: HOSPADM

## 2023-11-03 RX ORDER — MIDAZOLAM HYDROCHLORIDE 1 MG/ML
INJECTION INTRAMUSCULAR; INTRAVENOUS
Status: DISCONTINUED | OUTPATIENT
Start: 2023-11-03 | End: 2023-11-03 | Stop reason: HOSPADM

## 2023-11-03 NOTE — PLAN OF CARE
Plan of care reviewed. Pt verbalizes understanding. Questions and concerns addressed. Call button within reach.

## 2023-11-03 NOTE — DISCHARGE SUMMARY
Discharge Note  Short Stay      SUMMARY     Admit Date: 11/3/2023    Attending Physician: Kolton Terrell MD PhD    Discharge Physician: Kolton Terrell      Discharge Date: 11/3/2023 1:54 PM    Procedure(s) (LRB):  cervical NELI C7-T1 (N/A)    Final Diagnosis: Cervical radiculopathy [M54.12]  DDD (degenerative disc disease), cervical [M50.30]    Disposition: Home or self care    Patient Instructions:   Current Discharge Medication List        CONTINUE these medications which have NOT CHANGED    Details   acetaminophen (TYLENOL) 325 MG tablet Take 650 mg by mouth every 6 (six) hours as needed for Pain.      albuterol (PROVENTIL/VENTOLIN HFA) 90 mcg/actuation inhaler RescueINHALE 2 PUFFS INTO THE LUNGS EVERY 6 HOURS AS NEEDED WHEEZING, RESCUE Strength: 90 mcg/actuation  Qty: 8.5 g, Refills: 5    Associated Diagnoses: Mild intermittent asthma without complication      atorvastatin (LIPITOR) 20 MG tablet TAKE 1 TABLET(20 MG) BY MOUTH EVERY DAY  Qty: 90 tablet, Refills: 1    Associated Diagnoses: Type 2 diabetes mellitus without complication, without long-term current use of insulin; Mixed hyperlipidemia      azelastine (ASTELIN) 137 mcg (0.1 %) nasal spray 2 sprays (274 mcg total) by Nasal route 2 (two) times daily.  Qty: 30 mL, Refills: 9      cyclobenzaprine (FLEXERIL) 10 MG tablet Take 10 mg by mouth 3 (three) times daily.      dexmethylphenidate (FOCALIN) 10 MG tablet Take 1 tablet (10 mg total) by mouth 2 (two) times daily.  Qty: 60 tablet, Refills: 0    Associated Diagnoses: ADHD (attention deficit hyperactivity disorder), inattentive type      ergocalciferol, vitamin D2, (VITAMIN D ORAL) Take 1 tablet by mouth every other day.      EScitalopram oxalate (LEXAPRO) 10 MG tablet Take 1 tablet (10 mg total) by mouth once daily.  Qty: 90 tablet, Refills: 0    Associated Diagnoses: Unspecified mood (affective) disorder      fexofenadine (ALLEGRA) 180 MG tablet Take 180 mg by mouth once daily.      gabapentin (NEURONTIN) 300 MG  capsule TAKE 1 CAPSULE(300 MG) BY MOUTH THREE TIMES DAILY  Qty: 90 capsule, Refills: 3    Associated Diagnoses: Cervical radicular pain      lamoTRIgine (LAMICTAL) 100 MG tablet Take 1 tablet (100 mg total) by mouth 2 (two) times daily. IF any RASH, STOP ALL Lamictal and Tell Psyc MD.  Qty: 180 tablet, Refills: 1    Associated Diagnoses: Unspecified mood (affective) disorder      LORazepam (ATIVAN) 0.5 MG tablet Take 1 tablet (0.5 mg total) by mouth daily as needed (SIGNIFICANT ANXIETY).  Qty: 30 tablet, Refills: 2    Associated Diagnoses: Anxiety      meloxicam (MOBIC) 15 MG tablet TAKE 1 TABLET(15 MG) BY MOUTH EVERY DAY as needed FOR PAIN  Qty: 30 tablet, Refills: 2      montelukast (SINGULAIR) 10 mg tablet Take 1 tablet (10 mg total) by mouth every evening.  Qty: 90 tablet, Refills: 3    Associated Diagnoses: Mild intermittent asthma without complication; History of multiple allergies      ondansetron (ZOFRAN-ODT) 4 MG TbDL Dissolve 1 tablet (4 mg total) by mouth every 6 (six) hours as needed (nausea).  Qty: 30 tablet, Refills: 0      rOPINIRole (REQUIP) 0.25 MG tablet TAKE 1 TABLET(0.25 MG) BY MOUTH EVERY EVENING  Qty: 90 tablet, Refills: 2    Associated Diagnoses: Restless leg      blood sugar diagnostic (TRUE METRIX GLUCOSE TEST STRIP) Strp USE TO TEST ONCE DAILY  Qty: 30 strip, Refills: 5    Associated Diagnoses: Diabetes mellitus without complication      blood-glucose meter kit Use as instructed  Qty: 1 each, Refills: 0    Comments: Please provide meter covered by patient's insurance  Associated Diagnoses: Diabetes mellitus without complication      doxycycline (VIBRAMYCIN) 100 MG Cap Take 1 capsule (100 mg total) by mouth 2 (two) times daily.  Qty: 14 capsule, Refills: 0    Associated Diagnoses: Cellulitis of toe of left foot      fluocinonide (LIDEX) 0.05 % external solution AAA scalp qday prn itching, scaling  Qty: 60 mL, Refills: 3    Associated Diagnoses: Seborrheic dermatitis, unspecified       hydrocortisone 2.5 % cream APPLY EXTERNALLY TO THE AFFECTED AREA TWICE DAILY FOR 10 DAYS  Qty: 30 g, Refills: 0    Associated Diagnoses: Allergic contact dermatitis due to plant      ketoconazole (NIZORAL) 2 % cream APPLY TOPICALLY TO THE AFFECTED AREA TWICE DAILY  Qty: 15 g, Refills: 0    Associated Diagnoses: Seborrheic dermatitis of scalp      latanoprost 0.005 % ophthalmic solution Place 1 drop into both eyes every evening.  Qty: 7.5 mL, Refills: 3    Associated Diagnoses: Ocular hypertension, bilateral      semaglutide (OZEMPIC) 0.25 mg or 0.5 mg (2 mg/3 mL) pen injector Inject 0.5 mg into the skin every 7 days.  Qty: 3 each, Refills: 1      sod picosulf-mag ox-citric ac (CLENPIQ) 10 mg-3.5 gram- 12 gram/160 mL Soln Take As Directed.  Qty: 320 mL, Refills: 0    Associated Diagnoses: Blood in stool      tamsulosin (FLOMAX) 0.4 mg Cap Take 1 capsule (0.4 mg total) by mouth once daily for 10 days  Qty: 10 capsule, Refills: 0      ULTRA THIN LANCETS 30 gauge Misc USE TO TEST BLOOD SUGAR EVERY DAY AS DIRECTED  Qty: 100 each, Refills: 3    Associated Diagnoses: Diabetes mellitus without complication                 Discharge Diagnosis: Cervical radiculopathy [M54.12]  DDD (degenerative disc disease), cervical [M50.30]  Condition on Discharge: Stable with no complications to procedure   Diet on Discharge: Same as before.  Activity: as per instruction sheet.  Discharge to: Home with a responsible adult.  Follow up: 2-4 weeks       Please call my office or pager at [phone] if experienced any weakness or loss of sensation, fever > 101.5, pain uncontrolled with oral medications, persistent nausea/vomiting/or diarrhea, redness or drainage from the incisions, or any other worrisome concerns. If physician on call was not reached or could not communicate with our office for any reason please go to the nearest emergency department      Kolton Terrell MD PhD

## 2023-11-03 NOTE — DISCHARGE INSTRUCTIONS
Home Care Instructions Pain Management:     1.  DIET:     You may resume your normal diet today.     2.  BATHING:     You may shower with luke warm water.     3.  DRESSING:     You may remove your bandage today.     4.  ACTIVITY LEVEL:       You may resume your normal activities 24 hours after your procedure.     5.  MEDICATIONS:     You may resume your normal medications today.     6.  SPECIAL INSTRUCTIONS:     No heat to the injection site for 24 hours including bath or shower, heating pad, moist heat or hot tubs.     Use an ice pack to the injection site for any pain or discomfort.  Apply ice packs for 20 minute intervals as needed.     If you have received any sedatives by mouth today, you can not drive for 12 hours.     If you have received sedation through an IV, you can not drive for 24 hours.     PLEASE CALL YOUR DOCTOR FOR THE FOLLOWIN.  Redness or swelling around the injection site.  2.  Fever of 101 degrees.  3.  Drainage (pus) from the injection site.  4.  For any continuous bleeding (some dried blood over the incision is normal.)     FOR EMERGENCIES:     If any unusual problems or difficulties occur during clinic hours, call [phone] or dial [phone].     Follow up with with your physician in 2-3 weeks.

## 2023-11-03 NOTE — H&P
HPI  Hermelindo Garza III presents for Procedure(s):  cervical NELI C7-T1    Recent anticoagulation/antiplatelets reviewed and verified with nursing.  Recent antibiotics/recent infections reviewed and verified with nursing.    Past Medical History:   Diagnosis Date    ADD (attention deficit disorder) 05/09/2012    Allergy     Anxiety 4/12/2023    Asthma 05/09/2012    Cervical disc disorder with radiculopathy, unspecified cervical region 09/14/2021    Cervical spondylosis with myelopathy 06/29/2022    Eczema     Esophageal ulcer     GERD (gastroesophageal reflux disease) 05/09/2012    Glaucoma     Kidney stones 05/2014    Lumbar disc disease 05/09/2012    Lumbar herniated disc 05/09/2012    Malignant melanoma of skin, unspecified 01/06/2022    in situ right mid back    Melanoma 01/2022    in situ R mid back     LUBA (obstructive sleep apnea)     Radiculopathy, lumbar region 09/14/2021    Renal calculi 05/09/2012    Type 2 diabetes mellitus without complication, without long-term current use of insulin 11/16/2020     Past Surgical History:   Procedure Laterality Date    APPENDECTOMY  2006    CARPAL TUNNEL RELEASE Right 09/15/2022    Procedure: RELEASE, CARPAL TUNNEL;  Surgeon: Christen Marshall MD;  Location: [facility] OR;  Service: Orthopedics;  Laterality: Right;    COLONOSCOPY  01/22/2019    internal hemorrhoids, o/w normal - repeat at age 52    CORRECTION OF HAMMER TOE Left 8/14/2023    Procedure: CORRECTION, HAMMER TOE--  basic foot tray as well as a Reese microfree/microchoice tray. I also asked Sherry to bring an MIS mati.;  Surgeon: Ankush Back DPM;  Location: [facility] OR;  Service: Podiatry;  Laterality: Left;  reese drill, sagittal saw, foot set    CYSTOSCOPY  7/5/2023    Procedure: CYSTOSCOPY;  Surgeon: Chuckie Hall MD;  Location: [address];  Service: Urology;;    EPIDURAL STEROID INJECTION N/A 02/10/2021    Procedure: CERVICAL C6/7 NELI DIRECT REFERRAL;  Surgeon: Michi Escamilla MD;  Location:  [facility] PAIN MGT;  Service: Pain Management;  Laterality: N/A;  NEEDS CONSENT    EPIDURAL STEROID INJECTION INTO CERVICAL SPINE N/A 6/28/2023    Procedure: Injection-steroid-epidural-cervical C7-T1;  Surgeon: Lorraine Patel DO;  Location: [facility] PAIN MANAGEMENT;  Service: Pain Management;  Laterality: N/A;  diabetic    EPIDURAL STEROID INJECTION INTO LUMBAR SPINE N/A 9/27/2023    Procedure: L4-5 NELI;  Surgeon: Tirso Pickering MD;  Location: [facility] PAIN MANAGEMENT;  Service: Pain Management;  Laterality: N/A;  15 mins    ESOPHAGOGASTRODUODENOSCOPY  01/22/2019    LA grade B esophagitis; esophageal stenosis- dilated; gastritis; H pylori pathology negative    ESOPHAGOGASTRODUODENOSCOPY N/A 05/18/2021    Procedure: EGD (ESOPHAGOGASTRODUODENOSCOPY);  Surgeon: Mariana Hector MD;  Location: [facility];  Service: Endoscopy;  Laterality: N/A;    EXCISION OF GANGLION OF WRIST Right 09/15/2022    Procedure: EXCISION, GANGLION CYST, WRIST;  Surgeon: Christen Marshall MD;  Location: [facility];  Service: Orthopedics;  Laterality: Right;    FLEXOR TENDON REPAIR Right 09/15/2022    Procedure: FLEXOR TENOSYNOVECTOMY;  Surgeon: Christen Marshall MD;  Location: [facility] OR;  Service: Orthopedics;  Laterality: Right;    HAND ARTHROTOMY Right 09/15/2022    Procedure: ARTHROTOMY, HAND RADIOCARPAL;  Surgeon: Christen Marshall MD;  Location: [facility] OR;  Service: Orthopedics;  Laterality: Right;  Radiocarpal    INJECTION OF STEROID Left 09/15/2022    Procedure: INJECTION, STEROID LEFT WRIST AND LEFT LATERAL ELBOW;  Surgeon: Christen Marshall MD;  Location: [facility] OR;  Service: Orthopedics;  Laterality: Left;  Left Carpal Tunnel CSI    LASER LITHOTRIPSY Left 7/5/2023    Procedure: LITHOTRIPSY, USING LASER;  Surgeon: Chuckie Hall MD;  Location: [address];  Service: Urology;  Laterality: Left;    LUMBAR LAMINECTOMY  2010    LUMBAR LAMINECTOMY WITH DISCECTOMY Left 09/20/2021    Procedure: LAMINECTOMY, SPINE, LUMBAR, WITH DISCECTOMY;  Surgeon: Naseem  "JEANNE Mcnamara DO;  Location: [facility] OR [address] [facility];  Service: Neurosurgery;  Laterality: Left;  MIS L3-4    TYMPANOSTOMY TUBE PLACEMENT      URETERAL STENT PLACEMENT Left 7/5/2023    Procedure: INSERTION, STENT, URETER;  Surgeon: Chuckie Hall MD;  [facility];  Service: Urology;  Laterality: Left;    URETEROSCOPIC REMOVAL OF URETERIC CALCULUS Left 7/5/2023    Procedure: REMOVAL, CALCULUS, URETER, URETEROSCOPIC;  Surgeon: Chuckie Hall MD;  [facility];  Service: Urology;  Laterality: Left;    URETEROSCOPY Left 7/5/2023    Procedure: URETEROSCOPY;  Surgeon: Chuckie Hall MD;  Location: [facility] OR [address] [place] [address];  Service: Urology;  Laterality: Left;     Review of patient's allergies indicates:  No Known Allergies     PMHx, PSHx, Allergies, Medications reviewed in epic      ROS negative except pain complaints in HPI    OBJECTIVE:    BP (!) 159/80 (BP Location: Left arm, Patient Position: Lying)   Pulse 80   Temp 98.8 °F (37.1 °C) (Temporal)   Resp 16   Ht 5' 5" (1.651 m)   Wt 108.9 kg (240 lb)   SpO2 96%   BMI 39.94 kg/m²     PHYSICAL EXAMINATION:    GENERAL: Well appearing, in no acute distress, alert and oriented to name, situation, location.  PSYCH:  Mood and affect appropriate.  SKIN: Skin color, texture, turgor normal, no rashes or lesions at site of procedure.  CV: regular rate with palpation of the radial artery.  PULM: No evidence of respiratory difficulty, symmetric chest rise. No audible abnormal respiratory sounds.  NEURO: Cranial nerves grossly intact.    Plan:    Proceed with procedure as planned    Kolton Terrell  11/03/2023    "

## 2023-11-03 NOTE — PLAN OF CARE
Pt AAOx3, VSS on room air.Pt tolerated procedure well. Pt denies any pain, Dizziness or N/V. IV removed with catheter tip intact. Assisted up for the first time, steady on feet. Pt Discharge instructions given and explained to patient with verbalization of understanding all instructions. Pt denies any further questions at this time. Pt DC'd home VIA wheelchair with family.

## 2023-11-03 NOTE — OP NOTE
Cervical Interlaminar Epidural Steroid Injection under Fluoroscopic Guidance    The procedure, risks, benefits, and options were discussed with the patient. There are no contraindications to the procedure. The patent expressed understanding and agreed to the procedure. Informed written consent was obtained prior to the start of the procedure and can be found in the patient's chart.     PATIENT NAME: Hermelindo Garza III   MRN: [id number]     DATE OF PROCEDURE: 11/03/2023    PROCEDURE: Cervical Interlaminar Epidural Steroid Injection C7/T1 under Fluoroscopic Guidance    PRE-OP DIAGNOSIS: Cervical radiculopathy [M54.12]  DDD (degenerative disc disease), cervical [M50.30] Cervical radiculopathy [M54.12]    POST-OP DIAGNOSIS: Same    PHYSICIAN: Kolton Terrell MD     ASSISTANTS: None    MEDICATIONS INJECTED: Preservative-free Decadron 10mg with 1cc of Lidocaine 1% MPF and preservative free normal saline    LOCAL ANESTHETIC INJECTED: Xylocaine 2%     SEDATION: Versed 1mg and Fentanyl 50mcg                                                                                                                                                                                     Conscious sedation ordered by M.D. Patient re-evaluation prior to administration of conscious sedation. No changes noted in patient's status from initial evaluation. The patient's vital signs were monitored by RN and patient remained hemodynamically stable throughout the procedure.    Event Time In   Sedation Start 1347   Sedation End         1357    ESTIMATED BLOOD LOSS: None    COMPLICATIONS: None    TECHNIQUE: Time-out was performed to identify the patient and procedure to be performed. With the patient laying in a prone position, the surgical area was prepped and draped in the usual sterile fashion using ChloraPrep and a fenestrated drape. The level was determined under fluoroscopy guidance. Skin anesthesia was achieved by injecting Lidocaine 2% over the  injection site.  The interlaminar space was then approached with a 20 gauge, 5 inch Tuohy needle that was introduced under fluoroscopic guidance with AP, lateral and/or contralateral oblique imaging. Once the Ligamentum flavum was encountered loss of resistance to saline was used to enter the epidural space. With positive loss of resistance and negative aspiration for CSF or Blood, contrast dye  Omnipaque (240mg/mL) was injected to confirm placement and there was no vascular runoff. Then 3 mL of the medication mixture listed above was then injected slowly. Displacement of the radio opaque contrast after injection of the medication confirmed that the medication went into the area of the epidural space. The needles were removed, and bleeding was nil. A sterile dressing was applied. No specimens collected. The patient tolerated the procedure well.     The patient was monitored after the procedure in the recovery area. They were given post-procedure and discharge instructions to follow at home. The patient was discharged in a stable condition.    Kolton Terrell MD

## 2023-11-06 ENCOUNTER — PATIENT MESSAGE (OUTPATIENT)
Dept: INTERNAL MEDICINE | Facility: CLINIC | Age: 43
End: 2023-11-06
Payer: COMMERCIAL

## 2023-11-06 DIAGNOSIS — G25.81 RESTLESS LEG: ICD-10-CM

## 2023-11-07 ENCOUNTER — PATIENT MESSAGE (OUTPATIENT)
Dept: ADMINISTRATIVE | Facility: OTHER | Age: 43
End: 2023-11-07
Payer: COMMERCIAL

## 2023-11-07 ENCOUNTER — PATIENT MESSAGE (OUTPATIENT)
Dept: PSYCHIATRY | Facility: CLINIC | Age: 43
End: 2023-11-07
Payer: COMMERCIAL

## 2023-11-07 ENCOUNTER — PATIENT MESSAGE (OUTPATIENT)
Dept: UROLOGY | Facility: CLINIC | Age: 43
End: 2023-11-07
Payer: COMMERCIAL

## 2023-11-07 ENCOUNTER — TELEPHONE (OUTPATIENT)
Dept: PAIN MEDICINE | Facility: CLINIC | Age: 43
End: 2023-11-07
Payer: COMMERCIAL

## 2023-11-07 DIAGNOSIS — M54.16 LUMBAR RADICULOPATHY: Primary | ICD-10-CM

## 2023-11-07 RX ORDER — ONDANSETRON 4 MG/1
4 TABLET, ORALLY DISINTEGRATING ORAL EVERY 6 HOURS PRN
Qty: 30 TABLET | Refills: 0 | Status: SHIPPED | OUTPATIENT
Start: 2023-11-07 | End: 2023-12-20 | Stop reason: SDUPTHER

## 2023-11-07 RX ORDER — ROPINIROLE 0.25 MG/1
TABLET, FILM COATED ORAL
Qty: 90 TABLET | Refills: 2 | Status: SHIPPED | OUTPATIENT
Start: 2023-11-07

## 2023-11-07 NOTE — TELEPHONE ENCOUNTER
No care due was identified.  [facility] Embedded Care Due Messages. Reference number: [id number].   11/06/2023 9:38:50 PM CST

## 2023-11-08 ENCOUNTER — OFFICE VISIT (OUTPATIENT)
Dept: PSYCHIATRY | Facility: CLINIC | Age: 43
End: 2023-11-08
Payer: COMMERCIAL

## 2023-11-08 DIAGNOSIS — G47.00 INSOMNIA, UNSPECIFIED TYPE: ICD-10-CM

## 2023-11-08 DIAGNOSIS — F39 UNSPECIFIED MOOD (AFFECTIVE) DISORDER: Primary | ICD-10-CM

## 2023-11-08 DIAGNOSIS — Z87.442 HISTORY OF URINARY STONE: ICD-10-CM

## 2023-11-08 DIAGNOSIS — F41.9 ANXIETY: ICD-10-CM

## 2023-11-08 DIAGNOSIS — F90.0 ADHD (ATTENTION DEFICIT HYPERACTIVITY DISORDER), INATTENTIVE TYPE: ICD-10-CM

## 2023-11-08 DIAGNOSIS — M54.16 LUMBAR RADICULOPATHY, CHRONIC: ICD-10-CM

## 2023-11-08 DIAGNOSIS — M54.12 CERVICAL RADICULOPATHY: ICD-10-CM

## 2023-11-08 DIAGNOSIS — E11.9 TYPE 2 DIABETES MELLITUS WITHOUT COMPLICATION, UNSPECIFIED WHETHER LONG TERM INSULIN USE: ICD-10-CM

## 2023-11-08 PROCEDURE — 99215 PR OFFICE/OUTPT VISIT, EST, LEVL V, 40-54 MIN: ICD-10-PCS | Mod: 95,,, | Performed by: PSYCHIATRY & NEUROLOGY

## 2023-11-08 PROCEDURE — 3061F NEG MICROALBUMINURIA REV: CPT | Mod: CPTII,95,, | Performed by: PSYCHIATRY & NEUROLOGY

## 2023-11-08 PROCEDURE — 3044F PR MOST RECENT HEMOGLOBIN A1C LEVEL <7.0%: ICD-10-PCS | Mod: CPTII,95,, | Performed by: PSYCHIATRY & NEUROLOGY

## 2023-11-08 PROCEDURE — 99215 OFFICE O/P EST HI 40 MIN: CPT | Mod: 95,,, | Performed by: PSYCHIATRY & NEUROLOGY

## 2023-11-08 PROCEDURE — 3066F PR DOCUMENTATION OF TREATMENT FOR NEPHROPATHY: ICD-10-PCS | Mod: CPTII,95,, | Performed by: PSYCHIATRY & NEUROLOGY

## 2023-11-08 PROCEDURE — 3044F HG A1C LEVEL LT 7.0%: CPT | Mod: CPTII,95,, | Performed by: PSYCHIATRY & NEUROLOGY

## 2023-11-08 PROCEDURE — 3061F PR NEG MICROALBUMINURIA RESULT DOCUMENTED/REVIEW: ICD-10-PCS | Mod: CPTII,95,, | Performed by: PSYCHIATRY & NEUROLOGY

## 2023-11-08 PROCEDURE — 3066F NEPHROPATHY DOC TX: CPT | Mod: CPTII,95,, | Performed by: PSYCHIATRY & NEUROLOGY

## 2023-11-08 RX ORDER — LAMOTRIGINE 100 MG/1
100 TABLET ORAL 2 TIMES DAILY
Qty: 180 TABLET | Refills: 1 | Status: SHIPPED | OUTPATIENT
Start: 2023-11-08 | End: 2024-01-31 | Stop reason: SDUPTHER

## 2023-11-08 RX ORDER — LORAZEPAM 0.5 MG/1
0.5 TABLET ORAL DAILY PRN
Qty: 30 TABLET | Refills: 3 | Status: SHIPPED | OUTPATIENT
Start: 2023-11-04 | End: 2023-11-13 | Stop reason: SDUPTHER

## 2023-11-08 RX ORDER — DEXMETHYLPHENIDATE HYDROCHLORIDE 10 MG/1
10 TABLET ORAL 2 TIMES DAILY
Qty: 60 TABLET | Refills: 0 | Status: SHIPPED | OUTPATIENT
Start: 2023-12-08 | End: 2023-11-13

## 2023-11-08 RX ORDER — DEXMETHYLPHENIDATE HYDROCHLORIDE 10 MG/1
10 TABLET ORAL 2 TIMES DAILY
Qty: 60 TABLET | Refills: 0 | Status: SHIPPED | OUTPATIENT
Start: 2023-11-08 | End: 2023-11-13

## 2023-11-08 RX ORDER — DEXMETHYLPHENIDATE HYDROCHLORIDE 10 MG/1
10 TABLET ORAL 2 TIMES DAILY
Qty: 60 TABLET | Refills: 0 | Status: SHIPPED | OUTPATIENT
Start: 2024-01-06 | End: 2023-11-13

## 2023-11-08 NOTE — PATIENT INSTRUCTIONS
"  PLAN:     [facility] flowed request to scheduling for  THO PATIENT  NEEDS TO CONFIRM APPOINTMENT  by seeing such appointment  appear on their "My Chart" michael.      NOTE:  IF  appointment is not visible, THEN patient is  instructed to call [facility] ([facility]) at:  [phone]  and coordinate appointment scheduling with . Understanding Expressed.      Meds: Lamictal advanced to 100 mg BID  IF any rash stop all lamictal    ativan 0.5 mg / generally takes at bed ( replaces ambien)    Lexapro he moved down to 5 mg and would like to some off / MD agree to such thjo IF need he will recontact    Renewed focalin 10 mg twice daily       Have encouraged to establish with counselor:    Pt may call [facility] at [phone] and requests counselor;  pt was informed that there may be significant ait times involved.    As such patient was also told of alternatives such as:  1) contacting their  insurance carrier for a list of counselors  And / or  2) may explore website [employer] Today: [email or web address]/[email or web address]     References:     Relaxation stress reduction workbook: AMI Davenport PhD ( used: $7-10)    Feeling Good Website: Ashwin Anderson MD / www.SMART website (free) / alexey. PODCASTS    Anxiety &  phobia workbook by JORGE Carlisle PhD  (web retailers: used: $ 7-10)    [place]: Path to Better Sleep : https://www.veterantraining.[place].gov[place]insomnia/ (free)       Pt expressed appreciation for the visit today and did not have further question at this time though pt  was still informed to:     Call  if problems.    Call / Report Side Effects to [facility]     Encouraged to follow up with primary care / Gen Med MD for continued monitoring of general health and wellness.    Understanding was expressed; and no further concerns nor questions were raised at this time.     Remember healthy self care:   eat right  attempt adequate rest   HANDWASHING / encourage such alexey. During this " corona virus time   walk or light exercise within reason and as your general med team approves  read or explore any of reference materials / homework mentioned  reach out (I.e.,  connect with)  others who nuture and bring out best in you  avoid risky behaviors    Keep your appointments:    IF you  cannot make your appt THEN please call [phone] or go online (via My Chart michael) to reschedule.    It is the responsibility of the patient to reschedule an appointment if an appointment has been canceled or missed.    Avoid  alcohol and illicit substances.  Look for the positive.  All is often relative-seek balance  Call sooner if needed : [phone]   Call 911 or go to Emergency Room  (ER)  if  any acute concerns

## 2023-11-08 NOTE — PROGRESS NOTES
Hermelindo Garza [facility]   1980 11/08/2023     Disclaimer: Evaluation and treatment is based on information presented to date. Any new information may affect assessment and findings.     The patient location is: Patient's home  and reported  that his/her location at the time of this visit was in the [facility]     Visit type: Virtual visit with synchronous audio and video     Each patient to whom he or she provides medical services by telemedicine is: (1) informed of the relationship between the physician and patient and the respective role of any other health care provider with respect to management of the patient; and (2) notified that he or she may decline to receive medical services by telemedicine and may withdraw from such care at any time.    Patient was informed that I am a physician who is licensed in the [facility]:  Ashwin Donahue MD:  Employed by   [employer]     If technology issues arise: MERRY Donahue MD will attempt to call pt back     Pt informed that if he / she is ever in crisis (or has acute concerns): pt is instructed to Dial 911 or go to nearest Emergency Room (ER)    Pt informed that if questions related to privacy practices: pt is instructed to contact [facility] Information Department:     Understanding Expressed. No questions.      S: Patient's Own Perception of Condition (& Side Effects) : none      O:      CURRENT PRESENTATION:     He asked to do Telehealth today / in this case agreed     Last session dealing with hammer toe and kidney stone   Seems to speak a bit more nervous lately and they than when seen her for perhaps related pain    Psyc Meds   Lamictal 200 mg Lexapro  he moved to 5 mg / likes this regimen     Says anxiety improved         11/8/2023    10:17 AM 5/11/2023     6:10 PM   GAD7   1. Feeling nervous, anxious, or on edge? 1 1   2. Not being able to stop or control worrying? 2 3   3. Worrying too much about different things? 1 1   4. Trouble  relaxing? 2 3   5. Being so restless that it is hard to sit still? 1 2   6. Becoming easily annoyed or irritable? 1 2   7. Feeling afraid as if something awful might happen? 0 2   8. If you checked off any problems, how difficult have these problems made it for you to do your work, take care of things at home, or get along with other people? 1 2   GODWIN-7 Score 8 14          11/8/2023    10:18 AM 10/12/2023     8:51 AM 3/2/2023     2:14 PM 6/29/2022    11:04 AM   Depression Patient Health Questionnaire   Over the last two weeks how often have you been bothered by little interest or pleasure in doing things Several days Not at all Not at all Not at all   Over the last two weeks how often have you been bothered by feeling down, depressed or hopeless Not at all Not at all Not at all Not at all   PHQ-2 Total Score 1 0 0 0   Over the last two weeks how often have you been bothered by trouble falling or staying asleep, or sleeping too much More than half the days      Over the last two weeks how often have you been bothered by feeling tired or having little energy Several days      Over the last two weeks how often have you been bothered by a poor appetite or overeating Not at all      Over the last two weeks how often have you been bothered by feeling bad about yourself - or that you are a failure or have let yourself or your family down Not at all      Over the last two weeks how often have you been bothered by trouble concentrating on things, such as reading the newspaper or watching television Several days      Over the last two weeks how often have you been bothered by moving or speaking so slowly that other people could have noticed. Or the opposite - being so fidgety or restless that you have been moving around a lot more than usual. More than half the days      Over the last two weeks how often have you been bothered by thoughts that you would be better off dead, or of hurting yourself Not at all      If you checked  off any problems, how difficult have these problems made it for you to do your work, take care of things at home or get along with other people? Not difficult at all      PHQ-9 Score 7      PHQ-9 Interpretation Mild             Constitutional Health Concerns: back pain Sees Anders SELLERS / started 2010    Says does aquatherapy now then    Also taking allergy shots weekly main campus x 9 months     [facility]es  [facility]  Comments Medication: says ambien not working / asks to remove; says ativan  helps with anxiety insomnia / says has been on in past at low dose and helps ; asks to switch to that; risk benefit discussed; says not taking opioids tho may have been Rx in past. Did caution him as to such; says he well aware     Wt Readings from Last 3 Encounters:   11/03/23 108.9 kg (240 lb)   10/12/23 111.9 kg (246 lb 11.1 oz)   10/10/23 114.4 kg (252 lb 3.3 oz)      Laboratory Data  Admission on 11/03/2023, Discharged on 11/03/2023   Component Date Value Ref Range Status    POCT Glucose 11/03/2023 98  70 - 110 mg/dL Final   Lab Visit on 10/11/2023   Component Date Value Ref Range Status    Sodium 10/11/2023 138  136 - 145 mmol/L Final    Potassium 10/11/2023 4.1  3.5 - 5.1 mmol/L Final    Chloride 10/11/2023 102  95 - 110 mmol/L Final    CO2 10/11/2023 26  23 - 29 mmol/L Final    Glucose 10/11/2023 84  70 - 110 mg/dL Final    BUN 10/11/2023 18  6 - 20 mg/dL Final    Creatinine 10/11/2023 0.7  0.5 - 1.4 mg/dL Final    Calcium 10/11/2023 9.5  8.7 - 10.5 mg/dL Final    Total Protein 10/11/2023 7.1  6.0 - 8.4 g/dL Final    Albumin 10/11/2023 4.3  3.5 - 5.2 g/dL Final    Total Bilirubin 10/11/2023 0.7  0.1 - 1.0 mg/dL Final    Alkaline Phosphatase 10/11/2023 94  55 - 135 U/L Final    AST 10/11/2023 24  10 - 40 U/L Final    ALT 10/11/2023 39  10 - 44 U/L Final    eGFR 10/11/2023 >60.0  >60 mL/min/1.73 m^2 Final    Anion Gap 10/11/2023 10  8 - 16 mmol/L Final    Hemoglobin A1C 10/11/2023 5.4  4.0 - 5.6 % Final     Estimated Avg Glucose 10/11/2023 108  68 - 131 mg/dL Final    Cholesterol 10/11/2023 169  120 - 199 mg/dL Final    Triglycerides 10/11/2023 345 (H)  30 - 150 mg/dL Final    HDL 10/11/2023 40  40 - 75 mg/dL Final    LDL Cholesterol 10/11/2023 60.0 (L)  63.0 - 159.0 mg/dL Final    HDL/Cholesterol Ratio 10/11/2023 23.7  20.0 - 50.0 % Final    Total Cholesterol/HDL Ratio 10/11/2023 4.2  2.0 - 5.0 Final    Non-HDL Cholesterol 10/11/2023 129  mg/dL Final    Microalbumin, Urine 10/11/2023 <5.0  ug/mL Final    Creatinine, Urine 10/11/2023 31.0  23.0 - 375.0 mg/dL Final    Microalb/Creat Ratio 10/11/2023 Unable to calculate  0.0 - 30.0 ug/mg Final        Mental Status Exam:      Appearance: casual   Oriented: x 3   Attitude: cooperative   Eye Contact: good  Behavior: calm  smiling    Mood: feeling better than last session    Cognition: alert  Concentration: grossly intact   Affect: appropriate range      Anxiety: mild  / moderate     Thought Process: goal directed     Speech:       Volume : WNL       Quantity WNL       Quality: appears to openly answer questions      Threats: no SI / no HI     Psychosis: denies all      Estimate of Intellectual Function: average   Impulse Control: no thoughts of harm to self/ others      Musculoskeletal:  no tremor      Patient Active Problem List   Diagnosis    Gastro-esophageal reflux disease without esophagitis    Renal calculi    Mild intermittent asthma without complication    ADHD (attention deficit hyperactivity disorder), inattentive type    LUBA (obstructive sleep apnea)    Hyperlipidemia, unspecified    Hypertriglyceridemia    Elevated liver enzymes    SHEIKH (nonalcoholic steatohepatitis)    Hepatosplenomegaly    Cellulitis of left arm    History of multiple allergies    Unspecified asthma, uncomplicated    Obesity, unspecified    Type 2 diabetes mellitus without complications    Vitamin D insufficiency    Restless leg    Chronic pain    Pain in left elbow    Cervical disc  disorder with radiculopathy, unspecified cervical region    History of urinary stone    Lumbar radiculopathy, chronic    Tachycardia    S/P lumbar discectomy    Morbid obesity    Cervical spondylosis    History of melanoma in situ    Anxiety    Mood Disorder Unspecifed:  Depressed THO also has some mood elevation  (8of 13 on MDQ): ex: irritability, more self confident, thoughts race. more active)     Rotator cuff tendonitis, left    Left lateral epicondylitis    Cervical radiculopathy    Insomnia    Ureteral stone with hydronephrosis    Pain of left lower extremity    Neck pain    Chronic low back pain          Current Outpatient Medications:     acetaminophen (TYLENOL) 325 MG tablet, Take 650 mg by mouth every 6 (six) hours as needed for Pain., Disp: , Rfl:     albuterol (PROVENTIL/VENTOLIN HFA) 90 mcg/actuation inhaler, RescueINHALE 2 PUFFS INTO THE LUNGS EVERY 6 HOURS AS NEEDED WHEEZING, RESCUE Strength: 90 mcg/actuation, Disp: 8.5 g, Rfl: 5    atorvastatin (LIPITOR) 20 MG tablet, TAKE 1 TABLET(20 MG) BY MOUTH EVERY DAY, Disp: 90 tablet, Rfl: 1    azelastine (ASTELIN) 137 mcg (0.1 %) nasal spray, 2 sprays (274 mcg total) by Nasal route 2 (two) times daily., Disp: 30 mL, Rfl: 9    blood sugar diagnostic (TRUE METRIX GLUCOSE TEST STRIP) Strp, USE TO TEST ONCE DAILY, Disp: 30 strip, Rfl: 5    blood-glucose meter kit, Use as instructed, Disp: 1 each, Rfl: 0    cyclobenzaprine (FLEXERIL) 10 MG tablet, Take 10 mg by mouth 3 (three) times daily., Disp: , Rfl:     dexmethylphenidate (FOCALIN) 10 MG tablet, Take 1 tablet (10 mg total) by mouth 2 (two) times daily., Disp: 60 tablet, Rfl: 0    [START ON 12/8/2023] dexmethylphenidate (FOCALIN) 10 MG tablet, Take 1 tablet (10 mg total) by mouth 2 (two) times daily., Disp: 60 tablet, Rfl: 0    [START ON 1/6/2024] dexmethylphenidate (FOCALIN) 10 MG tablet, Take 1 tablet (10 mg total) by mouth 2 (two) times daily., Disp: 60 tablet, Rfl: 0    doxycycline (VIBRAMYCIN) 100 MG Cap,  Take 1 capsule (100 mg total) by mouth 2 (two) times daily., Disp: 14 capsule, Rfl: 0    ergocalciferol, vitamin D2, (VITAMIN D ORAL), Take 1 tablet by mouth every other day., Disp: , Rfl:     EScitalopram oxalate (LEXAPRO) 10 MG tablet, Take 1 tablet (10 mg total) by mouth once daily., Disp: 90 tablet, Rfl: 0    fexofenadine (ALLEGRA) 180 MG tablet, Take 180 mg by mouth once daily., Disp: , Rfl:     fluocinonide (LIDEX) 0.05 % external solution, AAA scalp qday prn itching, scaling, Disp: 60 mL, Rfl: 3    gabapentin (NEURONTIN) 300 MG capsule, TAKE 1 CAPSULE(300 MG) BY MOUTH THREE TIMES DAILY, Disp: 90 capsule, Rfl: 3    hydrocortisone 2.5 % cream, APPLY EXTERNALLY TO THE AFFECTED AREA TWICE DAILY FOR 10 DAYS, Disp: 30 g, Rfl: 0    ketoconazole (NIZORAL) 2 % cream, APPLY TOPICALLY TO THE AFFECTED AREA TWICE DAILY, Disp: 15 g, Rfl: 0    lamoTRIgine (LAMICTAL) 100 MG tablet, Take 1 tablet (100 mg total) by mouth 2 (two) times daily. IF any RASH, STOP ALL Lamictal and Tell Psyc MD., Disp: 180 tablet, Rfl: 1    latanoprost 0.005 % ophthalmic solution, Place 1 drop into both eyes every evening., Disp: 7.5 mL, Rfl: 3    LORazepam (ATIVAN) 0.5 MG tablet, Take 1 tablet (0.5 mg total) by mouth daily as needed (SIGNIFICANT ANXIETY)., Disp: 30 tablet, Rfl: 3    meloxicam (MOBIC) 15 MG tablet, TAKE 1 TABLET(15 MG) BY MOUTH EVERY DAY as needed FOR PAIN, Disp: 30 tablet, Rfl: 2    montelukast (SINGULAIR) 10 mg tablet, Take 1 tablet (10 mg total) by mouth every evening., Disp: 90 tablet, Rfl: 3    ondansetron (ZOFRAN-ODT) 4 MG TbDL, Dissolve 1 tablet (4 mg total) by mouth every 6 (six) hours as needed (nausea)., Disp: 30 tablet, Rfl: 0    rOPINIRole (REQUIP) 0.25 MG tablet, TAKE 1 TABLET(0.25 MG) BY MOUTH EVERY EVENING, Disp: 90 tablet, Rfl: 2    semaglutide (OZEMPIC) 0.25 mg or 0.5 mg (2 mg/3 mL) pen injector, Inject 0.5 mg into the skin every 7 days., Disp: 3 each, Rfl: 1    sod picosulf-mag ox-citric ac (CLENPIQ) 10 mg-3.5  "gram- 12 gram/160 mL Soln, Take As Directed., Disp: 320 mL, Rfl: 0    tamsulosin (FLOMAX) 0.4 mg Cap, Take 1 capsule (0.4 mg total) by mouth once daily for 10 days, Disp: 10 capsule, Rfl: 0    ULTRA THIN LANCETS 30 gauge Misc, USE TO TEST BLOOD SUGAR EVERY DAY AS DIRECTED, Disp: 100 each, Rfl: 3  No current facility-administered medications for this visit.    Facility-Administered Medications Ordered in Other Visits:     0.9%  NaCl infusion, 500 mL, Intravenous, Continuous, Kolton Terrell MD    mupirocin 2 % ointment, , Nasal, On Call Procedure, Chong Padron MD, Given at 09/15/22 0553    ondansetron injection 4 mg, 4 mg, Intravenous, Once PRN, Arron Rangel MD     Social History     Tobacco Use   Smoking Status Former    Current packs/day: 0.00    Types: Cigarettes    Quit date: 2018    Years since quittin.6    Passive exposure: Never   Smokeless Tobacco Never        Review of patient's allergies indicates:  No Known Allergies     ASSESSMENT:   Encounter Diagnoses   Name Primary?    Mood Disorder Unspecifed:  Depressed THO also has some mood elevation  (8of 13 on MDQ): ex: irritability, more self confident, thoughts race. more active)  Yes    Anxiety     Cervical radiculopathy     Type 2 diabetes mellitus without complication, unspecified whether long term insulin use     Insomnia, unspecified type     History of urinary stone     Lumbar radiculopathy, chronic     ADHD (attention deficit hyperactivity disorder), inattentive type        Patient Instructions     PLAN:     Psyc MD flowed request to scheduling for  THO PATIENT  NEEDS TO CONFIRM APPOINTMENT  by seeing such appointment  appear on their "My Chart" michael.      NOTE:  IF  appointment is not visible, THEN patient is  instructed to call [facility] ([facility]) at:  [phone]  and coordinate appointment scheduling with . Understanding Expressed.      Meds: Lamictal advanced to 100 mg BID  IF any rash stop all " lamictal    ativan 0.5 mg / generally takes at bed ( replaces ambien)    Lexapro he moved down to 5 mg and would like to some off / MD agree to such thjo IF need he will recontact    Renewed focalin 10 mg twice daily       Have encouraged to establish with counselor:    Pt may call [facility] at [phone] and requests counselor;  pt was informed that there may be significant ait times involved.    As such patient was also told of alternatives such as:  1) contacting their  insurance carrier for a list of counselors  And / or  2) may explore website Psychology Today: www.[email or web address]/us/therapists     References:     Relaxation stress reduction workbook: AMI Davenport PhD ( used: $7-10)    Feeling Good Website: Ashwin Anderson MD / www.[employer] website (free) / alexey. PODCASTS    Anxiety &  phobia workbook by JORGE Carlisle PhD  (web retailers: used: $ 7-10)    [place]: Path to Better Sleep : https://www.veterantraining.[place].gov/insomnia/ (free)       Pt expressed appreciation for the visit today and did not have further question at this time though pt  was still informed to:     Call  if problems.    Call / Report Side Effects to Psyc MD     Encouraged to follow up with primary care / Gen Med MD for continued monitoring of general health and wellness.    Understanding was expressed; and no further concerns nor questions were raised at this time.     Remember healthy self care:   eat right  attempt adequate rest   HANDWASHING / encourage such alexey. During this corona virus time   walk or light exercise within reason and as your general med team approves  read or explore any of reference materials / homework mentioned  reach out (I.e.,  connect with)  others who nuture and bring out best in you  avoid risky behaviors    Keep your appointments:    IF you  cannot make your appt THEN please call [phone] or go online (via My Chart michael) to reschedule.    It is the responsibility of the patient to  reschedule an appointment if an appointment has been canceled or missed.    Avoid  alcohol and illicit substances.  Look for the positive.  All is often relative-seek balance  Call sooner if needed : [phone]   Call 911 or go to Emergency Room  (ER)  if  any acute concerns  >>  References:     Relaxation stress reduction workbook: AMI Davenport PhD ( used: $7-10)    Feeling Good Website: Ashwin Anderson MD / www.[employer] website (free) / alexey. PODCASTS    Anxiety &  phobia workbook by JORGE Carlisle PhD  (web retailers: used: $ 7-10)    [place]: Path to Better Sleep : https://www.veterantraining.[place].gov/insomnia/ (free)    [place] Quit with Us [place]: http://www.quitwithusla.org/    (and other resources as per counselor, if applicable)     Pt expressed appreciation for the visit today and did not have further question at this time though pt  was still informed to:     Call / Report Side Effects to Psmarilyn SELLERS     Encouraged to follow up with primary care / Gen Med MD for continued monitoring of general health and wellness.    Understanding was expressed; and no further concerns nor questions were raised at this time.     remember healthy self care:   eat right  attempt adequate rest   HANDWASHING / encourage such alexey. During this corona virus time   walk or light exercise within reason and as your general med team approves  read or explore any of reference materials / homework mentioned  reach out (I.e.,  connect with)  others who nuture and bring out best in you  avoid risky behaviors  keep your appointments  IF you  cannot make your appt THEN please call or go online to reschedule.  avoid  alcohol and illicit substances.  Look for the positive.  All is often relative-seek balance  Call [facility]  if questions : [phone]   Call 911 or go to Emergency Room  (ER)  if any acute concern    >> Background from intial viral SELLERS eval  <<    He reports  to male. No children      Has under grad and mster in  [employer] science  from [employer] /      Works data mngt [employer]   Excerpt from [facility] Suzi Sanches MD note of 4-  :      41 yo male with DM2, HLD, obesity, GERD, SHEIKH, LUBA, ADHD, asthma, spondylosis, hx of melanoma in situ presents for annual. Pt is new to me. Last PCP Dr Beck.     DM2: ozempic 0.5 mg weekly. Is interested in stopping medication as A1c controlled and has had increasing nausea. Not sure if ozempic is causing it since he has been on it for years.   HLD: not on medication  LUBA: uses cpap  GERD: nexium 40 mg bid  Cervical spondylosis with myelopathy: continues to have chronic neck pain with radiation down both arms (now L side increased) also endorses weakness. Does see a spine surgeon. Reports he was told to also r/o rheumatoid arthritis. Pt denies having joint swelling   ADHD: on focalin. Sees psychiatry.   Anxiety: reports increased anxiety over the past few months. Having trouble sleeping. Has not been on SSRI      He was referred in as having started Lexapro 10 mg April 2023. Says helpnig some tho clearly still cites anxiety.      In past saw [facility] Ps[facility] NP / of Excerpt 1-  Psyc ntake:     Patient with hx of pre-glaucoma (eye drops for this), DM type 2 (well controlled with Ozempic), asthma (intermittent, mild), HLD, GERD, insomnia, sleep apnea, ADHD, lumbar discectomy, chronic back pain (managed with gabapentin), ozempic for weight loss.       Reports had previously been a patient of Dr. Dayana Robertson for past 12 to 13 years but was referred to this provider due to Dr. Robertson's care home.      Patient reports a long history of ADHD, had trialed several medications per communications with Dr. Robertson however Focalin has been the most effective lately. Stopped this for a short while, but since working from home the need for this medication has increased.      Reports anxiety has been more increased since being depleted due to work constraints. Reports some  "worry late at night with rumination. Occurring randomly but "I do worry a lot."     Reports "I have a compulsion to control and things need to be the way I want them to be."      Also long history of LUBA, insomnia and recently completed BEBP program      Reports been implementing sleep hygiene via BEBP and CBT.      Reports 50# weight loss - this has helped with sleep apnea (settings have been reduced since losing weight).      Discussed to resume focalin IR at [facility] to [facility] pending UDS and CS form completion.     Denies SI/HI, AVH, racing thoughts, substance abuse           "

## 2023-11-09 ENCOUNTER — PATIENT MESSAGE (OUTPATIENT)
Dept: ALLERGY | Facility: CLINIC | Age: 43
End: 2023-11-09
Payer: COMMERCIAL

## 2023-11-09 ENCOUNTER — PATIENT MESSAGE (OUTPATIENT)
Dept: ORTHOPEDICS | Facility: CLINIC | Age: 43
End: 2023-11-09
Payer: COMMERCIAL

## 2023-11-09 ENCOUNTER — PATIENT MESSAGE (OUTPATIENT)
Dept: PAIN MEDICINE | Facility: CLINIC | Age: 43
End: 2023-11-09
Payer: COMMERCIAL

## 2023-11-09 ENCOUNTER — PATIENT MESSAGE (OUTPATIENT)
Dept: INTERNAL MEDICINE | Facility: CLINIC | Age: 43
End: 2023-11-09
Payer: COMMERCIAL

## 2023-11-09 DIAGNOSIS — M25.522 LEFT ELBOW PAIN: Primary | ICD-10-CM

## 2023-11-10 DIAGNOSIS — M25.522 LEFT ELBOW PAIN: Primary | ICD-10-CM

## 2023-11-11 ENCOUNTER — PATIENT MESSAGE (OUTPATIENT)
Dept: GASTROENTEROLOGY | Facility: CLINIC | Age: 43
End: 2023-11-11
Payer: COMMERCIAL

## 2023-11-13 ENCOUNTER — HOSPITAL ENCOUNTER (OUTPATIENT)
Dept: RADIOLOGY | Facility: HOSPITAL | Age: 43
Discharge: HOME | End: 2023-11-13
Attending: ORTHOPAEDIC SURGERY
Payer: COMMERCIAL

## 2023-11-13 ENCOUNTER — PATIENT MESSAGE (OUTPATIENT)
Dept: PSYCHIATRY | Facility: CLINIC | Age: 43
End: 2023-11-13
Payer: COMMERCIAL

## 2023-11-13 ENCOUNTER — OFFICE VISIT (OUTPATIENT)
Dept: PAIN MEDICINE | Facility: CLINIC | Age: 43
End: 2023-11-13
Payer: COMMERCIAL

## 2023-11-13 ENCOUNTER — PATIENT MESSAGE (OUTPATIENT)
Dept: PAIN MEDICINE | Facility: HOSPITAL | Age: 43
End: 2023-11-13
Payer: COMMERCIAL

## 2023-11-13 ENCOUNTER — OFFICE VISIT (OUTPATIENT)
Dept: ORTHOPEDICS | Facility: CLINIC | Age: 43
End: 2023-11-13
Payer: COMMERCIAL

## 2023-11-13 VITALS
WEIGHT: 240.06 LBS | DIASTOLIC BLOOD PRESSURE: 80 MMHG | SYSTOLIC BLOOD PRESSURE: 121 MMHG | BODY MASS INDEX: 39.95 KG/M2 | HEART RATE: 68 BPM

## 2023-11-13 DIAGNOSIS — E11.9 DIABETES MELLITUS WITHOUT COMPLICATION: ICD-10-CM

## 2023-11-13 DIAGNOSIS — M54.12 CERVICAL RADICULOPATHY: ICD-10-CM

## 2023-11-13 DIAGNOSIS — K21.9 GASTROESOPHAGEAL REFLUX DISEASE, UNSPECIFIED WHETHER ESOPHAGITIS PRESENT: Primary | ICD-10-CM

## 2023-11-13 DIAGNOSIS — M79.641 RIGHT HAND PAIN: ICD-10-CM

## 2023-11-13 DIAGNOSIS — F90.0 ADHD (ATTENTION DEFICIT HYPERACTIVITY DISORDER), INATTENTIVE TYPE: ICD-10-CM

## 2023-11-13 DIAGNOSIS — M25.551 RIGHT HIP PAIN: Primary | ICD-10-CM

## 2023-11-13 DIAGNOSIS — L23.7 ALLERGIC CONTACT DERMATITIS DUE TO PLANT: ICD-10-CM

## 2023-11-13 DIAGNOSIS — M25.522 LEFT ELBOW PAIN: ICD-10-CM

## 2023-11-13 DIAGNOSIS — G89.4 CHRONIC PAIN SYNDROME: ICD-10-CM

## 2023-11-13 DIAGNOSIS — M25.512 LEFT SHOULDER PAIN, UNSPECIFIED CHRONICITY: Primary | ICD-10-CM

## 2023-11-13 DIAGNOSIS — M70.61 GREATER TROCHANTERIC BURSITIS OF RIGHT HIP: ICD-10-CM

## 2023-11-13 DIAGNOSIS — F41.9 ANXIETY: ICD-10-CM

## 2023-11-13 DIAGNOSIS — M25.512 LEFT SHOULDER PAIN, UNSPECIFIED CHRONICITY: ICD-10-CM

## 2023-11-13 DIAGNOSIS — M18.11 PRIMARY OSTEOARTHRITIS OF FIRST CARPOMETACARPAL JOINT OF RIGHT HAND: ICD-10-CM

## 2023-11-13 DIAGNOSIS — G56.02 LEFT CARPAL TUNNEL SYNDROME: ICD-10-CM

## 2023-11-13 DIAGNOSIS — M77.12 LEFT LATERAL EPICONDYLITIS: Primary | ICD-10-CM

## 2023-11-13 DIAGNOSIS — M75.82 ROTATOR CUFF TENDONITIS, LEFT: ICD-10-CM

## 2023-11-13 PROCEDURE — 3044F PR MOST RECENT HEMOGLOBIN A1C LEVEL <7.0%: ICD-10-PCS | Mod: CPTII,S$GLB,, | Performed by: ORTHOPAEDIC SURGERY

## 2023-11-13 PROCEDURE — 99999 PR PBB SHADOW E&M-EST. PATIENT-LVL II: CPT | Mod: PBBFAC,,, | Performed by: ORTHOPAEDIC SURGERY

## 2023-11-13 PROCEDURE — 3066F PR DOCUMENTATION OF TREATMENT FOR NEPHROPATHY: ICD-10-PCS | Mod: CPTII,S$GLB,, | Performed by: STUDENT IN AN ORGANIZED HEALTH CARE EDUCATION/TRAINING PROGRAM

## 2023-11-13 PROCEDURE — 73030 X-RAY EXAM OF SHOULDER: CPT | Mod: TC,PO,LT

## 2023-11-13 PROCEDURE — 99214 OFFICE O/P EST MOD 30 MIN: CPT | Mod: S$GLB,,, | Performed by: STUDENT IN AN ORGANIZED HEALTH CARE EDUCATION/TRAINING PROGRAM

## 2023-11-13 PROCEDURE — 73080 XR ELBOW COMPLETE 3 VIEW LEFT: ICD-10-PCS | Mod: 26,LT,, | Performed by: RADIOLOGY

## 2023-11-13 PROCEDURE — 73130 XR HAND COMPLETE 3 VIEW RIGHT: ICD-10-PCS | Mod: 26,RT,, | Performed by: RADIOLOGY

## 2023-11-13 PROCEDURE — 3061F NEG MICROALBUMINURIA REV: CPT | Mod: CPTII,S$GLB,, | Performed by: STUDENT IN AN ORGANIZED HEALTH CARE EDUCATION/TRAINING PROGRAM

## 2023-11-13 PROCEDURE — 20600 SMALL JOINT ASPIRATION/INJECTION: R THUMB CMC: ICD-10-PCS | Mod: RT,S$GLB,, | Performed by: ORTHOPAEDIC SURGERY

## 2023-11-13 PROCEDURE — 3061F PR NEG MICROALBUMINURIA RESULT DOCUMENTED/REVIEW: ICD-10-PCS | Mod: CPTII,S$GLB,, | Performed by: STUDENT IN AN ORGANIZED HEALTH CARE EDUCATION/TRAINING PROGRAM

## 2023-11-13 PROCEDURE — 3044F HG A1C LEVEL LT 7.0%: CPT | Mod: CPTII,S$GLB,, | Performed by: ORTHOPAEDIC SURGERY

## 2023-11-13 PROCEDURE — 1159F PR MEDICATION LIST DOCUMENTED IN MEDICAL RECORD: ICD-10-PCS | Mod: CPTII,S$GLB,, | Performed by: STUDENT IN AN ORGANIZED HEALTH CARE EDUCATION/TRAINING PROGRAM

## 2023-11-13 PROCEDURE — 3044F HG A1C LEVEL LT 7.0%: CPT | Mod: CPTII,S$GLB,, | Performed by: STUDENT IN AN ORGANIZED HEALTH CARE EDUCATION/TRAINING PROGRAM

## 2023-11-13 PROCEDURE — 3008F PR BODY MASS INDEX (BMI) DOCUMENTED: ICD-10-PCS | Mod: CPTII,S$GLB,, | Performed by: STUDENT IN AN ORGANIZED HEALTH CARE EDUCATION/TRAINING PROGRAM

## 2023-11-13 PROCEDURE — 73030 X-RAY EXAM OF SHOULDER: CPT | Mod: 26,LT,, | Performed by: RADIOLOGY

## 2023-11-13 PROCEDURE — 3074F SYST BP LT 130 MM HG: CPT | Mod: CPTII,S$GLB,, | Performed by: STUDENT IN AN ORGANIZED HEALTH CARE EDUCATION/TRAINING PROGRAM

## 2023-11-13 PROCEDURE — 99999 PR PBB SHADOW E&M-EST. PATIENT-LVL III: CPT | Mod: PBBFAC,,, | Performed by: STUDENT IN AN ORGANIZED HEALTH CARE EDUCATION/TRAINING PROGRAM

## 2023-11-13 PROCEDURE — 3066F NEPHROPATHY DOC TX: CPT | Mod: CPTII,S$GLB,, | Performed by: STUDENT IN AN ORGANIZED HEALTH CARE EDUCATION/TRAINING PROGRAM

## 2023-11-13 PROCEDURE — 1160F PR REVIEW ALL MEDS BY PRESCRIBER/CLIN PHARMACIST DOCUMENTED: ICD-10-PCS | Mod: CPTII,S$GLB,, | Performed by: ORTHOPAEDIC SURGERY

## 2023-11-13 PROCEDURE — 20600 DRAIN/INJ JOINT/BURSA W/O US: CPT | Mod: RT,S$GLB,, | Performed by: ORTHOPAEDIC SURGERY

## 2023-11-13 PROCEDURE — 99999 PR PBB SHADOW E&M-EST. PATIENT-LVL II: ICD-10-PCS | Mod: PBBFAC,,, | Performed by: ORTHOPAEDIC SURGERY

## 2023-11-13 PROCEDURE — 99999 PR PBB SHADOW E&M-EST. PATIENT-LVL III: ICD-10-PCS | Mod: PBBFAC,,, | Performed by: STUDENT IN AN ORGANIZED HEALTH CARE EDUCATION/TRAINING PROGRAM

## 2023-11-13 PROCEDURE — 3066F PR DOCUMENTATION OF TREATMENT FOR NEPHROPATHY: ICD-10-PCS | Mod: CPTII,S$GLB,, | Performed by: ORTHOPAEDIC SURGERY

## 2023-11-13 PROCEDURE — 73130 X-RAY EXAM OF HAND: CPT | Mod: 26,RT,, | Performed by: RADIOLOGY

## 2023-11-13 PROCEDURE — 3079F DIAST BP 80-89 MM HG: CPT | Mod: CPTII,S$GLB,, | Performed by: STUDENT IN AN ORGANIZED HEALTH CARE EDUCATION/TRAINING PROGRAM

## 2023-11-13 PROCEDURE — 73130 X-RAY EXAM OF HAND: CPT | Mod: TC,PO,RT

## 2023-11-13 PROCEDURE — 1159F PR MEDICATION LIST DOCUMENTED IN MEDICAL RECORD: ICD-10-PCS | Mod: CPTII,S$GLB,, | Performed by: ORTHOPAEDIC SURGERY

## 2023-11-13 PROCEDURE — 99214 OFFICE O/P EST MOD 30 MIN: CPT | Mod: 25,S$GLB,, | Performed by: ORTHOPAEDIC SURGERY

## 2023-11-13 PROCEDURE — 99214 PR OFFICE/OUTPT VISIT, EST, LEVL IV, 30-39 MIN: ICD-10-PCS | Mod: S$GLB,,, | Performed by: STUDENT IN AN ORGANIZED HEALTH CARE EDUCATION/TRAINING PROGRAM

## 2023-11-13 PROCEDURE — 73080 X-RAY EXAM OF ELBOW: CPT | Mod: 26,LT,, | Performed by: RADIOLOGY

## 2023-11-13 PROCEDURE — 73080 X-RAY EXAM OF ELBOW: CPT | Mod: TC,PO,LT

## 2023-11-13 PROCEDURE — 3066F NEPHROPATHY DOC TX: CPT | Mod: CPTII,S$GLB,, | Performed by: ORTHOPAEDIC SURGERY

## 2023-11-13 PROCEDURE — 3074F PR MOST RECENT SYSTOLIC BLOOD PRESSURE < 130 MM HG: ICD-10-PCS | Mod: CPTII,S$GLB,, | Performed by: STUDENT IN AN ORGANIZED HEALTH CARE EDUCATION/TRAINING PROGRAM

## 2023-11-13 PROCEDURE — 73030 XR SHOULDER COMPLETE 2 OR MORE VIEWS LEFT: ICD-10-PCS | Mod: 26,LT,, | Performed by: RADIOLOGY

## 2023-11-13 PROCEDURE — 3044F PR MOST RECENT HEMOGLOBIN A1C LEVEL <7.0%: ICD-10-PCS | Mod: CPTII,S$GLB,, | Performed by: STUDENT IN AN ORGANIZED HEALTH CARE EDUCATION/TRAINING PROGRAM

## 2023-11-13 PROCEDURE — 3061F NEG MICROALBUMINURIA REV: CPT | Mod: CPTII,S$GLB,, | Performed by: ORTHOPAEDIC SURGERY

## 2023-11-13 PROCEDURE — 1159F MED LIST DOCD IN RCRD: CPT | Mod: CPTII,S$GLB,, | Performed by: STUDENT IN AN ORGANIZED HEALTH CARE EDUCATION/TRAINING PROGRAM

## 2023-11-13 PROCEDURE — 3008F BODY MASS INDEX DOCD: CPT | Mod: CPTII,S$GLB,, | Performed by: STUDENT IN AN ORGANIZED HEALTH CARE EDUCATION/TRAINING PROGRAM

## 2023-11-13 PROCEDURE — 3079F PR MOST RECENT DIASTOLIC BLOOD PRESSURE 80-89 MM HG: ICD-10-PCS | Mod: CPTII,S$GLB,, | Performed by: STUDENT IN AN ORGANIZED HEALTH CARE EDUCATION/TRAINING PROGRAM

## 2023-11-13 PROCEDURE — 1159F MED LIST DOCD IN RCRD: CPT | Mod: CPTII,S$GLB,, | Performed by: ORTHOPAEDIC SURGERY

## 2023-11-13 PROCEDURE — 3061F PR NEG MICROALBUMINURIA RESULT DOCUMENTED/REVIEW: ICD-10-PCS | Mod: CPTII,S$GLB,, | Performed by: ORTHOPAEDIC SURGERY

## 2023-11-13 PROCEDURE — 99214 PR OFFICE/OUTPT VISIT, EST, LEVL IV, 30-39 MIN: ICD-10-PCS | Mod: 25,S$GLB,, | Performed by: ORTHOPAEDIC SURGERY

## 2023-11-13 PROCEDURE — 1160F RVW MEDS BY RX/DR IN RCRD: CPT | Mod: CPTII,S$GLB,, | Performed by: ORTHOPAEDIC SURGERY

## 2023-11-13 RX ORDER — METHYLPREDNISOLONE ACETATE 40 MG/ML
40 INJECTION, SUSPENSION INTRA-ARTICULAR; INTRALESIONAL; INTRAMUSCULAR; SOFT TISSUE
Status: DISCONTINUED | OUTPATIENT
Start: 2023-11-13 | End: 2023-11-13 | Stop reason: HOSPADM

## 2023-11-13 RX ORDER — DEXMETHYLPHENIDATE HYDROCHLORIDE 10 MG/1
10 TABLET ORAL 2 TIMES DAILY
Qty: 60 TABLET | Refills: 0 | Status: SHIPPED | OUTPATIENT
Start: 2023-11-14 | End: 2023-12-20

## 2023-11-13 RX ORDER — DEXMETHYLPHENIDATE HYDROCHLORIDE 10 MG/1
10 TABLET ORAL 2 TIMES DAILY
Qty: 60 TABLET | Refills: 0 | Status: SHIPPED | OUTPATIENT
Start: 2024-01-12 | End: 2024-01-31 | Stop reason: SDUPTHER

## 2023-11-13 RX ORDER — DEXMETHYLPHENIDATE HYDROCHLORIDE 10 MG/1
10 TABLET ORAL 2 TIMES DAILY
Qty: 60 TABLET | Refills: 0 | Status: SHIPPED | OUTPATIENT
Start: 2023-12-14 | End: 2024-01-17

## 2023-11-13 RX ADMIN — METHYLPREDNISOLONE ACETATE 40 MG: 40 INJECTION, SUSPENSION INTRA-ARTICULAR; INTRALESIONAL; INTRAMUSCULAR; SOFT TISSUE at 09:11

## 2023-11-13 NOTE — H&P (VIEW-ONLY)
[facility] New Patient Evaluation    Referred by: No ref. provider found   Reason for referral: Right hip pain     CC:   Chief Complaint   Patient presents with    Hip Pain     Right     Leg Pain     Right          4/20/2023    10:29 AM   Last 3 PDI Scores   Pain Disability Index (PDI) 17     11/13/23 Pt returns today complaining of right hip and right leg pain. Pain is located over the lateral right hip and radiates into the groin.  Pain started approximately 1 month ago. Pain has not improved with stretching and HEP.  He states his hip pain is worse than his lower back pain for which he is scheduled to have an NELI at the end of the month. He would like to address the hip pain first.      Subjective:   Hermelindo Garza III is a 43 y.o. male who presents complaining of both neck and back pain.  Today he reports his neck pain is most bothersome.  He reports muscle tightness and pain in the left trapezius and shoulder area.  He reports pain that radiates into the bilateral upper extremities with numbness and tingling.  He has been evaluated by Dr. Sierra with Neurosurgery who noted patient chris  candidate for ACDFs in he progresses.  Pt reports undergoing a cervical epidural steroid injection with Dr. Escamilla 02/10/2021 on with several months of relief.  He also reports low back pain.  He is s/p Left L3-4 hemilaminotomy, medial facetectomy, and foraminotomy for microdiscectomy with Dr Mcnamara on 9/19/2021.  He reports his back pain improved after surgery.  Patient reports he is previously completed physical therapy and is now actively continuing with his exercise program in the gym.  He also reports trying to lose weight and reports intentional  weight loss of approximately 55 pounds since 2020.    Location: back, shoulder, and neck   Onset: 2 years  Current Pain Score: 5/10  Daily Pain of Range: 5-6/10  Quality: Aching, Tight, Tingling, Deep, Numb, Electric, Hot, and Cold  Radiation: neck and  back  Worsened by: lying down, sitting, standing for more than 3 minutes, and walking for more than 6 minutes  Improved by: medications    Patient denies night fever/night sweats, urinary incontinence, bowel incontinence, significant weight loss, significant motor weakness, and loss of sensations.    Previous Interventions:  - 02/10/2021 -  C6/7 CERVICAL EPIDURAL STEROID INJECTION - Dr Escamilla - 4 months of pain relief.     Previous Therapies:  PT/OT: yes   Chiropractor:   HEP: yes  Relevant Surgery: yes    - Reports L4/L5 surgery at age 29 yo   - 09/19/21 - Left MIS L3-4 hemilaminotomy, medial facetectomy, and foraminotomy for microdiscectomy with Dr Mcnamara.   Previous Medications:   - NSAIDS: Meloxicam  - Muscle Relaxants: Robaxin, Flexeril  - TCAs:   - SNRIs:   - Topicals:   - Anticonvulsants: Lyrica - reports RL sxs so stopped; Gabapentin   - Opioids:   - Adjuvants: Tylenol    Current Pain Medications:  Gabapentin 300 mg QHS  Meloxicam  Tylenol       Review of Systems:  ROS    GENERAL:  No weight loss, malaise or fevers. +obesity +DM  HEENT:   No recent changes in vision or hearing  NECK:  No difficulty with swallowing. No stridor.   RESPIRATORY:  Negative for cough, wheezing or shortness of breath, patient denies any recent URI. +Asthma  CARDIOVASCULAR:  Negative for chest pain, leg swelling or palpitations.  GI/:  Negative for abdominal discomfort, blood in stools or black stools or change in bowel habits.   MUSCULOSKELETAL:  See HPI.  SKIN:  Negative for lesions, rash, and itching.  PSYCH:  No mood disorder or recent psychosocial stressors.  +anxiety +ADD  HEMATOLOGY/LYMPHOLOGY:  Negative for prolonged bleeding, bruising easily or swollen nodes.  Patient is not currently taking any anti-coagulants  NEURO:   No history of headaches, syncope, paralysis, seizures or tremors.  All other reviewed and negative other than HPI.    History:  Current medications, allergies, medical history, surgical history,   family  history, and social history were reviewed in the chart as marked.    Full Medication List:    Current Outpatient Medications:     acetaminophen (TYLENOL) 325 MG tablet, Take 650 mg by mouth every 6 (six) hours as needed for Pain., Disp: , Rfl:     albuterol (PROVENTIL/VENTOLIN HFA) 90 mcg/actuation inhaler, RescueINHALE 2 PUFFS INTO THE LUNGS EVERY 6 HOURS AS NEEDED WHEEZING, RESCUE Strength: 90 mcg/actuation, Disp: 8.5 g, Rfl: 5    atorvastatin (LIPITOR) 20 MG tablet, TAKE 1 TABLET(20 MG) BY MOUTH EVERY DAY, Disp: 90 tablet, Rfl: 1    azelastine (ASTELIN) 137 mcg (0.1 %) nasal spray, 2 sprays (274 mcg total) by Nasal route 2 (two) times daily., Disp: 30 mL, Rfl: 9    blood sugar diagnostic (TRUE METRIX GLUCOSE TEST STRIP) Strp, USE TO TEST ONCE DAILY, Disp: 30 strip, Rfl: 5    cyclobenzaprine (FLEXERIL) 10 MG tablet, Take 10 mg by mouth 3 (three) times daily., Disp: , Rfl:     dexmethylphenidate (FOCALIN) 10 MG tablet, Take 1 tablet (10 mg total) by mouth 2 (two) times daily., Disp: 60 tablet, Rfl: 0    [START ON 12/8/2023] dexmethylphenidate (FOCALIN) 10 MG tablet, Take 1 tablet (10 mg total) by mouth 2 (two) times daily., Disp: 60 tablet, Rfl: 0    [START ON 1/6/2024] dexmethylphenidate (FOCALIN) 10 MG tablet, Take 1 tablet (10 mg total) by mouth 2 (two) times daily., Disp: 60 tablet, Rfl: 0    doxycycline (VIBRAMYCIN) 100 MG Cap, Take 1 capsule (100 mg total) by mouth 2 (two) times daily., Disp: 14 capsule, Rfl: 0    ergocalciferol, vitamin D2, (VITAMIN D ORAL), Take 1 tablet by mouth every other day., Disp: , Rfl:     EScitalopram oxalate (LEXAPRO) 10 MG tablet, Take 1 tablet (10 mg total) by mouth once daily., Disp: 90 tablet, Rfl: 0    fexofenadine (ALLEGRA) 180 MG tablet, Take 180 mg by mouth once daily., Disp: , Rfl:     fluocinonide (LIDEX) 0.05 % external solution, AAA scalp qday prn itching, scaling, Disp: 60 mL, Rfl: 3    gabapentin (NEURONTIN) 300 MG capsule, TAKE 1 CAPSULE(300 MG) BY MOUTH THREE TIMES  DAILY, Disp: 90 capsule, Rfl: 3    hydrocortisone 2.5 % cream, APPLY EXTERNALLY TO THE AFFECTED AREA TWICE DAILY FOR 10 DAYS, Disp: 30 g, Rfl: 0    ketoconazole (NIZORAL) 2 % cream, APPLY TOPICALLY TO THE AFFECTED AREA TWICE DAILY, Disp: 15 g, Rfl: 0    lamoTRIgine (LAMICTAL) 100 MG tablet, Take 1 tablet (100 mg total) by mouth 2 (two) times daily. IF any RASH, STOP ALL Lamictal and Tell Psyc MD., Disp: 180 tablet, Rfl: 1    latanoprost 0.005 % ophthalmic solution, Place 1 drop into both eyes every evening., Disp: 7.5 mL, Rfl: 3    LORazepam (ATIVAN) 0.5 MG tablet, Take 1 tablet (0.5 mg total) by mouth daily as needed (SIGNIFICANT ANXIETY)., Disp: 30 tablet, Rfl: 3    meloxicam (MOBIC) 15 MG tablet, TAKE 1 TABLET(15 MG) BY MOUTH EVERY DAY as needed FOR PAIN, Disp: 30 tablet, Rfl: 2    montelukast (SINGULAIR) 10 mg tablet, Take 1 tablet (10 mg total) by mouth every evening., Disp: 90 tablet, Rfl: 3    ondansetron (ZOFRAN-ODT) 4 MG TbDL, Dissolve 1 tablet (4 mg total) by mouth every 6 (six) hours as needed (nausea)., Disp: 30 tablet, Rfl: 0    rOPINIRole (REQUIP) 0.25 MG tablet, TAKE 1 TABLET(0.25 MG) BY MOUTH EVERY EVENING, Disp: 90 tablet, Rfl: 2    semaglutide (OZEMPIC) 0.25 mg or 0.5 mg (2 mg/3 mL) pen injector, Inject 0.5 mg into the skin every 7 days., Disp: 3 each, Rfl: 1    sod picosulf-mag ox-citric ac (CLENPIQ) 10 mg-3.5 gram- 12 gram/160 mL Soln, Take As Directed., Disp: 320 mL, Rfl: 0    ULTRA THIN LANCETS 30 gauge Misc, USE TO TEST BLOOD SUGAR EVERY DAY AS DIRECTED, Disp: 100 each, Rfl: 3    blood-glucose meter kit, Use as instructed, Disp: 1 each, Rfl: 0    tamsulosin (FLOMAX) 0.4 mg Cap, Take 1 capsule (0.4 mg total) by mouth once daily for 10 days, Disp: 10 capsule, Rfl: 0  No current facility-administered medications for this visit.    Facility-Administered Medications Ordered in Other Visits:     0.9%  NaCl infusion, 500 mL, Intravenous, Continuous, Kolton Terrell MD    mupirocin 2 % ointment, ,  Nasal, On Call Procedure, Chong Padron MD, Given at 09/15/22 0553    ondansetron injection 4 mg, 4 mg, Intravenous, Once PRN, Arron Rangel MD     Allergies:  Patient has no known allergies.     Medical History:   has a past medical history of ADD (attention deficit disorder) (05/09/2012), Allergy, Anxiety (4/12/2023), Asthma (05/09/2012), Cervical disc disorder with radiculopathy, unspecified cervical region (09/14/2021), Cervical spondylosis with myelopathy (06/29/2022), Eczema, Esophageal ulcer, GERD (gastroesophageal reflux disease) (05/09/2012), Glaucoma, Kidney stones (05/2014), Lumbar disc disease (05/09/2012), Lumbar herniated disc (05/09/2012), Malignant melanoma of skin, unspecified (01/06/2022), Melanoma (01/2022), LUBA (obstructive sleep apnea), Radiculopathy, lumbar region (09/14/2021), Renal calculi (05/09/2012), and Type 2 diabetes mellitus without complication, without long-term current use of insulin (11/16/2020).    Surgical History:   has a past surgical history that includes Lumbar laminectomy (2010); Esophagogastroduodenoscopy (01/22/2019); Colonoscopy (01/22/2019); Appendectomy (2006); Epidural steroid injection (N/A, 02/10/2021); Esophagogastroduodenoscopy (N/A, 05/18/2021); Lumbar laminectomy with discectomy (Left, 09/20/2021); Carpal tunnel release (Right, 09/15/2022); Excision of ganglion of wrist (Right, 09/15/2022); Hand Arthrotomy (Right, 09/15/2022); Injection of steroid (Left, 09/15/2022); Flexor tendon repair (Right, 09/15/2022); Tympanostomy tube placement; Epidural steroid injection into cervical spine (N/A, 6/28/2023); Cystoscopy (7/5/2023); Ureteral stent placement (Left, 7/5/2023); Ureteroscopy (Left, 7/5/2023); Laser lithotripsy (Left, 7/5/2023); Ureteroscopic removal of ureteric calculus (Left, 7/5/2023); Correction of hammer toe (Left, 8/14/2023); Epidural steroid injection into lumbar spine (N/A, 9/27/2023); and Epidural steroid injection into cervical spine (N/A,  11/3/2023).    Family History:  family history includes Allergic rhinitis in his father, mother, sister, and sister; Asthma in his father, sister, and sister; Brain cancer in his maternal grandmother; Breast cancer in his paternal grandmother; Chronic back pain in his father; Diabetes in his maternal grandfather; MARTITA disease in his father; Hypertension in his mother; Melanoma in his father; No Known Problems in his brother, maternal aunt, maternal uncle, paternal aunt, paternal grandfather, and paternal uncle; Sleep apnea in his father and mother; Transient ischemic attack in his mother.    Social History:   reports that he quit smoking about 5 years ago. His smoking use included cigarettes. He has never been exposed to tobacco smoke. He has never used smokeless tobacco. He reports current alcohol use. He reports that he does not use drugs.    Physical Exam:  Vitals:    11/13/23 1346   BP: 121/80   Pulse: 68   Weight: 108.9 kg (240 lb 1.3 oz)   PainSc:   6       GENERAL: Well appearing, in no acute distress, alert and oriented x3. +Tearful  PSYCH:  Mood and affect appropriate.  SKIN: Skin color, texture, turgor normal, no rashes or lesions.  HEAD/FACE:  Normocephalic, atraumatic. Cranial nerves grossly intact.  NECK: Supple.  Reports pain with extension of the neck and lateral rotation.  Spurling's positive.  Tenderness and tightness noted over the left trapezius muscle.    CV: RRR with palpation of the radial artery.  PULM: No evidence of respiratory difficulty, symmetric chest rise.  GI:  Soft and non-distended.  MSK:  Lumbar surgical incision is well healed.  Tenderness over the RIGHT GTB. Pain with internal and external rotation of the Right hip. Pain with right hip flexion.   No deformities, edema, or skin discoloration.  No atrophy or tone abnormalities are noted.   NEURO: Bilateral upper and lower extremity coordination and strength is symmetric.  No loss of sensation is noted.  MENTAL STATUS: A x O x 3, good  concentration, speech is fluent and goal directed  MOTOR: 5/5 in all muscle groups  GAIT: normal. Ambulates unassisted.    Imaging:  XR CERVICAL SPINE AP LAT WITH FLEX EXTEN     CLINICAL HISTORY:  Other spondylosis with myelopathy, cervical region     TECHNIQUE:  Three views of the cervical spine plus flexion and extension views were performed.     COMPARISON:  September 8, 2022     FINDINGS:  As before, suboptimal visualization of the cervicothoracic junction due to superimposition of shoulder and chest wall soft tissues.  Straightening of normal cervical lordosis.  No acute fractures.  Unremarkable predental space.  No widening prevertebral soft tissues.  No anterior or retrolisthesis.  Flexion and extension views demonstrate no instability.  Reconfirmed areas of anterior longitudinal ligament calcification adjacent to C4-C5 and C5-C6 disc.  Reconfirmed findings of uncovertebral spurring and endplate osteophytes at preserved C4-C5 level and mildly narrowed C5-C6 level and mild to moderately narrowed C6-C7 level.  Some stable scattered mild facet arthropathic changes.  Intact odontoid tip and unremarkable C1-C2 articulation.  Cervical spine findings do not appear significantly changed to earlier exam.     Electronically signed by: Giovanny Busby  Date:                                            12/13/2022      MRI CERVICAL SPINE WITHOUT CONTRAST     CLINICAL HISTORY:  Arm pain, paresthesias, clumsiness;.  Cervical disc disorder with radiculopathy, unspecified cervical region     TECHNIQUE:  Multiplanar, multisequence MR images of the cervical spine were acquired without the administration of contrast.     COMPARISON:  Cervical spine MRI 01/12/2021     FINDINGS:  Straightening of the normal cervical lordosis.  No spondylolisthesis.     No compression fractures.  No marrow replacing lesions.     Multilevel degenerative changes with disc desiccation and disc space narrowing, described in detail below.  No bone marrow  edema.     Visualized structures in the posterior fossa are unremarkable. The cervical spinal cord is unremarkable.     Paraspinal soft tissues are unremarkable.     SIGNIFICANT FINDINGS BY LEVEL:     C2-3: Small disc osteophyte complex with central annular fissure.  No canal or foraminal stenosis.     C3-4: Disc osteophyte complex with superimposed central extrusion.  Mild canal stenosis with partial effacement of the thecal sac.  Mild bilateral foraminal stenosis.     C4-5: Disc osteophyte complex.  Moderate canal stenosis with near complete effacement of the thecal sac and flattening of the cervical cord.  Moderate bilateral foraminal stenosis.     C5-6: Disc osteophyte complex, eccentric to the right.  Severe canal stenosis with complete effacement of the thecal sac and flattening of the cervical cord.  Severe right and moderate left foraminal stenosis.     C6-7: Disc osteophyte complex.  Moderate canal stenosis.  Moderate bilateral foraminal stenosis.     C7-T1: Disc osteophyte complex with superimposed right foraminal extrusion.  Mild canal stenosis.  Mild right foraminal stenosis.     Impression:     Multilevel degenerative changes, most advanced at C5-6 where there is severe canal stenosis and severe right and moderate left foraminal stenosis.  Overall, degenerative changes have progressed from 01/12/2021.     This report was flagged in Epic as abnormal.     Electronically signed by: Horace Quiroz  Date:                                            06/16/2022      XR LUMBAR SPINE AP AND LAT WITH FLEX/EXT     CLINICAL HISTORY:  Other specified postprocedural states     TECHNIQUE:  AP and lateral views as well as lateral flexion and extension images are performed through the lumbar spine.     COMPARISON:  Non 06/16/2022 MRI of the lumbar spine e     FINDINGS:  Patient had previous a L3/L4 hemilaminectomy on the left side.     Mild DJD and mild lumbar scoliosis.  The lumbar intervertebral disc spaces are slightly  narrowed.  Few mm L2/L3 and L3/L4 retrolisthesis noted.  No fracture or dislocation.  No bone destruction identified     Electronically signed by: Xander Rubio MD  Date:                                            12/13/2022    MRI LUMBAR SPINE WITHOUT CONTRAST     CLINICAL HISTORY:  Lumbar radiculopathy, symptoms persist with conservative treatment;Low back pain, prior surgery, new symptoms; Dorsalgia, unspecified     TECHNIQUE:  Multiplanar, multisequence MR images were acquired from the thoracolumbar junction to the sacrum without the administration of contrast.     COMPARISON:  MRI lumbar spine 09/19/2021     FINDINGS:  Sequences are degraded by patient motion artifact.     Transitional lumbosacral anatomy with lumbarization of S1.     Postoperative changes from left L3-4 hemilaminectomy and micro discectomy.  Mild protrusion of the thecal sac and cauda equina nerve roots into the laminectomy defect.  No fluid collections in the operative bed.     No spondylolisthesis.     No compression fractures.  Small fat containing lesion in the L3 vertebral body, likely a hemangioma.     Multilevel degenerative changes with disc desiccation and disc space narrowing, described in detail below.  No significant bone marrow edema.     The conus medullaris has a normal appearance and terminates at the L2 level.  Cauda equina nerve roots are unremarkable.     Paraspinal muscle atrophy.     SIGNIFICANT FINDINGS BY LEVEL:     T12-L1: Mild disc bulge.  Minimal canal stenosis.  No foraminal stenosis.     L1-2: Left facet arthropathy.  No canal or foraminal stenosis.     L2-3: Disc bulge.  Bilateral facet arthropathy and ligamentum flavum thickening.  Mild canal stenosis.  Mild left foraminal stenosis.     L3-4: Residual disc bulge versus granulation tissue.  The previous disc fragment migrating inferiorly is no longer seen.  Bilateral facet arthropathy and ligamentum flavum thickening.  Mild canal stenosis with narrowing of both  subarticular zones and abutment of the left descending L4 nerve root.  Mild right and moderate left foraminal stenosis.     L4-5: Disc bulge.  Bilateral facet arthropathy and ligamentum flavum thickening.  Mild canal stenosis.  Moderate bilateral foraminal stenosis.     L5-S1: Disc bulge.  Bilateral facet arthropathy and ligamentum flavum thickening.  Mild canal stenosis.  Moderate bilateral foraminal stenosis.     Impression:     Postoperative changes from left L3-4 hemilaminectomy and micro discectomy.  The previous disc fragment migrating inferiorly is no longer seen.  There is a residual disc bulge/granulation tissue contributing to mild canal stenosis and moderate left foraminal stenosis.     Small meningocele at the laminectomy defect.     Degenerative changes elsewhere in the spine resulting in mild canal stenosis and moderate foraminal stenosis at multiple levels as described above.        Electronically signed by: Horace Quiroz  Date:                                            06/16/2022    EXAMINATION:  MRI LUMBAR SPINE WITHOUT CONTRAST     CLINICAL HISTORY:  Low back pain, prior surgery, new symptoms; Dorsalgia, unspecified     TECHNIQUE:  Multiplanar, multisequence MR images were acquired from the thoracolumbar junction to the sacrum without the administration of contrast.     COMPARISON:  MRI lumbar spine 06/16/2023     FINDINGS:  Postoperative change left L3-L4 hemilaminectomy and discectomy.  Mild protrusion of the thecal sac and cauda equina nerve roots into the laminectomy defect similar to prior.     Alignment: Normal.     Vertebrae: Endplate degenerative change L5-S1.  Small may angioma L3 vertebral body.  No acute fracture.  No infiltrative marrow process.     Discs: Multilevel disc height loss and desiccation most pronounced at L5-S1.     Cord: Normal.  Conus terminates at L1.     Soft tissue structures are unremarkable.  Limited evaluation of the retroperitoneal organs demonstrates no significant  abnormalities.  Paraspinal musculature demonstrates normal bulk and signal intensity.     Degenerative findings:     T12-L1: Mild circumferential disc bulge no neural foraminal narrowing or spinal canal stenosis.     L1-L2: No neural foraminal narrowing or spinal canal stenosis.     L2-L3: Circumferential disc bulge.  Bilateral facet arthropathy.  No neural foraminal narrowing.  Mild spinal canal stenosis.     L3-L4: Postop change.  Circumferential disc bulge.  Bilateral facet arthropathy.  Moderate left and mild right neural foraminal narrowing.  Mild spinal canal stenosis     L4-L5: Circumferential disc bulge.  Bilateral facet arthropathy.  Moderate right and severe left neural foraminal narrowing.  Mild to moderate spinal canal stenosis.     L5-S1: Circumferential disc bulge.  Bilateral facet arthropathy.  Moderate bilateral neural foraminal narrowing.  Mild spinal canal stenosis.     Impression:     1. Postoperative change left L3-L4 hemilaminectomy and discectomy.  2. Multilevel degenerative change of the lumbar spine as above not significantly changed from prior MRI 06/16/2023, noting severe left foraminal narrowing at L4-5.     Electronically signed by resident: Hammad Quinn  Date:                                            10/23/2023  Labs:  BMP  Lab Results   Component Value Date     10/11/2023    K 4.1 10/11/2023     10/11/2023    CO2 26 10/11/2023    BUN 18 10/11/2023    CREATININE 0.7 10/11/2023    CALCIUM 9.5 10/11/2023    ANIONGAP 10 10/11/2023    EGFRNORACEVR >60.0 10/11/2023     Lab Results   Component Value Date    ALT 39 10/11/2023    AST 24 10/11/2023    ALKPHOS 94 10/11/2023    BILITOT 0.7 10/11/2023     Lab Results   Component Value Date    WBC 13.22 (H) 07/04/2023    HGB 15.2 07/04/2023    HCT 45.2 07/04/2023    MCV 89 07/04/2023     07/04/2023             Assessment:  Problem List Items Addressed This Visit          Neuro    Chronic pain    Relevant Orders    Ambulatory  referral/consult to Psychology       Psychiatric    Anxiety    Relevant Orders    Ambulatory referral/consult to Psychology     Other Visit Diagnoses       Right hip pain    -  Primary    Greater trochanteric bursitis of right hip                04/20/2032-  Hermelindo Garza III is a 43 y.o. male who  has a past medical history of ADD (attention deficit disorder) (05/09/2012), Allergy, Anxiety (4/12/2023), Asthma (05/09/2012), Cervical disc disorder with radiculopathy, unspecified cervical region (09/14/2021), Cervical spondylosis with myelopathy (06/29/2022), Eczema, Esophageal ulcer, GERD (gastroesophageal reflux disease) (05/09/2012), Glaucoma, Kidney stones (05/2014), Lumbar disc disease (05/09/2012), Lumbar herniated disc (05/09/2012), Malignant melanoma of skin, unspecified (01/06/2022), Melanoma (01/2022), LUBA (obstructive sleep apnea), Radiculopathy, lumbar region (09/14/2021), Renal calculi (05/09/2012), and Type 2 diabetes mellitus without complication, without long-term current use of insulin (11/16/2020).  By history and examination this patient has chronicneck pain with bilateral radiculopathy as well as low back pain s/p 9/19/2021 Left L3-4 hemilaminotomy, medial facetectomy, and foraminotomy for microdiscectomy with Dr Mcnamara with some improvement. The underlying cause cause is facet arthritis, degenerative disc disease, foraminal stenosis, central canal stenosis, muscle dysfunction, and deconditioning.  MRI of the cervical spine was significant for multilevel degenerative changes, most advanced at C5-6 where there is severe canal stenosis and severe right and moderate left foraminal stenosis. MRI of the Lumbar spine post surgery was significant for residual disc bulge/granulation tissue contributing to mild canal stenosis and moderate left foraminal stenosis at L3/L4 and degenerative changes elsewhere in the spine resulting in mild canal stenosis and moderate foraminal stenosis at multiple levels.   We discussed the underlying diagnoses and multiple treatment options including non-opioid medications, interventional procedures, physical therapy, home exercise, core muscle enhancement, and weight loss.  He previously underwent a cervical steroid injection with several months of relief.  Plan for repeat of cervical epidural steroid injection.  The risks and benefits of each treatment option were discussed and all questions were answered.        11/13/2023 - Hermelindo Garza III presents for evaluation of right hip pain. His exam was consistent with Right GTB bursitis and right hip joint pain.  I will schedule him for a right GTB and Right intraarticular hip injection and defer the LESI for now.  Pt tearful and anxious throughout exam.  He is established with a Psychiatrist.  We discussed referral Psychology for possible CBT and assistance with coping strategies for chronic pain.     Treatment Plan:   Procedures:  S/f right GTB and right hip injection. Pt requests IV sedation. Defer repeat LESI for now.   PT/OT/HEP: I have stressed the importance of physical activity and a home exercise plan to help with pain and improve health.  He is previously completed physical therapy.  Continue with home exercise program.    Medications:   - Flexeril 10 mg t.i.d. p.r.n.  - increase gabapentin to 600 mg q.h.s. and 300 mg Qam   - continue with meloxicam 15 mg q.d.   - continue with p.r.n. Tylenol   -  Reviewed and consistent with medication use as prescribed.  Referrals: Referral placed to Psychology for possible CBT and assistance with coping strategies for chronic pain.   Imaging:  Reviewed and discussed with the patient.  Follow Up: RTC 4 weeks postprocedure.    Lorraine Patel,    Interventional Pain Medicine / Anesthesiology    Disclaimer: This note was partly generated using dictation software which may occasionally result in transcription errors.

## 2023-11-13 NOTE — PROCEDURES
Small Joint Aspiration/Injection: R thumb CMC    Date/Time: 11/13/2023 9:00 AM    Performed by: Christen Marshall MD  Authorized by: Christen Marshall MD    Consent Done?:  Yes (Verbal)  Indications:  Pain  Timeout: prior to procedure the correct patient, procedure, and site was verified    Prep: patient was prepped and draped in usual sterile fashion      Local anesthesia used?: Yes    Anesthesia:  Local infiltration  Local anesthetic:  Lidocaine 1% without epinephrine  Location:  Thumb  Site:  R thumb CMC  Needle size:  25 G  Medications:  40 mg methylPREDNISolone acetate 40 mg/mL  Patient tolerance:  Patient tolerated the procedure well with no immediate complications

## 2023-11-13 NOTE — PROGRESS NOTES
[facility] New Patient Evaluation    Referred by: No ref. provider found   Reason for referral: Right hip pain     CC:   Chief Complaint   Patient presents with    Hip Pain     Right     Leg Pain     Right          4/20/2023    10:29 AM   Last 3 PDI Scores   Pain Disability Index (PDI) 17     11/13/23 Pt returns today complaining of right hip and right leg pain. Pain is located over the lateral right hip and radiates into the groin.  Pain started approximately 1 month ago. Pain has not improved with stretching and HEP.  He states his hip pain is worse than his lower back pain for which he is scheduled to have an NELI at the end of the month. He would like to address the hip pain first.      Subjective:   Hermelindo Garza III is a 43 y.o. male who presents complaining of both neck and back pain.  Today he reports his neck pain is most bothersome.  He reports muscle tightness and pain in the left trapezius and shoulder area.  He reports pain that radiates into the bilateral upper extremities with numbness and tingling.  He has been evaluated by Dr. Sierra with Neurosurgery who noted patient chris  candidate for ACDFs in he progresses.  Pt reports undergoing a cervical epidural steroid injection with Dr. Escamilla 02/10/2021 on with several months of relief.  He also reports low back pain.  He is s/p Left L3-4 hemilaminotomy, medial facetectomy, and foraminotomy for microdiscectomy with Dr Mcnamara on 9/19/2021.  He reports his back pain improved after surgery.  Patient reports he is previously completed physical therapy and is now actively continuing with his exercise program in the gym.  He also reports trying to lose weight and reports intentional  weight loss of approximately 55 pounds since 2020.    Location: back, shoulder, and neck   Onset: 2 years  Current Pain Score: 5/10  Daily Pain of Range: 5-6/10  Quality: Aching, Tight, Tingling, Deep, Numb, Electric, Hot, and Cold  Radiation: neck and  back  Worsened by: lying down, sitting, standing for more than 3 minutes, and walking for more than 6 minutes  Improved by: medications    Patient denies night fever/night sweats, urinary incontinence, bowel incontinence, significant weight loss, significant motor weakness, and loss of sensations.    Previous Interventions:  - 02/10/2021 -  C6/7 CERVICAL EPIDURAL STEROID INJECTION - Dr Escamilla - 4 months of pain relief.     Previous Therapies:  PT/OT: yes   Chiropractor:   HEP: yes  Relevant Surgery: yes    - Reports L4/L5 surgery at age 31 yo   - 09/19/21 - Left MIS L3-4 hemilaminotomy, medial facetectomy, and foraminotomy for microdiscectomy with Dr Mcnamara.   Previous Medications:   - NSAIDS: Meloxicam  - Muscle Relaxants: Robaxin, Flexeril  - TCAs:   - SNRIs:   - Topicals:   - Anticonvulsants: Lyrica - reports RL sxs so stopped; Gabapentin   - Opioids:   - Adjuvants: Tylenol    Current Pain Medications:  Gabapentin 300 mg QHS  Meloxicam  Tylenol       Review of Systems:  ROS    GENERAL:  No weight loss, malaise or fevers. +obesity +DM  HEENT:   No recent changes in vision or hearing  NECK:  No difficulty with swallowing. No stridor.   RESPIRATORY:  Negative for cough, wheezing or shortness of breath, patient denies any recent URI. +Asthma  CARDIOVASCULAR:  Negative for chest pain, leg swelling or palpitations.  GI/:  Negative for abdominal discomfort, blood in stools or black stools or change in bowel habits.   MUSCULOSKELETAL:  See HPI.  SKIN:  Negative for lesions, rash, and itching.  PSYCH:  No mood disorder or recent psychosocial stressors.  +anxiety +ADD  HEMATOLOGY/LYMPHOLOGY:  Negative for prolonged bleeding, bruising easily or swollen nodes.  Patient is not currently taking any anti-coagulants  NEURO:   No history of headaches, syncope, paralysis, seizures or tremors.  All other reviewed and negative other than HPI.    History:  Current medications, allergies, medical history, surgical history,   family  history, and social history were reviewed in the chart as marked.    Full Medication List:    Current Outpatient Medications:     acetaminophen (TYLENOL) 325 MG tablet, Take 650 mg by mouth every 6 (six) hours as needed for Pain., Disp: , Rfl:     albuterol (PROVENTIL/VENTOLIN HFA) 90 mcg/actuation inhaler, RescueINHALE 2 PUFFS INTO THE LUNGS EVERY 6 HOURS AS NEEDED WHEEZING, RESCUE Strength: 90 mcg/actuation, Disp: 8.5 g, Rfl: 5    atorvastatin (LIPITOR) 20 MG tablet, TAKE 1 TABLET(20 MG) BY MOUTH EVERY DAY, Disp: 90 tablet, Rfl: 1    azelastine (ASTELIN) 137 mcg (0.1 %) nasal spray, 2 sprays (274 mcg total) by Nasal route 2 (two) times daily., Disp: 30 mL, Rfl: 9    blood sugar diagnostic (TRUE METRIX GLUCOSE TEST STRIP) Strp, USE TO TEST ONCE DAILY, Disp: 30 strip, Rfl: 5    cyclobenzaprine (FLEXERIL) 10 MG tablet, Take 10 mg by mouth 3 (three) times daily., Disp: , Rfl:     dexmethylphenidate (FOCALIN) 10 MG tablet, Take 1 tablet (10 mg total) by mouth 2 (two) times daily., Disp: 60 tablet, Rfl: 0    [START ON 12/8/2023] dexmethylphenidate (FOCALIN) 10 MG tablet, Take 1 tablet (10 mg total) by mouth 2 (two) times daily., Disp: 60 tablet, Rfl: 0    [START ON 1/6/2024] dexmethylphenidate (FOCALIN) 10 MG tablet, Take 1 tablet (10 mg total) by mouth 2 (two) times daily., Disp: 60 tablet, Rfl: 0    doxycycline (VIBRAMYCIN) 100 MG Cap, Take 1 capsule (100 mg total) by mouth 2 (two) times daily., Disp: 14 capsule, Rfl: 0    ergocalciferol, vitamin D2, (VITAMIN D ORAL), Take 1 tablet by mouth every other day., Disp: , Rfl:     EScitalopram oxalate (LEXAPRO) 10 MG tablet, Take 1 tablet (10 mg total) by mouth once daily., Disp: 90 tablet, Rfl: 0    fexofenadine (ALLEGRA) 180 MG tablet, Take 180 mg by mouth once daily., Disp: , Rfl:     fluocinonide (LIDEX) 0.05 % external solution, AAA scalp qday prn itching, scaling, Disp: 60 mL, Rfl: 3    gabapentin (NEURONTIN) 300 MG capsule, TAKE 1 CAPSULE(300 MG) BY MOUTH THREE TIMES  DAILY, Disp: 90 capsule, Rfl: 3    hydrocortisone 2.5 % cream, APPLY EXTERNALLY TO THE AFFECTED AREA TWICE DAILY FOR 10 DAYS, Disp: 30 g, Rfl: 0    ketoconazole (NIZORAL) 2 % cream, APPLY TOPICALLY TO THE AFFECTED AREA TWICE DAILY, Disp: 15 g, Rfl: 0    lamoTRIgine (LAMICTAL) 100 MG tablet, Take 1 tablet (100 mg total) by mouth 2 (two) times daily. IF any RASH, STOP ALL Lamictal and Tell Psyc MD., Disp: 180 tablet, Rfl: 1    latanoprost 0.005 % ophthalmic solution, Place 1 drop into both eyes every evening., Disp: 7.5 mL, Rfl: 3    LORazepam (ATIVAN) 0.5 MG tablet, Take 1 tablet (0.5 mg total) by mouth daily as needed (SIGNIFICANT ANXIETY)., Disp: 30 tablet, Rfl: 3    meloxicam (MOBIC) 15 MG tablet, TAKE 1 TABLET(15 MG) BY MOUTH EVERY DAY as needed FOR PAIN, Disp: 30 tablet, Rfl: 2    montelukast (SINGULAIR) 10 mg tablet, Take 1 tablet (10 mg total) by mouth every evening., Disp: 90 tablet, Rfl: 3    ondansetron (ZOFRAN-ODT) 4 MG TbDL, Dissolve 1 tablet (4 mg total) by mouth every 6 (six) hours as needed (nausea)., Disp: 30 tablet, Rfl: 0    rOPINIRole (REQUIP) 0.25 MG tablet, TAKE 1 TABLET(0.25 MG) BY MOUTH EVERY EVENING, Disp: 90 tablet, Rfl: 2    semaglutide (OZEMPIC) 0.25 mg or 0.5 mg (2 mg/3 mL) pen injector, Inject 0.5 mg into the skin every 7 days., Disp: 3 each, Rfl: 1    sod picosulf-mag ox-citric ac (CLENPIQ) 10 mg-3.5 gram- 12 gram/160 mL Soln, Take As Directed., Disp: 320 mL, Rfl: 0    ULTRA THIN LANCETS 30 gauge Misc, USE TO TEST BLOOD SUGAR EVERY DAY AS DIRECTED, Disp: 100 each, Rfl: 3    blood-glucose meter kit, Use as instructed, Disp: 1 each, Rfl: 0    tamsulosin (FLOMAX) 0.4 mg Cap, Take 1 capsule (0.4 mg total) by mouth once daily for 10 days, Disp: 10 capsule, Rfl: 0  No current facility-administered medications for this visit.    Facility-Administered Medications Ordered in Other Visits:     0.9%  NaCl infusion, 500 mL, Intravenous, Continuous, Kolton Terrell MD    mupirocin 2 % ointment, ,  Nasal, On Call Procedure, Chong Padron MD, Given at 09/15/22 0553    ondansetron injection 4 mg, 4 mg, Intravenous, Once PRN, Arron Ranegl MD     Allergies:  Patient has no known allergies.     Medical History:   has a past medical history of ADD (attention deficit disorder) (05/09/2012), Allergy, Anxiety (4/12/2023), Asthma (05/09/2012), Cervical disc disorder with radiculopathy, unspecified cervical region (09/14/2021), Cervical spondylosis with myelopathy (06/29/2022), Eczema, Esophageal ulcer, GERD (gastroesophageal reflux disease) (05/09/2012), Glaucoma, Kidney stones (05/2014), Lumbar disc disease (05/09/2012), Lumbar herniated disc (05/09/2012), Malignant melanoma of skin, unspecified (01/06/2022), Melanoma (01/2022), LUBA (obstructive sleep apnea), Radiculopathy, lumbar region (09/14/2021), Renal calculi (05/09/2012), and Type 2 diabetes mellitus without complication, without long-term current use of insulin (11/16/2020).    Surgical History:   has a past surgical history that includes Lumbar laminectomy (2010); Esophagogastroduodenoscopy (01/22/2019); Colonoscopy (01/22/2019); Appendectomy (2006); Epidural steroid injection (N/A, 02/10/2021); Esophagogastroduodenoscopy (N/A, 05/18/2021); Lumbar laminectomy with discectomy (Left, 09/20/2021); Carpal tunnel release (Right, 09/15/2022); Excision of ganglion of wrist (Right, 09/15/2022); Hand Arthrotomy (Right, 09/15/2022); Injection of steroid (Left, 09/15/2022); Flexor tendon repair (Right, 09/15/2022); Tympanostomy tube placement; Epidural steroid injection into cervical spine (N/A, 6/28/2023); Cystoscopy (7/5/2023); Ureteral stent placement (Left, 7/5/2023); Ureteroscopy (Left, 7/5/2023); Laser lithotripsy (Left, 7/5/2023); Ureteroscopic removal of ureteric calculus (Left, 7/5/2023); Correction of hammer toe (Left, 8/14/2023); Epidural steroid injection into lumbar spine (N/A, 9/27/2023); and Epidural steroid injection into cervical spine (N/A,  11/3/2023).    Family History:  family history includes Allergic rhinitis in his father, mother, sister, and sister; Asthma in his father, sister, and sister; Brain cancer in his maternal grandmother; Breast cancer in his paternal grandmother; Chronic back pain in his father; Diabetes in his maternal grandfather; MARTITA disease in his father; Hypertension in his mother; Melanoma in his father; No Known Problems in his brother, maternal aunt, maternal uncle, paternal aunt, paternal grandfather, and paternal uncle; Sleep apnea in his father and mother; Transient ischemic attack in his mother.    Social History:   reports that he quit smoking about 5 years ago. His smoking use included cigarettes. He has never been exposed to tobacco smoke. He has never used smokeless tobacco. He reports current alcohol use. He reports that he does not use drugs.    Physical Exam:  Vitals:    11/13/23 1346   BP: 121/80   Pulse: 68   Weight: 108.9 kg (240 lb 1.3 oz)   PainSc:   6       GENERAL: Well appearing, in no acute distress, alert and oriented x3. +Tearful  PSYCH:  Mood and affect appropriate.  SKIN: Skin color, texture, turgor normal, no rashes or lesions.  HEAD/FACE:  Normocephalic, atraumatic. Cranial nerves grossly intact.  NECK: Supple.  Reports pain with extension of the neck and lateral rotation.  Spurling's positive.  Tenderness and tightness noted over the left trapezius muscle.    CV: RRR with palpation of the radial artery.  PULM: No evidence of respiratory difficulty, symmetric chest rise.  GI:  Soft and non-distended.  MSK:  Lumbar surgical incision is well healed.  Tenderness over the RIGHT GTB. Pain with internal and external rotation of the Right hip. Pain with right hip flexion.   No deformities, edema, or skin discoloration.  No atrophy or tone abnormalities are noted.   NEURO: Bilateral upper and lower extremity coordination and strength is symmetric.  No loss of sensation is noted.  MENTAL STATUS: A x O x 3, good  concentration, speech is fluent and goal directed  MOTOR: 5/5 in all muscle groups  GAIT: normal. Ambulates unassisted.    Imaging:  XR CERVICAL SPINE AP LAT WITH FLEX EXTEN     CLINICAL HISTORY:  Other spondylosis with myelopathy, cervical region     TECHNIQUE:  Three views of the cervical spine plus flexion and extension views were performed.     COMPARISON:  September 8, 2022     FINDINGS:  As before, suboptimal visualization of the cervicothoracic junction due to superimposition of shoulder and chest wall soft tissues.  Straightening of normal cervical lordosis.  No acute fractures.  Unremarkable predental space.  No widening prevertebral soft tissues.  No anterior or retrolisthesis.  Flexion and extension views demonstrate no instability.  Reconfirmed areas of anterior longitudinal ligament calcification adjacent to C4-C5 and C5-C6 disc.  Reconfirmed findings of uncovertebral spurring and endplate osteophytes at preserved C4-C5 level and mildly narrowed C5-C6 level and mild to moderately narrowed C6-C7 level.  Some stable scattered mild facet arthropathic changes.  Intact odontoid tip and unremarkable C1-C2 articulation.  Cervical spine findings do not appear significantly changed to earlier exam.     Electronically signed by: Giovanny Busby  Date:                                            12/13/2022      MRI CERVICAL SPINE WITHOUT CONTRAST     CLINICAL HISTORY:  Arm pain, paresthesias, clumsiness;.  Cervical disc disorder with radiculopathy, unspecified cervical region     TECHNIQUE:  Multiplanar, multisequence MR images of the cervical spine were acquired without the administration of contrast.     COMPARISON:  Cervical spine MRI 01/12/2021     FINDINGS:  Straightening of the normal cervical lordosis.  No spondylolisthesis.     No compression fractures.  No marrow replacing lesions.     Multilevel degenerative changes with disc desiccation and disc space narrowing, described in detail below.  No bone marrow  edema.     Visualized structures in the posterior fossa are unremarkable. The cervical spinal cord is unremarkable.     Paraspinal soft tissues are unremarkable.     SIGNIFICANT FINDINGS BY LEVEL:     C2-3: Small disc osteophyte complex with central annular fissure.  No canal or foraminal stenosis.     C3-4: Disc osteophyte complex with superimposed central extrusion.  Mild canal stenosis with partial effacement of the thecal sac.  Mild bilateral foraminal stenosis.     C4-5: Disc osteophyte complex.  Moderate canal stenosis with near complete effacement of the thecal sac and flattening of the cervical cord.  Moderate bilateral foraminal stenosis.     C5-6: Disc osteophyte complex, eccentric to the right.  Severe canal stenosis with complete effacement of the thecal sac and flattening of the cervical cord.  Severe right and moderate left foraminal stenosis.     C6-7: Disc osteophyte complex.  Moderate canal stenosis.  Moderate bilateral foraminal stenosis.     C7-T1: Disc osteophyte complex with superimposed right foraminal extrusion.  Mild canal stenosis.  Mild right foraminal stenosis.     Impression:     Multilevel degenerative changes, most advanced at C5-6 where there is severe canal stenosis and severe right and moderate left foraminal stenosis.  Overall, degenerative changes have progressed from 01/12/2021.     This report was flagged in Epic as abnormal.     Electronically signed by: Horace Quiroz  Date:                                            06/16/2022      XR LUMBAR SPINE AP AND LAT WITH FLEX/EXT     CLINICAL HISTORY:  Other specified postprocedural states     TECHNIQUE:  AP and lateral views as well as lateral flexion and extension images are performed through the lumbar spine.     COMPARISON:  Non 06/16/2022 MRI of the lumbar spine e     FINDINGS:  Patient had previous a L3/L4 hemilaminectomy on the left side.     Mild DJD and mild lumbar scoliosis.  The lumbar intervertebral disc spaces are slightly  narrowed.  Few mm L2/L3 and L3/L4 retrolisthesis noted.  No fracture or dislocation.  No bone destruction identified     Electronically signed by: Xander Rubio MD  Date:                                            12/13/2022    MRI LUMBAR SPINE WITHOUT CONTRAST     CLINICAL HISTORY:  Lumbar radiculopathy, symptoms persist with conservative treatment;Low back pain, prior surgery, new symptoms; Dorsalgia, unspecified     TECHNIQUE:  Multiplanar, multisequence MR images were acquired from the thoracolumbar junction to the sacrum without the administration of contrast.     COMPARISON:  MRI lumbar spine 09/19/2021     FINDINGS:  Sequences are degraded by patient motion artifact.     Transitional lumbosacral anatomy with lumbarization of S1.     Postoperative changes from left L3-4 hemilaminectomy and micro discectomy.  Mild protrusion of the thecal sac and cauda equina nerve roots into the laminectomy defect.  No fluid collections in the operative bed.     No spondylolisthesis.     No compression fractures.  Small fat containing lesion in the L3 vertebral body, likely a hemangioma.     Multilevel degenerative changes with disc desiccation and disc space narrowing, described in detail below.  No significant bone marrow edema.     The conus medullaris has a normal appearance and terminates at the L2 level.  Cauda equina nerve roots are unremarkable.     Paraspinal muscle atrophy.     SIGNIFICANT FINDINGS BY LEVEL:     T12-L1: Mild disc bulge.  Minimal canal stenosis.  No foraminal stenosis.     L1-2: Left facet arthropathy.  No canal or foraminal stenosis.     L2-3: Disc bulge.  Bilateral facet arthropathy and ligamentum flavum thickening.  Mild canal stenosis.  Mild left foraminal stenosis.     L3-4: Residual disc bulge versus granulation tissue.  The previous disc fragment migrating inferiorly is no longer seen.  Bilateral facet arthropathy and ligamentum flavum thickening.  Mild canal stenosis with narrowing of both  subarticular zones and abutment of the left descending L4 nerve root.  Mild right and moderate left foraminal stenosis.     L4-5: Disc bulge.  Bilateral facet arthropathy and ligamentum flavum thickening.  Mild canal stenosis.  Moderate bilateral foraminal stenosis.     L5-S1: Disc bulge.  Bilateral facet arthropathy and ligamentum flavum thickening.  Mild canal stenosis.  Moderate bilateral foraminal stenosis.     Impression:     Postoperative changes from left L3-4 hemilaminectomy and micro discectomy.  The previous disc fragment migrating inferiorly is no longer seen.  There is a residual disc bulge/granulation tissue contributing to mild canal stenosis and moderate left foraminal stenosis.     Small meningocele at the laminectomy defect.     Degenerative changes elsewhere in the spine resulting in mild canal stenosis and moderate foraminal stenosis at multiple levels as described above.        Electronically signed by: Horace Quiroz  Date:                                            06/16/2022    EXAMINATION:  MRI LUMBAR SPINE WITHOUT CONTRAST     CLINICAL HISTORY:  Low back pain, prior surgery, new symptoms; Dorsalgia, unspecified     TECHNIQUE:  Multiplanar, multisequence MR images were acquired from the thoracolumbar junction to the sacrum without the administration of contrast.     COMPARISON:  MRI lumbar spine 06/16/2023     FINDINGS:  Postoperative change left L3-L4 hemilaminectomy and discectomy.  Mild protrusion of the thecal sac and cauda equina nerve roots into the laminectomy defect similar to prior.     Alignment: Normal.     Vertebrae: Endplate degenerative change L5-S1.  Small may angioma L3 vertebral body.  No acute fracture.  No infiltrative marrow process.     Discs: Multilevel disc height loss and desiccation most pronounced at L5-S1.     Cord: Normal.  Conus terminates at L1.     Soft tissue structures are unremarkable.  Limited evaluation of the retroperitoneal organs demonstrates no significant  abnormalities.  Paraspinal musculature demonstrates normal bulk and signal intensity.     Degenerative findings:     T12-L1: Mild circumferential disc bulge no neural foraminal narrowing or spinal canal stenosis.     L1-L2: No neural foraminal narrowing or spinal canal stenosis.     L2-L3: Circumferential disc bulge.  Bilateral facet arthropathy.  No neural foraminal narrowing.  Mild spinal canal stenosis.     L3-L4: Postop change.  Circumferential disc bulge.  Bilateral facet arthropathy.  Moderate left and mild right neural foraminal narrowing.  Mild spinal canal stenosis     L4-L5: Circumferential disc bulge.  Bilateral facet arthropathy.  Moderate right and severe left neural foraminal narrowing.  Mild to moderate spinal canal stenosis.     L5-S1: Circumferential disc bulge.  Bilateral facet arthropathy.  Moderate bilateral neural foraminal narrowing.  Mild spinal canal stenosis.     Impression:     1. Postoperative change left L3-L4 hemilaminectomy and discectomy.  2. Multilevel degenerative change of the lumbar spine as above not significantly changed from prior MRI 06/16/2023, noting severe left foraminal narrowing at L4-5.     Electronically signed by resident: Hammad Quinn  Date:                                            10/23/2023  Labs:  BMP  Lab Results   Component Value Date     10/11/2023    K 4.1 10/11/2023     10/11/2023    CO2 26 10/11/2023    BUN 18 10/11/2023    CREATININE 0.7 10/11/2023    CALCIUM 9.5 10/11/2023    ANIONGAP 10 10/11/2023    EGFRNORACEVR >60.0 10/11/2023     Lab Results   Component Value Date    ALT 39 10/11/2023    AST 24 10/11/2023    ALKPHOS 94 10/11/2023    BILITOT 0.7 10/11/2023     Lab Results   Component Value Date    WBC 13.22 (H) 07/04/2023    HGB 15.2 07/04/2023    HCT 45.2 07/04/2023    MCV 89 07/04/2023     07/04/2023             Assessment:  Problem List Items Addressed This Visit          Neuro    Chronic pain    Relevant Orders    Ambulatory  referral/consult to Psychology       Psychiatric    Anxiety    Relevant Orders    Ambulatory referral/consult to Psychology     Other Visit Diagnoses       Right hip pain    -  Primary    Greater trochanteric bursitis of right hip                04/20/2032-  Hermelindo Garza III is a 43 y.o. male who  has a past medical history of ADD (attention deficit disorder) (05/09/2012), Allergy, Anxiety (4/12/2023), Asthma (05/09/2012), Cervical disc disorder with radiculopathy, unspecified cervical region (09/14/2021), Cervical spondylosis with myelopathy (06/29/2022), Eczema, Esophageal ulcer, GERD (gastroesophageal reflux disease) (05/09/2012), Glaucoma, Kidney stones (05/2014), Lumbar disc disease (05/09/2012), Lumbar herniated disc (05/09/2012), Malignant melanoma of skin, unspecified (01/06/2022), Melanoma (01/2022), LUBA (obstructive sleep apnea), Radiculopathy, lumbar region (09/14/2021), Renal calculi (05/09/2012), and Type 2 diabetes mellitus without complication, without long-term current use of insulin (11/16/2020).  By history and examination this patient has chronicneck pain with bilateral radiculopathy as well as low back pain s/p 9/19/2021 Left L3-4 hemilaminotomy, medial facetectomy, and foraminotomy for microdiscectomy with Dr Mcnamara with some improvement. The underlying cause cause is facet arthritis, degenerative disc disease, foraminal stenosis, central canal stenosis, muscle dysfunction, and deconditioning.  MRI of the cervical spine was significant for multilevel degenerative changes, most advanced at C5-6 where there is severe canal stenosis and severe right and moderate left foraminal stenosis. MRI of the Lumbar spine post surgery was significant for residual disc bulge/granulation tissue contributing to mild canal stenosis and moderate left foraminal stenosis at L3/L4 and degenerative changes elsewhere in the spine resulting in mild canal stenosis and moderate foraminal stenosis at multiple levels.   We discussed the underlying diagnoses and multiple treatment options including non-opioid medications, interventional procedures, physical therapy, home exercise, core muscle enhancement, and weight loss.  He previously underwent a cervical steroid injection with several months of relief.  Plan for repeat of cervical epidural steroid injection.  The risks and benefits of each treatment option were discussed and all questions were answered.        11/13/2023 - Hermelindo Garza III presents for evaluation of right hip pain. His exam was consistent with Right GTB bursitis and right hip joint pain.  I will schedule him for a right GTB and Right intraarticular hip injection and defer the LESI for now.  Pt tearful and anxious throughout exam.  He is established with a Psychiatrist.  We discussed referral Psychology for possible CBT and assistance with coping strategies for chronic pain.     Treatment Plan:   Procedures:  S/f right GTB and right hip injection. Pt requests IV sedation. Defer repeat LESI for now.   PT/OT/HEP: I have stressed the importance of physical activity and a home exercise plan to help with pain and improve health.  He is previously completed physical therapy.  Continue with home exercise program.    Medications:   - Flexeril 10 mg t.i.d. p.r.n.  - increase gabapentin to 600 mg q.h.s. and 300 mg Qam   - continue with meloxicam 15 mg q.d.   - continue with p.r.n. Tylenol   -  Reviewed and consistent with medication use as prescribed.  Referrals: Referral placed to Psychology for possible CBT and assistance with coping strategies for chronic pain.   Imaging:  Reviewed and discussed with the patient.  Follow Up: RTC 4 weeks postprocedure.    Lorraine Patel,    Interventional Pain Medicine / Anesthesiology    Disclaimer: This note was partly generated using dictation software which may occasionally result in transcription errors.

## 2023-11-13 NOTE — PROGRESS NOTES
Hermelindo Garza III presents for follow up evaluation of   Encounter Diagnoses   Name Primary?    Cervical radiculopathy Yes    Left lateral epicondylitis     Rotator cuff tendonitis, left     Left carpal tunnel syndrome      He reports pain in the left elbow that is getting worse.  He also reports pain in the right basilar thumb joint with gripping. He has a history of cervical radiculopathy and reports his left shoulder pain is a constant burning.       #1 right thumb basilar pain thumb, 4/10. He is wearing the brace to help with pain and reports the last injection helped improve the pain but it returned about 1 month ago.    #2 Left elbow tender over lateral epicondyle, pain with resisted manual muscle testing. Injection provided minor relief in pain symptoms.  He reports weakness in the left side with gripping due to the elbow pain.      #3 Left shoulder - he has been having difficulty going back to the gym and working out because he has pain with resistive exercise. He went through PT and d/c in 6/22/2023; states that it helped but he was not fully better. Pain is 6/10 at worst day after activity, burning pain constant.     PE:    AA&O x 4.  NAD  HEENT:  NCAT, sclera nonicteric  Lungs:  Respirations are equal and unlabored.  CV:  2+ bilateral upper and lower extremity pulses.  MSK:  Tender to palpation left lateral epicondyle, pain with resisted wrist extension.  Positive grind test right basilar thumb joint.  Neurovascularly intact bilaterally.  5/5 thenar and intrinsic musculature strength.  Full range of motion hands, wrists and elbows.    Left shoulder: full AROM, negative spurlings, biceps tenderness to palpation, positive Neer test, positive Dianne's test, positive impingement, negative Belly Press test, negative Drop Sign    X-rays AP, lateral and oblique right hand, left elbow and left shoulder taken today are independently reviewed by me and shows Eaton stage II basilar thumb arthritis, elbow no  bony or ligamentous abnormalities, shoulder AC joint arthrosis.       ASSESSMENT/PLAN:      43 y.o. yo male with   Encounter Diagnoses   Name Primary?    Cervical radiculopathy Yes    Left lateral epicondylitis     Rotator cuff tendonitis, left     Left carpal tunnel syndrome         Plan:  We have discussed the natural history of basilar thumb arthritis and lateral epicondylitis including treatment options such as splinting, oral and topical anti-inflammatories, cortisone injections and surgery.  I have ordered an MRI of the left elbow to assess for possible extensor tendon tear .     Follow up after MRI left elbow for results review; referral back to Dr. Felton for left shoulder pain    -I have offered him a selective injection. I have explained the risks, benefits, and alternatives of the procedure in detail.  The patient voices understanding and all questions have been answered. The patient agrees to proceed as planned. So after a sterile prep of the skin in the normal fashion the right thumb cmc joint was injected using a 25 gauge needle with a combination of 1cc 1% plain lidocaine and 40 mg of methylprednisolone.  The patient is cautioned and immediate relief of pain is secondary to the local anesthetic and will be temporary.  After the anesthetic wears off there may be a increase in pain that may last for a few hours or a few days and they should use ice to help alleviate this flair up of pain. Patient tolerated the procedure well.    3.  Call with any questions/concerns in the interim     The patient's pathophysiology was explained in detail with reference to x-rays, models, other visual aids as appropriate.  Treatment options were discussed in detail.  Questions were invited and answered to the patient's satisfaction. I reviewed Primary care , and other specialty's notes to better coordinate patient's care.             Christen Marshall MD     Please be aware that this note has been generated with the  assistance of MModal voice-to-text.  Please excuse any spelling or grammatical errors.

## 2023-11-14 ENCOUNTER — TELEPHONE (OUTPATIENT)
Dept: PAIN MEDICINE | Facility: CLINIC | Age: 43
End: 2023-11-14
Payer: COMMERCIAL

## 2023-11-14 DIAGNOSIS — M25.551 PAIN OF RIGHT HIP JOINT: ICD-10-CM

## 2023-11-14 DIAGNOSIS — M25.551 RIGHT HIP PAIN: Primary | ICD-10-CM

## 2023-11-14 RX ORDER — LORAZEPAM 0.5 MG/1
0.5 TABLET ORAL DAILY PRN
Qty: 30 TABLET | Refills: 3 | Status: SHIPPED | OUTPATIENT
Start: 2023-11-14 | End: 2024-01-31 | Stop reason: SDUPTHER

## 2023-11-14 RX ORDER — HYDROCORTISONE 25 MG/G
CREAM TOPICAL
Qty: 30 G | Refills: 0 | Status: SHIPPED | OUTPATIENT
Start: 2023-11-14

## 2023-11-14 NOTE — PROGRESS NOTES
Supply chain issue     Pt requests medication  (appears as Focalin) / asking to be moved to [facility]     Such done for nov Dec and See     I also called him and told of such that only med moving is Focalin / IF something else then he needs recontact    D Post MD

## 2023-11-14 NOTE — TELEPHONE ENCOUNTER
Refill Routing Note   Medication(s) are not appropriate for processing by [facility] for the following reason(s):        Clarification of medication (Rx) details: should patient continue to apply for 10 days.  Provider please advise.    OR action(s):  Approve  Defer               Appointments  past 12m or future 3m with PCP    Date Provider   Last Visit   10/12/2023 Suzi Sanches MD   Next Visit   4/16/2024 Suzi Sanches MD   ED visits in past 90 days: 0        Note composed:11:11 AM 11/14/2023

## 2023-11-14 NOTE — TELEPHONE ENCOUNTER
No care due was identified.  [facility] Embedded Care Due Messages. Reference number: [id number].   11/13/2023 7:50:56 PM CST

## 2023-11-16 ENCOUNTER — CLINICAL SUPPORT (OUTPATIENT)
Dept: ALLERGY | Facility: CLINIC | Age: 43
End: 2023-11-16
Payer: COMMERCIAL

## 2023-11-16 DIAGNOSIS — J30.9 CHRONIC ALLERGIC RHINITIS: Primary | ICD-10-CM

## 2023-11-16 PROCEDURE — 99999 PR PBB SHADOW E&M-EST. PATIENT-LVL I: ICD-10-PCS | Mod: PBBFAC,,,

## 2023-11-16 PROCEDURE — 95117 IMMUNOTHERAPY INJECTIONS: CPT | Mod: S$GLB,,, | Performed by: ALLERGY & IMMUNOLOGY

## 2023-11-16 PROCEDURE — 99999 PR PBB SHADOW E&M-EST. PATIENT-LVL I: CPT | Mod: PBBFAC,,,

## 2023-11-16 PROCEDURE — 95117 PR IMMU2THERAPY, 2+ INJECTIONS: ICD-10-PCS | Mod: S$GLB,,, | Performed by: ALLERGY & IMMUNOLOGY

## 2023-11-20 ENCOUNTER — OFFICE VISIT (OUTPATIENT)
Dept: SURGERY | Facility: CLINIC | Age: 43
End: 2023-11-20
Payer: COMMERCIAL

## 2023-11-20 DIAGNOSIS — K21.9 GASTROESOPHAGEAL REFLUX DISEASE, UNSPECIFIED WHETHER ESOPHAGITIS PRESENT: Primary | ICD-10-CM

## 2023-11-20 PROCEDURE — 3044F HG A1C LEVEL LT 7.0%: CPT | Mod: CPTII,95,, | Performed by: STUDENT IN AN ORGANIZED HEALTH CARE EDUCATION/TRAINING PROGRAM

## 2023-11-20 PROCEDURE — 99214 PR OFFICE/OUTPT VISIT, EST, LEVL IV, 30-39 MIN: ICD-10-PCS | Mod: 95,,, | Performed by: STUDENT IN AN ORGANIZED HEALTH CARE EDUCATION/TRAINING PROGRAM

## 2023-11-20 PROCEDURE — 3061F PR NEG MICROALBUMINURIA RESULT DOCUMENTED/REVIEW: ICD-10-PCS | Mod: CPTII,95,, | Performed by: STUDENT IN AN ORGANIZED HEALTH CARE EDUCATION/TRAINING PROGRAM

## 2023-11-20 PROCEDURE — 1160F RVW MEDS BY RX/DR IN RCRD: CPT | Mod: CPTII,95,, | Performed by: STUDENT IN AN ORGANIZED HEALTH CARE EDUCATION/TRAINING PROGRAM

## 2023-11-20 PROCEDURE — 1160F PR REVIEW ALL MEDS BY PRESCRIBER/CLIN PHARMACIST DOCUMENTED: ICD-10-PCS | Mod: CPTII,95,, | Performed by: STUDENT IN AN ORGANIZED HEALTH CARE EDUCATION/TRAINING PROGRAM

## 2023-11-20 PROCEDURE — 1159F PR MEDICATION LIST DOCUMENTED IN MEDICAL RECORD: ICD-10-PCS | Mod: CPTII,95,, | Performed by: STUDENT IN AN ORGANIZED HEALTH CARE EDUCATION/TRAINING PROGRAM

## 2023-11-20 PROCEDURE — 3061F NEG MICROALBUMINURIA REV: CPT | Mod: CPTII,95,, | Performed by: STUDENT IN AN ORGANIZED HEALTH CARE EDUCATION/TRAINING PROGRAM

## 2023-11-20 PROCEDURE — 3066F PR DOCUMENTATION OF TREATMENT FOR NEPHROPATHY: ICD-10-PCS | Mod: CPTII,95,, | Performed by: STUDENT IN AN ORGANIZED HEALTH CARE EDUCATION/TRAINING PROGRAM

## 2023-11-20 PROCEDURE — 3066F NEPHROPATHY DOC TX: CPT | Mod: CPTII,95,, | Performed by: STUDENT IN AN ORGANIZED HEALTH CARE EDUCATION/TRAINING PROGRAM

## 2023-11-20 PROCEDURE — 99214 OFFICE O/P EST MOD 30 MIN: CPT | Mod: 95,,, | Performed by: STUDENT IN AN ORGANIZED HEALTH CARE EDUCATION/TRAINING PROGRAM

## 2023-11-20 PROCEDURE — 3044F PR MOST RECENT HEMOGLOBIN A1C LEVEL <7.0%: ICD-10-PCS | Mod: CPTII,95,, | Performed by: STUDENT IN AN ORGANIZED HEALTH CARE EDUCATION/TRAINING PROGRAM

## 2023-11-20 PROCEDURE — 1159F MED LIST DOCD IN RCRD: CPT | Mod: CPTII,95,, | Performed by: STUDENT IN AN ORGANIZED HEALTH CARE EDUCATION/TRAINING PROGRAM

## 2023-11-20 NOTE — PROGRESS NOTES
Audio Only Telehealth Visit     The patient location is: home  The chief complaint leading to consultation is: reflux  Visit type: Virtual visit with audio only (telephone)  Total time spent with patient: 15min     The reason for the audio only service rather than synchronous audio and video virtual visit was related to technical difficulties or patient preference/necessity.     Each patient to whom I provide medical services by telemedicine is:  (1) informed of the relationship between the physician and patient and the respective role of any other health care provider with respect to management of the patient; and (2) notified that they may decline to receive medical services by telemedicine and may withdraw from such care at any time. Patient verbally consented to receive this service via voice-only telephone call.       HPI:   43m with at least 10 year hx of reflux, heartburn. Overall worsening. Symptoms were minimal while on nexium but now has worsening heartburn even with nexium. Some nausea, takes zofran. Hx of EGD in 2013 that showed HH.  EGD in 2021 did not mention HH, some gastritis, hp neg.   Now with some blood in stool, scheduled for cscope and EGD next month with dr blair.   CT in July doesn't show much HH.   On ozempic since 2020, lost about 50#.   Hga1c 5.4  Talked to dr ulrich regarding linx a few years ago but decided he does not want to go through with that.   Symptoms significantly worse if he misses a dose of his nexium.   BMI 39     Assessment and plan:    43m with GERD, hiatal hernia  Will discuss with dr blair,already scheduled for EGD, cscope in December.   Upper GI  RTC after scopes. Tentatively plan HHR, fundo                          This service was not originating from a related E/M service provided within the previous 7 days nor will  to an E/M service or procedure within the next 24 hours or my soonest available appointment.  Prevailing standard of care was able to be  met in this audio-only visit.

## 2023-11-21 ENCOUNTER — TELEPHONE (OUTPATIENT)
Dept: SURGERY | Facility: CLINIC | Age: 43
End: 2023-11-21
Payer: COMMERCIAL

## 2023-11-21 NOTE — TELEPHONE ENCOUNTER
11/21/23  1529  Attempted to contact patient for UGI scheduling. No answer. Detailed message left via vm.

## 2023-11-22 ENCOUNTER — PATIENT MESSAGE (OUTPATIENT)
Dept: ENDOSCOPY | Facility: HOSPITAL | Age: 43
End: 2023-11-22
Payer: COMMERCIAL

## 2023-11-22 ENCOUNTER — HOSPITAL ENCOUNTER (OUTPATIENT)
Dept: RADIOLOGY | Facility: HOSPITAL | Age: 43
Discharge: HOME | End: 2023-11-22
Attending: ORTHOPAEDIC SURGERY
Payer: COMMERCIAL

## 2023-11-22 ENCOUNTER — TELEPHONE (OUTPATIENT)
Dept: GASTROENTEROLOGY | Facility: HOSPITAL | Age: 43
End: 2023-11-22
Payer: COMMERCIAL

## 2023-11-22 ENCOUNTER — TELEPHONE (OUTPATIENT)
Dept: GASTROENTEROLOGY | Facility: CLINIC | Age: 43
End: 2023-11-22
Payer: COMMERCIAL

## 2023-11-22 DIAGNOSIS — K21.9 GASTROESOPHAGEAL REFLUX DISEASE, UNSPECIFIED WHETHER ESOPHAGITIS PRESENT: Primary | ICD-10-CM

## 2023-11-22 DIAGNOSIS — K92.1 BLOOD IN STOOL: ICD-10-CM

## 2023-11-22 DIAGNOSIS — M77.12 LEFT LATERAL EPICONDYLITIS: ICD-10-CM

## 2023-11-22 DIAGNOSIS — M25.522 LEFT ELBOW PAIN: ICD-10-CM

## 2023-11-22 PROCEDURE — 73221 MRI JOINT UPR EXTREM W/O DYE: CPT | Mod: TC,LT

## 2023-11-22 PROCEDURE — 73221 MRI JOINT UPR EXTREM W/O DYE: CPT | Mod: 26,LT,, | Performed by: RADIOLOGY

## 2023-11-22 PROCEDURE — 73221 MRI ELBOW WITHOUT CONTRAST LEFT: ICD-10-PCS | Mod: 26,LT,, | Performed by: RADIOLOGY

## 2023-11-22 NOTE — TELEPHONE ENCOUNTER
Spoke with pt and informed him of egd w/ bravo. Instructions sent via portal and went over verbally

## 2023-11-22 NOTE — TELEPHONE ENCOUNTER
Please inform this patient.    Dr blakely is requesting a BRAVO ph  / acid reflux study to be done during his upcoming procedures.  Please give him instructions in regards to holding all acid medications, per our protocols.    Also, be sure that he is holding his ozempic at least 7 days before procedure, standard protocol    Team, new orders case request was placed , please update snapborad with new request and cancel old orders.

## 2023-11-22 NOTE — TELEPHONE ENCOUNTER
Spoke with pt and informed him to hold ozempic 7 days prior to procedure scheduled on 12/21. Verbal understanding

## 2023-11-22 NOTE — TELEPHONE ENCOUNTER
----- Message from Cody Winn MD sent at 11/21/2023  9:48 AM CST -----    Yes lets plan on doing it. Hopefully he can tolerate being off the PPI. He doesn't have much of a hiatal hernia but I can do a fundoplication on him if his symptoms correlate.     ----- Message -----  From: Teo Johnson MD  Sent: 11/20/2023   9:40 PM CST  To: Cody Winn MD    Yes, I can do a BRAVO ph test..  And yes, we do them OFF acid therapy, off PPI for about 14 days and off H2 blockers for 7 days.    Let me know if you want to confirm doing this, I can have my office set it up    ----- Message -----  From: Cody Winn MD  Sent: 11/20/2023  10:27 AM CST  To: Teo Johnson MD    This myrna is coming in Dec 21 for EGD, colonoscopy by you. I saw him on a virtual visit today for reflux, heartburn. He's on nexium for years. Do you think you could do a pH probe when you do the EGD? Do they have to be off of the PPI when you do that?  He's on ozempic too.

## 2023-11-24 ENCOUNTER — PATIENT MESSAGE (OUTPATIENT)
Dept: PAIN MEDICINE | Facility: HOSPITAL | Age: 43
End: 2023-11-24
Payer: COMMERCIAL

## 2023-11-24 NOTE — PROGRESS NOTES
Hermelindo Garza III presents for follow up evaluation of   Encounter Diagnoses   Name Primary?    Partial tear of radial collateral ligament of elbow Yes    Left lateral epicondylitis      Patient returns today in clinic to review left Elbow MRI without contrast results. He continues to have pain and weakness with  in the left elbow.    OBJECTIVE:    PE:    AA&O x 4.  NAD  HEENT:  NCAT, sclera nonicteric  Lungs:  Respirations are equal and unlabored.  CV:  2+ bilateral upper and lower extremity pulses.  MSK: Tender to palpation left lateral epicondyle, pain with resisted wrist extension  Neurovascularly intact bilaterally.  5/5 thenar and intrinsic musculature strength.  Otherwise, full range of motion hands, wrists and elbows.    Diagnostic Results:  MRI without contrast left elbow are independently reviewed by me and demonstrate a tear of the proximal radial ulnar collateral ligament.     ASSESSMENT/PLAN:      43 y.o. yo male with   Encounter Diagnoses   Name Primary?    Partial tear of radial collateral ligament of elbow Yes    Left lateral epicondylitis          Plan:  We have discussed the natural history of elbow radial collateral ligament injuries and lateral epicondylitis including treatment options such as splinting, oral and topical anti-inflammatories, cortisone injections and surgery.    We have discussed conservative vs. surgical treatment as well as risks, benefits and alternatives for left elbow radial collateral ligament tear and lateral epicondylitis.  Conservative measures have been exhausted and he would like to proceed with surgery.  Surgery would include left elbow radial collateral ligament repair and left elbow extensor carpi radialis brevis debridement with tendon repair . Consent signed today in clinic. Light use of the hand will be indicated for the first 4-6 weeks.     We have discussed risks of hand surgery which include but are not limited to blood clots in the legs that can  travel to the lungs (pulmonary embolism). Pulmonary embolism can cause shortness of breath, chest pain, and even shock. Other risks include urinary tract infection, nausea and vomiting (usually related to pain medication), chronic pain, bleeding, nerve damage, blood vessel injury, scarring and infection of the hand which can require re-operation. Furthermore, the risks of anesthesia include potential heart, lung, kidney, and liver damage.  Informed consent was obtained.  The patient understands and would like to proceed with surgery.            Christen Marshall MD     Please be aware that this note has been generated with the assistance of bernice voice-to-text.  Please excuse any spelling or grammatical errors.

## 2023-11-27 ENCOUNTER — OFFICE VISIT (OUTPATIENT)
Dept: ORTHOPEDICS | Facility: CLINIC | Age: 43
End: 2023-11-27
Payer: COMMERCIAL

## 2023-11-27 DIAGNOSIS — S53.20XA PARTIAL TEAR OF RADIAL COLLATERAL LIGAMENT OF ELBOW: Primary | ICD-10-CM

## 2023-11-27 DIAGNOSIS — M77.12 LEFT LATERAL EPICONDYLITIS: ICD-10-CM

## 2023-11-27 PROCEDURE — 3061F NEG MICROALBUMINURIA REV: CPT | Mod: CPTII,S$GLB,, | Performed by: ORTHOPAEDIC SURGERY

## 2023-11-27 PROCEDURE — 3061F PR NEG MICROALBUMINURIA RESULT DOCUMENTED/REVIEW: ICD-10-PCS | Mod: CPTII,S$GLB,, | Performed by: ORTHOPAEDIC SURGERY

## 2023-11-27 PROCEDURE — 1159F MED LIST DOCD IN RCRD: CPT | Mod: CPTII,S$GLB,, | Performed by: ORTHOPAEDIC SURGERY

## 2023-11-27 PROCEDURE — 1160F PR REVIEW ALL MEDS BY PRESCRIBER/CLIN PHARMACIST DOCUMENTED: ICD-10-PCS | Mod: CPTII,S$GLB,, | Performed by: ORTHOPAEDIC SURGERY

## 2023-11-27 PROCEDURE — 3066F NEPHROPATHY DOC TX: CPT | Mod: CPTII,S$GLB,, | Performed by: ORTHOPAEDIC SURGERY

## 2023-11-27 PROCEDURE — 1159F PR MEDICATION LIST DOCUMENTED IN MEDICAL RECORD: ICD-10-PCS | Mod: CPTII,S$GLB,, | Performed by: ORTHOPAEDIC SURGERY

## 2023-11-27 PROCEDURE — 3044F HG A1C LEVEL LT 7.0%: CPT | Mod: CPTII,S$GLB,, | Performed by: ORTHOPAEDIC SURGERY

## 2023-11-27 PROCEDURE — 99999 PR PBB SHADOW E&M-EST. PATIENT-LVL V: ICD-10-PCS | Mod: PBBFAC,,, | Performed by: ORTHOPAEDIC SURGERY

## 2023-11-27 PROCEDURE — 3066F PR DOCUMENTATION OF TREATMENT FOR NEPHROPATHY: ICD-10-PCS | Mod: CPTII,S$GLB,, | Performed by: ORTHOPAEDIC SURGERY

## 2023-11-27 PROCEDURE — 99214 PR OFFICE/OUTPT VISIT, EST, LEVL IV, 30-39 MIN: ICD-10-PCS | Mod: S$GLB,,, | Performed by: ORTHOPAEDIC SURGERY

## 2023-11-27 PROCEDURE — 1160F RVW MEDS BY RX/DR IN RCRD: CPT | Mod: CPTII,S$GLB,, | Performed by: ORTHOPAEDIC SURGERY

## 2023-11-27 PROCEDURE — 99999 PR PBB SHADOW E&M-EST. PATIENT-LVL V: CPT | Mod: PBBFAC,,, | Performed by: ORTHOPAEDIC SURGERY

## 2023-11-27 PROCEDURE — 3044F PR MOST RECENT HEMOGLOBIN A1C LEVEL <7.0%: ICD-10-PCS | Mod: CPTII,S$GLB,, | Performed by: ORTHOPAEDIC SURGERY

## 2023-11-27 PROCEDURE — 99214 OFFICE O/P EST MOD 30 MIN: CPT | Mod: S$GLB,,, | Performed by: ORTHOPAEDIC SURGERY

## 2023-11-28 NOTE — PRE-PROCEDURE INSTRUCTIONS
The following was discussed with pt via phone and sent to pt portal. Pt verbalized understanding.    On the day of your pain procedure, please report to [facility] located at [address] [place] [address].  Please park in the lot in front of the building and enter at the main entrance. Proceed to the second floor for registration.     Arrival time: 10:00 am     You may not drive home after your procedure. You may not leave alone in an uber, taxi, etc.  You must be discharged to a responsible adult. Please remain under adult supervision for 24 hours.   If possible, please have your visitor &/or ride home stay during your visit.   The surgeon should speak with your visitors after your procedure.  All visitors must be 18 years of age or older. Please limit your visitors to max of 2 people.        No food, no drink 8 hours prior to your arrival time.     You should take any medications that you routinely take for blood pressure, heart medications, thyroid, cholesterol, etc.   If you are a diabetic, do not take your medication if you will be fasting, but bring it with you.   Please plan on being here for roughly 2 hours.   Hold vitamins, mineral, herbs (including herbal tea) and supplements the morning of your procedure.     Shower the night before and the morning of your procedure with antibacterial soap (ex. dial antibacterial)  Please do not use antibacterial soap to wash your face. Use your regular face soap.  Do not apply any products to your skin nor your hair after you shower the morning of your procedure.  Products include: gels, creams, ointments, oils, powders, lotion, deodorant, make-up, perfume/cologne, after shave and sunscreen.     Wear loose, comfortable clothing (preferably a button up shirt)  No jewelry. No contacts. Bring glasses if necessary.  If you have dentures, please bring a case.  Please leave all valuables at home.     If you start to feel sick (fever, chills, coughing, etc) or start on any  antibiotics, please contact your doctor's office at [phone] to reschedule.      Thanks,  Catina, LPN [facility]  Pre-Admit Dept

## 2023-11-29 ENCOUNTER — HOSPITAL ENCOUNTER (OUTPATIENT)
Facility: HOSPITAL | Age: 43
Discharge: HOME | End: 2023-11-29
Attending: STUDENT IN AN ORGANIZED HEALTH CARE EDUCATION/TRAINING PROGRAM | Admitting: STUDENT IN AN ORGANIZED HEALTH CARE EDUCATION/TRAINING PROGRAM
Payer: COMMERCIAL

## 2023-11-29 VITALS
HEART RATE: 74 BPM | RESPIRATION RATE: 16 BRPM | TEMPERATURE: 98 F | BODY MASS INDEX: 39.99 KG/M2 | WEIGHT: 240 LBS | DIASTOLIC BLOOD PRESSURE: 73 MMHG | SYSTOLIC BLOOD PRESSURE: 126 MMHG | HEIGHT: 65 IN | OXYGEN SATURATION: 97 %

## 2023-11-29 DIAGNOSIS — M25.559 HIP PAIN, UNSPECIFIED LATERALITY: Primary | ICD-10-CM

## 2023-11-29 DIAGNOSIS — G89.29 CHRONIC PAIN: ICD-10-CM

## 2023-11-29 DIAGNOSIS — M70.60 GREATER TROCHANTERIC BURSITIS, UNSPECIFIED LATERALITY: ICD-10-CM

## 2023-11-29 LAB — POCT GLUCOSE: 106 MG/DL (ref 70–110)

## 2023-11-29 PROCEDURE — 25000003 PHARM REV CODE 250: Performed by: STUDENT IN AN ORGANIZED HEALTH CARE EDUCATION/TRAINING PROGRAM

## 2023-11-29 PROCEDURE — 20610 DRAIN/INJ JOINT/BURSA W/O US: CPT | Mod: RT | Performed by: STUDENT IN AN ORGANIZED HEALTH CARE EDUCATION/TRAINING PROGRAM

## 2023-11-29 PROCEDURE — 25500020 PHARM REV CODE 255: Performed by: STUDENT IN AN ORGANIZED HEALTH CARE EDUCATION/TRAINING PROGRAM

## 2023-11-29 PROCEDURE — 77002 PR FLUOROSCOPIC GUIDANCE NEEDLE PLACEMENT: ICD-10-PCS | Mod: 26,,, | Performed by: STUDENT IN AN ORGANIZED HEALTH CARE EDUCATION/TRAINING PROGRAM

## 2023-11-29 PROCEDURE — 63600175 PHARM REV CODE 636 W HCPCS: Performed by: STUDENT IN AN ORGANIZED HEALTH CARE EDUCATION/TRAINING PROGRAM

## 2023-11-29 PROCEDURE — 20610 DRAIN/INJ JOINT/BURSA W/O US: CPT | Mod: 59,RT,, | Performed by: STUDENT IN AN ORGANIZED HEALTH CARE EDUCATION/TRAINING PROGRAM

## 2023-11-29 PROCEDURE — 20610 PR DRAIN/INJECT LARGE JOINT/BURSA: ICD-10-PCS | Mod: 59,RT,, | Performed by: STUDENT IN AN ORGANIZED HEALTH CARE EDUCATION/TRAINING PROGRAM

## 2023-11-29 PROCEDURE — 82962 GLUCOSE BLOOD TEST: CPT | Performed by: STUDENT IN AN ORGANIZED HEALTH CARE EDUCATION/TRAINING PROGRAM

## 2023-11-29 PROCEDURE — 77002 NEEDLE LOCALIZATION BY XRAY: CPT | Mod: 26,,, | Performed by: STUDENT IN AN ORGANIZED HEALTH CARE EDUCATION/TRAINING PROGRAM

## 2023-11-29 RX ORDER — BUPIVACAINE HYDROCHLORIDE 2.5 MG/ML
INJECTION, SOLUTION EPIDURAL; INFILTRATION; INTRACAUDAL
Status: DISCONTINUED | OUTPATIENT
Start: 2023-11-29 | End: 2023-11-29 | Stop reason: HOSPADM

## 2023-11-29 RX ORDER — TRIAMCINOLONE ACETONIDE 40 MG/ML
INJECTION, SUSPENSION INTRA-ARTICULAR; INTRAMUSCULAR
Status: DISCONTINUED | OUTPATIENT
Start: 2023-11-29 | End: 2023-11-29 | Stop reason: HOSPADM

## 2023-11-29 RX ORDER — FENTANYL CITRATE 50 UG/ML
INJECTION, SOLUTION INTRAMUSCULAR; INTRAVENOUS
Status: DISCONTINUED | OUTPATIENT
Start: 2023-11-29 | End: 2023-11-29 | Stop reason: HOSPADM

## 2023-11-29 RX ORDER — LIDOCAINE HYDROCHLORIDE 20 MG/ML
INJECTION, SOLUTION EPIDURAL; INFILTRATION; INTRACAUDAL; PERINEURAL
Status: DISCONTINUED | OUTPATIENT
Start: 2023-11-29 | End: 2023-11-29 | Stop reason: HOSPADM

## 2023-11-29 RX ORDER — SODIUM CHLORIDE 9 MG/ML
INJECTION, SOLUTION INTRAVENOUS CONTINUOUS
Status: DISCONTINUED | OUTPATIENT
Start: 2023-11-29 | End: 2023-11-29 | Stop reason: HOSPADM

## 2023-11-29 RX ORDER — MIDAZOLAM HYDROCHLORIDE 1 MG/ML
INJECTION INTRAMUSCULAR; INTRAVENOUS
Status: DISCONTINUED | OUTPATIENT
Start: 2023-11-29 | End: 2023-11-29 | Stop reason: HOSPADM

## 2023-11-29 NOTE — DISCHARGE SUMMARY
Discharge Note  Short Stay      SUMMARY     Admit Date: 11/29/2023    Attending Physician: Lorraine Patel DO      Discharge Physician: Brandon Villagran MD      Discharge Date: 11/29/2023 11:13 AM    Procedure(s) (LRB):  right hip IA injection and Right GTB injection (Right)    Final Diagnosis: Right hip pain [M25.551]  Pain of right hip joint [M25.551]    Disposition: Home or self care    Patient Instructions:   Current Discharge Medication List        CONTINUE these medications which have NOT CHANGED    Details   acetaminophen (TYLENOL) 325 MG tablet Take 650 mg by mouth every 6 (six) hours as needed for Pain.      albuterol (PROVENTIL/VENTOLIN HFA) 90 mcg/actuation inhaler RescueINHALE 2 PUFFS INTO THE LUNGS EVERY 6 HOURS AS NEEDED WHEEZING, RESCUE Strength: 90 mcg/actuation  Qty: 8.5 g, Refills: 5    Associated Diagnoses: Mild intermittent asthma without complication      atorvastatin (LIPITOR) 20 MG tablet TAKE 1 TABLET(20 MG) BY MOUTH EVERY DAY  Qty: 90 tablet, Refills: 1    Associated Diagnoses: Type 2 diabetes mellitus without complication, without long-term current use of insulin; Mixed hyperlipidemia      azelastine (ASTELIN) 137 mcg (0.1 %) nasal spray 2 sprays (274 mcg total) by Nasal route 2 (two) times daily.  Qty: 30 mL, Refills: 9      cyclobenzaprine (FLEXERIL) 10 MG tablet Take 10 mg by mouth 3 (three) times daily.      !! dexmethylphenidate (FOCALIN) 10 MG tablet Take 1 tablet (10 mg total) by mouth 2 (two) times daily.  Qty: 60 tablet, Refills: 0    Associated Diagnoses: ADHD (attention deficit hyperactivity disorder), inattentive type      ergocalciferol, vitamin D2, (VITAMIN D ORAL) Take 1 tablet by mouth every other day.      EScitalopram oxalate (LEXAPRO) 10 MG tablet Take 1 tablet (10 mg total) by mouth once daily.  Qty: 90 tablet, Refills: 0    Associated Diagnoses: Unspecified mood (affective) disorder      fexofenadine (ALLEGRA) 180 MG tablet Take 180 mg by mouth once daily.       gabapentin (NEURONTIN) 300 MG capsule TAKE 1 CAPSULE(300 MG) BY MOUTH THREE TIMES DAILY  Qty: 90 capsule, Refills: 3    Associated Diagnoses: Cervical radicular pain      lamoTRIgine (LAMICTAL) 100 MG tablet Take 1 tablet (100 mg total) by mouth 2 (two) times daily. IF any RASH, STOP ALL Lamictal and Tell Psyc MD.  Qty: 180 tablet, Refills: 1    Associated Diagnoses: Unspecified mood (affective) disorder      LORazepam (ATIVAN) 0.5 MG tablet Take 1 tablet (0.5 mg total) by mouth daily as needed (SIGNIFICANT ANXIETY).  Qty: 30 tablet, Refills: 3    Associated Diagnoses: Anxiety      montelukast (SINGULAIR) 10 mg tablet Take 1 tablet (10 mg total) by mouth every evening.  Qty: 90 tablet, Refills: 3    Associated Diagnoses: Mild intermittent asthma without complication; History of multiple allergies      ondansetron (ZOFRAN-ODT) 4 MG TbDL Dissolve 1 tablet (4 mg total) by mouth every 6 (six) hours as needed (nausea).  Qty: 30 tablet, Refills: 0      rOPINIRole (REQUIP) 0.25 MG tablet TAKE 1 TABLET(0.25 MG) BY MOUTH EVERY EVENING  Qty: 90 tablet, Refills: 2    Associated Diagnoses: Restless leg      blood sugar diagnostic (TRUE METRIX GLUCOSE TEST STRIP) Strp Use to test blood glucose one (1) time a day,  Qty: 100 strip, Refills: 3    Comments: to be used with insurance-preferred brand of glucometer/supplies.  Associated Diagnoses: Diabetes mellitus without complication      blood-glucose meter Misc use as directed  Qty: 1 each, Refills: 0      !! dexmethylphenidate (FOCALIN) 10 MG tablet Take 1 tablet (10 mg total) by mouth 2 (two) times daily.  Qty: 60 tablet, Refills: 0    Associated Diagnoses: ADHD (attention deficit hyperactivity disorder), inattentive type      !! dexmethylphenidate (FOCALIN) 10 MG tablet Take 1 tablet (10 mg total) by mouth 2 (two) times daily.  Qty: 60 tablet, Refills: 0    Associated Diagnoses: ADHD (attention deficit hyperactivity disorder), inattentive type      doxycycline (VIBRAMYCIN) 100  MG Cap Take 1 capsule (100 mg total) by mouth 2 (two) times daily.  Qty: 14 capsule, Refills: 0    Associated Diagnoses: Cellulitis of toe of left foot      fluocinonide (LIDEX) 0.05 % external solution AAA scalp qday prn itching, scaling  Qty: 60 mL, Refills: 3    Associated Diagnoses: Seborrheic dermatitis, unspecified      hydrocortisone 2.5 % cream APPLY EXTERNALLY TO THE AFFECTED AREA TWICE DAILY FOR 10 DAYS  Qty: 30 g, Refills: 0    Associated Diagnoses: Allergic contact dermatitis due to plant      ketoconazole (NIZORAL) 2 % cream APPLY TOPICALLY TO THE AFFECTED AREA TWICE DAILY  Qty: 15 g, Refills: 0    Associated Diagnoses: Seborrheic dermatitis of scalp      !! lancets 33 gauge Misc Use to test blood glucose one (1) time a day,  Qty: 100 each, Refills: 0      latanoprost 0.005 % ophthalmic solution Place 1 drop into both eyes every evening.  Qty: 7.5 mL, Refills: 3    Associated Diagnoses: Ocular hypertension, bilateral      meloxicam (MOBIC) 15 MG tablet TAKE 1 TABLET(15 MG) BY MOUTH EVERY DAY as needed FOR PAIN  Qty: 30 tablet, Refills: 2      semaglutide (OZEMPIC) 0.25 mg or 0.5 mg (2 mg/3 mL) pen injector Inject 0.5 mg into the skin every 7 days.  Qty: 3 each, Refills: 1      sod picosulf-mag ox-citric ac (CLENPIQ) 10 mg-3.5 gram- 12 gram/160 mL Soln Take As Directed.  Qty: 320 mL, Refills: 0    Associated Diagnoses: Blood in stool      tamsulosin (FLOMAX) 0.4 mg Cap Take 1 capsule (0.4 mg total) by mouth once daily for 10 days  Qty: 10 capsule, Refills: 0      !! ULTRA THIN LANCETS 30 gauge Misc USE TO TEST BLOOD SUGAR EVERY DAY AS DIRECTED  Qty: 100 each, Refills: 3    Associated Diagnoses: Diabetes mellitus without complication       !! - Potential duplicate medications found. Please discuss with provider.              Discharge Diagnosis: Right hip pain [M25.551]  Pain of right hip joint [M25.551]  Condition on Discharge: Stable with no complications to procedure   Diet on Discharge: Same as  before.  Activity: as per instruction sheet.  Discharge to: Home with a responsible adult.  Follow up: 2-4 weeks       Please call my office or pager at [phone] if experienced any weakness or loss of sensation, fever > 101.5, pain uncontrolled with oral medications, persistent nausea/vomiting/or diarrhea, redness or drainage from the incisions, or any other worrisome concerns. If physician on call was not reached or could not communicate with our office for any reason please go to the nearest emergency department

## 2023-11-29 NOTE — DISCHARGE INSTRUCTIONS
Home Care Instructions Pain Management:    1.  DIET:    You may resume your normal diet today.    2.  BATHING:    You may shower with luke warm water.    3.  DRESSING:    You may remove your bandage today.    4.  ACTIVITY LEVEL:      You may resume your normal activities 24 hours after your procedure.    5.  MEDICATIONS:    You may resume your normal medications today.    6.  SPECIAL INSTRUCTIONS:    No heat to the injection site for 24 hours including bath or shower, heating pad, moist heat or hot tubs.    Use an ice pack to the injection site for any pain or discomfort.  Apply ice packs for 20 minute intervals as needed.    If you have received any sedatives by mouth today, you can not drive for 12 hours.    If you have received sedation through an IV, you can not drive for 24 hours.    PLEASE CALL YOUR DOCTOR FOR THE FOLLOWIN.  Redness or swelling around the injection site.  2.  Fever of 101 degrees.  3.  Drainage (pus) from the injection site.  4.  For any continuous bleeding (some dried blood over the incision is normal.)    FOR EMERGENCIES:    If any unusual problems or difficulties occur during clinic hours, call [phone] or dial [phone].    Follow up with with your physician in 2-3 weeks.

## 2023-11-29 NOTE — OP NOTE
Greater Trochanteric Bursa and Hip Injection under Fluoroscopic Guidance    The procedure, risks, benefits, and options were discussed with the patient. There are no contraindications to the procedure. The patent expressed understanding and agreed to the procedure. Informed written consent was obtained prior to the start of the procedure and can be found in the patient's chart.    PATIENT NAME: Hermelindo Garza III   MRN: [id number]     DATE OF PROCEDURE: 11/29/2023    PROCEDURE:   Right Greater Trochanteric Bursa Injection under Fluoroscopic Guidance  Right Hip Joint Injection under Fluoroscopic Guidance    PRE-OP DIAGNOSIS: Right hip pain [M25.551]  Pain of right hip joint [M25.551]    POST-OP DIAGNOSIS: Same    PHYSICIAN: Lorraine Patel DO    ASSISTANTS: Brandon Villagran MD Fellow    MEDICATIONS INJECTED: Preservative-free Kenalog 40mg with 7cc of Bupivacine 0.25%     LOCAL ANESTHETIC INJECTED: Xylocaine 2%     SEDATION: Versed 2mg and Fentanyl 50mcg                                                                                                                                                                                     Conscious sedation ordered by M.D. Patient re-evaluation prior to administration of conscious sedation. No changes noted in patient's status from initial evaluation. The patient's vital signs were monitored by RN and patient remained hemodynamically stable throughout the procedure.    Event Time In   Sedation Start 1057   Sedation End 1105       ESTIMATED BLOOD LOSS: None    COMPLICATIONS: None    TECHNIQUE: Time-out was performed to identify the patient and procedure to be performed. With the patient laying in a prone position, the surgical area was prepped and draped in the usual sterile fashion using ChloraPrep and a fenestrated drape. The area overlying the hip joint was determined under fluoroscopy guidance. Skin anesthesia was achieved by injecting Lidocaine 2% over the injection  site. The acetabulofemoral joint was then approached with a 22 gauge, 3.5 inch spinal quinke needle that was introduced under fluoroscopic guidance in the AP view. Once the needle tip was in the area of the joint, and there was no blood aspiration,  Contrast dye  Omnipaque (300mg/mL) was injected to confirm placement and there was no vascular runoff. 4 mL of the medication mixture listed above was injected slowly. The needles were removed and bleeding was nil. A sterile dressing was applied. No specimens collected. The patient tolerated the procedure well.    TECHNIQUE: Time-out was performed to identify the patient and procedure to be performed. With the patient laying in a prone position, the surgical area was prepped and draped in the usual sterile fashion using ChloraPrep and a fenestrated drape. The area overlying the greater trochanteric bursa was determined under fluoroscopy guidance. Skin anesthesia was achieved by injecting Lidocaine 2% over the injection sites. The greater trochanteric bursa was then approached with a 22 gauge, 5 inch spinal quinke needle that was introduced under fluoroscopic guidance in the AP view. Once the needle tip was in the area of the bursa, and there was no blood aspiration, Contrast dye  Omnipaque (300mg/mL) was injected to confirm placement and there was no vascular runoff. 4 mL of the medication mixture listed above was injected slowly. The needles were removed and bleeding was nil. A sterile dressing was applied. No specimens collected. The patient tolerated the procedure well.    The patient was monitored after the procedure in the recovery area. They were given post-procedure and discharge instructions to follow at home. The patient was discharged in a stable condition.    Brandon Villagran MD    I reviewed and edited the fellow's note. I conducted my own interview and physical examination. I agree with the findings. I was present and supervising all critical portions of the  procedure.

## 2023-11-30 ENCOUNTER — CLINICAL SUPPORT (OUTPATIENT)
Dept: ALLERGY | Facility: CLINIC | Age: 43
End: 2023-11-30
Payer: COMMERCIAL

## 2023-11-30 DIAGNOSIS — J30.9 CHRONIC ALLERGIC RHINITIS: Primary | ICD-10-CM

## 2023-11-30 PROCEDURE — 99999 PR PBB SHADOW E&M-EST. PATIENT-LVL I: ICD-10-PCS | Mod: PBBFAC,,,

## 2023-11-30 PROCEDURE — 99999 PR PBB SHADOW E&M-EST. PATIENT-LVL I: CPT | Mod: PBBFAC,,,

## 2023-11-30 PROCEDURE — 95117 PR IMMU2THERAPY, 2+ INJECTIONS: ICD-10-PCS | Mod: S$GLB,,, | Performed by: STUDENT IN AN ORGANIZED HEALTH CARE EDUCATION/TRAINING PROGRAM

## 2023-11-30 PROCEDURE — 95117 IMMUNOTHERAPY INJECTIONS: CPT | Mod: S$GLB,,, | Performed by: STUDENT IN AN ORGANIZED HEALTH CARE EDUCATION/TRAINING PROGRAM

## 2023-12-01 ENCOUNTER — HOSPITAL ENCOUNTER (OUTPATIENT)
Dept: RADIOLOGY | Facility: HOSPITAL | Age: 43
Discharge: HOME | End: 2023-12-01
Attending: STUDENT IN AN ORGANIZED HEALTH CARE EDUCATION/TRAINING PROGRAM
Payer: COMMERCIAL

## 2023-12-01 ENCOUNTER — ANESTHESIA EVENT (OUTPATIENT)
Dept: SURGERY | Facility: HOSPITAL | Age: 43
End: 2023-12-01
Payer: COMMERCIAL

## 2023-12-01 DIAGNOSIS — K21.9 GASTROESOPHAGEAL REFLUX DISEASE, UNSPECIFIED WHETHER ESOPHAGITIS PRESENT: ICD-10-CM

## 2023-12-01 PROCEDURE — 74240 X-RAY XM UPR GI TRC 1CNTRST: CPT | Mod: TC,FY

## 2023-12-01 PROCEDURE — A9698 NON-RAD CONTRAST MATERIALNOC: HCPCS | Performed by: STUDENT IN AN ORGANIZED HEALTH CARE EDUCATION/TRAINING PROGRAM

## 2023-12-01 PROCEDURE — 74240 X-RAY XM UPR GI TRC 1CNTRST: CPT | Mod: 26,,, | Performed by: RADIOLOGY

## 2023-12-01 PROCEDURE — 25500020 PHARM REV CODE 255: Performed by: STUDENT IN AN ORGANIZED HEALTH CARE EDUCATION/TRAINING PROGRAM

## 2023-12-01 PROCEDURE — 74240 FL UPPER GI: ICD-10-PCS | Mod: 26,,, | Performed by: RADIOLOGY

## 2023-12-01 RX ADMIN — BARIUM SULFATE 137 ML: 980 POWDER, FOR SUSPENSION ORAL at 09:12

## 2023-12-01 RX ADMIN — BARIUM SULFATE 355 ML: 0.6 SUSPENSION ORAL at 09:12

## 2023-12-04 ENCOUNTER — PATIENT MESSAGE (OUTPATIENT)
Dept: ORTHOPEDICS | Facility: CLINIC | Age: 43
End: 2023-12-04
Payer: COMMERCIAL

## 2023-12-04 ENCOUNTER — OFFICE VISIT (OUTPATIENT)
Dept: DERMATOLOGY | Facility: CLINIC | Age: 43
End: 2023-12-04
Payer: COMMERCIAL

## 2023-12-04 ENCOUNTER — TELEPHONE (OUTPATIENT)
Dept: ORTHOPEDICS | Facility: CLINIC | Age: 43
End: 2023-12-04
Payer: COMMERCIAL

## 2023-12-04 ENCOUNTER — PATIENT MESSAGE (OUTPATIENT)
Dept: PAIN MEDICINE | Facility: CLINIC | Age: 43
End: 2023-12-04
Payer: COMMERCIAL

## 2023-12-04 DIAGNOSIS — Z86.006 HISTORY OF MELANOMA IN SITU: Primary | ICD-10-CM

## 2023-12-04 DIAGNOSIS — J45.20 MILD INTERMITTENT ASTHMA WITHOUT COMPLICATION: ICD-10-CM

## 2023-12-04 DIAGNOSIS — E11.9 TYPE 2 DIABETES MELLITUS WITHOUT COMPLICATION, WITHOUT LONG-TERM CURRENT USE OF INSULIN: ICD-10-CM

## 2023-12-04 DIAGNOSIS — R20.9 SKIN SENSATION DISTURBANCE: ICD-10-CM

## 2023-12-04 DIAGNOSIS — D18.01 CHERRY ANGIOMA: ICD-10-CM

## 2023-12-04 DIAGNOSIS — D22.9 NEVUS: ICD-10-CM

## 2023-12-04 DIAGNOSIS — L81.4 LENTIGO: ICD-10-CM

## 2023-12-04 DIAGNOSIS — L82.1 SK (SEBORRHEIC KERATOSIS): ICD-10-CM

## 2023-12-04 DIAGNOSIS — L91.8 SKIN TAG: ICD-10-CM

## 2023-12-04 DIAGNOSIS — E78.2 MIXED HYPERLIPIDEMIA: ICD-10-CM

## 2023-12-04 DIAGNOSIS — M54.12 CERVICAL RADICULAR PAIN: ICD-10-CM

## 2023-12-04 DIAGNOSIS — Z88.9 HISTORY OF MULTIPLE ALLERGIES: ICD-10-CM

## 2023-12-04 PROCEDURE — 3066F NEPHROPATHY DOC TX: CPT | Mod: CPTII,S$GLB,, | Performed by: DERMATOLOGY

## 2023-12-04 PROCEDURE — 3044F PR MOST RECENT HEMOGLOBIN A1C LEVEL <7.0%: ICD-10-PCS | Mod: CPTII,S$GLB,, | Performed by: DERMATOLOGY

## 2023-12-04 PROCEDURE — 99999 PR PBB SHADOW E&M-EST. PATIENT-LVL IV: ICD-10-PCS | Mod: PBBFAC,,, | Performed by: DERMATOLOGY

## 2023-12-04 PROCEDURE — 1159F PR MEDICATION LIST DOCUMENTED IN MEDICAL RECORD: ICD-10-PCS | Mod: CPTII,S$GLB,, | Performed by: DERMATOLOGY

## 2023-12-04 PROCEDURE — 11200 PR REMOVAL OF SKIN TAGS, UP TO 15: ICD-10-PCS | Mod: S$GLB,,, | Performed by: DERMATOLOGY

## 2023-12-04 PROCEDURE — 3066F PR DOCUMENTATION OF TREATMENT FOR NEPHROPATHY: ICD-10-PCS | Mod: CPTII,S$GLB,, | Performed by: DERMATOLOGY

## 2023-12-04 PROCEDURE — 1160F RVW MEDS BY RX/DR IN RCRD: CPT | Mod: CPTII,S$GLB,, | Performed by: DERMATOLOGY

## 2023-12-04 PROCEDURE — 99213 PR OFFICE/OUTPT VISIT, EST, LEVL III, 20-29 MIN: ICD-10-PCS | Mod: 25,S$GLB,, | Performed by: DERMATOLOGY

## 2023-12-04 PROCEDURE — 3061F NEG MICROALBUMINURIA REV: CPT | Mod: CPTII,S$GLB,, | Performed by: DERMATOLOGY

## 2023-12-04 PROCEDURE — 1160F PR REVIEW ALL MEDS BY PRESCRIBER/CLIN PHARMACIST DOCUMENTED: ICD-10-PCS | Mod: CPTII,S$GLB,, | Performed by: DERMATOLOGY

## 2023-12-04 PROCEDURE — 1159F MED LIST DOCD IN RCRD: CPT | Mod: CPTII,S$GLB,, | Performed by: DERMATOLOGY

## 2023-12-04 PROCEDURE — 99999 PR PBB SHADOW E&M-EST. PATIENT-LVL IV: CPT | Mod: PBBFAC,,, | Performed by: DERMATOLOGY

## 2023-12-04 PROCEDURE — 3044F HG A1C LEVEL LT 7.0%: CPT | Mod: CPTII,S$GLB,, | Performed by: DERMATOLOGY

## 2023-12-04 PROCEDURE — 11200 RMVL SKIN TAGS UP TO&INC 15: CPT | Mod: S$GLB,,, | Performed by: DERMATOLOGY

## 2023-12-04 PROCEDURE — 99213 OFFICE O/P EST LOW 20 MIN: CPT | Mod: 25,S$GLB,, | Performed by: DERMATOLOGY

## 2023-12-04 PROCEDURE — 3061F PR NEG MICROALBUMINURIA RESULT DOCUMENTED/REVIEW: ICD-10-PCS | Mod: CPTII,S$GLB,, | Performed by: DERMATOLOGY

## 2023-12-04 RX ORDER — DOCUSATE SODIUM 100 MG/1
100 CAPSULE, LIQUID FILLED ORAL 2 TIMES DAILY
Qty: 30 CAPSULE | Refills: 1 | Status: ON HOLD | OUTPATIENT
Start: 2023-12-04 | End: 2024-03-15 | Stop reason: HOSPADM

## 2023-12-04 RX ORDER — MUPIROCIN 20 MG/G
OINTMENT TOPICAL
Status: CANCELLED | OUTPATIENT
Start: 2023-12-04

## 2023-12-04 RX ORDER — ONDANSETRON HYDROCHLORIDE 8 MG/1
8 TABLET, FILM COATED ORAL EVERY 8 HOURS PRN
Qty: 30 TABLET | Refills: 0 | Status: ON HOLD | OUTPATIENT
Start: 2023-12-04 | End: 2023-12-21 | Stop reason: CLARIF

## 2023-12-04 RX ORDER — HYDROCODONE BITARTRATE AND ACETAMINOPHEN 5; 325 MG/1; MG/1
1 TABLET ORAL EVERY 6 HOURS PRN
Qty: 30 TABLET | Refills: 0 | Status: SHIPPED | OUTPATIENT
Start: 2023-12-04 | End: 2024-01-31

## 2023-12-04 RX ORDER — CEFAZOLIN SODIUM 2 G/50ML
2 SOLUTION INTRAVENOUS
Status: CANCELLED | OUTPATIENT
Start: 2023-12-04

## 2023-12-04 NOTE — PROGRESS NOTES
Subjective:      Patient ID:  Hermelindo Garza III is a 43 y.o. male who presents for   Chief Complaint   Patient presents with    Skin Check     UBSE     Patient is here today for a UBSE.  Pt was last seen on 6/5/2023.   Pt has a history of  moderate sun exposure in the past.   Pt recalls several blistering sunburns in the past- yes  Pt has history of tanning bed use- yes  Pt has  had moles removed in the past- yes, MIS R mid back 2/2022  Pt has history of melanoma in first degree relatives-  Father NMSC     This is a high risk patient here to check for the development of new lesions.    Pt has no new complaints.        Skin Tags    Review of Systems   Constitutional:  Negative for fever, chills, weight loss, fatigue, night sweats and malaise.   HENT:  Negative for headaches.    Respiratory:  Negative for cough and shortness of breath.    Gastrointestinal:  Negative for indigestion.   Skin:  Positive for daily sunscreen use, activity-related sunscreen use and wears hat (50%). Negative for tendency to form keloidal scars and recent sunburn.   Neurological:  Negative for headaches.   Hematologic/Lymphatic: Negative for adenopathy. Does not bruise/bleed easily.     Objective:   Physical Exam   Constitutional: He appears well-developed and well-nourished. No distress.   Musculoskeletal: Lymphadenopathy:      Cervical: No cervical adenopathy.      Upper Body:   No axillary adenopathy present.No supraclavicular adenopathy is present.     Lymphadenopathy: No supraclavicular adenopathy is present.     He has no cervical adenopathy.     He has no axillary adenopathy.   Neurological: He is alert and oriented to person, place, and time. He is not disoriented.   Psychiatric: He has a normal mood and affect.   Skin:   Areas Examined (abnormalities noted in diagram):   Scalp / Hair Palpated and Inspected  Head / Face Inspection Performed  Neck Inspection Performed  Chest / Axilla Inspection Performed  Abdomen Inspection  Performed  Genitals / Buttocks / Groin Inspection Performed  Back Inspection Performed  RUE Inspected  LUE Inspection Performed  RLE Inspected  LLE Inspection Performed  Nails and Digits Inspection Performed                       Diagram Legend     Erythematous scaling macule/papule c/w actinic keratosis       Vascular papule c/w angioma      Pigmented verrucoid papule/plaque c/w seborrheic keratosis      Yellow umbilicated papule c/w sebaceous hyperplasia      Irregularly shaped tan macule c/w lentigo     1-2 mm smooth white papules consistent with Milia      Movable subcutaneous cyst with punctum c/w epidermal inclusion cyst      Subcutaneous movable cyst c/w pilar cyst      Firm pink to brown papule c/w dermatofibroma      Pedunculated fleshy papule(s) c/w skin tag(s)      Evenly pigmented macule c/w junctional nevus     Mildly variegated pigmented, slightly irregular-bordered macule c/w mildly atypical nevus      Flesh colored to evenly pigmented papule c/w intradermal nevus       Pink pearly papule/plaque c/w basal cell carcinoma      Erythematous hyperkeratotic cursted plaque c/w SCC      Surgical scar with no sign of skin cancer recurrence      Open and closed comedones      Inflammatory papules and pustules      Verrucoid papule consistent consistent with wart     Erythematous eczematous patches and plaques     Dystrophic onycholytic nail with subungual debris c/w onychomycosis     Umbilicated papule    Erythematous-base heme-crusted tan verrucoid plaque consistent with inflamed seborrheic keratosis     Erythematous Silvery Scaling Plaque c/w Psoriasis     See annotation      Assessment / Plan:        There are no diagnoses linked to this encounter.         No follow-ups on file.

## 2023-12-04 NOTE — PROGRESS NOTES
Subjective:      Patient ID:  Hermelindo Garza III is a 43 y.o. male who presents for   Chief Complaint   Patient presents with    Skin Check     UBSE     Pt here for skin check   C/o lesions onr ight buttock and posterior neck. Get irritated by his clothing. No tx. Present for months     Pt has a history of  moderate sun exposure in the past.   Pt recalls several blistering sunburns in the past- yes  Pt has history of tanning bed use- yes  Pt has  had moles removed in the past- yes, MIS R mid back 2/2022  Pt has history of melanoma in first degree relatives-  Father NMSC              Review of Systems   Skin:  Positive for activity-related sunscreen use. Negative for daily sunscreen use and recent sunburn.   Hematologic/Lymphatic: Does not bruise/bleed easily.       Objective:   Physical Exam   Constitutional: He appears well-developed and well-nourished. No distress.   Musculoskeletal: Lymphadenopathy:      Cervical: No cervical adenopathy.      Upper Body:   No axillary adenopathy present.No supraclavicular adenopathy is present.     Lymphadenopathy: No supraclavicular adenopathy is present.     He has no cervical adenopathy.     He has no axillary adenopathy.   Neurological: He is alert and oriented to person, place, and time. He is not disoriented.   Psychiatric: He has a normal mood and affect.   Skin:   Areas Examined (abnormalities noted in diagram):   Scalp / Hair Palpated and Inspected  Head / Face Inspection Performed  Neck Inspection Performed  Chest / Axilla Inspection Performed  Abdomen Inspection Performed  Genitals / Buttocks / Groin Inspection Performed  Back Inspection Performed  RUE Inspected  LUE Inspection Performed  RLE Inspected  LLE Inspection Performed  Nails and Digits Inspection Performed                         Diagram Legend     Erythematous scaling macule/papule c/w actinic keratosis       Vascular papule c/w angioma      Pigmented verrucoid papule/plaque c/w seborrheic keratosis       Yellow umbilicated papule c/w sebaceous hyperplasia      Irregularly shaped tan macule c/w lentigo     1-2 mm smooth white papules consistent with Milia      Movable subcutaneous cyst with punctum c/w epidermal inclusion cyst      Subcutaneous movable cyst c/w pilar cyst      Firm pink to brown papule c/w dermatofibroma      Pedunculated fleshy papule(s) c/w skin tag(s)      Evenly pigmented macule c/w junctional nevus     Mildly variegated pigmented, slightly irregular-bordered macule c/w mildly atypical nevus      Flesh colored to evenly pigmented papule c/w intradermal nevus       Pink pearly papule/plaque c/w basal cell carcinoma      Erythematous hyperkeratotic cursted plaque c/w SCC      Surgical scar with no sign of skin cancer recurrence      Open and closed comedones      Inflammatory papules and pustules      Verrucoid papule consistent consistent with wart     Erythematous eczematous patches and plaques     Dystrophic onycholytic nail with subungual debris c/w onychomycosis     Umbilicated papule    Erythematous-base heme-crusted tan verrucoid plaque consistent with inflamed seborrheic keratosis     Erythematous Silvery Scaling Plaque c/w Psoriasis     See annotation      Assessment / Plan:        History of melanoma in situ  Area of previous melanoma in situ  examined. Site well healed with no signs of recurrence.    Total body skin examination performed today including at least 12 points as noted in physical examination. No lesions suspicious for malignancy noted.    Recommend daily sun protection/avoidance, use of at least SPF 30, broad spectrum sunscreen (OTC drug), skin self examinations, and routine physician surveillance to optimize early detection    Skin tag  Skin sensation disturbance  Cryosurgery procedure note:    Verbal consent from the patient is obtained including, but not limited to, risk of hypopigmentation/hyperpigmentation, scar, recurrence of lesion. Liquid nitrogen cryosurgery is  applied to 4 lesions to produce a freeze injury. The patient is aware that blisters may form and is instructed on wound care with gentle cleansing and use of vaseline ointment to keep moist until healed. The patient is supplied a handout on cryosurgery and is instructed to call if lesions do not completely resolve.    Lentigo  This is a benign hyperpigmented sun induced lesion. Recommend daily sun protection/avoidance and use of at least SPF 30, broad spectrum sunscreen (OTC drug) will reduce the number of new lesions. Treatment of these benign lesions are considered cosmetic.  The nature of sun-induced photo-aging and skin cancers is discussed.  Sun avoidance, protective clothing, and the use of 30-SPF sunscreens is advised. Observe closely for skin damage/changes, and call if such occurs.    Cherry angioma  These are benign vascular lesions that are inherited.  Treatment is not necessary.    SK (seborrheic keratosis)  These are benign inherited growths without a malignant potential. Reassurance given to patient. No treatment is necessary.     Nevus  Discussed ABCDE's of nevi.  Monitor for new mole or moles that are becoming bigger, darker, irritated, or developing irregular borders. Brochure provided. Instructed patient to observe lesion(s) for changes and follow up in clinic if changes are noted. Patient to monitor skin at home for new or changing lesions.              Follow up in about 6 months (around 6/4/2024) for TBSE.   Female

## 2023-12-04 NOTE — TELEPHONE ENCOUNTER
Spoke to patient to inform them of their surgery arrival time (7 am), MIRANDA, [address] [facility] [address]:  Verbalized understanding of instructions for pre-wash, and reviewed NPO after midnight.   Confirmed call in regards to medication instructions from anesthesia.   Confirmed patient was NOT using a rideshare service for surgery arrival or  transportation. Dad

## 2023-12-05 ENCOUNTER — ANESTHESIA (OUTPATIENT)
Dept: SURGERY | Facility: HOSPITAL | Age: 43
End: 2023-12-05
Payer: COMMERCIAL

## 2023-12-05 ENCOUNTER — HOSPITAL ENCOUNTER (OUTPATIENT)
Facility: HOSPITAL | Age: 43
Discharge: HOME | End: 2023-12-05
Attending: ORTHOPAEDIC SURGERY | Admitting: ORTHOPAEDIC SURGERY
Payer: COMMERCIAL

## 2023-12-05 VITALS
RESPIRATION RATE: 18 BRPM | OXYGEN SATURATION: 94 % | DIASTOLIC BLOOD PRESSURE: 63 MMHG | SYSTOLIC BLOOD PRESSURE: 130 MMHG | HEIGHT: 65 IN | WEIGHT: 240 LBS | HEART RATE: 97 BPM | TEMPERATURE: 98 F | BODY MASS INDEX: 39.99 KG/M2

## 2023-12-05 DIAGNOSIS — M77.12 LEFT LATERAL EPICONDYLITIS: ICD-10-CM

## 2023-12-05 DIAGNOSIS — S53.20XA PARTIAL TEAR OF RADIAL COLLATERAL LIGAMENT OF ELBOW: Primary | ICD-10-CM

## 2023-12-05 LAB
POCT GLUCOSE: 108 MG/DL (ref 70–110)
POCT GLUCOSE: 89 MG/DL (ref 70–110)

## 2023-12-05 PROCEDURE — 37000009 HC ANESTHESIA EA ADD 15 MINS: Performed by: ORTHOPAEDIC SURGERY

## 2023-12-05 PROCEDURE — D9220A PRA ANESTHESIA: Mod: CRNA,,, | Performed by: NURSE ANESTHETIST, CERTIFIED REGISTERED

## 2023-12-05 PROCEDURE — D9220A PRA ANESTHESIA: ICD-10-PCS | Mod: CRNA,,, | Performed by: NURSE ANESTHETIST, CERTIFIED REGISTERED

## 2023-12-05 PROCEDURE — 82962 GLUCOSE BLOOD TEST: CPT | Performed by: ORTHOPAEDIC SURGERY

## 2023-12-05 PROCEDURE — 36000708 HC OR TIME LEV III 1ST 15 MIN: Performed by: ORTHOPAEDIC SURGERY

## 2023-12-05 PROCEDURE — 64415 PERIPHERAL BLOCK: ICD-10-PCS | Mod: 59,LT,, | Performed by: ANESTHESIOLOGY

## 2023-12-05 PROCEDURE — 25000003 PHARM REV CODE 250: Performed by: ORTHOPAEDIC SURGERY

## 2023-12-05 PROCEDURE — 24359 PR TENOTOMY ELBOW LATERAL/MEDIAL DEBRIDE REPAIR: ICD-10-PCS | Mod: 59,51,LT, | Performed by: ORTHOPAEDIC SURGERY

## 2023-12-05 PROCEDURE — 63600175 PHARM REV CODE 636 W HCPCS: Performed by: NURSE ANESTHETIST, CERTIFIED REGISTERED

## 2023-12-05 PROCEDURE — 27201423 OPTIME MED/SURG SUP & DEVICES STERILE SUPPLY: Performed by: ORTHOPAEDIC SURGERY

## 2023-12-05 PROCEDURE — 25000003 PHARM REV CODE 250: Performed by: ANESTHESIOLOGY

## 2023-12-05 PROCEDURE — 24343 REPR ELBOW LAT LIGMNT W/TISS: CPT | Mod: LT,,, | Performed by: ORTHOPAEDIC SURGERY

## 2023-12-05 PROCEDURE — 88304 TISSUE EXAM BY PATHOLOGIST: CPT | Mod: 26,,, | Performed by: PATHOLOGY

## 2023-12-05 PROCEDURE — 71000015 HC POSTOP RECOV 1ST HR: Performed by: ORTHOPAEDIC SURGERY

## 2023-12-05 PROCEDURE — 25000242 PHARM REV CODE 250 ALT 637 W/ HCPCS: Performed by: ANESTHESIOLOGY

## 2023-12-05 PROCEDURE — 88304 PR  SURG PATH,LEVEL III: ICD-10-PCS | Mod: 26,,, | Performed by: PATHOLOGY

## 2023-12-05 PROCEDURE — 71000033 HC RECOVERY, INTIAL HOUR: Performed by: ORTHOPAEDIC SURGERY

## 2023-12-05 PROCEDURE — 94761 N-INVAS EAR/PLS OXIMETRY MLT: CPT

## 2023-12-05 PROCEDURE — C1713 ANCHOR/SCREW BN/BN,TIS/BN: HCPCS | Performed by: ORTHOPAEDIC SURGERY

## 2023-12-05 PROCEDURE — 25000003 PHARM REV CODE 250: Performed by: NURSE ANESTHETIST, CERTIFIED REGISTERED

## 2023-12-05 PROCEDURE — 63600175 PHARM REV CODE 636 W HCPCS: Performed by: ANESTHESIOLOGY

## 2023-12-05 PROCEDURE — 37000008 HC ANESTHESIA 1ST 15 MINUTES: Performed by: ORTHOPAEDIC SURGERY

## 2023-12-05 PROCEDURE — 24359 REPAIR ELBOW DEB/ATTCH OPEN: CPT | Mod: 59,51,LT, | Performed by: ORTHOPAEDIC SURGERY

## 2023-12-05 PROCEDURE — D9220A PRA ANESTHESIA: Mod: ANES,,, | Performed by: ANESTHESIOLOGY

## 2023-12-05 PROCEDURE — 99900035 HC TECH TIME PER 15 MIN (STAT)

## 2023-12-05 PROCEDURE — 24343 PR REELBOW LAT LIGMNT W/TISS: ICD-10-PCS | Mod: LT,,, | Performed by: ORTHOPAEDIC SURGERY

## 2023-12-05 PROCEDURE — 64415 NJX AA&/STRD BRCH PLXS IMG: CPT | Mod: 59,LT | Performed by: ANESTHESIOLOGY

## 2023-12-05 PROCEDURE — 94640 AIRWAY INHALATION TREATMENT: CPT

## 2023-12-05 PROCEDURE — D9220A PRA ANESTHESIA: ICD-10-PCS | Mod: ANES,,, | Performed by: ANESTHESIOLOGY

## 2023-12-05 PROCEDURE — 36000709 HC OR TIME LEV III EA ADD 15 MIN: Performed by: ORTHOPAEDIC SURGERY

## 2023-12-05 PROCEDURE — 88304 TISSUE EXAM BY PATHOLOGIST: CPT | Performed by: PATHOLOGY

## 2023-12-05 DEVICE — IMPLANTABLE DEVICE: Type: IMPLANTABLE DEVICE | Site: ARM | Status: FUNCTIONAL

## 2023-12-05 RX ORDER — HYDROCODONE BITARTRATE AND ACETAMINOPHEN 5; 325 MG/1; MG/1
1 TABLET ORAL EVERY 4 HOURS PRN
Status: DISCONTINUED | OUTPATIENT
Start: 2023-12-05 | End: 2023-12-05 | Stop reason: HOSPADM

## 2023-12-05 RX ORDER — ROPIVACAINE HYDROCHLORIDE 5 MG/ML
INJECTION, SOLUTION EPIDURAL; INFILTRATION; PERINEURAL
Status: COMPLETED | OUTPATIENT
Start: 2023-12-05 | End: 2023-12-05

## 2023-12-05 RX ORDER — ALBUTEROL SULFATE 90 UG/1
AEROSOL, METERED RESPIRATORY (INHALATION)
Qty: 8.5 G | Refills: 5 | Status: SHIPPED | OUTPATIENT
Start: 2023-12-05

## 2023-12-05 RX ORDER — GABAPENTIN 300 MG/1
300 CAPSULE ORAL 3 TIMES DAILY
Qty: 90 CAPSULE | Refills: 3 | Status: SHIPPED | OUTPATIENT
Start: 2023-12-05 | End: 2024-04-02 | Stop reason: SDUPTHER

## 2023-12-05 RX ORDER — CELECOXIB 200 MG/1
400 CAPSULE ORAL DAILY
Status: DISCONTINUED | OUTPATIENT
Start: 2023-12-05 | End: 2023-12-05 | Stop reason: HOSPADM

## 2023-12-05 RX ORDER — MONTELUKAST SODIUM 10 MG/1
10 TABLET ORAL NIGHTLY
Qty: 90 TABLET | Refills: 3 | Status: SHIPPED | OUTPATIENT
Start: 2023-12-05

## 2023-12-05 RX ORDER — DEXMEDETOMIDINE HYDROCHLORIDE 100 UG/ML
INJECTION, SOLUTION INTRAVENOUS
Status: DISCONTINUED | OUTPATIENT
Start: 2023-12-05 | End: 2023-12-05

## 2023-12-05 RX ORDER — MIDAZOLAM HYDROCHLORIDE 1 MG/ML
0.5 INJECTION INTRAMUSCULAR; INTRAVENOUS
Status: CANCELLED | OUTPATIENT
Start: 2023-12-05

## 2023-12-05 RX ORDER — CELECOXIB 200 MG/1
400 CAPSULE ORAL DAILY
Status: DISCONTINUED | OUTPATIENT
Start: 2023-12-05 | End: 2023-12-05

## 2023-12-05 RX ORDER — CELECOXIB 200 MG/1
400 CAPSULE ORAL ONCE
Status: CANCELLED | OUTPATIENT
Start: 2023-12-05 | End: 2023-12-05

## 2023-12-05 RX ORDER — LIDOCAINE HYDROCHLORIDE 10 MG/ML
INJECTION, SOLUTION INTRAVENOUS
Status: DISCONTINUED | OUTPATIENT
Start: 2023-12-05 | End: 2023-12-05

## 2023-12-05 RX ORDER — ATORVASTATIN CALCIUM 20 MG/1
20 TABLET, FILM COATED ORAL DAILY
Qty: 90 TABLET | Refills: 1 | Status: SHIPPED | OUTPATIENT
Start: 2023-12-05

## 2023-12-05 RX ORDER — IPRATROPIUM BROMIDE AND ALBUTEROL SULFATE 2.5; .5 MG/3ML; MG/3ML
3 SOLUTION RESPIRATORY (INHALATION) ONCE
Status: COMPLETED | OUTPATIENT
Start: 2023-12-05 | End: 2023-12-05

## 2023-12-05 RX ORDER — ONDANSETRON 2 MG/ML
INJECTION INTRAMUSCULAR; INTRAVENOUS
Status: DISCONTINUED | OUTPATIENT
Start: 2023-12-05 | End: 2023-12-05

## 2023-12-05 RX ORDER — KETAMINE HCL IN 0.9 % NACL 50 MG/5 ML
SYRINGE (ML) INTRAVENOUS
Status: DISCONTINUED | OUTPATIENT
Start: 2023-12-05 | End: 2023-12-05

## 2023-12-05 RX ORDER — METHOCARBAMOL 500 MG/1
1000 TABLET, FILM COATED ORAL ONCE
Status: COMPLETED | OUTPATIENT
Start: 2023-12-05 | End: 2023-12-05

## 2023-12-05 RX ORDER — OXYCODONE HYDROCHLORIDE 5 MG/1
5 TABLET ORAL
Status: DISCONTINUED | OUTPATIENT
Start: 2023-12-05 | End: 2023-12-05 | Stop reason: HOSPADM

## 2023-12-05 RX ORDER — HALOPERIDOL 5 MG/ML
0.5 INJECTION INTRAMUSCULAR EVERY 10 MIN PRN
Status: DISCONTINUED | OUTPATIENT
Start: 2023-12-05 | End: 2023-12-05 | Stop reason: HOSPADM

## 2023-12-05 RX ORDER — FENTANYL CITRATE 50 UG/ML
25 INJECTION, SOLUTION INTRAMUSCULAR; INTRAVENOUS EVERY 5 MIN PRN
Status: DISCONTINUED | OUTPATIENT
Start: 2023-12-05 | End: 2023-12-05 | Stop reason: HOSPADM

## 2023-12-05 RX ORDER — FENTANYL CITRATE 50 UG/ML
25 INJECTION, SOLUTION INTRAMUSCULAR; INTRAVENOUS EVERY 5 MIN PRN
Status: CANCELLED | OUTPATIENT
Start: 2023-12-05

## 2023-12-05 RX ORDER — PROPOFOL 10 MG/ML
VIAL (ML) INTRAVENOUS
Status: DISCONTINUED | OUTPATIENT
Start: 2023-12-05 | End: 2023-12-05

## 2023-12-05 RX ORDER — ACETAMINOPHEN 500 MG
1000 TABLET ORAL ONCE
Status: COMPLETED | OUTPATIENT
Start: 2023-12-05 | End: 2023-12-05

## 2023-12-05 RX ORDER — ACETAMINOPHEN 500 MG
1000 TABLET ORAL ONCE
Status: CANCELLED | OUTPATIENT
Start: 2023-12-05 | End: 2023-12-05

## 2023-12-05 RX ORDER — MIDAZOLAM HYDROCHLORIDE 1 MG/ML
0.5 INJECTION INTRAMUSCULAR; INTRAVENOUS
Status: DISCONTINUED | OUTPATIENT
Start: 2023-12-05 | End: 2023-12-05 | Stop reason: HOSPADM

## 2023-12-05 RX ORDER — FENTANYL CITRATE 50 UG/ML
100 INJECTION, SOLUTION INTRAMUSCULAR; INTRAVENOUS CONTINUOUS PRN
Status: DISCONTINUED | OUTPATIENT
Start: 2023-12-05 | End: 2023-12-05 | Stop reason: HOSPADM

## 2023-12-05 RX ORDER — MUPIROCIN 20 MG/G
OINTMENT TOPICAL 2 TIMES DAILY
Status: DISCONTINUED | OUTPATIENT
Start: 2023-12-05 | End: 2023-12-05 | Stop reason: HOSPADM

## 2023-12-05 RX ORDER — CEFAZOLIN SODIUM 1 G/3ML
INJECTION, POWDER, FOR SOLUTION INTRAMUSCULAR; INTRAVENOUS
Status: DISCONTINUED | OUTPATIENT
Start: 2023-12-05 | End: 2023-12-05

## 2023-12-05 RX ORDER — PROPOFOL 10 MG/ML
VIAL (ML) INTRAVENOUS CONTINUOUS PRN
Status: DISCONTINUED | OUTPATIENT
Start: 2023-12-05 | End: 2023-12-05

## 2023-12-05 RX ORDER — MUPIROCIN 20 MG/G
OINTMENT TOPICAL
Status: DISCONTINUED | OUTPATIENT
Start: 2023-12-05 | End: 2023-12-05 | Stop reason: HOSPADM

## 2023-12-05 RX ORDER — SODIUM CHLORIDE 0.9 % (FLUSH) 0.9 %
10 SYRINGE (ML) INJECTION
Status: DISCONTINUED | OUTPATIENT
Start: 2023-12-05 | End: 2023-12-05 | Stop reason: HOSPADM

## 2023-12-05 RX ORDER — DEXAMETHASONE SODIUM PHOSPHATE 4 MG/ML
INJECTION, SOLUTION INTRA-ARTICULAR; INTRALESIONAL; INTRAMUSCULAR; INTRAVENOUS; SOFT TISSUE
Status: DISCONTINUED | OUTPATIENT
Start: 2023-12-05 | End: 2023-12-05

## 2023-12-05 RX ADMIN — MUPIROCIN: 20 OINTMENT TOPICAL at 07:12

## 2023-12-05 RX ADMIN — MIDAZOLAM HYDROCHLORIDE 2 MG: 1 INJECTION, SOLUTION INTRAMUSCULAR; INTRAVENOUS at 10:12

## 2023-12-05 RX ADMIN — DEXMEDETOMIDINE 12 MCG: 100 INJECTION, SOLUTION, CONCENTRATE INTRAVENOUS at 11:12

## 2023-12-05 RX ADMIN — Medication 10 MG: at 11:12

## 2023-12-05 RX ADMIN — FENTANYL CITRATE 100 MCG: 50 INJECTION INTRAMUSCULAR; INTRAVENOUS at 10:12

## 2023-12-05 RX ADMIN — PROPOFOL 20 MG: 10 INJECTION, EMULSION INTRAVENOUS at 11:12

## 2023-12-05 RX ADMIN — ACETAMINOPHEN 1000 MG: 500 TABLET ORAL at 07:12

## 2023-12-05 RX ADMIN — PROPOFOL 100 MCG/KG/MIN: 10 INJECTION, EMULSION INTRAVENOUS at 11:12

## 2023-12-05 RX ADMIN — ROPIVACAINE HYDROCHLORIDE 30 ML: 5 INJECTION EPIDURAL; INFILTRATION; PERINEURAL at 10:12

## 2023-12-05 RX ADMIN — HALOPERIDOL LACTATE 0.5 MG: 5 INJECTION, SOLUTION INTRAMUSCULAR at 01:12

## 2023-12-05 RX ADMIN — DEXAMETHASONE SODIUM PHOSPHATE 8 MG: 4 INJECTION, SOLUTION INTRAMUSCULAR; INTRAVENOUS at 11:12

## 2023-12-05 RX ADMIN — METHOCARBAMOL 1000 MG: 500 TABLET ORAL at 01:12

## 2023-12-05 RX ADMIN — PROPOFOL 20 MG: 10 INJECTION, EMULSION INTRAVENOUS at 12:12

## 2023-12-05 RX ADMIN — LIDOCAINE HYDROCHLORIDE 100 MG: 10 INJECTION, SOLUTION INTRAVENOUS at 11:12

## 2023-12-05 RX ADMIN — DEXMEDETOMIDINE 20 MCG: 100 INJECTION, SOLUTION, CONCENTRATE INTRAVENOUS at 11:12

## 2023-12-05 RX ADMIN — CEFAZOLIN 2 G: 330 INJECTION, POWDER, FOR SOLUTION INTRAMUSCULAR; INTRAVENOUS at 11:12

## 2023-12-05 RX ADMIN — PROPOFOL 30 MG: 10 INJECTION, EMULSION INTRAVENOUS at 11:12

## 2023-12-05 RX ADMIN — OXYCODONE HYDROCHLORIDE 5 MG: 5 TABLET ORAL at 02:12

## 2023-12-05 RX ADMIN — CELECOXIB 400 MG: 200 CAPSULE ORAL at 07:12

## 2023-12-05 RX ADMIN — IPRATROPIUM BROMIDE AND ALBUTEROL SULFATE 3 ML: .5; 3 SOLUTION RESPIRATORY (INHALATION) at 01:12

## 2023-12-05 RX ADMIN — SODIUM CHLORIDE: 0.9 INJECTION, SOLUTION INTRAVENOUS at 11:12

## 2023-12-05 RX ADMIN — ONDANSETRON 4 MG: 2 INJECTION INTRAMUSCULAR; INTRAVENOUS at 11:12

## 2023-12-05 NOTE — TRANSFER OF CARE
"Anesthesia Transfer of Care Note    Patient: Hermelindo Garza III    Procedure(s) Performed: Procedure(s) (LRB):  RECONSTRUCTION, LIGAMENT LATERAL COLLATERAL (Left)  REPAIR, TENDON, EXTENSOR (Left)    Patient location: PACU    Anesthesia Type: regional    Transport from OR: Transported from OR on room air with adequate spontaneous ventilation    Post pain: adequate analgesia    Post assessment: no apparent anesthetic complications    Post vital signs: stable    Level of consciousness: awake, alert and oriented    Nausea/Vomiting: nausea    Complications: none    Transfer of care protocol was followed      Last vitals: Visit Vitals  /76 (BP Location: Right arm, Patient Position: Lying)   Pulse 80   Temp 36.9 °C (98.4 °F) (Oral)   Resp 18   Ht 5' 5" (1.651 m)   Wt 108.9 kg (240 lb)   SpO2 (!) 92%   BMI 39.94 kg/m²     "

## 2023-12-05 NOTE — ANESTHESIA PREPROCEDURE EVALUATION
12/05/2023  Hermelindo Garza III is a 43 y.o., male.  Patient Active Problem List   Diagnosis    Gastro-esophageal reflux disease without esophagitis    Renal calculi    Mild intermittent asthma without complication    ADHD (attention deficit hyperactivity disorder), inattentive type    LUBA (obstructive sleep apnea)    Hyperlipidemia, unspecified    Hypertriglyceridemia    Elevated liver enzymes    SHEIKH (nonalcoholic steatohepatitis)    Hepatosplenomegaly    Cellulitis of left arm    History of multiple allergies    Unspecified asthma, uncomplicated    Obesity, unspecified    Type 2 diabetes mellitus without complications    Vitamin D insufficiency    Restless leg    Chronic pain    Pain in left elbow    Cervical disc disorder with radiculopathy, unspecified cervical region    History of urinary stone    Lumbar radiculopathy, chronic    Tachycardia    S/P lumbar discectomy    Morbid obesity    Cervical spondylosis    History of melanoma in situ    Anxiety    Mood Disorder Unspecifed:  Depressed THO also has some mood elevation  (8of 13 on MDQ): ex: irritability, more self confident, thoughts race. more active)     Rotator cuff tendonitis, left    Left lateral epicondylitis    Cervical radiculopathy    Insomnia    Ureteral stone with hydronephrosis    Pain of left lower extremity    Neck pain    Chronic low back pain     Past Surgical History:   Procedure Laterality Date    APPENDECTOMY  2006    CARPAL TUNNEL RELEASE Right 09/15/2022    Procedure: RELEASE, CARPAL TUNNEL;  Surgeon: Christen Marshall MD;  Location: [facility];  Service: Orthopedics;  Laterality: Right;    COLONOSCOPY  01/22/2019    internal hemorrhoids, o/w normal - repeat at age 52    CORRECTION OF HAMMER TOE Left 8/14/2023    Procedure: CORRECTION, HAMMER TOE--  basic foot tray as well as a Reese microfree/microchoice tray. I also asked [employer]  to bring an MIS mati.;  Surgeon: Ankush Back DPM;  Location: [facility] OR;  Service: Podiatry;  Laterality: Left;  mackey drill, sagittal saw, foot set    CYSTOSCOPY  7/5/2023    Procedure: CYSTOSCOPY;  Surgeon: Chuckie Hall MD;  Location: [facility] OR [facility] FLR;  Service: Urology;;    EPIDURAL STEROID INJECTION N/A 02/10/2021    Procedure: CERVICAL C6/7 NELI DIRECT REFERRAL;  Surgeon: Michi Escamilla MD;  Location: [facility] PAIN MGT;  Service: Pain Management;  Laterality: N/A;  NEEDS CONSENT    EPIDURAL STEROID INJECTION INTO CERVICAL SPINE N/A 6/28/2023    Procedure: Injection-steroid-epidural-cervical C7-T1;  Surgeon: Lorraine Patel DO;  Location: [facility] PAIN MANAGEMENT;  Service: Pain Management;  Laterality: N/A;  diabetic    EPIDURAL STEROID INJECTION INTO CERVICAL SPINE N/A 11/3/2023    Procedure: cervical NELI C7-T1;  Surgeon: Kolton Terrell MD;  Location: [facility] PAIN MANAGEMENT;  Service: Pain Management;  Laterality: N/A;  diabetic    EPIDURAL STEROID INJECTION INTO LUMBAR SPINE N/A 9/27/2023    Procedure: L4-5 NELI;  Surgeon: Tirso Pickering MD;  Location: [facility] PAIN MANAGEMENT;  Service: Pain Management;  Laterality: N/A;  15 mins    ESOPHAGOGASTRODUODENOSCOPY  01/22/2019    LA grade B esophagitis; esophageal stenosis- dilated; gastritis; H pylori pathology negative    ESOPHAGOGASTRODUODENOSCOPY N/A 05/18/2021    Procedure: EGD (ESOPHAGOGASTRODUODENOSCOPY);  Surgeon: Mariana Hector MD;  Location: [facility];  Service: Endoscopy;  Laterality: N/A;    EXCISION OF GANGLION OF WRIST Right 09/15/2022    Procedure: EXCISION, GANGLION CYST, WRIST;  Surgeon: Christen Marshall MD;  Location: [facility] OR;  Service: Orthopedics;  Laterality: Right;    FLEXOR TENDON REPAIR Right 09/15/2022    Procedure: FLEXOR TENOSYNOVECTOMY;  Surgeon: Christen Marshall MD;  Location: [facility];  Service: Orthopedics;  Laterality: Right;    HAND ARTHROTOMY Right 09/15/2022    Procedure: ARTHROTOMY, HAND RADIOCARPAL;  Surgeon: Christen Marshall  MD;  Location: [facility] OR;  Service: Orthopedics;  Laterality: Right;  Radiocarpal    INJECTION OF JOINT Right 11/29/2023    Procedure: right hip IA injection and Right GTB injection;  Surgeon: Lorraine Patel DO;  Location: [facility] PAIN MANAGEMENT;  Service: Pain Management;  Laterality: Right;    INJECTION OF STEROID Left 09/15/2022    Procedure: INJECTION, STEROID LEFT WRIST AND LEFT LATERAL ELBOW;  Surgeon: Christen Marshall MD;  Location: [facility] OR;  Service: Orthopedics;  Laterality: Left;  Left Carpal Tunnel CSI    LASER LITHOTRIPSY Left 7/5/2023    Procedure: LITHOTRIPSY, USING LASER;  Surgeon: Chuckie Hall MD;  Location: [facility] OR 1ST FLR;  Service: Urology;  Laterality: Left;    LUMBAR LAMINECTOMY  2010    LUMBAR LAMINECTOMY WITH DISCECTOMY Left 09/20/2021    Procedure: LAMINECTOMY, SPINE, LUMBAR, WITH DISCECTOMY;  Surgeon: Naseem Mcnamara DO;  Location: [facility] OR 2ND FLR;  Service: Neurosurgery;  Laterality: Left;  MIS L3-4    TYMPANOSTOMY TUBE PLACEMENT      URETERAL STENT PLACEMENT Left 7/5/2023    Procedure: INSERTION, STENT, URETER;  Surgeon: Chuckie Hall MD;  Location: [facility] OR 1ST FLR;  Service: Urology;  Laterality: Left;    URETEROSCOPIC REMOVAL OF URETERIC CALCULUS Left 7/5/2023    Procedure: REMOVAL, CALCULUS, URETER, URETEROSCOPIC;  Surgeon: Chuckie Hall MD;  Location: [facility] OR 1ST FLR;  Service: Urology;  Laterality: Left;    URETEROSCOPY Left 7/5/2023    Procedure: URETEROSCOPY;  Surgeon: Chuckie Hall MD;  Location: [facility] OR 1ST FLR;  Service: Urology;  Laterality: Left;         Pre-op Assessment          Review of Systems      Physical Exam  General: Well nourished    Airway:  Mallampati: II   Mouth Opening: Normal  TM Distance: Normal  Tongue: Normal  Neck ROM: Normal ROM        Anesthesia Plan  Type of Anesthesia, risks & benefits discussed:    Anesthesia Type: Gen ETT, Gen Natural Airway  Intra-op Monitoring Plan: Standard ASA Monitors  Post Op Pain Control Plan: multimodal  analgesia  Induction:  IV  Airway Plan: Direct  Informed Consent: Informed consent signed with the Patient and all parties understand the risks and agree with anesthesia plan.  All questions answered.   ASA Score: 3  Day of Surgery Review of History & Physical: H&P Update referred to the surgeon/provider.    Ready For Surgery From Anesthesia Perspective.     .

## 2023-12-05 NOTE — ANESTHESIA PROCEDURE NOTES
Peripheral Block    Patient location during procedure: pre-op   Block not for primary anesthetic.  Reason for block: at surgeon's request and post-op pain management   Post-op Pain Location: left arm   Start time: 12/5/2023 10:00 AM  Timeout: 12/5/2023 10:00 AM   End time: 12/5/2023 10:15 AM    Staffing  Authorizing Provider: Nikki Mari MD  Performing Provider: Nikki Mari MD    Staffing  Performed by: Nikki Mari MD  Authorized by: Nikki Mari MD    Preanesthetic Checklist  Completed: patient identified, IV checked, site marked, risks and benefits discussed, surgical consent, monitors and equipment checked, pre-op evaluation and timeout performed  Peripheral Block  Patient position: supine  Prep: ChloraPrep  Patient monitoring: heart rate, cardiac monitor, continuous pulse ox, continuous capnometry and frequent blood pressure checks  Block type: supraclavicular  Laterality: left  Injection technique: single shot  Needle  Needle type: Stimuplex   Needle gauge: 22 G  Needle length: 2 in  Needle localization: anatomical landmarks and ultrasound guidance   -ultrasound image captured on disc.  Assessment  Injection assessment: negative aspiration, negative parasthesia and local visualized surrounding nerve  Paresthesia pain: none  Heart rate change: no  Slow fractionated injection: yes    Medications:    Medications: ropivacaine (NAROPIN) injection 0.5% - Perineural   30 mL - 12/5/2023 10:10:00 AM    Additional Notes  VSS.  [facility] RN monitoring vitals throughout procedure.  Patient tolerated procedure well.

## 2023-12-05 NOTE — OR NURSING
VSS.  Patient tolerating oral liquids without difficulty.   No apparent s&s of distress noted at this time, no complaints voiced at this time.   Discharge instructions reviewed with patient and father with good verbal feedback received.   Post op medications delivered and patient/family educated on medications, DME not needed.  Patient ready for discharge.

## 2023-12-05 NOTE — DISCHARGE SUMMARY
[facility] (H[facility])  Discharge Note  Short Stay    Procedure(s) (LRB):  RECONSTRUCTION, LIGAMENT LATERAL COLLATERAL (Left)  REPAIR, TENDON, EXTENSOR (Left)      OUTCOME: Patient tolerated treatment/procedure well without complication and is now ready for discharge.    DISPOSITION: Home or Self Care    FINAL DIAGNOSIS:  Left radial collateral ligament tear, lateral epicondylitis    FOLLOWUP: In clinic    DISCHARGE INSTRUCTIONS:    Discharge Procedure Orders   Diet general     Keep surgical extremity elevated     Lifting restrictions     Leave dressing on - Keep it clean, dry, and intact until clinic visit     Call MD for:  temperature >100.4     Call MD for:  persistent nausea and vomiting     Call MD for:  severe uncontrolled pain     Call MD for:  difficulty breathing, headache or visual disturbances     Call MD for:  redness, tenderness, or signs of infection (pain, swelling, redness, odor or green/yellow discharge around incision site)     Call MD for:  hives     Call MD for:  persistent dizziness or light-headedness     Call MD for:  extreme fatigue

## 2023-12-05 NOTE — TELEPHONE ENCOUNTER
No care due was identified.  Health [facility] Embedded Care Due Messages. Reference number: [id number].   12/04/2023 6:46:43 PM CST

## 2023-12-05 NOTE — OP NOTE
[facility] Surgery [facility])  Surgery Department  Operative Note    SUMMARY     Date of Procedure: 12/5/2023     Procedure:   1. Left elbow lateral epicondyle debridement with extensor tendon repair, cpt 16490  2. Left elbow lateral radial collateral ligament reconstruction, cpt 90276    Pre-Operative Diagnosis: Partial tear of radial collateral ligament of elbow [S53.20XA]  Left lateral epicondylitis [M77.12]    Post-Operative Diagnosis: Post-Op Diagnosis Codes:     * Partial tear of radial collateral ligament of elbow [S53.20XA]     * Left lateral epicondylitis [M77.12]    Anesthesia: Regional    Surgeon(s) and Role:     * Chirsten Marshall MD - Primary    Assisting Surgeon: None    Indication for Procedure:  44 yo male with longstanding lateral epicondylitis and lateral ligament tear of the elbow on MRI that had failed conservative management including steroid injections and physical therapy.  Risks and benefits of the procedure were discussed with the patient and informed consent was obtained.    Description of the Findings of the Procedure: The patient was seen in the preoperative holding area and the left elbow was marked.  The patient was taken to the OR, placed supine on the table, and a padded hand table was used.  After Regional anesthesia was administered without difficulty, a time-out procedure was performed identifying the patient, the operative site and the procedure to be performed.  A tourniquet was placed on the arm and the left upper extremity was prepped and draped in standard sterile fashion.  The left upper extremity was exsanguinated with an Esmarch bandage, and the tourniquet was inflated to 250 mm Hg.      A 15 blade scalpel was used to make a 5 cm incision over the left lateral epicondyle. Littler scissors were used to dissect down to the interval between the ECRL and EDC tendons, this was sharply incised.  The lateral antebrachial cutaneous nerve was retracted and protected.  The ECRB tendon  was exposed and there was a Nirschl lesion at the attachment to the lateral epicondyle.  The Nirschl lesion was sharply excised, it measured 8 x 8 mm and was sent for permanent pathological specimen.  There was a partial tear of the lateral radial collateral ligament.  Rongeurs were used to decorticate and perform the lateral epicondylectomy in order to create a raw bone surface on which the tendon can heal.  The lateral ulnar collateral ligament was intact and protected during the case, and the radial head was stable. An arthrex fibertak anchor with fibertape was use to repair the radial collateral ligament.  An anchor socket was drilled into the lateral epicondyle, x-ray was used to find the central origin for the ligament.  The fibertak anchor was inserted and the fibertape was used to repair the radial collateral ligament to the lateral epicondyle in a running locking fashion.  The wound was copiously irrigated with normal saline. The neurovascular bundles were identified and protected throughout the case.  Bipolar electrocautery was used for hemostasis.  3-0 PDS was used to repair the ECRB tendon.  3-0 PDS was used to repair the fascia of the ECRL/EDC interval in a running fashion.  4-0 Monocryl was used for subcutaneous closure.  Dermabond, 4 x 4, cast padding a posterior elbow splint and Coban were used to dress the arm.  All instrument, sponge and needle counts were correct. The tourniquet was deflated and the hand and fingers were pink and well-perfused.  The patient tolerated the procedure well.  He was taken awake and alert to the recovery room.      Complications: No    Estimated Blood Loss (EBL): minimal           Implants:   Implant Name Type Inv. Item Serial No.  Lot No. LRB No. Used Action   DOUBLE LOADED KNEE FIBERTAK ANCHOR 2.6MM    ARTHREX [id number] Left 1 Implanted       Specimens:   Specimen (24h ago, onward)       Start     Ordered    12/05/23 1232  Specimen to Pathology, Surgery  Orthopedics  Once        Comments: Pre-op Diagnosis: Partial tear of radial collateral ligament of elbow [S53.20XA]Left lateral epicondylitis [M77.12]Procedure(s):RECONSTRUCTION, LIGAMENT LATERAL COLLATERALREPAIR, TENDON, EXTENSOR Number of specimens: 1Name of specimens: 1. LEFT ELBOW NIRSCHL LESION     References:    Click here for ordering Quick Tip   Question Answer Comment   Procedure Type: Orthopedics    Which provider would you like to cc? INDIRA FREDERICK    Release to patient Immediate        12/05/23 1236                            Condition: Good    Disposition: PACU - hemodynamically stable.    Attestation: I was present and scrubbed for the entire procedure.

## 2023-12-05 NOTE — TELEPHONE ENCOUNTER
Refill Routing Note   Medication(s) are not appropriate for processing by [facility] for the following reason(s):        No active prescription written by provider    [facility] action(s):  Defer  Approve               Appointments  past 12m or future 3m with PCP    Date Provider   Last Visit   10/12/2023 Suzi Sanches MD   Next Visit   4/16/2024 Suzi Sanches MD   ED visits in past 90 days: 0        Note composed:1:39 PM 12/05/2023

## 2023-12-05 NOTE — BRIEF OP NOTE
[facility] ([facility])  Surgery Department  Operative Note    SUMMARY     Date of Procedure: 12/5/2023     Procedure: Procedure(s) (LRB):  RECONSTRUCTION, LIGAMENT LATERAL COLLATERAL (Left)  REPAIR, TENDON, EXTENSOR (Left)     Surgeon(s) and Role:     * Christen Frederick MD - Primary    Assisting Surgeon: None    Pre-Operative Diagnosis: Partial tear of radial collateral ligament of elbow [S53.20XA]  Left lateral epicondylitis [M77.12]    Post-Operative Diagnosis: Post-Op Diagnosis Codes:     * Partial tear of radial collateral ligament of elbow [S53.20XA]     * Left lateral epicondylitis [M77.12]    Anesthesia: Regional    Estimated Blood Loss (EBL): minimal           Implants:   Implant Name Type Inv. Item Serial No.  Lot No. LRB No. Used Action   DOUBLE LOADED KNEE FIBERTAK ANCHOR 2.6MM    ARTHREX [id number] Left 1 Implanted       Specimens:   Specimen (24h ago, onward)       Start     Ordered    12/05/23 1236  Specimen to Pathology, Surgery Orthopedics  Once        Comments: Pre-op Diagnosis: Partial tear of radial collateral ligament of elbow [S53.20XA]Left lateral epicondylitis [M77.12]Procedure(s):RECONSTRUCTION, LIGAMENT LATERAL COLLATERALREPAIR, TENDON, EXTENSOR Number of specimens: 1Name of specimens: 1. LEFT ELBOW NIRSCHL LESION     References:    Click here for ordering Quick Tip   Question Answer Comment   Procedure Type: Orthopedics    Which provider would you like to cc? CHRISTEN FREDERICK    Release to patient Immediate        12/05/23 1236                            Condition: Good    Disposition: PACU - hemodynamically stable.    Attestation: I was present and scrubbed for the entire procedure.

## 2023-12-05 NOTE — DISCHARGE INSTRUCTIONS
HAND SURGERY - Care of the Hand after Surgery       Post-Op Care  It is important to follow your orthopaedic surgeon's instructions carefully after you return home. You should ask   someone to check on you that evening. The protocols described here are general in nature. Every person and   every surgery is different so the information given here is for guidance only. If you have questions you should   contact us.     Day of Surgery    You were place in a plaster splint today to protect the surgical area during healing. Do not remove the plaster splint until you are seen by Occupational Therapy or Dr. Marshall for your first postop visit    The hand and fingers may be numb for quite some time after surgery. This is due to the anesthetic block used at the time of surgery for pain control.    Begin taking liquids and food as soon as you can. You should always eat some solid food, a sandwich or light meal, a little while before taking your pain meds.     Day 3  Things are much the same on the third day after your surgery. Usually you have less pain and feel like doing more.    Showering: It is important to keep the incision absolutely dry while showering or bathing (use two plastic bags over your hand) or a shower bag.   If you feel that the pain medication you were given after surgery is stronger than you really need you can reduce the dose, take it less frequently or switch to ibuprofen or Tylenol. If you received antibiotics they should be taken until the entire prescription is completed.    Day 5-7  Occupational Therapy will see you between this time. Please let us know if you are not scheduled [phone]    Day 14  We will see you back after your surgery to review with you what was done in surgery and will talk about rehab and answer any questions you may have.       HAND SURGERY  Driving: You can drive if you are comfortable and have regained full finger movements and if you have sufficient power to control the  vehicle.   Return to work: Timing of your return to work is variable according to your occupation and specific surgery. We should discuss this at your follow up visit if not already discussed prior.  Elevation: Hand elevation is important to prevent swelling and stiffness of the fingers. One minute of leaving your hand dangling negates four hours of keeping it elevated. Please remember not to walk with your hand hanging down or to sit with your hand resting in your lap. It is fine, however, to lower your hand for light use and you should get back to normal light activities as soon as possible as guided by common sense.     Post-operative exercises  [x] Bend your fingers  Relax your hand. Start with your fingers straight and close together. Bend the end and middle joints of your fingers. Keep your wrist and knuckles straight. Moving slowly and smoothly, return your hand to the starting position. Repeat with your other hand. If you can, perform multiple repetitions of this exercise on each hand.    [x] Open your hand wide                       Spread your fingers apart as wide as you can and hold that position. Slowly relax your fingers and bring them together. Return to the open-wide position. Repeat with each hand and gradually add to the number of repetitions.    [x] Make a fist  Start with your fingers straight and spread apart. Make a loose, gentle fist and wrap your thumb around the outside of your fingers. Be careful not to squeeze your fingers together too tightly. Moving slowly and smoothly, return to the starting position. Repeat. Perform this exercise on both hands.    [x] Touch your fingertips  Straighten your fingers and thumb. Bend your thumb across your palm, touching the tip of your thumb to the pad of your hand just below your pinky finger. If you can't make your thumb touch, just stretch as far as you can. Return your thumb to its starting position, as shown in images 1 through 3.  For the next  "exercise, form the letter O by touching your thumb to each fingertip, as shown in images 4 through 6. Moving slowly and smoothly, touch your index finger to your thumb, then straighten your fingers. Touch your middle finger to your thumb and straighten. Follow with your ring and pinky fingers.    [x] Walk your fingers  Rest your hand on a flat surface, such as a tabletop, with your palm facing down and your fingers spread slightly apart. Moving one finger at a time, slowly walk your fingers toward your thumb. Start by lifting and moving your index finger toward your thumb. Follow by lifting and moving your middle finger toward your thumb. Proceed with moving your ring finger and then your pinky finger toward your thumb. Don't move your wrist or thumb while doing this exercise. Repeat with your other hand.      HAND SURGERY - Common Concerns and Frequently Asked Questions    When to Call the Doctor  Pain, burning, or numbness of the fingers or the back of the hand not relieved by elevation of the arm  Pale or cold finger; bluish nail beds  Red line or streak going up the arm  Excessive swelling  Fever over 101.3ºF  Pain unrelieved by pain medication    Tips for one armed living  It helps to have...   In the shower   Plastic bags and rubber bands to cover bandages - the bags that newspapers come in are good to cover the hand and wrist. Otherwise small trash can liners will do. Use two at a time.   Bottle sponge (soft sponge on a long stick) - for the armpit of your "good" hand.   Shower brush   A hair brush in the shower will help you to wash your hair.   Cotton omar cloth bathrobe - to dry your back.    In the bathroom   Toothpaste, shampoo, etc. in flip-top or pump (not screw top) dispensers.   Consider an electric razor.   Flossers (dental floss on a "Y" shaped handle).    In the kitchen   Dycem mat (rubber jar opener mat) - to help open jars, but also keep things from sliding around while you are working on " "them.    Double suction cup pads ("little Octopus") - to hold items while you use or wash them.   Electric can opener with a lid magnet strong enough to hold the can in the air - for one handed use.   In the bedroom   Back scratcher.   Large sleeve shirts and tops.   Put away clothing which buttons, fastens or snaps in the back or which uses drawstrings.    Sports bra or a camisole instead of a bra.   [employer] nylons can be pulled on one handed - most others can't.   A "wash and wear" haircut.     "

## 2023-12-06 ENCOUNTER — TELEPHONE (OUTPATIENT)
Dept: ORTHOPEDICS | Facility: CLINIC | Age: 43
End: 2023-12-06
Payer: COMMERCIAL

## 2023-12-06 ENCOUNTER — PATIENT MESSAGE (OUTPATIENT)
Dept: ORTHOPEDICS | Facility: CLINIC | Age: 43
End: 2023-12-06
Payer: COMMERCIAL

## 2023-12-06 RX ORDER — CYCLOBENZAPRINE HCL 10 MG
10 TABLET ORAL 3 TIMES DAILY PRN
Qty: 90 TABLET | Refills: 1 | Status: SHIPPED | OUTPATIENT
Start: 2023-12-06 | End: 2024-02-04

## 2023-12-06 NOTE — ANESTHESIA POSTPROCEDURE EVALUATION
Anesthesia Post Evaluation    Patient: Hermelindo Garza III    Procedure(s) Performed: Procedure(s) (LRB):  RECONSTRUCTION, LIGAMENT LATERAL COLLATERAL (Left)  REPAIR, TENDON, EXTENSOR (Left)    Final Anesthesia Type: general      Patient location during evaluation: PACU  Patient participation: Yes- Able to Participate  Level of consciousness: awake and alert and oriented  Pain management: adequate  Airway patency: patent    PONV status at discharge: No PONV  Anesthetic complications: no      Cardiovascular status: blood pressure returned to baseline and hemodynamically stable  Respiratory status: unassisted  Hydration status: euvolemic  Follow-up not needed.              Vitals Value Taken Time   /63 12/05/23 1416   Temp 36.6 °C (97.9 °F) 12/05/23 1320   Pulse 97 12/05/23 1418   Resp 18 12/05/23 1436   SpO2 95 % 12/05/23 1418   Vitals shown include unvalidated device data.      Event Time   Out of Recovery 13:52:00         Pain/Shani Score: Pain Rating Prior to Med Admin: 4 (12/5/2023  2:36 PM)  Pain Rating Post Med Admin: 0 (12/5/2023  2:49 PM)  Shani Score: 9 (12/5/2023  1:52 PM)

## 2023-12-06 NOTE — TELEPHONE ENCOUNTER
Patient communication regarding postoperative protocol and appointments.  Patient is doing well today and mild pain 2/10

## 2023-12-08 DIAGNOSIS — F90.0 ADHD (ATTENTION DEFICIT HYPERACTIVITY DISORDER), INATTENTIVE TYPE: ICD-10-CM

## 2023-12-08 RX ORDER — DEXMETHYLPHENIDATE HYDROCHLORIDE 10 MG/1
10 TABLET ORAL 2 TIMES DAILY
Qty: 60 TABLET | Refills: 0 | OUTPATIENT
Start: 2023-12-08 | End: 2024-01-07

## 2023-12-09 ENCOUNTER — PATIENT MESSAGE (OUTPATIENT)
Dept: PAIN MEDICINE | Facility: CLINIC | Age: 43
End: 2023-12-09
Payer: COMMERCIAL

## 2023-12-10 ENCOUNTER — PATIENT MESSAGE (OUTPATIENT)
Dept: ORTHOPEDICS | Facility: CLINIC | Age: 43
End: 2023-12-10
Payer: COMMERCIAL

## 2023-12-11 ENCOUNTER — CLINICAL SUPPORT (OUTPATIENT)
Dept: REHABILITATION | Facility: HOSPITAL | Age: 43
End: 2023-12-11
Payer: COMMERCIAL

## 2023-12-11 DIAGNOSIS — M25.621 IMPAIRED RANGE OF MOTION OF RIGHT ELBOW: Primary | ICD-10-CM

## 2023-12-11 DIAGNOSIS — F90.0 ADHD (ATTENTION DEFICIT HYPERACTIVITY DISORDER), INATTENTIVE TYPE: ICD-10-CM

## 2023-12-11 DIAGNOSIS — M77.12 LEFT LATERAL EPICONDYLITIS: ICD-10-CM

## 2023-12-11 DIAGNOSIS — M25.622 IMPAIRED RANGE OF MOTION OF LEFT ELBOW: ICD-10-CM

## 2023-12-11 DIAGNOSIS — S53.20XA PARTIAL TEAR OF RADIAL COLLATERAL LIGAMENT OF ELBOW: ICD-10-CM

## 2023-12-11 LAB
FINAL PATHOLOGIC DIAGNOSIS: NORMAL
Lab: NORMAL

## 2023-12-11 PROCEDURE — L3763 EWHO RIGID W/O JNTS CF: HCPCS

## 2023-12-11 PROCEDURE — 97760 ORTHOTIC MGMT&TRAING 1ST ENC: CPT

## 2023-12-11 RX ORDER — DEXMETHYLPHENIDATE HYDROCHLORIDE 10 MG/1
10 TABLET ORAL 2 TIMES DAILY
Qty: 60 TABLET | Refills: 0 | OUTPATIENT
Start: 2023-12-11 | End: 2024-01-10

## 2023-12-11 NOTE — PROGRESS NOTES
[facility]  Occupational Therapy Orthotic Note    Patient: Hermelindo Garza III  MRN: [id number]  Date of visit: 12/11/2023  Referring Physician:  Christen Marshall MD   Next MD visit: 12/20/2023   Primary Diagnosis: S53.20XA (ICD-10-CM) - Partial tear of radial collateral ligament of elbow; M77.12 (ICD-10-CM) - Left lateral epicondylitis  Surgical Procedure and Date:   1. Left elbow lateral epicondyle debridement with extensor tendon repair, cpt 65920  2. Left elbow lateral radial collateral ligament reconstruction, cpt 76070; 12.5.23    Treatment Diagnosis:   Encounter Diagnoses   Name Primary?    Partial tear of radial collateral ligament of elbow     Left lateral epicondylitis          Impaired range of motion of left elbow        TIME RECORD    Start Time:  8:00 am  Stop Time:  9:30 am    PROCEDURES:      UNTIMED  Procedure Min.    75             Total Timed Minutes:  15 min  Total Timed Units:  1  Total Untimed Units:  1  Charges Billed/# of units:    x 1    Subjective:     Pt reports: Doing okay. He is concerned about a bulky orthosis due to needing to fit orthosis in his long-sleeved dress shirts for work. Pt states MD told him he can remove the orthosis for showering.     Pain: 0/10  Location: L elbow    Objective:     Patient seen by OT this session for fabrication of orthosis per MD orders. Fabricated and applied custom thermoplastic long arm elbow orthosis with forearm in pronation and wrist in ~25 deg ext.     Orthotic fit and training for 15 minutes, including:  - Pt educated on orthosis purpose, donning/doffing, positioning, wear, care, and precautions. Patient/caregiver were provided written instructions and educated to monitor for increased pain/edema, pressure points, skin breakdown or redness/skin irritation. Patient/caregiver to contact clinic for adjustments as needed.   - Reviewed Dx precautions; full time brace wear, no wrist/forearm/elbow ROM at this  time.   - Reviewed NWB precautions; no heavy lifting or weightbearing.    Assessment:     Orthosis with good fit following fabrication. Pt. Able to josue/doff independently. Pt required mod verbal/tactile cues and demonstrations for precautions.     Patient Education/Response:     Pt instructed to wear continuous, remove for bathing or hygiene while adhering to precautions. Patient/caregiver were provided written instructions on orthosis purpose, wear schedule, care and precautions to monitor for increased pain/edema, pressure points, skin breakdown or redness/skin irritation Patient/caregiver to contact clinic for adjustments as needed. Patient signed copy of orthosis instructions, acknowledging delivery and understanding of wear, care, and precautions.    (See Media for scanned documents)    Plans and Goals:     Goal of Orthosis to protect Sx site/injury site. Pt to return for formal OT evaluation. Orthosis Check PRN, f/u with MD at [facility].

## 2023-12-11 NOTE — PATIENT INSTRUCTIONS
[facility] Occupational Therapy  Orthosis Instructions and Proof of Delivery        Date: 12/11/2023  Name: Hermelindo Garza III  MRN: [id number]  YOB: 1980  Referring Provider: Christen Marshall MD  Diagnosis:   S53.20XA (ICD-10-CM) - Partial tear of radial collateral ligament of elbow   M77.12 (ICD-10-CM) - Left lateral epicondylitis     DOS 12.5.23  True Collateral Ligament Repair of the Elbow (With anchor and internal brace)    [facility] [place] Level II Code and Description      Orthosis Information  (X) Custom Fabricated              () Prefabricated  () Right                                    (X) Left                                         () Bilateral  (X) Static                                   () Static Progressive                  () Dynamic    Orthosis Instructions  (X) Wear the orthosis at all times    Cleaning and Maintenance  Keep your orthosis away from any heat sources. Do not leave in your car.  Keep your orthosis away from pets.  You may clean your orthosis with cool water and soap or a mild cleanser.  Your orthosis may need adjustments due to changes in your medical condition (swelling, dressing size, additional surgery, etc.)  Monitor your skin color and integrity.  Do not try to adjust the orthosis on your own.     If you have any questions or concerns, please contact the therapy office at [phone].    I, Hermelindo Garza III , have personally received the above described orthosis along with instructions on the wear, care, and precautions related to this orthosis. I understand that I am responsible for payment to [facility] of my deductible, coinsurance, or other portion of my charges not paid in full by my insurance plans, including any item that is not covered under the insurance plan.     Signature: _________________________________ Date: __________________    Therapist: HERON Velazquez/TOMÁS

## 2023-12-14 ENCOUNTER — CLINICAL SUPPORT (OUTPATIENT)
Dept: ALLERGY | Facility: CLINIC | Age: 43
End: 2023-12-14
Payer: COMMERCIAL

## 2023-12-14 ENCOUNTER — CLINICAL SUPPORT (OUTPATIENT)
Dept: REHABILITATION | Facility: HOSPITAL | Age: 43
End: 2023-12-14
Payer: COMMERCIAL

## 2023-12-14 DIAGNOSIS — M25.622 IMPAIRED RANGE OF MOTION OF LEFT ELBOW: Primary | ICD-10-CM

## 2023-12-14 DIAGNOSIS — J30.9 CHRONIC ALLERGIC RHINITIS: Primary | ICD-10-CM

## 2023-12-14 PROCEDURE — 99999 PR PBB SHADOW E&M-EST. PATIENT-LVL I: CPT | Mod: PBBFAC,,,

## 2023-12-14 PROCEDURE — 95117 IMMUNOTHERAPY INJECTIONS: CPT | Mod: S$GLB,,, | Performed by: STUDENT IN AN ORGANIZED HEALTH CARE EDUCATION/TRAINING PROGRAM

## 2023-12-14 PROCEDURE — 95117 PR IMMU2THERAPY, 2+ INJECTIONS: ICD-10-PCS | Mod: S$GLB,,, | Performed by: STUDENT IN AN ORGANIZED HEALTH CARE EDUCATION/TRAINING PROGRAM

## 2023-12-14 PROCEDURE — 97110 THERAPEUTIC EXERCISES: CPT

## 2023-12-14 PROCEDURE — 99999 PR PBB SHADOW E&M-EST. PATIENT-LVL I: ICD-10-PCS | Mod: PBBFAC,,,

## 2023-12-14 PROCEDURE — 97166 OT EVAL MOD COMPLEX 45 MIN: CPT

## 2023-12-14 NOTE — PATIENT INSTRUCTIONS
"  [facility], OCCUPATIONAL THERAPY  HOME EXERCISE PROGRAM     Complete the following exercises with 10 repetitions each, 4x/day.     AROM: Elbow Flexion / Extension          Bend and straighten elbow in palm down position, do not go past 30 deg ext. Pain-free range.      AROM: Supination / Pronation   With your elbow by your side, turn your palm to neutral (thumb up) and turn your palm down.        AROM: Composite Flexion / Extension ("Full Fist")  Bend every joint in your hand into a fist. Hold 3 seconds. Straighten your fingers.     Copyright © [employer]. All rights reserved.     Complete the following massages for 2-3 minutes each, 4x/day.                     "

## 2023-12-14 NOTE — PLAN OF CARE
"[facility]  Occupational Therapy Initial Evaluation    Date: 12/14/2023  Name: Hermelindo Garza III  Clinic Number: [id number]    Therapy Diagnosis:   Encounter Diagnosis   Name Primary?    Impaired range of motion of left elbow Yes     Physician: Christen Marshall MD    Physician Orders: OT Eval and Treat  Medical Diagnosis: S53.20XA (ICD-10-CM) - Partial tear of radial collateral ligament of elbow; M77.12 (ICD-10-CM) - Left lateral epicondylitis   Surgical Procedure and Date:   1. Left elbow lateral epicondyle debridement with extensor tendon repair, cpt 05662  2. Left elbow lateral radial collateral ligament reconstruction, cpt 30551, 12/5/23 / Date of Injury/Onset: progressive onset   Evaluation Date: 12/14/2023  Insurance Authorization Period Expiration: 12/31/2023  Plan of Care Expiration: 3/8/24 (12 weeks)  Date of Return to MD: 12/20/2023    Visit # / Visits authorized: 2 / 6  FOTO: To be assessed next session.     Precautions:  Standard, Diabetes, and Weightbearing, [place] pgs. 351 & [phone]    Time In: 7:30 am  Time Out: 8:12 am  Total Appointment Time (timed & untimed codes): 42 minutes    SUBJECTIVE     Date of Onset: progressive onset     History of Current Condition/Mechanism of Injury: Per op note on 12/05/2023, "longstanding lateral epicondylitis and lateral ligament tear of the elbow on MRI that had failed conservative management including steroid injections and physical therapy." He was seen in OT for his orthosis fabrication on 12/11/23.     Falls: none    Involved Side: Left  Dominant Side: Right  Imaging: Reviewed   Prior Therapy: 2 years ago for L tennis elbow conservative management  Occupation: Manager  Working presently: employed  Duties: computer work    Functional Limitations/Social History:    Previous functional status includes: Mod Independent to Independent with all ADLs and IADLs.     Current Functional Status   Home/Living environment: lives with their " spouse      Limitation of Functional Status as follows:   ADLs/IADLs: Pt requires assistance for heavier household chores, lifting, bending to pick things up.     - Feeding: Mod I favoring R hand    - Bathing: Mod I favoring R hand and assistance to cover arm for shower    - Dressing/Grooming: Assistance for buttoning collar     - Driving: Mod I favoring R hand     Leisure: walking the dog, swimming     Pain:  Functional Pain Scale Rating 0-10: Current 0/10, worst 5/10, best 0/10   Location: L lateral elbow   Description: Throbbing and Hot  Aggravating Factors: activity   Easing Factors: pain medication and rest     Patient's Goals for Therapy: go swimming again and do normal stuff like button collar     Medical History:   Past Medical History:   Diagnosis Date    ADD (attention deficit disorder) 05/09/2012    Allergy     Anxiety 4/12/2023    Asthma 05/09/2012    Cervical disc disorder with radiculopathy, unspecified cervical region 09/14/2021    Cervical spondylosis with myelopathy 06/29/2022    Eczema     Esophageal ulcer     GERD (gastroesophageal reflux disease) 05/09/2012    Glaucoma     Kidney stones 05/2014    Lumbar disc disease 05/09/2012    Lumbar herniated disc 05/09/2012    Malignant melanoma of skin, unspecified 01/06/2022    in situ right mid back    Melanoma 01/2022    in situ R mid back     LUBA (obstructive sleep apnea)     Radiculopathy, lumbar region 09/14/2021    Renal calculi 05/09/2012    Type 2 diabetes mellitus without complication, without long-term current use of insulin 11/16/2020       Surgical History:    has a past surgical history that includes Lumbar laminectomy (2010); Esophagogastroduodenoscopy (01/22/2019); Colonoscopy (01/22/2019); Appendectomy (2006); Epidural steroid injection (N/A, 02/10/2021); Esophagogastroduodenoscopy (N/A, 05/18/2021); Lumbar laminectomy with discectomy (Left, 09/20/2021); Carpal tunnel release (Right, 09/15/2022); Excision of ganglion of wrist (Right,  09/15/2022); Hand Arthrotomy (Right, 09/15/2022); Injection of steroid (Left, 09/15/2022); Flexor tendon repair (Right, 09/15/2022); Tympanostomy tube placement; Epidural steroid injection into cervical spine (N/A, 6/28/2023); Cystoscopy (7/5/2023); Ureteral stent placement (Left, 7/5/2023); Ureteroscopy (Left, 7/5/2023); Laser lithotripsy (Left, 7/5/2023); Ureteroscopic removal of ureteric calculus (Left, 7/5/2023); Correction of hammer toe (Left, 8/14/2023); Epidural steroid injection into lumbar spine (N/A, 9/27/2023); Epidural steroid injection into cervical spine (N/A, 11/3/2023); Injection of joint (Right, 11/29/2023); Reconstruction of ligament (Left, 12/5/2023); and Repair of extensor tendon (Left, 12/5/2023).    Medications:   has a current medication list which includes the following prescription(s): acetaminophen, albuterol, atorvastatin, azelastine, blood sugar diagnostic, blood-glucose meter, cyclobenzaprine, cyclobenzaprine, dexmethylphenidate, dexmethylphenidate, [START ON 1/12/2024] dexmethylphenidate, docusate sodium, doxycycline, ergocalciferol (vitamin d2), escitalopram oxalate, fexofenadine, fluocinonide, gabapentin, hydrocodone-acetaminophen, hydrocortisone, ketoconazole, lamotrigine, lancets, latanoprost, lorazepam, meloxicam, montelukast, ondansetron, ondansetron, ropinirole, ozempic, clenpiq, tamsulosin, and ultra thin lancets, and the following Facility-Administered Medications: sodium chloride 0.9%, mupirocin, and ondansetron.    Allergies:   Review of patient's allergies indicates:  No Known Allergies     OBJECTIVE     Observation/Appearance: Orthosis intact. Incision healing well, no significant signs of infection noted.     Edema. Measured in centimeters.   12/14/2023 12/14/2023    Left Right   Elbow Crease NT NT     Elbow and Wrist ROM. Measured in degrees.   12/14/2023    Left   Elbow Ext/Flex (pronated forearm) -30/100   *Supination/Pronation 0/WFL   *Wrist Ext/Flex 35/neutral   Wrist  RD/UD NT   *With elbow flexed and arm adducted     Hand ROM. WFL - Pt able to make full composite fists with B/L hands and demonstrates thumb opposition WFL.     Strength (Dynamometer)   Measured in pounds to POP.   12/14/2023 12/14/2023    Left Right   Rung II NT NT     Sensation. To be assessed next session.     Manual Muscle Test   12/14/2023 12/14/2023    Left Right   Wrist Extension  NT NT   Wrist Flexion     Radial Deviation     Ulnar Deviation     Supination     Pronation     Elbow Extension     Elbow Flexion       Limitation/Restriction for FOTO Elbow Survey    To be assessed next session.        Treatment   Total Treatment time (time-based codes) separate from Evaluation: 12 minutes    Hermelindo received the treatments listed below:     Manual therapy techniques: Soft tissue Mobilization and Friction Massage were applied to the: L elbow for 2 minutes, including:  - Performed and instructed on gentle scar massage to incision to decrease adhesions and improve tensile glide.     Therapeutic exercises to develop endurance, ROM, and flexibility for 10 minutes, including:  - Short range, protected elbow ROM while laying supine with forearm in pronation 3 x 10 reps  - Pronation and supination to neutral 2 x 10 reps   - Gentle full fist x 20 reps    *Applied foam padding and edge tape to orthosis to decrease pressure areas. Re-issued stockinette sleeves.     Patient Education and Home Exercises      Education provided:   - Role of OT and POC  - HEP     Written Home Exercises Provided: yes.  Exercises were reviewed and Hermelindo was able to demonstrate them prior to the end of the session.  Hermelindo demonstrated good  understanding of the education provided. See EMR under Patient Instructions for exercises provided during therapy sessions.     Pt was advised to perform these exercises free of pain, and to stop performing them if pain occurs.    Patient/Family Education: role of OT, goals for OT,  scheduling/cancellations - pt verbalized understanding. Discussed insurance limitations with patient.    ASSESSMENT     Hermelindo Garza III is a 43 y.o. male referred to outpatient occupational therapy and presents with a medical diagnosis of Partial tear of radial collateral ligament of elbow and Left lateral epicondylitis. Patient presents with the following therapy deficits: Decreased ROM, Decreased  strength, Decreased muscle strength, Decreased functional hand use, Increased pain, Edema, Joint Stiffness, and Scar Adhesions and demonstrates limitations as described in the chart below. Following medical record review it is determined that pt will benefit from occupational therapy services in order to maximize pain free and/or functional use of left arm. The following goals were discussed with the patient and patient is in agreement with them as to be addressed in the treatment plan. The patient's rehab potential is Good.     Anticipated barriers to occupational therapy: scheduling constraints due to work  Pt has no cultural, educational or language barriers to learning provided.    Profile and History Assessment of Occupational Performance Level of Clinical Decision Making Complexity Score   Occupational Profile:   Hermelindo Garza III is a 43 y.o. male who lives with their spouse and is currently employed Hermelindo Garza III has difficulty with  ADLs and IADLs as listed previously, which  Affecting hisdaily functional abilities.      Comorbidities:    has a past medical history of ADD (attention deficit disorder), Allergy, Anxiety, Asthma, Cervical disc disorder with radiculopathy, unspecified cervical region, Cervical spondylosis with myelopathy, Eczema, Esophageal ulcer, GERD (gastroesophageal reflux disease), Glaucoma, Kidney stones, Lumbar disc disease, Lumbar herniated disc, Malignant melanoma of skin, unspecified, Melanoma, LUBA (obstructive sleep apnea), Radiculopathy, lumbar region,  Renal calculi, and Type 2 diabetes mellitus without complication, without long-term current use of insulin.    Medical and Therapy History Review:   Expanded               Performance Deficits    Physical:  Joint Mobility  Joint Stability  Muscle Power/Strength  Muscle Endurance  Skin Integrity/Scar Formation  Edema   Strength  Proprioception Functions  Pain    Cognitive:  No Deficits    Psychosocial:    Habits  Routines  Rituals     Clinical Decision Making:  moderate    Assessment Process:  Detailed Assessments    Modification/Need for Assistance:  Minimal-Moderate Modifications/Assistance    Intervention Selection:  Several Treatment Options       moderate  Based on PMHX, co morbidities , data from assessments and functional level of assistance required with task and clinical presentation directly impacting function.         Goals:   The following goals were discussed with the patient and patient is in agreement with them as to be addressed in the treatment plan.   Long Term Goals (LTGs); to be met by discharge.  LTG #1: Pt will report a pain level of 1-3 out of 10 at worst with arm use.   LTG #2: FOTO to be administered and assessed next session with updates to goals as needed.  LTG #3: Pt will return to prior level of function for IADLs and household management.   LTG #4: Assess MMT and  strength when appropriate and update goals as needed with focus on return to swimming and gym exercises.     Short Term Goals (STGs); to be met within 4 weeks (1/12/24).  STG #1: Pt will report a pain level of 4 out of 10 at worst with arm use.  STG #2: Pt will report/demo [place] with ADLs.  STG #3: Pt will demonstrate independence with issued HEP, orthosis wear, and modalities for pain/symptom management.  STG #4: Pt will demo improved L elbow SURESH by 30-60 degrees needed to aid with buttoning collar.   STG #5: Pt will demo improved L forearm SURESH by 30-40 degrees needed to aid with showering.     PLAN   Plan of  Care Certification: 12/14/2023 to 3/8/24 (12 weeks).     Outpatient Occupational Therapy 2-3 times weekly for 12 weeks to include the following interventions: Paraffin, Fluidotherapy, Manual therapy/joint mobilizations, Modalities for pain management, US 3 mhz, Therapeutic exercises/activities., Strengthening, Orthotic Fabrication/Fit/Training, Edema Control, Scar Management, Wound Care, Electrical Modalities, Joint Protection, and Energy Conservation.      Connie Vasquez, OTR/L      I CERTIFY THE NEED FOR THESE SERVICES FURNISHED UNDER THIS PLAN OF TREATMENT AND WHILE UNDER MY CARE  Physician's comments:      Physician's Signature: ___________________________________________________

## 2023-12-14 NOTE — PROGRESS NOTES
[facility]: Occupational Therapy Note     See plan of care for full initial OT evaluation.    Connie Vasquez, BRINDA, OTR/L

## 2023-12-18 ENCOUNTER — CLINICAL SUPPORT (OUTPATIENT)
Dept: ALLERGY | Facility: CLINIC | Age: 43
End: 2023-12-18
Payer: COMMERCIAL

## 2023-12-18 DIAGNOSIS — J30.9 CHRONIC ALLERGIC RHINITIS: Primary | ICD-10-CM

## 2023-12-18 PROCEDURE — 99999 PR PBB SHADOW E&M-EST. PATIENT-LVL I: CPT | Mod: PBBFAC,,,

## 2023-12-18 PROCEDURE — 95117 IMMUNOTHERAPY INJECTIONS: CPT | Mod: S$GLB,,, | Performed by: ALLERGY & IMMUNOLOGY

## 2023-12-18 PROCEDURE — 99999 PR PBB SHADOW E&M-EST. PATIENT-LVL I: ICD-10-PCS | Mod: PBBFAC,,,

## 2023-12-18 PROCEDURE — 95117 PR IMMU2THERAPY, 2+ INJECTIONS: ICD-10-PCS | Mod: S$GLB,,, | Performed by: ALLERGY & IMMUNOLOGY

## 2023-12-19 ENCOUNTER — DOCUMENTATION ONLY (OUTPATIENT)
Dept: REHABILITATION | Facility: HOSPITAL | Age: 43
End: 2023-12-19

## 2023-12-19 ENCOUNTER — CLINICAL SUPPORT (OUTPATIENT)
Dept: REHABILITATION | Facility: HOSPITAL | Age: 43
End: 2023-12-19
Payer: COMMERCIAL

## 2023-12-19 ENCOUNTER — OFFICE VISIT (OUTPATIENT)
Dept: PAIN MEDICINE | Facility: CLINIC | Age: 43
End: 2023-12-19
Payer: COMMERCIAL

## 2023-12-19 ENCOUNTER — TELEPHONE (OUTPATIENT)
Dept: ENDOSCOPY | Facility: HOSPITAL | Age: 43
End: 2023-12-19
Payer: COMMERCIAL

## 2023-12-19 VITALS
DIASTOLIC BLOOD PRESSURE: 73 MMHG | WEIGHT: 253.31 LBS | BODY MASS INDEX: 42.2 KG/M2 | SYSTOLIC BLOOD PRESSURE: 114 MMHG | HEIGHT: 65 IN | HEART RATE: 88 BPM

## 2023-12-19 DIAGNOSIS — M79.18 MYOFASCIAL PAIN: Primary | ICD-10-CM

## 2023-12-19 DIAGNOSIS — M54.16 LUMBAR RADICULOPATHY, CHRONIC: ICD-10-CM

## 2023-12-19 DIAGNOSIS — M25.559 HIP PAIN, UNSPECIFIED LATERALITY: ICD-10-CM

## 2023-12-19 DIAGNOSIS — M54.2 MYOFASCIAL NECK PAIN: ICD-10-CM

## 2023-12-19 DIAGNOSIS — G89.4 CHRONIC PAIN SYNDROME: ICD-10-CM

## 2023-12-19 DIAGNOSIS — M25.551 RIGHT HIP PAIN: ICD-10-CM

## 2023-12-19 DIAGNOSIS — M54.16 LUMBAR RADICULOPATHY: ICD-10-CM

## 2023-12-19 DIAGNOSIS — M54.12 CERVICAL RADICULOPATHY: ICD-10-CM

## 2023-12-19 DIAGNOSIS — M25.622 IMPAIRED RANGE OF MOTION OF LEFT ELBOW: Primary | ICD-10-CM

## 2023-12-19 DIAGNOSIS — M70.60 GREATER TROCHANTERIC BURSITIS, UNSPECIFIED LATERALITY: ICD-10-CM

## 2023-12-19 DIAGNOSIS — F41.9 ANXIETY: ICD-10-CM

## 2023-12-19 PROCEDURE — 3074F SYST BP LT 130 MM HG: CPT | Mod: CPTII,S$GLB,, | Performed by: STUDENT IN AN ORGANIZED HEALTH CARE EDUCATION/TRAINING PROGRAM

## 2023-12-19 PROCEDURE — 3008F PR BODY MASS INDEX (BMI) DOCUMENTED: ICD-10-PCS | Mod: CPTII,S$GLB,, | Performed by: STUDENT IN AN ORGANIZED HEALTH CARE EDUCATION/TRAINING PROGRAM

## 2023-12-19 PROCEDURE — 99999 PR PBB SHADOW E&M-EST. PATIENT-LVL V: ICD-10-PCS | Mod: PBBFAC,,, | Performed by: STUDENT IN AN ORGANIZED HEALTH CARE EDUCATION/TRAINING PROGRAM

## 2023-12-19 PROCEDURE — 3078F DIAST BP <80 MM HG: CPT | Mod: CPTII,S$GLB,, | Performed by: STUDENT IN AN ORGANIZED HEALTH CARE EDUCATION/TRAINING PROGRAM

## 2023-12-19 PROCEDURE — 3061F PR NEG MICROALBUMINURIA RESULT DOCUMENTED/REVIEW: ICD-10-PCS | Mod: CPTII,S$GLB,, | Performed by: STUDENT IN AN ORGANIZED HEALTH CARE EDUCATION/TRAINING PROGRAM

## 2023-12-19 PROCEDURE — 3008F BODY MASS INDEX DOCD: CPT | Mod: CPTII,S$GLB,, | Performed by: STUDENT IN AN ORGANIZED HEALTH CARE EDUCATION/TRAINING PROGRAM

## 2023-12-19 PROCEDURE — 99999 PR PBB SHADOW E&M-EST. PATIENT-LVL V: CPT | Mod: PBBFAC,,, | Performed by: STUDENT IN AN ORGANIZED HEALTH CARE EDUCATION/TRAINING PROGRAM

## 2023-12-19 PROCEDURE — 3074F PR MOST RECENT SYSTOLIC BLOOD PRESSURE < 130 MM HG: ICD-10-PCS | Mod: CPTII,S$GLB,, | Performed by: STUDENT IN AN ORGANIZED HEALTH CARE EDUCATION/TRAINING PROGRAM

## 2023-12-19 PROCEDURE — 99214 OFFICE O/P EST MOD 30 MIN: CPT | Mod: S$GLB,,, | Performed by: STUDENT IN AN ORGANIZED HEALTH CARE EDUCATION/TRAINING PROGRAM

## 2023-12-19 PROCEDURE — 97140 MANUAL THERAPY 1/> REGIONS: CPT

## 2023-12-19 PROCEDURE — 3044F HG A1C LEVEL LT 7.0%: CPT | Mod: CPTII,S$GLB,, | Performed by: STUDENT IN AN ORGANIZED HEALTH CARE EDUCATION/TRAINING PROGRAM

## 2023-12-19 PROCEDURE — 97110 THERAPEUTIC EXERCISES: CPT

## 2023-12-19 PROCEDURE — 1159F PR MEDICATION LIST DOCUMENTED IN MEDICAL RECORD: ICD-10-PCS | Mod: CPTII,S$GLB,, | Performed by: STUDENT IN AN ORGANIZED HEALTH CARE EDUCATION/TRAINING PROGRAM

## 2023-12-19 PROCEDURE — 3066F PR DOCUMENTATION OF TREATMENT FOR NEPHROPATHY: ICD-10-PCS | Mod: CPTII,S$GLB,, | Performed by: STUDENT IN AN ORGANIZED HEALTH CARE EDUCATION/TRAINING PROGRAM

## 2023-12-19 PROCEDURE — 1159F MED LIST DOCD IN RCRD: CPT | Mod: CPTII,S$GLB,, | Performed by: STUDENT IN AN ORGANIZED HEALTH CARE EDUCATION/TRAINING PROGRAM

## 2023-12-19 PROCEDURE — 3044F PR MOST RECENT HEMOGLOBIN A1C LEVEL <7.0%: ICD-10-PCS | Mod: CPTII,S$GLB,, | Performed by: STUDENT IN AN ORGANIZED HEALTH CARE EDUCATION/TRAINING PROGRAM

## 2023-12-19 PROCEDURE — 99214 PR OFFICE/OUTPT VISIT, EST, LEVL IV, 30-39 MIN: ICD-10-PCS | Mod: S$GLB,,, | Performed by: STUDENT IN AN ORGANIZED HEALTH CARE EDUCATION/TRAINING PROGRAM

## 2023-12-19 PROCEDURE — 3061F NEG MICROALBUMINURIA REV: CPT | Mod: CPTII,S$GLB,, | Performed by: STUDENT IN AN ORGANIZED HEALTH CARE EDUCATION/TRAINING PROGRAM

## 2023-12-19 PROCEDURE — 3066F NEPHROPATHY DOC TX: CPT | Mod: CPTII,S$GLB,, | Performed by: STUDENT IN AN ORGANIZED HEALTH CARE EDUCATION/TRAINING PROGRAM

## 2023-12-19 PROCEDURE — 3078F PR MOST RECENT DIASTOLIC BLOOD PRESSURE < 80 MM HG: ICD-10-PCS | Mod: CPTII,S$GLB,, | Performed by: STUDENT IN AN ORGANIZED HEALTH CARE EDUCATION/TRAINING PROGRAM

## 2023-12-19 NOTE — PROGRESS NOTES
[facility]  Occupational Therapy Treatment Note    Date: 12/19/2023  Name: Hermelindo Garza III  Clinic Number: [id number]    Therapy Diagnosis:   Encounter Diagnosis   Name Primary?    Impaired range of motion of left elbow Yes     Physician: Christen Marshall MD    Physician Orders: OT Eval and Treat  Medical Diagnosis: S53.20XA (ICD-10-CM) - Partial tear of radial collateral ligament of elbow; M77.12 (ICD-10-CM) - Left lateral epicondylitis   Surgical Procedure and Date:   1. Left elbow lateral epicondyle debridement with extensor tendon repair, cpt 97587  2. Left elbow lateral radial collateral ligament reconstruction, cpt 31155, 12/5/23 / Date of Injury/Onset: progressive onset   Evaluation Date: 12/14/2023  Insurance Authorization Period Expiration: 12/31/2023  Plan of Care Expiration: 3/8/24 (12 weeks)  Date of Return to MD: 12/20/2023    Visit # / Visits authorized: 3 / 6  FOTO: To be assessed next session.     Precautions: Standard, Diabetes, and Weightbearing, [place] pgs. 351 & 490     Time In: 8:14 am  Time Out: 8:45 am  Total Billable Time: 31 minutes    SUBJECTIVE     Pt reports: Doing well, no pain.   He was compliant with home exercise program given last session.   Response to previous treatment: First treatment  Functional change: none noted yet    Pain: 0/10  Location: left elbow    OBJECTIVE   Objective Measures updated at progress report unless specified.    Observation/Appearance: Orthosis intact. Incision healing well, no significant signs of infection noted.      Edema. Measured in centimeters.    12/14/2023 12/14/2023     Left Right   Elbow Crease NT NT      Elbow and Wrist ROM. Measured in degrees.    12/14/2023     Left   Elbow Ext/Flex (pronated forearm) -30/100   *Supination/Pronation 0/WFL   *Wrist Ext/Flex 35/neutral   Wrist RD/UD NT   *With elbow flexed and arm adducted      Hand ROM. WFL - Pt able to make full composite fists with B/L hands and  demonstrates thumb opposition WFL.      Strength (Dynamometer)   Measured in pounds to POP.    12/14/2023 12/14/2023     Left Right   Rung II NT NT      Sensation. To be assessed next session.      Manual Muscle Test    12/14/2023 12/14/2023     Left Right   Wrist Extension  NT NT   Wrist Flexion       Radial Deviation       Ulnar Deviation       Supination       Pronation       Elbow Extension       Elbow Flexion          Limitation/Restriction for FOTO Elbow Survey     To be assessed next session.            Treatment     Hermelindo received the treatments listed below:     Manual therapy techniques: Soft tissue Mobilization and Friction Massage were applied to the: L elbow for 8 minutes, including:  - Performed and instructed on gentle scar massage to incision to decrease adhesions and improve tensile glide.   - Performed soft tissue mobilization/massage to forearm to relieve associated soft tissue tightness, improve joint mobility,      Therapeutic exercises to develop endurance, ROM, and flexibility for 23 minutes, including:  - Short range, protected elbow ROM while laying supine with forearm in pronation 3 x 10 reps  - Pronation and supination to neutral 2 x 10 reps   - Gentle full fist x 20 reps  - Gentle wrist flex/ext while laying supine with elbow adducted, flexed, and forearm in pronation 2 x 10 reps     *Applied foam padding and edge tape to orthosis to decrease pressure areas. Re-issued stockinette sleeves.      Patient Education and Home Exercises      Education provided:   - HEP in place  - Progress towards goals     Written Home Exercises Provided: Patient instructed to cont prior HEP.  Exercises were reviewed and Hermelindo was able to demonstrate them prior to the end of the session.  Hermelindo demonstrated good  understanding of the HEP provided. See EMR under Patient Instructions for exercises provided during therapy sessions.      ASSESSMENT     Pt returns for his first follow-up visit this date  since eval. He participated well and endorses good adherence to HEP. He is able to perform pain-free, short range elbow ROM. Will progress as tolerated and per protocol.     Hermelindo is progressing well towards his goals and there are no updates to goals at this time. Pt prognosis is Good.     Pt will continue to benefit from skilled outpatient occupational therapy to address the deficits listed in the problem list on initial evaluation, provide pt/family education and to maximize pt's level of independence in the home and community environment.     Pt's spiritual, cultural and educational needs considered and pt agreeable to plan of care and goals.    Anticipated barriers to occupational therapy: scheduling constraints due to work     Goals:   The following goals were discussed with the patient and patient is in agreement with them as to be addressed in the treatment plan.   Long Term Goals (LTGs); to be met by discharge.  LTG #1: Pt will report a pain level of 1-3 out of 10 at worst with arm use. progressing not met 12/19/2023  LTG #2: FOTO to be administered and assessed next session with updates to goals as needed. progressing not met 12/19/2023  LTG #3: Pt will return to prior level of function for IADLs and household management. progressing not met 12/19/2023  LTG #4: Assess MMT and  strength when appropriate and update goals as needed with focus on return to swimming and gym exercises. progressing not met 12/19/2023     Short Term Goals (STGs); to be met within 4 weeks (1/12/24).  STG #1: Pt will report a pain level of 4 out of 10 at worst with arm use. progressing not met 12/19/2023  STG #2: Pt will report/demo [place] with ADLs. progressing not met 12/19/2023  STG #3: Pt will demonstrate independence with issued HEP, orthosis wear, and modalities for pain/symptom management. progressing not met 12/19/2023  STG #4: Pt will demo improved L elbow SURESH by 30-60 degrees needed to aid with buttoning  collar. progressing not met 12/19/2023  STG #5: Pt will demo improved L forearm SURESH by 30-40 degrees needed to aid with showering. progressing not met 12/19/2023    PLAN     Continue skilled occupational therapy with individualized plan of care focusing on maximizing functional use of patient's left arm.    Updates/Grading for next session: progress as tolerated and per protocol.    Connie Vasquez, OTR/L

## 2023-12-19 NOTE — PROGRESS NOTES
[facility] -  Established Patient Follow-up    Referred by: No ref. provider found   Reason for referral: Myofascial pain     CC:   Chief Complaint   Patient presents with    Hip Pain         12/19/2023     9:42 AM 4/20/2023    10:29 AM   Last 3 PDI Scores   Pain Disability Index (PDI) 32 17     Interval update 12/19/23  Patient returns today for follow up.  He is status post right GTB and right intra-articular hip injections on 11/29/2023 and notes improvement in his GTB and hip pain.  Today he complains of pain extending from the area overlying the right hip joint and extending down the anterolateral portion of the thigh.  This pain is worse with activity.  Pain improves with lying flat.  He rates his pain 7/10 today.      Of note, patient recently underwent left lateral collateral ligament reconstruction for left radial collateral ligament tear on 12/05/2023 with Dr. Marshall.    11/13/23 Pt returns today complaining of right hip and right leg pain. Pain is located over the lateral right hip and radiates into the groin.  Pain started approximately 1 month ago. Pain has not improved with stretching and HEP.  He states his hip pain is worse than his lower back pain for which he is scheduled to have an NELI at the end of the month. He would like to address the hip pain first.      Subjective:   Hermelindo Garza III is a 43 y.o. male who presents complaining of both neck and back pain.  Today he reports his neck pain is most bothersome.  He reports muscle tightness and pain in the left trapezius and shoulder area.  He reports pain that radiates into the bilateral upper extremities with numbness and tingling.  He has been evaluated by Dr. Sierra with Neurosurgery who noted patient chris  candidate for ACDFs in he progresses.  Pt reports undergoing a cervical epidural steroid injection with Dr. Escamilla 02/10/2021 on with several months of relief.  He also reports low back pain.  He is s/p  Left L3-4 hemilaminotomy, medial facetectomy, and foraminotomy for microdiscectomy with Dr Mcnamara on 9/19/2021.  He reports his back pain improved after surgery.  Patient reports he is previously completed physical therapy and is now actively continuing with his exercise program in the gym.  He also reports trying to lose weight and reports intentional  weight loss of approximately 55 pounds since 2020.    Location: back, shoulder, and neck   Onset: 2 years  Current Pain Score: 5/10  Daily Pain of Range: 5-6/10  Quality: Aching, Tight, Tingling, Deep, Numb, Electric, Hot, and Cold  Radiation: neck and back  Worsened by: lying down, sitting, standing for more than 3 minutes, and walking for more than 6 minutes  Improved by: medications    Patient denies night fever/night sweats, urinary incontinence, bowel incontinence, significant weight loss, significant motor weakness, and loss of sensations.    Previous Interventions:  - 02/10/2021 -  C6/7 CERVICAL EPIDURAL STEROID INJECTION - Dr Escamilla - 4 months of pain relief.   -11/29/2023 -  right GTB and right intra-articular hip injections - 40% relief    Previous Therapies:  PT/OT: yes   Chiropractor:   HEP: yes  Relevant Surgery: yes    - Reports L4/L5 surgery at age 31 yo   - 09/19/21 - Left MIS L3-4 hemilaminotomy, medial facetectomy, and foraminotomy for microdiscectomy with Dr Mcnamara.   Previous Medications:   - NSAIDS: Meloxicam  - Muscle Relaxants: Robaxin, Flexeril  - TCAs:   - SNRIs:   - Topicals:   - Anticonvulsants: Lyrica - reports RL sxs so stopped; Gabapentin   - Opioids:   - Adjuvants: Tylenol    Current Pain Medications:  Gabapentin 300 mg QHS  Meloxicam  Tylenol       Review of Systems:  ROS    GENERAL:  No weight loss, malaise or fevers. +obesity +DM  HEENT:   No recent changes in vision or hearing  NECK:  No difficulty with swallowing. No stridor.   RESPIRATORY:  Negative for cough, wheezing or shortness of breath, patient denies any recent URI.  +Asthma  CARDIOVASCULAR:  Negative for chest pain, leg swelling or palpitations.  GI/:  Negative for abdominal discomfort, blood in stools or black stools or change in bowel habits.   MUSCULOSKELETAL:  See HPI.  SKIN:  Negative for lesions, rash, and itching.  PSYCH:  No mood disorder or recent psychosocial stressors.  +anxiety +ADD  HEMATOLOGY/LYMPHOLOGY:  Negative for prolonged bleeding, bruising easily or swollen nodes.  Patient is not currently taking any anti-coagulants  NEURO:   No history of headaches, syncope, paralysis, seizures or tremors.  All other reviewed and negative other than HPI.    History:  Current medications, allergies, medical history, surgical history,   family history, and social history were reviewed in the chart as marked.    Full Medication List:    Current Outpatient Medications:     acetaminophen (TYLENOL) 325 MG tablet, Take 650 mg by mouth every 6 (six) hours as needed for Pain., Disp: , Rfl:     albuterol (PROVENTIL/VENTOLIN HFA) 90 mcg/actuation inhaler, INHALE 2 PUFFS INTO THE LUNGS EVERY 6 HOURS AS NEEDED WHEEZING, Disp: 8.5 g, Rfl: 5    atorvastatin (LIPITOR) 20 MG tablet, Take 1 tablet (20 mg total) by mouth once daily., Disp: 90 tablet, Rfl: 1    azelastine (ASTELIN) 137 mcg (0.1 %) nasal spray, 2 sprays (274 mcg total) by Nasal route 2 (two) times daily., Disp: 30 mL, Rfl: 9    blood sugar diagnostic (TRUE METRIX GLUCOSE TEST STRIP) Strp, Use to test blood glucose one (1) time a day,, Disp: 100 strip, Rfl: 3    blood-glucose meter Misc, use as directed, Disp: 1 each, Rfl: 0    cyclobenzaprine (FLEXERIL) 10 MG tablet, Take 10 mg by mouth 3 (three) times daily., Disp: , Rfl:     cyclobenzaprine (FLEXERIL) 10 MG tablet, Take 1 tablet (10 mg total) by mouth 3 (three) times daily as needed for Muscle spasms., Disp: 90 tablet, Rfl: 1    dexmethylphenidate (FOCALIN) 10 MG tablet, Take 1 tablet (10 mg total) by mouth 2 (two) times daily., Disp: 60 tablet, Rfl: 0     dexmethylphenidate (FOCALIN) 10 MG tablet, Take 1 tablet (10 mg total) by mouth 2 (two) times daily., Disp: 60 tablet, Rfl: 0    [START ON 1/12/2024] dexmethylphenidate (FOCALIN) 10 MG tablet, Take 1 tablet (10 mg total) by mouth 2 (two) times daily., Disp: 60 tablet, Rfl: 0    docusate sodium (COLACE) 100 MG capsule, Take 1 capsule (100 mg total) by mouth 2 (two) times daily., Disp: 30 capsule, Rfl: 1    doxycycline (VIBRAMYCIN) 100 MG Cap, Take 1 capsule (100 mg total) by mouth 2 (two) times daily., Disp: 14 capsule, Rfl: 0    ergocalciferol, vitamin D2, (VITAMIN D ORAL), Take 1 tablet by mouth every other day., Disp: , Rfl:     fexofenadine (ALLEGRA) 180 MG tablet, Take 180 mg by mouth once daily., Disp: , Rfl:     fluocinonide (LIDEX) 0.05 % external solution, AAA scalp qday prn itching, scaling, Disp: 60 mL, Rfl: 3    gabapentin (NEURONTIN) 300 MG capsule, Take 1 capsule (300 mg total) by mouth 3 (three) times daily., Disp: 90 capsule, Rfl: 3    hydrocortisone 2.5 % cream, APPLY EXTERNALLY TO THE AFFECTED AREA TWICE DAILY FOR 10 DAYS, Disp: 30 g, Rfl: 0    ketoconazole (NIZORAL) 2 % cream, APPLY TOPICALLY TO THE AFFECTED AREA TWICE DAILY, Disp: 15 g, Rfl: 0    lamoTRIgine (LAMICTAL) 100 MG tablet, Take 1 tablet (100 mg total) by mouth 2 (two) times daily. IF any RASH, STOP ALL Lamictal and Tell Psyc MD., Disp: 180 tablet, Rfl: 1    lancets 33 gauge Misc, Use to test blood glucose one (1) time a day,, Disp: 100 each, Rfl: 0    latanoprost 0.005 % ophthalmic solution, Place 1 drop into both eyes every evening., Disp: 7.5 mL, Rfl: 3    LORazepam (ATIVAN) 0.5 MG tablet, Take 1 tablet (0.5 mg total) by mouth daily as needed (SIGNIFICANT ANXIETY)., Disp: 30 tablet, Rfl: 3    meloxicam (MOBIC) 15 MG tablet, TAKE 1 TABLET(15 MG) BY MOUTH EVERY DAY as needed FOR PAIN, Disp: 30 tablet, Rfl: 2    montelukast (SINGULAIR) 10 mg tablet, Take 1 tablet (10 mg total) by mouth every evening., Disp: 90 tablet, Rfl: 3     ondansetron (ZOFRAN) 8 MG tablet, Take 1 tablet (8 mg total) by mouth every 8 (eight) hours as needed for Nausea., Disp: 30 tablet, Rfl: 0    ondansetron (ZOFRAN-ODT) 4 MG TbDL, Dissolve 1 tablet (4 mg total) by mouth every 6 (six) hours as needed (nausea)., Disp: 30 tablet, Rfl: 0    rOPINIRole (REQUIP) 0.25 MG tablet, TAKE 1 TABLET(0.25 MG) BY MOUTH EVERY EVENING, Disp: 90 tablet, Rfl: 2    semaglutide (OZEMPIC) 0.25 mg or 0.5 mg (2 mg/3 mL) pen injector, Inject 0.5 mg into the skin every 7 days., Disp: 3 each, Rfl: 1    sod picosulf-mag ox-citric ac (CLENPIQ) 10 mg-3.5 gram- 12 gram/160 mL Soln, Take As Directed., Disp: 320 mL, Rfl: 0    ULTRA THIN LANCETS 30 gauge Misc, USE TO TEST BLOOD SUGAR EVERY DAY AS DIRECTED, Disp: 100 each, Rfl: 3    EScitalopram oxalate (LEXAPRO) 10 MG tablet, Take 1 tablet (10 mg total) by mouth once daily., Disp: 90 tablet, Rfl: 0    HYDROcodone-acetaminophen (NORCO) 5-325 mg per tablet, Take 1 tablet by mouth every 6 (six) hours as needed for Pain. (Patient not taking: Reported on 12/19/2023), Disp: 30 tablet, Rfl: 0    tamsulosin (FLOMAX) 0.4 mg Cap, Take 1 capsule (0.4 mg total) by mouth once daily for 10 days, Disp: 10 capsule, Rfl: 0  No current facility-administered medications for this visit.    Facility-Administered Medications Ordered in Other Visits:     0.9%  NaCl infusion, 500 mL, Intravenous, Continuous, Kolton Terrell MD    mupirocin 2 % ointment, , Nasal, On Call Procedure, Chong Padron MD, Given at 09/15/22 0553    ondansetron injection 4 mg, 4 mg, Intravenous, Once PRN, Arron Rangel MD     Allergies:  Patient has no known allergies.     Medical History:   has a past medical history of ADD (attention deficit disorder) (05/09/2012), Allergy, Anxiety (4/12/2023), Asthma (05/09/2012), Cervical disc disorder with radiculopathy, unspecified cervical region (09/14/2021), Cervical spondylosis with myelopathy (06/29/2022), Eczema, Esophageal ulcer, GERD (gastroesophageal  reflux disease) (05/09/2012), Glaucoma, Kidney stones (05/2014), Lumbar disc disease (05/09/2012), Lumbar herniated disc (05/09/2012), Malignant melanoma of skin, unspecified (01/06/2022), Melanoma (01/2022), LUBA (obstructive sleep apnea), Radiculopathy, lumbar region (09/14/2021), Renal calculi (05/09/2012), and Type 2 diabetes mellitus without complication, without long-term current use of insulin (11/16/2020).    Surgical History:   has a past surgical history that includes Lumbar laminectomy (2010); Esophagogastroduodenoscopy (01/22/2019); Colonoscopy (01/22/2019); Appendectomy (2006); Epidural steroid injection (N/A, 02/10/2021); Esophagogastroduodenoscopy (N/A, 05/18/2021); Lumbar laminectomy with discectomy (Left, 09/20/2021); Carpal tunnel release (Right, 09/15/2022); Excision of ganglion of wrist (Right, 09/15/2022); Hand Arthrotomy (Right, 09/15/2022); Injection of steroid (Left, 09/15/2022); Flexor tendon repair (Right, 09/15/2022); Tympanostomy tube placement; Epidural steroid injection into cervical spine (N/A, 6/28/2023); Cystoscopy (7/5/2023); Ureteral stent placement (Left, 7/5/2023); Ureteroscopy (Left, 7/5/2023); Laser lithotripsy (Left, 7/5/2023); Ureteroscopic removal of ureteric calculus (Left, 7/5/2023); Correction of hammer toe (Left, 8/14/2023); Epidural steroid injection into lumbar spine (N/A, 9/27/2023); Epidural steroid injection into cervical spine (N/A, 11/3/2023); Injection of joint (Right, 11/29/2023); Reconstruction of ligament (Left, 12/5/2023); and Repair of extensor tendon (Left, 12/5/2023).    Family History:  family history includes Allergic rhinitis in his father, mother, sister, and sister; Asthma in his father, sister, and sister; Brain cancer in his maternal grandmother; Breast cancer in his paternal grandmother; Chronic back pain in his father; Diabetes in his maternal grandfather; MARTITA disease in his father; Hypertension in his mother; Melanoma in his father; No Known  "Problems in his brother, maternal aunt, maternal uncle, paternal aunt, paternal grandfather, and paternal uncle; Sleep apnea in his father and mother; Transient ischemic attack in his mother.    Social History:   reports that he quit smoking about 5 years ago. His smoking use included cigarettes. He has never been exposed to tobacco smoke. He has never used smokeless tobacco. He reports current alcohol use. He reports that he does not use drugs.    Physical Exam:  Vitals:    12/19/23 0940   BP: 114/73   Pulse: 88   Weight: 114.9 kg (253 lb 4.9 oz)   Height: 5' 5" (1.651 m)   PainSc:   7   PainLoc: Hip         GENERAL: Well appearing, in no acute distress, alert and oriented x3.   PSYCH:  Mood and affect appropriate.  SKIN: Skin color, texture, turgor normal, no rashes or lesions.  HEAD/FACE:  Normocephalic, atraumatic. Cranial nerves grossly intact.  NECK: Supple.  Reports pain with extension of the neck and lateral rotation.  Spurling's positive.  Tenderness and tightness noted over the left trapezius muscle.    CV: RRR with palpation of the radial artery.  PULM: No evidence of respiratory difficulty, symmetric chest rise.  GI:  Soft and non-distended.  MSK:   Left arm in hard cast. Lumbar surgical incision is well healed.  Tenderness noted in the distribution of the right tensor fascia ashtyn muscle.  No tenderness over the RIGHT GTB.  No deformities, edema, or skin discoloration.  No atrophy or tone abnormalities are noted.   NEURO: Bilateral upper and lower extremity coordination and strength is symmetric.  No loss of sensation is noted.  MENTAL STATUS: A x O x 3, good concentration, speech is fluent and goal directed  MOTOR: 5/5 in all muscle groups  GAIT: normal. Ambulates unassisted.    Imaging:  XR CERVICAL SPINE AP LAT WITH FLEX EXTEN     CLINICAL HISTORY:  Other spondylosis with myelopathy, cervical region     TECHNIQUE:  Three views of the cervical spine plus flexion and extension views were performed.   "   COMPARISON:  September 8, 2022     FINDINGS:  As before, suboptimal visualization of the cervicothoracic junction due to superimposition of shoulder and chest wall soft tissues.  Straightening of normal cervical lordosis.  No acute fractures.  Unremarkable predental space.  No widening prevertebral soft tissues.  No anterior or retrolisthesis.  Flexion and extension views demonstrate no instability.  Reconfirmed areas of anterior longitudinal ligament calcification adjacent to C4-C5 and C5-C6 disc.  Reconfirmed findings of uncovertebral spurring and endplate osteophytes at preserved C4-C5 level and mildly narrowed C5-C6 level and mild to moderately narrowed C6-C7 level.  Some stable scattered mild facet arthropathic changes.  Intact odontoid tip and unremarkable C1-C2 articulation.  Cervical spine findings do not appear significantly changed to earlier exam.     Electronically signed by: Giovanny Busby  Date:                                            12/13/2022      MRI CERVICAL SPINE WITHOUT CONTRAST     CLINICAL HISTORY:  Arm pain, paresthesias, clumsiness;.  Cervical disc disorder with radiculopathy, unspecified cervical region     TECHNIQUE:  Multiplanar, multisequence MR images of the cervical spine were acquired without the administration of contrast.     COMPARISON:  Cervical spine MRI 01/12/2021     FINDINGS:  Straightening of the normal cervical lordosis.  No spondylolisthesis.     No compression fractures.  No marrow replacing lesions.     Multilevel degenerative changes with disc desiccation and disc space narrowing, described in detail below.  No bone marrow edema.     Visualized structures in the posterior fossa are unremarkable. The cervical spinal cord is unremarkable.     Paraspinal soft tissues are unremarkable.     SIGNIFICANT FINDINGS BY LEVEL:     C2-3: Small disc osteophyte complex with central annular fissure.  No canal or foraminal stenosis.     C3-4: Disc osteophyte complex with superimposed  central extrusion.  Mild canal stenosis with partial effacement of the thecal sac.  Mild bilateral foraminal stenosis.     C4-5: Disc osteophyte complex.  Moderate canal stenosis with near complete effacement of the thecal sac and flattening of the cervical cord.  Moderate bilateral foraminal stenosis.     C5-6: Disc osteophyte complex, eccentric to the right.  Severe canal stenosis with complete effacement of the thecal sac and flattening of the cervical cord.  Severe right and moderate left foraminal stenosis.     C6-7: Disc osteophyte complex.  Moderate canal stenosis.  Moderate bilateral foraminal stenosis.     C7-T1: Disc osteophyte complex with superimposed right foraminal extrusion.  Mild canal stenosis.  Mild right foraminal stenosis.     Impression:     Multilevel degenerative changes, most advanced at C5-6 where there is severe canal stenosis and severe right and moderate left foraminal stenosis.  Overall, degenerative changes have progressed from 01/12/2021.     This report was flagged in [employer] as abnormal.     Electronically signed by: Horace Quiroz  Date:                                            06/16/2022      XR LUMBAR SPINE AP AND LAT WITH FLEX/EXT     CLINICAL HISTORY:  Other specified postprocedural states     TECHNIQUE:  AP and lateral views as well as lateral flexion and extension images are performed through the lumbar spine.     COMPARISON:  Non 06/16/2022 MRI of the lumbar spine e     FINDINGS:  Patient had previous a L3/L4 hemilaminectomy on the left side.     Mild DJD and mild lumbar scoliosis.  The lumbar intervertebral disc spaces are slightly narrowed.  Few mm L2/L3 and L3/L4 retrolisthesis noted.  No fracture or dislocation.  No bone destruction identified     Electronically signed by: Xander Rubio MD  Date:                                            12/13/2022    MRI LUMBAR SPINE WITHOUT CONTRAST     CLINICAL HISTORY:  Lumbar radiculopathy, symptoms persist with conservative  treatment;Low back pain, prior surgery, new symptoms; Dorsalgia, unspecified     TECHNIQUE:  Multiplanar, multisequence MR images were acquired from the thoracolumbar junction to the sacrum without the administration of contrast.     COMPARISON:  MRI lumbar spine 09/19/2021     FINDINGS:  Sequences are degraded by patient motion artifact.     Transitional lumbosacral anatomy with lumbarization of S1.     Postoperative changes from left L3-4 hemilaminectomy and micro discectomy.  Mild protrusion of the thecal sac and cauda equina nerve roots into the laminectomy defect.  No fluid collections in the operative bed.     No spondylolisthesis.     No compression fractures.  Small fat containing lesion in the L3 vertebral body, likely a hemangioma.     Multilevel degenerative changes with disc desiccation and disc space narrowing, described in detail below.  No significant bone marrow edema.     The conus medullaris has a normal appearance and terminates at the L2 level.  Cauda equina nerve roots are unremarkable.     Paraspinal muscle atrophy.     SIGNIFICANT FINDINGS BY LEVEL:     T12-L1: Mild disc bulge.  Minimal canal stenosis.  No foraminal stenosis.     L1-2: Left facet arthropathy.  No canal or foraminal stenosis.     L2-3: Disc bulge.  Bilateral facet arthropathy and ligamentum flavum thickening.  Mild canal stenosis.  Mild left foraminal stenosis.     L3-4: Residual disc bulge versus granulation tissue.  The previous disc fragment migrating inferiorly is no longer seen.  Bilateral facet arthropathy and ligamentum flavum thickening.  Mild canal stenosis with narrowing of both subarticular zones and abutment of the left descending L4 nerve root.  Mild right and moderate left foraminal stenosis.     L4-5: Disc bulge.  Bilateral facet arthropathy and ligamentum flavum thickening.  Mild canal stenosis.  Moderate bilateral foraminal stenosis.     L5-S1: Disc bulge.  Bilateral facet arthropathy and ligamentum flavum  thickening.  Mild canal stenosis.  Moderate bilateral foraminal stenosis.     Impression:     Postoperative changes from left L3-4 hemilaminectomy and micro discectomy.  The previous disc fragment migrating inferiorly is no longer seen.  There is a residual disc bulge/granulation tissue contributing to mild canal stenosis and moderate left foraminal stenosis.     Small meningocele at the laminectomy defect.     Degenerative changes elsewhere in the spine resulting in mild canal stenosis and moderate foraminal stenosis at multiple levels as described above.        Electronically signed by: Horace Quiroz  Date:                                            06/16/2022    EXAMINATION:  MRI LUMBAR SPINE WITHOUT CONTRAST     CLINICAL HISTORY:  Low back pain, prior surgery, new symptoms; Dorsalgia, unspecified     TECHNIQUE:  Multiplanar, multisequence MR images were acquired from the thoracolumbar junction to the sacrum without the administration of contrast.     COMPARISON:  MRI lumbar spine 06/16/2023     FINDINGS:  Postoperative change left L3-L4 hemilaminectomy and discectomy.  Mild protrusion of the thecal sac and cauda equina nerve roots into the laminectomy defect similar to prior.     Alignment: Normal.     Vertebrae: Endplate degenerative change L5-S1.  Small may angioma L3 vertebral body.  No acute fracture.  No infiltrative marrow process.     Discs: Multilevel disc height loss and desiccation most pronounced at L5-S1.     Cord: Normal.  Conus terminates at L1.     Soft tissue structures are unremarkable.  Limited evaluation of the retroperitoneal organs demonstrates no significant abnormalities.  Paraspinal musculature demonstrates normal bulk and signal intensity.     Degenerative findings:     T12-L1: Mild circumferential disc bulge no neural foraminal narrowing or spinal canal stenosis.     L1-L2: No neural foraminal narrowing or spinal canal stenosis.     L2-L3: Circumferential disc bulge.  Bilateral facet  arthropathy.  No neural foraminal narrowing.  Mild spinal canal stenosis.     L3-L4: Postop change.  Circumferential disc bulge.  Bilateral facet arthropathy.  Moderate left and mild right neural foraminal narrowing.  Mild spinal canal stenosis     L4-L5: Circumferential disc bulge.  Bilateral facet arthropathy.  Moderate right and severe left neural foraminal narrowing.  Mild to moderate spinal canal stenosis.     L5-S1: Circumferential disc bulge.  Bilateral facet arthropathy.  Moderate bilateral neural foraminal narrowing.  Mild spinal canal stenosis.     Impression:     1. Postoperative change left L3-L4 hemilaminectomy and discectomy.  2. Multilevel degenerative change of the lumbar spine as above not significantly changed from prior MRI 06/16/2023, noting severe left foraminal narrowing at L4-5.     Electronically signed by resident: Hammad Quinn  Date:                                            10/23/2023  Labs:  BMP  Lab Results   Component Value Date     10/11/2023    K 4.1 10/11/2023     10/11/2023    CO2 26 10/11/2023    BUN 18 10/11/2023    CREATININE 0.7 10/11/2023    CALCIUM 9.5 10/11/2023    ANIONGAP 10 10/11/2023    EGFRNORACEVR >60.0 10/11/2023     Lab Results   Component Value Date    ALT 39 10/11/2023    AST 24 10/11/2023    ALKPHOS 94 10/11/2023    BILITOT 0.7 10/11/2023     Lab Results   Component Value Date    WBC 13.22 (H) 07/04/2023    HGB 15.2 07/04/2023    HCT 45.2 07/04/2023    MCV 89 07/04/2023     07/04/2023             Assessment:  Problem List Items Addressed This Visit          Neuro    Chronic pain    Relevant Orders    Ambulatory referral/consult to Rheumatology    Ambulatory referral[facility]ult to [facility]    Lumbar radiculopathy, chronic    Relevant Orders    Ambulatory referral/consult to [facility]    Cervical radiculopathy       Psychiatric    Anxiety    Relevant Orders    Ambulatory referral[facility]consult to [facility]     Other Visit Diagnoses       Myofascial pain    -  Primary    Hip pain, unspecified laterality        Greater trochanteric bursitis, unspecified laterality        Right hip pain        Lumbar radiculopathy        Myofascial neck pain                  04/20/2032-  Hermelindo Garza III is a 43 y.o. male who  has a past medical history of ADD (attention deficit disorder) (05/09/2012), Allergy, Anxiety (4/12/2023), Asthma (05/09/2012), Cervical disc disorder with radiculopathy, unspecified cervical region (09/14/2021), Cervical spondylosis with myelopathy (06/29/2022), Eczema, Esophageal ulcer, GERD (gastroesophageal reflux disease) (05/09/2012), Glaucoma, Kidney stones (05/2014), Lumbar disc disease (05/09/2012), Lumbar herniated disc (05/09/2012), Malignant melanoma of skin, unspecified (01/06/2022), Melanoma (01/2022), LUBA (obstructive sleep apnea), Radiculopathy, lumbar region (09/14/2021), Renal calculi (05/09/2012), and Type 2 diabetes mellitus without complication, without long-term current use of insulin (11/16/2020).  By history and examination this patient has chronicneck pain with bilateral radiculopathy as well as low back pain s/p 9/19/2021 Left L3-4 hemilaminotomy, medial facetectomy, and foraminotomy for microdiscectomy with Dr Mcnamara with some improvement. The underlying cause cause is facet arthritis, degenerative disc disease, foraminal stenosis, central canal stenosis, muscle dysfunction, and deconditioning.  MRI of the cervical spine was significant for multilevel degenerative changes, most advanced at C5-6 where there is severe canal stenosis and severe right and moderate left foraminal stenosis. MRI of the Lumbar spine post surgery was significant for residual disc bulge/granulation tissue contributing to mild canal stenosis and moderate left foraminal stenosis at L3/L4 and degenerative changes elsewhere in the spine resulting in mild canal stenosis and moderate foraminal  stenosis at multiple levels.  We discussed the underlying diagnoses and multiple treatment options including non-opioid medications, interventional procedures, physical therapy, home exercise, core muscle enhancement, and weight loss.  He previously underwent a cervical steroid injection with several months of relief.  Plan for repeat of cervical epidural steroid injection.  The risks and benefits of each treatment option were discussed and all questions were answered.        11/13/2023 - Hermelindo Garaz III presents for evaluation of right hip pain. His exam was consistent with Right GTB bursitis and right hip joint pain.  I will schedule him for a right GTB and Right intraarticular hip injection and defer the LESI for now.  Pt tearful and anxious throughout exam.  He is established with a Psychiatrist.  We discussed referral Psychology for possible CBT and assistance with coping strategies for chronic pain.     12/19/2023 - Hermelindo Garza III presents today for follow-up.  He is status post  right GTB and right intra-articular hip injections on 11/29/2023 and notes improvement in his GTB and hip pain.  Today he complains of pain extending from the area overlying the right hip joint and extending down the anterolateral portion of the thigh  in the distribution of the tensor fascia ashtyn.   Patient has multiple pain generators  as well as concurrent anxiety and mood disorder.  I feel that he would be an excellent candidate for our functional restoration program.  Discussed with patient today during visit.  I will place a referral to the functional restoration program.   I also discussed with the patient that it would be a good to have him see Rheumatology given his extensive pain history, as perhaps they can offer their expertise.     Treatment Plan:   Procedures:  None at this time.   PT/OT/HEP: I have stressed the importance of physical activity and a home exercise plan to help with pain and improve  health.  He is previously completed physical therapy.  I feel the patient would significantly benefit from enrollment in the functional restoration program.  Referral placed.    Medications:   - Flexeril 10 mg t.i.d. p.r.n.  - continue with gabapentin 300 mg TID   - continue with meloxicam 15 mg q.d.   - continue with p.r.n. Tylenol   -  Reviewed and consistent with medication use as prescribed.  Referrals:  I had previously placed a referral to Psychology for possible CBT and assistance with coping strategies for chronic pain.  I have placed a referral to the functional restoration program for this patient.  I have also placed a referral to Rheumatology as this patient reports multiple generators of pain.  Prior labs showed an elevated sed rate, however rheumatoid factor was negative.  Would appreciate any help Rheumatology would provide in caring for this patient.   Imaging:  Reviewed and discussed with the patient.  Follow Up: RTC 6-8 weeks    Lorraine Patel DO   Interventional Pain Medicine / Anesthesiology    Disclaimer: This note was partly generated using dictation software which may occasionally result in transcription errors.

## 2023-12-20 ENCOUNTER — PATIENT MESSAGE (OUTPATIENT)
Dept: ORTHOPEDICS | Facility: CLINIC | Age: 43
End: 2023-12-20

## 2023-12-20 ENCOUNTER — OFFICE VISIT (OUTPATIENT)
Dept: ORTHOPEDICS | Facility: CLINIC | Age: 43
End: 2023-12-20
Payer: COMMERCIAL

## 2023-12-20 ENCOUNTER — TELEPHONE (OUTPATIENT)
Dept: ENDOSCOPY | Facility: HOSPITAL | Age: 43
End: 2023-12-20

## 2023-12-20 DIAGNOSIS — Z09 FOLLOW-UP EXAMINATION AFTER ORTHOPEDIC SURGERY: Primary | ICD-10-CM

## 2023-12-20 DIAGNOSIS — S53.20XA PARTIAL TEAR OF RADIAL COLLATERAL LIGAMENT OF ELBOW: ICD-10-CM

## 2023-12-20 PROCEDURE — 3061F PR NEG MICROALBUMINURIA RESULT DOCUMENTED/REVIEW: ICD-10-PCS | Mod: CPTII,S$GLB,, | Performed by: ORTHOPAEDIC SURGERY

## 2023-12-20 PROCEDURE — 3044F PR MOST RECENT HEMOGLOBIN A1C LEVEL <7.0%: ICD-10-PCS | Mod: CPTII,S$GLB,, | Performed by: ORTHOPAEDIC SURGERY

## 2023-12-20 PROCEDURE — 3044F HG A1C LEVEL LT 7.0%: CPT | Mod: CPTII,S$GLB,, | Performed by: ORTHOPAEDIC SURGERY

## 2023-12-20 PROCEDURE — 3066F NEPHROPATHY DOC TX: CPT | Mod: CPTII,S$GLB,, | Performed by: ORTHOPAEDIC SURGERY

## 2023-12-20 PROCEDURE — 3066F PR DOCUMENTATION OF TREATMENT FOR NEPHROPATHY: ICD-10-PCS | Mod: CPTII,S$GLB,, | Performed by: ORTHOPAEDIC SURGERY

## 2023-12-20 PROCEDURE — 1159F MED LIST DOCD IN RCRD: CPT | Mod: CPTII,S$GLB,, | Performed by: ORTHOPAEDIC SURGERY

## 2023-12-20 PROCEDURE — 99024 POSTOP FOLLOW-UP VISIT: CPT | Mod: S$GLB,,, | Performed by: ORTHOPAEDIC SURGERY

## 2023-12-20 PROCEDURE — 1160F PR REVIEW ALL MEDS BY PRESCRIBER/CLIN PHARMACIST DOCUMENTED: ICD-10-PCS | Mod: CPTII,S$GLB,, | Performed by: ORTHOPAEDIC SURGERY

## 2023-12-20 PROCEDURE — 99024 PR POST-OP FOLLOW-UP VISIT: ICD-10-PCS | Mod: S$GLB,,, | Performed by: ORTHOPAEDIC SURGERY

## 2023-12-20 PROCEDURE — 99999 PR PBB SHADOW E&M-EST. PATIENT-LVL IV: ICD-10-PCS | Mod: PBBFAC,,, | Performed by: ORTHOPAEDIC SURGERY

## 2023-12-20 PROCEDURE — 3061F NEG MICROALBUMINURIA REV: CPT | Mod: CPTII,S$GLB,, | Performed by: ORTHOPAEDIC SURGERY

## 2023-12-20 PROCEDURE — 1159F PR MEDICATION LIST DOCUMENTED IN MEDICAL RECORD: ICD-10-PCS | Mod: CPTII,S$GLB,, | Performed by: ORTHOPAEDIC SURGERY

## 2023-12-20 PROCEDURE — 1160F RVW MEDS BY RX/DR IN RCRD: CPT | Mod: CPTII,S$GLB,, | Performed by: ORTHOPAEDIC SURGERY

## 2023-12-20 PROCEDURE — 99999 PR PBB SHADOW E&M-EST. PATIENT-LVL IV: CPT | Mod: PBBFAC,,, | Performed by: ORTHOPAEDIC SURGERY

## 2023-12-20 RX ORDER — LANCETS 33 GAUGE
EACH MISCELLANEOUS
Qty: 100 EACH | Refills: 5 | Status: SHIPPED | OUTPATIENT
Start: 2023-12-20

## 2023-12-20 RX ORDER — ONDANSETRON 4 MG/1
4 TABLET, ORALLY DISINTEGRATING ORAL EVERY 6 HOURS PRN
Qty: 30 TABLET | Refills: 2 | Status: SHIPPED | OUTPATIENT
Start: 2023-12-20 | End: 2024-02-26

## 2023-12-20 NOTE — TELEPHONE ENCOUNTER
No care due was identified.  Health [facility] Embedded Care Due Messages. Reference number: [id number].   12/20/2023 11:01:36 AM CST

## 2023-12-20 NOTE — PROGRESS NOTES
Hermelindo Garza III presents for post-operative evaluation of   Encounter Diagnoses   Name Primary?    Follow-up examination after orthopedic surgery Yes    Partial tear of radial collateral ligament of elbow    The patient is now 2 weeks 1 day s/p 12.5.23 Left elbow RCL repair and ECRB debridement with tendon repair.  Overall the patient reports doing well.  The patient reports appropriate postoperative soreness with well controlled overall pain.      PE:    AA&O x 4.  NAD  HEENT:  NCAT, sclera nonicteric  Lungs:  Respirations are equal and unlabored.  CV:  2+ bilateral upper and lower extremity pulses.  MSK: The incision is healing.  Full wrist and finger motion.  Neurovascularly intact and has 5/5 thenar and intrinsic musculature strength.        A/P: Status post above, doing well  1) Continue with OT, light use of left arm, elbow splint wear at night for 2 more weeks, while walking for 4 more weeks, can remove while sitting, work on gentle elbow AROM  2) F/U 4 weeks  3) Call with any questions/concerns in the interim        Christen Marshall MD    Please be aware that this note has been generated with the assistance of [facility] voice-to-text.  Please excuse any spelling or grammatical errors.

## 2023-12-20 NOTE — TELEPHONE ENCOUNTER
No care due was identified.  Health [facility] Embedded Care Due Messages. Reference number: [id number].   12/20/2023 7:42:24 AM CST

## 2023-12-20 NOTE — TELEPHONE ENCOUNTER
Spoke with patient about arrival time @.0700    Prep instructions reviewed: the day before the procedure, follow a clear liquid diet all day, then start the first 1/2 of prep at 5pm and take 2nd 1/2 of prep @. 0400 Pt must be completely NPO when prep completed            Medications: Do not take Insulin or oral diabetic medications the day of the procedure.  Take as prescribed: heart, seizure and blood pressure medication in the morning with a sip of water (less than an ounce).  Take any breathing medications and bring inhalers to hospital with you Leave all valuables and jewelry at home.     Wear comfortable clothes to procedure to change into hospital gown You cannot drive for 24 hours after your procedure because you will receive sedation for your procedure to make you comfortable.  A ride must be provided at discharge.

## 2023-12-21 ENCOUNTER — TELEPHONE (OUTPATIENT)
Dept: ADMINISTRATIVE | Facility: OTHER | Age: 43
End: 2023-12-21
Payer: COMMERCIAL

## 2023-12-21 ENCOUNTER — ANESTHESIA EVENT (OUTPATIENT)
Dept: ENDOSCOPY | Facility: HOSPITAL | Age: 43
End: 2023-12-21
Payer: COMMERCIAL

## 2023-12-21 ENCOUNTER — HOSPITAL ENCOUNTER (OUTPATIENT)
Facility: HOSPITAL | Age: 43
Discharge: HOME | End: 2023-12-21
Attending: INTERNAL MEDICINE | Admitting: INTERNAL MEDICINE
Payer: COMMERCIAL

## 2023-12-21 ENCOUNTER — ANESTHESIA (OUTPATIENT)
Dept: ENDOSCOPY | Facility: HOSPITAL | Age: 43
End: 2023-12-21
Payer: COMMERCIAL

## 2023-12-21 VITALS
BODY MASS INDEX: 41.65 KG/M2 | WEIGHT: 250 LBS | TEMPERATURE: 98 F | RESPIRATION RATE: 16 BRPM | SYSTOLIC BLOOD PRESSURE: 131 MMHG | HEIGHT: 65 IN | HEART RATE: 82 BPM | OXYGEN SATURATION: 99 % | DIASTOLIC BLOOD PRESSURE: 75 MMHG

## 2023-12-21 DIAGNOSIS — K21.9 ACID REFLUX: ICD-10-CM

## 2023-12-21 LAB
GLUCOSE SERPL-MCNC: 105 MG/DL (ref 70–110)
POCT GLUCOSE: 105 MG/DL (ref 70–110)

## 2023-12-21 PROCEDURE — 43239 EGD BIOPSY SINGLE/MULTIPLE: CPT | Mod: 51,,, | Performed by: INTERNAL MEDICINE

## 2023-12-21 PROCEDURE — 25000003 PHARM REV CODE 250: Performed by: INTERNAL MEDICINE

## 2023-12-21 PROCEDURE — 88305 TISSUE EXAM BY PATHOLOGIST: CPT | Mod: 59 | Performed by: PATHOLOGY

## 2023-12-21 PROCEDURE — 37000009 HC ANESTHESIA EA ADD 15 MINS: Performed by: INTERNAL MEDICINE

## 2023-12-21 PROCEDURE — 27200942: Performed by: INTERNAL MEDICINE

## 2023-12-21 PROCEDURE — 43239 EGD BIOPSY SINGLE/MULTIPLE: CPT | Performed by: INTERNAL MEDICINE

## 2023-12-21 PROCEDURE — 88305 TISSUE EXAM BY PATHOLOGIST: CPT | Mod: 26,,, | Performed by: PATHOLOGY

## 2023-12-21 PROCEDURE — D9220A PRA ANESTHESIA: ICD-10-PCS | Mod: CRNA,,, | Performed by: NURSE ANESTHETIST, CERTIFIED REGISTERED

## 2023-12-21 PROCEDURE — 37000008 HC ANESTHESIA 1ST 15 MINUTES: Performed by: INTERNAL MEDICINE

## 2023-12-21 PROCEDURE — 27201012 HC FORCEPS, HOT/COLD, DISP: Performed by: INTERNAL MEDICINE

## 2023-12-21 PROCEDURE — D9220A PRA ANESTHESIA: ICD-10-PCS | Mod: ANES,,, | Performed by: ANESTHESIOLOGY

## 2023-12-21 PROCEDURE — 45385 COLONOSCOPY W/LESION REMOVAL: CPT | Performed by: INTERNAL MEDICINE

## 2023-12-21 PROCEDURE — 63600175 PHARM REV CODE 636 W HCPCS: Performed by: NURSE ANESTHETIST, CERTIFIED REGISTERED

## 2023-12-21 PROCEDURE — 25000003 PHARM REV CODE 250: Performed by: NURSE ANESTHETIST, CERTIFIED REGISTERED

## 2023-12-21 PROCEDURE — 82962 GLUCOSE BLOOD TEST: CPT | Performed by: INTERNAL MEDICINE

## 2023-12-21 PROCEDURE — D9220A PRA ANESTHESIA: Mod: ANES,,, | Performed by: ANESTHESIOLOGY

## 2023-12-21 PROCEDURE — 45385 COLONOSCOPY W/LESION REMOVAL: CPT | Mod: ,,, | Performed by: INTERNAL MEDICINE

## 2023-12-21 PROCEDURE — D9220A PRA ANESTHESIA: Mod: CRNA,,, | Performed by: NURSE ANESTHETIST, CERTIFIED REGISTERED

## 2023-12-21 PROCEDURE — 91035 G-ESOPH REFLX TST W/ELECTROD: CPT | Mod: TC | Performed by: INTERNAL MEDICINE

## 2023-12-21 RX ORDER — LIDOCAINE HYDROCHLORIDE 20 MG/ML
INJECTION INTRAVENOUS
Status: DISCONTINUED | OUTPATIENT
Start: 2023-12-21 | End: 2023-12-21

## 2023-12-21 RX ORDER — DEXMEDETOMIDINE HYDROCHLORIDE 100 UG/ML
INJECTION, SOLUTION INTRAVENOUS
Status: DISCONTINUED | OUTPATIENT
Start: 2023-12-21 | End: 2023-12-21

## 2023-12-21 RX ORDER — PROPOFOL 10 MG/ML
VIAL (ML) INTRAVENOUS CONTINUOUS PRN
Status: DISCONTINUED | OUTPATIENT
Start: 2023-12-21 | End: 2023-12-21

## 2023-12-21 RX ORDER — PHENYLEPHRINE HYDROCHLORIDE 10 MG/ML
INJECTION INTRAVENOUS
Status: DISCONTINUED | OUTPATIENT
Start: 2023-12-21 | End: 2023-12-21

## 2023-12-21 RX ORDER — PROPOFOL 10 MG/ML
VIAL (ML) INTRAVENOUS
Status: DISCONTINUED | OUTPATIENT
Start: 2023-12-21 | End: 2023-12-21

## 2023-12-21 RX ORDER — SODIUM CHLORIDE 9 MG/ML
INJECTION, SOLUTION INTRAVENOUS CONTINUOUS
Status: DISCONTINUED | OUTPATIENT
Start: 2023-12-21 | End: 2023-12-21 | Stop reason: HOSPADM

## 2023-12-21 RX ORDER — SODIUM CHLORIDE 0.9 % (FLUSH) 0.9 %
10 SYRINGE (ML) INJECTION
Status: DISCONTINUED | OUTPATIENT
Start: 2023-12-21 | End: 2023-12-21 | Stop reason: HOSPADM

## 2023-12-21 RX ORDER — SODIUM CHLORIDE, SODIUM LACTATE, POTASSIUM CHLORIDE, CALCIUM CHLORIDE 600; 310; 30; 20 MG/100ML; MG/100ML; MG/100ML; MG/100ML
INJECTION, SOLUTION INTRAVENOUS CONTINUOUS PRN
Status: DISCONTINUED | OUTPATIENT
Start: 2023-12-21 | End: 2023-12-21

## 2023-12-21 RX ADMIN — PHENYLEPHRINE HYDROCHLORIDE 100 MCG: 10 INJECTION INTRAVENOUS at 08:12

## 2023-12-21 RX ADMIN — PHENYLEPHRINE HYDROCHLORIDE 200 MCG: 10 INJECTION INTRAVENOUS at 08:12

## 2023-12-21 RX ADMIN — Medication 70 MG: at 08:12

## 2023-12-21 RX ADMIN — Medication 30 MG: at 08:12

## 2023-12-21 RX ADMIN — PROPOFOL 200 MCG/KG/MIN: 10 INJECTION, EMULSION INTRAVENOUS at 08:12

## 2023-12-21 RX ADMIN — SODIUM CHLORIDE: 9 INJECTION, SOLUTION INTRAVENOUS at 07:12

## 2023-12-21 RX ADMIN — DEXMEDETOMIDINE HYDROCHLORIDE 12 MCG: 100 INJECTION, SOLUTION, CONCENTRATE INTRAVENOUS at 08:12

## 2023-12-21 RX ADMIN — LIDOCAINE HYDROCHLORIDE 75 MG: 20 INJECTION, SOLUTION INTRAVENOUS at 08:12

## 2023-12-21 RX ADMIN — TOPICAL ANESTHETIC 1 EACH: 200 SPRAY DENTAL; PERIODONTAL at 08:12

## 2023-12-21 RX ADMIN — GLYCOPYRROLATE 0.2 MG: 0.2 INJECTION, SOLUTION INTRAMUSCULAR; INTRAVITREAL at 08:12

## 2023-12-21 RX ADMIN — SODIUM CHLORIDE, SODIUM LACTATE, POTASSIUM CHLORIDE, AND CALCIUM CHLORIDE: .6; .31; .03; .02 INJECTION, SOLUTION INTRAVENOUS at 07:12

## 2023-12-21 NOTE — [employer] PATIENT INSTRUCTIONS
Discharge Summary/Instructions after an Endoscopic Procedure  Patient Name: Hermelindo Garza  Patient MRN: [id number]  Patient YOB: 1980 Thursday, December 21, 2023  Teo Johnson MD  Dear patient,  As a result of recent federal legislation (The Federal Cures Act), you may   receive lab or pathology results from your procedure in your [facility]   account before your physician is able to contact you. Your physician or   their representative will relay the results to you with their   recommendations at their soonest availability.  Thank you,  Your health is very important to us during the Covid Crisis. Following your   procedure today, you will receive a daily text for 2 weeks asking about   signs or symptoms of Covid 19.  Please respond to this text when you   receive it so we can follow up and keep you as safe as possible.   RESTRICTIONS:  During your procedure today, you received medications for sedation.  These   medications may affect your judgment, balance and coordination.  Therefore,   for 24 hours, you have the following restrictions:   - DO NOT drive a car, operate machinery, make legal/financial decisions,   sign important papers or drink alcohol.    ACTIVITY:  Today: no heavy lifting, straining or running due to procedural   sedation/anesthesia.  The following day: return to full activity including work.  DIET:  Eat and drink normally unless instructed otherwise.     TREATMENT FOR COMMON SIDE EFFECTS:  - Mild abdominal pain, nausea, belching, bloating or excessive gas:  rest,   eat lightly and use a heating pad.  - Sore Throat: treat with throat lozenges and/or gargle with warm salt   water.  - Because air was used during the procedure, expelling large amounts of air   from your rectum or belching is normal.  - If a bowel prep was taken, you may not have a bowel movement for 1-3 days.    This is normal.  SYMPTOMS TO WATCH FOR AND REPORT TO YOUR PHYSICIAN:  1. Abdominal pain or bloating,  other than gas cramps.  2. Chest pain.  3. Back pain.  4. Signs of infection such as: chills or fever occurring within 24 hours   after the procedure.  5. Rectal bleeding, which would show as bright red, maroon, or black stools.   (A tablespoon of blood from the rectum is not serious, especially if   hemorrhoids are present.)  6. Vomiting.  7. Weakness or dizziness.  GO DIRECTLY TO THE NEAREST EMERGENCY ROOM IF YOU HAVE ANY OF THE FOLLOWING:      Difficulty breathing              Chills and/or fever over 101 F   Persistent vomiting and/or vomiting blood   Severe abdominal pain   Severe chest pain   Black, tarry stools   Bleeding- more than one tablespoon   Any other symptom or condition that you feel may need urgent attention  Your doctor recommends these additional instructions:  If any biopsies were taken, your doctors clinic will contact you in 1 to 2   weeks with any results.  - Discharge patient to home.   - Patient has a contact number available for emergencies.  The signs and   symptoms of potential delayed complications were discussed with the   patient.  Return to normal activities tomorrow.  Written discharge   instructions were provided to the patient.   - Resume previous diet.   - Continue present medications.   - Await pathology results.   - Perform a colonoscopy today.   - Repeat upper endoscopy in 3 months to check healing.   - follow bravo instructions  - after turning in bravo, resume antacid therapy  For questions, problems or results please call your physician - Teo Johnson MD.  EMERGENCY PHONE NUMBER: [phone],  LAB RESULTS: [phone]  IF A COMPLICATION OR EMERGENCY SITUATION ARISES AND YOU ARE UNABLE TO REACH   YOUR PHYSICIAN - GO DIRECTLY TO THE EMERGENCY ROOM.  Teo Johnson MD  12/21/2023 8:36:35 AM  This report has been verified and signed electronically.  Dear patient,  As a result of recent federal legislation (The Federal Cures Act), you may   receive lab or pathology  results from your procedure in your [facility]   account before your physician is able to contact you. Your physician or   their representative will relay the results to you with their   recommendations at their soonest availability.  Thank you,  [employer]ATION

## 2023-12-21 NOTE — [employer]ATION PATIENT INSTRUCTIONS
Discharge Summary/Instructions after an Endoscopic Procedure  Patient Name: Hermelindo Garza  Patient MRN: [id number]  Patient YOB: 1980 Thursday, December 21, 2023  Teo Johnson MD  Dear patient,  As a result of recent federal legislation (The Federal Cures Act), you may   receive lab or pathology results from your procedure in your [facility]   account before your physician is able to contact you. Your physician or   their representative will relay the results to you with their   recommendations at their soonest availability.  Thank you,  Your health is very important to us during the Covid Crisis. Following your   procedure today, you will receive a daily text for 2 weeks asking about   signs or symptoms of Covid 19.  Please respond to this text when you   receive it so we can follow up and keep you as safe as possible.   RESTRICTIONS:  During your procedure today, you received medications for sedation.  These   medications may affect your judgment, balance and coordination.  Therefore,   for 24 hours, you have the following restrictions:   - DO NOT drive a car, operate machinery, make legal/financial decisions,   sign important papers or drink alcohol.    ACTIVITY:  Today: no heavy lifting, straining or running due to procedural   sedation/anesthesia.  The following day: return to full activity including work.  DIET:  Eat and drink normally unless instructed otherwise.     TREATMENT FOR COMMON SIDE EFFECTS:  - Mild abdominal pain, nausea, belching, bloating or excessive gas:  rest,   eat lightly and use a heating pad.  - Sore Throat: treat with throat lozenges and/or gargle with warm salt   water.  - Because air was used during the procedure, expelling large amounts of air   from your rectum or belching is normal.  - If a bowel prep was taken, you may not have a bowel movement for 1-3 days.    This is normal.  SYMPTOMS TO WATCH FOR AND REPORT TO YOUR PHYSICIAN:  1. Abdominal pain or bloating,  other than gas cramps.  2. Chest pain.  3. Back pain.  4. Signs of infection such as: chills or fever occurring within 24 hours   after the procedure.  5. Rectal bleeding, which would show as bright red, maroon, or black stools.   (A tablespoon of blood from the rectum is not serious, especially if   hemorrhoids are present.)  6. Vomiting.  7. Weakness or dizziness.  GO DIRECTLY TO THE NEAREST EMERGENCY ROOM IF YOU HAVE ANY OF THE FOLLOWING:      Difficulty breathing              Chills and/or fever over 101 F   Persistent vomiting and/or vomiting blood   Severe abdominal pain   Severe chest pain   Black, tarry stools   Bleeding- more than one tablespoon   Any other symptom or condition that you feel may need urgent attention  Your doctor recommends these additional instructions:  If any biopsies were taken, your doctors clinic will contact you in 1 to 2   weeks with any results.  - Discharge patient to home.   - Patient has a contact number available for emergencies.  The signs and   symptoms of potential delayed complications were discussed with the   patient.  Return to normal activities tomorrow.  Written discharge   instructions were provided to the patient.   - Resume previous diet.   - Continue present medications.   - Await pathology results.   - Repeat colonoscopy in 5 years because the bowel preparation was suboptimal   and for surveillance.   - Use perianal cream such as Desitin or A&D ointment or Boudreauxs paste  For questions, problems or results please call your physician - Teo Johnson MD.  EMERGENCY PHONE NUMBER: [phone],  LAB RESULTS: [phone]  IF A COMPLICATION OR EMERGENCY SITUATION ARISES AND YOU ARE UNABLE TO REACH   YOUR PHYSICIAN - GO DIRECTLY TO THE EMERGENCY ROOM.  Teo Johnson MD  12/21/2023 8:48:55 AM  This report has been verified and signed electronically.  Dear patient,  As a result of recent federal legislation (The Federal Cures Act), you may   receive lab or  pathology results from your procedure in your [facility]   account before your physician is able to contact you. Your physician or   their representative will relay the results to you with their   recommendations at their soonest availability.  Thank you,  [employer]

## 2023-12-21 NOTE — TRANSFER OF CARE
"Anesthesia Transfer of Care Note    Patient: Hermelindo Rizviinger III    Procedure(s) Performed: Procedure(s) (LRB):  EGD (ESOPHAGOGASTRODUODENOSCOPY) (N/A)  PH MONITORING, ESOPHAGUS, WIRELESS, (OFF REFLUX MEDS) (N/A)  COLONOSCOPY (N/A)    Patient location: GI    Anesthesia Type: general    Transport from OR: Transported from OR on room air with adequate spontaneous ventilation    Post pain: adequate analgesia    Post assessment: no apparent anesthetic complications and tolerated procedure well    Post vital signs: stable    Level of consciousness: awake    Nausea/Vomiting: no nausea/vomiting    Complications: none    Transfer of care protocol was followed      Last vitals: Visit Vitals  /84 (BP Location: Right arm, Patient Position: Lying)   Pulse 64   Temp 36.7 °C (98.1 °F) (Temporal)   Resp 18   Ht 5' 5" (1.651 m)   Wt 113.4 kg (250 lb)   SpO2 (!) 94%   BMI 41.60 kg/m²     "

## 2023-12-21 NOTE — H&P
Short Stay Endoscopy History and Physical    PCP - Suzi Sanches MD    Procedure - Colonoscopy  +  EGD with bravo  ASA - per anesthesia  Mallampati - per anesthesia  History of Anesthesia problems - no  Family history Anesthesia problems - no   Plan of anesthesia - General    HPI:  This is a 43 y.o. male here for evaluation of : rectal bleeding    Egd for gerd with bravo study off meds    ROS:  Constitutional: No fevers, chills, No weight loss  CV: No chest pain  Pulm: No cough, No shortness of breath  GI: see HPI  Derm: No rash    Medical History:  has a past medical history of ADD (attention deficit disorder) (05/09/2012), Allergy, Anxiety (4/12/2023), Asthma (05/09/2012), Cervical disc disorder with radiculopathy, unspecified cervical region (09/14/2021), Cervical spondylosis with myelopathy (06/29/2022), Eczema, Esophageal ulcer, GERD (gastroesophageal reflux disease) (05/09/2012), Glaucoma, Kidney stones (05/2014), Lumbar disc disease (05/09/2012), Lumbar herniated disc (05/09/2012), Malignant melanoma of skin, unspecified (01/06/2022), Melanoma (01/2022), LUBA (obstructive sleep apnea), Radiculopathy, lumbar region (09/14/2021), Renal calculi (05/09/2012), and Type 2 diabetes mellitus without complication, without long-term current use of insulin (11/16/2020).    Surgical History:  has a past surgical history that includes Lumbar laminectomy (2010); Esophagogastroduodenoscopy (01/22/2019); Colonoscopy (01/22/2019); Appendectomy (2006); Epidural steroid injection (N/A, 02/10/2021); Esophagogastroduodenoscopy (N/A, 05/18/2021); Lumbar laminectomy with discectomy (Left, 09/20/2021); Carpal tunnel release (Right, 09/15/2022); Excision of ganglion of wrist (Right, 09/15/2022); Hand Arthrotomy (Right, 09/15/2022); Injection of steroid (Left, 09/15/2022); Flexor tendon repair (Right, 09/15/2022); Tympanostomy tube placement; Epidural steroid injection into cervical spine (N/A, 6/28/2023); Cystoscopy  (7/5/2023); Ureteral stent placement (Left, 7/5/2023); Ureteroscopy (Left, 7/5/2023); Laser lithotripsy (Left, 7/5/2023); Ureteroscopic removal of ureteric calculus (Left, 7/5/2023); Correction of hammer toe (Left, 8/14/2023); Epidural steroid injection into lumbar spine (N/A, 9/27/2023); Epidural steroid injection into cervical spine (N/A, 11/3/2023); Injection of joint (Right, 11/29/2023); Reconstruction of ligament (Left, 12/5/2023); and Repair of extensor tendon (Left, 12/5/2023).    Family History: family history includes Allergic rhinitis in his father, mother, sister, and sister; Asthma in his father, sister, and sister; Brain cancer in his maternal grandmother; Breast cancer in his paternal grandmother; Chronic back pain in his father; Diabetes in his maternal grandfather; MARTITA disease in his father; Hypertension in his mother; Melanoma in his father; No Known Problems in his brother, maternal aunt, maternal uncle, paternal aunt, paternal grandfather, and paternal uncle; Sleep apnea in his father and mother; Transient ischemic attack in his mother.. Otherwise no colon cancer, inflammatory bowel disease, or GI malignancies.    Social History:  reports that he quit smoking about 5 years ago. His smoking use included cigarettes. He has never been exposed to tobacco smoke. He has never used smokeless tobacco. He reports current alcohol use of about 3.0 standard drinks of alcohol per week. He reports that he does not use drugs.    Review of patient's allergies indicates:  No Known Allergies    Medications:   Medications Prior to Admission   Medication Sig Dispense Refill Last Dose    acetaminophen (TYLENOL) 325 MG tablet Take 650 mg by mouth every 6 (six) hours as needed for Pain.   12/20/2023    albuterol (PROVENTIL/VENTOLIN HFA) 90 mcg/actuation inhaler INHALE 2 PUFFS INTO THE LUNGS EVERY 6 HOURS AS NEEDED WHEEZING 8.5 g 5 Past Week    atorvastatin (LIPITOR) 20 MG tablet Take 1 tablet (20 mg total) by mouth once  daily. 90 tablet 1 12/20/2023    azelastine (ASTELIN) 137 mcg (0.1 %) nasal spray 2 sprays (274 mcg total) by Nasal route 2 (two) times daily. 30 mL 9 12/20/2023    cyclobenzaprine (FLEXERIL) 10 MG tablet Take 10 mg by mouth 3 (three) times daily.   Past Week    cyclobenzaprine (FLEXERIL) 10 MG tablet Take 1 tablet (10 mg total) by mouth 3 (three) times daily as needed for Muscle spasms. 90 tablet 1 Past Week    dexmethylphenidate (FOCALIN) 10 MG tablet Take 1 tablet (10 mg total) by mouth 2 (two) times daily. 60 tablet 0 Past Month    [START ON 1/12/2024] dexmethylphenidate (FOCALIN) 10 MG tablet Take 1 tablet (10 mg total) by mouth 2 (two) times daily. 60 tablet 0 Past Month    docusate sodium (COLACE) 100 MG capsule Take 1 capsule (100 mg total) by mouth 2 (two) times daily. 30 capsule 1 Past Week    ergocalciferol, vitamin D2, (VITAMIN D ORAL) Take 1 tablet by mouth every other day.   12/20/2023    EScitalopram oxalate (LEXAPRO) 10 MG tablet Take 1 tablet (10 mg total) by mouth once daily. 90 tablet 0 12/20/2023    fexofenadine (ALLEGRA) 180 MG tablet Take 180 mg by mouth once daily.   12/20/2023    fluocinonide (LIDEX) 0.05 % external solution AAA scalp qday prn itching, scaling 60 mL 3 12/20/2023    gabapentin (NEURONTIN) 300 MG capsule Take 1 capsule (300 mg total) by mouth 3 (three) times daily. 90 capsule 3 12/21/2023 at 0400    ketoconazole (NIZORAL) 2 % cream APPLY TOPICALLY TO THE AFFECTED AREA TWICE DAILY 15 g 0 Past Month    lamoTRIgine (LAMICTAL) 100 MG tablet Take 1 tablet (100 mg total) by mouth 2 (two) times daily. IF any RASH, STOP ALL Lamictal and Tell Psyc MD. 180 tablet 1 12/20/2023    latanoprost 0.005 % ophthalmic solution Place 1 drop into both eyes every evening. 7.5 mL 3 12/21/2023    LORazepam (ATIVAN) 0.5 MG tablet Take 1 tablet (0.5 mg total) by mouth daily as needed (SIGNIFICANT ANXIETY). 30 tablet 3 Past Week    meloxicam (MOBIC) 15 MG tablet TAKE 1 TABLET(15 MG) BY MOUTH EVERY DAY as  needed FOR PAIN 30 tablet 2 Past Month    montelukast (SINGULAIR) 10 mg tablet Take 1 tablet (10 mg total) by mouth every evening. 90 tablet 3 12/20/2023    ondansetron (ZOFRAN-ODT) 4 MG TbDL Dissolve 1 tablet (4 mg total) on the tongue every 6 (six) hours as needed (nausea). 30 tablet 2 12/20/2023    rOPINIRole (REQUIP) 0.25 MG tablet TAKE 1 TABLET(0.25 MG) BY MOUTH EVERY EVENING 90 tablet 2 Past Week    sod picosulf-mag ox-citric ac (CLENPIQ) 10 mg-3.5 gram- 12 gram/160 mL Soln Take As Directed. 320 mL 0 12/21/2023    blood sugar diagnostic (TRUE METRIX GLUCOSE TEST STRIP) Strp Use to test blood glucose one (1) time a day, 100 strip 3     blood-glucose meter Misc use as directed 1 each 0     dexmethylphenidate (FOCALIN) 10 MG tablet Take 1 tablet (10 mg total) by mouth 2 (two) times daily. 60 tablet 0     HYDROcodone-acetaminophen (NORCO) 5-325 mg per tablet Take 1 tablet by mouth every 6 (six) hours as needed for Pain. (Patient not taking: Reported on 12/19/2023) 30 tablet 0     hydrocortisone 2.5 % cream APPLY EXTERNALLY TO THE AFFECTED AREA TWICE DAILY FOR 10 DAYS 30 g 0 More than a month    lancets (ONETOUCH DELICA PLUS LANCET) 33 gauge Misc Use to test blood glucose one (1) time a day, 100 each 5     semaglutide (OZEMPIC) 0.25 mg or 0.5 mg (2 mg/3 mL) pen injector Inject 0.5 mg into the skin every 7 days. 3 each 1 11/30/2023    ULTRA THIN LANCETS 30 gauge Misc USE TO TEST BLOOD SUGAR EVERY DAY AS DIRECTED 100 each 3          Physical Exam:    Vital Signs:   Vitals:    12/21/23 0738   BP: 136/84   Pulse: 64   Resp: 18   Temp: 98.1 °F (36.7 °C)       General Appearance: Well appearing in no acute distress  Eyes:    No scleral icterus  ENT: Neck supple, Lips, mucosa, and tongue normal; teeth and gums normal  Abdomen: Soft, non tender, non distended with positive bowel sounds. No hepatosplenomegaly, ascites, or mass.  Extremities: 2+ pulses, no clubbing, cyanosis or edema  Skin: No rash      Labs:  Lab Results    Component Value Date    WBC 13.22 (H) 07/04/2023    HGB 15.2 07/04/2023    HCT 45.2 07/04/2023     07/04/2023    CHOL 169 10/11/2023    TRIG 345 (H) 10/11/2023    HDL 40 10/11/2023    ALT 39 10/11/2023    AST 24 10/11/2023     10/11/2023    K 4.1 10/11/2023     10/11/2023    CREATININE 0.7 10/11/2023    BUN 18 10/11/2023    CO2 26 10/11/2023    TSH 1.657 04/05/2023    PSA 0.18 10/05/2022    INR 1.0 09/19/2021    HGBA1C 5.4 10/11/2023       I have explained the risks and benefits of endoscopy procedures to the patient including but not limited to bleeding, perforation, infection, and death.  The patient was asked if they understand and allowed to ask any further questions to their satisfaction.    Teo Johnson MD

## 2023-12-21 NOTE — TELEPHONE ENCOUNTER
Left voice message for patient to return call to schedule appointment from referral to Functional Restoration department. Contact number provided, and My Chart message to be sent..  Adina SOUSA [phone]

## 2023-12-21 NOTE — ANESTHESIA POSTPROCEDURE EVALUATION
Anesthesia Post Evaluation    Patient: Hermelindo Halltzinger III    Procedure(s) Performed: Procedure(s) (LRB):  EGD (ESOPHAGOGASTRODUODENOSCOPY) (N/A)  PH MONITORING, ESOPHAGUS, WIRELESS, (OFF REFLUX MEDS) (N/A)  COLONOSCOPY (N/A)    Final Anesthesia Type: general      Patient location during evaluation: PACU  Patient participation: Yes- Able to Participate  Level of consciousness: awake and alert  Post-procedure vital signs: reviewed and stable  Pain management: adequate  Airway patency: patent    PONV status at discharge: No PONV  Anesthetic complications: no      Cardiovascular status: stable  Respiratory status: spontaneous ventilation  Hydration status: euvolemic  Follow-up not needed.              Vitals Value Taken Time   /54 12/21/23 0850   Temp 36.7 °C (98.1 °F) 12/21/23 0850   Pulse 78 12/21/23 0850   Resp 15 12/21/23 0850   SpO2 98 % 12/21/23 0850         No case tracking events are documented in the log.      Pain/Shani Score: No data recorded

## 2023-12-21 NOTE — ANESTHESIA PREPROCEDURE EVALUATION
12/21/2023  Hermelindo Garza III is a 43 y.o., male.      Pre-op Assessment    I have reviewed the Patient Summary Reports.     I have reviewed the Nursing Notes.    I have reviewed the Medications.     Review of Systems  Anesthesia Hx:  No problems with previous Anesthesia             Denies Family Hx of Anesthesia complications.    Denies Personal Hx of Anesthesia complications.                    Cardiovascular:  Exercise tolerance: good                   Functional Capacity good / => 4 METS                            Patient Active Problem List   Diagnosis    Gastro-esophageal reflux disease without esophagitis    Renal calculi    Mild intermittent asthma without complication    ADHD (attention deficit hyperactivity disorder), inattentive type    LUBA (obstructive sleep apnea)    Hyperlipidemia, unspecified    Hypertriglyceridemia    Elevated liver enzymes    SHEIKH (nonalcoholic steatohepatitis)    Hepatosplenomegaly    Cellulitis of left arm    History of multiple allergies    Unspecified asthma, uncomplicated    Obesity, unspecified    Type 2 diabetes mellitus without complications    Vitamin D insufficiency    Restless leg    Chronic pain    Pain in left elbow    Cervical disc disorder with radiculopathy, unspecified cervical region    History of urinary stone    Lumbar radiculopathy, chronic    Tachycardia    S/P lumbar discectomy    Morbid obesity    Cervical spondylosis    History of melanoma in situ    Anxiety    Mood Disorder Unspecifed:  Depressed THO also has some mood elevation  (8of 13 on MDQ): ex: irritability, more self confident, thoughts race. more active)     Rotator cuff tendonitis, left    Left lateral epicondylitis    Cervical radiculopathy    Insomnia    Ureteral stone with hydronephrosis    Pain of left lower extremity    Neck pain    Chronic low back pain    Impaired range of  motion of left elbow       Past Medical History:   Diagnosis Date    ADD (attention deficit disorder) 05/09/2012    Allergy     Anxiety 4/12/2023    Asthma 05/09/2012    Cervical disc disorder with radiculopathy, unspecified cervical region 09/14/2021    Cervical spondylosis with myelopathy 06/29/2022    Eczema     Esophageal ulcer     GERD (gastroesophageal reflux disease) 05/09/2012    Glaucoma     Kidney stones 05/2014    Lumbar disc disease 05/09/2012    Lumbar herniated disc 05/09/2012    Malignant melanoma of skin, unspecified 01/06/2022    in situ right mid back    Melanoma 01/2022    in situ R mid back     LUBA (obstructive sleep apnea)     Radiculopathy, lumbar region 09/14/2021    Renal calculi 05/09/2012    Type 2 diabetes mellitus without complication, without long-term current use of insulin 11/16/2020       ECHO: No results found for this or any previous visit.      There is no height or weight on file to calculate BMI.    Tobacco Use: Medium Risk (12/21/2023)    Patient History     Smoking Tobacco Use: Former     Smokeless Tobacco Use: Never     Passive Exposure: Never       Social History     Substance and Sexual Activity   Drug Use No        Alcohol Use: Not At Risk (11/7/2023)    AUDIT-C     Frequency of Alcohol Consumption: 2-3 times a week     Average Number of Drinks: 1 or 2     Frequency of Binge Drinking: Never       Review of patient's allergies indicates:  No Known Allergies      Airway:  No value filed.     Physical Exam  General: Well nourished    Airway:  Mallampati: III / II  Mouth Opening: Normal  TM Distance: Normal    Chest/Lungs:  Normal Respiratory Rate        Anesthesia Plan  Type of Anesthesia, risks & benefits discussed:    Anesthesia Type: Gen Natural Airway  Intra-op Monitoring Plan: Standard ASA Monitors  Post Op Pain Control Plan: multimodal analgesia  Induction:  IV  Informed Consent: Informed consent signed with the Patient and all parties understand the risks and agree with  anesthesia plan.  All questions answered.   ASA Score: 3  Day of Surgery Review of History & Physical: H&P Update referred to the surgeon/provider.    Ready For Surgery From Anesthesia Perspective.     .

## 2023-12-23 ENCOUNTER — PATIENT MESSAGE (OUTPATIENT)
Dept: SURGERY | Facility: CLINIC | Age: 43
End: 2023-12-23
Payer: COMMERCIAL

## 2023-12-26 LAB
FINAL PATHOLOGIC DIAGNOSIS: NORMAL
GROSS: NORMAL
Lab: NORMAL

## 2023-12-27 ENCOUNTER — PATIENT MESSAGE (OUTPATIENT)
Dept: GASTROENTEROLOGY | Facility: HOSPITAL | Age: 43
End: 2023-12-27
Payer: COMMERCIAL

## 2023-12-27 PROCEDURE — 91035 G-ESOPH REFLX TST W/ELECTROD: CPT | Mod: 26,,, | Performed by: INTERNAL MEDICINE

## 2023-12-27 NOTE — PROVATION PATIENT INSTRUCTIONS
Discharge Summary/Instructions after an Endoscopic Procedure  Patient Name: Hermelindo Garza  Patient MRN: [id number]  Patient YOB: 1980 Thursday, December 21, 2023  Teo Johnson MD  Dear patient,  As a result of recent federal legislation (The Federal Cures Act), you may   receive lab or pathology results from your procedure in your [facility]   account before your physician is able to contact you. Your physician or   their representative will relay the results to you with their   recommendations at their soonest availability.  Thank you,  Your health is very important to us during the Covid Crisis. Following your   procedure today, you will receive a daily text for 2 weeks asking about   signs or symptoms of Covid 19.  Please respond to this text when you   receive it so we can follow up and keep you as safe as possible.   RESTRICTIONS:  During your procedure today, you received medications for sedation.  These   medications may affect your judgment, balance and coordination.  Therefore,   for 24 hours, you have the following restrictions:   - DO NOT drive a car, operate machinery, make legal/financial decisions,   sign important papers or drink alcohol.    ACTIVITY:  Today: no heavy lifting, straining or running due to procedural   sedation/anesthesia.  The following day: return to full activity including work.  DIET:  Eat and drink normally unless instructed otherwise.     TREATMENT FOR COMMON SIDE EFFECTS:  - Mild abdominal pain, nausea, belching, bloating or excessive gas:  rest,   eat lightly and use a heating pad.  - Sore Throat: treat with throat lozenges and/or gargle with warm salt   water.  - Because air was used during the procedure, expelling large amounts of air   from your rectum or belching is normal.  - If a bowel prep was taken, you may not have a bowel movement for 1-3 days.    This is normal.  SYMPTOMS TO WATCH FOR AND REPORT TO YOUR PHYSICIAN:  1. Abdominal pain or bloating,  other than gas cramps.  2. Chest pain.  3. Back pain.  4. Signs of infection such as: chills or fever occurring within 24 hours   after the procedure.  5. Rectal bleeding, which would show as bright red, maroon, or black stools.   (A tablespoon of blood from the rectum is not serious, especially if   hemorrhoids are present.)  6. Vomiting.  7. Weakness or dizziness.  GO DIRECTLY TO THE NEAREST EMERGENCY ROOM IF YOU HAVE ANY OF THE FOLLOWING:      Difficulty breathing              Chills and/or fever over 101 F   Persistent vomiting and/or vomiting blood   Severe abdominal pain   Severe chest pain   Black, tarry stools   Bleeding- more than one tablespoon   Any other symptom or condition that you feel may need urgent attention  Your doctor recommends these additional instructions:  If any biopsies were taken, your doctors clinic will contact you in 1 to 2   weeks with any results.  - Discharge patient to home.   - Resume previous diet.   - Follow an antireflux regimen.   - Return to referring physician as previously scheduled.  For questions, problems or results please call your physician - Teo Johnson MD.  EMERGENCY PHONE NUMBER: [phone],  LAB RESULTS: [phone]  IF A COMPLICATION OR EMERGENCY SITUATION ARISES AND YOU ARE UNABLE TO REACH   YOUR PHYSICIAN - GO DIRECTLY TO THE EMERGENCY ROOM.  Teo Johnson MD  12/27/2023 3:17:48 PM  This report has been verified and signed electronically.  Dear patient,  As a result of recent federal legislation (The Federal Cures Act), you may   receive lab or pathology results from your procedure in your [facility]   account before your physician is able to contact you. Your physician or   their representative will relay the results to you with their   recommendations at their soonest availability.  Thank you,  PROVATION

## 2023-12-28 ENCOUNTER — CLINICAL SUPPORT (OUTPATIENT)
Dept: REHABILITATION | Facility: HOSPITAL | Age: 43
End: 2023-12-28
Payer: COMMERCIAL

## 2023-12-28 ENCOUNTER — CLINICAL SUPPORT (OUTPATIENT)
Dept: ALLERGY | Facility: CLINIC | Age: 43
End: 2023-12-28
Payer: COMMERCIAL

## 2023-12-28 DIAGNOSIS — M25.622 IMPAIRED RANGE OF MOTION OF LEFT ELBOW: Primary | ICD-10-CM

## 2023-12-28 DIAGNOSIS — J30.9 CHRONIC ALLERGIC RHINITIS: Primary | ICD-10-CM

## 2023-12-28 PROCEDURE — 95117 IMMUNOTHERAPY INJECTIONS: CPT | Mod: S$GLB,,, | Performed by: STUDENT IN AN ORGANIZED HEALTH CARE EDUCATION/TRAINING PROGRAM

## 2023-12-28 PROCEDURE — 97110 THERAPEUTIC EXERCISES: CPT

## 2023-12-28 PROCEDURE — 95117 PR IMMU2THERAPY, 2+ INJECTIONS: ICD-10-PCS | Mod: S$GLB,,, | Performed by: STUDENT IN AN ORGANIZED HEALTH CARE EDUCATION/TRAINING PROGRAM

## 2023-12-28 PROCEDURE — 99999 PR PBB SHADOW E&M-EST. PATIENT-LVL I: CPT | Mod: PBBFAC,,,

## 2023-12-28 PROCEDURE — 97140 MANUAL THERAPY 1/> REGIONS: CPT

## 2023-12-28 PROCEDURE — 99999 PR PBB SHADOW E&M-EST. PATIENT-LVL I: ICD-10-PCS | Mod: PBBFAC,,,

## 2023-12-28 NOTE — PROGRESS NOTES
[facility]  Occupational Therapy Treatment Note    Date: 12/28/2023  Name: Hermelindo Garza III  Clinic Number: [id number]    Therapy Diagnosis:   Encounter Diagnosis   Name Primary?    Impaired range of motion of left elbow Yes       Physician: Christen Marshall MD    Physician Orders: OT Eval and Treat  Medical Diagnosis: S53.20XA (ICD-10-CM) - Partial tear of radial collateral ligament of elbow; M77.12 (ICD-10-CM) - Left lateral epicondylitis   Surgical Procedure and Date:   1. Left elbow lateral epicondyle debridement with extensor tendon repair, cpt 26657  2. Left elbow lateral radial collateral ligament reconstruction, cpt 16405, 12/5/23 / Date of Injury/Onset: progressive onset   Evaluation Date: 12/14/2023  Insurance Authorization Period Expiration: 12/31/2023  Plan of Care Expiration: 3/8/24 (12 weeks)  Date of Return to MD: 12/20/2023    Visit # / Visits authorized: 4 / 6  FOTO: N/A    Precautions: Standard, Diabetes, and Weightbearing, [place] [address] & 490     Time In: 8:13 am  Time Out: 8:44 am  Total Billable Time: 31 minutes    SUBJECTIVE     Pt reports: Doing well, no pain.   He was compliant with home exercise program given last session.   Response to previous treatment: Good - improving motion   Functional change: use of hand for light activity     Pain: 0/10  Location: left elbow    OBJECTIVE   Objective Measures updated at progress report unless specified.    Observation/Appearance: Orthosis intact. Incision healing well, no significant signs of infection noted.      Edema. Measured in centimeters.    12/14/2023 12/14/2023     Left Right   Elbow Crease NT NT      Elbow and Wrist ROM. Measured in degrees.    12/14/2023     Left   Elbow Ext/Flex (pronated forearm) -30/100   *Supination/Pronation 0/WFL   *Wrist Ext/Flex 35/neutral   Wrist RD/UD NT   *With elbow flexed and arm adducted      Hand ROM. WFL - Pt able to make full composite fists with B/L hands and  demonstrates thumb opposition WFL.      Strength (Dynamometer)   Measured in pounds to POP.    12/14/2023 12/14/2023     Left Right   Rung II NT NT      Sensation. To be assessed next session.      Manual Muscle Test    12/14/2023 12/14/2023     Left Right   Wrist Extension  NT NT   Wrist Flexion       Radial Deviation       Ulnar Deviation       Supination       Pronation       Elbow Extension       Elbow Flexion          Limitation/Restriction for FOTO Elbow Survey     To be assessed next session.            Treatment     Hermelindo received the treatments listed below:     Manual therapy techniques: Soft tissue Mobilization and Friction Massage were applied to the: L elbow for 8 minutes, including:  - Scar massage to incision to decrease adhesions and improve tensile glide.   - Performed soft tissue mobilization/massage to forearm to relieve associated soft tissue tightness, improve joint mobility,      Therapeutic exercises to develop endurance, ROM, and flexibility for 23 minutes, including:  - Protected elbow extension while laying supine with forearm in pronation 2 x 10 reps  - Protected AAROM elbow flexion while laying supine with shoulder flexed for closed pack position, forearm in neutral/pronation 2 x 10 reps  - Pronation and supination to neutral 2 x 10 reps   - Gentle full fist x 20 reps  - Gentle wrist flex/ext while laying supine with elbow adducted, flexed, and forearm in neutral/pronation 2 x 10 reps     *Applied foam padding to orthosis to decrease pressure areas.     Patient Education and Home Exercises      Education provided:   - HEP in place  - Progress towards goals     Written Home Exercises Provided: Patient instructed to cont prior HEP.  Exercises were reviewed and Hermelindo was able to demonstrate them prior to the end of the session.  Hermelindo demonstrated good  understanding of the HEP provided. See EMR under Patient Instructions for exercises provided during therapy sessions.       ASSESSMENT     Pt participated well and endorses good adherence to HEP. He is able to progress with pain-free elbow ROM. Will progress as tolerated and per protocol.     Hermelindo is progressing well towards his goals and there are no updates to goals at this time. Pt prognosis is Good.     Pt will continue to benefit from skilled outpatient occupational therapy to address the deficits listed in the problem list on initial evaluation, provide pt/family education and to maximize pt's level of independence in the home and community environment.     Pt's spiritual, cultural and educational needs considered and pt agreeable to plan of care and goals.    Anticipated barriers to occupational therapy: scheduling constraints due to work     Goals:   The following goals were discussed with the patient and patient is in agreement with them as to be addressed in the treatment plan.   Long Term Goals (LTGs); to be met by discharge.  LTG #1: Pt will report a pain level of 1-3 out of 10 at worst with arm use. progressing not met 12/28/2023  LTG #2: FOTO to be administered and assessed next session with updates to goals as needed. progressing not met 12/28/2023  LTG #3: Pt will return to prior level of function for IADLs and household management. progressing not met 12/28/2023  LTG #4: Assess MMT and  strength when appropriate and update goals as needed with focus on return to swimming and gym exercises. progressing not met 12/28/2023     Short Term Goals (STGs); to be met within 4 weeks (1/12/24).  STG #1: Pt will report a pain level of 4 out of 10 at worst with arm use. progressing not met 12/28/2023  STG #2: Pt will report/demo [place] with ADLs. progressing not met 12/28/2023  STG #3: Pt will demonstrate independence with issued HEP, orthosis wear, and modalities for pain/symptom management. progressing not met 12/28/2023  STG #4: Pt will demo improved L elbow SURESH by 30-60 degrees needed to aid with buttoning  collar. progressing not met 12/28/2023  STG #5: Pt will demo improved L forearm SURESH by 30-40 degrees needed to aid with showering. progressing not met 12/28/2023    PLAN     Continue skilled occupational therapy with individualized plan of care focusing on maximizing functional use of patient's left arm.    Updates/Grading for next session: progress as tolerated and per protocol.    Connie Vasquez, OTR/TOMÁS

## 2023-12-29 ENCOUNTER — OFFICE VISIT (OUTPATIENT)
Dept: SURGERY | Facility: CLINIC | Age: 43
End: 2023-12-29
Payer: COMMERCIAL

## 2023-12-29 VITALS — HEIGHT: 65 IN | WEIGHT: 251 LBS | BODY MASS INDEX: 41.82 KG/M2

## 2023-12-29 DIAGNOSIS — K21.9 GASTROESOPHAGEAL REFLUX DISEASE, UNSPECIFIED WHETHER ESOPHAGITIS PRESENT: Primary | ICD-10-CM

## 2023-12-29 PROCEDURE — 3066F NEPHROPATHY DOC TX: CPT | Mod: CPTII,S$GLB,, | Performed by: STUDENT IN AN ORGANIZED HEALTH CARE EDUCATION/TRAINING PROGRAM

## 2023-12-29 PROCEDURE — 3061F NEG MICROALBUMINURIA REV: CPT | Mod: CPTII,S$GLB,, | Performed by: STUDENT IN AN ORGANIZED HEALTH CARE EDUCATION/TRAINING PROGRAM

## 2023-12-29 PROCEDURE — 3044F PR MOST RECENT HEMOGLOBIN A1C LEVEL <7.0%: ICD-10-PCS | Mod: CPTII,S$GLB,, | Performed by: STUDENT IN AN ORGANIZED HEALTH CARE EDUCATION/TRAINING PROGRAM

## 2023-12-29 PROCEDURE — 1159F PR MEDICATION LIST DOCUMENTED IN MEDICAL RECORD: ICD-10-PCS | Mod: CPTII,S$GLB,, | Performed by: STUDENT IN AN ORGANIZED HEALTH CARE EDUCATION/TRAINING PROGRAM

## 2023-12-29 PROCEDURE — 99214 PR OFFICE/OUTPT VISIT, EST, LEVL IV, 30-39 MIN: ICD-10-PCS | Mod: S$GLB,,, | Performed by: STUDENT IN AN ORGANIZED HEALTH CARE EDUCATION/TRAINING PROGRAM

## 2023-12-29 PROCEDURE — 99999 PR PBB SHADOW E&M-EST. PATIENT-LVL IV: ICD-10-PCS | Mod: PBBFAC,,, | Performed by: STUDENT IN AN ORGANIZED HEALTH CARE EDUCATION/TRAINING PROGRAM

## 2023-12-29 PROCEDURE — 3061F PR NEG MICROALBUMINURIA RESULT DOCUMENTED/REVIEW: ICD-10-PCS | Mod: CPTII,S$GLB,, | Performed by: STUDENT IN AN ORGANIZED HEALTH CARE EDUCATION/TRAINING PROGRAM

## 2023-12-29 PROCEDURE — 99214 OFFICE O/P EST MOD 30 MIN: CPT | Mod: S$GLB,,, | Performed by: STUDENT IN AN ORGANIZED HEALTH CARE EDUCATION/TRAINING PROGRAM

## 2023-12-29 PROCEDURE — 1159F MED LIST DOCD IN RCRD: CPT | Mod: CPTII,S$GLB,, | Performed by: STUDENT IN AN ORGANIZED HEALTH CARE EDUCATION/TRAINING PROGRAM

## 2023-12-29 PROCEDURE — 99999 PR PBB SHADOW E&M-EST. PATIENT-LVL IV: CPT | Mod: PBBFAC,,, | Performed by: STUDENT IN AN ORGANIZED HEALTH CARE EDUCATION/TRAINING PROGRAM

## 2023-12-29 PROCEDURE — 3044F HG A1C LEVEL LT 7.0%: CPT | Mod: CPTII,S$GLB,, | Performed by: STUDENT IN AN ORGANIZED HEALTH CARE EDUCATION/TRAINING PROGRAM

## 2023-12-29 PROCEDURE — 3066F PR DOCUMENTATION OF TREATMENT FOR NEPHROPATHY: ICD-10-PCS | Mod: CPTII,S$GLB,, | Performed by: STUDENT IN AN ORGANIZED HEALTH CARE EDUCATION/TRAINING PROGRAM

## 2023-12-29 PROCEDURE — 3008F PR BODY MASS INDEX (BMI) DOCUMENTED: ICD-10-PCS | Mod: CPTII,S$GLB,, | Performed by: STUDENT IN AN ORGANIZED HEALTH CARE EDUCATION/TRAINING PROGRAM

## 2023-12-29 PROCEDURE — 3008F BODY MASS INDEX DOCD: CPT | Mod: CPTII,S$GLB,, | Performed by: STUDENT IN AN ORGANIZED HEALTH CARE EDUCATION/TRAINING PROGRAM

## 2023-12-29 NOTE — PROGRESS NOTES
Patient ID: Hermelindo Garza III is a 43 y.o. male.    Chief Complaint: Follow-up (After colon )      HPI:  HPI  43m with reflux, heartburn symptoms for around 10 years. Moderately controlled on nexium but wants to get off. Still has significant symptoms at times even on meds. Underwent EGD that showed small ulcer.   Has been on ozempic since 2020 but stopped it recently.   Hpylori neg      Review of Systems   Constitutional:  Negative for chills, diaphoresis and fever.   HENT:  Negative for trouble swallowing.    Respiratory:  Negative for cough, shortness of breath, wheezing and stridor.    Cardiovascular:  Negative for chest pain and palpitations.   Gastrointestinal:  Negative for abdominal distention, abdominal pain, blood in stool, diarrhea, nausea and vomiting.   Endocrine: Negative for cold intolerance and heat intolerance.   Genitourinary:  Negative for difficulty urinating.   Musculoskeletal:  Negative for back pain.   Skin:  Negative for rash.   Allergic/Immunologic: Negative for immunocompromised state.   Neurological:  Negative for dizziness, syncope and numbness.   Hematological:  Negative for adenopathy.   Psychiatric/Behavioral:  Negative for agitation.        Current Outpatient Medications   Medication Sig Dispense Refill    acetaminophen (TYLENOL) 325 MG tablet Take 650 mg by mouth every 6 (six) hours as needed for Pain.      albuterol (PROVENTIL/VENTOLIN HFA) 90 mcg/actuation inhaler INHALE 2 PUFFS INTO THE LUNGS EVERY 6 HOURS AS NEEDED WHEEZING 8.5 g 5    atorvastatin (LIPITOR) 20 MG tablet Take 1 tablet (20 mg total) by mouth once daily. 90 tablet 1    azelastine (ASTELIN) 137 mcg (0.1 %) nasal spray 2 sprays (274 mcg total) by Nasal route 2 (two) times daily. 30 mL 9    blood sugar diagnostic (TRUE METRIX GLUCOSE TEST STRIP) Strp Use to test blood glucose one (1) time a day, 100 strip 3    blood-glucose meter Misc use as directed 1 each 0    cyclobenzaprine (FLEXERIL) 10 MG tablet Take 10  mg by mouth 3 (three) times daily.      cyclobenzaprine (FLEXERIL) 10 MG tablet Take 1 tablet (10 mg total) by mouth 3 (three) times daily as needed for Muscle spasms. 90 tablet 1    dexmethylphenidate (FOCALIN) 10 MG tablet Take 1 tablet (10 mg total) by mouth 2 (two) times daily. 60 tablet 0    [START ON 1/12/2024] dexmethylphenidate (FOCALIN) 10 MG tablet Take 1 tablet (10 mg total) by mouth 2 (two) times daily. 60 tablet 0    docusate sodium (COLACE) 100 MG capsule Take 1 capsule (100 mg total) by mouth 2 (two) times daily. 30 capsule 1    ergocalciferol, vitamin D2, (VITAMIN D ORAL) Take 1 tablet by mouth every other day.      fexofenadine (ALLEGRA) 180 MG tablet Take 180 mg by mouth once daily.      fluocinonide (LIDEX) 0.05 % external solution AAA scalp qday prn itching, scaling 60 mL 3    gabapentin (NEURONTIN) 300 MG capsule Take 1 capsule (300 mg total) by mouth 3 (three) times daily. 90 capsule 3    hydrocortisone 2.5 % cream APPLY EXTERNALLY TO THE AFFECTED AREA TWICE DAILY FOR 10 DAYS 30 g 0    ketoconazole (NIZORAL) 2 % cream APPLY TOPICALLY TO THE AFFECTED AREA TWICE DAILY 15 g 0    lamoTRIgine (LAMICTAL) 100 MG tablet Take 1 tablet (100 mg total) by mouth 2 (two) times daily. IF any RASH, STOP ALL Lamictal and Tell Psyc MD. 180 tablet 1    lancets (ONETOUCH DELICA PLUS LANCET) 33 gauge Misc Use to test blood glucose one (1) time a day, 100 each 5    latanoprost 0.005 % ophthalmic solution Place 1 drop into both eyes every evening. 7.5 mL 3    LORazepam (ATIVAN) 0.5 MG tablet Take 1 tablet (0.5 mg total) by mouth daily as needed (SIGNIFICANT ANXIETY). 30 tablet 3    meloxicam (MOBIC) 15 MG tablet TAKE 1 TABLET(15 MG) BY MOUTH EVERY DAY as needed FOR PAIN 30 tablet 2    montelukast (SINGULAIR) 10 mg tablet Take 1 tablet (10 mg total) by mouth every evening. 90 tablet 3    ondansetron (ZOFRAN-ODT) 4 MG TbDL Dissolve 1 tablet (4 mg total) on the tongue every 6 (six) hours as needed (nausea). 30 tablet 2     rOPINIRole (REQUIP) 0.25 MG tablet TAKE 1 TABLET(0.25 MG) BY MOUTH EVERY EVENING 90 tablet 2    semaglutide (OZEMPIC) 0.25 mg or 0.5 mg (2 mg/3 mL) pen injector Inject 0.5 mg into the skin every 7 days. 3 each 1    sod picosulf-mag ox-citric ac (CLENPIQ) 10 mg-3.5 gram- 12 gram/160 mL Soln Take As Directed. 320 mL 0    ULTRA THIN LANCETS 30 gauge Misc USE TO TEST BLOOD SUGAR EVERY DAY AS DIRECTED 100 each 3    EScitalopram oxalate (LEXAPRO) 10 MG tablet Take 1 tablet (10 mg total) by mouth once daily. 90 tablet 0    HYDROcodone-acetaminophen (NORCO) 5-325 mg per tablet Take 1 tablet by mouth every 6 (six) hours as needed for Pain. (Patient not taking: Reported on 12/19/2023) 30 tablet 0     No current facility-administered medications for this visit.     Facility-Administered Medications Ordered in Other Visits   Medication Dose Route Frequency Provider Last Rate Last Admin    0.9%  NaCl infusion  500 mL Intravenous Continuous Kolton Terrell MD        mupirocin 2 % ointment   Nasal On Call Procedure Chong Padron MD   Given at 09/15/22 0553    ondansetron injection 4 mg  4 mg Intravenous Once PRN Arron Rangel MD           Review of patient's allergies indicates:  No Known Allergies    Past Medical History:   Diagnosis Date    ADD (attention deficit disorder) 05/09/2012    Allergy     Anxiety 4/12/2023    Asthma 05/09/2012    Cervical disc disorder with radiculopathy, unspecified cervical region 09/14/2021    Cervical spondylosis with myelopathy 06/29/2022    Eczema     Esophageal ulcer     GERD (gastroesophageal reflux disease) 05/09/2012    Glaucoma     Kidney stones 05/2014    Lumbar disc disease 05/09/2012    Lumbar herniated disc 05/09/2012    Malignant melanoma of skin, unspecified 01/06/2022    in situ right mid back    Melanoma 01/2022    in situ R mid back     LUBA (obstructive sleep apnea)     Radiculopathy, lumbar region 09/14/2021    Renal calculi 05/09/2012    Type 2 diabetes mellitus without  complication, without long-term current use of insulin 11/16/2020       Past Surgical History:   Procedure Laterality Date    APPENDECTOMY  2006    CARPAL TUNNEL RELEASE Right 09/15/2022    Procedure: RELEASE, CARPAL TUNNEL;  Surgeon: Christen Marshall MD;  [facility] OR;  Service: Orthopedics;  Laterality: Right;    COLONOSCOPY  01/22/2019    internal hemorrhoids, o/w normal - repeat at age 52    COLONOSCOPY N/A 12/21/2023    Procedure: COLONOSCOPY;  Surgeon: Teo Johnson MD;  Location: [facility];  Service: Endoscopy;  Laterality: N/A;    CORRECTION OF HAMMER TOE Left 8/14/2023    Procedure: CORRECTION, HAMMER TOE--  basic foot tray as well as a Reese microfree/microchoice tray. I also asked [email or web address] to bring an MIS mati.;  Surgeon: Ankush Back DPM;  Location: [facility] OR;  Service: Podiatry;  Laterality: Left;  reese drill, sagittal saw, foot set    CYSTOSCOPY  7/5/2023    Procedure: CYSTOSCOPY;  Surgeon: Chuckie Hall MD;  Location: [address];  Service: Urology;;    EPIDURAL STEROID INJECTION N/A 02/10/2021    Procedure: CERVICAL C6/7 NELI DIRECT REFERRAL;  Surgeon: Michi Escamilla MD;  Location: [facility] [place];  Service: Pain Management;  Laterality: N/A;  NEEDS CONSENT    EPIDURAL STEROID INJECTION INTO CERVICAL SPINE N/A 6/28/2023    Procedure: Injection-steroid-epidural-cervical C7-T1;  Surgeon: Lorraine Patel DO;  Location: [facility] PAIN MANAGEMENT;  Service: Pain Management;  Laterality: N/A;  diabetic    EPIDURAL STEROID INJECTION INTO CERVICAL SPINE N/A 11/3/2023    Procedure: cervical NELI C7-T1;  Surgeon: Kolton Terrell MD;  Location: [facility] PAIN MANAGEMENT;  Service: Pain Management;  Laterality: N/A;  diabetic    EPIDURAL STEROID INJECTION INTO LUMBAR SPINE N/A 9/27/2023    Procedure: L4-5 NELI;  Surgeon: Tirso Pickering MD;  Location: [facility] PAIN MANAGEMENT;  Service: Pain Management;  Laterality: N/A;  15 mins    ESOPHAGOGASTRODUODENOSCOPY  01/22/2019    LA grade B esophagitis;  esophageal stenosis- dilated; gastritis; H pylori pathology negative    ESOPHAGOGASTRODUODENOSCOPY N/A 05/18/2021    Procedure: EGD (ESOPHAGOGASTRODUODENOSCOPY);  Surgeon: Mariana Hector MD;  Location: [facility];  Service: Endoscopy;  Laterality: N/A;    ESOPHAGOGASTRODUODENOSCOPY N/A 12/21/2023    Procedure: EGD (ESOPHAGOGASTRODUODENOSCOPY);  Surgeon: Teo Johnson MD;  Location: [facility];  Service: Endoscopy;  Laterality: N/A;    EXCISION OF GANGLION OF WRIST Right 09/15/2022    Procedure: EXCISION, GANGLION CYST, WRIST;  Surgeon: Christen Marshall MD;  Location: [facility] OR;  Service: Orthopedics;  Laterality: Right;    FLEXOR TENDON REPAIR Right 09/15/2022    Procedure: FLEXOR TENOSYNOVECTOMY;  Surgeon: Christen Marshall MD;  Location: [facility] OR;  Service: Orthopedics;  Laterality: Right;    HAND ARTHROTOMY Right 09/15/2022    Procedure: ARTHROTOMY, HAND RADIOCARPAL;  Surgeon: Christen Marshall MD;  Location: [facility] OR;  Service: Orthopedics;  Laterality: Right;  Radiocarpal    INJECTION OF JOINT Right 11/29/2023    Procedure: right hip IA injection and Right GTB injection;  Surgeon: Lorraine Patel DO;  Location: [facility] PAIN MANAGEMENT;  Service: Pain Management;  Laterality: Right;    INJECTION OF STEROID Left 09/15/2022    Procedure: INJECTION, STEROID LEFT WRIST AND LEFT LATERAL ELBOW;  Surgeon: Christen Marshall MD;  Location: [facility] OR;  Service: Orthopedics;  Laterality: Left;  Left Carpal Tunnel CSI    LASER LITHOTRIPSY Left 7/5/2023    Procedure: LITHOTRIPSY, USING LASER;  Surgeon: Chuckie Hall MD;  Location: [facility] OR 1ST FLR;  Service: Urology;  Laterality: Left;    LUMBAR LAMINECTOMY  2010    LUMBAR LAMINECTOMY WITH DISCECTOMY Left 09/20/2021    Procedure: LAMINECTOMY, SPINE, LUMBAR, WITH DISCECTOMY;  Surgeon: Naseem Mcnamara DO;  Location: [facility] OR 2ND FLR;  Service: Neurosurgery;  Laterality: Left;  MIS L3-4    PH MONITORING, ESOPHAGUS, WIRELESS, (OFF REFLUX MEDS) N/A 12/21/2023    Procedure: PH  MONITORING, ESOPHAGUS, WIRELESS, (OFF REFLUX MEDS);  Surgeon: Teo Johnson MD;  Location: [facility] ENDO;  Service: Endoscopy;  Laterality: N/A;    RECONSTRUCTION OF LIGAMENT Left 2023    Procedure: RECONSTRUCTION, LIGAMENT LATERAL COLLATERAL;  Surgeon: Christen Marshall MD;  Location: [facility] OR;  Service: Orthopedics;  Laterality: Left;    REPAIR OF EXTENSOR TENDON Left 2023    Procedure: REPAIR, TENDON, EXTENSOR;  Surgeon: Christen Marshall MD;  Location: [facility] OR;  Service: Orthopedics;  Laterality: Left;    TYMPANOSTOMY TUBE PLACEMENT      URETERAL STENT PLACEMENT Left 2023    Procedure: INSERTION, STENT, URETER;  Surgeon: Chuckie Hall MD;  Location: [facility] OR [address] [place] [address];  Service: Urology;  Laterality: Left;    URETEROSCOPIC REMOVAL OF URETERIC CALCULUS Left 2023    Procedure: REMOVAL, CALCULUS, URETER, URETEROSCOPIC;  Surgeon: Chuckie Hall MD;  [facility];  Service: Urology;  Laterality: Left;    URETEROSCOPY Left 2023    Procedure: URETEROSCOPY;  Surgeon: Chuckie Hall MD;  [facility];  Service: Urology;  Laterality: Left;       Social History     Socioeconomic History    Marital status:    Tobacco Use    Smoking status: Former     Current packs/day: 0.00     Types: Cigarettes     Quit date: 2018     Years since quittin.7     Passive exposure: Never    Smokeless tobacco: Never   Substance and Sexual Activity    Alcohol use: Yes     Alcohol/week: 3.0 standard drinks of alcohol     Types: 3 Drinks containing 0.5 oz of alcohol per week    Drug use: No    Sexual activity: Yes     Social Determinants of Health     Financial Resource Strain: Low Risk  (2023)    Overall Financial Resource Strain (CARDIA)     Difficulty of Paying Living Expenses: Not hard at all   Food Insecurity: No Food Insecurity (2023)    Hunger Vital Sign     Worried About Running Out of Food in the Last Year: Never true     Ran Out of Food in the Last Year: Never true    Transportation Needs: No Transportation Needs (11/7/2023)    PRAPARE - Transportation     Lack of Transportation (Medical): No     Lack of Transportation (Non-Medical): No   Physical Activity: Sufficiently Active (11/7/2023)    Exercise Vital Sign     Days of Exercise per Week: 4 days     Minutes of Exercise per Session: 40 min   Stress: No Stress Concern Present (11/7/2023)    [employer] [place] [employer] - Occupational Stress Questionnaire     Feeling of Stress : Only a little   Social Connections: Unknown (11/7/2023)    Social Connection and Isolation Panel [NHANES]     Frequency of Communication with Friends and Family: More than three times a week     Frequency of Social Gatherings with Friends and Family: Twice a week     Active Member of Clubs or Organizations: No     Attends Club or Organization Meetings: Never     Marital Status:    Housing Stability: Low Risk  (11/7/2023)    Housing Stability Vital Sign     Unable to Pay for Housing in the Last Year: No     Number of Places Lived in the Last Year: 1     Unstable Housing in the Last Year: No       There were no vitals filed for this visit.    Physical Exam  Constitutional:       General: He is not in acute distress.  HENT:      Head: Normocephalic and atraumatic.   Eyes:      General: No scleral icterus.  Cardiovascular:      Rate and Rhythm: Normal rate.   Pulmonary:      Effort: Pulmonary effort is normal. No respiratory distress.      Breath sounds: No stridor.   Abdominal:      Palpations: Abdomen is soft.      Tenderness: There is no abdominal tenderness.   Lymphadenopathy:      Cervical: No cervical adenopathy.   Skin:     General: Skin is warm.      Findings: No erythema.   Neurological:      Mental Status: He is alert and oriented to person, place, and time.   Psychiatric:         Behavior: Behavior normal.     Body mass index is 41.77 kg/m².  CT does not show much of a HH  EGD with small ulceration in duo  Upper GI  reviewed  Bravo consisent with GERD, good correlation    Assessment & Plan:  43M with GERD  Discussed fundoplication  Has trip in [place] he wants surgery prior to that  Plan for robot toupet March14  Understands he can't take ozempic the week before  Will discuss with dr blair timing of repeat EGD to eval ulcer? Maybe the week before surgery.

## 2024-01-02 ENCOUNTER — TELEPHONE (OUTPATIENT)
Dept: GASTROENTEROLOGY | Facility: CLINIC | Age: 44
End: 2024-01-02
Payer: COMMERCIAL

## 2024-01-02 ENCOUNTER — PATIENT MESSAGE (OUTPATIENT)
Dept: SURGERY | Facility: CLINIC | Age: 44
End: 2024-01-02
Payer: COMMERCIAL

## 2024-01-02 DIAGNOSIS — K21.9 GASTROESOPHAGEAL REFLUX DISEASE, UNSPECIFIED WHETHER ESOPHAGITIS PRESENT: Primary | ICD-10-CM

## 2024-01-02 DIAGNOSIS — K27.9 PEPTIC ULCER DISEASE: ICD-10-CM

## 2024-01-02 DIAGNOSIS — K21.9 GERD (GASTROESOPHAGEAL REFLUX DISEASE): ICD-10-CM

## 2024-01-02 NOTE — TELEPHONE ENCOUNTER
Spoke with pt and scheduled repeat egd. Instructions went over verbally and sent via portal.Verbal understanding

## 2024-01-02 NOTE — TELEPHONE ENCOUNTER
----- Message from Teo Johnson MD sent at 12/29/2023  9:34 PM CST -----  Can we try to arrange repeat egd around end feburary  ----- Message -----  From: Cody Winn MD  Sent: 12/29/2023   6:05 PM CST  To: Teo Johnson MD    This myrna came to see me for reflux  You did an EGD on him and he has a small ulcer in the duo. Been on nexium for years. You mentioned repeating it. I was planning doing a fundoplication march 14. Would you be able to do the repeat EGD maybe the week before or sometime to eval that ulcer? Concerning I guess it was was there despite PPI.

## 2024-01-02 NOTE — TELEPHONE ENCOUNTER
----- Message from Samuel Davenport sent at 1/2/2024 12:38 PM CST -----  Type:  Patient Returning Call    Who Called:PT  Who Left Message for Patient:Brice  Does the patient know what this is regarding?:SCHEDULING   Would the patient rather a call back or a response via [employer]? CALL  Best Call Back Number:[phone]  Additional Information:

## 2024-01-03 DIAGNOSIS — F90.0 ADHD (ATTENTION DEFICIT HYPERACTIVITY DISORDER), INATTENTIVE TYPE: ICD-10-CM

## 2024-01-03 DIAGNOSIS — H40.053 OCULAR HYPERTENSION, BILATERAL: ICD-10-CM

## 2024-01-03 RX ORDER — DEXMETHYLPHENIDATE HYDROCHLORIDE 10 MG/1
10 TABLET ORAL 2 TIMES DAILY
Qty: 60 TABLET | Refills: 0 | OUTPATIENT
Start: 2024-01-03 | End: 2024-02-02

## 2024-01-04 ENCOUNTER — PATIENT MESSAGE (OUTPATIENT)
Dept: ORTHOPEDICS | Facility: CLINIC | Age: 44
End: 2024-01-04
Payer: COMMERCIAL

## 2024-01-04 RX ORDER — LATANOPROST 50 UG/ML
1 SOLUTION/ DROPS OPHTHALMIC NIGHTLY
Qty: 7.5 ML | Refills: 3 | Status: SHIPPED | OUTPATIENT
Start: 2024-01-04 | End: 2024-01-31 | Stop reason: SDUPTHER

## 2024-01-05 ENCOUNTER — PATIENT MESSAGE (OUTPATIENT)
Dept: ALLERGY | Facility: CLINIC | Age: 44
End: 2024-01-05
Payer: COMMERCIAL

## 2024-01-07 DIAGNOSIS — F90.0 ADHD (ATTENTION DEFICIT HYPERACTIVITY DISORDER), INATTENTIVE TYPE: ICD-10-CM

## 2024-01-08 ENCOUNTER — OFFICE VISIT (OUTPATIENT)
Dept: PLASTIC SURGERY | Facility: CLINIC | Age: 44
End: 2024-01-08

## 2024-01-08 DIAGNOSIS — Z41.1 ENCOUNTER FOR COSMETIC PROCEDURE: Primary | ICD-10-CM

## 2024-01-08 PROCEDURE — 99499 UNLISTED E&M SERVICE: CPT | Mod: ,,, | Performed by: OTOLARYNGOLOGY

## 2024-01-08 RX ORDER — DEXMETHYLPHENIDATE HYDROCHLORIDE 10 MG/1
10 TABLET ORAL 2 TIMES DAILY
Qty: 60 TABLET | Refills: 0 | OUTPATIENT
Start: 2024-01-08 | End: 2024-02-07

## 2024-01-08 NOTE — PROGRESS NOTES
Patient who is here today for consult for cosmetic myomodulator treatment.   He was previously treated by by me with Dysport injections 5 months ago.  He still has a central horizontal forehead crease with functional muscle contracture remaining which he is unhappy with.  He also wished to treat his Crow's feet regions.  Discussed benefits and risks of botulinum toxin injections, including headache, weakness/paralysis of muscles, asymmetry, eyebrow/lid drooping, pain, bruising, swelling, infection, and rare risk of systemic botulism.  The patient was given the opportunity to ask any questions, and all questions were answered to the patient's satisfaction.  The patient verbalized understanding, elected to proceed, and signed a written informed consent.  Today have injected 30 units of Dysport in his central frontalis musculature and 15 units in each of his Crow's feet regions for a total of 60 units.     Patient tolerated well with no complications. She was instructed not to rub or massage the treated areas and that she should avoid lying down, bending upside down, and strenuous exercise for the rest of the day.  Instructed to wait two weeks to assess response before presenting for a touch up injection, if needed.

## 2024-01-09 ENCOUNTER — CLINICAL SUPPORT (OUTPATIENT)
Dept: REHABILITATION | Facility: HOSPITAL | Age: 44
End: 2024-01-09
Payer: COMMERCIAL

## 2024-01-09 ENCOUNTER — PATIENT MESSAGE (OUTPATIENT)
Dept: PLASTIC SURGERY | Facility: CLINIC | Age: 44
End: 2024-01-09
Payer: COMMERCIAL

## 2024-01-09 DIAGNOSIS — M25.622 IMPAIRED RANGE OF MOTION OF LEFT ELBOW: Primary | ICD-10-CM

## 2024-01-09 PROCEDURE — 97140 MANUAL THERAPY 1/> REGIONS: CPT

## 2024-01-09 PROCEDURE — 97110 THERAPEUTIC EXERCISES: CPT

## 2024-01-09 PROCEDURE — 97530 THERAPEUTIC ACTIVITIES: CPT

## 2024-01-09 NOTE — PROGRESS NOTES
"[facility]  Occupational Therapy Treatment Note    Date: 1/9/2024  Name: Hermelindo Garza III  Clinic Number: [id number]    Therapy Diagnosis:   Encounter Diagnosis   Name Primary?    Impaired range of motion of left elbow Yes     Physician: Christen Marshall MD    Physician Orders: OT Eval and Treat  Medical Diagnosis: S53.20XA (ICD-10-CM) - Partial tear of radial collateral ligament of elbow; M77.12 (ICD-10-CM) - Left lateral epicondylitis   Surgical Procedure and Date:   1. Left elbow lateral epicondyle debridement with extensor tendon repair, cpt 69069  2. Left elbow lateral radial collateral ligament reconstruction, cpt 53205, 12/5/23 / Date of Injury/Onset: progressive onset   Evaluation Date: 12/14/2023  Insurance Authorization Period Expiration: 1/31/2024  Plan of Care Expiration: 3/8/24 (12 weeks)  Date of Return to MD: 1/22/2024   Visit # / Visits authorized: 1 / 20 (Visit # 5)  FOTO: N/A    Precautions: Standard, Diabetes, and Weightbearing, [place] pgs. 351 & 490     Time In: 9:00 am  Time Out: 9:46 am  Total Billable Time: 40 minutes    SUBJECTIVE     Pt reports: "Every now and then, sore, but no pain."   He was compliant with home exercise program given last session.   Response to previous treatment: Good - improving motion   Functional change: use of hand for light activity     Pain: 0/10  Location: left elbow    OBJECTIVE   Objective Measures updated at progress report unless specified.    Observation/Appearance: Orthosis intact. Incision healing well, no significant signs of infection noted.      Edema. No significant edema noted.      Elbow and Wrist ROM. Measured in degrees.    12/14/2023 1/9/2024     Left* Left   Elbow Ext/Flex (pronated forearm) -30/100 -20/130 (+10/+30)   Supination/Pronation 0/WFL 70/75   Wrist Ext/Flex 35/neutral 62/60   *With elbow flexed and arm adducted      Hand ROM. WFL - Pt able to make full composite fists with B/L hands and demonstrates " thumb opposition WFL.      Strength (Dynamometer)   Measured in pounds to POP.    12/14/2023 12/14/2023     Left Right   Rung II NT NT      Sensation. To be assessed next session.      Manual Muscle Test    12/14/2023 12/14/2023     Left Right   Wrist Extension  NT NT   Wrist Flexion       Radial Deviation       Ulnar Deviation       Supination       Pronation       Elbow Extension       Elbow Flexion          Treatment     Hermelindo received the treatments listed below:     Supervised modalities after being cleared for contradictions: Hot Pack - Patient received moist heat x 6 min to L elbow to increase blood flow, circulation, and tissue elasticity prior to therex.    Manual therapy techniques: Soft tissue Mobilization and Friction Massage were applied to the: L elbow for 8 minutes, including:  - Scar massage to incision to decrease adhesions and improve tensile glide.   - Performed soft tissue mobilization/massage to forearm to relieve associated soft tissue tightness, improve joint mobility,      Therapeutic exercises to develop endurance, ROM, and flexibility for 24 minutes, including:  - Updated objective measurements, see above.   - Elbow ROM 3 ways 2 x 10 reps each  - Pron/sup 2 x 10 reps   - Gentle full fist x 20 reps  - Wrist flex/ext x 20 reps   - BUE pulleys x 3 min     Therapeutic activities to improve functional performance for 8 minutes, including:  - [place] rings for shoulder flexion and elbow extension crossing midline x 4 sets   - [place] rings for shoulder flexion and elbow extension onto towers x 4 sets      Patient Education and Home Exercises      Education provided:   - HEP in place  - Progress towards goals     Written Home Exercises Provided: Patient instructed to cont prior HEP.  Exercises were reviewed and Hermelindo was able to demonstrate them prior to the end of the session.  Hermelindo demonstrated good  understanding of the HEP provided. See EMR under Patient Instructions for exercises  provided during therapy sessions.      ASSESSMENT     Pt participated well and endorses good adherence to HEP. He is able to progress with pain-free elbow and forearm ROM. ROM is steadily improving and WFLs in all planes. Most limited with elbow and wrist extension. Will progress as tolerated and per protocol.     Hermelindo is progressing well towards his goals and there are no updates to goals at this time. Pt prognosis is Good.     Pt will continue to benefit from skilled outpatient occupational therapy to address the deficits listed in the problem list on initial evaluation, provide pt/family education and to maximize pt's level of independence in the home and community environment.     Pt's spiritual, cultural and educational needs considered and pt agreeable to plan of care and goals.    Anticipated barriers to occupational therapy: scheduling constraints due to work     Goals:   The following goals were discussed with the patient and patient is in agreement with them as to be addressed in the treatment plan.   Long Term Goals (LTGs); to be met by discharge.  LTG #1: Pt will report a pain level of 1-3 out of 10 at worst with arm use. progressing not met 1/9/2024  LTG #2: FOTO to be administered and assessed next session with updates to goals as needed. progressing not met 1/9/2024  LTG #3: Pt will return to prior level of function for IADLs and household management. progressing not met 1/9/2024  LTG #4: Assess MMT and  strength when appropriate and update goals as needed with focus on return to swimming and gym exercises. progressing not met 1/9/2024     Short Term Goals (STGs); to be met within 4 weeks (1/12/24).  STG #1: Pt will report a pain level of 4 out of 10 at worst with arm use. progressing not met 1/9/2024  STG #2: Pt will report/demo [place] with ADLs. progressing not met 1/9/2024  STG #3: Pt will demonstrate independence with issued HEP, orthosis wear, and modalities for pain/symptom  management. progressing not met 1/9/2024  STG #4: Pt will demo improved L elbow SURESH by 30-60 degrees needed to aid with buttoning collar. Met 1/9/2024  STG #5: Pt will demo improved L forearm SURESH by 30-40 degrees needed to aid with showering. Met 1/9/2024    PLAN     Continue skilled occupational therapy with individualized plan of care focusing on maximizing functional use of patient's left arm.    Updates/Grading for next session: progress as tolerated and per protocol. Measure.    HERON Velazquez/L

## 2024-01-10 DIAGNOSIS — F90.0 ADHD (ATTENTION DEFICIT HYPERACTIVITY DISORDER), INATTENTIVE TYPE: ICD-10-CM

## 2024-01-10 RX ORDER — DEXMETHYLPHENIDATE HYDROCHLORIDE 10 MG/1
10 TABLET ORAL 2 TIMES DAILY
Qty: 60 TABLET | Refills: 0 | OUTPATIENT
Start: 2024-01-10 | End: 2024-02-09

## 2024-01-11 ENCOUNTER — CLINICAL SUPPORT (OUTPATIENT)
Dept: ALLERGY | Facility: CLINIC | Age: 44
End: 2024-01-11
Payer: COMMERCIAL

## 2024-01-11 DIAGNOSIS — J30.9 CHRONIC ALLERGIC RHINITIS: Primary | ICD-10-CM

## 2024-01-11 PROCEDURE — 99999 PR PBB SHADOW E&M-EST. PATIENT-LVL I: CPT | Mod: PBBFAC,,,

## 2024-01-11 PROCEDURE — 95117 IMMUNOTHERAPY INJECTIONS: CPT | Mod: S$GLB,,, | Performed by: STUDENT IN AN ORGANIZED HEALTH CARE EDUCATION/TRAINING PROGRAM

## 2024-01-16 ENCOUNTER — CLINICAL SUPPORT (OUTPATIENT)
Dept: REHABILITATION | Facility: HOSPITAL | Age: 44
End: 2024-01-16
Payer: COMMERCIAL

## 2024-01-16 DIAGNOSIS — M25.622 IMPAIRED RANGE OF MOTION OF LEFT ELBOW: Primary | ICD-10-CM

## 2024-01-16 PROCEDURE — 97110 THERAPEUTIC EXERCISES: CPT

## 2024-01-16 PROCEDURE — 97140 MANUAL THERAPY 1/> REGIONS: CPT

## 2024-01-16 NOTE — PROGRESS NOTES
"[facility]  Occupational Therapy Treatment Note    Date: 1/16/2024  Name: Hermelindo Garza III  Clinic Number: [id number]    Therapy Diagnosis:   Encounter Diagnosis   Name Primary?    Impaired range of motion of left elbow Yes       Physician: Christen Marshall MD    Physician Orders: OT Eval and Treat  Medical Diagnosis: S53.20XA (ICD-10-CM) - Partial tear of radial collateral ligament of elbow; M77.12 (ICD-10-CM) - Left lateral epicondylitis   Surgical Procedure and Date:   1. Left elbow lateral epicondyle debridement with extensor tendon repair, cpt 83876  2. Left elbow lateral radial collateral ligament reconstruction, cpt 99612, 12/5/23 / Date of Injury/Onset: progressive onset   Evaluation Date: 12/14/2023  Insurance Authorization Period Expiration: 1/31/2024  Plan of Care Expiration: 3/8/24 (12 weeks)  Date of Return to MD: 1/22/2024   Visit # / Visits authorized: 2 / 20 (Visit # 6)  FOTO: N/A    Precautions: Standard, Diabetes, and Weightbearing, [place] pgs. 351 & [phone]     Time In: 9:04 am  Time Out: 9:43 am  Total Billable Time: 31 minutes    SUBJECTIVE     Pt reports: "Every now and then, sore, but no pain."   He was compliant with home exercise program given last session.   Response to previous treatment: Good - improving motion   Functional change: use of hand for light activity     Pain: 0/10  Location: left elbow    OBJECTIVE   Objective Measures updated at progress report unless specified.    Observation/Appearance: Incision well healed.      Edema. No significant edema noted.      Elbow and Wrist ROM. Measured in degrees.    12/14/2023 1/9/2024 1/16/2024     Left* Left Left   Elbow Ext/Flex (pronated forearm) -30/100 -20/130 (+10/+30) -15/130   Supination/Pronation 0/WFL 70/75 70/80   Wrist Ext/Flex 35/neutral 62/60 65/70   *With elbow flexed and arm adducted      Hand ROM. WFL - Pt able to make full composite fists with B/L hands and demonstrates thumb opposition " WFL.      Strength (Dynamometer)   Measured in pounds to POP.    12/14/2023 12/14/2023     Left Right   Rung II NT NT      Sensation. Intact per report.      Manual Muscle Test    12/14/2023 12/14/2023     Left Right   Wrist Extension  NT NT   Wrist Flexion       Radial Deviation       Ulnar Deviation       Supination       Pronation       Elbow Extension       Elbow Flexion          Treatment     Hermelindo received the treatments listed below:     Supervised modalities after being cleared for contradictions: Hot Pack - Patient received moist heat x 8 min to L elbow to increase blood flow, circulation, and tissue elasticity prior to therex.    Manual therapy techniques: Soft tissue Mobilization and Friction Massage were applied to the: L elbow for 8 minutes, including:  - Scar massage to incision to decrease adhesions and improve tensile glide.   - Performed soft tissue mobilization/massage to forearm and triceps to relieve associated soft tissue tightness, improve joint mobility,      Therapeutic exercises to develop endurance, ROM, and flexibility for 23 minutes, including:  - Updated objective measurements, see above.   - Elbow ROM 3 ways x 15 reps each  - Pron/sup x 15 reps  - Elbow PROM with dowel against wall 5 x 30 sec holds to tolerance   - Wrist flex/ext 2 x 10 reps   - BUE pulleys x 2 min   - UBE on no resistance x 4 min forward/back directions to increase UE strength and endurance.     Patient Education and Home Exercises      Education provided:   - HEP in place  - Progress towards goals     Written Home Exercises Provided: Patient instructed to cont prior HEP.  Exercises were reviewed and Hermelindo was able to demonstrate them prior to the end of the session.  Hermelindo demonstrated good  understanding of the HEP provided. See EMR under Patient Instructions for exercises provided during therapy sessions.      ASSESSMENT     Pt participated well and endorses good adherence to HEP. He is able to progress  with pain-free elbow and forearm PROM. ROM is WFLs per formal assessment. Will progress as tolerated and per protocol.     Hermelindo is progressing well towards his goals and there are no updates to goals at this time. Pt prognosis is Good.     Pt will continue to benefit from skilled outpatient occupational therapy to address the deficits listed in the problem list on initial evaluation, provide pt/family education and to maximize pt's level of independence in the home and community environment.     Pt's spiritual, cultural and educational needs considered and pt agreeable to plan of care and goals.    Anticipated barriers to occupational therapy: scheduling constraints due to work     Goals:   The following goals were discussed with the patient and patient is in agreement with them as to be addressed in the treatment plan.   Long Term Goals (LTGs); to be met by discharge.  LTG #1: Pt will report a pain level of 1-3 out of 10 at worst with arm use. progressing not met 1/16/2024  LTG #2: FOTO to be administered and assessed next session with updates to goals as needed. Discontinued   LTG #3: Pt will return to prior level of function for IADLs and household management. progressing not met 1/16/2024  LTG #4: Assess MMT and  strength when appropriate and update goals as needed with focus on return to swimming and gym exercises. progressing not met 1/16/2024     Short Term Goals (STGs); to be met within 4 weeks (1/12/24).  STG #1: Pt will report a pain level of 4 out of 10 at worst with arm use. Met 1/16/2024  STG #2: Pt will report/demo Kevin with ADLs. Met 1/16/2024  STG #3: Pt will demonstrate independence with issued HEP, orthosis wear, and modalities for pain/symptom management. Met ongoing 1/16/2024  STG #4: Pt will demo improved L elbow SURESH by 30-60 degrees needed to aid with buttoning collar. Met 1/9/2024  STG #5: Pt will demo improved L forearm SURESH by 30-40 degrees needed to aid with showering. Met  1/9/2024    PLAN     Continue skilled occupational therapy with individualized plan of care focusing on maximizing functional use of patient's left arm.    Updates/Grading for next session: progress as tolerated and per protocol.     Connie Vasquez, OTR/L

## 2024-01-17 ENCOUNTER — PATIENT MESSAGE (OUTPATIENT)
Dept: REHABILITATION | Facility: HOSPITAL | Age: 44
End: 2024-01-17
Payer: COMMERCIAL

## 2024-01-17 DIAGNOSIS — F90.0 ADHD (ATTENTION DEFICIT HYPERACTIVITY DISORDER), INATTENTIVE TYPE: ICD-10-CM

## 2024-01-17 RX ORDER — DEXMETHYLPHENIDATE HYDROCHLORIDE 10 MG/1
10 TABLET ORAL 2 TIMES DAILY
Qty: 60 TABLET | Refills: 0 | Status: CANCELLED | OUTPATIENT
Start: 2024-01-17 | End: 2024-02-16

## 2024-01-18 ENCOUNTER — CLINICAL SUPPORT (OUTPATIENT)
Dept: ALLERGY | Facility: CLINIC | Age: 44
End: 2024-01-18
Payer: COMMERCIAL

## 2024-01-18 DIAGNOSIS — J30.9 CHRONIC ALLERGIC RHINITIS: Primary | ICD-10-CM

## 2024-01-18 PROCEDURE — 99999 PR PBB SHADOW E&M-EST. PATIENT-LVL I: CPT | Mod: PBBFAC,,,

## 2024-01-18 PROCEDURE — 95117 IMMUNOTHERAPY INJECTIONS: CPT | Mod: S$GLB,,, | Performed by: STUDENT IN AN ORGANIZED HEALTH CARE EDUCATION/TRAINING PROGRAM

## 2024-01-22 ENCOUNTER — PATIENT MESSAGE (OUTPATIENT)
Dept: ORTHOPEDICS | Facility: CLINIC | Age: 44
End: 2024-01-22
Payer: COMMERCIAL

## 2024-01-22 ENCOUNTER — OFFICE VISIT (OUTPATIENT)
Dept: ORTHOPEDICS | Facility: CLINIC | Age: 44
End: 2024-01-22
Payer: COMMERCIAL

## 2024-01-22 ENCOUNTER — PATIENT MESSAGE (OUTPATIENT)
Dept: DERMATOLOGY | Facility: CLINIC | Age: 44
End: 2024-01-22
Payer: COMMERCIAL

## 2024-01-22 ENCOUNTER — OFFICE VISIT (OUTPATIENT)
Dept: PLASTIC SURGERY | Facility: CLINIC | Age: 44
End: 2024-01-22

## 2024-01-22 DIAGNOSIS — M77.12 LEFT LATERAL EPICONDYLITIS: ICD-10-CM

## 2024-01-22 DIAGNOSIS — Z09 FOLLOW-UP EXAMINATION AFTER ORTHOPEDIC SURGERY: Primary | ICD-10-CM

## 2024-01-22 DIAGNOSIS — S53.20XA PARTIAL TEAR OF RADIAL COLLATERAL LIGAMENT OF ELBOW: ICD-10-CM

## 2024-01-22 DIAGNOSIS — F90.0 ADHD (ATTENTION DEFICIT HYPERACTIVITY DISORDER), INATTENTIVE TYPE: ICD-10-CM

## 2024-01-22 DIAGNOSIS — Z41.1 ENCOUNTER FOR COSMETIC PROCEDURE: Primary | ICD-10-CM

## 2024-01-22 PROCEDURE — 1159F MED LIST DOCD IN RCRD: CPT | Mod: CPTII,S$GLB,, | Performed by: ORTHOPAEDIC SURGERY

## 2024-01-22 PROCEDURE — 3072F LOW RISK FOR RETINOPATHY: CPT | Mod: CPTII,S$GLB,, | Performed by: ORTHOPAEDIC SURGERY

## 2024-01-22 PROCEDURE — 99499 UNLISTED E&M SERVICE: CPT | Mod: ,,, | Performed by: OTOLARYNGOLOGY

## 2024-01-22 PROCEDURE — 99024 POSTOP FOLLOW-UP VISIT: CPT | Mod: S$GLB,,, | Performed by: ORTHOPAEDIC SURGERY

## 2024-01-22 PROCEDURE — 99999 PR PBB SHADOW E&M-EST. PATIENT-LVL II: CPT | Mod: PBBFAC,,, | Performed by: ORTHOPAEDIC SURGERY

## 2024-01-22 RX ORDER — DEXMETHYLPHENIDATE HYDROCHLORIDE 10 MG/1
10 TABLET ORAL 2 TIMES DAILY
Qty: 60 TABLET | Refills: 0 | OUTPATIENT
Start: 2024-01-22 | End: 2024-02-21

## 2024-01-22 NOTE — PROGRESS NOTES
Patient who is here today for consult for cosmetic myomodulator treatment.   Discussed benefits and risks of botulinum toxin injections, including headache, weakness/paralysis of muscles, asymmetry, eyebrow/lid drooping, pain, bruising, swelling, infection, and rare risk of systemic botulism.  The patient was given the opportunity to ask any questions, and all questions were answered to the patient's satisfaction.  The patient verbalized understanding, elected to proceed, and signed a written informed consent.  Today I have injected 15 units of Dysport in each of his  musculatures for a total of 30 units.     Patient tolerated well with no complications. She was instructed not to rub or massage the treated areas and that she should avoid lying down, bending upside down, and strenuous exercise for the rest of the day.  Instructed to wait two weeks to assess response before presenting for a touch up injection, if needed.

## 2024-01-22 NOTE — PROGRESS NOTES
Hermelindo Garza III presents for post-operative evaluation of   Encounter Diagnoses   Name Primary?    Follow-up examination after orthopedic surgery Yes    Partial tear of radial collateral ligament of elbow     Left lateral epicondylitis    The patient is now 6 weeks 6 day s/p 12.5.23 Left elbow RCL repair and ECRB debridement with tendon repair. He has is progressing well in OT. He has pain free AROM with his elbow.     He also states that he has had a return of pain in his right basilar thumb joint.    PE:    AA&O x 4.  NAD  HEENT:  NCAT, sclera nonicteric  Lungs:  Respirations are equal and unlabored.  CV:  2+ bilateral upper and lower extremity pulses.  MSK: The incision is healing.  Nontender to palpation left elbow.  Tender to palpation right basilar thumb joint positive grind test.  Full wrist and finger motion.  Neurovascularly intact and has 5/5 thenar and intrinsic musculature strength.        A/P: Status post above, doing well  1) Continue with OT, start strengthening, can do HEP if patient prefers  2) F/U 6 weeks  3) Call with any questions/concerns in the interim        Christen Marshall MD    Please be aware that this note has been generated with the assistance of [employer] voice-to-text.  Please excuse any spelling or grammatical errors.

## 2024-01-24 ENCOUNTER — CLINICAL SUPPORT (OUTPATIENT)
Dept: REHABILITATION | Facility: HOSPITAL | Age: 44
End: 2024-01-24
Payer: COMMERCIAL

## 2024-01-24 DIAGNOSIS — M25.622 IMPAIRED RANGE OF MOTION OF LEFT ELBOW: Primary | ICD-10-CM

## 2024-01-24 PROCEDURE — 97140 MANUAL THERAPY 1/> REGIONS: CPT

## 2024-01-24 PROCEDURE — 97110 THERAPEUTIC EXERCISES: CPT

## 2024-01-24 NOTE — PATIENT INSTRUCTIONS
[facility]  OCCUPATIONAL THERAPY  HOME EXERCISE PROGRAM     Complete the following strengthening program 3x/week.          2 min elbow flex, elbow ext       [facility], OCCUPATIONAL THERAPY  HOME EXERCISE PROGRAM         Complete the following strengthening exercises using 2 pound weight.  Do 10 repetitions each, 3 sets, 3 times a week.       With palm up and weight in hand, bend wrist up. Return slowly.    With palm down and weight in hand, bend wrist up. Then bend wrist down.     With thumb up and weight in hand, bend wrist up. Return slowly.  Copyright © [facility]. All rights reserved.         [facility]  OCCUPATIONAL THERAPY  HOME EXERCISE PROGRAM   Perform 3 x 30 sec holds

## 2024-01-25 ENCOUNTER — CLINICAL SUPPORT (OUTPATIENT)
Dept: ALLERGY | Facility: CLINIC | Age: 44
End: 2024-01-25
Payer: COMMERCIAL

## 2024-01-25 DIAGNOSIS — J30.9 CHRONIC ALLERGIC RHINITIS: Primary | ICD-10-CM

## 2024-01-25 PROCEDURE — 99999 PR PBB SHADOW E&M-EST. PATIENT-LVL I: CPT | Mod: PBBFAC,,,

## 2024-01-25 PROCEDURE — 95117 IMMUNOTHERAPY INJECTIONS: CPT | Mod: S$GLB,,, | Performed by: STUDENT IN AN ORGANIZED HEALTH CARE EDUCATION/TRAINING PROGRAM

## 2024-01-29 ENCOUNTER — CLINICAL SUPPORT (OUTPATIENT)
Dept: ALLERGY | Facility: CLINIC | Age: 44
End: 2024-01-29
Payer: COMMERCIAL

## 2024-01-29 DIAGNOSIS — J30.9 CHRONIC ALLERGIC RHINITIS: Primary | ICD-10-CM

## 2024-01-29 PROCEDURE — 95117 IMMUNOTHERAPY INJECTIONS: CPT | Mod: S$GLB,,, | Performed by: STUDENT IN AN ORGANIZED HEALTH CARE EDUCATION/TRAINING PROGRAM

## 2024-01-29 PROCEDURE — 99999 PR PBB SHADOW E&M-EST. PATIENT-LVL I: CPT | Mod: PBBFAC,,,

## 2024-01-30 DIAGNOSIS — F90.0 ADHD (ATTENTION DEFICIT HYPERACTIVITY DISORDER), INATTENTIVE TYPE: ICD-10-CM

## 2024-01-30 RX ORDER — DEXMETHYLPHENIDATE HYDROCHLORIDE 10 MG/1
10 TABLET ORAL 2 TIMES DAILY
Qty: 60 TABLET | Refills: 0 | OUTPATIENT
Start: 2024-01-30 | End: 2024-02-29

## 2024-01-31 ENCOUNTER — PATIENT MESSAGE (OUTPATIENT)
Dept: DERMATOLOGY | Facility: CLINIC | Age: 44
End: 2024-01-31
Payer: COMMERCIAL

## 2024-01-31 ENCOUNTER — OFFICE VISIT (OUTPATIENT)
Dept: PSYCHIATRY | Facility: CLINIC | Age: 44
End: 2024-01-31
Payer: COMMERCIAL

## 2024-01-31 VITALS
HEART RATE: 71 BPM | BODY MASS INDEX: 42.78 KG/M2 | WEIGHT: 257.06 LBS | DIASTOLIC BLOOD PRESSURE: 78 MMHG | SYSTOLIC BLOOD PRESSURE: 151 MMHG

## 2024-01-31 DIAGNOSIS — F90.0 ADHD (ATTENTION DEFICIT HYPERACTIVITY DISORDER), INATTENTIVE TYPE: ICD-10-CM

## 2024-01-31 DIAGNOSIS — F41.9 ANXIETY: ICD-10-CM

## 2024-01-31 DIAGNOSIS — E11.9 TYPE 2 DIABETES MELLITUS WITHOUT COMPLICATION, UNSPECIFIED WHETHER LONG TERM INSULIN USE: ICD-10-CM

## 2024-01-31 DIAGNOSIS — M54.12 CERVICAL RADICULOPATHY: ICD-10-CM

## 2024-01-31 DIAGNOSIS — F39 UNSPECIFIED MOOD (AFFECTIVE) DISORDER: Primary | ICD-10-CM

## 2024-01-31 DIAGNOSIS — H40.053 OCULAR HYPERTENSION, BILATERAL: ICD-10-CM

## 2024-01-31 DIAGNOSIS — Z87.442 HISTORY OF URINARY STONE: ICD-10-CM

## 2024-01-31 DIAGNOSIS — M54.16 LUMBAR RADICULOPATHY, CHRONIC: ICD-10-CM

## 2024-01-31 DIAGNOSIS — G47.00 INSOMNIA, UNSPECIFIED TYPE: ICD-10-CM

## 2024-01-31 PROCEDURE — 3008F BODY MASS INDEX DOCD: CPT | Mod: CPTII,S$GLB,, | Performed by: PSYCHIATRY & NEUROLOGY

## 2024-01-31 PROCEDURE — 1159F MED LIST DOCD IN RCRD: CPT | Mod: CPTII,S$GLB,, | Performed by: PSYCHIATRY & NEUROLOGY

## 2024-01-31 PROCEDURE — 99214 OFFICE O/P EST MOD 30 MIN: CPT | Mod: S$GLB,,, | Performed by: PSYCHIATRY & NEUROLOGY

## 2024-01-31 PROCEDURE — 3072F LOW RISK FOR RETINOPATHY: CPT | Mod: CPTII,S$GLB,, | Performed by: PSYCHIATRY & NEUROLOGY

## 2024-01-31 PROCEDURE — 3077F SYST BP >= 140 MM HG: CPT | Mod: CPTII,S$GLB,, | Performed by: PSYCHIATRY & NEUROLOGY

## 2024-01-31 PROCEDURE — 1160F RVW MEDS BY RX/DR IN RCRD: CPT | Mod: CPTII,S$GLB,, | Performed by: PSYCHIATRY & NEUROLOGY

## 2024-01-31 PROCEDURE — 3078F DIAST BP <80 MM HG: CPT | Mod: CPTII,S$GLB,, | Performed by: PSYCHIATRY & NEUROLOGY

## 2024-01-31 PROCEDURE — 99999 PR PBB SHADOW E&M-EST. PATIENT-LVL II: CPT | Mod: PBBFAC,,, | Performed by: PSYCHIATRY & NEUROLOGY

## 2024-01-31 RX ORDER — ESCITALOPRAM OXALATE 5 MG/1
5 TABLET ORAL DAILY
Qty: 90 TABLET | Refills: 1 | Status: SHIPPED | OUTPATIENT
Start: 2024-01-31 | End: 2024-05-02

## 2024-01-31 RX ORDER — LATANOPROST 50 UG/ML
1 SOLUTION/ DROPS OPHTHALMIC NIGHTLY
Qty: 7.5 ML | Refills: 3 | Status: SHIPPED | OUTPATIENT
Start: 2024-01-31

## 2024-01-31 RX ORDER — DEXMETHYLPHENIDATE HYDROCHLORIDE 10 MG/1
10 TABLET ORAL 2 TIMES DAILY
Qty: 60 TABLET | Refills: 0 | Status: SHIPPED | OUTPATIENT
Start: 2024-03-01 | End: 2024-04-27 | Stop reason: SDUPTHER

## 2024-01-31 RX ORDER — DEXMETHYLPHENIDATE HYDROCHLORIDE 10 MG/1
10 TABLET ORAL 2 TIMES DAILY
Qty: 60 TABLET | Refills: 0 | Status: SHIPPED | OUTPATIENT
Start: 2024-01-31 | End: 2024-03-31

## 2024-01-31 RX ORDER — DEXMETHYLPHENIDATE HYDROCHLORIDE 10 MG/1
10 TABLET ORAL 2 TIMES DAILY
Qty: 60 TABLET | Refills: 0 | Status: SHIPPED | OUTPATIENT
Start: 2024-03-29 | End: 2024-04-29

## 2024-01-31 RX ORDER — LAMOTRIGINE 100 MG/1
100 TABLET ORAL 2 TIMES DAILY
Qty: 180 TABLET | Refills: 1 | Status: SHIPPED | OUTPATIENT
Start: 2024-01-31 | End: 2024-05-02 | Stop reason: SDUPTHER

## 2024-01-31 RX ORDER — LORAZEPAM 0.5 MG/1
0.5 TABLET ORAL DAILY PRN
Qty: 30 TABLET | Refills: 3 | Status: SHIPPED | OUTPATIENT
Start: 2024-02-02 | End: 2024-05-02 | Stop reason: SDUPTHER

## 2024-01-31 NOTE — PROGRESS NOTES
Hermelindo Garza III   1980 01/31/2024     Disclaimer: Evaluation and treatment is based on information presented to date. Any new information may affect assessment and findings.     The patient location is: IN CLINIC      S: Patient's Own Perception of Condition (& Side Effects) : none lexo MD noted he has had wt gain tho pt acknowledges he came off ozempic / yet looking tog et back on and exercise     O:      CURRENT PRESENTATION:   Says doing ok    Jan 31 2024: Looking forward to going [employer] for pleasure    Also looking forward to [email or web address] cruise [place] to [place] and back    Psyc Meds   Lamictal 200 mg Lexapro /  he moved to 5 mg (from 10mg) as we had discussed past he opted to remain on such / likes this regimen     Says anxiety improved / feels bit tired at times    Constitutional Health Concerns: back pain Sees Anders SELLERS / started 2010    Says does aquatherapy now then    Also taking allergy shots weekly main campus x 9 months     Sees  [facility] pain mngt  Comments Medication: says ambien not working / asks to remove; says ativan  helps with anxiety insomnia / says has been on in past at low dose and helps ; asks to switch to that; risk benefit discussed; says not taking opioids tho may have been Rx in past. Did caution him as to such; says he well aware     Wt Readings from Last 12 Encounters:   01/31/24 116.6 kg (257 lb 0.9 oz)   12/29/23 113.9 kg (250 lb 15.9 oz)   12/21/23 113.4 kg (250 lb)   12/19/23 114.9 kg (253 lb 4.9 oz)   12/05/23 108.9 kg (240 lb)   11/29/23 108.9 kg (240 lb)   11/13/23 108.9 kg (240 lb 1.3 oz)   11/03/23 108.9 kg (240 lb)   10/12/23 111.9 kg (246 lb 11.1 oz)   10/10/23 114.4 kg (252 lb 3.3 oz)   10/10/23 114 kg (251 lb 5.2 oz)   08/28/23 112.8 kg (248 lb 10.9 oz)      Laboratory Data       Date/Time Component Value Flag Lab Status   04/05/23 0720 TSH 1.657 -- Final result   10/05/22 0718 PSA 0.18 -- Final result   10/11/23 1124 HDL 40 -- Final  result   10/11/23 1124 CHOL 169 -- Final result   10/11/23 1124 TRIG 345 Important  High Final result   10/11/23 1124 LDLCALC 60.0 Important  Low Final result   10/11/23 1124 CHOLHDL 23.7 -- Final result   10/11/23 1124 NONHDLCHOL 129 -- Final result   10/11/23 1124 TOTALCHOLEST 4.2 -- Final result   10/11/23 1124 HGBA1C 5.4 -- Final result   07/04/23 1143 COLORU Yellow -- Final result   07/04/23 1143 APPEARANCEUA Clear -- Final result   07/04/23 1143 SPECGRAV >=1.030 Important  Abnormal Final result   07/04/23 1143 PHUR 5.0 -- Final result   07/04/23 1143 KETONESU Trace Important  Abnormal Final result   07/04/23 1143 OCCULTUA 1+ Important  Abnormal Final result   07/04/23 1143 NITRITE Negative -- Final result   05/13/14 1512 UROBILINOGEN Negative -- Final result   01/02/20 0936 RAPFLUA Negative -- Final result   01/02/20 0936 RAPFLUB Negative -- Final result   12/21/23 0747 POCGLU 105 -- Final result   09/19/21 2023 INR 1.0 -- Final result   01/02/20 0928 RAPSCRN Negative -- Final result   07/04/23 1143 LEUKOCYTESUR Negative -- Final result   07/04/23 2317 WBC 13.22 Important  High Final result   07/04/23 2317 RBC 5.10 -- Final result   07/04/23 2317 HGB 15.2 -- Final result   07/04/23 2317 HCT 45.2 -- Final result   07/04/23 2317 MCH 29.8 -- Final result   07/04/23 2317 RDW 13.5 -- Final result   07/04/23 2317  -- Final result   07/04/23 2317 MPV 10.2 -- Final result   10/11/23 1124 GLU 84 -- Final result   10/11/23 1124 BUN 18 -- Final result   10/11/23 1124 CREATININE 0.7 -- Final result   10/11/23 1124 CALCIUM 9.5 -- Final result   10/11/23 1124  -- Final result   10/11/23 1124 K 4.1 -- Final result   10/11/23 1124  -- Final result   10/11/23 1124 PROT 7.1 -- Final result   10/11/23 1124 ALBUMIN 4.3 -- Final result   10/11/23 1124 BILITOT 0.7 -- Final result   10/11/23 1124 AST 24 -- Final result   10/11/23 1124 ALKPHOS 94 -- Final result   10/11/23 1124 CO2 26 -- Final result   10/11/23  1124 ALT 39 -- Final result   10/11/23 1124 ANIONGAP 10 -- Final result   04/05/22 0720 EGFRNONAA >60.0 -- Final result   04/05/22 0720 ESTGFRAFRICA >60.0 -- Final result   02/19/20 0723 MG 2.1 -- Final result   07/04/23 2317 MCV 89 -- Final result   04/12/23 1124 CRP 3.8 -- Final result     Mental Status Exam:      Appearance: casual   Oriented: x 3   Attitude: cooperative   Eye Contact: good  Behavior: calm  smiling    Mood: feeling ok  Cognition: alert  Concentration: grossly intact   Affect: appropriate range      Anxiety: mild       Thought Process: goal directed     Speech:       Volume : WNL       Quantity WNL       Quality: appears to openly answer questions      Threats: no SI / no HI     Psychosis: denies all      Estimate of Intellectual Function: average   Impulse Control: no thoughts of harm to self/ others      Musculoskeletal:  no tremor      Patient Active Problem List   Diagnosis    Gastro-esophageal reflux disease without esophagitis    Renal calculi    Mild intermittent asthma without complication    ADHD (attention deficit hyperactivity disorder), inattentive type    LUBA (obstructive sleep apnea)    Hyperlipidemia, unspecified    Hypertriglyceridemia    Elevated liver enzymes    SHEIKH (nonalcoholic steatohepatitis)    Hepatosplenomegaly    Cellulitis of left arm    History of multiple allergies    Unspecified asthma, uncomplicated    Obesity, unspecified    Type 2 diabetes mellitus without complications    Vitamin D insufficiency    Restless leg    Chronic pain    Pain in left elbow    Cervical disc disorder with radiculopathy, unspecified cervical region    History of urinary stone    Lumbar radiculopathy, chronic    Tachycardia    S/P lumbar discectomy    Morbid obesity    Cervical spondylosis    History of melanoma in situ    Anxiety    Mood Disorder Unspecifed:  Depressed THO also has some mood elevation  (8of 13 on MDQ): ex: irritability, more self confident, thoughts race. more active)      Rotator cuff tendonitis, left    Left lateral epicondylitis    Cervical radiculopathy    Insomnia    Ureteral stone with hydronephrosis    Pain of left lower extremity    Neck pain    Chronic low back pain          Current Outpatient Medications:     acetaminophen (TYLENOL) 325 MG tablet, Take 650 mg by mouth every 6 (six) hours as needed for Pain., Disp: , Rfl:     albuterol (PROVENTIL/VENTOLIN HFA) 90 mcg/actuation inhaler, INHALE 2 PUFFS INTO THE LUNGS EVERY 6 HOURS AS NEEDED WHEEZING, Disp: 8.5 g, Rfl: 5    atorvastatin (LIPITOR) 20 MG tablet, Take 1 tablet (20 mg total) by mouth once daily., Disp: 90 tablet, Rfl: 1    azelastine (ASTELIN) 137 mcg (0.1 %) nasal spray, 2 sprays (274 mcg total) by Nasal route 2 (two) times daily., Disp: 30 mL, Rfl: 9    blood sugar diagnostic (TRUE METRIX GLUCOSE TEST STRIP) Strp, Use to test blood glucose one (1) time a day,, Disp: 100 strip, Rfl: 3    blood-glucose meter Misc, use as directed, Disp: 1 each, Rfl: 0    cyclobenzaprine (FLEXERIL) 10 MG tablet, Take 10 mg by mouth 3 (three) times daily., Disp: , Rfl:     cyclobenzaprine (FLEXERIL) 10 MG tablet, Take 1 tablet (10 mg total) by mouth 3 (three) times daily as needed for Muscle spasms., Disp: 90 tablet, Rfl: 1    dexmethylphenidate (FOCALIN) 10 MG tablet, Take 1 tablet (10 mg total) by mouth 2 (two) times daily., Disp: 60 tablet, Rfl: 0    [START ON 3/1/2024] dexmethylphenidate (FOCALIN) 10 MG tablet, Take 1 tablet (10 mg total) by mouth 2 (two) times daily., Disp: 60 tablet, Rfl: 0    [START ON 3/29/2024] dexmethylphenidate (FOCALIN) 10 MG tablet, Take 1 tablet (10 mg total) by mouth 2 (two) times daily., Disp: 60 tablet, Rfl: 0    docusate sodium (COLACE) 100 MG capsule, Take 1 capsule (100 mg total) by mouth 2 (two) times daily., Disp: 30 capsule, Rfl: 1    ergocalciferol, vitamin D2, (VITAMIN D ORAL), Take 1 tablet by mouth every other day., Disp: , Rfl:     EScitalopram oxalate (LEXAPRO) 5 MG Tab, Take 1 tablet (5  mg total) by mouth once daily., Disp: 90 tablet, Rfl: 1    fexofenadine (ALLEGRA) 180 MG tablet, Take 180 mg by mouth once daily., Disp: , Rfl:     fluocinonide (LIDEX) 0.05 % external solution, AAA scalp qday prn itching, scaling, Disp: 60 mL, Rfl: 3    gabapentin (NEURONTIN) 300 MG capsule, Take 1 capsule (300 mg total) by mouth 3 (three) times daily., Disp: 90 capsule, Rfl: 3    hydrocortisone 2.5 % cream, APPLY EXTERNALLY TO THE AFFECTED AREA TWICE DAILY FOR 10 DAYS, Disp: 30 g, Rfl: 0    ketoconazole (NIZORAL) 2 % cream, APPLY TOPICALLY TO THE AFFECTED AREA TWICE DAILY, Disp: 15 g, Rfl: 0    lamoTRIgine (LAMICTAL) 100 MG tablet, Take 1 tablet (100 mg total) by mouth 2 (two) times daily. IF any RASH, STOP ALL Lamictal and Tell Psyc MD., Disp: 180 tablet, Rfl: 1    lancets (ONETOUCH DELICA PLUS LANCET) 33 gauge Misc, Use to test blood glucose one (1) time a day,, Disp: 100 each, Rfl: 5    latanoprost 0.005 % ophthalmic solution, Place 1 drop into both eyes every evening., Disp: 7.5 mL, Rfl: 3    [START ON 2/2/2024] LORazepam (ATIVAN) 0.5 MG tablet, Take 1 tablet (0.5 mg total) by mouth daily as needed (SIGNIFICANT ANXIETY)., Disp: 30 tablet, Rfl: 3    meloxicam (MOBIC) 15 MG tablet, TAKE 1 TABLET(15 MG) BY MOUTH EVERY DAY as needed FOR PAIN, Disp: 30 tablet, Rfl: 2    montelukast (SINGULAIR) 10 mg tablet, Take 1 tablet (10 mg total) by mouth every evening., Disp: 90 tablet, Rfl: 3    ondansetron (ZOFRAN-ODT) 4 MG TbDL, Dissolve 1 tablet (4 mg total) on the tongue every 6 (six) hours as needed (nausea)., Disp: 30 tablet, Rfl: 2    rOPINIRole (REQUIP) 0.25 MG tablet, TAKE 1 TABLET(0.25 MG) BY MOUTH EVERY EVENING, Disp: 90 tablet, Rfl: 2    semaglutide (OZEMPIC) 0.25 mg or 0.5 mg (2 mg/3 mL) pen injector, Inject 0.5 mg into the skin every 7 days., Disp: 3 each, Rfl: 1    sod picosulf-mag ox-citric ac (CLENPIQ) 10 mg-3.5 gram- 12 gram/160 mL Soln, Take As Directed., Disp: 320 mL, Rfl: 0    ULTRA THIN LANCETS 30 gauge  "Misc, USE TO TEST BLOOD SUGAR EVERY DAY AS DIRECTED, Disp: 100 each, Rfl: 3  No current facility-administered medications for this visit.    Facility-Administered Medications Ordered in Other Visits:     0.9%  NaCl infusion, 500 mL, Intravenous, Continuous, Kolton Terrell MD    mupirocin 2 % ointment, , Nasal, On Call Procedure, Chong Padron MD, Given at 09/15/22 0553    ondansetron injection 4 mg, 4 mg, Intravenous, Once PRN, Arron Rangel MD     Social History     Tobacco Use   Smoking Status Former    Current packs/day: 0.00    Types: Cigarettes    Quit date: 2018    Years since quittin.8    Passive exposure: Never   Smokeless Tobacco Never        Review of patient's allergies indicates:  No Known Allergies     ASSESSMENT:   Encounter Diagnoses   Name Primary?    Mood Disorder Unspecifed:  Depressed THO also has some mood elevation  (8of 13 on MDQ): ex: irritability, more self confident, thoughts race. more active)  Yes    Anxiety     ADHD (attention deficit hyperactivity disorder), inattentive type     Cervical radiculopathy     Type 2 diabetes mellitus without complication, unspecified whether long term insulin use     Insomnia, unspecified type     History of urinary stone     Lumbar radiculopathy, chronic          Patient Instructions     PLAN:     Psyc MD flowed request to scheduling for May 2 2024 10:30 a TELEHEALTH  THO PATIENT  NEEDS TO CONFIRM APPOINTMENT  by seeing such appointment  appear on their "My Chart" michael.      NOTE:  IF  appointment is not visible, THEN patient is  instructed to call [facility] ([facility]) at:  [phone]  and coordinate appointment scheduling with . Understanding Expressed.      Meds: Lamictal advanced to 100 mg BID  IF any rash stop all lamictal    ativan 0.5 mg / generally takes at bed ( replaces ambien)    Lexapro he moved down to 5 mg and would like to some off / MD agree to such thjo IF need he will recontact    Renewed " focalin 10 mg twice daily       Have encouraged to establish with counselor:    Pt may call [facility] at [phone] and requests counselor;  pt was informed that there may be significant ait times involved.    As such patient was also told of alternatives such as:  1) contacting their  insurance carrier for a list of counselors  And / or  2) may explore website Psychology Today: www.[email or web address]/us/therapists     References:     Relaxation stress reduction workbook: AMI Davenport PhD ( used: $7-10)    Feeling Good Website: Ashwin Anderson MD / www.[employer] website (free) / alexey. PODCASTS    Anxiety &  phobia workbook by JORGE Carlisle PhD  (web retailers: used: $ 7-10)    [place]: Path to Better Sleep : https://www.veterantraining.[place].gov/insomnia/ (free)       Pt expressed appreciation for the visit today and did not have further question at this time though pt  was still informed to:     Call  if problems.    Call / Report Side Effects to Psyc MD     Encouraged to follow up with primary care / Gen Med MD for continued monitoring of general health and wellness.    Understanding was expressed; and no further concerns nor questions were raised at this time.     Remember healthy self care:   eat right  attempt adequate rest   HANDWASHING / encourage such alexey. During this corona virus time   walk or light exercise within reason and as your general med team approves  read or explore any of reference materials / homework mentioned  reach out (I.e.,  connect with)  others who nuture and bring out best in you  avoid risky behaviors    Keep your appointments:    IF you  cannot make your appt THEN please call [phone] or go online (via My Chart michael) to reschedule.    It is the responsibility of the patient to reschedule an appointment if an appointment has been canceled or missed.    Avoid  alcohol and illicit substances.  Look for the positive.  All is often relative-seek balance  Call sooner if  needed : [phone]   Call 911 or go to Emergency Room  (ER)  if  any acute concerns  >>  References:     Relaxation stress reduction workbook: AMI Davenport PhD ( used: $7-10)    Feeling Good Website: Ashwin Anderson MD / www.[employer].com website (free) / alexey. PODCASTS    Anxiety &  phobia workbook by JORGE Carlisle PhD  (web retailers: used: $ 7-10)    [place]: Path to Better Sleep : https://www.veterantraining.[place].gov/insomnia/ (free)    [place] Quit with Us [place]: http://www.quitwithusla.org/    (and other resources as per counselor, if applicable)     Pt expressed appreciation for the visit today and did not have further question at this time though pt  was still informed to:     Call / Report Side Effects to Psmarilyn SELLERS     Encouraged to follow up with primary care / Gen Med MD for continued monitoring of general health and wellness.    Understanding was expressed; and no further concerns nor questions were raised at this time.     remember healthy self care:   eat right  attempt adequate rest   HANDWASHING / encourage such alexey. During this corona virus time   walk or light exercise within reason and as your general med team approves  read or explore any of reference materials / homework mentioned  reach out (I.e.,  connect with)  others who nuture and bring out best in you  avoid risky behaviors  keep your appointments  IF you  cannot make your appt THEN please call or go online to reschedule.  avoid  alcohol and illicit substances.  Look for the positive.  All is often relative-seek balance  Call [facility]  if questions : [phone]   Call 911 or go to Emergency Room  (ER)  if any acute concern    >> Background from intial viral SELLERS eval  <<    He reports  to male. 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 Pt is new to me. Last PCP Dr Beck.     DM2: ozempic 0.5 mg weekly. Is interested in stopping medication as A1c controlled and has had increasing nausea. Not sure if ozempic is causing it since he has been on it for years.   HLD: not on medication  LUBA: uses cpap  GERD: nexium 40 mg bid  Cervical spondylosis with myelopathy: continues to have chronic neck pain with radiation down both arms (now L side increased) also endorses weakness. Does see a spine surgeon. Reports he was told to also r/o rheumatoid arthritis. Pt denies having joint swelling   ADHD: on focalin. Sees psychiatry.   Anxiety: reports increased anxiety over the past few months. Having trouble sleeping. Has not been on SSRI      He was referred in as having started Lexapro 10 mg April 2023. Says helpnig some tho clearly still cites anxiety.      In past saw [facility] Pre[facility] Psyc NP / of Excerpt 1-  Psyc ntake:     Patient with hx of pre-glaucoma (eye drops for this), DM type 2 (well controlled with Ozempic), asthma (intermittent, mild), HLD, GERD, insomnia, sleep apnea, ADHD, lumbar discectomy, chronic back pain (managed with gabapentin), ozempic for weight loss.       Reports had previously been a patient of Dr. Dayana Robertson for past 12 to 13 years but was referred to this provider due to Dr. Robertson's custodial.      Patient reports a long history of ADHD, had trialed several medications per communications with Dr. Robertson however Focalin has been the most effective lately. Stopped this for a short while, but since working from home the need for this medication has increased.      Reports anxiety has been more increased since being depleted due to work constraints. Reports some worry late at night with rumination. Occurring randomly but "I do worry a lot."     Reports "I have a compulsion to control and things need to be the way I want them to be."      Also long history " of LUBA, insomnia and recently completed BEBP program      Reports been implementing sleep hygiene via BEBP and CBT.      Reports 50# weight loss - this has helped with sleep apnea (settings have been reduced since losing weight).      Discussed to resume focalin IR at BID to TID pending UDS and CS form completion.     Denies SI/HI, AVH, racing thoughts, substance abuse

## 2024-01-31 NOTE — PATIENT INSTRUCTIONS
"  PLAN:     Go SELLERS flowed request to scheduling for May 2 2024 10:30 a TELEHEALTH  THO PATIENT  NEEDS TO CONFIRM APPOINTMENT  by seeing such appointment  appear on their "My Chart" michael.      NOTE:  IF  appointment is not visible, THEN patient is  instructed to call [facility] ([facility]) at:  [phone]  and coordinate appointment scheduling with . Understanding Expressed.      Meds: Lamictal advanced to 100 mg BID  IF any rash stop all lamictal    ativan 0.5 mg / generally takes at bed ( replaces ambien)    Lexapro he moved down to 5 mg and would like to some off / MD agree to such thjo IF need he will recontact    Renewed focalin 10 mg twice daily       Have encouraged to establish with counselor:    Pt may call [facility] at [phone] and requests counselor;  pt was informed that there may be significant ait times involved.    As such patient was also told of alternatives such as:  1) contacting their  insurance carrier for a list of counselors  And / or  2) may explore website Psychology Today: www.[employer]/us/therapists     References:     Relaxation stress reduction workbook: AMI Davenport PhD ( used: $7-10)    Feeling Good Website: Ashwin Anderson MD / www.[employer].com website (free) / alexey. PODCASTS    Anxiety &  phobia workbook by JORGE Carlisle PhD  (web retailers: used: $ 7-10)    [place]: Path to Better Sleep : https://www.veterantraining.[place].gov/insomnia/ (free)       Pt expressed appreciation for the visit today and did not have further question at this time though pt  was still informed to:     Call  if problems.    Call / Report Side Effects to Go SELLERS     Encouraged to follow up with primary care / Gen Med MD for continued monitoring of general health and wellness.    Understanding was expressed; and no further concerns nor questions were raised at this time.     Remember healthy self care:   eat right  attempt adequate rest   HANDWASHING / " encourage such alexey. During this corona virus time   walk or light exercise within reason and as your general med team approves  read or explore any of reference materials / homework mentioned  reach out (I.e.,  connect with)  others who nuture and bring out best in you  avoid risky behaviors    Keep your appointments:    IF you  cannot make your appt THEN please call [phone] or go online (via My Chart michael) to reschedule.    It is the responsibility of the patient to reschedule an appointment if an appointment has been canceled or missed.    Avoid  alcohol and illicit substances.  Look for the positive.  All is often relative-seek balance  Call sooner if needed : [phone]   Call 911 or go to Emergency Room  (ER)  if  any acute concerns

## 2024-02-08 ENCOUNTER — PATIENT MESSAGE (OUTPATIENT)
Dept: ORTHOPEDICS | Facility: CLINIC | Age: 44
End: 2024-02-08
Payer: COMMERCIAL

## 2024-02-09 ENCOUNTER — PATIENT MESSAGE (OUTPATIENT)
Dept: SURGERY | Facility: CLINIC | Age: 44
End: 2024-02-09
Payer: COMMERCIAL

## 2024-02-09 ENCOUNTER — PATIENT MESSAGE (OUTPATIENT)
Dept: PAIN MEDICINE | Facility: CLINIC | Age: 44
End: 2024-02-09
Payer: COMMERCIAL

## 2024-02-12 ENCOUNTER — PATIENT MESSAGE (OUTPATIENT)
Dept: INTERNAL MEDICINE | Facility: CLINIC | Age: 44
End: 2024-02-12
Payer: COMMERCIAL

## 2024-02-12 DIAGNOSIS — E11.9 TYPE 2 DIABETES MELLITUS WITHOUT COMPLICATION, WITHOUT LONG-TERM CURRENT USE OF INSULIN: Primary | ICD-10-CM

## 2024-02-14 ENCOUNTER — CLINICAL SUPPORT (OUTPATIENT)
Dept: ALLERGY | Facility: CLINIC | Age: 44
End: 2024-02-14
Payer: COMMERCIAL

## 2024-02-14 DIAGNOSIS — J30.9 CHRONIC ALLERGIC RHINITIS: Primary | ICD-10-CM

## 2024-02-14 PROCEDURE — 99999 PR PBB SHADOW E&M-EST. PATIENT-LVL I: CPT | Mod: PBBFAC,,,

## 2024-02-14 PROCEDURE — 95117 IMMUNOTHERAPY INJECTIONS: CPT | Mod: S$GLB,,, | Performed by: STUDENT IN AN ORGANIZED HEALTH CARE EDUCATION/TRAINING PROGRAM

## 2024-02-14 RX ORDER — SEMAGLUTIDE 1.34 MG/ML
1 INJECTION, SOLUTION SUBCUTANEOUS
Qty: 3 ML | Refills: 3 | Status: SHIPPED | OUTPATIENT
Start: 2024-02-14 | End: 2024-04-04 | Stop reason: DRUGHIGH

## 2024-02-16 ENCOUNTER — TELEPHONE (OUTPATIENT)
Dept: PAIN MEDICINE | Facility: CLINIC | Age: 44
End: 2024-02-16
Payer: COMMERCIAL

## 2024-02-16 DIAGNOSIS — M51.36 DDD (DEGENERATIVE DISC DISEASE), LUMBAR: ICD-10-CM

## 2024-02-16 DIAGNOSIS — M54.16 LUMBAR RADICULOPATHY: Primary | ICD-10-CM

## 2024-02-16 NOTE — TELEPHONE ENCOUNTER
----- Message from Kelsey Patel DO sent at 2024 12:27 PM CST -----  Regarding: Order for VIET ARCHER III    Patient Name: VIET ARCHER III([id number])  Sex: Male  : 1980      PCP: MATT GAMEZ    Center: [facility]     Types of orders made on 2024: Procedure Request    Order Date:2024  Ordering User:KELSEY PATEL [[id number]]  Encounter Provider:Kelsey Patel DO [[id number]]  Z  1 Authorizing Provider: Kelsey Patel DO [[id number]  Department:[facility] PAIN MANAGEMENT[[id number]]    Common Order Information  Procedure -> Epidural Injection (specify level) Cmt: L4/L5 Interlaminar NELI    Pre-op Diagnosis -> Lumbar radiculopathy       -> DDD (degenerative disc disease), lumbar     Order Specific Information  Order: Procedure Order to Pain Management [Custom: [id number]]  Order #:          [id number]: 1 FUTUR  E    Priority: Routine  Class: Clinic Performed    Future Order Information      Expires on:2025            Expected by:2024                   Associated Diagnoses      M54.16 Lumbar radiculopathy      M51.36 DDD (degenerative disc disease), lumbar      Physician -> Gelter         Is patient on anti-coagulants? -> No         Fac[facility] Name: -> [facility]         Follow-up: -> 2 weeks Cmt: Hung Luke            Priority: Routine  Class: Clinic Performed    Future Order Information      Expires on:2025            Expected by:2024                   Associated Diagnoses      M54.16 Lumbar radiculopathy      M51.36 DDD (degenerative disc disease), lumbar      Procedure -> Epidural Injection (specify level) Cmt: L4/L5 Interlaminar NELI        Physician -> Gelter         Is patient on anti-coagulants? -> No           Pre-op Diagnosis -> Lumbar radiculopathy           -> DDD (degenerative disc disease), lumbar         Facility Name: -> [facility]         Follow-up: -> 2 weeks Cmt: Hung Luke

## 2024-02-20 ENCOUNTER — TELEPHONE (OUTPATIENT)
Dept: ENDOSCOPY | Facility: HOSPITAL | Age: 44
End: 2024-02-20
Payer: COMMERCIAL

## 2024-02-20 NOTE — TELEPHONE ENCOUNTER
Left message instructing patient to call dept @ [phone] between 8am-4pm.    Arrival time to be given @ 710  (Message sent via [facility] portal)

## 2024-02-21 ENCOUNTER — TELEPHONE (OUTPATIENT)
Dept: ENDOSCOPY | Facility: HOSPITAL | Age: 44
End: 2024-02-21
Payer: COMMERCIAL

## 2024-02-21 ENCOUNTER — CLINICAL SUPPORT (OUTPATIENT)
Dept: ALLERGY | Facility: CLINIC | Age: 44
End: 2024-02-21
Payer: COMMERCIAL

## 2024-02-21 DIAGNOSIS — J30.9 CHRONIC ALLERGIC RHINITIS: Primary | ICD-10-CM

## 2024-02-21 PROCEDURE — 95117 IMMUNOTHERAPY INJECTIONS: CPT | Mod: S$GLB,,, | Performed by: STUDENT IN AN ORGANIZED HEALTH CARE EDUCATION/TRAINING PROGRAM

## 2024-02-21 PROCEDURE — 99999 PR PBB SHADOW E&M-EST. PATIENT-LVL I: CPT | Mod: PBBFAC,,,

## 2024-02-22 ENCOUNTER — HOSPITAL ENCOUNTER (OUTPATIENT)
Facility: HOSPITAL | Age: 44
Discharge: HOME | End: 2024-02-22
Attending: INTERNAL MEDICINE | Admitting: INTERNAL MEDICINE
Payer: COMMERCIAL

## 2024-02-22 ENCOUNTER — ANESTHESIA (OUTPATIENT)
Dept: ENDOSCOPY | Facility: HOSPITAL | Age: 44
End: 2024-02-22
Payer: COMMERCIAL

## 2024-02-22 ENCOUNTER — ANESTHESIA EVENT (OUTPATIENT)
Dept: ENDOSCOPY | Facility: HOSPITAL | Age: 44
End: 2024-02-22
Payer: COMMERCIAL

## 2024-02-22 VITALS
RESPIRATION RATE: 14 BRPM | WEIGHT: 240 LBS | HEIGHT: 65 IN | HEART RATE: 72 BPM | OXYGEN SATURATION: 98 % | SYSTOLIC BLOOD PRESSURE: 119 MMHG | TEMPERATURE: 99 F | DIASTOLIC BLOOD PRESSURE: 69 MMHG | BODY MASS INDEX: 39.99 KG/M2

## 2024-02-22 LAB — POCT GLUCOSE: 84 MG/DL (ref 70–110)

## 2024-02-22 PROCEDURE — 88305 TISSUE EXAM BY PATHOLOGIST: CPT | Performed by: STUDENT IN AN ORGANIZED HEALTH CARE EDUCATION/TRAINING PROGRAM

## 2024-02-22 PROCEDURE — 37000009 HC ANESTHESIA EA ADD 15 MINS: Performed by: INTERNAL MEDICINE

## 2024-02-22 PROCEDURE — D9220A PRA ANESTHESIA: Mod: CRNA,,, | Performed by: NURSE ANESTHETIST, CERTIFIED REGISTERED

## 2024-02-22 PROCEDURE — 88342 IMHCHEM/IMCYTCHM 1ST ANTB: CPT | Performed by: STUDENT IN AN ORGANIZED HEALTH CARE EDUCATION/TRAINING PROGRAM

## 2024-02-22 PROCEDURE — 25000003 PHARM REV CODE 250: Performed by: NURSE ANESTHETIST, CERTIFIED REGISTERED

## 2024-02-22 PROCEDURE — 27201012 HC FORCEPS, HOT/COLD, DISP: Performed by: INTERNAL MEDICINE

## 2024-02-22 PROCEDURE — 88305 TISSUE EXAM BY PATHOLOGIST: CPT | Mod: 26,,, | Performed by: STUDENT IN AN ORGANIZED HEALTH CARE EDUCATION/TRAINING PROGRAM

## 2024-02-22 PROCEDURE — 37000008 HC ANESTHESIA 1ST 15 MINUTES: Performed by: INTERNAL MEDICINE

## 2024-02-22 PROCEDURE — D9220A PRA ANESTHESIA: Mod: ANES,,, | Performed by: ANESTHESIOLOGY

## 2024-02-22 PROCEDURE — 43239 EGD BIOPSY SINGLE/MULTIPLE: CPT | Performed by: INTERNAL MEDICINE

## 2024-02-22 PROCEDURE — 43239 EGD BIOPSY SINGLE/MULTIPLE: CPT | Mod: ,,, | Performed by: INTERNAL MEDICINE

## 2024-02-22 PROCEDURE — 88342 IMHCHEM/IMCYTCHM 1ST ANTB: CPT | Mod: 26,,, | Performed by: STUDENT IN AN ORGANIZED HEALTH CARE EDUCATION/TRAINING PROGRAM

## 2024-02-22 PROCEDURE — 63600175 PHARM REV CODE 636 W HCPCS: Performed by: NURSE ANESTHETIST, CERTIFIED REGISTERED

## 2024-02-22 RX ORDER — LIDOCAINE HYDROCHLORIDE 20 MG/ML
INJECTION INTRAVENOUS
Status: DISCONTINUED | OUTPATIENT
Start: 2024-02-22 | End: 2024-02-22

## 2024-02-22 RX ORDER — SODIUM CHLORIDE 0.9 % (FLUSH) 0.9 %
10 SYRINGE (ML) INJECTION
Status: DISCONTINUED | OUTPATIENT
Start: 2024-02-22 | End: 2024-02-22 | Stop reason: HOSPADM

## 2024-02-22 RX ORDER — PROPOFOL 10 MG/ML
VIAL (ML) INTRAVENOUS CONTINUOUS PRN
Status: DISCONTINUED | OUTPATIENT
Start: 2024-02-22 | End: 2024-02-22

## 2024-02-22 RX ORDER — DEXMEDETOMIDINE HYDROCHLORIDE 100 UG/ML
INJECTION, SOLUTION INTRAVENOUS
Status: DISCONTINUED | OUTPATIENT
Start: 2024-02-22 | End: 2024-02-22

## 2024-02-22 RX ORDER — SODIUM CHLORIDE 9 MG/ML
INJECTION, SOLUTION INTRAVENOUS CONTINUOUS
Status: DISCONTINUED | OUTPATIENT
Start: 2024-02-22 | End: 2024-02-22 | Stop reason: HOSPADM

## 2024-02-22 RX ORDER — PROPOFOL 10 MG/ML
VIAL (ML) INTRAVENOUS
Status: DISCONTINUED | OUTPATIENT
Start: 2024-02-22 | End: 2024-02-22

## 2024-02-22 RX ADMIN — PROPOFOL 175 MCG/KG/MIN: 10 INJECTION, EMULSION INTRAVENOUS at 10:02

## 2024-02-22 RX ADMIN — GLYCOPYRROLATE 0.2 MG: 0.2 INJECTION, SOLUTION INTRAMUSCULAR; INTRAVITREAL at 10:02

## 2024-02-22 RX ADMIN — DEXMEDETOMIDINE HYDROCHLORIDE 12 MCG: 100 INJECTION, SOLUTION, CONCENTRATE INTRAVENOUS at 10:02

## 2024-02-22 RX ADMIN — SODIUM CHLORIDE: 0.9 INJECTION, SOLUTION INTRAVENOUS at 10:02

## 2024-02-22 RX ADMIN — TOPICAL ANESTHETIC 1 EACH: 200 SPRAY DENTAL; PERIODONTAL at 10:02

## 2024-02-22 RX ADMIN — LIDOCAINE HYDROCHLORIDE 100 MG: 20 INJECTION, SOLUTION INTRAVENOUS at 10:02

## 2024-02-22 RX ADMIN — PROPOFOL 100 MG: 10 INJECTION, EMULSION INTRAVENOUS at 10:02

## 2024-02-22 NOTE — TRANSFER OF CARE
"Anesthesia Transfer of Care Note    Patient: Hermelindo Halltzinger III    Procedure(s) Performed: Procedure(s) (LRB):  EGD (ESOPHAGOGASTRODUODENOSCOPY) (N/A)    Patient location: GI    Anesthesia Type: general    Transport from OR: Transported from OR on room air with adequate spontaneous ventilation    Post pain: adequate analgesia    Post assessment: no apparent anesthetic complications    Post vital signs: stable    Level of consciousness: awake    Nausea/Vomiting: no nausea/vomiting    Complications: none    Transfer of care protocol was followed      Last vitals: Visit Vitals  /66   Pulse 75   Temp 36.7 °C (98 °F)   Resp 20   Ht 5' 5" (1.651 m)   Wt 108.9 kg (240 lb)   SpO2 97%   BMI 39.94 kg/m²     "

## 2024-02-22 NOTE — H&P
Short Stay Endoscopy History and Physical    PCP - Suzi Sanches MD     Procedure - EGD  ASA - per anesthesia  Mallampati - per anesthesia  History of Anesthesia problems - no  Family history Anesthesia problems -  no   Plan of anesthesia - General    HPI:  This is a 43 y.o. male here for evaluation of :     gerd  Reflux esophagitis , grade c      ROS:  Constitutional: No fevers, chills, No weight loss  CV: No chest pain  Pulm: No cough, No shortness of breath  Ophtho: No vision changes  GI: see HPI  Derm: No rash    Medical History:  has a past medical history of ADD (attention deficit disorder) (05/09/2012), Allergy, Anxiety (4/12/2023), Asthma (05/09/2012), Cervical disc disorder with radiculopathy, unspecified cervical region (09/14/2021), Cervical spondylosis with myelopathy (06/29/2022), Eczema, Esophageal ulcer, GERD (gastroesophageal reflux disease) (05/09/2012), Glaucoma, Kidney stones (05/2014), Lumbar disc disease (05/09/2012), Lumbar herniated disc (05/09/2012), Malignant melanoma of skin, unspecified (01/06/2022), Melanoma (01/2022), LUBA (obstructive sleep apnea), Radiculopathy, lumbar region (09/14/2021), Renal calculi (05/09/2012), and Type 2 diabetes mellitus without complication, without long-term current use of insulin (11/16/2020).    Surgical History:  has a past surgical history that includes Lumbar laminectomy (2010); Esophagogastroduodenoscopy (01/22/2019); Colonoscopy (01/22/2019); Appendectomy (2006); Epidural steroid injection (N/A, 02/10/2021); Esophagogastroduodenoscopy (N/A, 05/18/2021); Lumbar laminectomy with discectomy (Left, 09/20/2021); Carpal tunnel release (Right, 09/15/2022); Excision of ganglion of wrist (Right, 09/15/2022); Hand Arthrotomy (Right, 09/15/2022); Injection of steroid (Left, 09/15/2022); Flexor tendon repair (Right, 09/15/2022); Tympanostomy tube placement; Epidural steroid injection into cervical spine (N/A, 6/28/2023); Cystoscopy (7/5/2023); Ureteral  stent placement (Left, 7/5/2023); Ureteroscopy (Left, 7/5/2023); Laser lithotripsy (Left, 7/5/2023); Ureteroscopic removal of ureteric calculus (Left, 7/5/2023); Correction of hammer toe (Left, 8/14/2023); Epidural steroid injection into lumbar spine (N/A, 9/27/2023); Epidural steroid injection into cervical spine (N/A, 11/3/2023); Injection of joint (Right, 11/29/2023); Reconstruction of ligament (Left, 12/5/2023); Repair of extensor tendon (Left, 12/5/2023); Esophagogastroduodenoscopy (N/A, 12/21/2023); ph monitoring, esophagus, wireless, (off reflux meds) (N/A, 12/21/2023); and Colonoscopy (N/A, 12/21/2023).    Family History: family history includes Allergic rhinitis in his father, mother, sister, and sister; Asthma in his father, sister, and sister; Brain cancer in his maternal grandmother; Breast cancer in his paternal grandmother; Chronic back pain in his father; Diabetes in his maternal grandfather; MARTITA disease in his father; Hypertension in his mother; Melanoma in his father; No Known Problems in his brother, maternal aunt, maternal uncle, paternal aunt, paternal grandfather, and paternal uncle; Sleep apnea in his father and mother; Transient ischemic attack in his mother.. Otherwise no colon cancer, inflammatory bowel disease, or GI malignancies.    Social History:  reports that he quit smoking about 5 years ago. His smoking use included cigarettes. He has never been exposed to tobacco smoke. He has never used smokeless tobacco. He reports current alcohol use of about 3.0 standard drinks of alcohol per week. He reports that he does not use drugs.    Review of patient's allergies indicates:  No Known Allergies    Medications:   Medications Prior to Admission   Medication Sig Dispense Refill Last Dose    acetaminophen (TYLENOL) 325 MG tablet Take 650 mg by mouth every 6 (six) hours as needed for Pain.       albuterol (PROVENTIL/VENTOLIN HFA) 90 mcg/actuation inhaler INHALE 2 PUFFS INTO THE LUNGS EVERY 6  HOURS AS NEEDED WHEEZING 8.5 g 5     atorvastatin (LIPITOR) 20 MG tablet Take 1 tablet (20 mg total) by mouth once daily. 90 tablet 1     azelastine (ASTELIN) 137 mcg (0.1 %) nasal spray 2 sprays (274 mcg total) by Nasal route 2 (two) times daily. 30 mL 9     blood sugar diagnostic (TRUE METRIX GLUCOSE TEST STRIP) Strp Use to test blood glucose one (1) time a day, 100 strip 3     blood-glucose meter Misc use as directed 1 each 0     cyclobenzaprine (FLEXERIL) 10 MG tablet Take 10 mg by mouth 3 (three) times daily.       dexmethylphenidate (FOCALIN) 10 MG tablet Take 1 tablet (10 mg total) by mouth 2 (two) times daily. 60 tablet 0     [START ON 3/1/2024] dexmethylphenidate (FOCALIN) 10 MG tablet Take 1 tablet (10 mg total) by mouth 2 (two) times daily. 60 tablet 0     [START ON 3/29/2024] dexmethylphenidate (FOCALIN) 10 MG tablet Take 1 tablet (10 mg total) by mouth 2 (two) times daily. 60 tablet 0     docusate sodium (COLACE) 100 MG capsule Take 1 capsule (100 mg total) by mouth 2 (two) times daily. 30 capsule 1     ergocalciferol, vitamin D2, (VITAMIN D ORAL) Take 1 tablet by mouth every other day.       EScitalopram oxalate (LEXAPRO) 5 MG Tab Take 1 tablet (5 mg total) by mouth once daily. 90 tablet 1     fexofenadine (ALLEGRA) 180 MG tablet Take 180 mg by mouth once daily.       fluocinonide (LIDEX) 0.05 % external solution AAA scalp qday prn itching, scaling 60 mL 3     gabapentin (NEURONTIN) 300 MG capsule Take 1 capsule (300 mg total) by mouth 3 (three) times daily. 90 capsule 3     hydrocortisone 2.5 % cream APPLY EXTERNALLY TO THE AFFECTED AREA TWICE DAILY FOR 10 DAYS 30 g 0     ketoconazole (NIZORAL) 2 % cream APPLY TOPICALLY TO THE AFFECTED AREA TWICE DAILY 15 g 0     lamoTRIgine (LAMICTAL) 100 MG tablet Take 1 tablet (100 mg total) by mouth 2 (two) times daily. IF any RASH, STOP ALL Lamictal and Tell Psyc MD. 180 tablet 1     lancets (ONETOUCH DELICA PLUS LANCET) 33 gauge Misc Use to test blood glucose  one (1) time a day, 100 each 5     latanoprost 0.005 % ophthalmic solution Place 1 drop into both eyes every evening. 7.5 mL 3     LORazepam (ATIVAN) 0.5 MG tablet Take 1 tablet (0.5 mg total) by mouth daily as needed (SIGNIFICANT ANXIETY). 30 tablet 3     meloxicam (MOBIC) 15 MG tablet TAKE 1 TABLET(15 MG) BY MOUTH EVERY DAY as needed FOR PAIN 30 tablet 2     montelukast (SINGULAIR) 10 mg tablet Take 1 tablet (10 mg total) by mouth every evening. 90 tablet 3     ondansetron (ZOFRAN-ODT) 4 MG TbDL Dissolve 1 tablet (4 mg total) on the tongue every 6 (six) hours as needed (nausea). 30 tablet 2     rOPINIRole (REQUIP) 0.25 MG tablet TAKE 1 TABLET(0.25 MG) BY MOUTH EVERY EVENING 90 tablet 2     semaglutide (OZEMPIC) 1 mg/dose (4 mg/3 mL) Inject 1 mg into the skin every 7 days. 3 mL 3     sod picosulf-mag ox-citric ac (CLENPIQ) 10 mg-3.5 gram- 12 gram/160 mL Soln Take As Directed. 320 mL 0     ULTRA THIN LANCETS 30 gauge Misc USE TO TEST BLOOD SUGAR EVERY DAY AS DIRECTED 100 each 3        Physical Exam:    Vital Signs: There were no vitals filed for this visit.    General Appearance: Well appearing in no acute distress  Eyes:    No scleral icterus  ENT: Neck supple, Lips, mucosa, and tongue normal; teeth and gums normal  Abdomen: Soft, non tender, non distended with normal bowel sounds. No hepatosplenomegaly, ascites, or mass.  Extremities: No edema  Skin: No rash    Labs:  Lab Results   Component Value Date    WBC 13.22 (H) 07/04/2023    HGB 15.2 07/04/2023    HCT 45.2 07/04/2023     07/04/2023    CHOL 169 10/11/2023    TRIG 345 (H) 10/11/2023    HDL 40 10/11/2023    ALT 39 10/11/2023    AST 24 10/11/2023     10/11/2023    K 4.1 10/11/2023     10/11/2023    CREATININE 0.7 10/11/2023    BUN 18 10/11/2023    CO2 26 10/11/2023    TSH 1.657 04/05/2023    PSA 0.18 10/05/2022    INR 1.0 09/19/2021    HGBA1C 5.4 10/11/2023       I have explained the risks and benefits of endoscopy procedures to the patient  including but not limited to bleeding, perforation, infection, and death.  The patient was asked if they understand and allowed to ask any further questions to their satisfaction.      Teo Johnson MD

## 2024-02-22 NOTE — [employer]ATION PATIENT INSTRUCTIONS
Discharge Summary/Instructions after an Endoscopic Procedure  Patient Name: Hermelindo Garza  Patient MRN: [id number]  Patient YOB: 1980 Thursday, February 22, 2024  Teo Johnson MD  Dear patient,  As a result of recent federal legislation (The Federal Cures Act), you may   receive lab or pathology results from your procedure in your [facility]   account before your physician is able to contact you. Your physician or   their representative will relay the results to you with their   recommendations at their soonest availability.  Thank you,  Your health is very important to us during the Covid Crisis. Following your   procedure today, you will receive a daily text for 2 weeks asking about   signs or symptoms of Covid 19.  Please respond to this text when you   receive it so we can follow up and keep you as safe as possible.   RESTRICTIONS:  During your procedure today, you received medications for sedation.  These   medications may affect your judgment, balance and coordination.  Therefore,   for 24 hours, you have the following restrictions:   - DO NOT drive a car, operate machinery, make legal/financial decisions,   sign important papers or drink alcohol.    ACTIVITY:  Today: no heavy lifting, straining or running due to procedural   sedation/anesthesia.  The following day: return to full activity including work.  DIET:  Eat and drink normally unless instructed otherwise.     TREATMENT FOR COMMON SIDE EFFECTS:  - Mild abdominal pain, nausea, belching, bloating or excessive gas:  rest,   eat lightly and use a heating pad.  - Sore Throat: treat with throat lozenges and/or gargle with warm salt   water.  - Because air was used during the procedure, expelling large amounts of air   from your rectum or belching is normal.  - If a bowel prep was taken, you may not have a bowel movement for 1-3 days.    This is normal.  SYMPTOMS TO WATCH FOR AND REPORT TO YOUR PHYSICIAN:  1. Abdominal pain or bloating,  other than gas cramps.  2. Chest pain.  3. Back pain.  4. Signs of infection such as: chills or fever occurring within 24 hours   after the procedure.  5. Rectal bleeding, which would show as bright red, maroon, or black stools.   (A tablespoon of blood from the rectum is not serious, especially if   hemorrhoids are present.)  6. Vomiting.  7. Weakness or dizziness.  GO DIRECTLY TO THE NEAREST EMERGENCY ROOM IF YOU HAVE ANY OF THE FOLLOWING:      Difficulty breathing              Chills and/or fever over 101 F   Persistent vomiting and/or vomiting blood   Severe abdominal pain   Severe chest pain   Black, tarry stools   Bleeding- more than one tablespoon   Any other symptom or condition that you feel may need urgent attention  Your doctor recommends these additional instructions:  If any biopsies were taken, your doctors clinic will contact you in 1 to 2   weeks with any results.  - Discharge patient to home.   - Patient has a contact number available for emergencies.  The signs and   symptoms of potential delayed complications were discussed with the   patient.  Return to normal activities tomorrow.  Written discharge   instructions were provided to the patient.   - Resume previous diet.   - Continue present medications.   - Await pathology results.   - Proceed with hiatal hernia repair if deemed appropriate with surgeon.  - Avoid NSAIDs and gastric irritants.  For questions, problems or results please call your physician - Teo Johnson MD.  EMERGENCY PHONE NUMBER: [phone],  LAB RESULTS: [phone]  IF A COMPLICATION OR EMERGENCY SITUATION ARISES AND YOU ARE UNABLE TO REACH   YOUR PHYSICIAN - GO DIRECTLY TO THE EMERGENCY ROOM.  Teo Johnson MD  2/22/2024 11:02:28 AM  This report has been verified and signed electronically.  Dear patient,  As a result of recent federal legislation (The Federal Cures Act), you may   receive lab or pathology results from your procedure in your [facility]   account  before your physician is able to contact you. Your physician or   their representative will relay the results to you with their   recommendations at their soonest availability.  Thank you,  [employer]OVATION

## 2024-02-22 NOTE — ANESTHESIA PREPROCEDURE EVALUATION
02/22/2024  Hermelindo Garza III is a 43 y.o., male for EGD (ESOPHAGOGASTRODUODENOSCOPY) (N/A)  Past Medical History:   Diagnosis Date    ADD (attention deficit disorder) 05/09/2012    Allergy     Anxiety 4/12/2023    Asthma 05/09/2012    Cervical disc disorder with radiculopathy, unspecified cervical region 09/14/2021    Cervical spondylosis with myelopathy 06/29/2022    Eczema     Esophageal ulcer     GERD (gastroesophageal reflux disease) 05/09/2012    Glaucoma     Kidney stones 05/2014    Lumbar disc disease 05/09/2012    Lumbar herniated disc 05/09/2012    Malignant melanoma of skin, unspecified 01/06/2022    in situ right mid back    Melanoma 01/2022    in situ R mid back     LUBA (obstructive sleep apnea)     Radiculopathy, lumbar region 09/14/2021    Renal calculi 05/09/2012    Type 2 diabetes mellitus without complication, without long-term current use of insulin 11/16/2020     Past Surgical History:   Procedure Laterality Date    APPENDECTOMY  2006    CARPAL TUNNEL RELEASE Right 09/15/2022    Procedure: RELEASE, CARPAL TUNNEL;  Surgeon: Christen Marshall MD;  Location: [facility] OR;  Service: Orthopedics;  Laterality: Right;    COLONOSCOPY  01/22/2019    internal hemorrhoids, o/w normal - repeat at age 52    COLONOSCOPY N/A 12/21/2023    Procedure: COLONOSCOPY;  Surgeon: Teo Johnson MD;  Location: [facility];  Service: Endoscopy;  Laterality: N/A;    CORRECTION OF HAMMER TOE Left 8/14/2023    Procedure: CORRECTION, HAMMER TOE--  basic foot tray as well as a Reese microfree/microchoice tray. I also asked Sherry to bring an MIS mati.;  Surgeon: Ankush Back DPM;  Location: [facility] OR;  Service: Podiatry;  Laterality: Left;  reese drill, sagittal saw, foot set    CYSTOSCOPY  7/5/2023    Procedure: CYSTOSCOPY;  Surgeon: Chuckie Hall MD;  Location: [address];  Service: Urology;;    EPIDURAL  STEROID INJECTION N/A 02/10/2021    Procedure: CERVICAL C6/7 NELI DIRECT REFERRAL;  Surgeon: Michi Escamilla MD;  Location: [facility] PAIN MGT;  Service: Pain Management;  Laterality: N/A;  NEEDS CONSENT    EPIDURAL STEROID INJECTION INTO CERVICAL SPINE N/A 6/28/2023    Procedure: Injection-steroid-epidural-cervical C7-T1;  Surgeon: Lorraine Patel DO;  Location: [facility] PAIN MANAGEMENT;  Service: Pain Management;  Laterality: N/A;  diabetic    EPIDURAL STEROID INJECTION INTO CERVICAL SPINE N/A 11/3/2023    Procedure: cervical NELI C7-T1;  Surgeon: Kolton Terrell MD;  Location: [facility] PAIN MANAGEMENT;  Service: Pain Management;  Laterality: N/A;  diabetic    EPIDURAL STEROID INJECTION INTO LUMBAR SPINE N/A 9/27/2023    Procedure: L4-5 NELI;  Surgeon: Tirso Pickering MD;  Location: [facility] PAIN MANAGEMENT;  Service: Pain Management;  Laterality: N/A;  15 mins    ESOPHAGOGASTRODUODENOSCOPY  01/22/2019    LA grade B esophagitis; esophageal stenosis- dilated; gastritis; H pylori pathology negative    ESOPHAGOGASTRODUODENOSCOPY N/A 05/18/2021    Procedure: EGD (ESOPHAGOGASTRODUODENOSCOPY);  Surgeon: Mariana Hector MD;  Location: [facility];  Service: Endoscopy;  Laterality: N/A;    ESOPHAGOGASTRODUODENOSCOPY N/A 12/21/2023    Procedure: EGD (ESOPHAGOGASTRODUODENOSCOPY);  Surgeon: Teo Johnson MD;  Location: [facility];  Service: Endoscopy;  Laterality: N/A;    EXCISION OF GANGLION OF WRIST Right 09/15/2022    Procedure: EXCISION, GANGLION CYST, WRIST;  Surgeon: Christen Marshall MD;  Location: [facility];  Service: Orthopedics;  Laterality: Right;    FLEXOR TENDON REPAIR Right 09/15/2022    Procedure: FLEXOR TENOSYNOVECTOMY;  Surgeon: Christen Marshall MD;  Location: [facility] OR;  Service: Orthopedics;  Laterality: Right;    HAND ARTHROTOMY Right 09/15/2022    Procedure: ARTHROTOMY, HAND RADIOCARPAL;  Surgeon: Christen Marshall MD;  Location: [facility] OR;  Service: Orthopedics;  Laterality: Right;  Radiocarpal    INJECTION OF JOINT Right  11/29/2023    Procedure: right hip IA injection and Right GTB injection;  Surgeon: Lorraine Patel DO;  Location: [facility] PAIN MANAGEMENT;  Service: Pain Management;  Laterality: Right;    INJECTION OF STEROID Left 09/15/2022    Procedure: INJECTION, STEROID LEFT WRIST AND LEFT LATERAL ELBOW;  Surgeon: Christen Marshall MD;  Location: [facility];  Service: Orthopedics;  Laterality: Left;  Left Carpal Tunnel CSI    LASER LITHOTRIPSY Left 7/5/2023    Procedure: LITHOTRIPSY, USING LASER;  Surgeon: Chuckie Hall MD;  Location: [facility] OR 1ST FLR;  Service: Urology;  Laterality: Left;    LUMBAR LAMINECTOMY  2010    LUMBAR LAMINECTOMY WITH DISCECTOMY Left 09/20/2021    Procedure: LAMINECTOMY, SPINE, LUMBAR, WITH DISCECTOMY;  Surgeon: Naseem Mcnamara DO;  Location: [facility] OR 2ND FLR;  Service: Neurosurgery;  Laterality: Left;  MIS L3-4    PH MONITORING, ESOPHAGUS, WIRELESS, (OFF REFLUX MEDS) N/A 12/21/2023    Procedure: PH MONITORING, ESOPHAGUS, WIRELESS, (OFF REFLUX MEDS);  Surgeon: Teo Johnson MD;  Location: [facility] ENDO;  Service: Endoscopy;  Laterality: N/A;    RECONSTRUCTION OF LIGAMENT Left 12/5/2023    Procedure: RECONSTRUCTION, LIGAMENT LATERAL COLLATERAL;  Surgeon: Christen Marshall MD;  Location: [facility] OR;  Service: Orthopedics;  Laterality: Left;    REPAIR OF EXTENSOR TENDON Left 12/5/2023    Procedure: REPAIR, TENDON, EXTENSOR;  Surgeon: Christen Marshall MD;  Location: [facility];  Service: Orthopedics;  Laterality: Left;    TYMPANOSTOMY TUBE PLACEMENT      URETERAL STENT PLACEMENT Left 7/5/2023    Procedure: INSERTION, STENT, URETER;  Surgeon: Chuckie Hall MD;  Location: [facility] OR 1ST FLR;  Service: Urology;  Laterality: Left;    URETEROSCOPIC REMOVAL OF URETERIC CALCULUS Left 7/5/2023    Procedure: REMOVAL, CALCULUS, URETER, URETEROSCOPIC;  Surgeon: Chuckie Hall MD;  Location: [facility] OR 1ST FLR;  Service: Urology;  Laterality: Left;    URETEROSCOPY Left 7/5/2023    Procedure: URETEROSCOPY;  Surgeon: Chuckie SMITH  MD Rafael;  Location: [facility] OR [address] [place] [address];  Service: Urology;  Laterality: Left;     Pre-op Assessment       I have reviewed the Medications.     Review of Systems  Anesthesia Hx:             Denies Family Hx of Anesthesia complications.     Pulmonary:    Asthma mild   Sleep Apnea   Asthma:    Obstructive Sleep Apnea (LUBA).           Renal/:  Chronic Renal Disease        Kidney Function/Disease             Hepatic/GI:   PUD,  GERD Liver Disease, Hepatitis    Gerd, Peptic Ulcer Disease       Liver Disease, Hepatitis        Musculoskeletal:  Arthritis        Arthritis          Neurological:    Neuromuscular Disease,           Arthritis                         Neuromuscular Disease   Endocrine:  Diabetes    Diabetes                      Psych:  Psychiatric History                  Physical Exam  General: Well nourished    Airway:  Mallampati: III   TM Distance: Normal  Neck ROM: Normal ROM    Dental:  Intact    Chest/Lungs:  Clear to auscultation    Heart:  Rate: Normal  Rhythm: Regular Rhythm        Anesthesia Plan  Type of Anesthesia, risks & benefits discussed:    Anesthesia Type: Gen Natural Airway  Intra-op Monitoring Plan: Standard ASA Monitors  Post Op Pain Control Plan: multimodal analgesia  Informed Consent: Informed consent signed with the Patient and all parties understand the risks and agree with anesthesia plan.  All questions answered.   ASA Score: 3    Ready For Surgery From Anesthesia Perspective.     .

## 2024-02-23 ENCOUNTER — PATIENT MESSAGE (OUTPATIENT)
Dept: ALLERGY | Facility: CLINIC | Age: 44
End: 2024-02-23
Payer: COMMERCIAL

## 2024-02-23 NOTE — ANESTHESIA POSTPROCEDURE EVALUATION
Anesthesia Post Evaluation    Patient: Hermelindo Rizviinger III    Procedure(s) Performed: Procedure(s) (LRB):  EGD (ESOPHAGOGASTRODUODENOSCOPY) (N/A)    Final Anesthesia Type: general      Patient location during evaluation: PACU  Patient participation: Yes- Able to Participate  Level of consciousness: awake and alert  Post-procedure vital signs: reviewed and stable  Pain management: adequate  Airway patency: patent    PONV status at discharge: No PONV  Anesthetic complications: no      Cardiovascular status: blood pressure returned to baseline  Respiratory status: unassisted  Hydration status: euvolemic  Follow-up not needed.          Vitals Value Taken Time   /69 02/22/24 1132   Temp 37.1 °C (98.8 °F) 02/22/24 1102   Pulse 72 02/22/24 1132   Resp 14 02/22/24 1132   SpO2 98 % 02/22/24 1132         Event Time   Out of Recovery 11:34:18         Pain/Shani Score: Shani Score: 10 (2/22/2024 11:32 AM)

## 2024-02-26 ENCOUNTER — PATIENT MESSAGE (OUTPATIENT)
Dept: INTERNAL MEDICINE | Facility: CLINIC | Age: 44
End: 2024-02-26
Payer: COMMERCIAL

## 2024-02-26 RX ORDER — ONDANSETRON 8 MG/1
8 TABLET, ORALLY DISINTEGRATING ORAL EVERY 6 HOURS PRN
Qty: 30 TABLET | Refills: 2 | Status: SHIPPED | OUTPATIENT
Start: 2024-02-26

## 2024-02-26 NOTE — TELEPHONE ENCOUNTER
Please advise pt requesting higher dosage of dissolvable prescription for Nausea. Do pt need appointment ?

## 2024-02-27 ENCOUNTER — PATIENT MESSAGE (OUTPATIENT)
Dept: SURGERY | Facility: CLINIC | Age: 44
End: 2024-02-27
Payer: COMMERCIAL

## 2024-02-27 ENCOUNTER — PATIENT MESSAGE (OUTPATIENT)
Dept: GASTROENTEROLOGY | Facility: CLINIC | Age: 44
End: 2024-02-27
Payer: COMMERCIAL

## 2024-02-27 DIAGNOSIS — K60.2 ANAL FISSURE: Primary | ICD-10-CM

## 2024-02-27 LAB
FINAL PATHOLOGIC DIAGNOSIS: NORMAL
GROSS: NORMAL
Lab: NORMAL
MICROSCOPIC EXAM: NORMAL

## 2024-02-28 ENCOUNTER — TELEPHONE (OUTPATIENT)
Dept: PAIN MEDICINE | Facility: CLINIC | Age: 44
End: 2024-02-28
Payer: COMMERCIAL

## 2024-02-29 ENCOUNTER — CLINICAL SUPPORT (OUTPATIENT)
Dept: ALLERGY | Facility: CLINIC | Age: 44
End: 2024-02-29
Payer: COMMERCIAL

## 2024-02-29 DIAGNOSIS — J30.9 CHRONIC ALLERGIC RHINITIS: Primary | ICD-10-CM

## 2024-02-29 PROCEDURE — 99999 PR PBB SHADOW E&M-EST. PATIENT-LVL I: CPT | Mod: PBBFAC,,,

## 2024-02-29 PROCEDURE — 95117 IMMUNOTHERAPY INJECTIONS: CPT | Mod: S$GLB,,, | Performed by: ALLERGY & IMMUNOLOGY

## 2024-03-01 ENCOUNTER — HOSPITAL ENCOUNTER (OUTPATIENT)
Dept: RADIOLOGY | Facility: HOSPITAL | Age: 44
Discharge: HOME | End: 2024-03-01
Attending: STUDENT IN AN ORGANIZED HEALTH CARE EDUCATION/TRAINING PROGRAM
Payer: COMMERCIAL

## 2024-03-01 ENCOUNTER — PATIENT MESSAGE (OUTPATIENT)
Dept: SURGERY | Facility: CLINIC | Age: 44
End: 2024-03-01
Payer: COMMERCIAL

## 2024-03-01 DIAGNOSIS — K21.9 GASTROESOPHAGEAL REFLUX DISEASE, UNSPECIFIED WHETHER ESOPHAGITIS PRESENT: ICD-10-CM

## 2024-03-02 ENCOUNTER — PATIENT MESSAGE (OUTPATIENT)
Dept: SURGERY | Facility: CLINIC | Age: 44
End: 2024-03-02
Payer: COMMERCIAL

## 2024-03-04 ENCOUNTER — OFFICE VISIT (OUTPATIENT)
Dept: ORTHOPEDICS | Facility: CLINIC | Age: 44
End: 2024-03-04
Payer: COMMERCIAL

## 2024-03-04 DIAGNOSIS — S53.20XA PARTIAL TEAR OF RADIAL COLLATERAL LIGAMENT OF ELBOW: ICD-10-CM

## 2024-03-04 DIAGNOSIS — Z09 FOLLOW-UP EXAMINATION AFTER ORTHOPEDIC SURGERY: Primary | ICD-10-CM

## 2024-03-04 DIAGNOSIS — M77.12 LEFT LATERAL EPICONDYLITIS: ICD-10-CM

## 2024-03-04 PROCEDURE — 99024 POSTOP FOLLOW-UP VISIT: CPT | Mod: S$GLB,,, | Performed by: ORTHOPAEDIC SURGERY

## 2024-03-04 PROCEDURE — 1159F MED LIST DOCD IN RCRD: CPT | Mod: CPTII,S$GLB,, | Performed by: ORTHOPAEDIC SURGERY

## 2024-03-04 PROCEDURE — 3072F LOW RISK FOR RETINOPATHY: CPT | Mod: CPTII,S$GLB,, | Performed by: ORTHOPAEDIC SURGERY

## 2024-03-04 PROCEDURE — 1160F RVW MEDS BY RX/DR IN RCRD: CPT | Mod: CPTII,S$GLB,, | Performed by: ORTHOPAEDIC SURGERY

## 2024-03-04 PROCEDURE — 99999 PR PBB SHADOW E&M-EST. PATIENT-LVL II: CPT | Mod: PBBFAC,,, | Performed by: ORTHOPAEDIC SURGERY

## 2024-03-05 ENCOUNTER — PATIENT MESSAGE (OUTPATIENT)
Dept: PAIN MEDICINE | Facility: CLINIC | Age: 44
End: 2024-03-05
Payer: COMMERCIAL

## 2024-03-05 NOTE — PRE-PROCEDURE INSTRUCTIONS
Patient reviewed on 3/5/2024.  Okay to proceed at [facility]. The following pre-procedure instructions and arrival time have been reviewed with patient via phone and sent to patient portal for review.  Patient verbalized an understanding.  Pt to be accompanied by Mom day of procedure as responsible .      Dear Hermelindo ,     You are scheduled for a procedure with Dr. Patel on 3/6/2024.  Your scheduled arrival time is 12:20 pm.  This arrival time is roughly 1 hour before your anticipated procedure time to allow sufficient time for pre-op..  Please wear comfortable clothes.  Most patients do not need to change into a gown.  Please do not wear a dress.  This procedure will take place at the [facility] at the corner of [facility] and [facility].  It is in the [place] next to [facility].  The address is:     [address] [place] [address].  [place] [id number]     After entering the building, you will proceed to the second floor where you can check in with registration. You should take any medications that you routinely take for blood pressure, heart medications, thyroid, cholesterol, etc.      The fasting restrictions are dependent on whether or not you are receiving sedation.  Sedation is not available for all procedures.      Your fasting instructions are as follow:  IV sedation. You should not eat for 8 hours and can only drink clear liquids (water or black coffee without cream/sugar) up until 2 hours before your scheduled time.  You CANNOT drive yourself and must have a .     If you are on blood thinners, you need to follow the anticoagulation instructions that had been discussed previously.  You should only stop the blood thinners if it was approved by your primary care physician or your cardiologist.  In the event that you are not able to stop your blood thinners, a blood thinner was not listed on your medication list, or we were not able to get clearance from your  cardiologist, then the procedure may have to be postponed/canceled.      IF you were told to stop your blood thinners, this is how long you should generally hold some of the more common ones.  Remember that stopping blood thinners is only necessary for certain procedures. If you are unsure of your instructions, please call us.   Aspirin - 5 days  Plavix/Clopidogrel - 7 days  Warfarin / Coumadin - 5 days  Eliquis - 3 days  Pradaxa/Dabigatran - 4 days  Xarelto/Rivaroxaban - 3 days     HOLD all non-insulin injections (shots) until after surgery (Ozempic, Mounjaro, Trulicity, Victoza, Byetta, Wegovy and Adlyxin) (up to 7 days prior)     If you are a diabetic, do not take your medication if you will be fasting, but bring it with you. Please plan on being here for roughly 2 hours.     Please call us if you have been sick (running fever, having any flu-like symptoms) or have been taking antibiotics in the past 2 weeks or had any outpatient procedures other than with us (colonoscopy, endoscopy, OBGYN, dental, etc.). If you have been previously COVID positive, you will need to hold off on your procedure until you are symptom free for 10 days. If you did not have any symptoms, you can have your procedure 10 days from your positive test result.       *HOLD ALL VITAMINS, MINERALS, HERBS (INCLUDING HERBAL TEAS) AND SUPPLEMENTS  *SHOWER WITH ANTIBACTERIAL SOAP (EX. DIAL) NIGHT BEFORE AND MORNING OF PROCEDURE  *DO NOT APPLY ANY LOTIONS, OILS, POWDERS, PERFUME/COLOGNE, OINTMENTS, GELS, CREAMS, MAKEUP OR DEODORANT TO YOUR SKIN MORNING OF PROCEDURE  *LEAVE JEWELRY AND ANY VALUABLES AT HOME  *WEAR LOOSE COMFORTABLE CLOTHING (PREFERABLY A BUTTON UP SHIRT)     Please reply to this message as receipt of delivery.     Thank you,  [facility]  [place], LPN [facility]  Pre-Admit

## 2024-03-06 ENCOUNTER — HOSPITAL ENCOUNTER (OUTPATIENT)
Facility: HOSPITAL | Age: 44
Discharge: HOME | End: 2024-03-06
Attending: STUDENT IN AN ORGANIZED HEALTH CARE EDUCATION/TRAINING PROGRAM | Admitting: STUDENT IN AN ORGANIZED HEALTH CARE EDUCATION/TRAINING PROGRAM
Payer: COMMERCIAL

## 2024-03-06 VITALS
RESPIRATION RATE: 16 BRPM | OXYGEN SATURATION: 95 % | BODY MASS INDEX: 39.99 KG/M2 | SYSTOLIC BLOOD PRESSURE: 113 MMHG | WEIGHT: 240 LBS | DIASTOLIC BLOOD PRESSURE: 57 MMHG | TEMPERATURE: 98 F | HEIGHT: 65 IN | HEART RATE: 78 BPM

## 2024-03-06 DIAGNOSIS — M51.36 DDD (DEGENERATIVE DISC DISEASE), LUMBAR: ICD-10-CM

## 2024-03-06 DIAGNOSIS — M54.16 LUMBAR RADICULOPATHY, CHRONIC: Primary | ICD-10-CM

## 2024-03-06 DIAGNOSIS — G89.29 CHRONIC PAIN: ICD-10-CM

## 2024-03-06 LAB — POCT GLUCOSE: 82 MG/DL (ref 70–110)

## 2024-03-06 PROCEDURE — 25000003 PHARM REV CODE 250: Performed by: STUDENT IN AN ORGANIZED HEALTH CARE EDUCATION/TRAINING PROGRAM

## 2024-03-06 PROCEDURE — 62323 NJX INTERLAMINAR LMBR/SAC: CPT | Performed by: STUDENT IN AN ORGANIZED HEALTH CARE EDUCATION/TRAINING PROGRAM

## 2024-03-06 PROCEDURE — 63600175 PHARM REV CODE 636 W HCPCS: Performed by: STUDENT IN AN ORGANIZED HEALTH CARE EDUCATION/TRAINING PROGRAM

## 2024-03-06 PROCEDURE — 62323 NJX INTERLAMINAR LMBR/SAC: CPT | Mod: ,,, | Performed by: STUDENT IN AN ORGANIZED HEALTH CARE EDUCATION/TRAINING PROGRAM

## 2024-03-06 PROCEDURE — 82962 GLUCOSE BLOOD TEST: CPT | Performed by: STUDENT IN AN ORGANIZED HEALTH CARE EDUCATION/TRAINING PROGRAM

## 2024-03-06 PROCEDURE — 25500020 PHARM REV CODE 255: Performed by: STUDENT IN AN ORGANIZED HEALTH CARE EDUCATION/TRAINING PROGRAM

## 2024-03-06 RX ORDER — LIDOCAINE HYDROCHLORIDE 20 MG/ML
INJECTION, SOLUTION EPIDURAL; INFILTRATION; INTRACAUDAL; PERINEURAL
Status: DISCONTINUED | OUTPATIENT
Start: 2024-03-06 | End: 2024-03-06 | Stop reason: HOSPADM

## 2024-03-06 RX ORDER — MIDAZOLAM HYDROCHLORIDE 2 MG/2ML
INJECTION, SOLUTION INTRAMUSCULAR; INTRAVENOUS
Status: DISCONTINUED | OUTPATIENT
Start: 2024-03-06 | End: 2024-03-06 | Stop reason: HOSPADM

## 2024-03-06 RX ORDER — FENTANYL CITRATE 50 UG/ML
INJECTION, SOLUTION INTRAMUSCULAR; INTRAVENOUS
Status: DISCONTINUED | OUTPATIENT
Start: 2024-03-06 | End: 2024-03-06 | Stop reason: HOSPADM

## 2024-03-06 RX ORDER — DEXAMETHASONE SODIUM PHOSPHATE 10 MG/ML
INJECTION INTRAMUSCULAR; INTRAVENOUS
Status: DISCONTINUED | OUTPATIENT
Start: 2024-03-06 | End: 2024-03-06 | Stop reason: HOSPADM

## 2024-03-06 RX ORDER — LIDOCAINE HYDROCHLORIDE 10 MG/ML
INJECTION, SOLUTION EPIDURAL; INFILTRATION; INTRACAUDAL; PERINEURAL
Status: DISCONTINUED | OUTPATIENT
Start: 2024-03-06 | End: 2024-03-06 | Stop reason: HOSPADM

## 2024-03-06 RX ORDER — SODIUM CHLORIDE 9 MG/ML
INJECTION, SOLUTION INTRAVENOUS CONTINUOUS
Status: DISCONTINUED | OUTPATIENT
Start: 2024-03-06 | End: 2024-03-06 | Stop reason: HOSPADM

## 2024-03-06 NOTE — DISCHARGE SUMMARY
Discharge Note  Short Stay      SUMMARY     Admit Date: 3/6/2024    Attending Physician: Lorraine Patel      Discharge Physician: Lorraine Patel      Discharge Date: 3/6/2024 2:47 PM    Procedure(s) (LRB):  L4-5 IL NELI (N/A)    Final Diagnosis: Lumbar radiculopathy [M54.16]    Disposition: Home or self care    Patient Instructions:   Current Discharge Medication List        CONTINUE these medications which have NOT CHANGED    Details   atorvastatin (LIPITOR) 20 MG tablet Take 1 tablet (20 mg total) by mouth once daily.  Qty: 90 tablet, Refills: 1    Associated Diagnoses: Type 2 diabetes mellitus without complication, without long-term current use of insulin; Mixed hyperlipidemia      cyclobenzaprine (FLEXERIL) 10 MG tablet Take 10 mg by mouth 3 (three) times daily.      !! dexmethylphenidate (FOCALIN) 10 MG tablet Take 1 tablet (10 mg total) by mouth 2 (two) times daily.  Qty: 60 tablet, Refills: 0    Associated Diagnoses: ADHD (attention deficit hyperactivity disorder), inattentive type      ergocalciferol, vitamin D2, (VITAMIN D ORAL) Take 1 tablet by mouth every other day.      EScitalopram oxalate (LEXAPRO) 5 MG Tab Take 1 tablet (5 mg total) by mouth once daily.  Qty: 90 tablet, Refills: 1    Associated Diagnoses: Unspecified mood (affective) disorder      gabapentin (NEURONTIN) 300 MG capsule Take 1 capsule (300 mg total) by mouth 3 (three) times daily.  Qty: 90 capsule, Refills: 3    Associated Diagnoses: Cervical radicular pain      lamoTRIgine (LAMICTAL) 100 MG tablet Take 1 tablet (100 mg total) by mouth 2 (two) times daily. IF any RASH, STOP ALL Lamictal and Tell Psyc MD.  Qty: 180 tablet, Refills: 1    Associated Diagnoses: Unspecified mood (affective) disorder      LORazepam (ATIVAN) 0.5 MG tablet Take 1 tablet (0.5 mg total) by mouth daily as needed (SIGNIFICANT ANXIETY).  Qty: 30 tablet, Refills: 3    Associated Diagnoses: Anxiety      montelukast (SINGULAIR) 10 mg tablet Take 1 tablet (10 mg  total) by mouth every evening.  Qty: 90 tablet, Refills: 3    Associated Diagnoses: Mild intermittent asthma without complication; History of multiple allergies      ondansetron (ZOFRAN-ODT) 8 MG TbDL Dissolve 1 tablet (8 mg total) by mouth every 6 (six) hours as needed (nausea).  Qty: 30 tablet, Refills: 2    Comments: Discontinue prior scripts with same name      rOPINIRole (REQUIP) 0.25 MG tablet TAKE 1 TABLET(0.25 MG) BY MOUTH EVERY EVENING  Qty: 90 tablet, Refills: 2    Associated Diagnoses: Restless leg      acetaminophen (TYLENOL) 325 MG tablet Take 650 mg by mouth every 6 (six) hours as needed for Pain.      albuterol (PROVENTIL/VENTOLIN HFA) 90 mcg/actuation inhaler INHALE 2 PUFFS INTO THE LUNGS EVERY 6 HOURS AS NEEDED WHEEZING  Qty: 8.5 g, Refills: 5    Associated Diagnoses: Mild intermittent asthma without complication      azelastine (ASTELIN) 137 mcg (0.1 %) nasal spray 2 sprays (274 mcg total) by Nasal route 2 (two) times daily.  Qty: 30 mL, Refills: 9      blood sugar diagnostic (TRUE METRIX GLUCOSE TEST STRIP) Strp Use to test blood glucose one (1) time a day,  Qty: 100 strip, Refills: 3    Comments: to be used with insurance-preferred brand of glucometer/supplies.  Associated Diagnoses: Diabetes mellitus without complication      blood-glucose meter Misc use as directed  Qty: 1 each, Refills: 0      !! dexmethylphenidate (FOCALIN) 10 MG tablet Take 1 tablet (10 mg total) by mouth 2 (two) times daily.  Qty: 60 tablet, Refills: 0    Associated Diagnoses: ADHD (attention deficit hyperactivity disorder), inattentive type      !! dexmethylphenidate (FOCALIN) 10 MG tablet Take 1 tablet (10 mg total) by mouth 2 (two) times daily.  Qty: 60 tablet, Refills: 0    Associated Diagnoses: ADHD (attention deficit hyperactivity disorder), inattentive type      docusate sodium (COLACE) 100 MG capsule Take 1 capsule (100 mg total) by mouth 2 (two) times daily.  Qty: 30 capsule, Refills: 1      fexofenadine (ALLEGRA)  180 MG tablet Take 180 mg by mouth once daily.      fluocinonide (LIDEX) 0.05 % external solution AAA scalp qday prn itching, scaling  Qty: 60 mL, Refills: 3    Associated Diagnoses: Seborrheic dermatitis, unspecified      hydrocortisone 2.5 % cream APPLY EXTERNALLY TO THE AFFECTED AREA TWICE DAILY FOR 10 DAYS  Qty: 30 g, Refills: 0    Associated Diagnoses: Allergic contact dermatitis due to plant      ketoconazole (NIZORAL) 2 % cream APPLY TOPICALLY TO THE AFFECTED AREA TWICE DAILY  Qty: 15 g, Refills: 0    Associated Diagnoses: Seborrheic dermatitis of scalp      !! lancets (ONETOUCH DELICA PLUS LANCET) 33 gauge Misc Use to test blood glucose one (1) time a day,  Qty: 100 each, Refills: 5      latanoprost 0.005 % ophthalmic solution Place 1 drop into both eyes every evening.  Qty: 7.5 mL, Refills: 3    Associated Diagnoses: Ocular hypertension, bilateral      meloxicam (MOBIC) 15 MG tablet TAKE 1 TABLET(15 MG) BY MOUTH EVERY DAY as needed FOR PAIN  Qty: 30 tablet, Refills: 2      semaglutide (OZEMPIC) 1 mg/dose (4 mg/3 mL) Inject 1 mg into the skin every 7 days.  Qty: 3 mL, Refills: 3      sod picosulf-mag ox-citric ac (CLENPIQ) 10 mg-3.5 gram- 12 gram/160 mL Soln Take As Directed.  Qty: 320 mL, Refills: 0    Associated Diagnoses: Blood in stool      !! ULTRA THIN LANCETS 30 gauge Misc USE TO TEST BLOOD SUGAR EVERY DAY AS DIRECTED  Qty: 100 each, Refills: 3    Associated Diagnoses: Diabetes mellitus without complication       !! - Potential duplicate medications found. Please discuss with provider.              Discharge Diagnosis: Lumbar radiculopathy [M54.16]  Condition on Discharge: Stable with no complications to procedure   Diet on Discharge: Same as before.  Activity: as per instruction sheet.  Discharge to: Home with a responsible adult.  Follow up: 2-4 weeks       Please call my office or pager at [phone] if experienced any weakness or loss of sensation, fever > 101.5, pain uncontrolled with oral  medications, persistent nausea/vomiting/or diarrhea, redness or drainage from the incisions, or any other worrisome concerns. If physician on call was not reached or could not communicate with our office for any reason please go to the nearest emergency department

## 2024-03-06 NOTE — PLAN OF CARE
Hermelindo Garza III has met all discharge criteria from Phase II. Vital Signs are stable, ambulating  without difficulty. Discharge instructions given, patient verbalized understanding. Discharged from facility via wheelchair in stable condition.

## 2024-03-06 NOTE — OP NOTE
Lumbar Interlaminar Epidural Steroid Injection under Fluoroscopic Guidance    The procedure, risks, benefits, and options were discussed with the patient. There are no contraindications to the procedure. The patent expressed understanding and agreed to the procedure. Informed written consent was obtained prior to the start of the procedure and can be found in the patient's chart.    PATIENT NAME: Hermelindo Garza III   MRN: [id number]     DATE OF PROCEDURE: 03/06/2024    PROCEDURE: Lumbar Interlaminar Epidural Steroid Injection L4/L5 under Fluoroscopic Guidance    PRE-OP DIAGNOSIS: Lumbar radiculopathy [M54.16] Lumbar radiculopathy [M54.16]    POST-OP DIAGNOSIS: Same    PHYSICIAN: Lorraine Patel DO    ASSISTANTS: None     MEDICATIONS INJECTED: Preservative-free Decadron 10mg with 4cc of Lidocaine 1% MPF and preservative free normal saline    LOCAL ANESTHETIC INJECTED: Xylocaine 2%     SEDATION: Versed 2mg and Fentanyl 100mcg                                                                                                                                                                                     Conscious sedation ordered by M.D. Patient re-evaluation prior to administration of conscious sedation. No changes noted in patient's status from initial evaluation. The patient's vital signs were monitored by RN and patient remained hemodynamically stable throughout the procedure.    Event Time In   Sedation Start 1436   Sedation End 1446       ESTIMATED BLOOD LOSS: None    COMPLICATIONS: None    TECHNIQUE: Time-out was performed to identify the patient and procedure to be performed. With the patient laying in a prone position, the surgical area was prepped and draped in the usual sterile fashion using ChloraPrep and a fenestrated drape. The level was determined under fluoroscopy guidance. Skin anesthesia was achieved by injecting Lidocaine 2% over the injection site. The interlaminar space was then approached  with a 20 gauge,  5 inch Tuohy needle that was introduced under fluoroscopic guidance in the AP, lateral and/or contralateral oblique imaging. Once the Ligamentum flavum was encountered loss of resistance to saline was used to enter the epidural space. With positive loss of resistance and negative aspiration for CSF or Blood, contrast dye Omnipaque (300mg/mL) was injected to confirm placement and there was no vascular runoff. 5 mL of the medication mixture listed above was injected slowly. Displacement of the radio opaque contrast after injection of the medication confirmed that the medication went into the area of the epidural space. The needles were removed and bleeding was nil. A sterile dressing was applied. No specimens collected. The patient tolerated the procedure well.       The patient was monitored after the procedure in the recovery area. They were given post-procedure and discharge instructions to follow at home. The patient was discharged in a stable condition.    Lorraine Patel DO

## 2024-03-06 NOTE — H&P
HPI  Patient presenting for Procedure(s) (LRB):  L4-5 IL NELI (N/A)     Patient on Anti-coagulation No    No health changes since previous encounter    Past Medical History:   Diagnosis Date    ADD (attention deficit disorder) 05/09/2012    Allergy     Anxiety 4/12/2023    Asthma 05/09/2012    Cervical disc disorder with radiculopathy, unspecified cervical region 09/14/2021    Cervical spondylosis with myelopathy 06/29/2022    Eczema     Esophageal ulcer     GERD (gastroesophageal reflux disease) 05/09/2012    Glaucoma     Kidney stones 05/2014    Lumbar disc disease 05/09/2012    Lumbar herniated disc 05/09/2012    Malignant melanoma of skin, unspecified 01/06/2022    in situ right mid back    Melanoma 01/2022    in situ R mid back     LUBA (obstructive sleep apnea)     Radiculopathy, lumbar region 09/14/2021    Renal calculi 05/09/2012    Type 2 diabetes mellitus without complication, without long-term current use of insulin 11/16/2020     Past Surgical History:   Procedure Laterality Date    APPENDECTOMY  2006    CARPAL TUNNEL RELEASE Right 09/15/2022    Procedure: RELEASE, CARPAL TUNNEL;  Surgeon: Christen Marshall MD;  Location: [facility] OR;  Service: Orthopedics;  Laterality: Right;    COLONOSCOPY  01/22/2019    internal hemorrhoids, o/w normal - repeat at age 52    COLONOSCOPY N/A 12/21/2023    Procedure: COLONOSCOPY;  Surgeon: Teo Johnson MD;  Location: [facility];  Service: Endoscopy;  Laterality: N/A;    CORRECTION OF HAMMER TOE Left 8/14/2023    Procedure: CORRECTION, HAMMER TOE--  basic foot tray as well as a Reese microfree/microchoice tray. I also asked [place] to bring an MIS mati.;  Surgeon: Ankush Back DPM;  [facility]: [facility];  Service: Podiatry;  Laterality: Left;  reese drill, sagittal saw, foot set    CYSTOSCOPY  7/5/2023    Procedure: CYSTOSCOPY;  Surgeon: Chuckie Hall MD;  Location: [address];  Service: Urology;;    EPIDURAL STEROID INJECTION N/A 02/10/2021    Procedure: CERVICAL  C6/7 NELI DIRECT REFERRAL;  Surgeon: Michi Escamilla MD;  Location: [facility] PAIN [facility]GT;  Service: Pain Management;  Laterality: N/A;  NEEDS CONSENT    EPIDURAL STEROID INJECTION INTO CERVICAL SPINE N/A 6/28/2023    Procedure: Injection-steroid-epidural-cervical C7-T1;  Surgeon: Lorraine Patel DO;  Location: [facility] PAIN MANAGEMENT;  Service: Pain Management;  Laterality: N/A;  diabetic    EPIDURAL STEROID INJECTION INTO CERVICAL SPINE N/A 11/3/2023    Procedure: cervical NELI C7-T1;  Surgeon: Kolton Terrell MD;  Location: [facility] PAIN MANAGEMENT;  Service: Pain Management;  Laterality: N/A;  diabetic    EPIDURAL STEROID INJECTION INTO LUMBAR SPINE N/A 9/27/2023    Procedure: L4-5 NELI;  Surgeon: Tirso Pickering MD;  Location: [facility] PAIN MANAGEMENT;  Service: Pain Management;  Laterality: N/A;  15 mins    ESOPHAGOGASTRODUODENOSCOPY  01/22/2019    LA grade B esophagitis; esophageal stenosis- dilated; gastritis; H pylori pathology negative    ESOPHAGOGASTRODUODENOSCOPY N/A 05/18/2021    Procedure: EGD (ESOPHAGOGASTRODUODENOSCOPY);  Surgeon: Mariana Hector MD;  Location: [facility];  Service: Endoscopy;  Laterality: N/A;    ESOPHAGOGASTRODUODENOSCOPY N/A 12/21/2023    Procedure: EGD (ESOPHAGOGASTRODUODENOSCOPY);  Surgeon: Teo Johnson MD;  Location: [facility];  Service: Endoscopy;  Laterality: N/A;    ESOPHAGOGASTRODUODENOSCOPY N/A 2/22/2024    Procedure: EGD (ESOPHAGOGASTRODUODENOSCOPY);  Surgeon: Teo Johnson MD;  Location: [facility];  Service: Endoscopy;  Laterality: N/A;    EXCISION OF GANGLION OF WRIST Right 09/15/2022    Procedure: EXCISION, GANGLION CYST, WRIST;  Surgeon: Christen Marshall MD;  Location: [facility];  Service: Orthopedics;  Laterality: Right;    FLEXOR TENDON REPAIR Right 09/15/2022    Procedure: FLEXOR TENOSYNOVECTOMY;  Surgeon: Christen Marshall MD;  Location: [facility];  Service: Orthopedics;  Laterality: Right;    HAND ARTHROTOMY Right 09/15/2022    Procedure: ARTHROTOMY, HAND RADIOCARPAL;   Surgeon: Christen Marshall MD;  Location: [facility] OR;  Service: Orthopedics;  Laterality: Right;  Radiocarpal    INJECTION OF JOINT Right 11/29/2023    Procedure: right hip IA injection and Right GTB injection;  Surgeon: Lorraine Patel DO;  [facility];  Service: Pain Management;  Laterality: Right;    INJECTION OF STEROID Left 09/15/2022    Procedure: INJECTION, STEROID LEFT WRIST AND LEFT LATERAL ELBOW;  Surgeon: Christen Marshall MD;  Location: [facility] OR;  Service: Orthopedics;  Laterality: Left;  Left Carpal Tunnel CSI    LASER LITHOTRIPSY Left 7/5/2023    Procedure: LITHOTRIPSY, USING LASER;  Surgeon: Chuckie Hall MD;  Location: [facility] 1ST FLR;  Service: Urology;  Laterality: Left;    LUMBAR LAMINECTOMY  2010    LUMBAR LAMINECTOMY WITH DISCECTOMY Left 09/20/2021    Procedure: LAMINECTOMY, SPINE, LUMBAR, WITH DISCECTOMY;  Surgeon: Naseem Mcnamara DO;  Location: [facility] 2ND FLR;  Service: Neurosurgery;  Laterality: Left;  MIS L3-4    PH MONITORING, ESOPHAGUS, WIRELESS, (OFF REFLUX MEDS) N/A 12/21/2023    Procedure: PH MONITORING, ESOPHAGUS, WIRELESS, (OFF REFLUX MEDS);  Surgeon: Teo Johnson MD;  Location: [facility];  Service: Endoscopy;  Laterality: N/A;    RECONSTRUCTION OF LIGAMENT Left 12/5/2023    Procedure: RECONSTRUCTION, LIGAMENT LATERAL COLLATERAL;  Surgeon: Christen Marshall MD;  Location: [facility] OR;  Service: Orthopedics;  Laterality: Left;    REPAIR OF EXTENSOR TENDON Left 12/5/2023    Procedure: REPAIR, TENDON, EXTENSOR;  Surgeon: Christen Marshall MD;  Location: [facility] OR;  Service: Orthopedics;  Laterality: Left;    TYMPANOSTOMY TUBE PLACEMENT      URETERAL STENT PLACEMENT Left 7/5/2023    Procedure: INSERTION, STENT, URETER;  Surgeon: Chuckie Hall MD;  Location: [facility] OR 1ST FLR;  Service: Urology;  Laterality: Left;    URETEROSCOPIC REMOVAL OF URETERIC CALCULUS Left 7/5/2023    Procedure: REMOVAL, CALCULUS, URETER, URETEROSCOPIC;  Surgeon: Chuckie Hall MD;  Location:  "[facility] OR [address] [place] [address];  Service: Urology;  Laterality: Left;    URETEROSCOPY Left 7/5/2023    Procedure: URETEROSCOPY;  Surgeon: Chuckie Hall MD;  Location: [facility] OR [address] [place] [address];  Service: Urology;  Laterality: Left;     Review of patient's allergies indicates:  No Known Allergies   Current Facility-Administered Medications   Medication    0.9%  NaCl infusion     Facility-Administered Medications Ordered in Other Encounters   Medication    0.9%  NaCl infusion    mupirocin 2 % ointment    ondansetron injection 4 mg       PMHx, PSHx, Allergies, Medications reviewed in epic    ROS negative except pain complaints in HPI    OBJECTIVE:    /78   Pulse 75   Temp 98.3 °F (36.8 °C) (Oral)   Resp 16   Ht 5' 5" (1.651 m)   Wt 108.9 kg (240 lb)   SpO2 96%   BMI 39.94 kg/m²     PHYSICAL EXAMINATION:    GENERAL: Well appearing, in no acute distress, alert and oriented x3.  PSYCH:  Mood and affect appropriate.  SKIN: Skin color, texture, turgor normal, no rashes or lesions which will impact the procedure.  CV: RRR with palpation of the radial artery.  PULM: No evidence of respiratory difficulty, symmetric chest rise. Clear to auscultation.  NEURO: Cranial nerves grossly intact.    Plan:    Proceed with procedure as planned Procedure(s) (LRB):  L4-5 IL NELI (N/A)    Lorraine Patel  03/06/2024            "

## 2024-03-06 NOTE — DISCHARGE INSTRUCTIONS
Home Care Instructions Pain Management:    1.  DIET:    You may resume your normal diet today.    2.  BATHING:    You may shower with luke warm water.    3.  DRESSING:    You may remove your bandage today.    4.  ACTIVITY LEVEL:      You may resume your normal activities 24 hours after your procedure.    5.  MEDICATIONS:    You may resume your normal medications today.    6.  SPECIAL INSTRUCTIONS:    No heat to the injection site for 24 hours including bath or shower, heating pad, moist heat or hot tubs.    Use an ice pack to the injection site for any pain or discomfort.  Apply ice packs for 20 minute intervals as needed.    If you have received any sedatives by mouth today, you can not drive for 12 hours.    If you have received sedation through an IV, you can not drive for 24 hours.    PLEASE CALL YOUR DOCTOR FOR THE FOLLOWIN.  Redness or swelling around the injection site.  2.  Fever of 101 degrees.  3.  Drainage (pus) from the injection site.  4.  For any continuous bleeding (some dried blood over the incision is normal.)    FOR EMERGENCIES:    If any unusual problems or difficulties occur during clinic hours, call [phone] or dial [phone].    Follow up with with your physician in 2-3 weeks.

## 2024-03-07 ENCOUNTER — PATIENT MESSAGE (OUTPATIENT)
Dept: SURGERY | Facility: CLINIC | Age: 44
End: 2024-03-07
Payer: COMMERCIAL

## 2024-03-07 ENCOUNTER — CLINICAL SUPPORT (OUTPATIENT)
Dept: ALLERGY | Facility: CLINIC | Age: 44
End: 2024-03-07
Payer: COMMERCIAL

## 2024-03-07 ENCOUNTER — PATIENT MESSAGE (OUTPATIENT)
Dept: PAIN MEDICINE | Facility: CLINIC | Age: 44
End: 2024-03-07
Payer: COMMERCIAL

## 2024-03-07 DIAGNOSIS — J30.9 CHRONIC ALLERGIC RHINITIS: Primary | ICD-10-CM

## 2024-03-07 PROCEDURE — 95117 IMMUNOTHERAPY INJECTIONS: CPT | Mod: S$GLB,,, | Performed by: STUDENT IN AN ORGANIZED HEALTH CARE EDUCATION/TRAINING PROGRAM

## 2024-03-07 PROCEDURE — 99999 PR PBB SHADOW E&M-EST. PATIENT-LVL I: CPT | Mod: PBBFAC,,,

## 2024-03-13 ENCOUNTER — PATIENT MESSAGE (OUTPATIENT)
Dept: SURGERY | Facility: CLINIC | Age: 44
End: 2024-03-13
Payer: COMMERCIAL

## 2024-03-14 ENCOUNTER — HOSPITAL ENCOUNTER (OUTPATIENT)
Facility: HOSPITAL | Age: 44
Discharge: HOME | End: 2024-03-15
Attending: STUDENT IN AN ORGANIZED HEALTH CARE EDUCATION/TRAINING PROGRAM | Admitting: STUDENT IN AN ORGANIZED HEALTH CARE EDUCATION/TRAINING PROGRAM
Payer: COMMERCIAL

## 2024-03-14 ENCOUNTER — ANESTHESIA (OUTPATIENT)
Dept: SURGERY | Facility: HOSPITAL | Age: 44
End: 2024-03-14
Payer: COMMERCIAL

## 2024-03-14 ENCOUNTER — ANESTHESIA EVENT (OUTPATIENT)
Dept: SURGERY | Facility: HOSPITAL | Age: 44
End: 2024-03-14
Payer: COMMERCIAL

## 2024-03-14 ENCOUNTER — PATIENT MESSAGE (OUTPATIENT)
Dept: SURGERY | Facility: HOSPITAL | Age: 44
End: 2024-03-14
Payer: COMMERCIAL

## 2024-03-14 DIAGNOSIS — K21.9 GERD (GASTROESOPHAGEAL REFLUX DISEASE): Primary | ICD-10-CM

## 2024-03-14 DIAGNOSIS — K21.9 GASTROESOPHAGEAL REFLUX DISEASE, UNSPECIFIED WHETHER ESOPHAGITIS PRESENT: ICD-10-CM

## 2024-03-14 LAB — POCT GLUCOSE: 98 MG/DL (ref 70–110)

## 2024-03-14 PROCEDURE — 25000003 PHARM REV CODE 250: Performed by: NURSE ANESTHETIST, CERTIFIED REGISTERED

## 2024-03-14 PROCEDURE — 37000009 HC ANESTHESIA EA ADD 15 MINS: Performed by: STUDENT IN AN ORGANIZED HEALTH CARE EDUCATION/TRAINING PROGRAM

## 2024-03-14 PROCEDURE — 94761 N-INVAS EAR/PLS OXIMETRY MLT: CPT

## 2024-03-14 PROCEDURE — 63600175 PHARM REV CODE 636 W HCPCS: Performed by: NURSE ANESTHETIST, CERTIFIED REGISTERED

## 2024-03-14 PROCEDURE — 25000003 PHARM REV CODE 250: Performed by: STUDENT IN AN ORGANIZED HEALTH CARE EDUCATION/TRAINING PROGRAM

## 2024-03-14 PROCEDURE — 36000710: Performed by: STUDENT IN AN ORGANIZED HEALTH CARE EDUCATION/TRAINING PROGRAM

## 2024-03-14 PROCEDURE — 43281 LAP PARAESOPHAG HERN REPAIR: CPT | Mod: 22,,, | Performed by: STUDENT IN AN ORGANIZED HEALTH CARE EDUCATION/TRAINING PROGRAM

## 2024-03-14 PROCEDURE — 63600175 PHARM REV CODE 636 W HCPCS: Performed by: STUDENT IN AN ORGANIZED HEALTH CARE EDUCATION/TRAINING PROGRAM

## 2024-03-14 PROCEDURE — 36000711: Performed by: STUDENT IN AN ORGANIZED HEALTH CARE EDUCATION/TRAINING PROGRAM

## 2024-03-14 PROCEDURE — 37000008 HC ANESTHESIA 1ST 15 MINUTES: Performed by: STUDENT IN AN ORGANIZED HEALTH CARE EDUCATION/TRAINING PROGRAM

## 2024-03-14 PROCEDURE — D9220A PRA ANESTHESIA: Mod: ANES,,, | Performed by: ANESTHESIOLOGY

## 2024-03-14 PROCEDURE — 27201423 OPTIME MED/SURG SUP & DEVICES STERILE SUPPLY: Performed by: STUDENT IN AN ORGANIZED HEALTH CARE EDUCATION/TRAINING PROGRAM

## 2024-03-14 PROCEDURE — D9220A PRA ANESTHESIA: Mod: CRNA,,, | Performed by: NURSE ANESTHETIST, CERTIFIED REGISTERED

## 2024-03-14 PROCEDURE — 71000033 HC RECOVERY, INTIAL HOUR: Performed by: STUDENT IN AN ORGANIZED HEALTH CARE EDUCATION/TRAINING PROGRAM

## 2024-03-14 PROCEDURE — 25000242 PHARM REV CODE 250 ALT 637 W/ HCPCS: Performed by: ANESTHESIOLOGY

## 2024-03-14 PROCEDURE — 25000242 PHARM REV CODE 250 ALT 637 W/ HCPCS: Performed by: NURSE ANESTHETIST, CERTIFIED REGISTERED

## 2024-03-14 PROCEDURE — 27000221 HC OXYGEN, UP TO 24 HOURS

## 2024-03-14 PROCEDURE — C1729 CATH, DRAINAGE: HCPCS | Performed by: STUDENT IN AN ORGANIZED HEALTH CARE EDUCATION/TRAINING PROGRAM

## 2024-03-14 PROCEDURE — 94640 AIRWAY INHALATION TREATMENT: CPT

## 2024-03-14 PROCEDURE — 63600175 PHARM REV CODE 636 W HCPCS: Performed by: ANESTHESIOLOGY

## 2024-03-14 PROCEDURE — 99900035 HC TECH TIME PER 15 MIN (STAT)

## 2024-03-14 PROCEDURE — 71000039 HC RECOVERY, EACH ADD'L HOUR: Performed by: STUDENT IN AN ORGANIZED HEALTH CARE EDUCATION/TRAINING PROGRAM

## 2024-03-14 RX ORDER — SUCCINYLCHOLINE CHLORIDE 20 MG/ML
INJECTION INTRAMUSCULAR; INTRAVENOUS
Status: DISCONTINUED | OUTPATIENT
Start: 2024-03-14 | End: 2024-03-14

## 2024-03-14 RX ORDER — ONDANSETRON HYDROCHLORIDE 2 MG/ML
8 INJECTION, SOLUTION INTRAVENOUS EVERY 6 HOURS
Status: DISCONTINUED | OUTPATIENT
Start: 2024-03-14 | End: 2024-03-15 | Stop reason: HOSPADM

## 2024-03-14 RX ORDER — ALBUTEROL SULFATE 2.5 MG/.5ML
2.5 SOLUTION RESPIRATORY (INHALATION) ONCE
Status: COMPLETED | OUTPATIENT
Start: 2024-03-14 | End: 2024-03-14

## 2024-03-14 RX ORDER — DIPHENHYDRAMINE HYDROCHLORIDE 50 MG/ML
25 INJECTION INTRAMUSCULAR; INTRAVENOUS EVERY 6 HOURS PRN
Status: DISCONTINUED | OUTPATIENT
Start: 2024-03-14 | End: 2024-03-15 | Stop reason: HOSPADM

## 2024-03-14 RX ORDER — HYDROMORPHONE HYDROCHLORIDE 2 MG/ML
0.5 INJECTION, SOLUTION INTRAMUSCULAR; INTRAVENOUS; SUBCUTANEOUS EVERY 5 MIN PRN
Status: DISCONTINUED | OUTPATIENT
Start: 2024-03-14 | End: 2024-03-14 | Stop reason: HOSPADM

## 2024-03-14 RX ORDER — ONDANSETRON HYDROCHLORIDE 2 MG/ML
INJECTION, SOLUTION INTRAVENOUS
Status: DISCONTINUED | OUTPATIENT
Start: 2024-03-14 | End: 2024-03-14

## 2024-03-14 RX ORDER — CEFAZOLIN SODIUM 2 G/50ML
2 SOLUTION INTRAVENOUS
Status: COMPLETED | OUTPATIENT
Start: 2024-03-14 | End: 2024-03-14

## 2024-03-14 RX ORDER — MIDAZOLAM HYDROCHLORIDE 1 MG/ML
INJECTION, SOLUTION INTRAMUSCULAR; INTRAVENOUS
Status: DISCONTINUED | OUTPATIENT
Start: 2024-03-14 | End: 2024-03-14

## 2024-03-14 RX ORDER — BUPIVACAINE HYDROCHLORIDE 5 MG/ML
INJECTION, SOLUTION PERINEURAL
Status: DISCONTINUED | OUTPATIENT
Start: 2024-03-14 | End: 2024-03-14 | Stop reason: HOSPADM

## 2024-03-14 RX ORDER — VASOPRESSIN 20 [USP'U]/ML
INJECTION, SOLUTION INTRAMUSCULAR; SUBCUTANEOUS
Status: DISCONTINUED | OUTPATIENT
Start: 2024-03-14 | End: 2024-03-14

## 2024-03-14 RX ORDER — FENTANYL CITRATE 50 UG/ML
INJECTION, SOLUTION INTRAMUSCULAR; INTRAVENOUS
Status: DISCONTINUED | OUTPATIENT
Start: 2024-03-14 | End: 2024-03-14

## 2024-03-14 RX ORDER — SODIUM CHLORIDE 0.9 % (FLUSH) 0.9 %
10 SYRINGE (ML) INJECTION
Status: DISCONTINUED | OUTPATIENT
Start: 2024-03-14 | End: 2024-03-14 | Stop reason: HOSPADM

## 2024-03-14 RX ORDER — LIDOCAINE HYDROCHLORIDE 20 MG/ML
INJECTION, SOLUTION EPIDURAL; INFILTRATION; INTRACAUDAL; PERINEURAL
Status: DISCONTINUED | OUTPATIENT
Start: 2024-03-14 | End: 2024-03-14

## 2024-03-14 RX ORDER — ONDANSETRON HYDROCHLORIDE 2 MG/ML
4 INJECTION, SOLUTION INTRAVENOUS ONCE AS NEEDED
Status: DISCONTINUED | OUTPATIENT
Start: 2024-03-14 | End: 2024-03-14 | Stop reason: HOSPADM

## 2024-03-14 RX ORDER — ACETAMINOPHEN 10 MG/ML
INJECTION, SOLUTION INTRAVENOUS
Status: DISCONTINUED | OUTPATIENT
Start: 2024-03-14 | End: 2024-03-14

## 2024-03-14 RX ORDER — DEXAMETHASONE SODIUM PHOSPHATE 4 MG/ML
INJECTION, SOLUTION INTRA-ARTICULAR; INTRALESIONAL; INTRAMUSCULAR; INTRAVENOUS; SOFT TISSUE
Status: DISCONTINUED | OUTPATIENT
Start: 2024-03-14 | End: 2024-03-14

## 2024-03-14 RX ORDER — ALBUTEROL SULFATE 90 UG/1
AEROSOL, METERED RESPIRATORY (INHALATION)
Status: DISCONTINUED | OUTPATIENT
Start: 2024-03-14 | End: 2024-03-14

## 2024-03-14 RX ORDER — HYDROCODONE BITARTRATE AND ACETAMINOPHEN 7.5; 325 MG/15ML; MG/15ML
15 SOLUTION ORAL EVERY 4 HOURS PRN
Status: DISCONTINUED | OUTPATIENT
Start: 2024-03-14 | End: 2024-03-15 | Stop reason: HOSPADM

## 2024-03-14 RX ORDER — ROCURONIUM BROMIDE 10 MG/ML
INJECTION, SOLUTION INTRAVENOUS
Status: DISCONTINUED | OUTPATIENT
Start: 2024-03-14 | End: 2024-03-14

## 2024-03-14 RX ORDER — PHENYLEPHRINE HYDROCHLORIDE 10 MG/ML
INJECTION INTRAVENOUS
Status: DISCONTINUED | OUTPATIENT
Start: 2024-03-14 | End: 2024-03-14

## 2024-03-14 RX ORDER — KETAMINE HCL IN 0.9 % NACL 50 MG/5 ML
SYRINGE (ML) INTRAVENOUS
Status: DISCONTINUED | OUTPATIENT
Start: 2024-03-14 | End: 2024-03-14

## 2024-03-14 RX ORDER — PROPOFOL 10 MG/ML
VIAL (ML) INTRAVENOUS
Status: DISCONTINUED | OUTPATIENT
Start: 2024-03-14 | End: 2024-03-14

## 2024-03-14 RX ORDER — SODIUM CHLORIDE 9 MG/ML
INJECTION, SOLUTION INTRAVENOUS CONTINUOUS
Status: DISCONTINUED | OUTPATIENT
Start: 2024-03-14 | End: 2024-03-15 | Stop reason: HOSPADM

## 2024-03-14 RX ORDER — DEXMEDETOMIDINE HYDROCHLORIDE 100 UG/ML
INJECTION, SOLUTION INTRAVENOUS
Status: DISCONTINUED | OUTPATIENT
Start: 2024-03-14 | End: 2024-03-14

## 2024-03-14 RX ORDER — KETOROLAC TROMETHAMINE 30 MG/ML
15 INJECTION, SOLUTION INTRAMUSCULAR; INTRAVENOUS EVERY 6 HOURS
Status: DISCONTINUED | OUTPATIENT
Start: 2024-03-14 | End: 2024-03-15 | Stop reason: HOSPADM

## 2024-03-14 RX ORDER — MORPHINE SULFATE 2 MG/ML
2 INJECTION, SOLUTION INTRAMUSCULAR; INTRAVENOUS EVERY 4 HOURS PRN
Status: DISCONTINUED | OUTPATIENT
Start: 2024-03-14 | End: 2024-03-15 | Stop reason: HOSPADM

## 2024-03-14 RX ADMIN — PHENYLEPHRINE HYDROCHLORIDE 200 MCG: 10 INJECTION INTRAVENOUS at 07:03

## 2024-03-14 RX ADMIN — HYDROCODONE BITARTRATE AND ACETAMINOPHEN 15 ML: 7.5; 325 SOLUTION ORAL at 12:03

## 2024-03-14 RX ADMIN — KETOROLAC TROMETHAMINE 15 MG: 30 INJECTION, SOLUTION INTRAMUSCULAR; INTRAVENOUS at 06:03

## 2024-03-14 RX ADMIN — DEXMEDETOMIDINE 8 MCG: 200 INJECTION, SOLUTION INTRAVENOUS at 09:03

## 2024-03-14 RX ADMIN — CEFAZOLIN SODIUM 2 G: 2 SOLUTION INTRAVENOUS at 07:03

## 2024-03-14 RX ADMIN — SODIUM CHLORIDE, SODIUM LACTATE, POTASSIUM CHLORIDE, AND CALCIUM CHLORIDE: .6; .31; .03; .02 INJECTION, SOLUTION INTRAVENOUS at 06:03

## 2024-03-14 RX ADMIN — DEXAMETHASONE SODIUM PHOSPHATE 4 MG: 4 INJECTION, SOLUTION INTRA-ARTICULAR; INTRALESIONAL; INTRAMUSCULAR; INTRAVENOUS; SOFT TISSUE at 07:03

## 2024-03-14 RX ADMIN — ONDANSETRON 8 MG: 2 INJECTION INTRAMUSCULAR; INTRAVENOUS at 10:03

## 2024-03-14 RX ADMIN — SUCCINYLCHOLINE CHLORIDE 120 MG: 20 INJECTION, SOLUTION INTRAMUSCULAR; INTRAVENOUS; PARENTERAL at 07:03

## 2024-03-14 RX ADMIN — MORPHINE SULFATE 2 MG: 2 INJECTION, SOLUTION INTRAMUSCULAR; INTRAVENOUS at 04:03

## 2024-03-14 RX ADMIN — ONDANSETRON 8 MG: 2 INJECTION INTRAMUSCULAR; INTRAVENOUS at 11:03

## 2024-03-14 RX ADMIN — DEXMEDETOMIDINE 12 MCG: 200 INJECTION, SOLUTION INTRAVENOUS at 10:03

## 2024-03-14 RX ADMIN — ONDANSETRON 8 MG: 2 INJECTION INTRAMUSCULAR; INTRAVENOUS at 06:03

## 2024-03-14 RX ADMIN — ALBUTEROL SULFATE 4 PUFF: 90 AEROSOL, METERED RESPIRATORY (INHALATION) at 10:03

## 2024-03-14 RX ADMIN — GLYCOPYRROLATE 0.2 MG: 0.2 INJECTION, SOLUTION INTRAMUSCULAR; INTRAVITREAL at 07:03

## 2024-03-14 RX ADMIN — ROCURONIUM BROMIDE 20 MG: 50 INJECTION INTRAVENOUS at 07:03

## 2024-03-14 RX ADMIN — FENTANYL CITRATE 25 MCG: 50 INJECTION INTRAMUSCULAR; INTRAVENOUS at 10:03

## 2024-03-14 RX ADMIN — VASOPRESSIN 2 UNITS: 20 INJECTION INTRAVENOUS at 07:03

## 2024-03-14 RX ADMIN — VASOPRESSIN 1 UNITS: 20 INJECTION INTRAVENOUS at 07:03

## 2024-03-14 RX ADMIN — KETOROLAC TROMETHAMINE 15 MG: 30 INJECTION, SOLUTION INTRAMUSCULAR; INTRAVENOUS at 11:03

## 2024-03-14 RX ADMIN — MORPHINE SULFATE 2 MG: 2 INJECTION, SOLUTION INTRAMUSCULAR; INTRAVENOUS at 09:03

## 2024-03-14 RX ADMIN — ACETAMINOPHEN 1000 MG: 10 INJECTION, SOLUTION INTRAVENOUS at 10:03

## 2024-03-14 RX ADMIN — HYDROMORPHONE HYDROCHLORIDE 0.5 MG: 2 INJECTION INTRAMUSCULAR; INTRAVENOUS; SUBCUTANEOUS at 11:03

## 2024-03-14 RX ADMIN — ROCURONIUM BROMIDE 20 MG: 50 INJECTION INTRAVENOUS at 08:03

## 2024-03-14 RX ADMIN — HYDROCODONE BITARTRATE AND ACETAMINOPHEN 15 ML: 7.5; 325 SOLUTION ORAL at 10:03

## 2024-03-14 RX ADMIN — LIDOCAINE HYDROCHLORIDE 100 MG: 20 INJECTION, SOLUTION EPIDURAL; INFILTRATION; INTRACAUDAL at 07:03

## 2024-03-14 RX ADMIN — PHENYLEPHRINE HYDROCHLORIDE 0.25 MCG/KG/MIN: 10 INJECTION INTRAVENOUS at 07:03

## 2024-03-14 RX ADMIN — HYDROMORPHONE HYDROCHLORIDE 0.5 MG: 2 INJECTION INTRAMUSCULAR; INTRAVENOUS; SUBCUTANEOUS at 12:03

## 2024-03-14 RX ADMIN — PROPOFOL 150 MG: 10 INJECTION, EMULSION INTRAVENOUS at 07:03

## 2024-03-14 RX ADMIN — ROCURONIUM BROMIDE 20 MG: 50 INJECTION INTRAVENOUS at 09:03

## 2024-03-14 RX ADMIN — MIDAZOLAM 2 MG: 1 INJECTION INTRAMUSCULAR; INTRAVENOUS at 07:03

## 2024-03-14 RX ADMIN — Medication 30 MG: at 07:03

## 2024-03-14 RX ADMIN — ROCURONIUM BROMIDE 10 MG: 50 INJECTION INTRAVENOUS at 07:03

## 2024-03-14 RX ADMIN — SUGAMMADEX 200 MG: 100 INJECTION, SOLUTION INTRAVENOUS at 10:03

## 2024-03-14 RX ADMIN — Medication 20 MG: at 08:03

## 2024-03-14 RX ADMIN — ALBUTEROL SULFATE 2.5 MG: 2.5 SOLUTION RESPIRATORY (INHALATION) at 11:03

## 2024-03-14 RX ADMIN — FENTANYL CITRATE 50 MCG: 50 INJECTION INTRAMUSCULAR; INTRAVENOUS at 09:03

## 2024-03-14 RX ADMIN — FENTANYL CITRATE 100 MCG: 50 INJECTION INTRAMUSCULAR; INTRAVENOUS at 07:03

## 2024-03-14 NOTE — TRANSFER OF CARE
"Anesthesia Transfer of Care Note    Patient: Hermelindo Garza III    Procedure(s) Performed: Procedure(s) (LRB):  ROBOTIC REPAIR, HERNIA, HIATAL (N/A)    Patient location: PACU    Anesthesia Type: general    Transport from OR: Transported from OR on 6-10 L/min O2 by face mask with adequate spontaneous ventilation    Post pain: adequate analgesia    Post assessment: no apparent anesthetic complications    Post vital signs: stable    Level of consciousness: awake    Nausea/Vomiting: no nausea/vomiting    Complications: none    Transfer of care protocol was followed      Last vitals: Visit Vitals  /76 (BP Location: Right arm, Patient Position: Lying)   Pulse 79   Temp 36.7 °C (98 °F) (Tympanic)   Resp 16   Ht 5' 5" (1.651 m)   Wt 108.9 kg (240 lb)   SpO2 (!) 94%   BMI 39.94 kg/m²     "

## 2024-03-14 NOTE — OP NOTE
[place] - Surgery ([facility])  General Surgery  Operative Note    SUMMARY     Date of Procedure: 3/14/2024     Procedure: Procedure(s) (LRB):  ROBOTIC REPAIR, HERNIA, HIATAL (N/A)     Modifier 22 due to BMI greater than 40 and large amount of intra-peritoneal fat    Surgeon(s) and Role:     * Cody Winn MD - Primary    Assisting Surgeon: None    Pre-Operative Diagnosis: Gastroesophageal reflux disease, unspecified whether esophagitis present [K21.9]    Post-Operative Diagnosis: Post-Op Diagnosis Codes:     * Gastroesophageal reflux disease, unspecified whether esophagitis present [K21.9]    Anesthesia: General    Operative Findings (including complications, if any):   Hiatal hernia with about 15% of the stomach in the chest  Hiatus closed using running nonabsorbable vloc  270 degree toupet fundoplication performed      Description of Technical Procedures:   Brought into the OR and placed supine. Four upper abdominal trocars were inserted and the patient was placed in steep reverse Trendelenburg. A liver sling was fashioned from the 2-0 vloc to aid in retraction. The stomach was grasped and retracted inferiorly. About 15% of the stomach was within the chest. The pars flaccida was divided and the right terese identified. There was a great deal of intra-abdominal fat which made exposure difficult and time consuming.   The right terese was identified and scored. The hernia sac was retracted medially and inferiorly. Blunt dissection was used to dissect the hernia sac into the mediastinum. This was carried anteriorly. The attachments to the anterior esophagus were divided using vessel sealer.  Next the greater curve of the stomach was taken down using vessel sealer starting at the midportion and carried proximally to the  left terese. The entire proximal stomach was mobilized at this point. A penrose drain was passed posteriorly around the GE junction to aid in retraction.  The lower esophagus was then mobilized  circumferentially into the mediastinum until 4-5cm of lower esophagus remained within the abdomen without tension.   The hiatus was closed posteriorly using running nonabsorbable vloc suture taking care not to impinge on the lower esophagus. The fundus was then passed posteriorly and sutured to the right terese and right aspect of the lower esophagus. The anterior fundus was sutured to the anterior stomach and left terese using 0 ethibond sutures. This created a 3cm long 270 degree fundoplication. Hemostasis was noted. Sutures and penrose were removed. Trocars were removed. Skin was closed using 4-0 monocryl.     Significant Surgical Tasks Conducted by the Assistant(s), if Applicable:     Estimated Blood Loss (EBL): 50ml           Implants: * No implants in log *    Specimens:   Specimen (24h ago, onward)      None                    Condition: Stable    Disposition: PACU - hemodynamically stable.    Attestation: I was present and scrubbed for the entire procedure.

## 2024-03-14 NOTE — H&P
Chief Complaint: Follow-up (After colon )        HPI:  HPI  43m with reflux, heartburn symptoms for around 10 years. Moderately controlled on nexium but wants to get off. Still has significant symptoms at times even on meds. Underwent EGD that showed small ulcer.   Has been on ozempic since 2020 but stopped it recently.   Hpylori neg        Review of Systems   Constitutional:  Negative for chills, diaphoresis and fever.   HENT:  Negative for trouble swallowing.    Respiratory:  Negative for cough, shortness of breath, wheezing and stridor.    Cardiovascular:  Negative for chest pain and palpitations.   Gastrointestinal:  Negative for abdominal distention, abdominal pain, blood in stool, diarrhea, nausea and vomiting.   Endocrine: Negative for cold intolerance and heat intolerance.   Genitourinary:  Negative for difficulty urinating.   Musculoskeletal:  Negative for back pain.   Skin:  Negative for rash.   Allergic/Immunologic: Negative for immunocompromised state.   Neurological:  Negative for dizziness, syncope and numbness.   Hematological:  Negative for adenopathy.   Psychiatric/Behavioral:  Negative for agitation.          Current Medications          Current Outpatient Medications   Medication Sig Dispense Refill    acetaminophen (TYLENOL) 325 MG tablet Take 650 mg by mouth every 6 (six) hours as needed for Pain.        albuterol (PROVENTIL/VENTOLIN HFA) 90 mcg/actuation inhaler INHALE 2 PUFFS INTO THE LUNGS EVERY 6 HOURS AS NEEDED WHEEZING 8.5 g 5    atorvastatin (LIPITOR) 20 MG tablet Take 1 tablet (20 mg total) by mouth once daily. 90 tablet 1    azelastine (ASTELIN) 137 mcg (0.1 %) nasal spray 2 sprays (274 mcg total) by Nasal route 2 (two) times daily. 30 mL 9    blood sugar diagnostic (TRUE METRIX GLUCOSE TEST STRIP) Strp Use to test blood glucose one (1) time a day, 100 strip 3    blood-glucose meter Misc use as directed 1 each 0    cyclobenzaprine (FLEXERIL) 10 MG tablet Take 10 mg by mouth 3 (three)  times daily.        cyclobenzaprine (FLEXERIL) 10 MG tablet Take 1 tablet (10 mg total) by mouth 3 (three) times daily as needed for Muscle spasms. 90 tablet 1    dexmethylphenidate (FOCALIN) 10 MG tablet Take 1 tablet (10 mg total) by mouth 2 (two) times daily. 60 tablet 0    [START ON 1/12/2024] dexmethylphenidate (FOCALIN) 10 MG tablet Take 1 tablet (10 mg total) by mouth 2 (two) times daily. 60 tablet 0    docusate sodium (COLACE) 100 MG capsule Take 1 capsule (100 mg total) by mouth 2 (two) times daily. 30 capsule 1    ergocalciferol, vitamin D2, (VITAMIN D ORAL) Take 1 tablet by mouth every other day.        fexofenadine (ALLEGRA) 180 MG tablet Take 180 mg by mouth once daily.        fluocinonide (LIDEX) 0.05 % external solution AAA scalp qday prn itching, scaling 60 mL 3    gabapentin (NEURONTIN) 300 MG capsule Take 1 capsule (300 mg total) by mouth 3 (three) times daily. 90 capsule 3    hydrocortisone 2.5 % cream APPLY EXTERNALLY TO THE AFFECTED AREA TWICE DAILY FOR 10 DAYS 30 g 0    ketoconazole (NIZORAL) 2 % cream APPLY TOPICALLY TO THE AFFECTED AREA TWICE DAILY 15 g 0    lamoTRIgine (LAMICTAL) 100 MG tablet Take 1 tablet (100 mg total) by mouth 2 (two) times daily. IF any RASH, STOP ALL Lamictal and Tell Psyc MD. 180 tablet 1    lancets (ONETOUCH DELICA PLUS LANCET) 33 gauge Misc Use to test blood glucose one (1) time a day, 100 each 5    latanoprost 0.005 % ophthalmic solution Place 1 drop into both eyes every evening. 7.5 mL 3    LORazepam (ATIVAN) 0.5 MG tablet Take 1 tablet (0.5 mg total) by mouth daily as needed (SIGNIFICANT ANXIETY). 30 tablet 3    meloxicam (MOBIC) 15 MG tablet TAKE 1 TABLET(15 MG) BY MOUTH EVERY DAY as needed FOR PAIN 30 tablet 2    montelukast (SINGULAIR) 10 mg tablet Take 1 tablet (10 mg total) by mouth every evening. 90 tablet 3    ondansetron (ZOFRAN-ODT) 4 MG TbDL Dissolve 1 tablet (4 mg total) on the tongue every 6 (six) hours as needed (nausea). 30 tablet 2    rOPINIRole  (REQUIP) 0.25 MG tablet TAKE 1 TABLET(0.25 MG) BY MOUTH EVERY EVENING 90 tablet 2    semaglutide (OZEMPIC) 0.25 mg or 0.5 mg (2 mg/3 mL) pen injector Inject 0.5 mg into the skin every 7 days. 3 each 1    sod picosulf-mag ox-citric ac (CLENPIQ) 10 mg-3.5 gram- 12 gram/160 mL Soln Take As Directed. 320 mL 0    ULTRA THIN LANCETS 30 gauge Misc USE TO TEST BLOOD SUGAR EVERY DAY AS DIRECTED 100 each 3    EScitalopram oxalate (LEXAPRO) 10 MG tablet Take 1 tablet (10 mg total) by mouth once daily. 90 tablet 0    HYDROcodone-acetaminophen (NORCO) 5-325 mg per tablet Take 1 tablet by mouth every 6 (six) hours as needed for Pain. (Patient not taking: Reported on 12/19/2023) 30 tablet 0      No current facility-administered medications for this visit.                Facility-Administered Medications Ordered in Other Visits   Medication Dose Route Frequency Provider Last Rate Last Admin    0.9%  NaCl infusion  500 mL Intravenous Continuous Kolton Terrell MD        mupirocin 2 % ointment   Nasal On Call Procedure Chong Padron MD   Given at 09/15/22 0553    ondansetron injection 4 mg  4 mg Intravenous Once PRN Arron Rangel MD                Review of patient's allergies indicates:  No Known Allergies          Past Medical History:   Diagnosis Date    ADD (attention deficit disorder) 05/09/2012    Allergy      Anxiety 4/12/2023    Asthma 05/09/2012    Cervical disc disorder with radiculopathy, unspecified cervical region 09/14/2021    Cervical spondylosis with myelopathy 06/29/2022    Eczema      Esophageal ulcer      GERD (gastroesophageal reflux disease) 05/09/2012    Glaucoma      Kidney stones 05/2014    Lumbar disc disease 05/09/2012    Lumbar herniated disc 05/09/2012    Malignant melanoma of skin, unspecified 01/06/2022     in situ right mid back    Melanoma 01/2022     in situ R mid back     LUBA (obstructive sleep apnea)      Radiculopathy, lumbar region 09/14/2021    Renal calculi 05/09/2012    Type 2 diabetes  mellitus without complication, without long-term current use of insulin 11/16/2020               Past Surgical History:   Procedure Laterality Date    APPENDECTOMY   2006    CARPAL TUNNEL RELEASE Right 09/15/2022     Procedure: RELEASE, CARPAL TUNNEL;  Surgeon: Christen Marshall MD;  Location: [facility] OR;  Service: Orthopedics;  Laterality: Right;    COLONOSCOPY   01/22/2019     internal hemorrhoids, o/w normal - repeat at age 52    COLONOSCOPY N/A 12/21/2023     Procedure: COLONOSCOPY;  Surgeon: Teo Johnson MD;  Location: [facility];  Service: Endoscopy;  Laterality: N/A;    CORRECTION OF HAMMER TOE Left 8/14/2023     Procedure: CORRECTION, HAMMER TOE--  basic foot tray as well as a Reese microfree/microchoice tray. I also asked SPHARES to bring an MIS mati.;  Surgeon: Ankush Back DPM;  Location: [facility] of [facility] OR;  Service: Podiatry;  Laterality: Left;  reese drill, sagittal saw, foot set    CYSTOSCOPY   7/5/2023     Procedure: CYSTOSCOPY;  Surgeon: Chuckie Hall MD;  Location: [address];  Service: Urology;;    EPIDURAL STEROID INJECTION N/A 02/10/2021     Procedure: CERVICAL C6/7 NELI DIRECT REFERRAL;  Surgeon: Michi Escamilla MD;  Location: [facility] PAIN MGT;  Service: Pain Management;  Laterality: N/A;  NEEDS CONSENT    EPIDURAL STEROID INJECTION INTO CERVICAL SPINE N/A 6/28/2023     Procedure: Injection-steroid-epidural-cervical C7-T1;  Surgeon: Lorraine Patel DO;  Location: [facility] of [facility] PAIN MANAGEMENT;  Service: Pain Management;  Laterality: N/A;  diabetic    EPIDURAL STEROID INJECTION INTO CERVICAL SPINE N/A 11/3/2023     Procedure: cervical NELI C7-T1;  Surgeon: Kolton Terrell MD;  Location: [facility] of [facility] PAIN MANAGEMENT;  Service: Pain Management;  Laterality: N/A;  diabetic    EPIDURAL STEROID INJECTION INTO LUMBAR SPINE N/A 9/27/2023     Procedure: L4-5 NELI;  Surgeon: Tirso Pickering MD;  Location: [facility] of [facility]IN MANA[facility]EMENT;  Service: Pain Management;  Laterality: N/A;  15 mins    ESOPHAGOGASTRODUODENOSCOPY    01/22/2019     LA grade B esophagitis; esophageal stenosis- dilated; gastritis; H pylori pathology negative    ESOPHAGOGASTRODUODENOSCOPY N/A 05/18/2021     Procedure: EGD (ESOPHAGOGASTRODUODENOSCOPY);  Surgeon: Mariana Hector MD;  Location: [facility];  Service: Endoscopy;  Laterality: N/A;    ESOPHAGOGASTRODUODENOSCOPY N/A 12/21/2023     Procedure: EGD (ESOPHAGOGASTRODUODENOSCOPY);  Surgeon: Teo Johnson MD;  Location: [facility];  Service: Endoscopy;  Laterality: N/A;    EXCISION OF GANGLION OF WRIST Right 09/15/2022     Procedure: EXCISION, GANGLION CYST, WRIST;  Surgeon: Christen Marshall MD;  Location: [facility];  Service: Orthopedics;  Laterality: Right;    FLEXOR TENDON REPAIR Right 09/15/2022     Procedure: FLEXOR TENOSYNOVECTOMY;  Surgeon: Christen Marshall MD;  Location: [facility] OR;  Service: Orthopedics;  Laterality: Right;    HAND ARTHROTOMY Right 09/15/2022     Procedure: ARTHROTOMY, HAND RADIOCARPAL;  Surgeon: Christen Marshall MD;  Location: [facility] OR;  Service: Orthopedics;  Laterality: Right;  Radiocarpal    INJECTION OF JOINT Right 11/29/2023     Procedure: right hip IA injection and Right GTB injection;  Surgeon: Lorraine Patel DO;  Location: [facility];  Service: Pain Management;  Laterality: Right;    INJECTION OF STEROID Left 09/15/2022     Procedure: INJECTION, STEROID LEFT WRIST AND LEFT LATERAL ELBOW;  Surgeon: Christen Marshall MD;  Location: [facility] OR;  Service: Orthopedics;  Laterality: Left;  Left Carpal Tunnel CSI    LASER LITHOTRIPSY Left 7/5/2023     Procedure: LITHOTRIPSY, USING LASER;  Surgeon: Chuckie Hall MD;  Location: [facility] OR 1ST FLR;  Service: Urology;  Laterality: Left;    LUMBAR LAMINECTOMY   2010    LUMBAR LAMINECTOMY WITH DISCECTOMY Left 09/20/2021     Procedure: LAMINECTOMY, SPINE, LUMBAR, WITH DISCECTOMY;  Surgeon: Naseem Mcnamara DO;  Location: [facility] OR 2ND FLR;  Service: Neurosurgery;  Laterality: Left;  MIS L3-4    PH MONITORING, ESOPHAGUS, WIRELESS, (OFF  REFLUX MEDS) N/A 2023     Procedure: PH MONITORING, ESOPHAGUS, WIRELESS, (OFF REFLUX MEDS);  Surgeon: Teo Johnson MD;  Location: [facility] ENDO;  Service: Endoscopy;  Laterality: N/A;    RECONSTRUCTION OF LIGAMENT Left 2023     Procedure: RECONSTRUCTION, LIGAMENT LATERAL COLLATERAL;  Surgeon: Christen Marshall MD;  Location: [facility] OR;  Service: Orthopedics;  Laterality: Left;    REPAIR OF EXTENSOR TENDON Left 2023     Procedure: REPAIR, TENDON, EXTENSOR;  Surgeon: Christen Marshall MD;  Location: [facility] OR;  Service: Orthopedics;  Laterality: Left;    TYMPANOSTOMY TUBE PLACEMENT        URETERAL STENT PLACEMENT Left 2023     Procedure: INSERTION, STENT, URETER;  Surgeon: Chuckie Hall MD;  Location: [address];  Service: Urology;  Laterality: Left;    URETEROSCOPIC REMOVAL OF URETERIC CALCULUS Left 2023     Procedure: REMOVAL, CALCULUS, URETER, URETEROSCOPIC;  Surgeon: Chuckie Hall MD;  [facility];  Service: Urology;  Laterality: Left;    URETEROSCOPY Left 2023     Procedure: URETEROSCOPY;  Surgeon: Chuckie Hall MD;  [facility];  Service: Urology;  Laterality: Left;         Social History               Socioeconomic History    Marital status:    Tobacco Use    Smoking status: Former       Current packs/day: 0.00       Types: Cigarettes       Quit date: 2018       Years since quittin.7       Passive exposure: Never    Smokeless tobacco: Never   Substance and Sexual Activity    Alcohol use: Yes       Alcohol/week: 3.0 standard drinks of alcohol       Types: 3 Drinks containing 0.5 oz of alcohol per week    Drug use: No    Sexual activity: Yes      Social Determinants of Health           Financial Resource Strain: Low Risk  (2023)     Overall Financial Resource Strain (CARDIA)      Difficulty of Paying Living Expenses: Not hard at all   Food Insecurity: No Food Insecurity (2023)     Hunger Vital Sign      Worried About Running  Out of Food in the Last Year: Never true      Ran Out of Food in the Last Year: Never true   Transportation Needs: No Transportation Needs (11/7/2023)     PRAPARE - Transportation      Lack of Transportation (Medical): No      Lack of Transportation (Non-Medical): No   Physical Activity: Sufficiently Active (11/7/2023)     Exercise Vital Sign      Days of Exercise per Week: 4 days      Minutes of Exercise per Session: 40 min   Stress: No Stress Concern Present (11/7/2023)     [employer] [place] [employer] - Occupational Stress Questionnaire      Feeling of Stress : Only a little   Social Connections: Unknown (11/7/2023)     Social Connection and Isolation Panel [NHANES]      Frequency of Communication with Friends and Family: More than three times a week      Frequency of Social Gatherings with Friends and Family: Twice a week      Active Member of Clubs or Organizations: No      Attends Club or Organization Meetings: Never      Marital Status:    Housing Stability: Low Risk  (11/7/2023)     Housing Stability Vital Sign      Unable to Pay for Housing in the Last Year: No      Number of Places Lived in the Last Year: 1      Unstable Housing in the Last Year: No            There were no vitals filed for this visit.     Physical Exam  Constitutional:       General: He is not in acute distress.  HENT:      Head: Normocephalic and atraumatic.   Eyes:      General: No scleral icterus.  Cardiovascular:      Rate and Rhythm: Normal rate.   Pulmonary:      Effort: Pulmonary effort is normal. No respiratory distress.      Breath sounds: No stridor.   Abdominal:      Palpations: Abdomen is soft.      Tenderness: There is no abdominal tenderness.   Lymphadenopathy:      Cervical: No cervical adenopathy.   Skin:     General: Skin is warm.      Findings: No erythema.   Neurological:      Mental Status: He is alert and oriented to person, place, and time.   Psychiatric:         Behavior: Behavior normal.       Body mass index is 41.77 kg/m².  CT does not show much of a HH  EGD with small ulceration in duo  Upper GI reviewed  Bravo consisent with GERD, good correlation     Assessment & Plan:  43M with GERD  Discussed fundoplication  Has trip in [place] he wants surgery prior to that  Plan for robot toupet March14  Understands he can't take ozempic the week before

## 2024-03-14 NOTE — PROGRESS NOTES
VN cued into room to complete admit assessment. VIP model introduced; VN working alongside bedside treatment team.  Plan of care reviewed with patient. Patient informed of fall risk, fall precautions, call light within reach, side rails x2 elevated. Patient notified to ask staff for assistance. Patient verbalized complete understanding. Time allowed for questions. Will continue to monitor and intervene as needed. Patient's  is at bedside.             03/14/24 1341   Admission   Initial VN Admission Questions Complete   Communication Issues? None   Shift   Virtual Nurse - Patient Verbalized Approval Of Camera Use   Safety/Activity   Patient Rounds bed in low position;call light in patient/parent reach;clutter free environment maintained;visualized patient   Safety Promotion/Fall Prevention family to remain at bedside;side rails raised x 2   Positioning   Body Position supine   Head of Bed (HOB) Positioning HOB elevated

## 2024-03-14 NOTE — ANESTHESIA PROCEDURE NOTES
Intubation    Date/Time: 3/14/2024 7:07 AM    Performed by: Omar Allison CRNA  Authorized by: Nakul Baugh MD    Intubation:     Induction:  Intravenous    Intubated:  Postinduction    Mask Ventilation:  Not attempted    Attempts:  1    Attempted By:  Student    Method of Intubation:  Video laryngoscopy    Blade:  Wang 4    Laryngeal View Grade: Grade I - full view of cords      Difficult Airway Encountered?: No      Complications:  None    Airway Device:  Oral endotracheal tube    Airway Device Size:  7.5    Style/Cuff Inflation:  Cuffed (inflated to minimal occlusive pressure)    Inflation Amount (mL):  8    Tube secured:  22    Secured at:  The lips    Placement Verified By:  Capnometry    Complicating Factors:  None    Findings Post-Intubation:  BS equal bilateral and atraumatic/condition of teeth unchanged

## 2024-03-14 NOTE — ANESTHESIA POSTPROCEDURE EVALUATION
Anesthesia Post Evaluation    Patient: Hermelindo Garza III    Procedure(s) Performed: Procedure(s) (LRB):  ROBOTIC REPAIR, HERNIA, HIATAL (N/A)    Final Anesthesia Type: general      Patient location during evaluation: GI PACU  Patient participation: Yes- Able to Participate  Level of consciousness: awake and alert  Post-procedure vital signs: reviewed and stable  Pain management: adequate  Airway patency: patent    PONV status at discharge: No PONV  Anesthetic complications: no      Cardiovascular status: blood pressure returned to baseline and hemodynamically stable  Respiratory status: unassisted  Hydration status: euvolemic  Follow-up not needed.  Comments: Pt has some numbness and tingling in median nerve distribution of his left hand in PACU. Seems to be resolving but will follow.              Vitals Value Taken Time   /70 03/14/24 1335   Temp 36.4 °C (97.6 °F) 03/14/24 1335   Pulse 92 03/14/24 1335   Resp 18 03/14/24 1335   SpO2 95 % 03/14/24 1335         Event Time   Out of Recovery 13:10:01         Pain/Shani Score: Pain Rating Prior to Med Admin: 5 (3/14/2024 12:53 PM)  Shani Score: 9 (3/14/2024 12:53 PM)

## 2024-03-14 NOTE — ANESTHESIA PREPROCEDURE EVALUATION
03/14/2024  Hermelindo Garza III is a 43 y.o., male.      Pre-op Assessment     I have reviewed the Nursing Notes.    I have reviewed the Medications.     Review of Systems  Anesthesia Hx:  No problems with previous Anesthesia             Denies Family Hx of Anesthesia complications.     Social:  Non-Smoker, No Alcohol Use       Hematology/Oncology:  Hematology Normal   Oncology Normal                                   EENT/Dental:  EENT/Dental Normal           Cardiovascular:  Cardiovascular Normal Exercise tolerance: good                                           Pulmonary:    Asthma    Sleep Apnea   Asthma:    Obstructive Sleep Apnea (LUBA).           Renal/:  Chronic Renal Disease        Kidney Function/Disease             Hepatic/GI:   PUD,  GERD Liver Disease, Hepatitis    Gerd, Peptic Ulcer Disease       Liver Disease, Hepatitis        Musculoskeletal:  Arthritis        Arthritis          Neurological:    Neuromuscular Disease,           Arthritis                         Neuromuscular Disease   Endocrine:  Diabetes    Diabetes                      Psych:  Psychiatric History                  Physical Exam  General: Well nourished    Airway:  Mallampati: II / II  Mouth Opening: Normal  TM Distance: Normal  Tongue: Normal  Neck ROM: Normal ROM    Dental:  Intact        Anesthesia Plan  Type of Anesthesia, risks & benefits discussed:    Anesthesia Type: Gen ETT  Intra-op Monitoring Plan: Standard ASA Monitors  Post Op Pain Control Plan: multimodal analgesia  Induction:  IV  Airway Plan: Direct, Post-Induction  Informed Consent: Informed consent signed with the Patient and all parties understand the risks and agree with anesthesia plan.  All questions answered.   ASA Score: 2    Ready For Surgery From Anesthesia Perspective.     .

## 2024-03-15 ENCOUNTER — APPOINTMENT (OUTPATIENT)
Dept: RADIOLOGY | Facility: HOSPITAL | Age: 44
End: 2024-03-15
Payer: COMMERCIAL

## 2024-03-15 VITALS
SYSTOLIC BLOOD PRESSURE: 139 MMHG | BODY MASS INDEX: 43.34 KG/M2 | HEIGHT: 65 IN | HEART RATE: 75 BPM | WEIGHT: 260.13 LBS | TEMPERATURE: 98 F | DIASTOLIC BLOOD PRESSURE: 78 MMHG | OXYGEN SATURATION: 96 % | RESPIRATION RATE: 17 BRPM

## 2024-03-15 PROCEDURE — 94761 N-INVAS EAR/PLS OXIMETRY MLT: CPT

## 2024-03-15 PROCEDURE — 25000003 PHARM REV CODE 250: Performed by: STUDENT IN AN ORGANIZED HEALTH CARE EDUCATION/TRAINING PROGRAM

## 2024-03-15 PROCEDURE — 74240 X-RAY XM UPR GI TRC 1CNTRST: CPT | Mod: TC,FY

## 2024-03-15 PROCEDURE — 25500020 PHARM REV CODE 255: Performed by: STUDENT IN AN ORGANIZED HEALTH CARE EDUCATION/TRAINING PROGRAM

## 2024-03-15 PROCEDURE — 63600175 PHARM REV CODE 636 W HCPCS: Performed by: STUDENT IN AN ORGANIZED HEALTH CARE EDUCATION/TRAINING PROGRAM

## 2024-03-15 PROCEDURE — 74240 X-RAY XM UPR GI TRC 1CNTRST: CPT | Mod: 26,,, | Performed by: RADIOLOGY

## 2024-03-15 RX ORDER — HYDROCODONE BITARTRATE AND ACETAMINOPHEN 7.5; 325 MG/15ML; MG/15ML
15 SOLUTION ORAL EVERY 4 HOURS PRN
Qty: 150 ML | Refills: 0 | Status: SHIPPED | OUTPATIENT
Start: 2024-03-15 | End: 2024-04-09

## 2024-03-15 RX ADMIN — ONDANSETRON 8 MG: 2 INJECTION INTRAMUSCULAR; INTRAVENOUS at 12:03

## 2024-03-15 RX ADMIN — HYDROCODONE BITARTRATE AND ACETAMINOPHEN 15 ML: 7.5; 325 SOLUTION ORAL at 10:03

## 2024-03-15 RX ADMIN — DIPHENHYDRAMINE HYDROCHLORIDE 25 MG: 50 INJECTION, SOLUTION INTRAMUSCULAR; INTRAVENOUS at 12:03

## 2024-03-15 RX ADMIN — KETOROLAC TROMETHAMINE 15 MG: 30 INJECTION, SOLUTION INTRAMUSCULAR; INTRAVENOUS at 12:03

## 2024-03-15 RX ADMIN — MORPHINE SULFATE 2 MG: 2 INJECTION, SOLUTION INTRAMUSCULAR; INTRAVENOUS at 08:03

## 2024-03-15 RX ADMIN — ONDANSETRON 8 MG: 2 INJECTION INTRAMUSCULAR; INTRAVENOUS at 06:03

## 2024-03-15 RX ADMIN — KETOROLAC TROMETHAMINE 15 MG: 30 INJECTION, SOLUTION INTRAMUSCULAR; INTRAVENOUS at 11:03

## 2024-03-15 RX ADMIN — HYDROCODONE BITARTRATE AND ACETAMINOPHEN 15 ML: 7.5; 325 SOLUTION ORAL at 04:03

## 2024-03-15 RX ADMIN — KETOROLAC TROMETHAMINE 15 MG: 30 INJECTION, SOLUTION INTRAMUSCULAR; INTRAVENOUS at 05:03

## 2024-03-15 RX ADMIN — DIATRIZOATE MEGLUMINE AND DIATRIZOATE SODIUM 120 ML: 660; 100 LIQUID ORAL; RECTAL at 09:03

## 2024-03-15 RX ADMIN — ONDANSETRON 8 MG: 2 INJECTION INTRAMUSCULAR; INTRAVENOUS at 11:03

## 2024-03-15 RX ADMIN — MORPHINE SULFATE 2 MG: 2 INJECTION, SOLUTION INTRAMUSCULAR; INTRAVENOUS at 01:03

## 2024-03-15 NOTE — NURSING
Bilateral PIV's removed for discharge. AVS given to patient. Medications delivered to bedside. VN notified.

## 2024-03-15 NOTE — PLAN OF CARE
Pt is cleared to d/c home from [facility]. Pt will have his  Christopher [phone] help transport him home at time of d/c. SW requested f/u appts for pt. Rounds completed on pt. All questions addressed. Bedside nurse to discuss d/c medications. Discussed importance to attend all f/u appts and take medications as prescribed. Verbalized understanding. Case Management to sign off.     Salvador Wing, MSW  [phone]    Future Appointments   Date Time Provider Department Center   3/25/2024  8:20 AM Melody Liu MD [facility] CLINT [facility]   3/26/2024  8:00 AM Juana Castro MD [facility] OPHTHAL [place]   3/26/2024  9:30 AM Marcela Burks NP [facility] COLON [address]   3/27/2024  3:20 PM Cody Winn MD [facility] GENSUR [facility]   3/28/2024  1:00 PM Lorraine Patel DO [facility] PAINMGT [facility]   4/2/2024  9:45 AM INJECTION, ALLERGY [facility] ALLIMM [address]   4/9/2024  8:50 AM LAB, [facility] LAB[facility]A [place]   4/9/2024  9:10 AM LAB, [facility] [place]   4/17/2024 11:00 AM Kayla Mclean MD [facility] RHEUM [address]   4/22/2024  8:30 AM Suzi Sanches MD [facility] IM [place]   4/24/2024  8:00 AM Jay Walter MD [facility] URO [place]   5/2/2024 10:30 AM Ashwin Donahue MD [facility] PSYCH [address]   5/7/2024  9:45 AM INJECTION, ALLERGY [facility] ALLIMM [address]   6/5/2024 11:00 AM Coty Jean MD [facility] DERM [place]        03/15/24 1303   Final Note   Assessment Type Final Discharge Note   Anticipated Discharge Disposition Home   What phone number can be called within the next 1-3 days to see how you are doing after discharge? [phone]   Post-Acute Status   Coverage [employer]   Discharge Delays None known at this time

## 2024-03-15 NOTE — PROGRESS NOTES
"[facility] Surg  General Surgery  Progress Note    Subjective:     Interval History:   Pain better from yesterday  Left hand paresthesias not improving  No nausea    Post-Op Info:  Procedure(s) (LRB):  ROBOTIC REPAIR, HERNIA, HIATAL (N/A)   1 Day Post-Op      Medications:  Continuous Infusions:   sodium chloride 0.9%       Scheduled Meds:   ketorolac  15 mg Intravenous Q6H    ondansetron  8 mg Intravenous Q6H     PRN Meds:diphenhydrAMINE, hydrocodone-apap 7.5-325 MG/15 ML, morphine     Objective:     Vital Signs (Most Recent):  Temp: 98.1 °F (36.7 °C) (03/15/24 0742)  Pulse: 61 (03/15/24 0825)  Resp: 16 (03/15/24 0845)  BP: 129/69 (03/15/24 0742)  SpO2: (!) 94 % (03/15/24 0825) Vital Signs (24h Range):  Temp:  [97.3 °F (36.3 °C)-98.7 °F (37.1 °C)] 98.1 °F (36.7 °C)  Pulse:  [] 61  Resp:  [12-25] 16  SpO2:  [92 %-99 %] 94 %  BP: ()/(53-83) 129/69       Intake/Output Summary (Last 24 hours) at 3/15/2024 1022  Last data filed at 3/15/2024 0624  Gross per 24 hour   Intake 1200 ml   Output 1875 ml   Net -675 ml       Physical Exam  Ab appropriate    Significant Labs:  CBC: No results for input(s): "WBC", "RBC", "HGB", "HCT", "PLT", "MCV", "MCH", "MCHC" in the last 48 hours.  CMP: No results for input(s): "GLU", "CALCIUM", "ALBUMIN", "PROT", "NA", "K", "CO2", "CL", "BUN", "CREATININE", "ALKPHOS", "ALT", "AST", "BILITOT" in the last 48 hours.    Significant Diagnostics:  None    Assessment/Plan:     Active Diagnoses:    Diagnosis Date Noted POA    PRINCIPAL PROBLEM:  Gastroesophageal reflux disease [K21.9] 05/09/2012 Yes      Problems Resolved During this Admission:     S/p HHR  Upper gi looks ok, start clears  OOB  Discharge once tolerating clears, going home on liquid diet  Fu with me in 2 weeks    Cody Winn MD  General Surgery  [place] [facility] S[facility]  "

## 2024-03-15 NOTE — PLAN OF CARE
SW met with pt at bedside this AM to complete DCA. Pt reported 6/10 pain but was in a very pleasant mood with positive affect throughout assessment. Pt lives at home with his  Christopher [phone] and will have Christopher help transport him home at time of d/c. Per pt he does not use any HME at home and is fully independent in his ADL's. Pt still drives and is not current with any HH company. SW requested f/u appts for pt. Pt did not have any additional questions or concerns for sw at this time. White board updated with CM name and contact information.  Discharge brochure provided.  Pt encouraged to call with any questions or concerns.  Cm will continue to follow pt through transitions of care and assist with any discharge needs.     Salvador Maciej, MSW  [phone]    Future Appointments   Date Time Provider Department Center   3/25/2024  8:20 AM Melody Liu MD [facility] CLINT [facility] [facility]   3/26/2024  8:00 AM Juana Castro MD [facility] OPHTHAL [place]   3/26/2024  9:30 AM Marcela Burks NP [facility] COLON [address]   3/27/2024  3:20 PM Cody Winn MD [facility] GENSUR [facility]   3/28/2024  1:00 PM Lorraine Patel DO [facility] PAINMGT [facility]   4/2/2024  9:45 AM INJECTION, ALLERGY [facility] ALLIMM [address]   4/9/2024  8:50 AM LAB, OCV [facility] LABDRA [place]   4/9/2024  9:10 AM LAB, [facility]CV[facility] LABDRA [place]   4/17/2024 11:00 AM Kayla Mclean MD [facility] RHEUM [address]   4/22/2024  8:30 AM Suzi Sanches MD [facility] IM [place]   4/24/2024  8:00 AM Jay Walter MD [facility] URO [place]   5/2/2024 10:30 AM Ashwin Donahue MD [facility] PSYCH [address]   5/7/2024  9:45 AM INJECTION, ALLERGY [facility] [place]   6/5/2024 11:00 AM Coty Jean MD [facility] DERM [place]        03/15/24 1012   Discharge Assessment   Assessment Type Discharge Planning Assessment   Confirmed/corrected address, phone number and insurance Yes   Confirmed Demographics Correct on Facesheet   Source of  Information patient   When was your last doctors appointment?   (per pt 1 month)   Does patient/caregiver understand observation status Yes   Communicated NATASHA with patient/caregiver Yes   Reason For Admission gastroesophageal reflux diseas   People in Home spouse   Facility Arrived From: home   Do you expect to return to your current living situation? Yes   Do you have help at home or someone to help you manage your care at home? Yes   Who are your caregiver(s) and their phone number(s)?  Christopher [phone]   Prior to hospitilization cognitive status: Alert/Oriented   Current cognitive status: Alert/Oriented   Walking or Climbing Stairs Difficulty no   Dressing/Bathing Difficulty no   Do you have any problems with: Errands/Grocery   Home Accessibility wheelchair accessible   Home Layout Able to live on 1st floor   Equipment Currently Used at Home none   Readmission within 30 days? No   Patient currently being followed by outpatient case management? No   Do you currently have service(s) that help you manage your care at home? Yes   Is the pt/caregiver preference to resume services with current agency Yes   Do you take prescription medications? Yes   Do you have prescription coverage? Yes   Coverage [employer]   Do you have any problems affording any of your prescribed medications? No   Is the patient taking medications as prescribed? yes   Who is going to help you get home at discharge?  Christopher   How do you get to doctors appointments? car, drives self   Are you on dialysis? No   Do you take coumadin? No   Discharge Plan A Home with family   DME Needed Upon Discharge  other (see comments)  (TBD)   Discharge Plan discussed with: Patient   Transition of Care Barriers None   OTHER   Name(s) of People in Home Jovita White

## 2024-03-15 NOTE — RESPIRATORY THERAPY
Pt refuses to wear hospital provided CPAP. Pt brought his own CPAP and was made aware that it is not allowed unless approved by [employer]. RN and  made aware.

## 2024-03-15 NOTE — PLAN OF CARE
Pt is on room air, Pt has home cpap, RT made it aware to the patent that the home machine was no allowed and the hospital cpap will be brought to his room. No distress, will continue to monitor.

## 2024-03-22 NOTE — DISCHARGE SUMMARY
[facility] Surg  Short Stay  Discharge Summary    Admit Date: 3/14/2024    Discharge Date and Time: 3/15/2024  3:07 PM      Discharge Attending Physician: No att. providers found     Hospital Course (synopsis of major diagnoses, care, treatment, and services provided during the course of the hospital stay): Patient brought in for elective surgery. Underwent procedure without complication and was discharged.       Final Diagnoses:    Principal Problem: Gastroesophageal reflux disease   Secondary Diagnoses:   Active Hospital Problems    Diagnosis  POA    *Gastroesophageal reflux disease [K21.9]  Yes      Resolved Hospital Problems   No resolved problems to display.       Discharged Condition: stable    Disposition: Home or Self Care    Follow up/Patient Instructions:    Medications:  Reconciled Home Medications:      Medication List        START taking these medications      hydrocodone-apap 7.5-325 MG/15 ML oral solution  Commonly known as: HYCET  Take 15 mLs by mouth every 4 (four) hours as needed for Pain.            CONTINUE taking these medications      albuterol 90 mcg/actuation inhaler  Commonly known as: PROVENTIL/VENTOLIN HFA  INHALE 2 PUFFS INTO THE LUNGS EVERY 6 HOURS AS NEEDED WHEEZING     atorvastatin 20 MG tablet  Commonly known as: LIPITOR  Take 1 tablet (20 mg total) by mouth once daily.     azelastine 137 mcg (0.1 %) nasal spray  Commonly known as: ASTELIN  2 sprays (274 mcg total) by Nasal route 2 (two) times daily.     cyclobenzaprine 10 MG tablet  Commonly known as: FLEXERIL  Take 10 mg by mouth 3 (three) times daily.     * dexmethylphenidate 10 MG tablet  Commonly known as: FOCALIN  Take 1 tablet (10 mg total) by mouth 2 (two) times daily.     * dexmethylphenidate 10 MG tablet  Commonly known as: FOCALIN  Take 1 tablet (10 mg total) by mouth 2 (two) times daily.     * dexmethylphenidate 10 MG tablet  Commonly known as: FOCALIN  Take 1 tablet (10 mg total) by mouth 2 (two) times daily.  Start  taking on: March 29, 2024     EScitalopram oxalate 5 MG Tab  Commonly known as: LEXAPRO  Take 1 tablet (5 mg total) by mouth once daily.     fluocinonide 0.05 % external solution  Commonly known as: LIDEX  AAA scalp qday prn itching, scaling     gabapentin 300 MG capsule  Commonly known as: NEURONTIN  Take 1 capsule (300 mg total) by mouth 3 (three) times daily.     hydrocortisone 2.5 % cream  APPLY EXTERNALLY TO THE AFFECTED AREA TWICE DAILY FOR 10 DAYS     ketoconazole 2 % cream  Commonly known as: NIZORAL  APPLY TOPICALLY TO THE AFFECTED AREA TWICE DAILY     lamoTRIgine 100 MG tablet  Commonly known as: LAMICTAL  Take 1 tablet (100 mg total) by mouth 2 (two) times daily. IF any RASH, STOP ALL Lamictal and Tell Psyc MD.     latanoprost 0.005 % ophthalmic solution  Place 1 drop into both eyes every evening.     LORazepam 0.5 MG tablet  Commonly known as: ATIVAN  Take 1 tablet (0.5 mg total) by mouth daily as needed (SIGNIFICANT ANXIETY).     meloxicam 15 MG tablet  Commonly known as: MOBIC  TAKE 1 TABLET(15 MG) BY MOUTH EVERY DAY as needed FOR PAIN     montelukast 10 mg tablet  Commonly known as: SINGULAIR  Take 1 tablet (10 mg total) by mouth every evening.     ondansetron 8 MG Tbdl  Commonly known as: ZOFRAN-ODT  Dissolve 1 tablet (8 mg total) by mouth every 6 (six) hours as needed (nausea).     ONETOUCH VERIO FLEX METER Misc  Generic drug: blood-glucose meter  use as directed     ONETOUCH VERIO TEST STRIPS Strp  Generic drug: blood sugar diagnostic  Use to test blood glucose one (1) time a day,     OZEMPIC 1 mg/dose (4 mg/3 mL)  Generic drug: semaglutide  Inject 1 mg into the skin every 7 days.     rOPINIRole 0.25 MG tablet  Commonly known as: REQUIP  TAKE 1 TABLET(0.25 MG) BY MOUTH EVERY EVENING     * ULTRA THIN LANCETS 30 gauge Misc  Generic drug: lancets  USE TO TEST BLOOD SUGAR EVERY DAY AS DIRECTED     * lancets 33 gauge Misc  Commonly known as: ONETOUCH DELICA PLUS LANCET  Use to test blood glucose one (1)  time a day,     VITAMIN D ORAL  Take 1 tablet by mouth every other day.           * This list has 5 medication(s) that are the same as other medications prescribed for you. Read the directions carefully, and ask your doctor or other care provider to review them with you.                STOP taking these medications      acetaminophen 325 MG tablet  Commonly known as: TYLENOL     CLENPIQ 10 mg-3.5 gram- 12 gram/160 mL Soln  Generic drug: sod picosulf-mag ox-citric ac     docusate sodium 100 MG capsule  Commonly known as: COLACE            ASK your doctor about these medications      fexofenadine 180 MG tablet  Commonly known as: ALLEGRA  Take 180 mg by mouth once daily.            No discharge procedures on file.

## 2024-03-23 NOTE — PROGRESS NOTES
HPI     Glaucoma            Comments: Overdue glaucoma follow up and pt states no changes since last   exam           Comments    DLS: 3/14/23    1. OHT OU  2. MGD  3. Ocular Rosacea  4. Type 2 DM no DR    MEDS:  Latanoprost QHS OU  AT;'s PRN OU  Refresh PM PRN OU          Last edited by Simran Au MA on 3/26/2024  8:09 AM.              Assessment /Plan     For exam results, see Encounter Report.    Borderline glaucoma of both eyes with ocular hypertension    Open angle with borderline findings and high glaucoma risk in both eyes    Meibomian gland dysfunction (MGD) of both eyes    Ocular rosacea    Ocular hypertension, bilateral         Glaucoma (type and duration)    OHT   First HVF   3/2021    First photos   1/2021    Treatment / Drops started   Timolol            Family history    Mother (required LPI), grandmother        Glaucoma meds    Brimonidine - not effective        H/O adverse rxn to glaucoma drops    none        LASERS    none        GLAUCOMA SURGERIES    none        OTHER EYE SURGERIES    none        CDR    0.4 // 0.2        Tbase    25-26 // 26-28         Tmax    26 // 28            Ttarget    ?             HVF    2 test 2021 to  2022 -near  Full - ? Hint paracentral changes od // full os   NEW BASELINE - 1 test 2023 to 2023 - full od // full os         Gonio    2-3+  OU - very lightly pigmented TM - slight ant insertion - poor slt candidate         CCT   570 // 583        OCT    2 test 2021 to 2022 - RNFL - nl od // nl os        Disc photos    1/26/2021    - Ttoday    18/18  - Test done today  iop // gonio     MGD / ocular rosacea  - Tried to get restasis in the past, difficult with insurance - will try alternatives first  - Difficulty with WC- time/job   - Start Ocusoft eyelid wipes  - Cont AT QID  - If ocusoft not effective, will try doxy     Ok to use zaditor bid prn allergies         PLAN    GS with CDR asymmetry and FH+  Brimonidine not effective OU - no IOP change - stop  Blue  eyes -  latanoprost - may cause iris darkening - tolerating ok   Mild asthma, COPD - is on mult meds for asthma - prefer to use latanoprost if tolerated       Gonio +2-3 with VERY pale TM and a slight ant insertion - poor slt candidate    Cont  latanoprost - may cause iris to darken,- tolerating ok    Consider topical REBEKAH - but stings a lot    Consider SLT - poor candidate - very pale TM with a slight ant insertion    No response to brimonidine     F/U 4 months for IOP and HVF / DFE / OCT - if stable can spread out visits to 6 months

## 2024-03-25 DIAGNOSIS — F90.0 ADHD (ATTENTION DEFICIT HYPERACTIVITY DISORDER), INATTENTIVE TYPE: ICD-10-CM

## 2024-03-25 RX ORDER — DEXMETHYLPHENIDATE HYDROCHLORIDE 10 MG/1
10 TABLET ORAL 2 TIMES DAILY
Qty: 60 TABLET | Refills: 0 | Status: CANCELLED | OUTPATIENT
Start: 2024-03-25 | End: 2024-04-24

## 2024-03-26 ENCOUNTER — OFFICE VISIT (OUTPATIENT)
Dept: OPHTHALMOLOGY | Facility: CLINIC | Age: 44
End: 2024-03-26
Payer: COMMERCIAL

## 2024-03-26 DIAGNOSIS — H40.023 OPEN ANGLE WITH BORDERLINE FINDINGS AND HIGH GLAUCOMA RISK IN BOTH EYES: ICD-10-CM

## 2024-03-26 DIAGNOSIS — L71.8 OCULAR ROSACEA: ICD-10-CM

## 2024-03-26 DIAGNOSIS — H02.883 MEIBOMIAN GLAND DYSFUNCTION (MGD) OF BOTH EYES: ICD-10-CM

## 2024-03-26 DIAGNOSIS — H40.053 OCULAR HYPERTENSION, BILATERAL: ICD-10-CM

## 2024-03-26 DIAGNOSIS — H02.886 MEIBOMIAN GLAND DYSFUNCTION (MGD) OF BOTH EYES: ICD-10-CM

## 2024-03-26 DIAGNOSIS — H40.053 BORDERLINE GLAUCOMA OF BOTH EYES WITH OCULAR HYPERTENSION: Primary | ICD-10-CM

## 2024-03-26 PROCEDURE — 92020 GONIOSCOPY: CPT | Mod: S$GLB,,, | Performed by: OPHTHALMOLOGY

## 2024-03-26 PROCEDURE — 1159F MED LIST DOCD IN RCRD: CPT | Mod: CPTII,S$GLB,, | Performed by: OPHTHALMOLOGY

## 2024-03-26 PROCEDURE — 99214 OFFICE O/P EST MOD 30 MIN: CPT | Mod: S$GLB,,, | Performed by: OPHTHALMOLOGY

## 2024-03-26 PROCEDURE — 99999 PR PBB SHADOW E&M-EST. PATIENT-LVL IV: CPT | Mod: PBBFAC,,, | Performed by: OPHTHALMOLOGY

## 2024-03-26 PROCEDURE — 1160F RVW MEDS BY RX/DR IN RCRD: CPT | Mod: CPTII,S$GLB,, | Performed by: OPHTHALMOLOGY

## 2024-03-27 ENCOUNTER — OFFICE VISIT (OUTPATIENT)
Dept: SURGERY | Facility: CLINIC | Age: 44
End: 2024-03-27
Payer: COMMERCIAL

## 2024-03-27 VITALS — WEIGHT: 241.38 LBS | BODY MASS INDEX: 40.22 KG/M2 | HEIGHT: 65 IN

## 2024-03-27 DIAGNOSIS — F90.0 ADHD (ATTENTION DEFICIT HYPERACTIVITY DISORDER), INATTENTIVE TYPE: ICD-10-CM

## 2024-03-27 DIAGNOSIS — Z98.890 S/P HERNIA REPAIR: Primary | ICD-10-CM

## 2024-03-27 DIAGNOSIS — Z87.19 S/P HERNIA REPAIR: Primary | ICD-10-CM

## 2024-03-27 PROCEDURE — 1159F MED LIST DOCD IN RCRD: CPT | Mod: CPTII,S$GLB,, | Performed by: STUDENT IN AN ORGANIZED HEALTH CARE EDUCATION/TRAINING PROGRAM

## 2024-03-27 PROCEDURE — 99024 POSTOP FOLLOW-UP VISIT: CPT | Mod: S$GLB,,, | Performed by: STUDENT IN AN ORGANIZED HEALTH CARE EDUCATION/TRAINING PROGRAM

## 2024-03-27 PROCEDURE — 99999 PR PBB SHADOW E&M-EST. PATIENT-LVL IV: CPT | Mod: PBBFAC,,, | Performed by: STUDENT IN AN ORGANIZED HEALTH CARE EDUCATION/TRAINING PROGRAM

## 2024-03-27 PROCEDURE — 3072F LOW RISK FOR RETINOPATHY: CPT | Mod: CPTII,S$GLB,, | Performed by: STUDENT IN AN ORGANIZED HEALTH CARE EDUCATION/TRAINING PROGRAM

## 2024-03-27 RX ORDER — DEXMETHYLPHENIDATE HYDROCHLORIDE 10 MG/1
10 TABLET ORAL 2 TIMES DAILY
Qty: 60 TABLET | Refills: 0 | OUTPATIENT
Start: 2024-03-27 | End: 2024-04-26

## 2024-03-27 RX ORDER — DEXMETHYLPHENIDATE HYDROCHLORIDE 10 MG/1
10 TABLET ORAL 2 TIMES DAILY
Qty: 60 TABLET | Refills: 0 | Status: CANCELLED | OUTPATIENT
Start: 2024-03-27 | End: 2024-04-26

## 2024-03-27 NOTE — PROGRESS NOTES
S/p HHR toupet  Left hand numbness resolved 2-3 days post op  No Reflux  No ab Pain  Sleeping fine  Back on ozempic. Has lost a few pounds.   Liquids going down fine. No significant dysphagia  Incisions good  Fulls ok, still avoid breads, meats, rice  RTC 2 weeks  Ok to swim. Still avoid weights, resistance training

## 2024-03-28 ENCOUNTER — OFFICE VISIT (OUTPATIENT)
Dept: PAIN MEDICINE | Facility: CLINIC | Age: 44
End: 2024-03-28
Payer: COMMERCIAL

## 2024-03-28 ENCOUNTER — PATIENT MESSAGE (OUTPATIENT)
Dept: INTERNAL MEDICINE | Facility: CLINIC | Age: 44
End: 2024-03-28
Payer: COMMERCIAL

## 2024-03-28 ENCOUNTER — TELEPHONE (OUTPATIENT)
Dept: PAIN MEDICINE | Facility: CLINIC | Age: 44
End: 2024-03-28
Payer: COMMERCIAL

## 2024-03-28 VITALS
WEIGHT: 240.63 LBS | DIASTOLIC BLOOD PRESSURE: 80 MMHG | HEART RATE: 91 BPM | HEIGHT: 65 IN | SYSTOLIC BLOOD PRESSURE: 125 MMHG | BODY MASS INDEX: 40.09 KG/M2

## 2024-03-28 DIAGNOSIS — M51.36 DDD (DEGENERATIVE DISC DISEASE), LUMBAR: ICD-10-CM

## 2024-03-28 DIAGNOSIS — F90.0 ADHD (ATTENTION DEFICIT HYPERACTIVITY DISORDER), INATTENTIVE TYPE: ICD-10-CM

## 2024-03-28 DIAGNOSIS — M54.12 CERVICAL RADICULITIS: Primary | ICD-10-CM

## 2024-03-28 DIAGNOSIS — M54.16 LUMBAR RADICULOPATHY, CHRONIC: ICD-10-CM

## 2024-03-28 DIAGNOSIS — M50.30 DDD (DEGENERATIVE DISC DISEASE), CERVICAL: ICD-10-CM

## 2024-03-28 DIAGNOSIS — G89.4 CHRONIC PAIN SYNDROME: ICD-10-CM

## 2024-03-28 DIAGNOSIS — M54.12 CERVICAL RADICULOPATHY: Primary | ICD-10-CM

## 2024-03-28 PROCEDURE — 99999 PR PBB SHADOW E&M-EST. PATIENT-LVL IV: CPT | Mod: PBBFAC,,, | Performed by: STUDENT IN AN ORGANIZED HEALTH CARE EDUCATION/TRAINING PROGRAM

## 2024-03-28 PROCEDURE — 3074F SYST BP LT 130 MM HG: CPT | Mod: CPTII,S$GLB,, | Performed by: STUDENT IN AN ORGANIZED HEALTH CARE EDUCATION/TRAINING PROGRAM

## 2024-03-28 PROCEDURE — 3008F BODY MASS INDEX DOCD: CPT | Mod: CPTII,S$GLB,, | Performed by: STUDENT IN AN ORGANIZED HEALTH CARE EDUCATION/TRAINING PROGRAM

## 2024-03-28 PROCEDURE — 1159F MED LIST DOCD IN RCRD: CPT | Mod: CPTII,S$GLB,, | Performed by: STUDENT IN AN ORGANIZED HEALTH CARE EDUCATION/TRAINING PROGRAM

## 2024-03-28 PROCEDURE — 3072F LOW RISK FOR RETINOPATHY: CPT | Mod: CPTII,S$GLB,, | Performed by: STUDENT IN AN ORGANIZED HEALTH CARE EDUCATION/TRAINING PROGRAM

## 2024-03-28 PROCEDURE — 3079F DIAST BP 80-89 MM HG: CPT | Mod: CPTII,S$GLB,, | Performed by: STUDENT IN AN ORGANIZED HEALTH CARE EDUCATION/TRAINING PROGRAM

## 2024-03-28 PROCEDURE — 99214 OFFICE O/P EST MOD 30 MIN: CPT | Mod: S$GLB,,, | Performed by: STUDENT IN AN ORGANIZED HEALTH CARE EDUCATION/TRAINING PROGRAM

## 2024-03-28 RX ORDER — DEXMETHYLPHENIDATE HYDROCHLORIDE 10 MG/1
10 TABLET ORAL 2 TIMES DAILY
Qty: 60 TABLET | Refills: 0 | Status: CANCELLED | OUTPATIENT
Start: 2024-03-28 | End: 2024-04-27

## 2024-03-28 NOTE — TELEPHONE ENCOUNTER
----- Message from Kelsey Patel DO sent at 3/28/2024  1:37 PM CDT -----  Regarding: Order for VIET ARCHER III    Patient Name: VIET ARCHER III([id number])  Sex: Male  : 1980      PCP: MATT GAMEZ    Center: [facility]     Level of Service:99229     KS OFFICE/OUTPT VISIT, EST, LEVL IV, 30-39 MIN    Types of orders made on 2024: Procedure Request    Order Date:3/28/2024  Ordering User:KELSEY STEINBERG [[id number]]  Encounter Provider:Kelsey Patel DO [[id number]]  Authorizing Provider: Kelsey Patel DO [[id number]]  Department:[facility] PAIN MANAGEMENT[[id number]]    Common Order Information  Procedure -> Epidural Injection (specify level) Cmt: VENECIA C7/T1    Pre-op Diagnosis -> Cervical radiculopathy       -> DDD (degenerative disc disease), cervical     Order Specific Information  Order: Procedure Order to P  ain Management [Custom: [id number]]  Order #:          [id number]: 1 FUTURE    Priority: Routine  Class: Clinic Performed    Future Order Information      Expires on:2025            Expected by:2024                   Associated Diagnoses      M54.12 Cervical radiculopathy      M50.30 DDD (degenerative disc disease), cervical      Physician -> Gelter         Is patient on anti-coagulants? -> No           Facility Name: -> [facility]         Follow-up: -> 2 weeks Cmt: Hung Prieto          Priority: Routine  Class: Clinic Performed    Future Order Information      Expires on:2025            Expected by:2024                   Associated Diagnoses      M54.12 Cervical radiculopathy      M50.30 DDD (degenerative disc disease), cervical      Procedure -> Epidural Injection (specify level) Cmt: VENECIA C7/T1          Physician -> Gelter         Is patient on anti-coagulants? -> No         Pre-op Diagnosis -> Cervical radiculopathy           -> DDD (degenerative disc disease), cervical         Facility Name: -> [facility]         Follow-up: -> 2 weeks Cmt:  Hung Prieto

## 2024-03-28 NOTE — PROGRESS NOTES
[facility]  Interventional [facility] -  Established Patient Follow-up    Referred by: No ref. provider found   Reason for referral: Cervical radiculopathy     CC:   Chief Complaint   Patient presents with    Neck Pain    Back Pain         3/28/2024     1:12 PM 12/19/2023     9:42 AM 4/20/2023    10:29 AM   Last 3 PDI Scores   Pain Disability Index (PDI) 36 [phone] 17     Interval update 03/28/24:   Patient returns today for post procedure follow up and follow up of his neck and back pain.  He is s/p  Lumbar Interlaminar Epidural Steroid Injection L4/L5 on 03/06/2024.  He reports 70-80% relief of his low back pain and radicular symptoms.  Today he reports his cervical radicular symptoms are starting to returned.  He previously underwent cervical epidural steroid injection at C7/T1 on 11/03/2023 with significant improvement in his symptoms.  He reports almost 5 months of pain relief following a cervical epidural.  He would like to repeat this procedure.  In the meantime he is increased his gabapentin to 300 mg in the morning and 600 mg in the evening.  He does feel like this has helped with his pain.  He was also increased his exercise and has lost weight which he feels has been beneficial as well.      Interval update 12/19/23  Patient returns today for follow up.  He is status post right GTB and right intra-articular hip injections on 11/29/2023 and notes improvement in his GTB and hip pain.  Today he complains of pain extending from the area overlying the right hip joint and extending down the anterolateral portion of the thigh.  This pain is worse with activity.  Pain improves with lying flat.  He rates his pain 7/10 today.      Of note, patient recently underwent left lateral collateral ligament reconstruction for left radial collateral ligament tear on 12/05/2023 with Dr. Marshall.    11/13/23 Pt returns today complaining of right hip and right leg pain. Pain is located over the lateral right hip and radiates into  the groin.  Pain started approximately 1 month ago. Pain has not improved with stretching and HEP.  He states his hip pain is worse than his lower back pain for which he is scheduled to have an NELI at the end of the month. He would like to address the hip pain first.      Subjective:   Hermelindo Garza III is a 43 y.o. male who presents complaining of both neck and back pain.  Today he reports his neck pain is most bothersome.  He reports muscle tightness and pain in the left trapezius and shoulder area.  He reports pain that radiates into the bilateral upper extremities with numbness and tingling.  He has been evaluated by Dr. Sierra with Neurosurgery who noted patient chris  candidate for ACDFs in he progresses.  Pt reports undergoing a cervical epidural steroid injection with Dr. Escamilla 02/10/2021 on with several months of relief.  He also reports low back pain.  He is s/p Left L3-4 hemilaminotomy, medial facetectomy, and foraminotomy for microdiscectomy with Dr Mcnamara on 9/19/2021.  He reports his back pain improved after surgery.  Patient reports he is previously completed physical therapy and is now actively continuing with his exercise program in the gym.  He also reports trying to lose weight and reports intentional  weight loss of approximately 55 pounds since 2020.    Location: back, shoulder, and neck   Onset: 2 years  Current Pain Score: 5/10  Daily Pain of Range: 5-6/10  Quality: Aching, Tight, Tingling, Deep, Numb, Electric, Hot, and Cold  Radiation: neck and back  Worsened by: lying down, sitting, standing for more than 3 minutes, and walking for more than 6 minutes  Improved by: medications    Patient denies night fever/night sweats, urinary incontinence, bowel incontinence, significant weight loss, significant motor weakness, and loss of sensations.    Previous Interventions:  - 02/10/2021 -  C6/7 CERVICAL EPIDURAL STEROID INJECTION - Dr Escamilla - 4 months of pain relief.   - 06/28/2023 -  Cervical Interlaminar Epidural Steroid Injection C7/T1   - 09/27/2023 -  Lumbar Interlaminar Epidural Steroid Injection L4/L5  - approx 6 months of pain relief  - 11/03/2023 - C7/T1 cervical epidural steroid injection - 5 months of pain relief  - 11/29/2023 -  right GTB and right intra-articular hip injections - 40% relief  - 03/06/2024 - lumbar interlaminar steroid injection at L4/L5 -80% pain relief    Previous Therapies:  PT/OT: yes   Chiropractor:   HEP: yes  Relevant Surgery: yes    - Reports L4/L5 surgery at age 31 yo   - 09/19/21 - Left MIS L3-4 hemilaminotomy, medial facetectomy, and foraminotomy for microdiscectomy with Dr Mcanmara.   Previous Medications:   - NSAIDS: Meloxicam  - Muscle Relaxants: Robaxin, Flexeril  - TCAs:   - SNRIs:   - Topicals:   - Anticonvulsants: Lyrica - reports RL sxs so stopped; Gabapentin   - Opioids:   - Adjuvants: Tylenol    Current Pain Medications:  Gabapentin 300 mg TID  Meloxicam  Tylenol       Review of Systems:  ROS    GENERAL:  No weight loss, malaise or fevers. +obesity +DM  HEENT:   No recent changes in vision or hearing  NECK:  No difficulty with swallowing. No stridor.   RESPIRATORY:  Negative for cough, wheezing or shortness of breath, patient denies any recent URI. +Asthma  CARDIOVASCULAR:  Negative for chest pain, leg swelling or palpitations.  GI/:  Negative for abdominal discomfort, blood in stools or black stools or change in bowel habits.   MUSCULOSKELETAL:  See HPI.  SKIN:  Negative for lesions, rash, and itching.  PSYCH:  No mood disorder or recent psychosocial stressors.  +anxiety +ADD  HEMATOLOGY/LYMPHOLOGY:  Negative for prolonged bleeding, bruising easily or swollen nodes.  Patient is not currently taking any anti-coagulants  NEURO:   No history of headaches, syncope, paralysis, seizures or tremors.  All other reviewed and negative other than HPI.    History:  Current medications, allergies, medical history, surgical history,   family history, and social  history were reviewed in the chart as marked.    Full Medication List:    Current Outpatient Medications:     albuterol (PROVENTIL/VENTOLIN HFA) 90 mcg/actuation inhaler, INHALE 2 PUFFS INTO THE LUNGS EVERY 6 HOURS AS NEEDED WHEEZING, Disp: 8.5 g, Rfl: 5    atorvastatin (LIPITOR) 20 MG tablet, Take 1 tablet (20 mg total) by mouth once daily., Disp: 90 tablet, Rfl: 1    azelastine (ASTELIN) 137 mcg (0.1 %) nasal spray, 2 sprays (274 mcg total) by Nasal route 2 (two) times daily., Disp: 30 mL, Rfl: 9    blood sugar diagnostic (TRUE METRIX GLUCOSE TEST STRIP) Strp, Use to test blood glucose one (1) time a day,, Disp: 100 strip, Rfl: 3    blood-glucose meter Misc, use as directed, Disp: 1 each, Rfl: 0    cyclobenzaprine (FLEXERIL) 10 MG tablet, Take 10 mg by mouth 3 (three) times daily., Disp: , Rfl:     dexmethylphenidate (FOCALIN) 10 MG tablet, Take 1 tablet (10 mg total) by mouth 2 (two) times daily., Disp: 60 tablet, Rfl: 0    dexmethylphenidate (FOCALIN) 10 MG tablet, Take 1 tablet (10 mg total) by mouth 2 (two) times daily., Disp: 60 tablet, Rfl: 0    [START ON 3/29/2024] dexmethylphenidate (FOCALIN) 10 MG tablet, Take 1 tablet (10 mg total) by mouth 2 (two) times daily., Disp: 60 tablet, Rfl: 0    ergocalciferol, vitamin D2, (VITAMIN D ORAL), Take 1 tablet by mouth every other day., Disp: , Rfl:     EScitalopram oxalate (LEXAPRO) 5 MG Tab, Take 1 tablet (5 mg total) by mouth once daily., Disp: 90 tablet, Rfl: 1    fluocinonide (LIDEX) 0.05 % external solution, AAA scalp qday prn itching, scaling, Disp: 60 mL, Rfl: 3    gabapentin (NEURONTIN) 300 MG capsule, Take 1 capsule (300 mg total) by mouth 3 (three) times daily., Disp: 90 capsule, Rfl: 3    hydrocortisone 2.5 % cream, APPLY EXTERNALLY TO THE AFFECTED AREA TWICE DAILY FOR 10 DAYS, Disp: 30 g, Rfl: 0    ketoconazole (NIZORAL) 2 % cream, APPLY TOPICALLY TO THE AFFECTED AREA TWICE DAILY, Disp: 15 g, Rfl: 0    lamoTRIgine (LAMICTAL) 100 MG tablet, Take 1 tablet  (100 mg total) by mouth 2 (two) times daily. IF any RASH, STOP ALL Lamictal and Tell Psmarilyn SELLERS., Disp: 180 tablet, Rfl: 1    lancets (ONETOUCH DELICA PLUS LANCET) 33 gauge Misc, Use to test blood glucose one (1) time a day,, Disp: 100 each, Rfl: 5    latanoprost 0.005 % ophthalmic solution, Place 1 drop into both eyes every evening., Disp: 7.5 mL, Rfl: 3    LORazepam (ATIVAN) 0.5 MG tablet, Take 1 tablet (0.5 mg total) by mouth daily as needed (SIGNIFICANT ANXIETY)., Disp: 30 tablet, Rfl: 3    meloxicam (MOBIC) 15 MG tablet, TAKE 1 TABLET(15 MG) BY MOUTH EVERY DAY as needed FOR PAIN, Disp: 30 tablet, Rfl: 2    montelukast (SINGULAIR) 10 mg tablet, Take 1 tablet (10 mg total) by mouth every evening., Disp: 90 tablet, Rfl: 3    ondansetron (ZOFRAN-ODT) 8 MG TbDL, Dissolve 1 tablet (8 mg total) by mouth every 6 (six) hours as needed (nausea)., Disp: 30 tablet, Rfl: 2    rOPINIRole (REQUIP) 0.25 MG tablet, TAKE 1 TABLET(0.25 MG) BY MOUTH EVERY EVENING, Disp: 90 tablet, Rfl: 2    semaglutide (OZEMPIC) 1 mg/dose (4 mg/3 mL), Inject 1 mg into the skin every 7 days., Disp: 3 mL, Rfl: 3    ULTRA THIN LANCETS 30 gauge Misc, USE TO TEST BLOOD SUGAR EVERY DAY AS DIRECTED, Disp: 100 each, Rfl: 3    hydrocodone-acetaminophen (HYCET) solution 7.5-325 mg/15mL, Take 15 mLs by mouth every 4 (four) hours as needed for Pain., Disp: 150 mL, Rfl: 0  No current facility-administered medications for this visit.    Facility-Administered Medications Ordered in Other Visits:     0.9%  NaCl infusion, 500 mL, Intravenous, Continuous, Kolton Terrell MD    mupirocin 2 % ointment, , Nasal, On Call Procedure, Chong Padron MD, Given at 09/15/22 2118     Allergies:  Patient has no known allergies.     Medical History:   has a past medical history of ADD (attention deficit disorder) (05/09/2012), Allergy, Anxiety (04/12/2023), Asthma (05/09/2012), Cervical disc disorder with radiculopathy, unspecified cervical region (09/14/2021), Cervical  spondylosis with myelopathy (06/29/2022), Eczema, Esophageal ulcer, GERD (gastroesophageal reflux disease) (05/09/2012), Glaucoma, Kidney stones (05/2014), Lumbar disc disease (05/09/2012), Lumbar herniated disc (05/09/2012), Malignant melanoma of skin, unspecified (01/06/2022), Melanoma (01/2022), LUBA (obstructive sleep apnea), Radiculopathy, lumbar region (09/14/2021), Renal calculi (05/09/2012), and Type 2 diabetes mellitus without complication, without long-term current use of insulin (11/16/2020).    Surgical History:   has a past surgical history that includes Lumbar laminectomy (2010); Esophagogastroduodenoscopy (01/22/2019); Colonoscopy (01/22/2019); Appendectomy (2006); Epidural steroid injection (N/A, 02/10/2021); Esophagogastroduodenoscopy (N/A, 05/18/2021); Lumbar laminectomy with discectomy (Left, 09/20/2021); Carpal tunnel release (Right, 09/15/2022); Excision of ganglion of wrist (Right, 09/15/2022); Hand Arthrotomy (Right, 09/15/2022); Injection of steroid (Left, 09/15/2022); Flexor tendon repair (Right, 09/15/2022); Tympanostomy tube placement; Epidural steroid injection into cervical spine (N/A, 6/28/2023); Cystoscopy (7/5/2023); Ureteral stent placement (Left, 7/5/2023); Ureteroscopy (Left, 7/5/2023); Laser lithotripsy (Left, 7/5/2023); Ureteroscopic removal of ureteric calculus (Left, 7/5/2023); Correction of hammer toe (Left, 8/14/2023); Epidural steroid injection into lumbar spine (N/A, 9/27/2023); Epidural steroid injection into cervical spine (N/A, 11/3/2023); Injection of joint (Right, 11/29/2023); Reconstruction of ligament (Left, 12/5/2023); Repair of extensor tendon (Left, 12/5/2023); Esophagogastroduodenoscopy (N/A, 12/21/2023); ph monitoring, esophagus, wireless, (off reflux meds) (N/A, 12/21/2023); Colonoscopy (N/A, 12/21/2023); Esophagogastroduodenoscopy (N/A, 2/22/2024); Epidural steroid injection into lumbar spine (N/A, 3/6/2024); and Robot-assisted repair of hiatal hernia (N/A,  "3/14/2024).    Family History:  family history includes Allergic rhinitis in his father, mother, sister, and sister; Asthma in his father, sister, and sister; Brain cancer in his maternal grandmother; Breast cancer in his paternal grandmother; Chronic back pain in his father; Diabetes in his maternal grandfather; MARTITA disease in his father; Hypertension in his mother; Melanoma in his father; No Known Problems in his brother, maternal aunt, maternal uncle, paternal aunt, paternal grandfather, and paternal uncle; Sleep apnea in his father and mother; Transient ischemic attack in his mother.    Social History:   reports that he quit smoking about 5 years ago. His smoking use included cigarettes. He has never been exposed to tobacco smoke. He has never used smokeless tobacco. He reports current alcohol use of about 3.0 standard drinks of alcohol per week. He reports that he does not use drugs.    Physical Exam:  Vitals:    03/28/24 1311   BP: 125/80   Pulse: 91   Weight: 109.1 kg (240 lb 10.1 oz)   Height: 5' 5" (1.651 m)   PainSc:   4   PainLoc: Back           GENERAL: Well appearing, in no acute distress, alert and oriented x3.   PSYCH:  Mood and affect appropriate.  SKIN: Skin color, texture, turgor normal, no rashes or lesions.  HEAD/FACE:  Normocephalic, atraumatic. Cranial nerves grossly intact.  NECK: Supple.  Reports pain with extension of the neck and lateral rotation.  Spurling's positive.  Tenderness and tightness noted over the left trapezius muscle.    CV: RRR with palpation of the radial artery.  PULM: No evidence of respiratory difficulty, symmetric chest rise.  GI:  Soft and non-distended.  MSK:   Left arm in hard cast. Lumbar surgical incision is well healed.  Tenderness noted in the distribution of the right tensor fascia ashtyn muscle.  No tenderness over the RIGHT GTB.  No deformities, edema, or skin discoloration.  No atrophy or tone abnormalities are noted.   NEURO: Bilateral upper and lower extremity " coordination and strength is symmetric.  No loss of sensation is noted.  MENTAL STATUS: A x O x 3, good concentration, speech is fluent and goal directed  MOTOR: 5/5 in all muscle groups  GAIT: normal. Ambulates unassisted.    Imaging:  XR CERVICAL SPINE AP LAT WITH FLEX EXTEN     CLINICAL HISTORY:  Other spondylosis with myelopathy, cervical region     TECHNIQUE:  Three views of the cervical spine plus flexion and extension views were performed.     COMPARISON:  September 8, 2022     FINDINGS:  As before, suboptimal visualization of the cervicothoracic junction due to superimposition of shoulder and chest wall soft tissues.  Straightening of normal cervical lordosis.  No acute fractures.  Unremarkable predental space.  No widening prevertebral soft tissues.  No anterior or retrolisthesis.  Flexion and extension views demonstrate no instability.  Reconfirmed areas of anterior longitudinal ligament calcification adjacent to C4-C5 and C5-C6 disc.  Reconfirmed findings of uncovertebral spurring and endplate osteophytes at preserved C4-C5 level and mildly narrowed C5-C6 level and mild to moderately narrowed C6-C7 level.  Some stable scattered mild facet arthropathic changes.  Intact odontoid tip and unremarkable C1-C2 articulation.  Cervical spine findings do not appear significantly changed to earlier exam.     Electronically signed by: Giovanny Busby  Date:                                            12/13/2022      MRI CERVICAL SPINE WITHOUT CONTRAST     CLINICAL HISTORY:  Arm pain, paresthesias, clumsiness;.  Cervical disc disorder with radiculopathy, unspecified cervical region     TECHNIQUE:  Multiplanar, multisequence MR images of the cervical spine were acquired without the administration of contrast.     COMPARISON:  Cervical spine MRI 01/12/2021     FINDINGS:  Straightening of the normal cervical lordosis.  No spondylolisthesis.     No compression fractures.  No marrow replacing lesions.     Multilevel  degenerative changes with disc desiccation and disc space narrowing, described in detail below.  No bone marrow edema.     Visualized structures in the posterior fossa are unremarkable. The cervical spinal cord is unremarkable.     Paraspinal soft tissues are unremarkable.     SIGNIFICANT FINDINGS BY LEVEL:     C2-3: Small disc osteophyte complex with central annular fissure.  No canal or foraminal stenosis.     C3-4: Disc osteophyte complex with superimposed central extrusion.  Mild canal stenosis with partial effacement of the thecal sac.  Mild bilateral foraminal stenosis.     C4-5: Disc osteophyte complex.  Moderate canal stenosis with near complete effacement of the thecal sac and flattening of the cervical cord.  Moderate bilateral foraminal stenosis.     C5-6: Disc osteophyte complex, eccentric to the right.  Severe canal stenosis with complete effacement of the thecal sac and flattening of the cervical cord.  Severe right and moderate left foraminal stenosis.     C6-7: Disc osteophyte complex.  Moderate canal stenosis.  Moderate bilateral foraminal stenosis.     C7-T1: Disc osteophyte complex with superimposed right foraminal extrusion.  Mild canal stenosis.  Mild right foraminal stenosis.     Impression:     Multilevel degenerative changes, most advanced at C5-6 where there is severe canal stenosis and severe right and moderate left foraminal stenosis.  Overall, degenerative changes have progressed from 01/12/2021.     This report was flagged in Epic as abnormal.     Electronically signed by: Horace Quiroz  Date:                                            06/16/2022      XR LUMBAR SPINE AP AND LAT WITH FLEX/EXT     CLINICAL HISTORY:  Other specified postprocedural states     TECHNIQUE:  AP and lateral views as well as lateral flexion and extension images are performed through the lumbar spine.     COMPARISON:  Non 06/16/2022 MRI of the lumbar spine e     FINDINGS:  Patient had previous a L3/L4 hemilaminectomy  on the left side.     Mild DJD and mild lumbar scoliosis.  The lumbar intervertebral disc spaces are slightly narrowed.  Few mm L2/L3 and L3/L4 retrolisthesis noted.  No fracture or dislocation.  No bone destruction identified     Electronically signed by: Xander Rubio MD  Date:                                            12/13/2022    MRI LUMBAR SPINE WITHOUT CONTRAST     CLINICAL HISTORY:  Lumbar radiculopathy, symptoms persist with conservative treatment;Low back pain, prior surgery, new symptoms; Dorsalgia, unspecified     TECHNIQUE:  Multiplanar, multisequence MR images were acquired from the thoracolumbar junction to the sacrum without the administration of contrast.     COMPARISON:  MRI lumbar spine 09/19/2021     FINDINGS:  Sequences are degraded by patient motion artifact.     Transitional lumbosacral anatomy with lumbarization of S1.     Postoperative changes from left L3-4 hemilaminectomy and micro discectomy.  Mild protrusion of the thecal sac and cauda equina nerve roots into the laminectomy defect.  No fluid collections in the operative bed.     No spondylolisthesis.     No compression fractures.  Small fat containing lesion in the L3 vertebral body, likely a hemangioma.     Multilevel degenerative changes with disc desiccation and disc space narrowing, described in detail below.  No significant bone marrow edema.     The conus medullaris has a normal appearance and terminates at the L2 level.  Cauda equina nerve roots are unremarkable.     Paraspinal muscle atrophy.     SIGNIFICANT FINDINGS BY LEVEL:     T12-L1: Mild disc bulge.  Minimal canal stenosis.  No foraminal stenosis.     L1-2: Left facet arthropathy.  No canal or foraminal stenosis.     L2-3: Disc bulge.  Bilateral facet arthropathy and ligamentum flavum thickening.  Mild canal stenosis.  Mild left foraminal stenosis.     L3-4: Residual disc bulge versus granulation tissue.  The previous disc fragment migrating inferiorly is no longer seen.   Bilateral facet arthropathy and ligamentum flavum thickening.  Mild canal stenosis with narrowing of both subarticular zones and abutment of the left descending L4 nerve root.  Mild right and moderate left foraminal stenosis.     L4-5: Disc bulge.  Bilateral facet arthropathy and ligamentum flavum thickening.  Mild canal stenosis.  Moderate bilateral foraminal stenosis.     L5-S1: Disc bulge.  Bilateral facet arthropathy and ligamentum flavum thickening.  Mild canal stenosis.  Moderate bilateral foraminal stenosis.     Impression:     Postoperative changes from left L3-4 hemilaminectomy and micro discectomy.  The previous disc fragment migrating inferiorly is no longer seen.  There is a residual disc bulge/granulation tissue contributing to mild canal stenosis and moderate left foraminal stenosis.     Small meningocele at the laminectomy defect.     Degenerative changes elsewhere in the spine resulting in mild canal stenosis and moderate foraminal stenosis at multiple levels as described above.        Electronically signed by: Horace Quiroz  Date:                                            06/16/2022    EXAMINATION:  MRI LUMBAR SPINE WITHOUT CONTRAST     CLINICAL HISTORY:  Low back pain, prior surgery, new symptoms; Dorsalgia, unspecified     TECHNIQUE:  Multiplanar, multisequence MR images were acquired from the thoracolumbar junction to the sacrum without the administration of contrast.     COMPARISON:  MRI lumbar spine 06/16/2023     FINDINGS:  Postoperative change left L3-L4 hemilaminectomy and discectomy.  Mild protrusion of the thecal sac and cauda equina nerve roots into the laminectomy defect similar to prior.     Alignment: Normal.     Vertebrae: Endplate degenerative change L5-S1.  Small may angioma L3 vertebral body.  No acute fracture.  No infiltrative marrow process.     Discs: Multilevel disc height loss and desiccation most pronounced at L5-S1.     Cord: Normal.  Conus terminates at L1.     Soft tissue  structures are unremarkable.  Limited evaluation of the retroperitoneal organs demonstrates no significant abnormalities.  Paraspinal musculature demonstrates normal bulk and signal intensity.     Degenerative findings:     T12-L1: Mild circumferential disc bulge no neural foraminal narrowing or spinal canal stenosis.     L1-L2: No neural foraminal narrowing or spinal canal stenosis.     L2-L3: Circumferential disc bulge.  Bilateral facet arthropathy.  No neural foraminal narrowing.  Mild spinal canal stenosis.     L3-L4: Postop change.  Circumferential disc bulge.  Bilateral facet arthropathy.  Moderate left and mild right neural foraminal narrowing.  Mild spinal canal stenosis     L4-L5: Circumferential disc bulge.  Bilateral facet arthropathy.  Moderate right and severe left neural foraminal narrowing.  Mild to moderate spinal canal stenosis.     L5-S1: Circumferential disc bulge.  Bilateral facet arthropathy.  Moderate bilateral neural foraminal narrowing.  Mild spinal canal stenosis.     Impression:     1. Postoperative change left L3-L4 hemilaminectomy and discectomy.  2. Multilevel degenerative change of the lumbar spine as above not significantly changed from prior MRI 06/16/2023, noting severe left foraminal narrowing at L4-5.     Electronically signed by resident: Hammad Quinn  Date:                                            10/23/2023  Labs:  BMP  Lab Results   Component Value Date     10/11/2023    K 4.1 10/11/2023     10/11/2023    CO2 26 10/11/2023    BUN 18 10/11/2023    CREATININE 0.7 10/11/2023    CALCIUM 9.5 10/11/2023    ANIONGAP 10 10/11/2023    EGFRNORACEVR >60.0 10/11/2023     Lab Results   Component Value Date    ALT 39 10/11/2023    AST 24 10/11/2023    ALKPHOS 94 10/11/2023    BILITOT 0.7 10/11/2023     Lab Results   Component Value Date    WBC 13.22 (H) 07/04/2023    HGB 15.2 07/04/2023    HCT 45.2 07/04/2023    MCV 89 07/04/2023     07/04/2023              Assessment:  Problem List Items Addressed This Visit          Neuro    Chronic pain    Lumbar radiculopathy, chronic    Cervical radiculopathy - Primary     Other Visit Diagnoses       DDD (degenerative disc disease), lumbar        DDD (degenerative disc disease), cervical                    04/20/2032-  Hermelindo Garza III is a 43 y.o. male who  has a past medical history of ADD (attention deficit disorder) (05/09/2012), Allergy, Anxiety (04/12/2023), Asthma (05/09/2012), Cervical disc disorder with radiculopathy, unspecified cervical region (09/14/2021), Cervical spondylosis with myelopathy (06/29/2022), Eczema, Esophageal ulcer, GERD (gastroesophageal reflux disease) (05/09/2012), Glaucoma, Kidney stones (05/2014), Lumbar disc disease (05/09/2012), Lumbar herniated disc (05/09/2012), Malignant melanoma of skin, unspecified (01/06/2022), Melanoma (01/2022), LUBA (obstructive sleep apnea), Radiculopathy, lumbar region (09/14/2021), Renal calculi (05/09/2012), and Type 2 diabetes mellitus without complication, without long-term current use of insulin (11/16/2020).  By history and examination this patient has chronicneck pain with bilateral radiculopathy as well as low back pain s/p 9/19/2021 Left L3-4 hemilaminotomy, medial facetectomy, and foraminotomy for microdiscectomy with Dr Mcnamara with some improvement. The underlying cause cause is facet arthritis, degenerative disc disease, foraminal stenosis, central canal stenosis, muscle dysfunction, and deconditioning.  MRI of the cervical spine was significant for multilevel degenerative changes, most advanced at C5-6 where there is severe canal stenosis and severe right and moderate left foraminal stenosis. MRI of the Lumbar spine post surgery was significant for residual disc bulge/granulation tissue contributing to mild canal stenosis and moderate left foraminal stenosis at L3/L4 and degenerative changes elsewhere in the spine resulting in mild canal  stenosis and moderate foraminal stenosis at multiple levels.  We discussed the underlying diagnoses and multiple treatment options including non-opioid medications, interventional procedures, physical therapy, home exercise, core muscle enhancement, and weight loss.  He previously underwent a cervical steroid injection with several months of relief.  Plan for repeat of cervical epidural steroid injection.  The risks and benefits of each treatment option were discussed and all questions were answered.        11/13/2023 - Hermelindo Garza III presents for evaluation of right hip pain. His exam was consistent with Right GTB bursitis and right hip joint pain.  I will schedule him for a right GTB and Right intraarticular hip injection and defer the LESI for now.  Pt tearful and anxious throughout exam.  He is established with a Psychiatrist.  We discussed referral Psychology for possible CBT and assistance with coping strategies for chronic pain.     12/19/2023 - Hermelindo Garza III presents today for follow-up.  He is status post  right GTB and right intra-articular hip injections on 11/29/2023 and notes improvement in his GTB and hip pain.  Today he complains of pain extending from the area overlying the right hip joint and extending down the anterolateral portion of the thigh  in the distribution of the tensor fascia ashtyn.   Patient has multiple pain generators  as well as concurrent anxiety and mood disorder.  I feel that he would be an excellent candidate for our functional restoration program.  Discussed with patient today during visit.  I will place a referral to the functional restoration program.   I also discussed with the patient that it would be a good to have him see Rheumatology given his extensive pain history, as perhaps they can offer their expertise.     03/28/2024 - Hermelindo Garza III presents today for follow up.  He was status post lumbar epidural steroid injection at L4/L5 on  03/06/2024 with 70-80% relief of his low back symptoms.  His cervical radicular symptoms have started to return.  Most recent cervical epidural gave him over 5 months of pain relief.  He would like to repeat the procedure.  I will schedule from a repeat cervical epidural steroid injection.  I will also have the patient increase his gabapentin to 300 mg 4 times daily.  He will let me know when he needs a refill of the medication.    Treatment Plan:   Procedures:  Scheduled for repeat cervical epidural steroid injection at C7/T1  PT/OT/HEP: I have stressed the importance of physical activity and a home exercise plan to help with pain and improve health.  He is previously completed physical therapy.  I feel the patient would significantly benefit from enrollment in the functional restoration program.  Referral placed.    Medications:  - increase gabapentin 300 mg to 4 tablets daily.  He will let me know when he needs a refill.   - continue with meloxicam 15 mg q.d.   - continue with p.r.n. Tylenol   -  Reviewed and consistent with medication use as prescribed.  Imaging:  Reviewed and discussed with the patient.  Follow Up: RTC 6-8 weeks    Lorraine Patel DO   Interventional Pain Medicine / Anesthesiology    Disclaimer: This note was partly generated using dictation software which may occasionally result in transcription errors.

## 2024-03-28 NOTE — H&P (VIEW-ONLY)
[facility]  Interventional [facility] -  Established Patient Follow-up    Referred by: No ref. provider found   Reason for referral: Cervical radiculopathy     CC:   Chief Complaint   Patient presents with    Neck Pain    Back Pain         3/28/2024     1:12 PM 12/19/2023     9:42 AM 4/20/2023    10:29 AM   Last 3 PDI Scores   Pain Disability Index (PDI) 36 32 17     Interval update 03/28/24:   Patient returns today for post procedure follow up and follow up of his neck and back pain.  He is s/p  Lumbar Interlaminar Epidural Steroid Injection L4/L5 on 03/06/2024.  He reports 70-80% relief of his low back pain and radicular symptoms.  Today he reports his cervical radicular symptoms are starting to returned.  He previously underwent cervical epidural steroid injection at C7/T1 on 11/03/2023 with significant improvement in his symptoms.  He reports almost 5 months of pain relief following a cervical epidural.  He would like to repeat this procedure.  In the meantime he is increased his gabapentin to 300 mg in the morning and 600 mg in the evening.  He does feel like this has helped with his pain.  He was also increased his exercise and has lost weight which he feels has been beneficial as well.      Interval update 12/19/23  Patient returns today for follow up.  He is status post right GTB and right intra-articular hip injections on 11/29/2023 and notes improvement in his GTB and hip pain.  Today he complains of pain extending from the area overlying the right hip joint and extending down the anterolateral portion of the thigh.  This pain is worse with activity.  Pain improves with lying flat.  He rates his pain 7/10 today.      Of note, patient recently underwent left lateral collateral ligament reconstruction for left radial collateral ligament tear on 12/05/2023 with Dr. Marshall.    11/13/23 Pt returns today complaining of right hip and right leg pain. Pain is located over the lateral right hip and radiates into  the groin.  Pain started approximately 1 month ago. Pain has not improved with stretching and HEP.  He states his hip pain is worse than his lower back pain for which he is scheduled to have an NELI at the end of the month. He would like to address the hip pain first.      Subjective:   Hermelindo Garza III is a 43 y.o. male who presents complaining of both neck and back pain.  Today he reports his neck pain is most bothersome.  He reports muscle tightness and pain in the left trapezius and shoulder area.  He reports pain that radiates into the bilateral upper extremities with numbness and tingling.  He has been evaluated by Dr. Sierra with Neurosurgery who noted patient chris  candidate for ACDFs in he progresses.  Pt reports undergoing a cervical epidural steroid injection with Dr. Escamilla 02/10/2021 on with several months of relief.  He also reports low back pain.  He is s/p Left L3-4 hemilaminotomy, medial facetectomy, and foraminotomy for microdiscectomy with Dr Mcnamara on 9/19/2021.  He reports his back pain improved after surgery.  Patient reports he is previously completed physical therapy and is now actively continuing with his exercise program in the gym.  He also reports trying to lose weight and reports intentional  weight loss of approximately 55 pounds since 2020.    Location: back, shoulder, and neck   Onset: 2 years  Current Pain Score: 5/10  Daily Pain of Range: 5-6/10  Quality: Aching, Tight, Tingling, Deep, Numb, Electric, Hot, and Cold  Radiation: neck and back  Worsened by: lying down, sitting, standing for more than 3 minutes, and walking for more than 6 minutes  Improved by: medications    Patient denies night fever/night sweats, urinary incontinence, bowel incontinence, significant weight loss, significant motor weakness, and loss of sensations.    Previous Interventions:  - 02/10/2021 -  C6/7 CERVICAL EPIDURAL STEROID INJECTION - Dr Escamilla - 4 months of pain relief.   - 06/28/2023 -  Cervical Interlaminar Epidural Steroid Injection C7/T1   - 09/27/2023 -  Lumbar Interlaminar Epidural Steroid Injection L4/L5  - approx 6 months of pain relief  - 11/03/2023 - C7/T1 cervical epidural steroid injection - 5 months of pain relief  - 11/29/2023 -  right GTB and right intra-articular hip injections - 40% relief  - 03/06/2024 - lumbar interlaminar steroid injection at L4/L5 -80% pain relief    Previous Therapies:  PT/OT: yes   Chiropractor:   HEP: yes  Relevant Surgery: yes    - Reports L4/L5 surgery at age 29 yo   - 09/19/21 - Left MIS L3-4 hemilaminotomy, medial facetectomy, and foraminotomy for microdiscectomy with Dr Mcnamara.   Previous Medications:   - NSAIDS: Meloxicam  - Muscle Relaxants: Robaxin, Flexeril  - TCAs:   - SNRIs:   - Topicals:   - Anticonvulsants: Lyrica - reports RL sxs so stopped; Gabapentin   - Opioids:   - Adjuvants: Tylenol    Current Pain Medications:  Gabapentin 300 mg TID  Meloxicam  Tylenol       Review of Systems:  ROS    GENERAL:  No weight loss, malaise or fevers. +obesity +DM  HEENT:   No recent changes in vision or hearing  NECK:  No difficulty with swallowing. No stridor.   RESPIRATORY:  Negative for cough, wheezing or shortness of breath, patient denies any recent URI. +Asthma  CARDIOVASCULAR:  Negative for chest pain, leg swelling or palpitations.  GI/:  Negative for abdominal discomfort, blood in stools or black stools or change in bowel habits.   MUSCULOSKELETAL:  See HPI.  SKIN:  Negative for lesions, rash, and itching.  PSYCH:  No mood disorder or recent psychosocial stressors.  +anxiety +ADD  HEMATOLOGY/LYMPHOLOGY:  Negative for prolonged bleeding, bruising easily or swollen nodes.  Patient is not currently taking any anti-coagulants  NEURO:   No history of headaches, syncope, paralysis, seizures or tremors.  All other reviewed and negative other than HPI.    History:  Current medications, allergies, medical history, surgical history,   family history, and social  history were reviewed in the chart as marked.    Full Medication List:    Current Outpatient Medications:     albuterol (PROVENTIL/VENTOLIN HFA) 90 mcg/actuation inhaler, INHALE 2 PUFFS INTO THE LUNGS EVERY 6 HOURS AS NEEDED WHEEZING, Disp: 8.5 g, Rfl: 5    atorvastatin (LIPITOR) 20 MG tablet, Take 1 tablet (20 mg total) by mouth once daily., Disp: 90 tablet, Rfl: 1    azelastine (ASTELIN) 137 mcg (0.1 %) nasal spray, 2 sprays (274 mcg total) by Nasal route 2 (two) times daily., Disp: 30 mL, Rfl: 9    blood sugar diagnostic (TRUE METRIX GLUCOSE TEST STRIP) Strp, Use to test blood glucose one (1) time a day,, Disp: 100 strip, Rfl: 3    blood-glucose meter Misc, use as directed, Disp: 1 each, Rfl: 0    cyclobenzaprine (FLEXERIL) 10 MG tablet, Take 10 mg by mouth 3 (three) times daily., Disp: , Rfl:     dexmethylphenidate (FOCALIN) 10 MG tablet, Take 1 tablet (10 mg total) by mouth 2 (two) times daily., Disp: 60 tablet, Rfl: 0    dexmethylphenidate (FOCALIN) 10 MG tablet, Take 1 tablet (10 mg total) by mouth 2 (two) times daily., Disp: 60 tablet, Rfl: 0    [START ON 3/29/2024] dexmethylphenidate (FOCALIN) 10 MG tablet, Take 1 tablet (10 mg total) by mouth 2 (two) times daily., Disp: 60 tablet, Rfl: 0    ergocalciferol, vitamin D2, (VITAMIN D ORAL), Take 1 tablet by mouth every other day., Disp: , Rfl:     EScitalopram oxalate (LEXAPRO) 5 MG Tab, Take 1 tablet (5 mg total) by mouth once daily., Disp: 90 tablet, Rfl: 1    fluocinonide (LIDEX) 0.05 % external solution, AAA scalp qday prn itching, scaling, Disp: 60 mL, Rfl: 3    gabapentin (NEURONTIN) 300 MG capsule, Take 1 capsule (300 mg total) by mouth 3 (three) times daily., Disp: 90 capsule, Rfl: 3    hydrocortisone 2.5 % cream, APPLY EXTERNALLY TO THE AFFECTED AREA TWICE DAILY FOR 10 DAYS, Disp: 30 g, Rfl: 0    ketoconazole (NIZORAL) 2 % cream, APPLY TOPICALLY TO THE AFFECTED AREA TWICE DAILY, Disp: 15 g, Rfl: 0    lamoTRIgine (LAMICTAL) 100 MG tablet, Take 1 tablet  (100 mg total) by mouth 2 (two) times daily. IF any RASH, STOP ALL Lamictal and Tell Psmarilyn SELLERS., Disp: 180 tablet, Rfl: 1    lancets (ONETOUCH DELICA PLUS LANCET) 33 gauge Misc, Use to test blood glucose one (1) time a day,, Disp: 100 each, Rfl: 5    latanoprost 0.005 % ophthalmic solution, Place 1 drop into both eyes every evening., Disp: 7.5 mL, Rfl: 3    LORazepam (ATIVAN) 0.5 MG tablet, Take 1 tablet (0.5 mg total) by mouth daily as needed (SIGNIFICANT ANXIETY)., Disp: 30 tablet, Rfl: 3    meloxicam (MOBIC) 15 MG tablet, TAKE 1 TABLET(15 MG) BY MOUTH EVERY DAY as needed FOR PAIN, Disp: 30 tablet, Rfl: 2    montelukast (SINGULAIR) 10 mg tablet, Take 1 tablet (10 mg total) by mouth every evening., Disp: 90 tablet, Rfl: 3    ondansetron (ZOFRAN-ODT) 8 MG TbDL, Dissolve 1 tablet (8 mg total) by mouth every 6 (six) hours as needed (nausea)., Disp: 30 tablet, Rfl: 2    rOPINIRole (REQUIP) 0.25 MG tablet, TAKE 1 TABLET(0.25 MG) BY MOUTH EVERY EVENING, Disp: 90 tablet, Rfl: 2    semaglutide (OZEMPIC) 1 mg/dose (4 mg/3 mL), Inject 1 mg into the skin every 7 days., Disp: 3 mL, Rfl: 3    ULTRA THIN LANCETS 30 gauge Misc, USE TO TEST BLOOD SUGAR EVERY DAY AS DIRECTED, Disp: 100 each, Rfl: 3    hydrocodone-acetaminophen (HYCET) solution 7.5-325 mg/15mL, Take 15 mLs by mouth every 4 (four) hours as needed for Pain., Disp: 150 mL, Rfl: 0  No current facility-administered medications for this visit.    Facility-Administered Medications Ordered in Other Visits:     0.9%  NaCl infusion, 500 mL, Intravenous, Continuous, Kolton Terrell MD    mupirocin 2 % ointment, , Nasal, On Call Procedure, Chong Padron MD, Given at 09/15/22 1288     Allergies:  Patient has no known allergies.     Medical History:   has a past medical history of ADD (attention deficit disorder) (05/09/2012), Allergy, Anxiety (04/12/2023), Asthma (05/09/2012), Cervical disc disorder with radiculopathy, unspecified cervical region (09/14/2021), Cervical  spondylosis with myelopathy (06/29/2022), Eczema, Esophageal ulcer, GERD (gastroesophageal reflux disease) (05/09/2012), Glaucoma, Kidney stones (05/2014), Lumbar disc disease (05/09/2012), Lumbar herniated disc (05/09/2012), Malignant melanoma of skin, unspecified (01/06/2022), Melanoma (01/2022), LUBA (obstructive sleep apnea), Radiculopathy, lumbar region (09/14/2021), Renal calculi (05/09/2012), and Type 2 diabetes mellitus without complication, without long-term current use of insulin (11/16/2020).    Surgical History:   has a past surgical history that includes Lumbar laminectomy (2010); Esophagogastroduodenoscopy (01/22/2019); Colonoscopy (01/22/2019); Appendectomy (2006); Epidural steroid injection (N/A, 02/10/2021); Esophagogastroduodenoscopy (N/A, 05/18/2021); Lumbar laminectomy with discectomy (Left, 09/20/2021); Carpal tunnel release (Right, 09/15/2022); Excision of ganglion of wrist (Right, 09/15/2022); Hand Arthrotomy (Right, 09/15/2022); Injection of steroid (Left, 09/15/2022); Flexor tendon repair (Right, 09/15/2022); Tympanostomy tube placement; Epidural steroid injection into cervical spine (N/A, 6/28/2023); Cystoscopy (7/5/2023); Ureteral stent placement (Left, 7/5/2023); Ureteroscopy (Left, 7/5/2023); Laser lithotripsy (Left, 7/5/2023); Ureteroscopic removal of ureteric calculus (Left, 7/5/2023); Correction of hammer toe (Left, 8/14/2023); Epidural steroid injection into lumbar spine (N/A, 9/27/2023); Epidural steroid injection into cervical spine (N/A, 11/3/2023); Injection of joint (Right, 11/29/2023); Reconstruction of ligament (Left, 12/5/2023); Repair of extensor tendon (Left, 12/5/2023); Esophagogastroduodenoscopy (N/A, 12/21/2023); ph monitoring, esophagus, wireless, (off reflux meds) (N/A, 12/21/2023); Colonoscopy (N/A, 12/21/2023); Esophagogastroduodenoscopy (N/A, 2/22/2024); Epidural steroid injection into lumbar spine (N/A, 3/6/2024); and Robot-assisted repair of hiatal hernia (N/A,  "3/14/2024).    Family History:  family history includes Allergic rhinitis in his father, mother, sister, and sister; Asthma in his father, sister, and sister; Brain cancer in his maternal grandmother; Breast cancer in his paternal grandmother; Chronic back pain in his father; Diabetes in his maternal grandfather; MARTITA disease in his father; Hypertension in his mother; Melanoma in his father; No Known Problems in his brother, maternal aunt, maternal uncle, paternal aunt, paternal grandfather, and paternal uncle; Sleep apnea in his father and mother; Transient ischemic attack in his mother.    Social History:   reports that he quit smoking about 5 years ago. His smoking use included cigarettes. He has never been exposed to tobacco smoke. He has never used smokeless tobacco. He reports current alcohol use of about 3.0 standard drinks of alcohol per week. He reports that he does not use drugs.    Physical Exam:  Vitals:    03/28/24 1311   BP: 125/80   Pulse: 91   Weight: 109.1 kg (240 lb 10.1 oz)   Height: 5' 5" (1.651 m)   PainSc:   4   PainLoc: Back           GENERAL: Well appearing, in no acute distress, alert and oriented x3.   PSYCH:  Mood and affect appropriate.  SKIN: Skin color, texture, turgor normal, no rashes or lesions.  HEAD/FACE:  Normocephalic, atraumatic. Cranial nerves grossly intact.  NECK: Supple.  Reports pain with extension of the neck and lateral rotation.  Spurling's positive.  Tenderness and tightness noted over the left trapezius muscle.    CV: RRR with palpation of the radial artery.  PULM: No evidence of respiratory difficulty, symmetric chest rise.  GI:  Soft and non-distended.  MSK:   Left arm in hard cast. Lumbar surgical incision is well healed.  Tenderness noted in the distribution of the right tensor fascia ashtyn muscle.  No tenderness over the RIGHT GTB.  No deformities, edema, or skin discoloration.  No atrophy or tone abnormalities are noted.   NEURO: Bilateral upper and lower extremity " coordination and strength is symmetric.  No loss of sensation is noted.  MENTAL STATUS: A x O x 3, good concentration, speech is fluent and goal directed  MOTOR: 5/5 in all muscle groups  GAIT: normal. Ambulates unassisted.    Imaging:  XR CERVICAL SPINE AP LAT WITH FLEX EXTEN     CLINICAL HISTORY:  Other spondylosis with myelopathy, cervical region     TECHNIQUE:  Three views of the cervical spine plus flexion and extension views were performed.     COMPARISON:  September 8, 2022     FINDINGS:  As before, suboptimal visualization of the cervicothoracic junction due to superimposition of shoulder and chest wall soft tissues.  Straightening of normal cervical lordosis.  No acute fractures.  Unremarkable predental space.  No widening prevertebral soft tissues.  No anterior or retrolisthesis.  Flexion and extension views demonstrate no instability.  Reconfirmed areas of anterior longitudinal ligament calcification adjacent to C4-C5 and C5-C6 disc.  Reconfirmed findings of uncovertebral spurring and endplate osteophytes at preserved C4-C5 level and mildly narrowed C5-C6 level and mild to moderately narrowed C6-C7 level.  Some stable scattered mild facet arthropathic changes.  Intact odontoid tip and unremarkable C1-C2 articulation.  Cervical spine findings do not appear significantly changed to earlier exam.     Electronically signed by: Giovanny Busby  Date:                                            12/13/2022      MRI CERVICAL SPINE WITHOUT CONTRAST     CLINICAL HISTORY:  Arm pain, paresthesias, clumsiness;.  Cervical disc disorder with radiculopathy, unspecified cervical region     TECHNIQUE:  Multiplanar, multisequence MR images of the cervical spine were acquired without the administration of contrast.     COMPARISON:  Cervical spine MRI 01/12/2021     FINDINGS:  Straightening of the normal cervical lordosis.  No spondylolisthesis.     No compression fractures.  No marrow replacing lesions.     Multilevel  degenerative changes with disc desiccation and disc space narrowing, described in detail below.  No bone marrow edema.     Visualized structures in the posterior fossa are unremarkable. The cervical spinal cord is unremarkable.     Paraspinal soft tissues are unremarkable.     SIGNIFICANT FINDINGS BY LEVEL:     C2-3: Small disc osteophyte complex with central annular fissure.  No canal or foraminal stenosis.     C3-4: Disc osteophyte complex with superimposed central extrusion.  Mild canal stenosis with partial effacement of the thecal sac.  Mild bilateral foraminal stenosis.     C4-5: Disc osteophyte complex.  Moderate canal stenosis with near complete effacement of the thecal sac and flattening of the cervical cord.  Moderate bilateral foraminal stenosis.     C5-6: Disc osteophyte complex, eccentric to the right.  Severe canal stenosis with complete effacement of the thecal sac and flattening of the cervical cord.  Severe right and moderate left foraminal stenosis.     C6-7: Disc osteophyte complex.  Moderate canal stenosis.  Moderate bilateral foraminal stenosis.     C7-T1: Disc osteophyte complex with superimposed right foraminal extrusion.  Mild canal stenosis.  Mild right foraminal stenosis.     Impression:     Multilevel degenerative changes, most advanced at C5-6 where there is severe canal stenosis and severe right and moderate left foraminal stenosis.  Overall, degenerative changes have progressed from 01/12/2021.     This report was flagged in Epic as abnormal.     Electronically signed by: Horace Quiroz  Date:                                            06/16/2022      XR LUMBAR SPINE AP AND LAT WITH FLEX/EXT     CLINICAL HISTORY:  Other specified postprocedural states     TECHNIQUE:  AP and lateral views as well as lateral flexion and extension images are performed through the lumbar spine.     COMPARISON:  Non 06/16/2022 MRI of the lumbar spine e     FINDINGS:  Patient had previous a L3/L4 hemilaminectomy  on the left side.     Mild DJD and mild lumbar scoliosis.  The lumbar intervertebral disc spaces are slightly narrowed.  Few mm L2/L3 and L3/L4 retrolisthesis noted.  No fracture or dislocation.  No bone destruction identified     Electronically signed by: Xander Rubio MD  Date:                                            12/13/2022    MRI LUMBAR SPINE WITHOUT CONTRAST     CLINICAL HISTORY:  Lumbar radiculopathy, symptoms persist with conservative treatment;Low back pain, prior surgery, new symptoms; Dorsalgia, unspecified     TECHNIQUE:  Multiplanar, multisequence MR images were acquired from the thoracolumbar junction to the sacrum without the administration of contrast.     COMPARISON:  MRI lumbar spine 09/19/2021     FINDINGS:  Sequences are degraded by patient motion artifact.     Transitional lumbosacral anatomy with lumbarization of S1.     Postoperative changes from left L3-4 hemilaminectomy and micro discectomy.  Mild protrusion of the thecal sac and cauda equina nerve roots into the laminectomy defect.  No fluid collections in the operative bed.     No spondylolisthesis.     No compression fractures.  Small fat containing lesion in the L3 vertebral body, likely a hemangioma.     Multilevel degenerative changes with disc desiccation and disc space narrowing, described in detail below.  No significant bone marrow edema.     The conus medullaris has a normal appearance and terminates at the L2 level.  Cauda equina nerve roots are unremarkable.     Paraspinal muscle atrophy.     SIGNIFICANT FINDINGS BY LEVEL:     T12-L1: Mild disc bulge.  Minimal canal stenosis.  No foraminal stenosis.     L1-2: Left facet arthropathy.  No canal or foraminal stenosis.     L2-3: Disc bulge.  Bilateral facet arthropathy and ligamentum flavum thickening.  Mild canal stenosis.  Mild left foraminal stenosis.     L3-4: Residual disc bulge versus granulation tissue.  The previous disc fragment migrating inferiorly is no longer seen.   Bilateral facet arthropathy and ligamentum flavum thickening.  Mild canal stenosis with narrowing of both subarticular zones and abutment of the left descending L4 nerve root.  Mild right and moderate left foraminal stenosis.     L4-5: Disc bulge.  Bilateral facet arthropathy and ligamentum flavum thickening.  Mild canal stenosis.  Moderate bilateral foraminal stenosis.     L5-S1: Disc bulge.  Bilateral facet arthropathy and ligamentum flavum thickening.  Mild canal stenosis.  Moderate bilateral foraminal stenosis.     Impression:     Postoperative changes from left L3-4 hemilaminectomy and micro discectomy.  The previous disc fragment migrating inferiorly is no longer seen.  There is a residual disc bulge/granulation tissue contributing to mild canal stenosis and moderate left foraminal stenosis.     Small meningocele at the laminectomy defect.     Degenerative changes elsewhere in the spine resulting in mild canal stenosis and moderate foraminal stenosis at multiple levels as described above.        Electronically signed by: Horace Quiroz  Date:                                            06/16/2022    EXAMINATION:  MRI LUMBAR SPINE WITHOUT CONTRAST     CLINICAL HISTORY:  Low back pain, prior surgery, new symptoms; Dorsalgia, unspecified     TECHNIQUE:  Multiplanar, multisequence MR images were acquired from the thoracolumbar junction to the sacrum without the administration of contrast.     COMPARISON:  MRI lumbar spine 06/16/2023     FINDINGS:  Postoperative change left L3-L4 hemilaminectomy and discectomy.  Mild protrusion of the thecal sac and cauda equina nerve roots into the laminectomy defect similar to prior.     Alignment: Normal.     Vertebrae: Endplate degenerative change L5-S1.  Small may angioma L3 vertebral body.  No acute fracture.  No infiltrative marrow process.     Discs: Multilevel disc height loss and desiccation most pronounced at L5-S1.     Cord: Normal.  Conus terminates at L1.     Soft tissue  structures are unremarkable.  Limited evaluation of the retroperitoneal organs demonstrates no significant abnormalities.  Paraspinal musculature demonstrates normal bulk and signal intensity.     Degenerative findings:     T12-L1: Mild circumferential disc bulge no neural foraminal narrowing or spinal canal stenosis.     L1-L2: No neural foraminal narrowing or spinal canal stenosis.     L2-L3: Circumferential disc bulge.  Bilateral facet arthropathy.  No neural foraminal narrowing.  Mild spinal canal stenosis.     L3-L4: Postop change.  Circumferential disc bulge.  Bilateral facet arthropathy.  Moderate left and mild right neural foraminal narrowing.  Mild spinal canal stenosis     L4-L5: Circumferential disc bulge.  Bilateral facet arthropathy.  Moderate right and severe left neural foraminal narrowing.  Mild to moderate spinal canal stenosis.     L5-S1: Circumferential disc bulge.  Bilateral facet arthropathy.  Moderate bilateral neural foraminal narrowing.  Mild spinal canal stenosis.     Impression:     1. Postoperative change left L3-L4 hemilaminectomy and discectomy.  2. Multilevel degenerative change of the lumbar spine as above not significantly changed from prior MRI 06/16/2023, noting severe left foraminal narrowing at L4-5.     Electronically signed by resident: Hammad Quinn  Date:                                            10/23/2023  Labs:  BMP  Lab Results   Component Value Date     10/11/2023    K 4.1 10/11/2023     10/11/2023    CO2 26 10/11/2023    BUN 18 10/11/2023    CREATININE 0.7 10/11/2023    CALCIUM 9.5 10/11/2023    ANIONGAP 10 10/11/2023    EGFRNORACEVR >60.0 10/11/2023     Lab Results   Component Value Date    ALT 39 10/11/2023    AST 24 10/11/2023    ALKPHOS 94 10/11/2023    BILITOT 0.7 10/11/2023     Lab Results   Component Value Date    WBC 13.22 (H) 07/04/2023    HGB 15.2 07/04/2023    HCT 45.2 07/04/2023    MCV 89 07/04/2023     07/04/2023              Assessment:  Problem List Items Addressed This Visit          Neuro    Chronic pain    Lumbar radiculopathy, chronic    Cervical radiculopathy - Primary     Other Visit Diagnoses       DDD (degenerative disc disease), lumbar        DDD (degenerative disc disease), cervical                    04/20/2032-  Hermelindo Garza III is a 43 y.o. male who  has a past medical history of ADD (attention deficit disorder) (05/09/2012), Allergy, Anxiety (04/12/2023), Asthma (05/09/2012), Cervical disc disorder with radiculopathy, unspecified cervical region (09/14/2021), Cervical spondylosis with myelopathy (06/29/2022), Eczema, Esophageal ulcer, GERD (gastroesophageal reflux disease) (05/09/2012), Glaucoma, Kidney stones (05/2014), Lumbar disc disease (05/09/2012), Lumbar herniated disc (05/09/2012), Malignant melanoma of skin, unspecified (01/06/2022), Melanoma (01/2022), LUBA (obstructive sleep apnea), Radiculopathy, lumbar region (09/14/2021), Renal calculi (05/09/2012), and Type 2 diabetes mellitus without complication, without long-term current use of insulin (11/16/2020).  By history and examination this patient has chronicneck pain with bilateral radiculopathy as well as low back pain s/p 9/19/2021 Left L3-4 hemilaminotomy, medial facetectomy, and foraminotomy for microdiscectomy with Dr Mcnamara with some improvement. The underlying cause cause is facet arthritis, degenerative disc disease, foraminal stenosis, central canal stenosis, muscle dysfunction, and deconditioning.  MRI of the cervical spine was significant for multilevel degenerative changes, most advanced at C5-6 where there is severe canal stenosis and severe right and moderate left foraminal stenosis. MRI of the Lumbar spine post surgery was significant for residual disc bulge/granulation tissue contributing to mild canal stenosis and moderate left foraminal stenosis at L3/L4 and degenerative changes elsewhere in the spine resulting in mild canal  stenosis and moderate foraminal stenosis at multiple levels.  We discussed the underlying diagnoses and multiple treatment options including non-opioid medications, interventional procedures, physical therapy, home exercise, core muscle enhancement, and weight loss.  He previously underwent a cervical steroid injection with several months of relief.  Plan for repeat of cervical epidural steroid injection.  The risks and benefits of each treatment option were discussed and all questions were answered.        11/13/2023 - Hermelindo Garza III presents for evaluation of right hip pain. His exam was consistent with Right GTB bursitis and right hip joint pain.  I will schedule him for a right GTB and Right intraarticular hip injection and defer the LESI for now.  Pt tearful and anxious throughout exam.  He is established with a Psychiatrist.  We discussed referral Psychology for possible CBT and assistance with coping strategies for chronic pain.     12/19/2023 - Hermelindo Garza III presents today for follow-up.  He is status post  right GTB and right intra-articular hip injections on 11/29/2023 and notes improvement in his GTB and hip pain.  Today he complains of pain extending from the area overlying the right hip joint and extending down the anterolateral portion of the thigh  in the distribution of the tensor fascia ashtyn.   Patient has multiple pain generators  as well as concurrent anxiety and mood disorder.  I feel that he would be an excellent candidate for our functional restoration program.  Discussed with patient today during visit.  I will place a referral to the functional restoration program.   I also discussed with the patient that it would be a good to have him see Rheumatology given his extensive pain history, as perhaps they can offer their expertise.     03/28/2024 - Hermelindo Garza III presents today for follow up.  He was status post lumbar epidural steroid injection at L4/L5 on  03/06/2024 with 70-80% relief of his low back symptoms.  His cervical radicular symptoms have started to return.  Most recent cervical epidural gave him over 5 months of pain relief.  He would like to repeat the procedure.  I will schedule from a repeat cervical epidural steroid injection.  I will also have the patient increase his gabapentin to 300 mg 4 times daily.  He will let me know when he needs a refill of the medication.    Treatment Plan:   Procedures:  Scheduled for repeat cervical epidural steroid injection at C7/T1  PT/OT/HEP: I have stressed the importance of physical activity and a home exercise plan to help with pain and improve health.  He is previously completed physical therapy.  I feel the patient would significantly benefit from enrollment in the functional restoration program.  Referral placed.    Medications:  - increase gabapentin 300 mg to 4 tablets daily.  He will let me know when he needs a refill.   - continue with meloxicam 15 mg q.d.   - continue with p.r.n. Tylenol   -  Reviewed and consistent with medication use as prescribed.  Imaging:  Reviewed and discussed with the patient.  Follow Up: RTC 6-8 weeks    Lorraine Patel DO   Interventional Pain Medicine / Anesthesiology    Disclaimer: This note was partly generated using dictation software which may occasionally result in transcription errors.

## 2024-03-28 NOTE — LETTER
March 28, 2024      [facility]  [address]  [address]  [place] [address]  Phone: [phone]       Patient: Hermelindo Garza   YOB: 1980  Date of Visit: 03/28/2024    To Whom It May Concern:    Olga Garza  was at [facility] on 03/28/2024. The patient may return to work/school on [id number] with no restrictions. If you have any questions or concerns, or if I can be of further assistance, please do not hesitate to contact me.    Sincerely,    Haley Winters MA      Routing refill request to provider for review/approval because:  BP elevated and elevated creatinine.  BP Readings from Last 3 Encounters:   06/06/23 (!) 146/60   08/10/22 (!) 150/62   05/20/22 (!) 181/75     Creatinine   Date Value Ref Range Status   06/06/2023 2.63 (H) 0.67 - 1.17 mg/dL Final   07/07/2021 1.81 (H) 0.66 - 1.25 mg/dL Final     Thank you,  Peter Casiano, Triage RN [facility]  7:31 AM 7/31/2023

## 2024-03-31 ENCOUNTER — PATIENT MESSAGE (OUTPATIENT)
Dept: INTERNAL MEDICINE | Facility: CLINIC | Age: 44
End: 2024-03-31
Payer: COMMERCIAL

## 2024-03-31 DIAGNOSIS — E11.9 TYPE 2 DIABETES MELLITUS WITHOUT COMPLICATION, WITHOUT LONG-TERM CURRENT USE OF INSULIN: Primary | ICD-10-CM

## 2024-04-01 RX ORDER — SCOLOPAMINE TRANSDERMAL SYSTEM 1 MG/1
1 PATCH, EXTENDED RELEASE TRANSDERMAL
Qty: 4 PATCH | Refills: 0 | Status: SHIPPED | OUTPATIENT
Start: 2024-04-01 | End: 2024-04-14

## 2024-04-01 NOTE — TELEPHONE ENCOUNTER
Pt would like another increased dosage of Ozempic, states he does not feel a difference with medication and feel hungry.

## 2024-04-02 ENCOUNTER — CLINICAL SUPPORT (OUTPATIENT)
Dept: ALLERGY | Facility: CLINIC | Age: 44
End: 2024-04-02
Payer: COMMERCIAL

## 2024-04-02 DIAGNOSIS — L21.9 SEBORRHEIC DERMATITIS, UNSPECIFIED: ICD-10-CM

## 2024-04-02 DIAGNOSIS — M54.12 CERVICAL RADICULAR PAIN: ICD-10-CM

## 2024-04-02 DIAGNOSIS — J30.9 CHRONIC ALLERGIC RHINITIS: Primary | ICD-10-CM

## 2024-04-02 PROCEDURE — 99999 PR PBB SHADOW E&M-EST. PATIENT-LVL I: CPT | Mod: PBBFAC,,,

## 2024-04-02 PROCEDURE — 95117 IMMUNOTHERAPY INJECTIONS: CPT | Mod: S$GLB,,, | Performed by: ALLERGY & IMMUNOLOGY

## 2024-04-02 RX ORDER — GABAPENTIN 300 MG/1
300 CAPSULE ORAL 3 TIMES DAILY
Qty: 90 CAPSULE | Refills: 3 | Status: SHIPPED | OUTPATIENT
Start: 2024-04-02 | End: 2024-05-30 | Stop reason: ALTCHOICE

## 2024-04-02 RX ORDER — MELOXICAM 15 MG/1
TABLET ORAL
Qty: 30 TABLET | Refills: 2 | Status: SHIPPED | OUTPATIENT
Start: 2024-04-02

## 2024-04-02 NOTE — TELEPHONE ENCOUNTER
Care Due:                  Date            Visit Type   Department     Provider  --------------------------------------------------------------------------------                                EP -                              PRIMARY      MET INTERNAL  Last Visit: 10-      CARE ([facility])   MEDICINE       Suzi  Verónica                              EP -                              PRIMARY      [facility] INTERNAL  Next Visit: 04-      [facility] ([facility])   [facility]                                                            Last  Test          Frequency    Reason                     Performed    Due Date  --------------------------------------------------------------------------------    CBC.........  12 months..  meloxicam................  07- 06-    HBA1C.......  6 months...  semaglutide..............  10-   04-    [facility] Embedded Care Due Messages. Reference number: [id number].   4/02/2024 9:43:43 AM CDT

## 2024-04-04 ENCOUNTER — PATIENT MESSAGE (OUTPATIENT)
Dept: SURGERY | Facility: CLINIC | Age: 44
End: 2024-04-04
Payer: COMMERCIAL

## 2024-04-04 RX ORDER — FLUOCINONIDE TOPICAL SOLUTION USP, 0.05% 0.5 MG/ML
SOLUTION TOPICAL
Qty: 60 ML | Refills: 3 | Status: SHIPPED | OUTPATIENT
Start: 2024-04-04 | End: 2024-04-27 | Stop reason: SDUPTHER

## 2024-04-04 RX ORDER — SEMAGLUTIDE 2.68 MG/ML
2 INJECTION, SOLUTION SUBCUTANEOUS
Qty: 3 ML | Refills: 5 | Status: SHIPPED | OUTPATIENT
Start: 2024-04-04 | End: 2025-04-04

## 2024-04-04 NOTE — TELEPHONE ENCOUNTER
Please see the attached refill request.    Last seen 12/2023    Assessment / Plan:         History of melanoma in situ  Area of previous melanoma in situ  examined. Site well healed with no signs of recurrence.     Total body skin examination performed today including at least 12 points as noted in physical examination. No lesions suspicious for malignancy noted.     Recommend daily sun protection/avoidance, use of at least SPF 30, broad spectrum sunscreen (OTC drug), skin self examinations, and routine physician surveillance to optimize early detection     Skin tag  Skin sensation disturbance  Cryosurgery procedure note:     Verbal consent from the patient is obtained including, but not limited to, risk of hypopigmentation/hyperpigmentation, scar, recurrence of lesion. Liquid nitrogen cryosurgery is applied to 4 lesions to produce a freeze injury. The patient is aware that blisters may form and is instructed on wound care with gentle cleansing and use of vaseline ointment to keep moist until healed. The patient is supplied a handout on cryosurgery and is instructed to call if lesions do not completely resolve.     Lentigo  This is a benign hyperpigmented sun induced lesion. Recommend daily sun protection/avoidance and use of at least SPF 30, broad spectrum sunscreen (OTC drug) will reduce the number of new lesions. Treatment of these benign lesions are considered cosmetic.  The nature of sun-induced photo-aging and skin cancers is discussed.  Sun avoidance, protective clothing, and the use of 30-SPF sunscreens is advised. Observe closely for skin damage/changes, and call if such occurs.     Cherry angioma  These are benign vascular lesions that are inherited.  Treatment is not necessary.     SK (seborrheic keratosis)  These are benign inherited growths without a malignant potential. Reassurance given to patient. No treatment is necessary.      Nevus  Discussed ABCDE's of nevi.  Monitor for new mole or moles that  are becoming bigger, darker, irritated, or developing irregular borders. Brochure provided. Instructed patient to observe lesion(s) for changes and follow up in clinic if changes are noted. Patient to monitor skin at home for new or changing lesions.               Follow up in about 6 months (around 6/4/2024) for TBSE.

## 2024-04-08 ENCOUNTER — OFFICE VISIT (OUTPATIENT)
Dept: PLASTIC SURGERY | Facility: CLINIC | Age: 44
End: 2024-04-08

## 2024-04-08 DIAGNOSIS — Z41.1 ENCOUNTER FOR COSMETIC PROCEDURE: Primary | ICD-10-CM

## 2024-04-08 PROCEDURE — 99499 UNLISTED E&M SERVICE: CPT | Mod: ,,, | Performed by: OTOLARYNGOLOGY

## 2024-04-08 NOTE — PROGRESS NOTES
Patient who is here today for consult for cosmetic myomodulator treatment.  His last injections were January 2024.  Discussed benefits and risks of botulinum toxin injections, including headache, weakness/paralysis of muscles, asymmetry, eyebrow/lid drooping, pain, bruising, swelling, infection, and rare risk of systemic botulism.  The patient was given the opportunity to ask any questions, and all questions were answered to the patient's satisfaction.  The patient verbalized understanding, elected to proceed, and signed a written informed consent.  Today I have injected 15 units of Dysport in each of his  musculatures and 30 units in his frontalis musculature for a total of 60 units.     Patient tolerated well with no complications. She was instructed not to rub or massage the treated areas and that she should avoid lying down, bending upside down, and strenuous exercise for the rest of the day.  Instructed to wait two weeks to assess response before presenting for a touch up injection, if needed.

## 2024-04-09 ENCOUNTER — LAB VISIT (OUTPATIENT)
Dept: LAB | Facility: HOSPITAL | Age: 44
End: 2024-04-09
Attending: HOSPITALIST
Payer: COMMERCIAL

## 2024-04-09 ENCOUNTER — PATIENT MESSAGE (OUTPATIENT)
Dept: ALLERGY | Facility: CLINIC | Age: 44
End: 2024-04-09

## 2024-04-09 ENCOUNTER — OFFICE VISIT (OUTPATIENT)
Dept: ALLERGY | Facility: CLINIC | Age: 44
End: 2024-04-09
Payer: COMMERCIAL

## 2024-04-09 DIAGNOSIS — H10.13 ALLERGIC CONJUNCTIVITIS, BILATERAL: ICD-10-CM

## 2024-04-09 DIAGNOSIS — K75.81 NASH (NONALCOHOLIC STEATOHEPATITIS): ICD-10-CM

## 2024-04-09 DIAGNOSIS — Z00.00 ANNUAL PHYSICAL EXAM: ICD-10-CM

## 2024-04-09 DIAGNOSIS — E11.9 TYPE 2 DIABETES MELLITUS WITHOUT COMPLICATION, UNSPECIFIED WHETHER LONG TERM INSULIN USE: ICD-10-CM

## 2024-04-09 DIAGNOSIS — J45.20 MILD INTERMITTENT ASTHMA WITHOUT COMPLICATION: ICD-10-CM

## 2024-04-09 DIAGNOSIS — J30.9 CHRONIC ALLERGIC RHINITIS: Primary | ICD-10-CM

## 2024-04-09 DIAGNOSIS — T78.1XXD POLLEN-FOOD ALLERGY, SUBSEQUENT ENCOUNTER: ICD-10-CM

## 2024-04-09 DIAGNOSIS — E66.01 MORBID OBESITY: ICD-10-CM

## 2024-04-09 LAB
ALBUMIN/CREAT UR: 7 UG/MG (ref 0–30)
BILIRUB UR QL STRIP: NEGATIVE
CLARITY UR REFRACT.AUTO: ABNORMAL
COLOR UR AUTO: YELLOW
CREAT UR-MCNC: 158 MG/DL (ref 23–375)
GLUCOSE UR QL STRIP: NEGATIVE
HGB UR QL STRIP: NEGATIVE
KETONES UR QL STRIP: NEGATIVE
LEUKOCYTE ESTERASE UR QL STRIP: NEGATIVE
MICROALBUMIN UR DL<=1MG/L-MCNC: 11 UG/ML
NITRITE UR QL STRIP: NEGATIVE
PH UR STRIP: 7 [PH] (ref 5–8)
PROT UR QL STRIP: NEGATIVE
SP GR UR STRIP: 1.02 (ref 1–1.03)
URN SPEC COLLECT METH UR: ABNORMAL

## 2024-04-09 PROCEDURE — 99215 OFFICE O/P EST HI 40 MIN: CPT | Mod: 95,,, | Performed by: ALLERGY & IMMUNOLOGY

## 2024-04-09 PROCEDURE — 82043 UR ALBUMIN QUANTITATIVE: CPT | Performed by: HOSPITALIST

## 2024-04-09 PROCEDURE — 1159F MED LIST DOCD IN RCRD: CPT | Mod: CPTII,95,, | Performed by: ALLERGY & IMMUNOLOGY

## 2024-04-09 PROCEDURE — 81003 URINALYSIS AUTO W/O SCOPE: CPT | Performed by: HOSPITALIST

## 2024-04-09 PROCEDURE — 1160F RVW MEDS BY RX/DR IN RCRD: CPT | Mod: CPTII,95,, | Performed by: ALLERGY & IMMUNOLOGY

## 2024-04-09 PROCEDURE — 3072F LOW RISK FOR RETINOPATHY: CPT | Mod: CPTII,95,, | Performed by: ALLERGY & IMMUNOLOGY

## 2024-04-09 NOTE — TELEPHONE ENCOUNTER
Dr Farfan    Can you reorder Red(1:1) vial 1 of 3?    I was able to re-order the other ones    Thanks    Adrienne

## 2024-04-09 NOTE — PROGRESS NOTES
Subjective:       Patient ID: Hermelindo Garza III is a 43 y.o. male.    Chief Complaint:  Follow-up      The patient location is: patient home in [place]  The chief complaint leading to consultation is: f/u allergic rhinitis on IT    Visit type: audiovisual    Face to Face time with patient: 20 minutes  40 minutes of total time spent on the encounter, which includes face to face time and non-face to face time preparing to see the patient (eg, review of tests), Obtaining and/or reviewing separately obtained history, Documenting clinical information in the electronic or other health record, Independently interpreting results (not separately reported) and communicating results to the patient/family/caregiver, or Care coordination (not separately reported).         Each patient to whom he or she provides medical services by telemedicine is:  (1) informed of the relationship between the physician and patient and the respective role of any other health care provider with respect to management of the patient; and (2) notified that he or she may decline to receive medical services by telemedicine and may withdraw from such care at any time.    Notes:         44 yo man presents for continued evaluation of allergic rhinitis and conjunctivitis, asthma and oral allergy syndrome. He was last seen 4/13/2023. He had immunocaps with positives to cat, dog, grass, mold and trees. He was on fexofenadine daily prn, azelastine 2 SEN BID prn and albuterol as needed. He was using nasal spray every evening for most part. He was suing albuterol maybe once a week some weeks and then sometimes 2 times per day, varied. He had nasal congestion, worse at night. Had lot of NPD. Had itchy red eyes. So he opted to start IT. He started 5/1/2023. He gets 3 injections C/D/TR in left, GR/W in left and M in right. He progressed well and reached maintenance 3/7/2024. He has had no reactions to shots, no itch, no hives, no swelling, no SOB, no wheeze, no  dizziness. He gets some local swelling but nothing else. He does feel better, calabrese snot have sneeze fits or watery eyes as much any more. Occ stuffy nose, mostly with weather change. Is still taking montelukast daily but not sure if needs. No other daily allergy med's. Uses albuterol prn but not often. Uses astepro prn. He did have mosquito bites a few weeks ago and sitll has small bump and itchy, even saw UC and give hydroxyzine and hydrocortisone, not sure why such and exaggerated reaction. No new medications.       Prior History taken 2022: new patient evaluation of possible allergies. She states as child had bad allergy and asthma and did shots and seemed to work. All went away for long time. Over past year or son has issues. About 6 pm he gets sneeze fit, no runny nose then itchy watery eyes then nose swells and very congested. this causes him to feel SOB and use albuterol. Occ has patches if itch on skin as well. No drainage. No season worse but is mostly at night about 6 pm. He works from home so is home all day and not occurring day. He feels has been worse since  when got new dog, (had dg prior but that one  and got another). If he uses Flonase and allegra daily this seems to help but as soon as stops symptoms come back. He has elevated eye pressure so told not to use Flonase daily. He sleeps with CPAP and congestion makes it hard to use. Has asthma is on montelukast daily, not sure If it does much. Albuterol prn helps. No eczema. He does notice oral mouth hot, burning, tingling and occ lip swelling with raw avocado, banana and some nuts like pecan or walnut right out of shell. No H/O hives with any food. No insect or latex allergy. No ENT surgery        Environmental History: see history section for home environment  Review of Systems   Constitutional:  Negative for activity change, appetite change, chills, fatigue, fever and unexpected weight change.   HENT:  Positive for congestion, sinus  pressure and sneezing. Negative for ear discharge, ear pain, facial swelling, hearing loss, mouth sores, nosebleeds, postnasal drip, rhinorrhea, sore throat, tinnitus, trouble swallowing and voice change.    Eyes:  Positive for discharge and itching. Negative for redness and visual disturbance.   Respiratory:  Positive for chest tightness and shortness of breath. Negative for cough and wheezing.    Cardiovascular:  Negative for chest pain, palpitations and leg swelling.   Gastrointestinal:  Negative for abdominal distention, abdominal pain, constipation, diarrhea, nausea and vomiting.   Genitourinary:  Negative for difficulty urinating.   Musculoskeletal:  Negative for arthralgias, back pain, joint swelling and myalgias.   Skin:  Negative for color change, pallor and rash.   Neurological:  Negative for dizziness, tremors, speech difficulty, weakness, light-headedness and headaches.   Hematological:  Negative for adenopathy. Does not bruise/bleed easily.   Psychiatric/Behavioral:  Negative for agitation, confusion, decreased concentration and sleep disturbance. The patient is not nervous/anxious.       Objective:      Physical Exam  Constitutional:       General: He is not in acute distress.     Appearance: Normal appearance. He is not ill-appearing.   HENT:      Nose: No rhinorrhea.   Eyes:      General:         Right eye: No discharge.         Left eye: No discharge.      Conjunctiva/sclera: Conjunctivae normal.   Pulmonary:      Effort: Pulmonary effort is normal. No respiratory distress.   Skin:     Findings: No erythema or rash.   Neurological:      Mental Status: He is alert and oriented to person, place, and time.   Psychiatric:         Mood and Affect: Mood normal.         Behavior: Behavior normal.         Laboratory:   none performed   Assessment:       1. Chronic allergic rhinitis    2. Allergic conjunctivitis, bilateral    3. Pollen-food allergy, subsequent encounter    4. Mild intermittent asthma without  complication         Plan:       Cat and dog avoidance, measures reviewed  azelastine 2 SEN BID as needed as this will not affect eye pressure  Can continue fexofenadine daily as needed  Ok to stop montelukast and use Albuterol 2 puff every 4 hours as needed  Advised reaction to avocado, banana and nuts is oral allergy syndrome likely related to ragweed  continue allergy shots, vial 1:1 0.5 cc monthly.  All risks and benefits discussed.  Protocol discussed in detail including need to remain in clinic for 30 minutes after injections.  Patient advised to remain off beta blockers while on allergy shots and to notify injection clinic and MD of any new medications starts.  RTC annually or sooner if needed    I spent a total of 40 minutes on the day of the visit.  This includes face to face time and non-face to face time preparing to see the patient (eg, review of tests), obtaining and/or reviewing separately obtained history, documenting clinical information in the electronic or other health record, independently interpreting results and communicating results to the patient/family/caregiver, or care coordinator.

## 2024-04-10 ENCOUNTER — PATIENT MESSAGE (OUTPATIENT)
Dept: PAIN MEDICINE | Facility: CLINIC | Age: 44
End: 2024-04-10
Payer: COMMERCIAL

## 2024-04-10 ENCOUNTER — OFFICE VISIT (OUTPATIENT)
Dept: SURGERY | Facility: CLINIC | Age: 44
End: 2024-04-10
Payer: COMMERCIAL

## 2024-04-10 ENCOUNTER — TELEPHONE (OUTPATIENT)
Dept: PAIN MEDICINE | Facility: CLINIC | Age: 44
End: 2024-04-10
Payer: COMMERCIAL

## 2024-04-10 ENCOUNTER — PATIENT MESSAGE (OUTPATIENT)
Dept: SURGERY | Facility: CLINIC | Age: 44
End: 2024-04-10

## 2024-04-10 DIAGNOSIS — Z87.19 S/P HERNIA REPAIR: Primary | ICD-10-CM

## 2024-04-10 DIAGNOSIS — Z98.890 S/P HERNIA REPAIR: Primary | ICD-10-CM

## 2024-04-10 PROCEDURE — 3061F NEG MICROALBUMINURIA REV: CPT | Mod: CPTII,93,, | Performed by: STUDENT IN AN ORGANIZED HEALTH CARE EDUCATION/TRAINING PROGRAM

## 2024-04-10 PROCEDURE — 3066F NEPHROPATHY DOC TX: CPT | Mod: CPTII,93,, | Performed by: STUDENT IN AN ORGANIZED HEALTH CARE EDUCATION/TRAINING PROGRAM

## 2024-04-10 PROCEDURE — 3072F LOW RISK FOR RETINOPATHY: CPT | Mod: CPTII,93,, | Performed by: STUDENT IN AN ORGANIZED HEALTH CARE EDUCATION/TRAINING PROGRAM

## 2024-04-10 PROCEDURE — 3044F HG A1C LEVEL LT 7.0%: CPT | Mod: CPTII,93,, | Performed by: STUDENT IN AN ORGANIZED HEALTH CARE EDUCATION/TRAINING PROGRAM

## 2024-04-10 PROCEDURE — 99024 POSTOP FOLLOW-UP VISIT: CPT | Mod: 93,,, | Performed by: STUDENT IN AN ORGANIZED HEALTH CARE EDUCATION/TRAINING PROGRAM

## 2024-04-10 NOTE — PROGRESS NOTES
Established Patient - Audio Only Telehealth Visit     The patient location is: home  The chief complaint leading to consultation is: post op  Visit type: Virtual visit with audio only (telephone)  Total time spent with patient: 10min       The reason for the audio only service rather than synchronous audio and video virtual visit was related to technical difficulties or patient preference/necessity.     Each patient to whom I provide medical services by telemedicine is:  (1) informed of the relationship between the physician and patient and the respective role of any other health care provider with respect to management of the patient; and (2) notified that they may decline to receive medical services by telemedicine and may withdraw from such care at any time. Patient verbally consented to receive this service via voice-only telephone call.       HPI:   S/p toupet HHR 4 weeks ago  No pain  Incisions good  Fulls going down fine  No Reflux   Feels full earlier     Assessment and plan:    Regular diet, take it slow   Virtual follow up may 1                          This service was not originating from a related E/M service provided within the previous 7 days nor will  to an E/M service or procedure within the next 24 hours or my soonest available appointment.  Prevailing standard of care was able to be met in this audio-only visit.

## 2024-04-15 NOTE — PRE-PROCEDURE INSTRUCTIONS
Patient reviewed on 4/15/2024.  Okay to proceed at [facility]. The following pre-procedure instructions and arrival time have been reviewed with patient via phone and sent to patient portal for review.  Patient verbalized an understanding.  Pt to be accompanied by his father day of procedure as responsible .    Dear Hermelindo ,     You are scheduled for a procedure with Dr. Patel on 4/17/2024.  Your scheduled arrival time is 07:00 am.  This arrival time is roughly 1 hour before your anticipated procedure time to allow sufficient time for pre-op..  Please wear comfortable clothes. You will be placed in a gown for your procedure.  Please do not wear a dress.  This procedure will take place at the [facility] at the corner of [facility] and [facility].  It is in the [facility] [facility] next to [facility].  The address is:     [address] [place] [address].  [place] [id number]     After entering the building, you will proceed to the second floor where you can check in with registration. You should take any medications that you routinely take for blood pressure, heart medications, thyroid, cholesterol, etc.      The fasting restrictions are dependent on whether or not you are receiving sedation.  Sedation is not available for all procedures.      Your fasting instructions are as follow:  IV sedation. You should not eat for 8 hours and can only drink clear liquids (water or black coffee without cream/sugar) up until 2 hours before your scheduled time.  You CANNOT drive yourself and must have a .     If you are on blood thinners, you need to follow the anticoagulation instructions that had been discussed previously.  You should only stop the blood thinners if it was approved by your primary care physician or your cardiologist.  In the event that you are not able to stop your blood thinners, a blood thinner was not listed on your medication list, or we were not able to get clearance from  your cardiologist, then the procedure may have to be postponed/canceled.      IF you were told to stop your blood thinners, this is how long you should generally hold some of the more common ones.  Remember that stopping blood thinners is only necessary for certain procedures. If you are unsure of your instructions, please call us.   Aspirin - 5 days  Plavix/Clopidogrel - 7 days  Warfarin / Coumadin - 5 days  Eliquis - 3 days  Pradaxa/Dabigatran - 4 days  Xarelto/Rivaroxaban - 3 days     HOLD all non-insulin injections (shots) until after surgery (Ozempic, Mounjaro, Trulicity, Victoza, Byetta, Wegovy and Adlyxin) (Total of 7 days prior)     If you are a diabetic, do not take your medication if you will be fasting, but bring it with you. Please plan on being here for roughly 3 hours.     Please call us if you have been sick (running fever, having any flu-like symptoms) or have been taking antibiotics in the past 2 weeks or had any outpatient procedures other than with us (colonoscopy, endoscopy, OBGYN, dental, etc.). If you have been previously COVID positive, you will need to hold off on your procedure until you are symptom free for 10 days. If you did not have any symptoms, you can have your procedure 10 days from your positive test result.       *HOLD ALL VITAMINS, MINERALS, HERBS (INCLUDING HERBAL TEAS) AND SUPPLEMENTS  *SHOWER WITH ANTIBACTERIAL SOAP (EX. DIAL) NIGHT BEFORE AND MORNING OF PROCEDURE  *DO NOT APPLY ANY LOTIONS, OILS, POWDERS, PERFUME/COLOGNE, OINTMENTS, GELS, CREAMS, MAKEUP OR DEODORANT TO YOUR SKIN MORNING OF PROCEDURE  *LEAVE JEWELRY AND ANY VALUABLES AT HOME  *WEAR LOOSE COMFORTABLE CLOTHING (PREFERABLY A BUTTON UP SHIRT)     Please reply to this message as receipt of delivery.     Thank you,  [facility]  [place], LPN [facility]  Pre-Admit

## 2024-04-17 ENCOUNTER — HOSPITAL ENCOUNTER (OUTPATIENT)
Facility: HOSPITAL | Age: 44
Discharge: HOME | End: 2024-04-17
Attending: STUDENT IN AN ORGANIZED HEALTH CARE EDUCATION/TRAINING PROGRAM | Admitting: STUDENT IN AN ORGANIZED HEALTH CARE EDUCATION/TRAINING PROGRAM
Payer: COMMERCIAL

## 2024-04-17 VITALS
BODY MASS INDEX: 39.65 KG/M2 | OXYGEN SATURATION: 97 % | RESPIRATION RATE: 16 BRPM | DIASTOLIC BLOOD PRESSURE: 74 MMHG | WEIGHT: 238 LBS | HEART RATE: 74 BPM | SYSTOLIC BLOOD PRESSURE: 145 MMHG | HEIGHT: 65 IN | TEMPERATURE: 98 F

## 2024-04-17 DIAGNOSIS — M54.12 CERVICAL RADICULOPATHY: Primary | ICD-10-CM

## 2024-04-17 DIAGNOSIS — G89.29 CHRONIC PAIN: ICD-10-CM

## 2024-04-17 DIAGNOSIS — M50.10 CERVICAL DISC DISORDER WITH RADICULOPATHY, UNSPECIFIED CERVICAL REGION: ICD-10-CM

## 2024-04-17 LAB — POCT GLUCOSE: 83 MG/DL (ref 70–110)

## 2024-04-17 PROCEDURE — 62321 NJX INTERLAMINAR CRV/THRC: CPT | Mod: ,,, | Performed by: STUDENT IN AN ORGANIZED HEALTH CARE EDUCATION/TRAINING PROGRAM

## 2024-04-17 PROCEDURE — 82962 GLUCOSE BLOOD TEST: CPT | Performed by: STUDENT IN AN ORGANIZED HEALTH CARE EDUCATION/TRAINING PROGRAM

## 2024-04-17 PROCEDURE — 25500020 PHARM REV CODE 255: Performed by: STUDENT IN AN ORGANIZED HEALTH CARE EDUCATION/TRAINING PROGRAM

## 2024-04-17 PROCEDURE — 63600175 PHARM REV CODE 636 W HCPCS: Performed by: STUDENT IN AN ORGANIZED HEALTH CARE EDUCATION/TRAINING PROGRAM

## 2024-04-17 PROCEDURE — 62321 NJX INTERLAMINAR CRV/THRC: CPT | Performed by: STUDENT IN AN ORGANIZED HEALTH CARE EDUCATION/TRAINING PROGRAM

## 2024-04-17 PROCEDURE — 25000003 PHARM REV CODE 250: Performed by: STUDENT IN AN ORGANIZED HEALTH CARE EDUCATION/TRAINING PROGRAM

## 2024-04-17 RX ORDER — FENTANYL CITRATE 50 UG/ML
INJECTION, SOLUTION INTRAMUSCULAR; INTRAVENOUS
Status: DISCONTINUED | OUTPATIENT
Start: 2024-04-17 | End: 2024-04-17 | Stop reason: HOSPADM

## 2024-04-17 RX ORDER — SODIUM CHLORIDE 9 MG/ML
INJECTION, SOLUTION INTRAVENOUS CONTINUOUS
Status: DISCONTINUED | OUTPATIENT
Start: 2024-04-17 | End: 2024-04-17 | Stop reason: HOSPADM

## 2024-04-17 RX ORDER — LIDOCAINE HYDROCHLORIDE 20 MG/ML
INJECTION, SOLUTION EPIDURAL; INFILTRATION; INTRACAUDAL; PERINEURAL
Status: DISCONTINUED | OUTPATIENT
Start: 2024-04-17 | End: 2024-04-17 | Stop reason: HOSPADM

## 2024-04-17 RX ORDER — DEXAMETHASONE SODIUM PHOSPHATE 10 MG/ML
INJECTION INTRAMUSCULAR; INTRAVENOUS
Status: DISCONTINUED | OUTPATIENT
Start: 2024-04-17 | End: 2024-04-17 | Stop reason: HOSPADM

## 2024-04-17 RX ORDER — MIDAZOLAM HYDROCHLORIDE 1 MG/ML
INJECTION INTRAMUSCULAR; INTRAVENOUS
Status: DISCONTINUED | OUTPATIENT
Start: 2024-04-17 | End: 2024-04-17 | Stop reason: HOSPADM

## 2024-04-17 NOTE — DISCHARGE INSTRUCTIONS
Home Care Instructions Pain Management:    1.  DIET:    You may resume your normal diet today.    2.  BATHING:    You may shower with luke warm water.    3.  DRESSING:    You may remove your bandage today.    4.  ACTIVITY LEVEL:      You may resume your normal activities 24 hours after your procedure.    5.  MEDICATIONS:    You may resume your normal medications today.    6.  SPECIAL INSTRUCTIONS:    No heat to the injection site for 24 hours including bath or shower, heating pad, moist heat or hot tubs.    Use an ice pack to the injection site for any pain or discomfort.  Apply ice packs for 20 minute intervals as needed.    If you have received any sedatives by mouth today, you can not drive for 12 hours.    If you have received sedation through an IV, you can not drive for 24 hours.    PLEASE CALL YOUR DOCTOR FOR THE FOLLOWIN.  Redness or swelling around the injection site.  2.  Fever of 101 degrees.  3.  Drainage (pus) from the injection site.  4.  For any continuous bleeding (some dried blood over the incision is normal.)    FOR EMERGENCIES:    If any unusual problems or difficulties occur during clinic hours, call [phone] or dial [phone].    Follow up with with your physician in 2-3 weeks.

## 2024-04-17 NOTE — DISCHARGE SUMMARY
Discharge Note  Short Stay      SUMMARY     Admit Date: 4/17/2024    Attending Physician: Lorraine Patel      Discharge Physician: Lorraine Patel      Discharge Date: 4/17/2024 8:44 AM    Procedure(s) (LRB):  VENECIA C7/T1 (N/A)    Final Diagnosis: Cervical radiculitis [M54.12]  DDD (degenerative disc disease), cervical [M50.30]    Disposition: Home or self care    Patient Instructions:   Current Discharge Medication List        CONTINUE these medications which have NOT CHANGED    Details   albuterol (PROVENTIL/VENTOLIN HFA) 90 mcg/actuation inhaler INHALE 2 PUFFS INTO THE LUNGS EVERY 6 HOURS AS NEEDED WHEEZING  Qty: 8.5 g, Refills: 5    Associated Diagnoses: Mild intermittent asthma without complication      atorvastatin (LIPITOR) 20 MG tablet Take 1 tablet (20 mg total) by mouth once daily.  Qty: 90 tablet, Refills: 1    Associated Diagnoses: Type 2 diabetes mellitus without complication, without long-term current use of insulin; Mixed hyperlipidemia      cyclobenzaprine (FLEXERIL) 10 MG tablet Take 10 mg by mouth 3 (three) times daily.      !! dexmethylphenidate (FOCALIN) 10 MG tablet Take 1 tablet (10 mg total) by mouth 2 (two) times daily.  Qty: 60 tablet, Refills: 0    Associated Diagnoses: ADHD (attention deficit hyperactivity disorder), inattentive type      !! dexmethylphenidate (FOCALIN) 10 MG tablet Take 1 tablet (10 mg total) by mouth 2 (two) times daily.  Qty: 60 tablet, Refills: 0    Associated Diagnoses: ADHD (attention deficit hyperactivity disorder), inattentive type      ergocalciferol, vitamin D2, (VITAMIN D ORAL) Take 1 tablet by mouth every other day.      fluocinonide (LIDEX) 0.05 % external solution AAA scalp qday prn itching, scaling  Qty: 60 mL, Refills: 3    Associated Diagnoses: Seborrheic dermatitis, unspecified      gabapentin (NEURONTIN) 300 MG capsule Take 1 capsule (300 mg total) by mouth 3 (three) times daily.  Qty: 90 capsule, Refills: 3    Associated Diagnoses: Cervical  radicular pain      hydrocortisone 2.5 % cream APPLY EXTERNALLY TO THE AFFECTED AREA TWICE DAILY FOR 10 DAYS  Qty: 30 g, Refills: 0    Associated Diagnoses: Allergic contact dermatitis due to plant      lamoTRIgine (LAMICTAL) 100 MG tablet Take 1 tablet (100 mg total) by mouth 2 (two) times daily. IF any RASH, STOP ALL Lamictal and Tell Psyc MD.  Qty: 180 tablet, Refills: 1    Associated Diagnoses: Unspecified mood (affective) disorder      latanoprost 0.005 % ophthalmic solution Place 1 drop into both eyes every evening.  Qty: 7.5 mL, Refills: 3    Associated Diagnoses: Ocular hypertension, bilateral      LORazepam (ATIVAN) 0.5 MG tablet Take 1 tablet (0.5 mg total) by mouth daily as needed (SIGNIFICANT ANXIETY).  Qty: 30 tablet, Refills: 3    Associated Diagnoses: Anxiety      meloxicam (MOBIC) 15 MG tablet TAKE 1 TABLET(15 MG) BY MOUTH EVERY DAY as needed FOR PAIN  Qty: 30 tablet, Refills: 2      montelukast (SINGULAIR) 10 mg tablet Take 1 tablet (10 mg total) by mouth every evening.  Qty: 90 tablet, Refills: 3    Associated Diagnoses: Mild intermittent asthma without complication; History of multiple allergies      ondansetron (ZOFRAN-ODT) 8 MG TbDL Dissolve 1 tablet (8 mg total) by mouth every 6 (six) hours as needed (nausea).  Qty: 30 tablet, Refills: 2    Comments: Discontinue prior scripts with same name      rOPINIRole (REQUIP) 0.25 MG tablet TAKE 1 TABLET(0.25 MG) BY MOUTH EVERY EVENING  Qty: 90 tablet, Refills: 2    Associated Diagnoses: Restless leg      azelastine (ASTELIN) 137 mcg (0.1 %) nasal spray 2 sprays (274 mcg total) by Nasal route 2 (two) times daily.  Qty: 30 mL, Refills: 9      blood sugar diagnostic (TRUE METRIX GLUCOSE TEST STRIP) Strp Use to test blood glucose one (1) time a day,  Qty: 100 strip, Refills: 3    Comments: to be used with insurance-preferred brand of glucometer/supplies.  Associated Diagnoses: Diabetes mellitus without complication      blood-glucose meter Misc use as  directed  Qty: 1 each, Refills: 0      EScitalopram oxalate (LEXAPRO) 5 MG Tab Take 1 tablet (5 mg total) by mouth once daily.  Qty: 90 tablet, Refills: 1    Associated Diagnoses: Unspecified mood (affective) disorder      ketoconazole (NIZORAL) 2 % cream APPLY TOPICALLY TO THE AFFECTED AREA TWICE DAILY  Qty: 15 g, Refills: 0    Associated Diagnoses: Seborrheic dermatitis of scalp      !! lancets (ONETOUCH DELICA PLUS LANCET) 33 gauge Misc Use to test blood glucose one (1) time a day,  Qty: 100 each, Refills: 5      semaglutide (OZEMPIC) 2 mg/dose (8 mg/3 mL) PnIj Inject 2 mg into the skin every 7 days.  Qty: 3 mL, Refills: 5    Associated Diagnoses: Type 2 diabetes mellitus without complication, without long-term current use of insulin      !! ULTRA THIN LANCETS 30 gauge Misc USE TO TEST BLOOD SUGAR EVERY DAY AS DIRECTED  Qty: 100 each, Refills: 3    Associated Diagnoses: Diabetes mellitus without complication       !! - Potential duplicate medications found. Please discuss with provider.            Discharge Diagnosis: Cervical radiculitis [M54.12]  DDD (degenerative disc disease), cervical [M50.30]  Condition on Discharge: Stable with no complications to procedure   Diet on Discharge: Same as before.  Activity: as per instruction sheet.  Discharge to: Home with a responsible adult.  Follow up: 2-4 weeks       Please call my office or pager at [phone] if experienced any weakness or loss of sensation, fever > 101.5, pain uncontrolled with oral medications, persistent nausea/vomiting/or diarrhea, redness or drainage from the incisions, or any other worrisome concerns. If physician on call was not reached or could not communicate with our office for any reason please go to the nearest emergency department

## 2024-04-17 NOTE — OP NOTE
Cervical Interlaminar Epidural Steroid Injection under Fluoroscopic Guidance    The procedure, risks, benefits, and options were discussed with the patient. There are no contraindications to the procedure. The patent expressed understanding and agreed to the procedure. Informed written consent was obtained prior to the start of the procedure and can be found in the patient's chart.     PATIENT NAME: Hermelindo Garza III   MRN: [id number]     DATE OF PROCEDURE: 04/17/2024    PROCEDURE: Cervical Interlaminar Epidural Steroid Injection C7/T1 under Fluoroscopic Guidance    PRE-OP DIAGNOSIS: Cervical radiculitis [M54.12]  DDD (degenerative disc disease), cervical [M50.30] Cervical radiculopathy [M54.12]    POST-OP DIAGNOSIS: Same    PHYSICIAN: Lorraine Patel DO     ASSISTANTS: None    MEDICATIONS INJECTED: Preservative-free Decadron 10mg with preservative free normal saline    LOCAL ANESTHETIC INJECTED: Xylocaine 2%     SEDATION: Versed 2mg and Fentanyl 100mcg                                                                                                                                                                                     Conscious sedation ordered by M.D. Patient re-evaluation prior to administration of conscious sedation. No changes noted in patient's status from initial evaluation. The patient's vital signs were monitored by RN and patient remained hemodynamically stable throughout the procedure.    Event Time In   Sedation Start 0834   Sedation End 0841       ESTIMATED BLOOD LOSS: None    COMPLICATIONS: None    TECHNIQUE: Time-out was performed to identify the patient and procedure to be performed. With the patient laying in a prone position, the surgical area was prepped and draped in the usual sterile fashion using ChloraPrep and a fenestrated drape. The level was determined under fluoroscopy guidance. Skin anesthesia was achieved by injecting Lidocaine 2% over the injection site.  The  interlaminar space was then approached with a 20 gauge, 3.5 inch Tuohy needle that was introduced under fluoroscopic guidance with AP, lateral and/or contralateral oblique imaging. Once the Ligamentum flavum was encountered loss of resistance to saline was used to enter the epidural space. With positive loss of resistance and negative aspiration for CSF or Blood, contrast dye  Omnipaque (300mg/mL) was injected to confirm placement and there was no vascular runoff. Then 4 mL of the medication mixture listed above was then injected slowly. Displacement of the radio opaque contrast after injection of the medication confirmed that the medication went into the area of the epidural space. The needles were removed, and bleeding was nil. A sterile dressing was applied. No specimens collected. The patient tolerated the procedure well.     The patient was monitored after the procedure in the recovery area. They were given post-procedure and discharge instructions to follow at home. The patient was discharged in a stable condition.      Lorraine Patel DO

## 2024-04-22 ENCOUNTER — OFFICE VISIT (OUTPATIENT)
Dept: INTERNAL MEDICINE | Facility: CLINIC | Age: 44
End: 2024-04-22
Payer: COMMERCIAL

## 2024-04-22 VITALS
TEMPERATURE: 98 F | HEIGHT: 65 IN | WEIGHT: 236.75 LBS | BODY MASS INDEX: 39.45 KG/M2 | HEART RATE: 95 BPM | RESPIRATION RATE: 14 BRPM | SYSTOLIC BLOOD PRESSURE: 120 MMHG | OXYGEN SATURATION: 98 % | DIASTOLIC BLOOD PRESSURE: 66 MMHG

## 2024-04-22 DIAGNOSIS — E11.69 HYPERLIPIDEMIA ASSOCIATED WITH TYPE 2 DIABETES MELLITUS: ICD-10-CM

## 2024-04-22 DIAGNOSIS — E78.5 HYPERLIPIDEMIA ASSOCIATED WITH TYPE 2 DIABETES MELLITUS: ICD-10-CM

## 2024-04-22 DIAGNOSIS — N52.9 ERECTILE DYSFUNCTION, UNSPECIFIED ERECTILE DYSFUNCTION TYPE: ICD-10-CM

## 2024-04-22 DIAGNOSIS — F41.9 ANXIETY: ICD-10-CM

## 2024-04-22 DIAGNOSIS — K75.81 NASH (NONALCOHOLIC STEATOHEPATITIS): ICD-10-CM

## 2024-04-22 DIAGNOSIS — E11.9 TYPE 2 DIABETES MELLITUS WITHOUT COMPLICATION, WITHOUT LONG-TERM CURRENT USE OF INSULIN: ICD-10-CM

## 2024-04-22 DIAGNOSIS — E66.01 MORBID OBESITY: ICD-10-CM

## 2024-04-22 DIAGNOSIS — G47.33 OSA (OBSTRUCTIVE SLEEP APNEA): ICD-10-CM

## 2024-04-22 DIAGNOSIS — Z00.00 ANNUAL PHYSICAL EXAM: Primary | ICD-10-CM

## 2024-04-22 DIAGNOSIS — J45.20 MILD INTERMITTENT ASTHMA WITHOUT COMPLICATION: ICD-10-CM

## 2024-04-22 PROCEDURE — 1160F RVW MEDS BY RX/DR IN RCRD: CPT | Mod: CPTII,S$GLB,, | Performed by: HOSPITALIST

## 2024-04-22 PROCEDURE — 3078F DIAST BP <80 MM HG: CPT | Mod: CPTII,S$GLB,, | Performed by: HOSPITALIST

## 2024-04-22 PROCEDURE — G0009 ADMIN PNEUMOCOCCAL VACCINE: HCPCS | Mod: S$GLB,,, | Performed by: HOSPITALIST

## 2024-04-22 PROCEDURE — 99999 PR PBB SHADOW E&M-EST. PATIENT-LVL III: CPT | Mod: PBBFAC,,, | Performed by: HOSPITALIST

## 2024-04-22 PROCEDURE — 3008F BODY MASS INDEX DOCD: CPT | Mod: CPTII,S$GLB,, | Performed by: HOSPITALIST

## 2024-04-22 PROCEDURE — 3044F HG A1C LEVEL LT 7.0%: CPT | Mod: CPTII,S$GLB,, | Performed by: HOSPITALIST

## 2024-04-22 PROCEDURE — 99396 PREV VISIT EST AGE 40-64: CPT | Mod: S$GLB,,, | Performed by: HOSPITALIST

## 2024-04-22 PROCEDURE — 1159F MED LIST DOCD IN RCRD: CPT | Mod: CPTII,S$GLB,, | Performed by: HOSPITALIST

## 2024-04-22 PROCEDURE — 3061F NEG MICROALBUMINURIA REV: CPT | Mod: CPTII,S$GLB,, | Performed by: HOSPITALIST

## 2024-04-22 PROCEDURE — 3074F SYST BP LT 130 MM HG: CPT | Mod: CPTII,S$GLB,, | Performed by: HOSPITALIST

## 2024-04-22 PROCEDURE — 90677 PCV20 VACCINE IM: CPT | Mod: S$GLB,,, | Performed by: HOSPITALIST

## 2024-04-22 PROCEDURE — 3066F NEPHROPATHY DOC TX: CPT | Mod: CPTII,S$GLB,, | Performed by: HOSPITALIST

## 2024-04-22 PROCEDURE — 3072F LOW RISK FOR RETINOPATHY: CPT | Mod: CPTII,S$GLB,, | Performed by: HOSPITALIST

## 2024-04-22 RX ORDER — SILDENAFIL 50 MG/1
50 TABLET, FILM COATED ORAL DAILY PRN
Qty: 9 TABLET | Refills: 2 | Status: SHIPPED | OUTPATIENT
Start: 2024-04-22 | End: 2025-04-22

## 2024-04-22 RX ORDER — ATORVASTATIN CALCIUM 20 MG/1
20 TABLET, FILM COATED ORAL DAILY
Qty: 90 TABLET | Refills: 3 | Status: SHIPPED | OUTPATIENT
Start: 2024-04-22

## 2024-04-22 NOTE — PROGRESS NOTES
Subjective:     @Patient ID: Hermelindo Garza III is a 43 y.o. male.    Chief Complaint: Annual Exam    HPI    44 yo male with DM2, HLD, obesity, GERD, SHEIKH, LUBA, ADHD, asthma, spondylosis, hx of melanoma in situ presents for annual:      DM2: ozempic 2 mg weekly. Reports doing well at higher dose  HLD: lipitor 20 mg qday  LUBA: uses cpap  GERD: reports since hiatal hernia repair. Symptoms have improved.   Cervical spondylosis with myelopathy: continues to have chronic neck pain with radiation down both arms (now L side increased) also endorses weakness. Does see a spine surgeon.    ADHD: on focalin. Sees psychiatry.   Anxiety: no longer on Lexapro. Currently on lamictal. Seeing psychiatry. Reports feeling fatigue, groggy in AM  Low libido: will talk with psych about adjusting meds. However having difficulty maintaining erection      Lipid disorders/ASCVD risk (ages >/= 45 or >/= 20 if increased risk ): ordered  Eye exam:  utd 3/26/24      Vaccines:   Influenza (yearly) utd    Tetanus (every 10 yrs - 1st tdap) utd 2019   Covid19: reports utd. Did booster 3 weeks ago   Pna 20: due      Exercise: exercises regularly     Review of Systems   Constitutional:  Positive for fatigue. Negative for chills and fever.   HENT:  Negative for congestion and sore throat.    Eyes:  Negative for pain and visual disturbance.   Respiratory:  Negative for cough and shortness of breath.    Cardiovascular:  Negative for chest pain and leg swelling.   Gastrointestinal:  Negative for abdominal pain, nausea and vomiting.   Endocrine: Negative for polydipsia and polyuria.   Genitourinary:  Negative for difficulty urinating and dysuria.   Musculoskeletal:  Negative for arthralgias and back pain.   Skin:  Negative for rash and wound.   Neurological:  Negative for weakness and headaches.   Psychiatric/Behavioral:  Negative for agitation and confusion.      Past medical history, surgical history, and family medical history reviewed and  updated as appropriate.    Medications and allergies reviewed.     Objective:     There were no vitals filed for this visit.  There is no height or weight on file to calculate BMI.  Physical Exam  Vitals reviewed.   Constitutional:       General: He is not in acute distress.     Appearance: He is well-developed.   HENT:      Head: Normocephalic and atraumatic.      Right Ear: Tympanic membrane normal.      Left Ear: Tympanic membrane normal.      Mouth/Throat:      Mouth: Mucous membranes are moist.      Pharynx: No oropharyngeal exudate.   Eyes:      General:         Right eye: No discharge.         Left eye: No discharge.      Conjunctiva/sclera: Conjunctivae normal.   Cardiovascular:      Rate and Rhythm: Normal rate and regular rhythm.      Heart sounds: No murmur heard.     No friction rub.   Pulmonary:      Effort: Pulmonary effort is normal.      Breath sounds: Normal breath sounds.   Abdominal:      General: Bowel sounds are normal. There is no distension.      Palpations: Abdomen is soft.      Tenderness: There is no abdominal tenderness.   Musculoskeletal:         General: Normal range of motion.      Cervical back: Normal range of motion and neck supple.      Right lower leg: No edema.      Left lower leg: No edema.   Lymphadenopathy:      Cervical: No cervical adenopathy.   Skin:     General: Skin is warm and dry.   Neurological:      Mental Status: He is alert and oriented to person, place, and time.   Psychiatric:         Mood and Affect: Mood normal.         Behavior: Behavior normal.       Lab Results   Component Value Date    WBC 5.13 04/09/2024    HGB 15.7 04/09/2024    HCT 48.3 04/09/2024     04/09/2024    CHOL 131 04/09/2024    TRIG 221 (H) 04/09/2024    HDL 35 (L) 04/09/2024    ALT 39 04/09/2024    AST 25 04/09/2024     04/09/2024    K 4.1 04/09/2024     04/09/2024    CREATININE 0.8 04/09/2024    BUN 16 04/09/2024    CO2 25 04/09/2024    TSH 1.004 04/09/2024    PSA 0.18  10/05/2022    INR 1.0 09/19/2021    HGBA1C 5.4 04/09/2024       Assessment:     1. Annual physical exam    2. Type 2 diabetes mellitus without complication, without long-term current use of insulin    3. Hyperlipidemia associated with type 2 diabetes mellitus    4. Morbid obesity    5. Mild intermittent asthma without complication    6. SHEIKH (nonalcoholic steatohepatitis)    7. LUBA (obstructive sleep apnea)    8. Anxiety    9. Erectile dysfunction, unspecified erectile dysfunction type      Plan:   Hermelindo was seen today for annual exam.    Diagnoses and all orders for this visit:    Annual physical exam  - labs reviewed in clinic     Type 2 diabetes mellitus without complication, without long-term current use of insulin  - Stable. Continue home meds     -     Hemoglobin A1C; Future  -     Comprehensive Metabolic Panel; Future  -     Lipid Panel; Future  -     atorvastatin (LIPITOR) 20 MG tablet; Take 1 tablet (20 mg total) by mouth once daily.    Hyperlipidemia associated with type 2 diabetes mellitus  -     Hemoglobin A1C; Future  -     Comprehensive Metabolic Panel; Future  -     Lipid Panel; Future    Morbid obesity  - Improving.     Mild intermittent asthma without complication  - Stable. Continue home meds     SHEIKH (nonalcoholic steatohepatitis)  - stable. Continue to monitor     LUBA (obstructive sleep apnea)  - stable. Continue cpap    Anxiety  - continue f/u with psychiatry     Erectile dysfunction  - start trial of viagra     Other orders  -     pneumoc 20-kelly conj-dip cr(PF) (PREVNAR-20 (PF)) injection Syrg 0.5 mL  -     sildenafiL (VIAGRA) 50 MG tablet; Take 1 tablet (50 mg total) by mouth daily as needed for Erectile Dysfunction. Take 30 -60 minutes prior to sex  -     (In Office Administered) Pneumococcal Conjugate Vaccine (20 Valent) (IM) (Preferred)        Rtc 6 months    Suzi Sanches MD  Internal Medicine    4/22/2024

## 2024-04-23 ENCOUNTER — PATIENT MESSAGE (OUTPATIENT)
Dept: INTERNAL MEDICINE | Facility: CLINIC | Age: 44
End: 2024-04-23
Payer: COMMERCIAL

## 2024-04-24 NOTE — TELEPHONE ENCOUNTER
I spoke to the patient and he is feeling better then Monday and yesterday.   He was given a Pneumonia injection Monday in clinic.  Informed to use Tylenol or Advil. Also informed to stay well hydrated.

## 2024-04-25 ENCOUNTER — CLINICAL SUPPORT (OUTPATIENT)
Dept: ALLERGY | Facility: CLINIC | Age: 44
End: 2024-04-25
Payer: COMMERCIAL

## 2024-04-25 ENCOUNTER — DOCUMENTATION ONLY (OUTPATIENT)
Dept: ALLERGY | Facility: CLINIC | Age: 44
End: 2024-04-25
Payer: COMMERCIAL

## 2024-04-25 DIAGNOSIS — J30.9 CHRONIC ALLERGIC RHINITIS: Primary | ICD-10-CM

## 2024-04-25 PROCEDURE — 95117 IMMUNOTHERAPY INJECTIONS: CPT | Mod: S$GLB,,, | Performed by: STUDENT IN AN ORGANIZED HEALTH CARE EDUCATION/TRAINING PROGRAM

## 2024-04-25 PROCEDURE — 99999 PR PBB SHADOW E&M-EST. PATIENT-LVL I: CPT | Mod: PBBFAC,,,

## 2024-04-26 ENCOUNTER — PATIENT MESSAGE (OUTPATIENT)
Dept: DERMATOLOGY | Facility: CLINIC | Age: 44
End: 2024-04-26
Payer: COMMERCIAL

## 2024-04-26 ENCOUNTER — PATIENT MESSAGE (OUTPATIENT)
Dept: NEUROSURGERY | Facility: CLINIC | Age: 44
End: 2024-04-26
Payer: COMMERCIAL

## 2024-04-26 DIAGNOSIS — M54.9 DORSALGIA, UNSPECIFIED: Primary | ICD-10-CM

## 2024-04-27 DIAGNOSIS — L21.9 SEBORRHEIC DERMATITIS OF SCALP: ICD-10-CM

## 2024-04-27 DIAGNOSIS — L21.9 SEBORRHEIC DERMATITIS, UNSPECIFIED: ICD-10-CM

## 2024-04-27 DIAGNOSIS — F90.0 ADHD (ATTENTION DEFICIT HYPERACTIVITY DISORDER), INATTENTIVE TYPE: ICD-10-CM

## 2024-04-29 ENCOUNTER — TELEPHONE (OUTPATIENT)
Dept: PSYCHIATRY | Facility: HOSPITAL | Age: 44
End: 2024-04-29
Payer: COMMERCIAL

## 2024-04-29 DIAGNOSIS — F90.0 ADHD (ATTENTION DEFICIT HYPERACTIVITY DISORDER), INATTENTIVE TYPE: ICD-10-CM

## 2024-04-29 DIAGNOSIS — J34.3 HYPERTROPHY OF BOTH INFERIOR NASAL TURBINATES: ICD-10-CM

## 2024-04-29 DIAGNOSIS — J34.2 DEVIATED NASAL SEPTUM: Primary | ICD-10-CM

## 2024-04-29 RX ORDER — KETOCONAZOLE 20 MG/G
1 CREAM TOPICAL 2 TIMES DAILY
Qty: 15 G | Refills: 0 | Status: SHIPPED | OUTPATIENT
Start: 2024-04-29

## 2024-04-29 RX ORDER — DEXMETHYLPHENIDATE HYDROCHLORIDE 10 MG/1
10 TABLET ORAL 2 TIMES DAILY
Qty: 60 TABLET | Refills: 0 | Status: SHIPPED | OUTPATIENT
Start: 2024-05-01 | End: 2024-05-31

## 2024-04-29 RX ORDER — DEXMETHYLPHENIDATE HYDROCHLORIDE 10 MG/1
10 TABLET ORAL 2 TIMES DAILY
Qty: 60 TABLET | Refills: 0 | Status: SHIPPED | OUTPATIENT
Start: 2024-04-30 | End: 2024-04-29 | Stop reason: SDUPTHER

## 2024-04-30 ENCOUNTER — PATIENT MESSAGE (OUTPATIENT)
Dept: OTOLARYNGOLOGY | Facility: CLINIC | Age: 44
End: 2024-04-30

## 2024-04-30 ENCOUNTER — OFFICE VISIT (OUTPATIENT)
Dept: OTOLARYNGOLOGY | Facility: CLINIC | Age: 44
End: 2024-04-30
Payer: COMMERCIAL

## 2024-04-30 ENCOUNTER — OFFICE VISIT (OUTPATIENT)
Dept: PAIN MEDICINE | Facility: CLINIC | Age: 44
End: 2024-04-30
Payer: COMMERCIAL

## 2024-04-30 VITALS
WEIGHT: 238.56 LBS | BODY MASS INDEX: 39.69 KG/M2 | HEART RATE: 79 BPM | SYSTOLIC BLOOD PRESSURE: 112 MMHG | DIASTOLIC BLOOD PRESSURE: 77 MMHG

## 2024-04-30 DIAGNOSIS — M26.623 BILATERAL TEMPOROMANDIBULAR JOINT PAIN: ICD-10-CM

## 2024-04-30 DIAGNOSIS — J30.9 ALLERGIC RHINITIS, UNSPECIFIED SEASONALITY, UNSPECIFIED TRIGGER: Chronic | ICD-10-CM

## 2024-04-30 DIAGNOSIS — M50.10 CERVICAL DISC DISORDER WITH RADICULOPATHY, UNSPECIFIED CERVICAL REGION: ICD-10-CM

## 2024-04-30 DIAGNOSIS — M54.12 CERVICAL RADICULOPATHY: Primary | ICD-10-CM

## 2024-04-30 DIAGNOSIS — M25.559 HIP PAIN, UNSPECIFIED LATERALITY: ICD-10-CM

## 2024-04-30 DIAGNOSIS — M54.16 LUMBAR RADICULOPATHY, CHRONIC: ICD-10-CM

## 2024-04-30 DIAGNOSIS — G89.4 CHRONIC PAIN SYNDROME: ICD-10-CM

## 2024-04-30 DIAGNOSIS — M54.12 CERVICAL RADICULITIS: ICD-10-CM

## 2024-04-30 DIAGNOSIS — J34.2 DEVIATED NASAL SEPTUM: Primary | Chronic | ICD-10-CM

## 2024-04-30 DIAGNOSIS — J34.3 HYPERTROPHY OF BOTH INFERIOR NASAL TURBINATES: Chronic | ICD-10-CM

## 2024-04-30 DIAGNOSIS — H93.13 TINNITUS AURIUM, BILATERAL: ICD-10-CM

## 2024-04-30 DIAGNOSIS — M51.36 DDD (DEGENERATIVE DISC DISEASE), LUMBAR: ICD-10-CM

## 2024-04-30 PROCEDURE — 99214 OFFICE O/P EST MOD 30 MIN: CPT | Mod: 25,S$GLB,, | Performed by: OTOLARYNGOLOGY

## 2024-04-30 PROCEDURE — 99999 PR PBB SHADOW E&M-EST. PATIENT-LVL IV: CPT | Mod: PBBFAC,,, | Performed by: OTOLARYNGOLOGY

## 2024-04-30 PROCEDURE — 3074F SYST BP LT 130 MM HG: CPT | Mod: CPTII,S$GLB,, | Performed by: OTOLARYNGOLOGY

## 2024-04-30 PROCEDURE — 3044F HG A1C LEVEL LT 7.0%: CPT | Mod: CPTII,95,, | Performed by: NURSE PRACTITIONER

## 2024-04-30 PROCEDURE — 3008F BODY MASS INDEX DOCD: CPT | Mod: CPTII,S$GLB,, | Performed by: OTOLARYNGOLOGY

## 2024-04-30 PROCEDURE — 31575 DIAGNOSTIC LARYNGOSCOPY: CPT | Mod: S$GLB,,, | Performed by: OTOLARYNGOLOGY

## 2024-04-30 PROCEDURE — 1159F MED LIST DOCD IN RCRD: CPT | Mod: CPTII,95,, | Performed by: NURSE PRACTITIONER

## 2024-04-30 PROCEDURE — 3078F DIAST BP <80 MM HG: CPT | Mod: CPTII,S$GLB,, | Performed by: OTOLARYNGOLOGY

## 2024-04-30 PROCEDURE — 3061F NEG MICROALBUMINURIA REV: CPT | Mod: CPTII,95,, | Performed by: NURSE PRACTITIONER

## 2024-04-30 PROCEDURE — 3044F HG A1C LEVEL LT 7.0%: CPT | Mod: CPTII,S$GLB,, | Performed by: OTOLARYNGOLOGY

## 2024-04-30 PROCEDURE — 99214 OFFICE O/P EST MOD 30 MIN: CPT | Mod: 95,,, | Performed by: NURSE PRACTITIONER

## 2024-04-30 PROCEDURE — 3066F NEPHROPATHY DOC TX: CPT | Mod: CPTII,95,, | Performed by: NURSE PRACTITIONER

## 2024-04-30 PROCEDURE — 1159F MED LIST DOCD IN RCRD: CPT | Mod: CPTII,S$GLB,, | Performed by: OTOLARYNGOLOGY

## 2024-04-30 PROCEDURE — 1160F RVW MEDS BY RX/DR IN RCRD: CPT | Mod: CPTII,95,, | Performed by: NURSE PRACTITIONER

## 2024-04-30 PROCEDURE — 3066F NEPHROPATHY DOC TX: CPT | Mod: CPTII,S$GLB,, | Performed by: OTOLARYNGOLOGY

## 2024-04-30 PROCEDURE — 3061F NEG MICROALBUMINURIA REV: CPT | Mod: CPTII,S$GLB,, | Performed by: OTOLARYNGOLOGY

## 2024-04-30 RX ORDER — LEVOCETIRIZINE DIHYDROCHLORIDE 5 MG/1
5 TABLET, FILM COATED ORAL NIGHTLY
Qty: 30 TABLET | Refills: 11 | Status: SHIPPED | OUTPATIENT
Start: 2024-04-30 | End: 2025-04-30

## 2024-04-30 RX ORDER — OLOPATADINE HYDROCHLORIDE 665 UG/1
1 SPRAY NASAL 2 TIMES DAILY
Qty: 30.5 G | Refills: 5 | Status: SHIPPED | OUTPATIENT
Start: 2024-04-30 | End: 2025-04-30

## 2024-04-30 NOTE — PATIENT INSTRUCTIONS
I would recommend the use of a wax softening drop, either over the counter baby oil, vitamin E oil, or mineral oil, on a weekly basis.  Use debrox kit monthly to flush ears.    I also instructed the patient to avoid Qtips.      We discussed septoplasty and turbinate reduction.  He will work with my staff on scheduling.      Continue allergy treatments.      We had a long discussion regarding the underlying pathology of temporomandibular joint dysfunction (TMD) as the cause of ear pain.  We further discussed conservative measures to treat TMD including avoiding gum and other foods that require lots of chewing, warm compresses, and scheduled antinflammatories (such as Motrin, Ibuprofen, or Aleve).  The patient should also wear a  (purchased OTC or see dentist for custom), which prevents additional pressure on the TM joint.  Stress can also exacerbate TMJ symptoms.    If the pain persists, the patient will then schedule an appointment with a dentist for further evaluation.    The patient was given a prescription for a nasal steroid and/or nasal antihistamine nasal spray and we discussed in detail the proper mechanism of use directing the spray away from the nasal septum.  The patient was also instructed to take a daily oral antihistamine (Claritin, Zyrtec, Allegra, or Xyzal).    In addition, we also discussed that it will take 3-4 weeks of daily use to achieve maximal effectiveness.  The patient will please call in 3-4 weeks with their progress.  If allergy symptoms persist at that time, we could consider additional medications and possibly allergy testing.     How do you use a Nasal Corticosteroid Spray?    Make sure you understand your dosing instructions. Spray only the number of prescribed sprays in each nostril. Read the package instructions before using your spray the first time.    Most corticosteroid sprays suggest the following steps:    Wash your hands well.    Gently blow your nose to clear the  passageway.    Shake the container several times.    Tilt your head slightly downward.  Use the opposite hand from the nostril you will be spraying to hold the spray bottle.    Block one nostril with your finger.  Insert the nasal applicator into the other nostril.    Aim the spray toward the outer wall of the nostril.  Inhale slowly through the nose and press the .    Breathe out and repeat to apply the prescribed number of sprays.  Repeat these steps for the other nostril.     Avoid sneezing or blowing your nose right after spraying.

## 2024-04-30 NOTE — PROGRESS NOTES
[facility]  Interventional [facility] -  Established Patient Follow-up  telemedicine Encounter    Telemedicine Bundle:  This visit was completed during the Coronavirus Crisis to enhance patient safety.  The patient location is: patient's home  The chief complaint leading to consultation is: Neck Pain, Arm Pain (R>L), and Low-back Pain  Visit type: Virtual visit with synchronous audio and video  Total time spent with patient: 20 minutes   Each patient to whom he or she provides medical services by telemedicine is:    (1) informed of the relationship between the physician and patient and the respective role of any other health care provider with respect to management of the patient  (2) notified that he or she may decline to receive medical services by telemedicine and may withdraw from such care at any time.      Referred by: No ref. provider found   Reason for referral: * No diagnoses found *     CC:   Chief Complaint   Patient presents with    Neck Pain    Arm Pain     R>L    Low-back Pain         3/28/2024     1:12 PM 12/19/2023     9:42 AM 4/20/2023    10:29 AM   Last 3 PDI Scores   Pain Disability Index (PDI) 36 [phone] 17     Interval History 4/30/2024:  43-year-old male that presents virtually for a follow-up appointment he is status post a cervical NELI targeting C7-T1 on 04/17/2024 that provided significant relief in his symptoms 70-90%.  He is known to our clinic and has been previously provided this injection which has helped significantly.  Is now reporting low back pain that has gradually increased he did reach out to his previous surgeon Dr. Shen  ordered a recent CT lumbar.  He denies any bowel bladder dysfunction denies any saddle anesthesia denies any recent trauma.  He has been provided a lumbar NELI targeting L4-5 for low back and radicular symptoms, we discussed that we may need to repeat this in the future pending CT lumbar scan.      Interval update 03/28/24:   Patient returns today for post  procedure follow up and follow up of his neck and back pain.  He is s/p  Lumbar Interlaminar Epidural Steroid Injection L4/L5 on 03/06/2024.  He reports 70-80% relief of his low back pain and radicular symptoms.  Today he reports his cervical radicular symptoms are starting to returned.  He previously underwent cervical epidural steroid injection at C7/T1 on 11/03/2023 with significant improvement in his symptoms.  He reports almost 5 months of pain relief following a cervical epidural.  He would like to repeat this procedure.  In the meantime he is increased his gabapentin to 300 mg in the morning and 600 mg in the evening.  He does feel like this has helped with his pain.  He was also increased his exercise and has lost weight which he feels has been beneficial as well.      Interval update 12/19/23  Patient returns today for follow up.  He is status post right GTB and right intra-articular hip injections on 11/29/2023 and notes improvement in his GTB and hip pain.  Today he complains of pain extending from the area overlying the right hip joint and extending down the anterolateral portion of the thigh.  This pain is worse with activity.  Pain improves with lying flat.  He rates his pain 7/10 today.      Of note, patient recently underwent left lateral collateral ligament reconstruction for left radial collateral ligament tear on 12/05/2023 with Dr. Marshall.    11/13/23 Pt returns today complaining of right hip and right leg pain. Pain is located over the lateral right hip and radiates into the groin.  Pain started approximately 1 month ago. Pain has not improved with stretching and HEP.  He states his hip pain is worse than his lower back pain for which he is scheduled to have an NELI at the end of the month. He would like to address the hip pain first.      Subjective:   Hermelindo Garza III is a 43 y.o. male who presents complaining of both neck and back pain.  Today he reports his neck pain is most  bothersome.  He reports muscle tightness and pain in the left trapezius and shoulder area.  He reports pain that radiates into the bilateral upper extremities with numbness and tingling.  He has been evaluated by Dr. Sierra with Neurosurgery who noted patient chris  candidate for ACDFs in he progresses.  Pt reports undergoing a cervical epidural steroid injection with Dr. Escamilla 02/10/2021 on with several months of relief.  He also reports low back pain.  He is s/p Left L3-4 hemilaminotomy, medial facetectomy, and foraminotomy for microdiscectomy with Dr Mcnamara on 9/19/2021.  He reports his back pain improved after surgery.  Patient reports he is previously completed physical therapy and is now actively continuing with his exercise program in the gym.  He also reports trying to lose weight and reports intentional  weight loss of approximately 55 pounds since 2020.    Location: back, shoulder, and neck   Onset: 2 years  Current Pain Score: 5/10  Daily Pain of Range: 5-6/10  Quality: Aching, Tight, Tingling, Deep, Numb, Electric, Hot, and Cold  Radiation: neck and back  Worsened by: lying down, sitting, standing for more than 3 minutes, and walking for more than 6 minutes  Improved by: medications    Patient denies night fever/night sweats, urinary incontinence, bowel incontinence, significant weight loss, significant motor weakness, and loss of sensations.    Previous Interventions:  - 04/17/2024:Cervical Interlaminar Epidural Steroid Injection C7/T1  -03/06/2024 Lumbar Interlaminar Epidural Steroid Injection L4/L5  - 11/03/2023 Cervical Interlaminar Epidural Steroid Injection C7/T1 5 months of pain relief  -9/27/2023  Lumbar Interlaminar Epidural Steroid Injection L4/L5   - 06/28/2023 - Cervical Interlaminar Epidural Steroid Injection C7/T1   - 02/10/2021 -  C6/7 CERVICAL EPIDURAL STEROID INJECTION - Dr Escamilla - 4 months of pain relief.   - 11/29/2023 -  right GTB and right intra-articular hip injections - 40%  relief  - 03/06/2024 - lumbar interlaminar steroid injection at L4/L5 -80% pain relief    Previous Therapies:  PT/OT: yes   Chiropractor:   HEP: yes  Relevant Surgery: yes    - Reports L4/L5 surgery at age 29 yo   - 09/19/21 - Left MIS L3-4 hemilaminotomy, medial facetectomy, and foraminotomy for microdiscectomy with Dr Mcnamara.   Previous Medications:   - NSAIDS: Meloxicam  - Muscle Relaxants: Robaxin, Flexeril  - TCAs:   - SNRIs:   - Topicals:   - Anticonvulsants: Lyrica - reports RL sxs so stopped; Gabapentin   - Opioids:   - Adjuvants: Tylenol    Current Pain Medications:  Gabapentin 300 mg TID  Meloxicam  Tylenol       Review of Systems:  Review of Systems   Musculoskeletal:  Positive for neck pain.     GENERAL:  No weight loss, malaise or fevers. +obesity +DM  HEENT:   No recent changes in vision or hearing  NECK:  No difficulty with swallowing. No stridor.   RESPIRATORY:  Negative for cough, wheezing or shortness of breath, patient denies any recent URI. +Asthma  CARDIOVASCULAR:  Negative for chest pain, leg swelling or palpitations.  GI/:  Negative for abdominal discomfort, blood in stools or black stools or change in bowel habits.   MUSCULOSKELETAL:  See HPI.  SKIN:  Negative for lesions, rash, and itching.  PSYCH:  No mood disorder or recent psychosocial stressors.  +anxiety +ADD  HEMATOLOGY/LYMPHOLOGY:  Negative for prolonged bleeding, bruising easily or swollen nodes.  Patient is not currently taking any anti-coagulants  NEURO:   No history of headaches, syncope, paralysis, seizures or tremors.  All other reviewed and negative other than HPI.    History:  Current medications, allergies, medical history, surgical history,   family history, and social history were reviewed in the chart as marked.    Full Medication List:    Current Outpatient Medications:     albuterol (PROVENTIL/VENTOLIN HFA) 90 mcg/actuation inhaler, INHALE 2 PUFFS INTO THE LUNGS EVERY 6 HOURS AS NEEDED WHEEZING, Disp: 8.5 g, Rfl: 5     atorvastatin (LIPITOR) 20 MG tablet, Take 1 tablet (20 mg total) by mouth once daily., Disp: 90 tablet, Rfl: 3    blood sugar diagnostic (TRUE METRIX GLUCOSE TEST STRIP) Strp, Use to test blood glucose one (1) time a day,, Disp: 100 strip, Rfl: 3    blood-glucose meter Misc, use as directed, Disp: 1 each, Rfl: 0    cyclobenzaprine (FLEXERIL) 10 MG tablet, Take 10 mg by mouth 3 (three) times daily., Disp: , Rfl:     [START ON 5/1/2024] dexmethylphenidate (FOCALIN) 10 MG tablet, Take 1 tablet (10 mg total) by mouth 2 (two) times daily., Disp: 60 tablet, Rfl: 0    ergocalciferol, vitamin D2, (VITAMIN D ORAL), Take 1 tablet by mouth every other day., Disp: , Rfl:     EScitalopram oxalate (LEXAPRO) 5 MG Tab, Take 1 tablet (5 mg total) by mouth once daily., Disp: 90 tablet, Rfl: 1    fluocinonide (LIDEX) 0.05 % external solution, AAA scalp qday prn itching, scaling, Disp: 60 mL, Rfl: 3    gabapentin (NEURONTIN) 300 MG capsule, Take 1 capsule (300 mg total) by mouth 3 (three) times daily., Disp: 90 capsule, Rfl: 3    hydrocortisone 2.5 % cream, APPLY EXTERNALLY TO THE AFFECTED AREA TWICE DAILY FOR 10 DAYS, Disp: 30 g, Rfl: 0    ketoconazole (NIZORAL) 2 % cream, Apply 1 application  topically 2 (two) times daily. Apply to affected area up to 2 weeks, Disp: 15 g, Rfl: 0    lamoTRIgine (LAMICTAL) 100 MG tablet, Take 1 tablet (100 mg total) by mouth 2 (two) times daily. IF any RASH, STOP ALL Lamictal and Tell Psyc MD., Disp: 180 tablet, Rfl: 1    lancets (ONETOUCH DELICA PLUS LANCET) 33 gauge [facility], Use to test blood glucose one (1) time a day,, Disp: 100 each, Rfl: 5    latanoprost 0.005 % ophthalmic solution, Place 1 drop into both eyes every evening., Disp: 7.5 mL, Rfl: 3    levocetirizine (XYZAL) 5 MG tablet, Take 1 tablet (5 mg total) by mouth every evening., Disp: 30 tablet, Rfl: 11    LORazepam (ATIVAN) 0.5 MG tablet, Take 1 tablet (0.5 mg total) by mouth daily as needed (SIGNIFICANT ANXIETY)., Disp: 30 tablet, Rfl: 3     meloxicam (MOBIC) 15 MG tablet, TAKE 1 TABLET(15 MG) BY MOUTH EVERY DAY as needed FOR PAIN, Disp: 30 tablet, Rfl: 2    montelukast (SINGULAIR) 10 mg tablet, Take 1 tablet (10 mg total) by mouth every evening., Disp: 90 tablet, Rfl: 3    olopatadine (PATANASE) 0.6 % nasal spray, 1 spray by Each Nostril route 2 (two) times daily., Disp: 30.5 g, Rfl: 5    ondansetron (ZOFRAN-ODT) 8 MG TbDL, Dissolve 1 tablet (8 mg total) by mouth every 6 (six) hours as needed (nausea)., Disp: 30 tablet, Rfl: 2    rOPINIRole (REQUIP) 0.25 MG tablet, TAKE 1 TABLET(0.25 MG) BY MOUTH EVERY EVENING, Disp: 90 tablet, Rfl: 2    semaglutide (OZEMPIC) 2 mg/dose (8 mg/3 mL) PnIj, Inject 2 mg into the skin every 7 days., Disp: 3 mL, Rfl: 5    sildenafiL (VIAGRA) 50 MG tablet, Take 1 tablet (50 mg total) by mouth daily as needed for Erectile Dysfunction. Take 30 -60 minutes prior to sex, Disp: 9 tablet, Rfl: 2    ULTRA THIN LANCETS 30 gauge Misc, USE TO TEST BLOOD SUGAR EVERY DAY AS DIRECTED, Disp: 100 each, Rfl: 3  No current facility-administered medications for this visit.    Facility-Administered Medications Ordered in Other Visits:     0.9%  NaCl infusion, 500 mL, Intravenous, Continuous, Kolton Terrell MD    mupirocin 2 % ointment, , Nasal, On Call Procedure, Chong Padron MD, Given at 09/15/22 0565     Allergies:  Patient has no known allergies.     Medical History:   has a past medical history of ADD (attention deficit disorder) (05/09/2012), Allergy, Anxiety (04/12/2023), Asthma (05/09/2012), Cervical disc disorder with radiculopathy, unspecified cervical region (09/14/2021), Cervical spondylosis with myelopathy (06/29/2022), Eczema, Esophageal ulcer, GERD (gastroesophageal reflux disease) (05/09/2012), Glaucoma, Kidney stones (05/2014), Lumbar disc disease (05/09/2012), Lumbar herniated disc (05/09/2012), Malignant melanoma of skin, unspecified (01/06/2022), Melanoma (01/2022), LUBA (obstructive sleep apnea), Radiculopathy, lumbar  region (09/14/2021), Renal calculi (05/09/2012), and Type 2 diabetes mellitus without complication, without long-term current use of insulin (11/16/2020).    Surgical History:   has a past surgical history that includes Lumbar laminectomy (2010); Esophagogastroduodenoscopy (01/22/2019); Colonoscopy (01/22/2019); Appendectomy (2006); Epidural steroid injection (N/A, 02/10/2021); Esophagogastroduodenoscopy (N/A, 05/18/2021); Lumbar laminectomy with discectomy (Left, 09/20/2021); Carpal tunnel release (Right, 09/15/2022); Excision of ganglion of wrist (Right, 09/15/2022); Hand Arthrotomy (Right, 09/15/2022); Injection of steroid (Left, 09/15/2022); Flexor tendon repair (Right, 09/15/2022); Tympanostomy tube placement; Epidural steroid injection into cervical spine (N/A, 6/28/2023); Cystoscopy (7/5/2023); Ureteral stent placement (Left, 7/5/2023); Ureteroscopy (Left, 7/5/2023); Laser lithotripsy (Left, 7/5/2023); Ureteroscopic removal of ureteric calculus (Left, 7/5/2023); Correction of hammer toe (Left, 8/14/2023); Epidural steroid injection into lumbar spine (N/A, 9/27/2023); Epidural steroid injection into cervical spine (N/A, 11/3/2023); Injection of joint (Right, 11/29/2023); Reconstruction of ligament (Left, 12/5/2023); Repair of extensor tendon (Left, 12/5/2023); Esophagogastroduodenoscopy (N/A, 12/21/2023); ph monitoring, esophagus, wireless, (off reflux meds) (N/A, 12/21/2023); Colonoscopy (N/A, 12/21/2023); Esophagogastroduodenoscopy (N/A, 2/22/2024); Epidural steroid injection into lumbar spine (N/A, 3/6/2024); Robot-assisted repair of hiatal hernia (N/A, 3/14/2024); and Epidural steroid injection into cervical spine (N/A, 4/17/2024).    Family History:  family history includes Allergic rhinitis in his father, mother, sister, and sister; Asthma in his father, sister, and sister; Brain cancer in his maternal grandmother; Breast cancer in his paternal grandmother; Chronic back pain in his father; Diabetes in  his maternal grandfather; MARTITA disease in his father; Hypertension in his mother; Melanoma in his father; No Known Problems in his brother, maternal aunt, maternal uncle, paternal aunt, paternal grandfather, and paternal uncle; Sleep apnea in his father and mother; Transient ischemic attack in his mother.    Social History:   reports that he quit smoking about 6 years ago. His smoking use included cigarettes. He has never been exposed to tobacco smoke. He has never used smokeless tobacco. He reports current alcohol use of about 3.0 standard drinks of alcohol per week. He reports that he does not use drugs.    Physical Exam:  There were no vitals filed for this visit.  GEN: No acute distress. Calm, comfortable  HENT: Normocephalic, atraumatic, moist mucous membranes  EYE: Anicteric sclera, non-injected.   CV: Non-diaphoretic.   RESP: Breathing comfortably. Chest expansion symmetric.  PSYCH: Pleasant mood and appropriate affect. Recent and remote memory intact.       Imaging:  XR CERVICAL SPINE AP LAT WITH FLEX EXTEN     CLINICAL HISTORY:  Other spondylosis with myelopathy, cervical region     TECHNIQUE:  Three views of the cervical spine plus flexion and extension views were performed.     COMPARISON:  September 8, 2022     FINDINGS:  As before, suboptimal visualization of the cervicothoracic junction due to superimposition of shoulder and chest wall soft tissues.  Straightening of normal cervical lordosis.  No acute fractures.  Unremarkable predental space.  No widening prevertebral soft tissues.  No anterior or retrolisthesis.  Flexion and extension views demonstrate no instability.  Reconfirmed areas of anterior longitudinal ligament calcification adjacent to C4-C5 and C5-C6 disc.  Reconfirmed findings of uncovertebral spurring and endplate osteophytes at preserved C4-C5 level and mildly narrowed C5-C6 level and mild to moderately narrowed C6-C7 level.  Some stable scattered mild facet arthropathic changes.  Intact  odontoid tip and unremarkable C1-C2 articulation.  Cervical spine findings do not appear significantly changed to earlier exam.     Electronically signed by: Giovanny Busby  Date:                                            12/13/2022      MRI CERVICAL SPINE WITHOUT CONTRAST     CLINICAL HISTORY:  Arm pain, paresthesias, clumsiness;.  Cervical disc disorder with radiculopathy, unspecified cervical region     TECHNIQUE:  Multiplanar, multisequence MR images of the cervical spine were acquired without the administration of contrast.     COMPARISON:  Cervical spine MRI 01/12/2021     FINDINGS:  Straightening of the normal cervical lordosis.  No spondylolisthesis.     No compression fractures.  No marrow replacing lesions.     Multilevel degenerative changes with disc desiccation and disc space narrowing, described in detail below.  No bone marrow edema.     Visualized structures in the posterior fossa are unremarkable. The cervical spinal cord is unremarkable.     Paraspinal soft tissues are unremarkable.     SIGNIFICANT FINDINGS BY LEVEL:     C2-3: Small disc osteophyte complex with central annular fissure.  No canal or foraminal stenosis.     C3-4: Disc osteophyte complex with superimposed central extrusion.  Mild canal stenosis with partial effacement of the thecal sac.  Mild bilateral foraminal stenosis.     C4-5: Disc osteophyte complex.  Moderate canal stenosis with near complete effacement of the thecal sac and flattening of the cervical cord.  Moderate bilateral foraminal stenosis.     C5-6: Disc osteophyte complex, eccentric to the right.  Severe canal stenosis with complete effacement of the thecal sac and flattening of the cervical cord.  Severe right and moderate left foraminal stenosis.     C6-7: Disc osteophyte complex.  Moderate canal stenosis.  Moderate bilateral foraminal stenosis.     C7-T1: Disc osteophyte complex with superimposed right foraminal extrusion.  Mild canal stenosis.  Mild right foraminal  stenosis.     Impression:     Multilevel degenerative changes, most advanced at C5-6 where there is severe canal stenosis and severe right and moderate left foraminal stenosis.  Overall, degenerative changes have progressed from 01/12/2021.     This report was flagged in Epic as abnormal.     Electronically signed by: Horace Quiroz  Date:                                            06/16/2022      XR LUMBAR SPINE AP AND LAT WITH FLEX/EXT     CLINICAL HISTORY:  Other specified postprocedural states     TECHNIQUE:  AP and lateral views as well as lateral flexion and extension images are performed through the lumbar spine.     COMPARISON:  Non 06/16/2022 MRI of the lumbar spine e     FINDINGS:  Patient had previous a L3/L4 hemilaminectomy on the left side.     Mild DJD and mild lumbar scoliosis.  The lumbar intervertebral disc spaces are slightly narrowed.  Few mm L2/L3 and L3/L4 retrolisthesis noted.  No fracture or dislocation.  No bone destruction identified     Electronically signed by: Xander Rubio MD  Date:                                            12/13/2022    MRI LUMBAR SPINE WITHOUT CONTRAST     CLINICAL HISTORY:  Lumbar radiculopathy, symptoms persist with conservative treatment;Low back pain, prior surgery, new symptoms; Dorsalgia, unspecified     TECHNIQUE:  Multiplanar, multisequence MR images were acquired from the thoracolumbar junction to the sacrum without the administration of contrast.     COMPARISON:  MRI lumbar spine 09/19/2021     FINDINGS:  Sequences are degraded by patient motion artifact.     Transitional lumbosacral anatomy with lumbarization of S1.     Postoperative changes from left L3-4 hemilaminectomy and micro discectomy.  Mild protrusion of the thecal sac and cauda equina nerve roots into the laminectomy defect.  No fluid collections in the operative bed.     No spondylolisthesis.     No compression fractures.  Small fat containing lesion in the L3 vertebral body, likely a hemangioma.      Multilevel degenerative changes with disc desiccation and disc space narrowing, described in detail below.  No significant bone marrow edema.     The conus medullaris has a normal appearance and terminates at the L2 level.  Cauda equina nerve roots are unremarkable.     Paraspinal muscle atrophy.     SIGNIFICANT FINDINGS BY LEVEL:     T12-L1: Mild disc bulge.  Minimal canal stenosis.  No foraminal stenosis.     L1-2: Left facet arthropathy.  No canal or foraminal stenosis.     L2-3: Disc bulge.  Bilateral facet arthropathy and ligamentum flavum thickening.  Mild canal stenosis.  Mild left foraminal stenosis.     L3-4: Residual disc bulge versus granulation tissue.  The previous disc fragment migrating inferiorly is no longer seen.  Bilateral facet arthropathy and ligamentum flavum thickening.  Mild canal stenosis with narrowing of both subarticular zones and abutment of the left descending L4 nerve root.  Mild right and moderate left foraminal stenosis.     L4-5: Disc bulge.  Bilateral facet arthropathy and ligamentum flavum thickening.  Mild canal stenosis.  Moderate bilateral foraminal stenosis.     L5-S1: Disc bulge.  Bilateral facet arthropathy and ligamentum flavum thickening.  Mild canal stenosis.  Moderate bilateral foraminal stenosis.     Impression:     Postoperative changes from left L3-4 hemilaminectomy and micro discectomy.  The previous disc fragment migrating inferiorly is no longer seen.  There is a residual disc bulge/granulation tissue contributing to mild canal stenosis and moderate left foraminal stenosis.     Small meningocele at the laminectomy defect.     Degenerative changes elsewhere in the spine resulting in mild canal stenosis and moderate foraminal stenosis at multiple levels as described above.        Electronically signed by: Horace Quiroz  Date:                                            06/16/2022    EXAMINATION:  MRI LUMBAR SPINE WITHOUT CONTRAST     CLINICAL HISTORY:  Low back pain,  prior surgery, new symptoms; Dorsalgia, unspecified     TECHNIQUE:  Multiplanar, multisequence MR images were acquired from the thoracolumbar junction to the sacrum without the administration of contrast.     COMPARISON:  MRI lumbar spine 06/16/2023     FINDINGS:  Postoperative change left L3-L4 hemilaminectomy and discectomy.  Mild protrusion of the thecal sac and cauda equina nerve roots into the laminectomy defect similar to prior.     Alignment: Normal.     Vertebrae: Endplate degenerative change L5-S1.  Small may angioma L3 vertebral body.  No acute fracture.  No infiltrative marrow process.     Discs: Multilevel disc height loss and desiccation most pronounced at L5-S1.     Cord: Normal.  Conus terminates at L1.     Soft tissue structures are unremarkable.  Limited evaluation of the retroperitoneal organs demonstrates no significant abnormalities.  Paraspinal musculature demonstrates normal bulk and signal intensity.     Degenerative findings:     T12-L1: Mild circumferential disc bulge no neural foraminal narrowing or spinal canal stenosis.     L1-L2: No neural foraminal narrowing or spinal canal stenosis.     L2-L3: Circumferential disc bulge.  Bilateral facet arthropathy.  No neural foraminal narrowing.  Mild spinal canal stenosis.     L3-L4: Postop change.  Circumferential disc bulge.  Bilateral facet arthropathy.  Moderate left and mild right neural foraminal narrowing.  Mild spinal canal stenosis     L4-L5: Circumferential disc bulge.  Bilateral facet arthropathy.  Moderate right and severe left neural foraminal narrowing.  Mild to moderate spinal canal stenosis.     L5-S1: Circumferential disc bulge.  Bilateral facet arthropathy.  Moderate bilateral neural foraminal narrowing.  Mild spinal canal stenosis.     Impression:     1. Postoperative change left L3-L4 hemilaminectomy and discectomy.  2. Multilevel degenerative change of the lumbar spine as above not significantly changed from prior MRI  06/16/2023, noting severe left foraminal narrowing at L4-5.     Electronically signed by resident: Hammad Quinn  Date:                                            10/23/2023  Labs:  BMP  Lab Results   Component Value Date     04/09/2024    K 4.1 04/09/2024     04/09/2024    CO2 25 04/09/2024    BUN 16 04/09/2024    CREATININE 0.8 04/09/2024    CALCIUM 10.1 04/09/2024    ANIONGAP 13 04/09/2024    EGFRNORACEVR >60.0 04/09/2024     Lab Results   Component Value Date    ALT 39 04/09/2024    AST 25 04/09/2024    ALKPHOS 103 04/09/2024    BILITOT 0.7 04/09/2024     Lab Results   Component Value Date    WBC 5.13 04/09/2024    HGB 15.7 04/09/2024    HCT 48.3 04/09/2024    MCV 93 04/09/2024     04/09/2024             Assessment:  Problem List Items Addressed This Visit    None            04/20/2032-  Hermelindo Garza SELIN is a 43 y.o. male who  has a past medical history of ADD (attention deficit disorder) (05/09/2012), Allergy, Anxiety (04/12/2023), Asthma (05/09/2012), Cervical disc disorder with radiculopathy, unspecified cervical region (09/14/2021), Cervical spondylosis with myelopathy (06/29/2022), Eczema, Esophageal ulcer, GERD (gastroesophageal reflux disease) (05/09/2012), Glaucoma, Kidney stones (05/2014), Lumbar disc disease (05/09/2012), Lumbar herniated disc (05/09/2012), Malignant melanoma of skin, unspecified (01/06/2022), Melanoma (01/2022), LUBA (obstructive sleep apnea), Radiculopathy, lumbar region (09/14/2021), Renal calculi (05/09/2012), and Type 2 diabetes mellitus without complication, without long-term current use of insulin (11/16/2020).  By history and examination this patient has chronicneck pain with bilateral radiculopathy as well as low back pain s/p 9/19/2021 Left L3-4 hemilaminotomy, medial facetectomy, and foraminotomy for microdiscectomy with Dr Mcnamara with some improvement. The underlying cause cause is facet arthritis, degenerative disc disease, foraminal stenosis,  central canal stenosis, muscle dysfunction, and deconditioning.  MRI of the cervical spine was significant for multilevel degenerative changes, most advanced at C5-6 where there is severe canal stenosis and severe right and moderate left foraminal stenosis. MRI of the Lumbar spine post surgery was significant for residual disc bulge/granulation tissue contributing to mild canal stenosis and moderate left foraminal stenosis at L3/L4 and degenerative changes elsewhere in the spine resulting in mild canal stenosis and moderate foraminal stenosis at multiple levels.  We discussed the underlying diagnoses and multiple treatment options including non-opioid medications, interventional procedures, physical therapy, home exercise, core muscle enhancement, and weight loss.  He previously underwent a cervical steroid injection with several months of relief.  Plan for repeat of cervical epidural steroid injection.  The risks and benefits of each treatment option were discussed and all questions were answered.        11/13/2023 - Hermelindo Hallgregkelby SMALLWOOD presents for evaluation of right hip pain. His exam was consistent with Right GTB bursitis and right hip joint pain.  I will schedule him for a right GTB and Right intraarticular hip injection and defer the LESI for now.  Pt tearful and anxious throughout exam.  He is established with a Psychiatrist.  We discussed referral Psychology for possible CBT and assistance with coping strategies for chronic pain.     12/19/2023 - Hermelindo Garza III presents today for follow-up.  He is status post  right GTB and right intra-articular hip injections on 11/29/2023 and notes improvement in his GTB and hip pain.  Today he complains of pain extending from the area overlying the right hip joint and extending down the anterolateral portion of the thigh  in the distribution of the tensor fascia ashtyn.   Patient has multiple pain generators  as well as concurrent anxiety and mood  disorder.  I feel that he would be an excellent candidate for our functional restoration program.  Discussed with patient today during visit.  I will place a referral to the functional restoration program.   I also discussed with the patient that it would be a good to have him see Rheumatology given his extensive pain history, as perhaps they can offer their expertise.     03/28/2024 - Hermelindo Garza III presents today for follow up.  He was status post lumbar epidural steroid injection at L4/L5 on 03/06/2024 with 70-80% relief of his low back symptoms.  His cervical radicular symptoms have started to return.  Most recent cervical epidural gave him over 5 months of pain relief.  He would like to repeat the procedure.  I will schedule from a repeat cervical epidural steroid injection.  I will also have the patient increase his gabapentin to 300 mg 4 times daily.  He will let me know when he needs a refill of the medication.      04/30/2024-Hermelindo Garza III is a 43 y.o. male who  has a past medical history of ADD (attention deficit disorder) (05/09/2012), Allergy, Anxiety (04/12/2023), Asthma (05/09/2012), Cervical disc disorder with radiculopathy, unspecified cervical region (09/14/2021), Cervical spondylosis with myelopathy (06/29/2022), Eczema, Esophageal ulcer, GERD (gastroesophageal reflux disease) (05/09/2012), Glaucoma, Kidney stones (05/2014), Lumbar disc disease (05/09/2012), Lumbar herniated disc (05/09/2012), Malignant melanoma of skin, unspecified (01/06/2022), Melanoma (01/2022), LUBA (obstructive sleep apnea), Radiculopathy, lumbar region (09/14/2021), Renal calculi (05/09/2012), and Type 2 diabetes mellitus without complication, without long-term current use of insulin (11/16/2020).  By history and examination this patient has chronicneck pain with bilateral radiculopathy in the distribution of C5 and C6.  The underlying cause cause is facet arthritis, degenerative disc disease, foraminal  stenosis, and central canal stenosis.  Pathology is confirmed by imaging.  We discussed the underlying diagnoses and multiple treatment options including non-opioid medications, interventional procedures, physical therapy, home exercise, core muscle enhancement, activity modification, and weight loss.  The risks and benefits of each treatment option were discussed and all questions were answered.    He was recently provided a cervical NELI that provided him excellent relief he did reach out to Neurosurgery a new CT lumbar was ordered to rule out any new pathology.  The patient will follow up with our office to possibly repeat injections in the future.      Treatment Plan:   Procedures:  None at this time.  Consider repeating lumbar NELI pending CT lumbar  PT/OT/HEP: I have stressed the importance of physical activity and a home exercise plan to help with pain and improve health.  He is previously completed physical therapy.  I feel the patient would significantly benefit from enrollment in the functional restoration program.  Referral placed.    Medications:  -Continue gabapentin 300 mg to 4 tablets daily.  He will let me know when he needs a refill.   - continue with meloxicam 15 mg q.d.   - continue with p.r.n. Tylenol   -  Reviewed and consistent with medication use as prescribed.  Imaging:  Reviewed and discussed with the patient.  Follow Up: RTC 6-8 weeks or sooner if needed     JAY Syed  Interventional Pain Management      Disclaimer: This note was partly generated using dictation software which may occasionally result in transcription errors.

## 2024-04-30 NOTE — PROGRESS NOTES
Chief Complaint   Patient presents with    Sinus Problem     Itchy throat    Nasal Congestion     Tongue bumps    Headache        HPI: Hermelindo Garza III is a 43 y.o. male who presents for evaluation of a several day history of nasal congestion and postnasal drip. He describes difficulty breathing at night. There is bilateral nasal obstruction, sides. He does use sinus rinses or nasal sprays, saline and Afrin at night when needed.  He has tried azelastine for 3 weeks daily without much relief.  His ophthalmologist does not want him using nasal steroids due to ocular pressure. He has midface pain and pressure when symptoms are flared up.  He has rhinorrhea and postnasal drip. There is not maxillary tooth pain. He  has occasional headaches.  He has not had sinus or nasal surgery. There is no history of sinonasal trauma.    He uses a CPAP at night, nasal mask.  He has trouble tolerating this when his nasal congestion worsens.  He is currently on SCIT via [facility] allergy, and stopped montelukast over the last few months as he did not feel it was making a difference.  He does not currently take a daily antihistamine.  He also reports occasional white bumps that will occur in the back of the throat or the signs of the tongue.  He notices these more so when the nasal congestion is worse and there is possible mouth breathing occurring.      Also has occasional tinnitus and feeling of mild hearing loss.  He does report bruxism and pain in the right TMJ as well.                      Past Medical History:   Diagnosis Date    ADD (attention deficit disorder) 05/09/2012    Allergy     Anxiety 04/12/2023    Asthma 05/09/2012    Cervical disc disorder with radiculopathy, unspecified cervical region 09/14/2021    Cervical spondylosis with myelopathy 06/29/2022    Eczema     Esophageal ulcer     GERD (gastroesophageal reflux disease) 05/09/2012    Glaucoma     Kidney stones 05/2014    Lumbar disc disease 05/09/2012     Lumbar herniated disc 2012    Malignant melanoma of skin, unspecified 2022    in situ right mid back    Melanoma 2022    in situ R mid back     LUBA (obstructive sleep apnea)     Radiculopathy, lumbar region 2021    Renal calculi 2012    Type 2 diabetes mellitus without complication, without long-term current use of insulin 2020     Social History     Socioeconomic History    Marital status:    Tobacco Use    Smoking status: Former     Current packs/day: 0.00     Types: Cigarettes     Quit date: 2018     Years since quittin.0     Passive exposure: Never    Smokeless tobacco: Never   Substance and Sexual Activity    Alcohol use: Yes     Alcohol/week: 3.0 standard drinks of alcohol     Types: 3 Drinks containing 0.5 oz of alcohol per week    Drug use: No    Sexual activity: Yes     Social Determinants of Health     Financial Resource Strain: Low Risk  (2023)    Overall Financial Resource Strain (CARDIA)     Difficulty of Paying Living Expenses: Not hard at all   Food Insecurity: No Food Insecurity (2023)    Hunger Vital Sign     Worried About Running Out of Food in the Last Year: Never true     Ran Out of Food in the Last Year: Never true   Transportation Needs: No Transportation Needs (2023)    PRAPARE - Transportation     Lack of Transportation (Medical): No     Lack of Transportation (Non-Medical): No   Physical Activity: Sufficiently Active (2024)    Exercise Vital Sign     Days of Exercise per Week: 4 days     Minutes of Exercise per Session: 40 min   Stress: Stress Concern Present (2024)    [employer] - Occupational Stress Questionnaire     Feeling of Stress : To some extent   Social Connections: Unknown (2024)    Social Connection and Isolation Panel [NHANES]     Frequency of Communication with Friends and Family: More than three times a week     Frequency of Social Gatherings with Friends and Family: Twice a  week     Active Member of Clubs or Organizations: No     Attends Club or Organization Meetings: Never     Marital Status:    Housing Stability: Low Risk  (11/7/2023)    Housing Stability Vital Sign     Unable to Pay for Housing in the Last Year: No     Number of Places Lived in the Last Year: 1     Unstable Housing in the Last Year: No     Past Surgical History:   Procedure Laterality Date    APPENDECTOMY  2006    CARPAL TUNNEL RELEASE Right 09/15/2022    Procedure: RELEASE, CARPAL TUNNEL;  Surgeon: Christen Marshall MD;  Location: [facility] OR;  Service: Orthopedics;  Laterality: Right;    COLONOSCOPY  01/22/2019    internal hemorrhoids, o/w normal - repeat at age 52    COLONOSCOPY N/A 12/21/2023    Procedure: COLONOSCOPY;  Surgeon: Teo Johnson MD;  Location: [facility];  Service: Endoscopy;  Laterality: N/A;    CORRECTION OF HAMMER TOE Left 8/14/2023    Procedure: CORRECTION, HAMMER TOE--  basic foot tray as well as a Reese microfree/microchoice tray. I also asked Sherry to bring an MIS mati.;  Surgeon: Ankush Back DPM;  Location: [facility] OR;  Service: Podiatry;  Laterality: Left;  reese drill, sagittal saw, foot set    CYSTOSCOPY  7/5/2023    Procedure: CYSTOSCOPY;  Surgeon: Chuckie Hall MD;  Location: [address];  Service: Urology;;    EPIDURAL STEROID INJECTION N/A 02/10/2021    Procedure: CERVICAL C6/7 NELI DIRECT REFERRAL;  Surgeon: Michi Escamilla MD;  [facility];  Service: Pain Management;  Laterality: N/A;  NEEDS CONSENT    EPIDURAL STEROID INJECTION INTO CERVICAL SPINE N/A 6/28/2023    Procedure: Injection-steroid-epidural-cervical C7-T1;  Surgeon: Lorraine Patel DO;  Location: [facility]IN [facility];  Service: Pain Management;  Laterality: N/A;  diabetic    EPIDURAL STEROID INJECTION INTO CERVICAL SPINE N/A 11/3/2023    Procedure: cervical NELI C7-T1;  Surgeon: Kolton Terrell MD;  Location: [facility];  Service: Pain Management;  Laterality: N/A;  diabetic     EPIDURAL STEROID INJECTION INTO CERVICAL SPINE N/A 4/17/2024    Procedure: VENECIA C7/T1;  Surgeon: Lorraine Patel DO;  Location: [facility];  Service: Pain Management;  Laterality: N/A;  20mins- Ozempic 7d- no ac    EPIDURAL STEROID INJECTION INTO LUMBAR SPINE N/A 9/27/2023    Procedure: L4-5 NELI;  Surgeon: Tirso Pickering MD;  Location: [facility];  Service: Pain Management;  Laterality: N/A;  15 mins    EPIDURAL STEROID INJECTION INTO LUMBAR SPINE N/A 3/6/2024    Procedure: L4-5 IL NELI;  Surgeon: Lorraine Patel DO;  Location: [facility];  Service: Pain Management;  Laterality: N/A;  20 mins    ESOPHAGOGASTRODUODENOSCOPY  01/22/2019    LA grade B esophagitis; esophageal stenosis- dilated; gastritis; H pylori pathology negative    ESOPHAGOGASTRODUODENOSCOPY N/A 05/18/2021    Procedure: EGD (ESOPHAGOGASTRODUODENOSCOPY);  Surgeon: Mariana Hector MD;  Location: [facility];  Service: Endoscopy;  Laterality: N/A;    ESOPHAGOGASTRODUODENOSCOPY N/A 12/21/2023    Procedure: EGD (ESOPHAGOGASTRODUODENOSCOPY);  Surgeon: Teo Johnson MD;  Location: [facility];  Service: Endoscopy;  Laterality: N/A;    ESOPHAGOGASTRODUODENOSCOPY N/A 2/22/2024    Procedure: EGD (ESOPHAGOGASTRODUODENOSCOPY);  Surgeon: Teo Johnson MD;  Location: [facility];  Service: Endoscopy;  Laterality: N/A;    EXCISION OF GANGLION OF WRIST Right 09/15/2022    Procedure: EXCISION, GANGLION CYST, WRIST;  Surgeon: Christen Marshall MD;  Location: [facility];  Service: Orthopedics;  Laterality: Right;    FLEXOR TENDON REPAIR Right 09/15/2022    Procedure: FLEXOR TENOSYNOVECTOMY;  Surgeon: Christen Marshall MD;  Location: [facility];  Service: Orthopedics;  Laterality: Right;    HAND ARTHROTOMY Right 09/15/2022    Procedure: ARTHROTOMY, HAND RADIOCARPAL;  Surgeon: Christen Marshall MD;  Location: [facility];  Service: Orthopedics;  Laterality: Right;  Radiocarpal    INJECTION OF JOINT Right 11/29/2023    Procedure: right hip IA  injection and Right GTB injection;  Surgeon: Lorraine Patel DO;  Location: [facility] PAIN MANAGEMENT;  Service: Pain Management;  Laterality: Right;    INJECTION OF STEROID Left 09/15/2022    Procedure: INJECTION, STEROID LEFT WRIST AND LEFT LATERAL ELBOW;  Surgeon: Christen Marshall MD;  Location: [facility] OR;  Service: Orthopedics;  Laterality: Left;  Left Carpal Tunnel CSI    LASER LITHOTRIPSY Left 7/5/2023    Procedure: LITHOTRIPSY, USING LASER;  Surgeon: Chuckie Hall MD;  Location: [facility] OR 1ST FLR;  Service: Urology;  Laterality: Left;    LUMBAR LAMINECTOMY  2010    LUMBAR LAMINECTOMY WITH DISCECTOMY Left 09/20/2021    Procedure: LAMINECTOMY, SPINE, LUMBAR, WITH DISCECTOMY;  Surgeon: Naseem Mcnamara DO;  Location: [facility] OR 2ND FLR;  Service: Neurosurgery;  Laterality: Left;  MIS L3-4    PH MONITORING, ESOPHAGUS, WIRELESS, (OFF REFLUX MEDS) N/A 12/21/2023    Procedure: PH MONITORING, ESOPHAGUS, WIRELESS, (OFF REFLUX MEDS);  Surgeon: Teo Johnson MD;  Location: [facility];  Service: Endoscopy;  Laterality: N/A;    RECONSTRUCTION OF LIGAMENT Left 12/5/2023    Procedure: RECONSTRUCTION, LIGAMENT LATERAL COLLATERAL;  Surgeon: Christen Marshall MD;  Location: [facility] OR;  Service: Orthopedics;  Laterality: Left;    REPAIR OF EXTENSOR TENDON Left 12/5/2023    Procedure: REPAIR, TENDON, EXTENSOR;  Surgeon: Christen Marshall MD;  Location: [facility] OR;  Service: Orthopedics;  Laterality: Left;    ROBOT-ASSISTED REPAIR OF HIATAL HERNIA N/A 3/14/2024    Procedure: ROBOTIC REPAIR, HERNIA, HIATAL;  Surgeon: Cody Winn MD;  Location: [facility] OR;  Service: General;  Laterality: N/A;    TYMPANOSTOMY TUBE PLACEMENT      URETERAL STENT PLACEMENT Left 7/5/2023    Procedure: INSERTION, STENT, URETER;  Surgeon: Chuckie Hall MD;  Location: [facility] OR 1ST FLR;  Service: Urology;  Laterality: Left;    URETEROSCOPIC REMOVAL OF URETERIC CALCULUS Left 7/5/2023    Procedure: REMOVAL, CALCULUS, URETER, URETEROSCOPIC;  Surgeon: Chuckie SMITH  MD Rafael;  Location: [facility] OR [address] [place] [address];  Service: Urology;  Laterality: Left;    URETEROSCOPY Left 7/5/2023    Procedure: URETEROSCOPY;  Surgeon: Chuckie Hall MD;  Location: [facility] OR [address] [place] [address];  Service: Urology;  Laterality: Left;     Family History   Problem Relation Name Age of Onset    Allergic rhinitis Mother      Hypertension Mother      Transient ischemic attack Mother      Sleep apnea Mother      Allergic rhinitis Father      Asthma Father      Chronic back pain Father      MARTITA disease Father      Sleep apnea Father      Melanoma Father      Allergic rhinitis Sister      Asthma Sister      Allergic rhinitis Sister      Asthma Sister      No Known Problems Brother      No Known Problems Maternal Aunt      No Known Problems Maternal Uncle      No Known Problems Paternal Aunt      No Known Problems Paternal Uncle      Brain cancer Maternal Grandmother      Diabetes Maternal Grandfather      Breast cancer Paternal Grandmother      No Known Problems Paternal Grandfather      Amblyopia Neg Hx      Blindness Neg Hx      Cancer Neg Hx      Cataracts Neg Hx      Glaucoma Neg Hx      Macular degeneration Neg Hx      Retinal detachment Neg Hx      Strabismus Neg Hx      Stroke Neg Hx      Thyroid disease Neg Hx      Allergies Neg Hx      Angioedema Neg Hx             Review of Systems  General: negative for chills, fever or weight loss  Psychological: negative for mood changes or depression  Ophthalmic: negative for blurry vision, photophobia or eye pain  ENT: see HPI  Respiratory: no cough, shortness of breath, or wheezing  Cardiovascular: no chest pain or dyspnea on exertion  Gastrointestinal: no abdominal pain, change in bowel habits, or black/ bloody stools  Musculoskeletal: negative for gait disturbance or muscular weakness  Neurological: no syncope or seizures; no ataxia  Dermatological: negative for pruritis,  rash and jaundice  Hematologic/lymphatic: no easy bruising, no new adenopathy      Physical  Exam:    Vitals:    04/30/24 0849   BP: 112/77   Pulse: 79         Constitutional:   He is oriented to person, place, and time. Vital signs are normal. He appears well-developed and well-nourished. He appears alert. He is cooperative.   Obese   Normal speech.      Head:  Normocephalic and atraumatic. Head is with TMJ tenderness (R>L, TTP rigth pterygoing area). Salivary glands normal.  Facial strength is normal.      Ears:  Hearing normal to normal and whispered voice; external ear normal without scars, lesions, or masses; ear canal, tympanic membrane, and middle ear normal., right ear hearing normal to normal and whispered voice; external ear normal without scars, lesions, or masses; ear canal, tympanic membrane, and middle ear normal. and left ear hearing normal to normal and whispered voice; external ear normal without scars, lesions, or masses; ear canal, tympanic membrane, and middle ear normal..   Right Ear: No drainage. No middle ear effusion.   Left Ear: No drainage.  No middle ear effusion.     Nose:  Mucosal edema and septal deviation (deviation to left anteriorly) present. No rhinorrhea or polyps. Turbinates abnormal and turbinate hypertrophy (3+, boggy, congested mucosa).  Right sinus exhibits no maxillary sinus tenderness and no frontal sinus tenderness. Left sinus exhibits no maxillary sinus tenderness and no frontal sinus tenderness.     Mouth/Throat  Oropharynx clear and moist without lesions or asymmetry, normal uvula midline, lips, teeth, and gums normal and oropharynx normal. No uvula swelling, oral lesions, trismus or mucous membrane lesions. No oropharyngeal exudate, posterior oropharyngeal edema or posterior oropharyngeal erythema. Tonsils present, +1.  Mirror exam not performed due to patient tolerance.  Mirror exam not performed due to patient tolerance.    Mallampati class 2      Neck:  Neck normal without thyromegaly masses, asymmetry, normal tracheal structure, crepitus, and tenderness,  thyroid normal, trachea normal, phonation normal, full range of motion with neck supple and no adenopathy. No JVD present. Carotid bruit is not present. No thyroid mass and no thyromegaly present.     He has no cervical adenopathy.     Cardiovascular:    Normal rate, regular rhythm and rate and rhythm, heart sounds, and pulses normal.              Pulmonary/Chest:   Effort and breath sounds normal.     Psychiatric:   He has a normal mood and affect. His speech is normal and behavior is normal.     Neurological:   He is alert and oriented to person, place, and time. He has neurological normal, alert and oriented. No cranial nerve deficit.     Skin:   No abrasions, lacerations, lesions, or rashes.       Laryngoscopy    Date/Time: 4/30/2024 9:00 AM    Performed by: Melody Liu MD  Authorized by: Melody Liu MD    Consent Done?:  Yes (Verbal)  Anesthesia:     Local anesthetic:  Topical anesthetic    Patient tolerance:  Patient tolerated the procedure well with no immediate complications    Decongestion performed?: Yes    Laryngoscopy:     Areas examined:  Nasal cavities, vocal cords, nasopharynx, oropharynx, hypopharynx and larynx  Nose Intranasal:      Mucosa no polyps     Mucosa ulcers not present     No mucosa lesions present     Septum gross deformity (dev to left and small spur narrowing middle meatus; +swelling of septal mucosa bilaterally)     Enlarged turbinates  Nasopharynx:      No mucosa lesions     Adenoids not present     Posterior choanae patent     Eustachian tube patent  Larynx/hypopharynx:      No epiglottis lesions     No epiglottis edema     No AE folds lesions     No vocal cord polyps     Equal and normal bilateral     No hypopharynx lesions     No piriform sinus pooling     No piriform sinus lesions     No post cricoid edema     No post cricoid erythema            Assessment:    ICD-10-CM ICD-9-CM    1. Deviated nasal septum  J34.2 470       2. Hypertrophy of both inferior nasal turbinates   J34.3 478.0       3. Allergic rhinitis, unspecified seasonality, unspecified trigger  J30.9 477.9       4. Bilateral temporomandibular joint pain  M26.623 524.62       5. Tinnitus aurium, bilateral  H93.13 388.31         The primary encounter diagnosis was Deviated nasal septum. Diagnoses of Hypertrophy of both inferior nasal turbinates, Allergic rhinitis, unspecified seasonality, unspecified trigger, Bilateral temporomandibular joint pain, and Tinnitus aurium, bilateral were also pertinent to this visit.      Plan:    Fractions on prevention of cerumen impaction.    We discussed septoplasty and turbinate reduction to help his nasal breathing.  Risks and benefits were discussed and he will come back for preop visit closer to surgical date.    He will try olopatadine and levo cetirizine to see if these off her any better relief of ongoing symptoms so that he may reduce Afrin use.    Continue subcutaneous immunotherapy.  We discussed TMJ and its treatments and a handout was given with recommendations.      Melody Liu MD

## 2024-04-30 NOTE — TELEPHONE ENCOUNTER
Established patient     called in regards to prescription refill    Such approved 1 month has a appointment for May 2nd    Left message to call clinic if any further question or concern    and if any acute concerns 911 or ER    D Post MD

## 2024-05-01 ENCOUNTER — PATIENT MESSAGE (OUTPATIENT)
Dept: SURGERY | Facility: CLINIC | Age: 44
End: 2024-05-01
Payer: COMMERCIAL

## 2024-05-01 ENCOUNTER — OFFICE VISIT (OUTPATIENT)
Dept: SURGERY | Facility: CLINIC | Age: 44
End: 2024-05-01
Payer: COMMERCIAL

## 2024-05-01 VITALS
HEART RATE: 102 BPM | SYSTOLIC BLOOD PRESSURE: 127 MMHG | WEIGHT: 238.13 LBS | DIASTOLIC BLOOD PRESSURE: 83 MMHG | BODY MASS INDEX: 39.62 KG/M2

## 2024-05-01 DIAGNOSIS — K62.89 ANAL IRRITATION: Primary | ICD-10-CM

## 2024-05-01 DIAGNOSIS — K62.5 BRBPR (BRIGHT RED BLOOD PER RECTUM): ICD-10-CM

## 2024-05-01 PROCEDURE — 3061F NEG MICROALBUMINURIA REV: CPT | Mod: CPTII,S$GLB,, | Performed by: NURSE PRACTITIONER

## 2024-05-01 PROCEDURE — 1160F RVW MEDS BY RX/DR IN RCRD: CPT | Mod: CPTII,S$GLB,, | Performed by: NURSE PRACTITIONER

## 2024-05-01 PROCEDURE — 99203 OFFICE O/P NEW LOW 30 MIN: CPT | Mod: S$GLB,,, | Performed by: NURSE PRACTITIONER

## 2024-05-01 PROCEDURE — 3008F BODY MASS INDEX DOCD: CPT | Mod: CPTII,S$GLB,, | Performed by: NURSE PRACTITIONER

## 2024-05-01 PROCEDURE — 3066F NEPHROPATHY DOC TX: CPT | Mod: CPTII,S$GLB,, | Performed by: NURSE PRACTITIONER

## 2024-05-01 PROCEDURE — 99999 PR PBB SHADOW E&M-EST. PATIENT-LVL IV: CPT | Mod: PBBFAC,,, | Performed by: NURSE PRACTITIONER

## 2024-05-01 PROCEDURE — 3074F SYST BP LT 130 MM HG: CPT | Mod: CPTII,S$GLB,, | Performed by: NURSE PRACTITIONER

## 2024-05-01 PROCEDURE — 3079F DIAST BP 80-89 MM HG: CPT | Mod: CPTII,S$GLB,, | Performed by: NURSE PRACTITIONER

## 2024-05-01 PROCEDURE — 1159F MED LIST DOCD IN RCRD: CPT | Mod: CPTII,S$GLB,, | Performed by: NURSE PRACTITIONER

## 2024-05-01 PROCEDURE — 3044F HG A1C LEVEL LT 7.0%: CPT | Mod: CPTII,S$GLB,, | Performed by: NURSE PRACTITIONER

## 2024-05-01 NOTE — PROGRESS NOTES
CRS Office Visit History and Physical    Referring Md:   Teo Johnson Md  [address]  [address]  [place] [address]    SUBJECTIVE:     Chief Complaint: anal fissure    History of Present Illness:  The patient is new patient to this practice.   Course is as follows:  Patient is a 43 y.o. male presents with anal excoriation noted during c scope he had 12/2023.  He feels the irritation MLA.   He denies having any more BRBPR currently, last noted 12/2023  Still feels a bump at anus.   Gets hemorrhoids here and there  No consistent Proctalgia  Has a BM every am and sometimes during the day  Takes fiber pills  Occasionally has a hard stool, can have pain then.   No IRVIN recently         Last Colonoscopy: 12/2023 for BRBPR Preparation of the colon was fair.                          - The examined portion of the ileum was normal.                          - Two 3 to 4 mm polyps in the rectum, removed with                          a cold snare. Resected and retrieved.                          - Diverticulosis in the sigmoid colon.                          - The examination was otherwise normal on direct                          and retroflexion views.                          - Perianal exam shows a small skin excoriation /                          tear posterior to the anus, possible site of                          bleeding on wiping.   Family history of colorectal cancer or IBD: no.    Review of patient's allergies indicates:  No Known Allergies    Past Medical History:   Diagnosis Date    ADD (attention deficit disorder) 05/09/2012    Allergy     Anxiety 04/12/2023    Asthma 05/09/2012    Cervical disc disorder with radiculopathy, unspecified cervical region 09/14/2021    Cervical spondylosis with myelopathy 06/29/2022    Eczema     Esophageal ulcer     GERD (gastroesophageal reflux disease) 05/09/2012    Glaucoma     Kidney stones 05/2014    Lumbar disc disease 05/09/2012    Lumbar herniated disc 05/09/2012     Malignant melanoma of skin, unspecified 01/06/2022    in situ right mid back    Melanoma 01/2022    in situ R mid back     LUBA (obstructive sleep apnea)     Radiculopathy, lumbar region 09/14/2021    Renal calculi 05/09/2012    Type 2 diabetes mellitus without complication, without long-term current use of insulin 11/16/2020     Past Surgical History:   Procedure Laterality Date    APPENDECTOMY  2006    CARPAL TUNNEL RELEASE Right 09/15/2022    Procedure: RELEASE, CARPAL TUNNEL;  Surgeon: Christen Marshall MD;  Location: [facility] OR;  Service: Orthopedics;  Laterality: Right;    COLONOSCOPY  01/22/2019    internal hemorrhoids, o/w normal - repeat at age 52    COLONOSCOPY N/A 12/21/2023    Procedure: COLONOSCOPY;  Surgeon: Teo Johnson MD;  Location: [facility];  Service: Endoscopy;  Laterality: N/A;    CORRECTION OF HAMMER TOE Left 8/14/2023    Procedure: CORRECTION, HAMMER TOE--  basic foot tray as well as a Reese microfree/microchoice tray. I also asked Sherry to bring an MIS mati.;  Surgeon: Ankush Back DPM;  Location: [facility] OR;  Service: Podiatry;  Laterality: Left;  reese drill, sagittal saw, foot set    CYSTOSCOPY  7/5/2023    Procedure: CYSTOSCOPY;  Surgeon: Chuckie Hall MD;  Location: [address];  Service: Urology;;    EPIDURAL STEROID INJECTION N/A 02/10/2021    Procedure: CERVICAL C6/7 NELI DIRECT REFERRAL;  Surgeon: Michi Escamilla MD;  Location: [facility] PAIN MGT;  Service: Pain Management;  Laterality: N/A;  NEEDS CONSENT    EPIDURAL STEROID INJECTION INTO CERVICAL SPINE N/A 6/28/2023    Procedure: Injection-steroid-epidural-cervical C7-T1;  Surgeon: Lorraine Patel DO;  Location: [facility] PAIN MANAGEMENT;  Service: Pain Management;  Laterality: N/A;  diabetic    EPIDURAL STEROID INJECTION INTO CERVICAL SPINE N/A 11/3/2023    Procedure: cervical NELI C7-T1;  Surgeon: Kolton Terrell MD;  Location: [facility] PAIN MANAGEMENT;  Service: Pain Management;  Laterality: N/A;  diabetic    EPIDURAL STEROID  INJECTION INTO CERVICAL SPINE N/A 4/17/2024    Procedure: VENECIA C7/T1;  Surgeon: Lorraine Patel DO;  Location: [facility] PAIN MANAGEMENT;  Service: Pain Management;  Laterality: N/A;  20mins- Ozempic 7d- no ac    EPIDURAL STEROID INJECTION INTO LUMBAR SPINE N/A 9/27/2023    Procedure: L4-5 NELI;  Surgeon: Tirso Pickering MD;  Location: [facility] PAIN MANAGEMENT;  Service: Pain Management;  Laterality: N/A;  15 mins    EPIDURAL STEROID INJECTION INTO LUMBAR SPINE N/A 3/6/2024    Procedure: L4-5 IL NELI;  Surgeon: Lorraine Patel DO;  Location: [facility] PAIN MANAGEMENT;  Service: Pain Management;  Laterality: N/A;  20 mins    ESOPHAGOGASTRODUODENOSCOPY  01/22/2019    LA grade B esophagitis; esophageal stenosis- dilated; gastritis; H pylori pathology negative    ESOPHAGOGASTRODUODENOSCOPY N/A 05/18/2021    Procedure: EGD (ESOPHAGOGASTRODUODENOSCOPY);  Surgeon: Mariana Hector MD;  Location: [facility];  Service: Endoscopy;  Laterality: N/A;    ESOPHAGOGASTRODUODENOSCOPY N/A 12/21/2023    Procedure: EGD (ESOPHAGOGASTRODUODENOSCOPY);  Surgeon: Teo Johnson MD;  Location: [facility];  Service: Endoscopy;  Laterality: N/A;    ESOPHAGOGASTRODUODENOSCOPY N/A 2/22/2024    Procedure: EGD (ESOPHAGOGASTRODUODENOSCOPY);  Surgeon: Teo Johnson MD;  Location: [facility];  Service: Endoscopy;  Laterality: N/A;    EXCISION OF GANGLION OF WRIST Right 09/15/2022    Procedure: EXCISION, GANGLION CYST, WRIST;  Surgeon: Christen Marshall MD;  Location: [facility];  Service: Orthopedics;  Laterality: Right;    FLEXOR TENDON REPAIR Right 09/15/2022    Procedure: FLEXOR TENOSYNOVECTOMY;  Surgeon: Christen Marshall MD;  Location: [facility];  Service: Orthopedics;  Laterality: Right;    HAND ARTHROTOMY Right 09/15/2022    Procedure: ARTHROTOMY, HAND RADIOCARPAL;  Surgeon: Christen Marshall MD;  Location: [facility];  Service: Orthopedics;  Laterality: Right;  Radiocarpal    INJECTION OF JOINT Right 11/29/2023    Procedure: right hip IA injection and  Right GTB injection;  Surgeon: Lorraine Patel DO;  Location: [facility] PAIN MANAGEMENT;  Service: Pain Management;  Laterality: Right;    INJECTION OF STEROID Left 09/15/2022    Procedure: INJECTION, STEROID LEFT WRIST AND LEFT LATERAL ELBOW;  Surgeon: Christen Marshall MD;  Location: [facility];  Service: Orthopedics;  Laterality: Left;  Left Carpal Tunnel CSI    LASER LITHOTRIPSY Left 7/5/2023    Procedure: LITHOTRIPSY, USING LASER;  Surgeon: Chuckie Hall MD;  Location: [facility] OR 1ST FLR;  Service: Urology;  Laterality: Left;    LUMBAR LAMINECTOMY  2010    LUMBAR LAMINECTOMY WITH DISCECTOMY Left 09/20/2021    Procedure: LAMINECTOMY, SPINE, LUMBAR, WITH DISCECTOMY;  Surgeon: Naseem Mcnamara DO;  Location: [facility] OR 2ND FLR;  Service: Neurosurgery;  Laterality: Left;  MIS L3-4    PH MONITORING, ESOPHAGUS, WIRELESS, (OFF REFLUX MEDS) N/A 12/21/2023    Procedure: PH MONITORING, ESOPHAGUS, WIRELESS, (OFF REFLUX MEDS);  Surgeon: Teo Johnson MD;  Location: [facility];  Service: Endoscopy;  Laterality: N/A;    RECONSTRUCTION OF LIGAMENT Left 12/5/2023    Procedure: RECONSTRUCTION, LIGAMENT LATERAL COLLATERAL;  Surgeon: Christen Marshall MD;  Location: [facility] OR;  Service: Orthopedics;  Laterality: Left;    REPAIR OF EXTENSOR TENDON Left 12/5/2023    Procedure: REPAIR, TENDON, EXTENSOR;  Surgeon: Christen Marshall MD;  Location: [facility] OR;  Service: Orthopedics;  Laterality: Left;    ROBOT-ASSISTED REPAIR OF HIATAL HERNIA N/A 3/14/2024    Procedure: ROBOTIC REPAIR, HERNIA, HIATAL;  Surgeon: Cody Winn MD;  Location: [facility] OR;  Service: General;  Laterality: N/A;    TYMPANOSTOMY TUBE PLACEMENT      URETERAL STENT PLACEMENT Left 7/5/2023    Procedure: INSERTION, STENT, URETER;  Surgeon: Chuckie Hall MD;  Location: [facility] OR 1ST FLR;  Service: Urology;  Laterality: Left;    URETEROSCOPIC REMOVAL OF URETERIC CALCULUS Left 7/5/2023    Procedure: REMOVAL, CALCULUS, URETER, URETEROSCOPIC;  Surgeon: Chuckie Hall MD;   Location: [facility] OR [address] [place] [address];  Service: Urology;  Laterality: Left;    URETEROSCOPY Left 2023    Procedure: URETEROSCOPY;  Surgeon: Chuckie Hall MD;  Location: [facility] OR [address] [place] [address];  Service: Urology;  Laterality: Left;     Family History   Problem Relation Name Age of Onset    Allergic rhinitis Mother      Hypertension Mother      Transient ischemic attack Mother      Sleep apnea Mother      Allergic rhinitis Father      Asthma Father      Chronic back pain Father      MARTITA disease Father      Sleep apnea Father      Melanoma Father      Allergic rhinitis Sister      Asthma Sister      Allergic rhinitis Sister      Asthma Sister      No Known Problems Brother      No Known Problems Maternal Aunt      No Known Problems Maternal Uncle      No Known Problems Paternal Aunt      No Known Problems Paternal Uncle      Brain cancer Maternal Grandmother      Diabetes Maternal Grandfather      Breast cancer Paternal Grandmother      No Known Problems Paternal Grandfather      Amblyopia Neg Hx      Blindness Neg Hx      Cancer Neg Hx      Cataracts Neg Hx      Glaucoma Neg Hx      Macular degeneration Neg Hx      Retinal detachment Neg Hx      Strabismus Neg Hx      Stroke Neg Hx      Thyroid disease Neg Hx      Allergies Neg Hx      Angioedema Neg Hx       Social History     Tobacco Use    Smoking status: Former     Current packs/day: 0.00     Types: Cigarettes     Quit date: 2018     Years since quittin.0     Passive exposure: Never    Smokeless tobacco: Never   Substance Use Topics    Alcohol use: Yes     Alcohol/week: 3.0 standard drinks of alcohol     Types: 3 Drinks containing 0.5 oz of alcohol per week    Drug use: No        Review of Systems:  ROS    OBJECTIVE:     Vital Signs (Most Recent)  /83   Pulse 102   Wt 108 kg (238 lb 1.6 oz)   BMI 39.62 kg/m²     Physical Exam:  General: White male in no distress   Neuro: Alert and oriented to person, place, and time.  Moves all extremities.     HEENT: No  icterus.  Trachea midline  Respiratory: Respirations are even and unlabored, no cough or audible wheezing  Skin: Warm dry and intact, No visible rashes, no jaundice    Labs reviewed today:  Lab Results   Component Value Date    WBC 5.13 04/09/2024    HGB 15.7 04/09/2024    HCT 48.3 04/09/2024     04/09/2024    CHOL 131 04/09/2024    TRIG 221 (H) 04/09/2024    HDL 35 (L) 04/09/2024    ALT 39 04/09/2024    AST 25 04/09/2024     04/09/2024    K 4.1 04/09/2024     04/09/2024    CREATININE 0.8 04/09/2024    BUN 16 04/09/2024    CO2 25 04/09/2024    TSH 1.004 04/09/2024    PSA 0.18 10/05/2022    INR 1.0 09/19/2021    HGBA1C 5.4 04/09/2024       Endoscopy reviewed today:  See HPI    Anorectal Exam:    Anal Skin: circumferential perianal erythema w excoriation noted MLA    Digital Rectal Exam:  deferred      ASSESSMENT/PLAN:     Diagnoses and all orders for this visit:    Anal irritation    BRBPR (bright red blood per rectum)  -     Ambulatory referral/consult to Colorectal Surgery    43 y.o. M here for anal irritation, rash on exam. Likely from excessive moisture (sweating, wet wipe use).  We discussed cotton ball application, avoiding irritating foods, continue fiber and calmoseptine. If no improvement in a mo pt to RTC  Literature of above info provided.    F/u PRN    BIJU Medley  Colon and Rectal Surgery

## 2024-05-02 ENCOUNTER — HOSPITAL ENCOUNTER (OUTPATIENT)
Dept: RADIOLOGY | Facility: HOSPITAL | Age: 44
Discharge: HOME | End: 2024-05-02
Attending: NEUROLOGICAL SURGERY
Payer: COMMERCIAL

## 2024-05-02 ENCOUNTER — OFFICE VISIT (OUTPATIENT)
Dept: PSYCHIATRY | Facility: CLINIC | Age: 44
End: 2024-05-02
Payer: COMMERCIAL

## 2024-05-02 ENCOUNTER — PATIENT MESSAGE (OUTPATIENT)
Dept: PAIN MEDICINE | Facility: CLINIC | Age: 44
End: 2024-05-02
Payer: COMMERCIAL

## 2024-05-02 DIAGNOSIS — G47.33 OSA (OBSTRUCTIVE SLEEP APNEA): ICD-10-CM

## 2024-05-02 DIAGNOSIS — F90.0 ADHD (ATTENTION DEFICIT HYPERACTIVITY DISORDER), INATTENTIVE TYPE: Primary | ICD-10-CM

## 2024-05-02 DIAGNOSIS — M54.16 LUMBAR RADICULOPATHY, CHRONIC: ICD-10-CM

## 2024-05-02 DIAGNOSIS — M54.9 DORSALGIA, UNSPECIFIED: ICD-10-CM

## 2024-05-02 DIAGNOSIS — G47.00 INSOMNIA, UNSPECIFIED TYPE: ICD-10-CM

## 2024-05-02 DIAGNOSIS — E11.9 TYPE 2 DIABETES MELLITUS WITHOUT COMPLICATION, UNSPECIFIED WHETHER LONG TERM INSULIN USE: ICD-10-CM

## 2024-05-02 DIAGNOSIS — F39 UNSPECIFIED MOOD (AFFECTIVE) DISORDER: ICD-10-CM

## 2024-05-02 DIAGNOSIS — M54.12 CERVICAL RADICULOPATHY: ICD-10-CM

## 2024-05-02 DIAGNOSIS — Z87.442 HISTORY OF URINARY STONE: ICD-10-CM

## 2024-05-02 DIAGNOSIS — F41.9 ANXIETY: ICD-10-CM

## 2024-05-02 PROCEDURE — 3061F NEG MICROALBUMINURIA REV: CPT | Mod: CPTII,95,, | Performed by: PSYCHIATRY & NEUROLOGY

## 2024-05-02 PROCEDURE — 99214 OFFICE O/P EST MOD 30 MIN: CPT | Mod: 95,,, | Performed by: PSYCHIATRY & NEUROLOGY

## 2024-05-02 PROCEDURE — 72131 CT LUMBAR SPINE W/O DYE: CPT | Mod: 26,,, | Performed by: RADIOLOGY

## 2024-05-02 PROCEDURE — 3066F NEPHROPATHY DOC TX: CPT | Mod: CPTII,95,, | Performed by: PSYCHIATRY & NEUROLOGY

## 2024-05-02 PROCEDURE — 72131 CT LUMBAR SPINE W/O DYE: CPT | Mod: TC

## 2024-05-02 PROCEDURE — 3044F HG A1C LEVEL LT 7.0%: CPT | Mod: CPTII,95,, | Performed by: PSYCHIATRY & NEUROLOGY

## 2024-05-02 PROCEDURE — 90833 PSYTX W PT W E/M 30 MIN: CPT | Mod: 95,,, | Performed by: PSYCHIATRY & NEUROLOGY

## 2024-05-02 PROCEDURE — 72114 X-RAY EXAM L-S SPINE BENDING: CPT | Mod: TC

## 2024-05-02 PROCEDURE — 72114 X-RAY EXAM L-S SPINE BENDING: CPT | Mod: 26,,, | Performed by: RADIOLOGY

## 2024-05-02 RX ORDER — LAMOTRIGINE 100 MG/1
100 TABLET ORAL 2 TIMES DAILY
Qty: 180 TABLET | Refills: 1 | Status: SHIPPED | OUTPATIENT
Start: 2024-05-02 | End: 2024-10-29

## 2024-05-02 RX ORDER — DEXMETHYLPHENIDATE HYDROCHLORIDE 10 MG/1
10 TABLET ORAL 2 TIMES DAILY
Qty: 60 TABLET | Refills: 0 | Status: SHIPPED | OUTPATIENT
Start: 2024-06-28 | End: 2024-07-28

## 2024-05-02 RX ORDER — LORAZEPAM 0.5 MG/1
0.5 TABLET ORAL DAILY PRN
Qty: 30 TABLET | Refills: 3 | Status: SHIPPED | OUTPATIENT
Start: 2024-05-10 | End: 2024-09-07

## 2024-05-02 RX ORDER — DEXMETHYLPHENIDATE HYDROCHLORIDE 10 MG/1
10 TABLET ORAL 2 TIMES DAILY
Qty: 60 TABLET | Refills: 0 | Status: SHIPPED | OUTPATIENT
Start: 2024-05-30 | End: 2024-06-29

## 2024-05-02 RX ORDER — DEXMETHYLPHENIDATE HYDROCHLORIDE 10 MG/1
10 TABLET ORAL 2 TIMES DAILY
Qty: 60 TABLET | Refills: 0 | Status: SHIPPED | OUTPATIENT
Start: 2024-07-29 | End: 2024-08-28

## 2024-05-02 NOTE — PATIENT INSTRUCTIONS
"  PLAN:     Go SELLERS flowed request to scheduling for Follow UP Aug 27 2024 @ 2pm  TELEHEALTH TELEHEALTH  THO PATIENT  NEEDS TO CONFIRM APPOINTMENT  by seeing such appointment  appear on their "My Chart" michael.      NOTE:  IF  appointment is not visible, THEN patient is  instructed to call [facility] ([facility]) at:  [phone]  and coordinate appointment scheduling with . Understanding Expressed.      Meds:   Renew as prior ( note ativan replaced ambien)  Note: He tapered off all Lexapro  (from  5 mg to off)     Have encouraged to establish with counselor:    Pt may call [facility] at [phone] and requests counselor;  pt was informed that there may be significant ait times involved.    As such patient was also told of alternatives such as:  1) contacting their  insurance carrier for a list of counselors  And / or  2) may explore website Psychology Today: [email or web address]     References:     Relaxation stress reduction workbook: AMI Davenport PhD ( used: $7-10)    Feeling Good Website: Ashwin Anderson MD / www.[employer] website (free) / alexey. PODCASTS    Anxiety &  phobia workbook by JORGE Carlisle PhD  (web retailers: used: $ 7-10)    [place]: Path to Better Sleep : https://www.veterantraining.[place].gov/insomnia/ (free)       Pt expressed appreciation for the visit today and did not have further question at this time though pt  was still informed to:     Call  if problems.    Call / Report Side Effects to Go SELLERS     Encouraged to follow up with primary care / Gen Med MD for continued monitoring of general health and wellness.    Understanding was expressed; and no further concerns nor questions were raised at this time.     Remember healthy self care:   eat right  attempt adequate rest   HANDWASHING / encourage such alexey. During this corona virus time   walk or light exercise within reason and as your general med team approves  read or explore any of " reference materials / homework mentioned  reach out (I.e.,  connect with)  others who nuture and bring out best in you  avoid risky behaviors    Keep your appointments:    IF you  cannot make your appt THEN please call [phone] or go online (via My Chart michael) to reschedule.    It is the responsibility of the patient to reschedule an appointment if an appointment has been canceled or missed.    Avoid  alcohol and illicit substances.  Look for the positive.  All is often relative-seek balance  Call sooner if needed : [phone]   Call 151 or go to Emergency Room  (ER)  if  any acute concerns

## 2024-05-02 NOTE — PROGRESS NOTES
Hermelindo Garza [facility]   1980 05/02/2024     Disclaimer: Evaluation and treatment is based on information presented to date. Any new information may affect assessment and findings.     The patient location is: Patient's home  and reported  that his/her location at the time of this visit was in the [facility]     Visit type: Virtual visit with synchronous audio and video     Each patient to whom he or she provides medical services by telemedicine is: (1) informed of the relationship between the physician and patient and the respective role of any other health care provider with respect to management of the patient; and (2) notified that he or she may decline to receive medical services by telemedicine and may withdraw from such care at any time.    Patient was informed that I am a physician who is licensed in the [facility]:  Ashwin Donahue MD:  Employed by   [employer]     If technology issues arise: MERRY Donahue MD will attempt to call pt back     Pt informed that if he / she is ever in crisis (or has acute concerns): pt is instructed to Dial 911 or go to nearest Emergency Room (ER)    Pt informed that if questions related to privacy practices: pt is instructed to contact [employer] Information Department:     Understanding Expressed. No questions.     Note: said picked up Focalin YESTERDAY 5-1-24 / tho not (yet,?) refl;ected [facility]     S: Patient's Own Perception of Condition (& Side Effects) :  no rash / he did get back on ozempic / primary care increased his success see weight table BELOW ;  also walking regularly Svetlana fisher       O:      CURRENT PRESENTATION:     Says doing ok / smiling pleasant ; less anxious     Tapered off Lexapro     Likes Lamictal at 200 mg daily  / no rash    Walking and has lost weight    Anxiety scores cut in half    May 2 2024 looking forward to Apply[place] cruise [place] to [place] and back    Psyc Meds   Lamictal remains  200 mg /   he  tapered off  Lexapro /  he moved to 5 mg (from 10mg) as we had discussed past he opted to remain on such / likes this regimen   Ativan 0.5 mg daily / as needed signif aniety  / uses at times at night for insomnia   Focalin 10 mg twice daily    Says anxiety improved     Constitutional Health Concerns: back pain Sees Anders SELL[facility] / started 2010    Says does aquatherapy now then    Also taking allergy shots weekly main campus x 9 months         5/2/2024    11:01 AM 11/8/2023    10:17 AM 5/11/2023     6:10 PM   GAD7   1. Feeling nervous, anxious, or on edge? 1 1 1   2. Not being able to stop or control worrying? 2 2 3   3. Worrying too much about different things? 1 1 1   4. Trouble relaxing? 1 2 3   5. Being so restless that it is hard to sit still? 0 1 2   6. Becoming easily annoyed or irritable? 1 1 2   7. Feeling afraid as if something awful might happen? 0 0 2   8. If you checked off any problems, how difficult have these problems made it for you to do your work, take care of things at home, or get along with other people? 1 1 2   GODWIN-7 Score 6 8 14          5/2/2024    11:02 AM 4/22/2024     8:34 AM 11/8/2023    10:18 AM 10/12/2023     8:51 AM 3/2/2023     2:14 PM 6/29/2022    11:04 AM   Depression Patient Health Questionnaire   Over the last two weeks how often have you been bothered by little interest or pleasure in doing things Several days Not at all Several days Not at all Not at all Not at all   Over the last two weeks how often have you been bothered by feeling down, depressed or hopeless Several days Not at all Not at all Not at all Not at all Not at all   PHQ-2 Total Score 2 0 1 0 0 0   Over the last two weeks how often have you been bothered by trouble falling or staying asleep, or sleeping too much More than half the days  More than half the days      Over the last two weeks how often have you been bothered by feeling tired or having little energy Several days  Several days      Over the last two weeks how  often have you been bothered by a poor appetite or overeating Not at all  Not at all      Over the last two weeks how often have you been bothered by feeling bad about yourself - or that you are a failure or have let yourself or your family down Several days  Not at all      Over the last two weeks how often have you been bothered by trouble concentrating on things, such as reading the newspaper or watching television Several days  Several days      Over the last two weeks how often have you been bothered by moving or speaking so slowly that other people could have noticed. Or the opposite - being so fidgety or restless that you have been moving around a lot more than usual. More than half the days  More than half the days      Over the last two weeks how often have you been bothered by thoughts that you would be better off dead, or of hurting yourself Not at all  Not at all      If you checked off any problems, how difficult have these problems made it for you to do your work, take care of things at home or get along with other people? Somewhat difficult  Not difficult at all      PHQ-9 Score 9  7      PHQ-9 Interpretation Mild  Mild        [facility] excerpt pain mngt  Comments Medication: says ambien not working / asks to remove; says ativan  helps with anxiety insomnia / says has been on in past at low dose and helps ; asks to switch to that; risk benefit discussed; says not taking opioids tho may have been Rx in past. Did caution him as to such; says he well aware     Pain // May 2 2024 //  now 3 // when Pain 7 or 8    Wt Readings from Last 12 Encounters:   05/01/24 108 kg (238 lb 1.6 oz)   04/30/24 108.2 kg (238 lb 8.6 oz)   04/22/24 107.4 kg (236 lb 12.4 oz)   04/17/24 108 kg (238 lb)   03/28/24 109.1 kg (240 lb 10.1 oz)   03/27/24 109.5 kg (241 lb 6.5 oz)   03/14/24 118 kg (260 lb 2.3 oz)   03/06/24 108.9 kg (240 lb)   02/22/24 108.9 kg (240 lb)   01/31/24 116.6 kg (257 lb 0.9 oz)   12/29/23  113.9 kg (250 lb 15.9 oz)   12/21/23 113.4 kg (250 lb)      Laboratory Data     Date/Time Component Value Flag Lab Status   04/05/23 0720 TSH 1.657 -- Final result   10/05/22 0718 PSA 0.18 -- Final result   10/11/23 1124 HDL 40 -- Final result   10/11/23 1124 CHOL 169 -- Final result   10/11/23 1124 TRIG 345 Important  High Final result   10/11/23 1124 LDLCALC 60.0 Important  Low Final result   10/11/23 1124 CHOLHDL 23.7 -- Final result   10/11/23 1124 NONHDLCHOL 129 -- Final result   10/11/23 1124 TOTALCHOLEST 4.2 -- Final result   10/11/23 1124 HGBA1C 5.4 -- Final result   07/04/23 1143 COLORU Yellow -- Final result   07/04/23 1143 APPEARANCEUA Clear -- Final result   07/04/23 1143 SPECGRAV >=1.030 Important  Abnormal Final result   07/04/23 1143 PHUR 5.0 -- Final result   07/04/23 1143 KETONESU Trace Important  Abnormal Final result   07/04/23 1143 OCCULTUA 1+ Important  Abnormal Final result   07/04/23 1143 NITRITE Negative -- Final result   05/13/14 1512 UROBILINOGEN Negative -- Final result   01/02/20 0936 RAPFLUA Negative -- Final result   01/02/20 0936 RAPFLUB Negative -- Final result   12/21/23 0747 POCGLU 105 -- Final result   09/19/21 2023 INR 1.0 -- Final result   01/02/20 0928 RAPSCRN Negative -- Final result   07/04/23 1143 LEUKOCYTESUR Negative -- Final result   07/04/23 2317 WBC 13.22 Important  High Final result   07/04/23 2317 RBC 5.10 -- Final result   07/04/23 2317 HGB 15.2 -- Final result   07/04/23 2317 HCT 45.2 -- Final result   07/04/23 2317 MCH 29.8 -- Final result   07/04/23 2317 RDW 13.5 -- Final result   07/04/23 2317  -- Final result   07/04/23 2317 MPV 10.2 -- Final result   10/11/23 1124 GLU 84 -- Final result   10/11/23 1124 BUN 18 -- Final result   10/11/23 1124 CREATININE 0.7 -- Final result   10/11/23 1124 CALCIUM 9.5 -- Final result   10/11/23 1124  -- Final result   10/11/23 1124 K 4.1 -- Final result   10/11/23 1124  -- Final result   10/11/23 1124 PROT 7.1 --  Final result   10/11/23 1124 ALBUMIN 4.3 -- Final result   10/11/23 1124 BILITOT 0.7 -- Final result   10/11/23 1124 AST 24 -- Final result   10/11/23 1124 ALKPHOS 94 -- Final result   10/11/23 1124 CO2 26 -- Final result   10/11/23 1124 ALT 39 -- Final result   10/11/23 1124 ANIONGAP 10 -- Final result   04/05/22 0720 EGFRNONAA >60.0 -- Final result   04/05/22 0720 ESTGFRAFRICA >60.0 -- Final result   02/19/20 0723 MG 2.1 -- Final result   07/04/23 2317 MCV 89 -- Final result   04/12/23 1124 CRP 3.8 -- Final result     Mental Status Exam:      Appearance: casual   Oriented: x 3   Attitude: cooperative   Eye Contact: good  Behavior: calm  smiling    Mood: feeling ok  Cognition: alert  Concentration: grossly intact   Affect: appropriate range      Anxiety: mild       Thought Process: goal directed     Speech:       Volume : WNL       Quantity WNL       Quality: appears to openly answer questions      Threats: no SI / no HI     Psychosis: denies all      Estimate of Intellectual Function: average   Impulse Control: no thoughts of harm to self/ others      Musculoskeletal:  no tremor      Patient Active Problem List   Diagnosis    Gastroesophageal reflux disease    Renal calculi    Mild intermittent asthma without complication    ADHD (attention deficit hyperactivity disorder), inattentive type    LUBA (obstructive sleep apnea)    Hyperlipidemia associated with type 2 diabetes mellitus    Hypertriglyceridemia    Elevated liver enzymes    SHEIKH (nonalcoholic steatohepatitis)    Hepatosplenomegaly    Cellulitis of left arm    History of multiple allergies    Type 2 diabetes mellitus without complications    Vitamin D insufficiency    Restless leg    Chronic pain    Pain in left elbow    Cervical disc disorder with radiculopathy, unspecified cervical region    History of urinary stone    Lumbar radiculopathy, chronic    Tachycardia    S/P lumbar discectomy    Morbid obesity    Cervical spondylosis    History of melanoma in  situ    Anxiety    Mood Disorder Unspecifed:  Depressed THO also has some mood elevation  (8of 13 on MDQ): ex: irritability, more self confident, thoughts race. more active)     Rotator cuff tendonitis, left    Left lateral epicondylitis    Cervical radiculopathy    Insomnia    Ureteral stone with hydronephrosis    Pain of left lower extremity    Neck pain    Chronic low back pain          Current Outpatient Medications:     albuterol (PROVENTIL/VENTOLIN HFA) 90 mcg/actuation inhaler, INHALE 2 PUFFS INTO THE LUNGS EVERY 6 HOURS AS NEEDED WHEEZING, Disp: 8.5 g, Rfl: 5    atorvastatin (LIPITOR) 20 MG tablet, Take 1 tablet (20 mg total) by mouth once daily., Disp: 90 tablet, Rfl: 3    blood sugar diagnostic (TRUE METRIX GLUCOSE TEST STRIP) Strp, Use to test blood glucose one (1) time a day,, Disp: 100 strip, Rfl: 3    blood-glucose meter Misc, use as directed, Disp: 1 each, Rfl: 0    cyclobenzaprine (FLEXERIL) 10 MG tablet, Take 10 mg by mouth 3 (three) times daily., Disp: , Rfl:     dexmethylphenidate (FOCALIN) 10 MG tablet, Take 1 tablet (10 mg total) by mouth 2 (two) times daily., Disp: 60 tablet, Rfl: 0    [START ON 5/30/2024] dexmethylphenidate (FOCALIN) 10 MG tablet, Take 1 tablet (10 mg total) by mouth 2 (two) times daily., Disp: 60 tablet, Rfl: 0    [START ON 6/28/2024] dexmethylphenidate (FOCALIN) 10 MG tablet, Take 1 tablet (10 mg total) by mouth 2 (two) times daily., Disp: 60 tablet, Rfl: 0    [START ON 7/29/2024] dexmethylphenidate (FOCALIN) 10 MG tablet, Take 1 tablet (10 mg total) by mouth 2 (two) times daily., Disp: 60 tablet, Rfl: 0    ergocalciferol, vitamin D2, (VITAMIN D ORAL), Take 1 tablet by mouth every other day., Disp: , Rfl:     fluocinonide (LIDEX) 0.05 % external solution, AAA scalp qday prn itching, scaling, Disp: 60 mL, Rfl: 3    gabapentin (NEURONTIN) 300 MG capsule, Take 1 capsule (300 mg total) by mouth 3 (three) times daily., Disp: 90 capsule, Rfl: 3    hydrocortisone 2.5 % cream, APPLY  EXTERNALLY TO THE AFFECTED AREA TWICE DAILY FOR 10 DAYS, Disp: 30 g, Rfl: 0    ketoconazole (NIZORAL) 2 % cream, Apply 1 application  topically 2 (two) times daily. Apply to affected area up to 2 weeks, Disp: 15 g, Rfl: 0    lamoTRIgine (LAMICTAL) 100 MG tablet, Take 1 tablet (100 mg total) by mouth 2 (two) times daily. IF any RASH, STOP ALL Lamictal and Tell Psmarilyn SELLERS., Disp: 180 tablet, Rfl: 1    lancets (ONETOUCH DELICA PLUS LANCET) 33 gauge [facility], Use to test blood glucose one (1) time a day,, Disp: 100 each, Rfl: 5    latanoprost 0.005 % ophthalmic solution, Place 1 drop into both eyes every evening., Disp: 7.5 mL, Rfl: 3    levocetirizine (XYZAL) 5 MG tablet, Take 1 tablet (5 mg total) by mouth every evening., Disp: 30 tablet, Rfl: 11    [START ON 5/10/2024] LORazepam (ATIVAN) 0.5 MG tablet, Take 1 tablet (0.5 mg total) by mouth daily as needed (SIGNIFICANT ANXIETY)., Disp: 30 tablet, Rfl: 3    meloxicam (MOBIC) 15 MG tablet, TAKE 1 TABLET(15 MG) BY MOUTH EVERY DAY as needed FOR PAIN, Disp: 30 tablet, Rfl: 2    montelukast (SINGULAIR) 10 mg tablet, Take 1 tablet (10 mg total) by mouth every evening., Disp: 90 tablet, Rfl: 3    olopatadine (PATANASE) 0.6 % nasal spray, 1 spray by Each Nostril route 2 (two) times daily., Disp: 30.5 g, Rfl: 5    ondansetron (ZOFRAN-ODT) 8 MG TbDL, Dissolve 1 tablet (8 mg total) by mouth every 6 (six) hours as needed (nausea)., Disp: 30 tablet, Rfl: 2    rOPINIRole (REQUIP) 0.25 MG tablet, TAKE 1 TABLET(0.25 MG) BY MOUTH EVERY EVENING, Disp: 90 tablet, Rfl: 2    semaglutide (OZEMPIC) 2 mg/dose (8 mg/3 mL) PnIj, Inject 2 mg into the skin every 7 days., Disp: 3 mL, Rfl: 5    sildenafiL (VIAGRA) 50 MG tablet, Take 1 tablet (50 mg total) by mouth daily as needed for Erectile Dysfunction. Take 30 -60 minutes prior to sex, Disp: 9 tablet, Rfl: 2    ULTRA THIN LANCETS 30 gauge Misc, USE TO TEST BLOOD SUGAR EVERY DAY AS DIRECTED, Disp: 100 each, Rfl: 3  No current facility-administered  "medications for this visit.    Facility-Administered Medications Ordered in Other Visits:     0.9%  NaCl infusion, 500 mL, Intravenous, Continuous, Kolton Terrell MD    mupirocin 2 % ointment, , Nasal, On Call Procedure, Chong Padron MD, Given at 09/15/22 0555     Social History     Tobacco Use   Smoking Status Former    Current packs/day: 0.00    Types: Cigarettes    Quit date: 2018    Years since quittin.0    Passive exposure: Never   Smokeless Tobacco Never        Review of patient's allergies indicates:  No Known Allergies      Psychotherapy:  Target symptoms: depression, anxiety , daily stressors insomnia  Why chosen therapy is appropriate versus another modality: relevant to diagnosis  Outcome monitoring methods: self-report, observation, checklist/rating scale  Therapeutic intervention type: insight oriented psychotherapy, supportive psychotherapy  Topics discussed/themes:  anxiety mngt , insomnia, weight mngt stress mngt back  pain and caution lifting exercise  The patient's response to the intervention is accepting. The patient's progress toward treatment goals is good.   Duration of intervention: 16 minutes.     ASSESSMENT:   Encounter Diagnoses   Name Primary?    ADHD (attention deficit hyperactivity disorder), inattentive type Yes    Mood Disorder Unspecifed:  Depressed THO also has some mood elevation  (8of 13 on MDQ): ex: irritability, more self confident, thoughts race. more active)      Anxiety     History of urinary stone     Insomnia, unspecified type     Type 2 diabetes mellitus without complication, unspecified whether long term insulin use     Lumbar radiculopathy, chronic     Cervical radiculopathy        Patient Instructions     PLAN:     [facility] flowed request to scheduling for Follow UP Aug 27 2024 @ 2pm  TELEHEALTH TELEHEALTH  THO PATIENT  NEEDS TO CONFIRM APPOINTMENT  by seeing such appointment  appear on their "My Chart" michael.      NOTE:  IF  appointment is not visible, THEN " patient is  instructed to call [facility] ([facility]) at:  [phone]  and coordinate appointment scheduling with . Understanding Expressed.      Meds:   Renew as prior ( note ativan replaced ambien)  Note: He tapered off all Lexapro  (from  5 mg to off)     Have encouraged to establish with counselor:    Pt may call [facility] at [phone] and requests counselor;  pt was informed that there may be significant ait times involved.    As such patient was also told of alternatives such as:  1) contacting their  insurance carrier for a list of counselors  And / or  2) may explore website Psychology Today: www.TravelLine/us/therapists     References:     Relaxation stress reduction workbook: AMI Davenport PhD ( used: $7-10)    Feeling Good Website: Ashwin Anderson MD / www.[employer] website (free) / alexey. PODCASTS    Anxiety &  phobia workbook by JORGE Carlisle PhD  (web retailers: used: $ 7-10)    [place]: Path to Better Sleep : https://www.veterantraining.[place].gov/insomnia/ (free)       Pt expressed appreciation for the visit today and did not have further question at this time though pt  was still informed to:     Call  if problems.    Call / Report Side Effects to Psyc MD     Encouraged to follow up with primary care / Gen Med MD for continued monitoring of general health and wellness.    Understanding was expressed; and no further concerns nor questions were raised at this time.     Remember healthy self care:   eat right  attempt adequate rest   HANDWASHING / encourage such alexey. During this corona virus time   walk or light exercise within reason and as your general med team approves  read or explore any of reference materials / homework mentioned  reach out (I.e.,  connect with)  others who nuture and bring out best in you  avoid risky behaviors    Keep your appointments:    IF you  cannot make your appt THEN please call [phone] or go online (via My  Chart michael) to reschedule.    It is the responsibility of the patient to reschedule an appointment if an appointment has been canceled or missed.    Avoid  alcohol and illicit substances.  Look for the positive.  All is often relative-seek balance  Call sooner if needed : [phone]   Call 911 or go to Emergency Room  (ER)  if  any acute concerns  >>  References:     Relaxation stress reduction workbook: AMI Davenport PhD ( used: $7-10)    Feeling Good Website: Ashwin Anderson MD / www.[employer] website (free) / alexey. PODCASTS    Anxiety &  phobia workbook by JORGE Carlisle PhD  (web retailers: used: $ 7-10)    [place]: Path to Better Sleep : https://www.veterantraining.[place].gov/insomnia/ (free)    [place] Quit with Us [place]: http://www.quitwithusla.org/    (and other resources as per counselor, if applicable)     Pt expressed appreciation for the visit today and did not have further question at this time though pt  was still informed to:     Call / Report Side Effects to Go SELLERS     Encouraged to follow up with primary care / Gen Med MD for continued monitoring of general health and wellness.    Understanding was expressed; and no further concerns nor questions were raised at this time.     remember healthy self care:   eat right  attempt adequate rest   HANDWASHING / encourage such alexey. During this corona virus time   walk or light exercise within reason and as your general med team approves  read or explore any of reference materials / homework mentioned  reach out (I.e.,  connect with)  others who nuture and bring out best in you  avoid risky behaviors  keep your appointments  IF you  cannot make your appt THEN please call or go online to reschedule.  avoid  alcohol and illicit substances.  Look for the positive.  All is often relative-seek balance  Call [facility]  if questions : [phone]   Call 911 or go to Emergency Room  (ER)  if any acute concern    >> Background from intial go alegriaal  <<    He  reports  to male. No children      Has under grad and master in Political science  from [employer] /      Works data mngt [employer]   Excerpt from [facility] Suzi Sanches MD note of 4-  :      43 yo male with DM2, HLD, obesity, GERD, SHEIKH, LUBA, ADHD, asthma, spondylosis, hx of melanoma in situ presents for annual. Pt is new to me. Last PCP Dr Beck.     DM2: ozempic 0.5 mg weekly. Is interested in stopping medication as A1c controlled and has had increasing nausea. Not sure if ozempic is causing it since he has been on it for years.   HLD: not on medication  LUBA: uses cpap  GERD: nexium 40 mg bid  Cervical spondylosis with myelopathy: continues to have chronic neck pain with radiation down both arms (now L side increased) also endorses weakness. Does see a spine surgeon. Reports he was told to also r/o rheumatoid arthritis. Pt denies having joint swelling   ADHD: on focalin. Sees psychiatry.   Anxiety: reports increased anxiety over the past few months. Having trouble sleeping. Has not been on SSRI      He was referred in as having started Lexapro 10 mg April 2023. Says helpnig some tho clearly still cites anxiety.      In past saw [facility] Psyc NP / of Excerpt 1-  Psyc ntake:     Patient with hx of pre-glaucoma (eye drops for this), DM type 2 (well controlled with Ozempic), asthma (intermittent, mild), HLD, GERD, insomnia, sleep apnea, ADHD, lumbar discectomy, chronic back pain (managed with gabapentin), ozempic for weight loss.       Reports had previously been a patient of Dr. Dayana Robertson for past 12 to 13 years but was referred to this provider due to Dr. Robertson's long term.      Patient reports a long history of ADHD, had trialed several medications per communications with Dr. Robertson however Focalin has been the most effective lately. Stopped this for a short while, but since working from home the need for this medication has increased.      Reports anxiety has been  "more increased since being depleted due to work constraints. Reports some worry late at night with rumination. Occurring randomly but "I do worry a lot."     Reports "I have a compulsion to control and things need to be the way I want them to be."      Also long history of LUBA, insomnia and recently completed BEBP program      Reports been implementing sleep hygiene via BEBP and CBT.      Reports 50# weight loss - this has helped with sleep apnea (settings have been reduced since losing weight).      Discussed to resume focalin IR at [facility] to [facility] pending UDS and CS form completion.     Denies SI/HI, AVH, racing thoughts, substance abuse           "

## 2024-05-03 ENCOUNTER — PATIENT MESSAGE (OUTPATIENT)
Dept: NEUROSURGERY | Facility: HOSPITAL | Age: 44
End: 2024-05-03
Payer: COMMERCIAL

## 2024-05-03 ENCOUNTER — PATIENT MESSAGE (OUTPATIENT)
Dept: NEUROSURGERY | Facility: CLINIC | Age: 44
End: 2024-05-03
Payer: COMMERCIAL

## 2024-05-03 ENCOUNTER — TELEPHONE (OUTPATIENT)
Dept: PAIN MEDICINE | Facility: CLINIC | Age: 44
End: 2024-05-03
Payer: COMMERCIAL

## 2024-05-03 DIAGNOSIS — M51.36 DDD (DEGENERATIVE DISC DISEASE), LUMBAR: ICD-10-CM

## 2024-05-03 DIAGNOSIS — M54.16 LUMBAR RADICULOPATHY, CHRONIC: Primary | ICD-10-CM

## 2024-05-03 DIAGNOSIS — G89.4 CHRONIC PAIN SYNDROME: ICD-10-CM

## 2024-05-03 RX ORDER — METHYLPREDNISOLONE 4 MG/1
TABLET ORAL
Qty: 21 EACH | Refills: 0 | Status: SHIPPED | OUTPATIENT
Start: 2024-05-03 | End: 2024-05-24

## 2024-05-03 NOTE — TELEPHONE ENCOUNTER
----- Message from JUAN CARLOS Cordoba sent at 5/3/2024 12:14 PM CDT -----  Regarding: Order for VIET ARCHER III    Patient Name: VIET ARCHER III([id number])  Sex: Male  : 1980      PCP: MATT GAMEZ    Center: [facility]     Types of orders made on 2024: Medications, Procedure Request    Order Date:5/3/2024  Ordering User:LEONARD DIETZ [[id number]]  Encounter Provider:Leonard Dietz FNP [[id number]]  Z  1 Authorizing Provider: Leonard Dietz FNP [[id number]]  Supervising Provider:KELSEY ESCOBAR [[id number]]  Type of Supervision:Collaborating Physician  Department:[facility] PAIN MANAGEMENT[[id number]]    Common Order Information  Procedure -> Epidural Injection (specify level) Cmt: L4-5    Pre-op Diagnosis -> Lumbar radiculopathy       -> Neuroforaminal stenosis of lumbar spine       -> Chronic pain syndrome     Order Specific Information    Order: Procedure Request Order for Pain Management [Custom: [id number]]  Order #:          [id number]: 1 FUTURE    Priority: Routine  Class: Clinic Performed    Future Order Information      Expires on:2025            Expected by:2024                   Associated Diagnoses      M54.16 Lumbar radiculopathy, chronic      M51.36 DDD (degenerative disc disease), lumbar      G89.4 Chronic p  ain syndrome      Physician -> Eduardter         Is patient on anti-coagulants? -> No         Facility Name: -> [facility]         Follow-up: -> 2 weeks           Priority: Routine  Class: Clinic Performed    Future Order Information      Expires on:2025            Expected by:2024                   Associated Diagnoses      M54.16 Lumbar radiculopathy, chronic      M51.36 DDD (degenerative disc disease), l  umbar      G89.4 Chronic pain syndrome      Procedure -> Epidural Injection (specify level) Cmt: L4-5        Physician -> Gelter         Is patient on anti-coagulants? -> No         Pre-op Diagnosis -> Lumbar radiculopathy           -> Neuroforaminal  stenosis of lumbar spine           -> Chronic pain syndrome         Facility Name: -> [place]         Follow-up: -> 2 weeks

## 2024-05-03 NOTE — TELEPHONE ENCOUNTER
----- Message from JUAN CARLOS Cordoba sent at 5/3/2024 12:14 PM CDT -----  Regarding: Order for VIET ARCHER III    Patient Name: VIET ARCHER III([id number])  Sex: Male  : 1980      PCP: MATT GAMEZ    Center: [facility]     Types of orders made on 2024: Medications, Procedure Request    Order Date:5/3/2024  Ordering User:LEONARD DIETZ [[id number]]  Encounter Provider:Leonard Dietz FNP [[id number]]  Z  1 Authorizing Provider: Leonard Dietz FNP [[id number]]  Supervising Provider:KELSEY ESCOBAR [[id number]]  Type of Supervision:Collaborating Physician  Department:[facility] PAIN MANAGEMENT[[id number]]    Common Order Information  Procedure -> Epidural Injection (specify level) Cmt: L4-5    Pre-op Diagnosis -> Lumbar radiculopathy       -> Neuroforaminal stenosis of lumbar spine       -> Chronic pain syndrome     Order Specific Information    Order: Procedure Request Order for Pain Management [Custom: [id number]]  Order #:          [id number]: 1 FUTURE    Priority: Routine  Class: Clinic Performed    Future Order Information      Expires on:2025            Expected by:2024                   Associated Diagnoses      M54.16 Lumbar radiculopathy, chronic      M51.36 DDD (degenerative disc disease), lumbar      G89.4 Chronic p  ain syndrome      Physician -> Eduardter         Is patient on anti-coagulants? -> No         Facility Name: -> [place]         Follow-up: -> 2 weeks           Priority: Routine  Class: Clinic Performed    Future Order Information      Expires on:2025            Expected by:2024                   Associated Diagnoses      M54.16 Lumbar radiculopathy, chronic      M51.36 DDD (degenerative disc disease), l  umbar      G89.4 Chronic pain syndrome      Procedure -> Epidural Injection (specify level) Cmt: L4-5        Physician -> Gelter         Is patient on anti-coagulants? -> No         Pre-op Diagnosis -> Lumbar radiculopathy           -> Neuroforaminal  stenosis of lumbar spine           -> Chronic pain syndrome         Facility Name: -> [facility]         Follow-up: -> 2 weeks

## 2024-05-03 NOTE — TELEPHONE ENCOUNTER
Please see the attached refill request.    Last seen 12/2023    History of melanoma in situ  Area of previous melanoma in situ  examined. Site well healed with no signs of recurrence.     Total body skin examination performed today including at least 12 points as noted in physical examination. No lesions suspicious for malignancy noted.     Recommend daily sun protection/avoidance, use of at least SPF 30, broad spectrum sunscreen (OTC drug), skin self examinations, and routine physician surveillance to optimize early detection     Skin tag  Skin sensation disturbance  Cryosurgery procedure note:     Verbal consent from the patient is obtained including, but not limited to, risk of hypopigmentation/hyperpigmentation, scar, recurrence of lesion. Liquid nitrogen cryosurgery is applied to 4 lesions to produce a freeze injury. The patient is aware that blisters may form and is instructed on wound care with gentle cleansing and use of vaseline ointment to keep moist until healed. The patient is supplied a handout on cryosurgery and is instructed to call if lesions do not completely resolve.     Lentigo  This is a benign hyperpigmented sun induced lesion. Recommend daily sun protection/avoidance and use of at least SPF 30, broad spectrum sunscreen (OTC drug) will reduce the number of new lesions. Treatment of these benign lesions are considered cosmetic.  The nature of sun-induced photo-aging and skin cancers is discussed.  Sun avoidance, protective clothing, and the use of 30-SPF sunscreens is advised. Observe closely for skin damage/changes, and call if such occurs.     Cherry angioma  These are benign vascular lesions that are inherited.  Treatment is not necessary.     SK (seborrheic keratosis)  These are benign inherited growths without a malignant potential. Reassurance given to patient. No treatment is necessary.      Nevus  Discussed ABCDE's of nevi.  Monitor for new mole or moles that are becoming bigger, darker,  irritated, or developing irregular borders. Brochure provided. Instructed patient to observe lesion(s) for changes and follow up in clinic if changes are noted. Patient to monitor skin at home for new or changing lesions.               Follow up in about 6 months (around 6/4/2024) for TBSE.

## 2024-05-06 ENCOUNTER — OFFICE VISIT (OUTPATIENT)
Dept: DERMATOLOGY | Facility: CLINIC | Age: 44
End: 2024-05-06
Payer: COMMERCIAL

## 2024-05-06 DIAGNOSIS — L30.9 DERMATITIS: Primary | ICD-10-CM

## 2024-05-06 DIAGNOSIS — J45.20 MILD INTERMITTENT ASTHMA WITHOUT COMPLICATION: ICD-10-CM

## 2024-05-06 PROCEDURE — 99213 OFFICE O/P EST LOW 20 MIN: CPT | Mod: 95,,, | Performed by: DERMATOLOGY

## 2024-05-06 PROCEDURE — 1159F MED LIST DOCD IN RCRD: CPT | Mod: CPTII,95,, | Performed by: DERMATOLOGY

## 2024-05-06 PROCEDURE — 3044F HG A1C LEVEL LT 7.0%: CPT | Mod: CPTII,95,, | Performed by: DERMATOLOGY

## 2024-05-06 PROCEDURE — 3061F NEG MICROALBUMINURIA REV: CPT | Mod: CPTII,95,, | Performed by: DERMATOLOGY

## 2024-05-06 PROCEDURE — 3066F NEPHROPATHY DOC TX: CPT | Mod: CPTII,95,, | Performed by: DERMATOLOGY

## 2024-05-06 PROCEDURE — 1160F RVW MEDS BY RX/DR IN RCRD: CPT | Mod: CPTII,95,, | Performed by: DERMATOLOGY

## 2024-05-06 RX ORDER — FLUOCINONIDE TOPICAL SOLUTION USP, 0.05% 0.5 MG/ML
SOLUTION TOPICAL
Qty: 60 ML | Refills: 3 | Status: SHIPPED | OUTPATIENT
Start: 2024-05-06

## 2024-05-06 RX ORDER — DOXYCYCLINE HYCLATE 100 MG
TABLET ORAL
Qty: 10 TABLET | Refills: 2 | Status: SHIPPED | OUTPATIENT
Start: 2024-05-06

## 2024-05-06 RX ORDER — CLOBETASOL PROPIONATE 0.5 MG/G
CREAM TOPICAL 2 TIMES DAILY
Qty: 30 G | Refills: 1 | Status: SHIPPED | OUTPATIENT
Start: 2024-05-06

## 2024-05-06 RX ORDER — PERMETHRIN 50 MG/G
CREAM TOPICAL
Qty: 120 G | Refills: 1 | Status: SHIPPED | OUTPATIENT
Start: 2024-05-06

## 2024-05-06 NOTE — PATIENT INSTRUCTIONS
Discussed benefits and risks of doxycyline therapy including but not limited to GI discomfort, esophageal irritation/ulceration, and increased sun sensitivity. Patient was counseled to take medicine with meals and at least 1 hour before lying down.     Use clobetasol 2-3 times /day for itching     Zyrtec at night

## 2024-05-06 NOTE — PROGRESS NOTES
Subjective:      Patient ID:  Hermelindo Garza III is a 43 y.o. male who presents for No chief complaint on file.    The patient location is: [place]  The chief complaint leading to consultation is: lesion     Visit type: audiovisual    Face to Face time with patient: 8 minutes  10 minutes of total time spent on the encounter, which includes face to face time and non-face to face time preparing to see the patient (eg, review of tests), Obtaining and/or reviewing separately obtained history, Documenting clinical information in the electronic or other health record, Independently interpreting results (not separately reported) and communicating results to the patient/family/caregiver, or Care coordination (not separately reported).         Each patient to whom he or she provides medical services by telemedicine is:  (1) informed of the relationship between the physician and patient and the respective role of any other health care provider with respect to management of the patient; and (2) notified that he or she may decline to receive medical services by telemedicine and may withdraw from such care at any time.    Notes:     Pt c/o Red lesions on arms and knees and trunk. Not resolving. Swell up and get red and irritated.  Was told to use topical steroid cream HC 2.5% , lidocaine, calamine.  Takes xyzal already. They eventually fade but then flare up again.  Minimal itching in between finger web spaces.  Pt has had scabies int he past and he feels his is different. Does have a dog that is in and out.  it not itchy. Came on very suddenly and quickly.      Pt has been getting allergy shots for about 9-10 months              Review of Systems   Skin:  Positive for itching and rash.   Allergic/Immunologic: Positive for environmental allergies.       Objective:   Physical Exam   Skin:   Areas Examined (abnormalities noted in diagram):   Abdomen Inspection Performed  RUE Inspected  LUE Inspection Performed                 Diagram Legend     Erythematous scaling macule/papule c/w actinic keratosis       Vascular papule c/w angioma      Pigmented verrucoid papule/plaque c/w seborrheic keratosis      Yellow umbilicated papule c/w sebaceous hyperplasia      Irregularly shaped tan macule c/w lentigo     1-2 mm smooth white papules consistent with Milia      Movable subcutaneous cyst with punctum c/w epidermal inclusion cyst      Subcutaneous movable cyst c/w pilar cyst      Firm pink to brown papule c/w dermatofibroma      Pedunculated fleshy papule(s) c/w skin tag(s)      Evenly pigmented macule c/w junctional nevus     Mildly variegated pigmented, slightly irregular-bordered macule c/w mildly atypical nevus      Flesh colored to evenly pigmented papule c/w intradermal nevus       Pink pearly papule/plaque c/w basal cell carcinoma      Erythematous hyperkeratotic cursted plaque c/w SCC      Surgical scar with no sign of skin cancer recurrence      Open and closed comedones      Inflammatory papules and pustules      Verrucoid papule consistent consistent with wart     Erythematous eczematous patches and plaques     Dystrophic onycholytic nail with subungual debris c/w onychomycosis     Umbilicated papule    Erythematous-base heme-crusted tan verrucoid plaque consistent with inflamed seborrheic keratosis     Erythematous Silvery Scaling Plaque c/w Psoriasis     See annotation            Assessment / Plan:        Dermatitis- folliculitis v arthropod   -     doxycycline (VIBRA-TABS) 100 MG tablet; Sig 1 po qd with food and not within 1 hour of bedtime  Dispense: 10 tablet; Refill: 2  -     clobetasoL (TEMOVATE) 0.05 % cream; Apply topically 2 (two) times daily. Aaa bid prn flare  Dispense: 30 g; Refill: 1    Discussed benefits and risks of doxycyline therapy including but not limited to GI discomfort, esophageal irritation/ulceration, and increased sun sensitivity. Patient was counseled to take medicine with meals and at least 1  hour before lying down.     Use clobetasol 2-3 times /day for itching     Zyrtec at night    Pt is going  overseeas in 10 days. Will also treat with Elimite to ensure not scabies as this was a virtual visit we could not do a scraping. Discussed to apply from the neck down, leave on over night, rinse off in am and repeat in 1 week         Follow up if symptoms worsen or fail to improve.

## 2024-05-07 ENCOUNTER — TELEPHONE (OUTPATIENT)
Dept: ALLERGY | Facility: CLINIC | Age: 44
End: 2024-05-07
Payer: COMMERCIAL

## 2024-05-07 ENCOUNTER — HOSPITAL ENCOUNTER (EMERGENCY)
Facility: HOSPITAL | Age: 44
Discharge: HOME | End: 2024-05-07
Attending: EMERGENCY MEDICINE
Payer: COMMERCIAL

## 2024-05-07 VITALS
HEIGHT: 65 IN | RESPIRATION RATE: 17 BRPM | TEMPERATURE: 98 F | BODY MASS INDEX: 39.49 KG/M2 | SYSTOLIC BLOOD PRESSURE: 135 MMHG | HEART RATE: 66 BPM | OXYGEN SATURATION: 99 % | WEIGHT: 237 LBS | DIASTOLIC BLOOD PRESSURE: 85 MMHG

## 2024-05-07 DIAGNOSIS — R07.9 CHEST PAIN: ICD-10-CM

## 2024-05-07 LAB
ALBUMIN SERPL BCP-MCNC: 4.2 G/DL (ref 3.5–5.2)
ALP SERPL-CCNC: 90 U/L (ref 55–135)
ALT SERPL W/O P-5'-P-CCNC: 28 U/L (ref 10–44)
ANION GAP SERPL CALC-SCNC: 8 MMOL/L (ref 8–16)
AST SERPL-CCNC: 15 U/L (ref 10–40)
BASOPHILS # BLD AUTO: 0.05 K/UL (ref 0–0.2)
BASOPHILS NFR BLD: 0.5 % (ref 0–1.9)
BILIRUB SERPL-MCNC: 0.4 MG/DL (ref 0.1–1)
BNP SERPL-MCNC: 12 PG/ML (ref 0–99)
BUN SERPL-MCNC: 14 MG/DL (ref 6–20)
CALCIUM SERPL-MCNC: 9.4 MG/DL (ref 8.7–10.5)
CHLORIDE SERPL-SCNC: 108 MMOL/L (ref 95–110)
CO2 SERPL-SCNC: 23 MMOL/L (ref 23–29)
CREAT SERPL-MCNC: 0.7 MG/DL (ref 0.5–1.4)
DIFFERENTIAL METHOD BLD: ABNORMAL
EOSINOPHIL # BLD AUTO: 0 K/UL (ref 0–0.5)
EOSINOPHIL NFR BLD: 0.1 % (ref 0–8)
ERYTHROCYTE [DISTWIDTH] IN BLOOD BY AUTOMATED COUNT: 13.2 % (ref 11.5–14.5)
EST. GFR  (NO RACE VARIABLE): >60 ML/MIN/1.73 M^2
GLUCOSE SERPL-MCNC: 105 MG/DL (ref 70–110)
HCT VFR BLD AUTO: 50.5 % (ref 40–54)
HGB BLD-MCNC: 16.4 G/DL (ref 14–18)
IMM GRANULOCYTES # BLD AUTO: 0.06 K/UL (ref 0–0.04)
IMM GRANULOCYTES NFR BLD AUTO: 0.6 % (ref 0–0.5)
LYMPHOCYTES # BLD AUTO: 2.2 K/UL (ref 1–4.8)
LYMPHOCYTES NFR BLD: 20.1 % (ref 18–48)
MCH RBC QN AUTO: 30.4 PG (ref 27–31)
MCHC RBC AUTO-ENTMCNC: 32.5 G/DL (ref 32–36)
MCV RBC AUTO: 94 FL (ref 82–98)
MONOCYTES # BLD AUTO: 0.6 K/UL (ref 0.3–1)
MONOCYTES NFR BLD: 5.4 % (ref 4–15)
NEUTROPHILS # BLD AUTO: 8 K/UL (ref 1.8–7.7)
NEUTROPHILS NFR BLD: 73.3 % (ref 38–73)
NRBC BLD-RTO: 0 /100 WBC
OHS QRS DURATION: 90 MS
OHS QTC CALCULATION: 435 MS
PLATELET # BLD AUTO: 235 K/UL (ref 150–450)
PMV BLD AUTO: 9.9 FL (ref 9.2–12.9)
POTASSIUM SERPL-SCNC: 3.8 MMOL/L (ref 3.5–5.1)
PROT SERPL-MCNC: 7.2 G/DL (ref 6–8.4)
RBC # BLD AUTO: 5.4 M/UL (ref 4.6–6.2)
SODIUM SERPL-SCNC: 139 MMOL/L (ref 136–145)
TROPONIN I SERPL DL<=0.01 NG/ML-MCNC: <0.006 NG/ML (ref 0–0.03)
TROPONIN I SERPL DL<=0.01 NG/ML-MCNC: <0.006 NG/ML (ref 0–0.03)
WBC # BLD AUTO: 10.82 K/UL (ref 3.9–12.7)

## 2024-05-07 PROCEDURE — 93010 ELECTROCARDIOGRAM REPORT: CPT | Mod: ,,, | Performed by: INTERNAL MEDICINE

## 2024-05-07 PROCEDURE — 84484 ASSAY OF TROPONIN QUANT: CPT | Performed by: EMERGENCY MEDICINE

## 2024-05-07 PROCEDURE — 99285 EMERGENCY DEPT VISIT HI MDM: CPT | Mod: 25

## 2024-05-07 PROCEDURE — 96375 TX/PRO/DX INJ NEW DRUG ADDON: CPT

## 2024-05-07 PROCEDURE — 80053 COMPREHEN METABOLIC PANEL: CPT

## 2024-05-07 PROCEDURE — 85025 COMPLETE CBC W/AUTO DIFF WBC: CPT | Performed by: EMERGENCY MEDICINE

## 2024-05-07 PROCEDURE — 83880 ASSAY OF NATRIURETIC PEPTIDE: CPT | Performed by: EMERGENCY MEDICINE

## 2024-05-07 PROCEDURE — 93005 ELECTROCARDIOGRAM TRACING: CPT

## 2024-05-07 PROCEDURE — 84484 ASSAY OF TROPONIN QUANT: CPT | Mod: 91

## 2024-05-07 PROCEDURE — 96374 THER/PROPH/DIAG INJ IV PUSH: CPT

## 2024-05-07 PROCEDURE — 63600175 PHARM REV CODE 636 W HCPCS

## 2024-05-07 RX ORDER — KETOROLAC TROMETHAMINE 30 MG/ML
10 INJECTION, SOLUTION INTRAMUSCULAR; INTRAVENOUS
Status: COMPLETED | OUTPATIENT
Start: 2024-05-07 | End: 2024-05-07

## 2024-05-07 RX ORDER — ONDANSETRON HYDROCHLORIDE 2 MG/ML
4 INJECTION, SOLUTION INTRAVENOUS
Status: COMPLETED | OUTPATIENT
Start: 2024-05-07 | End: 2024-05-07

## 2024-05-07 RX ORDER — ALBUTEROL SULFATE 90 UG/1
AEROSOL, METERED RESPIRATORY (INHALATION)
Qty: 25.5 G | Refills: 3 | Status: SHIPPED | OUTPATIENT
Start: 2024-05-07

## 2024-05-07 RX ADMIN — ONDANSETRON 4 MG: 2 INJECTION INTRAMUSCULAR; INTRAVENOUS at 07:05

## 2024-05-07 RX ADMIN — KETOROLAC TROMETHAMINE 10 MG: 30 INJECTION, SOLUTION INTRAMUSCULAR at 05:05

## 2024-05-07 NOTE — PROVIDER PROGRESS NOTES - EMERGENCY DEPT.
Encounter Date: 5/7/2024    ED Physician Progress Notes        Physician Note:   43-year-old male with past medical history of GERD, type 2 diabetes, chronic pain secondary to chronic lumbar disc disease that presents to emergency department with chest pain.\    Chest pain workup initiated by previous ER team.  He was provided with supportive treatment with ketorolac and Zofran with near immediate resolution current symptoms.  Patient does feel that his chest pain is related to his history of GI symptoms.    At time of sign-out patient was pending 2nd troponin to complete chest pain workup.    On re-evaluation patient remained asymptomatic regarding his chest pain.  His troponin on repeat was negative.  Discussed the findings with the patient he expressed understanding and will follow up with primary care doctor to obtain further work up.  Provided strict ER return precautions he expressed understanding and had no further questions

## 2024-05-07 NOTE — TELEPHONE ENCOUNTER
Refill Routing Note   Medication(s) are not appropriate for processing by [facility] for the following reason(s):        ED/Hospital Visit since last OV with provider    [facility] action(s):  Defer        Medication Therapy Plan: Patient was admitted to the er today for chest pain    Extended chart review required: Yes     Appointments  past 12m or future 3m with PCP    Date Provider   Last Visit   4/22/2024 Suzi Sanches MD   Next Visit   10/28/2024 Suzi Sanches MD   ED visits in past 90 days: 1        Note composed:5:33 AM 05/07/2024

## 2024-05-07 NOTE — ED PROVIDER NOTES
Encounter Date: 5/7/2024       History     Chief Complaint   Patient presents with    Chest Pain     Pt complaining of chest pain diffusely across his chest that comes in waves that started a little while ago. Pt denies N/V and states hiatal hernia repair in march     Pt is a 43 year old male with PMHx significant for asthma who presents for evaluation of chest pain, which began several hours ago. He reports lower chest wall pain. Denies any known trigger. No exacerbating or alleviating factors. No fever, chills, cough, congestion, shortness of breath, nausea, vomiting, or abdominal pain. Used his albuterol inhaler which he states made his symptoms worse     The history is provided by the patient.     Review of patient's allergies indicates:  No Known Allergies  Past Medical History:   Diagnosis Date    ADD (attention deficit disorder) 05/09/2012    Allergy     Anxiety 04/12/2023    Asthma 05/09/2012    Cervical disc disorder with radiculopathy, unspecified cervical region 09/14/2021    Cervical spondylosis with myelopathy 06/29/2022    Eczema     Esophageal ulcer     GERD (gastroesophageal reflux disease) 05/09/2012    Glaucoma     Kidney stones 05/2014    Lumbar disc disease 05/09/2012    Lumbar herniated disc 05/09/2012    Malignant melanoma of skin, unspecified 01/06/2022    in situ right mid back    Melanoma 01/2022    in situ R mid back     LUBA (obstructive sleep apnea)     Radiculopathy, lumbar region 09/14/2021    Renal calculi 05/09/2012    Type 2 diabetes mellitus without complication, without long-term current use of insulin 11/16/2020     Past Surgical History:   Procedure Laterality Date    APPENDECTOMY  2006    CARPAL TUNNEL RELEASE Right 09/15/2022    Procedure: RELEASE, CARPAL TUNNEL;  Surgeon: Christen Marshall MD;  Location: [facility];  Service: Orthopedics;  Laterality: Right;    COLONOSCOPY  01/22/2019    internal hemorrhoids, o/w normal - repeat at age 52    COLONOSCOPY N/A 12/21/2023    Procedure:  COLONOSCOPY;  Surgeon: Teo Johnson MD;  Location: [facility] ENDO;  Service: Endoscopy;  Laterality: N/A;    CORRECTION OF HAMMER TOE Left 8/14/2023    Procedure: CORRECTION, HAMMER TOE--  basic foot tray as well as a Reese microfree/microchoice tray. I also asked Sherry to bring an MIS mati.;  Surgeon: Ankush Back DPM;  Location: Replaced by [facility] OR;  Service: Podiatry;  Laterality: Left;  reese drill, sagittal saw, foot set    CYSTOSCOPY  7/5/2023    Procedure: CYSTOSCOPY;  Surgeon: Chuckie Hall MD;  [facility];  Service: Urology;;    EPIDURAL STEROID INJECTION N/A 02/10/2021    Procedure: CERVICAL C6/7 NELI DIRECT REFERRAL;  Surgeon: Michi Escamilla MD;  Location: [facility] PAIN MGT;  Service: Pain Management;  Laterality: N/A;  NEEDS CONSENT    EPIDURAL STEROID INJECTION INTO CERVICAL SPINE N/A 6/28/2023    Procedure: Injection-steroid-epidural-cervical C7-T1;  Surgeon: Lorraine Patel DO;  Location: Replaced by [facility] PAIN MANAGEMENT;  Service: Pain Management;  Laterality: N/A;  diabetic    EPIDURAL STEROID INJECTION INTO CERVICAL SPINE N/A 11/3/2023    Procedure: cervical NELI C7-T1;  Surgeon: Kolton Terrell MD;  Location: Replaced by [facility] PAIN MANAGEMENT;  Service: Pain Management;  Laterality: N/A;  diabetic    EPIDURAL STEROID INJECTION INTO CERVICAL SPINE N/A 4/17/2024    Procedure: VENECIA C7/T1;  Surgeon: Lorraine Patel DO;  Location: Replaced by [facility] PAIN MANAGEMENT;  Service: Pain Management;  Laterality: N/A;  20mins- Ozempic 7d- no ac    EPIDURAL STEROID INJECTION INTO LUMBAR SPINE N/A 9/27/2023    Procedure: L4-5 NELI;  Surgeon: Tirso Pickering MD;  Location: Replaced by [facility] PAIN MANAGEMENT;  Service: Pain Management;  Laterality: N/A;  15 mins    EPIDURAL STEROID INJECTION INTO LUMBAR SPINE N/A 3/6/2024    Procedure: L4-5 IL NELI;  Surgeon: Lorraine Patel DO;  Location: Replaced by [facility] PAIN MANAGEMENT;  Service: Pain Management;  Laterality: N/A;  20 mins    ESOPHAGOGASTRODUODENOSCOPY  01/22/2019    LA grade B esophagitis; esophageal  stenosis- dilated; gastritis; H pylori pathology negative    ESOPHAGOGASTRODUODENOSCOPY N/A 05/18/2021    Procedure: EGD (ESOPHAGOGASTRODUODENOSCOPY);  Surgeon: Mariana Hector MD;  Location: [facility];  Service: Endoscopy;  Laterality: N/A;    ESOPHAGOGASTRODUODENOSCOPY N/A 12/21/2023    Procedure: EGD (ESOPHAGOGASTRODUODENOSCOPY);  Surgeon: Teo Johnson MD;  Location: [facility];  Service: Endoscopy;  Laterality: N/A;    ESOPHAGOGASTRODUODENOSCOPY N/A 2/22/2024    Procedure: EGD (ESOPHAGOGASTRODUODENOSCOPY);  Surgeon: Teo Johnson MD;  Location: [facility];  Service: Endoscopy;  Laterality: N/A;    EXCISION OF GANGLION OF WRIST Right 09/15/2022    Procedure: EXCISION, GANGLION CYST, WRIST;  Surgeon: Christen Marshall MD;  Location: [facility];  Service: Orthopedics;  Laterality: Right;    FLEXOR TENDON REPAIR Right 09/15/2022    Procedure: FLEXOR TENOSYNOVECTOMY;  Surgeon: Christen Marshall MD;  Location: [facility];  Service: Orthopedics;  Laterality: Right;    HAND ARTHROTOMY Right 09/15/2022    Procedure: ARTHROTOMY, HAND RADIOCARPAL;  Surgeon: Christen Marshall MD;  Location: [facility];  Service: Orthopedics;  Laterality: Right;  Radiocarpal    INJECTION OF JOINT Right 11/29/2023    Procedure: right hip IA injection and Right GTB injection;  Surgeon: Lorraine Patel DO;  Location: [facility] PAIN MANAGEMENT;  Service: Pain Management;  Laterality: Right;    INJECTION OF STEROID Left 09/15/2022    Procedure: INJECTION, STEROID LEFT WRIST AND LEFT LATERAL ELBOW;  Surgeon: Christen Marshall MD;  Location: [facility];  Service: Orthopedics;  Laterality: Left;  Left Carpal Tunnel CSI    LASER LITHOTRIPSY Left 7/5/2023    Procedure: LITHOTRIPSY, USING LASER;  Surgeon: Chuckie Hall MD;  Location: [address];  Service: Urology;  Laterality: Left;    LUMBAR LAMINECTOMY  2010    LUMBAR LAMINECTOMY WITH DISCECTOMY Left 09/20/2021    Procedure: LAMINECTOMY, SPINE, LUMBAR, WITH DISCECTOMY;  Surgeon: Naseem Mcnamara DO;   Location: [facility] OR 2ND FLR;  Service: Neurosurgery;  Laterality: Left;  MIS L3-4    PH MONITORING, ESOPHAGUS, WIRELESS, (OFF REFLUX MEDS) N/A 12/21/2023    Procedure: PH MONITORING, ESOPHAGUS, WIRELESS, (OFF REFLUX MEDS);  Surgeon: Teo Johnson MD;  Location: [facility] ENDO;  Service: Endoscopy;  Laterality: N/A;    RECONSTRUCTION OF LIGAMENT Left 12/5/2023    Procedure: RECONSTRUCTION, LIGAMENT LATERAL COLLATERAL;  Surgeon: Christen Marshall MD;  Location: [facility] OR;  Service: Orthopedics;  Laterality: Left;    REPAIR OF EXTENSOR TENDON Left 12/5/2023    Procedure: REPAIR, TENDON, EXTENSOR;  Surgeon: Christen Marshall MD;  Location: [facility] OR;  Service: Orthopedics;  Laterality: Left;    ROBOT-ASSISTED REPAIR OF HIATAL HERNIA N/A 3/14/2024    Procedure: ROBOTIC REPAIR, HERNIA, HIATAL;  Surgeon: Cody Winn MD;  Location: [facility] OR;  Service: General;  Laterality: N/A;    TYMPANOSTOMY TUBE PLACEMENT      URETERAL STENT PLACEMENT Left 7/5/2023    Procedure: INSERTION, STENT, URETER;  Surgeon: Chuckie Hall MD;  Location: [facility] OR 1ST FLR;  Service: Urology;  Laterality: Left;    URETEROSCOPIC REMOVAL OF URETERIC CALCULUS Left 7/5/2023    Procedure: REMOVAL, CALCULUS, URETER, URETEROSCOPIC;  Surgeon: Chuckie Hall MD;  Location: [facility] OR 1ST FLR;  Service: Urology;  Laterality: Left;    URETEROSCOPY Left 7/5/2023    Procedure: URETEROSCOPY;  Surgeon: Chuckie Hall MD;  Location: [facility] OR 1ST FLR;  Service: Urology;  Laterality: Left;     Family History   Problem Relation Name Age of Onset    Allergic rhinitis Mother      Hypertension Mother      Transient ischemic attack Mother      Sleep apnea Mother      Allergic rhinitis Father      Asthma Father      Chronic back pain Father      MARTITA disease Father      Sleep apnea Father      Melanoma Father      Allergic rhinitis Sister      Asthma Sister      Allergic rhinitis Sister      Asthma Sister      No Known Problems Brother      No Known Problems Maternal Aunt       No Known Problems Maternal Uncle      No Known Problems Paternal Aunt      No Known Problems Paternal Uncle      Brain cancer Maternal Grandmother      Diabetes Maternal Grandfather      Breast cancer Paternal Grandmother      No Known Problems Paternal Grandfather      Amblyopia Neg Hx      Blindness Neg Hx      Cancer Neg Hx      Cataracts Neg Hx      Glaucoma Neg Hx      Macular degeneration Neg Hx      Retinal detachment Neg Hx      Strabismus Neg Hx      Stroke Neg Hx      Thyroid disease Neg Hx      Allergies Neg Hx      Angioedema Neg Hx       Social History     Tobacco Use    Smoking status: Former     Current packs/day: 0.00     Types: Cigarettes     Quit date: 2018     Years since quittin.1     Passive exposure: Never    Smokeless tobacco: Never   Substance Use Topics    Alcohol use: Yes     Alcohol/week: 3.0 standard drinks of alcohol     Types: 3 Drinks containing 0.5 oz of alcohol per week    Drug use: No     Review of Systems    Physical Exam     Initial Vitals [24 0435]   BP Pulse Resp Temp SpO2   (!) 155/96 78 18 97.8 °F (36.6 °C) 99 %      MAP       --         Physical Exam    Nursing note and vitals reviewed.  Constitutional: He appears well-developed and well-nourished. No distress.   HENT:   Head: Normocephalic and atraumatic.   Eyes: EOM are normal. Pupils are equal, round, and reactive to light.   Neck: Neck supple.   Normal range of motion.  Cardiovascular:  Normal rate, regular rhythm and intact distal pulses.           Pulmonary/Chest: Breath sounds normal. No respiratory distress. He has no wheezes.   Abdominal: Abdomen is soft. He exhibits no distension. There is no abdominal tenderness. There is no rebound and no guarding.   Musculoskeletal:         General: No edema. Normal range of motion.      Cervical back: Normal range of motion and neck supple.     Neurological: He is alert and oriented to person, place, and time.   Skin: Skin is warm and dry. Capillary refill takes  "less than 2 seconds.         ED Course   Procedures  Labs Reviewed   CBC W/ AUTO DIFFERENTIAL - Abnormal; Notable for the following components:       Result Value    Immature Granulocytes 0.6 (*)     Gran # (ANC) 8.0 (*)     Immature Grans (Abs) 0.06 (*)     Gran % 73.3 (*)     All other components within normal limits   B-TYPE NATRIURETIC PEPTIDE   TROPONIN I   COMPREHENSIVE METABOLIC PANEL   TROPONIN I        ECG Results              EKG 12-lead (Final result)        Collection Time Result Time QRS Duration OHS QTC Calculation    05/07/24 04:48:20 05/07/24 13:53:09 90 435                     Final result by Interface, Lab In [facility] (05/07/24 13:53:18)                   Narrative:    Test Reason : R07.9,    Vent. Rate : 074 BPM     Atrial Rate : 074 BPM     P-R Int : 142 ms          QRS Dur : 090 ms      QT Int : 392 ms       P-R-T Axes : -16 -35 -25 degrees     QTc Int : 435 ms    Normal sinus rhythm  Left axis deviation  Abnormal ECG  When compared with ECG of 09-JUN-2023 14:12,  Inverted T waves have replaced nonspecific T wave abnormality in Inferior  leads  Confirmed by Isra Torres MD ([id number]) on 5/7/2024 1:53:07 PM    Referred By: [email or web address]   SELF           Confirmed By:Isra Torres MD                                  Imaging Results              X-Ray Chest AP Portable (Final result)  Result time 05/07/24 06:58:59      Final result by Osbaldo Causey MD (05/07/24 06:58:59)                   Impression:      No acute cardiopulmonary finding identified on this single view.      Electronically signed by: Osbaldo Causey MD  Date:    05/07/2024  Time:    06:58               Narrative:    EXAMINATION:  XR CHEST AP PORTABLE    CLINICAL HISTORY:  Provided history is "chest pain;  ".    TECHNIQUE:  One view of the chest.    COMPARISON:  03/02/2023.    FINDINGS:  Cardiac silhouette is not enlarged.  No focal consolidation.  No sizable pleural effusion.  No pneumothorax.                                     "   Medications   ketorolac injection 9.999 mg (9.999 mg Intravenous Given 5/7/24 0537)   ondansetron injection 4 mg (4 mg Intravenous Given 5/7/24 0703)     Medical Decision Making  Pt presents for chest pain. Ddx: ACS, arrhythmia, electrolyte abnormality, pneumonia, PTX. EKG without ST elevation. BNP and initial trop negative.CXR without acute process. Labs without leukocytosis or anemia.. Pt signed out to incoming team pending repeat trop with likely discharge to home     Amount and/or Complexity of Data Reviewed  Labs: ordered.  Radiology: ordered.    Risk  Prescription drug management.                                      Clinical Impression:  Final diagnoses:  [R07.9] Chest pain          ED Disposition Condition    Discharge Stable          ED Prescriptions    None       Follow-up Information       Follow up With Specialties Details Why Contact Info    Suzi Sanches MD Internal Medicine  As needed [address] [facility] [address]  [phone]      [facility] - Emergency Dept Emergency Medicine  If symptoms worsen [id number] [facility] [address] [phone]             Calderon Jerome Jr., MD  Resident  05/07/24 [id number]

## 2024-05-07 NOTE — TELEPHONE ENCOUNTER
No care due was identified.  [facility] Embedded Care Due Messages. Reference number: [id number].   5/06/2024 8:46:50 PM CDT

## 2024-05-07 NOTE — ED TRIAGE NOTES
Hermelindo Garza III, a 43 y.o. male presents to the ED w/ complaint of     Triage note:  Chief Complaint   Patient presents with    Chest Pain     Pt complaining of chest pain diffusely across his chest that comes in waves that started a little while ago. Pt denies N/V and states hiatal hernia repair in march     Review of patient's allergies indicates:  No Known Allergies  Past Medical History:   Diagnosis Date    ADD (attention deficit disorder) 05/09/2012    Allergy     Anxiety 04/12/2023    Asthma 05/09/2012    Cervical disc disorder with radiculopathy, unspecified cervical region 09/14/2021    Cervical spondylosis with myelopathy 06/29/2022    Eczema     Esophageal ulcer     GERD (gastroesophageal reflux disease) 05/09/2012    Glaucoma     Kidney stones 05/2014    Lumbar disc disease 05/09/2012    Lumbar herniated disc 05/09/2012    Malignant melanoma of skin, unspecified 01/06/2022    in situ right mid back    Melanoma 01/2022    in situ R mid back     LUBA (obstructive sleep apnea)     Radiculopathy, lumbar region 09/14/2021    Renal calculi 05/09/2012    Type 2 diabetes mellitus without complication, without long-term current use of insulin 11/16/2020   Patient identifiers for Hermelindo Garza III checked and correct.    LOC: The patient is awake, alert and aware of environment with an appropriate affect, the patient is oriented x 4 and speaking appropriately.    APPEARANCE: Patient resting comfortably and in no acute distress, patient is clean and well groomed, patient's clothing is properly fastened.    SKIN: The skin is warm and dry, color consistent with ethnicity, patient has normal skin turgor and moist mucus membranes, skin intact, no breakdown or bruising noted.    MUSCULOSKELETAL: Patient moving all extremities well, no obvious swelling or deformities noted.    RESPIRATORY: Airway is open and patent, respirations are spontaneous and even, patient has a normal effort and rate.    CARDIAC:  Patient has a normal rate and rhythm, no periphreal edema noted, capillary refill < 3 seconds. Reports chest pain that extends across chest    ABDOMEN: Soft and non tender to palpation, no distention noted. Patient denies any nausea, vomiting, diarrhea, or constipation.     NEUROLOGIC: Eyes open spontaneously, PERRL, behavior appropriate to situation, follows commands, facial expression symmetrical, bilateral hand grasp equal and even, purposeful motor response noted, normal sensation in all extremities.     HEENT: No abnormalities noted. White sclera and pupils equal round and reactive to light. Denies headache, dizziness.     : Pt voids independently, denies dysuria, hematuria, frequency.
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
## 2024-05-07 NOTE — TELEPHONE ENCOUNTER
Allergy vials shipped stat via  to [facility] Allergy Dept 9th floor in Clinic.  Atul Herbert  Confirmation number [id number]

## 2024-05-09 ENCOUNTER — PATIENT MESSAGE (OUTPATIENT)
Dept: DERMATOLOGY | Facility: CLINIC | Age: 44
End: 2024-05-09
Payer: COMMERCIAL

## 2024-05-13 ENCOUNTER — PATIENT MESSAGE (OUTPATIENT)
Dept: INTERNAL MEDICINE | Facility: CLINIC | Age: 44
End: 2024-05-13
Payer: COMMERCIAL

## 2024-05-13 NOTE — TELEPHONE ENCOUNTER
The patient is wanting suggestions regarding pain medication, TOPICALLY;    PER PATIENT;   tried spray lidocaine and roll-on, Voltaren, and OTC pain meds. Also taken muscle relaxer. Having spasms and just constant aching pain in shoulder and neck.   I was having it in my lower back and think it moved up. I was just looking for an alternative if there is one.    Please advise

## 2024-05-17 ENCOUNTER — DOCUMENTATION ONLY (OUTPATIENT)
Dept: ALLERGY | Facility: CLINIC | Age: 44
End: 2024-05-17
Payer: COMMERCIAL

## 2024-05-20 ENCOUNTER — PATIENT MESSAGE (OUTPATIENT)
Dept: PAIN MEDICINE | Facility: CLINIC | Age: 44
End: 2024-05-20
Payer: COMMERCIAL

## 2024-05-27 ENCOUNTER — TELEPHONE (OUTPATIENT)
Dept: PAIN MEDICINE | Facility: CLINIC | Age: 44
End: 2024-05-27
Payer: COMMERCIAL

## 2024-05-27 DIAGNOSIS — F90.0 ADHD (ATTENTION DEFICIT HYPERACTIVITY DISORDER), INATTENTIVE TYPE: ICD-10-CM

## 2024-05-28 RX ORDER — DEXMETHYLPHENIDATE HYDROCHLORIDE 10 MG/1
10 TABLET ORAL 2 TIMES DAILY
Qty: 60 TABLET | Refills: 0 | OUTPATIENT
Start: 2024-05-28 | End: 2024-06-27

## 2024-05-28 NOTE — PROGRESS NOTES
[facility]  Interventional [facility] -  Established Patient Follow-up      Referred by: No ref. provider found   Reason for referral: Cervicalgia     CC:   Chief Complaint   Patient presents with    Neck Pain    Shoulder Pain     Radiates to R shoulder          5/30/2024     8:40 AM 3/28/2024     1:12 PM 12/19/2023     9:42 AM   Last 3 PDI Scores   Pain Disability Index (PDI) 30 36 32     Interval update 05/30/24:  43-year-old male that presents today in clinic with 4 weeks of worsening right neck pain that radiates into the right  arm and down into the right 4th and 5th digits of the hand.   He also reports new tremor in the right hand.  No recent falls or trauma.  Patient reports pain is worse with lying on the right side and with range of motion of the neck.   He has a history of right-sided neck pain but symptoms into the arm and hand tremor are new. He is taking Gabapentin 300 mg QAM and 900 mg QHS with minimal relief.  He is requesting to switch to Lyrica.   He also requests a refill of his Flexeril. He rates his pain 5/10.       Interval History 4/30/2024:  43-year-old male that presents virtually for a follow-up appointment he is status post a cervical NELI targeting C7-T1 on 04/17/2024 that provided significant relief in his symptoms 70-90%.  He is known to our clinic and has been previously provided this injection which has helped significantly.  Is now reporting low back pain that has gradually increased he did reach out to his previous surgeon Dr. Shen  ordered a recent CT lumbar.  He denies any bowel bladder dysfunction denies any saddle anesthesia denies any recent trauma.  He has been provided a lumbar NELI targeting L4-5 for low back and radicular symptoms, we discussed that we may need to repeat this in the future pending CT lumbar scan.      Interval update 03/28/24:   Patient returns today for post procedure follow up and follow up of his neck and back pain.  He is s/p  Lumbar Interlaminar  Epidural Steroid Injection L4/L5 on 03/06/2024.  He reports 70-80% relief of his low back pain and radicular symptoms.  Today he reports his cervical radicular symptoms are starting to returned.  He previously underwent cervical epidural steroid injection at C7/T1 on 11/03/2023 with significant improvement in his symptoms.  He reports almost 5 months of pain relief following a cervical epidural.  He would like to repeat this procedure.  In the meantime he is increased his gabapentin to 300 mg in the morning and 600 mg in the evening.  He does feel like this has helped with his pain.  He was also increased his exercise and has lost weight which he feels has been beneficial as well.      Interval update 12/19/23  Patient returns today for follow up.  He is status post right GTB and right intra-articular hip injections on 11/29/2023 and notes improvement in his GTB and hip pain.  Today he complains of pain extending from the area overlying the right hip joint and extending down the anterolateral portion of the thigh.  This pain is worse with activity.  Pain improves with lying flat.  He rates his pain 7/10 today.      Of note, patient recently underwent left lateral collateral ligament reconstruction for left radial collateral ligament tear on 12/05/2023 with Dr. Marshall.    11/13/23 Pt returns today complaining of right hip and right leg pain. Pain is located over the lateral right hip and radiates into the groin.  Pain started approximately 1 month ago. Pain has not improved with stretching and HEP.  He states his hip pain is worse than his lower back pain for which he is scheduled to have an NELI at the end of the month. He would like to address the hip pain first.      Subjective:   Hermelindo ELIAS Greg SELIN is a 43 y.o. male who presents complaining of both neck and back pain.  Today he reports his neck pain is most bothersome.  He reports muscle tightness and pain in the left trapezius and shoulder area.  He reports  pain that radiates into the bilateral upper extremities with numbness and tingling.  He has been evaluated by Dr. Sierra with Neurosurgery who noted patient chris  candidate for ACDFs in he progresses.  Pt reports undergoing a cervical epidural steroid injection with Dr. Escamilla 02/10/2021 on with several months of relief.  He also reports low back pain.  He is s/p Left L3-4 hemilaminotomy, medial facetectomy, and foraminotomy for microdiscectomy with Dr Mcnaamra on 9/19/2021.  He reports his back pain improved after surgery.  Patient reports he is previously completed physical therapy and is now actively continuing with his exercise program in the gym.  He also reports trying to lose weight and reports intentional  weight loss of approximately 55 pounds since 2020.    Location: back, shoulder, and neck   Onset: 2 years  Current Pain Score: 5/10  Daily Pain of Range: 5-6/10  Quality: Aching, Tight, Tingling, Deep, Numb, Electric, Hot, and Cold  Radiation: neck and back  Worsened by: lying down, sitting, standing for more than 3 minutes, and walking for more than 6 minutes  Improved by: medications    Patient denies night fever/night sweats, urinary incontinence, bowel incontinence, significant weight loss, significant motor weakness, and loss of sensations.    Previous Interventions:  - 04/17/2024:Cervical Interlaminar Epidural Steroid Injection C7/T1  -03/06/2024 Lumbar Interlaminar Epidural Steroid Injection L4/L5  - 11/03/2023 Cervical Interlaminar Epidural Steroid Injection C7/T1 5 months of pain relief  -9/27/2023  Lumbar Interlaminar Epidural Steroid Injection L4/L5   - 06/28/2023 - Cervical Interlaminar Epidural Steroid Injection C7/T1   - 02/10/2021 -  C6/7 CERVICAL EPIDURAL STEROID INJECTION - Dr Escamilla - 4 months of pain relief.   - 11/29/2023 -  right GTB and right intra-articular hip injections - 40% relief  - 03/06/2024 - lumbar interlaminar steroid injection at L4/L5 -80% pain relief    Previous  Therapies:  PT/OT: yes   Chiropractor:   HEP: yes  Relevant Surgery: yes    - Reports L4/L5 surgery at age 31 yo   - 09/19/21 - Left MIS L3-4 hemilaminotomy, medial facetectomy, and foraminotomy for microdiscectomy with Dr Mcnamara.   Previous Medications:   - NSAIDS: Meloxicam  - Muscle Relaxants: Robaxin, Flexeril  - TCAs:   - SNRIs:   - Topicals:   - Anticonvulsants: Lyrica - reports RL sxs so stopped; Gabapentin   - Opioids:   - Adjuvants: Tylenol    Current Pain Medications:  Gabapentin 300 mg QAM and 900 mg QHS  Meloxicam  Tylenol    Flexeril      Review of Systems:  Review of Systems   Musculoskeletal:  Positive for neck pain.       GENERAL:  No weight loss, malaise or fevers. +obesity +DM  HEENT:   No recent changes in vision or hearing  NECK:  No difficulty with swallowing. No stridor.   RESPIRATORY:  Negative for cough, wheezing or shortness of breath, patient denies any recent URI. +Asthma  CARDIOVASCULAR:  Negative for chest pain, leg swelling or palpitations.  GI/:  Negative for abdominal discomfort, blood in stools or black stools or change in bowel habits.   MUSCULOSKELETAL:  See HPI.  SKIN:  Negative for lesions, rash, and itching.  PSYCH:  No mood disorder or recent psychosocial stressors.  +anxiety +ADD  HEMATOLOGY/LYMPHOLOGY:  Negative for prolonged bleeding, bruising easily or swollen nodes.  Patient is not currently taking any anti-coagulants  NEURO:   No history of headaches, syncope, paralysis, seizures or tremors.  All other reviewed and negative other than HPI.    History:  Current medications, allergies, medical history, surgical history,   family history, and social history were reviewed in the chart as marked.    Full Medication List:    Current Outpatient Medications:     albuterol (PROVENTIL/VENTOLIN HFA) 90 mcg/actuation inhaler, INHALE 2 PUFFS INTO THE LUNGS EVERY 6 HOURS AS NEEDED WHEEZING, Disp: 25.5 g, Rfl: 3    atorvastatin (LIPITOR) 20 MG tablet, Take 1 tablet (20 mg total) by  mouth once daily., Disp: 90 tablet, Rfl: 3    blood sugar diagnostic (TRUE METRIX GLUCOSE TEST STRIP) Strp, Use to test blood glucose one (1) time a day,, Disp: 100 strip, Rfl: 3    blood-glucose meter Misc, use as directed, Disp: 1 each, Rfl: 0    clobetasoL (TEMOVATE) 0.05 % cream, Apply to affected area 2 (two) times daily as needed for flare, Disp: 30 g, Rfl: 1    dexmethylphenidate (FOCALIN) 10 MG tablet, Take 1 tablet (10 mg total) by mouth 2 (two) times daily., Disp: 60 tablet, Rfl: 0    dexmethylphenidate (FOCALIN) 10 MG tablet, Take 1 tablet (10 mg total) by mouth 2 (two) times daily., Disp: 60 tablet, Rfl: 0    [START ON 6/28/2024] dexmethylphenidate (FOCALIN) 10 MG tablet, Take 1 tablet (10 mg total) by mouth 2 (two) times daily., Disp: 60 tablet, Rfl: 0    [START ON 7/29/2024] dexmethylphenidate (FOCALIN) 10 MG tablet, Take 1 tablet (10 mg total) by mouth 2 (two) times daily., Disp: 60 tablet, Rfl: 0    doxycycline (VIBRA-TABS) 100 MG tablet, Take 1 tablet by mouth daily food and not within 1 hour of bedtime, Disp: 10 tablet, Rfl: 2    ergocalciferol, vitamin D2, (VITAMIN D ORAL), Take 1 tablet by mouth every other day., Disp: , Rfl:     fluocinonide (LIDEX) 0.05 % external solution, apply to affected area of scalp daily as needed for itching, scaling, Disp: 60 mL, Rfl: 3    hydrocortisone 2.5 % cream, APPLY EXTERNALLY TO THE AFFECTED AREA TWICE DAILY FOR 10 DAYS, Disp: 30 g, Rfl: 0    ketoconazole (NIZORAL) 2 % cream, Apply 1 application  topically 2 (two) times daily. Apply to affected area up to 2 weeks, Disp: 15 g, Rfl: 0    lamoTRIgine (LAMICTAL) 100 MG tablet, Take 1 tablet (100 mg total) by mouth 2 (two) times daily. IF any RASH, STOP ALL Lamictal and Tell Psmarilyn SELLERS., Disp: 180 tablet, Rfl: 1    lancets (ONETOUCH DELICA PLUS LANCET) 33 gauge Misc, Use to test blood glucose one (1) time a day,, Disp: 100 each, Rfl: 5    latanoprost 0.005 % ophthalmic solution, Place 1 drop into both eyes every  evening., Disp: 7.5 mL, Rfl: 3    levocetirizine (XYZAL) 5 MG tablet, Take 1 tablet (5 mg total) by mouth every evening., Disp: 30 tablet, Rfl: 11    LORazepam (ATIVAN) 0.5 MG tablet, Take 1 tablet (0.5 mg total) by mouth daily as needed (SIGNIFICANT ANXIETY)., Disp: 30 tablet, Rfl: 3    meloxicam (MOBIC) 15 MG tablet, TAKE 1 TABLET(15 MG) BY MOUTH EVERY DAY as needed FOR PAIN, Disp: 30 tablet, Rfl: 2    montelukast (SINGULAIR) 10 mg tablet, Take 1 tablet (10 mg total) by mouth every evening., Disp: 90 tablet, Rfl: 3    olopatadine (PATANASE) 0.6 % nasal spray, 1 spray by Each Nostril route 2 (two) times daily., Disp: 30.5 g, Rfl: 5    ondansetron (ZOFRAN-ODT) 8 MG TbDL, Dissolve 1 tablet (8 mg total) by mouth every 6 (six) hours as needed (nausea)., Disp: 30 tablet, Rfl: 2    permethrin (ELIMITE) 5 % cream, Apply from the neck down and leave on overnight; wash off in am and repeat in 1 week, Disp: 120 g, Rfl: 1    rOPINIRole (REQUIP) 0.25 MG tablet, TAKE 1 TABLET(0.25 MG) BY MOUTH EVERY EVENING, Disp: 90 tablet, Rfl: 2    semaglutide (OZEMPIC) 2 mg/dose (8 mg/3 mL) PnIj, Inject 2 mg into the skin every 7 days., Disp: 3 mL, Rfl: 5    ULTRA THIN LANCETS 30 gauge Misc, USE TO TEST BLOOD SUGAR EVERY DAY AS DIRECTED, Disp: 100 each, Rfl: 3    cyclobenzaprine (FLEXERIL) 10 MG tablet, Take 10 mg by mouth 3 (three) times daily., Disp: , Rfl:     cyclobenzaprine (FLEXERIL) 10 MG tablet, Take 1 tablet (10 mg total) by mouth 3 (three) times daily as needed for Muscle spasms., Disp: 90 tablet, Rfl: 0    pregabalin (LYRICA) 75 MG capsule, Take 2 capsules (150 mg total) by mouth 2 (two) times daily., Disp: 120 capsule, Rfl: 1    sildenafiL (VIAGRA) 50 MG tablet, Take 1 tablet (50 mg total) by mouth daily as needed for Erectile Dysfunction. Take 30 -60 minutes prior to sex, Disp: 9 tablet, Rfl: 2  No current facility-administered medications for this visit.    Facility-Administered Medications Ordered in Other Visits:     0.9%   NaCl infusion, 500 mL, Intravenous, Continuous, Kolton Terrell MD    mupirocin 2 % ointment, , Nasal, On Call Procedure, Chong Padron MD, Given at 09/15/22 0553     Allergies:  Patient has no known allergies.     Medical History:   has a past medical history of ADD (attention deficit disorder) (05/09/2012), Allergy, Anxiety (04/12/2023), Asthma (05/09/2012), Cervical disc disorder with radiculopathy, unspecified cervical region (09/14/2021), Cervical spondylosis with myelopathy (06/29/2022), Eczema, Esophageal ulcer, GERD (gastroesophageal reflux disease) (05/09/2012), Glaucoma, Kidney stones (05/2014), Lumbar disc disease (05/09/2012), Lumbar herniated disc (05/09/2012), Malignant melanoma of skin, unspecified (01/06/2022), Melanoma (01/2022), LUBA (obstructive sleep apnea), Radiculopathy, lumbar region (09/14/2021), Renal calculi (05/09/2012), and Type 2 diabetes mellitus without complication, without long-term current use of insulin (11/16/2020).    Surgical History:   has a past surgical history that includes Lumbar laminectomy (2010); Esophagogastroduodenoscopy (01/22/2019); Colonoscopy (01/22/2019); Appendectomy (2006); Epidural steroid injection (N/A, 02/10/2021); Esophagogastroduodenoscopy (N/A, 05/18/2021); Lumbar laminectomy with discectomy (Left, 09/20/2021); Carpal tunnel release (Right, 09/15/2022); Excision of ganglion of wrist (Right, 09/15/2022); Hand Arthrotomy (Right, 09/15/2022); Injection of steroid (Left, 09/15/2022); Flexor tendon repair (Right, 09/15/2022); Tympanostomy tube placement; Epidural steroid injection into cervical spine (N/A, 6/28/2023); Cystoscopy (7/5/2023); Ureteral stent placement (Left, 7/5/2023); Ureteroscopy (Left, 7/5/2023); Laser lithotripsy (Left, 7/5/2023); Ureteroscopic removal of ureteric calculus (Left, 7/5/2023); Correction of hammer toe (Left, 8/14/2023); Epidural steroid injection into lumbar spine (N/A, 9/27/2023); Epidural steroid injection into cervical spine  "(N/A, 11/3/2023); Injection of joint (Right, 11/29/2023); Reconstruction of ligament (Left, 12/5/2023); Repair of extensor tendon (Left, 12/5/2023); Esophagogastroduodenoscopy (N/A, 12/21/2023); ph monitoring, esophagus, wireless, (off reflux meds) (N/A, 12/21/2023); Colonoscopy (N/A, 12/21/2023); Esophagogastroduodenoscopy (N/A, 2/22/2024); Epidural steroid injection into lumbar spine (N/A, 3/6/2024); Robot-assisted repair of hiatal hernia (N/A, 3/14/2024); and Epidural steroid injection into cervical spine (N/A, 4/17/2024).    Family History:  family history includes Allergic rhinitis in his father, mother, sister, and sister; Asthma in his father, sister, and sister; Brain cancer in his maternal grandmother; Breast cancer in his paternal grandmother; Chronic back pain in his father; Diabetes in his maternal grandfather; MARTITA disease in his father; Hypertension in his mother; Melanoma in his father; No Known Problems in his brother, maternal aunt, maternal uncle, paternal aunt, paternal grandfather, and paternal uncle; Sleep apnea in his father and mother; Transient ischemic attack in his mother.    Social History:   reports that he quit smoking about 6 years ago. His smoking use included cigarettes. He has never been exposed to tobacco smoke. He has never used smokeless tobacco. He reports current alcohol use of about 3.0 standard drinks of alcohol per week. He reports that he does not use drugs.    Physical Exam:  Vitals:    05/30/24 0839   BP: (!) 145/99   Pulse: 105   Weight: 105.2 kg (232 lb)   Height: 5' 5" (1.651 m)   PainSc:   5   PainLoc: Neck     PHYSICAL EXAMINATION:    GENERAL: Well appearing, in no acute distress, alert and oriented x3.  PSYCH:  Mood and affect appropriate.  SKIN: Skin color, texture, turgor normal, no rashes or lesions.  HEAD/FACE:  Normocephalic, atraumatic. Cranial nerves grossly intact.  NECK:  Mildly decreased ROM secondary to pain. + pain to palpation over the cervical " paraspinous muscles and bilateral trapezius muscles.  Spurling positive on the right.   CV: RRR with palpation of the radial artery.  PULM: No evidence of respiratory difficulty, symmetric chest rise.  GI:  Soft and non-distended.  MSK: No obvious deformities, edema, or skin discoloration.  No atrophy or tone abnormalities are noted.   NEURO: + tremor noted in the right hand, particularly the 4th and 5th digit.  Bilateral upper and lower extremity coordination and strength is symmetric.  No loss of sensation is noted.  MENTAL STATUS: A x O x 3, good concentration, speech is fluent and goal directed  MOTOR: 5/5 in all muscle groups  GAIT: normal.ambulates unassisted.       Imaging:  XR CERVICAL SPINE AP LAT WITH FLEX EXTEN     CLINICAL HISTORY:  Other spondylosis with myelopathy, cervical region     TECHNIQUE:  Three views of the cervical spine plus flexion and extension views were performed.     COMPARISON:  September 8, 2022     FINDINGS:  As before, suboptimal visualization of the cervicothoracic junction due to superimposition of shoulder and chest wall soft tissues.  Straightening of normal cervical lordosis.  No acute fractures.  Unremarkable predental space.  No widening prevertebral soft tissues.  No anterior or retrolisthesis.  Flexion and extension views demonstrate no instability.  Reconfirmed areas of anterior longitudinal ligament calcification adjacent to C4-C5 and C5-C6 disc.  Reconfirmed findings of uncovertebral spurring and endplate osteophytes at preserved C4-C5 level and mildly narrowed C5-C6 level and mild to moderately narrowed C6-C7 level.  Some stable scattered mild facet arthropathic changes.  Intact odontoid tip and unremarkable C1-C2 articulation.  Cervical spine findings do not appear significantly changed to earlier exam.     Electronically signed by: Giovanny Busby  Date:                                            12/13/2022      MRI CERVICAL SPINE WITHOUT CONTRAST     CLINICAL  HISTORY:  Arm pain, paresthesias, clumsiness;.  Cervical disc disorder with radiculopathy, unspecified cervical region     TECHNIQUE:  Multiplanar, multisequence MR images of the cervical spine were acquired without the administration of contrast.     COMPARISON:  Cervical spine MRI 01/12/2021     FINDINGS:  Straightening of the normal cervical lordosis.  No spondylolisthesis.     No compression fractures.  No marrow replacing lesions.     Multilevel degenerative changes with disc desiccation and disc space narrowing, described in detail below.  No bone marrow edema.     Visualized structures in the posterior fossa are unremarkable. The cervical spinal cord is unremarkable.     Paraspinal soft tissues are unremarkable.     SIGNIFICANT FINDINGS BY LEVEL:     C2-3: Small disc osteophyte complex with central annular fissure.  No canal or foraminal stenosis.     C3-4: Disc osteophyte complex with superimposed central extrusion.  Mild canal stenosis with partial effacement of the thecal sac.  Mild bilateral foraminal stenosis.     C4-5: Disc osteophyte complex.  Moderate canal stenosis with near complete effacement of the thecal sac and flattening of the cervical cord.  Moderate bilateral foraminal stenosis.     C5-6: Disc osteophyte complex, eccentric to the right.  Severe canal stenosis with complete effacement of the thecal sac and flattening of the cervical cord.  Severe right and moderate left foraminal stenosis.     C6-7: Disc osteophyte complex.  Moderate canal stenosis.  Moderate bilateral foraminal stenosis.     C7-T1: Disc osteophyte complex with superimposed right foraminal extrusion.  Mild canal stenosis.  Mild right foraminal stenosis.     Impression:     Multilevel degenerative changes, most advanced at C5-6 where there is severe canal stenosis and severe right and moderate left foraminal stenosis.  Overall, degenerative changes have progressed from 01/12/2021.     This report was flagged in Epic as  abnormal.     Electronically signed by: Horace Quiroz  Date:                                            06/16/2022      XR LUMBAR SPINE AP AND LAT WITH FLEX/EXT     CLINICAL HISTORY:  Other specified postprocedural states     TECHNIQUE:  AP and lateral views as well as lateral flexion and extension images are performed through the lumbar spine.     COMPARISON:  Non 06/16/2022 MRI of the lumbar spine e     FINDINGS:  Patient had previous a L3/L4 hemilaminectomy on the left side.     Mild DJD and mild lumbar scoliosis.  The lumbar intervertebral disc spaces are slightly narrowed.  Few mm L2/L3 and L3/L4 retrolisthesis noted.  No fracture or dislocation.  No bone destruction identified     Electronically signed by: Xander Rubio MD  Date:                                            12/13/2022    MRI LUMBAR SPINE WITHOUT CONTRAST     CLINICAL HISTORY:  Lumbar radiculopathy, symptoms persist with conservative treatment;Low back pain, prior surgery, new symptoms; Dorsalgia, unspecified     TECHNIQUE:  Multiplanar, multisequence MR images were acquired from the thoracolumbar junction to the sacrum without the administration of contrast.     COMPARISON:  MRI lumbar spine 09/19/2021     FINDINGS:  Sequences are degraded by patient motion artifact.     Transitional lumbosacral anatomy with lumbarization of S1.     Postoperative changes from left L3-4 hemilaminectomy and micro discectomy.  Mild protrusion of the thecal sac and cauda equina nerve roots into the laminectomy defect.  No fluid collections in the operative bed.     No spondylolisthesis.     No compression fractures.  Small fat containing lesion in the L3 vertebral body, likely a hemangioma.     Multilevel degenerative changes with disc desiccation and disc space narrowing, described in detail below.  No significant bone marrow edema.     The conus medullaris has a normal appearance and terminates at the L2 level.  Cauda equina nerve roots are unremarkable.      Paraspinal muscle atrophy.     SIGNIFICANT FINDINGS BY LEVEL:     T12-L1: Mild disc bulge.  Minimal canal stenosis.  No foraminal stenosis.     L1-2: Left facet arthropathy.  No canal or foraminal stenosis.     L2-3: Disc bulge.  Bilateral facet arthropathy and ligamentum flavum thickening.  Mild canal stenosis.  Mild left foraminal stenosis.     L3-4: Residual disc bulge versus granulation tissue.  The previous disc fragment migrating inferiorly is no longer seen.  Bilateral facet arthropathy and ligamentum flavum thickening.  Mild canal stenosis with narrowing of both subarticular zones and abutment of the left descending L4 nerve root.  Mild right and moderate left foraminal stenosis.     L4-5: Disc bulge.  Bilateral facet arthropathy and ligamentum flavum thickening.  Mild canal stenosis.  Moderate bilateral foraminal stenosis.     L5-S1: Disc bulge.  Bilateral facet arthropathy and ligamentum flavum thickening.  Mild canal stenosis.  Moderate bilateral foraminal stenosis.     Impression:     Postoperative changes from left L3-4 hemilaminectomy and micro discectomy.  The previous disc fragment migrating inferiorly is no longer seen.  There is a residual disc bulge/granulation tissue contributing to mild canal stenosis and moderate left foraminal stenosis.     Small meningocele at the laminectomy defect.     Degenerative changes elsewhere in the spine resulting in mild canal stenosis and moderate foraminal stenosis at multiple levels as described above.        Electronically signed by: Horace Quiroz  Date:                                            06/16/2022    EXAMINATION:  MRI LUMBAR SPINE WITHOUT CONTRAST     CLINICAL HISTORY:  Low back pain, prior surgery, new symptoms; Dorsalgia, unspecified     TECHNIQUE:  Multiplanar, multisequence MR images were acquired from the thoracolumbar junction to the sacrum without the administration of contrast.     COMPARISON:  MRI lumbar spine 06/16/2023      FINDINGS:  Postoperative change left L3-L4 hemilaminectomy and discectomy.  Mild protrusion of the thecal sac and cauda equina nerve roots into the laminectomy defect similar to prior.     Alignment: Normal.     Vertebrae: Endplate degenerative change L5-S1.  Small may angioma L3 vertebral body.  No acute fracture.  No infiltrative marrow process.     Discs: Multilevel disc height loss and desiccation most pronounced at L5-S1.     Cord: Normal.  Conus terminates at L1.     Soft tissue structures are unremarkable.  Limited evaluation of the retroperitoneal organs demonstrates no significant abnormalities.  Paraspinal musculature demonstrates normal bulk and signal intensity.     Degenerative findings:     T12-L1: Mild circumferential disc bulge no neural foraminal narrowing or spinal canal stenosis.     L1-L2: No neural foraminal narrowing or spinal canal stenosis.     L2-L3: Circumferential disc bulge.  Bilateral facet arthropathy.  No neural foraminal narrowing.  Mild spinal canal stenosis.     L3-L4: Postop change.  Circumferential disc bulge.  Bilateral facet arthropathy.  Moderate left and mild right neural foraminal narrowing.  Mild spinal canal stenosis     L4-L5: Circumferential disc bulge.  Bilateral facet arthropathy.  Moderate right and severe left neural foraminal narrowing.  Mild to moderate spinal canal stenosis.     L5-S1: Circumferential disc bulge.  Bilateral facet arthropathy.  Moderate bilateral neural foraminal narrowing.  Mild spinal canal stenosis.     Impression:     1. Postoperative change left L3-L4 hemilaminectomy and discectomy.  2. Multilevel degenerative change of the lumbar spine as above not significantly changed from prior MRI 06/16/2023, noting severe left foraminal narrowing at L4-5.     Electronically signed by resident: Hammad Quinn  Date:                                            10/23/2023  Labs:  BMP  Lab Results   Component Value Date     05/07/2024    K 3.8 05/07/2024      05/07/2024    CO2 23 05/07/2024    BUN 14 05/07/2024    CREATININE 0.7 05/07/2024    CALCIUM 9.4 05/07/2024    ANIONGAP 8 05/07/2024    EGFRNORACEVR >60.0 05/07/2024     Lab Results   Component Value Date    ALT 28 05/07/2024    AST 15 05/07/2024    ALKPHOS 90 05/07/2024    BILITOT 0.4 05/07/2024     Lab Results   Component Value Date    WBC 10.82 05/07/2024    HGB 16.4 05/07/2024    HCT 50.5 05/07/2024    MCV 94 05/07/2024     05/07/2024         Assessment:  Problem List Items Addressed This Visit          Neuro    Chronic pain    Cervical radiculopathy     Other Visit Diagnoses       Cervicalgia    -  Primary    Relevant Orders    MRI Cervical Spine Without Contrast    DDD (degenerative disc disease), cervical        Tremor        Relevant Orders    Ambulatory referral/consult to Neurology            04/20/2032-  Hermelindo Garza III is a 43 y.o. male who  has a past medical history of ADD (attention deficit disorder) (05/09/2012), Allergy, Anxiety (04/12/2023), Asthma (05/09/2012), Cervical disc disorder with radiculopathy, unspecified cervical region (09/14/2021), Cervical spondylosis with myelopathy (06/29/2022), Eczema, Esophageal ulcer, GERD (gastroesophageal reflux disease) (05/09/2012), Glaucoma, Kidney stones (05/2014), Lumbar disc disease (05/09/2012), Lumbar herniated disc (05/09/2012), Malignant melanoma of skin, unspecified (01/06/2022), Melanoma (01/2022), LUBA (obstructive sleep apnea), Radiculopathy, lumbar region (09/14/2021), Renal calculi (05/09/2012), and Type 2 diabetes mellitus without complication, without long-term current use of insulin (11/16/2020).  By history and examination this patient has chronicneck pain with bilateral radiculopathy as well as low back pain s/p 9/19/2021 Left L3-4 hemilaminotomy, medial facetectomy, and foraminotomy for microdiscectomy with Dr Mcnamara with some improvement. The underlying cause cause is facet arthritis, degenerative disc disease,  foraminal stenosis, central canal stenosis, muscle dysfunction, and deconditioning.  MRI of the cervical spine was significant for multilevel degenerative changes, most advanced at C5-6 where there is severe canal stenosis and severe right and moderate left foraminal stenosis. MRI of the Lumbar spine post surgery was significant for residual disc bulge/granulation tissue contributing to mild canal stenosis and moderate left foraminal stenosis at L3/L4 and degenerative changes elsewhere in the spine resulting in mild canal stenosis and moderate foraminal stenosis at multiple levels.  We discussed the underlying diagnoses and multiple treatment options including non-opioid medications, interventional procedures, physical therapy, home exercise, core muscle enhancement, and weight loss.  He previously underwent a cervical steroid injection with several months of relief.  Plan for repeat of cervical epidural steroid injection.  The risks and benefits of each treatment option were discussed and all questions were answered.        11/13/2023 - Hermelindo Garza III presents for evaluation of right hip pain. His exam was consistent with Right GTB bursitis and right hip joint pain.  I will schedule him for a right GTB and Right intraarticular hip injection and defer the LESI for now.  Pt tearful and anxious throughout exam.  He is established with a Psychiatrist.  We discussed referral Psychology for possible CBT and assistance with coping strategies for chronic pain.     12/19/2023 - Hermelindo Garza III presents today for follow-up.  He is status post  right GTB and right intra-articular hip injections on 11/29/2023 and notes improvement in his GTB and hip pain.  Today he complains of pain extending from the area overlying the right hip joint and extending down the anterolateral portion of the thigh  in the distribution of the tensor fascia ashtyn.   Patient has multiple pain generators  as well as concurrent  anxiety and mood disorder.  I feel that he would be an excellent candidate for our functional restoration program.  Discussed with patient today during visit.  I will place a referral to the functional restoration program.   I also discussed with the patient that it would be a good to have him see Rheumatology given his extensive pain history, as perhaps they can offer their expertise.     03/28/2024 - Hermelindo Garza III presents today for follow up.  He was status post lumbar epidural steroid injection at L4/L5 on 03/06/2024 with 70-80% relief of his low back symptoms.  His cervical radicular symptoms have started to return.  Most recent cervical epidural gave him over 5 months of pain relief.  He would like to repeat the procedure.  I will schedule from a repeat cervical epidural steroid injection.  I will also have the patient increase his gabapentin to 300 mg 4 times daily.  He will let me know when he needs a refill of the medication.      04/30/2024-Hermelindo Garza III is a 43 y.o. male who  has a past medical history of ADD (attention deficit disorder) (05/09/2012), Allergy, Anxiety (04/12/2023), Asthma (05/09/2012), Cervical disc disorder with radiculopathy, unspecified cervical region (09/14/2021), Cervical spondylosis with myelopathy (06/29/2022), Eczema, Esophageal ulcer, GERD (gastroesophageal reflux disease) (05/09/2012), Glaucoma, Kidney stones (05/2014), Lumbar disc disease (05/09/2012), Lumbar herniated disc (05/09/2012), Malignant melanoma of skin, unspecified (01/06/2022), Melanoma (01/2022), LUBA (obstructive sleep apnea), Radiculopathy, lumbar region (09/14/2021), Renal calculi (05/09/2012), and Type 2 diabetes mellitus without complication, without long-term current use of insulin (11/16/2020).  By history and examination this patient has chronicneck pain with bilateral radiculopathy in the distribution of C5 and C6.  The underlying cause cause is facet arthritis, degenerative disc  disease, foraminal stenosis, and central canal stenosis.  Pathology is confirmed by imaging.  We discussed the underlying diagnoses and multiple treatment options including non-opioid medications, interventional procedures, physical therapy, home exercise, core muscle enhancement, activity modification, and weight loss.  The risks and benefits of each treatment option were discussed and all questions were answered.    He was recently provided a cervical NELI that provided him excellent relief he did reach out to Neurosurgery a new CT lumbar was ordered to rule out any new pathology.  The patient will follow up with our office to possibly repeat injections in the future.    05/30/2024 - Hermelindo Garza III  presents today with worsening right-sided cervical radicular symptoms over the last 4 weeks.  Given the worsening of his symptoms and development of new tremor in the right upper extremity, I am obtaining an updated MRI of the cervical spine.   He is established with Dr. Mcnamara with Neurosurgery, and at his last visit it was noted that he is a candidate for cervical surgery if his symptoms progressed in the areas of nerve root compression.  I am recommending that he follow up with Dr. Mcnamara after updated imaging is obtained.  I am happy to repeat a cervical epidural for this patient, but I am concerned about the number of steroid injections he has required for both his back and neck pain thus far.  We discussed that these steroids can affect blood sugar, blood pressure and over time can cause bone weakening.  In the meantime the patient would like to try Lyrica again.  We will discontinue gabapentin and I will prescribe him Lyrica 150 mg b.I.d. Referral placed to Neurology to assist with workup of new onset right upper extremity tremor.      Treatment Plan:   Procedures:  He was scheduled for repeat lumbar epidural steroid injection next week.  Consider repeating cervical epidural steroid injection after  reviewing new imaging ordered at today's visit.  PT/OT/HEP: I have stressed the importance of physical activity and a home exercise plan to help with pain and improve health.  He is previously completed physical therapy.  I feel the patient would significantly benefit from enrollment in the functional restoration program.  Referral placed.    Medications:  - AStart Lyrica 150 mg b.i.d..  Discontinue gabapentin.   - continue with meloxicam 15 mg q.d.   - continue with p.r.n. Tylenol   -  Reviewed and consistent with medication use as prescribed.  Imaging:  I will order updated MRI cervical spine.  Recommend pt follow up with Dr. Mcnamara (neurosurgery)  Referral placed to Neurology to assist with workup of new onset right upper extremity tremor.  Follow Up: RTC 6-8 weeks or sooner if needed     Lorraine Patel, DO   [facility]      Disclaimer: This note was partly generated using dictation software which may occasionally result in transcription errors.

## 2024-05-28 NOTE — H&P (VIEW-ONLY)
[facility] -  Established Patient Follow-up      Referred by: No ref. provider found   Reason for referral: Cervicalgia     CC:   Chief Complaint   Patient presents with    Neck Pain    Shoulder Pain     Radiates to R shoulder          5/30/2024     8:40 AM 3/28/2024     1:12 PM 12/19/2023     9:42 AM   Last 3 PDI Scores   Pain Disability Index (PDI) 30 36 32     Interval update 05/30/24:  43-year-old male that presents today in clinic with 4 weeks of worsening right neck pain that radiates into the right  arm and down into the right 4th and 5th digits of the hand.   He also reports new tremor in the right hand.  No recent falls or trauma.  Patient reports pain is worse with lying on the right side and with range of motion of the neck.   He has a history of right-sided neck pain but symptoms into the arm and hand tremor are new. He is taking Gabapentin 300 mg QAM and 900 mg QHS with minimal relief.  He is requesting to switch to Lyrica.   He also requests a refill of his Flexeril. He rates his pain 5/10.       Interval History 4/30/2024:  43-year-old male that presents virtually for a follow-up appointment he is status post a cervical NELI targeting C7-T1 on 04/17/2024 that provided significant relief in his symptoms 70-90%.  He is known to our clinic and has been previously provided this injection which has helped significantly.  Is now reporting low back pain that has gradually increased he did reach out to his previous surgeon Dr. Shen  ordered a recent CT lumbar.  He denies any bowel bladder dysfunction denies any saddle anesthesia denies any recent trauma.  He has been provided a lumbar NELI targeting L4-5 for low back and radicular symptoms, we discussed that we may need to repeat this in the future pending CT lumbar scan.      Interval update 03/28/24:   Patient returns today for post procedure follow up and follow up of his neck and back pain.  He is s/p  Lumbar Interlaminar  Epidural Steroid Injection L4/L5 on 03/06/2024.  He reports 70-80% relief of his low back pain and radicular symptoms.  Today he reports his cervical radicular symptoms are starting to returned.  He previously underwent cervical epidural steroid injection at C7/T1 on 11/03/2023 with significant improvement in his symptoms.  He reports almost 5 months of pain relief following a cervical epidural.  He would like to repeat this procedure.  In the meantime he is increased his gabapentin to 300 mg in the morning and 600 mg in the evening.  He does feel like this has helped with his pain.  He was also increased his exercise and has lost weight which he feels has been beneficial as well.      Interval update 12/19/23  Patient returns today for follow up.  He is status post right GTB and right intra-articular hip injections on 11/29/2023 and notes improvement in his GTB and hip pain.  Today he complains of pain extending from the area overlying the right hip joint and extending down the anterolateral portion of the thigh.  This pain is worse with activity.  Pain improves with lying flat.  He rates his pain 7/10 today.      Of note, patient recently underwent left lateral collateral ligament reconstruction for left radial collateral ligament tear on 12/05/2023 with Dr. Marshall.    11/13/23 Pt returns today complaining of right hip and right leg pain. Pain is located over the lateral right hip and radiates into the groin.  Pain started approximately 1 month ago. Pain has not improved with stretching and HEP.  He states his hip pain is worse than his lower back pain for which he is scheduled to have an NELI at the end of the month. He would like to address the hip pain first.      Subjective:   Hermelindo ELIAS Greg ESLIN is a 43 y.o. male who presents complaining of both neck and back pain.  Today he reports his neck pain is most bothersome.  He reports muscle tightness and pain in the left trapezius and shoulder area.  He reports  pain that radiates into the bilateral upper extremities with numbness and tingling.  He has been evaluated by Dr. Sierra with Neurosurgery who noted patient chris  candidate for ACDFs in he progresses.  Pt reports undergoing a cervical epidural steroid injection with Dr. Escamilla 02/10/2021 on with several months of relief.  He also reports low back pain.  He is s/p Left L3-4 hemilaminotomy, medial facetectomy, and foraminotomy for microdiscectomy with Dr Mcnamara on 9/19/2021.  He reports his back pain improved after surgery.  Patient reports he is previously completed physical therapy and is now actively continuing with his exercise program in the gym.  He also reports trying to lose weight and reports intentional  weight loss of approximately 55 pounds since 2020.    Location: back, shoulder, and neck   Onset: 2 years  Current Pain Score: 5/10  Daily Pain of Range: 5-6/10  Quality: Aching, Tight, Tingling, Deep, Numb, Electric, Hot, and Cold  Radiation: neck and back  Worsened by: lying down, sitting, standing for more than 3 minutes, and walking for more than 6 minutes  Improved by: medications    Patient denies night fever/night sweats, urinary incontinence, bowel incontinence, significant weight loss, significant motor weakness, and loss of sensations.    Previous Interventions:  - 04/17/2024:Cervical Interlaminar Epidural Steroid Injection C7/T1  -03/06/2024 Lumbar Interlaminar Epidural Steroid Injection L4/L5  - 11/03/2023 Cervical Interlaminar Epidural Steroid Injection C7/T1 5 months of pain relief  -9/27/2023  Lumbar Interlaminar Epidural Steroid Injection L4/L5   - 06/28/2023 - Cervical Interlaminar Epidural Steroid Injection C7/T1   - 02/10/2021 -  C6/7 CERVICAL EPIDURAL STEROID INJECTION - Dr Escamilla - 4 months of pain relief.   - 11/29/2023 -  right GTB and right intra-articular hip injections - 40% relief  - 03/06/2024 - lumbar interlaminar steroid injection at L4/L5 -80% pain relief    Previous  Therapies:  PT/OT: yes   Chiropractor:   HEP: yes  Relevant Surgery: yes    - Reports L4/L5 surgery at age 29 yo   - 09/19/21 - Left MIS L3-4 hemilaminotomy, medial facetectomy, and foraminotomy for microdiscectomy with Dr Mcnamara.   Previous Medications:   - NSAIDS: Meloxicam  - Muscle Relaxants: Robaxin, Flexeril  - TCAs:   - SNRIs:   - Topicals:   - Anticonvulsants: Lyrica - reports RL sxs so stopped; Gabapentin   - Opioids:   - Adjuvants: Tylenol    Current Pain Medications:  Gabapentin 300 mg QAM and 900 mg QHS  Meloxicam  Tylenol    Flexeril      Review of Systems:  Review of Systems   Musculoskeletal:  Positive for neck pain.       GENERAL:  No weight loss, malaise or fevers. +obesity +DM  HEENT:   No recent changes in vision or hearing  NECK:  No difficulty with swallowing. No stridor.   RESPIRATORY:  Negative for cough, wheezing or shortness of breath, patient denies any recent URI. +Asthma  CARDIOVASCULAR:  Negative for chest pain, leg swelling or palpitations.  GI/:  Negative for abdominal discomfort, blood in stools or black stools or change in bowel habits.   MUSCULOSKELETAL:  See HPI.  SKIN:  Negative for lesions, rash, and itching.  PSYCH:  No mood disorder or recent psychosocial stressors.  +anxiety +ADD  HEMATOLOGY/LYMPHOLOGY:  Negative for prolonged bleeding, bruising easily or swollen nodes.  Patient is not currently taking any anti-coagulants  NEURO:   No history of headaches, syncope, paralysis, seizures or tremors.  All other reviewed and negative other than HPI.    History:  Current medications, allergies, medical history, surgical history,   family history, and social history were reviewed in the chart as marked.    Full Medication List:    Current Outpatient Medications:     albuterol (PROVENTIL/VENTOLIN HFA) 90 mcg/actuation inhaler, INHALE 2 PUFFS INTO THE LUNGS EVERY 6 HOURS AS NEEDED WHEEZING, Disp: 25.5 g, Rfl: 3    atorvastatin (LIPITOR) 20 MG tablet, Take 1 tablet (20 mg total) by  mouth once daily., Disp: 90 tablet, Rfl: 3    blood sugar diagnostic (TRUE METRIX GLUCOSE TEST STRIP) Strp, Use to test blood glucose one (1) time a day,, Disp: 100 strip, Rfl: 3    blood-glucose meter Misc, use as directed, Disp: 1 each, Rfl: 0    clobetasoL (TEMOVATE) 0.05 % cream, Apply to affected area 2 (two) times daily as needed for flare, Disp: 30 g, Rfl: 1    dexmethylphenidate (FOCALIN) 10 MG tablet, Take 1 tablet (10 mg total) by mouth 2 (two) times daily., Disp: 60 tablet, Rfl: 0    dexmethylphenidate (FOCALIN) 10 MG tablet, Take 1 tablet (10 mg total) by mouth 2 (two) times daily., Disp: 60 tablet, Rfl: 0    [START ON 6/28/2024] dexmethylphenidate (FOCALIN) 10 MG tablet, Take 1 tablet (10 mg total) by mouth 2 (two) times daily., Disp: 60 tablet, Rfl: 0    [START ON 7/29/2024] dexmethylphenidate (FOCALIN) 10 MG tablet, Take 1 tablet (10 mg total) by mouth 2 (two) times daily., Disp: 60 tablet, Rfl: 0    doxycycline (VIBRA-TABS) 100 MG tablet, Take 1 tablet by mouth daily food and not within 1 hour of bedtime, Disp: 10 tablet, Rfl: 2    ergocalciferol, vitamin D2, (VITAMIN D ORAL), Take 1 tablet by mouth every other day., Disp: , Rfl:     fluocinonide (LIDEX) 0.05 % external solution, apply to affected area of scalp daily as needed for itching, scaling, Disp: 60 mL, Rfl: 3    hydrocortisone 2.5 % cream, APPLY EXTERNALLY TO THE AFFECTED AREA TWICE DAILY FOR 10 DAYS, Disp: 30 g, Rfl: 0    ketoconazole (NIZORAL) 2 % cream, Apply 1 application  topically 2 (two) times daily. Apply to affected area up to 2 weeks, Disp: 15 g, Rfl: 0    lamoTRIgine (LAMICTAL) 100 MG tablet, Take 1 tablet (100 mg total) by mouth 2 (two) times daily. IF any RASH, STOP ALL Lamictal and Tell Psmarilyn SELLERS., Disp: 180 tablet, Rfl: 1    lancets (ONETOUCH DELICA PLUS LANCET) 33 gauge Misc, Use to test blood glucose one (1) time a day,, Disp: 100 each, Rfl: 5    latanoprost 0.005 % ophthalmic solution, Place 1 drop into both eyes every  evening., Disp: 7.5 mL, Rfl: 3    levocetirizine (XYZAL) 5 MG tablet, Take 1 tablet (5 mg total) by mouth every evening., Disp: 30 tablet, Rfl: 11    LORazepam (ATIVAN) 0.5 MG tablet, Take 1 tablet (0.5 mg total) by mouth daily as needed (SIGNIFICANT ANXIETY)., Disp: 30 tablet, Rfl: 3    meloxicam (MOBIC) 15 MG tablet, TAKE 1 TABLET(15 MG) BY MOUTH EVERY DAY as needed FOR PAIN, Disp: 30 tablet, Rfl: 2    montelukast (SINGULAIR) 10 mg tablet, Take 1 tablet (10 mg total) by mouth every evening., Disp: 90 tablet, Rfl: 3    olopatadine (PATANASE) 0.6 % nasal spray, 1 spray by Each Nostril route 2 (two) times daily., Disp: 30.5 g, Rfl: 5    ondansetron (ZOFRAN-ODT) 8 MG TbDL, Dissolve 1 tablet (8 mg total) by mouth every 6 (six) hours as needed (nausea)., Disp: 30 tablet, Rfl: 2    permethrin (ELIMITE) 5 % cream, Apply from the neck down and leave on overnight; wash off in am and repeat in 1 week, Disp: 120 g, Rfl: 1    rOPINIRole (REQUIP) 0.25 MG tablet, TAKE 1 TABLET(0.25 MG) BY MOUTH EVERY EVENING, Disp: 90 tablet, Rfl: 2    semaglutide (OZEMPIC) 2 mg/dose (8 mg/3 mL) PnIj, Inject 2 mg into the skin every 7 days., Disp: 3 mL, Rfl: 5    ULTRA THIN LANCETS 30 gauge Misc, USE TO TEST BLOOD SUGAR EVERY DAY AS DIRECTED, Disp: 100 each, Rfl: 3    cyclobenzaprine (FLEXERIL) 10 MG tablet, Take 10 mg by mouth 3 (three) times daily., Disp: , Rfl:     cyclobenzaprine (FLEXERIL) 10 MG tablet, Take 1 tablet (10 mg total) by mouth 3 (three) times daily as needed for Muscle spasms., Disp: 90 tablet, Rfl: 0    pregabalin (LYRICA) 75 MG capsule, Take 2 capsules (150 mg total) by mouth 2 (two) times daily., Disp: 120 capsule, Rfl: 1    sildenafiL (VIAGRA) 50 MG tablet, Take 1 tablet (50 mg total) by mouth daily as needed for Erectile Dysfunction. Take 30 -60 minutes prior to sex, Disp: 9 tablet, Rfl: 2  No current facility-administered medications for this visit.    Facility-Administered Medications Ordered in Other Visits:     0.9%   NaCl infusion, 500 mL, Intravenous, Continuous, Kolton Terrell MD    mupirocin 2 % ointment, , Nasal, On Call Procedure, Chong Padron MD, Given at 09/15/22 0553     Allergies:  Patient has no known allergies.     Medical History:   has a past medical history of ADD (attention deficit disorder) (05/09/2012), Allergy, Anxiety (04/12/2023), Asthma (05/09/2012), Cervical disc disorder with radiculopathy, unspecified cervical region (09/14/2021), Cervical spondylosis with myelopathy (06/29/2022), Eczema, Esophageal ulcer, GERD (gastroesophageal reflux disease) (05/09/2012), Glaucoma, Kidney stones (05/2014), Lumbar disc disease (05/09/2012), Lumbar herniated disc (05/09/2012), Malignant melanoma of skin, unspecified (01/06/2022), Melanoma (01/2022), LUBA (obstructive sleep apnea), Radiculopathy, lumbar region (09/14/2021), Renal calculi (05/09/2012), and Type 2 diabetes mellitus without complication, without long-term current use of insulin (11/16/2020).    Surgical History:   has a past surgical history that includes Lumbar laminectomy (2010); Esophagogastroduodenoscopy (01/22/2019); Colonoscopy (01/22/2019); Appendectomy (2006); Epidural steroid injection (N/A, 02/10/2021); Esophagogastroduodenoscopy (N/A, 05/18/2021); Lumbar laminectomy with discectomy (Left, 09/20/2021); Carpal tunnel release (Right, 09/15/2022); Excision of ganglion of wrist (Right, 09/15/2022); Hand Arthrotomy (Right, 09/15/2022); Injection of steroid (Left, 09/15/2022); Flexor tendon repair (Right, 09/15/2022); Tympanostomy tube placement; Epidural steroid injection into cervical spine (N/A, 6/28/2023); Cystoscopy (7/5/2023); Ureteral stent placement (Left, 7/5/2023); Ureteroscopy (Left, 7/5/2023); Laser lithotripsy (Left, 7/5/2023); Ureteroscopic removal of ureteric calculus (Left, 7/5/2023); Correction of hammer toe (Left, 8/14/2023); Epidural steroid injection into lumbar spine (N/A, 9/27/2023); Epidural steroid injection into cervical spine  "(N/A, 11/3/2023); Injection of joint (Right, 11/29/2023); Reconstruction of ligament (Left, 12/5/2023); Repair of extensor tendon (Left, 12/5/2023); Esophagogastroduodenoscopy (N/A, 12/21/2023); ph monitoring, esophagus, wireless, (off reflux meds) (N/A, 12/21/2023); Colonoscopy (N/A, 12/21/2023); Esophagogastroduodenoscopy (N/A, 2/22/2024); Epidural steroid injection into lumbar spine (N/A, 3/6/2024); Robot-assisted repair of hiatal hernia (N/A, 3/14/2024); and Epidural steroid injection into cervical spine (N/A, 4/17/2024).    Family History:  family history includes Allergic rhinitis in his father, mother, sister, and sister; Asthma in his father, sister, and sister; Brain cancer in his maternal grandmother; Breast cancer in his paternal grandmother; Chronic back pain in his father; Diabetes in his maternal grandfather; MARTITA disease in his father; Hypertension in his mother; Melanoma in his father; No Known Problems in his brother, maternal aunt, maternal uncle, paternal aunt, paternal grandfather, and paternal uncle; Sleep apnea in his father and mother; Transient ischemic attack in his mother.    Social History:   reports that he quit smoking about 6 years ago. His smoking use included cigarettes. He has never been exposed to tobacco smoke. He has never used smokeless tobacco. He reports current alcohol use of about 3.0 standard drinks of alcohol per week. He reports that he does not use drugs.    Physical Exam:  Vitals:    05/30/24 0839   BP: (!) 145/99   Pulse: 105   Weight: 105.2 kg (232 lb)   Height: 5' 5" (1.651 m)   PainSc:   5   PainLoc: Neck     PHYSICAL EXAMINATION:    GENERAL: Well appearing, in no acute distress, alert and oriented x3.  PSYCH:  Mood and affect appropriate.  SKIN: Skin color, texture, turgor normal, no rashes or lesions.  HEAD/FACE:  Normocephalic, atraumatic. Cranial nerves grossly intact.  NECK:  Mildly decreased ROM secondary to pain. + pain to palpation over the cervical " paraspinous muscles and bilateral trapezius muscles.  Spurling positive on the right.   CV: RRR with palpation of the radial artery.  PULM: No evidence of respiratory difficulty, symmetric chest rise.  GI:  Soft and non-distended.  MSK: No obvious deformities, edema, or skin discoloration.  No atrophy or tone abnormalities are noted.   NEURO: + tremor noted in the right hand, particularly the 4th and 5th digit.  Bilateral upper and lower extremity coordination and strength is symmetric.  No loss of sensation is noted.  MENTAL STATUS: A x O x 3, good concentration, speech is fluent and goal directed  MOTOR: 5/5 in all muscle groups  GAIT: normal.ambulates unassisted.       Imaging:  XR CERVICAL SPINE AP LAT WITH FLEX EXTEN     CLINICAL HISTORY:  Other spondylosis with myelopathy, cervical region     TECHNIQUE:  Three views of the cervical spine plus flexion and extension views were performed.     COMPARISON:  September 8, 2022     FINDINGS:  As before, suboptimal visualization of the cervicothoracic junction due to superimposition of shoulder and chest wall soft tissues.  Straightening of normal cervical lordosis.  No acute fractures.  Unremarkable predental space.  No widening prevertebral soft tissues.  No anterior or retrolisthesis.  Flexion and extension views demonstrate no instability.  Reconfirmed areas of anterior longitudinal ligament calcification adjacent to C4-C5 and C5-C6 disc.  Reconfirmed findings of uncovertebral spurring and endplate osteophytes at preserved C4-C5 level and mildly narrowed C5-C6 level and mild to moderately narrowed C6-C7 level.  Some stable scattered mild facet arthropathic changes.  Intact odontoid tip and unremarkable C1-C2 articulation.  Cervical spine findings do not appear significantly changed to earlier exam.     Electronically signed by: Giovanny Busby  Date:                                            12/13/2022      MRI CERVICAL SPINE WITHOUT CONTRAST     CLINICAL  HISTORY:  Arm pain, paresthesias, clumsiness;.  Cervical disc disorder with radiculopathy, unspecified cervical region     TECHNIQUE:  Multiplanar, multisequence MR images of the cervical spine were acquired without the administration of contrast.     COMPARISON:  Cervical spine MRI 01/12/2021     FINDINGS:  Straightening of the normal cervical lordosis.  No spondylolisthesis.     No compression fractures.  No marrow replacing lesions.     Multilevel degenerative changes with disc desiccation and disc space narrowing, described in detail below.  No bone marrow edema.     Visualized structures in the posterior fossa are unremarkable. The cervical spinal cord is unremarkable.     Paraspinal soft tissues are unremarkable.     SIGNIFICANT FINDINGS BY LEVEL:     C2-3: Small disc osteophyte complex with central annular fissure.  No canal or foraminal stenosis.     C3-4: Disc osteophyte complex with superimposed central extrusion.  Mild canal stenosis with partial effacement of the thecal sac.  Mild bilateral foraminal stenosis.     C4-5: Disc osteophyte complex.  Moderate canal stenosis with near complete effacement of the thecal sac and flattening of the cervical cord.  Moderate bilateral foraminal stenosis.     C5-6: Disc osteophyte complex, eccentric to the right.  Severe canal stenosis with complete effacement of the thecal sac and flattening of the cervical cord.  Severe right and moderate left foraminal stenosis.     C6-7: Disc osteophyte complex.  Moderate canal stenosis.  Moderate bilateral foraminal stenosis.     C7-T1: Disc osteophyte complex with superimposed right foraminal extrusion.  Mild canal stenosis.  Mild right foraminal stenosis.     Impression:     Multilevel degenerative changes, most advanced at C5-6 where there is severe canal stenosis and severe right and moderate left foraminal stenosis.  Overall, degenerative changes have progressed from 01/12/2021.     This report was flagged in Epic as  abnormal.     Electronically signed by: Horace Quiroz  Date:                                            06/16/2022      XR LUMBAR SPINE AP AND LAT WITH FLEX/EXT     CLINICAL HISTORY:  Other specified postprocedural states     TECHNIQUE:  AP and lateral views as well as lateral flexion and extension images are performed through the lumbar spine.     COMPARISON:  Non 06/16/2022 MRI of the lumbar spine e     FINDINGS:  Patient had previous a L3/L4 hemilaminectomy on the left side.     Mild DJD and mild lumbar scoliosis.  The lumbar intervertebral disc spaces are slightly narrowed.  Few mm L2/L3 and L3/L4 retrolisthesis noted.  No fracture or dislocation.  No bone destruction identified     Electronically signed by: Xander Rubio MD  Date:                                            12/13/2022    MRI LUMBAR SPINE WITHOUT CONTRAST     CLINICAL HISTORY:  Lumbar radiculopathy, symptoms persist with conservative treatment;Low back pain, prior surgery, new symptoms; Dorsalgia, unspecified     TECHNIQUE:  Multiplanar, multisequence MR images were acquired from the thoracolumbar junction to the sacrum without the administration of contrast.     COMPARISON:  MRI lumbar spine 09/19/2021     FINDINGS:  Sequences are degraded by patient motion artifact.     Transitional lumbosacral anatomy with lumbarization of S1.     Postoperative changes from left L3-4 hemilaminectomy and micro discectomy.  Mild protrusion of the thecal sac and cauda equina nerve roots into the laminectomy defect.  No fluid collections in the operative bed.     No spondylolisthesis.     No compression fractures.  Small fat containing lesion in the L3 vertebral body, likely a hemangioma.     Multilevel degenerative changes with disc desiccation and disc space narrowing, described in detail below.  No significant bone marrow edema.     The conus medullaris has a normal appearance and terminates at the L2 level.  Cauda equina nerve roots are unremarkable.      Paraspinal muscle atrophy.     SIGNIFICANT FINDINGS BY LEVEL:     T12-L1: Mild disc bulge.  Minimal canal stenosis.  No foraminal stenosis.     L1-2: Left facet arthropathy.  No canal or foraminal stenosis.     L2-3: Disc bulge.  Bilateral facet arthropathy and ligamentum flavum thickening.  Mild canal stenosis.  Mild left foraminal stenosis.     L3-4: Residual disc bulge versus granulation tissue.  The previous disc fragment migrating inferiorly is no longer seen.  Bilateral facet arthropathy and ligamentum flavum thickening.  Mild canal stenosis with narrowing of both subarticular zones and abutment of the left descending L4 nerve root.  Mild right and moderate left foraminal stenosis.     L4-5: Disc bulge.  Bilateral facet arthropathy and ligamentum flavum thickening.  Mild canal stenosis.  Moderate bilateral foraminal stenosis.     L5-S1: Disc bulge.  Bilateral facet arthropathy and ligamentum flavum thickening.  Mild canal stenosis.  Moderate bilateral foraminal stenosis.     Impression:     Postoperative changes from left L3-4 hemilaminectomy and micro discectomy.  The previous disc fragment migrating inferiorly is no longer seen.  There is a residual disc bulge/granulation tissue contributing to mild canal stenosis and moderate left foraminal stenosis.     Small meningocele at the laminectomy defect.     Degenerative changes elsewhere in the spine resulting in mild canal stenosis and moderate foraminal stenosis at multiple levels as described above.        Electronically signed by: Horace Quiroz  Date:                                            06/16/2022    EXAMINATION:  MRI LUMBAR SPINE WITHOUT CONTRAST     CLINICAL HISTORY:  Low back pain, prior surgery, new symptoms; Dorsalgia, unspecified     TECHNIQUE:  Multiplanar, multisequence MR images were acquired from the thoracolumbar junction to the sacrum without the administration of contrast.     COMPARISON:  MRI lumbar spine 06/16/2023      FINDINGS:  Postoperative change left L3-L4 hemilaminectomy and discectomy.  Mild protrusion of the thecal sac and cauda equina nerve roots into the laminectomy defect similar to prior.     Alignment: Normal.     Vertebrae: Endplate degenerative change L5-S1.  Small may angioma L3 vertebral body.  No acute fracture.  No infiltrative marrow process.     Discs: Multilevel disc height loss and desiccation most pronounced at L5-S1.     Cord: Normal.  Conus terminates at L1.     Soft tissue structures are unremarkable.  Limited evaluation of the retroperitoneal organs demonstrates no significant abnormalities.  Paraspinal musculature demonstrates normal bulk and signal intensity.     Degenerative findings:     T12-L1: Mild circumferential disc bulge no neural foraminal narrowing or spinal canal stenosis.     L1-L2: No neural foraminal narrowing or spinal canal stenosis.     L2-L3: Circumferential disc bulge.  Bilateral facet arthropathy.  No neural foraminal narrowing.  Mild spinal canal stenosis.     L3-L4: Postop change.  Circumferential disc bulge.  Bilateral facet arthropathy.  Moderate left and mild right neural foraminal narrowing.  Mild spinal canal stenosis     L4-L5: Circumferential disc bulge.  Bilateral facet arthropathy.  Moderate right and severe left neural foraminal narrowing.  Mild to moderate spinal canal stenosis.     L5-S1: Circumferential disc bulge.  Bilateral facet arthropathy.  Moderate bilateral neural foraminal narrowing.  Mild spinal canal stenosis.     Impression:     1. Postoperative change left L3-L4 hemilaminectomy and discectomy.  2. Multilevel degenerative change of the lumbar spine as above not significantly changed from prior MRI 06/16/2023, noting severe left foraminal narrowing at L4-5.     Electronically signed by resident: Hammad Quinn  Date:                                            10/23/2023  Labs:  BMP  Lab Results   Component Value Date     05/07/2024    K 3.8 05/07/2024      05/07/2024    CO2 23 05/07/2024    BUN 14 05/07/2024    CREATININE 0.7 05/07/2024    CALCIUM 9.4 05/07/2024    ANIONGAP 8 05/07/2024    EGFRNORACEVR >60.0 05/07/2024     Lab Results   Component Value Date    ALT 28 05/07/2024    AST 15 05/07/2024    ALKPHOS 90 05/07/2024    BILITOT 0.4 05/07/2024     Lab Results   Component Value Date    WBC 10.82 05/07/2024    HGB 16.4 05/07/2024    HCT 50.5 05/07/2024    MCV 94 05/07/2024     05/07/2024         Assessment:  Problem List Items Addressed This Visit          Neuro    Chronic pain    Cervical radiculopathy     Other Visit Diagnoses       Cervicalgia    -  Primary    Relevant Orders    MRI Cervical Spine Without Contrast    DDD (degenerative disc disease), cervical        Tremor        Relevant Orders    Ambulatory referral/consult to Neurology            04/20/2032-  Hermelindo Garza III is a 43 y.o. male who  has a past medical history of ADD (attention deficit disorder) (05/09/2012), Allergy, Anxiety (04/12/2023), Asthma (05/09/2012), Cervical disc disorder with radiculopathy, unspecified cervical region (09/14/2021), Cervical spondylosis with myelopathy (06/29/2022), Eczema, Esophageal ulcer, GERD (gastroesophageal reflux disease) (05/09/2012), Glaucoma, Kidney stones (05/2014), Lumbar disc disease (05/09/2012), Lumbar herniated disc (05/09/2012), Malignant melanoma of skin, unspecified (01/06/2022), Melanoma (01/2022), LUBA (obstructive sleep apnea), Radiculopathy, lumbar region (09/14/2021), Renal calculi (05/09/2012), and Type 2 diabetes mellitus without complication, without long-term current use of insulin (11/16/2020).  By history and examination this patient has chronicneck pain with bilateral radiculopathy as well as low back pain s/p 9/19/2021 Left L3-4 hemilaminotomy, medial facetectomy, and foraminotomy for microdiscectomy with Dr Mcnamara with some improvement. The underlying cause cause is facet arthritis, degenerative disc disease,  foraminal stenosis, central canal stenosis, muscle dysfunction, and deconditioning.  MRI of the cervical spine was significant for multilevel degenerative changes, most advanced at C5-6 where there is severe canal stenosis and severe right and moderate left foraminal stenosis. MRI of the Lumbar spine post surgery was significant for residual disc bulge/granulation tissue contributing to mild canal stenosis and moderate left foraminal stenosis at L3/L4 and degenerative changes elsewhere in the spine resulting in mild canal stenosis and moderate foraminal stenosis at multiple levels.  We discussed the underlying diagnoses and multiple treatment options including non-opioid medications, interventional procedures, physical therapy, home exercise, core muscle enhancement, and weight loss.  He previously underwent a cervical steroid injection with several months of relief.  Plan for repeat of cervical epidural steroid injection.  The risks and benefits of each treatment option were discussed and all questions were answered.        11/13/2023 - Hermelindo Garza III presents for evaluation of right hip pain. His exam was consistent with Right GTB bursitis and right hip joint pain.  I will schedule him for a right GTB and Right intraarticular hip injection and defer the LESI for now.  Pt tearful and anxious throughout exam.  He is established with a Psychiatrist.  We discussed referral Psychology for possible CBT and assistance with coping strategies for chronic pain.     12/19/2023 - Hermelindo Garza III presents today for follow-up.  He is status post  right GTB and right intra-articular hip injections on 11/29/2023 and notes improvement in his GTB and hip pain.  Today he complains of pain extending from the area overlying the right hip joint and extending down the anterolateral portion of the thigh  in the distribution of the tensor fascia ashtyn.   Patient has multiple pain generators  as well as concurrent  anxiety and mood disorder.  I feel that he would be an excellent candidate for our functional restoration program.  Discussed with patient today during visit.  I will place a referral to the functional restoration program.   I also discussed with the patient that it would be a good to have him see Rheumatology given his extensive pain history, as perhaps they can offer their expertise.     03/28/2024 - Hermelindo Garza III presents today for follow up.  He was status post lumbar epidural steroid injection at L4/L5 on 03/06/2024 with 70-80% relief of his low back symptoms.  His cervical radicular symptoms have started to return.  Most recent cervical epidural gave him over 5 months of pain relief.  He would like to repeat the procedure.  I will schedule from a repeat cervical epidural steroid injection.  I will also have the patient increase his gabapentin to 300 mg 4 times daily.  He will let me know when he needs a refill of the medication.      04/30/2024-Hermelindo Garza III is a 43 y.o. male who  has a past medical history of ADD (attention deficit disorder) (05/09/2012), Allergy, Anxiety (04/12/2023), Asthma (05/09/2012), Cervical disc disorder with radiculopathy, unspecified cervical region (09/14/2021), Cervical spondylosis with myelopathy (06/29/2022), Eczema, Esophageal ulcer, GERD (gastroesophageal reflux disease) (05/09/2012), Glaucoma, Kidney stones (05/2014), Lumbar disc disease (05/09/2012), Lumbar herniated disc (05/09/2012), Malignant melanoma of skin, unspecified (01/06/2022), Melanoma (01/2022), LUBA (obstructive sleep apnea), Radiculopathy, lumbar region (09/14/2021), Renal calculi (05/09/2012), and Type 2 diabetes mellitus without complication, without long-term current use of insulin (11/16/2020).  By history and examination this patient has chronicneck pain with bilateral radiculopathy in the distribution of C5 and C6.  The underlying cause cause is facet arthritis, degenerative disc  disease, foraminal stenosis, and central canal stenosis.  Pathology is confirmed by imaging.  We discussed the underlying diagnoses and multiple treatment options including non-opioid medications, interventional procedures, physical therapy, home exercise, core muscle enhancement, activity modification, and weight loss.  The risks and benefits of each treatment option were discussed and all questions were answered.    He was recently provided a cervical NELI that provided him excellent relief he did reach out to Neurosurgery a new CT lumbar was ordered to rule out any new pathology.  The patient will follow up with our office to possibly repeat injections in the future.    05/30/2024 - Hermelindo Garza III  presents today with worsening right-sided cervical radicular symptoms over the last 4 weeks.  Given the worsening of his symptoms and development of new tremor in the right upper extremity, I am obtaining an updated MRI of the cervical spine.   He is established with Dr. Mcnamara with Neurosurgery, and at his last visit it was noted that he is a candidate for cervical surgery if his symptoms progressed in the areas of nerve root compression.  I am recommending that he follow up with Dr. Mcnamara after updated imaging is obtained.  I am happy to repeat a cervical epidural for this patient, but I am concerned about the number of steroid injections he has required for both his back and neck pain thus far.  We discussed that these steroids can affect blood sugar, blood pressure and over time can cause bone weakening.  In the meantime the patient would like to try Lyrica again.  We will discontinue gabapentin and I will prescribe him Lyrica 150 mg b.I.d. Referral placed to Neurology to assist with workup of new onset right upper extremity tremor.      Treatment Plan:   Procedures:  He was scheduled for repeat lumbar epidural steroid injection next week.  Consider repeating cervical epidural steroid injection after  reviewing new imaging ordered at today's visit.  PT/OT/HEP: I have stressed the importance of physical activity and a home exercise plan to help with pain and improve health.  He is previously completed physical therapy.  I feel the patient would significantly benefit from enrollment in the functional restoration program.  Referral placed.    Medications:  - AStart Lyrica 150 mg b.i.d..  Discontinue gabapentin.   - continue with meloxicam 15 mg q.d.   - continue with p.r.n. Tylenol   -  Reviewed and consistent with medication use as prescribed.  Imaging:  I will order updated MRI cervical spine.  Recommend pt follow up with Dr. Mcnamara (neurosurgery)  Referral placed to Neurology to assist with workup of new onset right upper extremity tremor.  Follow Up: RTC 6-8 weeks or sooner if needed     Lorraine Patel, DO   [facility]      Disclaimer: This note was partly generated using dictation software which may occasionally result in transcription errors.

## 2024-05-29 ENCOUNTER — CLINICAL SUPPORT (OUTPATIENT)
Dept: ALLERGY | Facility: CLINIC | Age: 44
End: 2024-05-29
Payer: COMMERCIAL

## 2024-05-29 DIAGNOSIS — J30.9 CHRONIC ALLERGIC RHINITIS: Primary | ICD-10-CM

## 2024-05-29 PROCEDURE — 95117 IMMUNOTHERAPY INJECTIONS: CPT | Mod: S$GLB,,, | Performed by: ALLERGY & IMMUNOLOGY

## 2024-05-29 PROCEDURE — 99999 PR PBB SHADOW E&M-EST. PATIENT-LVL I: CPT | Mod: PBBFAC,,,

## 2024-05-29 NOTE — PROGRESS NOTES
Pt. Here today for maintenance split dose, will get 0.25 ml in left arm wait 30 minutes , then in right arm will be given 0.25 ml and wait additional 30 minutes.      Pt. Did well today with split dose

## 2024-05-30 ENCOUNTER — PATIENT MESSAGE (OUTPATIENT)
Dept: PAIN MEDICINE | Facility: CLINIC | Age: 44
End: 2024-05-30
Payer: COMMERCIAL

## 2024-05-30 ENCOUNTER — OFFICE VISIT (OUTPATIENT)
Dept: PAIN MEDICINE | Facility: CLINIC | Age: 44
End: 2024-05-30
Payer: COMMERCIAL

## 2024-05-30 VITALS
HEIGHT: 65 IN | WEIGHT: 232 LBS | SYSTOLIC BLOOD PRESSURE: 145 MMHG | BODY MASS INDEX: 38.65 KG/M2 | DIASTOLIC BLOOD PRESSURE: 99 MMHG | HEART RATE: 105 BPM

## 2024-05-30 DIAGNOSIS — M54.2 CERVICALGIA: Primary | ICD-10-CM

## 2024-05-30 DIAGNOSIS — R25.1 TREMOR: ICD-10-CM

## 2024-05-30 DIAGNOSIS — M54.12 CERVICAL RADICULOPATHY: ICD-10-CM

## 2024-05-30 DIAGNOSIS — M50.30 DDD (DEGENERATIVE DISC DISEASE), CERVICAL: ICD-10-CM

## 2024-05-30 DIAGNOSIS — G89.4 CHRONIC PAIN SYNDROME: ICD-10-CM

## 2024-05-30 PROCEDURE — 3080F DIAST BP >= 90 MM HG: CPT | Mod: CPTII,S$GLB,, | Performed by: STUDENT IN AN ORGANIZED HEALTH CARE EDUCATION/TRAINING PROGRAM

## 2024-05-30 PROCEDURE — 3008F BODY MASS INDEX DOCD: CPT | Mod: CPTII,S$GLB,, | Performed by: STUDENT IN AN ORGANIZED HEALTH CARE EDUCATION/TRAINING PROGRAM

## 2024-05-30 PROCEDURE — 3066F NEPHROPATHY DOC TX: CPT | Mod: CPTII,S$GLB,, | Performed by: STUDENT IN AN ORGANIZED HEALTH CARE EDUCATION/TRAINING PROGRAM

## 2024-05-30 PROCEDURE — 3077F SYST BP >= 140 MM HG: CPT | Mod: CPTII,S$GLB,, | Performed by: STUDENT IN AN ORGANIZED HEALTH CARE EDUCATION/TRAINING PROGRAM

## 2024-05-30 PROCEDURE — 99999 PR PBB SHADOW E&M-EST. PATIENT-LVL III: CPT | Mod: PBBFAC,,, | Performed by: STUDENT IN AN ORGANIZED HEALTH CARE EDUCATION/TRAINING PROGRAM

## 2024-05-30 PROCEDURE — 3044F HG A1C LEVEL LT 7.0%: CPT | Mod: CPTII,S$GLB,, | Performed by: STUDENT IN AN ORGANIZED HEALTH CARE EDUCATION/TRAINING PROGRAM

## 2024-05-30 PROCEDURE — 99214 OFFICE O/P EST MOD 30 MIN: CPT | Mod: S$GLB,,, | Performed by: STUDENT IN AN ORGANIZED HEALTH CARE EDUCATION/TRAINING PROGRAM

## 2024-05-30 PROCEDURE — 3061F NEG MICROALBUMINURIA REV: CPT | Mod: CPTII,S$GLB,, | Performed by: STUDENT IN AN ORGANIZED HEALTH CARE EDUCATION/TRAINING PROGRAM

## 2024-05-30 RX ORDER — PREGABALIN 75 MG/1
150 CAPSULE ORAL 2 TIMES DAILY
Qty: 120 CAPSULE | Refills: 1 | Status: SHIPPED | OUTPATIENT
Start: 2024-05-30 | End: 2024-07-29

## 2024-05-30 RX ORDER — CYCLOBENZAPRINE HCL 10 MG
10 TABLET ORAL 3 TIMES DAILY PRN
Qty: 90 TABLET | Refills: 0 | Status: SHIPPED | OUTPATIENT
Start: 2024-05-30 | End: 2024-06-29

## 2024-05-30 NOTE — PRE-PROCEDURE INSTRUCTIONS
Patient reviewed on 6/3/2024.  Okay to proceed at [facility]. The following pre-procedure instructions and arrival time have been reviewed with patient via phone and sent to patient portal for review.  Patient verbalized an understanding.  Pt to be accompanied by his dad day of procedure as responsible .    Dear Hermelindo ,     You are scheduled for a procedure with Dr. Patel on 6/3/2024.    Your scheduled arrival time is 07:00 am.  This arrival time is roughly 1 hour before your anticipated procedure time to allow sufficient time for pre-op..    Please wear comfortable clothes. You will be placed in a gown for your procedure.  Please do not wear a dress.  This procedure will take place at the [facility] at the corner of [facility] and [facility].  It is in the [place] [place] next to [facility].  The address is:     [address] [place] [address].  [place] [id number]     After entering the building, you will proceed to the second floor where you can check in with registration. You should take any medications that you routinely take for blood pressure, heart medications, thyroid, cholesterol, etc.      The fasting restrictions are dependent on whether or not you are receiving sedation.  Sedation is not available for all procedures.      Your fasting instructions are as follow:  IV sedation. You should not eat for 8 hours and can only drink clear liquids (water or black coffee without cream/sugar) up until 2 hours before your scheduled time.  You CANNOT drive yourself and must have a .     If you are on blood thinners, you need to follow the anticoagulation instructions that had been discussed previously.  You should only stop the blood thinners if it was approved by your primary care physician or your cardiologist.  In the event that you are not able to stop your blood thinners, a blood thinner was not listed on your medication list, or we were not able to get clearance from  your cardiologist, then the procedure may have to be postponed/canceled.      IF you were told to stop your blood thinners, this is how long you should generally hold some of the more common ones.  Remember that stopping blood thinners is only necessary for certain procedures. If you are unsure of your instructions, please call us.   Aspirin - 5 days  Plavix/Clopidogrel - 7 days  Warfarin / Coumadin - 5 days  Eliquis - 3 days  Pradaxa/Dabigatran - 4 days  Xarelto/Rivaroxaban - 3 days     HOLD all non-insulin injections (shots) until after surgery (Ozempic, Mounjaro, Trulicity, Victoza, Byetta, Wegovy and Adlyxin) (Total of 7 days prior)     If you are a diabetic, do not take your medication if you will be fasting, but bring it with you. Please plan on being here for roughly 3 hours.     Please call us if you have been sick (running fever, having any flu-like symptoms) or have been taking antibiotics in the past 2 weeks or had any outpatient procedures other than with us (colonoscopy, endoscopy, OBGYN, dental, etc.). If you have been previously COVID positive, you will need to hold off on your procedure until you are symptom free for 10 days. If you did not have any symptoms, you can have your procedure 10 days from your positive test result.       *HOLD ALL VITAMINS, MINERALS, HERBS (INCLUDING HERBAL TEAS) AND SUPPLEMENTS  *SHOWER WITH ANTIBACTERIAL SOAP (EX. DIAL) NIGHT BEFORE AND MORNING OF PROCEDURE  *DO NOT APPLY ANY LOTIONS, OILS, POWDERS, PERFUME/COLOGNE, OINTMENTS, GELS, CREAMS, MAKEUP OR DEODORANT TO YOUR SKIN MORNING OF PROCEDURE  *LEAVE JEWELRY AND ANY VALUABLES AT HOME  *WEAR LOOSE COMFORTABLE CLOTHING (PREFERABLY A BUTTON UP SHIRT)     Please reply to this message as receipt of delivery.     Thank you,  [facility]  [place], LPN [facility]  Pre-Admit

## 2024-05-31 ENCOUNTER — PATIENT MESSAGE (OUTPATIENT)
Dept: PAIN MEDICINE | Facility: CLINIC | Age: 44
End: 2024-05-31
Payer: COMMERCIAL

## 2024-05-31 ENCOUNTER — PATIENT MESSAGE (OUTPATIENT)
Dept: NEUROSURGERY | Facility: CLINIC | Age: 44
End: 2024-05-31
Payer: COMMERCIAL

## 2024-06-03 ENCOUNTER — TELEPHONE (OUTPATIENT)
Dept: NEUROLOGY | Facility: CLINIC | Age: 44
End: 2024-06-03
Payer: COMMERCIAL

## 2024-06-03 ENCOUNTER — HOSPITAL ENCOUNTER (OUTPATIENT)
Facility: HOSPITAL | Age: 44
Discharge: HOME | End: 2024-06-03
Attending: STUDENT IN AN ORGANIZED HEALTH CARE EDUCATION/TRAINING PROGRAM | Admitting: STUDENT IN AN ORGANIZED HEALTH CARE EDUCATION/TRAINING PROGRAM
Payer: COMMERCIAL

## 2024-06-03 VITALS
DIASTOLIC BLOOD PRESSURE: 99 MMHG | HEART RATE: 77 BPM | SYSTOLIC BLOOD PRESSURE: 146 MMHG | TEMPERATURE: 99 F | OXYGEN SATURATION: 94 % | RESPIRATION RATE: 16 BRPM | HEIGHT: 65 IN | BODY MASS INDEX: 38.65 KG/M2 | WEIGHT: 232 LBS

## 2024-06-03 DIAGNOSIS — M54.16 LUMBAR RADICULOPATHY, CHRONIC: Primary | ICD-10-CM

## 2024-06-03 DIAGNOSIS — M51.36 DDD (DEGENERATIVE DISC DISEASE), LUMBAR: ICD-10-CM

## 2024-06-03 DIAGNOSIS — G89.29 CHRONIC PAIN: ICD-10-CM

## 2024-06-03 LAB — POCT GLUCOSE: 98 MG/DL (ref 70–110)

## 2024-06-03 PROCEDURE — 62323 NJX INTERLAMINAR LMBR/SAC: CPT | Performed by: STUDENT IN AN ORGANIZED HEALTH CARE EDUCATION/TRAINING PROGRAM

## 2024-06-03 PROCEDURE — 82962 GLUCOSE BLOOD TEST: CPT | Performed by: STUDENT IN AN ORGANIZED HEALTH CARE EDUCATION/TRAINING PROGRAM

## 2024-06-03 PROCEDURE — 25000003 PHARM REV CODE 250: Performed by: STUDENT IN AN ORGANIZED HEALTH CARE EDUCATION/TRAINING PROGRAM

## 2024-06-03 PROCEDURE — 63600175 PHARM REV CODE 636 W HCPCS: Performed by: STUDENT IN AN ORGANIZED HEALTH CARE EDUCATION/TRAINING PROGRAM

## 2024-06-03 PROCEDURE — 25500020 PHARM REV CODE 255: Performed by: STUDENT IN AN ORGANIZED HEALTH CARE EDUCATION/TRAINING PROGRAM

## 2024-06-03 PROCEDURE — 62323 NJX INTERLAMINAR LMBR/SAC: CPT | Mod: ,,, | Performed by: STUDENT IN AN ORGANIZED HEALTH CARE EDUCATION/TRAINING PROGRAM

## 2024-06-03 RX ORDER — DEXAMETHASONE SODIUM PHOSPHATE 10 MG/ML
INJECTION INTRAMUSCULAR; INTRAVENOUS
Status: DISCONTINUED | OUTPATIENT
Start: 2024-06-03 | End: 2024-06-03 | Stop reason: HOSPADM

## 2024-06-03 RX ORDER — FENTANYL CITRATE 50 UG/ML
INJECTION, SOLUTION INTRAMUSCULAR; INTRAVENOUS
Status: DISCONTINUED | OUTPATIENT
Start: 2024-06-03 | End: 2024-06-03 | Stop reason: HOSPADM

## 2024-06-03 RX ORDER — SODIUM CHLORIDE 9 MG/ML
INJECTION, SOLUTION INTRAVENOUS CONTINUOUS
Status: DISCONTINUED | OUTPATIENT
Start: 2024-06-03 | End: 2024-06-03 | Stop reason: HOSPADM

## 2024-06-03 RX ORDER — MIDAZOLAM HYDROCHLORIDE 1 MG/ML
INJECTION INTRAMUSCULAR; INTRAVENOUS
Status: DISCONTINUED | OUTPATIENT
Start: 2024-06-03 | End: 2024-06-03 | Stop reason: HOSPADM

## 2024-06-03 RX ORDER — LIDOCAINE HYDROCHLORIDE 10 MG/ML
INJECTION, SOLUTION EPIDURAL; INFILTRATION; INTRACAUDAL; PERINEURAL
Status: DISCONTINUED | OUTPATIENT
Start: 2024-06-03 | End: 2024-06-03 | Stop reason: HOSPADM

## 2024-06-03 RX ORDER — LIDOCAINE HYDROCHLORIDE 20 MG/ML
INJECTION, SOLUTION EPIDURAL; INFILTRATION; INTRACAUDAL; PERINEURAL
Status: DISCONTINUED | OUTPATIENT
Start: 2024-06-03 | End: 2024-06-03 | Stop reason: HOSPADM

## 2024-06-03 NOTE — DISCHARGE INSTRUCTIONS
Home Care Instructions Pain Management:    1.  DIET:    You may resume your normal diet today.    2.  BATHING:    You may shower with luke warm water.    3.  DRESSING:    You may remove your bandage today.    4.  ACTIVITY LEVEL:      You may resume your normal activities 24 hours after your procedure.    5.  MEDICATIONS:    You may resume your normal medications today.    6.  SPECIAL INSTRUCTIONS:    No heat to the injection site for 24 hours including bath or shower, heating pad, moist heat or hot tubs.    Use an ice pack to the injection site for any pain or discomfort.  Apply ice packs for 20 minute intervals as needed.    If you have received any sedatives by mouth today, you can not drive for 12 hours.    If you have received sedation through an IV, you can not drive for 24 hours.    PLEASE CALL YOUR DOCTOR FOR THE FOLLOWIN.  Redness or swelling around the injection site.  2.  Fever of 101 degrees.  3.  Drainage (pus) from the injection site.  4.  For any continuous bleeding (some dried blood over the incision is normal.)    FOR EMERGENCIES:    If any unusual problems or difficulties occur during clinic hours, call [phone] or dial [phone].    Follow up with with your physician in 2-3 weeks.

## 2024-06-03 NOTE — DISCHARGE SUMMARY
Discharge Note  Short Stay      SUMMARY     Admit Date: 6/3/2024    Attending Physician: Lorraine Patel      Discharge Physician: Lorraine Patel      Discharge Date: 6/3/2024 8:39 AM    Procedure(s) (LRB):  NELI L4-5 (N/A)    Final Diagnosis: Lumbar radiculopathy, chronic [M54.16]  DDD (degenerative disc disease), lumbar [M51.36]  Chronic pain syndrome [G89.4]    Disposition: Home or self care    Patient Instructions:   Current Discharge Medication List        CONTINUE these medications which have NOT CHANGED    Details   atorvastatin (LIPITOR) 20 MG tablet Take 1 tablet (20 mg total) by mouth once daily.  Qty: 90 tablet, Refills: 3    Associated Diagnoses: Hyperlipidemia associated with type 2 diabetes mellitus      !! cyclobenzaprine (FLEXERIL) 10 MG tablet Take 1 tablet (10 mg total) by mouth 3 (three) times daily as needed for Muscle spasms.  Qty: 90 tablet, Refills: 0      fluocinonide (LIDEX) 0.05 % external solution apply to affected area of scalp daily as needed for itching, scaling  Qty: 60 mL, Refills: 3    Associated Diagnoses: Seborrheic dermatitis, unspecified      hydrocortisone 2.5 % cream APPLY EXTERNALLY TO THE AFFECTED AREA TWICE DAILY FOR 10 DAYS  Qty: 30 g, Refills: 0    Associated Diagnoses: Allergic contact dermatitis due to plant      ketoconazole (NIZORAL) 2 % cream Apply 1 application  topically 2 (two) times daily. Apply to affected area up to 2 weeks  Qty: 15 g, Refills: 0    Associated Diagnoses: Seborrheic dermatitis of scalp      lamoTRIgine (LAMICTAL) 100 MG tablet Take 1 tablet (100 mg total) by mouth 2 (two) times daily. IF any RASH, STOP ALL Lamictal and Tell Psyc MD.  Qty: 180 tablet, Refills: 1    Associated Diagnoses: Unspecified mood (affective) disorder      latanoprost 0.005 % ophthalmic solution Place 1 drop into both eyes every evening.  Qty: 7.5 mL, Refills: 3    Associated Diagnoses: Ocular hypertension, bilateral      levocetirizine (XYZAL) 5 MG tablet Take 1 tablet  (5 mg total) by mouth every evening.  Qty: 30 tablet, Refills: 11      meloxicam (MOBIC) 15 MG tablet TAKE 1 TABLET(15 MG) BY MOUTH EVERY DAY as needed FOR PAIN  Qty: 30 tablet, Refills: 2      olopatadine (PATANASE) 0.6 % nasal spray 1 spray by Each Nostril route 2 (two) times daily.  Qty: 30.5 g, Refills: 5      pregabalin (LYRICA) 75 MG capsule Take 2 capsules (150 mg total) by mouth 2 (two) times daily.  Qty: 120 capsule, Refills: 1      rOPINIRole (REQUIP) 0.25 MG tablet TAKE 1 TABLET(0.25 MG) BY MOUTH EVERY EVENING  Qty: 90 tablet, Refills: 2    Associated Diagnoses: Restless leg      albuterol (PROVENTIL/VENTOLIN HFA) 90 mcg/actuation inhaler INHALE 2 PUFFS INTO THE LUNGS EVERY 6 HOURS AS NEEDED WHEEZING  Qty: 25.5 g, Refills: 3    Associated Diagnoses: Mild intermittent asthma without complication      blood sugar diagnostic (TRUE METRIX GLUCOSE TEST STRIP) Strp Use to test blood glucose one (1) time a day,  Qty: 100 strip, Refills: 3    Comments: to be used with insurance-preferred brand of glucometer/supplies.  Associated Diagnoses: Diabetes mellitus without complication      blood-glucose meter Misc use as directed  Qty: 1 each, Refills: 0      clobetasoL (TEMOVATE) 0.05 % cream Apply to affected area 2 (two) times daily as needed for flare  Qty: 30 g, Refills: 1    Associated Diagnoses: Dermatitis      !! cyclobenzaprine (FLEXERIL) 10 MG tablet Take 10 mg by mouth 3 (three) times daily.      !! dexmethylphenidate (FOCALIN) 10 MG tablet Take 1 tablet (10 mg total) by mouth 2 (two) times daily.  Qty: 60 tablet, Refills: 0    Associated Diagnoses: ADHD (attention deficit hyperactivity disorder), inattentive type      !! dexmethylphenidate (FOCALIN) 10 MG tablet Take 1 tablet (10 mg total) by mouth 2 (two) times daily.  Qty: 60 tablet, Refills: 0    Associated Diagnoses: ADHD (attention deficit hyperactivity disorder), inattentive type      !! dexmethylphenidate (FOCALIN) 10 MG tablet Take 1 tablet (10 mg  total) by mouth 2 (two) times daily.  Qty: 60 tablet, Refills: 0    Associated Diagnoses: ADHD (attention deficit hyperactivity disorder), inattentive type      doxycycline (VIBRA-TABS) 100 MG tablet Take 1 tablet by mouth daily food and not within 1 hour of bedtime  Qty: 10 tablet, Refills: 2    Associated Diagnoses: Dermatitis      ergocalciferol, vitamin D2, (VITAMIN D ORAL) Take 1 tablet by mouth every other day.      !! lancets (ONETOUCH DELICA PLUS LANCET) 33 gauge Misc Use to test blood glucose one (1) time a day,  Qty: 100 each, Refills: 5      LORazepam (ATIVAN) 0.5 MG tablet Take 1 tablet (0.5 mg total) by mouth daily as needed (SIGNIFICANT ANXIETY).  Qty: 30 tablet, Refills: 3    Associated Diagnoses: Anxiety      montelukast (SINGULAIR) 10 mg tablet Take 1 tablet (10 mg total) by mouth every evening.  Qty: 90 tablet, Refills: 3    Associated Diagnoses: Mild intermittent asthma without complication; History of multiple allergies      ondansetron (ZOFRAN-ODT) 8 MG TbDL Dissolve 1 tablet (8 mg total) by mouth every 6 (six) hours as needed (nausea).  Qty: 30 tablet, Refills: 2    Comments: Discontinue prior scripts with same name      permethrin (ELIMITE) 5 % cream Apply from the neck down and leave on overnight; wash off in am and repeat in 1 week  Qty: 120 g, Refills: 1    Associated Diagnoses: Dermatitis      semaglutide (OZEMPIC) 2 mg/dose (8 mg/3 mL) PnIj Inject 2 mg into the skin every 7 days.  Qty: 3 mL, Refills: 5    Associated Diagnoses: Type 2 diabetes mellitus without complication, without long-term current use of insulin      sildenafiL (VIAGRA) 50 MG tablet Take 1 tablet (50 mg total) by mouth daily as needed for Erectile Dysfunction. Take 30 -60 minutes prior to sex  Qty: 9 tablet, Refills: 2      !! ULTRA THIN LANCETS 30 gauge Misc USE TO TEST BLOOD SUGAR EVERY DAY AS DIRECTED  Qty: 100 each, Refills: 3    Associated Diagnoses: Diabetes mellitus without complication       !! - Potential  duplicate medications found. Please discuss with provider.              Discharge Diagnosis: Lumbar radiculopathy, chronic [M54.16]  DDD (degenerative disc disease), lumbar [M51.36]  Chronic pain syndrome [G89.4]  Condition on Discharge: Stable with no complications to procedure   Diet on Discharge: Same as before.  Activity: as per instruction sheet.  Discharge to: Home with a responsible adult.  Follow up: 2-4 weeks       Please call my office or pager at [phone] if experienced any weakness or loss of sensation, fever > 101.5, pain uncontrolled with oral medications, persistent nausea/vomiting/or diarrhea, redness or drainage from the incisions, or any other worrisome concerns. If physician on call was not reached or could not communicate with our office for any reason please go to the nearest emergency department

## 2024-06-03 NOTE — OP NOTE
Lumbar Interlaminar Epidural Steroid Injection under Fluoroscopic Guidance    The procedure, risks, benefits, and options were discussed with the patient. There are no contraindications to the procedure. The patent expressed understanding and agreed to the procedure. Informed written consent was obtained prior to the start of the procedure and can be found in the patient's chart.    PATIENT NAME: Hermelindo Garza III   MRN: [id number]     DATE OF PROCEDURE: 06/03/2024    PROCEDURE: Lumbar Interlaminar Epidural Steroid Injection L4/L5 (veer LEFT) under Fluoroscopic Guidance    PRE-OP DIAGNOSIS: Lumbar radiculopathy, chronic [M54.16]  DDD (degenerative disc disease), lumbar [M51.36]  Chronic pain syndrome [G89.4] Lumbar radiculopathy [M54.16]    POST-OP DIAGNOSIS: Same    PHYSICIAN: Lorraine Patel DO    ASSISTANTS: None     MEDICATIONS INJECTED: Preservative-free Decadron 10mg with 4cc of Lidocaine 1% MPF and preservative free normal saline    LOCAL ANESTHETIC INJECTED: Xylocaine 2%     SEDATION: Versed 2mg and Fentanyl 100mcg                                                                                                                                                                                     Conscious sedation ordered by M.D. Patient re-evaluation prior to administration of conscious sedation. No changes noted in patient's status from initial evaluation. The patient's vital signs were monitored by RN and patient remained hemodynamically stable throughout the procedure.    Event Time In   Sedation Start 0828   Sedation End 0836       ESTIMATED BLOOD LOSS: None    COMPLICATIONS: None    TECHNIQUE: Time-out was performed to identify the patient and procedure to be performed. With the patient laying in a prone position, the surgical area was prepped and draped in the usual sterile fashion using ChloraPrep and a fenestrated drape. The level was determined under fluoroscopy guidance. Skin anesthesia was  achieved by injecting Lidocaine 2% over the injection site. The interlaminar space was then approached with a 20 gauge,  3.5 inch Tuohy needle that was introduced under fluoroscopic guidance in the AP, lateral and/or contralateral oblique imaging. Once the Ligamentum flavum was encountered loss of resistance to saline was used to enter the epidural space. With positive loss of resistance and negative aspiration for CSF or Blood, contrast dye Omnipaque (300mg/mL) was injected to confirm placement and there was no vascular runoff. 5 mL of the medication mixture listed above was injected slowly. Displacement of the radio opaque contrast after injection of the medication confirmed that the medication went into the area of the epidural space. The needles were removed and bleeding was nil. A sterile dressing was applied. No specimens collected. The patient tolerated the procedure well.       The patient was monitored after the procedure in the recovery area. They were given post-procedure and discharge instructions to follow at home. The patient was discharged in a stable condition.      Lorraine Patel DO

## 2024-06-03 NOTE — PLAN OF CARE
Pt to [facility] at 0842. VS stable on transfer. Handoff report received from procedural nurse. Discharge instructions reviewed with patient. Verbalized understanding. Questions encouraged and answered. PIV removed before DC.

## 2024-06-05 ENCOUNTER — OFFICE VISIT (OUTPATIENT)
Dept: DERMATOLOGY | Facility: CLINIC | Age: 44
End: 2024-06-05
Payer: COMMERCIAL

## 2024-06-05 DIAGNOSIS — L72.0 MILIA: ICD-10-CM

## 2024-06-05 DIAGNOSIS — D22.9 NEVUS: ICD-10-CM

## 2024-06-05 DIAGNOSIS — L91.8 SKIN TAG: ICD-10-CM

## 2024-06-05 DIAGNOSIS — Z86.006 HISTORY OF MELANOMA IN SITU: ICD-10-CM

## 2024-06-05 DIAGNOSIS — D18.01 CHERRY ANGIOMA: ICD-10-CM

## 2024-06-05 DIAGNOSIS — L82.1 SK (SEBORRHEIC KERATOSIS): ICD-10-CM

## 2024-06-05 DIAGNOSIS — D48.5 NEOPLASM OF UNCERTAIN BEHAVIOR OF SKIN: Primary | ICD-10-CM

## 2024-06-05 DIAGNOSIS — L81.4 LENTIGO: ICD-10-CM

## 2024-06-05 PROCEDURE — 88312 SPECIAL STAINS GROUP 1: CPT | Mod: 26,,, | Performed by: PATHOLOGY

## 2024-06-05 PROCEDURE — 11102 TANGNTL BX SKIN SINGLE LES: CPT | Mod: S$GLB,,, | Performed by: DERMATOLOGY

## 2024-06-05 PROCEDURE — 3066F NEPHROPATHY DOC TX: CPT | Mod: CPTII,S$GLB,, | Performed by: DERMATOLOGY

## 2024-06-05 PROCEDURE — 99999 PR PBB SHADOW E&M-EST. PATIENT-LVL V: CPT | Mod: PBBFAC,,, | Performed by: DERMATOLOGY

## 2024-06-05 PROCEDURE — 88305 TISSUE EXAM BY PATHOLOGIST: CPT | Mod: 26,,, | Performed by: PATHOLOGY

## 2024-06-05 PROCEDURE — 3061F NEG MICROALBUMINURIA REV: CPT | Mod: CPTII,S$GLB,, | Performed by: DERMATOLOGY

## 2024-06-05 PROCEDURE — 1159F MED LIST DOCD IN RCRD: CPT | Mod: CPTII,S$GLB,, | Performed by: DERMATOLOGY

## 2024-06-05 PROCEDURE — 88312 SPECIAL STAINS GROUP 1: CPT | Performed by: PATHOLOGY

## 2024-06-05 PROCEDURE — 1160F RVW MEDS BY RX/DR IN RCRD: CPT | Mod: CPTII,S$GLB,, | Performed by: DERMATOLOGY

## 2024-06-05 PROCEDURE — 99213 OFFICE O/P EST LOW 20 MIN: CPT | Mod: 25,S$GLB,, | Performed by: DERMATOLOGY

## 2024-06-05 PROCEDURE — 88305 TISSUE EXAM BY PATHOLOGIST: CPT | Performed by: PATHOLOGY

## 2024-06-05 PROCEDURE — 3044F HG A1C LEVEL LT 7.0%: CPT | Mod: CPTII,S$GLB,, | Performed by: DERMATOLOGY

## 2024-06-05 NOTE — PROGRESS NOTES
Subjective:      Patient ID:  Hermelindo Garza III is a 43 y.o. male who presents for   Chief Complaint   Patient presents with    Skin Check     TBSE      Pt here for skin check       Pt has a history of  moderate sun exposure in the past.   Pt recalls several blistering sunburns in the past- yes  Pt has history of tanning bed use- yes  Pt has  had moles removed in the past- yes, MIS R mid back 2/2022  Pt has history of melanoma in first degree relatives-  Father NMSC    Pt denies any new concerns for today but still complains of red lesions on left hand and wrist and right forearm for months. . Itching. Comes and goes but never resolves.    C/o forehead wrinkle. Gets botox but also interested in other alternative tx.     C/o white lesion on left nose.  Picks at it but will not resolve            Review of Systems   Constitutional:  Negative for fever and chills.   Skin:  Positive for itching, rash and activity-related sunscreen use. Negative for daily sunscreen use and recent sunburn.   Hematologic/Lymphatic: Negative for adenopathy. Does not bruise/bleed easily.       Objective:   Physical Exam   Constitutional: He appears well-developed and well-nourished. No distress.   Musculoskeletal: Lymphadenopathy:      Cervical: No cervical adenopathy.      Upper Body:   No axillary adenopathy present.No supraclavicular adenopathy is present.     Lymphadenopathy: No supraclavicular adenopathy is present.     He has no cervical adenopathy.     He has no axillary adenopathy.   Neurological: He is alert and oriented to person, place, and time. He is not disoriented.   Psychiatric: He has a normal mood and affect.   Skin:   Areas Examined (abnormalities noted in diagram):   Scalp / Hair Palpated and Inspected  Head / Face Inspection Performed  Neck Inspection Performed  Chest / Axilla Inspection Performed  Abdomen Inspection Performed  Genitals / Buttocks / Groin Inspection Performed  Back Inspection Performed  RUE  Inspected  LUE Inspection Performed  RLE Inspected  LLE Inspection Performed  Nails and Digits Inspection Performed                               Diagram Legend     Erythematous scaling macule/papule c/w actinic keratosis       Vascular papule c/w angioma      Pigmented verrucoid papule/plaque c/w seborrheic keratosis      Yellow umbilicated papule c/w sebaceous hyperplasia      Irregularly shaped tan macule c/w lentigo     1-2 mm smooth white papules consistent with Milia      Movable subcutaneous cyst with punctum c/w epidermal inclusion cyst      Subcutaneous movable cyst c/w pilar cyst      Firm pink to brown papule c/w dermatofibroma      Pedunculated fleshy papule(s) c/w skin tag(s)      Evenly pigmented macule c/w junctional nevus     Mildly variegated pigmented, slightly irregular-bordered macule c/w mildly atypical nevus      Flesh colored to evenly pigmented papule c/w intradermal nevus       Pink pearly papule/plaque c/w basal cell carcinoma      Erythematous hyperkeratotic cursted plaque c/w SCC      Surgical scar with no sign of skin cancer recurrence      Open and closed comedones      Inflammatory papules and pustules      Verrucoid papule consistent consistent with wart     Erythematous eczematous patches and plaques     Dystrophic onycholytic nail with subungual debris c/w onychomycosis     Umbilicated papule    Erythematous-base heme-crusted tan verrucoid plaque consistent with inflamed seborrheic keratosis     Erythematous Silvery Scaling Plaque c/w Psoriasis     See annotation      Assessment / Plan:      Pathology Orders:       Normal Orders This Visit    Specimen to Pathology, Dermatology     Comments:    Number of Specimens:->1  ------------------------->-------------------------  Spec 1 Procedure:->Biopsy  Spec 1 Clinical Impression:->r/o fibrous papule v  inflammatory papule v other  Spec 1 Source:->left wrist    Questions:    Procedure Type: Dermatology and skin neoplasms    Number of  Specimens: 1    ------------------------: -------------------------    Spec 1 Procedure: Biopsy    Spec 1 Clinical Impression: r/o fibrous papule v inflammatory papule v other    Spec 1 Source: left wrist    Release to patient:           Neoplasm of uncertain behavior of skin  Shave biopsy procedure note:    Shave biopsy performed after verbal consent including risk of infection, scar, recurrence, need for additional treatment of site. Area prepped with alcohol, anesthetized with approximately 1.0cc of 1% lidocaine with epinephrine. Lesional tissue shaved with razor blade. Hemostasis achieved with application of aluminum chloride followed by hyfrecation. No complications. Dressing applied. Wound care explained.    -     Specimen to Pathology, Dermatology    Milia  Reassurance given to patient. No treatment is necessary.   Treatment of benign, asymptomatic lesions may be considered cosmetic.    SK (seborrheic keratosis)  These are benign inherited growths without a malignant potential. Reassurance given to patient. No treatment is necessary.     Lentigo  This is a benign hyperpigmented sun induced lesion. Recommend daily sun protection/avoidance and use of at least SPF 30, broad spectrum sunscreen (OTC drug) will reduce the number of new lesions. Treatment of these benign lesions are considered cosmetic.  The nature of sun-induced photo-aging and skin cancers is discussed.  Sun avoidance, protective clothing, and the use of 30-SPF sunscreens is advised. Observe closely for skin damage/changes, and call if such occurs.    Nevus  Discussed ABCDE's of nevi.  Monitor for new mole or moles that are becoming bigger, darker, irritated, or developing irregular borders. Brochure provided. Instructed patient to observe lesion(s) for changes and follow up in clinic if changes are noted. Patient to monitor skin at home for new or changing lesions.     Cherry angioma  These are benign vascular lesions that are inherited.   Treatment is not necessary.    Skin tag  Reassurance given to patient. No treatment is necessary.   Treatment of benign, asymptomatic lesions may be considered cosmetic.    History of melanoma in situ-   Area of previous melanoma examined. Site well healed with no signs of recurrence.    Total body skin examination performed today including at least 12 points as noted in physical examination. No Suspicious lesions noted.    Recommend daily sun protection/avoidance, use of at least SPF 30, broad spectrum sunscreen (OTC drug), skin self examinations, and routine physician surveillance to optimize early detection               Follow up in about 6 months (around 12/5/2024) for TBSE.

## 2024-06-10 ENCOUNTER — PATIENT MESSAGE (OUTPATIENT)
Dept: INTERNAL MEDICINE | Facility: CLINIC | Age: 44
End: 2024-06-10
Payer: COMMERCIAL

## 2024-06-13 LAB
FINAL PATHOLOGIC DIAGNOSIS: NORMAL
GROSS: NORMAL
Lab: NORMAL
MICROSCOPIC EXAM: NORMAL

## 2024-06-14 ENCOUNTER — PATIENT MESSAGE (OUTPATIENT)
Dept: SURGERY | Facility: CLINIC | Age: 44
End: 2024-06-14
Payer: COMMERCIAL

## 2024-06-14 ENCOUNTER — HOSPITAL ENCOUNTER (OUTPATIENT)
Dept: RADIOLOGY | Facility: HOSPITAL | Age: 44
Discharge: HOME | End: 2024-06-14
Attending: STUDENT IN AN ORGANIZED HEALTH CARE EDUCATION/TRAINING PROGRAM
Payer: COMMERCIAL

## 2024-06-14 ENCOUNTER — PATIENT MESSAGE (OUTPATIENT)
Dept: PAIN MEDICINE | Facility: CLINIC | Age: 44
End: 2024-06-14
Payer: COMMERCIAL

## 2024-06-14 DIAGNOSIS — M54.2 CERVICALGIA: ICD-10-CM

## 2024-06-14 PROCEDURE — 72141 MRI NECK SPINE W/O DYE: CPT | Mod: TC

## 2024-06-14 PROCEDURE — 72141 MRI NECK SPINE W/O DYE: CPT | Mod: 26,,, | Performed by: RADIOLOGY

## 2024-06-23 DIAGNOSIS — F90.0 ADHD (ATTENTION DEFICIT HYPERACTIVITY DISORDER), INATTENTIVE TYPE: ICD-10-CM

## 2024-06-24 ENCOUNTER — OFFICE VISIT (OUTPATIENT)
Dept: PLASTIC SURGERY | Facility: CLINIC | Age: 44
End: 2024-06-24

## 2024-06-24 DIAGNOSIS — Z41.1 ENCOUNTER FOR COSMETIC PROCEDURE: Primary | ICD-10-CM

## 2024-06-24 PROCEDURE — 99499 UNLISTED E&M SERVICE: CPT | Mod: ,,, | Performed by: OTOLARYNGOLOGY

## 2024-06-24 RX ORDER — DEXMETHYLPHENIDATE HYDROCHLORIDE 10 MG/1
10 TABLET ORAL 2 TIMES DAILY
Qty: 60 TABLET | Refills: 0 | OUTPATIENT
Start: 2024-06-24 | End: 2024-07-24

## 2024-06-24 NOTE — PROGRESS NOTES
Patient who is here today for consult for cosmetic myomodulator treatment.  His last injections were January 2024.  Discussed benefits and risks of botulinum toxin injections, including headache, weakness/paralysis of muscles, asymmetry, eyebrow/lid drooping, pain, bruising, swelling, infection, and rare risk of systemic botulism.  The patient was given the opportunity to ask any questions, and all questions were answered to the patient's satisfaction.  The patient verbalized understanding, elected to proceed, and signed a written informed consent.  Today I have injected 30 units of Dysport in his frontalis musculature.     Patient tolerated well with no complications.  He was instructed not to rub or massage the treated areas and that she should avoid lying down, bending upside down, and strenuous exercise for the rest of the day.  Instructed to wait two weeks to assess response before presenting for a touch up injection, if needed.

## 2024-06-25 ENCOUNTER — PATIENT MESSAGE (OUTPATIENT)
Dept: NEUROSURGERY | Facility: CLINIC | Age: 44
End: 2024-06-25
Payer: COMMERCIAL

## 2024-06-25 ENCOUNTER — HOSPITAL ENCOUNTER (OUTPATIENT)
Dept: PREADMISSION TESTING | Facility: HOSPITAL | Age: 44
Discharge: HOME | End: 2024-06-25
Attending: NURSE PRACTITIONER
Payer: COMMERCIAL

## 2024-06-25 ENCOUNTER — OFFICE VISIT (OUTPATIENT)
Dept: PAIN MEDICINE | Facility: CLINIC | Age: 44
End: 2024-06-25
Payer: COMMERCIAL

## 2024-06-25 DIAGNOSIS — M51.36 DDD (DEGENERATIVE DISC DISEASE), LUMBAR: ICD-10-CM

## 2024-06-25 DIAGNOSIS — M50.10 CERVICAL DISC DISORDER WITH RADICULOPATHY, UNSPECIFIED CERVICAL REGION: ICD-10-CM

## 2024-06-25 DIAGNOSIS — M54.16 LUMBAR RADICULOPATHY, CHRONIC: ICD-10-CM

## 2024-06-25 DIAGNOSIS — M54.12 CERVICAL RADICULITIS: ICD-10-CM

## 2024-06-25 DIAGNOSIS — M54.12 CERVICAL RADICULOPATHY: Primary | ICD-10-CM

## 2024-06-25 DIAGNOSIS — M50.30 DDD (DEGENERATIVE DISC DISEASE), CERVICAL: ICD-10-CM

## 2024-06-25 PROCEDURE — 3066F NEPHROPATHY DOC TX: CPT | Mod: CPTII,95,, | Performed by: NURSE PRACTITIONER

## 2024-06-25 PROCEDURE — 3044F HG A1C LEVEL LT 7.0%: CPT | Mod: CPTII,95,, | Performed by: NURSE PRACTITIONER

## 2024-06-25 PROCEDURE — 1160F RVW MEDS BY RX/DR IN RCRD: CPT | Mod: CPTII,95,, | Performed by: NURSE PRACTITIONER

## 2024-06-25 PROCEDURE — 3061F NEG MICROALBUMINURIA REV: CPT | Mod: CPTII,95,, | Performed by: NURSE PRACTITIONER

## 2024-06-25 PROCEDURE — 1159F MED LIST DOCD IN RCRD: CPT | Mod: CPTII,95,, | Performed by: NURSE PRACTITIONER

## 2024-06-25 PROCEDURE — 99214 OFFICE O/P EST MOD 30 MIN: CPT | Mod: 95,,, | Performed by: NURSE PRACTITIONER

## 2024-06-25 NOTE — DISCHARGE INSTRUCTIONS

## 2024-06-25 NOTE — PROGRESS NOTES
[facility]  Interventional [facility] Management -  Established Patient Follow-up      Referred by: No ref. provider found   Reason for referral: * No diagnoses found *     CC:   Chief Complaint   Patient presents with    Low-back Pain    Neck Pain    Arm Pain         5/30/2024     8:40 AM 3/28/2024     1:12 PM 12/19/2023     9:42 AM   Last 3 PDI Scores   Pain Disability Index (PDI) 30 36 32       Interval history 06/25/2024  44-year-old male presents virtually he has status post a lumbar NELI targeting L4-5 on 06/03/2024 reporting 70-80% relief of his low back pain,  he reports intermittent numbness in his left toe.  His primary concern is his chronic neck pain with radiculopathy.  He has previously been given cervical NELI targeting C7-T1 with relief however his symptoms continue and are disrupting his quality of life.  He has a follow up with Dr. Shen later this month to discuss surgical decompression surgery for his chronic neck and radiculopathy pain.  He he did report intermittent weakness in his arms and dropping things      Interval update 05/30/24:  43-year-old male that presents today in clinic with 4 weeks of worsening right neck pain that radiates into the right  arm and down into the right 4th and 5th digits of the hand.   He also reports new tremor in the right hand.  No recent falls or trauma.  Patient reports pain is worse with lying on the right side and with range of motion of the neck.   He has a history of right-sided neck pain but symptoms into the arm and hand tremor are new. He is taking Gabapentin 300 mg QAM and 900 mg QHS with minimal relief.  He is requesting to switch to Lyrica.   He also requests a refill of his Flexeril. He rates his pain 5/10.       Interval History 4/30/2024:  43-year-old male that presents virtually for a follow-up appointment he is status post a cervical NELI targeting C7-T1 on 04/17/2024 that provided significant relief in his symptoms 70-90%.  He is known to our clinic and  has been previously provided this injection which has helped significantly.  Is now reporting low back pain that has gradually increased he did reach out to his previous surgeon Dr. Shen  ordered a recent CT lumbar.  He denies any bowel bladder dysfunction denies any saddle anesthesia denies any recent trauma.  He has been provided a lumbar NELI targeting L4-5 for low back and radicular symptoms, we discussed that we may need to repeat this in the future pending CT lumbar scan.      Interval update 03/28/24:   Patient returns today for post procedure follow up and follow up of his neck and back pain.  He is s/p  Lumbar Interlaminar Epidural Steroid Injection L4/L5 on 03/06/2024.  He reports 70-80% relief of his low back pain and radicular symptoms.  Today he reports his cervical radicular symptoms are starting to returned.  He previously underwent cervical epidural steroid injection at C7/T1 on 11/03/2023 with significant improvement in his symptoms.  He reports almost 5 months of pain relief following a cervical epidural.  He would like to repeat this procedure.  In the meantime he is increased his gabapentin to 300 mg in the morning and 600 mg in the evening.  He does feel like this has helped with his pain.  He was also increased his exercise and has lost weight which he feels has been beneficial as well.      Interval update 12/19/23  Patient returns today for follow up.  He is status post right GTB and right intra-articular hip injections on 11/29/2023 and notes improvement in his GTB and hip pain.  Today he complains of pain extending from the area overlying the right hip joint and extending down the anterolateral portion of the thigh.  This pain is worse with activity.  Pain improves with lying flat.  He rates his pain 7/10 today.      Of note, patient recently underwent left lateral collateral ligament reconstruction for left radial collateral ligament tear on 12/05/2023 with Dr. Marshall.    11/13/23 Pt  returns today complaining of right hip and right leg pain. Pain is located over the lateral right hip and radiates into the groin.  Pain started approximately 1 month ago. Pain has not improved with stretching and HEP.  He states his hip pain is worse than his lower back pain for which he is scheduled to have an NELI at the end of the month. He would like to address the hip pain first.      Subjective:   Hermelindo Garza III is a 44 y.o. male who presents complaining of both neck and back pain.  Today he reports his neck pain is most bothersome.  He reports muscle tightness and pain in the left trapezius and shoulder area.  He reports pain that radiates into the bilateral upper extremities with numbness and tingling.  He has been evaluated by Dr. Sierra with Neurosurgery who noted patient chris  candidate for ACDFs in he progresses.  Pt reports undergoing a cervical epidural steroid injection with Dr. Escamilla 02/10/2021 on with several months of relief.  He also reports low back pain.  He is s/p Left L3-4 hemilaminotomy, medial facetectomy, and foraminotomy for microdiscectomy with Dr Mcnamara on 9/19/2021.  He reports his back pain improved after surgery.  Patient reports he is previously completed physical therapy and is now actively continuing with his exercise program in the gym.  He also reports trying to lose weight and reports intentional  weight loss of approximately 55 pounds since 2020.    Location: back, shoulder, and neck   Onset: 2 years  Current Pain Score: 5/10  Daily Pain of Range: 5-6/10  Quality: Aching, Tight, Tingling, Deep, Numb, Electric, Hot, and Cold  Radiation: neck and back  Worsened by: lying down, sitting, standing for more than 3 minutes, and walking for more than 6 minutes  Improved by: medications    Patient denies night fever/night sweats, urinary incontinence, bowel incontinence, significant weight loss, significant motor weakness, and loss of sensations.    Previous  Interventions:  -06/03/2024 Lumbar Interlaminar Epidural Steroid Injection L4/L5 (veer LEFT) 70-80%  - 04/17/2024:Cervical Interlaminar Epidural Steroid Injection C7/T1  -03/06/2024 Lumbar Interlaminar Epidural Steroid Injection L4/L5  - 11/03/2023 Cervical Interlaminar Epidural Steroid Injection C7/T1 5 months of pain relief  -9/27/2023  Lumbar Interlaminar Epidural Steroid Injection L4/L5   - 06/28/2023 - Cervical Interlaminar Epidural Steroid Injection C7/T1   - 02/10/2021 -  C6/7 CERVICAL EPIDURAL STEROID INJECTION - Dr Escamilla - 4 months of pain relief.   - 11/29/2023 -  right GTB and right intra-articular hip injections - 40% relief  - 03/06/2024 - lumbar interlaminar steroid injection at L4/L5 -80% pain relief    Previous Therapies:  PT/OT: yes   Chiropractor:   HEP: yes  Relevant Surgery: yes    - Reports L4/L5 surgery at age 31 yo   - 09/19/21 - Left MIS L3-4 hemilaminotomy, medial facetectomy, and foraminotomy for microdiscectomy with Dr Mcnamara.   Previous Medications:   - NSAIDS: Meloxicam  - Muscle Relaxants: Robaxin, Flexeril  - TCAs:   - SNRIs:   - Topicals:   - Anticonvulsants: Lyrica - reports RL sxs so stopped; Gabapentin   - Opioids:   - Adjuvants: Tylenol    Current Pain Medications:  Gabapentin 300 mg QAM and 900 mg QHS  Meloxicam  Tylenol    Flexeril      Review of Systems:  Review of Systems   Musculoskeletal:  Positive for neck pain.       GENERAL:  No weight loss, malaise or fevers. +obesity +DM  HEENT:   No recent changes in vision or hearing  NECK:  No difficulty with swallowing. No stridor.   RESPIRATORY:  Negative for cough, wheezing or shortness of breath, patient denies any recent URI. +Asthma  CARDIOVASCULAR:  Negative for chest pain, leg swelling or palpitations.  GI/:  Negative for abdominal discomfort, blood in stools or black stools or change in bowel habits.   MUSCULOSKELETAL:  See HPI.  SKIN:  Negative for lesions, rash, and itching.  PSYCH:  No mood disorder or recent  psychosocial stressors.  +anxiety +ADD  HEMATOLOGY/LYMPHOLOGY:  Negative for prolonged bleeding, bruising easily or swollen nodes.  Patient is not currently taking any anti-coagulants  NEURO:   No history of headaches, syncope, paralysis, seizures or tremors.  All other reviewed and negative other than HPI.    History:  Current medications, allergies, medical history, surgical history,   family history, and social history were reviewed in the chart as marked.    Full Medication List:    Current Outpatient Medications:     albuterol (PROVENTIL/VENTOLIN HFA) 90 mcg/actuation inhaler, INHALE 2 PUFFS INTO THE LUNGS EVERY 6 HOURS AS NEEDED WHEEZING, Disp: 25.5 g, Rfl: 3    atorvastatin (LIPITOR) 20 MG tablet, Take 1 tablet (20 mg total) by mouth once daily., Disp: 90 tablet, Rfl: 3    blood sugar diagnostic (TRUE METRIX GLUCOSE TEST STRIP) Strp, Use to test blood glucose one (1) time a day,, Disp: 100 strip, Rfl: 3    blood-glucose meter Misc, use as directed, Disp: 1 each, Rfl: 0    clobetasoL (TEMOVATE) 0.05 % cream, Apply to affected area 2 (two) times daily as needed for flare, Disp: 30 g, Rfl: 1    cyclobenzaprine (FLEXERIL) 10 MG tablet, Take 10 mg by mouth 3 (three) times daily., Disp: , Rfl:     cyclobenzaprine (FLEXERIL) 10 MG tablet, Take 1 tablet (10 mg total) by mouth 3 (three) times daily as needed for Muscle spasms., Disp: 90 tablet, Rfl: 0    dexmethylphenidate (FOCALIN) 10 MG tablet, Take 1 tablet (10 mg total) by mouth 2 (two) times daily., Disp: 60 tablet, Rfl: 0    [START ON 6/28/2024] dexmethylphenidate (FOCALIN) 10 MG tablet, Take 1 tablet (10 mg total) by mouth 2 (two) times daily., Disp: 60 tablet, Rfl: 0    [START ON 7/29/2024] dexmethylphenidate (FOCALIN) 10 MG tablet, Take 1 tablet (10 mg total) by mouth 2 (two) times daily., Disp: 60 tablet, Rfl: 0    doxycycline (VIBRA-TABS) 100 MG tablet, Take 1 tablet by mouth daily food and not within 1 hour of bedtime, Disp: 10 tablet, Rfl: 2     ergocalciferol, vitamin D2, (VITAMIN D ORAL), Take 1 tablet by mouth every other day., Disp: , Rfl:     fluocinonide (LIDEX) 0.05 % external solution, apply to affected area of scalp daily as needed for itching, scaling, Disp: 60 mL, Rfl: 3    hydrocortisone 2.5 % cream, APPLY EXTERNALLY TO THE AFFECTED AREA TWICE DAILY FOR 10 DAYS, Disp: 30 g, Rfl: 0    ketoconazole (NIZORAL) 2 % cream, Apply 1 application  topically 2 (two) times daily. Apply to affected area up to 2 weeks, Disp: 15 g, Rfl: 0    lamoTRIgine (LAMICTAL) 100 MG tablet, Take 1 tablet (100 mg total) by mouth 2 (two) times daily. IF any RASH, STOP ALL Lamictal and Tell Psyc MD., Disp: 180 tablet, Rfl: 1    lancets (ONETOUCH DELICA PLUS LANCET) 33 gauge Misc, Use to test blood glucose one (1) time a day,, Disp: 100 each, Rfl: 5    latanoprost 0.005 % ophthalmic solution, Place 1 drop into both eyes every evening., Disp: 7.5 mL, Rfl: 3    levocetirizine (XYZAL) 5 MG tablet, Take 1 tablet (5 mg total) by mouth every evening., Disp: 30 tablet, Rfl: 11    LORazepam (ATIVAN) 0.5 MG tablet, Take 1 tablet (0.5 mg total) by mouth daily as needed (SIGNIFICANT ANXIETY)., Disp: 30 tablet, Rfl: 3    meloxicam (MOBIC) 15 MG tablet, TAKE 1 TABLET(15 MG) BY MOUTH EVERY DAY as needed FOR PAIN, Disp: 30 tablet, Rfl: 2    montelukast (SINGULAIR) 10 mg tablet, Take 1 tablet (10 mg total) by mouth every evening., Disp: 90 tablet, Rfl: 3    olopatadine (PATANASE) 0.6 % nasal spray, 1 spray by Each Nostril route 2 (two) times daily., Disp: 30.5 g, Rfl: 5    ondansetron (ZOFRAN-ODT) 8 MG TbDL, Dissolve 1 tablet (8 mg total) by mouth every 6 (six) hours as needed (nausea)., Disp: 30 tablet, Rfl: 2    permethrin (ELIMITE) 5 % cream, Apply from the neck down and leave on overnight; wash off in am and repeat in 1 week, Disp: 120 g, Rfl: 1    pregabalin (LYRICA) 75 MG capsule, Take 2 capsules (150 mg total) by mouth 2 (two) times daily., Disp: 120 capsule, Rfl: 1    rOPINIRole  (REQUIP) 0.25 MG tablet, TAKE 1 TABLET(0.25 MG) BY MOUTH EVERY EVENING, Disp: 90 tablet, Rfl: 2    semaglutide (OZEMPIC) 2 mg/dose (8 mg/3 mL) PnIj, Inject 2 mg into the skin every 7 days., Disp: 3 mL, Rfl: 5    sildenafiL (VIAGRA) 50 MG tablet, Take 1 tablet (50 mg total) by mouth daily as needed for Erectile Dysfunction. Take 30 -60 minutes prior to sex, Disp: 9 tablet, Rfl: 2    ULTRA THIN LANCETS 30 gauge Misc, USE TO TEST BLOOD SUGAR EVERY DAY AS DIRECTED, Disp: 100 each, Rfl: 3  No current facility-administered medications for this visit.    Facility-Administered Medications Ordered in Other Visits:     0.9%  NaCl infusion, 500 mL, Intravenous, Continuous, Kolton Terrell MD    mupirocin 2 % ointment, , Nasal, On Call Procedure, Chong Padron MD, Given at 09/15/22 0553     Allergies:  Patient has no known allergies.     Medical History:   has a past medical history of ADD (attention deficit disorder) (05/09/2012), Allergy, Anxiety (04/12/2023), Asthma (05/09/2012), Cervical disc disorder with radiculopathy, unspecified cervical region (09/14/2021), Cervical spondylosis with myelopathy (06/29/2022), Eczema, Esophageal ulcer, GERD (gastroesophageal reflux disease) (05/09/2012), Glaucoma, Kidney stones (05/2014), Lumbar disc disease (05/09/2012), Lumbar herniated disc (05/09/2012), Malignant melanoma of skin, unspecified (01/06/2022), Melanoma (01/2022), LUBA (obstructive sleep apnea), Radiculopathy, lumbar region (09/14/2021), Renal calculi (05/09/2012), and Type 2 diabetes mellitus without complication, without long-term current use of insulin (11/16/2020).    Surgical History:   has a past surgical history that includes Lumbar laminectomy (2010); Esophagogastroduodenoscopy (01/22/2019); Colonoscopy (01/22/2019); Appendectomy (2006); Epidural steroid injection (N/A, 02/10/2021); Esophagogastroduodenoscopy (N/A, 05/18/2021); Lumbar laminectomy with discectomy (Left, 09/20/2021); Carpal tunnel release (Right,  09/15/2022); Excision of ganglion of wrist (Right, 09/15/2022); Hand Arthrotomy (Right, 09/15/2022); Injection of steroid (Left, 09/15/2022); Flexor tendon repair (Right, 09/15/2022); Tympanostomy tube placement; Epidural steroid injection into cervical spine (N/A, 6/28/2023); Cystoscopy (7/5/2023); Ureteral stent placement (Left, 7/5/2023); Ureteroscopy (Left, 7/5/2023); Laser lithotripsy (Left, 7/5/2023); Ureteroscopic removal of ureteric calculus (Left, 7/5/2023); Correction of hammer toe (Left, 8/14/2023); Epidural steroid injection into lumbar spine (N/A, 9/27/2023); Epidural steroid injection into cervical spine (N/A, 11/3/2023); Injection of joint (Right, 11/29/2023); Reconstruction of ligament (Left, 12/5/2023); Repair of extensor tendon (Left, 12/5/2023); Esophagogastroduodenoscopy (N/A, 12/21/2023); ph monitoring, esophagus, wireless, (off reflux meds) (N/A, 12/21/2023); Colonoscopy (N/A, 12/21/2023); Esophagogastroduodenoscopy (N/A, 2/22/2024); Epidural steroid injection into lumbar spine (N/A, 3/6/2024); Robot-assisted repair of hiatal hernia (N/A, 3/14/2024); Epidural steroid injection into cervical spine (N/A, 4/17/2024); and Epidural steroid injection into lumbar spine (N/A, 6/3/2024).    Family History:  family history includes Allergic rhinitis in his father, mother, sister, and sister; Asthma in his father, sister, and sister; Brain cancer in his maternal grandmother; Breast cancer in his paternal grandmother; Chronic back pain in his father; Diabetes in his maternal grandfather; MARTITA disease in his father; Hypertension in his mother; Melanoma in his father; No Known Problems in his brother, maternal aunt, maternal uncle, paternal aunt, paternal grandfather, and paternal uncle; Sleep apnea in his father and mother; Transient ischemic attack in his mother.    Social History:   reports that he quit smoking about 6 years ago. His smoking use included cigarettes. He has never been exposed to tobacco  smoke. He has never used smokeless tobacco. He reports current alcohol use of about 3.0 standard drinks of alcohol per week. He reports that he does not use drugs.    Physical Exam:  There were no vitals filed for this visit.    PHYSICAL EXAMINATION:  GEN: No acute distress. Calm, comfortable  HENT: Normocephalic, atraumatic, moist mucous membranes  EYE: Anicteric sclera, non-injected.   CV: Non-diaphoretic.   RESP: Breathing comfortably. Chest expansion symmetric.  PSYCH: Pleasant mood and appropriate affect. Recent and remote memory intact.       GENERAL: Well appearing, in no acute distress, alert and oriented x3.  PSYCH:  Mood and affect appropriate.  SKIN: Skin color, texture, turgor normal, no rashes or lesions.  HEAD/FACE:  Normocephalic, atraumatic. Cranial nerves grossly intact.  NECK:  Mildly decreased ROM secondary to pain. + pain to palpation over the cervical paraspinous muscles and bilateral trapezius muscles.  Spurling positive on the right.   CV: RRR with palpation of the radial artery.  PULM: No evidence of respiratory difficulty, symmetric chest rise.  GI:  Soft and non-distended.  MSK: No obvious deformities, edema, or skin discoloration.  No atrophy or tone abnormalities are noted.   NEURO: + tremor noted in the right hand, particularly the 4th and 5th digit.  Bilateral upper and lower extremity coordination and strength is symmetric.  No loss of sensation is noted.  MENTAL STATUS: A x O x 3, good concentration, speech is fluent and goal directed  MOTOR: 5/5 in all muscle groups  GAIT: normal.ambulates unassisted.       Imaging:  XR CERVICAL SPINE AP LAT WITH FLEX EXTEN     CLINICAL HISTORY:  Other spondylosis with myelopathy, cervical region     TECHNIQUE:  Three views of the cervical spine plus flexion and extension views were performed.     COMPARISON:  September 8, 2022     FINDINGS:  As before, suboptimal visualization of the cervicothoracic junction due to superimposition of shoulder and  chest wall soft tissues.  Straightening of normal cervical lordosis.  No acute fractures.  Unremarkable predental space.  No widening prevertebral soft tissues.  No anterior or retrolisthesis.  Flexion and extension views demonstrate no instability.  Reconfirmed areas of anterior longitudinal ligament calcification adjacent to C4-C5 and C5-C6 disc.  Reconfirmed findings of uncovertebral spurring and endplate osteophytes at preserved C4-C5 level and mildly narrowed C5-C6 level and mild to moderately narrowed C6-C7 level.  Some stable scattered mild facet arthropathic changes.  Intact odontoid tip and unremarkable C1-C2 articulation.  Cervical spine findings do not appear significantly changed to earlier exam.     Electronically signed by: Giovanny Busby  Date:                                            12/13/2022      MRI CERVICAL SPINE WITHOUT CONTRAST     CLINICAL HISTORY:  Arm pain, paresthesias, clumsiness;.  Cervical disc disorder with radiculopathy, unspecified cervical region     TECHNIQUE:  Multiplanar, multisequence MR images of the cervical spine were acquired without the administration of contrast.     COMPARISON:  Cervical spine MRI 01/12/2021     FINDINGS:  Straightening of the normal cervical lordosis.  No spondylolisthesis.     No compression fractures.  No marrow replacing lesions.     Multilevel degenerative changes with disc desiccation and disc space narrowing, described in detail below.  No bone marrow edema.     Visualized structures in the posterior fossa are unremarkable. The cervical spinal cord is unremarkable.     Paraspinal soft tissues are unremarkable.     SIGNIFICANT FINDINGS BY LEVEL:     C2-3: Small disc osteophyte complex with central annular fissure.  No canal or foraminal stenosis.     C3-4: Disc osteophyte complex with superimposed central extrusion.  Mild canal stenosis with partial effacement of the thecal sac.  Mild bilateral foraminal stenosis.     C4-5: Disc osteophyte complex.   Moderate canal stenosis with near complete effacement of the thecal sac and flattening of the cervical cord.  Moderate bilateral foraminal stenosis.     C5-6: Disc osteophyte complex, eccentric to the right.  Severe canal stenosis with complete effacement of the thecal sac and flattening of the cervical cord.  Severe right and moderate left foraminal stenosis.     C6-7: Disc osteophyte complex.  Moderate canal stenosis.  Moderate bilateral foraminal stenosis.     C7-T1: Disc osteophyte complex with superimposed right foraminal extrusion.  Mild canal stenosis.  Mild right foraminal stenosis.     Impression:     Multilevel degenerative changes, most advanced at C5-6 where there is severe canal stenosis and severe right and moderate left foraminal stenosis.  Overall, degenerative changes have progressed from 01/12/2021.     This report was flagged in Epic as abnormal.     Electronically signed by: Horace Quiroz  Date:                                            06/16/2022      XR LUMBAR SPINE AP AND LAT WITH FLEX/EXT     CLINICAL HISTORY:  Other specified postprocedural states     TECHNIQUE:  AP and lateral views as well as lateral flexion and extension images are performed through the lumbar spine.     COMPARISON:  Non 06/16/2022 MRI of the lumbar spine e     FINDINGS:  Patient had previous a L3/L4 hemilaminectomy on the left side.     Mild DJD and mild lumbar scoliosis.  The lumbar intervertebral disc spaces are slightly narrowed.  Few mm L2/L3 and L3/L4 retrolisthesis noted.  No fracture or dislocation.  No bone destruction identified     Electronically signed by: Xander Rubio MD  Date:                                            12/13/2022    MRI LUMBAR SPINE WITHOUT CONTRAST     CLINICAL HISTORY:  Lumbar radiculopathy, symptoms persist with conservative treatment;Low back pain, prior surgery, new symptoms; Dorsalgia, unspecified     TECHNIQUE:  Multiplanar, multisequence MR images were acquired from the thoracolumbar  junction to the sacrum without the administration of contrast.     COMPARISON:  MRI lumbar spine 09/19/2021     FINDINGS:  Sequences are degraded by patient motion artifact.     Transitional lumbosacral anatomy with lumbarization of S1.     Postoperative changes from left L3-4 hemilaminectomy and micro discectomy.  Mild protrusion of the thecal sac and cauda equina nerve roots into the laminectomy defect.  No fluid collections in the operative bed.     No spondylolisthesis.     No compression fractures.  Small fat containing lesion in the L3 vertebral body, likely a hemangioma.     Multilevel degenerative changes with disc desiccation and disc space narrowing, described in detail below.  No significant bone marrow edema.     The conus medullaris has a normal appearance and terminates at the L2 level.  Cauda equina nerve roots are unremarkable.     Paraspinal muscle atrophy.     SIGNIFICANT FINDINGS BY LEVEL:     T12-L1: Mild disc bulge.  Minimal canal stenosis.  No foraminal stenosis.     L1-2: Left facet arthropathy.  No canal or foraminal stenosis.     L2-3: Disc bulge.  Bilateral facet arthropathy and ligamentum flavum thickening.  Mild canal stenosis.  Mild left foraminal stenosis.     L3-4: Residual disc bulge versus granulation tissue.  The previous disc fragment migrating inferiorly is no longer seen.  Bilateral facet arthropathy and ligamentum flavum thickening.  Mild canal stenosis with narrowing of both subarticular zones and abutment of the left descending L4 nerve root.  Mild right and moderate left foraminal stenosis.     L4-5: Disc bulge.  Bilateral facet arthropathy and ligamentum flavum thickening.  Mild canal stenosis.  Moderate bilateral foraminal stenosis.     L5-S1: Disc bulge.  Bilateral facet arthropathy and ligamentum flavum thickening.  Mild canal stenosis.  Moderate bilateral foraminal stenosis.     Impression:     Postoperative changes from left L3-4 hemilaminectomy and micro discectomy.   The previous disc fragment migrating inferiorly is no longer seen.  There is a residual disc bulge/granulation tissue contributing to mild canal stenosis and moderate left foraminal stenosis.     Small meningocele at the laminectomy defect.     Degenerative changes elsewhere in the spine resulting in mild canal stenosis and moderate foraminal stenosis at multiple levels as described above.        Electronically signed by: Horace Quiroz  Date:                                            06/16/2022    EXAMINATION:  MRI LUMBAR SPINE WITHOUT CONTRAST     CLINICAL HISTORY:  Low back pain, prior surgery, new symptoms; Dorsalgia, unspecified     TECHNIQUE:  Multiplanar, multisequence MR images were acquired from the thoracolumbar junction to the sacrum without the administration of contrast.     COMPARISON:  MRI lumbar spine 06/16/2023     FINDINGS:  Postoperative change left L3-L4 hemilaminectomy and discectomy.  Mild protrusion of the thecal sac and cauda equina nerve roots into the laminectomy defect similar to prior.     Alignment: Normal.     Vertebrae: Endplate degenerative change L5-S1.  Small may angioma L3 vertebral body.  No acute fracture.  No infiltrative marrow process.     Discs: Multilevel disc height loss and desiccation most pronounced at L5-S1.     Cord: Normal.  Conus terminates at L1.     Soft tissue structures are unremarkable.  Limited evaluation of the retroperitoneal organs demonstrates no significant abnormalities.  Paraspinal musculature demonstrates normal bulk and signal intensity.     Degenerative findings:     T12-L1: Mild circumferential disc bulge no neural foraminal narrowing or spinal canal stenosis.     L1-L2: No neural foraminal narrowing or spinal canal stenosis.     L2-L3: Circumferential disc bulge.  Bilateral facet arthropathy.  No neural foraminal narrowing.  Mild spinal canal stenosis.     L3-L4: Postop change.  Circumferential disc bulge.  Bilateral facet arthropathy.  Moderate left  and mild right neural foraminal narrowing.  Mild spinal canal stenosis     L4-L5: Circumferential disc bulge.  Bilateral facet arthropathy.  Moderate right and severe left neural foraminal narrowing.  Mild to moderate spinal canal stenosis.     L5-S1: Circumferential disc bulge.  Bilateral facet arthropathy.  Moderate bilateral neural foraminal narrowing.  Mild spinal canal stenosis.     Impression:     1. Postoperative change left L3-L4 hemilaminectomy and discectomy.  2. Multilevel degenerative change of the lumbar spine as above not significantly changed from prior MRI 06/16/2023, noting severe left foraminal narrowing at L4-5.     Electronically signed by resident: Hammad Quinn  Date:                                            10/23/2023  Labs:  BMP  Lab Results   Component Value Date     05/07/2024    K 3.8 05/07/2024     05/07/2024    CO2 23 05/07/2024    BUN 14 05/07/2024    CREATININE 0.7 05/07/2024    CALCIUM 9.4 05/07/2024    ANIONGAP 8 05/07/2024    EGFRNORACEVR >60.0 05/07/2024     Lab Results   Component Value Date    ALT 28 05/07/2024    AST 15 05/07/2024    ALKPHOS 90 05/07/2024    BILITOT 0.4 05/07/2024     Lab Results   Component Value Date    WBC 10.82 05/07/2024    HGB 16.4 05/07/2024    HCT 50.5 05/07/2024    MCV 94 05/07/2024     05/07/2024         Assessment:  Problem List Items Addressed This Visit    None        04/20/2032-  Hermelindo Garza SELIN is a 44 y.o. male who  has a past medical history of ADD (attention deficit disorder) (05/09/2012), Allergy, Anxiety (04/12/2023), Asthma (05/09/2012), Cervical disc disorder with radiculopathy, unspecified cervical region (09/14/2021), Cervical spondylosis with myelopathy (06/29/2022), Eczema, Esophageal ulcer, GERD (gastroesophageal reflux disease) (05/09/2012), Glaucoma, Kidney stones (05/2014), Lumbar disc disease (05/09/2012), Lumbar herniated disc (05/09/2012), Malignant melanoma of skin, unspecified (01/06/2022), Melanoma  (01/2022), LUBA (obstructive sleep apnea), Radiculopathy, lumbar region (09/14/2021), Renal calculi (05/09/2012), and Type 2 diabetes mellitus without complication, without long-term current use of insulin (11/16/2020).  By history and examination this patient has chronicneck pain with bilateral radiculopathy as well as low back pain s/p 9/19/2021 Left L3-4 hemilaminotomy, medial facetectomy, and foraminotomy for microdiscectomy with Dr Mcnamara with some improvement. The underlying cause cause is facet arthritis, degenerative disc disease, foraminal stenosis, central canal stenosis, muscle dysfunction, and deconditioning.  MRI of the cervical spine was significant for multilevel degenerative changes, most advanced at C5-6 where there is severe canal stenosis and severe right and moderate left foraminal stenosis. MRI of the Lumbar spine post surgery was significant for residual disc bulge/granulation tissue contributing to mild canal stenosis and moderate left foraminal stenosis at L3/L4 and degenerative changes elsewhere in the spine resulting in mild canal stenosis and moderate foraminal stenosis at multiple levels.  We discussed the underlying diagnoses and multiple treatment options including non-opioid medications, interventional procedures, physical therapy, home exercise, core muscle enhancement, and weight loss.  He previously underwent a cervical steroid injection with several months of relief.  Plan for repeat of cervical epidural steroid injection.  The risks and benefits of each treatment option were discussed and all questions were answered.        11/13/2023 - Hermelindo CURT Garza III presents for evaluation of right hip pain. His exam was consistent with Right GTB bursitis and right hip joint pain.  I will schedule him for a right GTB and Right intraarticular hip injection and defer the LESI for now.  Pt tearful and anxious throughout exam.  He is established with a Psychiatrist.  We discussed referral  Psychology for possible CBT and assistance with coping strategies for chronic pain.     12/19/2023 - Hermelindo Garza III presents today for follow-up.  He is status post  right GTB and right intra-articular hip injections on 11/29/2023 and notes improvement in his GTB and hip pain.  Today he complains of pain extending from the area overlying the right hip joint and extending down the anterolateral portion of the thigh  in the distribution of the tensor fascia ashtyn.   Patient has multiple pain generators  as well as concurrent anxiety and mood disorder.  I feel that he would be an excellent candidate for our functional restoration program.  Discussed with patient today during visit.  I will place a referral to the functional restoration program.   I also discussed with the patient that it would be a good to have him see Rheumatology given his extensive pain history, as perhaps they can offer their expertise.     03/28/2024 - Hermelindo Garza III presents today for follow up.  He was status post lumbar epidural steroid injection at L4/L5 on 03/06/2024 with 70-80% relief of his low back symptoms.  His cervical radicular symptoms have started to return.  Most recent cervical epidural gave him over 5 months of pain relief.  He would like to repeat the procedure.  I will schedule from a repeat cervical epidural steroid injection.  I will also have the patient increase his gabapentin to 300 mg 4 times daily.  He will let me know when he needs a refill of the medication.      04/30/2024-Hermelindo Garza III is a 44 y.o. male who  has a past medical history of ADD (attention deficit disorder) (05/09/2012), Allergy, Anxiety (04/12/2023), Asthma (05/09/2012), Cervical disc disorder with radiculopathy, unspecified cervical region (09/14/2021), Cervical spondylosis with myelopathy (06/29/2022), Eczema, Esophageal ulcer, GERD (gastroesophageal reflux disease) (05/09/2012), Glaucoma, Kidney stones (05/2014),  Lumbar disc disease (05/09/2012), Lumbar herniated disc (05/09/2012), Malignant melanoma of skin, unspecified (01/06/2022), Melanoma (01/2022), LUBA (obstructive sleep apnea), Radiculopathy, lumbar region (09/14/2021), Renal calculi (05/09/2012), and Type 2 diabetes mellitus without complication, without long-term current use of insulin (11/16/2020).  By history and examination this patient has chronicneck pain with bilateral radiculopathy in the distribution of C5 and C6.  The underlying cause cause is facet arthritis, degenerative disc disease, foraminal stenosis, and central canal stenosis.  Pathology is confirmed by imaging.  We discussed the underlying diagnoses and multiple treatment options including non-opioid medications, interventional procedures, physical therapy, home exercise, core muscle enhancement, activity modification, and weight loss.  The risks and benefits of each treatment option were discussed and all questions were answered.    He was recently provided a cervical NELI that provided him excellent relief he did reach out to Neurosurgery a new CT lumbar was ordered to rule out any new pathology.  The patient will follow up with our office to possibly repeat injections in the future.    05/30/2024 - Hermelindo Garza III  presents today with worsening right-sided cervical radicular symptoms over the last 4 weeks.  Given the worsening of his symptoms and development of new tremor in the right upper extremity, I am obtaining an updated MRI of the cervical spine.   He is established with Dr. Mcnamara with Neurosurgery, and at his last visit it was noted that he is a candidate for cervical surgery if his symptoms progressed in the areas of nerve root compression.  I am recommending that he follow up with Dr. Mcnamara after updated imaging is obtained.  I am happy to repeat a cervical epidural for this patient, but I am concerned about the number of steroid injections he has required for both his back and  neck pain thus far.  We discussed that these steroids can affect blood sugar, blood pressure and over time can cause bone weakening.  In the meantime the patient would like to try Lyrica again.  We will discontinue gabapentin and I will prescribe him Lyrica 150 mg b.I.d. Referral placed to Neurology to assist with workup of new onset right upper extremity tremor.    06/25/2024- 44-year-old male with a history of chronic low back and neck pain with radiculopathy symptoms in both his arms and legs.  We recently provided him a lumbar NELI targeting L4-5 that resulted in 70 80% relief and improvement in his functionality.  His worst pain at this point is his neck pain with radiculopathy.  He and I discussed that he will revisit cervical decompressive surgery with Dr. Mcnamara  in a few weeks as his symptoms have continued and on some level gotten a little worse.  He denies any profound weakness denies any bowel or bladder dysfunction at this time.  Discussed with patient that he can reach out to me at any point for support and for any problems moving forward.      Treatment Plan:   Procedures:  none at this time.  Consider repeating cervical epidural steroid injection after reviewing new imaging. Patient will meet with nsgy first.   PT/OT/HEP: I have stressed the importance of physical activity and a home exercise plan to help with pain and improve health.  He is previously completed physical therapy.  I feel the patient would significantly benefit from enrollment in the functional restoration program.  Referral placed.    Medications:  - Continue Lyrica 150 mg b.i.d..  Discontinue gabapentin.   - continue with meloxicam 15 mg q.d.   - continue with p.r.n. Tylenol   -  Reviewed and consistent with medication use as prescribed.  Imaging: reviewed and discussed   Recommend pt follow up with Dr. Mcnamara (neurosurgery)  Follow Up: RTC 6-8 weeks or sooner if needed     ZEUS SyedC  Interventional Pain  Management      Disclaimer: This note was partly generated using dictation software which may occasionally result in transcription errors.

## 2024-06-25 NOTE — PRE-PROCEDURE INSTRUCTIONS
.    Allergies, medical, surgical, family and psychosocial histories reviewed with patient. Periop plan of care reviewed. Preop instructions given, including medications to take and to hold. Hibiclens soap and instructions on use given. Time allotted for questions to be addressed.      Arrival time 0530.

## 2024-06-27 ENCOUNTER — CLINICAL SUPPORT (OUTPATIENT)
Dept: ALLERGY | Facility: CLINIC | Age: 44
End: 2024-06-27
Payer: COMMERCIAL

## 2024-06-27 DIAGNOSIS — J30.9 CHRONIC ALLERGIC RHINITIS: Primary | ICD-10-CM

## 2024-06-27 PROCEDURE — 95117 IMMUNOTHERAPY INJECTIONS: CPT | Mod: S$GLB,,, | Performed by: STUDENT IN AN ORGANIZED HEALTH CARE EDUCATION/TRAINING PROGRAM

## 2024-06-27 PROCEDURE — 99999 PR PBB SHADOW E&M-EST. PATIENT-LVL I: CPT | Mod: PBBFAC,,,

## 2024-07-03 ENCOUNTER — OFFICE VISIT (OUTPATIENT)
Dept: NEUROSURGERY | Facility: CLINIC | Age: 44
End: 2024-07-03
Payer: COMMERCIAL

## 2024-07-03 ENCOUNTER — PATIENT MESSAGE (OUTPATIENT)
Dept: NEUROSURGERY | Facility: CLINIC | Age: 44
End: 2024-07-03

## 2024-07-03 DIAGNOSIS — M50.10 CERVICAL DISC DISORDER WITH RADICULOPATHY, UNSPECIFIED CERVICAL REGION: Primary | ICD-10-CM

## 2024-07-03 DIAGNOSIS — F90.0 ADHD (ATTENTION DEFICIT HYPERACTIVITY DISORDER), INATTENTIVE TYPE: ICD-10-CM

## 2024-07-03 PROCEDURE — 3066F NEPHROPATHY DOC TX: CPT | Mod: CPTII,95,, | Performed by: NEUROLOGICAL SURGERY

## 2024-07-03 PROCEDURE — 3061F NEG MICROALBUMINURIA REV: CPT | Mod: CPTII,95,, | Performed by: NEUROLOGICAL SURGERY

## 2024-07-03 PROCEDURE — 3044F HG A1C LEVEL LT 7.0%: CPT | Mod: CPTII,95,, | Performed by: NEUROLOGICAL SURGERY

## 2024-07-03 PROCEDURE — 1160F RVW MEDS BY RX/DR IN RCRD: CPT | Mod: CPTII,95,, | Performed by: NEUROLOGICAL SURGERY

## 2024-07-03 PROCEDURE — 99214 OFFICE O/P EST MOD 30 MIN: CPT | Mod: 95,,, | Performed by: NEUROLOGICAL SURGERY

## 2024-07-03 PROCEDURE — 1159F MED LIST DOCD IN RCRD: CPT | Mod: CPTII,95,, | Performed by: NEUROLOGICAL SURGERY

## 2024-07-03 RX ORDER — DEXMETHYLPHENIDATE HYDROCHLORIDE 10 MG/1
10 TABLET ORAL 2 TIMES DAILY
Qty: 60 TABLET | Refills: 0 | OUTPATIENT
Start: 2024-07-03 | End: 2024-08-02

## 2024-07-03 NOTE — TELEPHONE ENCOUNTER
No care due was identified.  Health [facility] Embedded Care Due Messages. Reference number: [id number].   7/03/2024 12:08:07 PM CDT

## 2024-07-05 ENCOUNTER — PATIENT MESSAGE (OUTPATIENT)
Dept: NEUROSURGERY | Facility: CLINIC | Age: 44
End: 2024-07-05
Payer: COMMERCIAL

## 2024-07-05 RX ORDER — MELOXICAM 15 MG/1
TABLET ORAL
Qty: 30 TABLET | Refills: 2 | Status: SHIPPED | OUTPATIENT
Start: 2024-07-05

## 2024-07-05 NOTE — PROGRESS NOTES
The patient location is: home  The chief complaint leading to consultation is: leg and arm pain    Visit type: audiovisual    Face to Face time with patient: 25  30 minutes of total time spent on the encounter, which includes face to face time and non-face to face time preparing to see the patient (eg, review of tests), Obtaining and/or reviewing separately obtained history, Documenting clinical information in the electronic or other health record, Independently interpreting results (not separately reported) and communicating results to the patient/family/caregiver, or Care coordination (not separately reported).         Each patient to whom he or she provides medical services by telemedicine is:  (1) informed of the relationship between the physician and patient and the respective role of any other health care provider with respect to management of the patient; and (2) notified that he or she may decline to receive medical services by telemedicine and may withdraw from such care at any time.    Notes:   CHIEF COMPLAINT:  Recurrent left leg pain and numbness  Recurrent right hand/finger pain and numbness    INTERVAL HISTORY (7/3/24):  Patient returns to clinic with new and worsening constellation of symptoms.  He reports a new tremor in his right hand and fingers since May which is causing him difficulty writing and operating a computer mouse.  He also notes numbness, weakness, in this hand that leads to him dropping items.  Numbness resides over the top of his hand and the last 2 digits.  He notices that at night when he is extending his neck he will wake up with his hands feeling numb requiring him to repositioned.  He notes that he will have pain in his shoulder, chest, axilla, upper arm, biceps, and into the last 2 digits of his right hand after working out.  This pain will persist for several weeks.  Ultimately he has decided to stop exercising out of fear of triggering the pain.  He has had prior carpal tunnel  surgery.  He has not had a recent EMG/NCS.  He has known diffuse spondylosis in his cervical spine.    INTERVAL HISTORY (7/26/23):  Had an episode of significant neck pain approx 1 month ago. Took steroid pack and underwent C7-T1 IL NELI by Dr Patel (pain), which helped.      He also reports continued intermittent back and leg pain.  Pain with tingling that radiates up his shin to thigh and groin.  Also note some pain in big toe on left.  These sxs are currently stable/tolerable but he would like to discuss possibility of an NELI since they have helped with his neck.    INTERVAL HISTORY (5/24/23):  Virtual visit conducted regarding recurrent left leg pain and numbness as well as right hand/finger pain and numbness.  Patient is reporting an increase in low back pain that is triggered when sitting down or walking.  He is also having radiating left leg pain that radiates to his left shin and occasionally outside part of his thigh.  He is having sharp pain in his left big toe.  He also notes tightness despite stretching in both hips and buttocks.  Reports that he often has to lie down and put support under his knees.  This has been ongoing for past couple of months.  Since he is had 2 prior lumbar surgeries he is concerned that he may have a recurrent herniated disc or new problem.  He has known cervical stenosis but has had an increase in numbness in his right hand including his thumb and index finger.  He was given an epidural steroid shot which helped some but he is concerned about progression of his known cervical stenosis.    INTERVAL HISTORY (1/4/23):  Patient returns for continued surveillance of cervical myelopathy.  His symptoms have not changed significantly however he is attending physical therapy which he finds is helping some.  He complains of continued numbness and tingling in his arms and hands while sleeping.  He feels like it is exacerbated or triggered by neck extension and has to sleep with his head  propped forward.  He also gets some pain that radiates from his neck to the lateral shoulders.  He still gets occasional tingling in his big toe otherwise feels like the symptoms related to his acute hernia disc have resolved.  He also notes that he generally feels more arthritic type pain which will be exacerbated by exertion.  Finds himself with diffuse pain and achiness more than usual.    INTERVAL HISTORY (6/29/22):  Returns to review recent cervical MRI.    Always has discomfort in neck with pain radiating into R shoulder and occasionally into 4+5th digits as well as L shoulder.  He does endorse clumsiness with hands (dropping items, difficulty with bottle caps, not buttoning) and feels out of balance.  Occasionally has urinary urgency/frequency.    L thigh pain is decreased but worse in am.  Also intermittent pain in back of leg and foot.  Also notes some weakness and pain in ankle.    INTERVAL HISTORY (5/11/22):  Returns with new arm pain that radiates from base of neck to bilateral shoulders and down the volar thumb on R.  Stops at shoulder/upper arm on L.  Triggered by certain neck positions - has to use flat pillow or will have sxs.  + tingling to R hand and L shoulder.  Also reports clumsiness in hand with dropping items and poor fine motor control.  But denies overt weakness.  Feels tight through neck, chest and shoulder regions.    Also has discomfort in L leg along anterolateral thigh and describes as burning and itching.  Notes L leg weakness in back around thigh and knee.  Worse in am and foot feels like it fell asleep and is clumsy.  Occasionally has pain in L big toe.    Takes gabapentin 1 tab in am and 2 tab in pm.  He does HEP with occasional pool workout. Not particularly interested in PT.  Has not trialed antiinflammatories.    No b/b dysfunction.    HPI:    Hermelindo ELIAS Grge SELIN is a 44 y.o.-year-old male who presents today for post-operative follow-up s/p L MIS L3-4 lami/disc on 9/20/21.   Overall patient is significantly improved.  He has significant improvement in his back and leg pain.  He still has some mild tingling in his shins that radiate down to the feet.  This is made worse when he is not active.  He takes gabapentin 600 mg at night which helps.  He would like to increase that to more times during the day.  He denies weakness.      ROS:  As stated in the above HPI    PE:  No neuro deficits observed    FROM PRIOR VISIT:    AAOX3  NAD  CN 2-12 intact     Strength:      Deltoids Biceps Triceps Wrist Ext. Wrist Flex. Hand    RUE 5 5 5 5 5 5   LUE 5 5 5 5 5 5    Hip Flex. Knee Flex. Knee Ext. Dorsi Flex Plantar Flex EHL   RLE 5 5 5 5 5 5   LLE 5 5 5 5 5 5     Sensation:  Intact to light touch (All 4 extremities)    Gait:  normal    Lumbar incision:  Well-healed    IMAGING:  All imaging reviewed by me.    Cervical MRI, 6/14/24:  Loss of lordosis  C4-5 - mod-severe bilat NFS  C5-6 - moderate CS, severe bilat NFS  C6-7 - severe L NFS, moderate R NFS  R LRS/NFS at C7-T1    Cervical MRI, 6/16/23:  1. Diffuse spondylosis and hypertrophied PLL  2. Moderate stenosis at C4-5 with bilat NFS  3. Moderate stenosis at C5-6 with bilat NFS    Lumbar MRI, 6/16/23:  1. No recurrent HNP at L3-4  2. Mild-moderate bilat LRS at L3-4 and L4-5  3. Moderate bilat L5-S1 LRS    Cspine flex/ex, 12/13/22:  Diffuse spondylosis  Good alignment  No dynamic instability    Cervical MRI, 6/16/22:  1. Diffuse spondylosis and hypertrophied PLL  2. Severe stenosis at C4-5 and C5-6 with cord compression  3. Moderate stenosis at C3-4 and C6-7    Lumbar MRI, 6/16/22:  1. No recurrent HNP  2. Mild degen changes    Csp MRI, 1/12/21:  1. Loss of lordosis  2. Diffuse spondylosis  3. Multi-level DDD with moderate NFS  4. Severe left neural foraminal narrowing at C6-7  5. Severe right neural foraminal narrowing at C5-6 and C7-T1    Lsp MRI, 9/19/21:  1. Diffuse DDD and spondylosis  2. Large L L3-4 HNP  3. L L4-5 moderate NFS  4. R L5-S1  moderate NFS      ASSESSMENT:   Problem List Items Addressed This Visit          Neuro    Cervical disc disorder with radiculopathy, unspecified cervical region - Primary    Relevant Orders    EMG W/ ULTRASOUND AND NERVE CONDUCTION TEST 2 Extremities    CT Cervical Spine Without Contrast    X-Ray Cervical Spine AP Lat with Flexion  Extension       Right arm pain  Cervical spondylosis with radiculopathy  S/p L MIS L3-4 lami/disc for severe radiculopathy.  Resolved and stable. No recurrent HNP.    Patient's main complaint is right arm pain that involves his shoulder, chest, axilla, and upper arm down to his hand.  This is associated with numbness and weakness causing him to drop items and to have numbness at night with an extended neck position.  He has also noticed a tremor in the right hand and fingers since May.  A new MRI demonstrates continued multilevel neural foraminal stenosis in the setting of diffuse spondylosis.  Although his pain distribution is not dermatomal, the distribution of numbness suggests a C8 radiculopathy.  He has not had a recent EMG/NCS therefore I will make a referral and obtain some additional cervical spine imaging to help guide surgical decision-making.    PLAN:   - ordered cervical x-ray and CT  - ordered EMG/NCS  - return to clinic in 4 weeks    Time spent on this encounter: 40 minutes. This includes face-to-face time and non-face to face time preparing to see the patient (eg, review of tests), obtaining and/or reviewing separately obtained history, documenting clinical information in the electronic or other health record, independently interpreting results and communicating results to the patient/family/caregiver, or care coordinator.

## 2024-07-08 ENCOUNTER — CLINICAL SUPPORT (OUTPATIENT)
Dept: OTOLARYNGOLOGY | Facility: CLINIC | Age: 44
End: 2024-07-08
Payer: COMMERCIAL

## 2024-07-08 ENCOUNTER — OFFICE VISIT (OUTPATIENT)
Dept: PODIATRY | Facility: CLINIC | Age: 44
End: 2024-07-08
Payer: COMMERCIAL

## 2024-07-08 ENCOUNTER — OFFICE VISIT (OUTPATIENT)
Dept: OTOLARYNGOLOGY | Facility: CLINIC | Age: 44
End: 2024-07-08
Payer: COMMERCIAL

## 2024-07-08 VITALS
WEIGHT: 237.19 LBS | HEART RATE: 89 BPM | BODY MASS INDEX: 39.52 KG/M2 | SYSTOLIC BLOOD PRESSURE: 119 MMHG | DIASTOLIC BLOOD PRESSURE: 72 MMHG | OXYGEN SATURATION: 100 % | HEIGHT: 65 IN

## 2024-07-08 VITALS
BODY MASS INDEX: 39.49 KG/M2 | WEIGHT: 237.31 LBS | DIASTOLIC BLOOD PRESSURE: 72 MMHG | SYSTOLIC BLOOD PRESSURE: 119 MMHG | HEART RATE: 89 BPM

## 2024-07-08 DIAGNOSIS — G89.29 CHRONIC PAIN OF BOTH FEET: Primary | ICD-10-CM

## 2024-07-08 DIAGNOSIS — M20.5X1 HALLUX LIMITUS OF RIGHT FOOT: ICD-10-CM

## 2024-07-08 DIAGNOSIS — L84 CORN OR CALLUS: ICD-10-CM

## 2024-07-08 DIAGNOSIS — J34.3 HYPERTROPHY OF BOTH INFERIOR NASAL TURBINATES: Chronic | ICD-10-CM

## 2024-07-08 DIAGNOSIS — L85.3 XEROSIS OF SKIN: ICD-10-CM

## 2024-07-08 DIAGNOSIS — H93.293 ABNORMAL AUDITORY PERCEPTION, BILATERAL: Primary | ICD-10-CM

## 2024-07-08 DIAGNOSIS — M79.671 CHRONIC PAIN OF BOTH FEET: Primary | ICD-10-CM

## 2024-07-08 DIAGNOSIS — M20.5X2 HALLUX LIMITUS OF LEFT FOOT: ICD-10-CM

## 2024-07-08 DIAGNOSIS — M79.672 CHRONIC PAIN OF BOTH FEET: Primary | ICD-10-CM

## 2024-07-08 DIAGNOSIS — J30.9 ALLERGIC RHINITIS, UNSPECIFIED SEASONALITY, UNSPECIFIED TRIGGER: Chronic | ICD-10-CM

## 2024-07-08 DIAGNOSIS — H93.13 TINNITUS OF BOTH EARS: ICD-10-CM

## 2024-07-08 DIAGNOSIS — J34.2 DEVIATED NASAL SEPTUM: Primary | Chronic | ICD-10-CM

## 2024-07-08 PROCEDURE — 99214 OFFICE O/P EST MOD 30 MIN: CPT | Mod: S$GLB,,, | Performed by: STUDENT IN AN ORGANIZED HEALTH CARE EDUCATION/TRAINING PROGRAM

## 2024-07-08 PROCEDURE — 3078F DIAST BP <80 MM HG: CPT | Mod: CPTII,S$GLB,, | Performed by: OTOLARYNGOLOGY

## 2024-07-08 PROCEDURE — 3074F SYST BP LT 130 MM HG: CPT | Mod: CPTII,S$GLB,, | Performed by: STUDENT IN AN ORGANIZED HEALTH CARE EDUCATION/TRAINING PROGRAM

## 2024-07-08 PROCEDURE — 3078F DIAST BP <80 MM HG: CPT | Mod: CPTII,S$GLB,, | Performed by: STUDENT IN AN ORGANIZED HEALTH CARE EDUCATION/TRAINING PROGRAM

## 2024-07-08 PROCEDURE — 3008F BODY MASS INDEX DOCD: CPT | Mod: CPTII,S$GLB,, | Performed by: STUDENT IN AN ORGANIZED HEALTH CARE EDUCATION/TRAINING PROGRAM

## 2024-07-08 PROCEDURE — 99999 PR PBB SHADOW E&M-EST. PATIENT-LVL III: CPT | Mod: PBBFAC,,, | Performed by: OTOLARYNGOLOGY

## 2024-07-08 PROCEDURE — 99999 PR PBB SHADOW E&M-EST. PATIENT-LVL I: CPT | Mod: PBBFAC,,, | Performed by: AUDIOLOGIST

## 2024-07-08 PROCEDURE — 1159F MED LIST DOCD IN RCRD: CPT | Mod: CPTII,S$GLB,, | Performed by: STUDENT IN AN ORGANIZED HEALTH CARE EDUCATION/TRAINING PROGRAM

## 2024-07-08 PROCEDURE — 3066F NEPHROPATHY DOC TX: CPT | Mod: CPTII,S$GLB,, | Performed by: OTOLARYNGOLOGY

## 2024-07-08 PROCEDURE — 99999 PR PBB SHADOW E&M-EST. PATIENT-LVL IV: CPT | Mod: PBBFAC,,, | Performed by: STUDENT IN AN ORGANIZED HEALTH CARE EDUCATION/TRAINING PROGRAM

## 2024-07-08 PROCEDURE — 92567 TYMPANOMETRY: CPT | Mod: S$GLB,,, | Performed by: AUDIOLOGIST

## 2024-07-08 PROCEDURE — 99214 OFFICE O/P EST MOD 30 MIN: CPT | Mod: S$GLB,,, | Performed by: OTOLARYNGOLOGY

## 2024-07-08 PROCEDURE — 92556 SPEECH AUDIOMETRY COMPLETE: CPT | Mod: S$GLB,,, | Performed by: AUDIOLOGIST

## 2024-07-08 PROCEDURE — 3008F BODY MASS INDEX DOCD: CPT | Mod: CPTII,S$GLB,, | Performed by: OTOLARYNGOLOGY

## 2024-07-08 PROCEDURE — 1160F RVW MEDS BY RX/DR IN RCRD: CPT | Mod: CPTII,S$GLB,, | Performed by: OTOLARYNGOLOGY

## 2024-07-08 PROCEDURE — 1159F MED LIST DOCD IN RCRD: CPT | Mod: CPTII,S$GLB,, | Performed by: OTOLARYNGOLOGY

## 2024-07-08 PROCEDURE — 92552 PURE TONE AUDIOMETRY AIR: CPT | Mod: S$GLB,,, | Performed by: AUDIOLOGIST

## 2024-07-08 PROCEDURE — 3074F SYST BP LT 130 MM HG: CPT | Mod: CPTII,S$GLB,, | Performed by: OTOLARYNGOLOGY

## 2024-07-08 PROCEDURE — 3044F HG A1C LEVEL LT 7.0%: CPT | Mod: CPTII,S$GLB,, | Performed by: OTOLARYNGOLOGY

## 2024-07-08 PROCEDURE — 3044F HG A1C LEVEL LT 7.0%: CPT | Mod: CPTII,S$GLB,, | Performed by: STUDENT IN AN ORGANIZED HEALTH CARE EDUCATION/TRAINING PROGRAM

## 2024-07-08 PROCEDURE — 3066F NEPHROPATHY DOC TX: CPT | Mod: CPTII,S$GLB,, | Performed by: STUDENT IN AN ORGANIZED HEALTH CARE EDUCATION/TRAINING PROGRAM

## 2024-07-08 PROCEDURE — 3061F NEG MICROALBUMINURIA REV: CPT | Mod: CPTII,S$GLB,, | Performed by: OTOLARYNGOLOGY

## 2024-07-08 PROCEDURE — 3061F NEG MICROALBUMINURIA REV: CPT | Mod: CPTII,S$GLB,, | Performed by: STUDENT IN AN ORGANIZED HEALTH CARE EDUCATION/TRAINING PROGRAM

## 2024-07-08 RX ORDER — FLUOROURACIL 50 MG/G
CREAM TOPICAL DAILY
Qty: 40 G | Refills: 2 | Status: SHIPPED | OUTPATIENT
Start: 2024-07-08

## 2024-07-08 RX ORDER — UREA 40 %
CREAM (GRAM) TOPICAL DAILY
Qty: 225 G | Refills: 2 | Status: SHIPPED | OUTPATIENT
Start: 2024-07-08

## 2024-07-08 NOTE — PATIENT INSTRUCTIONS
Risks, benefits and alternatives of Septoplasty and turbinate reduction were discussed, patient and his/her representatives had no further questions or all questions answered, and patient/representative stated understanding.  Consents signed and post op appointment made.

## 2024-07-08 NOTE — PROGRESS NOTES
Subjective:     Patient    Hermelindo Garza III is a 44 y.o. male.    Problem    07/31/23: Presents with left 2nd toe that rubs on 1st toe causing blisters, callusing, and pain at medial left 2nd toe. He has attempted to resolve this issue by wearing bandages and toe spacers/sleeves but the issue has progressed over time and he continues to have pain and skin breakdown at the left 2nd toe. Also has wide feet and difficulty finding appropriately fitting footwear. He is now considering surgery to correct the left 2nd toe. He is also now having pain in the ball of the foot on the left. Denies numbness, tingling, burning or radiating pain in the feet.     08/21/23: Presents 1 week s/p L 2nd toe arthroplasty and ostectomy. Pain improved significantly, does throb when he walks a lot. No major issues.     08/28/23: Presents 2 weeks s/p L 2nd toe arthroplasty and ostectomy. Having minor numbness at base of left 2nd toe. Also reports no pain at left 4th MTPJ and no pain at left lateralfoot 5th metatarsal base area since surgery. No major concerns, would like to get back to physical activities.     10/10/23: Presents with puffy discolored left 2nd toe, painful, feels like it may be infected. Has been wearing a toe sleeve on the toe since surgery.     07/08/24: Returns for callusing at both 1st toes plantarmedially; the calluses become painful and also cause adjacent blistering, overall limiting his footwear selection and physical activity level. All issues at 2nd toe have resolved, he is satisfied with the surgery.     History    History obtained from patient and review of medical records.     Past Medical History:   Diagnosis Date    ADD (attention deficit disorder) 05/09/2012    Allergy     Anxiety 04/12/2023    Asthma 05/09/2012    Cervical disc disorder with radiculopathy, unspecified cervical region 09/14/2021    Cervical spondylosis with myelopathy 06/29/2022    Eczema     Esophageal ulcer     GERD  (gastroesophageal reflux disease) 05/09/2012    Glaucoma     Kidney stones 05/2014    Lumbar disc disease 05/09/2012    Lumbar herniated disc 05/09/2012    Malignant melanoma of skin, unspecified 01/06/2022    in situ right mid back    Melanoma 01/2022    in situ R mid back     LUBA (obstructive sleep apnea)     Radiculopathy, lumbar region 09/14/2021    Renal calculi 05/09/2012    Type 2 diabetes mellitus without complication, without long-term current use of insulin 11/16/2020       Past Surgical History:   Procedure Laterality Date    APPENDECTOMY  2006    CARPAL TUNNEL RELEASE Right 09/15/2022    Procedure: RELEASE, CARPAL TUNNEL;  Surgeon: Christen Marshall MD;  Location: [facility] OR;  Service: Orthopedics;  Laterality: Right;    COLONOSCOPY  01/22/2019    internal hemorrhoids, o/w normal - repeat at age 52    COLONOSCOPY N/A 12/21/2023    Procedure: COLONOSCOPY;  Surgeon: Teo Johnson MD;  Location: [facility];  Service: Endoscopy;  Laterality: N/A;    CORRECTION OF HAMMER TOE Left 8/14/2023    Procedure: CORRECTION, HAMMER TOE--  basic foot tray as well as a Reese microfree/microchoice tray. I also asked [place] to bring an MIS mati.;  Surgeon: Ankush Back DPM;  Location: [facility] OR;  Service: Podiatry;  Laterality: Left;  reese drill, sagittal saw, foot set    CYSTOSCOPY  7/5/2023    Procedure: CYSTOSCOPY;  Surgeon: Chuckie Hall MD;  Location: [address];  Service: Urology;;    EPIDURAL STEROID INJECTION N/A 02/10/2021    Procedure: CERVICAL C6/7 NELI DIRECT REFERRAL;  Surgeon: Michi Escamilla MD;  Location: [facility] PAIN MGT;  Service: Pain Management;  Laterality: N/A;  NEEDS CONSENT    EPIDURAL STEROID INJECTION INTO CERVICAL SPINE N/A 6/28/2023    Procedure: Injection-steroid-epidural-cervical C7-T1;  Surgeon: Lorraine Patel DO;  Location: [facility] PAIN MANAGEMENT;  Service: Pain Management;  Laterality: N/A;  diabetic    EPIDURAL STEROID INJECTION INTO CERVICAL SPINE N/A 11/3/2023    Procedure:  cervical NELI C7-T1;  Surgeon: Kolton Terrell MD;  Location: [facility] PAIN MANAGEMENT;  Service: Pain Management;  Laterality: N/A;  diabetic    EPIDURAL STEROID INJECTION INTO CERVICAL SPINE N/A 4/17/2024    Procedure: VENECIA C7/T1;  Surgeon: Lorraine Patel DO;  Location: [facility] PAIN MANAGEMENT;  Service: Pain Management;  Laterality: N/A;  20mins- Ozempic 7d- no ac    EPIDURAL STEROID INJECTION INTO LUMBAR SPINE N/A 9/27/2023    Procedure: L4-5 NELI;  Surgeon: Tirso Pickering MD;  Location: [facility] PAIN MANAGEMENT;  Service: Pain Management;  Laterality: N/A;  15 mins    EPIDURAL STEROID INJECTION INTO LUMBAR SPINE N/A 3/6/2024    Procedure: L4-5 IL NELI;  Surgeon: Lorraine Patel DO;  Location: [facility] PAIN MANAGEMENT;  Service: Pain Management;  Laterality: N/A;  20 mins    EPIDURAL STEROID INJECTION INTO LUMBAR SPINE N/A 6/3/2024    Procedure: NELI L4-5;  Surgeon: Lorraine Patel DO;  Location: [facility] PAIN MANAGEMENT;  Service: Pain Management;  Laterality: N/A;  20mins-no ac Ozempic 7d    ESOPHAGOGASTRODUODENOSCOPY  01/22/2019    LA grade B esophagitis; esophageal stenosis- dilated; gastritis; H pylori pathology negative    ESOPHAGOGASTRODUODENOSCOPY N/A 05/18/2021    Procedure: EGD (ESOPHAGOGASTRODUODENOSCOPY);  Surgeon: Mariana Hector MD;  Location: [facility];  Service: Endoscopy;  Laterality: N/A;    ESOPHAGOGASTRODUODENOSCOPY N/A 12/21/2023    Procedure: EGD (ESOPHAGOGASTRODUODENOSCOPY);  Surgeon: Teo Johnson MD;  Location: [facility];  Service: Endoscopy;  Laterality: N/A;    ESOPHAGOGASTRODUODENOSCOPY N/A 2/22/2024    Procedure: EGD (ESOPHAGOGASTRODUODENOSCOPY);  Surgeon: Teo Johnson MD;  Location: [facility];  Service: Endoscopy;  Laterality: N/A;    EXCISION OF GANGLION OF WRIST Right 09/15/2022    Procedure: EXCISION, GANGLION CYST, WRIST;  Surgeon: Christen Marshall MD;  Location: [facility];  Service: Orthopedics;  Laterality: Right;    FLEXOR TENDON REPAIR Right 09/15/2022    Procedure: FLEXOR  TENOSYNOVECTOMY;  Surgeon: Christen Marshall MD;  Location: [facility] OR;  Service: Orthopedics;  Laterality: Right;    HAND ARTHROTOMY Right 09/15/2022    Procedure: ARTHROTOMY, HAND RADIOCARPAL;  Surgeon: Christen Marshall MD;  Location: [facility] OR;  Service: Orthopedics;  Laterality: Right;  Radiocarpal    INJECTION OF JOINT Right 11/29/2023    Procedure: right hip IA injection and Right GTB injection;  Surgeon: Lorraine Patel DO;  Location: [facility] PAIN MANAGEMENT;  Service: Pain Management;  Laterality: Right;    INJECTION OF STEROID Left 09/15/2022    Procedure: INJECTION, STEROID LEFT WRIST AND LEFT LATERAL ELBOW;  Surgeon: Christen Marshall MD;  Location: [facility] OR;  Service: Orthopedics;  Laterality: Left;  Left Carpal Tunnel CSI    LASER LITHOTRIPSY Left 7/5/2023    Procedure: LITHOTRIPSY, USING LASER;  Surgeon: Chuckie Hall MD;  Location: [facility] 1ST FLR;  Service: Urology;  Laterality: Left;    LUMBAR LAMINECTOMY  2010    LUMBAR LAMINECTOMY WITH DISCECTOMY Left 09/20/2021    Procedure: LAMINECTOMY, SPINE, LUMBAR, WITH DISCECTOMY;  Surgeon: Naseem Mcnamara DO;  Location: [facility] OR 2ND FLR;  Service: Neurosurgery;  Laterality: Left;  MIS L3-4    PH MONITORING, ESOPHAGUS, WIRELESS, (OFF REFLUX MEDS) N/A 12/21/2023    Procedure: PH MONITORING, ESOPHAGUS, WIRELESS, (OFF REFLUX MEDS);  Surgeon: Teo Johnson MD;  Location: [facility] ENDO;  Service: Endoscopy;  Laterality: N/A;    RECONSTRUCTION OF LIGAMENT Left 12/5/2023    Procedure: RECONSTRUCTION, LIGAMENT LATERAL COLLATERAL;  Surgeon: Christen Marshall MD;  Location: [facility] OR;  Service: Orthopedics;  Laterality: Left;    REPAIR OF EXTENSOR TENDON Left 12/5/2023    Procedure: REPAIR, TENDON, EXTENSOR;  Surgeon: Christen Marshall MD;  Location: [facility] OR;  Service: Orthopedics;  Laterality: Left;    ROBOT-ASSISTED REPAIR OF HIATAL HERNIA N/A 3/14/2024    Procedure: ROBOTIC REPAIR, HERNIA, HIATAL;  Surgeon: Cody Winn MD;  Location: [facility] OR;  Service: General;   Laterality: N/A;    TYMPANOSTOMY TUBE PLACEMENT      URETERAL STENT PLACEMENT Left 7/5/2023    Procedure: INSERTION, STENT, URETER;  Surgeon: Chuckie Hall MD;  Location: [facility] OR [address] [place] [address];  Service: Urology;  Laterality: Left;    URETEROSCOPIC REMOVAL OF URETERIC CALCULUS Left 7/5/2023    Procedure: REMOVAL, CALCULUS, URETER, URETEROSCOPIC;  Surgeon: Chuckie Hlal MD;  Location: [facility] OR [address] [place] [address];  Service: Urology;  Laterality: Left;    URETEROSCOPY Left 7/5/2023    Procedure: URETEROSCOPY;  Surgeon: Chuckie Hall MD;  Location: [address];  Service: Urology;  Laterality: Left;        Objective:     Vitals  Wt Readings from Last 1 Encounters:   07/08/24 107.6 kg (237 lb 3.4 oz)     Temp Readings from Last 1 Encounters:   06/03/24 98.8 °F (37.1 °C)     BP Readings from Last 1 Encounters:   07/08/24 119/72     Pulse Readings from Last 1 Encounters:   07/08/24 89       Dermatological Exam    Skin:  Pedal hair growth, skin color, and skin texture normal on right; plantarmedial 1st toe callus  Pedal hair growth, skin color, and skin texture normal on left; plantarmedial 1st toe callus    Nails:  All nails normal in length, thickness, color    Vascular Exam    Arteries:  Posterior tibial artery palpable on right  Dorsalis pedis artery palpable on right  Posterior tibial artery palpable on left  Dorsalis pedis artery palpable on left    Veins:  Superficial veins unremarkable on right  Superficial veins unremarkable on left    Swelling:  None on right  None on left    Neurological Exam    [place] touch test:  6/6 sites sensed, light touch intact     Tinel Sign:  - at all major ankle and foot nerves bilaterally    Slump Test:  - bilaterally    Musculoskeletal Exam    Footwear:  Casual on right  Casual on left    Gait Exam:   Ambulatory Status: Ambulatory  Gait: Antalgic  Assistive Devices: None    Foot Progression Angle:  Normal on right  Normal on left    Foot Posture Index:  Right  Talar Head Palpation +1   TNJ  Prominence +1   Medial Arch Congruence +2   Lateral Malleolar Curve +1   Relaxed Calcaneal Position +1   Forefoot Abduction/Adduction  0   +6 (Rectus) on right (normal is 1-7)    Left  Talar Head Palpation +1   TNJ Prominence +1   Medial Arch Congruence +2   Lateral Malleolar Curve +1   Relaxed Calcaneal Position +1   Forefoot Abduction/Adduction  0   +6 (Rectus) on left (normal is 1-7)    WB Digital Alignment:  MTPJ varus deformity at 1st toe on right  MTPJ varus deformity and DIPJ valgus/abduction  at 2nd toe on right  MTPJ varus deformity at 3rd toe on right  MTPJ varus deformity at 4th toe on right  MTPJ varus deformity, PIPJ varus deformity, and DIPJ flexion deformity at 5th toe on right  Normal at 1st toe on left  Rectus 2nd toe on left except minor DIPJ valgus/abduction  PIPJ varus deformity at 3rd toe on left  PIPJ varus deformity at 4th toe on left  PIPJ varus deformity at 5th toe on left     Right Lower Extremity Additional Findings:  Functional hallux limitus  Right foot and ankle function, strength, and range of motion unremarkable except as noted above.     Left Lower Extremity Additional Findings:  Functional hallux limitus   Left foot and ankle function, strength, and range of motion unremarkable except as noted above.         Imaging and Other Tests    Imagin22 left foot X ray: mild skew foot with varus deformities at 1st-4th MTPJs, valgus deformity at 2nd DIPJ consistent with clinical findings.     Independently reviewed and interpreted imaging, findings are as follows: N/A     Assessment:     Encounter Diagnoses   Name Primary?    Chronic pain of both feet Yes    Xerosis of skin     Corn or callus     Hallux limitus of left foot     Hallux limitus of right foot           Plan:     I counseled the patient on his conditions, their implications and medical management.     Bilateral hallux limitus and dry skin causing callusing, pain: chronic stable  -Debrided calluses as a courtesy,  tolerated well.   -Urea and fluorouracil creams to calluses daily.   -Recommended synthetic/athletic sock or 2 layer sock system.   -Recommended footwear with high stack height and forefoot rockers.   -If not improving consider distal-medial plantar fascia releases for hallux limitus.      Return to clinic PRN.

## 2024-07-08 NOTE — H&P (VIEW-ONLY)
Chief Complaint   Patient presents with    Other Misc     Discuss surgery and sign consents         HPI 4/30/2024: Hermelindo Garza III is a 43 y.o. male who presents for evaluation of a several day history of nasal congestion and postnasal drip. He describes difficulty breathing at night. There is bilateral nasal obstruction, sides. He does use sinus rinses or nasal sprays, saline and Afrin at night when needed.  He has tried azelastine for 3 weeks daily without much relief.  His ophthalmologist does not want him using nasal steroids due to ocular pressure. He has midface pain and pressure when symptoms are flared up.  He has rhinorrhea and postnasal drip. There is not maxillary tooth pain. He  has occasional headaches.  He has not had sinus or nasal surgery. There is no history of sinonasal trauma.    He uses a CPAP at night, nasal mask.  He has trouble tolerating this when his nasal congestion worsens.  He is currently on SCIT via [facility], and stopped montelukast over the last few months as he did not feel it was making a difference.  He does not currently take a daily antihistamine.  He also reports occasional white bumps that will occur in the back of the throat or the signs of the tongue.  He notices these more so when the nasal congestion is worse and there is possible mouth breathing occurring.      Also has occasional tinnitus and feeling of mild hearing loss.  He does report bruxism and pain in the right TMJ as well.            Interval HPI 7/8/2024:      Patient does feel that the nasal sprays and allergy treatments have helped his symptoms.  He does still have feeling of nasal obstruction at night and feels that his CPAP is difficult to use due to this.    He is scheduled for septoplasty and turbinate reduction next week.  Here to  sign consents and discuss surgery.          Past Medical History:   Diagnosis Date    ADD (attention deficit disorder) 05/09/2012    Allergy     Anxiety  2023    Asthma 2012    Cervical disc disorder with radiculopathy, unspecified cervical region 2021    Cervical spondylosis with myelopathy 2022    Eczema     Esophageal ulcer     GERD (gastroesophageal reflux disease) 2012    Glaucoma     Kidney stones 2014    Lumbar disc disease 2012    Lumbar herniated disc 2012    Malignant melanoma of skin, unspecified 2022    in situ right mid back    Melanoma 2022    in situ R mid back     LUBA (obstructive sleep apnea)     Radiculopathy, lumbar region 2021    Renal calculi 2012    Type 2 diabetes mellitus without complication, without long-term current use of insulin 2020     Social History     Socioeconomic History    Marital status:    Tobacco Use    Smoking status: Former     Current packs/day: 0.00     Types: Cigarettes     Quit date: 2018     Years since quittin.2     Passive exposure: Never    Smokeless tobacco: Never   Substance and Sexual Activity    Alcohol use: Yes     Alcohol/week: 3.0 standard drinks of alcohol     Types: 3 Drinks containing 0.5 oz of alcohol per week    Drug use: No    Sexual activity: Yes     Social Determinants of Health     Financial Resource Strain: Low Risk  (2023)    Overall Financial Resource Strain (CARDIA)     Difficulty of Paying Living Expenses: Not hard at all   Food Insecurity: No Food Insecurity (2023)    Hunger Vital Sign     Worried About Running Out of Food in the Last Year: Never true     Ran Out of Food in the Last Year: Never true   Transportation Needs: No Transportation Needs (2023)    PRAPARE - Transportation     Lack of Transportation (Medical): No     Lack of Transportation (Non-Medical): No   Physical Activity: Sufficiently Active (2024)    Exercise Vital Sign     Days of Exercise per Week: 4 days     Minutes of Exercise per Session: 40 min   Stress: Stress Concern Present (2024)    [place] [place] [employer] - Occupational Stress Questionnaire     Feeling of Stress : To some extent   Housing Stability: Low Risk  (11/7/2023)    Housing Stability Vital Sign     Unable to Pay for Housing in the Last Year: No     Number of Places Lived in the Last Year: 1     Unstable Housing in the Last Year: No     Past Surgical History:   Procedure Laterality Date    APPENDECTOMY  2006    CARPAL TUNNEL RELEASE Right 09/15/2022    Procedure: RELEASE, CARPAL TUNNEL;  Surgeon: Christen Marshall MD;  Location: [facility] OR;  Service: Orthopedics;  Laterality: Right;    COLONOSCOPY  01/22/2019    internal hemorrhoids, o/w normal - repeat at age 52    COLONOSCOPY N/A 12/21/2023    Procedure: COLONOSCOPY;  Surgeon: Teo Johnson MD;  Location: [facility];  Service: Endoscopy;  Laterality: N/A;    CORRECTION OF HAMMER TOE Left 8/14/2023    Procedure: CORRECTION, HAMMER TOE--  basic foot tray as well as a Reese microfree/microchoice tray. I also asked Sherry to bring an MIS mati.;  Surgeon: Ankush Back DPM;  Location: [facility] OR;  Service: Podiatry;  Laterality: Left;  reese drill, sagittal saw, foot set    CYSTOSCOPY  7/5/2023    Procedure: CYSTOSCOPY;  Surgeon: Chuckie Hall MD;  Location: [address];  Service: Urology;;    EPIDURAL STEROID INJECTION N/A 02/10/2021    Procedure: CERVICAL C6/7 NELI DIRECT REFERRAL;  Surgeon: Michi Escamilla MD;  Location: [facility] PAIN MGT;  Service: Pain Management;  Laterality: N/A;  NEEDS CONSENT    EPIDURAL STEROID INJECTION INTO CERVICAL SPINE N/A 6/28/2023    Procedure: Injection-steroid-epidural-cervical C7-T1;  Surgeon: Lorraine Patel DO;  Location: [facility] PAIN MANAGEMENT;  Service: Pain Management;  Laterality: N/A;  diabetic    EPIDURAL STEROID INJECTION INTO CERVICAL SPINE N/A 11/3/2023    Procedure: cervical NELI C7-T1;  Surgeon: Kolton Terrell MD;  Location: [facility] PAIN MANAGEMENT;  Service: Pain Management;  Laterality: N/A;  diabetic    EPIDURAL STEROID INJECTION INTO  CERVICAL SPINE N/A 4/17/2024    Procedure: VENECIA C7/T1;  Surgeon: Lorraine Patel DO;  Location: [facility] PAIN MANAGEMENT;  Service: Pain Management;  Laterality: N/A;  20mins- Ozempic 7d- no ac    EPIDURAL STEROID INJECTION INTO LUMBAR SPINE N/A 9/27/2023    Procedure: L4-5 NELI;  Surgeon: Tirso Pickering MD;  Location: [facility] PAIN MANAGEMENT;  Service: Pain Management;  Laterality: N/A;  15 mins    EPIDURAL STEROID INJECTION INTO LUMBAR SPINE N/A 3/6/2024    Procedure: L4-5 IL NELI;  Surgeon: Lorraine Patel DO;  Location: [facility] PAIN MANAGEMENT;  Service: Pain Management;  Laterality: N/A;  20 mins    EPIDURAL STEROID INJECTION INTO LUMBAR SPINE N/A 6/3/2024    Procedure: NELI L4-5;  Surgeon: Lorraine Patel DO;  Location: [facility] PAIN MANAGEMENT;  Service: Pain Management;  Laterality: N/A;  20mins-no ac Ozempic 7d    ESOPHAGOGASTRODUODENOSCOPY  01/22/2019    LA grade B esophagitis; esophageal stenosis- dilated; gastritis; H pylori pathology negative    ESOPHAGOGASTRODUODENOSCOPY N/A 05/18/2021    Procedure: EGD (ESOPHAGOGASTRODUODENOSCOPY);  Surgeon: Mariana Hector MD;  Location: [facility];  Service: Endoscopy;  Laterality: N/A;    ESOPHAGOGASTRODUODENOSCOPY N/A 12/21/2023    Procedure: EGD (ESOPHAGOGASTRODUODENOSCOPY);  Surgeon: Teo Johnson MD;  Location: [facility];  Service: Endoscopy;  Laterality: N/A;    ESOPHAGOGASTRODUODENOSCOPY N/A 2/22/2024    Procedure: EGD (ESOPHAGOGASTRODUODENOSCOPY);  Surgeon: Teo Johnson MD;  Location: [facility];  Service: Endoscopy;  Laterality: N/A;    EXCISION OF GANGLION OF WRIST Right 09/15/2022    Procedure: EXCISION, GANGLION CYST, WRIST;  Surgeon: Christen Marshall MD;  Location: [facility];  Service: Orthopedics;  Laterality: Right;    FLEXOR TENDON REPAIR Right 09/15/2022    Procedure: FLEXOR TENOSYNOVECTOMY;  Surgeon: Christen Marshall MD;  Location: [facility];  Service: Orthopedics;  Laterality: Right;    HAND ARTHROTOMY Right 09/15/2022    Procedure: ARTHROTOMY,  HAND RADIOCARPAL;  Surgeon: Christen Marshall MD;  Location: [facility];  Service: Orthopedics;  Laterality: Right;  Radiocarpal    INJECTION OF JOINT Right 11/29/2023    Procedure: right hip IA injection and Right GTB injection;  Surgeon: Lorraine Patel DO;  Location: [facility] PAIN MANAGEMENT;  Service: Pain Management;  Laterality: Right;    INJECTION OF STEROID Left 09/15/2022    Procedure: INJECTION, STEROID LEFT WRIST AND LEFT LATERAL ELBOW;  Surgeon: Christen Marshall MD;  Location: [facility] OR;  Service: Orthopedics;  Laterality: Left;  Left Carpal Tunnel CSI    LASER LITHOTRIPSY Left 7/5/2023    Procedure: LITHOTRIPSY, USING LASER;  Surgeon: Chuckie Hall MD;  Location: [facility] OR 1ST FLR;  Service: Urology;  Laterality: Left;    LUMBAR LAMINECTOMY  2010    LUMBAR LAMINECTOMY WITH DISCECTOMY Left 09/20/2021    Procedure: LAMINECTOMY, SPINE, LUMBAR, WITH DISCECTOMY;  Surgeon: Naseem Mcnamara DO;  Location: [facility] OR 2ND FLR;  Service: Neurosurgery;  Laterality: Left;  MIS L3-4    PH MONITORING, ESOPHAGUS, WIRELESS, (OFF REFLUX MEDS) N/A 12/21/2023    Procedure: PH MONITORING, ESOPHAGUS, WIRELESS, (OFF REFLUX MEDS);  Surgeon: Teo Johnson MD;  Location: [facility];  Service: Endoscopy;  Laterality: N/A;    RECONSTRUCTION OF LIGAMENT Left 12/5/2023    Procedure: RECONSTRUCTION, LIGAMENT LATERAL COLLATERAL;  Surgeon: Christen Marshall MD;  Location: [facility] OR;  Service: Orthopedics;  Laterality: Left;    REPAIR OF EXTENSOR TENDON Left 12/5/2023    Procedure: REPAIR, TENDON, EXTENSOR;  Surgeon: Christen Marshall MD;  Location: [facility];  Service: Orthopedics;  Laterality: Left;    ROBOT-ASSISTED REPAIR OF HIATAL HERNIA N/A 3/14/2024    Procedure: ROBOTIC REPAIR, HERNIA, HIATAL;  Surgeon: Cody Winn MD;  Location: [facility];  Service: General;  Laterality: N/A;    TYMPANOSTOMY TUBE PLACEMENT      URETERAL STENT PLACEMENT Left 7/5/2023    Procedure: INSERTION, STENT, URETER;  Surgeon: Chuckie Hall MD;  Location:  [facility] OR 1ST FLR;  Service: Urology;  Laterality: Left;    URETEROSCOPIC REMOVAL OF URETERIC CALCULUS Left 7/5/2023    Procedure: REMOVAL, CALCULUS, URETER, URETEROSCOPIC;  Surgeon: Chuckie Hall MD;  Location: [facility] OR [address] [place] [address];  Service: Urology;  Laterality: Left;    URETEROSCOPY Left 7/5/2023    Procedure: URETEROSCOPY;  Surgeon: Chuckie Hall MD;  Location: [facility] OR [address] [place] [address];  Service: Urology;  Laterality: Left;     Family History   Problem Relation Name Age of Onset    Allergic rhinitis Mother      Hypertension Mother      Transient ischemic attack Mother      Sleep apnea Mother      Allergic rhinitis Father      Asthma Father      Chronic back pain Father      MARTITA disease Father      Sleep apnea Father      Melanoma Father      Allergic rhinitis Sister      Asthma Sister      Allergic rhinitis Sister      Asthma Sister      No Known Problems Brother      No Known Problems Maternal Aunt      No Known Problems Maternal Uncle      No Known Problems Paternal Aunt      No Known Problems Paternal Uncle      Brain cancer Maternal Grandmother      Diabetes Maternal Grandfather      Breast cancer Paternal Grandmother      No Known Problems Paternal Grandfather      Amblyopia Neg Hx      Blindness Neg Hx      Cancer Neg Hx      Cataracts Neg Hx      Glaucoma Neg Hx      Macular degeneration Neg Hx      Retinal detachment Neg Hx      Strabismus Neg Hx      Stroke Neg Hx      Thyroid disease Neg Hx      Allergies Neg Hx      Angioedema Neg Hx             Review of Systems  General: negative for chills, fever or weight loss  Psychological: negative for mood changes or depression  Ophthalmic: negative for blurry vision, photophobia or eye pain  ENT: see HPI  Respiratory: no cough, shortness of breath, or wheezing  Cardiovascular: no chest pain or dyspnea on exertion  Gastrointestinal: no abdominal pain, change in bowel habits, or black/ bloody stools  Musculoskeletal: negative for gait disturbance or muscular  weakness  Neurological: no syncope or seizures; no ataxia  Dermatological: negative for pruritis,  rash and jaundice  Hematologic/lymphatic: no easy bruising, no new adenopathy      Physical Exam:    Vitals:    07/08/24 1107   BP: 119/72   Pulse: 89           Constitutional:   He is oriented to person, place, and time. Vital signs are normal. He appears well-developed and well-nourished. He appears alert. He is cooperative.   Obese   Normal speech.      Head:  Normocephalic and atraumatic. Head is with TMJ tenderness (R>L, TTP rigth pterygoing area). Salivary glands normal.  Facial strength is normal.      Ears:  Hearing normal to normal and whispered voice; external ear normal without scars, lesions, or masses; ear canal, tympanic membrane, and middle ear normal., right ear hearing normal to normal and whispered voice; external ear normal without scars, lesions, or masses; ear canal, tympanic membrane, and middle ear normal. and left ear hearing normal to normal and whispered voice; external ear normal without scars, lesions, or masses; ear canal, tympanic membrane, and middle ear normal..   Right Ear: No drainage. No middle ear effusion.   Left Ear: No drainage.  No middle ear effusion.     Nose:  Mucosal edema and septal deviation (deviation to left anteriorly,   S shaped curvature with deviation to right at midportion of septum) present. No rhinorrhea or polyps. Turbinates abnormal and turbinate hypertrophy (3+, boggy, congested mucosa).  Right sinus exhibits no maxillary sinus tenderness and no frontal sinus tenderness. Left sinus exhibits no maxillary sinus tenderness and no frontal sinus tenderness.     Mouth/Throat  Oropharynx clear and moist without lesions or asymmetry, normal uvula midline, lips, teeth, and gums normal and oropharynx normal. No uvula swelling, oral lesions, trismus or mucous membrane lesions. No oropharyngeal exudate, posterior oropharyngeal edema or posterior oropharyngeal erythema.  Tonsils present, +1.  Mirror exam not performed due to patient tolerance.  Mirror exam not performed due to patient tolerance.    Mallampati class 2      Neck:  Neck normal without thyromegaly masses, asymmetry, normal tracheal structure, crepitus, and tenderness, thyroid normal, trachea normal, phonation normal, full range of motion with neck supple and no adenopathy. No JVD present. Carotid bruit is not present. No thyroid mass and no thyromegaly present.     He has no cervical adenopathy.     Cardiovascular:    Normal rate, regular rhythm and rate and rhythm, heart sounds, and pulses normal.              Pulmonary/Chest:   Effort and breath sounds normal.     Psychiatric:   He has a normal mood and affect. His speech is normal and behavior is normal.     Neurological:   He is alert and oriented to person, place, and time. He has neurological normal, alert and oriented. No cranial nerve deficit.     Skin:   No abrasions, lacerations, lesions, or rashes.             Assessment:    ICD-10-CM ICD-9-CM    1. Deviated nasal septum  J34.2 470       2. Hypertrophy of both inferior nasal turbinates  J34.3 478.0       3. Allergic rhinitis, unspecified seasonality, unspecified trigger  J30.9 477.9           The primary encounter diagnosis was Deviated nasal septum. Diagnoses of Hypertrophy of both inferior nasal turbinates and Allergic rhinitis, unspecified seasonality, unspecified trigger were also pertinent to this visit.      Plan:     Continue nasal spray and oral allergy medications.    Consent signed today and discussed risks, benefits of septoplasty and turbinate reduction.  Patient was allowed to ask questions and expressed understanding of all answers and of upcoming procedure.      Postop appointment made.      Melody Liu MD

## 2024-07-08 NOTE — PROGRESS NOTES
Hermelindo Del Rio III was seen today in the clinic for an audiologic evaluation.  His main complaint was bilateral tinnitus and pressure and pain in the right ear.    Tympanometry revealed a Type A tympanogram for both ears.  Audiogram results revealed hearing essentially within normal limits bilaterally.  Speech reception thresholds were obtained at 10dB for the right ear and 5dB for the left ear.  Speech discrimination scores were 96% for the right ear and 100% for the left ear.    Recommendations:  Otologic evaluation  Follow up hearing test as needed  Hearing protection in noise

## 2024-07-08 NOTE — PROGRESS NOTES
Chief Complaint   Patient presents with    Other Misc     Discuss surgery and sign consents         HPI 4/30/2024: Hermelindo Garza III is a 43 y.o. male who presents for evaluation of a several day history of nasal congestion and postnasal drip. He describes difficulty breathing at night. There is bilateral nasal obstruction, sides. He does use sinus rinses or nasal sprays, saline and Afrin at night when needed.  He has tried azelastine for 3 weeks daily without much relief.  His ophthalmologist does not want him using nasal steroids due to ocular pressure. He has midface pain and pressure when symptoms are flared up.  He has rhinorrhea and postnasal drip. There is not maxillary tooth pain. He  has occasional headaches.  He has not had sinus or nasal surgery. There is no history of sinonasal trauma.    He uses a CPAP at night, nasal mask.  He has trouble tolerating this when his nasal congestion worsens.  He is currently on SCIT via [facility], and stopped montelukast over the last few months as he did not feel it was making a difference.  He does not currently take a daily antihistamine.  He also reports occasional white bumps that will occur in the back of the throat or the signs of the tongue.  He notices these more so when the nasal congestion is worse and there is possible mouth breathing occurring.      Also has occasional tinnitus and feeling of mild hearing loss.  He does report bruxism and pain in the right TMJ as well.            Interval HPI 7/8/2024:      Patient does feel that the nasal sprays and allergy treatments have helped his symptoms.  He does still have feeling of nasal obstruction at night and feels that his CPAP is difficult to use due to this.    He is scheduled for septoplasty and turbinate reduction next week.  Here to  sign consents and discuss surgery.          Past Medical History:   Diagnosis Date    ADD (attention deficit disorder) 05/09/2012    Allergy     Anxiety  2023    Asthma 2012    Cervical disc disorder with radiculopathy, unspecified cervical region 2021    Cervical spondylosis with myelopathy 2022    Eczema     Esophageal ulcer     GERD (gastroesophageal reflux disease) 2012    Glaucoma     Kidney stones 2014    Lumbar disc disease 2012    Lumbar herniated disc 2012    Malignant melanoma of skin, unspecified 2022    in situ right mid back    Melanoma 2022    in situ R mid back     LUBA (obstructive sleep apnea)     Radiculopathy, lumbar region 2021    Renal calculi 2012    Type 2 diabetes mellitus without complication, without long-term current use of insulin 2020     Social History     Socioeconomic History    Marital status:    Tobacco Use    Smoking status: Former     Current packs/day: 0.00     Types: Cigarettes     Quit date: 2018     Years since quittin.2     Passive exposure: Never    Smokeless tobacco: Never   Substance and Sexual Activity    Alcohol use: Yes     Alcohol/week: 3.0 standard drinks of alcohol     Types: 3 Drinks containing 0.5 oz of alcohol per week    Drug use: No    Sexual activity: Yes     Social Determinants of Health     Financial Resource Strain: Low Risk  (2023)    Overall Financial Resource Strain (CARDIA)     Difficulty of Paying Living Expenses: Not hard at all   Food Insecurity: No Food Insecurity (2023)    Hunger Vital Sign     Worried About Running Out of Food in the Last Year: Never true     Ran Out of Food in the Last Year: Never true   Transportation Needs: No Transportation Needs (2023)    PRAPARE - Transportation     Lack of Transportation (Medical): No     Lack of Transportation (Non-Medical): No   Physical Activity: Sufficiently Active (2024)    Exercise Vital Sign     Days of Exercise per Week: 4 days     Minutes of Exercise per Session: 40 min   Stress: Stress Concern Present (2024)    [employer] [place] [employer] - Occupational Stress Questionnaire     Feeling of Stress : To some extent   Housing Stability: Low Risk  (11/7/2023)    Housing Stability Vital Sign     Unable to Pay for Housing in the Last Year: No     Number of Places Lived in the Last Year: 1     Unstable Housing in the Last Year: No     Past Surgical History:   Procedure Laterality Date    APPENDECTOMY  2006    CARPAL TUNNEL RELEASE Right 09/15/2022    Procedure: RELEASE, CARPAL TUNNEL;  Surgeon: Christen Marshall MD;  Location: [facility] OR;  Service: Orthopedics;  Laterality: Right;    COLONOSCOPY  01/22/2019    internal hemorrhoids, o/w normal - repeat at age 52    COLONOSCOPY N/A 12/21/2023    Procedure: COLONOSCOPY;  Surgeon: Teo Johnson MD;  Location: [facility];  Service: Endoscopy;  Laterality: N/A;    CORRECTION OF HAMMER TOE Left 8/14/2023    Procedure: CORRECTION, HAMMER TOE--  basic foot tray as well as a Reese microfree/microchoice tray. I also asked Sherry to bring an MIS mati.;  Surgeon: Ankush Back DPM;  Location: [facility] OR;  Service: Podiatry;  Laterality: Left;  reese drill, sagittal saw, foot set    CYSTOSCOPY  7/5/2023    Procedure: CYSTOSCOPY;  Surgeon: Chuckie Hall MD;  Location: [address];  Service: Urology;;    EPIDURAL STEROID INJECTION N/A 02/10/2021    Procedure: CERVICAL C6/7 NELI DIRECT REFERRAL;  Surgeon: Michi sEcamilla MD;  Location: [facility] PAIN MGT;  Service: Pain Management;  Laterality: N/A;  NEEDS CONSENT    EPIDURAL STEROID INJECTION INTO CERVICAL SPINE N/A 6/28/2023    Procedure: Injection-steroid-epidural-cervical C7-T1;  Surgeon: Lorraine Patel DO;  Location: [facility] PAIN MANAGEMENT;  Service: Pain Management;  Laterality: N/A;  diabetic    EPIDURAL STEROID INJECTION INTO CERVICAL SPINE N/A 11/3/2023    Procedure: cervical NELI C7-T1;  Surgeon: Kolton Terrell MD;  Location: [facility] PAIN MANAGEMENT;  Service: Pain Management;  Laterality: N/A;  diabetic    EPIDURAL STEROID INJECTION INTO  CERVICAL SPINE N/A 4/17/2024    Procedure: VENECIA C7/T1;  Surgeon: Lorraine Patel DO;  Location: [facility] PAIN MANA[facility]EMENT;  Service: Pain Management;  Laterality: N/A;  20mins- Ozempic 7d- no ac    EPIDURAL STEROID INJECTION INTO LUMBAR SPINE N/A 9/27/2023    Procedure: L4-5 NELI;  Surgeon: Tirso Pickering MD;  Location: [facility] PAIN MANAGEMENT;  Service: Pain Management;  Laterality: N/A;  15 mins    EPIDURAL STEROID INJECTION INTO LUMBAR SPINE N/A 3/6/2024    Procedure: L4-5 IL NELI;  Surgeon: Lorraine Patel DO;  Location: [facility] PAIN MANA[facility]EMENT;  Service: Pain Management;  Laterality: N/A;  20 mins    EPIDURAL STEROID INJECTION INTO LUMBAR SPINE N/A 6/3/2024    Procedure: NELI L4-5;  Surgeon: Lorraine Patel DO;  Location: [facility] PAIN MANA[facility]EMENT;  Service: Pain Management;  Laterality: N/A;  20mins-no ac Ozempic 7d    ESOPHAGOGASTRODUODENOSCOPY  01/22/2019    LA grade B esophagitis; esophageal stenosis- dilated; gastritis; H pylori pathology negative    ESOPHAGOGASTRODUODENOSCOPY N/A 05/18/2021    Procedure: EGD (ESOPHAGOGASTRODUODENOSCOPY);  Surgeon: Mariana Hector MD;  Location: [facility];  Service: Endoscopy;  Laterality: N/A;    ESOPHAGOGASTRODUODENOSCOPY N/A 12/21/2023    Procedure: EGD (ESOPHAGOGASTRODUODENOSCOPY);  Surgeon: Teo Johnson MD;  Location: [facility];  Service: Endoscopy;  Laterality: N/A;    ESOPHAGOGASTRODUODENOSCOPY N/A 2/22/2024    Procedure: EGD (ESOPHAGOGASTRODUODENOSCOPY);  Surgeon: Teo Johnson MD;  Location: [facility];  Service: Endoscopy;  Laterality: N/A;    EXCISION OF GANGLION OF WRIST Right 09/15/2022    Procedure: EXCISION, GANGLION CYST, WRIST;  Surgeon: Christen Marshall MD;  Location: [facility];  Service: Orthopedics;  Laterality: Right;    FLEXOR TENDON REPAIR Right 09/15/2022    Procedure: FLEXOR TENOSYNOVECTOMY;  Surgeon: Christen Marshall MD;  Location: [facility];  Service: Orthopedics;  Laterality: Right;    HAND ARTHROTOMY Right 09/15/2022    Procedure: ARTHROTOMY,  HAND RADIOCARPAL;  Surgeon: Christen Marshall MD;  Location: [facility];  Service: Orthopedics;  Laterality: Right;  Radiocarpal    INJECTION OF JOINT Right 11/29/2023    Procedure: right hip IA injection and Right GTB injection;  Surgeon: Lorraine Patel DO;  Location: [facility] PAIN MANAGEMENT;  Service: Pain Management;  Laterality: Right;    INJECTION OF STEROID Left 09/15/2022    Procedure: INJECTION, STEROID LEFT WRIST AND LEFT LATERAL ELBOW;  Surgeon: Christen Marshall MD;  Location: [facility] OR;  Service: Orthopedics;  Laterality: Left;  Left Carpal Tunnel CSI    LASER LITHOTRIPSY Left 7/5/2023    Procedure: LITHOTRIPSY, USING LASER;  Surgeon: Chuckie Hall MD;  Location: [facility] OR 1ST FLR;  Service: Urology;  Laterality: Left;    LUMBAR LAMINECTOMY  2010    LUMBAR LAMINECTOMY WITH DISCECTOMY Left 09/20/2021    Procedure: LAMINECTOMY, SPINE, LUMBAR, WITH DISCECTOMY;  Surgeon: Naseem Mcnamara DO;  Location: [facility] OR 2ND FLR;  Service: Neurosurgery;  Laterality: Left;  MIS L3-4    PH MONITORING, ESOPHAGUS, WIRELESS, (OFF REFLUX MEDS) N/A 12/21/2023    Procedure: PH MONITORING, ESOPHAGUS, WIRELESS, (OFF REFLUX MEDS);  Surgeon: Teo Johnson MD;  Location: [facility];  Service: Endoscopy;  Laterality: N/A;    RECONSTRUCTION OF LIGAMENT Left 12/5/2023    Procedure: RECONSTRUCTION, LIGAMENT LATERAL COLLATERAL;  Surgeon: Christen Marshall MD;  Location: [facility] OR;  Service: Orthopedics;  Laterality: Left;    REPAIR OF EXTENSOR TENDON Left 12/5/2023    Procedure: REPAIR, TENDON, EXTENSOR;  Surgeon: Christen Marshall MD;  Location: [facility];  Service: Orthopedics;  Laterality: Left;    ROBOT-ASSISTED REPAIR OF HIATAL HERNIA N/A 3/14/2024    Procedure: ROBOTIC REPAIR, HERNIA, HIATAL;  Surgeon: Cody Winn MD;  Location: [facility];  Service: General;  Laterality: N/A;    TYMPANOSTOMY TUBE PLACEMENT      URETERAL STENT PLACEMENT Left 7/5/2023    Procedure: INSERTION, STENT, URETER;  Surgeon: Chuckie Hall MD;  Location:  [facility] OR 1ST FLR;  Service: Urology;  Laterality: Left;    URETEROSCOPIC REMOVAL OF URETERIC CALCULUS Left 7/5/2023    Procedure: REMOVAL, CALCULUS, URETER, URETEROSCOPIC;  Surgeon: Chuckie Hall MD;  Location: [facility] OR [address] [place] [address];  Service: Urology;  Laterality: Left;    URETEROSCOPY Left 7/5/2023    Procedure: URETEROSCOPY;  Surgeon: Chuckie Hall MD;  Location: [facility] OR [address] [place] [address];  Service: Urology;  Laterality: Left;     Family History   Problem Relation Name Age of Onset    Allergic rhinitis Mother      Hypertension Mother      Transient ischemic attack Mother      Sleep apnea Mother      Allergic rhinitis Father      Asthma Father      Chronic back pain Father      MARTITA disease Father      Sleep apnea Father      Melanoma Father      Allergic rhinitis Sister      Asthma Sister      Allergic rhinitis Sister      Asthma Sister      No Known Problems Brother      No Known Problems Maternal Aunt      No Known Problems Maternal Uncle      No Known Problems Paternal Aunt      No Known Problems Paternal Uncle      Brain cancer Maternal Grandmother      Diabetes Maternal Grandfather      Breast cancer Paternal Grandmother      No Known Problems Paternal Grandfather      Amblyopia Neg Hx      Blindness Neg Hx      Cancer Neg Hx      Cataracts Neg Hx      Glaucoma Neg Hx      Macular degeneration Neg Hx      Retinal detachment Neg Hx      Strabismus Neg Hx      Stroke Neg Hx      Thyroid disease Neg Hx      Allergies Neg Hx      Angioedema Neg Hx             Review of Systems  General: negative for chills, fever or weight loss  Psychological: negative for mood changes or depression  Ophthalmic: negative for blurry vision, photophobia or eye pain  ENT: see HPI  Respiratory: no cough, shortness of breath, or wheezing  Cardiovascular: no chest pain or dyspnea on exertion  Gastrointestinal: no abdominal pain, change in bowel habits, or black/ bloody stools  Musculoskeletal: negative for gait disturbance or muscular  weakness  Neurological: no syncope or seizures; no ataxia  Dermatological: negative for pruritis,  rash and jaundice  Hematologic/lymphatic: no easy bruising, no new adenopathy      Physical Exam:    Vitals:    07/08/24 1107   BP: 119/72   Pulse: 89           Constitutional:   He is oriented to person, place, and time. Vital signs are normal. He appears well-developed and well-nourished. He appears alert. He is cooperative.   Obese   Normal speech.      Head:  Normocephalic and atraumatic. Head is with TMJ tenderness (R>L, TTP rigth pterygoing area). Salivary glands normal.  Facial strength is normal.      Ears:  Hearing normal to normal and whispered voice; external ear normal without scars, lesions, or masses; ear canal, tympanic membrane, and middle ear normal., right ear hearing normal to normal and whispered voice; external ear normal without scars, lesions, or masses; ear canal, tympanic membrane, and middle ear normal. and left ear hearing normal to normal and whispered voice; external ear normal without scars, lesions, or masses; ear canal, tympanic membrane, and middle ear normal..   Right Ear: No drainage. No middle ear effusion.   Left Ear: No drainage.  No middle ear effusion.     Nose:  Mucosal edema and septal deviation (deviation to left anteriorly,   S shaped curvature with deviation to right at midportion of septum) present. No rhinorrhea or polyps. Turbinates abnormal and turbinate hypertrophy (3+, boggy, congested mucosa).  Right sinus exhibits no maxillary sinus tenderness and no frontal sinus tenderness. Left sinus exhibits no maxillary sinus tenderness and no frontal sinus tenderness.     Mouth/Throat  Oropharynx clear and moist without lesions or asymmetry, normal uvula midline, lips, teeth, and gums normal and oropharynx normal. No uvula swelling, oral lesions, trismus or mucous membrane lesions. No oropharyngeal exudate, posterior oropharyngeal edema or posterior oropharyngeal erythema.  Tonsils present, +1.  Mirror exam not performed due to patient tolerance.  Mirror exam not performed due to patient tolerance.    Mallampati class 2      Neck:  Neck normal without thyromegaly masses, asymmetry, normal tracheal structure, crepitus, and tenderness, thyroid normal, trachea normal, phonation normal, full range of motion with neck supple and no adenopathy. No JVD present. Carotid bruit is not present. No thyroid mass and no thyromegaly present.     He has no cervical adenopathy.     Cardiovascular:    Normal rate, regular rhythm and rate and rhythm, heart sounds, and pulses normal.              Pulmonary/Chest:   Effort and breath sounds normal.     Psychiatric:   He has a normal mood and affect. His speech is normal and behavior is normal.     Neurological:   He is alert and oriented to person, place, and time. He has neurological normal, alert and oriented. No cranial nerve deficit.     Skin:   No abrasions, lacerations, lesions, or rashes.             Assessment:    ICD-10-CM ICD-9-CM    1. Deviated nasal septum  J34.2 470       2. Hypertrophy of both inferior nasal turbinates  J34.3 478.0       3. Allergic rhinitis, unspecified seasonality, unspecified trigger  J30.9 477.9           The primary encounter diagnosis was Deviated nasal septum. Diagnoses of Hypertrophy of both inferior nasal turbinates and Allergic rhinitis, unspecified seasonality, unspecified trigger were also pertinent to this visit.      Plan:     Continue nasal spray and oral allergy medications.    Consent signed today and discussed risks, benefits of septoplasty and turbinate reduction.  Patient was allowed to ask questions and expressed understanding of all answers and of upcoming procedure.      Postop appointment made.      Melody Liu MD

## 2024-07-09 ENCOUNTER — PATIENT MESSAGE (OUTPATIENT)
Dept: PSYCHIATRY | Facility: CLINIC | Age: 44
End: 2024-07-09
Payer: COMMERCIAL

## 2024-07-10 ENCOUNTER — PATIENT MESSAGE (OUTPATIENT)
Dept: NEUROSURGERY | Facility: CLINIC | Age: 44
End: 2024-07-10
Payer: COMMERCIAL

## 2024-07-10 ENCOUNTER — PATIENT MESSAGE (OUTPATIENT)
Dept: PAIN MEDICINE | Facility: CLINIC | Age: 44
End: 2024-07-10
Payer: COMMERCIAL

## 2024-07-11 ENCOUNTER — HOSPITAL ENCOUNTER (OUTPATIENT)
Dept: RADIOLOGY | Facility: HOSPITAL | Age: 44
Discharge: HOME | End: 2024-07-11
Attending: NEUROLOGICAL SURGERY
Payer: COMMERCIAL

## 2024-07-11 ENCOUNTER — TELEPHONE (OUTPATIENT)
Dept: PSYCHIATRY | Facility: HOSPITAL | Age: 44
End: 2024-07-11
Payer: COMMERCIAL

## 2024-07-11 DIAGNOSIS — M50.10 CERVICAL DISC DISORDER WITH RADICULOPATHY, UNSPECIFIED CERVICAL REGION: ICD-10-CM

## 2024-07-11 DIAGNOSIS — F39 UNSPECIFIED MOOD (AFFECTIVE) DISORDER: Primary | ICD-10-CM

## 2024-07-11 PROCEDURE — 72125 CT NECK SPINE W/O DYE: CPT | Mod: TC

## 2024-07-11 PROCEDURE — 72050 X-RAY EXAM NECK SPINE 4/5VWS: CPT | Mod: TC

## 2024-07-11 PROCEDURE — 72125 CT NECK SPINE W/O DYE: CPT | Mod: 26,,, | Performed by: RADIOLOGY

## 2024-07-11 PROCEDURE — 72050 X-RAY EXAM NECK SPINE 4/5VWS: CPT | Mod: 26,,, | Performed by: RADIOLOGY

## 2024-07-11 RX ORDER — PREGABALIN 75 MG/1
150 CAPSULE ORAL DAILY
Qty: 60 CAPSULE | Refills: 0 | Status: CANCELLED | OUTPATIENT
Start: 2024-07-11 | End: 2024-08-10

## 2024-07-11 RX ORDER — BUPROPION HYDROCHLORIDE 150 MG/1
150 TABLET ORAL DAILY
Qty: 30 TABLET | Refills: 2 | Status: SHIPPED | OUTPATIENT
Start: 2024-07-11 | End: 2024-08-28 | Stop reason: SDUPTHER

## 2024-07-11 RX ORDER — PREGABALIN 100 MG/1
100 CAPSULE ORAL 2 TIMES DAILY
Qty: 60 CAPSULE | Refills: 2 | Status: SHIPPED | OUTPATIENT
Start: 2024-07-11 | End: 2024-07-11

## 2024-07-11 RX ORDER — PREGABALIN 75 MG/1
150 CAPSULE ORAL DAILY
Qty: 60 CAPSULE | Refills: 0 | Status: SHIPPED | OUTPATIENT
Start: 2024-07-11 | End: 2024-08-10

## 2024-07-11 NOTE — TELEPHONE ENCOUNTER
----- Message from JUAN CARLOS Cordoba sent at 7/11/2024  9:52 AM CDT -----  Regarding: increasing lyrica  100 mg BID start today R2     Please put in a message to Amanda- increasing Lyrica to 100 mg BID.     Thanks     Cl

## 2024-07-11 NOTE — TELEPHONE ENCOUNTER
Patient sent message in stating that he had restarted his Lexapro 5 mg has sexual side effects    He did this on his own because we had stopped Lexapro at last session    The gist of his message was he was looking at an alternative to the Lexapro to minimize sexual side effects    Called patient to floor options including Wellbutrin Cymbalta    Mutually agreed to prescription of Wellbutrin  mg once daily    He has follow-up appointment with me next month    Told him if any issues to contact clinic    No acute concerns    Thanked me for calling    D Godfrey SELLERS

## 2024-07-11 NOTE — TELEPHONE ENCOUNTER
----- Message from JUAN CARLOS Cordoba sent at 7/11/2024 12:38 PM CDT -----  Regarding: Correction on Lyrica prescribed  Please disregard the last prescription message I sent for this patient I thought he was on a lower dose.  Submit a new prescription for 75 mg daily in addition to his old prescription of 150 mg b.i.d..  His total dose should be 375 mg.       Thanks    CL

## 2024-07-15 ENCOUNTER — ANESTHESIA EVENT (OUTPATIENT)
Dept: SURGERY | Facility: HOSPITAL | Age: 44
End: 2024-07-15
Payer: COMMERCIAL

## 2024-07-15 ENCOUNTER — PATIENT MESSAGE (OUTPATIENT)
Dept: SURGERY | Facility: HOSPITAL | Age: 44
End: 2024-07-15
Payer: COMMERCIAL

## 2024-07-15 ENCOUNTER — ANESTHESIA (OUTPATIENT)
Dept: SURGERY | Facility: HOSPITAL | Age: 44
End: 2024-07-15
Payer: COMMERCIAL

## 2024-07-15 ENCOUNTER — HOSPITAL ENCOUNTER (OUTPATIENT)
Facility: HOSPITAL | Age: 44
Discharge: HOME | End: 2024-07-15
Attending: OTOLARYNGOLOGY | Admitting: OTOLARYNGOLOGY
Payer: COMMERCIAL

## 2024-07-15 ENCOUNTER — NURSE TRIAGE (OUTPATIENT)
Dept: ADMINISTRATIVE | Facility: CLINIC | Age: 44
End: 2024-07-15
Payer: COMMERCIAL

## 2024-07-15 DIAGNOSIS — J34.3 HYPERTROPHY OF BOTH INFERIOR NASAL TURBINATES: Primary | ICD-10-CM

## 2024-07-15 DIAGNOSIS — J34.2 DEVIATED NASAL SEPTUM: ICD-10-CM

## 2024-07-15 LAB — POCT GLUCOSE: 91 MG/DL (ref 70–110)

## 2024-07-15 PROCEDURE — 63600175 PHARM REV CODE 636 W HCPCS: Performed by: NURSE ANESTHETIST, CERTIFIED REGISTERED

## 2024-07-15 PROCEDURE — 71000015 HC POSTOP RECOV 1ST HR: Performed by: OTOLARYNGOLOGY

## 2024-07-15 PROCEDURE — 36000707: Performed by: OTOLARYNGOLOGY

## 2024-07-15 PROCEDURE — 30140 RESECT INFERIOR TURBINATE: CPT | Mod: 50,51,, | Performed by: OTOLARYNGOLOGY

## 2024-07-15 PROCEDURE — D9220A PRA ANESTHESIA: Mod: CRNA,,, | Performed by: NURSE ANESTHETIST, CERTIFIED REGISTERED

## 2024-07-15 PROCEDURE — 71000016 HC POSTOP RECOV ADDL HR: Performed by: OTOLARYNGOLOGY

## 2024-07-15 PROCEDURE — 63600175 PHARM REV CODE 636 W HCPCS: Performed by: ANESTHESIOLOGY

## 2024-07-15 PROCEDURE — 30520 REPAIR OF NASAL SEPTUM: CPT | Mod: ,,, | Performed by: OTOLARYNGOLOGY

## 2024-07-15 PROCEDURE — 63600175 PHARM REV CODE 636 W HCPCS: Performed by: OTOLARYNGOLOGY

## 2024-07-15 PROCEDURE — 37000008 HC ANESTHESIA 1ST 15 MINUTES: Performed by: OTOLARYNGOLOGY

## 2024-07-15 PROCEDURE — 25000003 PHARM REV CODE 250: Performed by: OTOLARYNGOLOGY

## 2024-07-15 PROCEDURE — 36000706: Performed by: OTOLARYNGOLOGY

## 2024-07-15 PROCEDURE — 25000003 PHARM REV CODE 250: Performed by: ANESTHESIOLOGY

## 2024-07-15 PROCEDURE — 71000033 HC RECOVERY, INTIAL HOUR: Performed by: OTOLARYNGOLOGY

## 2024-07-15 PROCEDURE — 37000009 HC ANESTHESIA EA ADD 15 MINS: Performed by: OTOLARYNGOLOGY

## 2024-07-15 PROCEDURE — 27201423 OPTIME MED/SURG SUP & DEVICES STERILE SUPPLY: Performed by: OTOLARYNGOLOGY

## 2024-07-15 PROCEDURE — D9220A PRA ANESTHESIA: Mod: ANES,,, | Performed by: ANESTHESIOLOGY

## 2024-07-15 PROCEDURE — 25000003 PHARM REV CODE 250: Performed by: NURSE ANESTHETIST, CERTIFIED REGISTERED

## 2024-07-15 RX ORDER — HYDROCODONE BITARTRATE AND ACETAMINOPHEN 5; 325 MG/1; MG/1
1 TABLET ORAL EVERY 4 HOURS PRN
Status: DISCONTINUED | OUTPATIENT
Start: 2024-07-15 | End: 2024-07-15 | Stop reason: HOSPADM

## 2024-07-15 RX ORDER — LIDOCAINE HYDROCHLORIDE 20 MG/ML
INJECTION INTRAVENOUS
Status: DISCONTINUED | OUTPATIENT
Start: 2024-07-15 | End: 2024-07-15

## 2024-07-15 RX ORDER — HYDROCODONE BITARTRATE AND ACETAMINOPHEN 10; 325 MG/1; MG/1
1 TABLET ORAL EVERY 4 HOURS PRN
Status: DISCONTINUED | OUTPATIENT
Start: 2024-07-15 | End: 2024-07-15 | Stop reason: HOSPADM

## 2024-07-15 RX ORDER — ONDANSETRON 4 MG/1
4 TABLET, ORALLY DISINTEGRATING ORAL EVERY 6 HOURS PRN
Qty: 10 TABLET | Refills: 0 | Status: SHIPPED | OUTPATIENT
Start: 2024-07-15

## 2024-07-15 RX ORDER — ACETAMINOPHEN 10 MG/ML
INJECTION, SOLUTION INTRAVENOUS
Status: DISCONTINUED | OUTPATIENT
Start: 2024-07-15 | End: 2024-07-15

## 2024-07-15 RX ORDER — ONDANSETRON 8 MG/1
8 TABLET, ORALLY DISINTEGRATING ORAL ONCE
Status: COMPLETED | OUTPATIENT
Start: 2024-07-15 | End: 2024-07-15

## 2024-07-15 RX ORDER — OXYMETAZOLINE HCL 0.05 %
SPRAY, NON-AEROSOL (ML) NASAL
Status: DISCONTINUED | OUTPATIENT
Start: 2024-07-15 | End: 2024-07-15 | Stop reason: HOSPADM

## 2024-07-15 RX ORDER — PROCHLORPERAZINE EDISYLATE 5 MG/ML
5 INJECTION INTRAMUSCULAR; INTRAVENOUS EVERY 30 MIN PRN
Status: DISCONTINUED | OUTPATIENT
Start: 2024-07-15 | End: 2024-07-15 | Stop reason: HOSPADM

## 2024-07-15 RX ORDER — OXYMETAZOLINE HCL 0.05 %
1 SPRAY, NON-AEROSOL (ML) NASAL EVERY 4 HOURS PRN
Status: SHIPPED | OUTPATIENT
Start: 2024-07-15

## 2024-07-15 RX ORDER — FENTANYL CITRATE 50 UG/ML
INJECTION, SOLUTION INTRAMUSCULAR; INTRAVENOUS
Status: DISCONTINUED | OUTPATIENT
Start: 2024-07-15 | End: 2024-07-15

## 2024-07-15 RX ORDER — ONDANSETRON HYDROCHLORIDE 2 MG/ML
INJECTION, SOLUTION INTRAVENOUS
Status: DISCONTINUED | OUTPATIENT
Start: 2024-07-15 | End: 2024-07-15

## 2024-07-15 RX ORDER — LIDOCAINE HYDROCHLORIDE 10 MG/ML
1 INJECTION, SOLUTION EPIDURAL; INFILTRATION; INTRACAUDAL; PERINEURAL ONCE
Status: DISCONTINUED | OUTPATIENT
Start: 2024-07-15 | End: 2024-07-15 | Stop reason: HOSPADM

## 2024-07-15 RX ORDER — MIDAZOLAM HYDROCHLORIDE 1 MG/ML
INJECTION INTRAMUSCULAR; INTRAVENOUS
Status: DISCONTINUED | OUTPATIENT
Start: 2024-07-15 | End: 2024-07-15

## 2024-07-15 RX ORDER — AMOXICILLIN 500 MG/1
500 CAPSULE ORAL EVERY 12 HOURS
Qty: 14 CAPSULE | Refills: 0 | Status: SHIPPED | OUTPATIENT
Start: 2024-07-15 | End: 2024-07-22

## 2024-07-15 RX ORDER — OXYMETAZOLINE HCL 0.05 %
2 SPRAY, NON-AEROSOL (ML) NASAL ONCE
Status: DISCONTINUED | OUTPATIENT
Start: 2024-07-15 | End: 2024-07-15 | Stop reason: HOSPADM

## 2024-07-15 RX ORDER — NEOSTIGMINE METHYLSULFATE 1 MG/ML
INJECTION, SOLUTION INTRAVENOUS
Status: DISCONTINUED | OUTPATIENT
Start: 2024-07-15 | End: 2024-07-15

## 2024-07-15 RX ORDER — ROCURONIUM BROMIDE 10 MG/ML
INJECTION, SOLUTION INTRAVENOUS
Status: DISCONTINUED | OUTPATIENT
Start: 2024-07-15 | End: 2024-07-15

## 2024-07-15 RX ORDER — OXYCODONE HYDROCHLORIDE 5 MG/1
5 TABLET ORAL
Status: DISCONTINUED | OUTPATIENT
Start: 2024-07-15 | End: 2024-07-15 | Stop reason: HOSPADM

## 2024-07-15 RX ORDER — DEXAMETHASONE SODIUM PHOSPHATE 4 MG/ML
12 INJECTION, SOLUTION INTRA-ARTICULAR; INTRALESIONAL; INTRAMUSCULAR; INTRAVENOUS; SOFT TISSUE
Status: DISCONTINUED | OUTPATIENT
Start: 2024-07-15 | End: 2024-07-15 | Stop reason: HOSPADM

## 2024-07-15 RX ORDER — HYDROMORPHONE HYDROCHLORIDE 2 MG/ML
0.2 INJECTION, SOLUTION INTRAMUSCULAR; INTRAVENOUS; SUBCUTANEOUS EVERY 5 MIN PRN
Status: DISCONTINUED | OUTPATIENT
Start: 2024-07-15 | End: 2024-07-15 | Stop reason: HOSPADM

## 2024-07-15 RX ORDER — PHENYLEPHRINE HYDROCHLORIDE 10 MG/ML
INJECTION INTRAVENOUS
Status: DISCONTINUED | OUTPATIENT
Start: 2024-07-15 | End: 2024-07-15

## 2024-07-15 RX ORDER — SODIUM CHLORIDE 0.9 % (FLUSH) 0.9 %
10 SYRINGE (ML) INJECTION
Status: DISCONTINUED | OUTPATIENT
Start: 2024-07-15 | End: 2024-07-15 | Stop reason: HOSPADM

## 2024-07-15 RX ORDER — HYDROCODONE BITARTRATE AND ACETAMINOPHEN 5; 325 MG/1; MG/1
1 TABLET ORAL EVERY 6 HOURS PRN
Qty: 15 TABLET | Refills: 0 | Status: SHIPPED | OUTPATIENT
Start: 2024-07-15

## 2024-07-15 RX ORDER — PROPOFOL 10 MG/ML
VIAL (ML) INTRAVENOUS
Status: DISCONTINUED | OUTPATIENT
Start: 2024-07-15 | End: 2024-07-15

## 2024-07-15 RX ADMIN — LIDOCAINE HYDROCHLORIDE 100 MG: 20 INJECTION, SOLUTION INTRAVENOUS at 07:07

## 2024-07-15 RX ADMIN — ONDANSETRON 4 MG: 2 INJECTION, SOLUTION INTRAMUSCULAR; INTRAVENOUS at 07:07

## 2024-07-15 RX ADMIN — HYDROMORPHONE HYDROCHLORIDE 0.2 MG: 2 INJECTION INTRAMUSCULAR; INTRAVENOUS; SUBCUTANEOUS at 09:07

## 2024-07-15 RX ADMIN — PROPOFOL 200 MG: 10 INJECTION, EMULSION INTRAVENOUS at 07:07

## 2024-07-15 RX ADMIN — ONDANSETRON 4 MG: 2 INJECTION, SOLUTION INTRAMUSCULAR; INTRAVENOUS at 09:07

## 2024-07-15 RX ADMIN — DEXAMETHASONE SODIUM PHOSPHATE 12 MG: 4 INJECTION, SOLUTION INTRAMUSCULAR; INTRAVENOUS at 07:07

## 2024-07-15 RX ADMIN — HYDROCODONE BITARTRATE AND ACETAMINOPHEN 1 TABLET: 10; 325 TABLET ORAL at 10:07

## 2024-07-15 RX ADMIN — FENTANYL CITRATE 100 MCG: 50 INJECTION INTRAMUSCULAR; INTRAVENOUS at 07:07

## 2024-07-15 RX ADMIN — NEOSTIGMINE METHYLSULFATE 5 MG: 1 INJECTION INTRAVENOUS at 09:07

## 2024-07-15 RX ADMIN — SODIUM CHLORIDE, SODIUM LACTATE, POTASSIUM CHLORIDE, AND CALCIUM CHLORIDE: .6; .31; .03; .02 INJECTION, SOLUTION INTRAVENOUS at 06:07

## 2024-07-15 RX ADMIN — SODIUM CHLORIDE, SODIUM LACTATE, POTASSIUM CHLORIDE, AND CALCIUM CHLORIDE: .6; .31; .03; .02 INJECTION, SOLUTION INTRAVENOUS at 07:07

## 2024-07-15 RX ADMIN — ACETAMINOPHEN 1000 MG: 10 INJECTION, SOLUTION INTRAVENOUS at 08:07

## 2024-07-15 RX ADMIN — ONDANSETRON 8 MG: 8 TABLET, ORALLY DISINTEGRATING ORAL at 10:07

## 2024-07-15 RX ADMIN — OXYCODONE HYDROCHLORIDE 5 MG: 5 TABLET ORAL at 09:07

## 2024-07-15 RX ADMIN — GLYCOPYRROLATE 0.6 MG: 0.2 INJECTION, SOLUTION INTRAMUSCULAR; INTRAVITREAL at 09:07

## 2024-07-15 RX ADMIN — MIDAZOLAM HYDROCHLORIDE 2 MG: 1 INJECTION, SOLUTION INTRAMUSCULAR; INTRAVENOUS at 06:07

## 2024-07-15 RX ADMIN — ROCURONIUM BROMIDE 50 MG: 10 INJECTION, SOLUTION INTRAVENOUS at 07:07

## 2024-07-15 RX ADMIN — PHENYLEPHRINE HYDROCHLORIDE 100 MCG: 10 INJECTION INTRAVENOUS at 08:07

## 2024-07-15 NOTE — INTERVAL H&P NOTE
The patient has been examined and the H&P has been reviewed:    I concur with the findings and no changes have occurred since H&P was written.    Surgery risks, benefits and alternative options discussed and understood by patient/family.          Active Hospital Problems    Diagnosis  POA    *Deviated nasal septum [J34.2]  Yes    Hypertrophy of both inferior nasal turbinates [J34.3]  Yes      Resolved Hospital Problems   No resolved problems to display.     Melody Liu MD  [facility]    This note was created by combination of typed  and MModal dictation.  Transcription errors may be present.  If there are any questions, please contact me.

## 2024-07-15 NOTE — DISCHARGE INSTRUCTIONS
INSTRUCTIONS TO FOLLOW AFTER NASAL SURGERY  DR. Burke - [facility]      Your first office visit with Dr. Liu after surgery should have been already scheduled.  If you dont know when it is, call Dr. Liu's nurse at [phone].    ACTIVITY:    Sleep on your back with the head of the bead elevated, up on 2-3 pillows, or in a recliner for the first 3 to 5 days. This will help with swelling.     After surgery you may have a lot of nasal drainage. This is normal. Do not wipe, touch, or dab your nose. You may breathe through your nose if youre able but avoid inhaling forcibly. Let all drainage fall on your mustache dressing and change it as needed.    You may shower 24 hours after surgery.    If you use CPAP or BiPAP to sleep at night, you should wait at least 48 hours before resuming use.  Dr. Liu will advise you when it is safe to do this.    DRESSINGS:    Change the mustache dressing under your nose as needed. (If unsure what this dressing is or how to do this, ask your doctor or nurse before you leave the clinic/hospital.) You may have pinkish-red drainage for 2-3 days.    In certain cases you may have gauze packing inside your nostrils.  If so, these will turn from white to red from the drainage. This is normal so do not be alarmed. Do not touch or pull at the packing. The packing will be removed by your doctor at your first post-op visit.     You may also have a dissolvable stent or dissolvable sponge placed into the sinuses during surgery.  These usually do not need to be removed unless you are told otherwise by Dr. Liu.  You may notice small fragments of these items come out of your nose in the weeks following surgery.    MEDICATIONS:  After surgery, you are generally sent home with prescription for an antibiotic, a pain medication, and an anti-nausea medication.     Start your antibiotics when you get home from surgery and take them until they are all gone.  It is best to take them with food to  prevent an upset stomach.    Most people need prescription pain medication for the first few days after surgery.  If you find that this is not necessary, you may use over-the-counter Tylenol (Acetaminophen) instead.    Avoid Aspirin, Motrin (Ibuprofen), Advil, Aleve and Vitamin E for 1 week before surgery and 1 week after surgery. There are many other medications to avoid as well due to the fact they act as blood thinners.      Some people have problems with bowel movements after surgery. If you have NOT had a bowel movement 3-5 days after surgery, go to your local pharmacy and purchase an over the counter stool softener such as COLACE. You can also ask the pharmacist for his or her recommendation. If you still do not have a bowel movement after starting the softener, please call Dr. Hart office.    You will need these over-the-counter medications after surgery:    Saline Sinus Spray or Rinse: You will use this keep the packing moist after surgery.  Begin doing this one day after surgery, unless instructed otherwise by Dr. Liu.  You should do this 2 times a day, following the instructions on the box. Apply saline to the nasal packs in each nasal cavity.     Afrin (regular strength): Only use if you have severe nasal congestion or bleeding. Use 2 times per day for 3 days, stop for 1 day, continue 2 times per day for 3 days, then stop completely.  NOTE:  You may not need to do this at all.      RESTRICTED ACTIVITIES AFTER SURGERY:    Do NOT blow your nose for 2 weeks. If you have to sneeze or cough, do so with your mouth open.   AVOID all heavy lifting, straining, bending or vigorous exercise for 2 weeks.   AVOID any sexual activity for 2 weeks after surgery.  Do NOT operate a motor vehicle or any type of heavy machinery within 24 hours of taking pain medication.  DO NOT smoke or be around smokers.  AVOID irritating substances such as dust, chalk, harsh chemicals, and allergic triggers.    DIET:    Avoid hot and  spicy foods, or salty soups for 1 week after surgery.  Begin with bland foods the day after surgery and advance to your regular diet as tolerated.  It is not necessary to take only soft food unless you are recovering from tonsil surgery.  Drink plenty of fluids (water is best).   Avoid alcoholic and limit caffeinated beverages for 1 week after surgery.   ANESTHESIA  -For the first 24 hours after surgery:  Do not drive, use heavy equipment, make important decisions, or drink alcohol  -It is normal to feel sleepy for several hours.  Rest until you are more awake.  -Have someone stay with you, if needed.  They can watch for problems and help keep you safe.  -Some possible post anesthesia side effects include: nausea and vomiting, sore throat and hoarseness, sleepiness, and dizziness.    PAIN  -If you have pain after surgery, pain medicine will help you feel better.  Take it as directed, before pain becomes severe.  Most pain relievers taken by mouth need at least 20-30 minutes to start working.  -Do not drive or drink alcohol while taking pain medicine.  -Pain medication can upset your stomach.  Taking them with a little food may help.  -Other ways to help control pain: elevation, ice, and relaxation  -Call your surgeon if still having unmanageable pain an hour after taking pain medicine.  -Pain medicine can cause constipation.  Taking an over-the counter stool softener while on prescription pain medicine and drinking plenty of fluids can prevent this side effect.  -Call your surgeon if you have severe side effects like: breathing problems, trouble waking up, dizziness, confusion, or severe constipation.    NAUSEA  -Some people have nausea after surgery.  This is often because of anesthesia, pain, pain medicine, or the stress of surgery.  -Do not push yourself to eat.  Start off with clear liquids and soup.  Slowly move to solid foods.  Don't eat fatty, rich, spicy foods at first.  Eat smaller amounts.  -If you develop  persistent nausea and vomiting please notify your surgeon immediately.    BLEEDING  -Different types of surgery require different types of care and dressing changes.  It is important to follow all instructions and advice from your surgeon.  Change dressing as directed.  Call your surgeon for any concerns regarding postop bleeding.    SIGNS OF INFECTION  -Signs of infection include: fever, swelling, drainage, and redness  -Notify your surgeon if you have a fever of 100.4 F (38.0 C) or higher.  -Notify your surgeon if you notice redness, swelling, increased pain, pus, or a foul smell at the incision site.

## 2024-07-15 NOTE — OP NOTE
DATE OF OPERATION:  07/15/2024      SURGEON:  Melody Liu     ASSISTANT SURGEON:  none     OPERATION:    1. Septoplasty  2.. Bilateral inferior turbinate reduction with submucosal resection.     PREOPERATIVE DIAGNOSIS:      1. Hypertrophic turbinates.  2. Nasal septal deviation     POSTOPERATIVE DIAGNOSIS:      1. Septoplasty  2.. Bilateral inferior turbinate reduction with submucosal resection.    ANESTHESIA: General.     COMPLICATIONS: None.     ESTIMATED BLOOD LOSS: 20 mL     SPECIMEN: none     WOUND EXPECTANCY: Clean-contaminated.    DRESSING:   nasopore packing bilateral; boston splits bilateral nasal cavities     FINDINGS: NSD to the left with bony spur and leftward deviation of the cartilaginous septum; bilateral inferior tubinate hypertrophy.      INDICATIONS: Nasal septal deviation and inferior turbinate hypertrophy resulting in nasal obstruction not relieved by medical management.        I discussed the risks, benefits and alternatives of surgical correction of the nasal septal deviation and associated turbinate hypertrophy with the patient as well as the expected postoperative course. I gave the patient the opportunity to ask questions, and I answered all of them. On the morning of surgery I again met with the patient and reviewed the indications for surgery and she consented to proceed.     DESCRIPTION OF PROCEDURE: The patient was brought to the operating room and placed supine on the operating table. The patient was placed under general anesthesia and intubated. The patient was positioned with a donut under the head  Cottonoid pledgets soaked with Afrin was placed into the nasal cavity bilaterally for mucosal decongestion. A time-out was performed to confirm the proper patient, site and procedure.  The patient was prepped and draped in the usual fashion.  The bed was placed in 20-degree reverse Trendelenberg position.       At this point, approximately 5 cc of 1% lidocaine with epinephrine 1:100,000  was injected into the septum bilaterally and nasal columella in the submucoperichondrial plane.  A 15 blade was then used to make a hemitransfixion incision on the left.  The mucoperichondrial flap was then elevated first on the left, and then the incision was carried through the septum to the right and the right flap was elevated in a similar fashion.  Using a jeffrey elevator, foman scissors, double action forceps, and pituitary forceps, the deviated portion of the cartilaginous and bony septum was removed, taking care to leave 2cm of caudal septum and >2cm of dorsal septal cartilage intact.     Next, the head of each inferior turbinate was injected with 0.5 cc of 1% lidocaine with epinephrine and a 15 blade was used to make a small incision at the head of the turbinate.  A [place] elevator was used to elevate the erectile tissue of the inferior turbinate.  The microdebrider with the 2mm turbinate blade was then used to remove the erectile tissue in the submucous plane.      The anterior septal incision was then closed using a 4-0 chromic suture, and the bilateral mucoperichondrial flaps were re approximated using a quilting suture technique with a 4-0 plain gut suture.   [place] splints were then placed along the septum and secured anteriorly using a 2-0 silk suture.     A small amount of Nasopore dissolvable packing was placed lateral to the splints along the inferior turbinate to help with hemostasis.     Mupirocin ointment was applied to the vestibule bilaterally.  Mustache dressing was applied under the nose.    At this point,the drapes were taken down. The patient was turned back toward the anesthesiologist and awakened from anesthesia, extubated and transferred to the recovery room in stable condition.   All sponge, lap, needle and instrument counts were correct at the end of the case.       Melody Liu MD  [facility]    This note was created by combination of typed  and  MModal dictation.  Transcription errors may be present.  If there are any questions, please contact me.

## 2024-07-15 NOTE — ANESTHESIA POSTPROCEDURE EVALUATION
Anesthesia Post Evaluation    Patient: Hermelindo Garza III    Procedure(s) Performed: Procedure(s) (LRB):  CAUTERIZATION, MUCOSA, NASAL TURBINATE (Bilateral)  SEPTOPLASTY, NOSE (N/A)    Final Anesthesia Type: general      Patient location during evaluation: PACU  Patient participation: Yes- Able to Participate  Level of consciousness: awake and alert, oriented and awake  Post-procedure vital signs: reviewed and stable  Pain management: adequate  Airway patency: patent    PONV status at discharge: No PONV  Anesthetic complications: no      Cardiovascular status: stable  Respiratory status: unassisted and room air  Hydration status: euvolemic  Follow-up not needed.              Vitals Value Taken Time   /78 07/15/24 1130   Temp 36.7 °C (98 °F) 07/15/24 1130   Pulse 81 07/15/24 1130   Resp 18 07/15/24 1130   SpO2 96 % 07/15/24 1130         Event Time   Out of Recovery 10:05:27         Pain/Shani Score: Pain Rating Prior to Med Admin: 7 (7/15/2024 10:56 AM)  Pain Rating Post Med Admin: 5 (7/15/2024 11:30 AM)  Shani Score: 10 (7/15/2024 11:55 AM)

## 2024-07-15 NOTE — ANESTHESIA PREPROCEDURE EVALUATION
07/15/2024  Hermelindo Garza III is a 44 y.o., male here for nasal turbinate cauterization and septoplasty.       Pre-op Assessment    I have reviewed the Patient Summary Reports.    I have reviewed the NPO Status.   I have reviewed the Medications.     Review of Systems  Anesthesia Hx:  No problems with previous Anesthesia               Denies Personal Hx of Anesthesia complications.                    Social:  Former Smoker, Alcohol Use       Pulmonary:    Asthma    Sleep Apnea                Renal/:  Chronic Renal Disease                Hepatic/GI:   PUD,  GERD Liver Disease, Hepatitis           Musculoskeletal:  Arthritis               Neurological:    Neuromuscular Disease,                                   Endocrine:  Diabetes         Obesity / BMI > 30  Psych:  Psychiatric History                  Physical Exam  General: Well nourished, Cooperative, Alert and Oriented    Airway:  Mallampati: II / II  Mouth Opening: Normal  TM Distance: Normal  Tongue: Normal  Neck ROM: Normal ROM  Neck: Girth Increased    Dental:  Intact        Anesthesia Plan  Type of Anesthesia, risks & benefits discussed:    Anesthesia Type: Gen ETT  Intra-op Monitoring Plan: Standard ASA Monitors  Post Op Pain Control Plan: multimodal analgesia  Induction:  IV  Airway Plan: Video and Direct, Post-Induction  Informed Consent: Informed consent signed with the Patient and all parties understand the risks and agree with anesthesia plan.  All questions answered.   ASA Score: 3    Ready For Surgery From Anesthesia Perspective.     .

## 2024-07-15 NOTE — BRIEF OP NOTE
[facility]  Brief Operative Note     SUMMARY     Surgery Date: 7/15/2024     Surgeons and Role:     * Melody Liu MD - Primary    Assisting Surgeon: None    Pre-op Diagnosis:  Deviated nasal septum [J34.2]  Hypertrophy of both inferior nasal turbinates [J34.3]    Post-op Diagnosis:  Post-Op Diagnosis Codes:     * Deviated nasal septum [J34.2]     * Hypertrophy of both inferior nasal turbinates [J34.3]    Procedure(s) (LRB):  CAUTERIZATION, MUCOSA, NASAL TURBINATE (Bilateral)  SEPTOPLASTY, NOSE (N/A)    Anesthesia: General    Findings/Key Components:  septum deviated to left; enlarged inferior turbinates    Estimated Blood Loss: 20mL         Specimens:   Specimen (24h ago, onward)      None            Discharge Note    SUMMARY     Admit Date: 7/15/2024    Discharge Date and Time: 7/15/2024    Attending Physician: Melody Liu MD     Discharge Provider: Melody Liu    Final Diagnosis: Post-Op Diagnosis Codes:     * Deviated nasal septum [J34.2]     * Hypertrophy of both inferior nasal turbinates [J34.3]    Disposition: Home or Self Care    Follow Up/Patient Instructions: see pt instruction section    Medications:  Reconciled Home Medications:   Current Discharge Medication List        START taking these medications    Details   amoxicillin (AMOXIL) 500 MG capsule Take 1 capsule (500 mg total) by mouth every 12 (twelve) hours. for 7 days  Qty: 14 capsule, Refills: 0      HYDROcodone-acetaminophen (NORCO) 5-325 mg per tablet Take 1 tablet by mouth every 6 (six) hours as needed for Pain.  Qty: 15 tablet, Refills: 0    Comments: Quantity prescribed more than 7 day supply? No           CONTINUE these medications which have CHANGED    Details   ondansetron (ZOFRAN-ODT) 4 MG TbDL Take 1 tablet (4 mg total) by mouth every 6 (six) hours as needed (nausea).  Qty: 10 tablet, Refills: 0           CONTINUE these medications which have NOT CHANGED    Details   LORazepam (ATIVAN) 0.5 MG tablet Take 1 tablet  (0.5 mg total) by mouth daily as needed (SIGNIFICANT ANXIETY).  Qty: 30 tablet, Refills: 3    Associated Diagnoses: Anxiety      pregabalin (LYRICA) 75 MG capsule Take 2 capsules (150 mg total) by mouth once daily.  Qty: 60 capsule, Refills: 0      albuterol (PROVENTIL/VENTOLIN HFA) 90 mcg/actuation inhaler INHALE 2 PUFFS INTO THE LUNGS EVERY 6 HOURS AS NEEDED WHEEZING  Qty: 25.5 g, Refills: 3    Associated Diagnoses: Mild intermittent asthma without complication      atorvastatin (LIPITOR) 20 MG tablet Take 1 tablet (20 mg total) by mouth once daily.  Qty: 90 tablet, Refills: 3    Associated Diagnoses: Hyperlipidemia associated with type 2 diabetes mellitus      blood sugar diagnostic (TRUE METRIX GLUCOSE TEST STRIP) Strp Use to test blood glucose one (1) time a day,  Qty: 100 strip, Refills: 3    Comments: to be used with insurance-preferred brand of glucometer/supplies.  Associated Diagnoses: Diabetes mellitus without complication      blood-glucose meter Misc use as directed  Qty: 1 each, Refills: 0      buPROPion (WELLBUTRIN XL) 150 MG TB24 tablet Take 1 tablet (150 mg total) by mouth once daily.  Qty: 30 tablet, Refills: 2    Associated Diagnoses: Unspecified mood (affective) disorder      clobetasoL (TEMOVATE) 0.05 % cream Apply to affected area 2 (two) times daily as needed for flare  Qty: 30 g, Refills: 1    Associated Diagnoses: Dermatitis      !! dexmethylphenidate (FOCALIN) 10 MG tablet Take 1 tablet (10 mg total) by mouth 2 (two) times daily.  Qty: 60 tablet, Refills: 0    Associated Diagnoses: ADHD (attention deficit hyperactivity disorder), inattentive type      !! dexmethylphenidate (FOCALIN) 10 MG tablet Take 1 tablet (10 mg total) by mouth 2 (two) times daily.  Qty: 60 tablet, Refills: 0    Associated Diagnoses: ADHD (attention deficit hyperactivity disorder), inattentive type      ergocalciferol, vitamin D2, (VITAMIN D ORAL) Take 1 tablet by mouth every other day.      fluocinonide (LIDEX) 0.05 %  external solution apply to affected area of scalp daily as needed for itching, scaling  Qty: 60 mL, Refills: 3    Associated Diagnoses: Seborrheic dermatitis, unspecified      fluorouraciL (EFUDEX) 5 % cream Apply topically to the affected area once daily.  Qty: 40 g, Refills: 2    Associated Diagnoses: Xerosis of skin; Corn or callus      hydrocortisone 2.5 % cream APPLY EXTERNALLY TO THE AFFECTED AREA TWICE DAILY FOR 10 DAYS  Qty: 30 g, Refills: 0    Associated Diagnoses: Allergic contact dermatitis due to plant      ketoconazole (NIZORAL) 2 % cream Apply 1 application  topically 2 (two) times daily. Apply to affected area up to 2 weeks  Qty: 15 g, Refills: 0    Associated Diagnoses: Seborrheic dermatitis of scalp      lamoTRIgine (LAMICTAL) 100 MG tablet Take 1 tablet (100 mg total) by mouth 2 (two) times daily. IF any RASH, STOP ALL Lamictal and Tell Psyc MD.  Qty: 180 tablet, Refills: 1    Associated Diagnoses: Unspecified mood (affective) disorder      !! lancets (ONETOUCH DELICA PLUS LANCET) 33 gauge Misc Use to test blood glucose one (1) time a day,  Qty: 100 each, Refills: 5      latanoprost 0.005 % ophthalmic solution Place 1 drop into both eyes every evening.  Qty: 7.5 mL, Refills: 3    Associated Diagnoses: Ocular hypertension, bilateral      levocetirizine (XYZAL) 5 MG tablet Take 1 tablet (5 mg total) by mouth every evening.  Qty: 30 tablet, Refills: 11      meloxicam (MOBIC) 15 MG tablet TAKE 1 TABLET(15 MG) BY MOUTH EVERY DAY as needed FOR PAIN  Qty: 30 tablet, Refills: 2      montelukast (SINGULAIR) 10 mg tablet Take 1 tablet (10 mg total) by mouth every evening.  Qty: 90 tablet, Refills: 3    Associated Diagnoses: Mild intermittent asthma without complication; History of multiple allergies      olopatadine (PATANASE) 0.6 % nasal spray 1 spray by Each Nostril route 2 (two) times daily.  Qty: 30.5 g, Refills: 5      permethrin (ELIMITE) 5 % cream Apply from the neck down and leave on overnight; wash  off in am and repeat in 1 week  Qty: 120 g, Refills: 1    Associated Diagnoses: Dermatitis      rOPINIRole (REQUIP) 0.25 MG tablet TAKE 1 TABLET(0.25 MG) BY MOUTH EVERY EVENING  Qty: 90 tablet, Refills: 2    Associated Diagnoses: Restless leg      semaglutide (OZEMPIC) 2 mg/dose (8 mg/3 mL) PnIj Inject 2 mg into the skin every 7 days.  Qty: 3 mL, Refills: 5    Associated Diagnoses: Type 2 diabetes mellitus without complication, without long-term current use of insulin      !! ULTRA THIN LANCETS 30 gauge Misc USE TO TEST BLOOD SUGAR EVERY DAY AS DIRECTED  Qty: 100 each, Refills: 3    Associated Diagnoses: Diabetes mellitus without complication      urea (CARMOL) 40 % Crea Apply topically once daily.  Qty: 225 g, Refills: 2    Associated Diagnoses: Xerosis of skin; Corn or callus       !! - Potential duplicate medications found. Please discuss with provider.        Discharge Procedure Orders   Diet general     Change dressing (specify)   Order Comments: Change mustache gauze dressing prn saturation.     Call MD for:  temperature >100.4     Call MD for:  persistent nausea and vomiting     Call MD for:  severe uncontrolled pain     Call MD for:  persistent dizziness or light-headedness     Call MD for:   Order Comments: Persistent nasal bleeding     Melody Liu MD  [facility]    This note was created by combination of typed  and MModal dictation.  Transcription errors may be present.  If there are any questions, please contact me.

## 2024-07-15 NOTE — TRANSFER OF CARE
Anesthesia Transfer of Care Note    Patient: Hermelindo Garza III    Procedure(s) Performed: Procedure(s) (LRB):  CAUTERIZATION, MUCOSA, NASAL TURBINATE (Bilateral)  SEPTOPLASTY, NOSE (N/A)    Patient location: PACU    Anesthesia Type: general    Transport from OR: Transported from OR on 6-10 L/min O2 by face mask with adequate spontaneous ventilation    Post pain: adequate analgesia    Post assessment: no apparent anesthetic complications    Post vital signs: stable    Level of consciousness: awake, alert and oriented    Nausea/Vomiting: no nausea/vomiting    Complications: none    Transfer of care protocol was followed      Last vitals: Visit Vitals  /77 (BP Location: Right arm, Patient Position: Lying)   Pulse 90   Temp 36.6 °C (97.9 °F) (Skin)   Resp 12   SpO2 96%

## 2024-07-15 NOTE — ANESTHESIA PROCEDURE NOTES
Intubation    Date/Time: 7/15/2024 7:19 AM    Performed by: Glenis Guardado CRNA  Authorized by: Kalyn Boudreaux MD    Intubation:     Induction:  Intravenous    Intubated:  Postinduction    Mask Ventilation:  Easy mask    Attempts:  1    Attempted By:  CRNA    Method of Intubation:  Video laryngoscopy    Blade:  Wang 3    Laryngeal View Grade: Grade I - full view of cords      Difficult Airway Encountered?: No      Complications:  None    Airway Device:  Oral galdino    Airway Device Size:  7.5    Style/Cuff Inflation:  Cuffed (inflated to minimal occlusive pressure)    Inflation Amount (mL):  6    Tube secured:  21    Secured at:  The lips    Placement Verified By:  Capnometry    Complicating Factors:  None    Findings Post-Intubation:  BS equal bilateral and atraumatic/condition of teeth unchanged

## 2024-07-15 NOTE — DISCHARGE SUMMARY
[facility] - [facility] (Hospital)  Discharge Note  Short Stay    Procedure(s) (LRB):  CAUTERIZATION, MUCOSA, NASAL TURBINATE (Bilateral)  SEPTOPLASTY, NOSE (N/A)      OUTCOME: Patient tolerated treatment/procedure well without complication and is now ready for discharge.    DISPOSITION: Home or Self Care    FINAL DIAGNOSIS:  Deviated nasal septum    FOLLOWUP: In clinic    DISCHARGE INSTRUCTIONS:    Discharge Procedure Orders   Diet general     Change dressing (specify)   Order Comments: Change mustache gauze dressing prn saturation.     Call MD for:  temperature >100.4     Call MD for:  persistent nausea and vomiting     Call MD for:  severe uncontrolled pain     Call MD for:  persistent dizziness or light-headedness     Call MD for:   Order Comments: Persistent nasal bleeding        TIME SPENT ON DISCHARGE: 30 minutes    Melody Liu MD  [facility]    This note was created by combination of typed  and MModal dictation.  Transcription errors may be present.  If there are any questions, please contact me.

## 2024-07-16 ENCOUNTER — TELEPHONE (OUTPATIENT)
Dept: OTOLARYNGOLOGY | Facility: CLINIC | Age: 44
End: 2024-07-16
Payer: COMMERCIAL

## 2024-07-16 ENCOUNTER — PATIENT MESSAGE (OUTPATIENT)
Dept: OTOLARYNGOLOGY | Facility: CLINIC | Age: 44
End: 2024-07-16
Payer: COMMERCIAL

## 2024-07-16 NOTE — TELEPHONE ENCOUNTER
Returned call. Pt explained that it appears his nasal packing is coming out of his nose, he can see it at the very tip of his nostrils. Asked pt to send a pic through the portal so that dr. Liu may review. Pt verbalized understanding.     ----- Message from Leslie Sevilla sent at 7/16/2024  9:29 AM CDT -----  Regarding: Questions and concerns  Contact: [phone]  Type:  Needs Medical Advice    Who Called: Hermelindo  Would the patient rather a call back or a response via [facility]? Call  Best Call Back Number:  [phone]  Additional Information: Patient has questions about his care after his procedure he had on yesterday.

## 2024-07-16 NOTE — TELEPHONE ENCOUNTER
The packing in the nose is dissolvable.  It will sometimes start to come out as you do irrigations in the nose, especially because I did not have to put very much packing in your nose.  Generally we advise patient is not to try to remove the packing on their own, but If it is sticking to the bandage and right at the area of the front part of the nostrils or coming out of the nostrils, you can use some tweezers to gently remove the part that is visible, but I do not want you pulling on anything that is up in the higher parts of the nose.  Best option is to continue to irrigate with the saline and it will tend to dissolve into pieces and come out of the nose fairly easily.    This is all normal with dissolvable packing.  In some patients it just comes out quicker than others depending on the amount that had to be placed.    Melody Liu MD  [facility]    This note was created by combination of typed  and MModal dictation.  Transcription errors may be present.  If there are any questions, please contact me.

## 2024-07-16 NOTE — TELEPHONE ENCOUNTER
What you see in the front part of the nose is just the dissolvable packing making its way out.  UR fine to grab that with a pair of tweezers and gently pull it out.  You may see more of that coming out in the coming days, and that is fine and normal.    There isn't very much up there, so I would not expect to see much coming out after the 1st day or 2.    Melody Liu MD  [facility]    This note was created by combination of typed  and MModal dictation.  Transcription errors may be present.  If there are any questions, please contact me.

## 2024-07-16 NOTE — TELEPHONE ENCOUNTER
It is normal that in the week after a surgery like you had, the nose will actually become more congested due to swelling and postoperative changes.  This will resolve over time, and will get better once splints are removed, but you may have to preferentially breathe through the mouth while the nose is more congested.  You can use nasal saline as frequently as desired, but I would not use Afrin for more than 48 hours.  You can restart using medications such as Nasacort or Flonase to help with nasal congestion.  These can be obtained over-the-counter if needed.    Keep postoperative appointment with me next week and after splints are removed and the nose is cleaned, much of the nasal congestion should improve.    Keeping the head of bed elevated at all times is additionally helpful for swelling.    Melody Liu MD  [facility]    This note was created by combination of typed  and MModal dictation.  Transcription errors may be present.  If there are any questions, please contact me.

## 2024-07-16 NOTE — TELEPHONE ENCOUNTER
If it is just an oral mask, then yes you may start using it tonight.  Just no nasal mask for 48 hours.    Melody Liu MD  [facility]    This note was created by combination of typed  and MModal dictation.  Transcription errors may be present.  If there are any questions, please contact me.

## 2024-07-16 NOTE — TELEPHONE ENCOUNTER
Post op patient calling to see if he may take additional medication for nasal drip as he is worried he may have trouble sleeping. Advised, per protocol and verbalizes understanding. Would like follow up in this regard.    Reason for Disposition   [1] Caller has NON-URGENT question AND [2] triager unable to answer question    Additional Information   Negative: Sounds like a life-threatening emergency to the triager   Negative: [1] Widespread rash AND [2] bright red, sunburn-like   Negative: [1] SEVERE headache AND [2] after spinal (epidural) anesthesia   Negative: [1] Vomiting AND [2] persists > 4 hours   Negative: [1] Vomiting AND [2] abdomen looks much more swollen than usual   Negative: [1] Drinking very little AND [2] dehydration suspected (e.g., no urine > 12 hours, very dry mouth, very lightheaded)   Negative: Patient sounds very sick or weak to the triager   Negative: Sounds like a serious complication to the triager   Negative: Fever > 100.4 F (38.0 C)   Negative: [1] SEVERE post-op pain (e.g., excruciating, pain scale 8-10) AND [2] not controlled with pain medications   Negative: [1] Caller has URGENT question AND [2] triager unable to answer question   Negative: [1] MILD-MODERATE headache (e.g., pain scale 1-7) AND [2] after spinal (epidural) anesthesia   Negative: Fever present > 3 days (72 hours)    Protocols used: Post-Op Symptoms and [id number]

## 2024-07-17 VITALS
OXYGEN SATURATION: 96 % | RESPIRATION RATE: 18 BRPM | SYSTOLIC BLOOD PRESSURE: 125 MMHG | DIASTOLIC BLOOD PRESSURE: 78 MMHG | TEMPERATURE: 98 F | HEART RATE: 81 BPM

## 2024-07-19 ENCOUNTER — PATIENT MESSAGE (OUTPATIENT)
Dept: INTERNAL MEDICINE | Facility: CLINIC | Age: 44
End: 2024-07-19
Payer: COMMERCIAL

## 2024-07-19 NOTE — TELEPHONE ENCOUNTER
Dr. VAIL,     Do you want me to make an appt for the patient, or will you be able to send in something to help him sleep? Please advise    Thank you,  Sharon

## 2024-07-23 ENCOUNTER — OFFICE VISIT (OUTPATIENT)
Dept: OTOLARYNGOLOGY | Facility: CLINIC | Age: 44
End: 2024-07-23
Payer: COMMERCIAL

## 2024-07-23 ENCOUNTER — CLINICAL SUPPORT (OUTPATIENT)
Dept: ALLERGY | Facility: CLINIC | Age: 44
End: 2024-07-23
Payer: COMMERCIAL

## 2024-07-23 VITALS
WEIGHT: 235.88 LBS | HEART RATE: 90 BPM | SYSTOLIC BLOOD PRESSURE: 123 MMHG | BODY MASS INDEX: 39.25 KG/M2 | DIASTOLIC BLOOD PRESSURE: 84 MMHG

## 2024-07-23 DIAGNOSIS — J34.2 DEVIATED NASAL SEPTUM: Chronic | ICD-10-CM

## 2024-07-23 DIAGNOSIS — J30.9 CHRONIC ALLERGIC RHINITIS: Primary | ICD-10-CM

## 2024-07-23 DIAGNOSIS — J34.3 HYPERTROPHY OF BOTH INFERIOR NASAL TURBINATES: Chronic | ICD-10-CM

## 2024-07-23 DIAGNOSIS — J30.9 ALLERGIC RHINITIS, UNSPECIFIED SEASONALITY, UNSPECIFIED TRIGGER: Chronic | ICD-10-CM

## 2024-07-23 DIAGNOSIS — G47.33 OBSTRUCTIVE SLEEP APNEA SYNDROME: ICD-10-CM

## 2024-07-23 PROCEDURE — 3008F BODY MASS INDEX DOCD: CPT | Mod: CPTII,S$GLB,, | Performed by: OTOLARYNGOLOGY

## 2024-07-23 PROCEDURE — 99999 PR PBB SHADOW E&M-EST. PATIENT-LVL I: CPT | Mod: PBBFAC,,,

## 2024-07-23 PROCEDURE — 3066F NEPHROPATHY DOC TX: CPT | Mod: CPTII,S$GLB,, | Performed by: OTOLARYNGOLOGY

## 2024-07-23 PROCEDURE — 95117 IMMUNOTHERAPY INJECTIONS: CPT | Mod: S$GLB,,, | Performed by: STUDENT IN AN ORGANIZED HEALTH CARE EDUCATION/TRAINING PROGRAM

## 2024-07-23 PROCEDURE — 3061F NEG MICROALBUMINURIA REV: CPT | Mod: CPTII,S$GLB,, | Performed by: OTOLARYNGOLOGY

## 2024-07-23 PROCEDURE — 99024 POSTOP FOLLOW-UP VISIT: CPT | Mod: S$GLB,,, | Performed by: OTOLARYNGOLOGY

## 2024-07-23 PROCEDURE — 3044F HG A1C LEVEL LT 7.0%: CPT | Mod: CPTII,S$GLB,, | Performed by: OTOLARYNGOLOGY

## 2024-07-23 PROCEDURE — 1159F MED LIST DOCD IN RCRD: CPT | Mod: CPTII,S$GLB,, | Performed by: OTOLARYNGOLOGY

## 2024-07-23 PROCEDURE — 3074F SYST BP LT 130 MM HG: CPT | Mod: CPTII,S$GLB,, | Performed by: OTOLARYNGOLOGY

## 2024-07-23 PROCEDURE — 1160F RVW MEDS BY RX/DR IN RCRD: CPT | Mod: CPTII,S$GLB,, | Performed by: OTOLARYNGOLOGY

## 2024-07-23 PROCEDURE — 99999 PR PBB SHADOW E&M-EST. PATIENT-LVL V: CPT | Mod: PBBFAC,,, | Performed by: OTOLARYNGOLOGY

## 2024-07-23 PROCEDURE — 31231 NASAL ENDOSCOPY DX: CPT | Mod: 79,S$GLB,, | Performed by: OTOLARYNGOLOGY

## 2024-07-23 PROCEDURE — 3079F DIAST BP 80-89 MM HG: CPT | Mod: CPTII,S$GLB,, | Performed by: OTOLARYNGOLOGY

## 2024-07-23 RX ORDER — FLUTICASONE PROPIONATE 50 MCG
1 SPRAY, SUSPENSION (ML) NASAL 2 TIMES DAILY
Qty: 16 G | Refills: 11 | Status: SHIPPED | OUTPATIENT
Start: 2024-07-23 | End: 2024-08-22

## 2024-07-23 RX ORDER — MUPIROCIN 20 MG/G
OINTMENT TOPICAL 2 TIMES DAILY
Qty: 22 G | Refills: 3 | Status: SHIPPED | OUTPATIENT
Start: 2024-07-23 | End: 2024-08-02

## 2024-07-23 NOTE — PATIENT INSTRUCTIONS
Ok to use CPAP.    Continue saline rinses and sprays.     Restart flonase just for next 2-3 weeks.     Mupirocin ointment to nostrils twice daily.

## 2024-07-23 NOTE — PROGRESS NOTES
Subjective:      Hermelindo Garza III is a 44 y.o. male who comes for follow-up 1 week status-post septum and turbinate surgery.  He is doing well overall.  He had some issues with nasal and eye pressure after his surgery, but those resolved with use of p.o. Sudafed.  He has not been able to use his CPAP due to nasal splints and congestion.  He has been using his saline spray and irrigations daily.  He does not report receiving mupirocin ointment at discharge so has not been using that in the nasal vestibule.          Objective:     /84 (BP Location: Left arm, Patient Position: Sitting, BP Method: Large (Automatic))   Pulse 90   Wt 107 kg (235 lb 14.3 oz)   BMI 39.25 kg/m²      General:   not in distress   Nasal:  Mildly edematous mucosa   incision intact; splints in place bilaterally   no septal hematoma   no bleeding   Oral Cavity:   clear   Oropharynx:   no bleeding   Neck:   nontender       Nasal/sinus endoscopy    Date/Time: 7/23/2024 8:20 AM    Performed by: Melody Liu MD  Authorized by: Melody Liu MD    Consent Done?:  Yes (Verbal)  Anesthesia:     Local anesthetic:  Topical anesthetic    Location: Bilateral.    Patient tolerance:  Patient tolerated the procedure well with no immediate complications  Nose:     Procedure Performed:  Nasal Endoscopy  External:      No external nasal deformity  Intranasal:      Mucosa no polyps     Mucosa ulcers not present     No mucosa lesions present     Turbinates not enlarged     No septum gross deformity  Nasopharynx:      No mucosa lesions     Adenoids not present     Posterior choanae patent     Eustachian tube patent       Septum and turbinates healing well.  Splints removed from nose after removal of silk suture from columella.    Suctioned minimal amount of mucus from left nasal cavity.  No bleeding noted bilaterally.          Assessment:     Doing well following septoplasty and turbinate reduction.    1. Hypertrophy of both inferior nasal  turbinates    2. Deviated nasal septum    3. Allergic rhinitis, unspecified seasonality, unspecified trigger    4. Obstructive sleep apnea syndrome         Plan:     Continue saline spray and rinses daily.    Use mupirocin ointment in nostrils b.i.d..    May restart CPAP use.    Restart Flonase and use for 2-3 weeks.    Okay to use other nasal sprays p.r.n..        Follow up in about 6 weeks (around 9/3/2024), or if symptoms worsen or fail to improve.        Melody Liu MD  [facility]    This note was created by combination of typed  and MModal dictation.  Transcription errors may be present.  If there are any questions, please contact me.

## 2024-07-26 ENCOUNTER — PATIENT MESSAGE (OUTPATIENT)
Dept: PSYCHIATRY | Facility: CLINIC | Age: 44
End: 2024-07-26
Payer: COMMERCIAL

## 2024-07-26 DIAGNOSIS — F90.0 ADHD (ATTENTION DEFICIT HYPERACTIVITY DISORDER), INATTENTIVE TYPE: ICD-10-CM

## 2024-07-26 RX ORDER — DEXMETHYLPHENIDATE HYDROCHLORIDE 10 MG/1
10 TABLET ORAL 2 TIMES DAILY
Qty: 60 TABLET | Refills: 0 | OUTPATIENT
Start: 2024-07-26 | End: 2024-08-25

## 2024-07-27 NOTE — PROGRESS NOTES
HPI    DLS: 3/26/2024    Pt here for HVF review/OCT;  Pt states no eye pain or discomfort. Pt states he had to use for two weeks   a steroid nasal spray (flonase).     Meds;  Latanoprost QAM OU  AT's PRN OU  Refresh PM PRN OU    1. OHT OU   2. MGD   3. Ocular Rosacea   4. Type 2 DM no        Last edited by Emma Couch on 7/30/2024  9:24 AM.            Assessment /Plan     For exam results, see Encounter Report.    Borderline glaucoma of both eyes with ocular hypertension  -     Posterior Segment OCT Optic Nerve- Both eyes  -     Paulson Visual Field - OU - Extended - Both Eyes    Open angle with borderline findings and high glaucoma risk in both eyes  -     Posterior Segment OCT Optic Nerve- Both eyes  -     Paulson Visual Field - OU - Extended - Both Eyes    Meibomian gland dysfunction (MGD) of both eyes    Ocular rosacea         Glaucoma (type and duration)    OHT   First HVF   3/2021    First photos   1/2021    Treatment / Drops started   Timolol            Family history    Mother (required LPI), grandmother        Glaucoma meds    Brimonidine - not effective        H/O adverse rxn to glaucoma drops    none        LASERS    none        GLAUCOMA SURGERIES    none        OTHER EYE SURGERIES    none        CDR    0.4 // 0.2        Tbase    25-26 // 26-28         Tmax    26 // 28            Ttarget    ?             HVF    2 test 2021 to  2022 -near  Full - ? Hint paracentral changes od // full os   NEW BASELINE - 2 test 2023 to 2024 - full od // full os         Gonio    2-3+  OU - very lightly pigmented TM - slight ant insertion - poor slt candidate         CCT   570 // 583        OCT    2 test 2023 to 2024 - RNFL - nl od // nl os        Disc photos    1/26/2021    - Ttoday    18/18  - Test done today  iop // HVF / DFE / OCT     MGD / ocular rosacea  - Tried to get restasis in the past, difficult with insurance - will try alternatives first  - Difficulty with WC- time/job   - Start Ocusoft eyelid wipes  -  Cont AT QID  - If ocusoft not effective, will try doxy     Ok to use zaditor bid prn allergies         PLAN    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 CDR asymmetry and FH+  Brimonidine not effective OU - no IOP change - stop  Blue  eyes - latanoprost - may cause iris darkening - tolerating ok   Mild asthma, COPD - is on mult meds for asthma - prefer to use latanoprost if tolerated       Gonio +2-3 with VERY pale TM and a slight ant insertion - poor slt candidate    Cont  latanoprost - may cause iris to darken,- tolerating ok    Consider topical REBEKAH - but stings a lot    Consider SLT - poor candidate - very pale TM with a slight ant insertion    No response to brimonidine     7/30/2024  IOP 18/18 (on latanorost )   VF full   OCT nl     F/U  6 months with IOP and gonio // alt with 4 months with vf's

## 2024-07-30 ENCOUNTER — OFFICE VISIT (OUTPATIENT)
Dept: OPHTHALMOLOGY | Facility: CLINIC | Age: 44
End: 2024-07-30
Payer: COMMERCIAL

## 2024-07-30 ENCOUNTER — PATIENT MESSAGE (OUTPATIENT)
Dept: PAIN MEDICINE | Facility: CLINIC | Age: 44
End: 2024-07-30
Payer: COMMERCIAL

## 2024-07-30 ENCOUNTER — CLINICAL SUPPORT (OUTPATIENT)
Dept: OPHTHALMOLOGY | Facility: CLINIC | Age: 44
End: 2024-07-30
Payer: COMMERCIAL

## 2024-07-30 DIAGNOSIS — H02.883 MEIBOMIAN GLAND DYSFUNCTION (MGD) OF BOTH EYES: ICD-10-CM

## 2024-07-30 DIAGNOSIS — L71.8 OCULAR ROSACEA: ICD-10-CM

## 2024-07-30 DIAGNOSIS — H02.886 MEIBOMIAN GLAND DYSFUNCTION (MGD) OF BOTH EYES: ICD-10-CM

## 2024-07-30 DIAGNOSIS — H40.023 OPEN ANGLE WITH BORDERLINE FINDINGS AND HIGH GLAUCOMA RISK IN BOTH EYES: ICD-10-CM

## 2024-07-30 DIAGNOSIS — H40.053 BORDERLINE GLAUCOMA OF BOTH EYES WITH OCULAR HYPERTENSION: Primary | ICD-10-CM

## 2024-07-30 PROCEDURE — 3066F NEPHROPATHY DOC TX: CPT | Mod: CPTII,S$GLB,, | Performed by: OPHTHALMOLOGY

## 2024-07-30 PROCEDURE — 92133 CPTRZD OPH DX IMG PST SGM ON: CPT | Mod: S$GLB,,, | Performed by: OPHTHALMOLOGY

## 2024-07-30 PROCEDURE — 99214 OFFICE O/P EST MOD 30 MIN: CPT | Mod: S$GLB,,, | Performed by: OPHTHALMOLOGY

## 2024-07-30 PROCEDURE — 3061F NEG MICROALBUMINURIA REV: CPT | Mod: CPTII,S$GLB,, | Performed by: OPHTHALMOLOGY

## 2024-07-30 PROCEDURE — 1160F RVW MEDS BY RX/DR IN RCRD: CPT | Mod: CPTII,S$GLB,, | Performed by: OPHTHALMOLOGY

## 2024-07-30 PROCEDURE — 99999 PR PBB SHADOW E&M-EST. PATIENT-LVL IV: CPT | Mod: PBBFAC,,, | Performed by: OPHTHALMOLOGY

## 2024-07-30 PROCEDURE — 2023F DILAT RTA XM W/O RTNOPTHY: CPT | Mod: CPTII,S$GLB,, | Performed by: OPHTHALMOLOGY

## 2024-07-30 PROCEDURE — 3044F HG A1C LEVEL LT 7.0%: CPT | Mod: CPTII,S$GLB,, | Performed by: OPHTHALMOLOGY

## 2024-07-30 PROCEDURE — 1159F MED LIST DOCD IN RCRD: CPT | Mod: CPTII,S$GLB,, | Performed by: OPHTHALMOLOGY

## 2024-07-30 PROCEDURE — 92083 EXTENDED VISUAL FIELD XM: CPT | Mod: S$GLB,,, | Performed by: OPHTHALMOLOGY

## 2024-07-30 NOTE — TELEPHONE ENCOUNTER
Please pend x2 prescription whichever the 150 prescription he was taking and the 75 mg additional prescription that he was taking I added 75 mg to his original regimen.      ty

## 2024-07-30 NOTE — PROGRESS NOTES
OCT DONE OU     24-2 SS DONE OU     REL & FIX =  GOOD OU     COOP =     GOOD     PATIENT HAS NO ALLERGIES TO LATEX OR ADHESIVES AT THIS TIME    JTHOMAS     NO MRX     USED AGE CORRECTION LENS FOR TESTING

## 2024-07-31 ENCOUNTER — LAB VISIT (OUTPATIENT)
Dept: LAB | Facility: HOSPITAL | Age: 44
End: 2024-07-31
Attending: UROLOGY
Payer: COMMERCIAL

## 2024-07-31 ENCOUNTER — PATIENT MESSAGE (OUTPATIENT)
Dept: UROLOGY | Facility: CLINIC | Age: 44
End: 2024-07-31

## 2024-07-31 ENCOUNTER — OFFICE VISIT (OUTPATIENT)
Dept: UROLOGY | Facility: CLINIC | Age: 44
End: 2024-07-31
Payer: COMMERCIAL

## 2024-07-31 VITALS
DIASTOLIC BLOOD PRESSURE: 79 MMHG | WEIGHT: 236.56 LBS | BODY MASS INDEX: 39.36 KG/M2 | SYSTOLIC BLOOD PRESSURE: 121 MMHG | HEART RATE: 89 BPM

## 2024-07-31 DIAGNOSIS — N39.8 VOIDING DYSFUNCTION: ICD-10-CM

## 2024-07-31 DIAGNOSIS — N39.8 VOIDING DYSFUNCTION: Primary | ICD-10-CM

## 2024-07-31 LAB — COMPLEXED PSA SERPL-MCNC: 0.19 NG/ML (ref 0–4)

## 2024-07-31 PROCEDURE — 3078F DIAST BP <80 MM HG: CPT | Mod: CPTII,S$GLB,, | Performed by: UROLOGY

## 2024-07-31 PROCEDURE — 3074F SYST BP LT 130 MM HG: CPT | Mod: CPTII,S$GLB,, | Performed by: UROLOGY

## 2024-07-31 PROCEDURE — 36415 COLL VENOUS BLD VENIPUNCTURE: CPT | Performed by: UROLOGY

## 2024-07-31 PROCEDURE — 3066F NEPHROPATHY DOC TX: CPT | Mod: CPTII,S$GLB,, | Performed by: UROLOGY

## 2024-07-31 PROCEDURE — 3008F BODY MASS INDEX DOCD: CPT | Mod: CPTII,S$GLB,, | Performed by: UROLOGY

## 2024-07-31 PROCEDURE — 99214 OFFICE O/P EST MOD 30 MIN: CPT | Mod: S$GLB,,, | Performed by: UROLOGY

## 2024-07-31 PROCEDURE — 3061F NEG MICROALBUMINURIA REV: CPT | Mod: CPTII,S$GLB,, | Performed by: UROLOGY

## 2024-07-31 PROCEDURE — 3044F HG A1C LEVEL LT 7.0%: CPT | Mod: CPTII,S$GLB,, | Performed by: UROLOGY

## 2024-07-31 PROCEDURE — 84153 ASSAY OF PSA TOTAL: CPT | Performed by: UROLOGY

## 2024-07-31 PROCEDURE — 1159F MED LIST DOCD IN RCRD: CPT | Mod: CPTII,S$GLB,, | Performed by: UROLOGY

## 2024-07-31 PROCEDURE — 99999 PR PBB SHADOW E&M-EST. PATIENT-LVL IV: CPT | Mod: PBBFAC,,, | Performed by: UROLOGY

## 2024-07-31 RX ORDER — LIDOCAINE HYDROCHLORIDE 20 MG/ML
JELLY TOPICAL ONCE
OUTPATIENT
Start: 2024-07-31 | End: 2024-07-31

## 2024-07-31 RX ORDER — PREGABALIN 200 MG/1
200 CAPSULE ORAL 2 TIMES DAILY
Qty: 60 CAPSULE | Refills: 1 | Status: SHIPPED | OUTPATIENT
Start: 2024-07-31 | End: 2024-09-29

## 2024-07-31 RX ORDER — TAMSULOSIN HYDROCHLORIDE 0.4 MG/1
0.4 CAPSULE ORAL DAILY
Qty: 30 CAPSULE | Refills: 11 | Status: SHIPPED | OUTPATIENT
Start: 2024-07-31 | End: 2025-07-31

## 2024-07-31 NOTE — PROGRESS NOTES
[facility] Department of Urology      Return Voiding Dysfunction Note    7/31/2024    Referred by:  No ref. provider found    History of Present Illness    CHIEF COMPLAINT:  The patient presents with urinary symptoms, including frequent urination, urgency, and hesitancy, which have persisted for approximately one year since a previous episode of kidney stones.    HPI:  Mr. Garza reports urinary symptoms for approximately 1 year, initially beginning during a previous episode of kidney stones. Symptoms include nocturia once per night and frequent urination 5-6 times daily. Mr. Garza has urinary hesitancy, often waiting or straining to initiate urination consistently. At times, he needs to wait and relax or is unable to void. Mr. Garza has been attempting to stay hydrated and consume citric acid as previously advised, but urinary symptoms have persisted. He expresses concern about the nature of these symptoms and their potential relation to his previous kidney stones. Mr. Garza previously took Flomax for kidney stones but is not currently on any medication for these urinary symptoms.    MEDICATIONS:  Flomax (tamsulosin), previously taken for kidney stones.    MEDICAL HISTORY:  Kidney stones: Past occurrence.  Primary bladder neck obstruction: Suspected based on current symptoms.    TEST RESULTS:  Urodynamics: Scheduled for future.        A review of 10+ systems was conducted with pertinent positive and negative findings documented in HPI with all other systems reviewed and negative.    Past medical, family, surgical and social history reviewed as documented in chart with pertinent positive medical, family, surgical and social history detailed in HPI.    Exam Findings:    Physical Exam    General: No acute distress. Nontoxic appearing.  HENT: Normocephalic. Atraumatic.  Respiratory: Normal respiratory effort. No conversational dyspnea. No audible wheezing.  Abdomen: No obvious distension.  Skin: No  visible abnormalities.  Extremities: No edema upper extremities. No edema lower extremities.  Neurological: Alert and oriented x3. Normal speech.  Psychiatric: Normal mood. Normal affect. No evidence of SI.          Assessment/Plan:    IMPRESSION:  - Considered primary bladder neck obstruction as potential cause of urinary symptoms, given normal prostate size expected at age 44  - Will start alpha blocker (Flomax) to relax bladder neck and prostate smooth muscle  - Planned urodynamics test to assess bladder function if symptoms persist after Flomax trial  - Considered cystoscopy as potential follow-up procedure if urodynamics indicate need for further investigation  - Opted to check PSA to evaluate prostate volume, though enlarged prostate unlikely at patient's age  URINARY SYMPTOMS:  - Explained that urinary symptoms (hesitancy, frequency, urgency) are not normal or related to previous kidney stones.  - Discussed possibility of urethral scar tissue from previous stone treatment as potential cause of symptoms.  - Informed patient about primary bladder neck obstruction as possible diagnosis if symptoms improve with Flomax.  - Started Flomax (alpha blocker) to relax bladder neck and prostate smooth muscle.  - Ordered PSA test to evaluate prostate volume.  - Scheduled urodynamics test to assess bladder function.  - Follow up after Flomax trial to reassess symptoms and determine need for urodynamics test.  - If symptoms persist, proceed with scheduled urodynamics test.  - Consider cystoscopy based on urodynamics results, with option for in-office or operating room procedure depending on patient comfort.

## 2024-08-01 ENCOUNTER — PATIENT MESSAGE (OUTPATIENT)
Dept: PSYCHIATRY | Facility: CLINIC | Age: 44
End: 2024-08-01
Payer: COMMERCIAL

## 2024-08-06 ENCOUNTER — TELEPHONE (OUTPATIENT)
Dept: INTERNAL MEDICINE | Facility: CLINIC | Age: 44
End: 2024-08-06
Payer: COMMERCIAL

## 2024-08-06 DIAGNOSIS — G47.33 OSA (OBSTRUCTIVE SLEEP APNEA): Primary | ICD-10-CM

## 2024-08-06 DIAGNOSIS — F90.0 ADHD (ATTENTION DEFICIT HYPERACTIVITY DISORDER), INATTENTIVE TYPE: ICD-10-CM

## 2024-08-07 ENCOUNTER — PROCEDURE VISIT (OUTPATIENT)
Dept: NEUROLOGY | Facility: CLINIC | Age: 44
End: 2024-08-07
Payer: COMMERCIAL

## 2024-08-07 DIAGNOSIS — M50.10 CERVICAL DISC DISORDER WITH RADICULOPATHY, UNSPECIFIED CERVICAL REGION: ICD-10-CM

## 2024-08-07 PROCEDURE — 95911 NRV CNDJ TEST 9-10 STUDIES: CPT | Mod: S$GLB,,, | Performed by: PHYSICAL MEDICINE & REHABILITATION

## 2024-08-07 PROCEDURE — 95886 MUSC TEST DONE W/N TEST COMP: CPT | Mod: S$GLB,,, | Performed by: PHYSICAL MEDICINE & REHABILITATION

## 2024-08-07 RX ORDER — DEXMETHYLPHENIDATE HYDROCHLORIDE 10 MG/1
10 TABLET ORAL 2 TIMES DAILY
Qty: 60 TABLET | Refills: 0 | OUTPATIENT
Start: 2024-08-07 | End: 2024-09-06

## 2024-08-08 ENCOUNTER — TELEPHONE (OUTPATIENT)
Dept: INTERNAL MEDICINE | Facility: CLINIC | Age: 44
End: 2024-08-08
Payer: COMMERCIAL

## 2024-08-09 ENCOUNTER — PATIENT MESSAGE (OUTPATIENT)
Dept: INTERNAL MEDICINE | Facility: CLINIC | Age: 44
End: 2024-08-09
Payer: COMMERCIAL

## 2024-08-13 RX ORDER — ONDANSETRON 8 MG/1
8 TABLET, ORALLY DISINTEGRATING ORAL EVERY 8 HOURS PRN
Qty: 30 TABLET | Refills: 2 | Status: SHIPPED | OUTPATIENT
Start: 2024-08-13

## 2024-08-14 ENCOUNTER — OFFICE VISIT (OUTPATIENT)
Dept: NEUROSURGERY | Facility: CLINIC | Age: 44
End: 2024-08-14
Payer: COMMERCIAL

## 2024-08-14 DIAGNOSIS — M50.10 CERVICAL DISC DISORDER WITH RADICULOPATHY, UNSPECIFIED CERVICAL REGION: Primary | ICD-10-CM

## 2024-08-14 PROCEDURE — 1159F MED LIST DOCD IN RCRD: CPT | Mod: CPTII,95,, | Performed by: NEUROLOGICAL SURGERY

## 2024-08-14 PROCEDURE — 99215 OFFICE O/P EST HI 40 MIN: CPT | Mod: 95,,, | Performed by: NEUROLOGICAL SURGERY

## 2024-08-14 PROCEDURE — 3066F NEPHROPATHY DOC TX: CPT | Mod: CPTII,95,, | Performed by: NEUROLOGICAL SURGERY

## 2024-08-14 PROCEDURE — 3061F NEG MICROALBUMINURIA REV: CPT | Mod: CPTII,95,, | Performed by: NEUROLOGICAL SURGERY

## 2024-08-14 PROCEDURE — 1160F RVW MEDS BY RX/DR IN RCRD: CPT | Mod: CPTII,95,, | Performed by: NEUROLOGICAL SURGERY

## 2024-08-14 PROCEDURE — 3044F HG A1C LEVEL LT 7.0%: CPT | Mod: CPTII,95,, | Performed by: NEUROLOGICAL SURGERY

## 2024-08-14 NOTE — H&P (VIEW-ONLY)
"The patient location is: home  The chief complaint leading to consultation is: leg and arm pain    Visit type: audiovisual    Face to Face time with patient: 30  40 minutes of total time spent on the encounter, which includes face to face time and non-face to face time preparing to see the patient (eg, review of tests), Obtaining and/or reviewing separately obtained history, Documenting clinical information in the electronic or other health record, Independently interpreting results (not separately reported) and communicating results to the patient/family/caregiver, or Care coordination (not separately reported).         Each patient to whom he or she provides medical services by telemedicine is:  (1) informed of the relationship between the physician and patient and the respective role of any other health care provider with respect to management of the patient; and (2) notified that he or she may decline to receive medical services by telemedicine and may withdraw from such care at any time.    Notes:   CHIEF COMPLAINT:  Recurrent left leg pain and numbness  Recurrent right hand/finger pain and numbness    INTERVAL HISTORY (8/14/24):  Patient returns via virtual visit to discuss recent EMG results and to make a decision regarding surgery.  He reports that since his last visit his radiating arm pain has improved somewhat in terms of intensity but he still notes bothersome finger and hand numbness that results in him dropping items from his hand and affecting his ability to write.  He still reports tremor in his right hand that is made worse with stimulants.  He continues to have significant neck pain as well.  He explains that he has had to stop exercising and engaging in many of his daily activities to avoid exacerbating his arm pain.  His EMG showed chronic radiculopathies at C6, C8, and possibly C7.  He is interested in surgery as he states "he can not continue living like this."    INTERVAL HISTORY " (7/3/24):  Patient returns to clinic with new and worsening constellation of symptoms.  He reports a new tremor in his right hand and fingers since May which is causing him difficulty writing and operating a computer mouse.  He also notes numbness, weakness, in this hand that leads to him dropping items.  Numbness resides over the top of his hand and the last 2 digits.  He notices that at night when he is extending his neck he will wake up with his hands feeling numb requiring him to repositioned.  He notes that he will have pain in his shoulder, chest, axilla, upper arm, biceps, and into the last 2 digits of his right hand after working out.  This pain will persist for several weeks.  Ultimately he has decided to stop exercising out of fear of triggering the pain.  He has had prior carpal tunnel surgery.  He has not had a recent EMG/NCS.  He has known diffuse spondylosis in his cervical spine.    INTERVAL HISTORY (7/26/23):  Had an episode of significant neck pain approx 1 month ago. Took steroid pack and underwent C7-T1 IL NELI by Dr Patel (pain), which helped.      He also reports continued intermittent back and leg pain.  Pain with tingling that radiates up his shin to thigh and groin.  Also note some pain in big toe on left.  These sxs are currently stable/tolerable but he would like to discuss possibility of an NELI since they have helped with his neck.    INTERVAL HISTORY (5/24/23):  Virtual visit conducted regarding recurrent left leg pain and numbness as well as right hand/finger pain and numbness.  Patient is reporting an increase in low back pain that is triggered when sitting down or walking.  He is also having radiating left leg pain that radiates to his left shin and occasionally outside part of his thigh.  He is having sharp pain in his left big toe.  He also notes tightness despite stretching in both hips and buttocks.  Reports that he often has to lie down and put support under his knees.  This has  been ongoing for past couple of months.  Since he is had 2 prior lumbar surgeries he is concerned that he may have a recurrent herniated disc or new problem.  He has known cervical stenosis but has had an increase in numbness in his right hand including his thumb and index finger.  He was given an epidural steroid shot which helped some but he is concerned about progression of his known cervical stenosis.    INTERVAL HISTORY (1/4/23):  Patient returns for continued surveillance of cervical myelopathy.  His symptoms have not changed significantly however he is attending physical therapy which he finds is helping some.  He complains of continued numbness and tingling in his arms and hands while sleeping.  He feels like it is exacerbated or triggered by neck extension and has to sleep with his head propped forward.  He also gets some pain that radiates from his neck to the lateral shoulders.  He still gets occasional tingling in his big toe otherwise feels like the symptoms related to his acute hernia disc have resolved.  He also notes that he generally feels more arthritic type pain which will be exacerbated by exertion.  Finds himself with diffuse pain and achiness more than usual.    INTERVAL HISTORY (6/29/22):  Returns to review recent cervical MRI.    Always has discomfort in neck with pain radiating into R shoulder and occasionally into 4+5th digits as well as L shoulder.  He does endorse clumsiness with hands (dropping items, difficulty with bottle caps, not buttoning) and feels out of balance.  Occasionally has urinary urgency/frequency.    L thigh pain is decreased but worse in am.  Also intermittent pain in back of leg and foot.  Also notes some weakness and pain in ankle.    INTERVAL HISTORY (5/11/22):  Returns with new arm pain that radiates from base of neck to bilateral shoulders and down the volar thumb on R.  Stops at shoulder/upper arm on L.  Triggered by certain neck positions - has to use flat pillow  or will have sxs.  + tingling to R hand and L shoulder.  Also reports clumsiness in hand with dropping items and poor fine motor control.  But denies overt weakness.  Feels tight through neck, chest and shoulder regions.    Also has discomfort in L leg along anterolateral thigh and describes as burning and itching.  Notes L leg weakness in back around thigh and knee.  Worse in am and foot feels like it fell asleep and is clumsy.  Occasionally has pain in L big toe.    Takes gabapentin 1 tab in am and 2 tab in pm.  He does HEP with occasional pool workout. Not particularly interested in PT.  Has not trialed antiinflammatories.    No b/b dysfunction.    HPI:    Hermelinod Garza III is a 44 y.o.-year-old male who presents today for post-operative follow-up s/p L MIS L3-4 lami/disc on 9/20/21.  Overall patient is significantly improved.  He has significant improvement in his back and leg pain.  He still has some mild tingling in his shins that radiate down to the feet.  This is made worse when he is not active.  He takes gabapentin 600 mg at night which helps.  He would like to increase that to more times during the day.  He denies weakness.      ROS:  As stated in the above HPI    PE:  No neuro deficits observed    FROM PRIOR VISIT:    AAOX3  NAD  CN 2-12 intact     Strength:      Deltoids Biceps Triceps Wrist Ext. Wrist Flex. Hand    RUE 5 5 5 5 5 5   LUE 5 5 5 5 5 5    Hip Flex. Knee Flex. Knee Ext. Dorsi Flex Plantar Flex EHL   RLE 5 5 5 5 5 5   LLE 5 5 5 5 5 5     Sensation:  Intact to light touch (All 4 extremities)    Gait:  normal    Lumbar incision:  Well-healed    IMAGING:  All imaging reviewed by me.    EMG, 8/7/24:  chronic RIGHT C6 and C8 radiculopathies (C7 possible as well) and a chronic LEFT C6 radiculopathy without active/ongoing denervation     Cervical MRI, 6/14/24:  Loss of lordosis  C4-5 - mod-severe bilat NFS  C5-6 - moderate CS, severe bilat NFS  C6-7 - severe L NFS, moderate R NFS  R  LRS/NFS at C7-T1    Cervical MRI, 6/16/23:  1. Diffuse spondylosis and hypertrophied PLL  2. Moderate stenosis at C4-5 with bilat NFS  3. Moderate stenosis at C5-6 with bilat NFS    Lumbar MRI, 6/16/23:  1. No recurrent HNP at L3-4  2. Mild-moderate bilat LRS at L3-4 and L4-5  3. Moderate bilat L5-S1 LRS    Cspine flex/ex, 12/13/22:  Diffuse spondylosis  Good alignment  No dynamic instability    Cervical MRI, 6/16/22:  1. Diffuse spondylosis and hypertrophied PLL  2. Severe stenosis at C4-5 and C5-6 with cord compression  3. Moderate stenosis at C3-4 and C6-7    Lumbar MRI, 6/16/22:  1. No recurrent HNP  2. Mild degen changes    Csp MRI, 1/12/21:  1. Loss of lordosis  2. Diffuse spondylosis  3. Multi-level DDD with moderate NFS  4. Severe left neural foraminal narrowing at C6-7  5. Severe right neural foraminal narrowing at C5-6 and C7-T1    Lsp MRI, 9/19/21:  1. Diffuse DDD and spondylosis  2. Large L L3-4 HNP  3. L L4-5 moderate NFS  4. R L5-S1 moderate NFS      ASSESSMENT:   Problem List Items Addressed This Visit          Neuro    Cervical disc disorder with radiculopathy, unspecified cervical region - Primary    Relevant Orders    Case Request Operating Room: DISCECTOMY, SPINE, CERVICAL, ANTERIOR APPROACH, WITH FUSION (Completed)       Right arm pain  Cervical spondylosis with radiculopathy  S/p L MIS L3-4 lami/disc for severe radiculopathy.  Resolved and stable. No recurrent HNP.    Patient's main complaint remains right arm pain that involves his shoulder, chest, axilla, and upper arm down to his hand (improved from prior visit).  This is associated with numbness and weakness causing him to drop items and to have numbness at night with an extended neck position.  He has also noticed a tremor in the right hand and fingers since May.  A new MRI demonstrates continued multilevel neural foraminal stenosis in the setting of diffuse spondylosis.  Although his pain distribution is not dermatomal, the distribution of  numbness suggests a C8 radiculopathy.  EMG revealed chronic C6, C8, and possible C7 radiculopathies, which corresponds to severe neural foraminal stenosis seen on MRI at these levels.    My recommendation is for ACDF at C5-6, C6-7, and C7-T1.    PLAN:   - Surgery scheduled for 9/9/24 at [facility] [place]  - Preop clearance needed from PCP (Dr Amaya)  - STOP TAKING ASPIRIN, NSAIDS, AND ALL OTHER BLOOD THINNERS 7 DAYS PRIOR TO SURGERY      Time spent on this encounter: 40 minutes. This includes face-to-face time and non-face to face time preparing to see the patient (eg, review of tests), obtaining and/or reviewing separately obtained history, documenting clinical information in the electronic or other health record, independently interpreting results and communicating results to the patient/family/caregiver, or care coordinator.

## 2024-08-14 NOTE — PROGRESS NOTES
"The patient location is: home  The chief complaint leading to consultation is: leg and arm pain    Visit type: audiovisual    Face to Face time with patient: 30  40 minutes of total time spent on the encounter, which includes face to face time and non-face to face time preparing to see the patient (eg, review of tests), Obtaining and/or reviewing separately obtained history, Documenting clinical information in the electronic or other health record, Independently interpreting results (not separately reported) and communicating results to the patient/family/caregiver, or Care coordination (not separately reported).         Each patient to whom he or she provides medical services by telemedicine is:  (1) informed of the relationship between the physician and patient and the respective role of any other health care provider with respect to management of the patient; and (2) notified that he or she may decline to receive medical services by telemedicine and may withdraw from such care at any time.    Notes:   CHIEF COMPLAINT:  Recurrent left leg pain and numbness  Recurrent right hand/finger pain and numbness    INTERVAL HISTORY (8/14/24):  Patient returns via virtual visit to discuss recent EMG results and to make a decision regarding surgery.  He reports that since his last visit his radiating arm pain has improved somewhat in terms of intensity but he still notes bothersome finger and hand numbness that results in him dropping items from his hand and affecting his ability to write.  He still reports tremor in his right hand that is made worse with stimulants.  He continues to have significant neck pain as well.  He explains that he has had to stop exercising and engaging in many of his daily activities to avoid exacerbating his arm pain.  His EMG showed chronic radiculopathies at C6, C8, and possibly C7.  He is interested in surgery as he states "he can not continue living like this."    INTERVAL HISTORY " (7/3/24):  Patient returns to clinic with new and worsening constellation of symptoms.  He reports a new tremor in his right hand and fingers since May which is causing him difficulty writing and operating a computer mouse.  He also notes numbness, weakness, in this hand that leads to him dropping items.  Numbness resides over the top of his hand and the last 2 digits.  He notices that at night when he is extending his neck he will wake up with his hands feeling numb requiring him to repositioned.  He notes that he will have pain in his shoulder, chest, axilla, upper arm, biceps, and into the last 2 digits of his right hand after working out.  This pain will persist for several weeks.  Ultimately he has decided to stop exercising out of fear of triggering the pain.  He has had prior carpal tunnel surgery.  He has not had a recent EMG/NCS.  He has known diffuse spondylosis in his cervical spine.    INTERVAL HISTORY (7/26/23):  Had an episode of significant neck pain approx 1 month ago. Took steroid pack and underwent C7-T1 IL NELI by Dr Patel (pain), which helped.      He also reports continued intermittent back and leg pain.  Pain with tingling that radiates up his shin to thigh and groin.  Also note some pain in big toe on left.  These sxs are currently stable/tolerable but he would like to discuss possibility of an NELI since they have helped with his neck.    INTERVAL HISTORY (5/24/23):  Virtual visit conducted regarding recurrent left leg pain and numbness as well as right hand/finger pain and numbness.  Patient is reporting an increase in low back pain that is triggered when sitting down or walking.  He is also having radiating left leg pain that radiates to his left shin and occasionally outside part of his thigh.  He is having sharp pain in his left big toe.  He also notes tightness despite stretching in both hips and buttocks.  Reports that he often has to lie down and put support under his knees.  This has  been ongoing for past couple of months.  Since he is had 2 prior lumbar surgeries he is concerned that he may have a recurrent herniated disc or new problem.  He has known cervical stenosis but has had an increase in numbness in his right hand including his thumb and index finger.  He was given an epidural steroid shot which helped some but he is concerned about progression of his known cervical stenosis.    INTERVAL HISTORY (1/4/23):  Patient returns for continued surveillance of cervical myelopathy.  His symptoms have not changed significantly however he is attending physical therapy which he finds is helping some.  He complains of continued numbness and tingling in his arms and hands while sleeping.  He feels like it is exacerbated or triggered by neck extension and has to sleep with his head propped forward.  He also gets some pain that radiates from his neck to the lateral shoulders.  He still gets occasional tingling in his big toe otherwise feels like the symptoms related to his acute hernia disc have resolved.  He also notes that he generally feels more arthritic type pain which will be exacerbated by exertion.  Finds himself with diffuse pain and achiness more than usual.    INTERVAL HISTORY (6/29/22):  Returns to review recent cervical MRI.    Always has discomfort in neck with pain radiating into R shoulder and occasionally into 4+5th digits as well as L shoulder.  He does endorse clumsiness with hands (dropping items, difficulty with bottle caps, not buttoning) and feels out of balance.  Occasionally has urinary urgency/frequency.    L thigh pain is decreased but worse in am.  Also intermittent pain in back of leg and foot.  Also notes some weakness and pain in ankle.    INTERVAL HISTORY (5/11/22):  Returns with new arm pain that radiates from base of neck to bilateral shoulders and down the volar thumb on R.  Stops at shoulder/upper arm on L.  Triggered by certain neck positions - has to use flat pillow  or will have sxs.  + tingling to R hand and L shoulder.  Also reports clumsiness in hand with dropping items and poor fine motor control.  But denies overt weakness.  Feels tight through neck, chest and shoulder regions.    Also has discomfort in L leg along anterolateral thigh and describes as burning and itching.  Notes L leg weakness in back around thigh and knee.  Worse in am and foot feels like it fell asleep and is clumsy.  Occasionally has pain in L big toe.    Takes gabapentin 1 tab in am and 2 tab in pm.  He does HEP with occasional pool workout. Not particularly interested in PT.  Has not trialed antiinflammatories.    No b/b dysfunction.    HPI:    Hermelindo Garza III is a 44 y.o.-year-old male who presents today for post-operative follow-up s/p L MIS L3-4 lami/disc on 9/20/21.  Overall patient is significantly improved.  He has significant improvement in his back and leg pain.  He still has some mild tingling in his shins that radiate down to the feet.  This is made worse when he is not active.  He takes gabapentin 600 mg at night which helps.  He would like to increase that to more times during the day.  He denies weakness.      ROS:  As stated in the above HPI    PE:  No neuro deficits observed    FROM PRIOR VISIT:    AAOX3  NAD  CN 2-12 intact     Strength:      Deltoids Biceps Triceps Wrist Ext. Wrist Flex. Hand    RUE 5 5 5 5 5 5   LUE 5 5 5 5 5 5    Hip Flex. Knee Flex. Knee Ext. Dorsi Flex Plantar Flex EHL   RLE 5 5 5 5 5 5   LLE 5 5 5 5 5 5     Sensation:  Intact to light touch (All 4 extremities)    Gait:  normal    Lumbar incision:  Well-healed    IMAGING:  All imaging reviewed by me.    EMG, 8/7/24:  chronic RIGHT C6 and C8 radiculopathies (C7 possible as well) and a chronic LEFT C6 radiculopathy without active/ongoing denervation     Cervical MRI, 6/14/24:  Loss of lordosis  C4-5 - mod-severe bilat NFS  C5-6 - moderate CS, severe bilat NFS  C6-7 - severe L NFS, moderate R NFS  R  LRS/NFS at C7-T1    Cervical MRI, 6/16/23:  1. Diffuse spondylosis and hypertrophied PLL  2. Moderate stenosis at C4-5 with bilat NFS  3. Moderate stenosis at C5-6 with bilat NFS    Lumbar MRI, 6/16/23:  1. No recurrent HNP at L3-4  2. Mild-moderate bilat LRS at L3-4 and L4-5  3. Moderate bilat L5-S1 LRS    Cspine flex/ex, 12/13/22:  Diffuse spondylosis  Good alignment  No dynamic instability    Cervical MRI, 6/16/22:  1. Diffuse spondylosis and hypertrophied PLL  2. Severe stenosis at C4-5 and C5-6 with cord compression  3. Moderate stenosis at C3-4 and C6-7    Lumbar MRI, 6/16/22:  1. No recurrent HNP  2. Mild degen changes    Csp MRI, 1/12/21:  1. Loss of lordosis  2. Diffuse spondylosis  3. Multi-level DDD with moderate NFS  4. Severe left neural foraminal narrowing at C6-7  5. Severe right neural foraminal narrowing at C5-6 and C7-T1    Lsp MRI, 9/19/21:  1. Diffuse DDD and spondylosis  2. Large L L3-4 HNP  3. L L4-5 moderate NFS  4. R L5-S1 moderate NFS      ASSESSMENT:   Problem List Items Addressed This Visit          Neuro    Cervical disc disorder with radiculopathy, unspecified cervical region - Primary    Relevant Orders    Case Request Operating Room: DISCECTOMY, SPINE, CERVICAL, ANTERIOR APPROACH, WITH FUSION (Completed)       Right arm pain  Cervical spondylosis with radiculopathy  S/p L MIS L3-4 lami/disc for severe radiculopathy.  Resolved and stable. No recurrent HNP.    Patient's main complaint remains right arm pain that involves his shoulder, chest, axilla, and upper arm down to his hand (improved from prior visit).  This is associated with numbness and weakness causing him to drop items and to have numbness at night with an extended neck position.  He has also noticed a tremor in the right hand and fingers since May.  A new MRI demonstrates continued multilevel neural foraminal stenosis in the setting of diffuse spondylosis.  Although his pain distribution is not dermatomal, the distribution of  numbness suggests a C8 radiculopathy.  EMG revealed chronic C6, C8, and possible C7 radiculopathies, which corresponds to severe neural foraminal stenosis seen on MRI at these levels.    My recommendation is for ACDF at C5-6, C6-7, and C7-T1.    PLAN:   - Surgery scheduled for 9/9/24 at [facility] [place]  - Preop clearance needed from PCP (Dr Amaya)  - STOP TAKING ASPIRIN, NSAIDS, AND ALL OTHER BLOOD THINNERS 7 DAYS PRIOR TO SURGERY      Time spent on this encounter: 40 minutes. This includes face-to-face time and non-face to face time preparing to see the patient (eg, review of tests), obtaining and/or reviewing separately obtained history, documenting clinical information in the electronic or other health record, independently interpreting results and communicating results to the patient/family/caregiver, or care coordinator.

## 2024-08-15 ENCOUNTER — PATIENT MESSAGE (OUTPATIENT)
Dept: NEUROSURGERY | Facility: CLINIC | Age: 44
End: 2024-08-15
Payer: COMMERCIAL

## 2024-08-15 ENCOUNTER — PATIENT MESSAGE (OUTPATIENT)
Dept: PSYCHIATRY | Facility: CLINIC | Age: 44
End: 2024-08-15
Payer: COMMERCIAL

## 2024-08-15 ENCOUNTER — TELEPHONE (OUTPATIENT)
Dept: PREADMISSION TESTING | Facility: HOSPITAL | Age: 44
End: 2024-08-15
Payer: COMMERCIAL

## 2024-08-15 DIAGNOSIS — Z01.818 PREOPERATIVE TESTING: Primary | ICD-10-CM

## 2024-08-15 NOTE — PRE-PROCEDURE INSTRUCTIONS
Patient stated has not had any problem with anesthesia in the past. Will need medical optimization by Dr. Amaya  per Dr. Mcnamara. Will need poc appt, labs, & ua. Our  will call to set up these appts.         Preop instructions given. Hold aspirin, aspirin containing products, nsaids( Aleve, Advil, Motrin, Ibuprofen, Naprosyn, Naproxen, Voltaren, Diclofenac, Mobic, Meloxicam, Celebrex, Celecoxib), vitamins ( D2) and supplements one week prior to surgery.     May take Tylenol.  Hold Ozempic one week prior to surgery. ( Also sent to [facility] portal)  Verbalizes understanding.

## 2024-08-15 NOTE — ANESTHESIA PAT ROS NOTE
08/15/2024  Hermelindo Garza III is a 44 y.o., male.      Pre-op Assessment    I have reviewed the Patient Summary Reports.     I have reviewed the Nursing Notes. I have reviewed the NPO Status.   I have reviewed the Medications.     Review of Systems  Anesthesia Hx:  No problems with previous Anesthesia  ANXIETY R/T IV STICK  THICK SHORT NECK  CHRONIC PAIN--HX History of prior surgery of interest to airway management or planning:  Previous anesthesia: General 7/15/2024 CAUTERIZATION, MUCOSA, NASAL TURBINATE (Bilateral: Nose)  SEPTOPLASTY, NOSE with general anesthesia.  Procedure performed at an [facility].      Airway issues documented on chart review include easy direct laryngoscopy     Denies Family Hx of Anesthesia complications.    Denies Personal Hx of Anesthesia complications.                    Social:  Non-Smoker, Alcohol Use       Hematology/Oncology:       -- Denies Anemia:                    --  Cancer in past history:                 Oncology Comments: 01/06/2022  Malignant melanoma of skin,   Right Mid-Back       EENT/Dental:  denies chronic allergic rhinitis     Nasal Problems:  7/15/2024  Cautery of turbinates (Bilateral)   7/15/2024  Nasal septoplasty (N/A)         Cardiovascular:  Exercise tolerance: poor    Denies Hypertension.       Denies Angina.     hyperlipidemia   ECG has been reviewed. Hyperlipidemia associated with type 2 diabetes mellitus  Hypertriglyceridemia  Tachycardia                              Pulmonary:    Asthma mild   Sleep Apnea, CPAP                Renal/:  Chronic Renal Disease, CKD   7/5/2023  Ureteral stent placement (Left)   7/5/2023  Ureteroscopy (Left)   7/5/2023  Laser lithotripsy (Left)   7/5/2023  Ureteroscopic removal of ureteric calculus (Left)    Renal Symptoms/Infections/Stones:     Hx Renal Calculi with Hydronephrosis  Denies Kidney  Function/Disease             Hepatic/GI:   PUD,  GERD, well controlled Liver Disease, Hepatitis Esophageal ulcer  3/14/2024  Robot-assisted repair of hiatal hernia (N/A)         Liver Disease, Fatty Liver, Hepatitis       Elevated liver enzymes    SHEIKH (nonalcoholic steatohepatitis)    Hepatosplenomegaly        Musculoskeletal:  Arthritis    Musculoskeletal General/Symptoms: neck pain, joint pain, low back pain, muscle weakness, generalized. Functional capacity is ambulatory without assistance. 12/5/2023  Reconstruction of ligament (Left)   12/5/2023  Repair of extensor tendon (Left)  Pain in left elbow  Left lateral epicondylitis    09/15/2022  Carpal tunnel release (Right)  09/15/2022  Excision of ganglion of wrist (Right)   09/15/2022  Hand arthrotomy (Right)   09/15/2022  Injection of steroid (Left)   09/15/2022  Flexor tendon repair (Righ    Rotator cuff tendonitis, left    8/14/2023 HAMMER TOE REPAIR  Pain of left lower extremity    Neck pain  Chronic low back pain       Joint Disease:  Arthritis, Osteoarthritis, spine     Spine Disorders: cervical and lumbar Disc disease, Degenerative disease and Chronic Pain Degenerative Joint Disease     Herniated Disc, Cervical Herniated Disc Cervical Spine Disorder, Cervical Disc Disease, Myelopathy, Radiculopathy   Cervical disc disorder with radiculopathy, unspecified cervical region  Lumbar radiculopathy, chronic  S/P lumbar discectomy  Cervical spondylosis  Cervical radiculopathy  RUE neuropathy  RUE TREMORS      6/14/2024 MRI CERVICAL SPINE WITHOUT CONTRAST     CLINICAL HISTORY:  Myelopathy, chronic, cervical spine;Neck pain, chronic;  Cervicalgia     FINDINGS:  Comparison is 06/16/2023.     No marrow replacement, marrow edema, infection, or neoplasm is seen.  There is mild DJD between C3 and C7.  The cord is normal in course, caliber and signal including craniocervical junction.  Odontoid, prevertebral soft tissues, and posterior elements are intact.  No fracture  dislocation bone destruction seen.  No trauma seen.  No soft tissue injury or whiplash injury is seen.     C2-C3 demonstrates mild degenerative change.  The canal and neural foramina are patent.     C3-C4 demonstrates a shallow central canal disc protrusion.  There is a disc bulge that lateralizes towards the left.  There is mild bilateral neural foraminal narrowing.     C4-C5 demonstrates a mild disc bulge that lateralizes towards the left.  The canal is patent.  There is bilateral neural foraminal narrowing.     C5-C6 demonstrates disc osteophyte complex that lateralizes towards the right and flattens the right anterolateral thecal sac.  There is severe right, moderate left neural foraminal narrowing.     C6-C7 demonstrates a disc bulge that lateralizes towards the left and flattens the left anterolateral thecal sac.  There is severe left, moderate right neural foraminal narrowing.     C7-T1 demonstrates disc osteophyte complex at lateralizes towards the right.  This flattens the right anterolateral thecal sac.  There is severe right, moderate left neural foraminal narrowing.     Impression:   Degenerative changes as above.   Electronically signed by:Markus Aquino MD  Date:                                            06/14/2024 06/29/2022  Cervical spondylosis with myelopathy  09/14/2021  Cervical disc disorder with radiculopathy, unspecified cervical region    Lumbar Spine Disorders, Lumbar Disc Disease     09/20/2021  Lumbar laminectomy with discectomy (Left)  L3/L4    05/09/2012  Lumbar disc disease  05/09/2012  Lumbar herniated disc    Neurological:    Neuromuscular Disease,  Headaches      Neuro Symptoms of numbness, spasticity, tingling, weakness, dizziness, tremor, vertigo SENSITIVE TO MOVEMENTDx of Headaches, Migraine Headache  Pain Syndrome   Chronic Pain Syndrome Pain Etiology/Diagnosis, Arthritis, Osteoarthritis, spine, Cervical Radiculopathy, Low Back Pain  Peripheral Neuropathy , secondary to  Spinal Stenosis                      Movement Disorder Dx, Restless Leg Syndrome   Endocrine:  Diabetes, type 2   Vitamin D insufficiency Diabetes, Type 2 Diabetes                    Morbid Obesity / BMI > 40  Dermatological:  Eczema  Cellulitis of left arm     Psych:  Psychiatric History anxiety  ADHD  Mood Disorder Unspecifed:  Depressed THO also has some mood elevation  (8of 13 on MDQ): ex: irritability, more self confident, thoughts race. more active)    INSOMNIA               Physical Exam  General: Well nourished, Cooperative, Alert and Oriented    Airway:  Mouth Opening: Small, but > 3cm  Tongue: Large  Neck ROM: Flexion Decreased, Extension Decreased  Neck: Girth Increased    Dental:  Intact    Chest/Lungs:  Clear to auscultation, Normal Respiratory Rate    Heart:  Rate: Normal  Rhythm: Regular Rhythm  Sounds: Normal          Anesthesia Assessment: Preoperative EQUATION    Planned Procedure: Procedure(s) (LRB):  DISCECTOMY, SPINE, CERVICAL, ANTERIOR APPROACH, WITH FUSION (Bilateral)  Requested Anesthesia Type:General  Surgeon: Naseem Mcnamara DO  Service: Neurosurgery  Known or anticipated Date of Surgery:9/9/2024, Date change 9/12/2024    Surgeon notes: reviewed    Electronic QUestionnaire Assessment completed via nurse interview with patient.        Triage considerations:       Previous anesthesia records:GETA and No problems  7/15/2024 CAUTERIZATION, MUCOSA, NASAL TURBINATE (Bilateral: Nose)   SEPTOPLASTY, NOSE (Nose)   Airway:  Mallampati: II / II  Mouth Opening: Normal  TM Distance: Normal  Tongue: Normal  Neck ROM: Normal ROM  Neck: Girth Increased  Airway Placement Date: 07/15/24 Placement Time: 0719 , created via procedure documentation Method of Intubation: Video Laryngoscopy Airway Device: Oral Edyta Mask Ventilation: Easy Intubated: Postinduction Blade: Wang #3 Airway Device Size: 7.5 Cuff Inflation: Minimal occlusive pressure Placement Verified By: Capnometry Complicating Factors: None Findings  Post-Intubation: Bilateral breath sounds;Atraumatic/Condition of teeth unchanged Secured at: Lips Complications: None Removal Date: 07/15/24 Removal Time: 0916     Last PCP note: 3-6 months ago , within [facility]   Subspecialty notes:PER EPIC:  Allergy, Dermatology, ENT, Gastroenterology, Neurosurgery, Ortho, Pain Management, Psychiatry, Urology, OPHTHALMOLOGY, PODIATRY, PLASTIC SURGERY,CRS, SURGERY     Other important co-morbidities: PER EPIC: DM2, GERD, HLD, Obesity, LUBA, and CERVICAL DISC DISORDER WITH RADICULOPATHY,  RLS, H/O RENAL CALCULI, SHEIKH      Tests already available:  Available tests,  within 3 months , 3-6 months ago , within [facility] . 7/11/2024 XRAY CERVICAL SPINE AP/LAT W F/E, CT CERVICAL SPINE W/O CONTRAST, 6/14/2024 MRI CERVICAL SPINE W/O CONTRAST, 5/9/2024 EKG            Instructions given. (See in Nurse's note)    Optimization:  Anesthesia Preop Clinic Assessment  Indicated    Medical Opinion Indicated           Plan:    Testing:  CBC, CMP, PT/INR, PTT, T&S, and UA   Pre-anesthesia  visit       Visit focus: concerns in complex and/or prolonged anesthesia, COMORBIDITIES     Consultation:IM Perioperative Hospitalist PER DR GONZALEZ     Patient  has previously scheduled Medical Appointment:8/20 ALLERGY INJ, 8/27 PSYC, 9/4 UROL    Navigation: Tests Scheduled. TBD             Consults scheduled.TBD             Results will be tracked by Preop Clinic.  Loretta Wills RN BSN        8/30/2024 MEDICAL CLEARANCE  Patient is optimized     Patient/ care giver/ Family member was instructed to call and update me about any changes to health,  medication, office visits ,testing out side of the [facility] , hospitalizations between now and surgery       Amna Amaya MD  Internal Medicine  [facility]   Cell Phone- [phone]

## 2024-08-16 ENCOUNTER — TELEPHONE (OUTPATIENT)
Dept: PAIN MEDICINE | Facility: CLINIC | Age: 44
End: 2024-08-16
Payer: COMMERCIAL

## 2024-08-16 ENCOUNTER — PATIENT MESSAGE (OUTPATIENT)
Dept: PAIN MEDICINE | Facility: CLINIC | Age: 44
End: 2024-08-16
Payer: COMMERCIAL

## 2024-08-16 DIAGNOSIS — M54.12 CERVICAL RADICULOPATHY: ICD-10-CM

## 2024-08-16 DIAGNOSIS — M54.16 LUMBAR RADICULOPATHY, CHRONIC: Primary | ICD-10-CM

## 2024-08-16 DIAGNOSIS — M51.36 DDD (DEGENERATIVE DISC DISEASE), LUMBAR: ICD-10-CM

## 2024-08-16 NOTE — TELEPHONE ENCOUNTER
I attempted to contact pt in regards to message in the portal. Pt didn't answer I left  asking for a call back.----- Message from JUAN CARLOS Cordoba sent at 8/16/2024  3:32 PM CDT -----  Regarding: FW: Lumbar NELI  Please call this patient and let them know I have scheduled him for a lumbar NELI I have also spoken with his neurosurgeon Dr. Mcnamara and he has okay this procedure prior to his upcoming neck surgery.    ty  ----- Message -----  From: Naseem Mcnamara DO  Sent: 8/16/2024   2:26 PM CDT  To: JUAN CARLOS Cordoba  Subject: RE: Lumbar NELI                                   Sure  ----- Message -----  From: Hung Prieto FNP  Sent: 8/16/2024  11:13 AM CDT  To: Naseem Mcnamara DO  Subject: Lumbar NELI                                       Hi Dr. Mcnamara,     I see you have this patient  scheduled for neck  surgery in September would your be okay with us providing him a lumbar NELI within the next 2-3 weeks?      Thanks    CL

## 2024-08-16 NOTE — TELEPHONE ENCOUNTER
----- Message from JUAN CARLOS Cordoba sent at 2024  3:32 PM CDT -----  Regarding: Order for VIET ARCHER III    Patient Name: VIET ARCHER III([id number])  Sex: Male  : 1980      PCP: MATT GAMEZ    Center: [facility]     Types of orders made on 2024: Procedure Request    Order Date:2024  Ordering User:LEONARD DIETZ [[id number]]  Encounter Provider:Leonard Dietz FNP [[id number]]  Erma arzate Provider: Leonard Dietz FNP [[id number]]  Supervising Provider:KELSEY ESCOBAR [[id number]]  Type of Supervision:Collaborating Physician  Department:[facility] PAIN MANAGEMENT[[id number]]    Common Order Information  Procedure -> Epidural Injection (specify level) Cmt: L4-5 NELI Veer Left    Pre-op Diagnosis -> Lumbar radiculopathy       -> DDD (degenerative disc disease), lumbar     Order Specific Information  Order: Procedur  e Request Order for Pain Management [Custom: [id number]]  Order #:          [id number]: 1 FUTURE    Priority: Routine  Class: Clinic Performed    Future Order Information      Expires on:2025            Expected by:2024                   Associated Diagnoses      M51.36 DDD (degenerative disc disease), lumbar      M54.16 Lumbar radiculopathy, chronic      Physician -> Gelter         Is patient on a  nti-coagulants? -> No         Facility Name: -> [place]         Follow-up: -> 2 weeks           Priority: Routine  Class: Clinic Performed    Future Order Information      Expires on:2025            Expected by:2024                   Associated Diagnoses      M51.36 DDD (degenerative disc disease), lumbar      M54.16 Lumbar radiculopathy, chronic      Procedure -> Epidural Injection (specify level) Cm  t: L4-5 NELI Veer Left        Physician -> Gelter         Is patient on anti-coagulants? -> No         Pre-op Diagnosis -> Lumbar radiculopathy           -> DDD (degenerative disc disease), lumbar         Facility Name: -> [place]         Follow-up: -> 2  weeks

## 2024-08-16 NOTE — TELEPHONE ENCOUNTER
Spoke with pt in regards to message in the portal. Pt was advise per david message. No further concerns was expressed. Call ended. ----- Message from Massiel Hill sent at 8/16/2024  4:20 PM CDT -----  Type:  Patient Returning Call    Who Called:pt  Who Left Message for Patient:deepti  Does the patient know what this is regarding?:returning call  Would the patient rather a call back or a response via [facility]? call  Best Call Back Number: [phone]  Additional Information:

## 2024-08-16 NOTE — TELEPHONE ENCOUNTER
----- Message from JUAN CARLOS Cordoba sent at 2024  3:32 PM CDT -----  Regarding: Order for VIET ARCHER III    Patient Name: VIET ARCHER III([id number])  Sex: Male  : 1980      PCP: MATT GAMEZ    Center: [facility]     Types of orders made on 2024: Procedure Request    Order Date:2024  Ordering User:LENOARD DIETZ [[id number]]  Encounter Provider:Leonard Dietz FNP [[id number]]  Erma arzate Provider: Leonard Dietz FNP [[id number]]  Supervising Provider:KELSEY ESCOBAR [[id number]]  Type of Supervision:Collaborating Physician  Department:[facility] PAIN MANAGEMENT[[id number]]    Common Order Information  Procedure -> Epidural Injection (specify level) Cmt: L4-5 ENLI Veer Left    Pre-op Diagnosis -> Lumbar radiculopathy       -> DDD (degenerative disc disease), lumbar     Order Specific Information  Order: Procedur  e Request Order for Pain Management [Custom: [id number]]  Order #:          [id number]: [id number] FUTURE    Priority: Routine  Class: Clinic Performed    Future Order Information      Expires on:2025            Expected by:2024                   Associated Diagnoses      M51.36 DDD (degenerative disc disease), lumbar      M54.16 Lumbar radiculopathy, chronic      Physician -> Gelter         Is patient on a  nti-coagulants? -> No         Facility Name: -> [place]         Follow-up: -> 2 weeks           Priority: Routine  Class: Clinic Performed    Future Order Information      Expires on:2025            Expected by:2024                   Associated Diagnoses      M51.36 DDD (degenerative disc disease), lumbar      M54.16 Lumbar radiculopathy, chronic      Procedure -> Epidural Injection (specify level) Cm  t: L4-5 NELI Veer Left        Physician -> Gelter         Is patient on anti-coagulants? -> No         Pre-op Diagnosis -> Lumbar radiculopathy           -> DDD (degenerative disc disease), lumbar         Facility Name: -> [place]         Follow-up: -> 2  weeks

## 2024-08-19 ENCOUNTER — TELEPHONE (OUTPATIENT)
Dept: PAIN MEDICINE | Facility: CLINIC | Age: 44
End: 2024-08-19
Payer: COMMERCIAL

## 2024-08-20 ENCOUNTER — CLINICAL SUPPORT (OUTPATIENT)
Dept: ALLERGY | Facility: CLINIC | Age: 44
End: 2024-08-20
Payer: COMMERCIAL

## 2024-08-20 ENCOUNTER — PATIENT MESSAGE (OUTPATIENT)
Dept: PSYCHIATRY | Facility: CLINIC | Age: 44
End: 2024-08-20

## 2024-08-20 ENCOUNTER — OFFICE VISIT (OUTPATIENT)
Dept: PSYCHIATRY | Facility: CLINIC | Age: 44
End: 2024-08-20
Payer: COMMERCIAL

## 2024-08-20 DIAGNOSIS — F90.0 ADHD (ATTENTION DEFICIT HYPERACTIVITY DISORDER), INATTENTIVE TYPE: ICD-10-CM

## 2024-08-20 DIAGNOSIS — F41.9 ANXIETY: ICD-10-CM

## 2024-08-20 DIAGNOSIS — J30.9 CHRONIC ALLERGIC RHINITIS: Primary | ICD-10-CM

## 2024-08-20 DIAGNOSIS — F39 UNSPECIFIED MOOD (AFFECTIVE) DISORDER: Primary | ICD-10-CM

## 2024-08-20 PROCEDURE — 95117 IMMUNOTHERAPY INJECTIONS: CPT | Mod: S$GLB,,, | Performed by: STUDENT IN AN ORGANIZED HEALTH CARE EDUCATION/TRAINING PROGRAM

## 2024-08-20 PROCEDURE — 99999 PR PBB SHADOW E&M-EST. PATIENT-LVL I: CPT | Mod: PBBFAC,,,

## 2024-08-20 PROCEDURE — 3061F NEG MICROALBUMINURIA REV: CPT | Mod: CPTII,95,, | Performed by: PSYCHOLOGIST

## 2024-08-20 PROCEDURE — 90791 PSYCH DIAGNOSTIC EVALUATION: CPT | Mod: 95,,, | Performed by: PSYCHOLOGIST

## 2024-08-20 PROCEDURE — 3044F HG A1C LEVEL LT 7.0%: CPT | Mod: CPTII,95,, | Performed by: PSYCHOLOGIST

## 2024-08-20 PROCEDURE — 3066F NEPHROPATHY DOC TX: CPT | Mod: CPTII,95,, | Performed by: PSYCHOLOGIST

## 2024-08-20 NOTE — Clinical Note
Patient  is needing to be scheduled for diagnostic clarity testing. I have entered in a referral. Thanks!

## 2024-08-20 NOTE — PROGRESS NOTES
"[facility] Psychiatry Initial Visit (PhD/LCSW)      Patient Name:  Hermelindo Garza III    Date:  08/20/2024      Site:  The patient location is: home ([place])  The chief complaint leading to consultation is: anxiety    Visit type: audiovisual    Face to Face time with patient: 45  60 minutes of total time spent on the encounter, which includes face to face time and non-face to face time preparing to see the patient (eg, review of tests), Obtaining and/or reviewing separately obtained history, Documenting clinical information in the electronic or other health record, Independently interpreting results (not separately reported) and communicating results to the patient/family/caregiver, or Care coordination (not separately reported).         Each patient to whom he or she provides medical services by telemedicine is:  (1) informed of the relationship between the physician and patient and the respective role of any other health care provider with respect to management of the patient; and (2) notified that he or she may decline to receive medical services by telemedicine and may withdraw from such care at any time.    Notes:   Referral source:  Ashwin Donahue MD       Chief complaint/reason for encounter:  Psychological Evaluation to assess suitability for admission to the [facility]   Clinical status of patient:  Outpatient   Met with:  Patient   CPT Code: 95163      Before this evaluation was initiated, the purposes and process of the assessment and the limits of confidentiality were discussed with the patient who expressed understanding of these issues and verbally consented to proceed with the evaluation.      History of present illness:  Mr. Hermelindo Garza III is a 44 y.o.-year-old male who is pursuing psychotherapy to improve anxiety.  Patient states, "In recent months, I've been struggling with depression and anxiety. I don't feel as interactive as I used to. I'm in the house, not doing as much as I " "used to. I get in my own head a lot. My mind goes in circles a lot. I think the worst case about everything and that gets me worked up. I even went to the ER recently because I thought I was having a heart attack and they told me it was likely anxiety. Some days, I zone out, put my phone on do not disturb, and don't have any communications. I haven't wanted to be very sexual with my . I just feel like just worry so much about everything all the time and it prevents me from having normal days. I also wonder if I have OCD. If something is dirty or out of place, I cannot move on until its fixed. I have certain rules that I have to follow. I can't have bare feet on the floors, I can't have chairs not pushed back in. If my  pulls out a towel in the bathroom that doesn't match the other towels that are out, I have to put it in the wash and pull out one that matches." Patient also reports feeling manic at times. He reports he gets bursts of energy, his  has asked him if he "as taking speed or something." I obsessively spend at times. I get bursts of energy. I talk a lot. I want to hang out with my friends and drink. That lasts a day or two and then I go back to my reclusive state."       Medical history:    Patient Active Problem List   Diagnosis    Gastroesophageal reflux disease    Renal calculi    Mild intermittent asthma without complication    ADHD (attention deficit hyperactivity disorder), inattentive type    LUBA (obstructive sleep apnea)    Hyperlipidemia associated with type 2 diabetes mellitus    Hypertriglyceridemia    Elevated liver enzymes    SHEIKH (nonalcoholic steatohepatitis)    Hepatosplenomegaly    Cellulitis of left arm    History of multiple allergies    Type 2 diabetes mellitus without complications    Vitamin D insufficiency    Restless leg    Chronic pain    Pain in left elbow    Cervical disc disorder with radiculopathy, unspecified cervical region    History of urinary stone    " "Lumbar radiculopathy, chronic    Tachycardia    S/P lumbar discectomy    Morbid obesity    Cervical spondylosis    History of melanoma in situ    Anxiety    Mood Disorder Unspecifed:  Depressed THO also has some mood elevation  (8of 13 on MDQ): ex: irritability, more self confident, thoughts race. more active)     Rotator cuff tendonitis, left    Left lateral epicondylitis    Cervical radiculopathy    Insomnia    Ureteral stone with hydronephrosis    Pain of left lower extremity    Neck pain    Chronic low back pain    Hypertrophy of both inferior nasal turbinates    Deviated nasal septum     Psychiatric symptoms:   Depression - Endorsed depressed mood, loss of interest in pleasurable activities, anhedonia, sleep changes, decreased motivation, decreased concentration, weight changes, increased or decreased motor activity, decreased energy  Radha/Hypomania - Endorsed  increased talkative, racing thoughts, impulsive spending, episodic elevated or irritable mood, flight of ideas, distractibility, inflated self-esteem. States this occurs for 1-2 days then "I return to my reclusive state."  Anxiety - Endorsed excessive worry, difficulty controlling worrying, feeling keyed up, easily fatigued, difficulty concentrating, irritability, muscle tension, sleep disturbance, racing thoughts, being unable to relax  Panic Attacks- Endorsed palpitations,chest pain/discomfort  Thoughts - Denied any AVH, paranoia, delusions, ideas of reference, thought insertion or thought broadcasting  Suicidal thoughts/behaviors - denied passive or active SI, denied suicidal plans or intent  Self-injury - denied.  Substance abuse - denied abuse or dependence.   Sleep - Endorsed increased sleep latency, frequent nighttime awakenings, or early morning awakening with inability to return to sleep.     Current psychosocial stressors:  "I just got a promotion and as soon as that happened I started having a personnel issue and have been having to deal with " "HR."  Report of coping skills:  spend time with friends, , dog  Support system:  , friends     Current and past substance use:   Alcohol: Socially, 2-3 drinks a week when out with friends.  Denied history of abuse or dependency.   Drugs:  Denied current use.  Denied history of abuse or dependency.   Tobacco:  Denied current use.   Caffeine:  1-2 cups of coffee daily     Current Psychiatric Treatment:   Medications:  Wellbutrin, Ativan, Focalin  Psychotherapy:  denies     Psychiatric history:   Previous diagnosis:  ADHD, MDD, GODWIN  Previous hospitalizations:  denies  History of outpatient treatment:  medication management, currently sees Dr. Donahue  Previous suicide attempt:  Denied.   Family history of psychiatric illness:  denies     Trauma history:  Denied.      Social history:  Mr. Garza was born and raised in [place], [place] by his biological parents. He described his childhood as "okay," stating he had a nice relationship with his mother but he argued frequently with his father.  He denied childhood trauma, abuse, and neglect.  He has a masters degree in political science. 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  He denied  service.  He is not on disability.  He has been  to his  for 5 years.  He has no children.      Legal history:  He denied history of arrests and convictions.  He is not currently involved in civil or criminal litigation.      Mental Status Exam:   General appearance:  Appears stated age, neatly dressed, well groomed   Speech:  Normal rate, normal tone, normal pitch, normal volume   Level of cooperation:  Cooperative   Thought processes:  Logical, goal-directed   Mood:  Euthymic   Thought content:  No illusions, no visual hallucinations, no auditory hallucinations, no delusions, no active or passive homicidal thoughts, no active or passive suicidal ideation, no obsessions, no compulsions, no violence   Affect:  Appropriate   Orientation:  " Oriented to person, place, and date   Memory:   Recent memory:  Intact  Remote memory: Intact   Attention span and concentration:  Appropriate  Fund of general knowledge: Appropriate  Judgment and insight: Fair   Language:  Intact      Diagnostic impression:     ICD-10-CM ICD-9-CM   1. Mood Disorder Unspecifed:  Depressed THO also has some mood elevation  (8of 13 on MDQ): ex: irritability, more self confident, thoughts race. more active)   F39 296.90   2. Anxiety  F41.9 300.00   3. ADHD (attention deficit hyperactivity disorder), inattentive type  F90.0 314.00     Working differential diagnoses:  Bipolar Disorder, OCD, personality disorder          Plan:  Mr. Hermelindo Garza III  will be referred for diagnostic clarity assessment to aid in treatment planing. Patient is reporting symptoms of multiple conditions, including GODWIN, OCD, and Bipolar disorder. Prior to determining if [facility] is appropriate or if external resources are needed, diagnostic clarity is needed. Patient is open to diagnostic testing and treatment recommendations based on results.

## 2024-08-22 NOTE — PRE-PROCEDURE INSTRUCTIONS
Patient reviewed on 8/21/2024.  Okay to proceed at [facility]. The following pre-procedure instructions and arrival time have been reviewed with patient via phone and sent to patient portal for review.  Patient verbalized an understanding.  Pt to be accompanied by a family member day of procedure as responsible .    Dear Hermelindo ,     Please read over the following pre-procedure instructions in it's entirety as there is helpful information here to get you well prepared for your upcoming procedure.              You are scheduled for a procedure with Dr. Patel on 8/26/2024.      Please wear comfortable clothes. You will be placed in a gown for your procedure.  Please do not wear a dress.  This procedure will take place at the [facility] at the corner of [facility] and [facility].  It is in the [facility] [facility] next to [facility].  The address is:     [address] [place] [address].  MARCY [place]man [id number]     After entering the building, you will proceed to the second floor where you can check in with registration. You should take any medications that you routinely take for blood pressure, heart medications, thyroid, cholesterol, etc.      The fasting restrictions are dependent on whether or not you are receiving sedation.  Sedation is not available for all procedures.      Your fasting instructions are as follow:  IV sedation. You should not eat for 8 hours and can only drink clear liquids (water or black coffee without cream/sugar) up until 2 hours before your scheduled time.  You CANNOT drive yourself and must have a .     If you are on blood thinners, you need to follow the anticoagulation instructions that had been discussed previously.  You should only stop the blood thinners if it was approved by your primary care physician or your cardiologist.  In the event that you are not able to stop your blood thinners, a blood thinner was not listed on your medication list, or we were  not able to get clearance from your cardiologist, then the procedure may have to be postponed/canceled.      IF you were told to stop your blood thinners, this is how long you should generally hold some of the more common ones.  Remember that stopping blood thinners is only necessary for certain procedures. If you are unsure of your instructions, please call us.   Aspirin - 5 days  Plavix/Clopidogrel - 7 days  Warfarin / Coumadin - 5 days  Eliquis - 3 days  Pradaxa/Dabigatran - 4 days  Xarelto/Rivaroxaban - 3 days     If you are a diabetic, do not take your medication if you will be fasting, but bring it with you. Please plan on being here for roughly 3 hours.     Please call us if you have been sick (running fever, having any flu-like symptoms) or have been taking ANTIBIOTICS in the past 2 weeks or had any outpatient procedures other than with us (colonoscopy, endoscopy, OBGYN, dental, etc.).     If you have been previously COVID positive, you will need to hold off on your procedure until you are symptom free for 10 days. If you did not have any symptoms, you can have your procedure 10 days from your positive test result.       On the morning of your procedure:  *HOLD ALL VITAMINS, MINERALS, HERBS (INCLUDING HERBAL TEAS) AND SUPPLEMENTS  *SHOWER WITH ANTIBACTERIAL SOAP (EX. DIAL) NIGHT BEFORE AND MORNING OF PROCEDURE  *DO NOT APPLY ANY LOTIONS, OILS, POWDERS, PERFUME/COLOGNE, OINTMENTS, GELS, CREAMS, MAKEUP OR DEODORANT TO YOUR SKIN MORNING OF PROCEDURE  *LEAVE JEWELRY AND ANY VALUABLES AT HOME  *WEAR LOOSE COMFORTABLE CLOTHING (PREFERABLY A BUTTON UP SHIRT)     Please reply to this message as receipt of delivery.  Your scheduled arrival time is 7 am.  This arrival time is roughly 1 hour before your anticipated procedure time to allow sufficient time for pre-op..        Thank you,  [facility]  [place], LPN [facility]  Pre-Admit

## 2024-08-26 ENCOUNTER — PATIENT MESSAGE (OUTPATIENT)
Dept: PSYCHIATRY | Facility: CLINIC | Age: 44
End: 2024-08-26
Payer: COMMERCIAL

## 2024-08-26 ENCOUNTER — HOSPITAL ENCOUNTER (OUTPATIENT)
Facility: HOSPITAL | Age: 44
Discharge: HOME | End: 2024-08-26
Attending: STUDENT IN AN ORGANIZED HEALTH CARE EDUCATION/TRAINING PROGRAM | Admitting: STUDENT IN AN ORGANIZED HEALTH CARE EDUCATION/TRAINING PROGRAM
Payer: COMMERCIAL

## 2024-08-26 VITALS
SYSTOLIC BLOOD PRESSURE: 154 MMHG | RESPIRATION RATE: 18 BRPM | TEMPERATURE: 98 F | BODY MASS INDEX: 39.32 KG/M2 | WEIGHT: 236 LBS | HEIGHT: 65 IN | HEART RATE: 76 BPM | OXYGEN SATURATION: 95 % | DIASTOLIC BLOOD PRESSURE: 80 MMHG

## 2024-08-26 DIAGNOSIS — G89.29 CHRONIC PAIN: ICD-10-CM

## 2024-08-26 DIAGNOSIS — M51.36 DDD (DEGENERATIVE DISC DISEASE), LUMBAR: ICD-10-CM

## 2024-08-26 DIAGNOSIS — M54.16 LUMBAR RADICULOPATHY, CHRONIC: Primary | ICD-10-CM

## 2024-08-26 LAB — POCT GLUCOSE: 104 MG/DL (ref 70–110)

## 2024-08-26 PROCEDURE — 63600175 PHARM REV CODE 636 W HCPCS: Performed by: STUDENT IN AN ORGANIZED HEALTH CARE EDUCATION/TRAINING PROGRAM

## 2024-08-26 PROCEDURE — 99152 MOD SED SAME PHYS/QHP 5/>YRS: CPT | Performed by: STUDENT IN AN ORGANIZED HEALTH CARE EDUCATION/TRAINING PROGRAM

## 2024-08-26 PROCEDURE — 62323 NJX INTERLAMINAR LMBR/SAC: CPT | Performed by: STUDENT IN AN ORGANIZED HEALTH CARE EDUCATION/TRAINING PROGRAM

## 2024-08-26 PROCEDURE — 25000003 PHARM REV CODE 250: Performed by: STUDENT IN AN ORGANIZED HEALTH CARE EDUCATION/TRAINING PROGRAM

## 2024-08-26 PROCEDURE — 25500020 PHARM REV CODE 255: Performed by: STUDENT IN AN ORGANIZED HEALTH CARE EDUCATION/TRAINING PROGRAM

## 2024-08-26 PROCEDURE — 62323 NJX INTERLAMINAR LMBR/SAC: CPT | Mod: ,,, | Performed by: STUDENT IN AN ORGANIZED HEALTH CARE EDUCATION/TRAINING PROGRAM

## 2024-08-26 PROCEDURE — 82962 GLUCOSE BLOOD TEST: CPT | Performed by: STUDENT IN AN ORGANIZED HEALTH CARE EDUCATION/TRAINING PROGRAM

## 2024-08-26 RX ORDER — FENTANYL CITRATE 50 UG/ML
INJECTION, SOLUTION INTRAMUSCULAR; INTRAVENOUS
Status: DISCONTINUED | OUTPATIENT
Start: 2024-08-26 | End: 2024-08-26 | Stop reason: HOSPADM

## 2024-08-26 RX ORDER — SODIUM CHLORIDE 9 MG/ML
INJECTION, SOLUTION INTRAVENOUS CONTINUOUS
Status: DISCONTINUED | OUTPATIENT
Start: 2024-08-26 | End: 2024-08-26 | Stop reason: HOSPADM

## 2024-08-26 RX ORDER — MIDAZOLAM HYDROCHLORIDE 1 MG/ML
INJECTION INTRAMUSCULAR; INTRAVENOUS
Status: DISCONTINUED | OUTPATIENT
Start: 2024-08-26 | End: 2024-08-26 | Stop reason: HOSPADM

## 2024-08-26 RX ORDER — DEXAMETHASONE SODIUM PHOSPHATE 10 MG/ML
INJECTION INTRAMUSCULAR; INTRAVENOUS
Status: DISCONTINUED | OUTPATIENT
Start: 2024-08-26 | End: 2024-08-26 | Stop reason: HOSPADM

## 2024-08-26 RX ORDER — LIDOCAINE HYDROCHLORIDE 10 MG/ML
INJECTION, SOLUTION EPIDURAL; INFILTRATION; INTRACAUDAL; PERINEURAL
Status: DISCONTINUED | OUTPATIENT
Start: 2024-08-26 | End: 2024-08-26 | Stop reason: HOSPADM

## 2024-08-26 RX ORDER — LIDOCAINE HYDROCHLORIDE 20 MG/ML
INJECTION, SOLUTION EPIDURAL; INFILTRATION; INTRACAUDAL; PERINEURAL
Status: DISCONTINUED | OUTPATIENT
Start: 2024-08-26 | End: 2024-08-26 | Stop reason: HOSPADM

## 2024-08-26 NOTE — H&P
HPI  Patient presenting for Procedure(s) (LRB):  L4-5 NELI Veer Left (N/A)     Patient on Anti-coagulation No    No health changes since previous encounter    Past Medical History:   Diagnosis Date    ADD (attention deficit disorder) 05/09/2012    Allergy     Anxiety 04/12/2023    Asthma 05/09/2012    Cervical disc disorder with radiculopathy, unspecified cervical region 09/14/2021    Cervical spondylosis with myelopathy 06/29/2022    Eczema     Esophageal ulcer     GERD (gastroesophageal reflux disease) 05/09/2012    Glaucoma     Kidney stones 05/2014    Lumbar disc disease 05/09/2012    Lumbar herniated disc 05/09/2012    Malignant melanoma of skin, unspecified 01/06/2022    in situ right mid back    Melanoma 01/2022    in situ R mid back     LUBA (obstructive sleep apnea)     Radiculopathy, lumbar region 09/14/2021    Renal calculi 05/09/2012    Type 2 diabetes mellitus without complication, without long-term current use of insulin 11/16/2020     Past Surgical History:   Procedure Laterality Date    APPENDECTOMY  2006    CARPAL TUNNEL RELEASE Right 09/15/2022    Procedure: RELEASE, CARPAL TUNNEL;  Surgeon: Christen Marshall MD;  Location: [facility] OR;  Service: Orthopedics;  Laterality: Right;    CAUTERY OF TURBINATES Bilateral 7/15/2024    Procedure: CAUTERIZATION, MUCOSA, NASAL TURBINATE;  Surgeon: Melody Liu MD;  Location: [facility] OR;  Service: ENT;  Laterality: Bilateral;    COLONOSCOPY  01/22/2019    internal hemorrhoids, o/w normal - repeat at age 52    COLONOSCOPY N/A 12/21/2023    Procedure: COLONOSCOPY;  Surgeon: Teo Johnson MD;  Location: [facility] ENDO;  Service: Endoscopy;  Laterality: N/A;    CORRECTION OF HAMMER TOE Left 8/14/2023    Procedure: CORRECTION, HAMMER TOE--  basic foot tray as well as a Reese microfree/microchoice tray. I also asked [place] to bring an MIS mati.;  Surgeon: Ankush Back DPM;  Location: [facility] OR;  Service: Podiatry;  Laterality: Left;  reese drill, sagittal saw, foot set     CYSTOSCOPY  7/5/2023    Procedure: CYSTOSCOPY;  Surgeon: Chuckie Hall MD;  Location: [facility] OR [address] [place] [address];  Service: Urology;;    EPIDURAL STEROID INJECTION N/A 02/10/2021    Procedure: CERVICAL C6/7 NELI DIRECT REFERRAL;  Surgeon: Michi Escamilla MD;  Location: [facility] PAIN MGT;  Service: Pain Management;  Laterality: N/A;  NEEDS CONSENT    EPIDURAL STEROID INJECTION INTO CERVICAL SPINE N/A 6/28/2023    Procedure: Injection-steroid-epidural-cervical C7-T1;  Surgeon: Lorraine Patel DO;  Location: [facility] PAIN MANAGEMENT;  Service: Pain Management;  Laterality: N/A;  diabetic    EPIDURAL STEROID INJECTION INTO CERVICAL SPINE N/A 11/3/2023    Procedure: cervical NELI C7-T1;  Surgeon: Kolton Terrell MD;  Location: [facility] PAIN MANAGEMENT;  Service: Pain Management;  Laterality: N/A;  diabetic    EPIDURAL STEROID INJECTION INTO CERVICAL SPINE N/A 4/17/2024    Procedure: VENECIA C7/T1;  Surgeon: Lorraine Patel DO;  Location: [facility] PAIN MANAGEMENT;  Service: Pain Management;  Laterality: N/A;  20mins- Ozempic 7d- no ac    EPIDURAL STEROID INJECTION INTO LUMBAR SPINE N/A 9/27/2023    Procedure: L4-5 NELI;  Surgeon: Tirso Pickering MD;  Location: [facility] PAIN MANAGEMENT;  Service: Pain Management;  Laterality: N/A;  15 mins    EPIDURAL STEROID INJECTION INTO LUMBAR SPINE N/A 3/6/2024    Procedure: L4-5 IL NELI;  Surgeon: Lorraine Patel DO;  Location: [facility] PAIN MANAGEMENT;  Service: Pain Management;  Laterality: N/A;  20 mins    EPIDURAL STEROID INJECTION INTO LUMBAR SPINE N/A 6/3/2024    Procedure: NELI L4-5;  Surgeon: Lorraine Patel DO;  Location: [facility] PAIN MANAGEMENT;  Service: Pain Management;  Laterality: N/A;  20mins-no ac Ozempic 7d    ESOPHAGOGASTRODUODENOSCOPY  01/22/2019    LA grade B esophagitis; esophageal stenosis- dilated; gastritis; H pylori pathology negative    ESOPHAGOGASTRODUODENOSCOPY N/A 05/18/2021    Procedure: EGD (ESOPHAGOGASTRODUODENOSCOPY);  Surgeon: Mariana Hector MD;  Location: [facility];   Service: Endoscopy;  Laterality: N/A;    ESOPHAGOGASTRODUODENOSCOPY N/A 12/21/2023    Procedure: EGD (ESOPHAGOGASTRODUODENOSCOPY);  Surgeon: Teo Johnson MD;  Location: [facility];  Service: Endoscopy;  Laterality: N/A;    ESOPHAGOGASTRODUODENOSCOPY N/A 2/22/2024    Procedure: EGD (ESOPHAGOGASTRODUODENOSCOPY);  Surgeon: Teo Johnson MD;  Location: [facility] ENDO;  Service: Endoscopy;  Laterality: N/A;    EXCISION OF GANGLION OF WRIST Right 09/15/2022    Procedure: EXCISION, GANGLION CYST, WRIST;  Surgeon: Christen Marshall MD;  Location: [facility] OR;  Service: Orthopedics;  Laterality: Right;    FLEXOR TENDON REPAIR Right 09/15/2022    Procedure: FLEXOR TENOSYNOVECTOMY;  Surgeon: Christen Marshall MD;  Location: [facility];  Service: Orthopedics;  Laterality: Right;    HAND ARTHROTOMY Right 09/15/2022    Procedure: ARTHROTOMY, HAND RADIOCARPAL;  Surgeon: Christen Marshall MD;  Location: [facility] OR;  Service: Orthopedics;  Laterality: Right;  Radiocarpal    INJECTION OF JOINT Right 11/29/2023    Procedure: right hip IA injection and Right GTB injection;  Surgeon: Lorraine Patel DO;  [facility];  Service: Pain Management;  Laterality: Right;    INJECTION OF STEROID Left 09/15/2022    Procedure: INJECTION, STEROID LEFT WRIST AND LEFT LATERAL ELBOW;  Surgeon: Christen Marshall MD;  Location: [facility] OR;  Service: Orthopedics;  Laterality: Left;  Left Carpal Tunnel CSI    LASER LITHOTRIPSY Left 7/5/2023    Procedure: LITHOTRIPSY, USING LASER;  Surgeon: Chuckie Hall MD;  Location: [facility] OR 1ST FLR;  Service: Urology;  Laterality: Left;    LUMBAR LAMINECTOMY  2010    LUMBAR LAMINECTOMY WITH DISCECTOMY Left 09/20/2021    Procedure: LAMINECTOMY, SPINE, LUMBAR, WITH DISCECTOMY;  Surgeon: Naseem Mcnamara DO;  Location: [facility] OR 2ND FLR;  Service: Neurosurgery;  Laterality: Left;  MIS L3-4    NASAL SEPTOPLASTY N/A 7/15/2024    Procedure: SEPTOPLASTY, NOSE;  Surgeon: Melody Liu MD;  Location: [facility];  Service:  "ENT;  Laterality: N/A;    PH MONITORING, ESOPHAGUS, WIRELESS, (OFF REFLUX MEDS) N/A 12/21/2023    Procedure: PH MONITORING, ESOPHAGUS, WIRELESS, (OFF REFLUX MEDS);  Surgeon: Teo Johnson MD;  Location: [facility] ENDO;  Service: Endoscopy;  Laterality: N/A;    RECONSTRUCTION OF LIGAMENT Left 12/5/2023    Procedure: RECONSTRUCTION, LIGAMENT LATERAL COLLATERAL;  Surgeon: Christen Marshall MD;  Location: [facility] OR;  Service: Orthopedics;  Laterality: Left;    REPAIR OF EXTENSOR TENDON Left 12/5/2023    Procedure: REPAIR, TENDON, EXTENSOR;  Surgeon: Christen Marshall MD;  Location: [facility] OR;  Service: Orthopedics;  Laterality: Left;    ROBOT-ASSISTED REPAIR OF HIATAL HERNIA N/A 3/14/2024    Procedure: ROBOTIC REPAIR, HERNIA, HIATAL;  Surgeon: Cody Winn MD;  Location: [facility] OR;  Service: General;  Laterality: N/A;    TYMPANOSTOMY TUBE PLACEMENT      URETERAL STENT PLACEMENT Left 7/5/2023    Procedure: INSERTION, STENT, URETER;  Surgeon: Chuckie Hall MD;  Location: [address];  Service: Urology;  Laterality: Left;    URETEROSCOPIC REMOVAL OF URETERIC CALCULUS Left 7/5/2023    Procedure: REMOVAL, CALCULUS, URETER, URETEROSCOPIC;  Surgeon: Chuckie Hall MD;  Location: [facility] OR [address] [place] [address];  Service: Urology;  Laterality: Left;    URETEROSCOPY Left 7/5/2023    Procedure: URETEROSCOPY;  Surgeon: Chuckie Hall MD;  Location: [address];  Service: Urology;  Laterality: Left;     Review of patient's allergies indicates:  No Known Allergies   Current Facility-Administered Medications   Medication    0.9%  NaCl infusion     Facility-Administered Medications Ordered in Other Encounters   Medication    0.9%  NaCl infusion    mupirocin 2 % ointment       PMHx, PSHx, Allergies, Medications reviewed in epic    ROS negative except pain complaints in HPI    OBJECTIVE:    BP (!) 150/88 (BP Location: Right arm, Patient Position: Lying)   Resp 18   Ht 5' 5" (1.651 m)   Wt 107 kg (236 lb)   SpO2 95%   BMI 39.27 kg/m² "     PHYSICAL EXAMINATION:    GENERAL: Well appearing, in no acute distress, alert and oriented x3.  PSYCH:  Mood and affect appropriate.  SKIN: Skin color, texture, turgor normal, no rashes or lesions which will impact the procedure.  CV: RRR with palpation of the radial artery.  PULM: No evidence of respiratory difficulty, symmetric chest rise. Clear to auscultation.  NEURO: Cranial nerves grossly intact.    Plan:    Proceed with procedure as planned Procedure(s) (LRB):  L4-5 NELI Veer Left (N/A)    Lroraine Patel  08/26/2024

## 2024-08-26 NOTE — DISCHARGE INSTRUCTIONS
Home Care Instructions Pain Management:    1.  DIET:    You may resume your normal diet today.    2.  BATHING:    You may shower with luke warm water.    3.  DRESSING:    You may remove your bandage today.    4.  ACTIVITY LEVEL:      You may resume your normal activities 24 hours after your procedure.    5.  MEDICATIONS:    You may resume your normal medications today.    6.  SPECIAL INSTRUCTIONS:    No heat to the injection site for 24 hours including bath or shower, heating pad, moist heat or hot tubs.    Use an ice pack to the injection site for any pain or discomfort.  Apply ice packs for 20 minute intervals as needed.    If you have received any sedatives by mouth today, you can not drive for 12 hours.    If you have received sedation through an IV, you can not drive for 24 hours.    PLEASE CALL YOUR DOCTOR FOR THE FOLLOWIN.  Redness or swelling around the injection site.  2.  Fever of 101 degrees.  3.  Drainage (pus) from the injection site.  4.  For any continuous bleeding (some dried blood over the incision is normal.)    FOR EMERGENCIES:    If any unusual problems or difficulties occur during clinic hours, call [phone] or dial [phone].    Follow up with with your physician in 2-3 weeks.

## 2024-08-26 NOTE — OP NOTE
Lumbar Interlaminar Epidural Steroid Injection under Fluoroscopic Guidance    The procedure, risks, benefits, and options were discussed with the patient. There are no contraindications to the procedure. The patent expressed understanding and agreed to the procedure. Informed written consent was obtained prior to the start of the procedure and can be found in the patient's chart.    PATIENT NAME: Hermelindo Garza III   MRN: [id number]     DATE OF PROCEDURE: 08/26/2024    PROCEDURE: Lumbar Interlaminar Epidural Steroid Injection L4/L5 under Fluoroscopic Guidance    PRE-OP DIAGNOSIS: Lumbar radiculopathy, chronic [M54.16] Lumbar radiculopathy [M54.16]    POST-OP DIAGNOSIS: Same    PHYSICIAN: Lorraine Patel DO    ASSISTANTS: None     MEDICATIONS INJECTED: Preservative-free Decadron 10mg with 4cc of Lidocaine 1% MPF and preservative free normal saline    LOCAL ANESTHETIC INJECTED: Xylocaine 2%     SEDATION: Versed 2mg and Fentanyl 100mcg                                                                                                                                                                                     Conscious sedation ordered by M.D. Patient re-evaluation prior to administration of conscious sedation. No changes noted in patient's status from initial evaluation. The patient's vital signs were monitored by RN and patient remained hemodynamically stable throughout the procedure.    Event Time In   Sedation Start 0819   Sedation End 0830       ESTIMATED BLOOD LOSS: None    COMPLICATIONS: None    TECHNIQUE: Time-out was performed to identify the patient and procedure to be performed. With the patient laying in a prone position, the surgical area was prepped and draped in the usual sterile fashion using ChloraPrep and a fenestrated drape. The level was determined under fluoroscopy guidance. Skin anesthesia was achieved by injecting Lidocaine 2% over the injection site. The interlaminar space was then  approached with a 20 gauge,  3.5 inch Tuohy needle that was introduced under fluoroscopic guidance in the AP, lateral and/or contralateral oblique imaging. Once the Ligamentum flavum was encountered loss of resistance to saline was used to enter the epidural space. With positive loss of resistance and negative aspiration for CSF or Blood, contrast dye Omnipaque (300mg/mL) was injected to confirm placement and there was no vascular runoff. 7 ml of the medication mixture listed above was injected slowly. Displacement of the radio opaque contrast after injection of the medication confirmed that the medication went into the area of the epidural space. The needles were removed and bleeding was nil. A sterile dressing was applied. No specimens collected. The patient tolerated the procedure well.       The patient was monitored after the procedure in the recovery area. They were given post-procedure and discharge instructions to follow at home. The patient was discharged in a stable condition.      Lorraine Patel DO

## 2024-08-26 NOTE — DISCHARGE SUMMARY
Discharge Note  Short Stay      SUMMARY     Admit Date: 8/26/2024    Attending Physician: Lorraine Patel      Discharge Physician: Lorraine Patel      Discharge Date: 8/26/2024 8:35 AM    Procedure(s) (LRB):  L4-5 NELI Veer Left (N/A)    Final Diagnosis: Lumbar radiculopathy, chronic [M54.16]    Disposition: Home or self care    Patient Instructions:   Current Discharge Medication List        CONTINUE these medications which have NOT CHANGED    Details   albuterol (PROVENTIL/VENTOLIN HFA) 90 mcg/actuation inhaler INHALE 2 PUFFS INTO THE LUNGS EVERY 6 HOURS AS NEEDED WHEEZING  Qty: 25.5 g, Refills: 3    Associated Diagnoses: Mild intermittent asthma without complication      atorvastatin (LIPITOR) 20 MG tablet Take 1 tablet (20 mg total) by mouth once daily.  Qty: 90 tablet, Refills: 3    Associated Diagnoses: Hyperlipidemia associated with type 2 diabetes mellitus      buPROPion (WELLBUTRIN XL) 150 MG TB24 tablet Take 1 tablet (150 mg total) by mouth once daily.  Qty: 30 tablet, Refills: 2    Associated Diagnoses: Unspecified mood (affective) disorder      dexmethylphenidate (FOCALIN) 10 MG tablet Take 1 tablet (10 mg total) by mouth 2 (two) times daily.  Qty: 60 tablet, Refills: 0    Associated Diagnoses: ADHD (attention deficit hyperactivity disorder), inattentive type      ergocalciferol, vitamin D2, (VITAMIN D ORAL) Take 1 tablet by mouth every other day.      fluocinonide (LIDEX) 0.05 % external solution apply to affected area of scalp daily as needed for itching, scaling  Qty: 60 mL, Refills: 3    Associated Diagnoses: Seborrheic dermatitis, unspecified      lamoTRIgine (LAMICTAL) 100 MG tablet Take 1 tablet (100 mg total) by mouth 2 (two) times daily. IF any RASH, STOP ALL Lamictal and Tell Psyc MD.  Qty: 180 tablet, Refills: 1    Associated Diagnoses: Unspecified mood (affective) disorder      latanoprost 0.005 % ophthalmic solution Place 1 drop into both eyes every evening.  Qty: 7.5 mL, Refills: 3     Associated Diagnoses: Ocular hypertension, bilateral      levocetirizine (XYZAL) 5 MG tablet Take 1 tablet (5 mg total) by mouth every evening.  Qty: 30 tablet, Refills: 11      LORazepam (ATIVAN) 0.5 MG tablet Take 1 tablet (0.5 mg total) by mouth daily as needed (SIGNIFICANT ANXIETY).  Qty: 30 tablet, Refills: 3    Associated Diagnoses: Anxiety      meloxicam (MOBIC) 15 MG tablet TAKE 1 TABLET(15 MG) BY MOUTH EVERY DAY as needed FOR PAIN  Qty: 30 tablet, Refills: 2      olopatadine (PATANASE) 0.6 % nasal spray 1 spray by Each Nostril route 2 (two) times daily.  Qty: 30.5 g, Refills: 5      ondansetron (ZOFRAN-ODT) 8 MG TbDL Dissolve 1 tablet (8 mg total) on the tongue every 8 (eight) hours as needed (nausea).  Qty: 30 tablet, Refills: 2      pregabalin (LYRICA) 200 MG Cap Take 1 capsule (200 mg total) by mouth 2 (two) times daily.  Qty: 60 capsule, Refills: 1      rOPINIRole (REQUIP) 0.25 MG tablet TAKE 1 TABLET(0.25 MG) BY MOUTH EVERY EVENING  Qty: 90 tablet, Refills: 2    Associated Diagnoses: Restless leg      urea (CARMOL) 40 % Crea Apply topically once daily.  Qty: 225 g, Refills: 2    Associated Diagnoses: Xerosis of skin; Corn or callus      blood sugar diagnostic (TRUE METRIX GLUCOSE TEST STRIP) Strp Use to test blood glucose one (1) time a day,  Qty: 100 strip, Refills: 3    Comments: to be used with insurance-preferred brand of glucometer/supplies.  Associated Diagnoses: Diabetes mellitus without complication      blood-glucose meter Misc use as directed  Qty: 1 each, Refills: 0      clobetasoL (TEMOVATE) 0.05 % cream Apply to affected area 2 (two) times daily as needed for flare  Qty: 30 g, Refills: 1    Associated Diagnoses: Dermatitis      fluorouraciL (EFUDEX) 5 % cream Apply topically to the affected area once daily.  Qty: 40 g, Refills: 2    Associated Diagnoses: Xerosis of skin; Corn or callus      HYDROcodone-acetaminophen (NORCO) 5-325 mg per tablet Take 1 tablet by mouth every 6 (six) hours as  needed for Pain.  Qty: 15 tablet, Refills: 0    Comments: Quantity prescribed more than 7 day supply? No      hydrocortisone 2.5 % cream APPLY EXTERNALLY TO THE AFFECTED AREA TWICE DAILY FOR 10 DAYS  Qty: 30 g, Refills: 0    Associated Diagnoses: Allergic contact dermatitis due to plant      ketoconazole (NIZORAL) 2 % cream Apply 1 application  topically 2 (two) times daily. Apply to affected area up to 2 weeks  Qty: 15 g, Refills: 0    Associated Diagnoses: Seborrheic dermatitis of scalp      !! lancets (ONETOUCH DELICA PLUS LANCET) 33 gauge Misc Use to test blood glucose one (1) time a day,  Qty: 100 each, Refills: 5      permethrin (ELIMITE) 5 % cream Apply from the neck down and leave on overnight; wash off in am and repeat in 1 week  Qty: 120 g, Refills: 1    Associated Diagnoses: Dermatitis      semaglutide (OZEMPIC) 2 mg/dose (8 mg/3 mL) PnIj Inject 2 mg into the skin every 7 days.  Qty: 3 mL, Refills: 5    Associated Diagnoses: Type 2 diabetes mellitus without complication, without long-term current use of insulin      tamsulosin (FLOMAX) 0.4 mg Cap Take 1 capsule (0.4 mg total) by mouth once daily.  Qty: 30 capsule, Refills: 11      !! ULTRA THIN LANCETS 30 gauge Misc USE TO TEST BLOOD SUGAR EVERY DAY AS DIRECTED  Qty: 100 each, Refills: 3    Associated Diagnoses: Diabetes mellitus without complication       !! - Potential duplicate medications found. Please discuss with provider.              Discharge Diagnosis: Lumbar radiculopathy, chronic [M54.16]  Condition on Discharge: Stable with no complications to procedure   Diet on Discharge: Same as before.  Activity: as per instruction sheet.  Discharge to: Home with a responsible adult.  Follow up: 2-4 weeks       Please call my office or pager at [phone] if experienced any weakness or loss of sensation, fever > 101.5, pain uncontrolled with oral medications, persistent nausea/vomiting/or diarrhea, redness or drainage from the incisions, or any other  worrisome concerns. If physician on call was not reached or could not communicate with our office for any reason please go to the nearest emergency department

## 2024-08-28 ENCOUNTER — OFFICE VISIT (OUTPATIENT)
Dept: PSYCHIATRY | Facility: CLINIC | Age: 44
End: 2024-08-28
Payer: COMMERCIAL

## 2024-08-28 DIAGNOSIS — G47.00 INSOMNIA, UNSPECIFIED TYPE: ICD-10-CM

## 2024-08-28 DIAGNOSIS — E11.9 TYPE 2 DIABETES MELLITUS WITHOUT COMPLICATION, UNSPECIFIED WHETHER LONG TERM INSULIN USE: ICD-10-CM

## 2024-08-28 DIAGNOSIS — M54.12 CERVICAL RADICULOPATHY: ICD-10-CM

## 2024-08-28 DIAGNOSIS — F39 UNSPECIFIED MOOD (AFFECTIVE) DISORDER: Primary | ICD-10-CM

## 2024-08-28 DIAGNOSIS — F41.9 ANXIETY: ICD-10-CM

## 2024-08-28 DIAGNOSIS — F90.0 ADHD (ATTENTION DEFICIT HYPERACTIVITY DISORDER), INATTENTIVE TYPE: ICD-10-CM

## 2024-08-28 DIAGNOSIS — G47.33 OSA (OBSTRUCTIVE SLEEP APNEA): ICD-10-CM

## 2024-08-28 DIAGNOSIS — Z63.79 OTHER HEALTH PROBLEM WITHIN THE FAMILY: ICD-10-CM

## 2024-08-28 PROCEDURE — 3044F HG A1C LEVEL LT 7.0%: CPT | Mod: CPTII,95,, | Performed by: PSYCHIATRY & NEUROLOGY

## 2024-08-28 PROCEDURE — 99214 OFFICE O/P EST MOD 30 MIN: CPT | Mod: 95,,, | Performed by: PSYCHIATRY & NEUROLOGY

## 2024-08-28 PROCEDURE — 3066F NEPHROPATHY DOC TX: CPT | Mod: CPTII,95,, | Performed by: PSYCHIATRY & NEUROLOGY

## 2024-08-28 PROCEDURE — 3061F NEG MICROALBUMINURIA REV: CPT | Mod: CPTII,95,, | Performed by: PSYCHIATRY & NEUROLOGY

## 2024-08-28 RX ORDER — DEXMETHYLPHENIDATE HYDROCHLORIDE 10 MG/1
10 TABLET ORAL 2 TIMES DAILY
Qty: 60 TABLET | Refills: 0 | Status: SHIPPED | OUTPATIENT
Start: 2024-08-28 | End: 2024-09-27

## 2024-08-28 RX ORDER — LORAZEPAM 0.5 MG/1
0.5 TABLET ORAL DAILY PRN
Qty: 30 TABLET | Refills: 3 | Status: SHIPPED | OUTPATIENT
Start: 2024-09-13 | End: 2025-01-11

## 2024-08-28 RX ORDER — LAMOTRIGINE 100 MG/1
100 TABLET ORAL 2 TIMES DAILY
Qty: 180 TABLET | Refills: 1 | Status: SHIPPED | OUTPATIENT
Start: 2024-08-28 | End: 2025-02-24

## 2024-08-28 RX ORDER — DEXMETHYLPHENIDATE HYDROCHLORIDE 10 MG/1
10 TABLET ORAL 2 TIMES DAILY
Qty: 60 TABLET | Refills: 0 | Status: SHIPPED | OUTPATIENT
Start: 2024-09-27 | End: 2024-10-27

## 2024-08-28 RX ORDER — BUPROPION HYDROCHLORIDE 150 MG/1
150 TABLET ORAL DAILY
Qty: 30 TABLET | Refills: 2 | Status: SHIPPED | OUTPATIENT
Start: 2024-08-28 | End: 2024-11-26

## 2024-08-28 NOTE — PROGRESS NOTES
"  Hermelindo Garza [facility]   1980 08/28/2024     Disclaimer: Evaluation and treatment is based on information presented to date. Any new information may affect assessment and findings.     The patient location is: Patient's home  and reported  that his/her location at the time of this visit was in the [facility]     Visit type: Virtual visit  started with synchronous audio and video / though some audio difficulty so mutually agreed to complete on    Each patient to whom he or she provides medical services by telemedicine is: (1) informed of the relationship between the physician and patient and the respective role of any other health care provider with respect to management of the patient; and (2) notified that he or she may decline to receive medical services by telemedicine and may withdraw from such care at any time.    Patient was informed that I am a physician who is licensed in the [facility]:  Ashwin Donahue MD:  Employed by   [employer]     If technology issues arise: MERRY Donahue MD will attempt to call pt back     Pt informed that if he / she is ever in crisis (or has acute concerns): pt is instructed to Dial 911 or go to nearest Emergency Room (ER)    Pt informed that if questions related to privacy practices: pt is instructed to contact [facility] Information Department:     Understanding Expressed. No questions.      S: Patient's Own Perception of Condition (& Side Effects) :  no rash / he did get back on ozempic / primary care increased his success see weight table BELOW ;  also walking regularly Svetlana fisher       O:      CURRENT PRESENTATION:     Started [facility] / see note 8-20-24 / some enhannced diagnosic work up as well  as well tho my main focus referral / CBT anxiety mngt     Some stress as dad had surgery 10" small intestine removed     Says doing ok / was less anxious / though some recent stressors as dad had surgery to remove 10 in of small " intestine    Likes Lamictal at 200 mg daily  / no rash    Walking and has lost weight    Psyc Meds   Lamictal remains  200 mg /   he tapered off  Lexapro /  he moved to 5 mg (from 10mg) as we had discussed past he opted to remain on such / likes this regimen   Ativan 0.5 mg daily / as needed signif aniety  / uses at times at night for insomnia   Focalin 10 mg twice daily    Says anxiety improved     Constitutional Health Concerns: back pain Sees [facility] / started 2010    Says does aquatherapy now then    Also taking allergy shots weekly main campus x 9 months         5/2/2024    11:01 AM 11/8/2023    10:17 AM 5/11/2023     6:10 PM   GAD7   1. Feeling nervous, anxious, or on edge? 1 1 1   2. Not being able to stop or control worrying? 2 2 3   3. Worrying too much about different things? 1 1 1   4. Trouble relaxing? 1 2 3   5. Being so restless that it is hard to sit still? 0 1 2   6. Becoming easily annoyed or irritable? 1 1 2   7. Feeling afraid as if something awful might happen? 0 0 2   8. If you checked off any problems, how difficult have these problems made it for you to do your work, take care of things at home, or get along with other people? 1 1 2   GODWIN-7 Score 6 8 14          5/2/2024    11:02 AM 4/22/2024     8:34 AM 11/8/2023    10:18 AM 10/12/2023     8:51 AM 3/2/2023     2:14 PM 6/29/2022    11:04 AM   Depression Patient Health Questionnaire   Over the last two weeks how often have you been bothered by little interest or pleasure in doing things Several days Not at all Several days Not at all Not at all Not at all   Over the last two weeks how often have you been bothered by feeling down, depressed or hopeless Several days Not at all Not at all Not at all Not at all Not at all   PHQ-2 Total Score 2 0 1 0 0 0   Over the last two weeks how often have you been bothered by trouble falling or staying asleep, or sleeping too much More than half the days  More than half the days      Over the last two weeks  how often have you been bothered by feeling tired or having little energy Several days  Several days      Over the last two weeks how often have you been bothered by a poor appetite or overeating Not at all  Not at all      Over the last two weeks how often have you been bothered by feeling bad about yourself - or that you are a failure or have let yourself or your family down Several days  Not at all      Over the last two weeks how often have you been bothered by trouble concentrating on things, such as reading the newspaper or watching television Several days  Several days      Over the last two weeks how often have you been bothered by moving or speaking so slowly that other people could have noticed. Or the opposite - being so fidgety or restless that you have been moving around a lot more than usual. More than half the days  More than half the days      Over the last two weeks how often have you been bothered by thoughts that you would be better off dead, or of hurting yourself Not at all  Not at all      If you checked off any problems, how difficult have these problems made it for you to do your work, take care of things at home or get along with other people? Somewhat difficult  Not difficult at all      PHQ-9 Score 9  7      PHQ-9 Interpretation Mild  Mild        [facility]  Comments Medication: says ambien not working / asks to remove; says ativan  helps with anxiety insomnia / says has been on in past at low dose and helps ; asks to switch to that; risk benefit discussed; says not taking opioids tho may have been Rx in past. Did caution him as to such; says he well aware       Wt Readings from Last 12 Encounters:   08/26/24 107 kg (236 lb)   07/31/24 107.3 kg (236 lb 8.9 oz)   07/23/24 107 kg (235 lb 14.3 oz)   07/08/24 107.6 kg (237 lb 3.4 oz)   07/08/24 107.6 kg (237 lb 5.2 oz)   06/03/24 105.2 kg (232 lb)   05/30/24 105.2 kg (232 lb)   05/07/24 107.5 kg (237 lb)    05/01/24 108 kg (238 lb 1.6 oz)   04/30/24 108.2 kg (238 lb 8.6 oz)   04/22/24 107.4 kg (236 lb 12.4 oz)   04/17/24 108 kg (238 lb)      Laboratory Data     Date/Time Component Value Flag Lab Status   04/05/23 0720 TSH 1.657 -- Final result   10/05/22 0718 PSA 0.18 -- Final result   10/11/23 1124 HDL 40 -- Final result   10/11/23 1124 CHOL 169 -- Final result   10/11/23 1124 TRIG 345 Important  High Final result   10/11/23 1124 LDLCALC 60.0 Important  Low Final result   10/11/23 1124 CHOLHDL 23.7 -- Final result   10/11/23 1124 NONHDLCHOL 129 -- Final result   10/11/23 1124 TOTALCHOLEST 4.2 -- Final result   10/11/23 1124 HGBA1C 5.4 -- Final result   07/04/23 1143 COLORU Yellow -- Final result   07/04/23 1143 APPEARANCEUA Clear -- Final result   07/04/23 1143 SPECGRAV >=1.030 Important  Abnormal Final result   07/04/23 1143 PHUR 5.0 -- Final result   07/04/23 1143 KETONESU Trace Important  Abnormal Final result   07/04/23 1143 OCCULTUA 1+ Important  Abnormal Final result   07/04/23 1143 NITRITE Negative -- Final result   05/13/14 1512 UROBILINOGEN Negative -- Final result   01/02/20 0936 RAPFLUA Negative -- Final result   01/02/20 0936 RAPFLUB Negative -- Final result   12/21/23 0747 POCGLU 105 -- Final result   09/19/21 2023 INR 1.0 -- Final result   01/02/20 0928 RAPSCRN Negative -- Final result   07/04/23 1143 LEUKOCYTESUR Negative -- Final result   07/04/23 2317 WBC 13.22 Important  High Final result   07/04/23 2317 RBC 5.10 -- Final result   07/04/23 2317 HGB 15.2 -- Final result   07/04/23 2317 HCT 45.2 -- Final result   07/04/23 2317 MCH 29.8 -- Final result   07/04/23 2317 RDW 13.5 -- Final result   07/04/23 2317  -- Final result   07/04/23 2317 MPV 10.2 -- Final result   10/11/23 1124 GLU 84 -- Final result   10/11/23 1124 BUN 18 -- Final result   10/11/23 1124 CREATININE 0.7 -- Final result   10/11/23 1124 CALCIUM 9.5 -- Final result   10/11/23 1124  -- Final result   10/11/23 1124 K 4.1 --  Final result   10/11/23 1124  -- Final result   10/11/23 1124 PROT 7.1 -- Final result   10/11/23 1124 ALBUMIN 4.3 -- Final result   10/11/23 1124 BILITOT 0.7 -- Final result   10/11/23 1124 AST 24 -- Final result   10/11/23 1124 ALKPHOS 94 -- Final result   10/11/23 1124 CO2 26 -- Final result   10/11/23 1124 ALT 39 -- Final result   10/11/23 1124 ANIONGAP 10 -- Final result   04/05/22 0720 EGFRNONAA >60.0 -- Final result   04/05/22 0720 ESTGFRAFRICA >60.0 -- Final result   02/19/20 0723 MG 2.1 -- Final result   07/04/23 2317 MCV 89 -- Final result   04/12/23 1124 CRP 3.8 -- Final result     Mental Status Exam:      Appearance: casual   Oriented: x 3   Attitude: cooperative   Eye Contact: good  Behavior: calm     Mood: feeling ok  Cognition: alert  Concentration: grossly intact   Affect: appropriate range      Anxiety: mild  / moderate as stress with dad's surgery      Thought Process: goal directed     Speech:       Volume : WNL       Quantity WNL       Quality: appears to openly answer questions      Threats: no SI / no HI     Psychosis: denies all      Estimate of Intellectual Function: average   Impulse Control: no thoughts of harm to self/ others      Musculoskeletal:  no tremor      Patient Active Problem List   Diagnosis    Gastroesophageal reflux disease    Renal calculi    Mild intermittent asthma without complication    ADHD (attention deficit hyperactivity disorder), inattentive type    LUBA (obstructive sleep apnea)    Hyperlipidemia associated with type 2 diabetes mellitus    Hypertriglyceridemia    Elevated liver enzymes    SHEIKH (nonalcoholic steatohepatitis)    Hepatosplenomegaly    Cellulitis of left arm    History of multiple allergies    Type 2 diabetes mellitus without complications    Vitamin D insufficiency    Restless leg    Chronic pain    Pain in left elbow    Cervical disc disorder with radiculopathy, unspecified cervical region    History of urinary stone    Lumbar radiculopathy,  chronic    Tachycardia    S/P lumbar discectomy    Morbid obesity    Cervical spondylosis    History of melanoma in situ    Anxiety    Mood Disorder Unspecifed:  Depressed THO also has some mood elevation  (8of 13 on MDQ): ex: irritability, more self confident, thoughts race. more active)     Rotator cuff tendonitis, left    Left lateral epicondylitis    Cervical radiculopathy    Insomnia    Ureteral stone with hydronephrosis    Pain of left lower extremity    Neck pain    Chronic low back pain    Hypertrophy of both inferior nasal turbinates    Deviated nasal septum    Other health problem within the family:  Aug 2024 dad surgery small intestine          Current Outpatient Medications:     albuterol (PROVENTIL/VENTOLIN HFA) 90 mcg/actuation inhaler, INHALE 2 PUFFS INTO THE LUNGS EVERY 6 HOURS AS NEEDED WHEEZING, Disp: 25.5 g, Rfl: 3    atorvastatin (LIPITOR) 20 MG tablet, Take 1 tablet (20 mg total) by mouth once daily., Disp: 90 tablet, Rfl: 3    blood sugar diagnostic (TRUE METRIX GLUCOSE TEST STRIP) Strp, Use to test blood glucose one (1) time a day,, Disp: 100 strip, Rfl: 3    blood-glucose meter Misc, use as directed, Disp: 1 each, Rfl: 0    buPROPion (WELLBUTRIN XL) 150 MG TB24 tablet, Take 1 tablet (150 mg total) by mouth once daily., Disp: 30 tablet, Rfl: 2    clobetasoL (TEMOVATE) 0.05 % cream, Apply to affected area 2 (two) times daily as needed for flare, Disp: 30 g, Rfl: 1    dexmethylphenidate (FOCALIN) 10 MG tablet, Take 1 tablet (10 mg total) by mouth 2 (two) times daily., Disp: 60 tablet, Rfl: 0    [START ON 9/27/2024] dexmethylphenidate (FOCALIN) 10 MG tablet, Take 1 tablet (10 mg total) by mouth 2 (two) times daily., Disp: 60 tablet, Rfl: 0    [START ON 9/27/2024] dexmethylphenidate (FOCALIN) 10 MG tablet, Take 1 tablet (10 mg total) by mouth 2 (two) times daily., Disp: 60 tablet, Rfl: 0    ergocalciferol, vitamin D2, (VITAMIN D ORAL), Take 1 tablet by mouth every other day., Disp: , Rfl:      fluocinonide (LIDEX) 0.05 % external solution, apply to affected area of scalp daily as needed for itching, scaling, Disp: 60 mL, Rfl: 3    fluorouraciL (EFUDEX) 5 % cream, Apply topically to the affected area once daily., Disp: 40 g, Rfl: 2    HYDROcodone-acetaminophen (NORCO) 5-325 mg per tablet, Take 1 tablet by mouth every 6 (six) hours as needed for Pain., Disp: 15 tablet, Rfl: 0    hydrocortisone 2.5 % cream, APPLY EXTERNALLY TO THE AFFECTED AREA TWICE DAILY FOR 10 DAYS, Disp: 30 g, Rfl: 0    ketoconazole (NIZORAL) 2 % cream, Apply 1 application  topically 2 (two) times daily. Apply to affected area up to 2 weeks, Disp: 15 g, Rfl: 0    lamoTRIgine (LAMICTAL) 100 MG tablet, Take 1 tablet (100 mg total) by mouth 2 (two) times daily. IF any RASH, STOP ALL Lamictal and Tell Psyc MD., Disp: 180 tablet, Rfl: 1    lancets (ONETOUCH DELICA PLUS LANCET) 33 gauge Misc, Use to test blood glucose one (1) time a day,, Disp: 100 each, Rfl: 5    latanoprost 0.005 % ophthalmic solution, Place 1 drop into both eyes every evening., Disp: 7.5 mL, Rfl: 3    levocetirizine (XYZAL) 5 MG tablet, Take 1 tablet (5 mg total) by mouth every evening., Disp: 30 tablet, Rfl: 11    [START ON 9/13/2024] LORazepam (ATIVAN) 0.5 MG tablet, Take 1 tablet (0.5 mg total) by mouth daily as needed (SIGNIFICANT ANXIETY)., Disp: 30 tablet, Rfl: 3    meloxicam (MOBIC) 15 MG tablet, TAKE 1 TABLET(15 MG) BY MOUTH EVERY DAY as needed FOR PAIN, Disp: 30 tablet, Rfl: 2    olopatadine (PATANASE) 0.6 % nasal spray, 1 spray by Each Nostril route 2 (two) times daily., Disp: 30.5 g, Rfl: 5    ondansetron (ZOFRAN-ODT) 8 MG TbDL, Dissolve 1 tablet (8 mg total) on the tongue every 8 (eight) hours as needed (nausea)., Disp: 30 tablet, Rfl: 2    permethrin (ELIMITE) 5 % cream, Apply from the neck down and leave on overnight; wash off in am and repeat in 1 week, Disp: 120 g, Rfl: 1    pregabalin (LYRICA) 200 MG Cap, Take 1 capsule (200 mg total) by mouth 2 (two) times  daily., Disp: 60 capsule, Rfl: 1    rOPINIRole (REQUIP) 0.25 MG tablet, TAKE 1 TABLET(0.25 MG) BY MOUTH EVERY EVENING, Disp: 90 tablet, Rfl: 2    semaglutide (OZEMPIC) 2 mg/dose (8 mg/3 mL) PnIj, Inject 2 mg into the skin every 7 days. (Patient taking differently: Inject 2 mg into the skin every 7 days. Takes on ), Disp: 3 mL, Rfl: 5    tamsulosin (FLOMAX) 0.4 mg Cap, Take 1 capsule (0.4 mg total) by mouth once daily., Disp: 30 capsule, Rfl: 11    ULTRA THIN LANCETS 30 gauge Misc, USE TO TEST BLOOD SUGAR EVERY DAY AS DIRECTED, Disp: 100 each, Rfl: 3    urea (CARMOL) 40 % Crea, Apply topically once daily., Disp: 225 g, Rfl: 2    Current Facility-Administered Medications:     oxymetazoline 0.05 % nasal spray 1 spray, 1 spray, Each Nostril, Q4H PRN, Melody Liu MD    Facility-Administered Medications Ordered in Other Visits:     0.9%  NaCl infusion, 500 mL, Intravenous, Continuous, Kolton Terrell MD    mupirocin 2 % ointment, , Nasal, On Call Procedure, Chong Padron MD, Given at 09/15/22 0553     Social History     Tobacco Use   Smoking Status Former    Current packs/day: 0.00    Types: Cigarettes    Quit date: 2018    Years since quittin.4    Passive exposure: Never   Smokeless Tobacco Never        Review of patient's allergies indicates:  No Known Allergies      ASSESSMENT:   Encounter Diagnoses   Name Primary?    Mood Disorder Unspecifed:  Depressed THO also has some mood elevation  (8of 13 on MDQ): ex: irritability, more self confident, thoughts race. more active)  Yes    ADHD (attention deficit hyperactivity disorder), inattentive type     Other health problem within the family:  Aug 2024 dad surgery small intestine     Anxiety     Insomnia, unspecified type     Type 2 diabetes mellitus without complication, unspecified whether long term insulin use     LUBA (obstructive sleep apnea)     Cervical radiculopathy          Patient Instructions     PLAN:     Follow Up 2024 @ 9:30a  TELEHEALTH     Meds:   Renew as prior    Continue with  [facility] / brief therapy     References:     Relaxation stress reduction workbook: AMI Davenport PhD ( used: $7-10)    Feeling Good Website: Ashwin Anderson MD / wwwJobTalents website (free) / alexey. PODCASTS    Anxiety &  phobia workbook by JORGE Carlisle PhD  (web retailers: used: $ 7-10)    [place]: Path to Better Sleep : https://www.veterantraining.[place].gov/insomnia/ (free)       Pt expressed appreciation for the visit today and did not have further question at this time though pt  was still informed to:     Call  if problems.    Call / Report Side Effects to Psyc MD     Encouraged to follow up with primary care / Gen Med MD for continued monitoring of general health and wellness.    Understanding was expressed; and no further concerns nor questions were raised at this time.     Remember healthy self care:   eat right  attempt adequate rest   HANDWASHING / encourage such alexey. During this corona virus time   walk or light exercise within reason and as your general med team approves  read or explore any of reference materials / homework mentioned  reach out (I.e.,  connect with)  others who nuture and bring out best in you  avoid risky behaviors    Keep your appointments:    IF you  cannot make your appt THEN please call [phone] or go online (via My Chart michael) to reschedule.    It is the responsibility of the patient to reschedule an appointment if an appointment has been canceled or missed.    Avoid  alcohol and illicit substances.  Look for the positive.  All is often relative-seek balance  Call sooner if needed : [phone]   Call 911 or go to Emergency Room  (ER)  if  any acute concerns  >>  References:     Relaxation stress reduction workbook: AMI Davenport PhD ( used: $7-10)    Feeling Good Website: Ashwin Anderson MD / www.[email or web address] website (free) / alexey. PODCASTS    Anxiety &  phobia workbook by JORGE Carlisle PhD  (web retailers: used: $  7-10)    [place]: Path to Better Sleep : https://www.veterantraining.[place].gov/insomnia/ (free)    [place] Quit with Us [place]: http://www.quitwithusla.org/    (and other resources as per counselor, if applicable)     Pt expressed appreciation for the visit today and did not have further question at this time though pt  was still informed to:     Call / Report Side Effects to Psyc MD     Encouraged to follow up with primary care / Gen Med MD for continued monitoring of general health and wellness.    Understanding was expressed; and no further concerns nor questions were raised at this time.     remember healthy self care:   eat right  attempt adequate rest   HANDWASHING / encourage such alexey. During this corona virus time   walk or light exercise within reason and as your general med team approves  read or explore any of reference materials / homework mentioned  reach out (I.e.,  connect with)  others who nuture and bring out best in you  avoid risky behaviors  keep your appointments  IF you  cannot make your appt THEN please call or go online to reschedule.  avoid  alcohol and illicit substances.  Look for the positive.  All is often relative-seek balance  Call [facility]  if questions : [phone]   Call 911 or go to Emergency Room  (ER)  if any acute concern    >> Background from intial [facility] eval  <<    He reports  to male. No children      Has under grad and master in Political science  from [employer] /      Works data mngt [employer]   Excerpt from [facility] Suzi Sanches MD note of 4-  :      43 yo male with DM2, HLD, obesity, GERD, SHEIKH, LUBA, ADHD, asthma, spondylosis, hx of melanoma in situ presents for annual. Pt is new to me. Last PCP Dr Beck.     DM2: ozempic 0.5 mg weekly. Is interested in stopping medication as A1c controlled and has had increasing nausea. Not sure if ozempic is causing it since he has been on it for years.   HLD: not on medication  LUBA: uses cpap  GERD:  "nexium 40 mg bid  Cervical spondylosis with myelopathy: continues to have chronic neck pain with radiation down both arms (now L side increased) also endorses weakness. Does see a spine surgeon. Reports he was told to also r/o rheumatoid arthritis. Pt denies having joint swelling   ADHD: on focalin. Sees psychiatry.   Anxiety: reports increased anxiety over the past few months. Having trouble sleeping. Has not been on SSRI      He was referred in as having started Lexapro 10 mg April 2023. Says helpnig some tho clearly still cites anxiety.      In past saw [facility] R Pregeant Psyc NP / of Excerpt 1-  Psyc ntake:     Patient with hx of pre-glaucoma (eye drops for this), DM type 2 (well controlled with Ozempic), asthma (intermittent, mild), HLD, GERD, insomnia, sleep apnea, ADHD, lumbar discectomy, chronic back pain (managed with gabapentin), ozempic for weight loss.       Reports had previously been a patient of Dr. Dayana Robertson for past 12 to 13 years but was referred to this provider due to Dr. Robertson's [facility].      Patient reports a long history of ADHD, had trialed several medications per communications with Dr. Robertson however Focalin has been the most effective lately. Stopped this for a short while, but since working from home the need for this medication has increased.      Reports anxiety has been more increased since being depleted due to work constraints. Reports some worry late at night with rumination. Occurring randomly but "I do worry a lot."     Reports "I have a compulsion to control and things need to be the way I want them to be."      Also long history of LUBA, insomnia and recently completed BEBP program      Reports been implementing sleep hygiene via BEBP and CBT.      Reports 50# weight loss - this has helped with sleep apnea (settings have been reduced since losing weight).      Discussed to resume focalin IR at [facility] to [facility] pending UDS and CS form completion.     Denies SI/HI, AVH, " racing thoughts, substance abuse

## 2024-08-28 NOTE — PATIENT INSTRUCTIONS
PLAN:     Follow Up Nov 20 2024 @ 9:30a TELEHEALTH     Meds:   Renew as prior    Continue with  [facility] / brief therapy     References:     Relaxation stress reduction workbook: AMI Davenport PhD ( used: $7-10)    Feeling Good Website: Ashwin Anderson MD / [email or web address] website (free) / alexey. PODCASTS    Anxiety &  phobia workbook by JORGE Carlisle PhD  (web retailers: used: $ 7-10)    [place]: Path to Better Sleep : https://www.veterantraining.[place].gov/insomnia/ (free)       Pt expressed appreciation for the visit today and did not have further question at this time though pt  was still informed to:     Call  if problems.    Call / Report Side Effects to Psyc MD     Encouraged to follow up with primary care / Gen Med MD for continued monitoring of general health and wellness.    Understanding was expressed; and no further concerns nor questions were raised at this time.     Remember healthy self care:   eat right  attempt adequate rest   HANDWASHING / encourage such alexey. During this corona virus time   walk or light exercise within reason and as your general med team approves  read or explore any of reference materials / homework mentioned  reach out (I.e.,  connect with)  others who nuture and bring out best in you  avoid risky behaviors    Keep your appointments:    IF you  cannot make your appt THEN please call [phone] or go online (via My Chart michael) to reschedule.    It is the responsibility of the patient to reschedule an appointment if an appointment has been canceled or missed.    Avoid  alcohol and illicit substances.  Look for the positive.  All is often relative-seek balance  Call sooner if needed : [phone]   Call 911 or go to Emergency Room  (ER)  if  any acute concerns

## 2024-08-29 ENCOUNTER — TELEPHONE (OUTPATIENT)
Dept: PAIN MEDICINE | Facility: CLINIC | Age: 44
End: 2024-08-29
Payer: COMMERCIAL

## 2024-08-29 NOTE — DISCHARGE INSTRUCTIONS
Your surgery has been scheduled for:9/12/2024    You should report to:  [facility], located on the [facility] side of the Second floor of the [facility] ([phone])      Please Note   Tell your doctor if you take Aspirin, products containing Aspirin, herbal medications  or blood thinners, such as Coumadin, Ticlid, or Plavix.  (Consult your provider regarding holding or stopping before surgery).  Arrange for someone to drive you home following surgery.  You will not be allowed to leave the surgical facility alone or drive yourself home following sedation and anesthesia.    Before Surgery  Stop taking all herbal medications, vitamins, and supplements 7 days prior to surgery  No Motrin/Advil (Ibuprofen) 7 days before surgery  No Aleve (Naproxen) 7 days before surgery  Stop Taking Asprin, products containing Asprin __7___days before surgery  Stop taking blood thinners_______days before surgery  No Goody's/BC  Powder 7 days before surgery  Refrain from drinking alcoholic beverages for 24hours before and after surgery  Stop or limit smoking __7_______days before surgery  You may take Tylenol for pain    Night before Surgery  Do not eat or drink after midnight  Take a shower or bath (shower is recommended).  Bathe with Hibiclens soap or an antibacterial soap from the neck down.  If not supplied by your surgeon, hibiclens soap will need to be purchased over the counter in pharmacy.  Rinse soap off thoroughly.  Shampoo your hair with your regular shampoo    The Day of Surgery  Take another bath or shower with hibiclens or any antibacterial soap, to reduce the chance of infection.  Take heart and blood pressure medications with a small sip of water, as advised by the perioperative team.  Do not take fluid pills  You may brush your teeth and rinse your mouth, but do not swall any additional water.   Do not apply perfumes, powder, body lotions or deodorant on the day of  surgery.  Nail polish should be removed.  Do not wear makeup or moisturizer  Wear comfortable clothes, such as a button front shirt and loose fitting pants.  Leave all jewelry, including body piercings, and valuables at home.    Bring any devices you will neeed after surgery such as crutches or canes.  If you have sleep apnea, please bring your CPAP machine  In the event that your physical condition changes including the onset of a cold or respiratory illness, or if you have to delay or cancel your surgery, please notify your surgeon.       Anesthesia: General Anesthesia     You are watched continuously during your procedure by your anesthesia provider.     Youre due to have surgery. During surgery, youll be given medicine called anesthesia or anesthetic. This will keep you comfortable and pain-free. Your anesthesia provider will use general anesthesia.  What is general anesthesia?  General anesthesia puts you into a state like deep sleep. It goes into the bloodstream (IV anesthetics), into the lungs (gas anesthetics), or both. You feel nothing during the procedure. You will not remember it. During the procedure, the anesthesia provider monitors you continuously. He or she checks your heart rate and rhythm, blood pressure, breathing, and blood oxygen.  IV anesthetics. IV anesthetics are given through an IV line in your arm. Theyre often given first. This is so you are asleep before a gas anesthetic is started. Some kinds of IV anesthetics relieve pain. Others relax you. Your doctor will decide which kind is best in your case.  Gas anesthetics. Gas anesthetics are breathed into the lungs. They are often used to keep you asleep. They can be given through a facemask or a tube placed in your larynx or trachea (breathing tube).  If you have a facemask, your anesthesia provider will most likely place it over your nose and mouth while youre still awake. Youll breathe oxygen through the mask as your IV anesthetic is  started. Gas anesthetic may be added through the mask.  If you have a tube in the larynx or trachea, it will be inserted into your throat after youre asleep.  Anesthesia tools and medicines  You will likely have:  IV anesthetics. These are put into an IV line into your bloodstream.  Gas anesthetics. You breathe these anesthetics into your lungs, where they pass into your bloodstream.  Pulse oximeter. This is a small clip that is attached to the end of your finger. This measures your blood oxygen level.  Electrocardiography leads (electrodes). These are small sticky pads that are placed on your chest. They record your heart rate and rhythm.  Blood pressure cuff. This reads your blood pressure.  Risks and possible complications  General anesthesia has some risks. These include:  Breathing problems  Nausea and vomiting  Sore throat or hoarseness (usually temporary)  Allergic reaction to the anesthetic  Irregular heartbeat (rare)  Cardiac arrest (rare)   Anesthesia safety  Follow all instructions you are given for how long not to eat or drink before your procedure.  Be sure your doctor knows what medicines and drugs you take. This includes over-the-counter medicines, herbs, supplements, alcohol or other drugs. You will be asked when those were last taken.  Have an adult family member or friend drive you home after the procedure.  For the first 24 hours after your surgery:  Do not drive or use heavy equipment.  Do not make important decisions or sign legal documents. If important decisions or signing legal documents is necessary during the first 24 hours after surgery, have a trusted family member or spouse act on your behalf.  Avoid alcohol.  Have a responsible adult stay with you. He or she can watch for problems and help keep you safe.  Date Last Reviewed: 12/1/2016 © 2000-2017 [employer]. [address] [place] [address], [place] [address]. All rights reserved. This information is not intended as a substitute  for professional medical care. Always follow your healthcare professional's instructions.

## 2024-08-30 ENCOUNTER — OFFICE VISIT (OUTPATIENT)
Dept: INTERNAL MEDICINE | Facility: CLINIC | Age: 44
End: 2024-08-30
Payer: COMMERCIAL

## 2024-08-30 ENCOUNTER — HOSPITAL ENCOUNTER (OUTPATIENT)
Dept: PREADMISSION TESTING | Facility: HOSPITAL | Age: 44
Discharge: HOME | End: 2024-08-30
Attending: NEUROLOGICAL SURGERY | Admitting: NEUROLOGICAL SURGERY
Payer: COMMERCIAL

## 2024-08-30 VITALS
WEIGHT: 235 LBS | SYSTOLIC BLOOD PRESSURE: 115 MMHG | TEMPERATURE: 98 F | HEIGHT: 65 IN | DIASTOLIC BLOOD PRESSURE: 65 MMHG | HEART RATE: 71 BPM | OXYGEN SATURATION: 99 % | RESPIRATION RATE: 14 BRPM | BODY MASS INDEX: 39.15 KG/M2

## 2024-08-30 DIAGNOSIS — G25.81 RESTLESS LEG: ICD-10-CM

## 2024-08-30 DIAGNOSIS — G89.29 OTHER CHRONIC PAIN: ICD-10-CM

## 2024-08-30 DIAGNOSIS — K59.00 CONSTIPATION, UNSPECIFIED CONSTIPATION TYPE: ICD-10-CM

## 2024-08-30 DIAGNOSIS — E11.69 HYPERLIPIDEMIA ASSOCIATED WITH TYPE 2 DIABETES MELLITUS: ICD-10-CM

## 2024-08-30 DIAGNOSIS — K21.9 GASTROESOPHAGEAL REFLUX DISEASE, UNSPECIFIED WHETHER ESOPHAGITIS PRESENT: ICD-10-CM

## 2024-08-30 DIAGNOSIS — G47.33 OSA (OBSTRUCTIVE SLEEP APNEA): ICD-10-CM

## 2024-08-30 DIAGNOSIS — Z88.9 HISTORY OF MULTIPLE ALLERGIES: ICD-10-CM

## 2024-08-30 DIAGNOSIS — K75.81 NASH (NONALCOHOLIC STEATOHEPATITIS): ICD-10-CM

## 2024-08-30 DIAGNOSIS — F41.9 ANXIETY: ICD-10-CM

## 2024-08-30 DIAGNOSIS — N20.0 RENAL CALCULI: ICD-10-CM

## 2024-08-30 DIAGNOSIS — E78.5 HYPERLIPIDEMIA ASSOCIATED WITH TYPE 2 DIABETES MELLITUS: ICD-10-CM

## 2024-08-30 DIAGNOSIS — J45.20 MILD INTERMITTENT ASTHMA WITHOUT COMPLICATION: ICD-10-CM

## 2024-08-30 DIAGNOSIS — Z01.818 PREOP EXAMINATION: Primary | ICD-10-CM

## 2024-08-30 DIAGNOSIS — R39.9 LOWER URINARY TRACT SYMPTOMS (LUTS): ICD-10-CM

## 2024-08-30 DIAGNOSIS — E11.9 TYPE 2 DIABETES MELLITUS WITHOUT COMPLICATION, WITHOUT LONG-TERM CURRENT USE OF INSULIN: ICD-10-CM

## 2024-08-30 PROBLEM — M75.82 ROTATOR CUFF TENDONITIS, LEFT: Status: RESOLVED | Noted: 2023-05-22 | Resolved: 2024-08-30

## 2024-08-30 PROBLEM — J34.2 DEVIATED NASAL SEPTUM: Status: RESOLVED | Noted: 2024-07-15 | Resolved: 2024-08-30

## 2024-08-30 PROBLEM — M25.522 PAIN IN LEFT ELBOW: Status: RESOLVED | Noted: 2021-03-18 | Resolved: 2024-08-30

## 2024-08-30 PROBLEM — N13.2 URETERAL STONE WITH HYDRONEPHROSIS: Status: RESOLVED | Noted: 2023-07-05 | Resolved: 2024-08-30

## 2024-08-30 PROBLEM — R74.8 ELEVATED LIVER ENZYMES: Status: RESOLVED | Noted: 2019-03-27 | Resolved: 2024-08-30

## 2024-08-30 PROBLEM — L03.114 CELLULITIS OF LEFT ARM: Status: RESOLVED | Noted: 2019-08-01 | Resolved: 2024-08-30

## 2024-08-30 PROBLEM — R00.0 TACHYCARDIA: Status: RESOLVED | Noted: 2021-09-19 | Resolved: 2024-08-30

## 2024-08-30 PROBLEM — M77.12 LEFT LATERAL EPICONDYLITIS: Status: RESOLVED | Noted: 2023-05-22 | Resolved: 2024-08-30

## 2024-08-30 PROBLEM — Z87.442 HISTORY OF URINARY STONE: Status: RESOLVED | Noted: 2021-09-14 | Resolved: 2024-08-30

## 2024-08-30 PROCEDURE — 99999 PR PBB SHADOW E&M-EST. PATIENT-LVL II: CPT | Mod: PBBFAC,,, | Performed by: HOSPITALIST

## 2024-08-30 RX ORDER — DEXTROMETHORPHAN HYDROBROMIDE, GUAIFENESIN 5; 100 MG/5ML; MG/5ML
1300 LIQUID ORAL DAILY PRN
COMMUNITY

## 2024-08-30 RX ORDER — OMEGA-3S/DHA/EPA/FISH OIL 1000-1400
CAPSULE,DELAYED RELEASE (ENTERIC COATED) ORAL DAILY
COMMUNITY

## 2024-08-30 NOTE — HPI
History of present illness- I had the pleasure of meeting this pleasant 44 y.o. gentleman in the pre op clinic prior to his elective spine surgery. The patient is new to me .  Offered to have family to be on the phone during the consultation     I have obtained the history by speaking to the patient and by reviewing the electronic health records.    Events leading up to surgery / History of presenting illness -    Long standing spine problems  Family history of spine problems    Dropping things - both hands Rt > Left   Numbness, weakness, dropping things , shaking , difficulty writing - both hands       Shooting , throbbing pain - Left leg > Rt   Burning pain - buttock areas    Aching neck pain    Tylenol/ Flexeril helps  Physical activity increases the upper extremity symptoms- Decreases lower extremity symptoms    Had 2 spine surgeries - 2010 , 2021    No injuries    Relevant health conditions of significance for the perioperative period/ History of presenting illness -    DM2  Ozempic   HLD  BMI 39.27  LUBA  RLS  Kidney stones Ca oxalate   Fatty liver  Asthma  Anxiety and depression       Not known to have HTN, heart problem , Thyroid problem, deep vein thrombosis, pulmonary embolism,  COVID infection,  blood vessels stent     Lives with spouse  Single  story house   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

## 2024-08-30 NOTE — ASSESSMENT & PLAN NOTE
Weight related conditions     Known to have     Type 2 Diabetes   Hyperlipidemia   Sleep apnea   Acid reflux   Fatty liver   Osteoarthritis    Not troubled with / Not known to have      Gout       Encouraged maintaining healthy weight for improved health

## 2024-08-30 NOTE — ASSESSMENT & PLAN NOTE
Sleep apnea   Uses CPAP .  Suggested bringing for hospital use .   Informed the risk of worsening sleep apnea in the perioperative period and suggest using CPAP use any time in 24 hrs ( day or night )for planned sleep     I suggest  caution with usage of medication that can cause respiratory suppression in the perioperative period    Avoidance of  supine sleep, weight gain , alcoholic beverages , care with , sedative , CNS depressant use indicated  since all of these can worsen LUBA      Since lost weight, nasal surgery doing better

## 2024-08-30 NOTE — OUTPATIENT SUBJECTIVE & OBJECTIVE
Outpatient Subjective & Objective     Chief complaint-Preoperative evaluation, Perioperative Medical management, complication reduction plan     Active cardiac conditions- none    Revised cardiac risk index predictors- none    Functional capacity -Examples of physical activity, walks, swim,   can take 1 flight of stairs----- He can undertake all the above activities without  chest pain,chest tightness, Shortness of breath ,dizziness,lightheadedness making his exercise tolerance more,  than 4 Mets.       Review of Systems   Constitutional:  Negative for chills and fever.   HENT:          Sleep apnea   Eyes:         No unusual vision changes   Respiratory:          No cough , phlegm     No Hemoptysis   Cardiovascular:         As noted   Gastrointestinal:         Bowels- Regular  No overt GI/ blood losses   Endocrine:        Prednisone use > 20 mg daily for 3 weeks- none   Genitourinary:  Negative for dysuria.   Musculoskeletal:         As above      Skin:  Negative for rash.   Neurological:  Negative for syncope.        No unilateral weakness   Hematological:         Current use of Anticoagulants  None     No HIV          No anesthesia, bleeding, cardiac problems, PONV with previous surgeries/procedures.  Medications and Allergies reviewed in epic.   FH- No anesthesia,bleeding / venous thrombosis , in family      Physical Exam      Physical Exam  Constitutional-   General appearance-Conscious,Coherent  Eyes- No conjunctival icterus,pupils  round  and reactive to light   ENT-Oral cavity- moist    , Hearing grossly normal   Neck- No thyromegaly ,Trachea -central, No jugular venous distension,   No Carotid Bruit   Cardiovascular -Heart Sounds- Normal  and  no murmur   , No gallop rhythm   Respiratory - Normal Respiratory Effort, Normal breath sounds,  no wheeze , and  no forced expiratory wheeze    Peripheral pitting pedal edema-- none , no calf pain   Gastrointestinal -Soft abdomen, No palpable masses, Non  Tender,Liver,Spleen not palpable. No-- free fluid and shifting dullness  Musculoskeletal- No finger Clubbing. Strength grossly normal   Lymphatic-No Palpable cervical, axillary,Inguinal lymphadenopathy   Psychiatric - normal effect,Orientation  Rt Dorsalis pedis pulses-palpable    Lt Dorsalis pedis pulses- palpable   Rt Posterior tibial pulses -palpable   Left posterior tibial pulses -palpable   Miscellaneous -  no asterixis,  no dupuytren's contracture,  Surgical scarabdomen , and  no renal bruit     Investigations  Lab and Imaging have been reviewed in epic.    Review of Medicine tests    EKG- I had independently reviewed the EKG from--5/7/2024  It was reported to be showing     Normal sinus rhythm   Left axis deviation   Abnormal ECG   When compared with ECG of 09-JUN-2023 14:12,   Inverted T waves have replaced nonspecific T wave abnormality in Inferior   leads     Review of clinical lab tests:  Lab Results   Component Value Date    CREATININE 0.7 05/07/2024    HGB 16.4 05/07/2024     05/07/2024               Review of old records- Was done and information gathered regards to events leading to surgery and health conditions of significance in the perioperative period.    Outpatient Subjective & Objective

## 2024-08-30 NOTE — ASSESSMENT & PLAN NOTE
Kidney stones Ca oxalate     Discussed hydration   Not known to have elevated calcium, Uric acid   Not known have parathyroid problem, Gout

## 2024-08-30 NOTE — ASSESSMENT & PLAN NOTE
Doing good from a Allergies / nasal stand point   allergy shots monthly   Asthma is controlled . I suggest consideration of inhaled bronchodilator use if the patient has perioperative bronchospasm   Happy with asthma control

## 2024-08-30 NOTE — ASSESSMENT & PLAN NOTE
Anxiety and depression  wellbutrin Focalin  Ativan lamictal    Doing good from a mental health stand point   Under psychiatry  Has supportive family   On Medication that is helping   No Suicidal / Homicidal ideation      Hold Focalin AM of surgery     I suggest monitoring the sodium as SIADH from Wellbutrin  use and hypersecretion of ADH associated with surgery can reduce sodium in the perioperative period

## 2024-08-30 NOTE — ASSESSMENT & PLAN NOTE
Doing good since had procedure       Does not sound Cardiac     . I suggest aspiration precautions    I sugge

## 2024-08-30 NOTE — ASSESSMENT & PLAN NOTE
DM2  Ozempic - Monday - suggested skipping the 9/9 dose      Hemoglobin A1c- 5.4  Capillary glucose check-None      Diabetes Complications     Microvascular     Not known to have   Diabetes affecting the eyes, Kidneys   No reported open areas on the feet   Feet care suggested     Macrovascular     No stroke/ TIA  Not known to have CAD  No suggestion of  lower extremity claudication      Diabetes Mellitus-I suggest monitoring the glucose in the perioperative period ( Before meals and bed time,if the patient is on oral feeds or every 6 hourly ,if the patient is NPO )  Blood glucose target in hospitalized patients is 140-180. Oral Hypoglycemic agents are generally avoided during the hospital stay . If glucose is consistently elevated ,I suggest using basal ,prandial Insulin regimen to control the glucose , as elevated glucose can be associated with adverse surgical out comes. Please consider involving Hospital Medicine or Endocrinology ,if any help is needed with Glucose control. Patient will be instructed based on the pre op clinic guidelines  about adjustment of diabetic treatment (If applicable )  considering the NPO status for Surgery      I had educated that uncontrolled DM can cause post op complications,risk of infection, wound healing problem,increased length of stay in hospital and its associated complications.I suggest exercise as much as possible and follow diabetic diet       Takes Fiber-  Likely Ozempic related     Suggested working on bowel movements in preparation for surgery   Constipation- I suggest giving bowel movement regimen as opioid use,reduced ambulation  can increase the constipation      Exercise helping

## 2024-08-30 NOTE — PROGRESS NOTES
[facility]  Progress Note    Patient Name: Hermelindo Garza III  MRN: [id number]  Date of Evaluation- 08/30/2024  PCP- Suzi Sanches MD    Future cases for Hermelindo Garza III [[id number]]       Case ID Status Date Time Jeferson Procedure Provider Location    [id number] [facility] 9/12/2024  7:00  DISCECTOMY, SPINE, CERVICAL, ANTERIOR APPROACH, WITH FUSION Naseem Mcnamara DO [[id number]] NOM OR 2ND FLR            HPI:  History of present illness- I had the pleasure of meeting this pleasant 44 y.o. gentleman in the pre op clinic prior to his elective spine surgery. The patient is new to me .  Offered to have family to be on the phone during the consultation     I have obtained the history by speaking to the patient and by reviewing the electronic health records.    Events leading up to surgery / History of presenting illness -    Long standing spine problems  Family history of spine problems    Dropping things - both hands Rt > Left   Numbness, weakness, dropping things , shaking , difficulty writing - both hands       Shooting , throbbing pain - Left leg > Rt   Burning pain - buttock areas    Aching neck pain    Tylenol/ Flexeril helps  Physical activity increases the upper extremity symptoms- Decreases lower extremity symptoms    Had 2 spine surgeries - 2010 , 2021    No injuries    Relevant health conditions of significance for the perioperative period/ History of presenting illness -    DM2  Ozempic   HLD  BMI 39.27  LUBA  RLS  Kidney stones Ca oxalate   Fatty liver  Asthma  Anxiety and depression       Not known to have HTN, heart problem , Thyroid problem, deep vein thrombosis, pulmonary embolism,  COVID infection,  blood vessels stent     Lives with spouse  Single  story house   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       Review of Systems   Constitutional:  Negative for chills and fever.   HENT:          Sleep apnea   Eyes:         No unusual vision changes   Respiratory:          No cough , phlegm     No Hemoptysis   Cardiovascular:         As noted   Gastrointestinal:         Bowels- Regular  No overt GI/ blood losses   Endocrine:        Prednisone use > 20 mg daily for 3 weeks- none   Genitourinary:  Negative for dysuria.   Musculoskeletal:         As above      Skin:  Negative for rash.   Neurological:  Negative for syncope.        No unilateral weakness   Hematological:         Current use of Anticoagulants  None     No HIV          No anesthesia, bleeding, cardiac problems, PONV with previous surgeries/procedures.  Medications and Allergies reviewed in epic.   FH- No anesthesia,bleeding / venous thrombosis , in family      Physical Exam      Physical Exam  Constitutional-   General appearance-Conscious,Coherent  Eyes- No conjunctival icterus,pupils  round  and reactive to light   ENT-Oral cavity- moist    , Hearing grossly normal   Neck- No thyromegaly ,Trachea -central, No jugular venous distension,   No Carotid Bruit   Cardiovascular -Heart Sounds- Normal  and  no murmur   , No gallop rhythm   Respiratory - Normal Respiratory Effort, Normal breath sounds,  no wheeze , and  no forced expiratory wheeze    Peripheral pitting pedal edema-- none , no calf pain   Gastrointestinal -Soft abdomen, No palpable masses, Non Tender,Liver,Spleen not palpable. No-- free fluid and shifting dullness  Musculoskeletal- No finger Clubbing. Strength grossly normal   Lymphatic-No  Palpable cervical, axillary,Inguinal lymphadenopathy   Psychiatric - normal effect,Orientation  Rt Dorsalis pedis pulses-palpable    Lt Dorsalis pedis pulses- palpable   Rt Posterior tibial pulses -palpable   Left posterior tibial pulses -palpable   Miscellaneous -  no asterixis,  no dupuytren's contracture,  Surgical scarabdomen , and  no renal bruit     Investigations  Lab and Imaging have been reviewed in [facility].    Review of Medicine tests    EKG- I had independently reviewed the EKG from--5/7/2024  It was reported to be showing     Normal sinus rhythm   Left axis deviation   Abnormal ECG   When compared with ECG of 09-JUN-2023 14:12,   Inverted T waves have replaced nonspecific T wave abnormality in Inferior   leads     Review of clinical lab tests:  Lab Results   Component Value Date    CREATININE 0.7 05/07/2024    HGB 16.4 05/07/2024     05/07/2024               Review of old records- Was done and information gathered regards to events leading to surgery and health conditions of significance in the perioperative period.        Preoperative cardiac risk assessment-  The patient does not have any active cardiac conditions . Revised cardiac risk index predictors- 0---.Functional capacity is more than 4 Mets. He will be undergoing a Spine procedure that carries a Moderate Risk risk     Risk of a major Cardiac event ( Defined as death, myocardial infarction, or cardiac arrest at 30 days after noncardiac surgery), based on RCRI score     3.9%           Offered Cardiac evaluation- he felt good to proceed without Cardiac evaluation          American Society of Anesthesiologists Physical status classification ( ASA ) class: 2     Postoperative pulmonary complication risk assessment:      ARISCAT ( Canet) risk index- risk class -  Low          Assessment/Plan:     Restless leg  Not too troubled   Lyrica might be helping     Chronic pain  Spine related   Not on chronic opioid , NSAID   Under pain management      Anxiety  Anxiety and depression  wellbutrin Focalin  Ativan lamictal    Doing good from a mental health stand point   Under psychiatry  Has supportive family   On Medication that is helping   No Suicidal / Homicidal ideation      Hold Focalin AM of surgery     I suggest monitoring the sodium as SIADH from Wellbutrin  use and hypersecretion of ADH associated with surgery can reduce sodium in the perioperative period     Mild intermittent asthma without complication  Doing good from a Allergies / nasal stand point   allergy shots monthly   Asthma is controlled . I suggest consideration of inhaled bronchodilator use if the patient has perioperative bronchospasm   Happy with asthma control     Hyperlipidemia associated with type 2 diabetes mellitus  HLD-I  suggest continuation of statin during the entire perioperative period.     Renal calculi  Kidney stones Ca oxalate     Discussed hydration   Not known to have elevated calcium, Uric acid   Not known have parathyroid problem, Gout      Type 2 diabetes mellitus without complications  DM2  Ozempic - Monday - suggested skipping the 9/9 dose      Hemoglobin A1c- 5.4  Capillary glucose check-None      Diabetes Complications     Microvascular     Not known to have   Diabetes affecting the eyes, Kidneys   No reported open areas on the feet   Feet care suggested     Macrovascular     No stroke/ TIA  Not known to have CAD  No suggestion of  lower extremity claudication      Diabetes Mellitus-I suggest monitoring the glucose in the perioperative period ( Before meals and bed time,if the patient is on oral feeds or every 6 hourly ,if the patient is NPO )  Blood glucose target in hospitalized patients is 140-180. Oral Hypoglycemic agents are generally avoided during the hospital stay . If glucose is consistently elevated ,I suggest using basal ,prandial Insulin regimen to control the glucose , as elevated glucose can be associated with adverse surgical out comes. Please  consider involving Hospital Medicine or Endocrinology ,if any help is needed with Glucose control. Patient will be instructed based on the pre op clinic guidelines  about adjustment of diabetic treatment (If applicable )  considering the NPO status for Surgery      I had educated that uncontrolled DM can cause post op complications,risk of infection, wound healing problem,increased length of stay in hospital and its associated complications.I suggest exercise as much as possible and follow diabetic diet       Takes Fiber-  Likely Ozempic related     Suggested working on bowel movements in preparation for surgery   Constipation- I suggest giving bowel movement regimen as opioid use,reduced ambulation  can increase the constipation      Exercise helping         BMI 39.0-39.9,adult  Weight related conditions     Known to have     Type 2 Diabetes   Hyperlipidemia   Sleep apnea   Acid reflux   Fatty liver   Osteoarthritis    Not troubled with / Not known to have      Gout       Encouraged maintaining healthy weight for improved health     SHEIKH (nonalcoholic steatohepatitis)  Most recent liver function -N  4 drinks a week       No history  of cirrhosis of liver or suggestions of Liver  decompensation   No Jaundice , dark urine , pale stool   No easy bleeding or bruising  No varices history    Offered Hepatology - he currently did not take it    July 2023-Liver is enlarged, measuring approximately 20 cm in craniocaudal length. Possible mild hepatic steatosis. No focal liver mass identified. Gallbladder is unremarkable. No intrahepatic biliary ductal dilatation.       Gastroesophageal reflux disease  Doing good since had procedure       Does not sound Cardiac     . I suggest aspiration precautions    I sugge    LUBA (obstructive sleep apnea)  Sleep apnea   Uses CPAP .  Suggested bringing for hospital use .   Informed the risk of worsening sleep apnea in the perioperative period and suggest using CPAP use any time in 24 hrs (  day or night )for planned sleep     I suggest  caution with usage of medication that can cause respiratory suppression in the perioperative period    Avoidance of  supine sleep, weight gain , alcoholic beverages , care with , sedative , CNS depressant use indicated  since all of these can worsen LUBA      Since lost weight, nasal surgery doing better         History of multiple allergies  Under allergist care    Constipation        Lower urinary tract symptoms (LUTS)  As per chart  Considered primary bladder neck obstruction as potential cause of urinary symptoms, given normal prostate size expected at age 44     Urinating well  Emptying well  Increased risk of post operative urinary retention  I suggest monitoring the  bladder volume with a view to decompress the bladder to avoid bladder stretching and resultant urinary retention   To minimize the risk of postoperative urinary retention address constipation( if applicable), increased ambulation, minimize opioid and anticholinergic use    Continue Flomax perioperatively           Preventive perioperative care    Thromboembolic prophylaxis:  His risk factors for thrombosis include surgical procedure and age.I suggest  thromboembolic prophylaxis ( mechanical/pharmacological, weighing the risk benefits of pharmacological agent use considering [facility] procedural bleeding )  during the perioperative period.I suggested being active in the post operative period.      Postoperative pulmonary complication prophylaxis-- I suggest incentive spirometry use, early ambulation, and end tidal carbon dioxide monitoring  , oral care , head end of bed elevation      Renal complication prophylaxis-Risk factors for renal complications include diabetes mellitus . I suggest keeping him well hydrated  in the perioperative period.    Surgical site Infection Prophylaxis-I  suggest appropriate antibiotic for Prophylaxis against Surgical site infections  No reported Staph infection  Skin  antibacterial discussed    Offered nasal Bactroban decolonization he did not take it         In view of Spine procedure the patient  is at risk of postoperative urinary retention.  I suggest avoidance / minimizing the of  Benzodiazepines,Anticholinergic medication,antihistamines ( Benadryl) , if possible in the perioperative period. I suggest using the minimum possible use of opioids for the minimum period of time in the perioperative period. Benadryl avoidance suggested      This visit was focused on Preoperative evaluation, Perioperative Medical management, complication reduction plans. I suggest that the patient follows up with primary care or relevant sub specialists for ongoing health care.    I appreciate the opportunity to be involved in this patients care. Please feel free to contact me if there were any questions about this consultation.    Patient is optimized    Patient/ care giver/ Family member was instructed to call and update me about any changes to health,  medication, office visits ,testing out side of the [facility] , hospitalizations between now and surgery      Amna Amaya MD  Internal Medicine  [facility]   Cell Phone- [phone]    History of COVID - no   COVID vaccination status -Yes    COVID screening     No fever   No cough   No SOB  No sore throat   No loss of taste or smell   No muscle aches   No nausea, vomiting , diarrhea     Checked for over-the-counter medication      I have spent ------ minutes of time which includes, time spent to prepare to see the patient , obtaining history ,performing examination, counseling/Educating the patient , Documenting clinical information in the record    --    8/30/2024- 16 25       Blood tests from  today reviewed and  are acceptable for surgery    Messaged allergist to check if they are going to be any  issues from allergy injections for his upcoming surgery

## 2024-08-30 NOTE — ASSESSMENT & PLAN NOTE
As per chart  Considered primary bladder neck obstruction as potential cause of urinary symptoms, given normal prostate size expected at age 44     Urinating well  Emptying well  Increased risk of post operative urinary retention  I suggest monitoring the  bladder volume with a view to decompress the bladder to avoid bladder stretching and resultant urinary retention   To minimize the risk of postoperative urinary retention address constipation( if applicable), increased ambulation, minimize opioid and anticholinergic use    Continue Flomax perioperatively

## 2024-08-30 NOTE — ASSESSMENT & PLAN NOTE
Most recent liver function -N  4 drinks a week       No history  of cirrhosis of liver or suggestions of Liver  decompensation   No Jaundice , dark urine , pale stool   No easy bleeding or bruising  No varices history    Offered Hepatology - he currently did not take it    July 2023-Liver is enlarged, measuring approximately 20 cm in craniocaudal length. Possible mild hepatic steatosis. No focal liver mass identified. Gallbladder is unremarkable. No intrahepatic biliary ductal dilatation.

## 2024-09-03 ENCOUNTER — PATIENT MESSAGE (OUTPATIENT)
Dept: OTOLARYNGOLOGY | Facility: CLINIC | Age: 44
End: 2024-09-03
Payer: COMMERCIAL

## 2024-09-03 DIAGNOSIS — M50.10 CERVICAL DISC DISORDER WITH RADICULOPATHY, UNSPECIFIED CERVICAL REGION: Primary | ICD-10-CM

## 2024-09-04 ENCOUNTER — PATIENT MESSAGE (OUTPATIENT)
Dept: UROLOGY | Facility: CLINIC | Age: 44
End: 2024-09-04
Payer: COMMERCIAL

## 2024-09-04 ENCOUNTER — PROCEDURE VISIT (OUTPATIENT)
Facility: CLINIC | Age: 44
End: 2024-09-04
Payer: COMMERCIAL

## 2024-09-04 ENCOUNTER — TELEPHONE (OUTPATIENT)
Dept: UROLOGY | Facility: CLINIC | Age: 44
End: 2024-09-04
Payer: COMMERCIAL

## 2024-09-04 ENCOUNTER — PATIENT MESSAGE (OUTPATIENT)
Dept: NEUROSURGERY | Facility: CLINIC | Age: 44
End: 2024-09-04
Payer: COMMERCIAL

## 2024-09-04 ENCOUNTER — PATIENT MESSAGE (OUTPATIENT)
Dept: INTERNAL MEDICINE | Facility: CLINIC | Age: 44
End: 2024-09-04
Payer: COMMERCIAL

## 2024-09-04 ENCOUNTER — HOSPITAL ENCOUNTER (OUTPATIENT)
Dept: RADIOLOGY | Facility: HOSPITAL | Age: 44
Discharge: HOME | End: 2024-09-04
Attending: UROLOGY
Payer: COMMERCIAL

## 2024-09-04 VITALS
DIASTOLIC BLOOD PRESSURE: 83 MMHG | HEART RATE: 101 BPM | RESPIRATION RATE: 20 BRPM | TEMPERATURE: 98 F | SYSTOLIC BLOOD PRESSURE: 120 MMHG

## 2024-09-04 DIAGNOSIS — N39.8 VOIDING DYSFUNCTION: ICD-10-CM

## 2024-09-04 DIAGNOSIS — N39.8 VOIDING DYSFUNCTION: Primary | ICD-10-CM

## 2024-09-04 RX ORDER — LIDOCAINE HYDROCHLORIDE 20 MG/ML
JELLY TOPICAL
Status: COMPLETED | OUTPATIENT
Start: 2024-09-04 | End: 2024-09-04

## 2024-09-04 RX ADMIN — LIDOCAINE HYDROCHLORIDE: 20 JELLY TOPICAL at 08:09

## 2024-09-04 NOTE — PROCEDURES
Urodynamic Report    [facility]       Referring Physician:  Jay Walter MD    YOB: 1980  Date of Exam: 9/4/2024    HPI:  Mr. Garza reports urinary symptoms for approximately 1 year, initially beginning during a previous episode of kidney stones. Symptoms include nocturia once per night and frequent urination 5-6 times daily. Mr. Garza has urinary hesitancy, often waiting or straining to initiate urination consistently. At times, he needs to wait and relax or is unable to void. Mr. Garza has been attempting to stay hydrated and consume citric acid as previously advised, but urinary symptoms have persisted. He expresses concern about the nature of these symptoms and their potential relation to his previous kidney stones. Mr. Garza previously took Flomax for kidney stones but is not currently on any medication for these urinary symptoms.     MEDICATIONS:  Flomax (tamsulosin), previously taken for kidney stones.     MEDICAL HISTORY:  Kidney stones: Past occurrence.  Primary bladder neck obstruction: Suspected based on current symptoms.    PSA 0.19     Patient had vasovagal event upon attempt to place the urethral catheters; near syncope; patient has already taken benzodiazapine. Will not be able to definitively evaluate for SAEED without pressure flow study. Did offer to perform cystoscopy in OR to see if any obvious obstruction. Patient would not be open to bladder neck incision.

## 2024-09-05 ENCOUNTER — OFFICE VISIT (OUTPATIENT)
Dept: PAIN MEDICINE | Facility: CLINIC | Age: 44
End: 2024-09-05
Payer: COMMERCIAL

## 2024-09-05 VITALS
HEIGHT: 65 IN | BODY MASS INDEX: 39.11 KG/M2 | SYSTOLIC BLOOD PRESSURE: 132 MMHG | DIASTOLIC BLOOD PRESSURE: 83 MMHG | HEART RATE: 102 BPM

## 2024-09-05 DIAGNOSIS — M54.16 LUMBAR RADICULOPATHY, CHRONIC: Primary | ICD-10-CM

## 2024-09-05 DIAGNOSIS — M51.36 DDD (DEGENERATIVE DISC DISEASE), LUMBAR: ICD-10-CM

## 2024-09-05 DIAGNOSIS — M54.12 CERVICAL RADICULOPATHY: ICD-10-CM

## 2024-09-05 DIAGNOSIS — G89.4 CHRONIC PAIN SYNDROME: ICD-10-CM

## 2024-09-05 PROCEDURE — 3044F HG A1C LEVEL LT 7.0%: CPT | Mod: CPTII,S$GLB,, | Performed by: NURSE PRACTITIONER

## 2024-09-05 PROCEDURE — 1159F MED LIST DOCD IN RCRD: CPT | Mod: CPTII,S$GLB,, | Performed by: NURSE PRACTITIONER

## 2024-09-05 PROCEDURE — 99999 PR PBB SHADOW E&M-EST. PATIENT-LVL IV: CPT | Mod: PBBFAC,,, | Performed by: NURSE PRACTITIONER

## 2024-09-05 PROCEDURE — 3066F NEPHROPATHY DOC TX: CPT | Mod: CPTII,S$GLB,, | Performed by: NURSE PRACTITIONER

## 2024-09-05 PROCEDURE — 1160F RVW MEDS BY RX/DR IN RCRD: CPT | Mod: CPTII,S$GLB,, | Performed by: NURSE PRACTITIONER

## 2024-09-05 PROCEDURE — 3008F BODY MASS INDEX DOCD: CPT | Mod: CPTII,S$GLB,, | Performed by: NURSE PRACTITIONER

## 2024-09-05 PROCEDURE — 3061F NEG MICROALBUMINURIA REV: CPT | Mod: CPTII,S$GLB,, | Performed by: NURSE PRACTITIONER

## 2024-09-05 PROCEDURE — 3075F SYST BP GE 130 - 139MM HG: CPT | Mod: CPTII,S$GLB,, | Performed by: NURSE PRACTITIONER

## 2024-09-05 PROCEDURE — 99214 OFFICE O/P EST MOD 30 MIN: CPT | Mod: S$GLB,,, | Performed by: NURSE PRACTITIONER

## 2024-09-05 PROCEDURE — 3079F DIAST BP 80-89 MM HG: CPT | Mod: CPTII,S$GLB,, | Performed by: NURSE PRACTITIONER

## 2024-09-05 NOTE — PROGRESS NOTES
[facility]  Interventional [facility] -  Established Patient Follow-up      Referred by: No ref. provider found   Reason for referral: * No diagnoses found *     CC:   Chief Complaint   Patient presents with    Follow-up    Low-back Pain         9/5/2024    11:21 AM 5/30/2024     8:40 AM 3/28/2024     1:12 PM   Last 3 PDI Scores   Pain Disability Index (PDI) 36 30 36         Interval Update 9/05/2024: Patient return to clinic for follow up on low back pain.  He is status post a lumbar NELI targeting L4-5 on 08/26/2024 that provided 50 60% relief of his symptoms.  He does still complain of left lower extremity pain to his left toe which is positional.  He is scheduled for upcoming anterior cervical fusion surgery with Dr. Mcnamara on 09/12/2024.  He denies any profound weakness denies bowel or bladder dysfunction denies any saddle anesthesia at this time.    Interval history 06/25/2024  44-year-old male presents virtually he has status post a lumbar NELI targeting L4-5 on 06/03/2024 reporting 70-80% relief of his low back pain,  he reports intermittent numbness in his left toe.  His primary concern is his chronic neck pain with radiculopathy.  He has previously been given cervical NELI targeting C7-T1 with relief however his symptoms continue and are disrupting his quality of life.  He has a follow up with Dr. Shen later this month to discuss surgical decompression surgery for his chronic neck and radiculopathy pain.  He he did report intermittent weakness in his arms and dropping things      Interval update 05/30/24:  43-year-old male that presents today in clinic with 4 weeks of worsening right neck pain that radiates into the right  arm and down into the right 4th and 5th digits of the hand.   He also reports new tremor in the right hand.  No recent falls or trauma.  Patient reports pain is worse with lying on the right side and with range of motion of the neck.   He has a history of right-sided neck pain but  symptoms into the arm and hand tremor are new. He is taking Gabapentin 300 mg QAM and 900 mg QHS with minimal relief.  He is requesting to switch to Lyrica.   He also requests a refill of his Flexeril. He rates his pain 5/10.       Interval History 4/30/2024:  43-year-old male that presents virtually for a follow-up appointment he is status post a cervical NELI targeting C7-T1 on 04/17/2024 that provided significant relief in his symptoms 70-90%.  He is known to our clinic and has been previously provided this injection which has helped significantly.  Is now reporting low back pain that has gradually increased he did reach out to his previous surgeon Dr. Shen  ordered a recent CT lumbar.  He denies any bowel bladder dysfunction denies any saddle anesthesia denies any recent trauma.  He has been provided a lumbar NELI targeting L4-5 for low back and radicular symptoms, we discussed that we may need to repeat this in the future pending CT lumbar scan.      Interval update 03/28/24:   Patient returns today for post procedure follow up and follow up of his neck and back pain.  He is s/p  Lumbar Interlaminar Epidural Steroid Injection L4/L5 on 03/06/2024.  He reports 70-80% relief of his low back pain and radicular symptoms.  Today he reports his cervical radicular symptoms are starting to returned.  He previously underwent cervical epidural steroid injection at C7/T1 on 11/03/2023 with significant improvement in his symptoms.  He reports almost 5 months of pain relief following a cervical epidural.  He would like to repeat this procedure.  In the meantime he is increased his gabapentin to 300 mg in the morning and 600 mg in the evening.  He does feel like this has helped with his pain.  He was also increased his exercise and has lost weight which he feels has been beneficial as well.      Interval update 12/19/23  Patient returns today for follow up.  He is status post right GTB and right intra-articular hip injections  on 11/29/2023 and notes improvement in his GTB and hip pain.  Today he complains of pain extending from the area overlying the right hip joint and extending down the anterolateral portion of the thigh.  This pain is worse with activity.  Pain improves with lying flat.  He rates his pain 7/10 today.      Of note, patient recently underwent left lateral collateral ligament reconstruction for left radial collateral ligament tear on 12/05/2023 with Dr. Marshall.    11/13/23 Pt returns today complaining of right hip and right leg pain. Pain is located over the lateral right hip and radiates into the groin.  Pain started approximately 1 month ago. Pain has not improved with stretching and HEP.  He states his hip pain is worse than his lower back pain for which he is scheduled to have an NELI at the end of the month. He would like to address the hip pain first.      Subjective:   Hermelindo Garza III is a 44 y.o. male who presents complaining of both neck and back pain.  Today he reports his neck pain is most bothersome.  He reports muscle tightness and pain in the left trapezius and shoulder area.  He reports pain that radiates into the bilateral upper extremities with numbness and tingling.  He has been evaluated by Dr. Sierra with Neurosurgery who noted patient chris  candidate for ACDFs in he progresses.  Pt reports undergoing a cervical epidural steroid injection with Dr. Escamilla 02/10/2021 on with several months of relief.  He also reports low back pain.  He is s/p Left L3-4 hemilaminotomy, medial facetectomy, and foraminotomy for microdiscectomy with Dr Mcnamara on 9/19/2021.  He reports his back pain improved after surgery.  Patient reports he is previously completed physical therapy and is now actively continuing with his exercise program in the gym.  He also reports trying to lose weight and reports intentional  weight loss of approximately 55 pounds since 2020.    Location: back, shoulder, and neck   Onset: 2  years  Current Pain Score: 5/10  Daily Pain of Range: 5-6/10  Quality: Aching, Tight, Tingling, Deep, Numb, Electric, Hot, and Cold  Radiation: neck and back  Worsened by: lying down, sitting, standing for more than 3 minutes, and walking for more than 6 minutes  Improved by: medications    Patient denies night fever/night sweats, urinary incontinence, bowel incontinence, significant weight loss, significant motor weakness, and loss of sensations.    Previous Interventions:  - 08/26/2024 Lumbar Interlaminar Epidural Steroid Injection L4/L5 50-60%  -06/03/2024 Lumbar Interlaminar Epidural Steroid Injection L4/L5 (veer LEFT) 70-80%  - 04/17/2024:Cervical Interlaminar Epidural Steroid Injection C7/T1  -03/06/2024 Lumbar Interlaminar Epidural Steroid Injection L4/L5  - 11/03/2023 Cervical Interlaminar Epidural Steroid Injection C7/T1 5 months of pain relief  -9/27/2023  Lumbar Interlaminar Epidural Steroid Injection L4/L5   - 06/28/2023 - Cervical Interlaminar Epidural Steroid Injection C7/T1   - 02/10/2021 -  C6/7 CERVICAL EPIDURAL STEROID INJECTION - Dr Escamilla - 4 months of pain relief.   - 11/29/2023 -  right GTB and right intra-articular hip injections - 40% relief  - 03/06/2024 - lumbar interlaminar steroid injection at L4/L5 -80% pain relief    Previous Therapies:  PT/OT: yes   Chiropractor:   HEP: yes  Relevant Surgery: yes    - Reports L4/L5 surgery at age 29 yo   - 09/19/21 - Left MIS L3-4 hemilaminotomy, medial facetectomy, and foraminotomy for microdiscectomy with Dr Mcnamara.   Previous Medications:   - NSAIDS: Meloxicam  - Muscle Relaxants: Robaxin, Flexeril  - TCAs:   - SNRIs:   - Topicals:   - Anticonvulsants: Lyrica - reports RL sxs so stopped; Gabapentin   - Opioids:   - Adjuvants: Tylenol    Current Pain Medications:  Gabapentin 300 mg QAM and 900 mg QHS  Meloxicam  Tylenol    Flexeril      Review of Systems:  Review of Systems   Musculoskeletal:  Positive for neck pain.       GENERAL:  No weight loss,  malaise or fevers. +obesity +DM  HEENT:   No recent changes in vision or hearing  NECK:  No difficulty with swallowing. No stridor.   RESPIRATORY:  Negative for cough, wheezing or shortness of breath, patient denies any recent URI. +Asthma  CARDIOVASCULAR:  Negative for chest pain, leg swelling or palpitations.  GI/:  Negative for abdominal discomfort, blood in stools or black stools or change in bowel habits.   MUSCULOSKELETAL:  See HPI.  SKIN:  Negative for lesions, rash, and itching.  PSYCH:  No mood disorder or recent psychosocial stressors.  +anxiety +ADD  HEMATOLOGY/LYMPHOLOGY:  Negative for prolonged bleeding, bruising easily or swollen nodes.  Patient is not currently taking any anti-coagulants  NEURO:   No history of headaches, syncope, paralysis, seizures or tremors.  All other reviewed and negative other than HPI.    History:  Current medications, allergies, medical history, surgical history,   family history, and social history were reviewed in the chart as marked.    Full Medication List:    Current Outpatient Medications:     acetaminophen (TYLENOL) 650 MG TbSR, Take 1,300 mg by mouth daily as needed., Disp: , Rfl:     albuterol (PROVENTIL/VENTOLIN HFA) 90 mcg/actuation inhaler, INHALE 2 PUFFS INTO THE LUNGS EVERY 6 HOURS AS NEEDED WHEEZING, Disp: 25.5 g, Rfl: 3    atorvastatin (LIPITOR) 20 MG tablet, Take 1 tablet (20 mg total) by mouth once daily. (Patient taking differently: Take 20 mg by mouth every evening.), Disp: 90 tablet, Rfl: 3    blood sugar diagnostic (TRUE METRIX GLUCOSE TEST STRIP) Strp, Use to test blood glucose one (1) time a day,, Disp: 100 strip, Rfl: 3    blood-glucose meter Misc, use as directed, Disp: 1 each, Rfl: 0    buPROPion (WELLBUTRIN XL) 150 MG TB24 tablet, Take 1 tablet (150 mg total) by mouth once daily. (Patient taking differently: Take 150 mg by mouth nightly.), Disp: 30 tablet, Rfl: 2    clobetasoL (TEMOVATE) 0.05 % cream, Apply to affected area 2 (two) times daily as  needed for flare, Disp: 30 g, Rfl: 1    dexmethylphenidate (FOCALIN) 10 MG tablet, Take 1 tablet (10 mg total) by mouth 2 (two) times daily., Disp: 60 tablet, Rfl: 0    [START ON 9/27/2024] dexmethylphenidate (FOCALIN) 10 MG tablet, Take 1 tablet (10 mg total) by mouth 2 (two) times daily., Disp: 60 tablet, Rfl: 0    [START ON 9/27/2024] dexmethylphenidate (FOCALIN) 10 MG tablet, Take 1 tablet (10 mg total) by mouth 2 (two) times daily., Disp: 60 tablet, Rfl: 0    ergocalciferol, vitamin D2, (VITAMIN D ORAL), Take 1 tablet by mouth every other day., Disp: , Rfl:     fluocinonide (LIDEX) 0.05 % external solution, apply to affected area of scalp daily as needed for itching, scaling, Disp: 60 mL, Rfl: 3    fluorouraciL (EFUDEX) 5 % cream, Apply topically to the affected area once daily., Disp: 40 g, Rfl: 2    hydrocortisone 2.5 % cream, APPLY EXTERNALLY TO THE AFFECTED AREA TWICE DAILY FOR 10 DAYS, Disp: 30 g, Rfl: 0    inulin (FIBER GUMMIES) 2 gram Chew, Take by mouth once daily. 2 gummies a day, Disp: , Rfl:     ketoconazole (NIZORAL) 2 % cream, Apply 1 application  topically 2 (two) times daily. Apply to affected area up to 2 weeks, Disp: 15 g, Rfl: 0    lamoTRIgine (LAMICTAL) 100 MG tablet, Take 1 tablet (100 mg total) by mouth 2 (two) times daily. IF any RASH, STOP ALL Lamictal and Tell Psyc MD. (Patient taking differently: Take 200 mg by mouth nightly. IF any RASH, STOP ALL Lamictal and Tell Psyc MD.), Disp: 180 tablet, Rfl: 1    lancets (ONETOUCH DELICA PLUS LANCET) 33 gauge Misc, Use to test blood glucose one (1) time a day,, Disp: 100 each, Rfl: 5    latanoprost 0.005 % ophthalmic solution, Place 1 drop into both eyes every evening. (Patient taking differently: Place 1 drop into both eyes once daily.), Disp: 7.5 mL, Rfl: 3    levocetirizine (XYZAL) 5 MG tablet, Take 1 tablet (5 mg total) by mouth every evening., Disp: 30 tablet, Rfl: 11    [START ON 9/13/2024] LORazepam (ATIVAN) 0.5 MG tablet, Take 1 tablet  (0.5 mg total) by mouth daily as needed (SIGNIFICANT ANXIETY)., Disp: 30 tablet, Rfl: 3    meloxicam (MOBIC) 15 MG tablet, TAKE 1 TABLET(15 MG) BY MOUTH EVERY DAY as needed FOR PAIN (Patient taking differently: TAKE 1 TABLET(15 MG) BY MOUTH EVERY DAY as needed FOR PAIN As needed for pain), Disp: 30 tablet, Rfl: 2    olopatadine (PATANASE) 0.6 % nasal spray, 1 spray by Each Nostril route 2 (two) times daily., Disp: 30.5 g, Rfl: 5    ondansetron (ZOFRAN-ODT) 8 MG TbDL, Dissolve 1 tablet (8 mg total) on the tongue every 8 (eight) hours as needed (nausea)., Disp: 30 tablet, Rfl: 2    permethrin (ELIMITE) 5 % cream, Apply from the neck down and leave on overnight; wash off in am and repeat in 1 week, Disp: 120 g, Rfl: 1    pregabalin (LYRICA) 200 MG Cap, Take 1 capsule (200 mg total) by mouth 2 (two) times daily., Disp: 60 capsule, Rfl: 1    rOPINIRole (REQUIP) 0.25 MG tablet, TAKE 1 TABLET(0.25 MG) BY MOUTH EVERY EVENING, Disp: 90 tablet, Rfl: 2    semaglutide (OZEMPIC) 2 mg/dose (8 mg/3 mL) PnIj, Inject 2 mg into the skin every 7 days. (Patient taking differently: Inject 2 mg into the skin every 7 days. Takes on Mondays), Disp: 3 mL, Rfl: 5    tamsulosin (FLOMAX) 0.4 mg Cap, Take 1 capsule (0.4 mg total) by mouth once daily., Disp: 30 capsule, Rfl: 11    ULTRA THIN LANCETS 30 gauge Misc, USE TO TEST BLOOD SUGAR EVERY DAY AS DIRECTED, Disp: 100 each, Rfl: 3    urea (CARMOL) 40 % Crea, Apply topically once daily., Disp: 225 g, Rfl: 2    HYDROcodone-acetaminophen (NORCO) 5-325 mg per tablet, Take 1 tablet by mouth every 6 (six) hours as needed for Pain., Disp: 15 tablet, Rfl: 0    Current Facility-Administered Medications:     oxymetazoline 0.05 % nasal spray 1 spray, 1 spray, Each Nostril, Q4H PRN, Melody Liu MD    Facility-Administered Medications Ordered in Other Visits:     0.9%  NaCl infusion, 500 mL, Intravenous, Continuous, Kolton Terrell MD    mupirocin 2 % ointment, , Nasal, On Call Procedure, [place],  MD Chong, Given at 09/15/22 0553     Allergies:  Patient has no known allergies.     Medical History:   has a past medical history of ADD (attention deficit disorder) (05/09/2012), Allergy, Anxiety (04/12/2023), Asthma (05/09/2012), Cervical disc disorder with radiculopathy, unspecified cervical region (09/14/2021), Cervical spondylosis with myelopathy (06/29/2022), Eczema, Esophageal ulcer, GERD (gastroesophageal reflux disease) (05/09/2012), Glaucoma, Kidney stones (05/2014), Lumbar disc disease (05/09/2012), Lumbar herniated disc (05/09/2012), Malignant melanoma of skin, unspecified (01/06/2022), Melanoma (01/2022), LUBA (obstructive sleep apnea), Radiculopathy, lumbar region (09/14/2021), Renal calculi (05/09/2012), and Type 2 diabetes mellitus without complication, without long-term current use of insulin (11/16/2020).    Surgical History:   has a past surgical history that includes Lumbar laminectomy (2010); Esophagogastroduodenoscopy (01/22/2019); Colonoscopy (01/22/2019); Appendectomy (2006); Epidural steroid injection (N/A, 02/10/2021); Esophagogastroduodenoscopy (N/A, 05/18/2021); Lumbar laminectomy with discectomy (Left, 09/20/2021); Carpal tunnel release (Right, 09/15/2022); Excision of ganglion of wrist (Right, 09/15/2022); Hand Arthrotomy (Right, 09/15/2022); Injection of steroid (Left, 09/15/2022); Flexor tendon repair (Right, 09/15/2022); Tympanostomy tube placement; Epidural steroid injection into cervical spine (N/A, 6/28/2023); Cystoscopy (7/5/2023); Ureteral stent placement (Left, 7/5/2023); Ureteroscopy (Left, 7/5/2023); Laser lithotripsy (Left, 7/5/2023); Ureteroscopic removal of ureteric calculus (Left, 7/5/2023); Correction of hammer toe (Left, 8/14/2023); Epidural steroid injection into lumbar spine (N/A, 9/27/2023); Epidural steroid injection into cervical spine (N/A, 11/3/2023); Injection of joint (Right, 11/29/2023); Reconstruction of ligament (Left, 12/5/2023); Repair of extensor tendon  "(Left, 12/5/2023); Esophagogastroduodenoscopy (N/A, 12/21/2023); ph monitoring, esophagus, wireless, (off reflux meds) (N/A, 12/21/2023); Colonoscopy (N/A, 12/21/2023); Esophagogastroduodenoscopy (N/A, 2/22/2024); Epidural steroid injection into lumbar spine (N/A, 3/6/2024); Robot-assisted repair of hiatal hernia (N/A, 3/14/2024); Epidural steroid injection into cervical spine (N/A, 4/17/2024); Epidural steroid injection into lumbar spine (N/A, 6/3/2024); Cautery of turbinates (Bilateral, 7/15/2024); Nasal septoplasty (N/A, 7/15/2024); and Epidural steroid injection into lumbar spine (N/A, 8/26/2024).    Family History:  family history includes Allergic rhinitis in his father, mother, sister, and sister; Asthma in his father, sister, and sister; Brain cancer in his maternal grandmother; Breast cancer in his paternal grandmother; Chronic back pain in his father; Diabetes in his maternal grandfather; MARTITA disease in his father; Hypertension in his mother; Melanoma in his father; No Known Problems in his brother, maternal aunt, maternal uncle, paternal aunt, paternal grandfather, and paternal uncle; Sleep apnea in his father and mother; Transient ischemic attack in his mother.    Social History:   reports that he quit smoking about 6 years ago. His smoking use included cigarettes. He has never been exposed to tobacco smoke. He has never used smokeless tobacco. He reports current alcohol use of about 3.0 standard drinks of alcohol per week. He reports that he does not use drugs.    Physical Exam:  Vitals:    09/05/24 1121   BP: 132/83   Pulse: 102   Height: 5' 5" (1.651 m)   PainSc:   6       PHYSICAL EXAMINATION:  GEN: No acute distress. Calm, comfortable  HENT: Normocephalic, atraumatic, moist mucous membranes  EYE: Anicteric sclera, non-injected.   CV: Non-diaphoretic.   RESP: Breathing comfortably. Chest expansion symmetric.  PSYCH: Pleasant mood and appropriate affect. Recent and remote memory intact.       GENERAL: " Well appearing, in no acute distress, alert and oriented x3.  PSYCH:  Mood and affect appropriate.  SKIN: Skin color, texture, turgor normal, no rashes or lesions.  HEAD/FACE:  Normocephalic, atraumatic. Cranial nerves grossly intact.  NECK:  Mildly decreased ROM secondary to pain. + pain to palpation over the cervical paraspinous muscles and bilateral trapezius muscles.  Spurling positive on the right.   CV: RRR with palpation of the radial artery.  PULM: No evidence of respiratory difficulty, symmetric chest rise.  GI:  Soft and non-distended.  MSK: No obvious deformities, edema, or skin discoloration.  No atrophy or tone abnormalities are noted.   NEURO: + tremor noted in the right hand, particularly the 4th and 5th digit.  Bilateral upper and lower extremity coordination and strength is symmetric.  No loss of sensation is noted.  MENTAL STATUS: A x O x 3, good concentration, speech is fluent and goal directed  MOTOR: 5/5 in all muscle groups  GAIT: normal.ambulates unassisted.       Imaging:  XR CERVICAL SPINE AP LAT WITH FLEX EXTEN     CLINICAL HISTORY:  Other spondylosis with myelopathy, cervical region     TECHNIQUE:  Three views of the cervical spine plus flexion and extension views were performed.     COMPARISON:  September 8, 2022     FINDINGS:  As before, suboptimal visualization of the cervicothoracic junction due to superimposition of shoulder and chest wall soft tissues.  Straightening of normal cervical lordosis.  No acute fractures.  Unremarkable predental space.  No widening prevertebral soft tissues.  No anterior or retrolisthesis.  Flexion and extension views demonstrate no instability.  Reconfirmed areas of anterior longitudinal ligament calcification adjacent to C4-C5 and C5-C6 disc.  Reconfirmed findings of uncovertebral spurring and endplate osteophytes at preserved C4-C5 level and mildly narrowed C5-C6 level and mild to moderately narrowed C6-C7 level.  Some stable scattered mild facet  arthropathic changes.  Intact odontoid tip and unremarkable C1-C2 articulation.  Cervical spine findings do not appear significantly changed to earlier exam.     Electronically signed by: Giovanny Busby  Date:                                            12/13/2022      MRI CERVICAL SPINE WITHOUT CONTRAST     CLINICAL HISTORY:  Arm pain, paresthesias, clumsiness;.  Cervical disc disorder with radiculopathy, unspecified cervical region     TECHNIQUE:  Multiplanar, multisequence MR images of the cervical spine were acquired without the administration of contrast.     COMPARISON:  Cervical spine MRI 01/12/2021     FINDINGS:  Straightening of the normal cervical lordosis.  No spondylolisthesis.     No compression fractures.  No marrow replacing lesions.     Multilevel degenerative changes with disc desiccation and disc space narrowing, described in detail below.  No bone marrow edema.     Visualized structures in the posterior fossa are unremarkable. The cervical spinal cord is unremarkable.     Paraspinal soft tissues are unremarkable.     SIGNIFICANT FINDINGS BY LEVEL:     C2-3: Small disc osteophyte complex with central annular fissure.  No canal or foraminal stenosis.     C3-4: Disc osteophyte complex with superimposed central extrusion.  Mild canal stenosis with partial effacement of the thecal sac.  Mild bilateral foraminal stenosis.     C4-5: Disc osteophyte complex.  Moderate canal stenosis with near complete effacement of the thecal sac and flattening of the cervical cord.  Moderate bilateral foraminal stenosis.     C5-6: Disc osteophyte complex, eccentric to the right.  Severe canal stenosis with complete effacement of the thecal sac and flattening of the cervical cord.  Severe right and moderate left foraminal stenosis.     C6-7: Disc osteophyte complex.  Moderate canal stenosis.  Moderate bilateral foraminal stenosis.     C7-T1: Disc osteophyte complex with superimposed right foraminal extrusion.  Mild canal  stenosis.  Mild right foraminal stenosis.     Impression:     Multilevel degenerative changes, most advanced at C5-6 where there is severe canal stenosis and severe right and moderate left foraminal stenosis.  Overall, degenerative changes have progressed from 01/12/2021.     This report was flagged in Epic as abnormal.     Electronically signed by: Horace Quiroz  Date:                                            06/16/2022      XR LUMBAR SPINE AP AND LAT WITH FLEX/EXT     CLINICAL HISTORY:  Other specified postprocedural states     TECHNIQUE:  AP and lateral views as well as lateral flexion and extension images are performed through the lumbar spine.     COMPARISON:  Non 06/16/2022 MRI of the lumbar spine e     FINDINGS:  Patient had previous a L3/L4 hemilaminectomy on the left side.     Mild DJD and mild lumbar scoliosis.  The lumbar intervertebral disc spaces are slightly narrowed.  Few mm L2/L3 and L3/L4 retrolisthesis noted.  No fracture or dislocation.  No bone destruction identified     Electronically signed by: Xander Rubio MD  Date:                                            12/13/2022    MRI LUMBAR SPINE WITHOUT CONTRAST     CLINICAL HISTORY:  Lumbar radiculopathy, symptoms persist with conservative treatment;Low back pain, prior surgery, new symptoms; Dorsalgia, unspecified     TECHNIQUE:  Multiplanar, multisequence MR images were acquired from the thoracolumbar junction to the sacrum without the administration of contrast.     COMPARISON:  MRI lumbar spine 09/19/2021     FINDINGS:  Sequences are degraded by patient motion artifact.     Transitional lumbosacral anatomy with lumbarization of S1.     Postoperative changes from left L3-4 hemilaminectomy and micro discectomy.  Mild protrusion of the thecal sac and cauda equina nerve roots into the laminectomy defect.  No fluid collections in the operative bed.     No spondylolisthesis.     No compression fractures.  Small fat containing lesion in the L3  vertebral body, likely a hemangioma.     Multilevel degenerative changes with disc desiccation and disc space narrowing, described in detail below.  No significant bone marrow edema.     The conus medullaris has a normal appearance and terminates at the L2 level.  Cauda equina nerve roots are unremarkable.     Paraspinal muscle atrophy.     SIGNIFICANT FINDINGS BY LEVEL:     T12-L1: Mild disc bulge.  Minimal canal stenosis.  No foraminal stenosis.     L1-2: Left facet arthropathy.  No canal or foraminal stenosis.     L2-3: Disc bulge.  Bilateral facet arthropathy and ligamentum flavum thickening.  Mild canal stenosis.  Mild left foraminal stenosis.     L3-4: Residual disc bulge versus granulation tissue.  The previous disc fragment migrating inferiorly is no longer seen.  Bilateral facet arthropathy and ligamentum flavum thickening.  Mild canal stenosis with narrowing of both subarticular zones and abutment of the left descending L4 nerve root.  Mild right and moderate left foraminal stenosis.     L4-5: Disc bulge.  Bilateral facet arthropathy and ligamentum flavum thickening.  Mild canal stenosis.  Moderate bilateral foraminal stenosis.     L5-S1: Disc bulge.  Bilateral facet arthropathy and ligamentum flavum thickening.  Mild canal stenosis.  Moderate bilateral foraminal stenosis.     Impression:     Postoperative changes from left L3-4 hemilaminectomy and micro discectomy.  The previous disc fragment migrating inferiorly is no longer seen.  There is a residual disc bulge/granulation tissue contributing to mild canal stenosis and moderate left foraminal stenosis.     Small meningocele at the laminectomy defect.     Degenerative changes elsewhere in the spine resulting in mild canal stenosis and moderate foraminal stenosis at multiple levels as described above.        Electronically signed by: Horace Quiroz  Date:                                            06/16/2022    EXAMINATION:  MRI LUMBAR SPINE WITHOUT  CONTRAST     CLINICAL HISTORY:  Low back pain, prior surgery, new symptoms; Dorsalgia, unspecified     TECHNIQUE:  Multiplanar, multisequence MR images were acquired from the thoracolumbar junction to the sacrum without the administration of contrast.     COMPARISON:  MRI lumbar spine 06/16/2023     FINDINGS:  Postoperative change left L3-L4 hemilaminectomy and discectomy.  Mild protrusion of the thecal sac and cauda equina nerve roots into the laminectomy defect similar to prior.     Alignment: Normal.     Vertebrae: Endplate degenerative change L5-S1.  Small may angioma L3 vertebral body.  No acute fracture.  No infiltrative marrow process.     Discs: Multilevel disc height loss and desiccation most pronounced at L5-S1.     Cord: Normal.  Conus terminates at L1.     Soft tissue structures are unremarkable.  Limited evaluation of the retroperitoneal organs demonstrates no significant abnormalities.  Paraspinal musculature demonstrates normal bulk and signal intensity.     Degenerative findings:     T12-L1: Mild circumferential disc bulge no neural foraminal narrowing or spinal canal stenosis.     L1-L2: No neural foraminal narrowing or spinal canal stenosis.     L2-L3: Circumferential disc bulge.  Bilateral facet arthropathy.  No neural foraminal narrowing.  Mild spinal canal stenosis.     L3-L4: Postop change.  Circumferential disc bulge.  Bilateral facet arthropathy.  Moderate left and mild right neural foraminal narrowing.  Mild spinal canal stenosis     L4-L5: Circumferential disc bulge.  Bilateral facet arthropathy.  Moderate right and severe left neural foraminal narrowing.  Mild to moderate spinal canal stenosis.     L5-S1: Circumferential disc bulge.  Bilateral facet arthropathy.  Moderate bilateral neural foraminal narrowing.  Mild spinal canal stenosis.     Impression:     1. Postoperative change left L3-L4 hemilaminectomy and discectomy.  2. Multilevel degenerative change of the lumbar spine as above not  significantly changed from prior MRI 06/16/2023, noting severe left foraminal narrowing at L4-5.     Electronically signed by resident: Hammad Quinn  Date:                                            10/23/2023  Labs:  BMP  Lab Results   Component Value Date     08/30/2024    K 4.0 08/30/2024     08/30/2024    CO2 27 08/30/2024    BUN 19 08/30/2024    CREATININE 0.8 08/30/2024    CALCIUM 10.1 08/30/2024    ANIONGAP 7 (L) 08/30/2024    EGFRNORACEVR >60.0 08/30/2024     Lab Results   Component Value Date    ALT 37 08/30/2024    AST 19 08/30/2024    ALKPHOS 92 08/30/2024    BILITOT 0.7 08/30/2024     Lab Results   Component Value Date    WBC 7.53 08/30/2024    HGB 15.5 08/30/2024    HCT 47.6 08/30/2024    MCV 92 08/30/2024     08/30/2024         Assessment:  Problem List Items Addressed This Visit    None        04/20/2032-  Hermelindo CURT Greg SMALLWOOD is a 44 y.o. male who  has a past medical history of ADD (attention deficit disorder) (05/09/2012), Allergy, Anxiety (04/12/2023), Asthma (05/09/2012), Cervical disc disorder with radiculopathy, unspecified cervical region (09/14/2021), Cervical spondylosis with myelopathy (06/29/2022), Eczema, Esophageal ulcer, GERD (gastroesophageal reflux disease) (05/09/2012), Glaucoma, Kidney stones (05/2014), Lumbar disc disease (05/09/2012), Lumbar herniated disc (05/09/2012), Malignant melanoma of skin, unspecified (01/06/2022), Melanoma (01/2022), LUBA (obstructive sleep apnea), Radiculopathy, lumbar region (09/14/2021), Renal calculi (05/09/2012), and Type 2 diabetes mellitus without complication, without long-term current use of insulin (11/16/2020).  By history and examination this patient has chronicneck pain with bilateral radiculopathy as well as low back pain s/p 9/19/2021 Left L3-4 hemilaminotomy, medial facetectomy, and foraminotomy for microdiscectomy with Dr Mcnamara with some improvement. The underlying cause cause is facet arthritis, degenerative disc  disease, foraminal stenosis, central canal stenosis, muscle dysfunction, and deconditioning.  MRI of the cervical spine was significant for multilevel degenerative changes, most advanced at C5-6 where there is severe canal stenosis and severe right and moderate left foraminal stenosis. MRI of the Lumbar spine post surgery was significant for residual disc bulge/granulation tissue contributing to mild canal stenosis and moderate left foraminal stenosis at L3/L4 and degenerative changes elsewhere in the spine resulting in mild canal stenosis and moderate foraminal stenosis at multiple levels.  We discussed the underlying diagnoses and multiple treatment options including non-opioid medications, interventional procedures, physical therapy, home exercise, core muscle enhancement, and weight loss.  He previously underwent a cervical steroid injection with several months of relief.  Plan for repeat of cervical epidural steroid injection.  The risks and benefits of each treatment option were discussed and all questions were answered.        11/13/2023 - Hermelindo Garza III presents for evaluation of right hip pain. His exam was consistent with Right GTB bursitis and right hip joint pain.  I will schedule him for a right GTB and Right intraarticular hip injection and defer the LESI for now.  Pt tearful and anxious throughout exam.  He is established with a Psychiatrist.  We discussed referral Psychology for possible CBT and assistance with coping strategies for chronic pain.     12/19/2023 - Hermelindo Garza III presents today for follow-up.  He is status post  right GTB and right intra-articular hip injections on 11/29/2023 and notes improvement in his GTB and hip pain.  Today he complains of pain extending from the area overlying the right hip joint and extending down the anterolateral portion of the thigh  in the distribution of the tensor fascia ashtyn.   Patient has multiple pain generators  as well as  concurrent anxiety and mood disorder.  I feel that he would be an excellent candidate for our functional restoration program.  Discussed with patient today during visit.  I will place a referral to the functional restoration program.   I also discussed with the patient that it would be a good to have him see Rheumatology given his extensive pain history, as perhaps they can offer their expertise.     03/28/2024 - Hermelindo Garza III presents today for follow up.  He was status post lumbar epidural steroid injection at L4/L5 on 03/06/2024 with 70-80% relief of his low back symptoms.  His cervical radicular symptoms have started to return.  Most recent cervical epidural gave him over 5 months of pain relief.  He would like to repeat the procedure.  I will schedule from a repeat cervical epidural steroid injection.  I will also have the patient increase his gabapentin to 300 mg 4 times daily.  He will let me know when he needs a refill of the medication.      04/30/2024-Hermelindo Garza III is a 44 y.o. male who  has a past medical history of ADD (attention deficit disorder) (05/09/2012), Allergy, Anxiety (04/12/2023), Asthma (05/09/2012), Cervical disc disorder with radiculopathy, unspecified cervical region (09/14/2021), Cervical spondylosis with myelopathy (06/29/2022), Eczema, Esophageal ulcer, GERD (gastroesophageal reflux disease) (05/09/2012), Glaucoma, Kidney stones (05/2014), Lumbar disc disease (05/09/2012), Lumbar herniated disc (05/09/2012), Malignant melanoma of skin, unspecified (01/06/2022), Melanoma (01/2022), LUBA (obstructive sleep apnea), Radiculopathy, lumbar region (09/14/2021), Renal calculi (05/09/2012), and Type 2 diabetes mellitus without complication, without long-term current use of insulin (11/16/2020).  By history and examination this patient has chronicneck pain with bilateral radiculopathy in the distribution of C5 and C6.  The underlying cause cause is facet arthritis,  degenerative disc disease, foraminal stenosis, and central canal stenosis.  Pathology is confirmed by imaging.  We discussed the underlying diagnoses and multiple treatment options including non-opioid medications, interventional procedures, physical therapy, home exercise, core muscle enhancement, activity modification, and weight loss.  The risks and benefits of each treatment option were discussed and all questions were answered.    He was recently provided a cervical NELI that provided him excellent relief he did reach out to Neurosurgery a new CT lumbar was ordered to rule out any new pathology.  The patient will follow up with our office to possibly repeat injections in the future.    05/30/2024 - Hermelindo Garza III  presents today with worsening right-sided cervical radicular symptoms over the last 4 weeks.  Given the worsening of his symptoms and development of new tremor in the right upper extremity, I am obtaining an updated MRI of the cervical spine.   He is established with Dr. Mcnamara with Neurosurgery, and at his last visit it was noted that he is a candidate for cervical surgery if his symptoms progressed in the areas of nerve root compression.  I am recommending that he follow up with Dr. Mcnamara after updated imaging is obtained.  I am happy to repeat a cervical epidural for this patient, but I am concerned about the number of steroid injections he has required for both his back and neck pain thus far.  We discussed that these steroids can affect blood sugar, blood pressure and over time can cause bone weakening.  In the meantime the patient would like to try Lyrica again.  We will discontinue gabapentin and I will prescribe him Lyrica 150 mg b.I.d. Referral placed to Neurology to assist with workup of new onset right upper extremity tremor.    06/25/2024- 44-year-old male with a history of chronic low back and neck pain with radiculopathy symptoms in both his arms and legs.  We recently provided  him a lumbar NELI targeting L4-5 that resulted in 70 80% relief and improvement in his functionality.  His worst pain at this point is his neck pain with radiculopathy.  He and I discussed that he will revisit cervical decompressive surgery with Dr. Mcnamara  in a few weeks as his symptoms have continued and on some level gotten a little worse.  He denies any profound weakness denies any bowel or bladder dysfunction at this time.  Discussed with patient that he can reach out to me at any point for support and for any problems moving forward.    09/05/2024-Hermelindo Garza III is a 44 y.o. male who  has a past medical history of ADD (attention deficit disorder) (05/09/2012), Allergy, Anxiety (04/12/2023), Asthma (05/09/2012), Cervical disc disorder with radiculopathy, unspecified cervical region (09/14/2021), Cervical spondylosis with myelopathy (06/29/2022), Eczema, Esophageal ulcer, GERD (gastroesophageal reflux disease) (05/09/2012), Glaucoma, Kidney stones (05/2014), Lumbar disc disease (05/09/2012), Lumbar herniated disc (05/09/2012), Malignant melanoma of skin, unspecified (01/06/2022), Melanoma (01/2022), LUBA (obstructive sleep apnea), Radiculopathy, lumbar region (09/14/2021), Renal calculi (05/09/2012), and Type 2 diabetes mellitus without complication, without long-term current use of insulin (11/16/2020).  By history and examination this patient has chronic low back pain with radiculopathy.  The underlying cause cause is facet arthritis, degenerative disc disease, foraminal stenosis, muscles strain, and deconditioning.  Pathology is confirmed by imaging.  We discussed the underlying diagnoses and multiple treatment options including non-opioid medications, interventional procedures, physical therapy, core muscle enhancement, activity modification, and weight loss.  The risks and benefits of each treatment option were discussed and all questions were answered.          Treatment Plan:   Procedures:  none at  this time.  Patient is scheduled for upcoming anterior cervical fusion surgery with Dr. Mcnamara   PT/OT/HEP: I have stressed the importance of physical activity and a home exercise plan to help with pain and improve health.  He is previously completed physical therapy.  I feel the patient would significantly benefit from enrollment in the functional restoration program.  Referral placed.    Medications:  - Continue Lyrica 150 mg b.i.d..  Discontinue gabapentin.   - continue with meloxicam 15 mg q.d.   - continue with p.r.n. Tylenol   -  Reviewed and consistent with medication use as prescribed.  Imaging: reviewed and discussed   Recommend pt continue to follow up with Dr. Mcnamara (neurosurgery)  Follow Up: PRN per patient and following surgery     Hung Prieto NP-C  Interventional Pain Management      Disclaimer: This note was partly generated using dictation software which may occasionally result in transcription errors.

## 2024-09-06 DIAGNOSIS — L21.9 SEBORRHEIC DERMATITIS, UNSPECIFIED: ICD-10-CM

## 2024-09-09 ENCOUNTER — PATIENT MESSAGE (OUTPATIENT)
Dept: NEUROSURGERY | Facility: CLINIC | Age: 44
End: 2024-09-09
Payer: COMMERCIAL

## 2024-09-10 ENCOUNTER — PATIENT MESSAGE (OUTPATIENT)
Dept: ALLERGY | Facility: CLINIC | Age: 44
End: 2024-09-10
Payer: COMMERCIAL

## 2024-09-11 ENCOUNTER — ANESTHESIA EVENT (OUTPATIENT)
Dept: SURGERY | Facility: HOSPITAL | Age: 44
End: 2024-09-11
Payer: COMMERCIAL

## 2024-09-11 ENCOUNTER — TELEPHONE (OUTPATIENT)
Dept: NEUROSURGERY | Facility: CLINIC | Age: 44
End: 2024-09-11
Payer: COMMERCIAL

## 2024-09-11 NOTE — TELEPHONE ENCOUNTER
Informed patient that he can arrive at 7am instead of 5am due to the OR start time being pushed back.  Pt la.

## 2024-09-12 ENCOUNTER — ANESTHESIA (OUTPATIENT)
Dept: SURGERY | Facility: HOSPITAL | Age: 44
End: 2024-09-12
Payer: COMMERCIAL

## 2024-09-12 ENCOUNTER — HOSPITAL ENCOUNTER (INPATIENT)
Facility: HOSPITAL | Age: 44
LOS: 2 days | Discharge: HOME | DRG: 473 | End: 2024-09-14
Attending: NEUROLOGICAL SURGERY | Admitting: NEUROLOGICAL SURGERY
Payer: COMMERCIAL

## 2024-09-12 DIAGNOSIS — M54.12 CERVICAL RADICULOPATHY: Primary | ICD-10-CM

## 2024-09-12 LAB
ABO + RH BLD: NORMAL
BLD GP AB SCN CELLS X3 SERPL QL: NORMAL
POCT GLUCOSE: 131 MG/DL (ref 70–110)
POCT GLUCOSE: 133 MG/DL (ref 70–110)
SPECIMEN OUTDATE: NORMAL

## 2024-09-12 PROCEDURE — 25000242 PHARM REV CODE 250 ALT 637 W/ HCPCS: Performed by: ANESTHESIOLOGY

## 2024-09-12 PROCEDURE — 27000221 HC OXYGEN, UP TO 24 HOURS

## 2024-09-12 PROCEDURE — 0RB30ZZ EXCISION OF CERVICAL VERTEBRAL DISC, OPEN APPROACH: ICD-10-PCS | Performed by: NEUROLOGICAL SURGERY

## 2024-09-12 PROCEDURE — 20930 SP BONE ALGRFT MORSEL ADD-ON: CPT | Mod: ,,, | Performed by: NEUROLOGICAL SURGERY

## 2024-09-12 PROCEDURE — 0RG20A0 FUSION OF 2 OR MORE CERVICAL VERTEBRAL JOINTS WITH INTERBODY FUSION DEVICE, ANTERIOR APPROACH, ANTERIOR COLUMN, OPEN APPROACH: ICD-10-PCS | Performed by: NEUROLOGICAL SURGERY

## 2024-09-12 PROCEDURE — 11000001 HC ACUTE MED/SURG PRIVATE ROOM

## 2024-09-12 PROCEDURE — 63600175 PHARM REV CODE 636 W HCPCS: Performed by: STUDENT IN AN ORGANIZED HEALTH CARE EDUCATION/TRAINING PROGRAM

## 2024-09-12 PROCEDURE — 94640 AIRWAY INHALATION TREATMENT: CPT

## 2024-09-12 PROCEDURE — 86850 RBC ANTIBODY SCREEN: CPT | Performed by: STUDENT IN AN ORGANIZED HEALTH CARE EDUCATION/TRAINING PROGRAM

## 2024-09-12 PROCEDURE — 86901 BLOOD TYPING SEROLOGIC RH(D): CPT | Performed by: STUDENT IN AN ORGANIZED HEALTH CARE EDUCATION/TRAINING PROGRAM

## 2024-09-12 PROCEDURE — C1713 ANCHOR/SCREW BN/BN,TIS/BN: HCPCS | Performed by: NEUROLOGICAL SURGERY

## 2024-09-12 PROCEDURE — 63600175 PHARM REV CODE 636 W HCPCS: Performed by: ANESTHESIOLOGY

## 2024-09-12 PROCEDURE — 63600175 PHARM REV CODE 636 W HCPCS: Performed by: NURSE ANESTHETIST, CERTIFIED REGISTERED

## 2024-09-12 PROCEDURE — 25000003 PHARM REV CODE 250: Performed by: STUDENT IN AN ORGANIZED HEALTH CARE EDUCATION/TRAINING PROGRAM

## 2024-09-12 PROCEDURE — 94761 N-INVAS EAR/PLS OXIMETRY MLT: CPT

## 2024-09-12 PROCEDURE — 27201037 HC PRESSURE MONITORING SET UP

## 2024-09-12 PROCEDURE — 63600175 PHARM REV CODE 636 W HCPCS: Performed by: NEUROLOGICAL SURGERY

## 2024-09-12 PROCEDURE — C1769 GUIDE WIRE: HCPCS | Performed by: NEUROLOGICAL SURGERY

## 2024-09-12 PROCEDURE — 22853 INSJ BIOMECHANICAL DEVICE: CPT | Mod: ,,, | Performed by: NEUROLOGICAL SURGERY

## 2024-09-12 PROCEDURE — 37000008 HC ANESTHESIA 1ST 15 MINUTES: Performed by: NEUROLOGICAL SURGERY

## 2024-09-12 PROCEDURE — 82962 GLUCOSE BLOOD TEST: CPT | Performed by: NEUROLOGICAL SURGERY

## 2024-09-12 PROCEDURE — 01N10ZZ RELEASE CERVICAL NERVE, OPEN APPROACH: ICD-10-PCS | Performed by: NEUROLOGICAL SURGERY

## 2024-09-12 PROCEDURE — 22551 ARTHRD ANT NTRBDY CERVICAL: CPT | Mod: ,,, | Performed by: NEUROLOGICAL SURGERY

## 2024-09-12 PROCEDURE — 25000003 PHARM REV CODE 250: Performed by: NURSE ANESTHETIST, CERTIFIED REGISTERED

## 2024-09-12 PROCEDURE — 71000015 HC POSTOP RECOV 1ST HR: Performed by: NEUROLOGICAL SURGERY

## 2024-09-12 PROCEDURE — 27201423 OPTIME MED/SURG SUP & DEVICES STERILE SUPPLY: Performed by: NEUROLOGICAL SURGERY

## 2024-09-12 PROCEDURE — 99900035 HC TECH TIME PER 15 MIN (STAT)

## 2024-09-12 PROCEDURE — 22845 INSERT SPINE FIXATION DEVICE: CPT | Mod: 59,,, | Performed by: NEUROLOGICAL SURGERY

## 2024-09-12 PROCEDURE — 25000003 PHARM REV CODE 250: Performed by: ANESTHESIOLOGY

## 2024-09-12 PROCEDURE — 86900 BLOOD TYPING SEROLOGIC ABO: CPT | Performed by: STUDENT IN AN ORGANIZED HEALTH CARE EDUCATION/TRAINING PROGRAM

## 2024-09-12 PROCEDURE — 71000016 HC POSTOP RECOV ADDL HR: Performed by: NEUROLOGICAL SURGERY

## 2024-09-12 PROCEDURE — 00NW0ZZ RELEASE CERVICAL SPINAL CORD, OPEN APPROACH: ICD-10-PCS | Performed by: NEUROLOGICAL SURGERY

## 2024-09-12 PROCEDURE — 36000710: Performed by: NEUROLOGICAL SURGERY

## 2024-09-12 PROCEDURE — 36000711: Performed by: NEUROLOGICAL SURGERY

## 2024-09-12 PROCEDURE — 25000003 PHARM REV CODE 250: Performed by: NEUROLOGICAL SURGERY

## 2024-09-12 PROCEDURE — 22552 ARTHRD ANT NTRBD CERVICAL EA: CPT | Mod: ,,, | Performed by: NEUROLOGICAL SURGERY

## 2024-09-12 PROCEDURE — 37000009 HC ANESTHESIA EA ADD 15 MINS: Performed by: NEUROLOGICAL SURGERY

## 2024-09-12 PROCEDURE — C1889 IMPLANT/INSERT DEVICE, NOC: HCPCS | Performed by: NEUROLOGICAL SURGERY

## 2024-09-12 PROCEDURE — 0PB30ZZ EXCISION OF CERVICAL VERTEBRA, OPEN APPROACH: ICD-10-PCS | Performed by: NEUROLOGICAL SURGERY

## 2024-09-12 PROCEDURE — 71000033 HC RECOVERY, INTIAL HOUR: Performed by: NEUROLOGICAL SURGERY

## 2024-09-12 DEVICE — IMPLANTABLE DEVICE: Type: IMPLANTABLE DEVICE | Site: SPINE CERVICAL | Status: FUNCTIONAL

## 2024-09-12 DEVICE — SCREW ZAVATION SD VAR 4.0X16MM: Type: IMPLANTABLE DEVICE | Site: SPINE CERVICAL | Status: FUNCTIONAL

## 2024-09-12 DEVICE — ACTIFUSE SHAPE MD CYL 15X15MM: Type: IMPLANTABLE DEVICE | Site: SPINE CERVICAL | Status: FUNCTIONAL

## 2024-09-12 RX ORDER — AMOXICILLIN 250 MG
2 CAPSULE ORAL 2 TIMES DAILY
Status: DISCONTINUED | OUTPATIENT
Start: 2024-09-12 | End: 2024-09-14 | Stop reason: HOSPADM

## 2024-09-12 RX ORDER — IBUPROFEN 200 MG
16 TABLET ORAL
Status: DISCONTINUED | OUTPATIENT
Start: 2024-09-12 | End: 2024-09-14 | Stop reason: HOSPADM

## 2024-09-12 RX ORDER — ACETAMINOPHEN 325 MG/1
650 TABLET ORAL EVERY 6 HOURS
Status: DISCONTINUED | OUTPATIENT
Start: 2024-09-12 | End: 2024-09-14 | Stop reason: HOSPADM

## 2024-09-12 RX ORDER — ALUMINUM HYDROXIDE, MAGNESIUM HYDROXIDE, AND SIMETHICONE 1200; 120; 1200 MG/30ML; MG/30ML; MG/30ML
30 SUSPENSION ORAL EVERY 4 HOURS PRN
Status: DISCONTINUED | OUTPATIENT
Start: 2024-09-12 | End: 2024-09-14 | Stop reason: HOSPADM

## 2024-09-12 RX ORDER — EPHEDRINE SULFATE 50 MG/ML
INJECTION, SOLUTION INTRAVENOUS
Status: DISCONTINUED | OUTPATIENT
Start: 2024-09-12 | End: 2024-09-12

## 2024-09-12 RX ORDER — MIDAZOLAM HYDROCHLORIDE 1 MG/ML
INJECTION INTRAMUSCULAR; INTRAVENOUS
Status: DISCONTINUED | OUTPATIENT
Start: 2024-09-12 | End: 2024-09-12

## 2024-09-12 RX ORDER — HEPARIN SODIUM 5000 [USP'U]/ML
5000 INJECTION, SOLUTION INTRAVENOUS; SUBCUTANEOUS EVERY 8 HOURS
Status: DISCONTINUED | OUTPATIENT
Start: 2024-09-13 | End: 2024-09-14 | Stop reason: HOSPADM

## 2024-09-12 RX ORDER — BISACODYL 10 MG/1
10 SUPPOSITORY RECTAL DAILY
Status: DISCONTINUED | OUTPATIENT
Start: 2024-09-12 | End: 2024-09-14 | Stop reason: HOSPADM

## 2024-09-12 RX ORDER — LAMOTRIGINE 100 MG/1
200 TABLET ORAL NIGHTLY
Status: DISCONTINUED | OUTPATIENT
Start: 2024-09-12 | End: 2024-09-14 | Stop reason: HOSPADM

## 2024-09-12 RX ORDER — ONDANSETRON HYDROCHLORIDE 2 MG/ML
4 INJECTION, SOLUTION INTRAVENOUS ONCE
Status: COMPLETED | OUTPATIENT
Start: 2024-09-12 | End: 2024-09-12

## 2024-09-12 RX ORDER — MIDAZOLAM HYDROCHLORIDE 1 MG/ML
2 INJECTION, SOLUTION INTRAMUSCULAR; INTRAVENOUS
Status: DISCONTINUED | OUTPATIENT
Start: 2024-09-12 | End: 2024-09-14 | Stop reason: HOSPADM

## 2024-09-12 RX ORDER — METHYLPREDNISOLONE ACETATE 40 MG/ML
INJECTION, SUSPENSION INTRA-ARTICULAR; INTRALESIONAL; INTRAMUSCULAR; SOFT TISSUE
Status: DISCONTINUED | OUTPATIENT
Start: 2024-09-12 | End: 2024-09-12 | Stop reason: HOSPADM

## 2024-09-12 RX ORDER — HALOPERIDOL 5 MG/ML
0.5 INJECTION INTRAMUSCULAR EVERY 10 MIN PRN
Status: COMPLETED | OUTPATIENT
Start: 2024-09-12 | End: 2024-09-12

## 2024-09-12 RX ORDER — PROPOFOL 10 MG/ML
VIAL (ML) INTRAVENOUS CONTINUOUS PRN
Status: DISCONTINUED | OUTPATIENT
Start: 2024-09-12 | End: 2024-09-12

## 2024-09-12 RX ORDER — PHENYLEPHRINE HYDROCHLORIDE 10 MG/ML
INJECTION INTRAVENOUS CONTINUOUS PRN
Status: DISCONTINUED | OUTPATIENT
Start: 2024-09-12 | End: 2024-09-12

## 2024-09-12 RX ORDER — MUPIROCIN 20 MG/G
OINTMENT TOPICAL
Status: DISCONTINUED | OUTPATIENT
Start: 2024-09-12 | End: 2024-09-12 | Stop reason: HOSPADM

## 2024-09-12 RX ORDER — ACETAMINOPHEN 10 MG/ML
INJECTION, SOLUTION INTRAVENOUS
Status: DISCONTINUED | OUTPATIENT
Start: 2024-09-12 | End: 2024-09-12

## 2024-09-12 RX ORDER — INSULIN ASPART 100 [IU]/ML
0-10 INJECTION, SOLUTION INTRAVENOUS; SUBCUTANEOUS
Status: DISCONTINUED | OUTPATIENT
Start: 2024-09-12 | End: 2024-09-14 | Stop reason: HOSPADM

## 2024-09-12 RX ORDER — SODIUM CHLORIDE 9 MG/ML
INJECTION, SOLUTION INTRAVENOUS CONTINUOUS
Status: ACTIVE | OUTPATIENT
Start: 2024-09-12 | End: 2024-09-13

## 2024-09-12 RX ORDER — SODIUM CHLORIDE 9 MG/ML
INJECTION, SOLUTION INTRAVENOUS CONTINUOUS
Status: DISCONTINUED | OUTPATIENT
Start: 2024-09-12 | End: 2024-09-14 | Stop reason: HOSPADM

## 2024-09-12 RX ORDER — IPRATROPIUM BROMIDE AND ALBUTEROL SULFATE 2.5; .5 MG/3ML; MG/3ML
3 SOLUTION RESPIRATORY (INHALATION) EVERY 8 HOURS
Status: DISPENSED | OUTPATIENT
Start: 2024-09-12 | End: 2024-09-13

## 2024-09-12 RX ORDER — ATORVASTATIN CALCIUM 20 MG/1
20 TABLET, FILM COATED ORAL NIGHTLY
Status: DISCONTINUED | OUTPATIENT
Start: 2024-09-13 | End: 2024-09-14 | Stop reason: HOSPADM

## 2024-09-12 RX ORDER — OXYCODONE HYDROCHLORIDE 10 MG/1
10 TABLET ORAL EVERY 6 HOURS PRN
Status: DISCONTINUED | OUTPATIENT
Start: 2024-09-12 | End: 2024-09-14 | Stop reason: HOSPADM

## 2024-09-12 RX ORDER — LIDOCAINE HYDROCHLORIDE 20 MG/ML
INJECTION INTRAVENOUS
Status: DISCONTINUED | OUTPATIENT
Start: 2024-09-12 | End: 2024-09-12

## 2024-09-12 RX ORDER — IBUPROFEN 200 MG
24 TABLET ORAL
Status: DISCONTINUED | OUTPATIENT
Start: 2024-09-12 | End: 2024-09-14 | Stop reason: HOSPADM

## 2024-09-12 RX ORDER — DEXMEDETOMIDINE HYDROCHLORIDE 100 UG/ML
INJECTION, SOLUTION INTRAVENOUS
Status: DISCONTINUED | OUTPATIENT
Start: 2024-09-12 | End: 2024-09-12

## 2024-09-12 RX ORDER — MUPIROCIN 20 MG/G
OINTMENT TOPICAL 2 TIMES DAILY
Status: DISCONTINUED | OUTPATIENT
Start: 2024-09-12 | End: 2024-09-14 | Stop reason: HOSPADM

## 2024-09-12 RX ORDER — ONDANSETRON HYDROCHLORIDE 2 MG/ML
4 INJECTION, SOLUTION INTRAVENOUS DAILY PRN
Status: DISCONTINUED | OUTPATIENT
Start: 2024-09-12 | End: 2024-09-12 | Stop reason: HOSPADM

## 2024-09-12 RX ORDER — HYDROMORPHONE HYDROCHLORIDE 2 MG/ML
INJECTION, SOLUTION INTRAMUSCULAR; INTRAVENOUS; SUBCUTANEOUS
Status: DISCONTINUED | OUTPATIENT
Start: 2024-09-12 | End: 2024-09-12

## 2024-09-12 RX ORDER — MORPHINE SULFATE 2 MG/ML
2 INJECTION, SOLUTION INTRAMUSCULAR; INTRAVENOUS EVERY 4 HOURS PRN
Status: DISCONTINUED | OUTPATIENT
Start: 2024-09-12 | End: 2024-09-14 | Stop reason: HOSPADM

## 2024-09-12 RX ORDER — FENTANYL CITRATE 50 UG/ML
25 INJECTION, SOLUTION INTRAMUSCULAR; INTRAVENOUS EVERY 5 MIN PRN
Status: DISCONTINUED | OUTPATIENT
Start: 2024-09-12 | End: 2024-09-12 | Stop reason: HOSPADM

## 2024-09-12 RX ORDER — METHOCARBAMOL 500 MG/1
500 TABLET, FILM COATED ORAL 3 TIMES DAILY PRN
Status: DISCONTINUED | OUTPATIENT
Start: 2024-09-12 | End: 2024-09-14 | Stop reason: HOSPADM

## 2024-09-12 RX ORDER — GLUCAGON 1 MG
1 KIT INJECTION
Status: DISCONTINUED | OUTPATIENT
Start: 2024-09-12 | End: 2024-09-12 | Stop reason: HOSPADM

## 2024-09-12 RX ORDER — SUCCINYLCHOLINE CHLORIDE 20 MG/ML
INJECTION INTRAMUSCULAR; INTRAVENOUS
Status: DISCONTINUED | OUTPATIENT
Start: 2024-09-12 | End: 2024-09-12

## 2024-09-12 RX ORDER — MUPIROCIN 20 MG/G
1 OINTMENT TOPICAL 2 TIMES DAILY
Status: DISCONTINUED | OUTPATIENT
Start: 2024-09-12 | End: 2024-09-12 | Stop reason: HOSPADM

## 2024-09-12 RX ORDER — LIDOCAINE HYDROCHLORIDE AND EPINEPHRINE 10; 10 MG/ML; UG/ML
INJECTION, SOLUTION INFILTRATION; PERINEURAL
Status: DISCONTINUED | OUTPATIENT
Start: 2024-09-12 | End: 2024-09-12 | Stop reason: HOSPADM

## 2024-09-12 RX ORDER — HYDROMORPHONE HYDROCHLORIDE 1 MG/ML
0.2 INJECTION, SOLUTION INTRAMUSCULAR; INTRAVENOUS; SUBCUTANEOUS EVERY 5 MIN PRN
Status: COMPLETED | OUTPATIENT
Start: 2024-09-12 | End: 2024-09-12

## 2024-09-12 RX ORDER — OXYCODONE HYDROCHLORIDE 5 MG/1
5 TABLET ORAL EVERY 4 HOURS PRN
Status: DISCONTINUED | OUTPATIENT
Start: 2024-09-12 | End: 2024-09-14 | Stop reason: HOSPADM

## 2024-09-12 RX ORDER — MEPERIDINE HYDROCHLORIDE 50 MG/ML
12.5 INJECTION INTRAMUSCULAR; INTRAVENOUS; SUBCUTANEOUS EVERY 10 MIN PRN
Status: DISCONTINUED | OUTPATIENT
Start: 2024-09-12 | End: 2024-09-12 | Stop reason: HOSPADM

## 2024-09-12 RX ORDER — BUPROPION HYDROCHLORIDE 150 MG/1
150 TABLET ORAL NIGHTLY
Status: DISCONTINUED | OUTPATIENT
Start: 2024-09-12 | End: 2024-09-14 | Stop reason: HOSPADM

## 2024-09-12 RX ORDER — LATANOPROST 50 UG/ML
1 SOLUTION/ DROPS OPHTHALMIC NIGHTLY
Status: DISCONTINUED | OUTPATIENT
Start: 2024-09-13 | End: 2024-09-14 | Stop reason: HOSPADM

## 2024-09-12 RX ORDER — OXYCODONE HYDROCHLORIDE 5 MG/1
5 TABLET ORAL ONCE AS NEEDED
Status: DISCONTINUED | OUTPATIENT
Start: 2024-09-12 | End: 2024-09-12 | Stop reason: HOSPADM

## 2024-09-12 RX ORDER — PROCHLORPERAZINE EDISYLATE 5 MG/ML
5 INJECTION INTRAMUSCULAR; INTRAVENOUS EVERY 6 HOURS PRN
Status: DISCONTINUED | OUTPATIENT
Start: 2024-09-12 | End: 2024-09-14 | Stop reason: HOSPADM

## 2024-09-12 RX ORDER — FENTANYL CITRATE 50 UG/ML
INJECTION, SOLUTION INTRAMUSCULAR; INTRAVENOUS
Status: DISCONTINUED | OUTPATIENT
Start: 2024-09-12 | End: 2024-09-12

## 2024-09-12 RX ORDER — SCOLOPAMINE TRANSDERMAL SYSTEM 1 MG/1
1 PATCH, EXTENDED RELEASE TRANSDERMAL ONCE
Status: DISCONTINUED | OUTPATIENT
Start: 2024-09-12 | End: 2024-09-14 | Stop reason: HOSPADM

## 2024-09-12 RX ORDER — ALBUTEROL SULFATE 90 UG/1
1 INHALANT RESPIRATORY (INHALATION) EVERY 4 HOURS PRN
Status: DISCONTINUED | OUTPATIENT
Start: 2024-09-12 | End: 2024-09-14 | Stop reason: HOSPADM

## 2024-09-12 RX ORDER — KETAMINE HCL IN 0.9 % NACL 50 MG/5 ML
SYRINGE (ML) INTRAVENOUS
Status: DISCONTINUED | OUTPATIENT
Start: 2024-09-12 | End: 2024-09-12

## 2024-09-12 RX ORDER — GENTAMICIN 40 MG/ML
INJECTION, SOLUTION INTRAMUSCULAR; INTRAVENOUS
Status: DISCONTINUED | OUTPATIENT
Start: 2024-09-12 | End: 2024-09-12 | Stop reason: HOSPADM

## 2024-09-12 RX ORDER — GLUCAGON 1 MG
1 KIT INJECTION
Status: DISCONTINUED | OUTPATIENT
Start: 2024-09-12 | End: 2024-09-14 | Stop reason: HOSPADM

## 2024-09-12 RX ORDER — POLYETHYLENE GLYCOL 3350 17 G/17G
17 POWDER, FOR SOLUTION ORAL DAILY
Status: DISCONTINUED | OUTPATIENT
Start: 2024-09-12 | End: 2024-09-14 | Stop reason: HOSPADM

## 2024-09-12 RX ORDER — ROPINIROLE 0.25 MG/1
0.25 TABLET, FILM COATED ORAL NIGHTLY
Status: DISCONTINUED | OUTPATIENT
Start: 2024-09-12 | End: 2024-09-14 | Stop reason: HOSPADM

## 2024-09-12 RX ORDER — TAMSULOSIN HYDROCHLORIDE 0.4 MG/1
0.4 CAPSULE ORAL DAILY
Status: DISCONTINUED | OUTPATIENT
Start: 2024-09-13 | End: 2024-09-14 | Stop reason: HOSPADM

## 2024-09-12 RX ORDER — LIDOCAINE HYDROCHLORIDE 10 MG/ML
INJECTION, SOLUTION EPIDURAL; INFILTRATION; INTRACAUDAL; PERINEURAL
Status: DISPENSED
Start: 2024-09-12 | End: 2024-09-12

## 2024-09-12 RX ORDER — DEXAMETHASONE SODIUM PHOSPHATE 4 MG/ML
INJECTION, SOLUTION INTRA-ARTICULAR; INTRALESIONAL; INTRAMUSCULAR; INTRAVENOUS; SOFT TISSUE
Status: DISCONTINUED | OUTPATIENT
Start: 2024-09-12 | End: 2024-09-12

## 2024-09-12 RX ORDER — ONDANSETRON HYDROCHLORIDE 2 MG/ML
INJECTION, SOLUTION INTRAVENOUS
Status: DISCONTINUED | OUTPATIENT
Start: 2024-09-12 | End: 2024-09-12

## 2024-09-12 RX ORDER — MAGNESIUM SULFATE HEPTAHYDRATE 500 MG/ML
INJECTION, SOLUTION INTRAMUSCULAR; INTRAVENOUS
Status: DISCONTINUED | OUTPATIENT
Start: 2024-09-12 | End: 2024-09-12

## 2024-09-12 RX ORDER — ONDANSETRON 8 MG/1
8 TABLET, ORALLY DISINTEGRATING ORAL EVERY 6 HOURS PRN
Status: DISCONTINUED | OUTPATIENT
Start: 2024-09-12 | End: 2024-09-14 | Stop reason: HOSPADM

## 2024-09-12 RX ADMIN — SODIUM CHLORIDE, SODIUM GLUCONATE, SODIUM ACETATE, POTASSIUM CHLORIDE, MAGNESIUM CHLORIDE, SODIUM PHOSPHATE, DIBASIC, AND POTASSIUM PHOSPHATE: .53; .5; .37; .037; .03; .012; .00082 INJECTION, SOLUTION INTRAVENOUS at 11:09

## 2024-09-12 RX ADMIN — DEXMEDETOMIDINE 8 MCG: 100 INJECTION, SOLUTION, CONCENTRATE INTRAVENOUS at 11:09

## 2024-09-12 RX ADMIN — PREGABALIN 200 MG: 150 CAPSULE ORAL at 09:09

## 2024-09-12 RX ADMIN — PROPOFOL 50 MG: 10 INJECTION, EMULSION INTRAVENOUS at 10:09

## 2024-09-12 RX ADMIN — BUPROPION HYDROCHLORIDE 150 MG: 150 TABLET, EXTENDED RELEASE ORAL at 09:09

## 2024-09-12 RX ADMIN — HYDROMORPHONE HYDROCHLORIDE 0.2 MG: 1 INJECTION, SOLUTION INTRAMUSCULAR; INTRAVENOUS; SUBCUTANEOUS at 03:09

## 2024-09-12 RX ADMIN — HYDROMORPHONE HYDROCHLORIDE 0.2 MG: 1 INJECTION, SOLUTION INTRAMUSCULAR; INTRAVENOUS; SUBCUTANEOUS at 02:09

## 2024-09-12 RX ADMIN — Medication 10 MG: at 11:09

## 2024-09-12 RX ADMIN — PHENYLEPHRINE HYDROCHLORIDE 100 MCG: 10 INJECTION INTRAVENOUS at 09:09

## 2024-09-12 RX ADMIN — HALOPERIDOL LACTATE 0.5 MG: 5 INJECTION, SOLUTION INTRAMUSCULAR at 06:09

## 2024-09-12 RX ADMIN — SODIUM CHLORIDE 0.2 MCG/KG/MIN: 9 INJECTION, SOLUTION INTRAVENOUS at 09:09

## 2024-09-12 RX ADMIN — POLYETHYLENE GLYCOL 3350 17 G: 17 POWDER, FOR SOLUTION ORAL at 02:09

## 2024-09-12 RX ADMIN — HYDROMORPHONE HYDROCHLORIDE 0.2 MG: 2 INJECTION INTRAMUSCULAR; INTRAVENOUS; SUBCUTANEOUS at 12:09

## 2024-09-12 RX ADMIN — ACETAMINOPHEN 650 MG: 325 TABLET ORAL at 11:09

## 2024-09-12 RX ADMIN — PROPOFOL 150 MG: 10 INJECTION, EMULSION INTRAVENOUS at 09:09

## 2024-09-12 RX ADMIN — PHENYLEPHRINE HYDROCHLORIDE 100 MCG: 10 INJECTION INTRAVENOUS at 10:09

## 2024-09-12 RX ADMIN — ONDANSETRON 4 MG: 2 INJECTION INTRAMUSCULAR; INTRAVENOUS at 08:09

## 2024-09-12 RX ADMIN — CEFAZOLIN 2 G: 2 INJECTION, POWDER, FOR SOLUTION INTRAMUSCULAR; INTRAVENOUS at 11:09

## 2024-09-12 RX ADMIN — HYDROMORPHONE HYDROCHLORIDE 0.2 MG: 1 INJECTION, SOLUTION INTRAMUSCULAR; INTRAVENOUS; SUBCUTANEOUS at 05:09

## 2024-09-12 RX ADMIN — ACETAMINOPHEN 1000 MG: 10 INJECTION, SOLUTION INTRAVENOUS at 10:09

## 2024-09-12 RX ADMIN — DEXAMETHASONE SODIUM PHOSPHATE 12 MG: 4 INJECTION, SOLUTION INTRAMUSCULAR; INTRAVENOUS at 09:09

## 2024-09-12 RX ADMIN — DEXMEDETOMIDINE 8 MCG: 100 INJECTION, SOLUTION, CONCENTRATE INTRAVENOUS at 01:09

## 2024-09-12 RX ADMIN — EPHEDRINE SULFATE 25 MG: 50 INJECTION INTRAVENOUS at 09:09

## 2024-09-12 RX ADMIN — CEFAZOLIN 2 G: 2 INJECTION, POWDER, FOR SOLUTION INTRAMUSCULAR; INTRAVENOUS at 10:09

## 2024-09-12 RX ADMIN — Medication 10 MG: at 10:09

## 2024-09-12 RX ADMIN — MIDAZOLAM HYDROCHLORIDE 2 MG: 1 INJECTION, SOLUTION INTRAMUSCULAR; INTRAVENOUS at 09:09

## 2024-09-12 RX ADMIN — MEPERIDINE HYDROCHLORIDE 12.5 MG: 50 INJECTION INTRAMUSCULAR; INTRAVENOUS; SUBCUTANEOUS at 04:09

## 2024-09-12 RX ADMIN — SENNOSIDES AND DOCUSATE SODIUM 2 TABLET: 50; 8.6 TABLET ORAL at 09:09

## 2024-09-12 RX ADMIN — LIDOCAINE HYDROCHLORIDE 100 MG: 20 INJECTION INTRAVENOUS at 09:09

## 2024-09-12 RX ADMIN — PHENYLEPHRINE HYDROCHLORIDE 0.3 MCG/KG/MIN: 10 INJECTION INTRAVENOUS at 09:09

## 2024-09-12 RX ADMIN — ROPINIROLE HYDROCHLORIDE 0.25 MG: 0.25 TABLET, FILM COATED ORAL at 09:09

## 2024-09-12 RX ADMIN — MUPIROCIN: 20 OINTMENT TOPICAL at 07:09

## 2024-09-12 RX ADMIN — SODIUM CHLORIDE: 9 INJECTION, SOLUTION INTRAVENOUS at 02:09

## 2024-09-12 RX ADMIN — LAMOTRIGINE 200 MG: 100 TABLET ORAL at 09:09

## 2024-09-12 RX ADMIN — PROPOFOL 50 MG: 10 INJECTION, EMULSION INTRAVENOUS at 12:09

## 2024-09-12 RX ADMIN — MIDAZOLAM 2 MG: 1 INJECTION INTRAMUSCULAR; INTRAVENOUS at 08:09

## 2024-09-12 RX ADMIN — HALOPERIDOL LACTATE 0.5 MG: 5 INJECTION, SOLUTION INTRAMUSCULAR at 05:09

## 2024-09-12 RX ADMIN — MUPIROCIN: 20 OINTMENT TOPICAL at 09:09

## 2024-09-12 RX ADMIN — SODIUM CHLORIDE: 9 INJECTION, SOLUTION INTRAVENOUS at 09:09

## 2024-09-12 RX ADMIN — IPRATROPIUM BROMIDE AND ALBUTEROL SULFATE 3 ML: 2.5; .5 SOLUTION RESPIRATORY (INHALATION) at 02:09

## 2024-09-12 RX ADMIN — DEXMEDETOMIDINE 8 MCG: 100 INJECTION, SOLUTION, CONCENTRATE INTRAVENOUS at 12:09

## 2024-09-12 RX ADMIN — HYDROMORPHONE HYDROCHLORIDE 0.2 MG: 2 INJECTION INTRAMUSCULAR; INTRAVENOUS; SUBCUTANEOUS at 11:09

## 2024-09-12 RX ADMIN — SCOPOLAMINE 1 PATCH: 1.5 PATCH, EXTENDED RELEASE TRANSDERMAL at 08:09

## 2024-09-12 RX ADMIN — PROPOFOL 175 MCG/KG/MIN: 10 INJECTION, EMULSION INTRAVENOUS at 09:09

## 2024-09-12 RX ADMIN — ACETAMINOPHEN 650 MG: 325 TABLET ORAL at 05:09

## 2024-09-12 RX ADMIN — OXYCODONE HYDROCHLORIDE 10 MG: 10 TABLET ORAL at 02:09

## 2024-09-12 RX ADMIN — Medication 30 MG: at 09:09

## 2024-09-12 RX ADMIN — SODIUM CHLORIDE: 9 INJECTION, SOLUTION INTRAVENOUS at 07:09

## 2024-09-12 RX ADMIN — CEFAZOLIN 2 G: 2 INJECTION, POWDER, FOR SOLUTION INTRAMUSCULAR; INTRAVENOUS at 04:09

## 2024-09-12 RX ADMIN — OXYCODONE HYDROCHLORIDE 10 MG: 10 TABLET ORAL at 09:09

## 2024-09-12 RX ADMIN — MAGNESIUM SULFATE HEPTAHYDRATE 2 G: 500 INJECTION, SOLUTION INTRAMUSCULAR; INTRAVENOUS at 09:09

## 2024-09-12 RX ADMIN — SUCCINYLCHOLINE 160 MG: 20 INJECTION, SOLUTION INTRAMUSCULAR; INTRAVENOUS at 09:09

## 2024-09-12 RX ADMIN — HYDROMORPHONE HYDROCHLORIDE 0.2 MG: 1 INJECTION, SOLUTION INTRAMUSCULAR; INTRAVENOUS; SUBCUTANEOUS at 06:09

## 2024-09-12 RX ADMIN — DEXMEDETOMIDINE 20 MCG: 100 INJECTION, SOLUTION, CONCENTRATE INTRAVENOUS at 09:09

## 2024-09-12 RX ADMIN — FENTANYL CITRATE 100 MCG: 50 INJECTION, SOLUTION INTRAMUSCULAR; INTRAVENOUS at 09:09

## 2024-09-12 RX ADMIN — MUPIROCIN 1 G: 20 OINTMENT TOPICAL at 07:09

## 2024-09-12 RX ADMIN — METHOCARBAMOL 500 MG: 500 TABLET ORAL at 02:09

## 2024-09-12 RX ADMIN — ONDANSETRON 4 MG: 2 INJECTION INTRAMUSCULAR; INTRAVENOUS at 01:09

## 2024-09-12 NOTE — INTERVAL H&P NOTE
The patient has been examined and the H&P has been reviewed:    I concur with the findings and no changes have occurred since H&P was written.    Surgery risks, benefits and alternative options discussed and understood by patient/family.    General: mild distress, anxious  Head: Non-traumatic, normocephalic  Eyes: Pupils equal, EOMi  Neck: Supple, normal ROM, no tenderness to palpation  CVS: Normal rate and rhythm, distal pulses present  Pulm: Symmetric expansion, no respiratory distress  GI: Abdomen nondistended, nontender  MSK: Moves all extremities without restriction, atraumatic  Skin: Dry, intact  Psych: Normal thought content and cognition  Neuro: AOx3, GCS E4V5M6  CNII-XII: Intact on fine exam, PERRL, EOMi, facial sensation preserved, no facial asymmetry, tongue/uvula/palate midline, shoulder shrug equal, No pronator drift  Extremities:  Motor:  Upper Extremity:                                  Deltoids        Triceps        Biceps        WE        WF                Interosseous           R        5/5              5/5              5/5            5/5         5/5         5/5                5/5           L        5/5              5/5              5/5            5/5         5/5         5/5                5/5  Lower Extremity:                                      HF        KE        KF        DF        PF        EHL           R       5/5        5/5        5/5        5/5        5/5        5/5           L       5/5        5/5        5/5        5/5        5/5        5/5    Reflexes:     DTR: 2+ and symmetrically throughout     Sensory:      Sensorium intact throughout, no sensory level present    Coordination:      Coordination intact throughout, some clumsiness appreciated holding pen to sign consent          There are no hospital problems to display for this patient.

## 2024-09-12 NOTE — ANESTHESIA POSTPROCEDURE EVALUATION
Anesthesia Post Evaluation    Patient: Hermelindo Garza III    Procedure(s) Performed: Procedure(s) (LRB):  DISCECTOMY, SPINE, CERVICAL, ANTERIOR APPROACH, WITH FUSION C5-6, C6-7 (N/A)    Final Anesthesia Type: general      Patient location during evaluation: PACU  Patient participation: Yes- Able to Participate  Level of consciousness: awake and alert  Post-procedure vital signs: reviewed and stable  Pain management: adequate  Airway patency: patent    PONV status at discharge: No PONV  Anesthetic complications: no      Cardiovascular status: blood pressure returned to baseline  Respiratory status: unassisted  Hydration status: euvolemic  Follow-up not needed.            Vitals Value Taken Time   /65 09/12/24 1417   Temp 98.5 09/12/24 1422   Pulse 101 09/12/24 1421   Resp 22 09/12/24 1421   SpO2 93 % 09/12/24 1421   Vitals shown include unfiled device data.      No case tracking events are documented in the log.      Pain/Shani Score: Pain Rating Prior to Med Admin: 10 (9/12/2024  2:14 PM)

## 2024-09-12 NOTE — TRANSFER OF CARE
"Anesthesia Transfer of Care Note    Patient: Hermelindo Garza III    Procedure(s) Performed: Procedure(s) (LRB):  DISCECTOMY, SPINE, CERVICAL, ANTERIOR APPROACH, WITH FUSION C5-6, C6-7 (N/A)    Patient location: PACU    Anesthesia Type: general    Transport from OR: Transported from OR on 6-10 L/min O2 by face mask with adequate spontaneous ventilation    Post pain: adequate analgesia    Post assessment: no apparent anesthetic complications and tolerated procedure well    Post vital signs: stable    Level of consciousness: awake, alert and oriented    Nausea/Vomiting: no nausea/vomiting    Complications: none    Transfer of care protocol was followed      Last vitals: Visit Vitals  /69 (BP Location: Left arm, Patient Position: Lying)   Pulse 81   Temp 36.6 °C (97.9 °F) (Temporal)   Resp 12   Ht 5' 5" (1.651 m)   Wt 107 kg (236 lb)   SpO2 97%   BMI 39.27 kg/m²     "

## 2024-09-12 NOTE — ANESTHESIA PREPROCEDURE EVALUATION
09/12/2024  Hermelindo Garza III is a 44 y.o., male.      Pre-op Assessment    I have reviewed the Patient Summary Reports.       I have reviewed the Medications.     Review of Systems  Anesthesia Hx:  No problems with previous Anesthesia   Neg history of prior surgery.          Denies Family Hx of Anesthesia complications.   Personal Hx of Anesthesia complications, Post-Operative Nausea/Vomiting                    Hematology/Oncology:  Hematology Normal   Oncology Normal                                   EENT/Dental:  EENT/Dental Normal           Cardiovascular:  Exercise tolerance: good          Denies Angina.          Functional Capacity good / => 4 METS                         Pulmonary:    Asthma mild and asymptomatic   Sleep Apnea   Asthma:    Obstructive Sleep Apnea (LUBA).           Renal/:  Chronic Renal Disease   calculi     Kidney Function/Disease             Hepatic/GI:   PUD,  GERD, well controlled Liver Disease, Hepatitis    Gerd, Peptic Ulcer Disease       Liver Disease, Hepatitis        Musculoskeletal:  Arthritis        Arthritis          Neurological:    Neuromuscular Disease,         Denies Pain         Arthritis                         Neuromuscular Disease   Endocrine:  Diabetes, type 2    Diabetes                      Psych:  Denies Psychiatric History. anxiety             Physical Exam  General: Well nourished and Cooperative    Airway:  Mallampati: II / II  Mouth Opening: Normal  TM Distance: Normal  Tongue: Normal  Neck ROM: Normal ROM    Dental:  Intact    Chest/Lungs:  Clear to auscultation, Normal Respiratory Rate    Heart:  Rate: Normal    Anesthesia Plan  Type of Anesthesia, risks & benefits discussed:    Anesthesia Type: Gen ETT  Intra-op Monitoring Plan: Standard ASA Monitors  Post Op Pain Control Plan: multimodal analgesia and IV/PO Opioids PRN  Induction:  IV  Airway  Plan: , Post-Induction  Informed Consent: Informed consent signed with the Patient and all parties understand the risks and agree with anesthesia plan.  All questions answered.   ASA Score: 3  Day of Surgery Review of History & Physical: H&P Update referred to the surgeon/provider.  Anesthesia Plan Notes: Preop nausea and hx PONV; substantial preop anxiety: ondansetron, scopolamine & midazolam ordered     Ready For Surgery From Anesthesia Perspective.   .

## 2024-09-12 NOTE — ANESTHESIA PROCEDURE NOTES
Intubation    Date/Time: 9/12/2024 9:16 AM    Performed by: Samir Garcia CRNA  Authorized by: Samir Garcia CRNA    Intubation:     Induction:  Intravenous    Intubated:  Postinduction    Mask Ventilation:  Easy mask    Attempts:  1    Attempted By:  CRNA    Method of Intubation:  Video laryngoscopy    Blade:  Wang 4    Laryngeal View Grade: Grade I - full view of cords      Difficult Airway Encountered?: No      Complications:  None    Airway Device:  Oral endotracheal tube    Airway Device Size:  7.5    Placement Verified By:  Capnometry    Complicating Factors:  None    Findings Post-Intubation:  BS equal bilateral and atraumatic/condition of teeth unchanged

## 2024-09-12 NOTE — PROGRESS NOTES
Patient prepared for surgery. Placed on monitoring after administration of anxiolytic medication. Spouse is at the bedside. VSS on 2L per n/c. Patient and spouse briefed on perioperative routine.

## 2024-09-12 NOTE — PLAN OF CARE
Post Op Check    The patient was seen at bedside following the procedure. The patient was still waking up from anesthesia but grossly at his neurological baseline, pain was reasonably controlled in the post-operative period, and all questions and/or complaints were directly addressed at bedside by the NSGY resident. All post-operative management orders have been placed and the patient was signed out to bedside BENJAMIN HALL on way from [facility].    Sandhya Baron MD  Neurosurgery  [facility]

## 2024-09-12 NOTE — BRIEF OP NOTE
[facility] - Surgery ([facility])  Surgery Department  Operative Note    SUMMARY     Date of Procedure: 9/12/2024     Procedure: Procedure(s) (LRB):  DISCECTOMY, SPINE, CERVICAL, ANTERIOR APPROACH, WITH FUSION C5-6, C6-7 (N/A)     Surgeons and Role:     * Naseem Mcnamara,  - Primary     * Sandhya Baron MD - Resident - Assisting        Pre-Operative Diagnosis: Cervical disc disorder with radiculopathy, unspecified cervical region [M50.10]    Post-Operative Diagnosis: Post-Op Diagnosis Codes:     * Cervical disc disorder with radiculopathy, unspecified cervical region [M50.10]    Anesthesia: General    Operative Findings (including complications, if any): C5/6, C6/7 ACDF, Ariel head rodriguez with GW tongs, closed with dermabond    Description of Technical Procedures: see full op note      Estimated Blood Loss (EBL): * No values recorded between 9/12/2024 12:00 AM and 9/12/2024  8:56 AM *           Implants:   Implant Name Type Inv. Item Serial No.  Lot No. LRB No. Used Action   PIN DISTRACTION DISP 14MM - [id number]  PIN DISTRACTION DISP 14MM  B [facility] [id number]  2 Implanted and Explanted   ACTIFUSE SHAPE MD CYL [id number] - [id number]  ACTIFUSE SHAPE MD CYL [id number]  [employer] [id number] N/A 1 Implanted   7MM Z-LINK CERVICAL INTERBODY PLATE    [employer]  N/A 2 Implanted   Z-LINK CERVIAL SPACER 12X15, 6 DEG, -07    [employer]  N/A 2 Implanted   SCREW ZAVATION SD KURT 4.0X16MM - [id number]  SCREW ZAVATION SD KURT 4.0X16MM  [employer]  N/A 4 Implanted       Specimens:   Specimen (24h ago, onward)      None                    Condition: Stable    Disposition: PACU - hemodynamically stable.    Attestation: Op Note Attestation: The attending physician was present for the entire procedure.

## 2024-09-12 NOTE — CARE UPDATE
Patient arrived in PACU awake, c/o some chest discomfort and some restriction in breathing.  There is no radiation and the pain is very close to the incision (anterior neck)  Auscultation revealed bilateral BS, no audible wheezes but definitely prolonged expiratory time.  SPO2 was 90 off oxygen, 92-93 on oxygen.  Plan was to restart inhaled bronchodilator therapy and obtain a CXR (which showed no acute changes) but poor inspiratory effort.  SPO2 now 94%  Patient

## 2024-09-12 NOTE — NURSING TRANSFER
Nursing Transfer Note      9/12/2024   3:48 PM    Nurse giving handoff:Karen MONTES PACU  Nurse receiving handoff: Yeso RN POSS    Reason patient is being transferred: MD order    Transfer To: 502    Transfer via bed    Transfer with  to O2    Transported by PCT    Order for Tele Monitor? No      Medicines sent: NS Gtt    Any special needs or follow-up needed: C Collar in place    Patient belongings transferred with patient: Yes    Chart send with patient: Yes    Notified: spouse    Patient reassessed at: 1600 (date, time)  1  Upon arrival to floor: patient oriented to room and call bell in reach

## 2024-09-13 LAB
POCT GLUCOSE: 100 MG/DL (ref 70–110)
POCT GLUCOSE: 102 MG/DL (ref 70–110)
POCT GLUCOSE: 106 MG/DL (ref 70–110)
POCT GLUCOSE: 110 MG/DL (ref 70–110)
POCT GLUCOSE: 99 MG/DL (ref 70–110)

## 2024-09-13 PROCEDURE — 94640 AIRWAY INHALATION TREATMENT: CPT

## 2024-09-13 PROCEDURE — 97161 PT EVAL LOW COMPLEX 20 MIN: CPT

## 2024-09-13 PROCEDURE — 97165 OT EVAL LOW COMPLEX 30 MIN: CPT

## 2024-09-13 PROCEDURE — 97116 GAIT TRAINING THERAPY: CPT

## 2024-09-13 PROCEDURE — 99900035 HC TECH TIME PER 15 MIN (STAT)

## 2024-09-13 PROCEDURE — 97535 SELF CARE MNGMENT TRAINING: CPT

## 2024-09-13 PROCEDURE — 11000001 HC ACUTE MED/SURG PRIVATE ROOM

## 2024-09-13 PROCEDURE — 25000003 PHARM REV CODE 250: Performed by: STUDENT IN AN ORGANIZED HEALTH CARE EDUCATION/TRAINING PROGRAM

## 2024-09-13 PROCEDURE — 63600175 PHARM REV CODE 636 W HCPCS: Performed by: STUDENT IN AN ORGANIZED HEALTH CARE EDUCATION/TRAINING PROGRAM

## 2024-09-13 PROCEDURE — 94761 N-INVAS EAR/PLS OXIMETRY MLT: CPT

## 2024-09-13 PROCEDURE — 25000242 PHARM REV CODE 250 ALT 637 W/ HCPCS: Performed by: ANESTHESIOLOGY

## 2024-09-13 RX ADMIN — ROPINIROLE HYDROCHLORIDE 0.25 MG: 0.25 TABLET, FILM COATED ORAL at 08:09

## 2024-09-13 RX ADMIN — LATANOPROST 1 DROP: 50 SOLUTION OPHTHALMIC at 09:09

## 2024-09-13 RX ADMIN — IPRATROPIUM BROMIDE AND ALBUTEROL SULFATE 3 ML: 2.5; .5 SOLUTION RESPIRATORY (INHALATION) at 07:09

## 2024-09-13 RX ADMIN — PREGABALIN 200 MG: 150 CAPSULE ORAL at 08:09

## 2024-09-13 RX ADMIN — MORPHINE SULFATE 2 MG: 2 INJECTION, SOLUTION INTRAMUSCULAR; INTRAVENOUS at 09:09

## 2024-09-13 RX ADMIN — CEFAZOLIN 2 G: 2 INJECTION, POWDER, FOR SOLUTION INTRAMUSCULAR; INTRAVENOUS at 11:09

## 2024-09-13 RX ADMIN — METHOCARBAMOL 500 MG: 500 TABLET ORAL at 08:09

## 2024-09-13 RX ADMIN — BUPROPION HYDROCHLORIDE 150 MG: 150 TABLET, EXTENDED RELEASE ORAL at 08:09

## 2024-09-13 RX ADMIN — ACETAMINOPHEN 650 MG: 325 TABLET ORAL at 05:09

## 2024-09-13 RX ADMIN — OXYCODONE HYDROCHLORIDE 10 MG: 10 TABLET ORAL at 12:09

## 2024-09-13 RX ADMIN — MUPIROCIN: 20 OINTMENT TOPICAL at 08:09

## 2024-09-13 RX ADMIN — ACETAMINOPHEN 650 MG: 325 TABLET ORAL at 11:09

## 2024-09-13 RX ADMIN — ACETAMINOPHEN 650 MG: 325 TABLET ORAL at 06:09

## 2024-09-13 RX ADMIN — LAMOTRIGINE 200 MG: 100 TABLET ORAL at 08:09

## 2024-09-13 RX ADMIN — CEFAZOLIN 2 G: 2 INJECTION, POWDER, FOR SOLUTION INTRAMUSCULAR; INTRAVENOUS at 08:09

## 2024-09-13 RX ADMIN — HEPARIN SODIUM 5000 UNITS: 5000 INJECTION INTRAVENOUS; SUBCUTANEOUS at 04:09

## 2024-09-13 RX ADMIN — CEFAZOLIN 2 G: 2 INJECTION, POWDER, FOR SOLUTION INTRAMUSCULAR; INTRAVENOUS at 03:09

## 2024-09-13 RX ADMIN — MORPHINE SULFATE 2 MG: 2 INJECTION, SOLUTION INTRAMUSCULAR; INTRAVENOUS at 04:09

## 2024-09-13 RX ADMIN — ATORVASTATIN CALCIUM 20 MG: 20 TABLET, FILM COATED ORAL at 08:09

## 2024-09-13 RX ADMIN — OXYCODONE HYDROCHLORIDE 10 MG: 10 TABLET ORAL at 06:09

## 2024-09-13 RX ADMIN — OXYCODONE HYDROCHLORIDE 10 MG: 10 TABLET ORAL at 05:09

## 2024-09-13 RX ADMIN — POLYETHYLENE GLYCOL 3350 17 G: 17 POWDER, FOR SOLUTION ORAL at 08:09

## 2024-09-13 RX ADMIN — MUPIROCIN: 20 OINTMENT TOPICAL at 09:09

## 2024-09-13 RX ADMIN — SENNOSIDES AND DOCUSATE SODIUM 2 TABLET: 50; 8.6 TABLET ORAL at 08:09

## 2024-09-13 RX ADMIN — MORPHINE SULFATE 2 MG: 2 INJECTION, SOLUTION INTRAMUSCULAR; INTRAVENOUS at 08:09

## 2024-09-13 RX ADMIN — TAMSULOSIN HYDROCHLORIDE 0.4 MG: 0.4 CAPSULE ORAL at 08:09

## 2024-09-13 NOTE — ASSESSMENT & PLAN NOTE
44M with chronic RUE radicular pain now s/p C5/6, C6/7 ACDF, recovering well:    --Patient admitted to NSGY on telemetry      - q4h neurochecks on floor  --Post op XR satisfactory  --Maintain C collar at all times when out of bed  --PT/OT/OOB  --Tolerating liquids, has not yet had solid food  --Pain control with bowel reg  --Cont home meds as appropriate  --TEDs/SCDs/SQH to begin today  --Continue to monitor clinically, notify NSGY immediately with any changes in neuro status    Dispo: pending therapy evals    Closed w/Alan    D/w Dr. Mcnamara
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
## 2024-09-13 NOTE — OP NOTE
DATE OF PROCEDURE: 9/12/24    PREOPERATIVE DIAGNOSES:   Cervical spondylosis with radiculopathy  Multi-level neuroforaminal stenosis    POSTOPERATIVE DIAGNOSES:   Same as above    PROCEDURES PERFORMED:   1. Anterior cervical discectomy and fusion at C5-6 and C6-7  2. Near-total discectomy at C5-6 and C6-7 for placement of interbody PEEK cage (Spine Wave Z-link cage)  3. Placement of anterior plate (Spine Wave Z-link)  4. Use of altapore shape allograft   5. Use of intraop microscope with microscopic dissection  6. Use of intraop flouroscopy  7. Use of intraop neuromonitoring (MEP, SSEP, EMG)    ATTENDING:  Naseem Mcnamara D.O.    FIRST ASSISTANT:  Sandhya Baron M.D.    CLINICAL HISTORY AND INDICATIONS FOR SURGERY:   Patient's main complaint remains right arm pain that involves his shoulder, chest, axilla, and upper arm down to his hand (improved from prior visit).  This is associated with numbness and weakness causing him to drop items and to have numbness at night with an extended neck position.  He has also noticed a tremor in the right hand and fingers since May.  A new MRI demonstrates continued multilevel neural foraminal stenosis in the setting of diffuse spondylosis.  Although his pain distribution is not dermatomal, the distribution of numbness suggests a C6 radiculopathy.  EMG revealed chronic C6, C8, and possible C7 radiculopathies, which corresponds to severe neural foraminal stenosis seen on MRI at these levels.  My recommendation is for ACDF at C5-6, C6-7, and possible C7-T1.    Given the patient's symptoms surgery was recommended and after discussing all the risks, benefits, and alternatives, the patient wishes to proceed with surgery.  Consents were obtained.     OPERATIVE PROCEDURE: The patient was correctly identified and brought into the Operating Room where the anesthesia team administered general anesthesia and fiberotic endotracheal intubation. Neuromonitoring electrodes were placed to allow for  intraop SSEPs, MEPs and free-running EMGs.  Baseline parameters were present. The patient was kept in the supine position with all pressure points padded.  The head was placed on a foam donut in an extended position.  The shoulders were flor inferiorly to allow adequate penetration of flouroscopy.  We used lateral x-ray to localize the C6-7 level (unable to see C7-T1 disc space) and a right transverse incision was planned. Anterior cervical region was prepped and draped using sterile technique.    Level 1:  A timeout was performed and the incision was infiltrated with local anesthetic and incised with a 10-blade scalpel.  Bovie cautery was used to dissect down to the platysma.   A sub-platysmal dissection was carried out using bovie cautery and a self-retaining retractor was placed for better exposure. We then found the medial edge of the sternocleidomastoid muscle as well as the omohyoid, which was dissected along its fibers for easy mobilization. We then went through the avascular tissue plane until the carotid artery was palpated.  The carotid was maintained laterally and the tracheoesophageal complex medially.  The prevertebral fascia was dissected off the anterior cervical spine exposing the ALL and longus coli bilaterally. We used a penfield 4 to confirm to correct disc space (C5-6) under lateral flouro. We then used a monopolar cautery to undermine the longus colli bilaterally. We placed Trimline retractors and used a rongeur to remove the anterior osteophyte overlying the C5-6 disk space. We placed Nora pin distractors and used a #15 blade to cut into the C5-6 disk space. We distracted the disk spaces and brought in the operating microscope.  A combination of straight and angled curettes were used to perform a near complete diskectomy at C5-6.  The overhanging osteophyte was drilled flush with the superior endplate to gain better visualization and access to the disk space.The posterior opsteophyte was  drilled out as well exposing the PLL.  A nerve hook was used to enter the PLL laterally, which was then removed in piecemeal fashion using a combination of Kerrisons 1 and 2.  A complete removal of the PLL was achieved allowing for full decompression of the spinal cord and  nerve roots at C5-6. A nerve hook and microsect currettes were used to confirm decompression of the nerve roots and spinal cord. Surgiflo was used to get hemostasis within the disc space and an appropriate size trial spacer was placed under flouro guidance.  Once the cage size was deteremined (7 mm), the endplates were final prepared using a straight curette. The cage was the assembled with the plate on the back table and packed with altapore allograft.  It was placed within the C5-6 disc space under flouro guidance.  Once in satisfactory position, an awl was used to make a  hole in the superior and inferior endplates.  Screws were then placed in the  holes to secure to cage.  The locking mechanism was activated. The position of the cage was checked with AP flouro and the retractor and distractor systems were disassembled.    Level 2:  A combination of straight and angled curettes were used to perform a near complete diskectomy at C6-7.  The overhanging osteophyte was drilled flush with the superior endplate to gain better visualization and access to the disk space.The posterior opsteophyte was drilled out as well exposing the PLL.  A nerve hook was used to enter the PLL laterally, which was then removed in piecemeal fashion using a combination of Kerrisons 1 and 2.  A complete removal of the PLL was achieved allowing for full decompression of the spinal cord and  nerve roots at C6-7. A nerve hook and microsect currettes were used to confirm decompression of the nerve roots and spinal cord. Surgiflo was used to get hemostasis within the disc space and an appropriate size trial spacer was placed under flouro guidance.  Once the cage size  was deteremined (6 mm), the endplates were final prepared using a straight curette. The cage was the assembled with the plate on the back table and packed with altapore shpe allograft.  It was placed within the C6-7 disc space under flouro guidance.  Once in satisfactory position, an awl was used to make a  hole in the superior and inferior endplates.  Screws were then placed in the  holes to secure to cage.  The locking mechanism was activated. The position of the cage was checked with AP flouro and the retractor and distractor systems were disassembled.      An attempt at a C7-T1 ACDF was made however the C7-T1 disc space could not reliably be visualized under fluoroscopy.  Various maneuvers were attempted to enhance the fluoroscopic penetration to no avail.  The manubrium was also blocking access to further dissection down to T1.  Decision was made to abort the C7-T1 ACDF, monitor patient's recovery and if felt to be symptomatic from C7-T1 would approach posteriorly.    The longus coli as well as any point of bleeding were cauterized with bipolar cautery.  The site was irrigated with several rounds of antibiotic solution and then closed in layered fashion - with 3-0 Vicryls in the platysma and subcuticular tissue.  Dermabond was use on the skin and an island dressing was applied.      MEPs were present and unchanged at the end of the case.    COMPLICATIONS:  None    INCISION:  Right anterolateral neck    WOUND CLASSIFICATION:  Clean    ANESTHESIA: General    ESTIMATED BLOOD LOSS: 30 ml    DRAINS:  None    CONDITION: Stable    PROGNOSIS:  Good

## 2024-09-13 NOTE — NURSING
Received report from Karen in PACU. She was told to keep pt washington in even though there is dc order at 1355.

## 2024-09-13 NOTE — TELEPHONE ENCOUNTER
Please see the attached refill request.    Last seen 06/2024    Assessment / Plan:      Pathology Orders:         Normal Orders This Visit     Specimen to Pathology, Dermatology      Comments:     Number of Specimens:->1  ------------------------->-------------------------  Spec 1 Procedure:->Biopsy  Spec 1 Clinical Impression:->r/o fibrous papule v  inflammatory papule v other  Spec 1 Source:->left wrist     Questions:     Procedure Type: Dermatology and skin neoplasms     Number of Specimens: 1     ------------------------: -------------------------     Spec 1 Procedure: Biopsy     Spec 1 Clinical Impression: r/o fibrous papule v inflammatory papule v other     Spec 1 Source: left wrist     Release to patient:              Neoplasm of uncertain behavior of skin  Shave biopsy procedure note:     Shave biopsy performed after verbal consent including risk of infection, scar, recurrence, need for additional treatment of site. Area prepped with alcohol, anesthetized with approximately 1.0cc of 1% lidocaine with epinephrine. Lesional tissue shaved with razor blade. Hemostasis achieved with application of aluminum chloride followed by hyfrecation. No complications. Dressing applied. Wound care explained.     -     Specimen to Pathology, Dermatology     Travon  Reassurance given to patient. No treatment is necessary.   Treatment of benign, asymptomatic lesions may be considered cosmetic.     SK (seborrheic keratosis)  These are benign inherited growths without a malignant potential. Reassurance given to patient. No treatment is necessary.      Lentigo  This is a benign hyperpigmented sun induced lesion. Recommend daily sun protection/avoidance and use of at least SPF 30, broad spectrum sunscreen (OTC drug) will reduce the number of new lesions. Treatment of these benign lesions are considered cosmetic.  The nature of sun-induced photo-aging and skin cancers is discussed.  Sun avoidance, protective clothing, and the use of  30-SPF sunscreens is advised. Observe closely for skin damage/changes, and call if such occurs.     Nevus  Discussed ABCDE's of nevi.  Monitor for new mole or moles that are becoming bigger, darker, irritated, or developing irregular borders. Brochure provided. Instructed patient to observe lesion(s) for changes and follow up in clinic if changes are noted. Patient to monitor skin at home for new or changing lesions.      Cherry angioma  These are benign vascular lesions that are inherited.  Treatment is not necessary.     Skin tag  Reassurance given to patient. No treatment is necessary.   Treatment of benign, asymptomatic lesions may be considered cosmetic.     History of melanoma in situ-   Area of previous melanoma examined. Site well healed with no signs of recurrence.     Total body skin examination performed today including at least 12 points as noted in physical examination. No Suspicious lesions noted.     Recommend daily sun protection/avoidance, use of at least SPF 30, broad spectrum sunscreen (OTC drug), skin self examinations, and routine physician surveillance to optimize early detection                 Follow up in about 6 months (around 12/5/2024) for TBSE.

## 2024-09-13 NOTE — HPI
44M with RUE radicular pain with EMG findings c/w chronic C6, C8 and possibly C7 radiculopathy. He reports that since his last visit his radiating arm pain has improved somewhat in terms of intensity but he still notes bothersome finger and hand numbness that results in him dropping items from his hand and affecting his ability to write.  He still reports tremor in his right hand that is made worse with stimulants.  He continues to have significant neck pain as well.  He explains that he has had to stop exercising and engaging in many of his daily activities to avoid exacerbating his arm pain.      Patient presents for elective ACDF on 9/12/24.

## 2024-09-13 NOTE — NURSING
Nurses Note -- 4 Eyes      9/12/2024   8:30 PM      Skin assessed during: Admit      [x] No Altered Skin Integrity Present    []Prevention Measures Documented      [] Yes- Altered Skin Integrity Present or Discovered   [] LDA Added if Not in Epic (Describe Wound)   [] New Altered Skin Integrity was Present on Admit and Documented in LDA   [] Wound Image Taken    Wound Care Consulted? No    Attending Nurse:  Melissa     Second RN/Staff Member:  Marylin
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
## 2024-09-13 NOTE — PLAN OF CARE
[facility]  Initial Discharge Assessment       Primary Care Provider: Suzi Sanches MD    Admission Diagnosis: Cervical disc disorder with radiculopathy, unspecified cervical region [M50.10]  Cervical radiculopathy [M54.12]    Admission Date: 9/12/2024  Expected Discharge Date: 9/14/2024    Transition of Care Barriers: None    Payor: [employer] / Plan: [facility] CHOICE PLUS / Product Type: Commercial /     Extended Emergency Contact Information  Primary Emergency Contact: Christopher Denson  Address: [address] [place] [address] [place]  Mobile Phone: [phone]  Relation: Spouse  Secondary Emergency Contact: Ivelisse Garza  Address: [address] [place] [address] [address] [place] [address]  Mobile Phone: [phone]  Relation: Mother  Father: Jr. Garza William   [place]  Mobile Phone: [phone]    Discharge Plan A: Home with family  Discharge Plan B: Home Health, Home with family      [facility] [address]  [place] [address]  Phone: [phone] Fax: [phone]    [id number]  [place] - [address] AT [facility]  [address]  [place] [address]  Phone: [phone] Fax: [phone]      Initial Assessment (most recent)       Adult Discharge Assessment - 09/13/24 1127          Discharge Assessment    Assessment Type Discharge Planning Assessment     Confirmed/corrected address, phone number and insurance Yes     Confirmed Demographics Correct on Facesheet     Source of Information patient     Communicated NATASHA with patient/caregiver Yes     People in Home spouse     Do you expect to return to your current living situation? Yes     Do you have help at home or someone to help you manage your care at home? Yes     Who are your caregiver(s) and their phone number(s)? spouse-Christopher     Prior to hospitilization cognitive status:  Alert/Oriented     Current cognitive status: Alert/Oriented     Equipment Currently Used at Home CPAP;glucometer     Do you currently have service(s) that help you manage your care at home? No     Do you take prescription medications? Yes     Do you have prescription coverage? Yes     Do you have any problems affording any of your prescribed medications? No     Is the patient taking medications as prescribed? yes     How do you get to doctors appointments? car, drives self     Are you on dialysis? No     Do you take coumadin? No     Discharge Plan A Home with family     Discharge Plan B Home Health;Home with family     DME Needed Upon Discharge  none     Discharge Plan discussed with: Patient     Transition of Care Barriers None                   Patient lives with spouse in a single story home with no entry steps. Patient does not feel he will need any post acute services upon hospital discharge. Patient spouse is primary caregiver and will assist in his care upon hospital discharge. Patient spouse-Christopher will also provide transportation home.

## 2024-09-13 NOTE — PT/OT/SLP EVAL
Physical Therapy Evaluation and Discharge Note    Patient Name:  Hermelindo Garza III   MRN:  [id number]    Recommendations:     Discharge Recommendations: No Therapy Indicated  Discharge Equipment Recommendations: none   Barriers to discharge: None    Assessment:     Hermelindo Garza III is a 44 y.o. male admitted with a medical diagnosis of <principal problem not specified>. .  At this time, patient is functioning at their prior level of function and does not require further acute PT services.     Recent Surgery: Procedure(s) (LRB):  DISCECTOMY, SPINE, CERVICAL, ANTERIOR APPROACH, WITH FUSION C5-6, C6-7 (N/A) 1 Day Post-Op    Plan:     During this hospitalization, patient does not require further acute PT services.  Please re-consult if situation changes.      Subjective     Chief Complaint: neck pain  Patient/Family Comments/goals: To use the bathroom  Pain/Comfort:  Pain Rating 1: 7/10  Location - Orientation 1: generalized  Location 1: neck  Pain Addressed 1: Reposition, Distraction  Pain Rating Post-Intervention 1: 7/10    Patients cultural, spiritual, Presybeterian conflicts given the current situation: no    Patient History:     Living Environment: Pt lives spouse in [facility] with no MEGHAN. Bathroom: tub/shower combo   Prior Level of Function: IND with amb and ADLs  DME owned: crutches  Caregiver Assistance: spouse    Additional roles/responsibilities/hobbies:   Driving: Yes  Occpuation: Works from home    Objective:     Communicated with RN prior to session.  Patient found HOB elevated with peripheral IV upon PT entry to room.    General Precautions: Standard, fall    Orthopedic Precautions:spinal precautions   Braces: Sutter J collar (OOB)  Respiratory Status: Room air    Exams:  Gross Motor Coordination:  WFL  Sensation:    -       Intact  RLE ROM: WFL  RLE Strength: WFL  LLE ROM: WFL  LLE Strength: WFL    Functional Mobility:    Bed Mobility:   Rolling: to L with supervision  Supine > Sit:  supervision    Transfers:   Sit <> Stand Transfer: supervision from EOB without AD    Balance:   Sitting balance: GOOD-: Incosistently Maintains balance through MODERATE excursions of active trunk movement,     Standing balance:   GOOD-: Takes MODERATE challenges from all directions inconsistently  GOOD: Needs SUPERVISION only during gait and able to self right with moderate LOB                 Gait:  Distance: ~350 ft   Assistive Device: no AD  Assistance Level: supervision  Gait Assessment: Pt amb with decreased jairo but no LOB or c/o increased pain    AM-PAC 6 CLICK MOBILITY  Total Score:24       Treatment and Education:  Pt educated on tip to reduce fall risk and safety with mobility and using call button for assistance from nursing staff with OOB mobility.  Pt educated on sitting up in chair throughout most of day  Pt educated on amb 2-3x per day with assistance from staff and increased movement during hospital stay.  All questions answered within the scope of PT.  White board updated accordingly.      AM-PAC 6 CLICK MOBILITY  Total Score:24     Patient left up in chair with all lines intact, call button in reach, and RN notified.    GOALS:   Multidisciplinary Problems       Physical Therapy Goals       Not on file              Multidisciplinary Problems (Resolved)          Problem: Physical Therapy    Goal Priority Disciplines Outcome Goal Variances Interventions   Physical Therapy Goal   (Resolved)     PT, PT/OT Met                         History:     Past Medical History:   Diagnosis Date    ADD (attention deficit disorder) 05/09/2012    Allergy     Anxiety 04/12/2023    Asthma 05/09/2012    Cervical disc disorder with radiculopathy, unspecified cervical region 09/14/2021    Cervical spondylosis with myelopathy 06/29/2022    Eczema     Esophageal ulcer     GERD (gastroesophageal reflux disease) 05/09/2012    Glaucoma     Kidney stones 05/2014    Lumbar disc disease 05/09/2012    Lumbar herniated disc  05/09/2012    Malignant melanoma of skin, unspecified 01/06/2022    in situ right mid back    Melanoma 01/2022    in situ R mid back     LUBA (obstructive sleep apnea)     Radiculopathy, lumbar region 09/14/2021    Renal calculi 05/09/2012    Type 2 diabetes mellitus without complication, without long-term current use of insulin 11/16/2020       Past Surgical History:   Procedure Laterality Date    ANTERIOR CERVICAL DISCECTOMY W/ FUSION N/A 9/12/2024    Procedure: DISCECTOMY, SPINE, CERVICAL, ANTERIOR APPROACH, WITH FUSION C5-6, C6-7;  Surgeon: Naseem Mcnamara DO;  Location: [facility] 2ND FLR;  Service: Neurosurgery;  Laterality: N/A;    APPENDECTOMY  2006    CARPAL TUNNEL RELEASE Right 09/15/2022    Procedure: RELEASE, CARPAL TUNNEL;  Surgeon: Christen Marshall MD;  Location: [facility];  Service: Orthopedics;  Laterality: Right;    CAUTERY OF TURBINATES Bilateral 7/15/2024    Procedure: CAUTERIZATION, MUCOSA, NASAL TURBINATE;  Surgeon: Melody Liu MD;  Location: [facility] OR;  Service: ENT;  Laterality: Bilateral;    COLONOSCOPY  01/22/2019    internal hemorrhoids, o/w normal - repeat at age 52    COLONOSCOPY N/A 12/21/2023    Procedure: COLONOSCOPY;  Surgeon: Teo Johnson MD;  Location: [facility];  Service: Endoscopy;  Laterality: N/A;    CORRECTION OF HAMMER TOE Left 8/14/2023    Procedure: CORRECTION, HAMMER TOE--  basic foot tray as well as a Reese microfree/microchoice tray. I also asked Sherry to bring an MIS mati.;  Surgeon: Aknush Back DPM;  Location: [facility] OR;  Service: Podiatry;  Laterality: Left;  reese drill, sagittal saw, foot set    CYSTOSCOPY  7/5/2023    Procedure: CYSTOSCOPY;  Surgeon: Chuckie Hall MD;  Location: [facility] OR 1ST FLR;  Service: Urology;;    EPIDURAL STEROID INJECTION N/A 02/10/2021    Procedure: CERVICAL C6/7 NELI DIRECT REFERRAL;  Surgeon: Michi Escamilla MD;  Location: [facility] PAIN MGT;  Service: Pain Management;  Laterality: N/A;  NEEDS CONSENT    EPIDURAL STEROID INJECTION  INTO CERVICAL SPINE N/A 6/28/2023    Procedure: Injection-steroid-epidural-cervical C7-T1;  Surgeon: Lorraine Patel DO;  Location: [facility] PAIN MANAGEMENT;  Service: Pain Management;  Laterality: N/A;  diabetic    EPIDURAL STEROID INJECTION INTO CERVICAL SPINE N/A 11/3/2023    Procedure: cervical NELI C7-T1;  Surgeon: Kolton Terrell MD;  Location: [facility] PAIN MANAGEMENT;  Service: Pain Management;  Laterality: N/A;  diabetic    EPIDURAL STEROID INJECTION INTO CERVICAL SPINE N/A 4/17/2024    Procedure: VENECIA C7/T1;  Surgeon: Lorraine Patel DO;  Location: [facility] PAIN MANAGEMENT;  Service: Pain Management;  Laterality: N/A;  20mins- Ozempic 7d- no ac    EPIDURAL STEROID INJECTION INTO LUMBAR SPINE N/A 9/27/2023    Procedure: L4-5 NELI;  Surgeon: Tirso Pickering MD;  Location: [facility] PAIN MANAGEMENT;  Service: Pain Management;  Laterality: N/A;  15 mins    EPIDURAL STEROID INJECTION INTO LUMBAR SPINE N/A 3/6/2024    Procedure: L4-5 IL NELI;  Surgeon: Lorraine Patel DO;  Location: [facility] PAIN MANAGEMENT;  Service: Pain Management;  Laterality: N/A;  20 mins    EPIDURAL STEROID INJECTION INTO LUMBAR SPINE N/A 6/3/2024    Procedure: NELI L4-5;  Surgeon: Lorraine Patel DO;  Location: [facility] PAIN MANAGEMENT;  Service: Pain Management;  Laterality: N/A;  20mins-no ac Ozempic 7d    EPIDURAL STEROID INJECTION INTO LUMBAR SPINE N/A 8/26/2024    Procedure: L4-5 NELI Veer Left;  Surgeon: Lorraine Patel DO;  Location: [facility] PAIN MANAGEMENT;  Service: Pain Management;  Laterality: N/A;  20 mins No AC - Ozempic 7 days    ESOPHAGOGASTRODUODENOSCOPY  01/22/2019    LA grade B esophagitis; esophageal stenosis- dilated; gastritis; H pylori pathology negative    ESOPHAGOGASTRODUODENOSCOPY N/A 05/18/2021    Procedure: EGD (ESOPHAGOGASTRODUODENOSCOPY);  Surgeon: Mariana Hector MD;  Location: [facility];  Service: Endoscopy;  Laterality: N/A;    ESOPHAGOGASTRODUODENOSCOPY N/A 12/21/2023    Procedure: EGD (ESOPHAGOGASTRODUODENOSCOPY);   Surgeon: Teo Johnson MD;  Location: [facility] ENDO;  Service: Endoscopy;  Laterality: N/A;    ESOPHAGOGASTRODUODENOSCOPY N/A 2/22/2024    Procedure: EGD (ESOPHAGOGASTRODUODENOSCOPY);  Surgeon: Teo Johnson MD;  Location: [facility] ENDO;  Service: Endoscopy;  Laterality: N/A;    EXCISION OF GANGLION OF WRIST Right 09/15/2022    Procedure: EXCISION, GANGLION CYST, WRIST;  Surgeon: Christen Marshall MD;  Location: [facility] OR;  Service: Orthopedics;  Laterality: Right;    FLEXOR TENDON REPAIR Right 09/15/2022    Procedure: FLEXOR TENOSYNOVECTOMY;  Surgeon: Christen Marshall MD;  Location: [facility] OR;  Service: Orthopedics;  Laterality: Right;    HAND ARTHROTOMY Right 09/15/2022    Procedure: ARTHROTOMY, HAND RADIOCARPAL;  Surgeon: Christen Marshall MD;  Location: [facility] OR;  Service: Orthopedics;  Laterality: Right;  Radiocarpal    INJECTION OF JOINT Right 11/29/2023    Procedure: right hip IA injection and Right GTB injection;  Surgeon: Lorraine Patel DO;  Location: [facility] PAIN MANAGEMENT;  Service: Pain Management;  Laterality: Right;    INJECTION OF STEROID Left 09/15/2022    Procedure: INJECTION, STEROID LEFT WRIST AND LEFT LATERAL ELBOW;  Surgeon: Christen Marshall MD;  Location: [facility] OR;  Service: Orthopedics;  Laterality: Left;  Left Carpal Tunnel CSI    LASER LITHOTRIPSY Left 7/5/2023    Procedure: LITHOTRIPSY, USING LASER;  Surgeon: Chuckie Hall MD;  Location: [facility] OR 1ST FLR;  Service: Urology;  Laterality: Left;    LUMBAR LAMINECTOMY  2010    LUMBAR LAMINECTOMY WITH DISCECTOMY Left 09/20/2021    Procedure: LAMINECTOMY, SPINE, LUMBAR, WITH DISCECTOMY;  Surgeon: Naseem Mcnamara DO;  Location: [facility] OR 2ND FLR;  Service: Neurosurgery;  Laterality: Left;  MIS L3-4    NASAL SEPTOPLASTY N/A 7/15/2024    Procedure: SEPTOPLASTY, NOSE;  Surgeon: Melody Liu MD;  Location: [facility] OR;  Service: ENT;  Laterality: N/A;    PH MONITORING, ESOPHAGUS, WIRELESS, (OFF REFLUX MEDS) N/A 12/21/2023    Procedure: PH MONITORING,  ESOPHAGUS, WIRELESS, (OFF REFLUX MEDS);  Surgeon: Teo Johnson MD;  Location: [facility] ENDO;  Service: Endoscopy;  Laterality: N/A;    RECONSTRUCTION OF LIGAMENT Left 12/5/2023    Procedure: RECONSTRUCTION, LIGAMENT LATERAL COLLATERAL;  Surgeon: Christen Marshall MD;  Location: [facility] OR;  Service: Orthopedics;  Laterality: Left;    REPAIR OF EXTENSOR TENDON Left 12/5/2023    Procedure: REPAIR, TENDON, EXTENSOR;  Surgeon: Christen Marshall MD;  Location: [facility] OR;  Service: Orthopedics;  Laterality: Left;    ROBOT-ASSISTED REPAIR OF HIATAL HERNIA N/A 3/14/2024    Procedure: ROBOTIC REPAIR, HERNIA, HIATAL;  Surgeon: Cody Winn MD;  Location: [facility] OR;  Service: General;  Laterality: N/A;    TYMPANOSTOMY TUBE PLACEMENT      URETERAL STENT PLACEMENT Left 7/5/2023    Procedure: INSERTION, STENT, URETER;  Surgeon: Chuckie Hall MD;  Location: [facility] OR [address] [place] [address];  Service: Urology;  Laterality: Left;    URETEROSCOPIC REMOVAL OF URETERIC CALCULUS Left 7/5/2023    Procedure: REMOVAL, CALCULUS, URETER, URETEROSCOPIC;  Surgeon: Chuckie Hall MD;  [facility];  Service: Urology;  Laterality: Left;    URETEROSCOPY Left 7/5/2023    Procedure: URETEROSCOPY;  Surgeon: Chuckie Hall MD;  [facility];  Service: Urology;  Laterality: Left;       Time Tracking:     PT Received On: 09/13/24  PT Start Time: 0854     PT Stop Time: 0911  PT Total Time (min): 17 min     Billable Minutes: Evaluation 9 and Gait Training 8      09/13/2024

## 2024-09-13 NOTE — PLAN OF CARE
Patient does not require acute PT services at this time due to being at (I) PLOF and demonstrated safety with functional mobility, including transfers and ambulation. No goals set.    Problem: Physical Therapy  Goal: Physical Therapy Goal  Outcome: Met   9/13/2024
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
## 2024-09-13 NOTE — PLAN OF CARE
Pt AAOx4 and VSS . Progressing with plan of care. Free of skin breakdown as the pt positioned/repositioned well independently. Clean, dry, and intact dressing noted on neck. C-collar in place. Alcazar in place. SCDs in place at all times. Incentive spirometer at bedside and pt instructed on its use. Pain controlled well with PRN meds. Frequent rounds made to assess pain and safety and no complaints at this time noted. Side rails up x 2. Bed locked. Call light within reach. No falls noted. Will continue to monitor.    Problem: Adult Inpatient Plan of Care  Goal: Plan of Care Review  Outcome: Progressing  Goal: Patient-Specific Goal (Individualized)  Outcome: Progressing  Goal: Absence of Hospital-Acquired Illness or Injury  Outcome: Progressing  Goal: Optimal Comfort and Wellbeing  Outcome: Progressing  Goal: Readiness for Transition of Care  Outcome: Progressing     Problem: Diabetes Comorbidity  Goal: Blood Glucose Level Within Targeted Range  Outcome: Progressing     Problem: Wound  Goal: Optimal Coping  Outcome: Progressing  Goal: Optimal Functional Ability  Outcome: Progressing  Goal: Absence of Infection Signs and Symptoms  Outcome: Progressing  Goal: Improved Oral Intake  Outcome: Progressing  Goal: Optimal Pain Control and Function  Outcome: Progressing  Goal: Skin Health and Integrity  Outcome: Progressing  Goal: Optimal Wound Healing  Outcome: Progressing     Problem: Infection  Goal: Absence of Infection Signs and Symptoms  Outcome: Progressing

## 2024-09-13 NOTE — PT/OT/SLP EVAL
Occupational Therapy   Evaluation and Discharge Note    Name: Hermelindo Garza III  MRN: [id number]  Admitting Diagnosis: <principal problem not specified>  Recent Surgery: Procedure(s) (LRB):  DISCECTOMY, SPINE, CERVICAL, ANTERIOR APPROACH, WITH FUSION C5-6, C6-7 (N/A) 1 Day Post-Op    Recommendations:     Discharge Recommendations: No Therapy Indicated  Discharge Equipment Recommendations: none  Barriers to discharge:  None    Assessment:   CO-TX with PT for pt safety and max participation with both disciplines.     Hermelindo Garza III is a 44 y.o. male with a medical diagnosis of <principal problem not specified>. At this time, patient is functioning at their prior level of function and does not require further acute OT services.     Plan:     During this hospitalization, patient does not require further acute OT services.  Please re-consult if situation changes.    Plan of Care Reviewed with: patient    Subjective     Chief Complaint: Neck pain  Patient/Family Comments/goals: return home    Occupational Profile:  Living Environment: Lives with , in [facility], 0 MEGHAN and no hand rail, tub/shower in bathroom  Previous level of function: Indp  Roles and Routines:   Equipment Used at home: CPAP, crutches  Assistance upon Discharge: spouse    Pain/Comfort:  Pain Rating 1: 7/10  Location - Orientation 1: generalized  Location 1: neck  Pain Addressed 1: Reposition, Distraction    Patients cultural, spiritual, Buddhism conflicts given the current situation: no    Objective:     Communicated with: Nsmiquel prior to session.  Patient found supine with peripheral IV upon OT entry to room.    General Precautions: Standard, fall  Orthopedic Precautions: spinal precautions  Braces: [place] J collar  Respiratory Status: Room air     Occupational Performance:    Bed Mobility:    Patient completed Supine to Sit with supervision    Functional Mobility/Transfers:  Patient completed Sit <> Stand Transfer with modified  independence and supervision  with  no assistive device   Chair t/f c/ Sup  Functional Mobility: Pt ambulated room and hallway level with SBA to Sup (refer to pt note)    Activities of Daily Living:  Grooming: supervision hand hygiene standing at sink  Upper Body Dressing: assist to place around IV    Lower Body Dressing: supervision don socks  Toileting: modified independence using urinal in standing while in bathroom    Cognitive/Visual Perceptual:  A&O x4    Physical Exam:  BUE WNL  Balance: intact    AMPAC 6 Click ADL:  AMPAC Total Score: 24    Treatment & Education:  Pt educated on role and purpose of therapy  Pt educated on goal setting  Pt educated on benefits of OOB activity  Pt educated on self advocacy   Pt educated on possible spinal precautions     Patient left up in chair with all lines intact, call button in reach, and nsg notified    GOALS:   Multidisciplinary Problems       Occupational Therapy Goals       Not on file              Multidisciplinary Problems (Resolved)          Problem: Occupational Therapy    Goal Priority Disciplines Outcome Interventions   Occupational Therapy Goal   (Resolved)     OT, PT/OT Met                        History:     Past Medical History:   Diagnosis Date    ADD (attention deficit disorder) 05/09/2012    Allergy     Anxiety 04/12/2023    Asthma 05/09/2012    Cervical disc disorder with radiculopathy, unspecified cervical region 09/14/2021    Cervical spondylosis with myelopathy 06/29/2022    Eczema     Esophageal ulcer     GERD (gastroesophageal reflux disease) 05/09/2012    Glaucoma     Kidney stones 05/2014    Lumbar disc disease 05/09/2012    Lumbar herniated disc 05/09/2012    Malignant melanoma of skin, unspecified 01/06/2022    in situ right mid back    Melanoma 01/2022    in situ R mid back     LUBA (obstructive sleep apnea)     Radiculopathy, lumbar region 09/14/2021    Renal calculi 05/09/2012    Type 2 diabetes mellitus without complication, without long-term  current use of insulin 11/16/2020         Past Surgical History:   Procedure Laterality Date    ANTERIOR CERVICAL DISCECTOMY W/ FUSION N/A 9/12/2024    Procedure: DISCECTOMY, SPINE, CERVICAL, ANTERIOR APPROACH, WITH FUSION C5-6, C6-7;  Surgeon: Naseem Mcnamara DO;  Location: [facility] 2ND FLR;  Service: Neurosurgery;  Laterality: N/A;    APPENDECTOMY  2006    CARPAL TUNNEL RELEASE Right 09/15/2022    Procedure: RELEASE, CARPAL TUNNEL;  Surgeon: Christen Marshall MD;  Location: [facility] OR;  Service: Orthopedics;  Laterality: Right;    CAUTERY OF TURBINATES Bilateral 7/15/2024    Procedure: CAUTERIZATION, MUCOSA, NASAL TURBINATE;  Surgeon: Melody Liu MD;  Location: [facility] OR;  Service: ENT;  Laterality: Bilateral;    COLONOSCOPY  01/22/2019    internal hemorrhoids, o/w normal - repeat at age 52    COLONOSCOPY N/A 12/21/2023    Procedure: COLONOSCOPY;  Surgeon: Teo Johnson MD;  Location: [facility];  Service: Endoscopy;  Laterality: N/A;    CORRECTION OF HAMMER TOE Left 8/14/2023    Procedure: CORRECTION, HAMMER TOE--  basic foot tray as well as a Reese microfree/microchoice tray. I also asked Sherry to bring an MIS mati.;  Surgeon: Ankush Back DP;  Location: [facility] OR;  Service: Podiatry;  Laterality: Left;  reese drill, sagittal saw, foot set    CYSTOSCOPY  7/5/2023    Procedure: CYSTOSCOPY;  Surgeon: Chuckie Hall MD;  Location: [facility] OR 1ST FLR;  Service: Urology;;    EPIDURAL STEROID INJECTION N/A 02/10/2021    Procedure: CERVICAL C6/7 NELI DIRECT REFERRAL;  Surgeon: Michi Escamilla MD;  Location: [facility] PAIN MGT;  Service: Pain Management;  Laterality: N/A;  NEEDS CONSENT    EPIDURAL STEROID INJECTION INTO CERVICAL SPINE N/A 6/28/2023    Procedure: Injection-steroid-epidural-cervical C7-T1;  Surgeon: Lorraine Patel DO;  Location: [facility] PAIN MANAGEMENT;  Service: Pain Management;  Laterality: N/A;  diabetic    EPIDURAL STEROID INJECTION INTO CERVICAL SPINE N/A 11/3/2023    Procedure: cervical NELI  C7-T1;  Surgeon: Kolton Terrell MD;  Location: [facility] PAIN MANAGEMENT;  Service: Pain Management;  Laterality: N/A;  diabetic    EPIDURAL STEROID INJECTION INTO CERVICAL SPINE N/A 4/17/2024    Procedure: VENECIA C7/T1;  Surgeon: Lorraine Patel DO;  Location: [facility] PAIN MANAGEMENT;  Service: Pain Management;  Laterality: N/A;  20mins- Ozempic 7d- no ac    EPIDURAL STEROID INJECTION INTO LUMBAR SPINE N/A 9/27/2023    Procedure: L4-5 NELI;  Surgeon: Tirso Pickering MD;  Location: [facility] PAIN MANAGEMENT;  Service: Pain Management;  Laterality: N/A;  15 mins    EPIDURAL STEROID INJECTION INTO LUMBAR SPINE N/A 3/6/2024    Procedure: L4-5 IL NELI;  Surgeon: Lorraine Patel DO;  Location: [facility] PAIN MANAGEMENT;  Service: Pain Management;  Laterality: N/A;  20 mins    EPIDURAL STEROID INJECTION INTO LUMBAR SPINE N/A 6/3/2024    Procedure: NELI L4-5;  Surgeon: Lorraine Patel DO;  Location: [facility] PAIN MANAGEMENT;  Service: Pain Management;  Laterality: N/A;  20mins-no ac Ozempic 7d    EPIDURAL STEROID INJECTION INTO LUMBAR SPINE N/A 8/26/2024    Procedure: L4-5 NELI Veer Left;  Surgeon: Lorraine Patel DO;  Location: [facility] PAIN MANAGEMENT;  Service: Pain Management;  Laterality: N/A;  20 mins No AC - Ozempic 7 days    ESOPHAGOGASTRODUODENOSCOPY  01/22/2019    LA grade B esophagitis; esophageal stenosis- dilated; gastritis; H pylori pathology negative    ESOPHAGOGASTRODUODENOSCOPY N/A 05/18/2021    Procedure: EGD (ESOPHAGOGASTRODUODENOSCOPY);  Surgeon: Mariana Hector MD;  Location: [facility];  Service: Endoscopy;  Laterality: N/A;    ESOPHAGOGASTRODUODENOSCOPY N/A 12/21/2023    Procedure: EGD (ESOPHAGOGASTRODUODENOSCOPY);  Surgeon: Teo Johnson MD;  Location: [facility];  Service: Endoscopy;  Laterality: N/A;    ESOPHAGOGASTRODUODENOSCOPY N/A 2/22/2024    Procedure: EGD (ESOPHAGOGASTRODUODENOSCOPY);  Surgeon: Teo Johnson MD;  Location: [facility];  Service: Endoscopy;  Laterality: N/A;    EXCISION OF GANGLION  OF WRIST Right 09/15/2022    Procedure: EXCISION, GANGLION CYST, WRIST;  Surgeon: Christen Marshall MD;  Location: [facility] OR;  Service: Orthopedics;  Laterality: Right;    FLEXOR TENDON REPAIR Right 09/15/2022    Procedure: FLEXOR TENOSYNOVECTOMY;  Surgeon: Christen Marshall MD;  Location: [facility] OR;  Service: Orthopedics;  Laterality: Right;    HAND ARTHROTOMY Right 09/15/2022    Procedure: ARTHROTOMY, HAND RADIOCARPAL;  Surgeon: Christen Marshall MD;  Location: [facility] OR;  Service: Orthopedics;  Laterality: Right;  Radiocarpal    INJECTION OF JOINT Right 11/29/2023    Procedure: right hip IA injection and Right GTB injection;  Surgeon: Lorraine Patel DO;  Location: [facility] PAIN MANAGEMENT;  Service: Pain Management;  Laterality: Right;    INJECTION OF STEROID Left 09/15/2022    Procedure: INJECTION, STEROID LEFT WRIST AND LEFT LATERAL ELBOW;  Surgeon: Christen Marshall MD;  Location: [facility] OR;  Service: Orthopedics;  Laterality: Left;  Left Carpal Tunnel CSI    LASER LITHOTRIPSY Left 7/5/2023    Procedure: LITHOTRIPSY, USING LASER;  Surgeon: Chuckie Hall MD;  Location: [facility] OR 1ST FLR;  Service: Urology;  Laterality: Left;    LUMBAR LAMINECTOMY  2010    LUMBAR LAMINECTOMY WITH DISCECTOMY Left 09/20/2021    Procedure: LAMINECTOMY, SPINE, LUMBAR, WITH DISCECTOMY;  Surgeon: Naseem Mcnamara DO;  Location: [facility] OR 2ND FLR;  Service: Neurosurgery;  Laterality: Left;  MIS L3-4    NASAL SEPTOPLASTY N/A 7/15/2024    Procedure: SEPTOPLASTY, NOSE;  Surgeon: Melody Liu MD;  Location: [facility] OR;  Service: ENT;  Laterality: N/A;    PH MONITORING, ESOPHAGUS, WIRELESS, (OFF REFLUX MEDS) N/A 12/21/2023    Procedure: PH MONITORING, ESOPHAGUS, WIRELESS, (OFF REFLUX MEDS);  Surgeon: Teo Johnson MD;  Location: [facility] ENDO;  Service: Endoscopy;  Laterality: N/A;    RECONSTRUCTION OF LIGAMENT Left 12/5/2023    Procedure: RECONSTRUCTION, LIGAMENT LATERAL COLLATERAL;  Surgeon: Christen Marshall MD;  Location: [facility] OR;  Service: Orthopedics;   Laterality: Left;    REPAIR OF EXTENSOR TENDON Left 12/5/2023    Procedure: REPAIR, TENDON, EXTENSOR;  Surgeon: Christen Marshall MD;  Location: [facility];  Service: Orthopedics;  Laterality: Left;    ROBOT-ASSISTED REPAIR OF HIATAL HERNIA N/A 3/14/2024    Procedure: ROBOTIC REPAIR, HERNIA, HIATAL;  Surgeon: Cody Winn MD;  Location: [facility] OR;  Service: General;  Laterality: N/A;    TYMPANOSTOMY TUBE PLACEMENT      URETERAL STENT PLACEMENT Left 7/5/2023    Procedure: INSERTION, STENT, URETER;  Surgeon: Chuckie Hall MD;  Location: [address];  Service: Urology;  Laterality: Left;    URETEROSCOPIC REMOVAL OF URETERIC CALCULUS Left 7/5/2023    Procedure: REMOVAL, CALCULUS, URETER, URETEROSCOPIC;  Surgeon: Chuckie Hall MD;  Location: [address];  Service: Urology;  Laterality: Left;    URETEROSCOPY Left 7/5/2023    Procedure: URETEROSCOPY;  Surgeon: Chuckie Hall MD;  Location: [address];  Service: Urology;  Laterality: Left;       Time Tracking:     OT Date of Treatment: 09/13/24  OT Start Time: 0854  OT Stop Time: 0911  OT Total Time (min): 17 min    Billable Minutes:Evaluation 8  Self Care/Home Management 8    9/13/2024

## 2024-09-14 VITALS
WEIGHT: 236 LBS | OXYGEN SATURATION: 92 % | TEMPERATURE: 98 F | RESPIRATION RATE: 16 BRPM | HEIGHT: 65 IN | SYSTOLIC BLOOD PRESSURE: 121 MMHG | DIASTOLIC BLOOD PRESSURE: 66 MMHG | BODY MASS INDEX: 39.32 KG/M2 | HEART RATE: 89 BPM

## 2024-09-14 LAB — POCT GLUCOSE: 76 MG/DL (ref 70–110)

## 2024-09-14 PROCEDURE — 25000003 PHARM REV CODE 250: Performed by: STUDENT IN AN ORGANIZED HEALTH CARE EDUCATION/TRAINING PROGRAM

## 2024-09-14 PROCEDURE — 63600175 PHARM REV CODE 636 W HCPCS: Performed by: STUDENT IN AN ORGANIZED HEALTH CARE EDUCATION/TRAINING PROGRAM

## 2024-09-14 RX ORDER — AMOXICILLIN 250 MG
2 CAPSULE ORAL 2 TIMES DAILY
Qty: 60 TABLET | Refills: 0 | Status: SHIPPED | OUTPATIENT
Start: 2024-09-14

## 2024-09-14 RX ORDER — OXYCODONE AND ACETAMINOPHEN 5; 325 MG/1; MG/1
1 TABLET ORAL EVERY 4 HOURS PRN
Qty: 28 TABLET | Refills: 0 | Status: SHIPPED | OUTPATIENT
Start: 2024-09-14 | End: 2024-09-21

## 2024-09-14 RX ORDER — METHOCARBAMOL 500 MG/1
500 TABLET, FILM COATED ORAL 3 TIMES DAILY PRN
Qty: 21 TABLET | Refills: 0 | Status: SHIPPED | OUTPATIENT
Start: 2024-09-14 | End: 2024-09-21

## 2024-09-14 RX ADMIN — OXYCODONE HYDROCHLORIDE 10 MG: 10 TABLET ORAL at 12:09

## 2024-09-14 RX ADMIN — METHOCARBAMOL 500 MG: 500 TABLET ORAL at 05:09

## 2024-09-14 RX ADMIN — ACETAMINOPHEN 650 MG: 325 TABLET ORAL at 05:09

## 2024-09-14 RX ADMIN — OXYCODONE HYDROCHLORIDE 10 MG: 10 TABLET ORAL at 07:09

## 2024-09-14 RX ADMIN — PREGABALIN 200 MG: 150 CAPSULE ORAL at 08:09

## 2024-09-14 RX ADMIN — TAMSULOSIN HYDROCHLORIDE 0.4 MG: 0.4 CAPSULE ORAL at 08:09

## 2024-09-14 RX ADMIN — SENNOSIDES AND DOCUSATE SODIUM 2 TABLET: 50; 8.6 TABLET ORAL at 08:09

## 2024-09-14 RX ADMIN — HEPARIN SODIUM 5000 UNITS: 5000 INJECTION INTRAVENOUS; SUBCUTANEOUS at 05:09

## 2024-09-14 RX ADMIN — MORPHINE SULFATE 2 MG: 2 INJECTION, SOLUTION INTRAMUSCULAR; INTRAVENOUS at 05:09

## 2024-09-14 NOTE — ASSESSMENT & PLAN NOTE
44M with chronic RUE radicular pain now s/p C5/6, C6/7 ACDF, recovering well:    --Patient admitted to NSGY on telemetry      - q4h neurochecks on floor  --Post op XR satisfactory  --Maintain C collar at all times when out of bed  --PT/OT/OOB - cleared for home w/o needs  --Tolerating PO w/o difficulty  --Voiding spon  --Pain control with bowel reg  --Cont home meds as appropriate  --TEDs/SCDs/SQH  --Continue to monitor clinically, notify NSGY immediately with any changes in neuro status    Dispo: home today with NSGY f/up as scheduled    Closed w/Alan    D/w Dr. Mcnamara and Dr. Quach

## 2024-09-14 NOTE — PLAN OF CARE
Pt AAOx4 and VSS . Progressing with plan of care. Free of skin breakdown as the pt positioned/repositioned well independently. Clean, dry, and intact dressing noted on neck. C-collar in place when out of bed. Void to the bathroom independently. Incentive spirometer at bedside and pt instructed on its use. Pain controlled well with PRN meds. Frequent rounds made to assess pain and safety and no complaints at this time noted. Side rails up x 2. Bed locked. Call light within reach. No falls noted. Will continue to monitor.     Problem: Adult Inpatient Plan of Care  Goal: Plan of Care Review  Outcome: Progressing  Goal: Patient-Specific Goal (Individualized)  Outcome: Progressing  Goal: Absence of Hospital-Acquired Illness or Injury  Outcome: Progressing  Goal: Optimal Comfort and Wellbeing  Outcome: Progressing  Goal: Readiness for Transition of Care  Outcome: Progressing     Problem: Diabetes Comorbidity  Goal: Blood Glucose Level Within Targeted Range  Outcome: Progressing     Problem: Wound  Goal: Optimal Coping  Outcome: Progressing  Goal: Optimal Functional Ability  Outcome: Progressing  Goal: Absence of Infection Signs and Symptoms  Outcome: Progressing  Goal: Improved Oral Intake  Outcome: Progressing  Goal: Optimal Pain Control and Function  Outcome: Progressing  Goal: Skin Health and Integrity  Outcome: Progressing  Goal: Optimal Wound Healing  Outcome: Progressing     Problem: Infection  Goal: Absence of Infection Signs and Symptoms  Outcome: Progressing

## 2024-09-14 NOTE — PROGRESS NOTES
[facility] - [facility]  Neurosurgery  Progress Note    Subjective:     History of Present Illness: 44M with RUE radicular pain with EMG findings c/w chronic C6, C8 and possibly C7 radiculopathy. He reports that since his last visit his radiating arm pain has improved somewhat in terms of intensity but he still notes bothersome finger and hand numbness that results in him dropping items from his hand and affecting his ability to write.  He still reports tremor in his right hand that is made worse with stimulants.  He continues to have significant neck pain as well.  He explains that he has had to stop exercising and engaging in many of his daily activities to avoid exacerbating his arm pain.      Patient presents for elective ACDF on 9/12/24.    Post-Op Info:  Procedure(s) (LRB):  DISCECTOMY, SPINE, CERVICAL, ANTERIOR APPROACH, WITH FUSION C5-6, C6-7 (N/A)   2 Days Post-Op   Interval History: NAEON. No therapy needs based of evals yesterday. Pain controlled. Tolerating PO w/o difficulty. Plan for home today.    Medications:  Continuous Infusions:   0.9% NaCl   Intravenous Continuous 100 mL/hr at 09/12/24 0749 New Bag at 09/12/24 0749     Scheduled Meds:   acetaminophen  650 mg Oral Q6H    atorvastatin  20 mg Oral QHS    bisacodyL  10 mg Rectal Daily    buPROPion  150 mg Oral Nightly    ceFAZolin (Ancef) IV (PEDS and ADULTS)  2 g Intravenous Q8H    heparin (porcine)  5,000 Units Subcutaneous Q8H    lamoTRIgine  200 mg Oral Nightly    latanoprost  1 drop Both Eyes QHS    mupirocin   Nasal BID    polyethylene glycol  17 g Oral Daily    pregabalin  200 mg Oral BID    rOPINIRole  0.25 mg Oral QHS    scopolamine  1 patch Transdermal Once    senna-docusate 8.6-50 mg  2 tablet Oral BID    tamsulosin  0.4 mg Oral Daily     PRN Meds:  Current Facility-Administered Medications:     albuterol, 1 puff, Inhalation, Q4H PRN    aluminum-magnesium hydroxide-simethicone, 30 mL, Oral, Q4H PRN    dextrose 10%, 12.5 g, Intravenous, PRN     dextrose 10%, 25 g, Intravenous, PRN    glucagon (human recombinant), 1 mg, Intramuscular, PRN    glucose, 16 g, Oral, PRN    glucose, 24 g, Oral, PRN    insulin aspart U-100, 0-10 Units, Subcutaneous, QID (AC + HS) PRN    methocarbamoL, 500 mg, Oral, TID PRN    midazolam, 2 mg, Intravenous, Q15 Min PRN    morphine, 2 mg, Intravenous, Q4H PRN    ondansetron, 8 mg, Oral, Q6H PRN    oxyCODONE, 5 mg, Oral, Q4H PRN    oxyCODONE, 10 mg, Oral, Q6H PRN    prochlorperazine, 5 mg, Intravenous, Q6H PRN     Review of Systems  Objective:     Weight: 107 kg (236 lb)  Body mass index is 39.27 kg/m².  Vital Signs (Most Recent):  Temp: 97.8 °F (36.6 °C) (09/14/24 0706)  Pulse: 89 (09/14/24 0706)  Resp: 16 (09/14/24 0709)  BP: 121/66 (09/14/24 0706)  SpO2: (!) 92 % (09/14/24 0706) Vital Signs (24h Range):  Temp:  [97.5 °F (36.4 °C)-98.2 °F (36.8 °C)] 97.8 °F (36.6 °C)  Pulse:  [78-92] 89  Resp:  [16-20] 16  SpO2:  [92 %-97 %] 92 %  BP: (112-131)/(66-86) 121/66                                 Physical Exam         Neurosurgery Physical Exam  General: no distress  Head: Non-traumatic, normocephalic  Eyes: Pupils equal, EOMi  Neck: Supple, normal ROM, no tenderness to palpation  CVS: Normal rate and rhythm, distal pulses present  Pulm: Symmetric expansion, no respiratory distress  GI: Abdomen nondistended, nontender  MSK: Moves all extremities without restriction, atraumatic  Skin: Incision flat, c/d/I with Dermabond  Psych: Normal thought content and cognition  Neuro: AOx3, GCS E4V5M6  CNII-XII: Intact on fine exam, PERRL, EOMi, facial sensation preserved, no facial asymmetry, tongue/uvula/palate midline, shoulder shrug equal, No pronator drift  Extremities:  Motor:  Upper Extremity: minimal tremor appreciated in RUE this AM                                Deltoids        Triceps        Biceps        WE        WF                Interosseous           R        5/5 5/5 5/5 5/5 5/5          "5/5 5/5           L        5/5 5/5              5/5            5/5         5/5         5/5 5/5  Lower Extremity:                                      HF        KE        KF        DF        PF        EHL           R       5/5 5/5        5/5        5/5        5/5        5/5           L       5/5 5/5        5/5        5/5        5/5 5/5     Reflexes:     DTR: 2+ and symmetrically throughout     Patricia's: Negative        Sensory:      Sensorium intact throughout, no sensory level present     Coordination:      Coordination intact throughout    Bedside exam this Am by Dr. Quach    Significant Labs:  No results for input(s): "GLU", "NA", "K", "CL", "CO2", "BUN", "CREATININE", "CALCIUM", "MG" in the last 48 hours.  No results for input(s): "WBC", "HGB", "HCT", "PLT" in the last 48 hours.  No results for input(s): "LABPT", "INR", "APTT" in the last 48 hours.  Microbiology Results (last 7 days)       ** No results found for the last 168 hours. **          All pertinent labs from the last 24 hours have been reviewed.    Significant Diagnostics:  I have reviewed all pertinent imaging results/findings within the past 24 hours.        Assessment/Plan:     Cervical disc disorder with radiculopathy, unspecified cervical region  44M with chronic RUE radicular pain now s/p C5/6, C6/7 ACDF, recovering well:    --Patient admitted to NSGY on telemetry      - q4h neurochecks on floor  --Post op XR satisfactory  --Maintain C collar at all times when out of bed  --PT/OT/OOB - cleared for home w/o needs  --Tolerating PO w/o difficulty  --Voiding spon  --Pain control with bowel reg  --Cont home meds as appropriate  --TEDs/SCDs/SQH  --Continue to monitor clinically, notify NSGY immediately with any changes in neuro status    Dispo: home today with NSGY f/up as scheduled    Closed w/Dermabond    D/w Dr. Mcnamara and Dr. Main Brock MD  Neurosurgery  [facility] - " Surgery

## 2024-09-14 NOTE — SUBJECTIVE & OBJECTIVE
Interval History: NAEON. No therapy needs based of evals yesterday. Pain controlled. Tolerating PO w/o difficulty. Plan for home today.    Medications:  Continuous Infusions:   0.9% NaCl   Intravenous Continuous 100 mL/hr at 09/12/24 0749 New Bag at 09/12/24 0749     Scheduled Meds:   acetaminophen  650 mg Oral Q6H    atorvastatin  20 mg Oral QHS    bisacodyL  10 mg Rectal Daily    buPROPion  150 mg Oral Nightly    ceFAZolin (Ancef) IV (PEDS and ADULTS)  2 g Intravenous Q8H    heparin (porcine)  5,000 Units Subcutaneous Q8H    lamoTRIgine  200 mg Oral Nightly    latanoprost  1 drop Both Eyes QHS    mupirocin   Nasal BID    polyethylene glycol  17 g Oral Daily    pregabalin  200 mg Oral BID    rOPINIRole  0.25 mg Oral QHS    scopolamine  1 patch Transdermal Once    senna-docusate 8.6-50 mg  2 tablet Oral BID    tamsulosin  0.4 mg Oral Daily     PRN Meds:  Current Facility-Administered Medications:     albuterol, 1 puff, Inhalation, Q4H PRN    aluminum-magnesium hydroxide-simethicone, 30 mL, Oral, Q4H PRN    dextrose 10%, 12.5 g, Intravenous, PRN    dextrose 10%, 25 g, Intravenous, PRN    glucagon (human recombinant), 1 mg, Intramuscular, PRN    glucose, 16 g, Oral, PRN    glucose, 24 g, Oral, PRN    insulin aspart U-100, 0-10 Units, Subcutaneous, QID (AC + HS) PRN    methocarbamoL, 500 mg, Oral, TID PRN    midazolam, 2 mg, Intravenous, Q15 Min PRN    morphine, 2 mg, Intravenous, Q4H PRN    ondansetron, 8 mg, Oral, Q6H PRN    oxyCODONE, 5 mg, Oral, Q4H PRN    oxyCODONE, 10 mg, Oral, Q6H PRN    prochlorperazine, 5 mg, Intravenous, Q6H PRN     Review of Systems  Objective:     Weight: 107 kg (236 lb)  Body mass index is 39.27 kg/m².  Vital Signs (Most Recent):  Temp: 97.8 °F (36.6 °C) (09/14/24 0706)  Pulse: 89 (09/14/24 0706)  Resp: 16 (09/14/24 0709)  BP: 121/66 (09/14/24 0706)  SpO2: (!) 92 % (09/14/24 0706) Vital Signs (24h Range):  Temp:  [97.5 °F (36.4 °C)-98.2 °F (36.8 °C)] 97.8 °F (36.6 °C)  Pulse:  [78-92]  "89  Resp:  [16-20] 16  SpO2:  [92 %-97 %] 92 %  BP: (112-131)/(66-86) 121/66                                 Physical Exam         Neurosurgery Physical Exam  General: no distress  Head: Non-traumatic, normocephalic  Eyes: Pupils equal, EOMi  Neck: Supple, normal ROM, no tenderness to palpation  CVS: Normal rate and rhythm, distal pulses present  Pulm: Symmetric expansion, no respiratory distress  GI: Abdomen nondistended, nontender  MSK: Moves all extremities without restriction, atraumatic  Skin: Incision flat, c/d/I with Dermabond  Psych: Normal thought content and cognition  Neuro: AOx3, GCS E4V5M6  CNII-XII: Intact on fine exam, PERRL, EOMi, facial sensation preserved, no facial asymmetry, tongue/uvula/palate midline, shoulder shrug equal, No pronator drift  Extremities:  Motor:  Upper Extremity: minimal tremor appreciated in RUE this AM                                Deltoids        Triceps        Biceps        WE        WF                Interosseous           R        5/5              5/5              5/5            5/5         5/5         5/5                5/5           L        5/5              5/5              5/5            5/5         5/5         5/5                5/5  Lower Extremity:                                      HF        KE        KF        DF        PF        EHL           R       5/5        5/5        5/5        5/5        5/5        5/5           L       5/5        5/5        5/5        5/5        5/5        5/5     Reflexes:     DTR: 2+ and symmetrically throughout     Patricia's: Negative        Sensory:      Sensorium intact throughout, no sensory level present     Coordination:      Coordination intact throughout    Bedside exam this Am by Dr. Quach    Significant Labs:  No results for input(s): "GLU", "NA", "K", "CL", "CO2", "BUN", "CREATININE", "CALCIUM", "MG" in the last 48 hours.  No results for input(s): "WBC", "HGB", "HCT", "PLT" in the last 48 hours.  No results for " "input(s): "LABPT", "INR", "APTT" in the last 48 hours.  Microbiology Results (last 7 days)       ** No results found for the last 168 hours. **          All pertinent labs from the last 24 hours have been reviewed.    Significant Diagnostics:  I have reviewed all pertinent imaging results/findings within the past 24 hours.        "

## 2024-09-14 NOTE — DISCHARGE INSTRUCTIONS
Neurosurgery Patient Information. Please follow ONLY the instructions that are checked below.    Activity Restrictions:  [x]  Return to work will be determined on an individual basis.  [x]  No lifting greater than 5-10 pounds.  [x]  Avoid bending and twisting the area of your surgery more than 45 degrees from neutral position in any direction.  [x]  No driving or operating machinery:  [x]  until cleared by your surgeon.  [x]  while taking narcotic pain medications or muscle relaxants.  [x]  Wear your brace at all times except when lying in bed, showering, using the restroom, or performing hygiene tasks.   [x]  Increase ambulation over the next 2 weeks so that you are walking 2 miles per day at 2 weeks post-operatively.  [x]  Walk on paved surfaces only. It is okay to walk up and down stairs while holding onto a side rail.  [x]  No sexual activity for 6 weeks.    Discharge Medication/Follow-up:  [x]  Please refer to discharge medication reconciliation form.  [x]  Do not take ANY Aspirin or Aspirin containing products for 2 weeks after surgery.   [x]  Do not take ANY herbal supplements for 2 weeks after surgery.    [x]  Do not take ANY non-steroidal anti-inflammatory drugs (NSAIDS), including the following: ibuprofen, naprosyn, Aleve, Advil, Indocin, Mobic, or Celebrex for 6 weeks after surgery.   [x]  Prescriptions for appropriate medication will be given upon discharge.  [x]  Take docusate (Colace 100 mg): take one capsule a day as needed for constipation. You can get this over the counter.  [x]  Follow-up appointment:  [x]  10-14 days post-op for wound check by physician assistant/nurse  [x]  4-6 weeks with MD:  [x]  with x-rays  [x]  An appointment will be mailed to you.    Wound Care:  [x]  No bandage required. Keep your incision open to the air. Do not remove purple glue  [x]  You may shower on the 2nd day after your surgery. Keep the incision clean and dry at all times. Please cover the incision while showering  and REMOVE once you have completed taking your shower. Do not allow the force of water to hit the incision. If the incision gets damp, pat it dry. Do not rub or scrub the incision.  [x]  You cannot take a bath until 8 weeks after surgery.  []  Apply bacitracin to incision twice a day for 2 weeks.    Call your doctor or go to the Emergency Room for any signs of infection, including: increased redness, drainage, pain, or fever (temperature >=101.5 for 24 hours). Call your doctor or go to the Emergency Room if there are any localized neurological changes; problems with speech, vision, numbness, tingling, weakness, or severe headache; or for other concerns.    Special Instructions:  [x]  No use of tobacco products.  [x]  Diet: Please eat a regular diet as tolerated.      Physicians need 3 days' notice for pain medicine to be refilled. Pain medicine will only be refilled between 8 AM and 5 PM, Monday through Friday, due to Food and Drug Administration regulation of documentation.    If you have any questions about this form, please call [phone].    Form No. 29554 (Revised 10/31/2013)

## 2024-09-14 NOTE — NURSING
Nurses Note -- 4 Eyes      9/13/2024   7:14 PM      Skin assessed during: Daily Assessment      [x] No Altered Skin Integrity Present    []Prevention Measures Documented      [] Yes- Altered Skin Integrity Present or Discovered   [] LDA Added if Not in Epic (Describe Wound)   [] New Altered Skin Integrity was Present on Admit and Documented in LDA   [] Wound Image Taken    Wound Care Consulted? No    Attending Nurse:  Melissa     Second RN/Staff Member:  Aundrea
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
## 2024-09-15 ENCOUNTER — HOSPITAL ENCOUNTER (EMERGENCY)
Facility: HOSPITAL | Age: 44
Discharge: HOME | End: 2024-09-15
Attending: EMERGENCY MEDICINE
Payer: COMMERCIAL

## 2024-09-15 ENCOUNTER — NURSE TRIAGE (OUTPATIENT)
Dept: ADMINISTRATIVE | Facility: CLINIC | Age: 44
End: 2024-09-15
Payer: COMMERCIAL

## 2024-09-15 VITALS
HEART RATE: 98 BPM | DIASTOLIC BLOOD PRESSURE: 78 MMHG | OXYGEN SATURATION: 99 % | HEIGHT: 65 IN | BODY MASS INDEX: 39.3 KG/M2 | TEMPERATURE: 98 F | SYSTOLIC BLOOD PRESSURE: 136 MMHG | WEIGHT: 235.88 LBS | RESPIRATION RATE: 20 BRPM

## 2024-09-15 DIAGNOSIS — J98.11 ATELECTASIS: ICD-10-CM

## 2024-09-15 DIAGNOSIS — R50.82 POSTOPERATIVE FEVER: Primary | ICD-10-CM

## 2024-09-15 LAB
ALBUMIN SERPL BCP-MCNC: 4.1 G/DL (ref 3.5–5.2)
ALP SERPL-CCNC: 84 U/L (ref 55–135)
ALT SERPL W/O P-5'-P-CCNC: 18 U/L (ref 10–44)
ANION GAP SERPL CALC-SCNC: 11 MMOL/L (ref 8–16)
AST SERPL-CCNC: 23 U/L (ref 10–40)
BASOPHILS # BLD AUTO: 0.04 K/UL (ref 0–0.2)
BASOPHILS NFR BLD: 0.5 % (ref 0–1.9)
BILIRUB SERPL-MCNC: 0.8 MG/DL (ref 0.1–1)
BILIRUB UR QL STRIP: NEGATIVE
BUN SERPL-MCNC: 16 MG/DL (ref 6–20)
CALCIUM SERPL-MCNC: 9.7 MG/DL (ref 8.7–10.5)
CHLORIDE SERPL-SCNC: 105 MMOL/L (ref 95–110)
CLARITY UR REFRACT.AUTO: CLEAR
CO2 SERPL-SCNC: 23 MMOL/L (ref 23–29)
COLOR UR AUTO: YELLOW
CREAT SERPL-MCNC: 0.8 MG/DL (ref 0.5–1.4)
DIFFERENTIAL METHOD BLD: ABNORMAL
EOSINOPHIL # BLD AUTO: 0.1 K/UL (ref 0–0.5)
EOSINOPHIL NFR BLD: 1 % (ref 0–8)
ERYTHROCYTE [DISTWIDTH] IN BLOOD BY AUTOMATED COUNT: 12.8 % (ref 11.5–14.5)
EST. GFR  (NO RACE VARIABLE): >60 ML/MIN/1.73 M^2
GLUCOSE SERPL-MCNC: 89 MG/DL (ref 70–110)
GLUCOSE UR QL STRIP: NEGATIVE
HCT VFR BLD AUTO: 41.5 % (ref 40–54)
HCV AB SERPL QL IA: NORMAL
HGB BLD-MCNC: 14.1 G/DL (ref 14–18)
HGB UR QL STRIP: NEGATIVE
HIV 1+2 AB+HIV1 P24 AG SERPL QL IA: NORMAL
IMM GRANULOCYTES # BLD AUTO: 0.02 K/UL (ref 0–0.04)
IMM GRANULOCYTES NFR BLD AUTO: 0.2 % (ref 0–0.5)
KETONES UR QL STRIP: ABNORMAL
LEUKOCYTE ESTERASE UR QL STRIP: NEGATIVE
LYMPHOCYTES # BLD AUTO: 2.2 K/UL (ref 1–4.8)
LYMPHOCYTES NFR BLD: 27.2 % (ref 18–48)
MCH RBC QN AUTO: 30.9 PG (ref 27–31)
MCHC RBC AUTO-ENTMCNC: 34 G/DL (ref 32–36)
MCV RBC AUTO: 91 FL (ref 82–98)
MONOCYTES # BLD AUTO: 0.8 K/UL (ref 0.3–1)
MONOCYTES NFR BLD: 10.1 % (ref 4–15)
NEUTROPHILS # BLD AUTO: 4.9 K/UL (ref 1.8–7.7)
NEUTROPHILS NFR BLD: 61 % (ref 38–73)
NITRITE UR QL STRIP: NEGATIVE
NRBC BLD-RTO: 0 /100 WBC
PH UR STRIP: 6 [PH] (ref 5–8)
PLATELET # BLD AUTO: 177 K/UL (ref 150–450)
PMV BLD AUTO: 10.3 FL (ref 9.2–12.9)
POCT GLUCOSE: 95 MG/DL (ref 70–110)
POTASSIUM SERPL-SCNC: 3.9 MMOL/L (ref 3.5–5.1)
PROT SERPL-MCNC: 7.2 G/DL (ref 6–8.4)
PROT UR QL STRIP: ABNORMAL
RBC # BLD AUTO: 4.57 M/UL (ref 4.6–6.2)
SARS-COV-2 RDRP RESP QL NAA+PROBE: NEGATIVE
SODIUM SERPL-SCNC: 139 MMOL/L (ref 136–145)
SP GR UR STRIP: 1.03 (ref 1–1.03)
URN SPEC COLLECT METH UR: ABNORMAL
WBC # BLD AUTO: 8.1 K/UL (ref 3.9–12.7)

## 2024-09-15 PROCEDURE — U0002 COVID-19 LAB TEST NON-CDC: HCPCS | Performed by: EMERGENCY MEDICINE

## 2024-09-15 PROCEDURE — 63600175 PHARM REV CODE 636 W HCPCS: Performed by: EMERGENCY MEDICINE

## 2024-09-15 PROCEDURE — 85025 COMPLETE CBC W/AUTO DIFF WBC: CPT | Performed by: EMERGENCY MEDICINE

## 2024-09-15 PROCEDURE — 25000003 PHARM REV CODE 250: Performed by: EMERGENCY MEDICINE

## 2024-09-15 PROCEDURE — 80053 COMPREHEN METABOLIC PANEL: CPT | Performed by: EMERGENCY MEDICINE

## 2024-09-15 PROCEDURE — 86803 HEPATITIS C AB TEST: CPT | Performed by: PHYSICIAN ASSISTANT

## 2024-09-15 PROCEDURE — 82962 GLUCOSE BLOOD TEST: CPT

## 2024-09-15 PROCEDURE — 96374 THER/PROPH/DIAG INJ IV PUSH: CPT

## 2024-09-15 PROCEDURE — 99284 EMERGENCY DEPT VISIT MOD MDM: CPT | Mod: 25

## 2024-09-15 PROCEDURE — 96375 TX/PRO/DX INJ NEW DRUG ADDON: CPT

## 2024-09-15 PROCEDURE — 96361 HYDRATE IV INFUSION ADD-ON: CPT

## 2024-09-15 PROCEDURE — 87389 HIV-1 AG W/HIV-1&-2 AB AG IA: CPT | Performed by: PHYSICIAN ASSISTANT

## 2024-09-15 PROCEDURE — 81003 URINALYSIS AUTO W/O SCOPE: CPT | Performed by: EMERGENCY MEDICINE

## 2024-09-15 RX ORDER — ONDANSETRON HYDROCHLORIDE 2 MG/ML
4 INJECTION, SOLUTION INTRAVENOUS
Status: COMPLETED | OUTPATIENT
Start: 2024-09-15 | End: 2024-09-15

## 2024-09-15 RX ORDER — ACETAMINOPHEN 500 MG
1000 TABLET ORAL
Status: COMPLETED | OUTPATIENT
Start: 2024-09-15 | End: 2024-09-15

## 2024-09-15 RX ORDER — OXYCODONE HYDROCHLORIDE 5 MG/1
5 TABLET ORAL
Status: COMPLETED | OUTPATIENT
Start: 2024-09-15 | End: 2024-09-15

## 2024-09-15 RX ORDER — MORPHINE SULFATE 4 MG/ML
4 INJECTION, SOLUTION INTRAMUSCULAR; INTRAVENOUS
Status: COMPLETED | OUTPATIENT
Start: 2024-09-15 | End: 2024-09-15

## 2024-09-15 RX ADMIN — MORPHINE SULFATE 4 MG: 4 INJECTION INTRAVENOUS at 05:09

## 2024-09-15 RX ADMIN — SODIUM CHLORIDE, POTASSIUM CHLORIDE, SODIUM LACTATE AND CALCIUM CHLORIDE 1000 ML: 600; 310; 30; 20 INJECTION, SOLUTION INTRAVENOUS at 05:09

## 2024-09-15 RX ADMIN — ONDANSETRON 4 MG: 2 INJECTION INTRAMUSCULAR; INTRAVENOUS at 05:09

## 2024-09-15 RX ADMIN — ACETAMINOPHEN 1000 MG: 500 TABLET ORAL at 05:09

## 2024-09-15 RX ADMIN — OXYCODONE HYDROCHLORIDE 5 MG: 5 TABLET ORAL at 08:09

## 2024-09-15 NOTE — ED NOTES
I assumed care of this patient at this time. Report received from SIMON Berumen. Pt is sitting up in ED stretcher. Pt is AAOx4. RR is even, unlabored, and spontaneous + pt's oxygen saturation is 93% on room air at this time. Skin is warm, dry and intact. Pt currently in c-collar from surgery. Pt endorsing pain; states he can't get comfortable. Pt ambulatory + full ROM of all extremities + independent. Continent of bowel and bladder + able to void spontaneously. Bed low and locked; side rails up x2; call light within reach. Updated on plan of care. Colby Adams MD regarding discharge status; pt states he's ready to go.

## 2024-09-15 NOTE — PROVIDER PROGRESS NOTES - EMERGENCY DEPT.
Encounter Date: 9/15/2024    ED Physician Progress Notes        Physician Note:   Patient is signed out to me.  Here with recent C5/C7 fusion, coming in with chills sent to 102.  Pending UA, COVID.  Chest x-ray with some atelectasis.  Surgical bed looks good.  Pending Neurosurgery recommendations.  No imaging of the surgical area was ordered as waiting for neurosurgical recommendations for imaging as well.  Mildly hypoxic but patient also says that he is having set up a certain way to breathe no chest pain, no shortness a breath, there is a low suspicion for PE.      Update:  Workup is unremarkable aside from atelectasis.  Seen by surgery cleared from a Neurosurgery standpoint.  No surgical bed infection per their evaluation.  Patient's pulse ox probe change location, good O2 sat on room air, no desats with ambulation.  No chest pain or shortness of breath, not consistent with PE.    Patient was eager to go home.  Given 1 dose of oxycodone.     Return precautions for any continued or worsening fevers, trouble breathing, worsening pain, or any other new, worsening worrisome symptoms.  Instructed to use his incentive spirometer and continue home pain medications.    Follow-up with Neurosurgery    Findings of ED work up and return precautions verbally explained to patient. Patient agrees with discharge plan, return instructions and verbalizes understanding of return precautions.

## 2024-09-15 NOTE — ED PROVIDER NOTES
Encounter Date: 9/15/2024       History     Chief Complaint   Patient presents with    Post-op Problem     Pt states cervical vertebrae fusion surgery of C5-C7 on 9/12 and discharged yesterday. Pt states woke up this morning with a fever. Pt denies any drainage from site and admits to some pain to the back of his neck     HPI  44-year-old male with significant medical history as noted reviewed below.  Patient had a C5-C7 vertebral fusion on 09/12/2024 and was discharged yesterday.  He reports he was had some constipation with 1 small bowel movement since his surgery.  He reports that he woke up in the middle of the night with chills.  He took a half a Percocet in his chills resolved he subsequently developed fever to 102.  He denies new numbness or weakness.  He reports ongoing pain that makes it difficult to be in any position other than sitting upright.    Review of patient's allergies indicates:  No Known Allergies  Past Medical History:   Diagnosis Date    ADD (attention deficit disorder) 05/09/2012    Allergy     Anxiety 04/12/2023    Asthma 05/09/2012    Cervical disc disorder with radiculopathy, unspecified cervical region 09/14/2021    Cervical spondylosis with myelopathy 06/29/2022    Eczema     Esophageal ulcer     GERD (gastroesophageal reflux disease) 05/09/2012    Glaucoma     Kidney stones 05/2014    Lumbar disc disease 05/09/2012    Lumbar herniated disc 05/09/2012    Malignant melanoma of skin, unspecified 01/06/2022    in situ right mid back    Melanoma 01/2022    in situ R mid back     LUBA (obstructive sleep apnea)     Radiculopathy, lumbar region 09/14/2021    Renal calculi 05/09/2012    Type 2 diabetes mellitus without complication, without long-term current use of insulin 11/16/2020     Past Surgical History:   Procedure Laterality Date    ANTERIOR CERVICAL DISCECTOMY W/ FUSION N/A 9/12/2024    Procedure: DISCECTOMY, SPINE, CERVICAL, ANTERIOR APPROACH, WITH FUSION C5-6, C6-7;  Surgeon: Anders  Naseem ANGEL DO;  Location: [facility] 2ND FLR;  Service: Neurosurgery;  Laterality: N/A;    APPENDECTOMY  2006    CARPAL TUNNEL RELEASE Right 09/15/2022    Procedure: RELEASE, CARPAL TUNNEL;  Surgeon: Christen Marshall MD;  Location: [facility] OR;  Service: Orthopedics;  Laterality: Right;    CAUTERY OF TURBINATES Bilateral 7/15/2024    Procedure: CAUTERIZATION, MUCOSA, NASAL TURBINATE;  Surgeon: Melody Liu MD;  Location: [facility] OR;  Service: ENT;  Laterality: Bilateral;    COLONOSCOPY  01/22/2019    internal hemorrhoids, o/w normal - repeat at age 52    COLONOSCOPY N/A 12/21/2023    Procedure: COLONOSCOPY;  Surgeon: Teo Johnson MD;  Location: [facility];  Service: Endoscopy;  Laterality: N/A;    CORRECTION OF HAMMER TOE Left 8/14/2023    Procedure: CORRECTION, HAMMER TOE--  basic foot tray as well as a Reese microfree/microchoice tray. I also asked Sherry to bring an MIS mati.;  Surgeon: Ankush Back DPM;  Location: [facility] OR;  Service: Podiatry;  Laterality: Left;  reese drill, sagittal saw, foot set    CYSTOSCOPY  7/5/2023    Procedure: CYSTOSCOPY;  Surgeon: Chuckie Hall MD;  Location: [facility] 1ST FLR;  Service: Urology;;    EPIDURAL STEROID INJECTION N/A 02/10/2021    Procedure: CERVICAL C6/7 NELI DIRECT REFERRAL;  Surgeon: Michi Escamilla MD;  Location: [facility] PAIN MGT;  Service: Pain Management;  Laterality: N/A;  NEEDS CONSENT    EPIDURAL STEROID INJECTION INTO CERVICAL SPINE N/A 6/28/2023    Procedure: Injection-steroid-epidural-cervical C7-T1;  Surgeon: Lorraine Patel DO;  Location: [facility] PAIN MANAGEMENT;  Service: Pain Management;  Laterality: N/A;  diabetic    EPIDURAL STEROID INJECTION INTO CERVICAL SPINE N/A 11/3/2023    Procedure: cervical NELI C7-T1;  Surgeon: Kolton Terrell MD;  Location: [facility] PAIN MANAGEMENT;  Service: Pain Management;  Laterality: N/A;  diabetic    EPIDURAL STEROID INJECTION INTO CERVICAL SPINE N/A 4/17/2024    Procedure: VENECIA C7/T1;  Surgeon: Lorraine Patel DO;   [facility];  Service: Pain Management;  Laterality: N/A;  20mins- Ozempic 7d- no ac    EPIDURAL STEROID INJECTION INTO LUMBAR SPINE N/A 9/27/2023    Procedure: L4-5 NELI;  Surgeon: Tirso Pickering MD;  [facility];  Service: Pain Management;  Laterality: N/A;  15 mins    EPIDURAL STEROID INJECTION INTO LUMBAR SPINE N/A 3/6/2024    Procedure: L4-5 IL NELI;  Surgeon: Lorraine Patel DO;  [facility];  Service: Pain Management;  Laterality: N/A;  20 mins    EPIDURAL STEROID INJECTION INTO LUMBAR SPINE N/A 6/3/2024    Procedure: NELI L4-5;  Surgeon: Lorraine Patel DO;  [facility];  Service: Pain Management;  Laterality: N/A;  20mins-no ac Ozempic 7d    EPIDURAL STEROID INJECTION INTO LUMBAR SPINE N/A 8/26/2024    Procedure: L4-5 NELI Veer Left;  Surgeon: Lorraine Patel DO;  [facility];  Service: Pain Management;  Laterality: N/A;  20 mins No AC - Ozempic 7 days    ESOPHAGOGASTRODUODENOSCOPY  01/22/2019    LA grade B esophagitis; esophageal stenosis- dilated; gastritis; H pylori pathology negative    ESOPHAGOGASTRODUODENOSCOPY N/A 05/18/2021    Procedure: EGD (ESOPHAGOGASTRODUODENOSCOPY);  Surgeon: Mariana Hector MD;  Location: [facility];  Service: Endoscopy;  Laterality: N/A;    ESOPHAGOGASTRODUODENOSCOPY N/A 12/21/2023    Procedure: EGD (ESOPHAGOGASTRODUODENOSCOPY);  Surgeon: Teo Johnson MD;  Location: [facility];  Service: Endoscopy;  Laterality: N/A;    ESOPHAGOGASTRODUODENOSCOPY N/A 2/22/2024    Procedure: EGD (ESOPHAGOGASTRODUODENOSCOPY);  Surgeon: Teo Johnson MD;  Location: [facility];  Service: Endoscopy;  Laterality: N/A;    EXCISION OF GANGLION OF WRIST Right 09/15/2022    Procedure: EXCISION, GANGLION CYST, WRIST;  Surgeon: Christen Marshall MD;  Location: [facility];  Service: Orthopedics;  Laterality: Right;    FLEXOR TENDON REPAIR Right 09/15/2022    Procedure: FLEXOR TENOSYNOVECTOMY;   Surgeon: Christen Marshall MD;  Location: [facility] OR;  Service: Orthopedics;  Laterality: Right;    HAND ARTHROTOMY Right 09/15/2022    Procedure: ARTHROTOMY, HAND RADIOCARPAL;  Surgeon: Christen Marshall MD;  Location: [facility] OR;  Service: Orthopedics;  Laterality: Right;  Radiocarpal    INJECTION OF JOINT Right 11/29/2023    Procedure: right hip IA injection and Right GTB injection;  Surgeon: Lorraine Patel DO;  Location: [facility] PAIN MANAGEMENT;  Service: Pain Management;  Laterality: Right;    INJECTION OF STEROID Left 09/15/2022    Procedure: INJECTION, STEROID LEFT WRIST AND LEFT LATERAL ELBOW;  Surgeon: Christen Marshall MD;  Location: [facility] OR;  Service: Orthopedics;  Laterality: Left;  Left Carpal Tunnel CSI    LASER LITHOTRIPSY Left 7/5/2023    Procedure: LITHOTRIPSY, USING LASER;  Surgeon: Chuckie Hall MD;  Location: [facility] 1ST FLR;  Service: Urology;  Laterality: Left;    LUMBAR LAMINECTOMY  2010    LUMBAR LAMINECTOMY WITH DISCECTOMY Left 09/20/2021    Procedure: LAMINECTOMY, SPINE, LUMBAR, WITH DISCECTOMY;  Surgeon: Naseem Mcnamara DO;  Location: [facility] OR 2ND FLR;  Service: Neurosurgery;  Laterality: Left;  MIS L3-4    NASAL SEPTOPLASTY N/A 7/15/2024    Procedure: SEPTOPLASTY, NOSE;  Surgeon: Melody Liu MD;  Location: [facility] OR;  Service: ENT;  Laterality: N/A;    PH MONITORING, ESOPHAGUS, WIRELESS, (OFF REFLUX MEDS) N/A 12/21/2023    Procedure: PH MONITORING, ESOPHAGUS, WIRELESS, (OFF REFLUX MEDS);  Surgeon: Teo Johnson MD;  Location: [facility] ENDO;  Service: Endoscopy;  Laterality: N/A;    RECONSTRUCTION OF LIGAMENT Left 12/5/2023    Procedure: RECONSTRUCTION, LIGAMENT LATERAL COLLATERAL;  Surgeon: Christen Marshall MD;  Location: [facility] OR;  Service: Orthopedics;  Laterality: Left;    REPAIR OF EXTENSOR TENDON Left 12/5/2023    Procedure: REPAIR, TENDON, EXTENSOR;  Surgeon: Christen Marshall MD;  Location: [facility] OR;  Service: Orthopedics;  Laterality: Left;    ROBOT-ASSISTED REPAIR OF HIATAL HERNIA N/A  3/14/2024    Procedure: ROBOTIC REPAIR, HERNIA, HIATAL;  Surgeon: Cody Winn MD;  Location: [facility];  Service: General;  Laterality: N/A;    TYMPANOSTOMY TUBE PLACEMENT      URETERAL STENT PLACEMENT Left 2023    Procedure: INSERTION, STENT, URETER;  Surgeon: Chuckie Hall MD;  Location: [facility] OR [address] [place] [address];  Service: Urology;  Laterality: Left;    URETEROSCOPIC REMOVAL OF URETERIC CALCULUS Left 2023    Procedure: REMOVAL, CALCULUS, URETER, URETEROSCOPIC;  Surgeon: Chuckie Hall MD;  Location: [facility] OR [address] [place] [address];  Service: Urology;  Laterality: Left;    URETEROSCOPY Left 2023    Procedure: URETEROSCOPY;  Surgeon: Chuckie Hall MD;  Location: [facility] OR [address] [place] [address];  Service: Urology;  Laterality: Left;     Family History   Problem Relation Name Age of Onset    Allergic rhinitis Mother      Hypertension Mother      Transient ischemic attack Mother      Sleep apnea Mother      Allergic rhinitis Father      Asthma Father      Chronic back pain Father      MARTITA disease Father      Sleep apnea Father      Melanoma Father      Allergic rhinitis Sister      Asthma Sister      Allergic rhinitis Sister      Asthma Sister      No Known Problems Brother      No Known Problems Maternal Aunt      No Known Problems Maternal Uncle      No Known Problems Paternal Aunt      No Known Problems Paternal Uncle      Brain cancer Maternal Grandmother      Diabetes Maternal Grandfather      Breast cancer Paternal Grandmother      No Known Problems Paternal Grandfather      Amblyopia Neg Hx      Blindness Neg Hx      Cancer Neg Hx      Cataracts Neg Hx      Glaucoma Neg Hx      Macular degeneration Neg Hx      Retinal detachment Neg Hx      Strabismus Neg Hx      Stroke Neg Hx      Thyroid disease Neg Hx      Allergies Neg Hx      Angioedema Neg Hx       Social History     Tobacco Use    Smoking status: Former     Current packs/day: 0.00     Types: Cigarettes     Quit date: 2018     Years since quittin.4     Passive  exposure: Never    Smokeless tobacco: Never   Substance Use Topics    Alcohol use: Yes     Alcohol/week: 3.0 standard drinks of alcohol     Types: 3 Drinks containing 0.5 oz of alcohol per week    Drug use: No     Review of Systems  All other systems reviewed and negative except as noted in HPI    Physical Exam     Initial Vitals [09/15/24 0450]   BP Pulse Resp Temp SpO2   (!) 140/83 106 18 100 °F (37.8 °C) 95 %      MAP       --         Physical Exam    Constitutional: He appears well-developed and well-nourished. He is not diaphoretic. No distress.   Eyes: Conjunctivae and EOM are normal. Pupils are equal, round, and reactive to light.   Neck:   C-collar in place   Anterior neck incision clean dry and intact   Cardiovascular:  Normal rate, regular rhythm and intact distal pulses.           Pulmonary/Chest: Breath sounds normal. No respiratory distress.   Abdominal: Abdomen is soft. He exhibits no distension. There is no abdominal tenderness.   Musculoskeletal:         General: No tenderness or edema. Normal range of motion.     Skin: Skin is warm and dry. Capillary refill takes less than 2 seconds.   Anterior neck incision clean/dry/intact   No induration   No erythema   No drainage   Psychiatric: He has a normal mood and affect.         ED Course   Procedures  Labs Reviewed   CBC W/ AUTO DIFFERENTIAL - Abnormal       Result Value    WBC 8.10      RBC 4.57 (*)     Hemoglobin 14.1      Hematocrit 41.5      MCV 91      MCH 30.9      MCHC 34.0      RDW 12.8      Platelets 177      MPV 10.3      Immature Granulocytes 0.2      Gran # (ANC) 4.9      Immature Grans (Abs) 0.02      Lymph # 2.2      Mono # 0.8      Eos # 0.1      Baso # 0.04      nRBC 0      Gran % 61.0      Lymph % 27.2      Mono % 10.1      Eosinophil % 1.0      Basophil % 0.5      Differential Method Automated     URINALYSIS, REFLEX TO URINE CULTURE - Abnormal    Specimen UA Urine, Clean Catch      Color, UA Yellow      Appearance, UA Clear      pH, UA  6.0      Specific Gravity, UA 1.030      Protein, UA Trace (*)     Glucose, UA Negative      Ketones, UA 2+ (*)     Bilirubin (UA) Negative      Occult Blood UA Negative      Nitrite, UA Negative      Leukocytes, UA Negative      Narrative:     Specimen Source->Urine   HIV 1 / 2 ANTIBODY    HIV 1/2 Ag/Ab Non-reactive      Narrative:     Release to patient->Immediate   HEPATITIS C ANTIBODY    Hepatitis C Ab Non-reactive      Narrative:     Release to patient->Immediate   COMPREHENSIVE METABOLIC PANEL    Sodium 139      Potassium 3.9      Chloride 105      CO2 23      Glucose 89      BUN 16      Creatinine 0.8      Calcium 9.7      Total Protein 7.2      Albumin 4.1      Total Bilirubin 0.8      Alkaline Phosphatase 84      AST 23      ALT 18      eGFR >60.0      Anion Gap 11     SARS-COV-2 RNA AMPLIFICATION, QUAL    SARS-CoV-2 RNA, Amplification, Qual Negative     POCT GLUCOSE    POCT Glucose 95            Imaging Results              X-Ray Chest AP Portable (Final result)  Result time 09/15/24 06:27:07      Final result by Rob Rankin DO (09/15/24 06:27:07)                   Impression:      Left basilar atelectasis.  Otherwise no acute cardiopulmonary abnormality.      Electronically signed by: Rob Rankin  Date:    09/15/2024  Time:    06:27               Narrative:    EXAMINATION:  XR CHEST AP PORTABLE    CLINICAL HISTORY:  Fever, unspecified    TECHNIQUE:  Single frontal view of the chest was performed.    COMPARISON:  09/12/2024.    FINDINGS:  The lungs are well expanded.  Minimal bandlike atelectasis noted in the left lung base.  The lungs are otherwise clear.  The pleural spaces are clear. The cardiac silhouette is unremarkable. The visualized osseous structures are intact.                                       Medications   morphine injection 4 mg (4 mg Intravenous Given 9/15/24 0540)   ondansetron injection 4 mg (4 mg Intravenous Given 9/15/24 0540)   lactated ringers bolus 1,000 mL (0 mLs  Intravenous Stopped 9/15/24 0803)   acetaminophen tablet 1,000 mg (1,000 mg Oral Given 9/15/24 0541)   oxyCODONE immediate release tablet 5 mg (5 mg Oral Given 9/15/24 0801)     Medical Decision Making  No symptoms to localize fever at this time.  Concern for possible UTI, pneumonia, atelectasis, COVID-19, or infection of operative bed/tract.  No progressive neurologic symptoms to suggest spinal epidural abscess.  Neurosurgery consulted to guide imaging decision and disposition.  Chest x-ray ordered and without evidence of pneumonia on independent review.  Urinalysis ordered to assess for possible UTI.  Pain medication and nausea medication given.  IV fluids ordered due to NPO status    Amount and/or Complexity of Data Reviewed  External Data Reviewed: labs, radiology, ECG and notes.     Details: C5-C7 cervical spine fusion on 09/12/2024  Labs: ordered. Decision-making details documented in ED Course.  Radiology: ordered and independent interpretation performed. Decision-making details documented in ED Course.    Risk  OTC drugs.  Prescription drug management.  Decision regarding hospitalization.  Diagnosis or treatment significantly limited by social determinants of health.               ED Course as of 09/15/24 2354   Sun Sep 15, 2024   0626 CBC auto differential(!) [DS]   0626 X-Ray Chest AP Portable [DS]   0629 Patient had another episode of chills.  Tylenol ordered.  Considered pulmonary embolism given mild hypoxia but this appears to be associated with hypopnea after morphine administration as this improves when patient was coached take deeper breaths.  Additionally, patient has no chest pain.  He has no leg swelling. [DS]   0658 Left basilar atelectasis noted on chest x-ray [DS]      ED Course User Index  [DS] Alonso Del Toro MD                           Clinical Impression:  Final diagnoses:  [R50.82] Postoperative fever (Primary)  [J98.11] Atelectasis          ED Disposition Condition    Discharge  Stable          ED Prescriptions    None       Follow-up Information       Follow up With Specialties Details Why Contact Info Additional Information    [facility] - Neurosurgery 8th Fl Neurosurgery   [address]  [facility] [address] [phone] 8th Floor [facility] [place] Please park in [facility]. Check in desk is located in the lobby. Please take the C elevator to 8th floor which opens to the lobby.             Sessions, Alonso BARAJAS MD  09/15/24 9388

## 2024-09-15 NOTE — TELEPHONE ENCOUNTER
Pt states discharged today, post op. Pt states woke up cold, checked temp, reading 101 x2. Last dose of pain meds prior to call. Pt denies redness or swelling at this time. Per protocol, see hcp within 4 hours. Discussed to monitor for new or worsening symptoms and to call back for any further questions or concerns.   Reason for Disposition   Fever > 100.4 F (38.0 C)    Additional Information   Negative: Sounds like a life-threatening emergency to the triager   Negative: [1] Widespread rash AND [2] bright red, sunburn-like   Negative: [1] SEVERE headache AND [2] after spinal (epidural) anesthesia   Negative: [1] Vomiting AND [2] persists > 4 hours   Negative: [1] Vomiting AND [2] abdomen looks much more swollen than usual   Negative: [1] Drinking very little AND [2] dehydration suspected (e.g., no urine > 12 hours, very dry mouth, very lightheaded)   Negative: Patient sounds very sick or weak to the triager   Negative: Sounds like a serious complication to the triager    Protocols used: Post-Op Symptoms and [id number]

## 2024-09-15 NOTE — ASSESSMENT & PLAN NOTE
Hermelindo Garza III is a 44 y.o. male presents on POD3 to ED with postop fever s/p C5/6-C6/7 ACDF. Pt reports chills and fever to 102 at home. Has been taking percocet but no other antipyretics. Pt reports pain in back of neck that is controlled with his pain meds. Denies numbness/tingling/weakness, dysuria, SOB, CP. CXR showing left basilar atelectasis. Infectious workup otherwise negative. Incision site c/d/i.     Patient seen by NSGY at bedside  All labs and diagnostics reviewed  Follow-up in outpatient clinic as scheduled, call or return to ED for red flag symptoms   NSGY will signoff at this time    Plan discussed with Dr Mcnamara

## 2024-09-15 NOTE — HPI
Hermelindo Garza III is a 44 y.o. male presents on POD3 to ED with postop fever s/p C5/6-C6/7 ACDF. Pt reports chills and fever to 102 at home. Has been taking percocet but no other antipyretics. Pt reports pain in back of neck that is controlled with his pain meds. Denies numbness/tingling/weakness, dysuria, SOB, CP. CXR showing left basilar atelectasis. Infectious workup otherwise negative. Incision site c/d/i.

## 2024-09-15 NOTE — ED NOTES
Pt's pulse ox moved from finger to right ear lobe -- oxygen saturation at %. During ambulation, pt at 97%. MD liu.

## 2024-09-15 NOTE — SUBJECTIVE & OBJECTIVE
(Not in a hospital admission)      Review of patient's allergies indicates:  No Known Allergies    Past Medical History:   Diagnosis Date    ADD (attention deficit disorder) 05/09/2012    Allergy     Anxiety 04/12/2023    Asthma 05/09/2012    Cervical disc disorder with radiculopathy, unspecified cervical region 09/14/2021    Cervical spondylosis with myelopathy 06/29/2022    Eczema     Esophageal ulcer     GERD (gastroesophageal reflux disease) 05/09/2012    Glaucoma     Kidney stones 05/2014    Lumbar disc disease 05/09/2012    Lumbar herniated disc 05/09/2012    Malignant melanoma of skin, unspecified 01/06/2022    in situ right mid back    Melanoma 01/2022    in situ R mid back     LUBA (obstructive sleep apnea)     Radiculopathy, lumbar region 09/14/2021    Renal calculi 05/09/2012    Type 2 diabetes mellitus without complication, without long-term current use of insulin 11/16/2020     Past Surgical History:   Procedure Laterality Date    ANTERIOR CERVICAL DISCECTOMY W/ FUSION N/A 9/12/2024    Procedure: DISCECTOMY, SPINE, CERVICAL, ANTERIOR APPROACH, WITH FUSION C5-6, C6-7;  Surgeon: Naseem Mcnamara DO;  Location: [address];  Service: Neurosurgery;  Laterality: N/A;    APPENDECTOMY  2006    CARPAL TUNNEL RELEASE Right 09/15/2022    Procedure: RELEASE, CARPAL TUNNEL;  Surgeon: Christen Marshall MD;  Location: [facility] OR;  Service: Orthopedics;  Laterality: Right;    CAUTERY OF TURBINATES Bilateral 7/15/2024    Procedure: CAUTERIZATION, MUCOSA, NASAL TURBINATE;  Surgeon: Melody Liu MD;  Location: [facility] OR;  Service: ENT;  Laterality: Bilateral;    COLONOSCOPY  01/22/2019    internal hemorrhoids, o/w normal - repeat at age 52    COLONOSCOPY N/A 12/21/2023    Procedure: COLONOSCOPY;  Surgeon: Teo Johnson MD;  Location: [facility] ENDO;  Service: Endoscopy;  Laterality: N/A;    CORRECTION OF HAMMER TOE Left 8/14/2023    Procedure: CORRECTION, HAMMER TOE--  basic foot tray as well as a Reese  microfree/microchoice tray. I also asked Sherry to bring an MIS mati.;  Surgeon: Ankush Back DPM;  Location: [facility] OR;  Service: Podiatry;  Laterality: Left;  mackey drill, sagittal saw, foot set    CYSTOSCOPY  7/5/2023    Procedure: CYSTOSCOPY;  Surgeon: Chuckie Hall MD;  Location: [facility] OR [facility] FLR;  Service: Urology;;    EPIDURAL STEROID INJECTION N/A 02/10/2021    Procedure: CERVICAL C6/7 NELI DIRECT REFERRAL;  Surgeon: Michi Escamilla MD;  Location: [facility] PAIN MGT;  Service: Pain Management;  Laterality: N/A;  NEEDS CONSENT    EPIDURAL STEROID INJECTION INTO CERVICAL SPINE N/A 6/28/2023    Procedure: Injection-steroid-epidural-cervical C7-T1;  Surgeon: Lorraine Patel DO;  Location: [facility] PAIN MANAGEMENT;  Service: Pain Management;  Laterality: N/A;  diabetic    EPIDURAL STEROID INJECTION INTO CERVICAL SPINE N/A 11/3/2023    Procedure: cervical NELI C7-T1;  Surgeon: Kolton Terrell MD;  Location: [facility] PAIN MANAGEMENT;  Service: Pain Management;  Laterality: N/A;  diabetic    EPIDURAL STEROID INJECTION INTO CERVICAL SPINE N/A 4/17/2024    Procedure: VENECIA C7/T1;  Surgeon: Lorraine Patel DO;  Location: [facility] PAIN MANAGEMENT;  Service: Pain Management;  Laterality: N/A;  20mins- Ozempic 7d- no ac    EPIDURAL STEROID INJECTION INTO LUMBAR SPINE N/A 9/27/2023    Procedure: L4-5 NELI;  Surgeon: Tirso Pickering MD;  Location: [facility] PAIN MANAGEMENT;  Service: Pain Management;  Laterality: N/A;  15 mins    EPIDURAL STEROID INJECTION INTO LUMBAR SPINE N/A 3/6/2024    Procedure: L4-5 IL NELI;  Surgeon: Lorraine Patel DO;  Location: [facility] PAIN MANAGEMENT;  Service: Pain Management;  Laterality: N/A;  20 mins    EPIDURAL STEROID INJECTION INTO LUMBAR SPINE N/A 6/3/2024    Procedure: NELI L4-5;  Surgeon: Lorraine Patel DO;  Location: [facility] PAIN MANAGEMENT;  Service: Pain Management;  Laterality: N/A;  20mins-no ac Ozempic 7d    EPIDURAL STEROID INJECTION INTO LUMBAR SPINE N/A 8/26/2024    Procedure: L4-5 NELI  Veer Left;  Surgeon: Lorraine Patel DO;  Location: [facility] PAIN MANAGEMENT;  Service: Pain Management;  Laterality: N/A;  20 mins No AC - Ozempic 7 days    ESOPHAGOGASTRODUODENOSCOPY  01/22/2019    LA grade B esophagitis; esophageal stenosis- dilated; gastritis; H pylori pathology negative    ESOPHAGOGASTRODUODENOSCOPY N/A 05/18/2021    Procedure: EGD (ESOPHAGOGASTRODUODENOSCOPY);  Surgeon: Mariana Hector MD;  Location: [facility];  Service: Endoscopy;  Laterality: N/A;    ESOPHAGOGASTRODUODENOSCOPY N/A 12/21/2023    Procedure: EGD (ESOPHAGOGASTRODUODENOSCOPY);  Surgeon: Teo Johnson MD;  Location: [facility];  Service: Endoscopy;  Laterality: N/A;    ESOPHAGOGASTRODUODENOSCOPY N/A 2/22/2024    Procedure: EGD (ESOPHAGOGASTRODUODENOSCOPY);  Surgeon: Teo Johnson MD;  Location: [facility];  Service: Endoscopy;  Laterality: N/A;    EXCISION OF GANGLION OF WRIST Right 09/15/2022    Procedure: EXCISION, GANGLION CYST, WRIST;  Surgeon: Christen Marshall MD;  Location: [facility] OR;  Service: Orthopedics;  Laterality: Right;    FLEXOR TENDON REPAIR Right 09/15/2022    Procedure: FLEXOR TENOSYNOVECTOMY;  Surgeon: Christen Marshall MD;  Location: [facility] OR;  Service: Orthopedics;  Laterality: Right;    HAND ARTHROTOMY Right 09/15/2022    Procedure: ARTHROTOMY, HAND RADIOCARPAL;  Surgeon: Christen Marshall MD;  Location: [facility] OR;  Service: Orthopedics;  Laterality: Right;  Radiocarpal    INJECTION OF JOINT Right 11/29/2023    Procedure: right hip IA injection and Right GTB injection;  Surgeon: Lorraine Patel DO;  Location: [facility] PAIN MANAGEMENT;  Service: Pain Management;  Laterality: Right;    INJECTION OF STEROID Left 09/15/2022    Procedure: INJECTION, STEROID LEFT WRIST AND LEFT LATERAL ELBOW;  Surgeon: Christen Marshall MD;  Location: [facility] OR;  Service: Orthopedics;  Laterality: Left;  Left Carpal Tunnel CSI    LASER LITHOTRIPSY Left 7/5/2023    Procedure: LITHOTRIPSY, USING LASER;  Surgeon: Chuckie Hall MD;   Location: [facility] OR 1ST FLR;  Service: Urology;  Laterality: Left;    LUMBAR LAMINECTOMY  2010    LUMBAR LAMINECTOMY WITH DISCECTOMY Left 09/20/2021    Procedure: LAMINECTOMY, SPINE, LUMBAR, WITH DISCECTOMY;  Surgeon: Naseem Mcnamara DO;  Location: [facility] OR 2ND FLR;  Service: Neurosurgery;  Laterality: Left;  MIS L3-4    NASAL SEPTOPLASTY N/A 7/15/2024    Procedure: SEPTOPLASTY, NOSE;  Surgeon: Melody Liu MD;  Location: [facility] OR;  Service: ENT;  Laterality: N/A;    PH MONITORING, ESOPHAGUS, WIRELESS, (OFF REFLUX MEDS) N/A 12/21/2023    Procedure: PH MONITORING, ESOPHAGUS, WIRELESS, (OFF REFLUX MEDS);  Surgeon: Teo Johnson MD;  Location: [facility] ENDO;  Service: Endoscopy;  Laterality: N/A;    RECONSTRUCTION OF LIGAMENT Left 12/5/2023    Procedure: RECONSTRUCTION, LIGAMENT LATERAL COLLATERAL;  Surgeon: Christen Marshall MD;  Location: [facility] OR;  Service: Orthopedics;  Laterality: Left;    REPAIR OF EXTENSOR TENDON Left 12/5/2023    Procedure: REPAIR, TENDON, EXTENSOR;  Surgeon: Christen Marshall MD;  Location: [facility] OR;  Service: Orthopedics;  Laterality: Left;    ROBOT-ASSISTED REPAIR OF HIATAL HERNIA N/A 3/14/2024    Procedure: ROBOTIC REPAIR, HERNIA, HIATAL;  Surgeon: Cody Winn MD;  Location: [facility] OR;  Service: General;  Laterality: N/A;    TYMPANOSTOMY TUBE PLACEMENT      URETERAL STENT PLACEMENT Left 7/5/2023    Procedure: INSERTION, STENT, URETER;  Surgeon: Chuckie Hall MD;  Location: [facility] OR 1ST FLR;  Service: Urology;  Laterality: Left;    URETEROSCOPIC REMOVAL OF URETERIC CALCULUS Left 7/5/2023    Procedure: REMOVAL, CALCULUS, URETER, URETEROSCOPIC;  Surgeon: Chuckie Hall MD;  Location: [facility] OR 1ST FLR;  Service: Urology;  Laterality: Left;    URETEROSCOPY Left 7/5/2023    Procedure: URETEROSCOPY;  Surgeon: Chuckie Hall MD;  Location: [facility] OR 1ST FLR;  Service: Urology;  Laterality: Left;     Family History       Problem Relation (Age of Onset)    Allergic rhinitis Mother, Father,  Sister, Sister    Asthma Father, Sister, Sister    Brain cancer Maternal Grandmother    Breast cancer Paternal Grandmother    Chronic back pain Father    Diabetes Maternal Grandfather    MARTITA disease Father    Hypertension Mother    Melanoma Father    No Known Problems Brother, Maternal Aunt, Maternal Uncle, Paternal Aunt, Paternal Uncle, Paternal Grandfather    Sleep apnea Mother, Father    Transient ischemic attack Mother          Tobacco Use    Smoking status: Former     Current packs/day: 0.00     Types: Cigarettes     Quit date: 2018     Years since quittin.4     Passive exposure: Never    Smokeless tobacco: Never   Substance and Sexual Activity    Alcohol use: Yes     Alcohol/week: 3.0 standard drinks of alcohol     Types: 3 Drinks containing 0.5 oz of alcohol per week    Drug use: No    Sexual activity: Yes     Partners: Male     Review of Systems   Constitutional:  Positive for chills and fever.   HENT:  Negative for congestion.    Respiratory:  Negative for cough and shortness of breath.    Cardiovascular:  Negative for chest pain.   Gastrointestinal:  Negative for abdominal pain.   Genitourinary:  Negative for dysuria.   Musculoskeletal:  Positive for neck pain.   Neurological:  Negative for dizziness.     Objective:     Weight: 107 kg (236 lb)  Body mass index is 39.27 kg/m².  Vital Signs (Most Recent):  Temp: 98.3 °F (36.8 °C) (09/15/24 0615)  Pulse: 100 (09/15/24 0608)  Resp: 16 (09/15/24 0540)  BP: 137/74 (09/15/24 0608)  SpO2: (!) 92 % (Sessions, MD made aware. Patient denies any SOB.) (09/15/24 0607) Vital Signs (24h Range):  Temp:  [98.3 °F (36.8 °C)-100 °F (37.8 °C)] 98.3 °F (36.8 °C)  Pulse:  [100-106] 100  Resp:  [16-18] 16  SpO2:  [92 %-95 %] 92 %  BP: (137-140)/(74-83) 137/74                        Physical Exam         Neurosurgery Physical Exam  GENERAL: resting comfortably  HEENT: NCAT, PERRL, mucous membranes moist  NECK: supple, trachea midline, cervical collar in place, incision  "C/D/I  CV: normal capillary refill  PULM: aerating well, symmetric expansion, no distress  ABD: soft, NT, ND  EXT: no c/c/e     NEURO:     GCS 15 [id number]  AAO x 3  CN II-XII grossly intact  Fc x 4 antigravity  Grossly full strength throughout  SILT        Significant Labs:  Recent Labs   Lab 09/15/24  0533   GLU 89      K 3.9      CO2 23   BUN 16   CREATININE 0.8   CALCIUM 9.7     Recent Labs   Lab 09/15/24  0533   WBC 8.10   HGB 14.1   HCT 41.5        No results for input(s): "LABPT", "INR", "APTT" in the last 48 hours.  Microbiology Results (last 7 days)       ** No results found for the last 168 hours. **          Recent Lab Results         09/15/24  0628   09/15/24  0614   09/15/24  0533   09/14/24  0755        Albumin     4.1         ALP     84         ALT     18         Anion Gap     11         Appearance, UA   Clear           AST     23         Baso #     0.04         Basophil %     0.5         Bilirubin (UA)   Negative           BILIRUBIN TOTAL     0.8  Comment: For infants and newborns, interpretation of results should be based  on gestational age, weight and in agreement with clinical  observations.    Premature Infant recommended reference ranges:  Up to 24 hours.............<8.0 mg/dL  Up to 48 hours............<12.0 mg/dL  3-5 days..................<15.0 mg/dL  6-29 days.................<15.0 mg/dL           BUN     16         Calcium     9.7         Chloride     105         CO2     23         Color, UA   Yellow           Creatinine     0.8         Differential Method     Automated         eGFR     >60.0         Eos #     0.1         Eos %     1.0         Glucose     89         Glucose, UA   Negative           Gran # (ANC)     4.9         Gran %     61.0         Hematocrit     41.5         Hemoglobin     14.1         Hepatitis C Ab     Non-reactive         HIV 1/2 Ag/Ab     Non-reactive         Immature Grans (Abs)     0.02  Comment: Mild elevation in immature granulocytes is non " specific and   can be seen in a variety of conditions including stress response,   acute inflammation, trauma and pregnancy. Correlation with other   laboratory and clinical findings is essential.           Immature Granulocytes     0.2         Ketones, UA   2+           Leukocyte Esterase, UA   Negative           Lymph #     2.2         Lymph %     27.2         MCH     30.9         MCHC     34.0         MCV     91         Mono #     0.8         Mono %     10.1         MPV     10.3         NITRITE UA   Negative           nRBC     0         Blood, UA   Negative           pH, UA   6.0           Platelet Count     177         POCT Glucose 95       76       Potassium     3.9         PROTEIN TOTAL     7.2         Protein, UA   Trace  Comment: Recommend a 24 hour urine protein or a urine   protein/creatinine ratio if globulin induced proteinuria is  clinically suspected.             RBC     4.57         RDW     12.8         Sodium     139         Spec Grav UA   1.030           Specimen UA   Urine, Clean Catch           WBC     8.10               All pertinent labs from the last 24 hours have been reviewed.    Significant Diagnostics:  CT: No results found in the last 24 hours.  MRI: No results found in the last 24 hours.  I have reviewed all pertinent imaging results/findings within the past 24 hours.  I have reviewed and interpreted all pertinent imaging results/findings within the past 24 hours.

## 2024-09-15 NOTE — DISCHARGE INSTRUCTIONS
At this time it is unclear what is causing your fevers, could be postoperative.    Please continue take your home pain medications as prescribed.    For fever you can take Tylenol in over-the-counter doses.    If you develop worsening symptoms, weakness, dizziness, trouble breathing, worsening pain in your neck, or any other new, worsening worrisome symptoms please return emergency department for re-evaluation.    Please follow-up as scheduled with the Neurosurgery follow up team for your recent surgery.

## 2024-09-15 NOTE — CONSULTS
[facility]  Neurosurgery  Consult Note    Inpatient consult to Neurosurgery  Consult performed by: Dominic Quach MD  Consult ordered by: Alonso Del Toro MD        Subjective:     Chief Complaint/Reason for Admission: Postop fever    History of Present Illness: Hermelindo Garza III is a 44 y.o. male presents on POD3 to ED with postop fever s/p C5/6-C6/7 ACDF. Pt reports chills and fever to 102 at home. Has been taking percocet but no other antipyretics. Pt reports pain in back of neck that is controlled with his pain meds. Denies numbness/tingling/weakness, dysuria, SOB, CP. CXR showing left basilar atelectasis. Infectious workup otherwise negative. Incision site c/d/i.    (Not in a hospital admission)      Review of patient's allergies indicates:  No Known Allergies    Past Medical History:   Diagnosis Date    ADD (attention deficit disorder) 05/09/2012    Allergy     Anxiety 04/12/2023    Asthma 05/09/2012    Cervical disc disorder with radiculopathy, unspecified cervical region 09/14/2021    Cervical spondylosis with myelopathy 06/29/2022    Eczema     Esophageal ulcer     GERD (gastroesophageal reflux disease) 05/09/2012    Glaucoma     Kidney stones 05/2014    Lumbar disc disease 05/09/2012    Lumbar herniated disc 05/09/2012    Malignant melanoma of skin, unspecified 01/06/2022    in situ right mid back    Melanoma 01/2022    in situ R mid back     LUBA (obstructive sleep apnea)     Radiculopathy, lumbar region 09/14/2021    Renal calculi 05/09/2012    Type 2 diabetes mellitus without complication, without long-term current use of insulin 11/16/2020     Past Surgical History:   Procedure Laterality Date    ANTERIOR CERVICAL DISCECTOMY W/ FUSION N/A 9/12/2024    Procedure: DISCECTOMY, SPINE, CERVICAL, ANTERIOR APPROACH, WITH FUSION C5-6, C6-7;  Surgeon: Naseem Mcnamara DO;  Location: [facility] OR [address] [place] [address];  Service: Neurosurgery;  Laterality: N/A;    APPENDECTOMY  2006    CARPAL TUNNEL  RELEASE Right 09/15/2022    Procedure: RELEASE, CARPAL TUNNEL;  Surgeon: Christen Marshall MD;  Location: [facility] OR;  Service: Orthopedics;  Laterality: Right;    CAUTERY OF TURBINATES Bilateral 7/15/2024    Procedure: CAUTERIZATION, MUCOSA, NASAL TURBINATE;  Surgeon: Melody Liu MD;  Location: [facility] OR;  Service: ENT;  Laterality: Bilateral;    COLONOSCOPY  01/22/2019    internal hemorrhoids, o/w normal - repeat at age 52    COLONOSCOPY N/A 12/21/2023    Procedure: COLONOSCOPY;  Surgeon: Teo Johnson MD;  Location: [facility] ENDO;  Service: Endoscopy;  Laterality: N/A;    CORRECTION OF HAMMER TOE Left 8/14/2023    Procedure: CORRECTION, HAMMER TOE--  basic foot tray as well as a Reese microfree/microchoice tray. I also asked Sherry to bring an MIS mati.;  Surgeon: Ankush Back DPM;  Location: [facility] OR;  Service: Podiatry;  Laterality: Left;  reese drill, sagittal saw, foot set    CYSTOSCOPY  7/5/2023    Procedure: CYSTOSCOPY;  Surgeon: Chuckie Hall MD;  Location: [address];  Service: Urology;;    EPIDURAL STEROID INJECTION N/A 02/10/2021    Procedure: CERVICAL C6/7 NELI DIRECT REFERRAL;  Surgeon: Michi Escamilla MD;  Location: [facility] PAIN MGT;  Service: Pain Management;  Laterality: N/A;  NEEDS CONSENT    EPIDURAL STEROID INJECTION INTO CERVICAL SPINE N/A 6/28/2023    Procedure: Injection-steroid-epidural-cervical C7-T1;  Surgeon: Lorraine Patel DO;  Location: [facility] PAIN MANAGEMENT;  Service: Pain Management;  Laterality: N/A;  diabetic    EPIDURAL STEROID INJECTION INTO CERVICAL SPINE N/A 11/3/2023    Procedure: cervical NELI C7-T1;  Surgeon: Kolton Terrell MD;  Location: [facility] PAIN MANAGEMENT;  Service: Pain Management;  Laterality: N/A;  diabetic    EPIDURAL STEROID INJECTION INTO CERVICAL SPINE N/A 4/17/2024    Procedure: VENECIA C7/T1;  Surgeon: Lorraine Patel DO;  Location: [facility] PAIN MANAGEMENT;  Service: Pain Management;  Laterality: N/A;  20mins- Ozempic 7d- no ac    EPIDURAL STEROID  INJECTION INTO LUMBAR SPINE N/A 9/27/2023    Procedure: L4-5 NELI;  Surgeon: Tirso Pickering MD;  Location: [facility] PAIN MANAGEMENT;  Service: Pain Management;  Laterality: N/A;  15 mins    EPIDURAL STEROID INJECTION INTO LUMBAR SPINE N/A 3/6/2024    Procedure: L4-5 IL NELI;  Surgeon: Lorraine Patel DO;  Location: [facility] PAIN MANAGEMENT;  Service: Pain Management;  Laterality: N/A;  20 mins    EPIDURAL STEROID INJECTION INTO LUMBAR SPINE N/A 6/3/2024    Procedure: NELI L4-5;  Surgeon: Lorraine Patel DO;  Location: [facility] PAIN MANAGEMENT;  Service: Pain Management;  Laterality: N/A;  20mins-no ac Ozempic 7d    EPIDURAL STEROID INJECTION INTO LUMBAR SPINE N/A 8/26/2024    Procedure: L4-5 NELI Veer Left;  Surgeon: Lorraine Patel DO;  Location: [facility] PAIN MANAGEMENT;  Service: Pain Management;  Laterality: N/A;  20 mins No AC - Ozempic 7 days    ESOPHAGOGASTRODUODENOSCOPY  01/22/2019    LA grade B esophagitis; esophageal stenosis- dilated; gastritis; H pylori pathology negative    ESOPHAGOGASTRODUODENOSCOPY N/A 05/18/2021    Procedure: EGD (ESOPHAGOGASTRODUODENOSCOPY);  Surgeon: Mariana Hector MD;  Location: [facility];  Service: Endoscopy;  Laterality: N/A;    ESOPHAGOGASTRODUODENOSCOPY N/A 12/21/2023    Procedure: EGD (ESOPHAGOGASTRODUODENOSCOPY);  Surgeon: Teo Johnson MD;  Location: [facility];  Service: Endoscopy;  Laterality: N/A;    ESOPHAGOGASTRODUODENOSCOPY N/A 2/22/2024    Procedure: EGD (ESOPHAGOGASTRODUODENOSCOPY);  Surgeon: Teo Johnson MD;  Location: [facility];  Service: Endoscopy;  Laterality: N/A;    EXCISION OF GANGLION OF WRIST Right 09/15/2022    Procedure: EXCISION, GANGLION CYST, WRIST;  Surgeon: Christen Marshall MD;  Location: [facility];  Service: Orthopedics;  Laterality: Right;    FLEXOR TENDON REPAIR Right 09/15/2022    Procedure: FLEXOR TENOSYNOVECTOMY;  Surgeon: Christen Marshall MD;  Location: [facility];  Service: Orthopedics;  Laterality: Right;    HAND ARTHROTOMY Right 09/15/2022     Procedure: ARTHROTOMY, HAND RADIOCARPAL;  Surgeon: Christen Marshall MD;  Location: [facility];  Service: Orthopedics;  Laterality: Right;  Radiocarpal    INJECTION OF JOINT Right 11/29/2023    Procedure: right hip IA injection and Right GTB injection;  Surgeon: Lorraine Patel DO;  Location: [facility] PAIN MANAGEMENT;  Service: Pain Management;  Laterality: Right;    INJECTION OF STEROID Left 09/15/2022    Procedure: INJECTION, STEROID LEFT WRIST AND LEFT LATERAL ELBOW;  Surgeon: Christen Marshall MD;  Location: [facility] OR;  Service: Orthopedics;  Laterality: Left;  Left Carpal Tunnel CSI    LASER LITHOTRIPSY Left 7/5/2023    Procedure: LITHOTRIPSY, USING LASER;  Surgeon: Chuckie Hall MD;  Location: [facility] 1ST FLR;  Service: Urology;  Laterality: Left;    LUMBAR LAMINECTOMY  2010    LUMBAR LAMINECTOMY WITH DISCECTOMY Left 09/20/2021    Procedure: LAMINECTOMY, SPINE, LUMBAR, WITH DISCECTOMY;  Surgeon: Naseem Mcnamara DO;  Location: [facility] OR 2ND FLR;  Service: Neurosurgery;  Laterality: Left;  MIS L3-4    NASAL SEPTOPLASTY N/A 7/15/2024    Procedure: SEPTOPLASTY, NOSE;  Surgeon: Melody Liu MD;  Location: [facility] OR;  Service: ENT;  Laterality: N/A;    PH MONITORING, ESOPHAGUS, WIRELESS, (OFF REFLUX MEDS) N/A 12/21/2023    Procedure: PH MONITORING, ESOPHAGUS, WIRELESS, (OFF REFLUX MEDS);  Surgeon: Teo Johnson MD;  Location: [facility] ENDO;  Service: Endoscopy;  Laterality: N/A;    RECONSTRUCTION OF LIGAMENT Left 12/5/2023    Procedure: RECONSTRUCTION, LIGAMENT LATERAL COLLATERAL;  Surgeon: Christen Marshall MD;  Location: [facility];  Service: Orthopedics;  Laterality: Left;    REPAIR OF EXTENSOR TENDON Left 12/5/2023    Procedure: REPAIR, TENDON, EXTENSOR;  Surgeon: Christen Marshall MD;  Location: [facility] OR;  Service: Orthopedics;  Laterality: Left;    ROBOT-ASSISTED REPAIR OF HIATAL HERNIA N/A 3/14/2024    Procedure: ROBOTIC REPAIR, HERNIA, HIATAL;  Surgeon: Cody Winn MD;  Location: [facility];  Service: General;   Laterality: N/A;    TYMPANOSTOMY TUBE PLACEMENT      URETERAL STENT PLACEMENT Left 2023    Procedure: INSERTION, STENT, URETER;  Surgeon: Chuckie Hall MD;  Location: [facility] OR [address] [place] [address];  Service: Urology;  Laterality: Left;    URETEROSCOPIC REMOVAL OF URETERIC CALCULUS Left 2023    Procedure: REMOVAL, CALCULUS, URETER, URETEROSCOPIC;  Surgeon: Chuckie Hall MD;  Location: [facility] OR [address] [place] [address];  Service: Urology;  Laterality: Left;    URETEROSCOPY Left 2023    Procedure: URETEROSCOPY;  Surgeon: Chuckie Hall MD;  Location: [facility] OR [address] [place] [address];  Service: Urology;  Laterality: Left;     Family History       Problem Relation (Age of Onset)    Allergic rhinitis Mother, Father, Sister, Sister    Asthma Father, Sister, Sister    Brain cancer Maternal Grandmother    Breast cancer Paternal Grandmother    Chronic back pain Father    Diabetes Maternal Grandfather    MARTITA disease Father    Hypertension Mother    Melanoma Father    No Known Problems Brother, Maternal Aunt, Maternal Uncle, Paternal Aunt, Paternal Uncle, Paternal Grandfather    Sleep apnea Mother, Father    Transient ischemic attack Mother          Tobacco Use    Smoking status: Former     Current packs/day: 0.00     Types: Cigarettes     Quit date: 2018     Years since quittin.4     Passive exposure: Never    Smokeless tobacco: Never   Substance and Sexual Activity    Alcohol use: Yes     Alcohol/week: 3.0 standard drinks of alcohol     Types: 3 Drinks containing 0.5 oz of alcohol per week    Drug use: No    Sexual activity: Yes     Partners: Male     Review of Systems   Constitutional:  Positive for chills and fever.   HENT:  Negative for congestion.    Respiratory:  Negative for cough and shortness of breath.    Cardiovascular:  Negative for chest pain.   Gastrointestinal:  Negative for abdominal pain.   Genitourinary:  Negative for dysuria.   Musculoskeletal:  Positive for neck pain.   Neurological:  Negative for dizziness.     Objective:  "    Weight: 107 kg (236 lb)  Body mass index is 39.27 kg/m².  Vital Signs (Most Recent):  Temp: 98.3 °F (36.8 °C) (09/15/24 0615)  Pulse: 100 (09/15/24 0608)  Resp: 16 (09/15/24 0540)  BP: 137/74 (09/15/24 0608)  SpO2: (!) 92 % (Sessions, MD made aware. Patient denies any SOB.) (09/15/24 0607) Vital Signs (24h Range):  Temp:  [98.3 °F (36.8 °C)-100 °F (37.8 °C)] 98.3 °F (36.8 °C)  Pulse:  [100-106] 100  Resp:  [16-18] 16  SpO2:  [92 %-95 %] 92 %  BP: (137-140)/(74-83) 137/74                        Physical Exam         Neurosurgery Physical Exam  GENERAL: resting comfortably  HEENT: NCAT, PERRL, mucous membranes moist  NECK: supple, trachea midline, cervical collar in place, incision C/D/I  CV: normal capillary refill  PULM: aerating well, symmetric expansion, no distress  ABD: soft, NT, ND  EXT: no c/c/e     NEURO:     GCS 15 E4V5M6  AAO x 3  CN II-XII grossly intact  Fc x 4 antigravity  Grossly full strength throughout  SILT        Significant Labs:  Recent Labs   Lab 09/15/24  0533   GLU 89      K 3.9      CO2 23   BUN 16   CREATININE 0.8   CALCIUM 9.7     Recent Labs   Lab 09/15/24  0533   WBC 8.10   HGB 14.1   HCT 41.5        No results for input(s): "LABPT", "INR", "APTT" in the last 48 hours.  Microbiology Results (last 7 days)       ** No results found for the last 168 hours. **          Recent Lab Results         09/15/24  0628   09/15/24  0614   09/15/24  0533   09/14/24  0755        Albumin     4.1         ALP     84         ALT     18         Anion Gap     11         Appearance, UA   Clear           AST     23         Baso #     0.04         Basophil %     0.5         Bilirubin (UA)   Negative           BILIRUBIN TOTAL     0.8  Comment: For infants and newborns, interpretation of results should be based  on gestational age, weight and in agreement with clinical  observations.    Premature Infant recommended reference ranges:  Up to 24 hours.............<8.0 mg/dL  Up to 48 " hours............<12.0 mg/dL  3-5 days..................<15.0 mg/dL  6-29 days.................<15.0 mg/dL           BUN     16         Calcium     9.7         Chloride     105         CO2     23         Color, UA   Yellow           Creatinine     0.8         Differential Method     Automated         eGFR     >60.0         Eos #     0.1         Eos %     1.0         Glucose     89         Glucose, UA   Negative           Gran # (ANC)     4.9         Gran %     61.0         Hematocrit     41.5         Hemoglobin     14.1         Hepatitis C Ab     Non-reactive         HIV 1/2 Ag/Ab     Non-reactive         Immature Grans (Abs)     0.02  Comment: Mild elevation in immature granulocytes is non specific and   can be seen in a variety of conditions including stress response,   acute inflammation, trauma and pregnancy. Correlation with other   laboratory and clinical findings is essential.           Immature Granulocytes     0.2         Ketones, UA   2+           Leukocyte Esterase, UA   Negative           Lymph #     2.2         Lymph %     27.2         MCH     30.9         MCHC     34.0         MCV     91         Mono #     0.8         Mono %     10.1         MPV     10.3         NITRITE UA   Negative           nRBC     0         Blood, UA   Negative           pH, UA   6.0           Platelet Count     177         POCT Glucose 95       76       Potassium     3.9         PROTEIN TOTAL     7.2         Protein, UA   Trace  Comment: Recommend a 24 hour urine protein or a urine   protein/creatinine ratio if globulin induced proteinuria is  clinically suspected.             RBC     4.57         RDW     12.8         Sodium     139         Spec Grav UA   1.030           Specimen UA   Urine, Clean Catch           WBC     8.10               All pertinent labs from the last 24 hours have been reviewed.    Significant Diagnostics:  CT: No results found in the last 24 hours.  MRI: No results found in the last 24 hours.  I have  reviewed all pertinent imaging results/findings within the past 24 hours.  I have reviewed and interpreted all pertinent imaging results/findings within the past 24 hours.  Assessment/Plan:     Postoperative fever  Hermelindo Garza III is a 44 y.o. male presents on POD3 to ED with postop fever s/p C5/6-C6/7 ACDF. Pt reports chills and fever to 102 at home. Has been taking percocet but no other antipyretics. Pt reports pain in back of neck that is controlled with his pain meds. Denies numbness/tingling/weakness, dysuria, SOB, CP. CXR showing left basilar atelectasis. Infectious workup otherwise negative. Incision site c/d/i.     Patient seen by NSGY at bedside  All labs and diagnostics reviewed  Follow-up in outpatient clinic as scheduled, call or return to ED for red flag symptoms   NSGY will signoff at this time    Plan discussed with Dr Mcnamara        Thank you for your consult. I will sign off. Please contact us if you have any additional questions.    Dominic Quach MD  Neurosurgery  [facility]

## 2024-09-16 ENCOUNTER — PATIENT OUTREACH (OUTPATIENT)
Dept: ADMINISTRATIVE | Facility: CLINIC | Age: 44
End: 2024-09-16
Payer: COMMERCIAL

## 2024-09-16 RX ORDER — METHYLPREDNISOLONE 4 MG/1
TABLET ORAL
Qty: 21 EACH | Refills: 0 | Status: SHIPPED | OUTPATIENT
Start: 2024-09-16 | End: 2024-10-07

## 2024-09-16 RX ORDER — FLUOCINONIDE TOPICAL SOLUTION USP, 0.05% 0.5 MG/ML
SOLUTION TOPICAL
Qty: 60 ML | Refills: 3 | Status: SHIPPED | OUTPATIENT
Start: 2024-09-16

## 2024-09-24 ENCOUNTER — ANESTHESIA EVENT (OUTPATIENT)
Dept: SURGERY | Facility: HOSPITAL | Age: 44
End: 2024-09-24
Payer: COMMERCIAL

## 2024-09-24 ENCOUNTER — PATIENT MESSAGE (OUTPATIENT)
Dept: DERMATOLOGY | Facility: CLINIC | Age: 44
End: 2024-09-24
Payer: COMMERCIAL

## 2024-09-24 RX ORDER — PREGABALIN 200 MG/1
200 CAPSULE ORAL 2 TIMES DAILY
Qty: 60 CAPSULE | Refills: 1 | Status: SHIPPED | OUTPATIENT
Start: 2024-09-24 | End: 2024-11-23

## 2024-09-24 NOTE — ANESTHESIA PREPROCEDURE EVALUATION
09/24/2024  Hermelindo Garza III is a 44 y.o., male.      Pre-op Assessment    I have reviewed the Patient Summary Reports.     I have reviewed the Nursing Notes. I have reviewed the NPO Status.   I have reviewed the Medications.     Review of Systems  Anesthesia Hx:  No problems with previous Anesthesia   Neg history of prior surgery.          Denies Family Hx of Anesthesia complications.   Post-Operative Nausea/Vomiting Denies Personal Hx of Anesthesia complications.                    Hematology/Oncology:  Hematology Normal   Oncology Normal                                   EENT/Dental:  EENT/Dental Normal           Cardiovascular:  Exercise tolerance: good          Denies Angina.          Functional Capacity good / => 4 METS                         Pulmonary:    Asthma mild and asymptomatic   Sleep Apnea   Asthma:    Obstructive Sleep Apnea (LUBA).           Renal/:  Chronic Renal Disease   calculi     Kidney Function/Disease             Hepatic/GI:   PUD,  GERD, well controlled Liver Disease, Hepatitis    Gerd, Peptic Ulcer Disease       Liver Disease, Hepatitis        Musculoskeletal:  Arthritis        Arthritis          Neurological:    Neuromuscular Disease,         Denies Pain         Arthritis                         Neuromuscular Disease   Endocrine:  Diabetes, type 2    Diabetes                      Psych:  Denies Psychiatric History. anxiety             Physical Exam  General: Well nourished and Cooperative    Airway:  Mallampati: II / II  Mouth Opening: Normal  TM Distance: Normal  Tongue: Normal  Neck ROM: Normal ROM    Dental:  Intact    Chest/Lungs:  Clear to auscultation, Normal Respiratory Rate    Heart:  Rate: Normal    Anesthesia Plan  Type of Anesthesia, risks & benefits discussed:    Anesthesia Type: Gen Natural Airway  Intra-op Monitoring Plan: Standard ASA Monitors  Post Op  Pain Control Plan: multimodal analgesia and IV/PO Opioids PRN  Induction:  IV  Airway Plan: , Post-Induction  Informed Consent: Informed consent signed with the Patient and all parties understand the risks and agree with anesthesia plan.  All questions answered.   ASA Score: 3  Day of Surgery Review of History & Physical: H&P Update referred to the surgeon/provider.  Anesthesia Plan Notes: Preop nausea and hx PONV; substantial preop anxiety: ondansetron, scopolamine & midazolam ordered     Ready For Surgery From Anesthesia Perspective.     .

## 2024-09-25 ENCOUNTER — CLINICAL SUPPORT (OUTPATIENT)
Dept: NEUROSURGERY | Facility: CLINIC | Age: 44
End: 2024-09-25
Payer: COMMERCIAL

## 2024-09-25 ENCOUNTER — PATIENT MESSAGE (OUTPATIENT)
Dept: NEUROSURGERY | Facility: CLINIC | Age: 44
End: 2024-09-25

## 2024-09-25 DIAGNOSIS — Z98.1 S/P CERVICAL SPINAL FUSION: Primary | ICD-10-CM

## 2024-09-25 RX ORDER — OXYCODONE AND ACETAMINOPHEN 5; 325 MG/1; MG/1
1 TABLET ORAL EVERY 8 HOURS PRN
Qty: 30 TABLET | Refills: 0 | Status: SHIPPED | OUTPATIENT
Start: 2024-09-25

## 2024-09-25 RX ORDER — HYDROCODONE BITARTRATE AND ACETAMINOPHEN 5; 325 MG/1; MG/1
1 TABLET ORAL EVERY 8 HOURS PRN
Qty: 28 TABLET | Refills: 0 | Status: SHIPPED | OUTPATIENT
Start: 2024-09-25 | End: 2024-09-25

## 2024-09-25 NOTE — PRE-PROCEDURE INSTRUCTIONS
Patient was informed of pre-procedure instructions and arrival time. Pt verbalized understanding and is to be accompanied by mother.    Patient denies taking GLP-1 injection for 7 days.     Dear Hermelindo ,     You are scheduled for a procedure with Dr. Walter on 9/26/2024. Your scheduled arrival time is 11:30 am.  This arrival time is roughly 2 hours before your anticipated procedure time to allow sufficient time for pre-op.  Please wear comfortable clothing  This procedure will take place at the [facility] at the corner of [facility] and [facility].  [facility] [place] [facility]. The address is:     [address] [place] [address].  [place] [id number]     After entering the building, proceed to the second floor and check in with registration.      Your fasting instructions are as follows:     Nothing to eat for 8 hours prior to procedure.   You may drink clear liquids up until 2 hours prior to your arrival time.      You MUST have a responsible adult to bring you home. Your surgery will be cancelled if you do not have a ride. ([employer] AND [employer] WILL NOT BE ACCEPTED AS A RESPONSIBLE RIDE)     Please hold the following medications the morning of your procedure:  -Aspirin and Aspirin-containing products (Goody's powder, Excedrin)  -NSAIDs (Advil, Ibuprofen, Aleve, Diclofenac)  -Vitamins/Supplements   -Herbal remedies/Teas  -Stimulants (Adderall, Vyvanse, Adipex)  -Diabetic medication   -Prescription creams/gels/lotions        *May take Tylenol if needed         The evening before and morning of your procedure, take a shower using antibacterial soap (ex: Hibiclens or Dial antibacterial soap). DO NOT apply deodorant, lotion, cologne, or anything else to the skin. Do not wear jewelry or bring any valuables with you.  .     Please do not wear contact lenses the day of your procedure.   Bring any inhalers that you may need.     If you have any procedure-specific questions,  please call your surgeon's office. Any other questions, don't hesitate to call at [phone]     Thanks,  Pre-Admit Testing  Anesthesia Dept OC

## 2024-09-25 NOTE — PROGRESS NOTES
Mr. Garza is 43 y/o who underwent ACDF by Dr. Mcnamara on 9/12/24.  (For complete diagnosis and procedure, see OP note.) Pt presents today for 2 week incision check. He is ambulatory, gait steady, in NAD. Denies any fever, chills. Pt is wearing brace.    Pt reports that he fell while walking into the hospital today for appt.  He says he fell up 3 steps and caught himself with his hands and knees. He says that he is having some muscular pain now.  Pt will let us know if symptoms worsen or does not improve.  Informed Dr. Mcnamara of the fall.  Pt says that he filled out an incident report with the [facility] security team.    Patient says that prior to today's fall he had been noticing improvement in symptoms. He was able to write better, less arm numbness, and better remy to hold things in his hands. He reports still having some tightness around his upper back and shoulders.  He has been taking the prn norco and tylenol for pain relief.   I reviewed the activity restrictions with the patient. Pt is not to lift more than 10lbs, no bending/twisting spine, no strenuous activity, no overhead reaching, and no sitting/ standing/ walking for long periods of time. Pt is not to drive while wearing the collar or taking the narcotic pain medication.  Pt would like to return to working on Monday with restrictions.  Pt has a sedentary office job.  He plans to work remotely at his home computer for now.      Incision C/D/I without any signs of redness or infection. No drainage noted. Wound edges are approximated.     Pt received updated post-op care instructions.  All questions were answered. Patient was encouraged to call clinic with any future concerns.

## 2024-09-25 NOTE — TELEPHONE ENCOUNTER
Pt called and informed that we would cancel Norco and resend percocet to his pharmacy. He will contact us if there are any problems picking it up.

## 2024-09-26 ENCOUNTER — PATIENT MESSAGE (OUTPATIENT)
Dept: UROLOGY | Facility: CLINIC | Age: 44
End: 2024-09-26
Payer: COMMERCIAL

## 2024-09-26 ENCOUNTER — OFFICE VISIT (OUTPATIENT)
Dept: DERMATOLOGY | Facility: CLINIC | Age: 44
End: 2024-09-26
Payer: COMMERCIAL

## 2024-09-26 ENCOUNTER — ANESTHESIA (OUTPATIENT)
Dept: SURGERY | Facility: HOSPITAL | Age: 44
End: 2024-09-26
Payer: COMMERCIAL

## 2024-09-26 ENCOUNTER — HOSPITAL ENCOUNTER (OUTPATIENT)
Facility: HOSPITAL | Age: 44
Discharge: HOME | End: 2024-09-26
Attending: UROLOGY | Admitting: UROLOGY
Payer: COMMERCIAL

## 2024-09-26 VITALS
HEART RATE: 93 BPM | TEMPERATURE: 98 F | WEIGHT: 232 LBS | OXYGEN SATURATION: 97 % | HEIGHT: 65 IN | SYSTOLIC BLOOD PRESSURE: 109 MMHG | BODY MASS INDEX: 38.65 KG/M2 | RESPIRATION RATE: 29 BRPM | DIASTOLIC BLOOD PRESSURE: 69 MMHG

## 2024-09-26 DIAGNOSIS — L30.9 DERMATITIS: Primary | ICD-10-CM

## 2024-09-26 DIAGNOSIS — N32.81 OAB (OVERACTIVE BLADDER): Primary | ICD-10-CM

## 2024-09-26 DIAGNOSIS — N39.41 URGENCY INCONTINENCE: ICD-10-CM

## 2024-09-26 PROBLEM — N39.8 VOIDING DYSFUNCTION: Status: ACTIVE | Noted: 2024-09-26

## 2024-09-26 LAB — POCT GLUCOSE: 92 MG/DL (ref 70–110)

## 2024-09-26 PROCEDURE — 25000003 PHARM REV CODE 250: Performed by: UROLOGY

## 2024-09-26 PROCEDURE — 37000009 HC ANESTHESIA EA ADD 15 MINS: Performed by: UROLOGY

## 2024-09-26 PROCEDURE — 63600175 PHARM REV CODE 636 W HCPCS: Performed by: ANESTHESIOLOGY

## 2024-09-26 PROCEDURE — 94761 N-INVAS EAR/PLS OXIMETRY MLT: CPT

## 2024-09-26 PROCEDURE — 63600175 PHARM REV CODE 636 W HCPCS: Performed by: NURSE ANESTHETIST, CERTIFIED REGISTERED

## 2024-09-26 PROCEDURE — 1111F DSCHRG MED/CURRENT MED MERGE: CPT | Mod: CPTII,95,, | Performed by: DERMATOLOGY

## 2024-09-26 PROCEDURE — 3066F NEPHROPATHY DOC TX: CPT | Mod: CPTII,95,, | Performed by: DERMATOLOGY

## 2024-09-26 PROCEDURE — 36000706: Performed by: UROLOGY

## 2024-09-26 PROCEDURE — 1160F RVW MEDS BY RX/DR IN RCRD: CPT | Mod: CPTII,95,, | Performed by: DERMATOLOGY

## 2024-09-26 PROCEDURE — 99213 OFFICE O/P EST LOW 20 MIN: CPT | Mod: 95,,, | Performed by: DERMATOLOGY

## 2024-09-26 PROCEDURE — 36000707: Performed by: UROLOGY

## 2024-09-26 PROCEDURE — 3044F HG A1C LEVEL LT 7.0%: CPT | Mod: CPTII,95,, | Performed by: DERMATOLOGY

## 2024-09-26 PROCEDURE — 3061F NEG MICROALBUMINURIA REV: CPT | Mod: CPTII,95,, | Performed by: DERMATOLOGY

## 2024-09-26 PROCEDURE — 1159F MED LIST DOCD IN RCRD: CPT | Mod: CPTII,95,, | Performed by: DERMATOLOGY

## 2024-09-26 PROCEDURE — 25000003 PHARM REV CODE 250: Performed by: ANESTHESIOLOGY

## 2024-09-26 PROCEDURE — 37000008 HC ANESTHESIA 1ST 15 MINUTES: Performed by: UROLOGY

## 2024-09-26 PROCEDURE — 71000015 HC POSTOP RECOV 1ST HR: Performed by: UROLOGY

## 2024-09-26 PROCEDURE — 82962 GLUCOSE BLOOD TEST: CPT | Performed by: UROLOGY

## 2024-09-26 PROCEDURE — 25000003 PHARM REV CODE 250: Performed by: NURSE ANESTHETIST, CERTIFIED REGISTERED

## 2024-09-26 PROCEDURE — 52000 CYSTOURETHROSCOPY: CPT | Mod: ,,, | Performed by: UROLOGY

## 2024-09-26 PROCEDURE — 99900035 HC TECH TIME PER 15 MIN (STAT)

## 2024-09-26 RX ORDER — ONDANSETRON HYDROCHLORIDE 2 MG/ML
4 INJECTION, SOLUTION INTRAVENOUS DAILY PRN
OUTPATIENT
Start: 2024-09-26

## 2024-09-26 RX ORDER — MIDAZOLAM HYDROCHLORIDE 1 MG/ML
INJECTION, SOLUTION INTRAMUSCULAR; INTRAVENOUS
Status: DISCONTINUED | OUTPATIENT
Start: 2024-09-26 | End: 2024-09-26

## 2024-09-26 RX ORDER — CIPROFLOXACIN 2 MG/ML
400 INJECTION, SOLUTION INTRAVENOUS ONCE
Status: DISCONTINUED | OUTPATIENT
Start: 2024-09-26 | End: 2024-09-26 | Stop reason: HOSPADM

## 2024-09-26 RX ORDER — GLUCAGON 1 MG
1 KIT INJECTION
OUTPATIENT
Start: 2024-09-26

## 2024-09-26 RX ORDER — LIDOCAINE HYDROCHLORIDE 10 MG/ML
1 INJECTION, SOLUTION EPIDURAL; INFILTRATION; INTRACAUDAL; PERINEURAL ONCE AS NEEDED
Status: DISCONTINUED | OUTPATIENT
Start: 2024-09-26 | End: 2024-09-26 | Stop reason: HOSPADM

## 2024-09-26 RX ORDER — CIPROFLOXACIN 2 MG/ML
INJECTION, SOLUTION INTRAVENOUS
Status: DISCONTINUED | OUTPATIENT
Start: 2024-09-26 | End: 2024-09-26

## 2024-09-26 RX ORDER — LIDOCAINE HYDROCHLORIDE 20 MG/ML
JELLY TOPICAL
Status: DISCONTINUED | OUTPATIENT
Start: 2024-09-26 | End: 2024-09-26 | Stop reason: HOSPADM

## 2024-09-26 RX ORDER — FENTANYL CITRATE 50 UG/ML
25 INJECTION, SOLUTION INTRAMUSCULAR; INTRAVENOUS EVERY 5 MIN PRN
OUTPATIENT
Start: 2024-09-26

## 2024-09-26 RX ORDER — HYDROMORPHONE HYDROCHLORIDE 1 MG/ML
0.2 INJECTION, SOLUTION INTRAMUSCULAR; INTRAVENOUS; SUBCUTANEOUS EVERY 5 MIN PRN
OUTPATIENT
Start: 2024-09-26

## 2024-09-26 RX ORDER — DEXMEDETOMIDINE HYDROCHLORIDE 100 UG/ML
INJECTION, SOLUTION INTRAVENOUS
Status: DISCONTINUED | OUTPATIENT
Start: 2024-09-26 | End: 2024-09-26

## 2024-09-26 RX ORDER — TAMSULOSIN HYDROCHLORIDE 0.4 MG/1
0.8 CAPSULE ORAL DAILY
Qty: 60 CAPSULE | Refills: 11 | Status: SHIPPED | OUTPATIENT
Start: 2024-09-26 | End: 2025-09-26

## 2024-09-26 RX ORDER — OXYCODONE AND ACETAMINOPHEN 5; 325 MG/1; MG/1
1 TABLET ORAL
OUTPATIENT
Start: 2024-09-26

## 2024-09-26 RX ORDER — SODIUM CHLORIDE 9 MG/ML
INJECTION, SOLUTION INTRAVENOUS CONTINUOUS
Status: DISCONTINUED | OUTPATIENT
Start: 2024-09-26 | End: 2024-09-26 | Stop reason: HOSPADM

## 2024-09-26 RX ORDER — PROCHLORPERAZINE EDISYLATE 5 MG/ML
5 INJECTION INTRAMUSCULAR; INTRAVENOUS EVERY 30 MIN PRN
OUTPATIENT
Start: 2024-09-26

## 2024-09-26 RX ADMIN — MIDAZOLAM HYDROCHLORIDE 1 MG: 1 INJECTION, SOLUTION INTRAMUSCULAR; INTRAVENOUS at 12:09

## 2024-09-26 RX ADMIN — MIDAZOLAM HYDROCHLORIDE 2 MG: 1 INJECTION, SOLUTION INTRAMUSCULAR; INTRAVENOUS at 12:09

## 2024-09-26 RX ADMIN — CIPROFLOXACIN 400 MG: 2 INJECTION, SOLUTION INTRAVENOUS at 12:09

## 2024-09-26 RX ADMIN — GLYCOPYRROLATE 0.2 MCG: 0.2 INJECTION, SOLUTION INTRAMUSCULAR; INTRAVENOUS at 12:09

## 2024-09-26 RX ADMIN — DEXMEDETOMIDINE HYDROCHLORIDE 8 MCG: 100 INJECTION, SOLUTION INTRAVENOUS at 12:09

## 2024-09-26 RX ADMIN — DEXMEDETOMIDINE HYDROCHLORIDE 12 MCG: 100 INJECTION, SOLUTION INTRAVENOUS at 12:09

## 2024-09-26 RX ADMIN — SODIUM CHLORIDE: 0.9 INJECTION, SOLUTION INTRAVENOUS at 11:09

## 2024-09-26 NOTE — PATIENT INSTRUCTIONS
Nightly - use small amount of clobetasol then vaseline or aqupahor; dont use the clobetasol longer than 1-2 weeks straight in the same location. When improved use vaseline or aquaphor nightly for maintenance  Pt educated that overuse of steroids can lead to skin thinning/atrophy, hypopigmentation, striae.  Try to avoid friction in this area

## 2024-09-26 NOTE — PLAN OF CARE
Discharge instructions given and explained to patient  with verbalization of understanding all instructions. Patients v/s stable, denies n/v and tolerating po, rates pain level tolerable, IV removed, and ready for patient discharge home.

## 2024-09-26 NOTE — PROGRESS NOTES
Subjective:      Patient ID:  Hermelindo Garza III is a 44 y.o. male who presents for No chief complaint on file.    The patient location is: [place]  The chief complaint leading to consultation is: rash    Visit type: audiovisual    Face to Face time with patient: 4 minutes  7 minutes of total time spent on the encounter, which includes face to face time and non-face to face time preparing to see the patient (eg, review of tests), Obtaining and/or reviewing separately obtained history, Documenting clinical information in the electronic or other health record, Independently interpreting results (not separately reported) and communicating results to the patient/family/caregiver, or Care coordination (not separately reported).         Each patient to whom he or she provides medical services by telemedicine is:  (1) informed of the relationship between the physician and patient and the respective role of any other health care provider with respect to management of the patient; and (2) notified that he or she may decline to receive medical services by telemedicine and may withdraw from such care at any time.    Notes:    C/o dry skin on the L elbow for a few months. Pt using urea cream which does not help. Gets irritated with friction.  Has to wear a long sleeved shirts to prevent irritation        Review of Systems   Skin:  Positive for itching, rash and dry skin.       Objective:   Physical Exam   Skin:   Areas Examined (abnormalities noted in diagram):   LUE Inspection Performed            Diagram Legend     Erythematous scaling macule/papule c/w actinic keratosis       Vascular papule c/w angioma      Pigmented verrucoid papule/plaque c/w seborrheic keratosis      Yellow umbilicated papule c/w sebaceous hyperplasia      Irregularly shaped tan macule c/w lentigo     1-2 mm smooth white papules consistent with Milia      Movable subcutaneous cyst with punctum c/w epidermal inclusion cyst      Subcutaneous movable  cyst c/w pilar cyst      Firm pink to brown papule c/w dermatofibroma      Pedunculated fleshy papule(s) c/w skin tag(s)      Evenly pigmented macule c/w junctional nevus     Mildly variegated pigmented, slightly irregular-bordered macule c/w mildly atypical nevus      Flesh colored to evenly pigmented papule c/w intradermal nevus       Pink pearly papule/plaque c/w basal cell carcinoma      Erythematous hyperkeratotic cursted plaque c/w SCC      Surgical scar with no sign of skin cancer recurrence      Open and closed comedones      Inflammatory papules and pustules      Verrucoid papule consistent consistent with wart     Erythematous eczematous patches and plaques     Dystrophic onycholytic nail with subungual debris c/w onychomycosis     Umbilicated papule    Erythematous-base heme-crusted tan verrucoid plaque consistent with inflamed seborrheic keratosis     Erythematous Silvery Scaling Plaque c/w Psoriasis     See annotation      Assessment / Plan:        Dermatitis  Nightly - use small amount of clobetasol then vaseline or aqupahor; dont use the clobetasol longer than 1-2 weeks straight in the same location. When improved use vaseline or aquaphor nightly for maintenance  Pt educated that overuse of steroids can lead to skin thinning/atrophy, hypopigmentation, striae.    Try to avoid friction in this area           Follow up if symptoms worsen or fail to improve.

## 2024-09-26 NOTE — INTERVAL H&P NOTE
The patient has been examined and the H&P has been reviewed:    Unable to tolerate UDS or cystoscopy in the office. Won't be able to definitively rule out SAEED with UDS but can at least insure there is no anatomic obstruction with cystoscopy under sedation.     There are no hospital problems to display for this patient.

## 2024-09-26 NOTE — OP NOTE
Cystoscopy Operative Note  9/26/2024    Preoperative Diagnosis:   Voiding Dysfunction    Postoperative Diagnosis:  Primary bladder neck obstruction    Procedure:  Cystourethroscopy (39694)    Attending Surgeon: Jay Walter MD    Anesthesia: Monitored Anesthesia Care    EBL: <5 mL    Complications: None    Findings:  Patient with very high bladder neck    Drains: None    Reason for procedure: Hermelindo Garza III is a very pleasant 44 y.o. male who presented with a history of  urinary frequency and urgency coupled with weak stream, hesitancy and intermittency  and was scheduled for cystoscopy for evaluation and management. He did not tolerate a urodynamic study in the office.     Procedure in Detail:  Informed consent was obtained by explaining all risks and benefits of the procedure to the patient in detail.  After informed consent, the patient was brought to the suite where Monitored Anesthesia Carewas administered prior to initiation of the invasive procedure. Appropriate perioperative antibiotics were given within 30 minutes of beginning the procedure. A formal timeout was performed prior to the procedure. The patient was gently placed in lithotomy position with all pressure points padded. Bilateral sequential compression devices were applied and activated. The patient was prepped and draped in standard fashion.    17 Eritrean cystoscope was passed per urethra into the bladder. Inspection of the bladder demonstrated a normal bladder and urethra without stone, tumor, foreign body, diverticulum, erthema, or other abnormality in the bladder or urethra except that the bladder neck was exceptionally high. I had significant difficulty even maneuvering the rigid cystoscope over the bladder neck. No significant prostatic obstruction or median lobe was demonstrated.     He tolerated the procedure well and was transferred in stable condition to the recovery room.     We will plan to see him back in 4-6 weeks for  continued evaluation.

## 2024-09-26 NOTE — PLAN OF CARE
Chart reviewed. Pre-op nursing care completed per orders. Safe surgery checklist complete. Pt belongings placed in PACU locker 9. Waiting for admit order, H&P, anesthesia and procedural consent prior to procedure. Call button within reach.

## 2024-09-26 NOTE — DISCHARGE SUMMARY
[facility]  Discharge Note  Short Stay    Procedure(s) (LRB):  CYSTOSCOPY (N/A)      OUTCOME: Patient tolerated treatment/procedure well without complication and is now ready for discharge.    DISPOSITION: Home or Self Care    FINAL DIAGNOSIS:  Primary Bladder Outlet Obstruction    FOLLOWUP: In clinic    DISCHARGE INSTRUCTIONS:  No discharge procedures on file.     TIME SPENT ON DISCHARGE: 15 minutes

## 2024-09-26 NOTE — ANESTHESIA POSTPROCEDURE EVALUATION
Anesthesia Post Evaluation    Patient: Hermelindo Garza III    Procedure(s) Performed: Procedure(s) (LRB):  CYSTOSCOPY (N/A)    Final Anesthesia Type: general      Patient location during evaluation: PACU  Patient participation: Yes- Able to Participate  Level of consciousness: awake and alert  Post-procedure vital signs: reviewed and stable  Pain management: adequate  Airway patency: patent    PONV status at discharge: No PONV  Anesthetic complications: no      Cardiovascular status: stable  Respiratory status: spontaneous ventilation  Hydration status: euvolemic  Follow-up not needed.          Vitals Value Taken Time   /69 09/26/24 1332   Temp 36.7 °C (98 °F) 09/26/24 1305   Pulse 83 09/26/24 1339   Resp 27 09/26/24 1339   SpO2 92 % 09/26/24 1339   Vitals shown include unfiled device data.      No case tracking events are documented in the log.      Pain/Shani Score: No data recorded

## 2024-09-26 NOTE — TRANSFER OF CARE
"Anesthesia Transfer of Care Note    Patient: Hermelindo Garza III    Procedure(s) Performed: Procedure(s) (LRB):  CYSTOSCOPY (N/A)    Patient location: PACU    Anesthesia Type: MAC    Transport from OR: Transported from OR on room air with adequate spontaneous ventilation    Post pain: adequate analgesia    Post assessment: no apparent anesthetic complications    Post vital signs: stable    Level of consciousness: awake    Nausea/Vomiting: no nausea/vomiting    Complications: none    Transfer of care protocol was followed    Last vitals: Visit Vitals  BP (!) 142/78 (BP Location: Right arm, Patient Position: Lying)   Pulse 95   Temp 37 °C (98.6 °F) (Temporal)   Resp 16   Ht 5' 5" (1.651 m)   Wt 105.2 kg (232 lb)   SpO2 95%   BMI 38.61 kg/m²     "

## 2024-09-29 ENCOUNTER — PATIENT MESSAGE (OUTPATIENT)
Dept: NEUROSURGERY | Facility: CLINIC | Age: 44
End: 2024-09-29
Payer: COMMERCIAL

## 2024-09-30 ENCOUNTER — PATIENT MESSAGE (OUTPATIENT)
Dept: ALLERGY | Facility: CLINIC | Age: 44
End: 2024-09-30
Payer: COMMERCIAL

## 2024-09-30 ENCOUNTER — CLINICAL SUPPORT (OUTPATIENT)
Dept: ALLERGY | Facility: CLINIC | Age: 44
End: 2024-09-30
Payer: COMMERCIAL

## 2024-09-30 DIAGNOSIS — J30.9 CHRONIC ALLERGIC RHINITIS: Primary | ICD-10-CM

## 2024-09-30 PROCEDURE — 95117 IMMUNOTHERAPY INJECTIONS: CPT | Mod: S$GLB,,, | Performed by: STUDENT IN AN ORGANIZED HEALTH CARE EDUCATION/TRAINING PROGRAM

## 2024-10-01 ENCOUNTER — PATIENT MESSAGE (OUTPATIENT)
Dept: INTERNAL MEDICINE | Facility: CLINIC | Age: 44
End: 2024-10-01
Payer: COMMERCIAL

## 2024-10-01 DIAGNOSIS — F90.0 ADHD (ATTENTION DEFICIT HYPERACTIVITY DISORDER), INATTENTIVE TYPE: ICD-10-CM

## 2024-10-01 DIAGNOSIS — E11.9 TYPE 2 DIABETES MELLITUS WITHOUT COMPLICATION, WITHOUT LONG-TERM CURRENT USE OF INSULIN: ICD-10-CM

## 2024-10-01 DIAGNOSIS — G25.81 RESTLESS LEG: ICD-10-CM

## 2024-10-01 RX ORDER — SEMAGLUTIDE 2.68 MG/ML
2 INJECTION, SOLUTION SUBCUTANEOUS
Qty: 3 ML | Refills: 5 | Status: CANCELLED | OUTPATIENT
Start: 2024-10-01 | End: 2025-10-01

## 2024-10-01 RX ORDER — ROPINIROLE 0.25 MG/1
TABLET, FILM COATED ORAL
Qty: 90 TABLET | Refills: 2 | Status: CANCELLED | OUTPATIENT
Start: 2024-10-01

## 2024-10-01 RX ORDER — DEXMETHYLPHENIDATE HYDROCHLORIDE 10 MG/1
10 TABLET ORAL 2 TIMES DAILY
Qty: 60 TABLET | Refills: 0 | Status: CANCELLED | OUTPATIENT
Start: 2024-10-01 | End: 2024-10-31

## 2024-10-01 NOTE — TELEPHONE ENCOUNTER
Care Due:                  Date            Visit Type   Department     Provider  --------------------------------------------------------------------------------                                EP -                              PRIMARY      MET INTERNAL  Last Visit: 04-      CARE ([facility])   MEDICINE       Suzi  Verónica                              EP -                              PRIMARY      [facility] INTERNAL  Next Visit: 10-      CARE ([facility])   [facility]                                                            Last  Test          Frequency    Reason                     Performed    Due Date  --------------------------------------------------------------------------------    HBA1C.......  6 months...  semaglutide..............  04-   10-    Health [facility] Embedded Care Due Messages. Reference number: [id number].   10/01/2024 3:44:07 AM CDT

## 2024-10-02 ENCOUNTER — PATIENT MESSAGE (OUTPATIENT)
Dept: INTERNAL MEDICINE | Facility: CLINIC | Age: 44
End: 2024-10-02
Payer: COMMERCIAL

## 2024-10-02 DIAGNOSIS — G25.81 RESTLESS LEG: ICD-10-CM

## 2024-10-02 DIAGNOSIS — E11.9 TYPE 2 DIABETES MELLITUS WITHOUT COMPLICATION, WITHOUT LONG-TERM CURRENT USE OF INSULIN: ICD-10-CM

## 2024-10-02 NOTE — TELEPHONE ENCOUNTER
Ozempic last ordered 4/4/2024   Requip last ordered 11/7/2023   Last annual 04/22/2024  The patient is requesting a refill.

## 2024-10-02 NOTE — TELEPHONE ENCOUNTER
No care due was identified.  Health [facility] Embedded Care Due Messages. Reference number: [id number].   10/02/2024 3:14:08 PM CDT

## 2024-10-03 RX ORDER — SEMAGLUTIDE 2.68 MG/ML
2 INJECTION, SOLUTION SUBCUTANEOUS
Qty: 3 ML | Refills: 5 | Status: SHIPPED | OUTPATIENT
Start: 2024-10-03 | End: 2025-10-03

## 2024-10-03 RX ORDER — ROPINIROLE 0.25 MG/1
TABLET, FILM COATED ORAL
Qty: 90 TABLET | Refills: 2 | Status: SHIPPED | OUTPATIENT
Start: 2024-10-03

## 2024-10-07 ENCOUNTER — TELEPHONE (OUTPATIENT)
Dept: PAIN MEDICINE | Facility: CLINIC | Age: 44
End: 2024-10-07
Payer: COMMERCIAL

## 2024-10-07 ENCOUNTER — PATIENT MESSAGE (OUTPATIENT)
Dept: PAIN MEDICINE | Facility: CLINIC | Age: 44
End: 2024-10-07
Payer: COMMERCIAL

## 2024-10-07 DIAGNOSIS — M54.16 LUMBAR RADICULOPATHY, CHRONIC: Primary | ICD-10-CM

## 2024-10-07 DIAGNOSIS — M51.361 DEGENERATION OF INTERVERTEBRAL DISC OF LUMBAR REGION WITH LOWER EXTREMITY PAIN: ICD-10-CM

## 2024-10-07 DIAGNOSIS — M79.18 MYOFASCIAL PAIN: ICD-10-CM

## 2024-10-07 DIAGNOSIS — G89.4 CHRONIC PAIN SYNDROME: ICD-10-CM

## 2024-10-07 RX ORDER — CYCLOBENZAPRINE HCL 10 MG
10 TABLET ORAL 3 TIMES DAILY PRN
Qty: 90 TABLET | Refills: 2 | Status: SHIPPED | OUTPATIENT
Start: 2024-10-07 | End: 2025-01-05

## 2024-10-07 NOTE — TELEPHONE ENCOUNTER
----- Message from JUAN CARLOS Syed sent at 10/7/2024 11:28 AM CDT -----  Regarding: Order for VIET ARCHER III    Patient Name: VIET ARCHER III([id number])  Sex: Male  : 1980      PCP: MATT GAMEZ    Center: [facility]     Types of orders made on 10/07/2024: Procedure Request    Order Date:10/7/2024  Ordering User:LEONARD DIETZ [[id number]]  Encounter Provider:Leonard Dietz FNP [[id number]]  Authorizing Provider: Leonard Dietz FNP [[id number]]  Supervising Provider:KELSEY ESCOBAR [[id number]]  Type of Supervision:Collaborating Physician  Department:[facility] PAIN MANAGEMENT[[id number]]    Common Order Information  Procedure -> Epidural Injection (specify level) Cmt: L4-5    Pre-op Diagnosis -> Spinal stenosis at L4-L5 level       -> DDD (degenerative disc disease), lumbar     Order Specific Information  Order: Procedure Request Order for Pain Management [Custom: [id number]]  Order #:          [id number]: 1 FUTURE    Priority: Routine  Class: Clinic Performed    Future Order Information      Expires on:10/07/2025            Expected by:10/07/2024                   Associated Diagnoses      M54.16 Lumbar radiculopathy, chronic      M51.361 Degeneration of intervertebral disc of lumbar region with lower       extremity pain      G89.4 Chronic pain syndrome      Physician -> Gelter         Is patient on anti-coagulants? -> No         Facility Name: -> [facility]         Follow-up: -> 2 weeks           Priority: Routine  Class: Clinic Performed    Future Order Information      Expires on:10/07/2025            Expected by:10/07/2024                   Associated Diagnoses      M54.16 Lumbar radiculopathy, chronic      M51.361 Degeneration of intervertebral disc of lumbar region with lower       extremity pain      G89.4 Chronic pain syndrome      Procedure -> Epidural Injection (specify level) Cmt: L4-5        Physician -> Gelter         Is patient on anti-coagulants? -> No         Pre-op Diagnosis -> Spinal  stenosis at L4-L5 level           -> DDD (degenerative disc disease), lumbar         Facility Name: -> [facility]         Follow-up: -> 2 weeks

## 2024-10-08 ENCOUNTER — PATIENT MESSAGE (OUTPATIENT)
Dept: ORTHOPEDICS | Facility: CLINIC | Age: 44
End: 2024-10-08
Payer: COMMERCIAL

## 2024-10-10 ENCOUNTER — PATIENT MESSAGE (OUTPATIENT)
Dept: UROLOGY | Facility: CLINIC | Age: 44
End: 2024-10-10
Payer: COMMERCIAL

## 2024-10-10 ENCOUNTER — TELEPHONE (OUTPATIENT)
Dept: UROLOGY | Facility: CLINIC | Age: 44
End: 2024-10-10
Payer: COMMERCIAL

## 2024-10-10 NOTE — TELEPHONE ENCOUNTER
10/10/2024 @ 8:35 am called patient regarding message in portal I offered the patient a virtual appointment he accepted and he will see  virtually on 10/11/24   The patient is a 69y Male complaining of

## 2024-10-12 ENCOUNTER — PATIENT MESSAGE (OUTPATIENT)
Dept: PSYCHIATRY | Facility: CLINIC | Age: 44
End: 2024-10-12
Payer: COMMERCIAL

## 2024-10-13 ENCOUNTER — PATIENT MESSAGE (OUTPATIENT)
Dept: NEUROSURGERY | Facility: CLINIC | Age: 44
End: 2024-10-13
Payer: COMMERCIAL

## 2024-10-15 ENCOUNTER — PATIENT MESSAGE (OUTPATIENT)
Dept: INTERNAL MEDICINE | Facility: CLINIC | Age: 44
End: 2024-10-15
Payer: COMMERCIAL

## 2024-10-15 ENCOUNTER — OFFICE VISIT (OUTPATIENT)
Dept: UROLOGY | Facility: CLINIC | Age: 44
End: 2024-10-15
Payer: COMMERCIAL

## 2024-10-15 DIAGNOSIS — N39.8 VOIDING DYSFUNCTION: Primary | ICD-10-CM

## 2024-10-15 PROCEDURE — 3066F NEPHROPATHY DOC TX: CPT | Mod: CPTII,95,, | Performed by: UROLOGY

## 2024-10-15 PROCEDURE — 3044F HG A1C LEVEL LT 7.0%: CPT | Mod: CPTII,95,, | Performed by: UROLOGY

## 2024-10-15 PROCEDURE — 3061F NEG MICROALBUMINURIA REV: CPT | Mod: CPTII,95,, | Performed by: UROLOGY

## 2024-10-15 PROCEDURE — 99214 OFFICE O/P EST MOD 30 MIN: CPT | Mod: 95,,, | Performed by: UROLOGY

## 2024-10-15 NOTE — PROGRESS NOTES
"[facility]      Return  Bladder Outlet Obstruction  Note    10/16/2024    Referred by:  No ref. provider found    History of Present Illness    CHIEF COMPLAINT:  Mr. Garza presents today for follow-up regarding lower urinary tract symptoms and to discuss bladder neck incision.    LOWER URINARY TRACT SYMPTOMS:  He reports worsening lower urinary tract symptoms including urinary urgency, frequency, weak stream, hesitancy, and intermittency. He wakes up twice nightly to urinate and feels the need to urinate again shortly after voiding, even when he knows there is no urine to expel. He states symptoms are progressively worsening, noting that he is "pushing more and getting less" when attempting to urinate.    PREVIOUS TREATMENTS AND PROCEDURES:  Previous treatment with an alpha blocker (Flomax/tamsulosin) was ineffective in managing his symptoms. He was unable to tolerate urodynamic evaluation. A cystoscopy was performed in the operating room, revealing an exceptionally high bladder neck with significant difficulty maneuvering the rigid 17-Rwandan cystoscope over it. No significant prostatic obstruction or median lobe was observed.    SURGICAL CONCERNS:  He expresses concerns about the proposed bladder neck incision procedure, inquiring about potential side effects, long-term success rate, recovery process, and time off work. He acknowledges the possibility of needing a catheter post-surgery depending on bleeding. Despite initial hesitation about undergoing another surgery this year, he indicates willingness to proceed. He confirms the procedure will be under general anesthesia.    MEDICAL HISTORY:  He reports a history of sleep apnea managed with a CPAP machine.      ROS:  Genitourinary: reports frequency, reports urgency          A review of 10+ systems was conducted with pertinent positive and negative findings documented in HPI with all other systems reviewed and negative.    Past medical, " family, surgical and social history reviewed as documented in chart with pertinent positive medical, family, surgical and social history detailed in HPI.    Exam Findings:    Physical Exam    General: No acute distress. Nontoxic appearing.  HENT: Normocephalic. Atraumatic.  Respiratory: Normal respiratory effort. No conversational dyspnea. No audible wheezing.  Abdomen: No obvious distension.  Skin: No visible abnormalities.  Extremities: No edema upper extremities. No edema lower extremities.  Neurological: Alert and oriented x3. Normal speech.  Psychiatric: Normal mood. Normal affect. No evidence of SI.  Male Genitourinary: Bladder neck exceptionally high. Significant difficulty maneuvering rigid cystoscope over bladder neck. No significant prostatic obstruction. No median lobe.          Assessment/Plan:    Assessment & Plan    - Patient presents with significant lower urinary tract symptoms  - Previous trial of alpha blockade (tamsulosin) unsuccessful  - Cystoscopy revealed exceptionally elevated bladder neck, suggesting primary bladder neck obstruction  - Considering bladder neck incision as treatment option  - Anticipate improvement in slow/weak stream and pushing to urinate  - If frequency/urgency persist post-procedure, can pursue overactive bladder treatments (medications, Botox, neuromodulation)    BLADDER OUTLET OBSTRUCTION:  - Explained bladder neck incision procedure, including use of laser fiber to minimize bleeding.  - Discussed primary side effect of retrograde ejaculation, its impact on fertility.  - Clarified low risk of urinary incontinence due to intact sphincter function.  - Informed about potential need for catheter post-procedure depending on bleeding.  - Explained rationale for bilateral incision to ensure optimal outcome.  - Discussed how bladder outlet obstruction can lead to increased urinary frequency.  - Bladder neck incision procedure to be scheduled.    FOLLOW UP:  - Follow up with  Marcela from scheduling to arrange procedure date.  - Procedure to be scheduled before the end of the year, likely on a Thursday or Friday.  - Follow up for catheter removal on the following Monday, if catheter placed during procedure.               The patient location is: Home  The chief complaint leading to consultation is: Voiding Difficulty    Visit type: audiovisual    Face to Face time with patient: 14 minutes  22 minutes of total time spent on the encounter, which includes face to face time and non-face to face time preparing to see the patient (eg, review of tests), Obtaining and/or reviewing separately obtained history, Documenting clinical information in the electronic or other health record, Independently interpreting results (not separately reported) and communicating results to the patient/family/caregiver, or Care coordination (not separately reported).         Each patient to whom he or she provides medical services by telemedicine is:  (1) informed of the relationship between the physician and patient and the respective role of any other health care provider with respect to management of the patient; and (2) notified that he or she may decline to receive medical services by telemedicine and may withdraw from such care at any time.    Notes:

## 2024-10-15 NOTE — H&P (VIEW-ONLY)
"[facility]      Return  Bladder Outlet Obstruction  Note    10/16/2024    Referred by:  No ref. provider found    History of Present Illness    CHIEF COMPLAINT:  Mr. Garza presents today for follow-up regarding lower urinary tract symptoms and to discuss bladder neck incision.    LOWER URINARY TRACT SYMPTOMS:  He reports worsening lower urinary tract symptoms including urinary urgency, frequency, weak stream, hesitancy, and intermittency. He wakes up twice nightly to urinate and feels the need to urinate again shortly after voiding, even when he knows there is no urine to expel. He states symptoms are progressively worsening, noting that he is "pushing more and getting less" when attempting to urinate.    PREVIOUS TREATMENTS AND PROCEDURES:  Previous treatment with an alpha blocker (Flomax/tamsulosin) was ineffective in managing his symptoms. He was unable to tolerate urodynamic evaluation. A cystoscopy was performed in the operating room, revealing an exceptionally high bladder neck with significant difficulty maneuvering the rigid 17-Egyptian cystoscope over it. No significant prostatic obstruction or median lobe was observed.    SURGICAL CONCERNS:  He expresses concerns about the proposed bladder neck incision procedure, inquiring about potential side effects, long-term success rate, recovery process, and time off work. He acknowledges the possibility of needing a catheter post-surgery depending on bleeding. Despite initial hesitation about undergoing another surgery this year, he indicates willingness to proceed. He confirms the procedure will be under general anesthesia.    MEDICAL HISTORY:  He reports a history of sleep apnea managed with a CPAP machine.      ROS:  Genitourinary: reports frequency, reports urgency          A review of 10+ systems was conducted with pertinent positive and negative findings documented in HPI with all other systems reviewed and negative.    Past medical, " family, surgical and social history reviewed as documented in chart with pertinent positive medical, family, surgical and social history detailed in HPI.    Exam Findings:    Physical Exam    General: No acute distress. Nontoxic appearing.  HENT: Normocephalic. Atraumatic.  Respiratory: Normal respiratory effort. No conversational dyspnea. No audible wheezing.  Abdomen: No obvious distension.  Skin: No visible abnormalities.  Extremities: No edema upper extremities. No edema lower extremities.  Neurological: Alert and oriented x3. Normal speech.  Psychiatric: Normal mood. Normal affect. No evidence of SI.  Male Genitourinary: Bladder neck exceptionally high. Significant difficulty maneuvering rigid cystoscope over bladder neck. No significant prostatic obstruction. No median lobe.          Assessment/Plan:    Assessment & Plan    - Patient presents with significant lower urinary tract symptoms  - Previous trial of alpha blockade (tamsulosin) unsuccessful  - Cystoscopy revealed exceptionally elevated bladder neck, suggesting primary bladder neck obstruction  - Considering bladder neck incision as treatment option  - Anticipate improvement in slow/weak stream and pushing to urinate  - If frequency/urgency persist post-procedure, can pursue overactive bladder treatments (medications, Botox, neuromodulation)    BLADDER OUTLET OBSTRUCTION:  - Explained bladder neck incision procedure, including use of laser fiber to minimize bleeding.  - Discussed primary side effect of retrograde ejaculation, its impact on fertility.  - Clarified low risk of urinary incontinence due to intact sphincter function.  - Informed about potential need for catheter post-procedure depending on bleeding.  - Explained rationale for bilateral incision to ensure optimal outcome.  - Discussed how bladder outlet obstruction can lead to increased urinary frequency.  - Bladder neck incision procedure to be scheduled.    FOLLOW UP:  - Follow up with  Marcela from scheduling to arrange procedure date.  - Procedure to be scheduled before the end of the year, likely on a Thursday or Friday.  - Follow up for catheter removal on the following Monday, if catheter placed during procedure.               The patient location is: Home  The chief complaint leading to consultation is: Voiding Difficulty    Visit type: audiovisual    Face to Face time with patient: 14 minutes  22 minutes of total time spent on the encounter, which includes face to face time and non-face to face time preparing to see the patient (eg, review of tests), Obtaining and/or reviewing separately obtained history, Documenting clinical information in the electronic or other health record, Independently interpreting results (not separately reported) and communicating results to the patient/family/caregiver, or Care coordination (not separately reported).         Each patient to whom he or she provides medical services by telemedicine is:  (1) informed of the relationship between the physician and patient and the respective role of any other health care provider with respect to management of the patient; and (2) notified that he or she may decline to receive medical services by telemedicine and may withdraw from such care at any time.    Notes:

## 2024-10-16 ENCOUNTER — TELEPHONE (OUTPATIENT)
Dept: UROLOGY | Facility: CLINIC | Age: 44
End: 2024-10-16
Payer: COMMERCIAL

## 2024-10-16 DIAGNOSIS — N32.0 BLADDER NECK OBSTRUCTION: Primary | ICD-10-CM

## 2024-10-17 ENCOUNTER — OFFICE VISIT (OUTPATIENT)
Dept: OTOLARYNGOLOGY | Facility: CLINIC | Age: 44
End: 2024-10-17
Payer: COMMERCIAL

## 2024-10-17 VITALS — SYSTOLIC BLOOD PRESSURE: 117 MMHG | HEART RATE: 89 BPM | DIASTOLIC BLOOD PRESSURE: 81 MMHG

## 2024-10-17 DIAGNOSIS — G47.33 OBSTRUCTIVE SLEEP APNEA SYNDROME: ICD-10-CM

## 2024-10-17 DIAGNOSIS — H60.63 CHRONIC OTITIS EXTERNA OF BOTH EARS, UNSPECIFIED TYPE: Primary | Chronic | ICD-10-CM

## 2024-10-17 DIAGNOSIS — J34.3 HYPERTROPHY OF BOTH INFERIOR NASAL TURBINATES: ICD-10-CM

## 2024-10-17 DIAGNOSIS — J30.9 ALLERGIC RHINITIS, UNSPECIFIED SEASONALITY, UNSPECIFIED TRIGGER: Chronic | ICD-10-CM

## 2024-10-17 DIAGNOSIS — J34.2 DEVIATED NASAL SEPTUM: ICD-10-CM

## 2024-10-17 PROCEDURE — 3066F NEPHROPATHY DOC TX: CPT | Mod: CPTII,S$GLB,, | Performed by: OTOLARYNGOLOGY

## 2024-10-17 PROCEDURE — 3061F NEG MICROALBUMINURIA REV: CPT | Mod: CPTII,S$GLB,, | Performed by: OTOLARYNGOLOGY

## 2024-10-17 PROCEDURE — 1159F MED LIST DOCD IN RCRD: CPT | Mod: CPTII,S$GLB,, | Performed by: OTOLARYNGOLOGY

## 2024-10-17 PROCEDURE — 3044F HG A1C LEVEL LT 7.0%: CPT | Mod: CPTII,S$GLB,, | Performed by: OTOLARYNGOLOGY

## 2024-10-17 PROCEDURE — 3074F SYST BP LT 130 MM HG: CPT | Mod: CPTII,S$GLB,, | Performed by: OTOLARYNGOLOGY

## 2024-10-17 PROCEDURE — 3079F DIAST BP 80-89 MM HG: CPT | Mod: CPTII,S$GLB,, | Performed by: OTOLARYNGOLOGY

## 2024-10-17 PROCEDURE — 1160F RVW MEDS BY RX/DR IN RCRD: CPT | Mod: CPTII,S$GLB,, | Performed by: OTOLARYNGOLOGY

## 2024-10-17 PROCEDURE — 99999 PR PBB SHADOW E&M-EST. PATIENT-LVL IV: CPT | Mod: PBBFAC,,, | Performed by: OTOLARYNGOLOGY

## 2024-10-17 PROCEDURE — 99214 OFFICE O/P EST MOD 30 MIN: CPT | Mod: S$GLB,,, | Performed by: OTOLARYNGOLOGY

## 2024-10-17 RX ORDER — FLUOCINOLONE ACETONIDE 0.11 MG/ML
3 OIL AURICULAR (OTIC) 2 TIMES DAILY
Qty: 20 ML | Refills: 3 | Status: SHIPPED | OUTPATIENT
Start: 2024-10-17

## 2024-10-17 NOTE — PROGRESS NOTES
Chief Complaint   Patient presents with    Follow-up     Doing good no new issues        HPI:  Patient is a 44 y.o. male who has previously seen me for chronic rhinitis and deviated septum/turbinate hypertrophy.  He underwent septoplasty and turbinate reduction a couple of months ago.  He has been doing well since that time and says his breathing is much improved.  He is tolerating his CPAP well.  He additionally reports some chronic itching in the ears and occasional wet ear wax.          Active Ambulatory Problems     Diagnosis Date Noted    Gastroesophageal reflux disease 05/09/2012    Renal calculi 05/09/2012    Mild intermittent asthma without complication 05/09/2012    ADHD (attention deficit hyperactivity disorder), inattentive type 05/09/2012    LUBA (obstructive sleep apnea) 08/12/2013    Hyperlipidemia associated with type 2 diabetes mellitus 03/27/2019    Hypertriglyceridemia 03/27/2019    SHEIKH (nonalcoholic steatohepatitis) 07/10/2019    Hepatosplenomegaly 07/10/2019    History of multiple allergies 08/02/2019    Type 2 diabetes mellitus without complications 11/16/2020    Vitamin D insufficiency 11/16/2020    Restless leg 11/23/2020    Chronic pain 02/10/2021    Cervical disc disorder with radiculopathy, unspecified cervical region 09/14/2021    Lumbar radiculopathy, chronic 09/14/2021    S/P lumbar discectomy 11/17/2021    BMI 39.0-39.9,adult 04/12/2022    Cervical spondylosis 06/29/2022    History of melanoma in situ 04/12/2023    Anxiety 04/12/2023    Mood Disorder Unspecifed:  Depressed THO also has some mood elevation  (8of 13 on MDQ): ex: irritability, more self confident, thoughts race. more active)  05/11/2023    Cervical radiculopathy 05/22/2023    Insomnia 06/12/2023    Pain of left lower extremity 07/26/2023    Neck pain 07/26/2023    Chronic low back pain 07/26/2023    Hypertrophy of both inferior nasal turbinates 07/15/2024    Other health problem within the family:  Aug 2024 dad surgery  small intestine 08/28/2024    Lower urinary tract symptoms (LUTS) 08/30/2024    Postoperative fever 09/15/2024    Voiding dysfunction 09/26/2024     Resolved Ambulatory Problems     Diagnosis Date Noted    Lumbar herniated disc 05/09/2012    Dysphagia, oropharyngeal phase 05/28/2013    Prediabetes 03/27/2019    Elevated liver enzymes 03/27/2019    Cellulitis of left arm 08/01/2019    Pain in left elbow 03/18/2021    History of urinary stone 09/14/2021    Tachycardia 09/19/2021    Melanoma in situ of trunk 02/21/2022    Rotator cuff tendonitis, left 05/22/2023    Left lateral epicondylitis 05/22/2023    Ureteral stone with hydronephrosis 07/05/2023    Impaired range of motion of right elbow 12/11/2023    Impaired range of motion of left elbow 12/11/2023    Deviated nasal septum 07/15/2024    Constipation 08/30/2024     Past Medical History:   Diagnosis Date    ADD (attention deficit disorder) 05/09/2012    Allergy     Asthma 05/09/2012    Cervical spondylosis with myelopathy 06/29/2022    Eczema     Esophageal ulcer     GERD (gastroesophageal reflux disease) 05/09/2012    Glaucoma     Kidney stones 05/2014    Lumbar disc disease 05/09/2012    Malignant melanoma of skin, unspecified 01/06/2022    Melanoma 01/2022    Radiculopathy, lumbar region 09/14/2021    Type 2 diabetes mellitus without complication, without long-term current use of insulin 11/16/2020       Review of Systems  General: negative for chills, fever or weight loss  Psychological: negative for mood changes or depression  Ophthalmic: negative for blurry vision, photophobia or eye pain  ENT: see HPI  Respiratory: no cough, shortness of breath, or wheezing  Cardiovascular: no chest pain or dyspnea on exertion  Gastrointestinal: no abdominal pain, change in bowel habits, or black/ bloody stools  Musculoskeletal: negative for gait disturbance or muscular weakness  Neurological: no syncope or seizures; no ataxia  Dermatological: negative for pruritis, rash  and jaundice  Hematologic/lymphatic: no easy bruising, no new adenopathy    Physical Exam     Vitals:    10/17/24 1031   BP: 117/81   Pulse: 89         Constitutional:   He is oriented to person, place, and time. Vital signs are normal. He appears well-developed and well-nourished. He appears alert. He is cooperative.  Non-toxic appearance. Normal speech.      Head:  Normocephalic and atraumatic. Salivary glands normal.  Facial strength is normal.      Ears:    Right Ear: Tympanic membrane is not perforated, not erythematous and not retracted. No middle ear effusion.   Left Ear: Tympanic membrane is not perforated, not erythematous and not retracted.  No middle ear effusion.   Dry skin and skin irritation bilateral ear canals, consistent with chronic otitis externa.      Nose:  Mucosal edema present. No rhinorrhea, septal deviation, nasal septal hematoma or polyps. No epistaxis. No turbinate masses and no turbinate hypertrophy (2+ size).  Right sinus exhibits no maxillary sinus tenderness and no frontal sinus tenderness. Left sinus exhibits no maxillary sinus tenderness and no frontal sinus tenderness.   Mild mucosal edema consistent with allergies.  Septum healing well, no perforations noted.  Turbinates healing well, reduced in size from preop.    Mouth/Throat  Oropharynx clear and moist without lesions or asymmetry, normal uvula midline, lips, teeth, and gums normal and oropharynx normal. Normal dentition. No uvula swelling or oral lesions. No oropharyngeal exudate, posterior oropharyngeal edema or posterior oropharyngeal erythema. Mirror exam not performed due to patient tolerance.  Mirror exam not performed due to patient tolerance.      Neck:  Neck normal without thyromegaly masses, asymmetry, normal tracheal structure, crepitus, and tenderness, thyroid normal, trachea normal, full range of motion with neck supple and no adenopathy. No JVD present. Carotid bruit is not present. Thyroid tenderness is present.  No edema and no erythema present. No thyroid mass and no thyromegaly present.     He has no cervical adenopathy.   Patient in C-collar due to recent ACDF surgery.     Cardiovascular:    Normal rate, regular rhythm, normal heart sounds and rate and rhythm, heart sounds, and pulses normal.              Pulmonary/Chest:   Effort and breath sounds normal.     Psychiatric:   He has a normal mood and affect. His speech is normal and behavior is normal.     Neurological:   He is alert and oriented to person, place, and time. He has neurological normal, alert and oriented. No cranial nerve deficit.     Skin:   No abrasions, lacerations, lesions, or rashes.         Assessment/Plan:      ICD-10-CM ICD-9-CM    1. Chronic otitis externa of both ears, unspecified type  H60.63 380.23 fluocinolone acetonide oiL (DERMOTIC OIL) 0.01 % Drop      2. Deviated nasal septum  J34.2 470       3. Hypertrophy of both inferior nasal turbinates  J34.3 478.0       4. Allergic rhinitis, unspecified seasonality, unspecified trigger  J30.9 477.9       5. Obstructive sleep apnea syndrome  G47.33 327.23           Patient may continue using steroid nasal spray and OTC antihistamines as needed for his allergy symptoms.    He is healing well from his surgery so no further follow-up for septoplasty is indicated at this time.    He was prescribed Dermotic eardrops to use for ear itching and given instructions on treatment of chronic otitis externa.    Follow-up in 6 months to a year or sooner if needed.    Melody Liu MD  [facility]

## 2024-10-17 NOTE — PATIENT INSTRUCTIONS
Continue nasal sprays and xyzal for allergies.     I would recommend the use of an over the counter moisturizing drop such as baby oil, mineral oil, Vitamin E oil, etc on a weekly basis.    You should avoid Qtip use in the ears.    If the ear feels wet, use a hairdryer on a cool setting to dry the ears.    Fluocinolone Oil (DermOtic) drops may have been prescribed and can be used daily or weekly as needed for itching.

## 2024-10-22 ENCOUNTER — PATIENT MESSAGE (OUTPATIENT)
Dept: DERMATOLOGY | Facility: CLINIC | Age: 44
End: 2024-10-22
Payer: COMMERCIAL

## 2024-10-23 ENCOUNTER — HOSPITAL ENCOUNTER (OUTPATIENT)
Dept: RADIOLOGY | Facility: HOSPITAL | Age: 44
Discharge: HOME | End: 2024-10-23
Attending: NEUROLOGICAL SURGERY
Payer: COMMERCIAL

## 2024-10-23 ENCOUNTER — OFFICE VISIT (OUTPATIENT)
Dept: NEUROSURGERY | Facility: CLINIC | Age: 44
End: 2024-10-23
Payer: COMMERCIAL

## 2024-10-23 VITALS — SYSTOLIC BLOOD PRESSURE: 114 MMHG | DIASTOLIC BLOOD PRESSURE: 76 MMHG | HEART RATE: 81 BPM

## 2024-10-23 DIAGNOSIS — Z98.1 S/P CERVICAL SPINAL FUSION: Primary | ICD-10-CM

## 2024-10-23 DIAGNOSIS — M50.10 CERVICAL DISC DISORDER WITH RADICULOPATHY, UNSPECIFIED CERVICAL REGION: ICD-10-CM

## 2024-10-23 PROBLEM — M54.12 CERVICAL RADICULOPATHY: Status: RESOLVED | Noted: 2023-05-22 | Resolved: 2024-10-23

## 2024-10-23 PROCEDURE — 3074F SYST BP LT 130 MM HG: CPT | Mod: CPTII,S$GLB,, | Performed by: NEUROLOGICAL SURGERY

## 2024-10-23 PROCEDURE — 1159F MED LIST DOCD IN RCRD: CPT | Mod: CPTII,S$GLB,, | Performed by: NEUROLOGICAL SURGERY

## 2024-10-23 PROCEDURE — 99024 POSTOP FOLLOW-UP VISIT: CPT | Mod: S$GLB,,, | Performed by: NEUROLOGICAL SURGERY

## 2024-10-23 PROCEDURE — 99999 PR PBB SHADOW E&M-EST. PATIENT-LVL IV: CPT | Mod: PBBFAC,,, | Performed by: NEUROLOGICAL SURGERY

## 2024-10-23 PROCEDURE — 72040 X-RAY EXAM NECK SPINE 2-3 VW: CPT | Mod: TC

## 2024-10-23 PROCEDURE — 3078F DIAST BP <80 MM HG: CPT | Mod: CPTII,S$GLB,, | Performed by: NEUROLOGICAL SURGERY

## 2024-10-23 PROCEDURE — 3044F HG A1C LEVEL LT 7.0%: CPT | Mod: CPTII,S$GLB,, | Performed by: NEUROLOGICAL SURGERY

## 2024-10-23 PROCEDURE — 3061F NEG MICROALBUMINURIA REV: CPT | Mod: CPTII,S$GLB,, | Performed by: NEUROLOGICAL SURGERY

## 2024-10-23 PROCEDURE — 3066F NEPHROPATHY DOC TX: CPT | Mod: CPTII,S$GLB,, | Performed by: NEUROLOGICAL SURGERY

## 2024-10-23 PROCEDURE — 1160F RVW MEDS BY RX/DR IN RCRD: CPT | Mod: CPTII,S$GLB,, | Performed by: NEUROLOGICAL SURGERY

## 2024-10-23 PROCEDURE — 72040 X-RAY EXAM NECK SPINE 2-3 VW: CPT | Mod: 26,,, | Performed by: RADIOLOGY

## 2024-10-23 NOTE — PROGRESS NOTES
"CHIEF COMPLAINT:  6wks postop    INTERVAL HISTORY (10/23/24):  6 wks s/p C5-6 and C6-7 ACDF on 9/12/24.  Reports he is recovering well.  No longer having numbness or tingling in arms/hands.  Pain resolved.  Swallowing difficulties resolved with short course of steroids.  He has been wearing his collar as instructed.  He would like to advance his activities and return to the gym.    INTERVAL HISTORY (8/14/24):  Patient returns via virtual visit to discuss recent EMG results and to make a decision regarding surgery.  He reports that since his last visit his radiating arm pain has improved somewhat in terms of intensity but he still notes bothersome finger and hand numbness that results in him dropping items from his hand and affecting his ability to write.  He still reports tremor in his right hand that is made worse with stimulants.  He continues to have significant neck pain as well.  He explains that he has had to stop exercising and engaging in many of his daily activities to avoid exacerbating his arm pain.  His EMG showed chronic radiculopathies at C6, C8, and possibly C7.  He is interested in surgery as he states "he can not continue living like this."    INTERVAL HISTORY (7/3/24):  Patient returns to clinic with new and worsening constellation of symptoms.  He reports a new tremor in his right hand and fingers since May which is causing him difficulty writing and operating a computer mouse.  He also notes numbness, weakness, in this hand that leads to him dropping items.  Numbness resides over the top of his hand and the last 2 digits.  He notices that at night when he is extending his neck he will wake up with his hands feeling numb requiring him to repositioned.  He notes that he will have pain in his shoulder, chest, axilla, upper arm, biceps, and into the last 2 digits of his right hand after working out.  This pain will persist for several weeks.  Ultimately he has decided to stop exercising out of fear " of triggering the pain.  He has had prior carpal tunnel surgery.  He has not had a recent EMG/NCS.  He has known diffuse spondylosis in his cervical spine.    INTERVAL HISTORY (7/26/23):  Had an episode of significant neck pain approx 1 month ago. Took steroid pack and underwent C7-T1 IL NELI by Dr Patel (pain), which helped.      He also reports continued intermittent back and leg pain.  Pain with tingling that radiates up his shin to thigh and groin.  Also note some pain in big toe on left.  These sxs are currently stable/tolerable but he would like to discuss possibility of an NELI since they have helped with his neck.    INTERVAL HISTORY (5/24/23):  Virtual visit conducted regarding recurrent left leg pain and numbness as well as right hand/finger pain and numbness.  Patient is reporting an increase in low back pain that is triggered when sitting down or walking.  He is also having radiating left leg pain that radiates to his left shin and occasionally outside part of his thigh.  He is having sharp pain in his left big toe.  He also notes tightness despite stretching in both hips and buttocks.  Reports that he often has to lie down and put support under his knees.  This has been ongoing for past couple of months.  Since he is had 2 prior lumbar surgeries he is concerned that he may have a recurrent herniated disc or new problem.  He has known cervical stenosis but has had an increase in numbness in his right hand including his thumb and index finger.  He was given an epidural steroid shot which helped some but he is concerned about progression of his known cervical stenosis.    INTERVAL HISTORY (1/4/23):  Patient returns for continued surveillance of cervical myelopathy.  His symptoms have not changed significantly however he is attending physical therapy which he finds is helping some.  He complains of continued numbness and tingling in his arms and hands while sleeping.  He feels like it is exacerbated or  triggered by neck extension and has to sleep with his head propped forward.  He also gets some pain that radiates from his neck to the lateral shoulders.  He still gets occasional tingling in his big toe otherwise feels like the symptoms related to his acute hernia disc have resolved.  He also notes that he generally feels more arthritic type pain which will be exacerbated by exertion.  Finds himself with diffuse pain and achiness more than usual.    INTERVAL HISTORY (6/29/22):  Returns to review recent cervical MRI.    Always has discomfort in neck with pain radiating into R shoulder and occasionally into 4+5th digits as well as L shoulder.  He does endorse clumsiness with hands (dropping items, difficulty with bottle caps, not buttoning) and feels out of balance.  Occasionally has urinary urgency/frequency.    L thigh pain is decreased but worse in am.  Also intermittent pain in back of leg and foot.  Also notes some weakness and pain in ankle.    INTERVAL HISTORY (5/11/22):  Returns with new arm pain that radiates from base of neck to bilateral shoulders and down the volar thumb on R.  Stops at shoulder/upper arm on L.  Triggered by certain neck positions - has to use flat pillow or will have sxs.  + tingling to R hand and L shoulder.  Also reports clumsiness in hand with dropping items and poor fine motor control.  But denies overt weakness.  Feels tight through neck, chest and shoulder regions.    Also has discomfort in L leg along anterolateral thigh and describes as burning and itching.  Notes L leg weakness in back around thigh and knee.  Worse in am and foot feels like it fell asleep and is clumsy.  Occasionally has pain in L big toe.    Takes gabapentin 1 tab in am and 2 tab in pm.  He does HEP with occasional pool workout. Not particularly interested in PT.  Has not trialed antiinflammatories.    No b/b dysfunction.    HPI:    Hermelindo ELIAS Greg SELIN is a 44 y.o.-year-old male who presents today for  post-operative follow-up s/p L MIS L3-4 lami/disc on 9/20/21.  Overall patient is significantly improved.  He has significant improvement in his back and leg pain.  He still has some mild tingling in his shins that radiate down to the feet.  This is made worse when he is not active.  He takes gabapentin 600 mg at night which helps.  He would like to increase that to more times during the day.  He denies weakness.      ROS:  As stated in the above HPI    PE:  No neuro deficits observed    FROM PRIOR VISIT:    AAOX3  NAD  CN 2-12 intact     Strength:      Deltoids Biceps Triceps Wrist Ext. Wrist Flex. Hand    RUE 5 5 5 5 5 5   LUE 5 5 5 5 5 5    Hip Flex. Knee Flex. Knee Ext. Dorsi Flex Plantar Flex EHL   RLE 5 5 5 5 5 5   LLE 5 5 5 5 5 5     Sensation:  Intact to light touch (All 4 extremities)    Gait:  normal    Lumbar incision:  Well-healed    IMAGING:  All imaging reviewed by me.    Cervical xray, 10/23/24:  Hardware in good position    EMG, 8/7/24:  chronic RIGHT C6 and C8 radiculopathies (C7 possible as well) and a chronic LEFT C6 radiculopathy without active/ongoing denervation     Cervical MRI, 6/14/24:  Loss of lordosis  C4-5 - mod-severe bilat NFS  C5-6 - moderate CS, severe bilat NFS  C6-7 - severe L NFS, moderate R NFS  R LRS/NFS at C7-T1    Cervical MRI, 6/16/23:  1. Diffuse spondylosis and hypertrophied PLL  2. Moderate stenosis at C4-5 with bilat NFS  3. Moderate stenosis at C5-6 with bilat NFS    Lumbar MRI, 6/16/23:  1. No recurrent HNP at L3-4  2. Mild-moderate bilat LRS at L3-4 and L4-5  3. Moderate bilat L5-S1 LRS    Cspine flex/ex, 12/13/22:  Diffuse spondylosis  Good alignment  No dynamic instability    Cervical MRI, 6/16/22:  1. Diffuse spondylosis and hypertrophied PLL  2. Severe stenosis at C4-5 and C5-6 with cord compression  3. Moderate stenosis at C3-4 and C6-7    Lumbar MRI, 6/16/22:  1. No recurrent HNP  2. Mild degen changes    Csp MRI, 1/12/21:  1. Loss of lordosis  2. Diffuse  spondylosis  3. Multi-level DDD with moderate NFS  4. Severe left neural foraminal narrowing at C6-7  5. Severe right neural foraminal narrowing at C5-6 and C7-T1    Lsp MRI, 9/19/21:  1. Diffuse DDD and spondylosis  2. Large L L3-4 HNP  3. L L4-5 moderate NFS  4. R L5-S1 moderate NFS      ASSESSMENT:   Problem List Items Addressed This Visit    None  Visit Diagnoses       S/P cervical spinal fusion    -  Primary    Relevant Orders    X-Ray Cervical Spine AP Lat with Flexion  Extension            S/p C5-6 and C6-7 ACDF  S/p L MIS L3-4 lami/disc for severe radiculopathy.      Patient is now 6 weeks status post 2 level ACDF.  He reports resolution of his paresthesias and arm pain.  Overall he has recovered well.  He has been wearing his collar as instructed and would like to advance his activities and exercise.  Cervical x-ray shows good position of hardware with good alignment of the cervical spine.  He is cleared to remove his cervical collar and advance his activities as tolerated.  His wound is healed well without any signs of infection.    PLAN:   - Ok to advance activities as tolerated (ok to lift > 10 lbs, gradual return to exercise, avoid extreme twisting/bending or overly strenuous activities if possible)  - RTC in 2m with flex/ex      Time spent on this encounter: 16 minutes. This includes face-to-face time and non-face to face time preparing to see the patient (eg, review of tests), obtaining and/or reviewing separately obtained history, documenting clinical information in the electronic or other health record, independently interpreting results and communicating results to the patient/family/caregiver, or care coordinator.

## 2024-10-24 ENCOUNTER — LAB VISIT (OUTPATIENT)
Dept: LAB | Facility: HOSPITAL | Age: 44
End: 2024-10-24
Attending: HOSPITALIST
Payer: COMMERCIAL

## 2024-10-24 DIAGNOSIS — E78.5 HYPERLIPIDEMIA ASSOCIATED WITH TYPE 2 DIABETES MELLITUS: ICD-10-CM

## 2024-10-24 DIAGNOSIS — E11.9 TYPE 2 DIABETES MELLITUS WITHOUT COMPLICATION, WITHOUT LONG-TERM CURRENT USE OF INSULIN: ICD-10-CM

## 2024-10-24 DIAGNOSIS — E11.69 HYPERLIPIDEMIA ASSOCIATED WITH TYPE 2 DIABETES MELLITUS: ICD-10-CM

## 2024-10-24 LAB
ALBUMIN SERPL BCP-MCNC: 4.3 G/DL (ref 3.5–5.2)
ALP SERPL-CCNC: 101 U/L (ref 40–150)
ALT SERPL W/O P-5'-P-CCNC: 22 U/L (ref 10–44)
ANION GAP SERPL CALC-SCNC: 8 MMOL/L (ref 8–16)
AST SERPL-CCNC: 19 U/L (ref 10–40)
BILIRUB SERPL-MCNC: 0.6 MG/DL (ref 0.1–1)
BUN SERPL-MCNC: 17 MG/DL (ref 6–20)
CALCIUM SERPL-MCNC: 9.6 MG/DL (ref 8.7–10.5)
CHLORIDE SERPL-SCNC: 106 MMOL/L (ref 95–110)
CHOLEST SERPL-MCNC: 147 MG/DL (ref 120–199)
CHOLEST/HDLC SERPL: 4.1 {RATIO} (ref 2–5)
CO2 SERPL-SCNC: 27 MMOL/L (ref 23–29)
CREAT SERPL-MCNC: 0.8 MG/DL (ref 0.5–1.4)
EST. GFR  (NO RACE VARIABLE): >60 ML/MIN/1.73 M^2
ESTIMATED AVG GLUCOSE: 103 MG/DL (ref 68–131)
GLUCOSE SERPL-MCNC: 84 MG/DL (ref 70–110)
HBA1C MFR BLD: 5.2 % (ref 4–5.6)
HDLC SERPL-MCNC: 36 MG/DL (ref 40–75)
HDLC SERPL: 24.5 % (ref 20–50)
LDLC SERPL CALC-MCNC: 70.4 MG/DL (ref 63–159)
NONHDLC SERPL-MCNC: 111 MG/DL
POTASSIUM SERPL-SCNC: 4.2 MMOL/L (ref 3.5–5.1)
PROT SERPL-MCNC: 7.2 G/DL (ref 6–8.4)
SODIUM SERPL-SCNC: 141 MMOL/L (ref 136–145)
TRIGL SERPL-MCNC: 203 MG/DL (ref 30–150)

## 2024-10-24 PROCEDURE — 36415 COLL VENOUS BLD VENIPUNCTURE: CPT | Performed by: HOSPITALIST

## 2024-10-24 PROCEDURE — 80061 LIPID PANEL: CPT | Performed by: HOSPITALIST

## 2024-10-24 PROCEDURE — 80053 COMPREHEN METABOLIC PANEL: CPT | Performed by: HOSPITALIST

## 2024-10-24 PROCEDURE — 83036 HEMOGLOBIN GLYCOSYLATED A1C: CPT | Performed by: HOSPITALIST

## 2024-10-28 ENCOUNTER — OFFICE VISIT (OUTPATIENT)
Dept: INTERNAL MEDICINE | Facility: CLINIC | Age: 44
End: 2024-10-28
Payer: COMMERCIAL

## 2024-10-28 VITALS
HEART RATE: 76 BPM | SYSTOLIC BLOOD PRESSURE: 114 MMHG | WEIGHT: 237 LBS | DIASTOLIC BLOOD PRESSURE: 62 MMHG | RESPIRATION RATE: 18 BRPM | BODY MASS INDEX: 39.49 KG/M2 | OXYGEN SATURATION: 96 % | HEIGHT: 65 IN | TEMPERATURE: 98 F

## 2024-10-28 DIAGNOSIS — E11.9 TYPE 2 DIABETES MELLITUS WITHOUT COMPLICATION, WITHOUT LONG-TERM CURRENT USE OF INSULIN: ICD-10-CM

## 2024-10-28 DIAGNOSIS — E11.69 HYPERLIPIDEMIA ASSOCIATED WITH TYPE 2 DIABETES MELLITUS: ICD-10-CM

## 2024-10-28 DIAGNOSIS — Z23 NEED FOR VACCINATION: ICD-10-CM

## 2024-10-28 DIAGNOSIS — E78.5 HYPERLIPIDEMIA ASSOCIATED WITH TYPE 2 DIABETES MELLITUS: ICD-10-CM

## 2024-10-28 DIAGNOSIS — E66.9 OBESITY (BMI 30-39.9): ICD-10-CM

## 2024-10-28 DIAGNOSIS — E11.9 TYPE 2 DIABETES MELLITUS WITHOUT COMPLICATION, WITHOUT LONG-TERM CURRENT USE OF INSULIN: Primary | ICD-10-CM

## 2024-10-28 DIAGNOSIS — Z00.00 ANNUAL PHYSICAL EXAM: Primary | ICD-10-CM

## 2024-10-28 DIAGNOSIS — J45.20 MILD INTERMITTENT ASTHMA WITHOUT COMPLICATION: ICD-10-CM

## 2024-10-28 DIAGNOSIS — G47.33 OSA (OBSTRUCTIVE SLEEP APNEA): ICD-10-CM

## 2024-10-28 PROCEDURE — 90471 IMMUNIZATION ADMIN: CPT | Mod: S$GLB,,, | Performed by: HOSPITALIST

## 2024-10-28 PROCEDURE — 3078F DIAST BP <80 MM HG: CPT | Mod: CPTII,S$GLB,, | Performed by: HOSPITALIST

## 2024-10-28 PROCEDURE — 3061F NEG MICROALBUMINURIA REV: CPT | Mod: CPTII,S$GLB,, | Performed by: HOSPITALIST

## 2024-10-28 PROCEDURE — 1159F MED LIST DOCD IN RCRD: CPT | Mod: CPTII,S$GLB,, | Performed by: HOSPITALIST

## 2024-10-28 PROCEDURE — 99214 OFFICE O/P EST MOD 30 MIN: CPT | Mod: 25,S$GLB,, | Performed by: HOSPITALIST

## 2024-10-28 PROCEDURE — 3066F NEPHROPATHY DOC TX: CPT | Mod: CPTII,S$GLB,, | Performed by: HOSPITALIST

## 2024-10-28 PROCEDURE — 3074F SYST BP LT 130 MM HG: CPT | Mod: CPTII,S$GLB,, | Performed by: HOSPITALIST

## 2024-10-28 PROCEDURE — 90656 IIV3 VACC NO PRSV 0.5 ML IM: CPT | Mod: S$GLB,,, | Performed by: HOSPITALIST

## 2024-10-28 PROCEDURE — 1160F RVW MEDS BY RX/DR IN RCRD: CPT | Mod: CPTII,S$GLB,, | Performed by: HOSPITALIST

## 2024-10-28 PROCEDURE — 3044F HG A1C LEVEL LT 7.0%: CPT | Mod: CPTII,S$GLB,, | Performed by: HOSPITALIST

## 2024-10-28 PROCEDURE — 3008F BODY MASS INDEX DOCD: CPT | Mod: CPTII,S$GLB,, | Performed by: HOSPITALIST

## 2024-10-28 PROCEDURE — 99999 PR PBB SHADOW E&M-EST. PATIENT-LVL V: CPT | Mod: PBBFAC,,, | Performed by: HOSPITALIST

## 2024-10-28 RX ORDER — ONDANSETRON 8 MG/1
8 TABLET, ORALLY DISINTEGRATING ORAL EVERY 8 HOURS PRN
Qty: 30 TABLET | Refills: 3 | Status: SHIPPED | OUTPATIENT
Start: 2024-10-28

## 2024-10-28 RX ORDER — TIRZEPATIDE 5 MG/.5ML
5 INJECTION, SOLUTION SUBCUTANEOUS
Qty: 2 ML | Refills: 0 | Status: SHIPPED | OUTPATIENT
Start: 2024-10-28

## 2024-10-29 ENCOUNTER — PATIENT MESSAGE (OUTPATIENT)
Dept: INTERNAL MEDICINE | Facility: CLINIC | Age: 44
End: 2024-10-29
Payer: COMMERCIAL

## 2024-10-29 ENCOUNTER — PATIENT MESSAGE (OUTPATIENT)
Dept: NEUROSURGERY | Facility: CLINIC | Age: 44
End: 2024-10-29
Payer: COMMERCIAL

## 2024-10-29 ENCOUNTER — PATIENT MESSAGE (OUTPATIENT)
Dept: UROLOGY | Facility: CLINIC | Age: 44
End: 2024-10-29
Payer: COMMERCIAL

## 2024-10-29 ENCOUNTER — CLINICAL SUPPORT (OUTPATIENT)
Dept: ALLERGY | Facility: CLINIC | Age: 44
End: 2024-10-29
Payer: COMMERCIAL

## 2024-10-29 DIAGNOSIS — J30.9 CHRONIC ALLERGIC RHINITIS: Primary | ICD-10-CM

## 2024-10-29 PROCEDURE — 95117 IMMUNOTHERAPY INJECTIONS: CPT | Mod: S$GLB,,, | Performed by: STUDENT IN AN ORGANIZED HEALTH CARE EDUCATION/TRAINING PROGRAM

## 2024-10-29 PROCEDURE — 99999 PR PBB SHADOW E&M-EST. PATIENT-LVL I: CPT | Mod: PBBFAC,,,

## 2024-11-05 ENCOUNTER — PATIENT MESSAGE (OUTPATIENT)
Dept: ORTHOPEDICS | Facility: CLINIC | Age: 44
End: 2024-11-05
Payer: COMMERCIAL

## 2024-11-06 ENCOUNTER — PATIENT MESSAGE (OUTPATIENT)
Dept: INTERNAL MEDICINE | Facility: CLINIC | Age: 44
End: 2024-11-06
Payer: COMMERCIAL

## 2024-11-06 DIAGNOSIS — L21.9 SEBORRHEIC DERMATITIS OF SCALP: ICD-10-CM

## 2024-11-06 DIAGNOSIS — E11.9 TYPE 2 DIABETES MELLITUS WITHOUT COMPLICATION, WITHOUT LONG-TERM CURRENT USE OF INSULIN: Primary | ICD-10-CM

## 2024-11-06 DIAGNOSIS — F90.0 ADHD (ATTENTION DEFICIT HYPERACTIVITY DISORDER), INATTENTIVE TYPE: ICD-10-CM

## 2024-11-07 ENCOUNTER — PATIENT MESSAGE (OUTPATIENT)
Dept: UROLOGY | Facility: CLINIC | Age: 44
End: 2024-11-07
Payer: COMMERCIAL

## 2024-11-07 ENCOUNTER — TELEPHONE (OUTPATIENT)
Dept: UROLOGY | Facility: CLINIC | Age: 44
End: 2024-11-07
Payer: COMMERCIAL

## 2024-11-07 RX ORDER — KETOCONAZOLE 20 MG/G
1 CREAM TOPICAL 2 TIMES DAILY
Qty: 30 G | Refills: 2 | Status: SHIPPED | OUTPATIENT
Start: 2024-11-07

## 2024-11-07 NOTE — TELEPHONE ENCOUNTER
11/7/24 @ 11:20 am called patient regarding message in portal I was able to assist the patient and answer his question

## 2024-11-07 NOTE — PRE-PROCEDURE INSTRUCTIONS
Unable to reach patient via phone. Left message with pre procedure instructions and arrival time. The following was sent to pt portal.     Dear Hermelindo ,     You are scheduled for a procedure with Dr. Walter on 11/8/2024. Your scheduled arrival time is 8:30 am . This arrival time is approximately 2 hours before anticipated procedure time. Please wear comfortable clothing  This procedure will take place at the [facility] at the corner of [facility]. It is in the [facility] next to [employer]. The address is:     [address] [place] [address].  M[place] [id number]     After entering the building, proceed to the second floor and check in with registration.      Your fasting instructions are as follows:     Nothing to eat after Midnight the evening before your surgery.   Anesthesia encourages you to consume clear liquids up to 2 hours prior to arrival time to ensure you are adequately hydrated.     You MUST have a responsible adult to bring you home.         Please HOLD the following medications the morning of your procedure:  Aspirin and Aspirin-containing products (Goody's powder, Excedrin)  Stimulants (Adderall, Vyvanse, Adipex)  Diabetic medication   Prescription creams/gels/lotions        *You may take tylenol if needed         The evening before and morning of your procedure, take a shower using antibacterial soap (ex: Hibiclens or Dial antibacterial soap). DO NOT apply deodorant, lotion, cologne, or anything else to the skin. Please do not wear jewelry or bring any valuables with you.  .     Please do not wear contact lenses the day of your procedure.   Bring any inhalers that you may need.     If you have any procedure-specific questions, please contact your surgeon's office. For pre-admit or anesthesia questions call [phone].     Thanks,  Pre-Admit Testing  Anesthesia Dept [facility]

## 2024-11-08 ENCOUNTER — PATIENT MESSAGE (OUTPATIENT)
Dept: SURGERY | Facility: HOSPITAL | Age: 44
End: 2024-11-08
Payer: COMMERCIAL

## 2024-11-08 ENCOUNTER — HOSPITAL ENCOUNTER (OUTPATIENT)
Facility: HOSPITAL | Age: 44
Discharge: HOME | End: 2024-11-08
Attending: UROLOGY | Admitting: UROLOGY
Payer: COMMERCIAL

## 2024-11-08 ENCOUNTER — ANESTHESIA EVENT (OUTPATIENT)
Dept: SURGERY | Facility: HOSPITAL | Age: 44
End: 2024-11-08
Payer: COMMERCIAL

## 2024-11-08 ENCOUNTER — ANESTHESIA (OUTPATIENT)
Dept: SURGERY | Facility: HOSPITAL | Age: 44
End: 2024-11-08
Payer: COMMERCIAL

## 2024-11-08 VITALS
TEMPERATURE: 97 F | RESPIRATION RATE: 20 BRPM | HEART RATE: 88 BPM | SYSTOLIC BLOOD PRESSURE: 115 MMHG | OXYGEN SATURATION: 96 % | DIASTOLIC BLOOD PRESSURE: 82 MMHG

## 2024-11-08 DIAGNOSIS — N32.0 BLADDER NECK OBSTRUCTION: ICD-10-CM

## 2024-11-08 DIAGNOSIS — N39.8 VOIDING DYSFUNCTION: Primary | ICD-10-CM

## 2024-11-08 LAB
POCT GLUCOSE: 76 MG/DL (ref 70–110)
POCT GLUCOSE: 96 MG/DL (ref 70–110)

## 2024-11-08 PROCEDURE — 82962 GLUCOSE BLOOD TEST: CPT | Performed by: UROLOGY

## 2024-11-08 PROCEDURE — 25000003 PHARM REV CODE 250: Performed by: ANESTHESIOLOGY

## 2024-11-08 PROCEDURE — 52601 PROSTATECTOMY (TURP): CPT | Mod: ,,, | Performed by: UROLOGY

## 2024-11-08 PROCEDURE — 25000003 PHARM REV CODE 250: Performed by: NURSE ANESTHETIST, CERTIFIED REGISTERED

## 2024-11-08 PROCEDURE — 99900035 HC TECH TIME PER 15 MIN (STAT)

## 2024-11-08 PROCEDURE — 94761 N-INVAS EAR/PLS OXIMETRY MLT: CPT

## 2024-11-08 PROCEDURE — 36000706: Performed by: UROLOGY

## 2024-11-08 PROCEDURE — 63600175 PHARM REV CODE 636 W HCPCS: Performed by: NURSE ANESTHETIST, CERTIFIED REGISTERED

## 2024-11-08 PROCEDURE — 71000033 HC RECOVERY, INTIAL HOUR: Performed by: UROLOGY

## 2024-11-08 PROCEDURE — 37000008 HC ANESTHESIA 1ST 15 MINUTES: Performed by: UROLOGY

## 2024-11-08 PROCEDURE — 37000009 HC ANESTHESIA EA ADD 15 MINS: Performed by: UROLOGY

## 2024-11-08 PROCEDURE — 27201423 OPTIME MED/SURG SUP & DEVICES STERILE SUPPLY: Performed by: UROLOGY

## 2024-11-08 PROCEDURE — 63600175 PHARM REV CODE 636 W HCPCS: Performed by: ANESTHESIOLOGY

## 2024-11-08 PROCEDURE — 71000016 HC POSTOP RECOV ADDL HR: Performed by: UROLOGY

## 2024-11-08 PROCEDURE — 71000015 HC POSTOP RECOV 1ST HR: Performed by: UROLOGY

## 2024-11-08 PROCEDURE — 36000707: Performed by: UROLOGY

## 2024-11-08 RX ORDER — CEFAZOLIN SODIUM 1 G/3ML
INJECTION, POWDER, FOR SOLUTION INTRAMUSCULAR; INTRAVENOUS
Status: DISCONTINUED | OUTPATIENT
Start: 2024-11-08 | End: 2024-11-08

## 2024-11-08 RX ORDER — DEXMETHYLPHENIDATE HYDROCHLORIDE 10 MG/1
10 TABLET ORAL 2 TIMES DAILY
Qty: 60 TABLET | Refills: 0 | Status: SHIPPED | OUTPATIENT
Start: 2024-11-08 | End: 2024-12-08

## 2024-11-08 RX ORDER — HYDROMORPHONE HYDROCHLORIDE 1 MG/ML
0.5 INJECTION, SOLUTION INTRAMUSCULAR; INTRAVENOUS; SUBCUTANEOUS EVERY 5 MIN PRN
Status: COMPLETED | OUTPATIENT
Start: 2024-11-08 | End: 2024-11-08

## 2024-11-08 RX ORDER — ROCURONIUM BROMIDE 10 MG/ML
INJECTION, SOLUTION INTRAVENOUS
Status: DISCONTINUED | OUTPATIENT
Start: 2024-11-08 | End: 2024-11-08

## 2024-11-08 RX ORDER — CEFAZOLIN 2 G/1
2 INJECTION, POWDER, FOR SOLUTION INTRAMUSCULAR; INTRAVENOUS ONCE
Status: DISCONTINUED | OUTPATIENT
Start: 2024-11-08 | End: 2024-11-08 | Stop reason: HOSPADM

## 2024-11-08 RX ORDER — ONDANSETRON HYDROCHLORIDE 2 MG/ML
4 INJECTION, SOLUTION INTRAVENOUS DAILY PRN
Status: DISCONTINUED | OUTPATIENT
Start: 2024-11-08 | End: 2024-11-08 | Stop reason: HOSPADM

## 2024-11-08 RX ORDER — FENTANYL CITRATE 50 UG/ML
INJECTION, SOLUTION INTRAMUSCULAR; INTRAVENOUS
Status: DISCONTINUED | OUTPATIENT
Start: 2024-11-08 | End: 2024-11-08

## 2024-11-08 RX ORDER — LIDOCAINE HYDROCHLORIDE 20 MG/ML
INJECTION INTRAVENOUS
Status: DISCONTINUED | OUTPATIENT
Start: 2024-11-08 | End: 2024-11-08

## 2024-11-08 RX ORDER — PHENAZOPYRIDINE HYDROCHLORIDE 100 MG/1
100 TABLET, FILM COATED ORAL 3 TIMES DAILY PRN
Qty: 30 TABLET | Refills: 0 | Status: SHIPPED | OUTPATIENT
Start: 2024-11-08 | End: 2024-11-18

## 2024-11-08 RX ORDER — ONDANSETRON HYDROCHLORIDE 2 MG/ML
INJECTION, SOLUTION INTRAVENOUS
Status: DISCONTINUED | OUTPATIENT
Start: 2024-11-08 | End: 2024-11-08

## 2024-11-08 RX ORDER — MIDAZOLAM HYDROCHLORIDE 1 MG/ML
INJECTION INTRAMUSCULAR; INTRAVENOUS
Status: DISCONTINUED | OUTPATIENT
Start: 2024-11-08 | End: 2024-11-08

## 2024-11-08 RX ORDER — PROPOFOL 10 MG/ML
VIAL (ML) INTRAVENOUS
Status: DISCONTINUED | OUTPATIENT
Start: 2024-11-08 | End: 2024-11-08

## 2024-11-08 RX ORDER — OXYCODONE HYDROCHLORIDE 5 MG/1
5 TABLET ORAL
Status: DISCONTINUED | OUTPATIENT
Start: 2024-11-08 | End: 2024-11-08 | Stop reason: HOSPADM

## 2024-11-08 RX ORDER — HYDROMORPHONE HYDROCHLORIDE 1 MG/ML
0.4 INJECTION, SOLUTION INTRAMUSCULAR; INTRAVENOUS; SUBCUTANEOUS EVERY 5 MIN PRN
Status: DISCONTINUED | OUTPATIENT
Start: 2024-11-08 | End: 2024-11-08 | Stop reason: HOSPADM

## 2024-11-08 RX ORDER — GLUCAGON 1 MG
1 KIT INJECTION
Status: DISCONTINUED | OUTPATIENT
Start: 2024-11-08 | End: 2024-11-08 | Stop reason: HOSPADM

## 2024-11-08 RX ADMIN — FENTANYL CITRATE 100 MCG: 50 INJECTION, SOLUTION INTRAMUSCULAR; INTRAVENOUS at 10:11

## 2024-11-08 RX ADMIN — SUGAMMADEX 300 MG: 100 INJECTION, SOLUTION INTRAVENOUS at 11:11

## 2024-11-08 RX ADMIN — MIDAZOLAM HYDROCHLORIDE 2 MG: 1 INJECTION, SOLUTION INTRAMUSCULAR; INTRAVENOUS at 10:11

## 2024-11-08 RX ADMIN — SODIUM CHLORIDE: 0.9 INJECTION, SOLUTION INTRAVENOUS at 10:11

## 2024-11-08 RX ADMIN — ROCURONIUM BROMIDE 50 MG: 10 INJECTION INTRAVENOUS at 10:11

## 2024-11-08 RX ADMIN — HYDROMORPHONE HYDROCHLORIDE 0.5 MG: 1 INJECTION, SOLUTION INTRAMUSCULAR; INTRAVENOUS; SUBCUTANEOUS at 12:11

## 2024-11-08 RX ADMIN — PROPOFOL 200 MG: 10 INJECTION, EMULSION INTRAVENOUS at 10:11

## 2024-11-08 RX ADMIN — CEFAZOLIN 2 G: 330 INJECTION, POWDER, FOR SOLUTION INTRAMUSCULAR; INTRAVENOUS at 10:11

## 2024-11-08 RX ADMIN — ONDANSETRON 4 MG: 2 INJECTION INTRAMUSCULAR; INTRAVENOUS at 11:11

## 2024-11-08 RX ADMIN — OXYCODONE HYDROCHLORIDE 5 MG: 5 TABLET ORAL at 02:11

## 2024-11-08 RX ADMIN — HYDROMORPHONE HYDROCHLORIDE 0.5 MG: 1 INJECTION, SOLUTION INTRAMUSCULAR; INTRAVENOUS; SUBCUTANEOUS at 01:11

## 2024-11-08 RX ADMIN — HYDROMORPHONE HYDROCHLORIDE 0.5 MG: 1 INJECTION, SOLUTION INTRAMUSCULAR; INTRAVENOUS; SUBCUTANEOUS at 11:11

## 2024-11-08 RX ADMIN — LIDOCAINE HYDROCHLORIDE 100 MG: 20 INJECTION INTRAVENOUS at 10:11

## 2024-11-08 RX ADMIN — ONDANSETRON 4 MG: 2 INJECTION INTRAMUSCULAR; INTRAVENOUS at 01:11

## 2024-11-08 NOTE — ANESTHESIA PREPROCEDURE EVALUATION
11/08/2024  Hermelindo Garza III is a 44 y.o., male.      Pre-op Assessment    I have reviewed the Patient Summary Reports.     I have reviewed the Nursing Notes. I have reviewed the NPO Status.      Review of Systems  Anesthesia Hx:               Denies Personal Hx of Anesthesia complications.                    Pulmonary:    Asthma    Sleep Apnea   Asthma:    Obstructive Sleep Apnea (LUBA).           Renal/:  Chronic Renal Disease        Kidney Function/Disease             Hepatic/GI:   PUD,  GERD Liver Disease, Hepatitis       Gerd, Peptic Ulcer Disease       Liver Disease, Hepatitis        Musculoskeletal:  Arthritis        Arthritis          Neurological:    Neuromuscular Disease,           Arthritis                         Neuromuscular Disease   Endocrine:  Diabetes    Diabetes                      Psych:  Psychiatric History              Physical Exam  General: Well nourished    Airway:  Mallampati: II   Mouth Opening: Normal  Neck ROM: Normal ROM    Anesthesia Plan  Type of Anesthesia, risks & benefits discussed:    Anesthesia Type: Gen ETT  Informed Consent: Informed consent signed with the Patient and all parties understand the risks and agree with anesthesia plan.  All questions answered.   ASA Score: 3    Ready For Surgery From Anesthesia Perspective.   .

## 2024-11-08 NOTE — TRANSFER OF CARE
Anesthesia Transfer of Care Note    Patient: Hermelindo Garza III    Procedure(s) Performed: Procedure(s) (LRB):  INCISION, CONTRACTURE, BLADDER NECK, TRANSURETHRAL (N/A)    Patient location: PACU    Anesthesia Type: general    Transport from OR: Transported from OR on room air with adequate spontaneous ventilation    Post pain: adequate analgesia    Post assessment: no apparent anesthetic complications    Post vital signs: stable    Level of consciousness: awake    Nausea/Vomiting: no nausea/vomiting    Complications: none    Transfer of care protocol was followed      Last vitals: Visit Vitals  /67 (BP Location: Right arm, Patient Position: Lying)   Pulse 79   Temp 37.1 °C (98.8 °F) (Temporal)   Resp 16   SpO2 97%

## 2024-11-08 NOTE — ANESTHESIA POSTPROCEDURE EVALUATION
Anesthesia Post Evaluation    Patient: Hermelindo Garza III    Procedure(s) Performed: Procedure(s) (LRB):  INCISION, CONTRACTURE, BLADDER NECK, TRANSURETHRAL (N/A)    Final Anesthesia Type: general      Patient location during evaluation: PACU  Patient participation: Yes- Able to Participate  Level of consciousness: awake and alert  Post-procedure vital signs: reviewed and stable  Pain management: adequate  Airway patency: patent    PONV status at discharge: No PONV  Anesthetic complications: no      Cardiovascular status: blood pressure returned to baseline  Respiratory status: unassisted  Hydration status: euvolemic            Vitals Value Taken Time   /59 11/08/24 1417   Temp 36.2 °C (97.2 °F) 11/08/24 1131   Pulse 86 11/08/24 1420   Resp 23 11/08/24 1420   SpO2 92 % 11/08/24 1420   Vitals shown include unfiled device data.      Event Time   Out of Recovery 12:00:00         Pain/Shani Score: Pain Rating Prior to Med Admin: 6 (11/8/2024  2:00 PM)  Shani Score: 10 (11/8/2024  2:00 PM)

## 2024-11-08 NOTE — ANESTHESIA PROCEDURE NOTES
Intubation    Date/Time: 11/8/2024 10:39 AM    Performed by: Carmen Wolfe CRNA  Authorized by: Live Vasquez MD    Intubation:     Induction:  Intravenous    Intubated:  Postinduction    Mask Ventilation:  Moderately difficult with oral airway    Attempts:  1    Attempted By:  CRNA    Method of Intubation:  Video laryngoscopy    Blade:  Wang 3    Laryngeal View Grade: Grade I - full view of cords      Difficult Airway Encountered?: No      Complications:  None    Airway Device:  Oral endotracheal tube    Airway Device Size:  7.5    Style/Cuff Inflation:  Cuffed (inflated to minimal occlusive pressure)    Tube secured:  24    Secured at:  The lips    Placement Verified By:  Capnometry    Complicating Factors:  None    Findings Post-Intubation:  BS equal bilateral

## 2024-11-08 NOTE — DISCHARGE SUMMARY
[facility]  Discharge Note  Short Stay    Procedure(s) (LRB):  INCISION, CONTRACTURE, BLADDER NECK, TRANSURETHRAL (N/A)      OUTCOME: Patient tolerated treatment/procedure well without complication and is now ready for discharge.    DISPOSITION: Home or Self Care    FINAL DIAGNOSIS:  Voiding dysfunction    FOLLOWUP: In clinic with Dr. Walter    DISCHARGE INSTRUCTIONS:    Discharge Procedure Orders   No dressing needed     Notify your health care provider if you experience any of the following:  temperature >100.4     Notify your health care provider if you experience any of the following:  persistent nausea and vomiting or diarrhea     Notify your health care provider if you experience any of the following:  severe uncontrolled pain     Notify your health care provider if you experience any of the following:  difficulty breathing or increased cough     Notify your health care provider if you experience any of the following:  severe persistent headache     Notify your health care provider if you experience any of the following:  worsening rash     Notify your health care provider if you experience any of the following:  persistent dizziness, light-headedness, or visual disturbances     Activity as tolerated        TIME SPENT ON DISCHARGE: 15 minutes

## 2024-11-08 NOTE — PLAN OF CARE
Pt in preop bay 32, VSS, and IV inserted. Pt denies any open wounds on body or the use of any weight loss injections. Pt needs anesthesia consents signed, procedural consents, H&P updated, admit order and otherwise ready to roll.

## 2024-11-08 NOTE — PLAN OF CARE
Discharge instructions reviewed with pt and spouse at bedside. Both v/u of all instructions. VSS. IV removed with catheter tip intact. No concerns voiced; all questions answered. Prescriptions delivered to bedside by pharmacy. Escorted patient via wheelchair to family member awaiting in private vehicle.      Patient Needs Assistance to Leave Residence...

## 2024-11-08 NOTE — DISCHARGE INSTRUCTIONS
Post Cystoscopy and Transurethral resection of Prostate  Do not strain to have a bowel movement  No strenuous exercise x 7 days  No driving while you are on narcotic pain medications or if your [place]  catheter is in place    You can expect:  To see blood in your urine.    Go to the ER if:  You are having severe abdominal pain  Inability to urinate if you do not have a catheter  Catheter stops draining if you have one in place.    Call the doctor if:  Temperature is greater than 101F  Persistent vomiting and inability to keep food down

## 2024-11-08 NOTE — OP NOTE
[facility]  Operative Note    Date: 11/08/2024    Pre-Op Diagnosis: Voiding Dysfunction.    Post-Op Diagnosis: same    Procedure(s) Performed:   TURP    Specimen(s): prostate chips    Staff Surgeon: Jay Walter MD    Assistant Surgeon: Conor Carson DO    Anesthesia: General endotracheal anesthesia    Indications: Hermelindo Garza III is a 44 y.o. male with voiding dysfunction.    Findings: High Bladder neck.     Drains: 18 Fr [place] tip [place] catheter    Procedure in detail:  After the risks and benefits of the procedure were explained, consent was obtained, and the patient was taken to the operating suite and placed in the supine position.  Anesthesia was administered.  When adequately sedated, the patient was placed in dorsal lithotomy position and prepped and draped in normal sterile fashion.  Timeout was performed and preoperative ABx were confirmed.      A 26-Arabic sheath resectoscope was placed into the bladder using a visual obturator. The visual obturator was exchanged for the resecting mechanism. The ureteral orifices were identified. The bladder neck was first resected at the 5 and 7 o'clock position with care to avoid the ureteral orifices. The median lobe was then resected at the location of the bladder neck. Hemostasis was then achieved and prostate chips were irrigated from the bladder.     A motion wire was then inserted into the bladder and a 18 Fr. [place] tip was then placed over the wire with return of clear, yellow urine. 10 cc's of sterile water was used to inflate the balloon.     The patient was transferred to recovery in stable condition.     Disposition: The patient will be discharged home pending PM [place] check.     Conor Carson MD

## 2024-11-09 ENCOUNTER — NURSE TRIAGE (OUTPATIENT)
Dept: ADMINISTRATIVE | Facility: CLINIC | Age: 44
End: 2024-11-09
Payer: COMMERCIAL

## 2024-11-09 NOTE — TELEPHONE ENCOUNTER
"Patient says he had bladder surgery yesterday and he has been peeing red like they said he would. He said when he was peeing he saw a chunk of blood that was long. Pain only when he pees. States he is able to urinate and denies feeling like his bladder is still full after he goes. Patient advised that per discharge documents: "You can expect:  To see blood in your urine.   Go to the ER if:  You are having severe abdominal pain  Inability to urinate if you do not have a catheter". Patient also advised to increase water intake to thin bleeding. Understanding verbalized. Please contact caller directly to discuss further care advice.      Reason for Disposition   [1] Caller has NON-URGENT question AND [2] triager unable to answer question    Additional Information   Negative: [1] Widespread rash AND [2] bright red, sunburn-like   Negative: [1] SEVERE headache AND [2] after spinal (epidural) anesthesia   Negative: [1] Vomiting AND [2] persists > 4 hours   Negative: [1] Vomiting AND [2] abdomen looks much more swollen than usual   Negative: [1] Drinking very little AND [2] dehydration suspected (e.g., no urine > 12 hours, very dry mouth, very lightheaded)   Negative: Patient sounds very sick or weak to the triager   Negative: Sounds like a serious complication to the triager   Negative: Fever > 100.4 F (38.0 C)   Negative: [1] SEVERE post-op pain (e.g., excruciating, pain scale 8-10) AND [2] not controlled with pain medications   Negative: [1] Caller has URGENT question AND [2] triager unable to answer question   Negative: [1] MILD-MODERATE headache (e.g., pain scale 1-7) AND [2] after spinal (epidural) anesthesia   Negative: Fever present > 3 days (72 hours)   Negative: [1] MILD-MODERATE post-op pain (e.g., pain scale 1-7) AND [2] not controlled with pain medications    Protocols used: Post-Op Symptoms and [id number]    "

## 2024-11-19 ENCOUNTER — CLINICAL SUPPORT (OUTPATIENT)
Dept: ALLERGY | Facility: CLINIC | Age: 44
End: 2024-11-19
Payer: COMMERCIAL

## 2024-11-19 DIAGNOSIS — J30.9 CHRONIC ALLERGIC RHINITIS: Primary | ICD-10-CM

## 2024-11-19 PROCEDURE — 95117 IMMUNOTHERAPY INJECTIONS: CPT | Mod: S$GLB,,, | Performed by: STUDENT IN AN ORGANIZED HEALTH CARE EDUCATION/TRAINING PROGRAM

## 2024-11-19 PROCEDURE — 99999 PR PBB SHADOW E&M-EST. PATIENT-LVL I: CPT | Mod: PBBFAC,,,

## 2024-11-20 ENCOUNTER — TELEPHONE (OUTPATIENT)
Dept: UROLOGY | Facility: CLINIC | Age: 44
End: 2024-11-20
Payer: COMMERCIAL

## 2024-11-20 ENCOUNTER — OFFICE VISIT (OUTPATIENT)
Dept: PSYCHIATRY | Facility: CLINIC | Age: 44
End: 2024-11-20
Payer: COMMERCIAL

## 2024-11-20 ENCOUNTER — PATIENT MESSAGE (OUTPATIENT)
Dept: INTERNAL MEDICINE | Facility: CLINIC | Age: 44
End: 2024-11-20
Payer: COMMERCIAL

## 2024-11-20 ENCOUNTER — PATIENT MESSAGE (OUTPATIENT)
Dept: PSYCHIATRY | Facility: CLINIC | Age: 44
End: 2024-11-20

## 2024-11-20 DIAGNOSIS — F39 UNSPECIFIED MOOD (AFFECTIVE) DISORDER: ICD-10-CM

## 2024-11-20 DIAGNOSIS — F90.0 ADHD (ATTENTION DEFICIT HYPERACTIVITY DISORDER), INATTENTIVE TYPE: ICD-10-CM

## 2024-11-20 DIAGNOSIS — E11.9 TYPE 2 DIABETES MELLITUS WITHOUT COMPLICATION, WITHOUT LONG-TERM CURRENT USE OF INSULIN: Primary | ICD-10-CM

## 2024-11-20 DIAGNOSIS — F41.9 ANXIETY: ICD-10-CM

## 2024-11-20 PROCEDURE — 3061F NEG MICROALBUMINURIA REV: CPT | Mod: CPTII,95,, | Performed by: PSYCHIATRY & NEUROLOGY

## 2024-11-20 PROCEDURE — 3044F HG A1C LEVEL LT 7.0%: CPT | Mod: CPTII,95,, | Performed by: PSYCHIATRY & NEUROLOGY

## 2024-11-20 PROCEDURE — 90833 PSYTX W PT W E/M 30 MIN: CPT | Mod: 95,,, | Performed by: PSYCHIATRY & NEUROLOGY

## 2024-11-20 PROCEDURE — 99215 OFFICE O/P EST HI 40 MIN: CPT | Mod: 95,,, | Performed by: PSYCHIATRY & NEUROLOGY

## 2024-11-20 PROCEDURE — 3066F NEPHROPATHY DOC TX: CPT | Mod: CPTII,95,, | Performed by: PSYCHIATRY & NEUROLOGY

## 2024-11-20 RX ORDER — DEXMETHYLPHENIDATE HYDROCHLORIDE 10 MG/1
10 TABLET ORAL 2 TIMES DAILY
Qty: 60 TABLET | Refills: 0 | Status: SHIPPED | OUTPATIENT
Start: 2024-12-13 | End: 2025-01-12

## 2024-11-20 RX ORDER — DEXMETHYLPHENIDATE HYDROCHLORIDE 10 MG/1
10 TABLET ORAL 2 TIMES DAILY
Qty: 60 TABLET | Refills: 0 | Status: SHIPPED | OUTPATIENT
Start: 2025-01-10 | End: 2025-02-09

## 2024-11-20 RX ORDER — LORAZEPAM 0.5 MG/1
0.5 TABLET ORAL DAILY PRN
Qty: 30 TABLET | Refills: 3 | Status: SHIPPED | OUTPATIENT
Start: 2024-11-20 | End: 2025-03-20

## 2024-11-20 RX ORDER — DEXMETHYLPHENIDATE HYDROCHLORIDE 10 MG/1
10 TABLET ORAL 2 TIMES DAILY
Qty: 60 TABLET | Refills: 0 | Status: SHIPPED | OUTPATIENT
Start: 2025-02-03 | End: 2025-03-05

## 2024-11-20 RX ORDER — BUPROPION HYDROCHLORIDE 300 MG/1
300 TABLET ORAL DAILY
Qty: 90 TABLET | Refills: 1 | Status: SHIPPED | OUTPATIENT
Start: 2024-11-20 | End: 2025-05-19

## 2024-11-20 RX ORDER — LAMOTRIGINE 100 MG/1
100 TABLET ORAL 2 TIMES DAILY
Qty: 180 TABLET | Refills: 1 | Status: SHIPPED | OUTPATIENT
Start: 2024-11-20 | End: 2025-05-19

## 2024-11-20 NOTE — PATIENT INSTRUCTIONS
PLAN:     Follow Up Feb 18 2025 @ 10:30a /  IN CLINIC / EXTENDED 60 min     Psyc Meds Adjustments: (See orders for full detail):  renew as prior  tho:  Wellbutrin XL advanced to 300 mg  Encouraged him to get back into therapy     [facility] sent messager to Aixa Claudio PhD to let her know patient had interest to follow up with [facility] program     References:     Relaxation stress reduction workbook: AMI Davenport PhD ( used: $7-10)    Feeling Good Website: Ashwin Anderson MD / [email or web address] website (free) / alexey. PODCASTS    Anxiety &  phobia workbook by JORGE Carlisle PhD  (web retailers: used: $ 7-10)    [place]: Path to Better Sleep : https://www.veter[place]raining.[place].gov[place]insomnia/ (free)       Pt expressed appreciation for the visit today and did not have further question at this time though pt  was still informed to:     Call  if problems.    Call / Report Side Effects to [facility]     Encouraged to follow up with primary care / Gen Med MD for continued monitoring of general health and wellness.    Understanding was expressed; and no further concerns nor questions were raised at this time.     Remember healthy self care:   eat right  attempt adequate rest   HANDWASHING / encourage such alexey. During this corona virus time   walk or light exercise within reason and as your general med team approves  read or explore any of reference materials / homework mentioned  reach out (I.e.,  connect with)  others who nuture and bring out best in you  avoid risky behaviors    Keep your appointments:    IF you  cannot make your appt THEN please call [phone] or go online (via My Chart michael) to reschedule.    It is the responsibility of the patient to reschedule an appointment if an appointment has been canceled or missed.    Avoid  alcohol and illicit substances.  Look for the positive.  All is often relative-seek balance  Call sooner if needed : [phone]   Call 911 or go to Emergency Room  (ER)  if  any acute concerns

## 2024-11-20 NOTE — TELEPHONE ENCOUNTER
11/20/24 @ 1 pm called patient regarding message in portal left message advising he can push up the post op michael to come in sooner to see Dr. Walter left number for patient to call back

## 2024-11-20 NOTE — PROGRESS NOTES
"  Hermelindo CURT Greg [facility]   1980 11/20/2024     Disclaimer: Evaluation and treatment is based on information presented to date. Any new information may affect assessment and findings.     The patient location is: Patient's home  and reported  that his/her location at the time of this visit was in the [facility]     Visit type: Virtual visit  started with synchronous audio and video / though some audio difficulty so mutually agreed to complete on    Each patient to whom he or she provides medical services by telemedicine is: (1) informed of the relationship between the physician and patient and the respective role of any other health care provider with respect to management of the patient; and (2) notified that he or she may decline to receive medical services by telemedicine and may withdraw from such care at any time.    Patient was informed that I am a physician who is licensed in the [facility]:  Ashwin Donahue MD:  Employed by   [facility] [employer]     If technology issues arise: MERRY Donahue MD will attempt to call pt back     Pt informed that if he / she is ever in crisis (or has acute concerns): pt is instructed to Dial 911 or go to nearest Emergency Room (ER)    Pt informed that if questions related to privacy practices: pt is instructed to contact [facility] Information Department:     Understanding Expressed. No questions.     Time 11-20-24 (60 min)     >>     S: Patient's Own Perception of Condition (& Side Effects) :  [facility]           O:      CURRENT PRESENTATION:     Says been dealing with a lot     Depressed assoc stressors ; says tend to have negative thoughts ; no SI no HI    Also notes: stress as dad diag and surg for cancer ; dad had surgery 10" small intestine removed     Went through initial assessment process for [facility] / see note 8-20-24 / some enhannced diagnosic work up as well  as well tho my main focus referral / CBT anxiety " mngt     Walking and has lost weight    Psyc Meds   Lamictal remains  200 mg /   he tapered off  Lexapro /  he moved to 5 mg (from 10mg) as we had discussed past he opted to remain on such / likes this regimen   Ativan 0.5 mg daily / as needed signif aniety  / uses at times at night for insomnia   Wellbuturin  ng / 11-20-24 interested to move to 300 mg  Focalin 10 mg twice daily    Says anxiety improved     Constitutional Health Concerns: back pain since  2010 had cervical  fusion by   [facility] /   no rash / he did get back on ozempic / see weight table;  also walking regularly Svetlana fisher       Says does aquatherapy now and  then    Also taking allergy shots weekly main campus x 9 months         11/20/2024     9:51 AM 5/2/2024    11:01 AM 11/8/2023    10:17 AM   GAD7   1. Feeling nervous, anxious, or on edge? 2 1 1   2. Not being able to stop or control worrying? 3 2 2   3. Worrying too much about different things? 3 1 1   4. Trouble relaxing? 3 1 2   5. Being so restless that it is hard to sit still? 2 0 1   6. Becoming easily annoyed or irritable? 2 1 1   7. Feeling afraid as if something awful might happen? 2 0 0   8. If you checked off any problems, how difficult have these problems made it for you to do your work, take care of things at home, or get along with other people? 2 1 1   GODWIN-7 Score 17 6 8          11/20/2024     9:53 AM 5/2/2024    11:02 AM 4/22/2024     8:34 AM 11/8/2023    10:18 AM 10/12/2023     8:51 AM 3/2/2023     2:14 PM 6/29/2022    11:04 AM   Depression Patient Health Questionnaire   Over the last two weeks how often have you been bothered by little interest or pleasure in doing things More than half the days Several days Not at all Several days Not at all Not at all Not at all   Over the last two weeks how often have you been bothered by feeling down, depressed or hopeless More than half the days Several days Not at all Not at all Not at all Not at all Not at all   PHQ-2 Total  Score 4 2 0 1 0 0 0   Over the last two weeks how often have you been bothered by trouble falling or staying asleep, or sleeping too much Nearly every day More than half the days  More than half the days      Over the last two weeks how often have you been bothered by feeling tired or having little energy Several days Several days  Several days      Over the last two weeks how often have you been bothered by a poor appetite or overeating Not at all Not at all  Not at all      Over the last two weeks how often have you been bothered by feeling bad about yourself - or that you are a failure or have let yourself or your family down Nearly every day Several days  Not at all      Over the last two weeks how often have you been bothered by trouble concentrating on things, such as reading the newspaper or watching television Several days Several days  Several days      Over the last two weeks how often have you been bothered by moving or speaking so slowly that other people could have noticed. Or the opposite - being so fidgety or restless that you have been moving around a lot more than usual. More than half the days More than half the days  More than half the days      Over the last two weeks how often have you been bothered by thoughts that you would be better off dead, or of hurting yourself Not at all Not at all  Not at all      If you checked off any problems, how difficult have these problems made it for you to do your work, take care of things at home or get along with other people? Very difficult Somewhat difficult  Not difficult at all      PHQ-9 Score 14 9  7      PHQ-9 Interpretation Moderate Mild  Mild        [facility] pain [facility]    Comments Medication: says ambien not working / asks to remove; says ativan  helps with anxiety insomnia / says has been on in past at low dose and helps ; asks to switch to that; risk benefit discussed; says not taking opioids tho may have been Rx in past. Did  caution him as to such; says he well aware       Wt Readings from Last 12 Encounters:   10/28/24 107.5 kg (236 lb 15.9 oz)   09/26/24 105.2 kg (232 lb)   09/15/24 107 kg (235 lb 14.3 oz)   09/12/24 107 kg (236 lb)   08/30/24 106.6 kg (235 lb)   08/26/24 107 kg (236 lb)   07/31/24 107.3 kg (236 lb 8.9 oz)   07/23/24 107 kg (235 lb 14.3 oz)   07/08/24 107.6 kg (237 lb 3.4 oz)   07/08/24 107.6 kg (237 lb 5.2 oz)   06/03/24 105.2 kg (232 lb)   05/30/24 105.2 kg (232 lb)      Laboratory Data     Date/Time Component Value Flag Lab Status   04/05/23 0720 TSH 1.657 -- Final result   10/05/22 0718 PSA 0.18 -- Final result   10/11/23 1124 HDL 40 -- Final result   10/11/23 1124 CHOL 169 -- Final result   10/11/23 1124 TRIG 345 Important  High Final result   10/11/23 1124 LDLCALC 60.0 Important  Low Final result   10/11/23 1124 CHOLHDL 23.7 -- Final result   10/11/23 1124 NONHDLCHOL 129 -- Final result   10/11/23 1124 TOTALCHOLEST 4.2 -- Final result   10/11/23 1124 HGBA1C 5.4 -- Final result   07/04/23 1143 COLORU Yellow -- Final result   07/04/23 1143 APPEARANCEUA Clear -- Final result   07/04/23 1143 SPECGRAV >=1.030 Important  Abnormal Final result   07/04/23 1143 PHUR 5.0 -- Final result   07/04/23 1143 KETONESU Trace Important  Abnormal Final result   07/04/23 1143 OCCULTUA 1+ Important  Abnormal Final result   07/04/23 1143 NITRITE Negative -- Final result   05/13/14 1512 UROBILINOGEN Negative -- Final result   01/02/20 0936 RAPFLUA Negative -- Final result   01/02/20 0936 RAPFLUB Negative -- Final result   12/21/23 0747 POCGLU 105 -- Final result   09/19/21 2023 INR 1.0 -- Final result   01/02/20 0928 RAPSCRN Negative -- Final result   07/04/23 1143 LEUKOCYTESUR Negative -- Final result   07/04/23 2317 WBC 13.22 Important  High Final result   07/04/23 2317 RBC 5.10 -- Final result   07/04/23 2317 HGB 15.2 -- Final result   07/04/23 2317 HCT 45.2 -- Final result   07/04/23 2317 MCH 29.8 -- Final result   07/04/23 2317  RDW 13.5 -- Final result   07/04/23 2317  -- Final result   07/04/23 2317 MPV 10.2 -- Final result   10/11/23 1124 GLU 84 -- Final result   10/11/23 1124 BUN 18 -- Final result   10/11/23 1124 CREATININE 0.7 -- Final result   10/11/23 1124 CALCIUM 9.5 -- Final result   10/11/23 1124  -- Final result   10/11/23 1124 K 4.1 -- Final result   10/11/23 1124  -- Final result   10/11/23 1124 PROT 7.1 -- Final result   10/11/23 1124 ALBUMIN 4.3 -- Final result   10/11/23 1124 BILITOT 0.7 -- Final result   10/11/23 1124 AST 24 -- Final result   10/11/23 1124 ALKPHOS 94 -- Final result   10/11/23 1124 CO2 26 -- Final result   10/11/23 1124 ALT 39 -- Final result   10/11/23 1124 ANIONGAP 10 -- Final result   04/05/22 0720 EGFRNONAA >60.0 -- Final result   04/05/22 0720 ESTGFRAFRICA >60.0 -- Final result   02/19/20 0723 MG 2.1 -- Final result   07/04/23 2317 MCV 89 -- Final result   04/12/23 1124 CRP 3.8 -- Final result     Mental Status Exam:      Appearance: casual   Oriented: x 3   Attitude: cooperative   Eye Contact: good  Behavior: calm     Mood: feeling ok  Cognition: alert  Concentration: grossly intact   Affect: appropriate range      Anxiety: mild  / moderate as stress with dad's surgery      Thought Process: goal directed     Speech:       Volume : WNL       Quantity WNL       Quality: appears to openly answer questions      Threats: no SI / no HI     Psychosis: denies all      Estimate of Intellectual Function: average   Impulse Control: no thoughts of harm to self/ others      Musculoskeletal:  no tremor      Patient Active Problem List   Diagnosis    Gastroesophageal reflux disease    Renal calculi    Mild intermittent asthma without complication    ADHD (attention deficit hyperactivity disorder), inattentive type    LUBA (obstructive sleep apnea)    Hyperlipidemia associated with type 2 diabetes mellitus    Hypertriglyceridemia    SHEIKH (nonalcoholic steatohepatitis)    Hepatosplenomegaly     History of multiple allergies    Type 2 diabetes mellitus without complications    Vitamin D insufficiency    Restless leg    Chronic pain    Lumbar radiculopathy, chronic    S/P lumbar discectomy    Obesity (BMI 30-39.9)    Cervical spondylosis    History of melanoma in situ    Anxiety    Mood Disorder Unspecifed:  Depressed THO also has some mood elevation  (8of 13 on MDQ): ex: irritability, more self confident, thoughts race. more active)     Insomnia    Pain of left lower extremity    Neck pain    Chronic low back pain    Hypertrophy of both inferior nasal turbinates    Other health problem within the family:  Aug 2024 dad surgery small intestine    Lower urinary tract symptoms (LUTS)    Postoperative fever    Voiding dysfunction          Current Outpatient Medications:     acetaminophen (TYLENOL) 650 MG TbSR, Take 1,300 mg by mouth daily as needed., Disp: , Rfl:     albuterol (PROVENTIL/VENTOLIN HFA) 90 mcg/actuation inhaler, INHALE 2 PUFFS INTO THE LUNGS EVERY 6 HOURS AS NEEDED WHEEZING, Disp: 25.5 g, Rfl: 3    atorvastatin (LIPITOR) 20 MG tablet, Take 1 tablet (20 mg total) by mouth once daily. (Patient taking differently: Take 20 mg by mouth every evening.), Disp: 90 tablet, Rfl: 3    blood sugar diagnostic (TRUE METRIX GLUCOSE TEST STRIP) Strp, Use to test blood glucose one (1) time a day,, Disp: 100 strip, Rfl: 3    blood-glucose meter Misc, use as directed, Disp: 1 each, Rfl: 0    buPROPion (WELLBUTRIN XL) 300 MG 24 hr tablet, Take 1 tablet (300 mg total) by mouth once daily., Disp: 90 tablet, Rfl: 1    clobetasoL (TEMOVATE) 0.05 % cream, Apply to affected area 2 (two) times daily as needed for flare, Disp: 30 g, Rfl: 1    cyclobenzaprine (FLEXERIL) 10 MG tablet, Take 1 tablet (10 mg total) by mouth 3 (three) times daily as needed for Muscle spasms., Disp: 90 tablet, Rfl: 2    [START ON 12/13/2024] dexmethylphenidate (FOCALIN) 10 MG tablet, Take 1 tablet (10 mg total) by mouth 2 (two) times daily., Disp:  60 tablet, Rfl: 0    [START ON 1/10/2025] dexmethylphenidate (FOCALIN) 10 MG tablet, Take 1 tablet (10 mg total) by mouth 2 (two) times daily., Disp: 60 tablet, Rfl: 0    [START ON 2/3/2025] dexmethylphenidate (FOCALIN) 10 MG tablet, Take 1 tablet (10 mg total) by mouth 2 (two) times daily., Disp: 60 tablet, Rfl: 0    ergocalciferol, vitamin D2, (VITAMIN D ORAL), Take 1 tablet by mouth every other day., Disp: , Rfl:     fluocinolone acetonide oiL (DERMOTIC OIL) 0.01 % Drop, Place 3 drops in ear(s) 2 (two) times daily., Disp: 20 mL, Rfl: 3    fluocinonide (LIDEX) 0.05 % external solution, apply to affected area of scalp daily as needed for itching, scaling, Disp: 60 mL, Rfl: 3    fluorouraciL (EFUDEX) 5 % cream, Apply topically to the affected area once daily., Disp: 40 g, Rfl: 2    hydrocortisone 2.5 % cream, APPLY EXTERNALLY TO THE AFFECTED AREA TWICE DAILY FOR 10 DAYS, Disp: 30 g, Rfl: 0    inulin (FIBER GUMMIES) 2 gram Chew, Take by mouth once daily. 2 gummies a day, Disp: , Rfl:     ketoconazole (NIZORAL) 2 % cream, Apply 1 application  topically 2 (two) times daily. Apply to affected area up to 2 weeks, Disp: 30 g, Rfl: 2    lamoTRIgine (LAMICTAL) 100 MG tablet, Take 1 tablet (100 mg total) by mouth 2 (two) times daily. IF any RASH, STOP ALL Lamictal and Tell Psyc MD., Disp: 180 tablet, Rfl: 1    lancets (ONETOUCH DELICA PLUS LANCET) 33 gauge [facility], Use to test blood glucose one (1) time a day,, Disp: 100 each, Rfl: 5    latanoprost 0.005 % ophthalmic solution, Place 1 drop into both eyes every evening. (Patient taking differently: Place 1 drop into both eyes once daily.), Disp: 7.5 mL, Rfl: 3    levocetirizine (XYZAL) 5 MG tablet, Take 1 tablet (5 mg total) by mouth every evening., Disp: 30 tablet, Rfl: 11    LORazepam (ATIVAN) 0.5 MG tablet, Take 1 tablet (0.5 mg total) by mouth daily as needed (SIGNIFICANT ANXIETY)., Disp: 30 tablet, Rfl: 3    olopatadine (PATANASE) 0.6 % nasal spray, Use 1 spray by Each  Nostril route 2 (two) times daily. (Patient not taking: Reported on 10/28/2024), Disp: 30.5 g, Rfl: 5    ondansetron (ZOFRAN-ODT) 8 MG TbDL, Dissolve 1 tablet (8 mg total) on the tongue every 8 (eight) hours as needed (nausea)., Disp: 30 tablet, Rfl: 3    oxyCODONE-acetaminophen (PERCOCET) 5-325 mg per tablet, Take 1 tablet by mouth every 8 (eight) hours as needed for Pain., Disp: 30 tablet, Rfl: 0    permethrin (ELIMITE) 5 % cream, Apply from the neck down and leave on overnight; wash off in am and repeat in 1 week, Disp: 120 g, Rfl: 1    pregabalin (LYRICA) 200 MG Cap, Take 1 capsule (200 mg total) by mouth 2 (two) times daily., Disp: 60 capsule, Rfl: 1    rOPINIRole (REQUIP) 0.25 MG tablet, TAKE 1 TABLET(0.25 MG) BY MOUTH EVERY EVENING, Disp: 90 tablet, Rfl: 2    senna-docusate 8.6-50 mg (PERICOLACE) 8.6-50 mg per tablet, Take 2 tablets by mouth 2 (two) times daily. Continue while on pain medication, Disp: 60 tablet, Rfl: 0    tamsulosin (FLOMAX) 0.4 mg Cap, Take 1 capsule (0.4 mg total) by mouth once daily., Disp: 30 capsule, Rfl: 11    tamsulosin (FLOMAX) 0.4 mg Cap, Take 2 capsules (0.8 mg total) by mouth once daily., Disp: 60 capsule, Rfl: 11    [START ON 11/25/2024] tirzepatide 7.5 mg/0.5 mL PnIj, Inject 7.5 mg into the skin every 7 days., Disp: 4 Pen, Rfl: 2    ULTRA THIN LANCETS 30 gauge Misc, USE TO TEST BLOOD SUGAR EVERY DAY AS DIRECTED, Disp: 100 each, Rfl: 3    urea (CARMOL) 40 % Crea, Apply topically once daily., Disp: 225 g, Rfl: 2    Current Facility-Administered Medications:     oxymetazoline 0.05 % nasal spray 1 spray, 1 spray, Each Nostril, Q4H PRN, Melody Liu MD    Facility-Administered Medications Ordered in Other Visits:     0.9%  NaCl infusion, 500 mL, Intravenous, Continuous, Kolton Terrell MD    mupirocin 2 % ointment, , Nasal, On Call Procedure, Chong Padron MD, Given at 09/15/22 3978     Social History     Tobacco Use   Smoking Status Former    Current packs/day: 0.00    Types:  Cigarettes    Quit date: 2018    Years since quittin.6    Passive exposure: Never   Smokeless Tobacco Never        Review of patient's allergies indicates:  No Known Allergies        Psychotherapy:  Target symptoms: depression, anxiety , stressors, gen med  Why chosen therapy is appropriate versus another modality: relevant to diagnosis  Outcome monitoring methods: self-report, observation, checklist/rating scale  Therapeutic intervention type: insight oriented psychotherapy, supportive psychotherapy  Topics discussed/themes:  see Target symptoms  The patient's response to the intervention is accepting. The patient's progress toward treatment goals is fair.   Duration of intervention: 20 minutes.     ASSESSMENT:   Encounter Diagnoses   Name Primary?    ADHD (attention deficit hyperactivity disorder), inattentive type     Mood Disorder Unspecifed:  Depressed THO also has some mood elevation  (8of 13 on MDQ): ex: irritability, more self confident, thoughts race. more active)      Anxiety            Patient Instructions     PLAN:     Follow Up 2025 @ 10:30a /  IN CLINIC / EXTENDED 60 min     Psyc Meds Adjustments: (See orders for full detail):  renew as prior  tho:  Wellbutrin XL advanced to 300 mg  Encouraged him to get back into therapy     [employer] [facility] sent messager to Aixa Claudio PhD to let her know patient had interest to follow up with BEBP program     References:     Relaxation stress reduction workbook: AMI Davenport PhD ( used: $7-10)    Feeling Good Website: Ashwin Anderson MD / www.[email or web address] website (free) / alexey. PODCASTS    Anxiety &  phobia workbook by JORGE Carlisle PhD  (web retailers: used: $ 7-10)    [place]: Path to Better Sleep : https://www.veterantraining.[place].gov/insomnia/ (free)       Pt expressed appreciation for the visit today and did not have further question at this time though pt  was still informed to:     Call  if problems.    Call / Report Side Effects to [facility]     Encouraged to  follow up with primary care / Gen Med MD for continued monitoring of general health and wellness.    Understanding was expressed; and no further concerns nor questions were raised at this time.     Remember healthy self care:   eat right  attempt adequate rest   HANDWASHING / encourage such alexey. During this corona virus time   walk or light exercise within reason and as your general med team approves  read or explore any of reference materials / homework mentioned  reach out (I.e.,  connect with)  others who nuture and bring out best in you  avoid risky behaviors    Keep your appointments:    IF you  cannot make your appt THEN please call [phone] or go online (via My Chart michael) to reschedule.    It is the responsibility of the patient to reschedule an appointment if an appointment has been canceled or missed.    Avoid  alcohol and illicit substances.  Look for the positive.  All is often relative-seek balance  Call sooner if needed : [phone]   Call 911 or go to Emergency Room  (ER)  if  any acute concerns  >>  References:     Relaxation stress reduction workbook: AMI Davenport PhD ( used: $7-10)    Feeling Good Website: Ashwin Anderson MD / www.[email or web address] website (free) / alexey. PODCASTS    Anxiety &  phobia workbook by JORGE Carlisle PhD  (web retailers: used: $ 7-10)    [place]: Path to Better Sleep : https://www.veterantraining.[place].gov/insomnia/ (free)    [place] Quit with Us [place]: http://www.quitwithusla.org/    (and other resources as per counselor, if applicable)     Pt expressed appreciation for the visit today and did not have further question at this time though pt  was still informed to:     Call / Report Side Effects to Psyc MD     Encouraged to follow up with primary care / Gen Med MD for continued monitoring of general health and wellness.    Understanding was expressed; and no further concerns nor questions were raised at this time.     remember healthy self care:   eat right  attempt adequate rest    HANDWASHING / encourage such alexey. During this corona virus time   walk or light exercise within reason and as your general med team approves  read or explore any of reference materials / homework mentioned  reach out (I.e.,  connect with)  others who nuture and bring out best in you  avoid risky behaviors  keep your appointments  IF you  cannot make your appt THEN please call or go online to reschedule.  avoid  alcohol and illicit substances.  Look for the positive.  All is often relative-seek balance  Call [facility]  if questions : [phone]   Call 911 or go to Emergency Room  (ER)  if any acute concern    >> Background from intial psyc MD rome  <<    He reports  to male. No children      Has under grad and master in Political science  from [employer] /      Works data mngt [employer]   Excerpt from [facility] Suzi Sanches MD note of 4-  :      41 yo male with DM2, HLD, obesity, GERD, SHEIKH, LUBA, ADHD, asthma, spondylosis, hx of melanoma in situ presents for annual. Pt is new to me. Last PCP Dr Beck.     DM2: ozempic 0.5 mg weekly. Is interested in stopping medication as A1c controlled and has had increasing nausea. Not sure if ozempic is causing it since he has been on it for years.   HLD: not on medication  LUBA: uses cpap  GERD: nexium 40 mg bid  Cervical spondylosis with myelopathy: continues to have chronic neck pain with radiation down both arms (now L side increased) also endorses weakness. Does see a spine surgeon. Reports he was told to also r/o rheumatoid arthritis. Pt denies having joint swelling   ADHD: on focalin. Sees psychiatry.   Anxiety: reports increased anxiety over the past few months. Having trouble sleeping. Has not been on SSRI      He was referred in as having started Lexapro 10 mg April 2023. Says helpnig some tho clearly still cites anxiety.      In past saw [facility] R Pregeant Psyc NP / of Excerpt 1-  Psyc ntake:     Patient with hx of  "pre-glaucoma (eye drops for this), DM type 2 (well controlled with Ozempic), asthma (intermittent, mild), HLD, GERD, insomnia, sleep apnea, ADHD, lumbar discectomy, chronic back pain (managed with gabapentin), ozempic for weight loss.       Reports had previously been a patient of Dr. Dayana Robertson for past 12 to 13 years but was referred to this provider due to Dr. Robertson's FDC.      Patient reports a long history of ADHD, had trialed several medications per communications with Dr. Robertson however Focalin has been the most effective lately. Stopped this for a short while, but since working from home the need for this medication has increased.      Reports anxiety has been more increased since being depleted due to work constraints. Reports some worry late at night with rumination. Occurring randomly but "I do worry a lot."     Reports "I have a compulsion to control and things need to be the way I want them to be."      Also long history of LUBA, insomnia and recently completed BEBP program      Reports been implementing sleep hygiene via BEBP and CBT.      Reports 50# weight loss - this has helped with sleep apnea (settings have been reduced since losing weight).      Discussed to resume focalin IR at [facility] to [facility] pending UDS and CS form completion.     Denies SI/HI, AVH, racing thoughts, substance abuse               "

## 2024-11-20 NOTE — TELEPHONE ENCOUNTER
The patient is wanting to increase his dosage on Mounjaro.  Please see his [email or web address] message for more detailed explanation.

## 2024-11-22 DIAGNOSIS — H40.053 OCULAR HYPERTENSION, BILATERAL: ICD-10-CM

## 2024-11-23 RX ORDER — LATANOPROST 50 UG/ML
1 SOLUTION/ DROPS OPHTHALMIC NIGHTLY
Qty: 7.5 ML | Refills: 3 | Status: SHIPPED | OUTPATIENT
Start: 2024-11-23

## 2024-11-25 ENCOUNTER — TELEPHONE (OUTPATIENT)
Dept: PAIN MEDICINE | Facility: CLINIC | Age: 44
End: 2024-11-25
Payer: COMMERCIAL

## 2024-11-26 ENCOUNTER — PATIENT MESSAGE (OUTPATIENT)
Dept: UROLOGY | Facility: CLINIC | Age: 44
End: 2024-11-26
Payer: COMMERCIAL

## 2024-11-29 NOTE — PRE-PROCEDURE INSTRUCTIONS
Unable to reach pt via phone.  Left voicemail with arrival time also informing pt of need for responsible  accompaniment and instructing pt to follow pre-procedure instructions provided via [facility] portal.  The following message was sent to pt's portal.        Dear Hermelindo,     Please read over the following pre-procedure instructions in it's entirety as there is helpful information here to get you well prepared for your upcoming procedure.     You are scheduled for a procedure with Dr. Patel on 12/2/2024.     [facility] at the corner of [facility] and [facility]. It is in the [employer] [place] next to [employer]. The address is: [address] [place] [address]. Take the elevator to the 2nd floor.       Registration check in time: 7:00 am  Scheduled procedure time: 8:15 am     If you are receiving sedation, you CANNOT drive yourself and must have a responsible friend or family member (no rideshare) to drive you home.        You should take any medications that you routinely take for blood pressure, heart medications, thyroid, cholesterol, etc.      The fasting restrictions are dependent on whether or not you are receiving sedation. Sedation is not available for all procedures.      Your fasting instructions/Sedation type are as follow:  IV sedation.   Nothing to eat after midnight the night prior to procedure.   Patients are encouraged to consume clear liquids up to 2 hours prior to scheduled arrival time.  -Clear liquids include Gatorade, water, soda, black coffee or tea (no milk or creamer), and clear juices. - Clear liquids do NOT include anything with pulp or food particles (chicken broth, ice cream, yogurt, Jello, etc.) You CANNOT drive yourself and must have a .              If you are on blood thinners, you need to follow the anticoagulation instructions that had been discussed previously. You should only stop the blood thinners if it was approved by your primary care physician  or your cardiologist. In the event that you are not able to stop your blood thinners, a blood thinner was not listed on your medication list, or we were not able to get clearance from your cardiologist, then the procedure may have to be postponed/canceled.      IF you were told to stop your blood thinners, this is how long you should generally hold some of the more common ones. Remember that stopping blood thinners is only necessary for certain procedures. If you are unsure of your instructions, please call us.   Aspirin - 5 days  Plavix/Clopidogrel - 7 days  Warfarin / Coumadin - 5 days  Eliquis - 3 days  Pradaxa/Dabigatran - 4 days  Xarelto/Rivaroxaban - 3 days        HOLD all non-insulin injections (shots) until after surgery (Ozempic, Mounjaro, Trulicity, Victoza, Byetta, Wegovy and Adlyxin) (Total of 7 days prior)        If you are a diabetic, do not take your medication if you will be fasting, but bring it with you. Please plan on being here for roughly 2-3 hours.     Please call us if you have been sick (running fever, having any flu-like symptoms) or have been taking ANTIBIOTICS in the past 2 weeks or had any outpatient procedures other than with us (colonoscopy, endoscopy, OBGYN, dental, etc.).      If you have been previously COVID positive, you will need to hold off on your procedure until you are symptom free for 10 days. If you did not have any symptoms, you can have your procedure 10 days from your positive test result.         On the morning of your procedure:  *HOLD ALL VITAMINS, MINERALS, HERBS (INCLUDING HERBAL TEAS) AND SUPPLEMENTS  *SHOWER WITH ANTIBACTERIAL SOAP (EX. DIAL) NIGHT BEFORE AND MORNING OF PROCEDURE  *DO NOT APPLY ANY LOTIONS, OILS, POWDERS, PERFUME/COLOGNE, OINTMENTS, GELS, CREAMS, MAKEUP OR DEODORANT TO YOUR SKIN MORNING OF PROCEDURE  *LEAVE JEWELRY AND ANY VALUABLES AT HOME  *WEAR LOOSE COMFORTABLE CLOTHING         Please reply to this portal message as receipt of delivery.      Thank you,  [facility]  [place], LPN [facility]  Pre-Admit

## 2024-12-02 ENCOUNTER — TELEPHONE (OUTPATIENT)
Dept: PAIN MEDICINE | Facility: CLINIC | Age: 44
End: 2024-12-02
Payer: COMMERCIAL

## 2024-12-02 ENCOUNTER — HOSPITAL ENCOUNTER (OUTPATIENT)
Facility: HOSPITAL | Age: 44
Discharge: HOME | End: 2024-12-02
Attending: STUDENT IN AN ORGANIZED HEALTH CARE EDUCATION/TRAINING PROGRAM | Admitting: STUDENT IN AN ORGANIZED HEALTH CARE EDUCATION/TRAINING PROGRAM
Payer: COMMERCIAL

## 2024-12-02 VITALS
WEIGHT: 236 LBS | HEART RATE: 69 BPM | HEIGHT: 65 IN | DIASTOLIC BLOOD PRESSURE: 58 MMHG | OXYGEN SATURATION: 95 % | TEMPERATURE: 98 F | SYSTOLIC BLOOD PRESSURE: 118 MMHG | BODY MASS INDEX: 39.32 KG/M2 | RESPIRATION RATE: 16 BRPM

## 2024-12-02 DIAGNOSIS — G89.29 CHRONIC PAIN: ICD-10-CM

## 2024-12-02 DIAGNOSIS — M54.16 LUMBAR RADICULOPATHY, CHRONIC: Primary | ICD-10-CM

## 2024-12-02 LAB — POCT GLUCOSE: 115 MG/DL (ref 70–110)

## 2024-12-02 PROCEDURE — 82962 GLUCOSE BLOOD TEST: CPT | Performed by: STUDENT IN AN ORGANIZED HEALTH CARE EDUCATION/TRAINING PROGRAM

## 2024-12-02 PROCEDURE — 62323 NJX INTERLAMINAR LMBR/SAC: CPT | Mod: ,,, | Performed by: STUDENT IN AN ORGANIZED HEALTH CARE EDUCATION/TRAINING PROGRAM

## 2024-12-02 PROCEDURE — 63600175 PHARM REV CODE 636 W HCPCS: Performed by: STUDENT IN AN ORGANIZED HEALTH CARE EDUCATION/TRAINING PROGRAM

## 2024-12-02 PROCEDURE — 25500020 PHARM REV CODE 255: Performed by: STUDENT IN AN ORGANIZED HEALTH CARE EDUCATION/TRAINING PROGRAM

## 2024-12-02 PROCEDURE — 62323 NJX INTERLAMINAR LMBR/SAC: CPT | Performed by: STUDENT IN AN ORGANIZED HEALTH CARE EDUCATION/TRAINING PROGRAM

## 2024-12-02 RX ORDER — DEXAMETHASONE SODIUM PHOSPHATE 10 MG/ML
INJECTION INTRAMUSCULAR; INTRAVENOUS
Status: DISCONTINUED | OUTPATIENT
Start: 2024-12-02 | End: 2024-12-02 | Stop reason: HOSPADM

## 2024-12-02 RX ORDER — LIDOCAINE HYDROCHLORIDE 20 MG/ML
INJECTION, SOLUTION EPIDURAL; INFILTRATION; INTRACAUDAL; PERINEURAL
Status: DISCONTINUED | OUTPATIENT
Start: 2024-12-02 | End: 2024-12-02 | Stop reason: HOSPADM

## 2024-12-02 RX ORDER — FENTANYL CITRATE 50 UG/ML
INJECTION, SOLUTION INTRAMUSCULAR; INTRAVENOUS
Status: DISCONTINUED | OUTPATIENT
Start: 2024-12-02 | End: 2024-12-02 | Stop reason: HOSPADM

## 2024-12-02 RX ORDER — SODIUM CHLORIDE 9 MG/ML
INJECTION, SOLUTION INTRAVENOUS CONTINUOUS
Status: DISCONTINUED | OUTPATIENT
Start: 2024-12-02 | End: 2024-12-02 | Stop reason: HOSPADM

## 2024-12-02 RX ORDER — MIDAZOLAM HYDROCHLORIDE 1 MG/ML
INJECTION INTRAMUSCULAR; INTRAVENOUS
Status: DISCONTINUED | OUTPATIENT
Start: 2024-12-02 | End: 2024-12-02 | Stop reason: HOSPADM

## 2024-12-02 NOTE — H&P
HPI  Patient presenting for Procedure(s) (LRB):  NELI L4-5 (N/A)     Patient on Anti-coagulation No    No health changes since previous encounter    Past Medical History:   Diagnosis Date    ADD (attention deficit disorder) 05/09/2012    Allergy     Anxiety 04/12/2023    Asthma 05/09/2012    Cervical disc disorder with radiculopathy, unspecified cervical region 09/14/2021    Cervical radiculopathy 05/22/2023    Cervical spondylosis with myelopathy 06/29/2022    Eczema     Esophageal ulcer     GERD (gastroesophageal reflux disease) 05/09/2012    Glaucoma     Kidney stones 05/2014    Lumbar disc disease 05/09/2012    Lumbar herniated disc 05/09/2012    Malignant melanoma of skin, unspecified 01/06/2022    in situ right mid back    Melanoma 01/2022    in situ R mid back     LUBA (obstructive sleep apnea)     Radiculopathy, lumbar region 09/14/2021    Renal calculi 05/09/2012    Type 2 diabetes mellitus without complication, without long-term current use of insulin 11/16/2020     Past Surgical History:   Procedure Laterality Date    ANTERIOR CERVICAL DISCECTOMY W/ FUSION N/A 9/12/2024    Procedure: DISCECTOMY, SPINE, CERVICAL, ANTERIOR APPROACH, WITH FUSION C5-6, C6-7;  Surgeon: Naseem Mcnamara DO;  Location: [address];  Service: Neurosurgery;  Laterality: N/A;    APPENDECTOMY  2006    CARPAL TUNNEL RELEASE Right 09/15/2022    Procedure: RELEASE, CARPAL TUNNEL;  Surgeon: Christen Marshall MD;  Location: [facility] OR;  Service: Orthopedics;  Laterality: Right;    CAUTERY OF TURBINATES Bilateral 7/15/2024    Procedure: CAUTERIZATION, MUCOSA, NASAL TURBINATE;  Surgeon: Melody Liu MD;  Location: [facility] OR;  Service: ENT;  Laterality: Bilateral;    COLONOSCOPY  01/22/2019    internal hemorrhoids, o/w normal - repeat at age 52    COLONOSCOPY N/A 12/21/2023    Procedure: COLONOSCOPY;  Surgeon: Teo Johnson MD;  Location: [facility] ENDO;  Service: Endoscopy;  Laterality: N/A;    CORRECTION OF HAMMER TOE Left 8/14/2023     Procedure: CORRECTION, HAMMER TOE--  basic foot tray as well as a Reese microfree/microchoice tray. I also asked [place] to bring an MIS mati.;  Surgeon: Ankush Back DPM;  Location: [facility] OR;  Service: Podiatry;  Laterality: Left;  reese drill, sagittal saw, foot set    CYSTOSCOPY  7/5/2023    Procedure: CYSTOSCOPY;  Surgeon: Chuckie Hall MD;  Location: [facility] OR [facility] FLR;  Service: Urology;;    CYSTOSCOPY N/A 9/26/2024    Procedure: CYSTOSCOPY;  Surgeon: Jay Walter MD;  Location: [facility] OR;  Service: Urology;  Laterality: N/A;  30 minutes    EPIDURAL STEROID INJECTION N/A 02/10/2021    Procedure: CERVICAL C6/7 NELI DIRECT REFERRAL;  Surgeon: Michi Escamilla MD;  Location: [facility] [place];  Service: Pain Management;  Laterality: N/A;  NEEDS CONSENT    EPIDURAL STEROID INJECTION INTO CERVICAL SPINE N/A 6/28/2023    Procedure: Injection-steroid-epidural-cervical C7-T1;  Surgeon: Lorraine Patel DO;  Location: [facility] PAIN MANAGEMENT;  Service: Pain Management;  Laterality: N/A;  diabetic    EPIDURAL STEROID INJECTION INTO CERVICAL SPINE N/A 11/3/2023    Procedure: cervical NELI C7-T1;  Surgeon: Kolton Terrell MD;  Location: [facility] PAIN MANAGEMENT;  Service: Pain Management;  Laterality: N/A;  diabetic    EPIDURAL STEROID INJECTION INTO CERVICAL SPINE N/A 4/17/2024    Procedure: VENECIA C7/T1;  Surgeon: Lorraine Patel DO;  Location: [facility] PAIN [facility]NA[facility]EMENT;  Service: Pain Management;  Laterality: N/A;  20mins- Ozempic 7d- no ac    EPIDURAL STEROID INJECTION INTO LUMBAR SPINE N/A 9/27/2023    Procedure: L4-5 NELI;  Surgeon: Tirso Pickering MD;  Location: [facility]IN [facility];  Service: Pain Management;  Laterality: N/A;  15 mins    EPIDURAL STEROID INJECTION INTO LUMBAR SPINE N/A 3/6/2024    Procedure: L4-5 IL NELI;  Surgeon: Lorraine Patel DO;  Location: [facility]IN [facility];  Service: Pain Management;  Laterality: N/A;  20 mins    EPIDURAL STEROID INJECTION INTO LUMBAR SPINE N/A 6/3/2024    Procedure:  NELI L4-5;  Surgeon: Lorraine Patel DO;  Location: [facility] PAIN MANAGEMENT;  Service: Pain Management;  Laterality: N/A;  20mins-no ac Ozempic 7d    EPIDURAL STEROID INJECTION INTO LUMBAR SPINE N/A 8/26/2024    Procedure: L4-5 NELI [place] Left;  Surgeon: Lorraine Patel DO;  Location: [facility] PAIN MANAGEMENT;  Service: Pain Management;  Laterality: N/A;  20 mins No AC - Ozempic 7 days    ESOPHAGOGASTRODUODENOSCOPY  01/22/2019    LA grade B esophagitis; esophageal stenosis- dilated; gastritis; H pylori pathology negative    ESOPHAGOGASTRODUODENOSCOPY N/A 05/18/2021    Procedure: EGD (ESOPHAGOGASTRODUODENOSCOPY);  Surgeon: Mariana Hector MD;  Location: [facility];  Service: Endoscopy;  Laterality: N/A;    ESOPHAGOGASTRODUODENOSCOPY N/A 12/21/2023    Procedure: EGD (ESOPHAGOGASTRODUODENOSCOPY);  Surgeon: Teo Johnson MD;  Location: [facility];  Service: Endoscopy;  Laterality: N/A;    ESOPHAGOGASTRODUODENOSCOPY N/A 2/22/2024    Procedure: EGD (ESOPHAGOGASTRODUODENOSCOPY);  Surgeon: Teo Johnson MD;  Location: [facility];  Service: Endoscopy;  Laterality: N/A;    EXCISION OF GANGLION OF WRIST Right 09/15/2022    Procedure: EXCISION, GANGLION CYST, WRIST;  Surgeon: Christen Marshall MD;  Location: [facility];  Service: Orthopedics;  Laterality: Right;    FLEXOR TENDON REPAIR Right 09/15/2022    Procedure: FLEXOR TENOSYNOVECTOMY;  Surgeon: Christen Marshall MD;  Location: [facility];  Service: Orthopedics;  Laterality: Right;    HAND ARTHROTOMY Right 09/15/2022    Procedure: ARTHROTOMY, HAND RADIOCARPAL;  Surgeon: Christen Marshall MD;  Location: [facility];  Service: Orthopedics;  Laterality: Right;  Radiocarpal    INCISION, CONTRACTURE, BLADDER NECK, TRANSURETHRAL N/A 11/8/2024    Procedure: INCISION, CONTRACTURE, BLADDER NECK, TRANSURETHRAL;  Surgeon: Jay Walter MD;  Location: [facility] OR;  Service: Urology;  Laterality: N/A;  1 hour    INJECTION OF JOINT Right 11/29/2023    Procedure: right hip IA injection and  Right GTB injection;  Surgeon: Lorraine Patel DO;  [facility]: [facility];  Service: Pain Management;  Laterality: Right;    INJECTION OF STEROID Left 09/15/2022    Procedure: INJECTION, STEROID LEFT WRIST AND LEFT LATERAL ELBOW;  Surgeon: Christen Marshall MD;  Location: [facility];  Service: Orthopedics;  Laterality: Left;  Left Carpal Tunnel CSI    LASER LITHOTRIPSY Left 7/5/2023    Procedure: LITHOTRIPSY, USING LASER;  Surgeon: Chuckie Hall MD;  Location: [facility] OR 1ST FLR;  Service: Urology;  Laterality: Left;    LUMBAR LAMINECTOMY  2010    LUMBAR LAMINECTOMY WITH DISCECTOMY Left 09/20/2021    Procedure: LAMINECTOMY, SPINE, LUMBAR, WITH DISCECTOMY;  Surgeon: Naseem Mcnamara DO;  Location: [facility] OR 2ND FLR;  Service: Neurosurgery;  Laterality: Left;  MIS L3-4    NASAL SEPTOPLASTY N/A 7/15/2024    Procedure: SEPTOPLASTY, NOSE;  Surgeon: Melody Liu MD;  Location: [facility] OR;  Service: ENT;  Laterality: N/A;    PH MONITORING, ESOPHAGUS, WIRELESS, (OFF REFLUX MEDS) N/A 12/21/2023    Procedure: PH MONITORING, ESOPHAGUS, WIRELESS, (OFF REFLUX MEDS);  Surgeon: Teo Johnson MD;  Location: [facility];  Service: Endoscopy;  Laterality: N/A;    RECONSTRUCTION OF LIGAMENT Left 12/5/2023    Procedure: RECONSTRUCTION, LIGAMENT LATERAL COLLATERAL;  Surgeon: Christen Marshall MD;  Location: [facility] OR;  Service: Orthopedics;  Laterality: Left;    REPAIR OF EXTENSOR TENDON Left 12/5/2023    Procedure: REPAIR, TENDON, EXTENSOR;  Surgeon: Christen Marshall MD;  Location: [facility];  Service: Orthopedics;  Laterality: Left;    ROBOT-ASSISTED REPAIR OF HIATAL HERNIA N/A 3/14/2024    Procedure: ROBOTIC REPAIR, HERNIA, HIATAL;  Surgeon: Cody Winn MD;  Location: [facility];  Service: General;  Laterality: N/A;    TYMPANOSTOMY TUBE PLACEMENT      URETERAL STENT PLACEMENT Left 7/5/2023    Procedure: INSERTION, STENT, URETER;  Surgeon: Chuckie Hall MD;  Location: [facility] 1ST FLR;  Service: Urology;  Laterality: Left;     URETEROSCOPIC REMOVAL OF URETERIC CALCULUS Left 7/5/2023    Procedure: REMOVAL, CALCULUS, URETER, URETEROSCOPIC;  Surgeon: Chuckie Hall MD;  Location: [facility] OR [address] [place] [address];  Service: Urology;  Laterality: Left;    URETEROSCOPY Left 7/5/2023    Procedure: URETEROSCOPY;  Surgeon: Chuckie Hall MD;  Location: [facility] OR [address] [place] [address];  Service: Urology;  Laterality: Left;     Review of patient's allergies indicates:  No Known Allergies   Current Facility-Administered Medications   Medication    0.9% NaCl infusion    0.9% NaCl infusion     Facility-Administered Medications Ordered in Other Encounters   Medication    0.9%  NaCl infusion    mupirocin 2 % ointment       PMHx, PSHx, Allergies, Medications reviewed in epic    ROS negative except pain complaints in HPI    OBJECTIVE:    There were no vitals taken for this visit.    PHYSICAL EXAMINATION:    GENERAL: Well appearing, in no acute distress, alert and oriented x3.  PSYCH:  Mood and affect appropriate.  SKIN: Skin color, texture, turgor normal, no rashes or lesions which will impact the procedure.  CV: RRR with palpation of the radial artery.  PULM: No evidence of respiratory difficulty, symmetric chest rise. Clear to auscultation.  NEURO: Cranial nerves grossly intact.    Plan:    Proceed with procedure as planned Procedure(s) (LRB):  NELI L4-5 (N/A)    Lorraine Patel  12/02/2024

## 2024-12-02 NOTE — DISCHARGE INSTRUCTIONS
[facility] [place]  Dr. Lorraine Patel  Messaging service # [phone]    POST-PROCEDURE INSTRUCTIONS:    Today you had an injection that included a steroid medications.  The steroid may or may not have been mixed with a local anesthetic when it was injected.   If the injection was in the neck, you may feel some pressure, numbness, or slight weakness in the arm after the procedure for a short period of time (this is a normal response), if this persists for longer than 1 day please contact our office or go to the emergency room.  If the injection was in the low back, you may feel some pressure, numbness, or slight weakness in the leg after the procedure for a short period of time (this is a normal response), if this persists for longer than 1 day please contact our office or go to the emergency room.  You may get side effects from the steroid.  This is not uncommon.  Symptoms include: elevated blood sugar, elevated blood pressure, headache, flushing, nausea, insomnia.  These symptoms are transient and will resolve within 1-3 days.  If symptoms last longer than this please contact our office or head to the emergency room.  Steroid medications can take anywhere from 3-14 days to take effect (rarely longer).  You may notice that your pain worsens for a short period of time after the injection, this would not be unusual due to the pressure and trauma from the needle.    If you do not have a follow up appointment scheduled, please contact my office (or the office of the physician who referred you for the procedure) to get a post-procedure follow up scheduled 2-4 weeks after the procedure.  This can be done as a virtual visit if that is more convenient for you.      What you need to do:    Keep a record of your response to the injection you had today.    How much relief did you get?   When did the relief start and how long did it last?  Were you able to decrease the use of any of your pain  medications?  Were you able to increase your level of activity?  How long did the relief last?    What to watch out for:    If you experience any of the following symptoms after your procedure, please notify the messaging service immediately (see above for contact information):   fever (increased oral temperature)   bleeding or swelling at the injection site,    drainage, rash or redness at the injection site    possible signs of infection    increased pain at the injection site   worsening of your usual pain   severe headache   new or worsening numbness    new arm and/or leg weakness, or    changes in bowel and/or bladder function: urinating or defecating on yourself and not knowing that you did it.    PLEASE FOLLOW ALL INSTRUCTIONS CAREFULLY     Do not engage in strenuous activity (e.g., lifting or pushing heavy objects or repeated bending) for 24 hours.     Do not take a bath, swim or use Jacuzzi for 24 hours after procedure. (A shower is fine).   Remove any Band-Aids when you get home.    Use cold/ice, as needed for comfort.  We recommend the use of cold therapy alternating on for 20 minutes, off for 20 minutes.    Do not apply direct heat (heating pad or heat packs) to the injection site for 24 hours.     Resume your usual medications, unless instructed otherwise by your Pain Physician.     If you are on warfarin (Coumadin) or other blood thinner, resume this medication as instructed by your prescribing Physician.    IF AT ANY POINT YOU ARE VERY CONCERNED ABOUT YOUR SYMPTOMS, PLEASE GO TO THE EMERGENCY ROOM.    If you develop worsening pain, weakness, numbness, lose bowel or bladder control (i.e., having an accident where you did not even know you had to go to the bathroom and suddenly noticed you soiled yourself), saddle anesthesia (a loss of sensation restricted to the area of the buttocks, anus and between the legs -- i.e., those parts of your body that would touch a saddle if you were sitting on one) you  need to go immediately to the emergency department for evaluation and treatment.    ----------------------------------------------------------------------------------------------------------------------------------------------------------------  If you received Sedation please read the following instructions:  POST SEDATION INSTRUCTIONS    Today you received intravenous medication (also known as sedation) that was used to help you relax and/or decrease discomfort during your procedure. This medication will be acting in your body for the next 24 hours, so you might feel a little tired or sleepy. This feeling will slowly wear off.   Common side effects associated with these medications include: drowsiness, dizziness, sleepiness, confusion, feeling excited, difficulty remembering things, lack of steadiness with walking or balance, loss of fine muscle control, slowed reflexes, difficulty focusing, and blurred vision.  Some over-the-counter and prescription medications (e.g., muscle relaxants, opioids, mood-altering medications, sedatives/hypnotics, antihistamines) can interact with the intravenous medication you received and cause an increased risk of the side effects listed above in addition to other potentially life threatening side effects. Use extreme caution if you are taking such medications, and consult with your Pain Physician or prescribing physician if you have any questions.  For the next 12-24 hours:    DO NOT--Drive a car, operate machinery or power tools   DO NOT--Drink any alcoholic beverages (not even beer), they may dangerously increase the risk of side effects.    DO NOT--Make any important legal or business decisions or sign important documents.  We advise you to have someone to assist you at home. Move slowly and carefully. Do not make sudden changes in position. Be aware of dizziness or light-headedness and move accordingly.   If you seek medical treatment within 24 hours, let the nurse or doctor  caring for you know that you have received the above medications. If you have any questions or concerns related to your sedation or treatment today please contact us.

## 2024-12-02 NOTE — OP NOTE
Lumbar Interlaminar Epidural Steroid Injection under Fluoroscopic Guidance    The procedure, risks, benefits, and options were discussed with the patient. There are no contraindications to the procedure. The patent expressed understanding and agreed to the procedure. Informed written consent was obtained prior to the start of the procedure and can be found in the patient's chart.    PATIENT NAME: Hermelindo Garza III   MRN: [id number]     DATE OF PROCEDURE: 12/02/2024    PROCEDURE: Lumbar Interlaminar Epidural Steroid Injection L5/S1 under Fluoroscopic Guidance    PRE-OP DIAGNOSIS: Lumbar radiculopathy, chronic [M54.16]  Degeneration of intervertebral disc of lumbar region with lower extremity pain [M51.361] Lumbar radiculopathy [M54.16]    POST-OP DIAGNOSIS: Same    PHYSICIAN: Lorraine Patel DO    ASSISTANTS: None     MEDICATIONS INJECTED: Preservative-free Decadron 10mg with 4cc of preservative free normal saline    LOCAL ANESTHETIC INJECTED: Xylocaine 2%     SEDATION: Versed 2mg and Fentanyl 100mcg                                                                                                                                                                                     Conscious sedation ordered by M.D. Patient re-evaluation prior to administration of conscious sedation. No changes noted in patient's status from initial evaluation. The patient's vital signs were monitored by RN and patient remained hemodynamically stable throughout the procedure.    Event Time In   Sedation Start 0821       ESTIMATED BLOOD LOSS: None    COMPLICATIONS: None    TECHNIQUE: Time-out was performed to identify the patient and procedure to be performed. With the patient laying in a prone position, the surgical area was prepped and draped in the usual sterile fashion using ChloraPrep and a fenestrated drape. The level was determined under fluoroscopy guidance. Skin anesthesia was achieved by injecting Lidocaine 2% over the  injection site. The interlaminar space was then approached with a 20 gauge,  3.5 inch Tuohy needle that was introduced under fluoroscopic guidance in the AP, lateral and/or contralateral oblique imaging. Once the Ligamentum flavum was encountered loss of resistance to saline was used to enter the epidural space. With positive loss of resistance and negative aspiration for CSF or Blood, contrast dye Omnipaque (300mg/mL) was injected to confirm placement and there was no vascular runoff. 5 mL of the medication mixture listed above was injected slowly. Displacement of the radio opaque contrast after injection of the medication confirmed that the medication went into the area of the epidural space. The needles were removed and bleeding was nil. A sterile dressing was applied. No specimens collected. The patient tolerated the procedure well.     The patient was monitored after the procedure in the recovery area. They were given post-procedure and discharge instructions to follow at home. The patient was discharged in a stable condition.      Lorraine Patel DO

## 2024-12-02 NOTE — PLAN OF CARE
Hermelindo Garza has met all discharge criteria from Phase II. Vital Signs are stable, ambulating  without difficulty. Discharge instructions given, patient verbalized understanding. Discharged from facility via wheelchair in stable condition.

## 2024-12-02 NOTE — DISCHARGE SUMMARY
,Discharge Note  Short Stay      SUMMARY     Admit Date: 12/2/2024    Attending Physician: Lorraine Patel      Discharge Physician: Lorraine Patel      Discharge Date: 12/2/2024 8:33 AM    Procedure(s) (LRB):  NELI L5-S1 (N/A)    Final Diagnosis: Lumbar radiculopathy, chronic [M54.16]  Degeneration of intervertebral disc of lumbar region with lower extremity pain [M51.361]    Disposition: Home or self care    Patient Instructions:   Current Discharge Medication List        CONTINUE these medications which have NOT CHANGED    Details   acetaminophen (TYLENOL) 650 MG TbSR Take 1,300 mg by mouth daily as needed.      albuterol (PROVENTIL/VENTOLIN HFA) 90 mcg/actuation inhaler INHALE 2 PUFFS INTO THE LUNGS EVERY 6 HOURS AS NEEDED WHEEZING  Qty: 25.5 g, Refills: 3    Associated Diagnoses: Mild intermittent asthma without complication      atorvastatin (LIPITOR) 20 MG tablet Take 1 tablet (20 mg total) by mouth once daily.  Qty: 90 tablet, Refills: 3    Associated Diagnoses: Hyperlipidemia associated with type 2 diabetes mellitus      buPROPion (WELLBUTRIN XL) 300 MG 24 hr tablet Take 1 tablet (300 mg total) by mouth once daily.  Qty: 90 tablet, Refills: 1      clobetasoL (TEMOVATE) 0.05 % cream Apply to affected area 2 (two) times daily as needed for flare  Qty: 30 g, Refills: 1    Associated Diagnoses: Dermatitis      cyclobenzaprine (FLEXERIL) 10 MG tablet Take 1 tablet (10 mg total) by mouth 3 (three) times daily as needed for Muscle spasms.  Qty: 90 tablet, Refills: 2    Associated Diagnoses: Lumbar radiculopathy, chronic; Degeneration of intervertebral disc of lumbar region with lower extremity pain; Myofascial pain      !! dexmethylphenidate (FOCALIN) 10 MG tablet Take 1 tablet (10 mg total) by mouth 2 (two) times daily.  Qty: 60 tablet, Refills: 0    Associated Diagnoses: ADHD (attention deficit hyperactivity disorder), inattentive type      !! dexmethylphenidate (FOCALIN) 10 MG tablet Take 1 tablet (10 mg  total) by mouth 2 (two) times daily.  Qty: 60 tablet, Refills: 0    Associated Diagnoses: ADHD (attention deficit hyperactivity disorder), inattentive type      !! dexmethylphenidate (FOCALIN) 10 MG tablet Take 1 tablet (10 mg total) by mouth 2 (two) times daily.  Qty: 60 tablet, Refills: 0    Associated Diagnoses: ADHD (attention deficit hyperactivity disorder), inattentive type      ergocalciferol, vitamin D2, (VITAMIN D ORAL) Take 1 tablet by mouth every other day.      fluocinolone acetonide oiL (DERMOTIC OIL) 0.01 % Drop Place 3 drops in ear(s) 2 (two) times daily.  Qty: 20 mL, Refills: 3    Associated Diagnoses: Chronic otitis externa of both ears, unspecified type      fluorouraciL (EFUDEX) 5 % cream Apply topically to the affected area once daily.  Qty: 40 g, Refills: 2    Associated Diagnoses: Xerosis of skin; Corn or callus      hydrocortisone 2.5 % cream APPLY EXTERNALLY TO THE AFFECTED AREA TWICE DAILY FOR 10 DAYS  Qty: 30 g, Refills: 0    Associated Diagnoses: Allergic contact dermatitis due to plant      inulin (FIBER GUMMIES) 2 gram Chew Take by mouth once daily. 2 gummies a day      ketoconazole (NIZORAL) 2 % cream Apply 1 application  topically 2 (two) times daily. Apply to affected area up to 2 weeks  Qty: 30 g, Refills: 2    Associated Diagnoses: Seborrheic dermatitis of scalp      lamoTRIgine (LAMICTAL) 100 MG tablet Take 1 tablet (100 mg total) by mouth 2 (two) times daily. IF any RASH, STOP ALL Lamictal and Tell Psyc MD.  Qty: 180 tablet, Refills: 1    Associated Diagnoses: Unspecified mood (affective) disorder      latanoprost 0.005 % ophthalmic solution Place 1 drop into both eyes every evening.  Qty: 7.5 mL, Refills: 3    Associated Diagnoses: Ocular hypertension, bilateral      levocetirizine (XYZAL) 5 MG tablet Take 1 tablet (5 mg total) by mouth every evening.  Qty: 30 tablet, Refills: 11      LORazepam (ATIVAN) 0.5 MG tablet Take 1 tablet (0.5 mg total) by mouth daily as needed  (SIGNIFICANT ANXIETY).  Qty: 30 tablet, Refills: 3    Associated Diagnoses: Anxiety      ondansetron (ZOFRAN-ODT) 8 MG TbDL Dissolve 1 tablet (8 mg total) on the tongue every 8 (eight) hours as needed (nausea).  Qty: 30 tablet, Refills: 3      pregabalin (LYRICA) 200 MG Cap Take 1 capsule (200 mg total) by mouth 2 (two) times daily.  Qty: 60 capsule, Refills: 1      rOPINIRole (REQUIP) 0.25 MG tablet TAKE 1 TABLET(0.25 MG) BY MOUTH EVERY EVENING  Qty: 90 tablet, Refills: 2    Associated Diagnoses: Restless leg      !! tamsulosin (FLOMAX) 0.4 mg Cap Take 1 capsule (0.4 mg total) by mouth once daily.  Qty: 30 capsule, Refills: 11      urea (CARMOL) 40 % Crea Apply topically once daily.  Qty: 225 g, Refills: 2    Associated Diagnoses: Xerosis of skin; Corn or callus      blood sugar diagnostic (TRUE METRIX GLUCOSE TEST STRIP) Strp Use to test blood glucose one (1) time a day,  Qty: 100 strip, Refills: 3    Comments: to be used with insurance-preferred brand of glucometer/supplies.  Associated Diagnoses: Diabetes mellitus without complication      blood-glucose meter Misc use as directed  Qty: 1 each, Refills: 0      fluocinonide (LIDEX) 0.05 % external solution apply to affected area of scalp daily as needed for itching, scaling  Qty: 60 mL, Refills: 3    Associated Diagnoses: Seborrheic dermatitis, unspecified      !! lancets (ONETOUCH DELICA PLUS LANCET) 33 gauge Misc Use to test blood glucose one (1) time a day,  Qty: 100 each, Refills: 5      olopatadine (PATANASE) 0.6 % nasal spray Use 1 spray by Each Nostril route 2 (two) times daily.  Qty: 30.5 g, Refills: 5      oxyCODONE-acetaminophen (PERCOCET) 5-325 mg per tablet Take 1 tablet by mouth every 8 (eight) hours as needed for Pain.  Qty: 30 tablet, Refills: 0    Comments: Quantity prescribed more than 7 day supply? Yes, quantity medically necessary  Associated Diagnoses: S/P cervical spinal fusion      permethrin (ELIMITE) 5 % cream Apply from the neck down and  leave on overnight; wash off in am and repeat in 1 week  Qty: 120 g, Refills: 1    Associated Diagnoses: Dermatitis      senna-docusate 8.6-50 mg (PERICOLACE) 8.6-50 mg per tablet Take 2 tablets by mouth 2 (two) times daily. Continue while on pain medication  Qty: 60 tablet, Refills: 0      !! tamsulosin (FLOMAX) 0.4 mg Cap Take 2 capsules (0.8 mg total) by mouth once daily.  Qty: 60 capsule, Refills: 11      tirzepatide 10 mg/0.5 mL PnIj Inject 10 mg into the skin every 7 days.  Qty: 2 mL, Refills: 2    Associated Diagnoses: Type 2 diabetes mellitus without complication, without long-term current use of insulin      !! ULTRA THIN LANCETS 30 gauge Misc USE TO TEST BLOOD SUGAR EVERY DAY AS DIRECTED  Qty: 100 each, Refills: 3    Associated Diagnoses: Diabetes mellitus without complication       !! - Potential duplicate medications found. Please discuss with provider.              Discharge Diagnosis: Lumbar radiculopathy, chronic [M54.16]  Degeneration of intervertebral disc of lumbar region with lower extremity pain [M51.361]  Condition on Discharge: Stable with no complications to procedure   Diet on Discharge: Same as before.  Activity: as per instruction sheet.  Discharge to: Home with a responsible adult.  Follow up: 2-4 weeks       Please call my office or pager at [phone] if experienced any weakness or loss of sensation, fever > 101.5, pain uncontrolled with oral medications, persistent nausea/vomiting/or diarrhea, redness or drainage from the incisions, or any other worrisome concerns. If physician on call was not reached or could not communicate with our office for any reason please go to the nearest emergency department

## 2024-12-03 DIAGNOSIS — E11.9 DIABETES MELLITUS WITHOUT COMPLICATION: ICD-10-CM

## 2024-12-03 RX ORDER — PREGABALIN 200 MG/1
200 CAPSULE ORAL 2 TIMES DAILY
Qty: 60 CAPSULE | Refills: 1 | Status: SHIPPED | OUTPATIENT
Start: 2024-12-03 | End: 2025-02-01

## 2024-12-03 NOTE — TELEPHONE ENCOUNTER
Refill Decision Note   Hermelindo Garza  is requesting a refill authorization.  Brief Assessment and Rationale for Refill:  Approve     Medication Therapy Plan:        Comments:     Note composed:1:10 PM 12/03/2024

## 2024-12-03 NOTE — TELEPHONE ENCOUNTER
No care due was identified.  Health [facility] Embedded Care Due Messages. Reference number: [id number].   12/03/2024 10:17:21 AM CST

## 2024-12-04 ENCOUNTER — PATIENT MESSAGE (OUTPATIENT)
Dept: INTERNAL MEDICINE | Facility: CLINIC | Age: 44
End: 2024-12-04
Payer: COMMERCIAL

## 2024-12-04 DIAGNOSIS — J45.20 MILD INTERMITTENT ASTHMA WITHOUT COMPLICATION: Primary | ICD-10-CM

## 2024-12-05 ENCOUNTER — OFFICE VISIT (OUTPATIENT)
Dept: URGENT CARE | Facility: CLINIC | Age: 44
End: 2024-12-05
Payer: COMMERCIAL

## 2024-12-05 VITALS
SYSTOLIC BLOOD PRESSURE: 119 MMHG | DIASTOLIC BLOOD PRESSURE: 80 MMHG | RESPIRATION RATE: 16 BRPM | BODY MASS INDEX: 39.32 KG/M2 | OXYGEN SATURATION: 96 % | TEMPERATURE: 99 F | WEIGHT: 236 LBS | HEART RATE: 76 BPM | HEIGHT: 65 IN

## 2024-12-05 DIAGNOSIS — R05.9 COUGH, UNSPECIFIED TYPE: ICD-10-CM

## 2024-12-05 DIAGNOSIS — J45.901 EXACERBATION OF ASTHMA, UNSPECIFIED ASTHMA SEVERITY, UNSPECIFIED WHETHER PERSISTENT: Primary | ICD-10-CM

## 2024-12-05 RX ORDER — ALBUTEROL SULFATE 0.83 MG/ML
2.5 SOLUTION RESPIRATORY (INHALATION)
Status: COMPLETED | OUTPATIENT
Start: 2024-12-05 | End: 2024-12-05

## 2024-12-05 RX ORDER — BENZONATATE 200 MG/1
200 CAPSULE ORAL 3 TIMES DAILY PRN
Qty: 30 CAPSULE | Refills: 0 | Status: SHIPPED | OUTPATIENT
Start: 2024-12-05 | End: 2024-12-15

## 2024-12-05 RX ORDER — ALBUTEROL SULFATE 90 UG/1
2 INHALANT RESPIRATORY (INHALATION) EVERY 6 HOURS PRN
Qty: 8.5 G | Refills: 0 | Status: SHIPPED | OUTPATIENT
Start: 2024-12-05 | End: 2025-12-05

## 2024-12-05 RX ORDER — DEXAMETHASONE SODIUM PHOSPHATE 4 MG/ML
4 INJECTION, SOLUTION INTRA-ARTICULAR; INTRALESIONAL; INTRAMUSCULAR; INTRAVENOUS; SOFT TISSUE
Status: COMPLETED | OUTPATIENT
Start: 2024-12-05 | End: 2024-12-05

## 2024-12-05 RX ORDER — METHYLPREDNISOLONE 4 MG/1
TABLET ORAL
Qty: 21 EACH | Refills: 0 | Status: SHIPPED | OUTPATIENT
Start: 2024-12-05 | End: 2024-12-26

## 2024-12-05 RX ADMIN — ALBUTEROL SULFATE 2.5 MG: 0.83 SOLUTION RESPIRATORY (INHALATION) at 01:12

## 2024-12-05 RX ADMIN — DEXAMETHASONE SODIUM PHOSPHATE 4 MG: 4 INJECTION, SOLUTION INTRA-ARTICULAR; INTRALESIONAL; INTRAMUSCULAR; INTRAVENOUS; SOFT TISSUE at 02:12

## 2024-12-05 NOTE — PROGRESS NOTES
Subjective:      Patient ID: Hermelindo Garza III is a 44 y.o. male.    Vitals:  vitals were not taken for this visit.     Chief Complaint: URI    This is a 44 y.o. male who presents today with a chief complaint of URI. Symptoms started on Tuesday. Patient is having dizziness when exhaling. Patient expressed that his lungs feel tight. Patient also is having some ear fullness.      URI   This is a new problem. The current episode started in the past 7 days. The problem has been unchanged. There has been no fever. Associated symptoms include congestion and wheezing. He has tried decongestant and inhaler use (mucinex) for the symptoms. The treatment provided no relief.     HENT:  Positive for congestion.    Respiratory:  Positive for wheezing.     Objective:     Physical Exam   Constitutional: He is oriented to person, place, and time. No distress. normal  HENT:   Head: Normocephalic and atraumatic.   Ears:   Right Ear: Tympanic membrane, external ear and ear canal normal.   Left Ear: Tympanic membrane, external ear and ear canal normal.   Nose: No rhinorrhea or congestion.   Mouth/Throat: No oropharyngeal exudate.   Eyes: Conjunctivae are normal. Pupils are equal, round, and reactive to light. Extraocular movement intact   Neck: Neck supple. No neck rigidity present.   Cardiovascular: Normal rate, regular rhythm, normal heart sounds and normal pulses.   No murmur heard.  Pulmonary/Chest: Effort normal. He has wheezes. He has rhonchi.   Abdominal: Normal appearance and bowel sounds are normal. Soft. flat abdomen   Musculoskeletal: Normal range of motion.         General: Normal range of motion.   Neurological: no focal deficit. He is alert, oriented to person, place, and time and at baseline.   Skin: Skin is warm and dry. Capillary refill takes less than 2 seconds. No bruising   Psychiatric: His behavior is normal. Mood, judgment and thought content normal.   Nursing note and vitals reviewed.    Assessment:      Plan:   1. Exacerbation of asthma, unspecified asthma severity, unspecified whether persistent  - albuterol nebulizer solution 2.5 mg  - methylPREDNISolone (MEDROL DOSEPACK) 4 mg tablet; use as directed  Dispense: 21 each; Refill: 0  - dexAMETHasone injection 4 mg  - albuterol (VENTOLIN HFA) 90 mcg/actuation inhaler; Inhale 2 puffs into the lungs every 6 (six) hours as needed for Wheezing. Rescue  Dispense: 8.5 g; Refill: 0    2. Cough, unspecified type  - benzonatate (TESSALON) 200 MG capsule; Take 1 capsule (200 mg total) by mouth 3 (three) times daily as needed.  Dispense: 30 capsule; Refill: 0   Pt improved after the nebulizer tx.

## 2024-12-05 NOTE — TELEPHONE ENCOUNTER
Patient ended up going to urgent care and has bronchitis - he wants a nebulizer if you think it is appropriate - please advise     Proventil was last filled on 05/07/24 with 3 refills - last OV was 10/28/24 - Also patient is asking for a nebulizer if you think that is appropriate - please review and advise

## 2024-12-05 NOTE — TELEPHONE ENCOUNTER
Proventil was last filled on 05/07/24 with 3 refills - last OV was 10/28/24 - Also patient is asking for a nebulizer if you think that is appropriate - please review and advise

## 2024-12-06 ENCOUNTER — PATIENT MESSAGE (OUTPATIENT)
Dept: PAIN MEDICINE | Facility: CLINIC | Age: 44
End: 2024-12-06
Payer: COMMERCIAL

## 2024-12-06 ENCOUNTER — TELEPHONE (OUTPATIENT)
Dept: PAIN MEDICINE | Facility: CLINIC | Age: 44
End: 2024-12-06
Payer: COMMERCIAL

## 2024-12-06 RX ORDER — ALBUTEROL SULFATE 0.83 MG/ML
2.5 SOLUTION RESPIRATORY (INHALATION) EVERY 6 HOURS PRN
Qty: 75 ML | Refills: 3 | Status: SHIPPED | OUTPATIENT
Start: 2024-12-06 | End: 2025-12-06

## 2024-12-06 NOTE — TELEPHONE ENCOUNTER
I faxed the information and script to [phone] - I also called and spoke with the patient and advised him of the information below - he verbalized understanding.

## 2024-12-11 ENCOUNTER — HOSPITAL ENCOUNTER (OUTPATIENT)
Dept: RADIOLOGY | Facility: HOSPITAL | Age: 44
Discharge: HOME | End: 2024-12-11
Attending: NEUROLOGICAL SURGERY
Payer: COMMERCIAL

## 2024-12-11 ENCOUNTER — OFFICE VISIT (OUTPATIENT)
Dept: NEUROSURGERY | Facility: CLINIC | Age: 44
End: 2024-12-11
Payer: COMMERCIAL

## 2024-12-11 ENCOUNTER — PATIENT MESSAGE (OUTPATIENT)
Dept: INTERNAL MEDICINE | Facility: CLINIC | Age: 44
End: 2024-12-11
Payer: COMMERCIAL

## 2024-12-11 VITALS — SYSTOLIC BLOOD PRESSURE: 130 MMHG | HEART RATE: 91 BPM | DIASTOLIC BLOOD PRESSURE: 83 MMHG

## 2024-12-11 DIAGNOSIS — Z98.1 S/P CERVICAL SPINAL FUSION: ICD-10-CM

## 2024-12-11 DIAGNOSIS — Z98.1 S/P CERVICAL SPINAL FUSION: Primary | ICD-10-CM

## 2024-12-11 PROCEDURE — 1159F MED LIST DOCD IN RCRD: CPT | Mod: CPTII,S$GLB,, | Performed by: NEUROLOGICAL SURGERY

## 2024-12-11 PROCEDURE — 99024 POSTOP FOLLOW-UP VISIT: CPT | Mod: S$GLB,,, | Performed by: NEUROLOGICAL SURGERY

## 2024-12-11 PROCEDURE — 3061F NEG MICROALBUMINURIA REV: CPT | Mod: CPTII,S$GLB,, | Performed by: NEUROLOGICAL SURGERY

## 2024-12-11 PROCEDURE — 72050 X-RAY EXAM NECK SPINE 4/5VWS: CPT | Mod: TC

## 2024-12-11 PROCEDURE — 3044F HG A1C LEVEL LT 7.0%: CPT | Mod: CPTII,S$GLB,, | Performed by: NEUROLOGICAL SURGERY

## 2024-12-11 PROCEDURE — 72050 X-RAY EXAM NECK SPINE 4/5VWS: CPT | Mod: 26,,, | Performed by: RADIOLOGY

## 2024-12-11 PROCEDURE — 99999 PR PBB SHADOW E&M-EST. PATIENT-LVL IV: CPT | Mod: PBBFAC,,, | Performed by: NEUROLOGICAL SURGERY

## 2024-12-11 PROCEDURE — 3066F NEPHROPATHY DOC TX: CPT | Mod: CPTII,S$GLB,, | Performed by: NEUROLOGICAL SURGERY

## 2024-12-11 PROCEDURE — 3075F SYST BP GE 130 - 139MM HG: CPT | Mod: CPTII,S$GLB,, | Performed by: NEUROLOGICAL SURGERY

## 2024-12-11 PROCEDURE — 3079F DIAST BP 80-89 MM HG: CPT | Mod: CPTII,S$GLB,, | Performed by: NEUROLOGICAL SURGERY

## 2024-12-11 PROCEDURE — 1160F RVW MEDS BY RX/DR IN RCRD: CPT | Mod: CPTII,S$GLB,, | Performed by: NEUROLOGICAL SURGERY

## 2024-12-11 NOTE — PROGRESS NOTES
"CHIEF COMPLAINT:  3m postop    INTERVAL HISTORY (12/11/24):  Patient is now 3 months postop and reports he is doing well.  He denies any pain at this time.  He has occasional tingling in his 1st and 5th digits on the right hand particularly when he is sleeping.  The tremor in his hand has stopped and his hand and fingers are no longer constantly numb.  He continues to contend with low back pain for which he underwent an L5-S1 NELI.  He feels that this level helped his pain better than a previous L4-5.  Pain is located in his back and along his hips.    INTERVAL HISTORY (10/23/24):  6 wks s/p C5-6 and C6-7 ACDF on 9/12/24.  Reports he is recovering well.  No longer having numbness or tingling in arms/hands.  Pain resolved.  Swallowing difficulties resolved with short course of steroids.  He has been wearing his collar as instructed.  He would like to advance his activities and return to the gym.    INTERVAL HISTORY (8/14/24):  Patient returns via virtual visit to discuss recent EMG results and to make a decision regarding surgery.  He reports that since his last visit his radiating arm pain has improved somewhat in terms of intensity but he still notes bothersome finger and hand numbness that results in him dropping items from his hand and affecting his ability to write.  He still reports tremor in his right hand that is made worse with stimulants.  He continues to have significant neck pain as well.  He explains that he has had to stop exercising and engaging in many of his daily activities to avoid exacerbating his arm pain.  His EMG showed chronic radiculopathies at C6, C8, and possibly C7.  He is interested in surgery as he states "he can not continue living like this."    INTERVAL HISTORY (7/3/24):  Patient returns to clinic with new and worsening constellation of symptoms.  He reports a new tremor in his right hand and fingers since May which is causing him difficulty writing and operating a computer mouse.  He " also notes numbness, weakness, in this hand that leads to him dropping items.  Numbness resides over the top of his hand and the last 2 digits.  He notices that at night when he is extending his neck he will wake up with his hands feeling numb requiring him to repositioned.  He notes that he will have pain in his shoulder, chest, axilla, upper arm, biceps, and into the last 2 digits of his right hand after working out.  This pain will persist for several weeks.  Ultimately he has decided to stop exercising out of fear of triggering the pain.  He has had prior carpal tunnel surgery.  He has not had a recent EMG/NCS.  He has known diffuse spondylosis in his cervical spine.    INTERVAL HISTORY (7/26/23):  Had an episode of significant neck pain approx 1 month ago. Took steroid pack and underwent C7-T1 IL NELI by Dr Patel (pain), which helped.      He also reports continued intermittent back and leg pain.  Pain with tingling that radiates up his shin to thigh and groin.  Also note some pain in big toe on left.  These sxs are currently stable/tolerable but he would like to discuss possibility of an NELI since they have helped with his neck.    INTERVAL HISTORY (5/24/23):  Virtual visit conducted regarding recurrent left leg pain and numbness as well as right hand/finger pain and numbness.  Patient is reporting an increase in low back pain that is triggered when sitting down or walking.  He is also having radiating left leg pain that radiates to his left shin and occasionally outside part of his thigh.  He is having sharp pain in his left big toe.  He also notes tightness despite stretching in both hips and buttocks.  Reports that he often has to lie down and put support under his knees.  This has been ongoing for past couple of months.  Since he is had 2 prior lumbar surgeries he is concerned that he may have a recurrent herniated disc or new problem.  He has known cervical stenosis but has had an increase in numbness in  his right hand including his thumb and index finger.  He was given an epidural steroid shot which helped some but he is concerned about progression of his known cervical stenosis.    INTERVAL HISTORY (1/4/23):  Patient returns for continued surveillance of cervical myelopathy.  His symptoms have not changed significantly however he is attending physical therapy which he finds is helping some.  He complains of continued numbness and tingling in his arms and hands while sleeping.  He feels like it is exacerbated or triggered by neck extension and has to sleep with his head propped forward.  He also gets some pain that radiates from his neck to the lateral shoulders.  He still gets occasional tingling in his big toe otherwise feels like the symptoms related to his acute hernia disc have resolved.  He also notes that he generally feels more arthritic type pain which will be exacerbated by exertion.  Finds himself with diffuse pain and achiness more than usual.    INTERVAL HISTORY (6/29/22):  Returns to review recent cervical MRI.    Always has discomfort in neck with pain radiating into R shoulder and occasionally into 4+5th digits as well as L shoulder.  He does endorse clumsiness with hands (dropping items, difficulty with bottle caps, not buttoning) and feels out of balance.  Occasionally has urinary urgency/frequency.    L thigh pain is decreased but worse in am.  Also intermittent pain in back of leg and foot.  Also notes some weakness and pain in ankle.    INTERVAL HISTORY (5/11/22):  Returns with new arm pain that radiates from base of neck to bilateral shoulders and down the volar thumb on R.  Stops at shoulder/upper arm on L.  Triggered by certain neck positions - has to use flat pillow or will have sxs.  + tingling to R hand and L shoulder.  Also reports clumsiness in hand with dropping items and poor fine motor control.  But denies overt weakness.  Feels tight through neck, chest and shoulder regions.    Also  has discomfort in L leg along anterolateral thigh and describes as burning and itching.  Notes L leg weakness in back around thigh and knee.  Worse in am and foot feels like it fell asleep and is clumsy.  Occasionally has pain in L big toe.    Takes gabapentin 1 tab in am and 2 tab in pm.  He does HEP with occasional pool workout. Not particularly interested in PT.  Has not trialed antiinflammatories.    No b/b dysfunction.    HPI:    Hermelindo Garza III is a 44 y.o.-year-old male who presents today for post-operative follow-up s/p L MIS L3-4 lami/disc on 9/20/21.  Overall patient is significantly improved.  He has significant improvement in his back and leg pain.  He still has some mild tingling in his shins that radiate down to the feet.  This is made worse when he is not active.  He takes gabapentin 600 mg at night which helps.  He would like to increase that to more times during the day.  He denies weakness.      ROS:  As stated in the above HPI    PE:  No neuro deficits observed    FROM PRIOR VISIT:    AAOX3  NAD  CN 2-12 intact     Strength:      Deltoids Biceps Triceps Wrist Ext. Wrist Flex. Hand    RUE 5 5 5 5 5 5   LUE 5 5 5 5 5 5    Hip Flex. Knee Flex. Knee Ext. Dorsi Flex Plantar Flex EHL   RLE 5 5 5 5 5 5   LLE 5 5 5 5 5 5     Sensation:  Intact to light touch (All 4 extremities)    Gait:  normal    Lumbar incision:  Well-healed    IMAGING:  All imaging reviewed by me.    Cervical flex/ex xray, 12/11/24:  Hardware in good position without instability    Cervical xray, 10/23/24:  Hardware in good position    EMG, 8/7/24:  chronic RIGHT C6 and C8 radiculopathies (C7 possible as well) and a chronic LEFT C6 radiculopathy without active/ongoing denervation     Cervical MRI, 6/14/24:  Loss of lordosis  C4-5 - mod-severe bilat NFS  C5-6 - moderate CS, severe bilat NFS  C6-7 - severe L NFS, moderate R NFS  R LRS/NFS at C7-T1    Cervical MRI, 6/16/23:  1. Diffuse spondylosis and hypertrophied PLL  2.  Moderate stenosis at C4-5 with bilat NFS  3. Moderate stenosis at C5-6 with bilat NFS    Lumbar MRI, 6/16/23:  1. No recurrent HNP at L3-4  2. Mild-moderate bilat LRS at L3-4 and L4-5  3. Moderate bilat L5-S1 LRS    Cspine flex/ex, 12/13/22:  Diffuse spondylosis  Good alignment  No dynamic instability    Cervical MRI, 6/16/22:  1. Diffuse spondylosis and hypertrophied PLL  2. Severe stenosis at C4-5 and C5-6 with cord compression  3. Moderate stenosis at C3-4 and C6-7    Lumbar MRI, 6/16/22:  1. No recurrent HNP  2. Mild degen changes    Csp MRI, 1/12/21:  1. Loss of lordosis  2. Diffuse spondylosis  3. Multi-level DDD with moderate NFS  4. Severe left neural foraminal narrowing at C6-7  5. Severe right neural foraminal narrowing at C5-6 and C7-T1    Lsp MRI, 9/19/21:  1. Diffuse DDD and spondylosis  2. Large L L3-4 HNP  3. L L4-5 moderate NFS  4. R L5-S1 moderate NFS      ASSESSMENT:   Problem List Items Addressed This Visit          Neuro    S/P cervical spinal fusion - Primary    Relevant Orders    CT Cervical Spine Without Contrast       S/p C5-6 and C6-7 ACDF  S/p L MIS L3-4 lami/disc for severe radiculopathy.      Patient is now 3 months status post 2 level ACDF.  He reports continued resolution of his paresthesias and arm pain.  He occasionally experiences tingling in his 1st and 5th digit on R at night and thinks it relates to neck position during sleep.  He underwent L5-S1 NELI that he thinks has been more helpful than a previous L4-5.    PLAN:   - RTC 3m with CT      Time spent on this encounter: 16 minutes. This includes face-to-face time and non-face to face time preparing to see the patient (eg, review of tests), obtaining and/or reviewing separately obtained history, documenting clinical information in the electronic or other health record, independently interpreting results and communicating results to the patient/family/caregiver, or care coordinator.

## 2024-12-12 ENCOUNTER — OFFICE VISIT (OUTPATIENT)
Dept: PAIN MEDICINE | Facility: CLINIC | Age: 44
End: 2024-12-12
Payer: COMMERCIAL

## 2024-12-12 DIAGNOSIS — M54.16 LUMBAR RADICULOPATHY, CHRONIC: ICD-10-CM

## 2024-12-12 DIAGNOSIS — M51.362 DEGENERATION OF INTERVERTEBRAL DISC OF LUMBAR REGION WITH DISCOGENIC BACK PAIN AND LOWER EXTREMITY PAIN: ICD-10-CM

## 2024-12-12 DIAGNOSIS — G89.4 CHRONIC PAIN SYNDROME: Primary | ICD-10-CM

## 2024-12-12 DIAGNOSIS — M25.559 HIP PAIN, UNSPECIFIED LATERALITY: ICD-10-CM

## 2024-12-12 PROCEDURE — 99214 OFFICE O/P EST MOD 30 MIN: CPT | Mod: 95,,, | Performed by: NURSE PRACTITIONER

## 2024-12-12 PROCEDURE — 3061F NEG MICROALBUMINURIA REV: CPT | Mod: CPTII,95,, | Performed by: NURSE PRACTITIONER

## 2024-12-12 PROCEDURE — G2211 COMPLEX E/M VISIT ADD ON: HCPCS | Mod: 95,,, | Performed by: NURSE PRACTITIONER

## 2024-12-12 PROCEDURE — 3044F HG A1C LEVEL LT 7.0%: CPT | Mod: CPTII,95,, | Performed by: NURSE PRACTITIONER

## 2024-12-12 PROCEDURE — 1160F RVW MEDS BY RX/DR IN RCRD: CPT | Mod: CPTII,95,, | Performed by: NURSE PRACTITIONER

## 2024-12-12 PROCEDURE — 1159F MED LIST DOCD IN RCRD: CPT | Mod: CPTII,95,, | Performed by: NURSE PRACTITIONER

## 2024-12-12 PROCEDURE — 3066F NEPHROPATHY DOC TX: CPT | Mod: CPTII,95,, | Performed by: NURSE PRACTITIONER

## 2024-12-12 NOTE — PROGRESS NOTES
[facility]  Interventional [facility] Management -  Established Patient Follow-up  telemedicine Encounter    Telemedicine Bundle:  The patient location is: patient's home  The chief complaint leading to consultation is: Low-back Pain, Leg Pain, and Hip Pain  Visit type: Virtual visit with synchronous audio and video  Total time spent with patient: 20 minutes   Each patient to whom he or she provides medical services by telemedicine is:    (1) informed of the relationship between the physician and patient and the respective role of any other health care provider with respect to management of the patient  (2) notified that he or she may decline to receive medical services by telemedicine and may withdraw from such care at any time.        Referred by: Dr. Suzi Sanches   Reason for referral: * No diagnoses found *     CC:   No chief complaint on file.        9/5/2024    11:21 AM 5/30/2024     8:40 AM 3/28/2024     1:12 PM   Last 3 PDI Scores   Pain Disability Index (PDI) 36 30 36     Interval history 12/12/2024  44-year-old male presents for a virtual follow up appointment today he is status post a lumbar NELI targeting L5-S1 on 12/02/2024 patient is reporting 60 70% relief of his symptoms with improvement in functionality.  Today patient denies any new pain denies any profound weakness denies any bowel bladder dysfunction at this time patient is status post C5-6 and C6-7 ACDF on 9/12/24 with Dr. Mcnamara.  Patient reports recovering well from previous neck surgery.       Interval Update 9/05/2024: Patient return to clinic for follow up on low back pain.  He is status post a lumbar NELI targeting L4-5 on 08/26/2024 that provided 50 60% relief of his symptoms.  He does still complain of left lower extremity pain to his left toe which is positional.  He is scheduled for upcoming anterior cervical fusion surgery with Dr. Mcnamara on 09/12/2024.  He denies any profound weakness denies bowel or bladder dysfunction denies any saddle  anesthesia at this time.    Interval history 06/25/2024  44-year-old male presents virtually he has status post a lumbar NLEI targeting L4-5 on 06/03/2024 reporting 70-80% relief of his low back pain,  he reports intermittent numbness in his left toe.  His primary concern is his chronic neck pain with radiculopathy.  He has previously been given cervical NELI targeting C7-T1 with relief however his symptoms continue and are disrupting his quality of life.  He has a follow up with Dr. Shen later this month to discuss surgical decompression surgery for his chronic neck and radiculopathy pain.  He he did report intermittent weakness in his arms and dropping things      Interval update 05/30/24:  43-year-old male that presents today in clinic with 4 weeks of worsening right neck pain that radiates into the right  arm and down into the right 4th and 5th digits of the hand.   He also reports new tremor in the right hand.  No recent falls or trauma.  Patient reports pain is worse with lying on the right side and with range of motion of the neck.   He has a history of right-sided neck pain but symptoms into the arm and hand tremor are new. He is taking Gabapentin 300 mg QAM and 900 mg QHS with minimal relief.  He is requesting to switch to Lyrica.   He also requests a refill of his Flexeril. He rates his pain 5/10.       Interval History 4/30/2024:  43-year-old male that presents virtually for a follow-up appointment he is status post a cervical NELI targeting C7-T1 on 04/17/2024 that provided significant relief in his symptoms 70-90%.  He is known to our clinic and has been previously provided this injection which has helped significantly.  Is now reporting low back pain that has gradually increased he did reach out to his previous surgeon Dr. Shen  ordered a recent CT lumbar.  He denies any bowel bladder dysfunction denies any saddle anesthesia denies any recent trauma.  He has been provided a lumbar NELI targeting L4-5  for low back and radicular symptoms, we discussed that we may need to repeat this in the future pending CT lumbar scan.      Interval update 03/28/24:   Patient returns today for post procedure follow up and follow up of his neck and back pain.  He is s/p  Lumbar Interlaminar Epidural Steroid Injection L4/L5 on 03/06/2024.  He reports 70-80% relief of his low back pain and radicular symptoms.  Today he reports his cervical radicular symptoms are starting to returned.  He previously underwent cervical epidural steroid injection at C7/T1 on 11/03/2023 with significant improvement in his symptoms.  He reports almost 5 months of pain relief following a cervical epidural.  He would like to repeat this procedure.  In the meantime he is increased his gabapentin to 300 mg in the morning and 600 mg in the evening.  He does feel like this has helped with his pain.  He was also increased his exercise and has lost weight which he feels has been beneficial as well.      Interval update 12/19/23  Patient returns today for follow up.  He is status post right GTB and right intra-articular hip injections on 11/29/2023 and notes improvement in his GTB and hip pain.  Today he complains of pain extending from the area overlying the right hip joint and extending down the anterolateral portion of the thigh.  This pain is worse with activity.  Pain improves with lying flat.  He rates his pain 7/10 today.      Of note, patient recently underwent left lateral collateral ligament reconstruction for left radial collateral ligament tear on 12/05/2023 with Dr. Marshall.    11/13/23 Pt returns today complaining of right hip and right leg pain. Pain is located over the lateral right hip and radiates into the groin.  Pain started approximately 1 month ago. Pain has not improved with stretching and HEP.  He states his hip pain is worse than his lower back pain for which he is scheduled to have an NELI at the end of the month. He would like to address  the hip pain first.      Subjective:   Hermelindo Garza III is a 44 y.o. male who presents complaining of both neck and back pain.  Today he reports his neck pain is most bothersome.  He reports muscle tightness and pain in the left trapezius and shoulder area.  He reports pain that radiates into the bilateral upper extremities with numbness and tingling.  He has been evaluated by Dr. Sierra with Neurosurgery who noted patient chris  candidate for ACDFs in he progresses.  Pt reports undergoing a cervical epidural steroid injection with Dr. Escamilla 02/10/2021 on with several months of relief.  He also reports low back pain.  He is s/p Left L3-4 hemilaminotomy, medial facetectomy, and foraminotomy for microdiscectomy with Dr Mcnamara on 9/19/2021.  He reports his back pain improved after surgery.  Patient reports he is previously completed physical therapy and is now actively continuing with his exercise program in the gym.  He also reports trying to lose weight and reports intentional  weight loss of approximately 55 pounds since 2020.    Location: back, shoulder, and neck   Onset: 2 years  Current Pain Score: 5/10  Daily Pain of Range: 5-6/10  Quality: Aching, Tight, Tingling, Deep, Numb, Electric, Hot, and Cold  Radiation: neck and back  Worsened by: lying down, sitting, standing for more than 3 minutes, and walking for more than 6 minutes  Improved by: medications    Patient denies night fever/night sweats, urinary incontinence, bowel incontinence, significant weight loss, significant motor weakness, and loss of sensations.    Previous Interventions:  -12/02/2024 Lumbar Interlaminar Epidural Steroid Injection L5/S1  60-80%  - 08/26/2024 Lumbar Interlaminar Epidural Steroid Injection L4/L5 50-60%  -06/03/2024 Lumbar Interlaminar Epidural Steroid Injection L4/L5 (veer LEFT) 70-80%  - 04/17/2024:Cervical Interlaminar Epidural Steroid Injection C7/T1  -03/06/2024 Lumbar Interlaminar Epidural Steroid Injection L4/L5  -  11/03/2023 Cervical Interlaminar Epidural Steroid Injection C7/T1 5 months of pain relief  -9/27/2023  Lumbar Interlaminar Epidural Steroid Injection L4/L5   - 06/28/2023 - Cervical Interlaminar Epidural Steroid Injection C7/T1   - 02/10/2021 -  C6/7 CERVICAL EPIDURAL STEROID INJECTION - Dr Escamilla - 4 months of pain relief.   - 11/29/2023 -  right GTB and right intra-articular hip injections - 40% relief  - 03/06/2024 - lumbar interlaminar steroid injection at L4/L5 -80% pain relief    Previous Therapies:  PT/OT: yes   Chiropractor:   HEP: yes  Relevant Surgery: yes   -9/12/24 s/p C5-6 and C6-7 ACDF Dr. Mcnamara    - Reports L4/L5 surgery at age 29 yo   - 09/19/21 - Left MIS L3-4 hemilaminotomy, medial facetectomy, and foraminotomy for microdiscectomy with Dr Mcnamara.   Previous Medications:   - NSAIDS: Meloxicam  - Muscle Relaxants: Robaxin, Flexeril  - TCAs:   - SNRIs:   - Topicals:   - Anticonvulsants: Lyrica - reports RL sxs so stopped; Gabapentin   - Opioids:   - Adjuvants: Tylenol    Current Pain Medications:  Gabapentin 300 mg QAM and 900 mg QHS  Meloxicam  Tylenol    Flexeril      Review of Systems:  Review of Systems   Musculoskeletal:  Positive for back pain and neck pain.       GENERAL:  No weight loss, malaise or fevers. +obesity +DM  HEENT:   No recent changes in vision or hearing  NECK:  No difficulty with swallowing. No stridor.   RESPIRATORY:  Negative for cough, wheezing or shortness of breath, patient denies any recent URI. +Asthma  CARDIOVASCULAR:  Negative for chest pain, leg swelling or palpitations.  GI/:  Negative for abdominal discomfort, blood in stools or black stools or change in bowel habits.   MUSCULOSKELETAL:  See HPI.  SKIN:  Negative for lesions, rash, and itching.  PSYCH:  No mood disorder or recent psychosocial stressors.  +anxiety +ADD  HEMATOLOGY/LYMPHOLOGY:  Negative for prolonged bleeding, bruising easily or swollen nodes.  Patient is not currently taking any  anti-coagulants  NEURO:   No history of headaches, syncope, paralysis, seizures or tremors.  All other reviewed and negative other than HPI.    History:  Current medications, allergies, medical history, surgical history,   family history, and social history were reviewed in the chart as marked.    Full Medication List:    Current Outpatient Medications:     acetaminophen (TYLENOL) 650 MG TbSR, Take 1,300 mg by mouth daily as needed., Disp: , Rfl:     albuterol (PROVENTIL) 2.5 mg /3 mL (0.083 %) nebulizer solution, Take 3 mLs (2.5 mg total) by nebulization every 6 (six) hours as needed for Wheezing or Shortness of Breath. Rescue, Disp: 75 mL, Rfl: 3    albuterol (PROVENTIL/VENTOLIN HFA) 90 mcg/actuation inhaler, INHALE 2 PUFFS INTO THE LUNGS EVERY 6 HOURS AS NEEDED WHEEZING, Disp: 25.5 g, Rfl: 3    albuterol (VENTOLIN HFA) 90 mcg/actuation inhaler, Inhale 2 puffs into the lungs every 6 (six) hours as needed for Wheezing. Rescue, Disp: 8.5 g, Rfl: 0    atorvastatin (LIPITOR) 20 MG tablet, Take 1 tablet (20 mg total) by mouth once daily., Disp: 90 tablet, Rfl: 3    benzonatate (TESSALON) 200 MG capsule, Take 1 capsule (200 mg total) by mouth 3 (three) times daily as needed., Disp: 30 capsule, Rfl: 0    blood sugar diagnostic (ONETOUCH VERIO TEST STRIPS) Strp, Use to test blood glucose one (1) time a day,, Disp: 100 strip, Rfl: 3    blood-glucose meter Misc, use as directed, Disp: 1 each, Rfl: 0    buPROPion (WELLBUTRIN XL) 300 MG 24 hr tablet, Take 1 tablet (300 mg total) by mouth once daily., Disp: 90 tablet, Rfl: 1    clobetasoL (TEMOVATE) 0.05 % cream, Apply to affected area 2 (two) times daily as needed for flare, Disp: 30 g, Rfl: 1    cyclobenzaprine (FLEXERIL) 10 MG tablet, Take 1 tablet (10 mg total) by mouth 3 (three) times daily as needed for Muscle spasms., Disp: 90 tablet, Rfl: 2    [START ON 12/13/2024] dexmethylphenidate (FOCALIN) 10 MG tablet, Take 1 tablet (10 mg total) by mouth 2 (two) times daily.,  Disp: 60 tablet, Rfl: 0    [START ON 1/10/2025] dexmethylphenidate (FOCALIN) 10 MG tablet, Take 1 tablet (10 mg total) by mouth 2 (two) times daily., Disp: 60 tablet, Rfl: 0    [START ON 2/3/2025] dexmethylphenidate (FOCALIN) 10 MG tablet, Take 1 tablet (10 mg total) by mouth 2 (two) times daily., Disp: 60 tablet, Rfl: 0    ergocalciferol, vitamin D2, (VITAMIN D ORAL), Take 1 tablet by mouth every other day., Disp: , Rfl:     fluocinolone acetonide oiL (DERMOTIC OIL) 0.01 % Drop, Place 3 drops in ear(s) 2 (two) times daily., Disp: 20 mL, Rfl: 3    fluocinonide (LIDEX) 0.05 % external solution, apply to affected area of scalp daily as needed for itching, scaling, Disp: 60 mL, Rfl: 3    fluorouraciL (EFUDEX) 5 % cream, Apply topically to the affected area once daily., Disp: 40 g, Rfl: 2    hydrocortisone 2.5 % cream, APPLY EXTERNALLY TO THE AFFECTED AREA TWICE DAILY FOR 10 DAYS, Disp: 30 g, Rfl: 0    inulin (FIBER GUMMIES) 2 gram Chew, Take by mouth once daily. 2 gummies a day, Disp: , Rfl:     ketoconazole (NIZORAL) 2 % cream, Apply 1 application  topically 2 (two) times daily. Apply to affected area up to 2 weeks, Disp: 30 g, Rfl: 2    lamoTRIgine (LAMICTAL) 100 MG tablet, Take 1 tablet (100 mg total) by mouth 2 (two) times daily. IF any RASH, STOP ALL Lamictal and Tell Psyc MD., Disp: 180 tablet, Rfl: 1    lancets (ONETOUCH DELICA PLUS LANCET) 33 gauge [facility] [place], Use to test blood glucose one (1) time a day,, Disp: 100 each, Rfl: 5    latanoprost 0.005 % ophthalmic solution, Place 1 drop into both eyes every evening., Disp: 7.5 mL, Rfl: 3    levocetirizine (XYZAL) 5 MG tablet, Take 1 tablet (5 mg total) by mouth every evening., Disp: 30 tablet, Rfl: 11    LORazepam (ATIVAN) 0.5 MG tablet, Take 1 tablet (0.5 mg total) by mouth daily as needed (SIGNIFICANT ANXIETY)., Disp: 30 tablet, Rfl: 3    methylPREDNISolone (MEDROL DOSEPACK) 4 mg tablet, use as directed, Disp: 21 each, Rfl: 0    olopatadine (PATANASE) 0.6 % nasal  spray, Use 1 spray by Each Nostril route 2 (two) times daily., Disp: 30.5 g, Rfl: 5    ondansetron (ZOFRAN-ODT) 8 MG TbDL, Dissolve 1 tablet (8 mg total) on the tongue every 8 (eight) hours as needed (nausea)., Disp: 30 tablet, Rfl: 3    oxyCODONE-acetaminophen (PERCOCET) 5-325 mg per tablet, Take 1 tablet by mouth every 8 (eight) hours as needed for Pain., Disp: 30 tablet, Rfl: 0    permethrin (ELIMITE) 5 % cream, Apply from the neck down and leave on overnight; wash off in am and repeat in 1 week, Disp: 120 g, Rfl: 1    pregabalin (LYRICA) 200 MG Cap, Take 1 capsule (200 mg total) by mouth 2 (two) times daily., Disp: 60 capsule, Rfl: 1    rOPINIRole (REQUIP) 0.25 MG tablet, TAKE 1 TABLET(0.25 MG) BY MOUTH EVERY EVENING, Disp: 90 tablet, Rfl: 2    senna-docusate 8.6-50 mg (PERICOLACE) 8.6-50 mg per tablet, Take 2 tablets by mouth 2 (two) times daily. Continue while on pain medication, Disp: 60 tablet, Rfl: 0    tamsulosin (FLOMAX) 0.4 mg Cap, Take 1 capsule (0.4 mg total) by mouth once daily., Disp: 30 capsule, Rfl: 11    tamsulosin (FLOMAX) 0.4 mg Cap, Take 2 capsules (0.8 mg total) by mouth once daily., Disp: 60 capsule, Rfl: 11    tirzepatide 10 mg/0.5 mL PnIj, Inject 10 mg into the skin every 7 days., Disp: 2 mL, Rfl: 2    ULTRA THIN LANCETS 30 gauge Misc, USE TO TEST BLOOD SUGAR EVERY DAY AS DIRECTED, Disp: 100 each, Rfl: 3    urea (CARMOL) 40 % Crea, Apply topically once daily., Disp: 225 g, Rfl: 2    Current Facility-Administered Medications:     oxymetazoline 0.05 % nasal spray 1 spray, 1 spray, Each Nostril, Q4H PRN, Melody Liu MD    Facility-Administered Medications Ordered in Other Visits:     0.9%  NaCl infusion, 500 mL, Intravenous, Continuous, Kolton Terrell MD    mupirocin 2 % ointment, , Nasal, On Call Procedure, Chong Padron MD, Given at 09/15/22 9927     Allergies:  Patient has no known allergies.     Medical History:   has a past medical history of ADD (attention deficit disorder)  (05/09/2012), Allergy, Anxiety (04/12/2023), Asthma (05/09/2012), Cervical disc disorder with radiculopathy, unspecified cervical region (09/14/2021), Cervical radiculopathy (05/22/2023), Cervical spondylosis with myelopathy (06/29/2022), Eczema, Esophageal ulcer, GERD (gastroesophageal reflux disease) (05/09/2012), Glaucoma, Kidney stones (05/2014), Lumbar disc disease (05/09/2012), Lumbar herniated disc (05/09/2012), Malignant melanoma of skin, unspecified (01/06/2022), Melanoma (01/2022), LUBA (obstructive sleep apnea), Radiculopathy, lumbar region (09/14/2021), Renal calculi (05/09/2012), and Type 2 diabetes mellitus without complication, without long-term current use of insulin (11/16/2020).    Surgical History:   has a past surgical history that includes Lumbar laminectomy (2010); Esophagogastroduodenoscopy (01/22/2019); Colonoscopy (01/22/2019); Appendectomy (2006); Epidural steroid injection (N/A, 02/10/2021); Esophagogastroduodenoscopy (N/A, 05/18/2021); Lumbar laminectomy with discectomy (Left, 09/20/2021); Carpal tunnel release (Right, 09/15/2022); Excision of ganglion of wrist (Right, 09/15/2022); Hand Arthrotomy (Right, 09/15/2022); Injection of steroid (Left, 09/15/2022); Flexor tendon repair (Right, 09/15/2022); Tympanostomy tube placement; Epidural steroid injection into cervical spine (N/A, 6/28/2023); Cystoscopy (7/5/2023); Ureteral stent placement (Left, 7/5/2023); Ureteroscopy (Left, 7/5/2023); Laser lithotripsy (Left, 7/5/2023); Ureteroscopic removal of ureteric calculus (Left, 7/5/2023); Correction of hammer toe (Left, 8/14/2023); Epidural steroid injection into lumbar spine (N/A, 9/27/2023); Epidural steroid injection into cervical spine (N/A, 11/3/2023); Injection of joint (Right, 11/29/2023); Reconstruction of ligament (Left, 12/5/2023); Repair of extensor tendon (Left, 12/5/2023); Esophagogastroduodenoscopy (N/A, 12/21/2023); ph monitoring, esophagus, wireless, (off reflux meds) (N/A,  12/21/2023); Colonoscopy (N/A, 12/21/2023); Esophagogastroduodenoscopy (N/A, 2/22/2024); Epidural steroid injection into lumbar spine (N/A, 3/6/2024); Robot-assisted repair of hiatal hernia (N/A, 3/14/2024); Epidural steroid injection into cervical spine (N/A, 4/17/2024); Epidural steroid injection into lumbar spine (N/A, 6/3/2024); Cautery of turbinates (Bilateral, 7/15/2024); Nasal septoplasty (N/A, 7/15/2024); Epidural steroid injection into lumbar spine (N/A, 8/26/2024); Anterior cervical discectomy w/ fusion (N/A, 9/12/2024); Cystoscopy (N/A, 9/26/2024); incision, contracture, bladder neck, transurethral (N/A, 11/8/2024); and Epidural steroid injection into lumbar spine (N/A, 12/2/2024).    Family History:  family history includes Allergic rhinitis in his father, mother, sister, and sister; Asthma in his father, sister, and sister; Brain cancer in his maternal grandmother; Breast cancer in his paternal grandmother; Chronic back pain in his father; Diabetes in his maternal grandfather; MARTITA disease in his father; Hypertension in his mother; Melanoma in his father; No Known Problems in his brother, maternal aunt, maternal uncle, paternal aunt, paternal grandfather, and paternal uncle; Sleep apnea in his father and mother; Transient ischemic attack in his mother.    Social History:   reports that he quit smoking about 6 years ago. His smoking use included cigarettes. He has never been exposed to tobacco smoke. He has never used smokeless tobacco. He reports current alcohol use of about 3.0 standard drinks of alcohol per week. He reports that he does not use drugs.    Physical Exam:  There were no vitals filed for this visit.      PHYSICAL EXAMINATION:  GEN: No acute distress. Calm, comfortable  HENT: Normocephalic, atraumatic, moist mucous membranes  EYE: Anicteric sclera, non-injected.   CV: Non-diaphoretic.   RESP: Breathing comfortably. Chest expansion symmetric.  PSYCH: Pleasant mood and appropriate affect.  Recent and remote memory intact.       GENERAL: Well appearing, in no acute distress, alert and oriented x3.  PSYCH:  Mood and affect appropriate.  SKIN: Skin color, texture, turgor normal, no rashes or lesions.  HEAD/FACE:  Normocephalic, atraumatic. Cranial nerves grossly intact.  NECK:  Mildly decreased ROM secondary to pain. + pain to palpation over the cervical paraspinous muscles and bilateral trapezius muscles.  Spurling positive on the right.   CV: RRR with palpation of the radial artery.  PULM: No evidence of respiratory difficulty, symmetric chest rise.  GI:  Soft and non-distended.  MSK: No obvious deformities, edema, or skin discoloration.  No atrophy or tone abnormalities are noted.   NEURO: + tremor noted in the right hand, particularly the 4th and 5th digit.  Bilateral upper and lower extremity coordination and strength is symmetric.  No loss of sensation is noted.  MENTAL STATUS: A x O x 3, good concentration, speech is fluent and goal directed  MOTOR: 5/5 in all muscle groups  GAIT: normal.ambulates unassisted.       Imaging:  XR CERVICAL SPINE AP LAT WITH FLEX EXTEN     CLINICAL HISTORY:  Other spondylosis with myelopathy, cervical region     TECHNIQUE:  Three views of the cervical spine plus flexion and extension views were performed.     COMPARISON:  September 8, 2022     FINDINGS:  As before, suboptimal visualization of the cervicothoracic junction due to superimposition of shoulder and chest wall soft tissues.  Straightening of normal cervical lordosis.  No acute fractures.  Unremarkable predental space.  No widening prevertebral soft tissues.  No anterior or retrolisthesis.  Flexion and extension views demonstrate no instability.  Reconfirmed areas of anterior longitudinal ligament calcification adjacent to C4-C5 and C5-C6 disc.  Reconfirmed findings of uncovertebral spurring and endplate osteophytes at preserved C4-C5 level and mildly narrowed C5-C6 level and mild to moderately narrowed C6-C7  level.  Some stable scattered mild facet arthropathic changes.  Intact odontoid tip and unremarkable C1-C2 articulation.  Cervical spine findings do not appear significantly changed to earlier exam.     Electronically signed by: Giovanny Busby  Date:                                            12/13/2022      MRI CERVICAL SPINE WITHOUT CONTRAST     CLINICAL HISTORY:  Arm pain, paresthesias, clumsiness;.  Cervical disc disorder with radiculopathy, unspecified cervical region     TECHNIQUE:  Multiplanar, multisequence MR images of the cervical spine were acquired without the administration of contrast.     COMPARISON:  Cervical spine MRI 01/12/2021     FINDINGS:  Straightening of the normal cervical lordosis.  No spondylolisthesis.     No compression fractures.  No marrow replacing lesions.     Multilevel degenerative changes with disc desiccation and disc space narrowing, described in detail below.  No bone marrow edema.     Visualized structures in the posterior fossa are unremarkable. The cervical spinal cord is unremarkable.     Paraspinal soft tissues are unremarkable.     SIGNIFICANT FINDINGS BY LEVEL:     C2-3: Small disc osteophyte complex with central annular fissure.  No canal or foraminal stenosis.     C3-4: Disc osteophyte complex with superimposed central extrusion.  Mild canal stenosis with partial effacement of the thecal sac.  Mild bilateral foraminal stenosis.     C4-5: Disc osteophyte complex.  Moderate canal stenosis with near complete effacement of the thecal sac and flattening of the cervical cord.  Moderate bilateral foraminal stenosis.     C5-6: Disc osteophyte complex, eccentric to the right.  Severe canal stenosis with complete effacement of the thecal sac and flattening of the cervical cord.  Severe right and moderate left foraminal stenosis.     C6-7: Disc osteophyte complex.  Moderate canal stenosis.  Moderate bilateral foraminal stenosis.     C7-T1: Disc osteophyte complex with superimposed  right foraminal extrusion.  Mild canal stenosis.  Mild right foraminal stenosis.     Impression:     Multilevel degenerative changes, most advanced at C5-6 where there is severe canal stenosis and severe right and moderate left foraminal stenosis.  Overall, degenerative changes have progressed from 01/12/2021.     This report was flagged in Epic as abnormal.     Electronically signed by: Horace Quiroz  Date:                                            06/16/2022      XR LUMBAR SPINE AP AND LAT WITH FLEX/EXT     CLINICAL HISTORY:  Other specified postprocedural states     TECHNIQUE:  AP and lateral views as well as lateral flexion and extension images are performed through the lumbar spine.     COMPARISON:  Non 06/16/2022 MRI of the lumbar spine e     FINDINGS:  Patient had previous a L3/L4 hemilaminectomy on the left side.     Mild DJD and mild lumbar scoliosis.  The lumbar intervertebral disc spaces are slightly narrowed.  Few mm L2/L3 and L3/L4 retrolisthesis noted.  No fracture or dislocation.  No bone destruction identified     Electronically signed by: Xander Rubio MD  Date:                                            12/13/2022    MRI LUMBAR SPINE WITHOUT CONTRAST     CLINICAL HISTORY:  Lumbar radiculopathy, symptoms persist with conservative treatment;Low back pain, prior surgery, new symptoms; Dorsalgia, unspecified     TECHNIQUE:  Multiplanar, multisequence MR images were acquired from the thoracolumbar junction to the sacrum without the administration of contrast.     COMPARISON:  MRI lumbar spine 09/19/2021     FINDINGS:  Sequences are degraded by patient motion artifact.     Transitional lumbosacral anatomy with lumbarization of S1.     Postoperative changes from left L3-4 hemilaminectomy and micro discectomy.  Mild protrusion of the thecal sac and cauda equina nerve roots into the laminectomy defect.  No fluid collections in the operative bed.     No spondylolisthesis.     No compression fractures.  Small  fat containing lesion in the L3 vertebral body, likely a hemangioma.     Multilevel degenerative changes with disc desiccation and disc space narrowing, described in detail below.  No significant bone marrow edema.     The conus medullaris has a normal appearance and terminates at the L2 level.  Cauda equina nerve roots are unremarkable.     Paraspinal muscle atrophy.     SIGNIFICANT FINDINGS BY LEVEL:     T12-L1: Mild disc bulge.  Minimal canal stenosis.  No foraminal stenosis.     L1-2: Left facet arthropathy.  No canal or foraminal stenosis.     L2-3: Disc bulge.  Bilateral facet arthropathy and ligamentum flavum thickening.  Mild canal stenosis.  Mild left foraminal stenosis.     L3-4: Residual disc bulge versus granulation tissue.  The previous disc fragment migrating inferiorly is no longer seen.  Bilateral facet arthropathy and ligamentum flavum thickening.  Mild canal stenosis with narrowing of both subarticular zones and abutment of the left descending L4 nerve root.  Mild right and moderate left foraminal stenosis.     L4-5: Disc bulge.  Bilateral facet arthropathy and ligamentum flavum thickening.  Mild canal stenosis.  Moderate bilateral foraminal stenosis.     L5-S1: Disc bulge.  Bilateral facet arthropathy and ligamentum flavum thickening.  Mild canal stenosis.  Moderate bilateral foraminal stenosis.     Impression:     Postoperative changes from left L3-4 hemilaminectomy and micro discectomy.  The previous disc fragment migrating inferiorly is no longer seen.  There is a residual disc bulge/granulation tissue contributing to mild canal stenosis and moderate left foraminal stenosis.     Small meningocele at the laminectomy defect.     Degenerative changes elsewhere in the spine resulting in mild canal stenosis and moderate foraminal stenosis at multiple levels as described above.        Electronically signed by: Horace Quiroz  Date:                                             06/16/2022    EXAMINATION:  MRI LUMBAR SPINE WITHOUT CONTRAST     CLINICAL HISTORY:  Low back pain, prior surgery, new symptoms; Dorsalgia, unspecified     TECHNIQUE:  Multiplanar, multisequence MR images were acquired from the thoracolumbar junction to the sacrum without the administration of contrast.     COMPARISON:  MRI lumbar spine 06/16/2023     FINDINGS:  Postoperative change left L3-L4 hemilaminectomy and discectomy.  Mild protrusion of the thecal sac and cauda equina nerve roots into the laminectomy defect similar to prior.     Alignment: Normal.     Vertebrae: Endplate degenerative change L5-S1.  Small may angioma L3 vertebral body.  No acute fracture.  No infiltrative marrow process.     Discs: Multilevel disc height loss and desiccation most pronounced at L5-S1.     Cord: Normal.  Conus terminates at L1.     Soft tissue structures are unremarkable.  Limited evaluation of the retroperitoneal organs demonstrates no significant abnormalities.  Paraspinal musculature demonstrates normal bulk and signal intensity.     Degenerative findings:     T12-L1: Mild circumferential disc bulge no neural foraminal narrowing or spinal canal stenosis.     L1-L2: No neural foraminal narrowing or spinal canal stenosis.     L2-L3: Circumferential disc bulge.  Bilateral facet arthropathy.  No neural foraminal narrowing.  Mild spinal canal stenosis.     L3-L4: Postop change.  Circumferential disc bulge.  Bilateral facet arthropathy.  Moderate left and mild right neural foraminal narrowing.  Mild spinal canal stenosis     L4-L5: Circumferential disc bulge.  Bilateral facet arthropathy.  Moderate right and severe left neural foraminal narrowing.  Mild to moderate spinal canal stenosis.     L5-S1: Circumferential disc bulge.  Bilateral facet arthropathy.  Moderate bilateral neural foraminal narrowing.  Mild spinal canal stenosis.     Impression:     1. Postoperative change left L3-L4 hemilaminectomy and discectomy.  2. Multilevel  degenerative change of the lumbar spine as above not significantly changed from prior MRI 06/16/2023, noting severe left foraminal narrowing at L4-5.     Electronically signed by resident: Hammad Quinn  Date:                                            10/23/2023  Labs:  BMP  Lab Results   Component Value Date     10/24/2024    K 4.2 10/24/2024     10/24/2024    CO2 27 10/24/2024    BUN 17 10/24/2024    CREATININE 0.8 10/24/2024    CALCIUM 9.6 10/24/2024    ANIONGAP 8 10/24/2024    EGFRNORACEVR >60.0 10/24/2024     Lab Results   Component Value Date    ALT 22 10/24/2024    AST 19 10/24/2024    ALKPHOS 101 10/24/2024    BILITOT 0.6 10/24/2024     Lab Results   Component Value Date    WBC 8.10 09/15/2024    HGB 14.1 09/15/2024    HCT 41.5 09/15/2024    MCV 91 09/15/2024     09/15/2024         Assessment:  Problem List Items Addressed This Visit    None        04/20/2032-  Hermelindo ELIAS Rafaelgregkelby SMALLWOOD is a 44 y.o. male who  has a past medical history of ADD (attention deficit disorder) (05/09/2012), Allergy, Anxiety (04/12/2023), Asthma (05/09/2012), Cervical disc disorder with radiculopathy, unspecified cervical region (09/14/2021), Cervical radiculopathy (05/22/2023), Cervical spondylosis with myelopathy (06/29/2022), Eczema, Esophageal ulcer, GERD (gastroesophageal reflux disease) (05/09/2012), Glaucoma, Kidney stones (05/2014), Lumbar disc disease (05/09/2012), Lumbar herniated disc (05/09/2012), Malignant melanoma of skin, unspecified (01/06/2022), Melanoma (01/2022), LUBA (obstructive sleep apnea), Radiculopathy, lumbar region (09/14/2021), Renal calculi (05/09/2012), and Type 2 diabetes mellitus without complication, without long-term current use of insulin (11/16/2020).  By history and examination this patient has chronicneck pain with bilateral radiculopathy as well as low back pain s/p 9/19/2021 Left L3-4 hemilaminotomy, medial facetectomy, and foraminotomy for microdiscectomy with Dr Mcnamara with  some improvement. The underlying cause cause is facet arthritis, degenerative disc disease, foraminal stenosis, central canal stenosis, muscle dysfunction, and deconditioning.  MRI of the cervical spine was significant for multilevel degenerative changes, most advanced at C5-6 where there is severe canal stenosis and severe right and moderate left foraminal stenosis. MRI of the Lumbar spine post surgery was significant for residual disc bulge/granulation tissue contributing to mild canal stenosis and moderate left foraminal stenosis at L3/L4 and degenerative changes elsewhere in the spine resulting in mild canal stenosis and moderate foraminal stenosis at multiple levels.  We discussed the underlying diagnoses and multiple treatment options including non-opioid medications, interventional procedures, physical therapy, home exercise, core muscle enhancement, and weight loss.  He previously underwent a cervical steroid injection with several months of relief.  Plan for repeat of cervical epidural steroid injection.  The risks and benefits of each treatment option were discussed and all questions were answered.        11/13/2023 - Hermelindo Garza III presents for evaluation of right hip pain. His exam was consistent with Right GTB bursitis and right hip joint pain.  I will schedule him for a right GTB and Right intraarticular hip injection and defer the LESI for now.  Pt tearful and anxious throughout exam.  He is established with a Psychiatrist.  We discussed referral Psychology for possible CBT and assistance with coping strategies for chronic pain.     12/19/2023 - Hermelindo Garza III presents today for follow-up.  He is status post  right GTB and right intra-articular hip injections on 11/29/2023 and notes improvement in his GTB and hip pain.  Today he complains of pain extending from the area overlying the right hip joint and extending down the anterolateral portion of the thigh  in the distribution of  the tensor fascia ashtyn.   Patient has multiple pain generators  as well as concurrent anxiety and mood disorder.  I feel that he would be an excellent candidate for our functional restoration program.  Discussed with patient today during visit.  I will place a referral to the functional restoration program.   I also discussed with the patient that it would be a good to have him see Rheumatology given his extensive pain history, as perhaps they can offer their expertise.     03/28/2024 - Hermelindo Garza III presents today for follow up.  He was status post lumbar epidural steroid injection at L4/L5 on 03/06/2024 with 70-80% relief of his low back symptoms.  His cervical radicular symptoms have started to return.  Most recent cervical epidural gave him over 5 months of pain relief.  He would like to repeat the procedure.  I will schedule from a repeat cervical epidural steroid injection.  I will also have the patient increase his gabapentin to 300 mg 4 times daily.  He will let me know when he needs a refill of the medication.      04/30/2024-Hermelindo Garza III is a 44 y.o. male who  has a past medical history of ADD (attention deficit disorder) (05/09/2012), Allergy, Anxiety (04/12/2023), Asthma (05/09/2012), Cervical disc disorder with radiculopathy, unspecified cervical region (09/14/2021), Cervical radiculopathy (05/22/2023), Cervical spondylosis with myelopathy (06/29/2022), Eczema, Esophageal ulcer, GERD (gastroesophageal reflux disease) (05/09/2012), Glaucoma, Kidney stones (05/2014), Lumbar disc disease (05/09/2012), Lumbar herniated disc (05/09/2012), Malignant melanoma of skin, unspecified (01/06/2022), Melanoma (01/2022), LUBA (obstructive sleep apnea), Radiculopathy, lumbar region (09/14/2021), Renal calculi (05/09/2012), and Type 2 diabetes mellitus without complication, without long-term current use of insulin (11/16/2020).  By history and examination this patient has chronicneck pain with  bilateral radiculopathy in the distribution of C5 and C6.  The underlying cause cause is facet arthritis, degenerative disc disease, foraminal stenosis, and central canal stenosis.  Pathology is confirmed by imaging.  We discussed the underlying diagnoses and multiple treatment options including non-opioid medications, interventional procedures, physical therapy, home exercise, core muscle enhancement, activity modification, and weight loss.  The risks and benefits of each treatment option were discussed and all questions were answered.    He was recently provided a cervical NELI that provided him excellent relief he did reach out to Neurosurgery a new CT lumbar was ordered to rule out any new pathology.  The patient will follow up with our office to possibly repeat injections in the future.    05/30/2024 - Hermelindo Garza III  presents today with worsening right-sided cervical radicular symptoms over the last 4 weeks.  Given the worsening of his symptoms and development of new tremor in the right upper extremity, I am obtaining an updated MRI of the cervical spine.   He is established with Dr. Mcnamara with Neurosurgery, and at his last visit it was noted that he is a candidate for cervical surgery if his symptoms progressed in the areas of nerve root compression.  I am recommending that he follow up with Dr. Mcnamara after updated imaging is obtained.  I am happy to repeat a cervical epidural for this patient, but I am concerned about the number of steroid injections he has required for both his back and neck pain thus far.  We discussed that these steroids can affect blood sugar, blood pressure and over time can cause bone weakening.  In the meantime the patient would like to try Lyrica again.  We will discontinue gabapentin and I will prescribe him Lyrica 150 mg b.I.d. Referral placed to Neurology to assist with workup of new onset right upper extremity tremor.    06/25/2024- 44-year-old male with a history of  chronic low back and neck pain with radiculopathy symptoms in both his arms and legs.  We recently provided him a lumbar NELI targeting L4-5 that resulted in 70 80% relief and improvement in his functionality.  His worst pain at this point is his neck pain with radiculopathy.  He and I discussed that he will revisit cervical decompressive surgery with Dr. Mcnamara  in a few weeks as his symptoms have continued and on some level gotten a little worse.  He denies any profound weakness denies any bowel or bladder dysfunction at this time.  Discussed with patient that he can reach out to me at any point for support and for any problems moving forward.    09/05/2024-Hermelindo Garza III is a 44 y.o. male who  has a past medical history of ADD (attention deficit disorder) (05/09/2012), Allergy, Anxiety (04/12/2023), Asthma (05/09/2012), Cervical disc disorder with radiculopathy, unspecified cervical region (09/14/2021), Cervical radiculopathy (05/22/2023), Cervical spondylosis with myelopathy (06/29/2022), Eczema, Esophageal ulcer, GERD (gastroesophageal reflux disease) (05/09/2012), Glaucoma, Kidney stones (05/2014), Lumbar disc disease (05/09/2012), Lumbar herniated disc (05/09/2012), Malignant melanoma of skin, unspecified (01/06/2022), Melanoma (01/2022), LUBA (obstructive sleep apnea), Radiculopathy, lumbar region (09/14/2021), Renal calculi (05/09/2012), and Type 2 diabetes mellitus without complication, without long-term current use of insulin (11/16/2020).  By history and examination this patient has chronic low back pain with radiculopathy.  The underlying cause cause is facet arthritis, degenerative disc disease, foraminal stenosis, muscles strain, and deconditioning.  Pathology is confirmed by imaging.  We discussed the underlying diagnoses and multiple treatment options including non-opioid medications, interventional procedures, physical therapy, core muscle enhancement, activity modification, and weight loss.   The risks and benefits of each treatment option were discussed and all questions were answered.      12/12/2024-Hermelindo Garza III is a 44 y.o. male who  has a past medical history of ADD (attention deficit disorder) (05/09/2012), Allergy, Anxiety (04/12/2023), Asthma (05/09/2012), Cervical disc disorder with radiculopathy, unspecified cervical region (09/14/2021), Cervical radiculopathy (05/22/2023), Cervical spondylosis with myelopathy (06/29/2022), Eczema, Esophageal ulcer, GERD (gastroesophageal reflux disease) (05/09/2012), Glaucoma, Kidney stones (05/2014), Lumbar disc disease (05/09/2012), Lumbar herniated disc (05/09/2012), Malignant melanoma of skin, unspecified (01/06/2022), Melanoma (01/2022), LUBA (obstructive sleep apnea), Radiculopathy, lumbar region (09/14/2021), Renal calculi (05/09/2012), and Type 2 diabetes mellitus without complication, without long-term current use of insulin (11/16/2020).  By history and examination this patient has chronic low back pain with radiculopathy.  The underlying cause cause is degenerative disc disease, foraminal stenosis, central canal stenosis, muscle dysfunction, muscles strain, and deconditioning.  Pathology is confirmed by imaging.  We discussed the underlying diagnoses and multiple treatment options including non-opioid medications, interventional procedures, physical therapy, home exercise, core muscle enhancement, activity modification, and weight loss.  The risks and benefits of each treatment option were discussed and all questions were answered.        Treatment Plan:   Procedures:  none at this time.  Can repeat lumbar NELI targeting L5-S1 in the future pain returns.  PT/OT/HEP: I have stressed the importance of physical activity and a home exercise plan to help with pain and improve health.  He is previously completed physical therapy.  I feel the patient would significantly benefit from enrollment in the functional restoration program.  Referral  placed.    Medications:  - Continue Lyrica 150 mg b.i.d..  Discontinue gabapentin.   - continue with meloxicam 15 mg q.d.   - continue with p.r.n. Tylenol   -  Reviewed and consistent with medication use as prescribed.  Imaging: reviewed and discussed   Recommend pt continue to follow up with Dr. Mcnamara (neurosurgery)  Follow Up:  3-4 months or sooner if needed for continued care.    Hung Prieto, NP-C  Interventional Pain Management      Disclaimer: This note was partly generated using dictation software which may occasionally result in transcription errors.

## 2024-12-17 ENCOUNTER — OFFICE VISIT (OUTPATIENT)
Dept: UROLOGY | Facility: CLINIC | Age: 44
End: 2024-12-17
Payer: COMMERCIAL

## 2024-12-17 VITALS
HEART RATE: 88 BPM | SYSTOLIC BLOOD PRESSURE: 118 MMHG | DIASTOLIC BLOOD PRESSURE: 78 MMHG | WEIGHT: 235.44 LBS | BODY MASS INDEX: 39.18 KG/M2

## 2024-12-17 DIAGNOSIS — N32.0 BLADDER NECK OBSTRUCTION: Primary | ICD-10-CM

## 2024-12-17 PROCEDURE — 99999 PR PBB SHADOW E&M-EST. PATIENT-LVL IV: CPT | Mod: PBBFAC,,, | Performed by: UROLOGY

## 2024-12-17 PROCEDURE — 3074F SYST BP LT 130 MM HG: CPT | Mod: CPTII,S$GLB,, | Performed by: UROLOGY

## 2024-12-17 PROCEDURE — 1159F MED LIST DOCD IN RCRD: CPT | Mod: CPTII,S$GLB,, | Performed by: UROLOGY

## 2024-12-17 PROCEDURE — 99024 POSTOP FOLLOW-UP VISIT: CPT | Mod: S$GLB,,, | Performed by: UROLOGY

## 2024-12-17 PROCEDURE — 3061F NEG MICROALBUMINURIA REV: CPT | Mod: CPTII,S$GLB,, | Performed by: UROLOGY

## 2024-12-17 PROCEDURE — 3078F DIAST BP <80 MM HG: CPT | Mod: CPTII,S$GLB,, | Performed by: UROLOGY

## 2024-12-17 PROCEDURE — 3044F HG A1C LEVEL LT 7.0%: CPT | Mod: CPTII,S$GLB,, | Performed by: UROLOGY

## 2024-12-17 PROCEDURE — 3008F BODY MASS INDEX DOCD: CPT | Mod: CPTII,S$GLB,, | Performed by: UROLOGY

## 2024-12-17 PROCEDURE — 3066F NEPHROPATHY DOC TX: CPT | Mod: CPTII,S$GLB,, | Performed by: UROLOGY

## 2024-12-17 NOTE — PROGRESS NOTES
[facility]      Return  Voiding Dysfunction  Note    12/17/2024    Referred by:  No ref. provider found    History of Present Illness    CHIEF COMPLAINT:  Patient presents for follow-up evaluation of voiding symptoms after undergoing transurethral bladder neck incision/resection for primary bladder neck obstruction.    HPI:  Patient, a 44-year-old male, returns for evaluation of urinary symptoms following a transurethral incision/resection of the bladder neck performed on November 8th. Prior to the procedure, he had voiding dysfunction and was treated with an alpha blocker for primary bladder neck obstruction, which was ineffective. He reports improvement in symptoms since the surgery, including better urinary flow and cessation of nocturia. He now has a more normal urge to urinate, in contrast to his pre-operative state where he felt urges but had minimal output. Patient reports decreased urination frequency compared to pre-operative levels, with occasional habitual urges rather than physiological needs. Post-operatively, he has been taking Flomax (tamsulosin). He also reports gradual onset of retrograde ejaculation since the procedure, which has become more pronounced over time. Patient denies frequent nighttime urination, urgency, or the sensation of incomplete bladder emptying.    MEDICATIONS:  Patient is on Flomax (tamsulosin) for voiding dysfunction.    MEDICAL HISTORY:  Patient has a history of voiding dysfunction and primary bladder neck obstruction.    SURGICAL HISTORY:  He underwent a transurethral bladder neck incision/resection on November 8th for primary bladder neck obstruction. The outcome was improved urinary flow and decreased nighttime urination frequency.    TEST RESULTS:  Today's post-void residual measurement was 26 mL, indicating that the patient is emptying well. A cystourethroscopy was performed in the OR, which revealed a relatively high bladder neck. The dynamic  evaluation could not be performed due to the patient's vasovagal reaction. Additionally, a voiding cystourethrogram was not obtained because of the patient's reaction to catheter placement.    SOCIAL HISTORY:  Patient reports consuming alcohol with dinner.          A review of 10+ systems was conducted with pertinent positive and negative findings documented in HPI with all other systems reviewed and negative.    Past medical, family, surgical and social history reviewed as documented in chart with pertinent positive medical, family, surgical and social history detailed in HPI.    Exam Findings:    Physical Exam    General: No acute distress. Nontoxic appearing.  HENT: Normocephalic. Atraumatic.  Respiratory: Normal respiratory effort. No conversational dyspnea. No audible wheezing.  Abdomen: No obvious distension.  Skin: No visible abnormalities.  Extremities: No edema upper extremities. No edema lower extremities.  Neurological: Alert and oriented x3. Normal speech.  Psychiatric: Normal mood. Normal affect. No evidence of SI.  Male Genitourinary: Post-void residual: 26 mL.          Assessment/Plan:    Assessment & Plan    Patient underwent transurethral bladder neck incision/resection for primary bladder neck obstruction after alpha blocker treatment was ineffective  Unable to perform definitive diagnostic testing (voiding cystourethrogram) due to patient's vasovagal reaction to catheter placement  Cystourethroscopy in OR revealed relatively high bladder neck  Post-operative improvement in voiding symptoms suggests diagnosis and treatment were likely correct  Continued improvement expected as bladder adapts to resolved obstruction  Monitoring for potential scar formation at resection site in long-term follow-up    PLAN SUMMARY:  - Discontinued Flomax (tamsulosin) today  - Restart Flomax if significant symptom changes occur within a week of discontinuation  - Contact office if significant changes in urinary  symptoms occur after stopping Flomax  - Follow up in 3-4 months    BLADDER-NECK OBSTRUCTION:  - Explained that bladder may take time to adjust to resolved obstruction due to long-standing nature of condition.  - Discussed normal occurrence of retrograde ejaculation following aggressive bladder neck resection.  - Clarified that current urinary symptoms likely due to bladder adaptation rather than ongoing surgical healing.  - Discontinued Flomax (tamsulosin) today.  - Restart Flomax if significant symptom changes occur within a week of discontinuation.  - Post-void residual: 26 mL.    FREQUENCY OF MICTURITION:  - Contact the office if significant changes in urinary symptoms occur after stopping Flomax.    FOLLOW-UP:  - Follow up in 3-4 months.

## 2024-12-19 ENCOUNTER — CLINICAL SUPPORT (OUTPATIENT)
Dept: ALLERGY | Facility: CLINIC | Age: 44
End: 2024-12-19
Payer: COMMERCIAL

## 2024-12-19 DIAGNOSIS — J30.9 CHRONIC ALLERGIC RHINITIS: Primary | ICD-10-CM

## 2024-12-19 PROCEDURE — 99999 PR PBB SHADOW E&M-EST. PATIENT-LVL I: CPT | Mod: PBBFAC,,,

## 2024-12-19 PROCEDURE — 95117 IMMUNOTHERAPY INJECTIONS: CPT | Mod: S$GLB,,, | Performed by: STUDENT IN AN ORGANIZED HEALTH CARE EDUCATION/TRAINING PROGRAM

## 2025-01-02 ENCOUNTER — PATIENT MESSAGE (OUTPATIENT)
Dept: DERMATOLOGY | Facility: CLINIC | Age: 45
End: 2025-01-02

## 2025-01-02 ENCOUNTER — OFFICE VISIT (OUTPATIENT)
Dept: DERMATOLOGY | Facility: CLINIC | Age: 45
End: 2025-01-02
Payer: COMMERCIAL

## 2025-01-02 DIAGNOSIS — L82.1 SK (SEBORRHEIC KERATOSIS): ICD-10-CM

## 2025-01-02 DIAGNOSIS — D18.01 CHERRY ANGIOMA: ICD-10-CM

## 2025-01-02 DIAGNOSIS — L73.9 FOLLICULITIS: Primary | ICD-10-CM

## 2025-01-02 DIAGNOSIS — L91.8 SKIN TAG: ICD-10-CM

## 2025-01-02 DIAGNOSIS — L30.9 DERMATITIS: ICD-10-CM

## 2025-01-02 DIAGNOSIS — Z86.006 HISTORY OF MELANOMA IN SITU: ICD-10-CM

## 2025-01-02 DIAGNOSIS — J45.901 EXACERBATION OF ASTHMA, UNSPECIFIED ASTHMA SEVERITY, UNSPECIFIED WHETHER PERSISTENT: ICD-10-CM

## 2025-01-02 DIAGNOSIS — D22.9 NEVUS: ICD-10-CM

## 2025-01-02 DIAGNOSIS — L81.4 LENTIGO: ICD-10-CM

## 2025-01-02 PROCEDURE — 99999 PR PBB SHADOW E&M-EST. PATIENT-LVL IV: CPT | Mod: PBBFAC,,, | Performed by: DERMATOLOGY

## 2025-01-02 RX ORDER — ALBUTEROL SULFATE 90 UG/1
2 INHALANT RESPIRATORY (INHALATION) EVERY 6 HOURS PRN
Qty: 8.5 G | Refills: 0 | Status: CANCELLED | OUTPATIENT
Start: 2025-01-02 | End: 2026-01-02

## 2025-01-02 RX ORDER — CLINDAMYCIN PHOSPHATE 11.9 MG/ML
SOLUTION TOPICAL
Qty: 60 ML | Refills: 3 | Status: SHIPPED | OUTPATIENT
Start: 2025-01-02

## 2025-01-02 NOTE — PROGRESS NOTES
Subjective:      Patient ID:  Hermelindo Garza III is a 44 y.o. male who presents for   Chief Complaint   Patient presents with    Skin Check     tbse     Pt present for TBSE.    Pt has a history of  moderate sun exposure in the past.   Pt recalls several blistering sunburns in the past- yes  Pt has history of tanning bed use- yes  Pt has  had moles removed in the past- yes, MIS R mid back 2/2022  Pt has history of melanoma in first degree relatives-  Father NMSC    Patient with new area of concern:   Location: L ear- painful  Previous treatments: none    Patient with new area of concern:   Location: r jaw  Previous treatments: none          Review of Systems   Skin:  Positive for daily sunscreen use, activity-related sunscreen use and wears hat (some times). Negative for recent sunburn.   Hematologic/Lymphatic: Does not bruise/bleed easily.       Objective:   Physical Exam   Constitutional: He appears well-developed and well-nourished. No distress.   Musculoskeletal: Lymphadenopathy:      Cervical: No cervical adenopathy.      Upper Body:   No axillary adenopathy present.No supraclavicular adenopathy is present.     Lymphadenopathy: No supraclavicular adenopathy is present.     He has no cervical adenopathy.     He has no axillary adenopathy.   Neurological: He is alert and oriented to person, place, and time. He is not disoriented.   Psychiatric: He has a normal mood and affect.   Skin:   Areas Examined (abnormalities noted in diagram):   Scalp / Hair Palpated and Inspected  Head / Face Inspection Performed  Neck Inspection Performed  Chest / Axilla Inspection Performed  Abdomen Inspection Performed  Genitals / Buttocks / Groin Inspection Performed  Back Inspection Performed  RUE Inspected  LUE Inspection Performed  RLE Inspected  LLE Inspection Performed  Nails and Digits Inspection Performed                         Diagram Legend     Erythematous scaling macule/papule c/w actinic keratosis       Vascular  papule c/w angioma      Pigmented verrucoid papule/plaque c/w seborrheic keratosis      Yellow umbilicated papule c/w sebaceous hyperplasia      Irregularly shaped tan macule c/w lentigo     1-2 mm smooth white papules consistent with Milia      Movable subcutaneous cyst with punctum c/w epidermal inclusion cyst      Subcutaneous movable cyst c/w pilar cyst      Firm pink to brown papule c/w dermatofibroma      Pedunculated fleshy papule(s) c/w skin tag(s)      Evenly pigmented macule c/w junctional nevus     Mildly variegated pigmented, slightly irregular-bordered macule c/w mildly atypical nevus      Flesh colored to evenly pigmented papule c/w intradermal nevus       Pink pearly papule/plaque c/w basal cell carcinoma      Erythematous hyperkeratotic cursted plaque c/w SCC      Surgical scar with no sign of skin cancer recurrence      Open and closed comedones      Inflammatory papules and pustules      Verrucoid papule consistent consistent with wart     Erythematous eczematous patches and plaques     Dystrophic onycholytic nail with subungual debris c/w onychomycosis     Umbilicated papule    Erythematous-base heme-crusted tan verrucoid plaque consistent with inflamed seborrheic keratosis     Erythematous Silvery Scaling Plaque c/w Psoriasis     See annotation      Assessment / Plan:        Folliculitis  Wash with CLn sports wash   -     clindamycin (CLEOCIN T) 1 % external solution; Apply to the affected area on buttock, under abdomen , left ear, right jaw 1-2 times a day as needed for flare  Dispense: 60 mL; Refill: 3    Dermatitis  Can use topical steroid for flares that dont resolve with above or extreme itching     SK (seborrheic keratosis)  These are benign inherited growths without a malignant potential. Reassurance given to patient. No treatment is necessary.     Cherry angioma  These are benign vascular lesions that are inherited.  Treatment is not necessary.    Nevus  Discussed ABCDE's of nevi.   Monitor for new mole or moles that are becoming bigger, darker, irritated, or developing irregular borders. Brochure provided. Instructed patient to observe lesion(s) for changes and follow up in clinic if changes are noted. Patient to monitor skin at home for new or changing lesions.     Lentigo  This is a benign hyperpigmented sun induced lesion. Recommend daily sun protection/avoidance and use of at least SPF 30, broad spectrum sunscreen (OTC drug) will reduce the number of new lesions. Treatment of these benign lesions are considered cosmetic.  The nature of sun-induced photo-aging and skin cancers is discussed.  Sun avoidance, protective clothing, and the use of 30-SPF sunscreens is advised. Observe closely for skin damage/changes, and call if such occurs.    Skin tag  Reassurance given to patient. No treatment is necessary.   Treatment of benign, asymptomatic lesions may be considered cosmetic.    History of melanoma in situ- R mid back 1/2022  Area of previous melanoma in situ  examined. Site well healed with no signs of recurrence.    Total body skin examination performed today including at least 12 points as noted in physical examination. No lesions suspicious for malignancy noted.    Recommend daily sun protection/avoidance, use of at least SPF 30, broad spectrum sunscreen (OTC drug), skin self examinations, and routine physician surveillance to optimize early detection             Follow up in about 3 months (around 4/2/2025) for recheck feet, nevus on back, rash follow up.

## 2025-01-06 DIAGNOSIS — J45.901 EXACERBATION OF ASTHMA, UNSPECIFIED ASTHMA SEVERITY, UNSPECIFIED WHETHER PERSISTENT: ICD-10-CM

## 2025-01-06 RX ORDER — ALBUTEROL SULFATE 90 UG/1
2 INHALANT RESPIRATORY (INHALATION) EVERY 6 HOURS PRN
Qty: 8.5 G | Refills: 0 | Status: SHIPPED | OUTPATIENT
Start: 2025-01-06 | End: 2026-01-06

## 2025-01-12 ENCOUNTER — PATIENT MESSAGE (OUTPATIENT)
Dept: DERMATOLOGY | Facility: CLINIC | Age: 45
End: 2025-01-12
Payer: COMMERCIAL

## 2025-01-12 DIAGNOSIS — L73.9 FOLLICULITIS: Primary | ICD-10-CM

## 2025-01-15 ENCOUNTER — PATIENT MESSAGE (OUTPATIENT)
Dept: INTERNAL MEDICINE | Facility: CLINIC | Age: 45
End: 2025-01-15
Payer: COMMERCIAL

## 2025-01-15 RX ORDER — MUPIROCIN 20 MG/G
OINTMENT TOPICAL
Qty: 22 G | Refills: 3 | Status: SHIPPED | OUTPATIENT
Start: 2025-01-15

## 2025-01-16 ENCOUNTER — CLINICAL SUPPORT (OUTPATIENT)
Dept: ALLERGY | Facility: CLINIC | Age: 45
End: 2025-01-16
Payer: COMMERCIAL

## 2025-01-16 DIAGNOSIS — J30.9 CHRONIC ALLERGIC RHINITIS: Primary | ICD-10-CM

## 2025-01-16 PROCEDURE — 95117 IMMUNOTHERAPY INJECTIONS: CPT | Mod: S$GLB,,, | Performed by: STUDENT IN AN ORGANIZED HEALTH CARE EDUCATION/TRAINING PROGRAM

## 2025-01-16 PROCEDURE — 99999 PR PBB SHADOW E&M-EST. PATIENT-LVL I: CPT | Mod: PBBFAC,,,

## 2025-01-16 NOTE — TELEPHONE ENCOUNTER
Spoke with patient to offer an available appt today at 3:15, put states that he has a meeting until 3:30 and couldn't make it    Appt scheduled on 1/27 at 7:45 am per patient's request, he stated that there was no rush as he does still have a month of medication remaining

## 2025-01-18 ENCOUNTER — PATIENT MESSAGE (OUTPATIENT)
Dept: URGENT CARE | Facility: CLINIC | Age: 45
End: 2025-01-18

## 2025-01-18 ENCOUNTER — OFFICE VISIT (OUTPATIENT)
Dept: URGENT CARE | Facility: CLINIC | Age: 45
End: 2025-01-18
Payer: COMMERCIAL

## 2025-01-18 VITALS
HEART RATE: 85 BPM | DIASTOLIC BLOOD PRESSURE: 72 MMHG | OXYGEN SATURATION: 98 % | BODY MASS INDEX: 39.11 KG/M2 | TEMPERATURE: 99 F | RESPIRATION RATE: 18 BRPM | WEIGHT: 235 LBS | SYSTOLIC BLOOD PRESSURE: 109 MMHG

## 2025-01-18 DIAGNOSIS — R59.0 ENLARGED LYMPH NODES IN ARMPIT: ICD-10-CM

## 2025-01-18 DIAGNOSIS — J45.901 EXACERBATION OF ASTHMA, UNSPECIFIED ASTHMA SEVERITY, UNSPECIFIED WHETHER PERSISTENT: ICD-10-CM

## 2025-01-18 DIAGNOSIS — R19.7 DIARRHEA, UNSPECIFIED TYPE: Primary | ICD-10-CM

## 2025-01-18 DIAGNOSIS — R05.1 ACUTE COUGH: ICD-10-CM

## 2025-01-18 LAB
CTP QC/QA: YES
CTP QC/QA: YES
POC MOLECULAR INFLUENZA A AGN: NEGATIVE
POC MOLECULAR INFLUENZA B AGN: NEGATIVE
SARS-COV-2 AG RESP QL IA.RAPID: NEGATIVE

## 2025-01-18 PROCEDURE — 87502 INFLUENZA DNA AMP PROBE: CPT | Mod: QW,S$GLB,,

## 2025-01-18 PROCEDURE — 87811 SARS-COV-2 COVID19 W/OPTIC: CPT | Mod: QW,S$GLB,,

## 2025-01-18 PROCEDURE — 99213 OFFICE O/P EST LOW 20 MIN: CPT | Mod: S$GLB,,,

## 2025-01-18 RX ORDER — AZITHROMYCIN 500 MG/1
500 TABLET, FILM COATED ORAL DAILY
Qty: 3 TABLET | Refills: 0 | Status: SHIPPED | OUTPATIENT
Start: 2025-01-18 | End: 2025-01-21

## 2025-01-18 RX ORDER — PREDNISONE 20 MG/1
40 TABLET ORAL DAILY
Qty: 10 TABLET | Refills: 0 | Status: SHIPPED | OUTPATIENT
Start: 2025-01-18 | End: 2025-01-23

## 2025-01-18 RX ORDER — LOPERAMIDE HYDROCHLORIDE 2 MG/1
2 CAPSULE ORAL 4 TIMES DAILY PRN
Qty: 20 CAPSULE | Refills: 0 | Status: SHIPPED | OUTPATIENT
Start: 2025-01-18

## 2025-01-18 NOTE — PATIENT INSTRUCTIONS
Take azithromycin once daily for 3 days.  Imodium as needed for diarrhea.  Eat foods high in fiber.  Take prednisone once daily for 5 days for your asthma.  Continue your nebulizer and inhaler as needed.    When do I need to call the doctor?   You have more than 6 runny, watery stools in 24 hours.  You have a fever of 101.3°F (38.5°C) or higher or chills that do not go away after a day.  You have very bad belly pain.  Your stools have a small amount (less than 1 teaspoon or 5 mL) of blood in them.  You develop early signs of fluid loss, such as:  Your urine is very dark colored.  Your mouth is dry.  You have muscle cramps.  You have a lack of energy.  You feel light-headed when you get up.  You have new or worsening symptoms.  - Rest.    - Drink plenty of fluids.    - Acetaminophen (tylenol) or Ibuprofen (advil,motrin) as directed as needed for fever/pain. Avoid tylenol if you have a history of liver disease. Do not take ibuprofen if you have a history of GI bleeding, kidney disease, or if you take blood thinners.     - Follow up with your PCP or specialty clinic as directed in the next 1-2 weeks if not improved or as needed.  You can call [phone] to schedule an appointment with the appropriate provider.    - Go to the ER or seek medical attention immediately if you develop new or worsening symptoms.     - You must understand that you have received an Urgent Care treatment only and that you may be released before all of your medical problems are known or treated.   - You, the patient, will arrange for follow up care as instructed.   - If your condition worsens or fails to improve we recommend that you receive another evaluation at the ER immediately or contact your PCP to discuss your concerns or return here.

## 2025-01-18 NOTE — PROGRESS NOTES
Subjective:      Patient ID: Hermelindo Garza III is a 44 y.o. male.    Vitals:  weight is 106.6 kg (235 lb). His oral temperature is 98.9 °F (37.2 °C). His blood pressure is 109/72 and his pulse is 85. His respiration is 18 and oxygen saturation is 98%.     Chief Complaint: Diarrhea    44-year-old male patient presents with ongoing diarrhea for about a week.  He states he is having very watery stools.  No meds taken for diarrhea.  He also reports he was at a park last week and notice his asthma was flaring up.  He states he has been using his nebulizer and inhaler more frequently.  Currently denies any shortness of breath.  He denies chest pain or fever.  He reports he has also been coughing.  He reports he notice a lump to his bilateral axilla.  He was seen by dermatology and was prescribed clindamycin.  No redness or erythema.        Diarrhea   This is a new problem. The current episode started in the past 7 days. The problem occurs 5 to 10 times per day. The problem has been unchanged. The stool consistency is described as Watery. The patient states that diarrhea awakens him from sleep. Associated symptoms include bloating and coughing. Pertinent negatives include no abdominal pain, arthralgias, chills, fever, headaches, increased  flatus, myalgias, sweats, URI, vomiting or weight loss. Nothing aggravates the symptoms. There are no known risk factors. He has tried nothing for the symptoms. The treatment provided no relief. There is no history of bowel resection, inflammatory bowel disease, irritable bowel syndrome, malabsorption, a recent abdominal surgery or short gut syndrome.       Constitution: Negative for activity change, appetite change, chills, sweating, fatigue and fever.   HENT:  Negative for ear pain, ear discharge, sore throat, trouble swallowing and voice change.    Neck: Positive for painful lymph nodes. Negative for neck pain and neck stiffness.   Cardiovascular:  Negative for chest trauma  and chest pain.   Eyes:  Negative for eye trauma, foreign body in eye and eye discharge.   Respiratory:  Positive for cough and asthma. Negative for sleep apnea, chest tightness, sputum production, bloody sputum, COPD, shortness of breath, stridor and wheezing.    Gastrointestinal:  Positive for diarrhea. Negative for abdominal trauma, abdominal pain, abdominal bloating, history of abdominal surgery, nausea, vomiting, constipation, bright red blood in stool and dark colored stools.   Endocrine: hair loss and cold intolerance.   Genitourinary:  Negative for dysuria, frequency and urgency.   Musculoskeletal:  Negative for pain, trauma, joint pain and muscle ache.   Skin:  Negative for color change, pale and rash.   Allergic/Immunologic: Positive for environmental allergies, seasonal allergies and asthma. Negative for food allergies, eczema and immunocompromised state.   Neurological:  Negative for dizziness, history of vertigo, light-headedness, passing out, headaches, disorientation and altered mental status.   Hematologic/Lymphatic: Positive for swollen lymph nodes. Negative for easy bruising/bleeding, trouble clotting and history of blood clots. Does not bruise/bleed easily.   Psychiatric/Behavioral:  Negative for altered mental status, disorientation and confusion.       Objective:     Physical Exam   Constitutional: He is oriented to person, place, and time. He appears well-developed. He is cooperative.  Non-toxic appearance. He does not appear ill. No distress.   HENT:   Head: Normocephalic and atraumatic.   Ears:   Right Ear: Hearing, tympanic membrane, external ear and ear canal normal.   Left Ear: Hearing, tympanic membrane, external ear and ear canal normal.   Nose: Congestion present. No mucosal edema, rhinorrhea or nasal deformity. No epistaxis. Right sinus exhibits no maxillary sinus tenderness and no frontal sinus tenderness. Left sinus exhibits no maxillary sinus tenderness and no frontal sinus  tenderness.   Mouth/Throat: Uvula is midline, oropharynx is clear and moist and mucous membranes are normal. Mucous membranes are moist. No trismus in the jaw. Normal dentition. No uvula swelling. No oropharyngeal exudate, posterior oropharyngeal edema or posterior oropharyngeal erythema. Oropharynx is clear.   Eyes: Conjunctivae and lids are normal. No scleral icterus.   Neck: Trachea normal and phonation normal. Neck supple. No edema present. No erythema present. No neck rigidity present.   Cardiovascular: Normal rate, regular rhythm, normal heart sounds and normal pulses.   Pulmonary/Chest: Effort normal and breath sounds normal. No stridor. No respiratory distress. He has no decreased breath sounds. He has no wheezes. He has no rhonchi. He has no rales. He exhibits no tenderness.   Abdominal: Normal appearance and bowel sounds are normal. He exhibits no distension and no mass. Soft. There is no abdominal tenderness. There is no rebound, no guarding, no left CVA tenderness and no right CVA tenderness. No hernia.   Musculoskeletal: Normal range of motion.         General: No deformity. Normal range of motion.   Lymphadenopathy:   Right upper body: Axillary adenopathy present.   Left upper body: Axillary adenopathy present.  Neurological: He is alert and oriented to person, place, and time. He has normal strength. He exhibits normal muscle tone. Coordination normal.   Skin: Skin is warm, dry, intact, not diaphoretic and not pale.   Psychiatric: His speech is normal and behavior is normal. Judgment and thought content normal.   Nursing note and vitals reviewed.    Results for orders placed or performed in visit on 01/18/25   SARS Coronavirus 2 Antigen, POCT Manual Read    Collection Time: 01/18/25 10:36 AM   Result Value Ref Range    SARS Coronavirus 2 Antigen Negative Negative     Acceptable Yes    POCT Influenza A/B MOLECULAR    Collection Time: 01/18/25 10:36 AM   Result Value Ref Range    POC  Molecular Influenza A Ag Negative Negative    POC Molecular Influenza B Ag Negative Negative     Acceptable Yes      *Note: Due to a large number of results and/or encounters for the requested time period, some results have not been displayed. A complete set of results can be found in Results Review.        Assessment:     1. Diarrhea, unspecified type    2. Exacerbation of asthma, unspecified asthma severity, unspecified whether persistent    3. Enlarged lymph nodes in armpit    4. Acute cough        Plan:       Diarrhea, unspecified type  -     loperamide (IMODIUM) 2 mg capsule; Take 1 capsule (2 mg total) by mouth 4 (four) times daily as needed for Diarrhea.  Dispense: 20 capsule; Refill: 0  -     azithromycin (ZITHROMAX) 500 MG tablet; Take 1 tablet (500 mg total) by mouth once daily. for 3 days  Dispense: 3 tablet; Refill: 0    Exacerbation of asthma, unspecified asthma severity, unspecified whether persistent  -     predniSONE (DELTASONE) 20 MG tablet; Take 2 tablets (40 mg total) by mouth once daily. for 5 days  Dispense: 10 tablet; Refill: 0    Enlarged lymph nodes in armpit  -     predniSONE (DELTASONE) 20 MG tablet; Take 2 tablets (40 mg total) by mouth once daily. for 5 days  Dispense: 10 tablet; Refill: 0    Acute cough  -     SARS Coronavirus 2 Antigen, POCT Manual Read  -     POCT Influenza A/B MOLECULAR             Additional MDM:     Heart Failure Score:   COPD = No

## 2025-01-27 ENCOUNTER — PATIENT MESSAGE (OUTPATIENT)
Dept: DERMATOLOGY | Facility: CLINIC | Age: 45
End: 2025-01-27
Payer: COMMERCIAL

## 2025-01-27 ENCOUNTER — OFFICE VISIT (OUTPATIENT)
Dept: INTERNAL MEDICINE | Facility: CLINIC | Age: 45
End: 2025-01-27
Payer: COMMERCIAL

## 2025-01-27 ENCOUNTER — OFFICE VISIT (OUTPATIENT)
Dept: PSYCHIATRY | Facility: CLINIC | Age: 45
End: 2025-01-27
Payer: COMMERCIAL

## 2025-01-27 ENCOUNTER — PATIENT MESSAGE (OUTPATIENT)
Dept: INTERNAL MEDICINE | Facility: CLINIC | Age: 45
End: 2025-01-27

## 2025-01-27 DIAGNOSIS — Z00.8 ENCOUNTER FOR PSYCHOLOGICAL EVALUATION: Primary | ICD-10-CM

## 2025-01-27 DIAGNOSIS — F41.9 ANXIETY: ICD-10-CM

## 2025-01-27 DIAGNOSIS — F39 UNSPECIFIED MOOD (AFFECTIVE) DISORDER: ICD-10-CM

## 2025-01-27 DIAGNOSIS — E11.9 TYPE 2 DIABETES MELLITUS WITHOUT COMPLICATION, WITHOUT LONG-TERM CURRENT USE OF INSULIN: Primary | ICD-10-CM

## 2025-01-27 DIAGNOSIS — J45.30 MILD PERSISTENT ASTHMA WITHOUT COMPLICATION: ICD-10-CM

## 2025-01-27 PROCEDURE — 90791 PSYCH DIAGNOSTIC EVALUATION: CPT | Mod: 95,,, | Performed by: PSYCHOLOGIST

## 2025-01-27 PROCEDURE — 99499 UNLISTED E&M SERVICE: CPT | Mod: 95,,, | Performed by: HOSPITALIST

## 2025-01-27 PROCEDURE — 3072F LOW RISK FOR RETINOPATHY: CPT | Mod: CPTII,95,, | Performed by: PSYCHOLOGIST

## 2025-01-27 RX ORDER — FLUTICASONE PROPIONATE AND SALMETEROL 100; 50 UG/1; UG/1
1 POWDER RESPIRATORY (INHALATION) 2 TIMES DAILY
Qty: 60 EACH | Refills: 11 | Status: SHIPPED | OUTPATIENT
Start: 2025-01-27 | End: 2026-01-27

## 2025-01-27 RX ORDER — TIRZEPATIDE 12.5 MG/.5ML
12.5 INJECTION, SOLUTION SUBCUTANEOUS
Qty: 2 ML | Refills: 5 | Status: SHIPPED | OUTPATIENT
Start: 2025-01-27

## 2025-01-27 RX ORDER — PREGABALIN 200 MG/1
200 CAPSULE ORAL 2 TIMES DAILY
Qty: 60 CAPSULE | Refills: 1 | Status: SHIPPED | OUTPATIENT
Start: 2025-01-27 | End: 2025-03-28

## 2025-01-27 NOTE — PROGRESS NOTES
Appt canceled as difficulty with audio/visual     Spoke with pt over the phone. Pt reports having asthma flare more frequently with chest tightness and wheezing.     Also reports feeling lightheaded in AM. Suspect due polypharmacy? Pt to change wellbutrin to AM. Will move lyrica dose up and d/w psychiatry about lamictal contributing to symptoms.     Pt also wants to increase mounjaro as reports weight not moving much. Diabetes remains controlled.
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
## 2025-01-30 ENCOUNTER — PATIENT MESSAGE (OUTPATIENT)
Dept: INTERNAL MEDICINE | Facility: CLINIC | Age: 45
End: 2025-01-30
Payer: COMMERCIAL

## 2025-01-30 ENCOUNTER — PATIENT MESSAGE (OUTPATIENT)
Dept: OTOLARYNGOLOGY | Facility: CLINIC | Age: 45
End: 2025-01-30
Payer: COMMERCIAL

## 2025-02-03 ENCOUNTER — OFFICE VISIT (OUTPATIENT)
Dept: OTOLARYNGOLOGY | Facility: CLINIC | Age: 45
End: 2025-02-03
Payer: COMMERCIAL

## 2025-02-03 VITALS
SYSTOLIC BLOOD PRESSURE: 138 MMHG | WEIGHT: 235.88 LBS | HEART RATE: 95 BPM | DIASTOLIC BLOOD PRESSURE: 82 MMHG | BODY MASS INDEX: 39.25 KG/M2

## 2025-02-03 DIAGNOSIS — J06.9 UPPER RESPIRATORY TRACT INFECTION, UNSPECIFIED TYPE: ICD-10-CM

## 2025-02-03 DIAGNOSIS — J30.9 ALLERGIC RHINITIS, UNSPECIFIED SEASONALITY, UNSPECIFIED TRIGGER: Chronic | ICD-10-CM

## 2025-02-03 DIAGNOSIS — J01.00 ACUTE NON-RECURRENT MAXILLARY SINUSITIS: Primary | ICD-10-CM

## 2025-02-03 PROCEDURE — 3072F LOW RISK FOR RETINOPATHY: CPT | Mod: CPTII,S$GLB,, | Performed by: OTOLARYNGOLOGY

## 2025-02-03 PROCEDURE — 3008F BODY MASS INDEX DOCD: CPT | Mod: CPTII,S$GLB,, | Performed by: OTOLARYNGOLOGY

## 2025-02-03 PROCEDURE — 99214 OFFICE O/P EST MOD 30 MIN: CPT | Mod: S$GLB,,, | Performed by: OTOLARYNGOLOGY

## 2025-02-03 PROCEDURE — 99999 PR PBB SHADOW E&M-EST. PATIENT-LVL IV: CPT | Mod: PBBFAC,,, | Performed by: OTOLARYNGOLOGY

## 2025-02-03 PROCEDURE — 1160F RVW MEDS BY RX/DR IN RCRD: CPT | Mod: CPTII,S$GLB,, | Performed by: OTOLARYNGOLOGY

## 2025-02-03 PROCEDURE — 3075F SYST BP GE 130 - 139MM HG: CPT | Mod: CPTII,S$GLB,, | Performed by: OTOLARYNGOLOGY

## 2025-02-03 PROCEDURE — 1159F MED LIST DOCD IN RCRD: CPT | Mod: CPTII,S$GLB,, | Performed by: OTOLARYNGOLOGY

## 2025-02-03 PROCEDURE — 3079F DIAST BP 80-89 MM HG: CPT | Mod: CPTII,S$GLB,, | Performed by: OTOLARYNGOLOGY

## 2025-02-03 RX ORDER — LEVOFLOXACIN 500 MG/1
500 TABLET, FILM COATED ORAL DAILY
Qty: 7 TABLET | Refills: 1 | Status: SHIPPED | OUTPATIENT
Start: 2025-02-03 | End: 2025-02-18

## 2025-02-03 RX ORDER — PREDNISONE 10 MG/1
10 TABLET ORAL DAILY
Qty: 10 TABLET | Refills: 0 | Status: SHIPPED | OUTPATIENT
Start: 2025-02-03

## 2025-02-03 NOTE — PATIENT INSTRUCTIONS
Start antibiotics and steroid taper. Saline sinus rinses.     Let me know if symptoms not improving.     Continue allergy medications.

## 2025-02-03 NOTE — PROGRESS NOTES
Chief Complaint   Patient presents with    Follow-up     Post nasal drip, causing cough and frequent throat clearing       HPI:  Patient is a 44 y.o. male who has previously seen me for chronic rhinitis and deviated septum/turbinate hypertrophy.  He underwent septoplasty and turbinate reduction a couple of months ago.  He has been doing well since that time and says his breathing is much improved.  He is tolerating his CPAP well.  He additionally reports some chronic itching in the ears and occasional wet ear wax.      Interval HPI 02/03/2025 :      Patient notes he has been having ongoing postnasal drip, thick mucus, causing cough.  He started with an upper respiratory infection and bronchitis in January, and symptoms of postnasal drip and nasal congestion have persisted since that time.  He was not treated with any antibiotics for his current symptoms, and he has not been using any nasal sprays.  He has tried taking antihistamines which have minimally helped.      Active Ambulatory Problems     Diagnosis Date Noted    Gastroesophageal reflux disease 05/09/2012    Renal calculi 05/09/2012    Mild intermittent asthma without complication 05/09/2012    ADHD (attention deficit hyperactivity disorder), inattentive type 05/09/2012    LUBA (obstructive sleep apnea) 08/12/2013    Hyperlipidemia associated with type 2 diabetes mellitus 03/27/2019    Hypertriglyceridemia 03/27/2019    SHEIKH (nonalcoholic steatohepatitis) 07/10/2019    Hepatosplenomegaly 07/10/2019    History of multiple allergies 08/02/2019    Type 2 diabetes mellitus without complications 11/16/2020    Vitamin D insufficiency 11/16/2020    Restless leg 11/23/2020    Chronic pain 02/10/2021    Lumbar radiculopathy, chronic 09/14/2021    S/P lumbar discectomy 11/17/2021    Obesity (BMI 30-39.9) 04/12/2022    Cervical spondylosis 06/29/2022    History of melanoma in situ 04/12/2023    Anxiety 04/12/2023    Mood Disorder Unspecifed:  Depressed THO also has  some mood elevation  (8of 13 on MDQ): ex: irritability, more self confident, thoughts race. more active)  05/11/2023    Insomnia 06/12/2023    Pain of left lower extremity 07/26/2023    Neck pain 07/26/2023    Chronic low back pain 07/26/2023    Hypertrophy of both inferior nasal turbinates 07/15/2024    Other health problem within the family:  Aug 2024 dad surgery small intestine 08/28/2024    Lower urinary tract symptoms (LUTS) 08/30/2024    Postoperative fever 09/15/2024    Voiding dysfunction 09/26/2024    Cough 12/05/2024    Exacerbation of asthma 12/05/2024    S/P cervical spinal fusion 12/11/2024     Resolved Ambulatory Problems     Diagnosis Date Noted    Lumbar herniated disc 05/09/2012    Dysphagia, oropharyngeal phase 05/28/2013    Prediabetes 03/27/2019    Elevated liver enzymes 03/27/2019    Cellulitis of left arm 08/01/2019    Pain in left elbow 03/18/2021    History of urinary stone 09/14/2021    Tachycardia 09/19/2021    Melanoma in situ of trunk 02/21/2022    Rotator cuff tendonitis, left 05/22/2023    Left lateral epicondylitis 05/22/2023    Cervical radiculopathy 05/22/2023    Ureteral stone with hydronephrosis 07/05/2023    Impaired range of motion of right elbow 12/11/2023    Impaired range of motion of left elbow 12/11/2023    Deviated nasal septum 07/15/2024    Constipation 08/30/2024     Past Medical History:   Diagnosis Date    ADD (attention deficit disorder) 05/09/2012    Allergy     Asthma 05/09/2012    Cervical disc disorder with radiculopathy, unspecified cervical region 09/14/2021    Cervical spondylosis with myelopathy 06/29/2022    Eczema     Esophageal ulcer     GERD (gastroesophageal reflux disease) 05/09/2012    Glaucoma     Kidney stones 05/2014    Lumbar disc disease 05/09/2012    Malignant melanoma of skin, unspecified 01/06/2022    Melanoma 01/2022    Radiculopathy, lumbar region 09/14/2021    Type 2 diabetes mellitus without complication, without long-term current use of  insulin 11/16/2020       Review of Systems  General: negative for chills, fever or weight loss  Psychological: negative for mood changes or depression  Ophthalmic: negative for blurry vision, photophobia or eye pain  ENT: see HPI  Respiratory: no cough, shortness of breath, or wheezing  Cardiovascular: no chest pain or dyspnea on exertion  Gastrointestinal: no abdominal pain, change in bowel habits, or black/ bloody stools  Musculoskeletal: negative for gait disturbance or muscular weakness  Neurological: no syncope or seizures; no ataxia  Dermatological: negative for pruritis, rash and jaundice  Hematologic/lymphatic: no easy bruising, no new adenopathy    Physical Exam     Vitals:    02/03/25 1354   BP: 138/82   Pulse: 95           Constitutional:   He is oriented to person, place, and time. Vital signs are normal. He appears well-developed and well-nourished. He appears alert. He is cooperative.  Non-toxic appearance. Normal speech.      Head:  Normocephalic and atraumatic. Salivary glands normal.  Facial strength is normal.      Ears:    Right Ear: Tympanic membrane is not perforated, not erythematous and not retracted. No middle ear effusion.   Left Ear: Tympanic membrane is not perforated, not erythematous and not retracted.  No middle ear effusion.     Nose:  Mucosal edema and rhinorrhea (sticky, white) present. No septal deviation, nasal septal hematoma or polyps. No epistaxis. No turbinate masses and no turbinate hypertrophy (2+ size).  Right sinus exhibits maxillary sinus tenderness. Right sinus exhibits no frontal sinus tenderness. Left sinus exhibits no maxillary sinus tenderness and no frontal sinus tenderness.     Mouth/Throat  Oropharynx clear and moist without lesions or asymmetry, normal uvula midline, lips, teeth, and gums normal and oropharynx normal. Normal dentition. No uvula swelling or oral lesions. No oropharyngeal exudate, posterior oropharyngeal edema or posterior oropharyngeal erythema.  Mirror exam not performed due to patient tolerance.  Mirror exam not performed due to patient tolerance.      Neck:  Neck normal without thyromegaly masses, asymmetry, normal tracheal structure, crepitus, and tenderness, thyroid normal, trachea normal, full range of motion with neck supple and no adenopathy. No JVD present. Carotid bruit is not present. Thyroid tenderness is present. No edema and no erythema present. No thyroid mass and no thyromegaly present.     He has no cervical adenopathy.     Cardiovascular:    Normal rate, regular rhythm, normal heart sounds and rate and rhythm, heart sounds, and pulses normal.              Pulmonary/Chest:   Effort and breath sounds normal.     Psychiatric:   He has a normal mood and affect. His speech is normal and behavior is normal.     Neurological:   He is alert and oriented to person, place, and time. He has neurological normal, alert and oriented. No cranial nerve deficit.     Skin:   No abrasions, lacerations, lesions, or rashes.         Assessment/Plan:      ICD-10-CM ICD-9-CM    1. Acute non-recurrent maxillary sinusitis  J01.00 461.0 levoFLOXacin (LEVAQUIN) 500 MG tablet      predniSONE (DELTASONE) 10 MG tablet      2. Upper respiratory tract infection, unspecified type  J06.9 465.9       3. Allergic rhinitis, unspecified seasonality, unspecified trigger  J30.9 477.9             Patient will start antibiotics and a steroid taper for subacute maxillary sinusitis.    He will perform saline nasal rinses daily.    If symptoms do not resolve he will let me know.    He will continue his current medications for allergic rhinitis.    Melody Liu MD  [facility]

## 2025-02-05 ENCOUNTER — PATIENT MESSAGE (OUTPATIENT)
Dept: PAIN MEDICINE | Facility: CLINIC | Age: 45
End: 2025-02-05
Payer: COMMERCIAL

## 2025-02-07 ENCOUNTER — PATIENT MESSAGE (OUTPATIENT)
Dept: INTERNAL MEDICINE | Facility: CLINIC | Age: 45
End: 2025-02-07
Payer: COMMERCIAL

## 2025-02-08 NOTE — PROGRESS NOTES
HPI    DLS: 7/30/2024    Pt here for 6 Month Check;   Pt states no eye pain but eyes do itch at times and sometimes in the   morning his OD would be dry.    Meds:  Latanoprost QHS OU  AT's PRN OU  Refresh PM PRN OU    1. OHT OU   2. MGD   3. Ocular Ros acea   4. Type 2 DM no DR     Last edited by Emma Couch on 2/11/2025  9:59 AM.            Assessment /Plan     For exam results, see Encounter Report.    Ocular hypertension, bilateral    Borderline glaucoma of both eyes with ocular hypertension    Open angle with borderline findings and high glaucoma risk in both eyes    Meibomian gland dysfunction (MGD) of both eyes    Ocular rosacea         Glaucoma (type and duration)    OHT   First HVF   3/2021    First photos   1/2021    Treatment / Drops started   Timolol            Family history    Mother (required LPI), grandmother        Glaucoma meds    Brimonidine - not effective        H/O adverse rxn to glaucoma drops    none        LASERS    none        GLAUCOMA SURGERIES    none        OTHER EYE SURGERIES    none        CDR    0.4 // 0.2        Tbase    25-26 // 26-28         Tmax    26 // 28            Ttarget    ?             HVF    2 test 2021 to  2022 -near  Full - ? Hint paracentral changes od // full os   NEW BASELINE - 2 test 2023 to 2024 - full od // full os         Gonio    2-3+  OU - very lightly pigmented TM - slight ant insertion - poor slt candidate         CCT   570 // 583        OCT    2 test 2023 to 2024 - RNFL - nl od // nl os        Disc photos    1/26/2021    - Ttoday    17/17  - Test done today  iop // gonio    MGD / ocular rosacea  - Tried to get restasis in the past, difficult with insurance - will try alternatives first  - Difficulty with WC- time/job   - Start Ocusoft eyelid wipes  - Cont AT QID  - If ocusoft not effective, will try doxy     Ok to use zaditor bid prn allergies         PLAN    GS with CDR asymmetry and FH+  Brimonidine not effective OU - no IOP change - stop  Blue  eyes -  latanoprost - may cause iris darkening - tolerating ok   Mild asthma, COPD - is on mult meds for asthma - prefer to use latanoprost if tolerated       Gonio +2-3 with VERY pale TM and a slight ant insertion - poor slt candidate    Cont  latanoprost - may cause iris to darken,- tolerating ok    Consider topical REBEKAH - but stings a lot    Consider SLT - poor candidate - very pale TM with a slight ant insertion    No response to brimonidine     2/11/2025   IOP 17/17 (on latanorost )   Gonio +2-3 - pale TM     F/U  6 months with IOP and //  HVF / DFE / OCT - diabetic check -  Metaire

## 2025-02-10 ENCOUNTER — CLINICAL SUPPORT (OUTPATIENT)
Dept: PSYCHIATRY | Facility: CLINIC | Age: 45
End: 2025-02-10
Payer: COMMERCIAL

## 2025-02-10 DIAGNOSIS — M54.16 LUMBAR RADICULOPATHY, CHRONIC: Primary | ICD-10-CM

## 2025-02-10 DIAGNOSIS — Z00.8 ENCOUNTER FOR PSYCHOLOGICAL EVALUATION: Primary | ICD-10-CM

## 2025-02-10 DIAGNOSIS — M51.362 DEGENERATION OF INTERVERTEBRAL DISC OF LUMBAR REGION WITH DISCOGENIC BACK PAIN AND LOWER EXTREMITY PAIN: ICD-10-CM

## 2025-02-10 DIAGNOSIS — G89.4 CHRONIC PAIN SYNDROME: ICD-10-CM

## 2025-02-10 RX ORDER — METHYLPREDNISOLONE 4 MG/1
TABLET ORAL
Qty: 21 EACH | Refills: 0 | Status: SHIPPED | OUTPATIENT
Start: 2025-02-10 | End: 2025-03-03

## 2025-02-11 ENCOUNTER — OFFICE VISIT (OUTPATIENT)
Dept: OPHTHALMOLOGY | Facility: CLINIC | Age: 45
End: 2025-02-11
Payer: COMMERCIAL

## 2025-02-11 DIAGNOSIS — H02.883 MEIBOMIAN GLAND DYSFUNCTION (MGD) OF BOTH EYES: ICD-10-CM

## 2025-02-11 DIAGNOSIS — H40.053 BORDERLINE GLAUCOMA OF BOTH EYES WITH OCULAR HYPERTENSION: ICD-10-CM

## 2025-02-11 DIAGNOSIS — H40.023 OPEN ANGLE WITH BORDERLINE FINDINGS AND HIGH GLAUCOMA RISK IN BOTH EYES: ICD-10-CM

## 2025-02-11 DIAGNOSIS — H40.053 OCULAR HYPERTENSION, BILATERAL: Primary | ICD-10-CM

## 2025-02-11 DIAGNOSIS — L71.8 OCULAR ROSACEA: ICD-10-CM

## 2025-02-11 DIAGNOSIS — H02.886 MEIBOMIAN GLAND DYSFUNCTION (MGD) OF BOTH EYES: ICD-10-CM

## 2025-02-11 PROCEDURE — 99999 PR PBB SHADOW E&M-EST. PATIENT-LVL IV: CPT | Mod: PBBFAC,,, | Performed by: OPHTHALMOLOGY

## 2025-02-11 PROCEDURE — 1159F MED LIST DOCD IN RCRD: CPT | Mod: CPTII,S$GLB,, | Performed by: OPHTHALMOLOGY

## 2025-02-11 PROCEDURE — 92020 GONIOSCOPY: CPT | Mod: S$GLB,,, | Performed by: OPHTHALMOLOGY

## 2025-02-11 PROCEDURE — 99214 OFFICE O/P EST MOD 30 MIN: CPT | Mod: S$GLB,,, | Performed by: OPHTHALMOLOGY

## 2025-02-11 PROCEDURE — 1160F RVW MEDS BY RX/DR IN RCRD: CPT | Mod: CPTII,S$GLB,, | Performed by: OPHTHALMOLOGY

## 2025-02-13 ENCOUNTER — CLINICAL SUPPORT (OUTPATIENT)
Dept: ALLERGY | Facility: CLINIC | Age: 45
End: 2025-02-13
Payer: COMMERCIAL

## 2025-02-13 DIAGNOSIS — J30.9 CHRONIC ALLERGIC RHINITIS: Primary | ICD-10-CM

## 2025-02-13 PROCEDURE — 95117 IMMUNOTHERAPY INJECTIONS: CPT | Mod: S$GLB,,, | Performed by: STUDENT IN AN ORGANIZED HEALTH CARE EDUCATION/TRAINING PROGRAM

## 2025-02-13 PROCEDURE — 99999 PR PBB SHADOW E&M-EST. PATIENT-LVL I: CPT | Mod: PBBFAC,,,

## 2025-02-14 ENCOUNTER — DOCUMENTATION ONLY (OUTPATIENT)
Dept: ALLERGY | Facility: CLINIC | Age: 45
End: 2025-02-14
Payer: COMMERCIAL

## 2025-02-14 NOTE — PROGRESS NOTES
Red vial 1 of 3 complete and discarded    
What Type Of Note Output Would You Prefer (Optional)?: Standard Output
How Severe Is Your Rash?: moderate
Is This A New Presentation, Or A Follow-Up?: Rash

## 2025-02-17 ENCOUNTER — HOSPITAL ENCOUNTER (OUTPATIENT)
Dept: RADIOLOGY | Facility: HOSPITAL | Age: 45
Discharge: HOME | End: 2025-02-17
Attending: NEUROLOGICAL SURGERY
Payer: COMMERCIAL

## 2025-02-17 DIAGNOSIS — Z98.1 S/P CERVICAL SPINAL FUSION: ICD-10-CM

## 2025-02-17 PROCEDURE — 72125 CT NECK SPINE W/O DYE: CPT | Mod: TC

## 2025-02-19 ENCOUNTER — OFFICE VISIT (OUTPATIENT)
Dept: NEUROSURGERY | Facility: CLINIC | Age: 45
End: 2025-02-19
Payer: COMMERCIAL

## 2025-02-19 DIAGNOSIS — Z98.1 S/P CERVICAL SPINAL FUSION: Primary | ICD-10-CM

## 2025-02-20 NOTE — PROGRESS NOTES
The patient location is: Home  The chief complaint leading to consultation is: Postop f/u    Visit type: audiovisual    Face to Face time with patient: 22  30 minutes of total time spent on the encounter, which includes face to face time and non-face to face time preparing to see the patient (eg, review of tests), Obtaining and/or reviewing separately obtained history, Documenting clinical information in the electronic or other health record, Independently interpreting results (not separately reported) and communicating results to the patient/family/caregiver, or Care coordination (not separately reported).         Each patient to whom he or she provides medical services by telemedicine is:  (1) informed of the relationship between the physician and patient and the respective role of any other health care provider with respect to management of the patient; and (2) notified that he or she may decline to receive medical services by telemedicine and may withdraw from such care at any time.    Notes:   CHIEF COMPLAINT:  5m postop    INTERVAL HISTORY (2/20/25):  Patient is now approximately 5 months postop.  Overall he is doing well and reports continued improvement in his pain.  He does note that over the last few days he has noticed some altered sensation and shaking in the 4th and 5th digits of his right hand.  The sensation usually starts in the back aspect of his armpit radiates down to the elbow and into the fingers.  Also involves the pectoralis muscle.  It seems more pronounced when he is lying down when going to sleep.  No other sensory motor changes.    INTERVAL HISTORY (12/11/24):  Patient is now 3 months postop and reports he is doing well.  He denies any pain at this time.  He has occasional tingling in his 1st and 5th digits on the right hand particularly when he is sleeping.  The tremor in his hand has stopped and his hand and fingers are no longer constantly numb.  He continues to contend with low back  "pain for which he underwent an L5-S1 NELI.  He feels that this level helped his pain better than a previous L4-5.  Pain is located in his back and along his hips.    INTERVAL HISTORY (10/23/24):  6 wks s/p C5-6 and C6-7 ACDF on 9/12/24.  Reports he is recovering well.  No longer having numbness or tingling in arms/hands.  Pain resolved.  Swallowing difficulties resolved with short course of steroids.  He has been wearing his collar as instructed.  He would like to advance his activities and return to the gym.    INTERVAL HISTORY (8/14/24):  Patient returns via virtual visit to discuss recent EMG results and to make a decision regarding surgery.  He reports that since his last visit his radiating arm pain has improved somewhat in terms of intensity but he still notes bothersome finger and hand numbness that results in him dropping items from his hand and affecting his ability to write.  He still reports tremor in his right hand that is made worse with stimulants.  He continues to have significant neck pain as well.  He explains that he has had to stop exercising and engaging in many of his daily activities to avoid exacerbating his arm pain.  His EMG showed chronic radiculopathies at C6, C8, and possibly C7.  He is interested in surgery as he states "he can not continue living like this."    INTERVAL HISTORY (7/3/24):  Patient returns to clinic with new and worsening constellation of symptoms.  He reports a new tremor in his right hand and fingers since May which is causing him difficulty writing and operating a computer mouse.  He also notes numbness, weakness, in this hand that leads to him dropping items.  Numbness resides over the top of his hand and the last 2 digits.  He notices that at night when he is extending his neck he will wake up with his hands feeling numb requiring him to repositioned.  He notes that he will have pain in his shoulder, chest, axilla, upper arm, biceps, and into the last 2 digits of his " right hand after working out.  This pain will persist for several weeks.  Ultimately he has decided to stop exercising out of fear of triggering the pain.  He has had prior carpal tunnel surgery.  He has not had a recent EMG/NCS.  He has known diffuse spondylosis in his cervical spine.    INTERVAL HISTORY (7/26/23):  Had an episode of significant neck pain approx 1 month ago. Took steroid pack and underwent C7-T1 IL NELI by Dr Patel (pain), which helped.      He also reports continued intermittent back and leg pain.  Pain with tingling that radiates up his shin to thigh and groin.  Also note some pain in big toe on left.  These sxs are currently stable/tolerable but he would like to discuss possibility of an NELI since they have helped with his neck.    INTERVAL HISTORY (5/24/23):  Virtual visit conducted regarding recurrent left leg pain and numbness as well as right hand/finger pain and numbness.  Patient is reporting an increase in low back pain that is triggered when sitting down or walking.  He is also having radiating left leg pain that radiates to his left shin and occasionally outside part of his thigh.  He is having sharp pain in his left big toe.  He also notes tightness despite stretching in both hips and buttocks.  Reports that he often has to lie down and put support under his knees.  This has been ongoing for past couple of months.  Since he is had 2 prior lumbar surgeries he is concerned that he may have a recurrent herniated disc or new problem.  He has known cervical stenosis but has had an increase in numbness in his right hand including his thumb and index finger.  He was given an epidural steroid shot which helped some but he is concerned about progression of his known cervical stenosis.    INTERVAL HISTORY (1/4/23):  Patient returns for continued surveillance of cervical myelopathy.  His symptoms have not changed significantly however he is attending physical therapy which he finds is helping  some.  He complains of continued numbness and tingling in his arms and hands while sleeping.  He feels like it is exacerbated or triggered by neck extension and has to sleep with his head propped forward.  He also gets some pain that radiates from his neck to the lateral shoulders.  He still gets occasional tingling in his big toe otherwise feels like the symptoms related to his acute hernia disc have resolved.  He also notes that he generally feels more arthritic type pain which will be exacerbated by exertion.  Finds himself with diffuse pain and achiness more than usual.    INTERVAL HISTORY (6/29/22):  Returns to review recent cervical MRI.    Always has discomfort in neck with pain radiating into R shoulder and occasionally into 4+5th digits as well as L shoulder.  He does endorse clumsiness with hands (dropping items, difficulty with bottle caps, not buttoning) and feels out of balance.  Occasionally has urinary urgency/frequency.    L thigh pain is decreased but worse in am.  Also intermittent pain in back of leg and foot.  Also notes some weakness and pain in ankle.    INTERVAL HISTORY (5/11/22):  Returns with new arm pain that radiates from base of neck to bilateral shoulders and down the volar thumb on R.  Stops at shoulder/upper arm on L.  Triggered by certain neck positions - has to use flat pillow or will have sxs.  + tingling to R hand and L shoulder.  Also reports clumsiness in hand with dropping items and poor fine motor control.  But denies overt weakness.  Feels tight through neck, chest and shoulder regions.    Also has discomfort in L leg along anterolateral thigh and describes as burning and itching.  Notes L leg weakness in back around thigh and knee.  Worse in am and foot feels like it fell asleep and is clumsy.  Occasionally has pain in L big toe.    Takes gabapentin 1 tab in am and 2 tab in pm.  He does HEP with occasional pool workout. Not particularly interested in PT.  Has not trialed  antiinflammatories.    No b/b dysfunction.    HPI:    Hermelindo Garza III is a 44 y.o.-year-old male who presents today for post-operative follow-up s/p L MIS L3-4 lami/disc on 9/20/21.  Overall patient is significantly improved.  He has significant improvement in his back and leg pain.  He still has some mild tingling in his shins that radiate down to the feet.  This is made worse when he is not active.  He takes gabapentin 600 mg at night which helps.  He would like to increase that to more times during the day.  He denies weakness.      ROS:  As stated in the above HPI    PE:  No neuro deficits observed    FROM PRIOR VISIT:    AAOX3  NAD  CN 2-12 intact     Strength:      Deltoids Biceps Triceps Wrist Ext. Wrist Flex. Hand    RUE 5 5 5 5 5 5   LUE 5 5 5 5 5 5    Hip Flex. Knee Flex. Knee Ext. Dorsi Flex Plantar Flex EHL   RLE 5 5 5 5 5 5   LLE 5 5 5 5 5 5     Sensation:  Intact to light touch (All 4 extremities)    Gait:  normal    Lumbar incision:  Well-healed    IMAGING:  All imaging reviewed by me.    Cervical CT, 2/17/25:  Hardware in good position without haloing  Beginnings of interbody fusion    Cervical flex/ex xray, 12/11/24:  Hardware in good position without instability    Cervical xray, 10/23/24:  Hardware in good position    EMG, 8/7/24:  chronic RIGHT C6 and C8 radiculopathies (C7 possible as well) and a chronic LEFT C6 radiculopathy without active/ongoing denervation     Cervical MRI, 6/14/24:  Loss of lordosis  C4-5 - mod-severe bilat NFS  C5-6 - moderate CS, severe bilat NFS  C6-7 - severe L NFS, moderate R NFS  R LRS/NFS at C7-T1    Cervical MRI, 6/16/23:  1. Diffuse spondylosis and hypertrophied PLL  2. Moderate stenosis at C4-5 with bilat NFS  3. Moderate stenosis at C5-6 with bilat NFS    Lumbar MRI, 6/16/23:  1. No recurrent HNP at L3-4  2. Mild-moderate bilat LRS at L3-4 and L4-5  3. Moderate bilat L5-S1 LRS    Cspine flex/ex, 12/13/22:  Diffuse spondylosis  Good alignment  No  dynamic instability    Cervical MRI, 6/16/22:  1. Diffuse spondylosis and hypertrophied PLL  2. Severe stenosis at C4-5 and C5-6 with cord compression  3. Moderate stenosis at C3-4 and C6-7    Lumbar MRI, 6/16/22:  1. No recurrent HNP  2. Mild degen changes    Csp MRI, 1/12/21:  1. Loss of lordosis  2. Diffuse spondylosis  3. Multi-level DDD with moderate NFS  4. Severe left neural foraminal narrowing at C6-7  5. Severe right neural foraminal narrowing at C5-6 and C7-T1    Lsp MRI, 9/19/21:  1. Diffuse DDD and spondylosis  2. Large L L3-4 HNP  3. L L4-5 moderate NFS  4. R L5-S1 moderate NFS      ASSESSMENT:   Problem List Items Addressed This Visit          Neuro    S/P cervical spinal fusion - Primary    Relevant Orders    CT Cervical Spine Without Contrast       S/p C5-6 and C6-7 ACDF  S/p L MIS L3-4 lami/disc for severe radiculopathy.      Patient is now 5 months status post 2 level ACDF.  Overall doing well but reports a recurrence of altered sensation that originates in his axilla and radiates down to the 4th and 5th digits with shaking.  It is more pronounced at night when he is lying flat.  He reports some tightness in his pectoralis muscle and shoulder therefore this may just be peripherally induced by muscle strain.  I encouraged him to advance his activities to include stretching and exercise to try to loosen up the area.  Most recent CT scan shows good placement of hardware with beginnings of interbody fusion.    PLAN:   - RTC 6m with CT    Time spent on this encounter: 30 minutes. This includes face-to-face time and non-face to face time preparing to see the patient (eg, review of tests), obtaining and/or reviewing separately obtained history, documenting clinical information in the electronic or other health record, independently interpreting results and communicating results to the patient/family/caregiver, or care coordinator.

## 2025-02-21 ENCOUNTER — PATIENT MESSAGE (OUTPATIENT)
Dept: ORTHOPEDICS | Facility: CLINIC | Age: 45
End: 2025-02-21
Payer: COMMERCIAL

## 2025-02-24 ENCOUNTER — OFFICE VISIT (OUTPATIENT)
Dept: PSYCHIATRY | Facility: CLINIC | Age: 45
End: 2025-02-24
Payer: COMMERCIAL

## 2025-02-24 ENCOUNTER — PATIENT MESSAGE (OUTPATIENT)
Dept: PSYCHIATRY | Facility: CLINIC | Age: 45
End: 2025-02-24
Payer: COMMERCIAL

## 2025-02-24 DIAGNOSIS — F41.1 GENERALIZED ANXIETY DISORDER: ICD-10-CM

## 2025-02-24 DIAGNOSIS — F31.81 BIPOLAR II DISORDER: ICD-10-CM

## 2025-02-24 DIAGNOSIS — Z00.8 ENCOUNTER FOR PSYCHOLOGICAL EVALUATION: Primary | ICD-10-CM

## 2025-02-24 DIAGNOSIS — F42.9 OBSESSIVE-COMPULSIVE DISORDER, UNSPECIFIED TYPE: ICD-10-CM

## 2025-02-25 PROCEDURE — 95165 ANTIGEN THERAPY SERVICES: CPT | Mod: S$GLB,,, | Performed by: ALLERGY & IMMUNOLOGY

## 2025-02-28 ENCOUNTER — PATIENT MESSAGE (OUTPATIENT)
Dept: PSYCHIATRY | Facility: CLINIC | Age: 45
End: 2025-02-28
Payer: COMMERCIAL

## 2025-03-01 ENCOUNTER — OFFICE VISIT (OUTPATIENT)
Dept: URGENT CARE | Facility: CLINIC | Age: 45
End: 2025-03-01
Payer: COMMERCIAL

## 2025-03-01 VITALS
RESPIRATION RATE: 16 BRPM | DIASTOLIC BLOOD PRESSURE: 83 MMHG | BODY MASS INDEX: 38.27 KG/M2 | SYSTOLIC BLOOD PRESSURE: 122 MMHG | TEMPERATURE: 99 F | WEIGHT: 230 LBS | HEART RATE: 88 BPM | OXYGEN SATURATION: 98 %

## 2025-03-01 DIAGNOSIS — S69.91XA INJURY OF RIGHT HAND, INITIAL ENCOUNTER: ICD-10-CM

## 2025-03-01 DIAGNOSIS — S29.9XXA RIB INJURY: ICD-10-CM

## 2025-03-01 DIAGNOSIS — S62.656A CLOSED NONDISPLACED FRACTURE OF MIDDLE PHALANX OF RIGHT LITTLE FINGER, INITIAL ENCOUNTER: Primary | ICD-10-CM

## 2025-03-01 PROCEDURE — 99213 OFFICE O/P EST LOW 20 MIN: CPT | Mod: 25,S$GLB,,

## 2025-03-01 PROCEDURE — 73130 X-RAY EXAM OF HAND: CPT | Mod: FY,RT,S$GLB, | Performed by: RADIOLOGY

## 2025-03-01 PROCEDURE — 96372 THER/PROPH/DIAG INJ SC/IM: CPT | Mod: S$GLB,,,

## 2025-03-01 PROCEDURE — 71101 X-RAY EXAM UNILAT RIBS/CHEST: CPT | Mod: FY,RT,S$GLB, | Performed by: RADIOLOGY

## 2025-03-01 RX ORDER — KETOROLAC TROMETHAMINE 30 MG/ML
30 INJECTION, SOLUTION INTRAMUSCULAR; INTRAVENOUS ONCE
Status: COMPLETED | OUTPATIENT
Start: 2025-03-01 | End: 2025-03-01

## 2025-03-01 RX ADMIN — KETOROLAC TROMETHAMINE 30 MG: 30 INJECTION, SOLUTION INTRAMUSCULAR; INTRAVENOUS at 01:03

## 2025-03-01 NOTE — PROGRESS NOTES
Subjective:      Patient ID: Hermelindo Garza III is a 44 y.o. male.    Vitals:  weight is 104.3 kg (230 lb). His oral temperature is 98.7 °F (37.1 °C). His blood pressure is 122/83 and his pulse is 88. His respiration is 16 and oxygen saturation is 98%.     Chief Complaint: Injury    44-year-old male presents with right 5th digit pain, tenderness, swelling, right rib pain after he sustained a fall a week ago.  He states he was walking his dog when he tripped and fell on concrete.  He states right lower rib is painful and tender to touch.  He states right 5th digit finger still swollen and painful.  He has been taking Tylenol and ibuprofen for his pain with mild improvement.      Hand Injury   His dominant hand is their right hand. The incident occurred more than 1 week ago. The incident occurred at home. The injury mechanism was a fall. The pain is present in the right fingers and right elbow. The quality of the pain is described as burning. The pain radiates to the right hand. The pain is at a severity of 5/10. The pain is moderate. The pain has been Fluctuating since the incident. Pertinent negatives include no chest pain, muscle weakness, numbness or tingling.       Constitution: Negative for activity change, appetite change, chills and sweating.   HENT:  Negative for ear pain, ear discharge, foreign body in ear, tinnitus, hearing loss, sinus pressure, sore throat, trouble swallowing and voice change.    Neck: Negative for neck pain, neck stiffness, painful lymph nodes and neck swelling.   Cardiovascular:  Negative for chest trauma, chest pain and leg swelling.   Eyes:  Negative for eye trauma, foreign body in eye, eye discharge and eye itching.   Respiratory:  Negative for sleep apnea, chest tightness and cough.    Gastrointestinal:  Negative for abdominal trauma, abdominal pain, abdominal bloating and history of abdominal surgery.   Endocrine: hair loss, cold intolerance and heat intolerance.    Genitourinary:  Negative for dysuria, frequency, urgency and urine decreased.   Musculoskeletal:  Positive for joint pain, joint swelling and abnormal ROM of joint. Negative for pain, trauma, arthritis and gout.   Skin:  Negative for color change, pale, rash, wound, abrasion, laceration and erythema.   Allergic/Immunologic: Negative for environmental allergies, seasonal allergies, food allergies and eczema.   Neurological:  Negative for dizziness, history of vertigo, light-headedness, passing out, disorientation, altered mental status and numbness.   Hematologic/Lymphatic: Negative for swollen lymph nodes, easy bruising/bleeding, trouble clotting and history of blood clots. Does not bruise/bleed easily.   Psychiatric/Behavioral:  Negative for altered mental status, disorientation, confusion, agitation, nervous/anxious and sleep disturbance. The patient is not nervous/anxious.       Objective:     Physical Exam   Constitutional: He is oriented to person, place, and time. He appears well-developed. He is cooperative. No distress.   HENT:   Head: Normocephalic and atraumatic.   Nose: Nose normal.   Mouth/Throat: Oropharynx is clear and moist and mucous membranes are normal.   Eyes: Conjunctivae and lids are normal.   Neck: Trachea normal and phonation normal. Neck supple.   Cardiovascular: Normal rate, regular rhythm, normal heart sounds and normal pulses.   Pulmonary/Chest: Effort normal and breath sounds normal.   Abdominal: Normal appearance and bowel sounds are normal. He exhibits no mass. Soft.   Musculoskeletal:         General: Swelling, tenderness and signs of injury present. No deformity.      Right lower leg: No edema.      Left lower leg: No edema.   Neurological: He is alert and oriented to person, place, and time. He has normal strength and normal reflexes. No sensory deficit.   Skin: Skin is warm, dry, intact, not diaphoretic, not pale and no rash. No bruising, No erythema and No lesion no  jaundice  Psychiatric: His speech is normal and behavior is normal. Judgment and thought content normal.   Nursing note and vitals reviewed.       Assessment:     1. Closed nondisplaced fracture of middle phalanx of right little finger, initial encounter    2. Injury of right hand, initial encounter    3. Rib injury        Plan:       Closed nondisplaced fracture of middle phalanx of right little finger, initial encounter  -     SPLINT FOR HOME USE  -     Ambulatory referral/consult to Orthopedics    Injury of right hand, initial encounter  -     XR HAND COMPLETE 3 VIEW RIGHT; Future; Expected date: 03/01/2025    Rib injury  -     XR RIB RIGHT W/ PA CHEST; Future; Expected date: 03/01/2025  -     ketorolac injection 30 mg

## 2025-03-01 NOTE — PATIENT INSTRUCTIONS
Keep splint on and keep finger immobilized.  Tylenol or ibuprofen as needed for pain.  Follow-up with orthopedics for further evaluation of your fracture.    When do I need to call the doctor?   You have very bad pain that is not helped by pain medicine.  Your hand swells.  Your fingers are numb, tingly, or turn blue.  You damage your splint.  Your splint gets wet and it is not supposed to be wet.  - Rest.    - Drink plenty of fluids.    - Acetaminophen (tylenol) or Ibuprofen (advil,motrin) as directed as needed for fever/pain. Avoid tylenol if you have a history of liver disease. Do not take ibuprofen if you have a history of GI bleeding, kidney disease, or if you take blood thinners.     - Follow up with your PCP or specialty clinic as directed in the next 1-2 weeks if not improved or as needed.  You can call [phone] to schedule an appointment with the appropriate provider.    - Go to the ER or seek medical attention immediately if you develop new or worsening symptoms.     - You must understand that you have received an Urgent Care treatment only and that you may be released before all of your medical problems are known or treated.   - You, the patient, will arrange for follow up care as instructed.   - If your condition worsens or fails to improve we recommend that you receive another evaluation at the ER immediately or contact your PCP to discuss your concerns or return here.

## 2025-03-05 ENCOUNTER — TELEPHONE (OUTPATIENT)
Dept: ORTHOPEDICS | Facility: CLINIC | Age: 45
End: 2025-03-05
Payer: COMMERCIAL

## 2025-03-05 DIAGNOSIS — F90.0 ADHD (ATTENTION DEFICIT HYPERACTIVITY DISORDER), INATTENTIVE TYPE: ICD-10-CM

## 2025-03-05 DIAGNOSIS — F41.9 ANXIETY: ICD-10-CM

## 2025-03-05 NOTE — TELEPHONE ENCOUNTER
"Called pt to schedule appointment; pt preferred 8am appointment with EMERITA HAIR at [facility] hand clinic. Pt is aware to arrive to [address] [facility] for updated xrays and then present to hand clinic for appointment with LAXMI.    Esther Tate MA  [facility]  P: [phone]  F: [phone]      ----- Message from Med Assistant Bertrand sent at 3/5/2025 11:18 AM CST -----  Regarding: FW: Medical advice/Appointment  I seen you shravan was slammed on Friday Sanket asked if you can add him on Monday morning like 10:30 with new x-rays and that way  can see him as well she's also only in clinic in the morning .  ----- Message -----  From: Maricruz Infante  Sent: 3/5/2025   9:51 AM CST  To: Rolando Dawn Staff  Subject: Medical advice/Appointment                       "Type:  Patient Call BackWho Called:PTWhat is the reqeust in detail:Requesting call back . Please adviseCan the clinic reply by [facility]?no Best Call Back Number:[phone] Information:Pt has an new referral to be seen for  Closed nondisplaced fracture of middle phalanx of right little finger, initial encounter. Pt would like to know if he can see doctor.  "

## 2025-03-07 ENCOUNTER — TELEPHONE (OUTPATIENT)
Dept: ORTHOPEDICS | Facility: CLINIC | Age: 45
End: 2025-03-07
Payer: COMMERCIAL

## 2025-03-07 ENCOUNTER — TELEPHONE (OUTPATIENT)
Dept: PSYCHIATRY | Facility: CLINIC | Age: 45
End: 2025-03-07
Payer: COMMERCIAL

## 2025-03-07 ENCOUNTER — PATIENT MESSAGE (OUTPATIENT)
Dept: PREADMISSION TESTING | Facility: HOSPITAL | Age: 45
End: 2025-03-07
Payer: COMMERCIAL

## 2025-03-07 DIAGNOSIS — S62.656A CLOSED NONDISPLACED FRACTURE OF MIDDLE PHALANX OF RIGHT LITTLE FINGER, INITIAL ENCOUNTER: Primary | ICD-10-CM

## 2025-03-07 DIAGNOSIS — F90.0 ADHD (ATTENTION DEFICIT HYPERACTIVITY DISORDER), INATTENTIVE TYPE: ICD-10-CM

## 2025-03-07 DIAGNOSIS — F41.9 ANXIETY: ICD-10-CM

## 2025-03-07 RX ORDER — DEXMETHYLPHENIDATE HYDROCHLORIDE 10 MG/1
10 TABLET ORAL 2 TIMES DAILY
Qty: 60 TABLET | Refills: 0 | Status: SHIPPED | OUTPATIENT
Start: 2025-03-07 | End: 2025-04-06

## 2025-03-07 RX ORDER — LORAZEPAM 0.5 MG/1
0.5 TABLET ORAL DAILY PRN
Qty: 30 TABLET | Refills: 0 | Status: SHIPPED | OUTPATIENT
Start: 2025-03-28 | End: 2025-07-26

## 2025-03-07 NOTE — ANESTHESIA PAT ROS NOTE
03/07/2025  Hermelindo Garza III is a 44 y.o., male.      Pre-op Assessment    I have reviewed the Patient Summary Reports.       I have reviewed the Medications.   Prednisone    Review of Systems  Anesthesia Hx:  No problems with previous Anesthesia   History of prior surgery of interest to airway management or planning: cervical fusion. Previous anesthesia: General, MAC, Nerve Block 11/8/24 bladder contracture incision with general anesthesia.  Procedure performed at an [facility].     for 2/5/23 ligament reconstruction.  Procedure performed at an [facility].  9/26/24 cystocopy with MAC.  Procedure performed at an [facility]. Airway issues documented on chart review include mask, difficult, GETA, videolaryngoscope used  , view on video-laryngoscopy Grade I       Social:  Alcohol Use, Former Smoker Quit smoking 2018      Cardiovascular:                hyperlipidemia                               Pulmonary:    Asthma    Sleep Apnea, CPAP                Renal/:   renal calculi               Hepatic/GI:   PUD,  GERD Liver Disease, Hepatitis SHEIKH Taking GLP-1 Agonists Instructed to Hold for 7 Days           Musculoskeletal:  Arthritis   Closed nondisplaced fracture of middle phalanx of right little finger       Spine Disorders: cervical and lumbar Disc disease and Degenerative disease           Endocrine:  Diabetes, type 2         Obesity / BMI > 30  Psych:   anxiety depression                 Past Medical History:   Diagnosis Date    ADD (attention deficit disorder) 05/09/2012    Allergy     Anxiety 04/12/2023    Asthma 05/09/2012    Cervical disc disorder with radiculopathy, unspecified cervical region 09/14/2021    Cervical radiculopathy 05/22/2023    Cervical spondylosis with myelopathy 06/29/2022    Eczema     Esophageal ulcer     GERD (gastroesophageal reflux disease) 05/09/2012     Glaucoma     Kidney stones 05/2014    Lumbar disc disease 05/09/2012    Lumbar herniated disc 05/09/2012    Malignant melanoma of skin, unspecified 01/06/2022    in situ right mid back    Melanoma 01/2022    in situ R mid back     LUBA (obstructive sleep apnea)     Radiculopathy, lumbar region 09/14/2021    Renal calculi 05/09/2012    Type 2 diabetes mellitus without complication, without long-term current use of insulin 11/16/2020     Past Surgical History:   Procedure Laterality Date    ANTERIOR CERVICAL DISCECTOMY W/ FUSION N/A 9/12/2024    Procedure: DISCECTOMY, SPINE, CERVICAL, ANTERIOR APPROACH, WITH FUSION C5-6, C6-7;  Surgeon: Naseem Mcnamara DO;  Location: [facility] OR 2ND FLR;  Service: Neurosurgery;  Laterality: N/A;    APPENDECTOMY  2006    CARPAL TUNNEL RELEASE Right 09/15/2022    Procedure: RELEASE, CARPAL TUNNEL;  Surgeon: Christen Marshall MD;  Location: [facility] OR;  Service: Orthopedics;  Laterality: Right;    CAUTERY OF TURBINATES Bilateral 7/15/2024    Procedure: CAUTERIZATION, MUCOSA, NASAL TURBINATE;  Surgeon: Melody Liu MD;  Location: [facility] OR;  Service: ENT;  Laterality: Bilateral;    COLONOSCOPY  01/22/2019    internal hemorrhoids, o/w normal - repeat at age 52    COLONOSCOPY N/A 12/21/2023    Procedure: COLONOSCOPY;  Surgeon: Teo Johnson MD;  Location: [facility] ENDO;  Service: Endoscopy;  Laterality: N/A;    CORRECTION OF HAMMER TOE Left 8/14/2023    Procedure: CORRECTION, HAMMER TOE--  basic foot tray as well as a Reese microfree/microchoice tray. I also asked Moran to bring an MIS mati.;  Surgeon: Ankush Back DPM;  Location: [facility] OR;  Service: Podiatry;  Laterality: Left;  reese drill, sagittal saw, foot set    CYSTOSCOPY  7/5/2023    Procedure: CYSTOSCOPY;  Surgeon: Chuckie Hall MD;  Location: [facility] OR 1ST FLR;  Service: Urology;;    CYSTOSCOPY N/A 9/26/2024    Procedure: CYSTOSCOPY;  Surgeon: Jay Walter MD;  Location: [facility] OR;  Service: Urology;  Laterality: N/A;  30  minutes    EPIDURAL STEROID INJECTION N/A 02/10/2021    Procedure: CERVICAL C6/7 NELI DIRECT REFERRAL;  Surgeon: Michi Escamilla MD;  Location: [facility] PAIN T;  Service: Pain Management;  Laterality: N/A;  NEEDS CONSENT    EPIDURAL STEROID INJECTION INTO CERVICAL SPINE N/A 6/28/2023    Procedure: Injection-steroid-epidural-cervical C7-T1;  Surgeon: Lorraine Patel DO;  Location: [facility] PAIN MANAGEMENT;  Service: Pain Management;  Laterality: N/A;  diabetic    EPIDURAL STEROID INJECTION INTO CERVICAL SPINE N/A 11/3/2023    Procedure: cervical NELI C7-T1;  Surgeon: Kolton Terrell MD;  Location: [facility] PAIN MANAGEMENT;  Service: Pain Management;  Laterality: N/A;  diabetic    EPIDURAL STEROID INJECTION INTO CERVICAL SPINE N/A 4/17/2024    Procedure: VENECIA C7/T1;  Surgeon: Lorraine Patel DO;  Location: [facility] PAIN MANAGEMENT;  Service: Pain Management;  Laterality: N/A;  20mins- Ozempic 7d- no ac    EPIDURAL STEROID INJECTION INTO LUMBAR SPINE N/A 9/27/2023    Procedure: L4-5 NELI;  Surgeon: Tirso Pickering MD;  Location: [facility] PAIN MANAGEMENT;  Service: Pain Management;  Laterality: N/A;  15 mins    EPIDURAL STEROID INJECTION INTO LUMBAR SPINE N/A 3/6/2024    Procedure: L4-5 IL NELI;  Surgeon: Lorraine Patel DO;  Location: [facility] PAIN MANAGEMENT;  Service: Pain Management;  Laterality: N/A;  20 mins    EPIDURAL STEROID INJECTION INTO LUMBAR SPINE N/A 6/3/2024    Procedure: NELI L4-5;  Surgeon: Lorraine Patel DO;  Location: [facility] PAIN MANAGEMENT;  Service: Pain Management;  Laterality: N/A;  20mins-no ac Ozempic 7d    EPIDURAL STEROID INJECTION INTO LUMBAR SPINE N/A 8/26/2024    Procedure: L4-5 NELI Veer Left;  Surgeon: Lorraine Patel DO;  Location: [facility] PAIN MANAGEMENT;  Service: Pain Management;  Laterality: N/A;  20 mins No AC - Ozempic 7 days    EPIDURAL STEROID INJECTION INTO LUMBAR SPINE N/A 12/2/2024    Procedure: NELI L5-S1;  Surgeon: Lorraine Patel DO;  Location: [facility] PAIN MANAGEMENT;  Service:  Pain Management;  Laterality: N/A;  no ac    ESOPHAGOGASTRODUODENOSCOPY  01/22/2019    LA grade B esophagitis; esophageal stenosis- dilated; gastritis; H pylori pathology negative    ESOPHAGOGASTRODUODENOSCOPY N/A 05/18/2021    Procedure: EGD (ESOPHAGOGASTRODUODENOSCOPY);  Surgeon: Mariana Hector MD;  Location: [facility];  Service: Endoscopy;  Laterality: N/A;    ESOPHAGOGASTRODUODENOSCOPY N/A 12/21/2023    Procedure: EGD (ESOPHAGOGASTRODUODENOSCOPY);  Surgeon: Teo Johnson MD;  Location: [facility];  Service: Endoscopy;  Laterality: N/A;    ESOPHAGOGASTRODUODENOSCOPY N/A 2/22/2024    Procedure: EGD (ESOPHAGOGASTRODUODENOSCOPY);  Surgeon: Teo Johnson MD;  Location: [facility];  Service: Endoscopy;  Laterality: N/A;    EXCISION OF GANGLION OF WRIST Right 09/15/2022    Procedure: EXCISION, GANGLION CYST, WRIST;  Surgeon: Christen Marshall MD;  Location: [facility];  Service: Orthopedics;  Laterality: Right;    FLEXOR TENDON REPAIR Right 09/15/2022    Procedure: FLEXOR TENOSYNOVECTOMY;  Surgeon: Christen Marshall MD;  Location: [facility] OR;  Service: Orthopedics;  Laterality: Right;    HAND ARTHROTOMY Right 09/15/2022    Procedure: ARTHROTOMY, HAND RADIOCARPAL;  Surgeon: Christen Marshall MD;  Location: [facility];  Service: Orthopedics;  Laterality: Right;  Radiocarpal    INCISION, CONTRACTURE, BLADDER NECK, TRANSURETHRAL N/A 11/8/2024    Procedure: INCISION, CONTRACTURE, BLADDER NECK, TRANSURETHRAL;  Surgeon: Jay Walter MD;  Location: [facility] OR;  Service: Urology;  Laterality: N/A;  1 hour    INJECTION OF JOINT Right 11/29/2023    Procedure: right hip IA injection and Right GTB injection;  Surgeon: Lorraine Patel DO;  Location: [facility] PAIN MANAGEMENT;  Service: Pain Management;  Laterality: Right;    INJECTION OF STEROID Left 09/15/2022    Procedure: INJECTION, STEROID LEFT WRIST AND LEFT LATERAL ELBOW;  Surgeon: Christen Marshall MD;  Location: [facility] OR;  Service: Orthopedics;  Laterality: Left;  Left Carpal  Tunnel CSI    LASER LITHOTRIPSY Left 7/5/2023    Procedure: LITHOTRIPSY, USING LASER;  Surgeon: Chuckie Hall MD;  Location: [facility] OR 1ST FLR;  Service: Urology;  Laterality: Left;    LUMBAR LAMINECTOMY  2010    LUMBAR LAMINECTOMY WITH DISCECTOMY Left 09/20/2021    Procedure: LAMINECTOMY, SPINE, LUMBAR, WITH DISCECTOMY;  Surgeon: Naseem Mcnamara DO;  Location: [facility] OR 2ND FLR;  Service: Neurosurgery;  Laterality: Left;  MIS L3-4    NASAL SEPTOPLASTY N/A 7/15/2024    Procedure: SEPTOPLASTY, NOSE;  Surgeon: Melody Liu MD;  Location: [facility] OR;  Service: ENT;  Laterality: N/A;    PH MONITORING, ESOPHAGUS, WIRELESS, (OFF REFLUX MEDS) N/A 12/21/2023    Procedure: PH MONITORING, ESOPHAGUS, WIRELESS, (OFF REFLUX MEDS);  Surgeon: Teo Johnson MD;  Location: [facility] ENDO;  Service: Endoscopy;  Laterality: N/A;    RECONSTRUCTION OF LIGAMENT Left 12/5/2023    Procedure: RECONSTRUCTION, LIGAMENT LATERAL COLLATERAL;  Surgeon: Christen Marshall MD;  Location: [facility] OR;  Service: Orthopedics;  Laterality: Left;    REPAIR OF EXTENSOR TENDON Left 12/5/2023    Procedure: REPAIR, TENDON, EXTENSOR;  Surgeon: Christen Marshall MD;  Location: [facility] OR;  Service: Orthopedics;  Laterality: Left;    ROBOT-ASSISTED REPAIR OF HIATAL HERNIA N/A 3/14/2024    Procedure: ROBOTIC REPAIR, HERNIA, HIATAL;  Surgeon: Cody Winn MD;  Location: [facility] OR;  Service: General;  Laterality: N/A;    TYMPANOSTOMY TUBE PLACEMENT      URETERAL STENT PLACEMENT Left 7/5/2023    Procedure: INSERTION, STENT, URETER;  Surgeon: Chuckie Hall MD;  Location: [facility] OR 1ST FLR;  Service: Urology;  Laterality: Left;    URETEROSCOPIC REMOVAL OF URETERIC CALCULUS Left 7/5/2023    Procedure: REMOVAL, CALCULUS, URETER, URETEROSCOPIC;  Surgeon: Chuckie Hall MD;  Location: [facility] OR 1ST FLR;  Service: Urology;  Laterality: Left;    URETEROSCOPY Left 7/5/2023    Procedure: URETEROSCOPY;  Surgeon: Chuckie Hall MD;  Location: [facility] OR [address] [place] [address];  Service: Urology;   Laterality: Left;     Anesthesia Assessment: Preoperative EQUATION    Planned Procedure: Procedure(s) (LRB):  ORIF, SMALL FINGER (Right)  Requested Anesthesia Type:Regional  Surgeon: Christen Marshall MD  Service: Orthopedics  Known or anticipated Date of Surgery:3/11/2025    Surgeon notes: reviewed    Electronic QUestionnaire Assessment completed via nurse interview with patient.      Triage considerations:     The patient has no apparent active cardiac condition (No unstable coronary Syndrome such as severe unstable angina or recent [<1 month] myocardial infarction, decompensated CHF, severe valvular   disease or significant arrhythmia)    Previous anesthesia records:GETA, MAC, Nerve block for post-op pain, and Difficult airway    Last PCP note: within 3 months   Subspecialty notes: Dermatology, Neurosurgery, Ortho, Pain Management, Psychiatry, Urology    Other important co-morbidities: DM2, GERD, HLD, Obesity, and LUBA   EKG 5/7/24  Vent. Rate : 074 BPM     Atrial Rate : 074 BPM      P-R Int : 142 ms          QRS Dur : 090 ms       QT Int : 392 ms       P-R-T Axes : -16 -35 -25 degrees      QTc Int : 435 ms     Normal sinus rhythm   Left axis deviation   Abnormal ECG   When compared with ECG of 09-JUN-2023 14:12,   Inverted T waves have replaced nonspecific T wave abnormality in Inferior   leads   Confirmed by Isra Torres MD ([id number]) on 5/7/2024 1:53:07 PM       Tests already available:  Results have been reviewed.             Instructions given. (See in Nurse's note) Pre op medication instructions sent via portal message.  RN called pt [phone] to instruct in holding Mounjaro.  Pt states he last took his Mounjaro this past Tuesday.  RN messaged Sanket to inform.    Optimization:  Anesthesia Preop Clinic Assessment not Indicated    Medical Opinion Indicated: no       Sub-specialist consult indicated: no      Plan:   No pre op medical clearance requested.    Navigation:  Straight Line to surgery.                "No tests, anesthesia preop clinic visit, or consult required.    Ht: 5'5"  Wt: 104.3 (230 lb)  BMI: 38.27  "

## 2025-03-07 NOTE — TELEPHONE ENCOUNTER
Called MrMarcell  Related to refill request    Chart reviewed    Spoke to my schedule yesterday who placed a couple of calls offering him slots late March     Explained that his existing appointment April would not align with me giving him 2 months refill I was only willing to do 1 month to bridge him to his appointment    He declined offer to come in but did appreciate 1 month to get him to his appointment    In reviewing chart he does have other meds to get him to his appointment I did put in 1 month of Ativan to be filled late March I believe March 27    I put in only 1 month of Focalin I was not willing to put in 2 months he had been offered to come in in March he opted to wait till his April appointment    Noted if any acute concerns 911 or ER    D Post MD

## 2025-03-07 NOTE — TELEPHONE ENCOUNTER
Patient was seen at  3/1/25, sustained a FOOSH injury to right hand resulting in right small finger intra-articular nd fracture of middle phalanx. Patient is scheduled to see Dr. Huitron on Monday.     Called patient and scheduled Right small finger middle phalanx ORIF for Tuesday.     Sanket Bonilla MS, OTC   Sports Medicine Assistant   [facility]  (P) [phone]  (F) [phone]

## 2025-03-10 ENCOUNTER — PATIENT MESSAGE (OUTPATIENT)
Dept: ALLERGY | Facility: CLINIC | Age: 45
End: 2025-03-10
Payer: COMMERCIAL

## 2025-03-10 ENCOUNTER — HOSPITAL ENCOUNTER (OUTPATIENT)
Dept: RADIOLOGY | Facility: HOSPITAL | Age: 45
Discharge: HOME | End: 2025-03-10
Payer: COMMERCIAL

## 2025-03-10 ENCOUNTER — TELEPHONE (OUTPATIENT)
Dept: ORTHOPEDICS | Facility: CLINIC | Age: 45
End: 2025-03-10
Payer: COMMERCIAL

## 2025-03-10 ENCOUNTER — PATIENT MESSAGE (OUTPATIENT)
Dept: ORTHOPEDICS | Facility: CLINIC | Age: 45
End: 2025-03-10
Payer: COMMERCIAL

## 2025-03-10 ENCOUNTER — OFFICE VISIT (OUTPATIENT)
Dept: ORTHOPEDICS | Facility: CLINIC | Age: 45
End: 2025-03-10
Payer: COMMERCIAL

## 2025-03-10 DIAGNOSIS — M25.521 RIGHT ELBOW PAIN: ICD-10-CM

## 2025-03-10 DIAGNOSIS — M79.641 RIGHT HAND PAIN: Primary | ICD-10-CM

## 2025-03-10 DIAGNOSIS — S62.626A CLOSED DISPLACED FRACTURE OF MIDDLE PHALANX OF RIGHT LITTLE FINGER, INITIAL ENCOUNTER: ICD-10-CM

## 2025-03-10 DIAGNOSIS — M79.641 RIGHT HAND PAIN: ICD-10-CM

## 2025-03-10 PROCEDURE — 73080 X-RAY EXAM OF ELBOW: CPT | Mod: 26,RT,, | Performed by: RADIOLOGY

## 2025-03-10 PROCEDURE — 99999 PR PBB SHADOW E&M-EST. PATIENT-LVL V: CPT | Mod: PBBFAC,,,

## 2025-03-10 PROCEDURE — 3072F LOW RISK FOR RETINOPATHY: CPT | Mod: CPTII,S$GLB,,

## 2025-03-10 PROCEDURE — 99204 OFFICE O/P NEW MOD 45 MIN: CPT | Mod: S$GLB,,,

## 2025-03-10 PROCEDURE — 73140 X-RAY EXAM OF FINGER(S): CPT | Mod: TC,RT

## 2025-03-10 PROCEDURE — 1159F MED LIST DOCD IN RCRD: CPT | Mod: CPTII,S$GLB,,

## 2025-03-10 PROCEDURE — 73140 X-RAY EXAM OF FINGER(S): CPT | Mod: 26,RT,, | Performed by: RADIOLOGY

## 2025-03-10 PROCEDURE — 73080 X-RAY EXAM OF ELBOW: CPT | Mod: TC,RT

## 2025-03-10 NOTE — PATIENT INSTRUCTIONS
[facility]  HAND CLINIC SURGERY INSTRUCTIONS  Christen Marshall MD  Date of Surgery: 3/13/25    Location of Surgery:      [facility], [facility] [place] [address]    PRE-OPERATIVE INSTRUCTIONS:    Dr. Marshall's clinic staff will call you THE DAY BEFORE SURGERY with your arrival time. You will be notified in the afternoon between 11:00AM - 5:00pm. We will attempt to provide your arrival time earlier and will call you if this is at all possible.   5am    DO NOT eat or drink after midnight, this includes gum/hard candy, coffee.   You may drink gatorade and water only up to 2 hours prior to arrival, NOTHING ELSE.    You ARE NOT to drive or take an Uber/Lyft/taxi home from surgery. Please arrange a friend/family member to accompany you at the surgery center and drive you home.  Transportation: Spouse [facility] Visitor Policy:    Each patient will be allowed 2 visitors with 1 visitor at a time. Only 1 swap out allowed.   Pediatric patients: Both parents are allowed if present.   Post-Op: If there is more than 1 visitor, the person that is the main caregiver will need to be available for d/c instructions should visit 2nd, and stay with the patient until d/c.  All visitors must be accompanied in and out with an employee.       PRE-OPERATIVE MEDICATION INSTRUCTIONS:    If you are diabetic, DO NOT take any diabetic medication the night before surgery or morning of surgery. If you are type I diabetic on an insulin pump, please bring your monitor the morning of surgery.   DM2    MUST HOLD SEMAGLUTIDE MEDICATIONS 7 DAYS PRIOR TO SURGERY, examples: Mounjaro, Ozempic, Trulicity.   tirzepatide (MOUNJARO) 12.5 mg/0.5 mL PnIj        Please HOLD Vitamins 5 days prior to surgery or sooner, CONTINUE Vitamin D up until the AM of your surgery.    Avoid anti-inflammatory medications 5 days before your surgery. This includes Aleve/Naproxen, Celebrex, Meloxicam/Mobic, Voltaren/Diclofenac.    You may dose ibuprofen/Advil/Motrin up until the day before your surgery.  You may take extra strength Tylenol/Acetaminophen.    HOLD ALL OTHER MEDICATIONS AM OF SURGERY THAT ARE NOT DISCUSSED ABOVE        POST-OPERATIVE MEDICATION INSTRUCTIONS:    Your post-operative pain medication will be DELIVERED BEDSIDE to you at the surgery center by the [facility]. You DO NOT need to pick this medication up from the [facility].     TAKE post-operative pain medication as directed.   You may also take over-the-counter extra strength Tylenol/acetaminophen as directed on the bottle for breakthrough pain between dosed of prescribed pain medication.   Please note, some pain medications contain Tylenol/acetaminophen.   DO NOT exceed 3000mg of Tylenol/acetaminophen in 1 day.  You may also use an over-the-counter anti-inflammatory medication also known as an NSAID,  (Aleve/Naproxen) as directed on the bottle for breakthrough pain between doses of prescribed pain medication.  DO NOT take NSAIDs if you have been previously instructed not to by a physician.     POST-OPERATIVE INSTRUCTIONS:    DO NOT let your operative area become wet, Your dressings/bandages/splints must remain dry.   Recommend waterproof arm cast cover to be worn when showering and bathing and can be purchased on Amazon. Garbage bags, plastic shopping bags, or umbrella bags can also be used instead.    DO NOT remove any post-operative dressings/bandages/splints until you are seen by Lori Crawford PA-C, Dr. Marshall or Occupational Therapy   These dressings/bandages/splints are in place to keep the operative site clean, dry, and in optimal healing position     ELEVATE your hand/arm above the level of your heart as much as possible to decrease post-operative swelling for first 48 hours.  Swelling after surgery is TO BE EXPECTED.      ICE the operative site following surgery for 20-30 minutes, 4-5 times per day.  Be sure to have a towel between your  dressings/bandages/splint to keep from getting wet.    MOVE the parts of your hand/arm that are not immobilized in a sling or splint.   Make a gentle fist with your fingers, bend/straighten your elbow, etc.   DO NOT attempt any strengthening exercises (hand putty, exercise balls, etc) on your operative side as this can increase swelling.     *CALL the [facility] at [phone]*  If at any time following surgery your dressings/bandages/splints: get wet, come loose, feels tight, feels uncomfortable.  Or if you are concerned about possible infection, worsening pain, worsening swelling or have any other concerns regarding your surgery.   Signs of infection:  fever (over 100.3), chills, body aches, increased warmth, redness, significant increase in swelling & pain at surgical site.     OUTPATIENT FOLLOW UP:  YOU WILL BE SEEN FIRST BY OCCUPATIONAL THERAPY 5-7 DAYS AFTER YOUR SURGERY. *Your splint / soft dressing will be removed as well as your sutures if applicable.  YOU WILL THEN BE SEEN BY KAREN COX PA-C IN CLINIC 2 weeks AFTER SURGERY AND DR. FREDERICK IN CLINIC 6 WEEKS AFTER SURGERY    Patient IQ (Survey)  A quick questionnaire (2-3 minutes) will be sent to you via text message or email in the next few hours. It will only take a few minutes to complete!  We care about your health, answering the questionnaire allows us to track your progress through your recovery to provide the best outcome for your recovery.    FMLA or Disability paperwork can be sent to [facility]  If you feel you will need accommodations at work for more than 2 weeks please ask your HR department for paperwork so that they can accommodate your restrictions. * Only needed if you are going to be out of work 2 weeks or more*\  Please send the paperwork filled out with your information to: Fax: [phone] or Email [email or web address]   This process should take 8-10 business days. If you have any concerns or  need to check the status of your request please contact HIM at the email address or phone number listed below as once it leaves this department we do not have access to the status of your request. Phone: [phone]

## 2025-03-10 NOTE — H&P (VIEW-ONLY)
[facility]  History & Physical  Orthopedics    Subjective:      Chief Complaint: Injury of the Right Hand (Doi 2/22/25)    Referring Provider: Self, Aaareferral     History of Present Illness:  Hermelindo Garza III is a 44 y.o. right hand dominant male who presents to clinic today for evaluation of 5th middle phalanx fracture that occurred 2 weeks 3 days ago on February 21, 2025. Patient fell while walking his dog. Patient had persistent pain, swelling, and decreased motion in his finger following the injury. About a week after on 3/1 he went to urgent care where x-rays confirmed fracture of right small middle phalanx.  He has some numbness and shaking in 5th finger, but states shaking was present prior to injury. Patient has been managing pain with Tylenol and OTC finger splint from [employer]. He denies any skin tears, abrasions, or lacerations from the fall.     He reports new symptoms in his arm since the fall, including tightness and pulling sensations from his forearm and elbow up to his shoulder. He has burning pains in the elbow that radiate down the forearm when flexing the elbow and pronating the forearm. His arm pain is burning and tight when bending his arm. He mentions a history of spinal fusion.      MEDICATIONS:  - Tylenol: used for pain management after the initial injury  - Steroid inhaler: patient reports using it daily    SURGICAL HISTORY:  - 9/12/2024 C5-6 C5-7 ACDF   - 2/5/2023 Left elbow lateral RCL ligament reconstruction and lateral epicondyle debridement with extensor tendon repair  - 9/15/2022 Right carpal tunnel release, ganglion cyst excision, flexor tenosynovectomy    SOCIAL HISTORY:  - Smoking: Quit 5-6 years ago    The patient denies any fevers, chills, N/V, D/C and presents for evaluation.    Vitals:    03/10/25 0819   PainSc:   3   PainLoc: Finger     Review of Systems:  Constitutional: no fever or chills  Eyes: no visual changes  ENT: no nasal congestion or  sore throat  Respiratory: no cough or shortness of breath  Cardiovascular: no chest pain  Gastrointestinal: no nausea or vomiting, tolerating diet  Musculoskeletal: pain, soreness, and decreased ROM    Past Medical History:   Diagnosis Date    ADD (attention deficit disorder) 05/09/2012    Allergy     Anxiety 04/12/2023    Asthma 05/09/2012    Cervical disc disorder with radiculopathy, unspecified cervical region 09/14/2021    Cervical radiculopathy 05/22/2023    Cervical spondylosis with myelopathy 06/29/2022    Eczema     Esophageal ulcer     GERD (gastroesophageal reflux disease) 05/09/2012    Glaucoma     Kidney stones 05/2014    Lumbar disc disease 05/09/2012    Lumbar herniated disc 05/09/2012    Malignant melanoma of skin, unspecified 01/06/2022    in situ right mid back    Melanoma 01/2022    in situ R mid back     LUBA (obstructive sleep apnea)     Radiculopathy, lumbar region 09/14/2021    Renal calculi 05/09/2012    Type 2 diabetes mellitus without complication, without long-term current use of insulin 11/16/2020     Past Surgical History:   Procedure Laterality Date    ANTERIOR CERVICAL DISCECTOMY W/ FUSION N/A 9/12/2024    Procedure: DISCECTOMY, SPINE, CERVICAL, ANTERIOR APPROACH, WITH FUSION C5-6, C6-7;  Surgeon: Naseem Mcnamara DO;  Location: [address];  Service: Neurosurgery;  Laterality: N/A;    APPENDECTOMY  2006    CARPAL TUNNEL RELEASE Right 09/15/2022    Procedure: RELEASE, CARPAL TUNNEL;  Surgeon: Christen Marshall MD;  Location: [facility] OR;  Service: Orthopedics;  Laterality: Right;    CAUTERY OF TURBINATES Bilateral 7/15/2024    Procedure: CAUTERIZATION, MUCOSA, NASAL TURBINATE;  Surgeon: Melody Liu MD;  Location: [facility] OR;  Service: ENT;  Laterality: Bilateral;    COLONOSCOPY  01/22/2019    internal hemorrhoids, o/w normal - repeat at age 52    COLONOSCOPY N/A 12/21/2023    Procedure: COLONOSCOPY;  Surgeon: Teo Johnson MD;  Location: [facility] ENDO;  Service: Endoscopy;  Laterality:  N/A;    CORRECTION OF HAMMER TOE Left 8/14/2023    Procedure: CORRECTION, HAMMER TOE--  basic foot tray as well as a Reese microfree/microchoice tray. I also asked Sherry to bring an MIS mati.;  Surgeon: Ankush Back DPM;  Location: [facility] OR;  Service: Podiatry;  Laterality: Left;  reese drill, sagittal saw, foot set    CYSTOSCOPY  7/5/2023    Procedure: CYSTOSCOPY;  Surgeon: Chuckie Hall MD;  Location: [facility] OR [facility] FLR;  Service: Urology;;    CYSTOSCOPY N/A 9/26/2024    Procedure: CYSTOSCOPY;  Surgeon: Jay Walter MD;  Location: [facility] OR;  Service: Urology;  Laterality: N/A;  30 minutes    EPIDURAL STEROID INJECTION N/A 02/10/2021    Procedure: CERVICAL C6/7 NELI DIRECT REFERRAL;  Surgeon: Michi Escamilla MD;  Location: [facility] PAIN MGT;  Service: Pain Management;  Laterality: N/A;  NEEDS CONSENT    EPIDURAL STEROID INJECTION INTO CERVICAL SPINE N/A 6/28/2023    Procedure: Injection-steroid-epidural-cervical C7-T1;  Surgeon: Lorraine Patel DO;  Location: [facility] PAIN MANAGEMENT;  Service: Pain Management;  Laterality: N/A;  diabetic    EPIDURAL STEROID INJECTION INTO CERVICAL SPINE N/A 11/3/2023    Procedure: cervical NELI C7-T1;  Surgeon: Kolton Terrell MD;  Location: [facility] PAIN MANAGEMENT;  Service: Pain Management;  Laterality: N/A;  diabetic    EPIDURAL STEROID INJECTION INTO CERVICAL SPINE N/A 4/17/2024    Procedure: VENECIA C7/T1;  Surgeon: Lorraine Patel DO;  Location: [facility] PAIN MANAGEMENT;  Service: Pain Management;  Laterality: N/A;  20mins- Ozempic 7d- no ac    EPIDURAL STEROID INJECTION INTO LUMBAR SPINE N/A 9/27/2023    Procedure: L4-5 NELI;  Surgeon: Tirso Pickering MD;  Location: [facility] PAIN MANAGEMENT;  Service: Pain Management;  Laterality: N/A;  15 mins    EPIDURAL STEROID INJECTION INTO LUMBAR SPINE N/A 3/6/2024    Procedure: L4-5 IL NELI;  Surgeon: Lorraine Patel DO;  Location: [facility] PAIN MANAGEMENT;  Service: Pain Management;  Laterality: N/A;  20 mins    EPIDURAL STEROID  INJECTION INTO LUMBAR SPINE N/A 6/3/2024    Procedure: NELI L4-5;  Surgeon: Lorraine Paetl DO;  Location: [facility] PAIN MANAGEMENT;  Service: Pain Management;  Laterality: N/A;  20mins-no ac Ozempic 7d    EPIDURAL STEROID INJECTION INTO LUMBAR SPINE N/A 8/26/2024    Procedure: L4-5 NELI Veer Left;  Surgeon: Lorraine Patel DO;  Location: [facility] PAIN MANAGEMENT;  Service: Pain Management;  Laterality: N/A;  20 mins No AC - Ozempic 7 days    EPIDURAL STEROID INJECTION INTO LUMBAR SPINE N/A 12/2/2024    Procedure: NELI L5-S1;  Surgeon: Lorraine Patel DO;  Location: [facility] PAIN MANAGEMENT;  Service: Pain Management;  Laterality: N/A;  no ac    ESOPHAGOGASTRODUODENOSCOPY  01/22/2019    LA grade B esophagitis; esophageal stenosis- dilated; gastritis; H pylori pathology negative    ESOPHAGOGASTRODUODENOSCOPY N/A 05/18/2021    Procedure: EGD (ESOPHAGOGASTRODUODENOSCOPY);  Surgeon: Mariana Hector MD;  Location: [facility];  Service: Endoscopy;  Laterality: N/A;    ESOPHAGOGASTRODUODENOSCOPY N/A 12/21/2023    Procedure: EGD (ESOPHAGOGASTRODUODENOSCOPY);  Surgeon: Teo Johnson MD;  Location: [facility];  Service: Endoscopy;  Laterality: N/A;    ESOPHAGOGASTRODUODENOSCOPY N/A 2/22/2024    Procedure: EGD (ESOPHAGOGASTRODUODENOSCOPY);  Surgeon: Teo Johnson MD;  Location: [facility];  Service: Endoscopy;  Laterality: N/A;    EXCISION OF GANGLION OF WRIST Right 09/15/2022    Procedure: EXCISION, GANGLION CYST, WRIST;  Surgeon: Christen Marshall MD;  Location: [facility];  Service: Orthopedics;  Laterality: Right;    FLEXOR TENDON REPAIR Right 09/15/2022    Procedure: FLEXOR TENOSYNOVECTOMY;  Surgeon: Christen Marshall MD;  Location: [facility];  Service: Orthopedics;  Laterality: Right;    HAND ARTHROTOMY Right 09/15/2022    Procedure: ARTHROTOMY, HAND RADIOCARPAL;  Surgeon: Christen Marshall MD;  Location: [facility];  Service: Orthopedics;  Laterality: Right;  Radiocarpal    INCISION, CONTRACTURE, BLADDER NECK, TRANSURETHRAL N/A  11/8/2024    Procedure: INCISION, CONTRACTURE, BLADDER NECK, TRANSURETHRAL;  Surgeon: Jay Walter MD;  Location: [facility] OR;  Service: Urology;  Laterality: N/A;  1 hour    INJECTION OF JOINT Right 11/29/2023    Procedure: right hip IA injection and Right GTB injection;  Surgeon: Lorraine Patel DO;  Location: [facility] PAIN MANAGEMENT;  Service: Pain Management;  Laterality: Right;    INJECTION OF STEROID Left 09/15/2022    Procedure: INJECTION, STEROID LEFT WRIST AND LEFT LATERAL ELBOW;  Surgeon: Christen Marshall MD;  Location: [facility] OR;  Service: Orthopedics;  Laterality: Left;  Left Carpal Tunnel CSI    LASER LITHOTRIPSY Left 7/5/2023    Procedure: LITHOTRIPSY, USING LASER;  Surgeon: Chuckie Hall MD;  Location: [facility] 1ST FLR;  Service: Urology;  Laterality: Left;    LUMBAR LAMINECTOMY  2010    LUMBAR LAMINECTOMY WITH DISCECTOMY Left 09/20/2021    Procedure: LAMINECTOMY, SPINE, LUMBAR, WITH DISCECTOMY;  Surgeon: Naseem Mcnamara DO;  Location: [facility] 2ND FLR;  Service: Neurosurgery;  Laterality: Left;  MIS L3-4    NASAL SEPTOPLASTY N/A 7/15/2024    Procedure: SEPTOPLASTY, NOSE;  Surgeon: Melody Liu MD;  Location: [facility] OR;  Service: ENT;  Laterality: N/A;    PH MONITORING, ESOPHAGUS, WIRELESS, (OFF REFLUX MEDS) N/A 12/21/2023    Procedure: PH MONITORING, ESOPHAGUS, WIRELESS, (OFF REFLUX MEDS);  Surgeon: Teo Johnson MD;  Location: [facility];  Service: Endoscopy;  Laterality: N/A;    RECONSTRUCTION OF LIGAMENT Left 12/5/2023    Procedure: RECONSTRUCTION, LIGAMENT LATERAL COLLATERAL;  Surgeon: Christen Marshall MD;  Location: [facility] OR;  Service: Orthopedics;  Laterality: Left;    REPAIR OF EXTENSOR TENDON Left 12/5/2023    Procedure: REPAIR, TENDON, EXTENSOR;  Surgeon: Christen Marshall MD;  Location: [facility] OR;  Service: Orthopedics;  Laterality: Left;    ROBOT-ASSISTED REPAIR OF HIATAL HERNIA N/A 3/14/2024    Procedure: ROBOTIC REPAIR, HERNIA, HIATAL;  Surgeon: Cody Winn MD;  Location:  [facility] OR;  Service: General;  Laterality: N/A;    TYMPANOSTOMY TUBE PLACEMENT      URETERAL STENT PLACEMENT Left 2023    Procedure: INSERTION, STENT, URETER;  Surgeon: Chuckie Hall MD;  Location: [facility] OR [address] [place] [address];  Service: Urology;  Laterality: Left;    URETEROSCOPIC REMOVAL OF URETERIC CALCULUS Left 2023    Procedure: REMOVAL, CALCULUS, URETER, URETEROSCOPIC;  Surgeon: Chuckie Hall MD;  Location: [facility] OR [address] [place] [address];  Service: Urology;  Laterality: Left;    URETEROSCOPY Left 2023    Procedure: URETEROSCOPY;  Surgeon: Chuckie Hall MD;  Location: [facility] OR [address] [place] [address];  Service: Urology;  Laterality: Left;     Family History   Problem Relation Name Age of Onset    Allergic rhinitis Mother      Hypertension Mother      Transient ischemic attack Mother      Sleep apnea Mother      Allergic rhinitis Father      Asthma Father      Chronic back pain Father      MARTITA disease Father      Sleep apnea Father      Melanoma Father      Allergic rhinitis Sister      Asthma Sister      Allergic rhinitis Sister      Asthma Sister      No Known Problems Brother      No Known Problems Maternal Aunt      No Known Problems Maternal Uncle      No Known Problems Paternal Aunt      No Known Problems Paternal Uncle      Brain cancer Maternal Grandmother      Diabetes Maternal Grandfather      Breast cancer Paternal Grandmother      No Known Problems Paternal Grandfather      Amblyopia Neg Hx      Blindness Neg Hx      Cancer Neg Hx      Cataracts Neg Hx      Glaucoma Neg Hx      Macular degeneration Neg Hx      Retinal detachment Neg Hx      Strabismus Neg Hx      Stroke Neg Hx      Thyroid disease Neg Hx      Allergies Neg Hx      Angioedema Neg Hx       Social History     Socioeconomic History    Marital status:    Tobacco Use    Smoking status: Former     Current packs/day: 0.00     Types: Cigarettes     Quit date: 2018     Years since quittin.9     Passive exposure: Never    Smokeless tobacco: Never    Substance and Sexual Activity    Alcohol use: Yes     Alcohol/week: 3.0 standard drinks of alcohol     Types: 3 Drinks containing 0.5 oz of alcohol per week    Drug use: No    Sexual activity: Yes     Partners: Male     Social Drivers of Health     Financial Resource Strain: Low Risk  (1/23/2025)    Overall Financial Resource Strain (CARDIA)     Difficulty of Paying Living Expenses: Not hard at all   Food Insecurity: No Food Insecurity (1/23/2025)    Hunger Vital Sign     Worried About Running Out of Food in the Last Year: Never true     Ran Out of Food in the Last Year: Never true   Transportation Needs: No Transportation Needs (11/7/2023)    PRAPARE - Transportation     Lack of Transportation (Medical): No     Lack of Transportation (Non-Medical): No   Physical Activity: Sufficiently Active (1/23/2025)    Exercise Vital Sign     Days of Exercise per Week: 4 days     Minutes of Exercise per Session: 40 min   Stress: Stress Concern Present (1/23/2025)    [employer] [place] [employer] - Occupational Stress Questionnaire     Feeling of Stress : Very much   Housing Stability: Low Risk  (11/7/2023)    Housing Stability Vital Sign     Unable to Pay for Housing in the Last Year: No     Number of Places Lived in the Last Year: 1     Unstable Housing in the Last Year: No       Current Medications[1]  Review of patient's allergies indicates:  No Known Allergies    Objective:   Vital Signs (Most Recent):  There were no vitals filed for this visit.  There is no height or weight on file to calculate BMI.    Physical Exam:  Constitutional: The patient appears well-developed and well-nourished. No distress.   Skin: No lesions appreciated  Head: Normocephalic and atraumatic.   Nose: Nose normal.   Ears: No deformities seen  Eyes: Conjunctivae and EOM are normal.   Neck: No tracheal deviation present.   Cardiovascular: Normal rate and intact distal pulses.    Pulmonary/Chest: Effort normal. No respiratory distress.    Abdominal: There is no guarding.   Neurological: The patient is alert.   Psychiatric: The patient has a normal mood and affect.     HAND/WRIST EXAMINATION:    Observation/Inspection:  Swelling  right small finger    Deformity  none  Discoloration  none     Scars   R carpal tunnel incision, L elbow incision well healed. Abrasion to left dorsal hand.    Atrophy  none    Palpation:  Tenderness to palpation of the right small middle phalanx and PIPJ. Unable to make fist with right hand.   Tenderness to palpation of the right elbow and distal triceps tendon.    Range of Motion:  Small finger ROM exam limited 2/2 fracture.  Otherwise ROM hand, wrist, elbow full and painless     Neurovascular Exam:  Digits WWP, brisk CR < 3s throughout  NVI motor/LTS to M/R/U nerves, radial pulse 2+    Diagnostics Review:   Imaging: I independently viewed the patient's imaging as well as the radiology report.    My personal interpretation of X-rays AP, lateral, oblique right small finger taken today demonstrate oblique intra-articular fracture of proximal aspect of 5th middle phalanx.   My personal interpretation of X-rays AP, lateral, oblique right elbow taken today demonstrate well preserved joint spaces with no fracture or dislocation.    Assessment:   44 y.o. yo male with   Encounter Diagnoses   Name Primary?    Right hand pain Yes    Right elbow pain     Closed displaced fracture of middle phalanx of right little finger, initial encounter       Plan:   Plan: The patient and I had a thorough discussion today. We discussed the working diagnosis as well as several other potential alternative diagnoses. Treatment options were discussed, both conservative and surgical. Conservative treatment options would include things such as activity modifications, workplace modifications, a period of rest, oral vs topical OTC and prescription anti-inflammatory medications, occupational therapy, splinting/bracing, immobilization, corticosteroid  injections, and others. Surgical options were discussed as well.     At this time, the patient has exhausted conservative measures and would like to proceed with surgery. Surgery would include open reduction internal fixation of right small middle phalanx fracture. Consent signed today in clinic. Discussed pre-op instructions and post-op instructions/care with patient. Light use of the hand will be indicated for the first 4-6 weeks.     We have discussed risks, benefits and alternatives of hand surgery which include but are not limited to blood clots in the legs that can travel to the lungs (pulmonary embolism). Pulmonary embolism can cause shortness of breath, chest pain, and even shock. Other risks include urinary tract infection, nausea and vomiting (usually related to pain medication), chronic pain, bleeding, nerve damage, blood vessel injury, scarring and infection of the hand which can require re-operation. Furthermore, the risks of anesthesia include potential heart, lung, kidney, and liver damage. Informed consent was obtained. Dr. Marshall met patient and answered any questions. he understands and would like to proceed with surgery in the near future.    Should the patient's symptoms worsen, persist, or fail to improve they should return for reevaluation.     Lori Crawford PA-C  [facility]     This note was generated with the assistance of ambient listening technology. Verbal consent was obtained by the patient and accompanying visitor(s) for the recording of patient appointment to facilitate this note. I attest to having reviewed and edited the generated note for accuracy, though some syntax or spelling errors may persist. Please contact the author of this note for any clarification.       [1]   Current Outpatient Medications   Medication Sig Dispense Refill    acetaminophen (TYLENOL) 650 MG TbSR Take 1,300 mg by mouth daily as needed.      albuterol (PROVENTIL) 2.5 mg /3 mL (0.083 %)  nebulizer solution Take 3 mLs (2.5 mg total) by nebulization every 6 (six) hours as needed for Wheezing or Shortness of Breath. Rescue 75 mL 3    albuterol (PROVENTIL/VENTOLIN HFA) 90 mcg/actuation inhaler INHALE 2 PUFFS INTO THE LUNGS EVERY 6 HOURS AS NEEDED WHEEZING 25.5 g 3    albuterol (VENTOLIN HFA) 90 mcg/actuation inhaler Inhale 2 puffs into the lungs every 6 (six) hours as needed for Wheezing. Rescue 8.5 g 0    atorvastatin (LIPITOR) 20 MG tablet Take 1 tablet (20 mg total) by mouth once daily. 90 tablet 3    blood sugar diagnostic (ONETOUCH VERIO TEST STRIPS) Strp Use to test blood glucose one (1) time a day, 100 strip 3    blood-glucose meter Misc use as directed 1 each 0    buPROPion (WELLBUTRIN XL) 300 MG 24 hr tablet Take 1 tablet (300 mg total) by mouth once daily. 90 tablet 1    clindamycin (CLEOCIN T) 1 % external solution Apply to the affected area on buttock, under abdomen , left ear, right jaw 1-2 times a day as needed for flare 60 mL 3    clobetasoL (TEMOVATE) 0.05 % cream Apply to affected area 2 (two) times daily as needed for flare 30 g 1    dexmethylphenidate (FOCALIN) 10 MG tablet Take 1 tablet (10 mg total) by mouth 2 (two) times daily. 60 tablet 0    docusate sodium (COLACE) 100 MG capsule Take 1 capsule (100 mg total) by mouth 2 (two) times daily. 30 capsule 1    ergocalciferol, vitamin D2, (VITAMIN D ORAL) Take 1 tablet by mouth every other day.      fluocinolone acetonide oiL (DERMOTIC OIL) 0.01 % Drop Place 3 drops in ear(s) 2 (two) times daily. 20 mL 3    fluocinonide (LIDEX) 0.05 % external solution apply to affected area of scalp daily as needed for itching, scaling 60 mL 3    fluorouraciL (EFUDEX) 5 % cream Apply topically to the affected area once daily. 40 g 2    fluticasone-salmeterol diskus inhaler 100-50 mcg Inhale 1 puff into the lungs 2 (two) times daily. Controller 60 each 11    HYDROcodone-acetaminophen (NORCO) 5-325 mg per tablet Take 1 tablet by mouth every 6 (six) hours  as needed for Pain. 30 tablet 0    hydrocortisone 2.5 % cream APPLY EXTERNALLY TO THE AFFECTED AREA TWICE DAILY FOR 10 DAYS 30 g 0    inulin (FIBER GUMMIES) 2 gram Chew Take by mouth once daily. 2 gummies a day      ketoconazole (NIZORAL) 2 % cream Apply 1 application  topically 2 (two) times daily. Apply to affected area up to 2 weeks 30 g 2    lancets (ONETOUCH DELICA PLUS LANCET) 33 gauge Misc Use to test blood glucose one (1) time a day, 100 each 5    latanoprost 0.005 % ophthalmic solution Place 1 drop into both eyes every evening. 7.5 mL 3    levocetirizine (XYZAL) 5 MG tablet Take 1 tablet (5 mg total) by mouth every evening. 30 tablet 11    loperamide (IMODIUM) 2 mg capsule Take 1 capsule (2 mg total) by mouth 4 (four) times daily as needed for Diarrhea. 20 capsule 0    [START ON 3/28/2025] LORazepam (ATIVAN) 0.5 MG tablet Take 1 tablet (0.5 mg total) by mouth daily as needed (SIGNIFICANT ANXIETY). 30 tablet 0    ondansetron (ZOFRAN) 8 MG tablet Take 1 tablet (8 mg total) by mouth every 8 (eight) hours as needed for Nausea. 30 tablet 0    ondansetron (ZOFRAN-ODT) 8 MG TbDL Dissolve 1 tablet (8 mg total) on the tongue every 8 (eight) hours as needed (nausea). 30 tablet 3    predniSONE (DELTASONE) 10 MG tablet Take 4 tablets by mouth on day 1, then 3 tablets on day 2, then 2 tablets on day 3, then 1 tablet on  day 4 (Patient not taking: Reported on 3/1/2025) 10 tablet 0    pregabalin (LYRICA) 200 MG Cap Take 1 capsule (200 mg total) by mouth 2 (two) times daily. 60 capsule 1    rOPINIRole (REQUIP) 0.25 MG tablet TAKE 1 TABLET(0.25 MG) BY MOUTH EVERY EVENING 90 tablet 2    tamsulosin (FLOMAX) 0.4 mg Cap Take 1 capsule (0.4 mg total) by mouth once daily. 30 capsule 11    tamsulosin (FLOMAX) 0.4 mg Cap Take 2 capsules (0.8 mg total) by mouth once daily. 60 capsule 11    tirzepatide (MOUNJARO) 12.5 mg/0.5 mL PnMarcelo Inject 12.5 mg into the skin every 7 days. 2 mL 5    ULTRA THIN LANCETS 30 gauge Misc USE TO TEST  BLOOD SUGAR EVERY DAY AS DIRECTED 100 each 3    urea (CARMOL) 40 % Crea Apply topically once daily. 225 g 2     Current Facility-Administered Medications   Medication Dose Route Frequency Provider Last Rate Last Admin    oxymetazoline 0.05 % nasal spray 1 spray  1 spray Each Nostril Q4H PRN Melody Liu MD         Facility-Administered Medications Ordered in Other Visits   Medication Dose Route Frequency Provider Last Rate Last Admin    0.9%  NaCl infusion  500 mL Intravenous Continuous Kolton Terrell MD

## 2025-03-10 NOTE — PROGRESS NOTES
[facility]  History & Physical  Orthopedics    Subjective:      Chief Complaint: Injury of the Right Hand (Doi 2/22/25)    Referring Provider: Self, Aaareferral     History of Present Illness:  Hermelindo Garza III is a 44 y.o. right hand dominant male who presents to clinic today for evaluation of 5th middle phalanx fracture that occurred 2 weeks 3 days ago on February 21, 2025. Patient fell while walking his dog. Patient had persistent pain, swelling, and decreased motion in his finger following the injury. About a week after on 3/1 he went to urgent care where x-rays confirmed fracture of right small middle phalanx.  He has some numbness and shaking in 5th finger, but states shaking was present prior to injury. Patient has been managing pain with Tylenol and OTC finger splint from [employer]. He denies any skin tears, abrasions, or lacerations from the fall.     He reports new symptoms in his arm since the fall, including tightness and pulling sensations from his forearm and elbow up to his shoulder. He has burning pains in the elbow that radiate down the forearm when flexing the elbow and pronating the forearm. His arm pain is burning and tight when bending his arm. He mentions a history of spinal fusion.      MEDICATIONS:  - Tylenol: used for pain management after the initial injury  - Steroid inhaler: patient reports using it daily    SURGICAL HISTORY:  - 9/12/2024 C5-6 C5-7 ACDF   - 2/5/2023 Left elbow lateral RCL ligament reconstruction and lateral epicondyle debridement with extensor tendon repair  - 9/15/2022 Right carpal tunnel release, ganglion cyst excision, flexor tenosynovectomy    SOCIAL HISTORY:  - Smoking: Quit 5-6 years ago    The patient denies any fevers, chills, N/V, D/C and presents for evaluation.    Vitals:    03/10/25 0819   PainSc:   3   PainLoc: Finger     Review of Systems:  Constitutional: no fever or chills  Eyes: no visual changes  ENT: no nasal congestion or  sore throat  Respiratory: no cough or shortness of breath  Cardiovascular: no chest pain  Gastrointestinal: no nausea or vomiting, tolerating diet  Musculoskeletal: pain, soreness, and decreased ROM    Past Medical History:   Diagnosis Date    ADD (attention deficit disorder) 05/09/2012    Allergy     Anxiety 04/12/2023    Asthma 05/09/2012    Cervical disc disorder with radiculopathy, unspecified cervical region 09/14/2021    Cervical radiculopathy 05/22/2023    Cervical spondylosis with myelopathy 06/29/2022    Eczema     Esophageal ulcer     GERD (gastroesophageal reflux disease) 05/09/2012    Glaucoma     Kidney stones 05/2014    Lumbar disc disease 05/09/2012    Lumbar herniated disc 05/09/2012    Malignant melanoma of skin, unspecified 01/06/2022    in situ right mid back    Melanoma 01/2022    in situ R mid back     LUBA (obstructive sleep apnea)     Radiculopathy, lumbar region 09/14/2021    Renal calculi 05/09/2012    Type 2 diabetes mellitus without complication, without long-term current use of insulin 11/16/2020     Past Surgical History:   Procedure Laterality Date    ANTERIOR CERVICAL DISCECTOMY W/ FUSION N/A 9/12/2024    Procedure: DISCECTOMY, SPINE, CERVICAL, ANTERIOR APPROACH, WITH FUSION C5-6, C6-7;  Surgeon: Naseem Mcnamara DO;  Location: [address];  Service: Neurosurgery;  Laterality: N/A;    APPENDECTOMY  2006    CARPAL TUNNEL RELEASE Right 09/15/2022    Procedure: RELEASE, CARPAL TUNNEL;  Surgeon: Christen Marshall MD;  Location: [facility] OR;  Service: Orthopedics;  Laterality: Right;    CAUTERY OF TURBINATES Bilateral 7/15/2024    Procedure: CAUTERIZATION, MUCOSA, NASAL TURBINATE;  Surgeon: Melody Liu MD;  Location: [facility] OR;  Service: ENT;  Laterality: Bilateral;    COLONOSCOPY  01/22/2019    internal hemorrhoids, o/w normal - repeat at age 52    COLONOSCOPY N/A 12/21/2023    Procedure: COLONOSCOPY;  Surgeon: Teo Johnson MD;  Location: [facility] ENDO;  Service: Endoscopy;  Laterality:  N/A;    CORRECTION OF HAMMER TOE Left 8/14/2023    Procedure: CORRECTION, HAMMER TOE--  basic foot tray as well as a Reese microfree/microchoice tray. I also asked Sherry to bring an MIS mati.;  Surgeon: Ankush Back DPM;  Location: [facility] OR;  Service: Podiatry;  Laterality: Left;  reese drill, sagittal saw, foot set    CYSTOSCOPY  7/5/2023    Procedure: CYSTOSCOPY;  Surgeon: Chuckie Hall MD;  Location: [facility] FLR;  Service: Urology;;    CYSTOSCOPY N/A 9/26/2024    Procedure: CYSTOSCOPY;  Surgeon: Jay Walter MD;  Location: [facility] OR;  Service: Urology;  Laterality: N/A;  30 minutes    EPIDURAL STEROID INJECTION N/A 02/10/2021    Procedure: CERVICAL C6/7 NELI DIRECT REFERRAL;  Surgeon: Michi Escamilla MD;  Location: [facility] PAIN MGT;  Service: Pain Management;  Laterality: N/A;  NEEDS CONSENT    EPIDURAL STEROID INJECTION INTO CERVICAL SPINE N/A 6/28/2023    Procedure: Injection-steroid-epidural-cervical C7-T1;  Surgeon: Lorraine Patel DO;  Location: [facility] PAIN MANAGEMENT;  Service: Pain Management;  Laterality: N/A;  diabetic    EPIDURAL STEROID INJECTION INTO CERVICAL SPINE N/A 11/3/2023    Procedure: cervical NELI C7-T1;  Surgeon: Kolton Terrell MD;  Location: [facility] PAIN MANAGEMENT;  Service: Pain Management;  Laterality: N/A;  diabetic    EPIDURAL STEROID INJECTION INTO CERVICAL SPINE N/A 4/17/2024    Procedure: VENECIA C7/T1;  Surgeon: Lorraine Patel DO;  Location: [facility] PAIN MANAGEMENT;  Service: Pain Management;  Laterality: N/A;  20mins- Ozempic 7d- no ac    EPIDURAL STEROID INJECTION INTO LUMBAR SPINE N/A 9/27/2023    Procedure: L4-5 NELI;  Surgeon: Tirso Pickering MD;  Location: [facility] PAIN MANAGEMENT;  Service: Pain Management;  Laterality: N/A;  15 mins    EPIDURAL STEROID INJECTION INTO LUMBAR SPINE N/A 3/6/2024    Procedure: L4-5 IL NELI;  Surgeon: Lorraine Patel DO;  Location: [facility] PAIN MANAGEMENT;  Service: Pain Management;  Laterality: N/A;  20 mins    EPIDURAL STEROID  INJECTION INTO LUMBAR SPINE N/A 6/3/2024    Procedure: NELI L4-5;  Surgeon: Lorraine Patel DO;  Location: [facility] PAIN MANAGEMENT;  Service: Pain Management;  Laterality: N/A;  20mins-no ac Ozempic 7d    EPIDURAL STEROID INJECTION INTO LUMBAR SPINE N/A 8/26/2024    Procedure: L4-5 NELI Veer Left;  Surgeon: Lorraine Patel DO;  Location: [facility] PAIN MANAGEMENT;  Service: Pain Management;  Laterality: N/A;  20 mins No AC - Ozempic 7 days    EPIDURAL STEROID INJECTION INTO LUMBAR SPINE N/A 12/2/2024    Procedure: NELI L5-S1;  Surgeon: Lorraine Patel DO;  Location: [facility] PAIN MANAGEMENT;  Service: Pain Management;  Laterality: N/A;  no ac    ESOPHAGOGASTRODUODENOSCOPY  01/22/2019    LA grade B esophagitis; esophageal stenosis- dilated; gastritis; H pylori pathology negative    ESOPHAGOGASTRODUODENOSCOPY N/A 05/18/2021    Procedure: EGD (ESOPHAGOGASTRODUODENOSCOPY);  Surgeon: Mariana Hector MD;  Location: [facility];  Service: Endoscopy;  Laterality: N/A;    ESOPHAGOGASTRODUODENOSCOPY N/A 12/21/2023    Procedure: EGD (ESOPHAGOGASTRODUODENOSCOPY);  Surgeon: Teo Johnson MD;  Location: [facility];  Service: Endoscopy;  Laterality: N/A;    ESOPHAGOGASTRODUODENOSCOPY N/A 2/22/2024    Procedure: EGD (ESOPHAGOGASTRODUODENOSCOPY);  Surgeon: Teo Johnson MD;  Location: [facility];  Service: Endoscopy;  Laterality: N/A;    EXCISION OF GANGLION OF WRIST Right 09/15/2022    Procedure: EXCISION, GANGLION CYST, WRIST;  Surgeon: Christen Marshall MD;  Location: [facility];  Service: Orthopedics;  Laterality: Right;    FLEXOR TENDON REPAIR Right 09/15/2022    Procedure: FLEXOR TENOSYNOVECTOMY;  Surgeon: Christen Marshall MD;  Location: [facility];  Service: Orthopedics;  Laterality: Right;    HAND ARTHROTOMY Right 09/15/2022    Procedure: ARTHROTOMY, HAND RADIOCARPAL;  Surgeon: Christen Marshall MD;  Location: [facility];  Service: Orthopedics;  Laterality: Right;  Radiocarpal    INCISION, CONTRACTURE, BLADDER NECK, TRANSURETHRAL N/A  11/8/2024    Procedure: INCISION, CONTRACTURE, BLADDER NECK, TRANSURETHRAL;  Surgeon: Jay Walter MD;  Location: [facility] OR;  Service: Urology;  Laterality: N/A;  1 hour    INJECTION OF JOINT Right 11/29/2023    Procedure: right hip IA injection and Right GTB injection;  Surgeon: Lorraine Patel DO;  Location: [facility] PAIN MANAGEMENT;  Service: Pain Management;  Laterality: Right;    INJECTION OF STEROID Left 09/15/2022    Procedure: INJECTION, STEROID LEFT WRIST AND LEFT LATERAL ELBOW;  Surgeon: Christen Marshall MD;  Location: [facility] OR;  Service: Orthopedics;  Laterality: Left;  Left Carpal Tunnel CSI    LASER LITHOTRIPSY Left 7/5/2023    Procedure: LITHOTRIPSY, USING LASER;  Surgeon: Chuckie Hall MD;  Location: [facility] 1ST FLR;  Service: Urology;  Laterality: Left;    LUMBAR LAMINECTOMY  2010    LUMBAR LAMINECTOMY WITH DISCECTOMY Left 09/20/2021    Procedure: LAMINECTOMY, SPINE, LUMBAR, WITH DISCECTOMY;  Surgeon: Naseem Mcnamara DO;  Location: [facility] 2ND FLR;  Service: Neurosurgery;  Laterality: Left;  MIS L3-4    NASAL SEPTOPLASTY N/A 7/15/2024    Procedure: SEPTOPLASTY, NOSE;  Surgeon: Melody Liu MD;  Location: [facility] OR;  Service: ENT;  Laterality: N/A;    PH MONITORING, ESOPHAGUS, WIRELESS, (OFF REFLUX MEDS) N/A 12/21/2023    Procedure: PH MONITORING, ESOPHAGUS, WIRELESS, (OFF REFLUX MEDS);  Surgeon: Teo Johnson MD;  Location: [facility];  Service: Endoscopy;  Laterality: N/A;    RECONSTRUCTION OF LIGAMENT Left 12/5/2023    Procedure: RECONSTRUCTION, LIGAMENT LATERAL COLLATERAL;  Surgeon: Christen Marshall MD;  Location: [facility] OR;  Service: Orthopedics;  Laterality: Left;    REPAIR OF EXTENSOR TENDON Left 12/5/2023    Procedure: REPAIR, TENDON, EXTENSOR;  Surgeon: Christen Marshall MD;  Location: [facility] OR;  Service: Orthopedics;  Laterality: Left;    ROBOT-ASSISTED REPAIR OF HIATAL HERNIA N/A 3/14/2024    Procedure: ROBOTIC REPAIR, HERNIA, HIATAL;  Surgeon: Cody Winn MD;  Location:  [facility] OR;  Service: General;  Laterality: N/A;    TYMPANOSTOMY TUBE PLACEMENT      URETERAL STENT PLACEMENT Left 2023    Procedure: INSERTION, STENT, URETER;  Surgeon: Chuckie Hall MD;  Location: [facility] OR [address] [place] [address];  Service: Urology;  Laterality: Left;    URETEROSCOPIC REMOVAL OF URETERIC CALCULUS Left 2023    Procedure: REMOVAL, CALCULUS, URETER, URETEROSCOPIC;  Surgeon: Chuckie Hall MD;  Location: [facility] OR [address] [place] [address];  Service: Urology;  Laterality: Left;    URETEROSCOPY Left 2023    Procedure: URETEROSCOPY;  Surgeon: Chuckie Hall MD;  Location: [facility] OR [address] [place] [address];  Service: Urology;  Laterality: Left;     Family History   Problem Relation Name Age of Onset    Allergic rhinitis Mother      Hypertension Mother      Transient ischemic attack Mother      Sleep apnea Mother      Allergic rhinitis Father      Asthma Father      Chronic back pain Father      MARTITA disease Father      Sleep apnea Father      Melanoma Father      Allergic rhinitis Sister      Asthma Sister      Allergic rhinitis Sister      Asthma Sister      No Known Problems Brother      No Known Problems Maternal Aunt      No Known Problems Maternal Uncle      No Known Problems Paternal Aunt      No Known Problems Paternal Uncle      Brain cancer Maternal Grandmother      Diabetes Maternal Grandfather      Breast cancer Paternal Grandmother      No Known Problems Paternal Grandfather      Amblyopia Neg Hx      Blindness Neg Hx      Cancer Neg Hx      Cataracts Neg Hx      Glaucoma Neg Hx      Macular degeneration Neg Hx      Retinal detachment Neg Hx      Strabismus Neg Hx      Stroke Neg Hx      Thyroid disease Neg Hx      Allergies Neg Hx      Angioedema Neg Hx       Social History     Socioeconomic History    Marital status:    Tobacco Use    Smoking status: Former     Current packs/day: 0.00     Types: Cigarettes     Quit date: 2018     Years since quittin.9     Passive exposure: Never    Smokeless tobacco: Never    Substance and Sexual Activity    Alcohol use: Yes     Alcohol/week: 3.0 standard drinks of alcohol     Types: 3 Drinks containing 0.5 oz of alcohol per week    Drug use: No    Sexual activity: Yes     Partners: Male     Social Drivers of Health     Financial Resource Strain: Low Risk  (1/23/2025)    Overall Financial Resource Strain (CARDIA)     Difficulty of Paying Living Expenses: Not hard at all   Food Insecurity: No Food Insecurity (1/23/2025)    Hunger Vital Sign     Worried About Running Out of Food in the Last Year: Never true     Ran Out of Food in the Last Year: Never true   Transportation Needs: No Transportation Needs (11/7/2023)    PRAPARE - Transportation     Lack of Transportation (Medical): No     Lack of Transportation (Non-Medical): No   Physical Activity: Sufficiently Active (1/23/2025)    Exercise Vital Sign     Days of Exercise per Week: 4 days     Minutes of Exercise per Session: 40 min   Stress: Stress Concern Present (1/23/2025)    [employer] - Occupational Stress Questionnaire     Feeling of Stress : Very much   Housing Stability: Low Risk  (11/7/2023)    Housing Stability Vital Sign     Unable to Pay for Housing in the Last Year: No     Number of Places Lived in the Last Year: 1     Unstable Housing in the Last Year: No       Current Medications[1]  Review of patient's allergies indicates:  No Known Allergies    Objective:   Vital Signs (Most Recent):  There were no vitals filed for this visit.  There is no height or weight on file to calculate BMI.    Physical Exam:  Constitutional: The patient appears well-developed and well-nourished. No distress.   Skin: No lesions appreciated  Head: Normocephalic and atraumatic.   Nose: Nose normal.   Ears: No deformities seen  Eyes: Conjunctivae and EOM are normal.   Neck: No tracheal deviation present.   Cardiovascular: Normal rate and intact distal pulses.    Pulmonary/Chest: Effort normal. No respiratory distress.    Abdominal: There is no guarding.   Neurological: The patient is alert.   Psychiatric: The patient has a normal mood and affect.     HAND/WRIST EXAMINATION:    Observation/Inspection:  Swelling  right small finger    Deformity  none  Discoloration  none     Scars   R carpal tunnel incision, L elbow incision well healed. Abrasion to left dorsal hand.    Atrophy  none    Palpation:  Tenderness to palpation of the right small middle phalanx and PIPJ. Unable to make fist with right hand.   Tenderness to palpation of the right elbow and distal triceps tendon.    Range of Motion:  Small finger ROM exam limited 2/2 fracture.  Otherwise ROM hand, wrist, elbow full and painless     Neurovascular Exam:  Digits WWP, brisk CR < 3s throughout  NVI motor/LTS to M/R/U nerves, radial pulse 2+    Diagnostics Review:   Imaging: I independently viewed the patient's imaging as well as the radiology report.    My personal interpretation of X-rays AP, lateral, oblique right small finger taken today demonstrate oblique intra-articular fracture of proximal aspect of 5th middle phalanx.   My personal interpretation of X-rays AP, lateral, oblique right elbow taken today demonstrate well preserved joint spaces with no fracture or dislocation.    Assessment:   44 y.o. yo male with   Encounter Diagnoses   Name Primary?    Right hand pain Yes    Right elbow pain     Closed displaced fracture of middle phalanx of right little finger, initial encounter       Plan:   Plan: The patient and I had a thorough discussion today. We discussed the working diagnosis as well as several other potential alternative diagnoses. Treatment options were discussed, both conservative and surgical. Conservative treatment options would include things such as activity modifications, workplace modifications, a period of rest, oral vs topical OTC and prescription anti-inflammatory medications, occupational therapy, splinting/bracing, immobilization, corticosteroid  injections, and others. Surgical options were discussed as well.     At this time, the patient has exhausted conservative measures and would like to proceed with surgery. Surgery would include open reduction internal fixation of right small middle phalanx fracture. Consent signed today in clinic. Discussed pre-op instructions and post-op instructions/care with patient. Light use of the hand will be indicated for the first 4-6 weeks.     We have discussed risks, benefits and alternatives of hand surgery which include but are not limited to blood clots in the legs that can travel to the lungs (pulmonary embolism). Pulmonary embolism can cause shortness of breath, chest pain, and even shock. Other risks include urinary tract infection, nausea and vomiting (usually related to pain medication), chronic pain, bleeding, nerve damage, blood vessel injury, scarring and infection of the hand which can require re-operation. Furthermore, the risks of anesthesia include potential heart, lung, kidney, and liver damage. Informed consent was obtained. Dr. Marshall met patient and answered any questions. he understands and would like to proceed with surgery in the near future.    Should the patient's symptoms worsen, persist, or fail to improve they should return for reevaluation.     Lori Crawford PA-C  [facility]     This note was generated with the assistance of ambient listening technology. Verbal consent was obtained by the patient and accompanying visitor(s) for the recording of patient appointment to facilitate this note. I attest to having reviewed and edited the generated note for accuracy, though some syntax or spelling errors may persist. Please contact the author of this note for any clarification.       [1]   Current Outpatient Medications   Medication Sig Dispense Refill    acetaminophen (TYLENOL) 650 MG TbSR Take 1,300 mg by mouth daily as needed.      albuterol (PROVENTIL) 2.5 mg /3 mL (0.083 %)  nebulizer solution Take 3 mLs (2.5 mg total) by nebulization every 6 (six) hours as needed for Wheezing or Shortness of Breath. Rescue 75 mL 3    albuterol (PROVENTIL/VENTOLIN HFA) 90 mcg/actuation inhaler INHALE 2 PUFFS INTO THE LUNGS EVERY 6 HOURS AS NEEDED WHEEZING 25.5 g 3    albuterol (VENTOLIN HFA) 90 mcg/actuation inhaler Inhale 2 puffs into the lungs every 6 (six) hours as needed for Wheezing. Rescue 8.5 g 0    atorvastatin (LIPITOR) 20 MG tablet Take 1 tablet (20 mg total) by mouth once daily. 90 tablet 3    blood sugar diagnostic (ONETOUCH VERIO TEST STRIPS) Strp Use to test blood glucose one (1) time a day, 100 strip 3    blood-glucose meter Misc use as directed 1 each 0    buPROPion (WELLBUTRIN XL) 300 MG 24 hr tablet Take 1 tablet (300 mg total) by mouth once daily. 90 tablet 1    clindamycin (CLEOCIN T) 1 % external solution Apply to the affected area on buttock, under abdomen , left ear, right jaw 1-2 times a day as needed for flare 60 mL 3    clobetasoL (TEMOVATE) 0.05 % cream Apply to affected area 2 (two) times daily as needed for flare 30 g 1    dexmethylphenidate (FOCALIN) 10 MG tablet Take 1 tablet (10 mg total) by mouth 2 (two) times daily. 60 tablet 0    docusate sodium (COLACE) 100 MG capsule Take 1 capsule (100 mg total) by mouth 2 (two) times daily. 30 capsule 1    ergocalciferol, vitamin D2, (VITAMIN D ORAL) Take 1 tablet by mouth every other day.      fluocinolone acetonide oiL (DERMOTIC OIL) 0.01 % Drop Place 3 drops in ear(s) 2 (two) times daily. 20 mL 3    fluocinonide (LIDEX) 0.05 % external solution apply to affected area of scalp daily as needed for itching, scaling 60 mL 3    fluorouraciL (EFUDEX) 5 % cream Apply topically to the affected area once daily. 40 g 2    fluticasone-salmeterol diskus inhaler 100-50 mcg Inhale 1 puff into the lungs 2 (two) times daily. Controller 60 each 11    HYDROcodone-acetaminophen (NORCO) 5-325 mg per tablet Take 1 tablet by mouth every 6 (six) hours  as needed for Pain. 30 tablet 0    hydrocortisone 2.5 % cream APPLY EXTERNALLY TO THE AFFECTED AREA TWICE DAILY FOR 10 DAYS 30 g 0    inulin (FIBER GUMMIES) 2 gram Chew Take by mouth once daily. 2 gummies a day      ketoconazole (NIZORAL) 2 % cream Apply 1 application  topically 2 (two) times daily. Apply to affected area up to 2 weeks 30 g 2    lancets (ONETOUCH DELICA PLUS LANCET) 33 gauge Misc Use to test blood glucose one (1) time a day, 100 each 5    latanoprost 0.005 % ophthalmic solution Place 1 drop into both eyes every evening. 7.5 mL 3    levocetirizine (XYZAL) 5 MG tablet Take 1 tablet (5 mg total) by mouth every evening. 30 tablet 11    loperamide (IMODIUM) 2 mg capsule Take 1 capsule (2 mg total) by mouth 4 (four) times daily as needed for Diarrhea. 20 capsule 0    [START ON 3/28/2025] LORazepam (ATIVAN) 0.5 MG tablet Take 1 tablet (0.5 mg total) by mouth daily as needed (SIGNIFICANT ANXIETY). 30 tablet 0    ondansetron (ZOFRAN) 8 MG tablet Take 1 tablet (8 mg total) by mouth every 8 (eight) hours as needed for Nausea. 30 tablet 0    ondansetron (ZOFRAN-ODT) 8 MG TbDL Dissolve 1 tablet (8 mg total) on the tongue every 8 (eight) hours as needed (nausea). 30 tablet 3    predniSONE (DELTASONE) 10 MG tablet Take 4 tablets by mouth on day 1, then 3 tablets on day 2, then 2 tablets on day 3, then 1 tablet on  day 4 (Patient not taking: Reported on 3/1/2025) 10 tablet 0    pregabalin (LYRICA) 200 MG Cap Take 1 capsule (200 mg total) by mouth 2 (two) times daily. 60 capsule 1    rOPINIRole (REQUIP) 0.25 MG tablet TAKE 1 TABLET(0.25 MG) BY MOUTH EVERY EVENING 90 tablet 2    tamsulosin (FLOMAX) 0.4 mg Cap Take 1 capsule (0.4 mg total) by mouth once daily. 30 capsule 11    tamsulosin (FLOMAX) 0.4 mg Cap Take 2 capsules (0.8 mg total) by mouth once daily. 60 capsule 11    tirzepatide (MOUNJARO) 12.5 mg/0.5 mL PnMarcelo Inject 12.5 mg into the skin every 7 days. 2 mL 5    ULTRA THIN LANCETS 30 gauge Misc USE TO TEST  BLOOD SUGAR EVERY DAY AS DIRECTED 100 each 3    urea (CARMOL) 40 % Crea Apply topically once daily. 225 g 2     Current Facility-Administered Medications   Medication Dose Route Frequency Provider Last Rate Last Admin    oxymetazoline 0.05 % nasal spray 1 spray  1 spray Each Nostril Q4H PRN Melody Liu MD         Facility-Administered Medications Ordered in Other Visits   Medication Dose Route Frequency Provider Last Rate Last Admin    0.9%  NaCl infusion  500 mL Intravenous Continuous Kolton Terrell MD

## 2025-03-12 ENCOUNTER — ANESTHESIA EVENT (OUTPATIENT)
Dept: SURGERY | Facility: HOSPITAL | Age: 45
End: 2025-03-12
Payer: COMMERCIAL

## 2025-03-12 ENCOUNTER — TELEPHONE (OUTPATIENT)
Dept: ORTHOPEDICS | Facility: CLINIC | Age: 45
End: 2025-03-12
Payer: COMMERCIAL

## 2025-03-12 DIAGNOSIS — S62.626A CLOSED DISPLACED FRACTURE OF MIDDLE PHALANX OF RIGHT LITTLE FINGER, INITIAL ENCOUNTER: Primary | ICD-10-CM

## 2025-03-12 RX ORDER — DOCUSATE SODIUM 100 MG/1
100 CAPSULE, LIQUID FILLED ORAL 2 TIMES DAILY
Qty: 30 CAPSULE | Refills: 1 | Status: SHIPPED | OUTPATIENT
Start: 2025-03-12

## 2025-03-12 RX ORDER — ONDANSETRON HYDROCHLORIDE 8 MG/1
8 TABLET, FILM COATED ORAL EVERY 8 HOURS PRN
Qty: 30 TABLET | Refills: 0 | Status: SHIPPED | OUTPATIENT
Start: 2025-03-12

## 2025-03-12 RX ORDER — HYDROCODONE BITARTRATE AND ACETAMINOPHEN 5; 325 MG/1; MG/1
1 TABLET ORAL EVERY 6 HOURS PRN
Qty: 30 TABLET | Refills: 0 | Status: SHIPPED | OUTPATIENT
Start: 2025-03-12

## 2025-03-12 NOTE — ANESTHESIA PREPROCEDURE EVALUATION
03/12/2025    Hermelindo Garza III is a 44 y.o. male  who presents  for Procedure(s):  ORIF, SMALL FINGER        Review of patient's allergies indicates:  No Known Allergies    Wt Readings from Last 1 Encounters:   03/01/25 1243 104.3 kg (230 lb)       BP Readings from Last 3 Encounters:   03/01/25 122/83   02/03/25 138/82   01/18/25 109/72       Problem List[1]    Past Surgical History:   Procedure Laterality Date    ANTERIOR CERVICAL DISCECTOMY W/ FUSION N/A 9/12/2024    Procedure: DISCECTOMY, SPINE, CERVICAL, ANTERIOR APPROACH, WITH FUSION C5-6, C6-7;  Surgeon: Naseem Mcnamara DO;  [facility];  Service: Neurosurgery;  Laterality: N/A;    APPENDECTOMY  2006    CARPAL TUNNEL RELEASE Right 09/15/2022    Procedure: RELEASE, CARPAL TUNNEL;  Surgeon: Christen Marshall MD;  Location: [facility] OR;  Service: Orthopedics;  Laterality: Right;    CAUTERY OF TURBINATES Bilateral 7/15/2024    Procedure: CAUTERIZATION, MUCOSA, NASAL TURBINATE;  Surgeon: Melody Liu MD;  Location: [facility] OR;  Service: ENT;  Laterality: Bilateral;    COLONOSCOPY  01/22/2019    internal hemorrhoids, o/w normal - repeat at age 52    COLONOSCOPY N/A 12/21/2023    Procedure: COLONOSCOPY;  Surgeon: Teo Johnson MD;  Location: [facility] ENDO;  Service: Endoscopy;  Laterality: N/A;    CORRECTION OF HAMMER TOE Left 8/14/2023    Procedure: CORRECTION, HAMMER TOE--  basic foot tray as well as a Reese microfree/microchoice tray. I also asked Sherry to bring an MIS mati.;  Surgeon: Ankush Back DPM;  Location: [facility] OR;  Service: Podiatry;  Laterality: Left;  reese drill, sagittal saw, foot set    CYSTOSCOPY  7/5/2023    Procedure: CYSTOSCOPY;  Surgeon: Chuckie Hall MD;  Location: [facility] OR 1ST FLR;  Service: Urology;;    CYSTOSCOPY N/A 9/26/2024    Procedure: CYSTOSCOPY;  Surgeon: Jay Walter MD;  Location: [facility] OR;  Service: Urology;  Laterality: N/A;  30 minutes    EPIDURAL STEROID INJECTION N/A 02/10/2021    Procedure:  CERVICAL C6/7 NELI DIRECT REFERRAL;  Surgeon: Michi Escamilla MD;  Location: [facility] PAIN MGT;  Service: Pain Management;  Laterality: N/A;  NEEDS CONSENT    EPIDURAL STEROID INJECTION INTO CERVICAL SPINE N/A 6/28/2023    Procedure: Injection-steroid-epidural-cervical C7-T1;  Surgeon: Lorraine Patel DO;  Location: [facility]IN [facility]GEMENT;  Service: Pain Management;  Laterality: N/A;  diabetic    EPIDURAL STEROID INJECTION INTO CERVICAL SPINE N/A 11/3/2023    Procedure: cervical NELI C7-T1;  Surgeon: Kolton Terrell MD;  Location: [facility] PAIN MANAGEMENT;  Service: Pain Management;  Laterality: N/A;  diabetic    EPIDURAL STEROID INJECTION INTO CERVICAL SPINE N/A 4/17/2024    Procedure: VENECIA C7/T1;  Surgeon: Lorraine Patel DO;  Location: [facility] PAIN MANAGEMENT;  Service: Pain Management;  Laterality: N/A;  20mins- Ozempic 7d- no ac    EPIDURAL STEROID INJECTION INTO LUMBAR SPINE N/A 9/27/2023    Procedure: L4-5 NELI;  Surgeon: Tirso Pickering MD;  Location: [facility] PAIN MANAGEMENT;  Service: Pain Management;  Laterality: N/A;  15 mins    EPIDURAL STEROID INJECTION INTO LUMBAR SPINE N/A 3/6/2024    Procedure: L4-5 IL NELI;  Surgeon: Lorraine Patel DO;  Location: [facility] PAIN MANAGEMENT;  Service: Pain Management;  Laterality: N/A;  20 mins    EPIDURAL STEROID INJECTION INTO LUMBAR SPINE N/A 6/3/2024    Procedure: NELI L4-5;  Surgeon: Lorraine Patel DO;  Location: [facility] PAIN MANAGEMENT;  Service: Pain Management;  Laterality: N/A;  20mins-no ac Ozempic 7d    EPIDURAL STEROID INJECTION INTO LUMBAR SPINE N/A 8/26/2024    Procedure: L4-5 NELI Veer Left;  Surgeon: Lorraine Patel DO;  Location: [facility] PAIN MANAGEMENT;  Service: Pain Management;  Laterality: N/A;  20 mins No AC - Ozempic 7 days    EPIDURAL STEROID INJECTION INTO LUMBAR SPINE N/A 12/2/2024    Procedure: NELI L5-S1;  Surgeon: Lorraine Patel DO;  Location: [facility] PAIN MANAGEMENT;  Service: Pain Management;  Laterality: N/A;  no ac     ESOPHAGOGASTRODUODENOSCOPY  01/22/2019    LA grade B esophagitis; esophageal stenosis- dilated; gastritis; H pylori pathology negative    ESOPHAGOGASTRODUODENOSCOPY N/A 05/18/2021    Procedure: EGD (ESOPHAGOGASTRODUODENOSCOPY);  Surgeon: Mariana Hector MD;  Location: [facility] ENDO;  Service: Endoscopy;  Laterality: N/A;    ESOPHAGOGASTRODUODENOSCOPY N/A 12/21/2023    Procedure: EGD (ESOPHAGOGASTRODUODENOSCOPY);  Surgeon: Teo Johnson MD;  Location: [facility];  Service: Endoscopy;  Laterality: N/A;    ESOPHAGOGASTRODUODENOSCOPY N/A 2/22/2024    Procedure: EGD (ESOPHAGOGASTRODUODENOSCOPY);  Surgeon: Teo Johnson MD;  Location: [facility];  Service: Endoscopy;  Laterality: N/A;    EXCISION OF GANGLION OF WRIST Right 09/15/2022    Procedure: EXCISION, GANGLION CYST, WRIST;  Surgeon: Christen Marshall MD;  Location: [facility];  Service: Orthopedics;  Laterality: Right;    FLEXOR TENDON REPAIR Right 09/15/2022    Procedure: FLEXOR TENOSYNOVECTOMY;  Surgeon: Christen Marshall MD;  Location: [facility] OR;  Service: Orthopedics;  Laterality: Right;    HAND ARTHROTOMY Right 09/15/2022    Procedure: ARTHROTOMY, HAND RADIOCARPAL;  Surgeon: Christen Marshall MD;  Location: [facility] OR;  Service: Orthopedics;  Laterality: Right;  Radiocarpal    INCISION, CONTRACTURE, BLADDER NECK, TRANSURETHRAL N/A 11/8/2024    Procedure: INCISION, CONTRACTURE, BLADDER NECK, TRANSURETHRAL;  Surgeon: Jay Walter MD;  Location: [facility] OR;  Service: Urology;  Laterality: N/A;  1 hour    INJECTION OF JOINT Right 11/29/2023    Procedure: right hip IA injection and Right GTB injection;  Surgeon: Lorraine Patel DO;  Location: [facility] PAIN MANAGEMENT;  Service: Pain Management;  Laterality: Right;    INJECTION OF STEROID Left 09/15/2022    Procedure: INJECTION, STEROID LEFT WRIST AND LEFT LATERAL ELBOW;  Surgeon: Christen Marshall MD;  Location: [facility] OR;  Service: Orthopedics;  Laterality: Left;  Left Carpal Tunnel CSI    LASER LITHOTRIPSY Left 7/5/2023     Procedure: LITHOTRIPSY, USING LASER;  Surgeon: Chuckie Hall MD;  Location: [facility] OR 1ST FLR;  Service: Urology;  Laterality: Left;    LUMBAR LAMINECTOMY  2010    LUMBAR LAMINECTOMY WITH DISCECTOMY Left 09/20/2021    Procedure: LAMINECTOMY, SPINE, LUMBAR, WITH DISCECTOMY;  Surgeon: Naseem Mcnamara DO;  Location: [facility] OR 2ND FLR;  Service: Neurosurgery;  Laterality: Left;  MIS L3-4    NASAL SEPTOPLASTY N/A 7/15/2024    Procedure: SEPTOPLASTY, NOSE;  Surgeon: Melody Liu MD;  Location: [facility] OR;  Service: ENT;  Laterality: N/A;    PH MONITORING, ESOPHAGUS, WIRELESS, (OFF REFLUX MEDS) N/A 12/21/2023    Procedure: PH MONITORING, ESOPHAGUS, WIRELESS, (OFF REFLUX MEDS);  Surgeon: Teo Johnson MD;  Location: [facility] ENDO;  Service: Endoscopy;  Laterality: N/A;    RECONSTRUCTION OF LIGAMENT Left 12/5/2023    Procedure: RECONSTRUCTION, LIGAMENT LATERAL COLLATERAL;  Surgeon: Christen Marshall MD;  Location: [facility] OR;  Service: Orthopedics;  Laterality: Left;    REPAIR OF EXTENSOR TENDON Left 12/5/2023    Procedure: REPAIR, TENDON, EXTENSOR;  Surgeon: Christen Marshall MD;  Location: [facility] OR;  Service: Orthopedics;  Laterality: Left;    ROBOT-ASSISTED REPAIR OF HIATAL HERNIA N/A 3/14/2024    Procedure: ROBOTIC REPAIR, HERNIA, HIATAL;  Surgeon: Cody Winn MD;  Location: [facility] OR;  Service: General;  Laterality: N/A;    TYMPANOSTOMY TUBE PLACEMENT      URETERAL STENT PLACEMENT Left 7/5/2023    Procedure: INSERTION, STENT, URETER;  Surgeon: Chuckie Hall MD;  Location: [facility] OR 1ST FLR;  Service: Urology;  Laterality: Left;    URETEROSCOPIC REMOVAL OF URETERIC CALCULUS Left 7/5/2023    Procedure: REMOVAL, CALCULUS, URETER, URETEROSCOPIC;  Surgeon: Chuckie Hall MD;  Location: [facility] OR 1ST FLR;  Service: Urology;  Laterality: Left;    URETEROSCOPY Left 7/5/2023    Procedure: URETEROSCOPY;  Surgeon: Chuckie Hall MD;  Location: [facility] OR 1ST FLR;  Service: Urology;  Laterality: Left;       Social  "History[2]      Chemistry        Component Value Date/Time     10/24/2024 0712    K 4.2 10/24/2024 0712     10/24/2024 0712    CO2 27 10/24/2024 0712    BUN 17 10/24/2024 0712    CREATININE 0.8 10/24/2024 0712    GLU 84 10/24/2024 0712        Component Value Date/Time    CALCIUM 9.6 10/24/2024 0712    ALKPHOS 101 10/24/2024 0712    AST 19 10/24/2024 0712    ALT 22 10/24/2024 0712    BILITOT 0.6 10/24/2024 0712    ESTGFRAFRICA >60.0 04/05/2022 0720    EGFRNONAA >60.0 04/05/2022 0720            Lab Results   Component Value Date    WBC 8.10 09/15/2024    HGB 14.1 09/15/2024    HCT 41.5 09/15/2024     09/15/2024    CHOL 147 10/24/2024    TRIG 203 (H) 10/24/2024    HDL 36 (L) 10/24/2024    ALT 22 10/24/2024    AST 19 10/24/2024     10/24/2024    K 4.2 10/24/2024     10/24/2024    CREATININE 0.8 10/24/2024    BUN 17 10/24/2024    CO2 27 10/24/2024    TSH 1.004 04/09/2024    PSA 0.18 10/05/2022    INR 1.0 08/30/2024    HGBA1C 5.2 10/24/2024       No results for input(s): "PT", "INR", "PROTIME", "APTT" in the last 72 hours.    No results found. However, due to the size of the patient record, not all encounters were searched. Please check Results Review for a complete set of results.       Pre-op Assessment    I have reviewed the Patient Summary Reports.     I have reviewed the Nursing Notes. I have reviewed the NPO Status.   I have reviewed the Medications.   Prednisone    Review of Systems  Anesthesia Hx:  No problems with previous Anesthesia   History of prior surgery of interest to airway management or planning: cervical fusion. Previous anesthesia: General, MAC, Nerve Block 11/8/24 bladder contracture incision with general anesthesia.  Procedure performed at an [facility].     for 2/5/23 ligament reconstruction.  Procedure performed at an [facility].  9/26/24 cystocopy with MAC.  Procedure performed at an [facility]. Airway issues documented on chart review include mask, " difficult, GETA, videolaryngoscope used  , view on video-laryngoscopy Grade I       Social:  Alcohol Use, Former Smoker Quit smoking 2018      Cardiovascular:                hyperlipidemia                               Pulmonary:    Asthma    Sleep Apnea, CPAP                Renal/:   renal calculi               Hepatic/GI:   PUD,  GERD Liver Disease, Hepatitis SHEIKH Taking GLP-1 Agonists Instructed to Hold for 7 Days           Musculoskeletal:  Arthritis   Closed nondisplaced fracture of middle phalanx of right little finger       Spine Disorders: cervical and lumbar Disc disease and Degenerative disease           Neurological:    Neuromuscular Disease,                                   Endocrine:  Diabetes, type 2         Obesity / BMI > 30  Psych:   anxiety depression                Physical Exam  General: Well nourished, Cooperative, Alert and Oriented    Airway:  Mallampati: III / II  Mouth Opening: Normal  TM Distance: Normal  Tongue: Normal  Neck ROM: Normal ROM    Dental:  Intact, Periodontal disease      Past Medical History:   Diagnosis Date    ADD (attention deficit disorder) 05/09/2012    Allergy     Anxiety 04/12/2023    Asthma 05/09/2012    Cervical disc disorder with radiculopathy, unspecified cervical region 09/14/2021    Cervical radiculopathy 05/22/2023    Cervical spondylosis with myelopathy 06/29/2022    Eczema     Esophageal ulcer     GERD (gastroesophageal reflux disease) 05/09/2012    Glaucoma     Kidney stones 05/2014    Lumbar disc disease 05/09/2012    Lumbar herniated disc 05/09/2012    Malignant melanoma of skin, unspecified 01/06/2022    in situ right mid back    Melanoma 01/2022    in situ R mid back     LUBA (obstructive sleep apnea)     Radiculopathy, lumbar region 09/14/2021    Renal calculi 05/09/2012    Type 2 diabetes mellitus without complication, without long-term current use of insulin 11/16/2020     Past Surgical History:   Procedure Laterality Date    ANTERIOR CERVICAL  DISCECTOMY W/ FUSION N/A 9/12/2024    Procedure: DISCECTOMY, SPINE, CERVICAL, ANTERIOR APPROACH, WITH FUSION C5-6, C6-7;  Surgeon: Naseem Mcnamara DO;  Location: [facility] OR 2ND FLR;  Service: Neurosurgery;  Laterality: N/A;    APPENDECTOMY  2006    CARPAL TUNNEL RELEASE Right 09/15/2022    Procedure: RELEASE, CARPAL TUNNEL;  Surgeon: Christen Marshall MD;  Location: [facility] OR;  Service: Orthopedics;  Laterality: Right;    CAUTERY OF TURBINATES Bilateral 7/15/2024    Procedure: CAUTERIZATION, MUCOSA, NASAL TURBINATE;  Surgeon: Melody Liu MD;  Location: [facility] OR;  Service: ENT;  Laterality: Bilateral;    COLONOSCOPY  01/22/2019    internal hemorrhoids, o/w normal - repeat at age 52    COLONOSCOPY N/A 12/21/2023    Procedure: COLONOSCOPY;  Surgeon: Teo Johnson MD;  Location: [facility] ENDO;  Service: Endoscopy;  Laterality: N/A;    CORRECTION OF HAMMER TOE Left 8/14/2023    Procedure: CORRECTION, HAMMER TOE--  basic foot tray as well as a Reese microfree/microchoice tray. I also asked [place] to bring an MIS mati.;  Surgeon: Ankush Back DPM;  Location: [facility] OR;  Service: Podiatry;  Laterality: Left;  reese drill, sagittal saw, foot set    CYSTOSCOPY  7/5/2023    Procedure: CYSTOSCOPY;  Surgeon: Chuckie Hall MD;  Location: [facility] OR 1ST FLR;  Service: Urology;;    CYSTOSCOPY N/A 9/26/2024    Procedure: CYSTOSCOPY;  Surgeon: Jay Walter MD;  Location: [facility] OR;  Service: Urology;  Laterality: N/A;  30 minutes    EPIDURAL STEROID INJECTION N/A 02/10/2021    Procedure: CERVICAL C6/7 NELI DIRECT REFERRAL;  Surgeon: Michi Escamilla MD;  Location: [facility] PAIN MGT;  Service: Pain Management;  Laterality: N/A;  NEEDS CONSENT    EPIDURAL STEROID INJECTION INTO CERVICAL SPINE N/A 6/28/2023    Procedure: Injection-steroid-epidural-cervical C7-T1;  Surgeon: Lorraine Patel DO;  Location: [facility] PAIN MANAGEMENT;  Service: Pain Management;  Laterality: N/A;  diabetic    EPIDURAL STEROID INJECTION INTO CERVICAL SPINE  N/A 11/3/2023    Procedure: cervical NELI C7-T1;  Surgeon: Kolton Terrell MD;  Location: [facility] PAIN MANAGEMENT;  Service: Pain Management;  Laterality: N/A;  diabetic    EPIDURAL STEROID INJECTION INTO CERVICAL SPINE N/A 4/17/2024    Procedure: VENECIA C7/T1;  Surgeon: Lorraine Patel DO;  Location: [facility] PAIN MANAGEMENT;  Service: Pain Management;  Laterality: N/A;  20mins- Ozempic 7d- no ac    EPIDURAL STEROID INJECTION INTO LUMBAR SPINE N/A 9/27/2023    Procedure: L4-5 NELI;  Surgeon: Tirso Pickering MD;  Location: [facility] PAIN MANAGEMENT;  Service: Pain Management;  Laterality: N/A;  15 mins    EPIDURAL STEROID INJECTION INTO LUMBAR SPINE N/A 3/6/2024    Procedure: L4-5 IL NELI;  Surgeon: Lorraine Patel DO;  Location: [facility] PAIN MANAGEMENT;  Service: Pain Management;  Laterality: N/A;  20 mins    EPIDURAL STEROID INJECTION INTO LUMBAR SPINE N/A 6/3/2024    Procedure: NELI L4-5;  Surgeon: Lorraine Patel DO;  Location: [facility] PAIN MANAGEMENT;  Service: Pain Management;  Laterality: N/A;  20mins-no ac Ozempic 7d    EPIDURAL STEROID INJECTION INTO LUMBAR SPINE N/A 8/26/2024    Procedure: L4-5 NELI Veer Left;  Surgeon: Lorraine Patel DO;  Location: [facility] PAIN MANAGEMENT;  Service: Pain Management;  Laterality: N/A;  20 mins No AC - Ozempic 7 days    EPIDURAL STEROID INJECTION INTO LUMBAR SPINE N/A 12/2/2024    Procedure: NELI L5-S1;  Surgeon: Lorraine Patel DO;  Location: [facility] PAIN MANAGEMENT;  Service: Pain Management;  Laterality: N/A;  no ac    ESOPHAGOGASTRODUODENOSCOPY  01/22/2019    LA grade B esophagitis; esophageal stenosis- dilated; gastritis; H pylori pathology negative    ESOPHAGOGASTRODUODENOSCOPY N/A 05/18/2021    Procedure: EGD (ESOPHAGOGASTRODUODENOSCOPY);  Surgeon: Mariana Hector MD;  Location: [facility];  Service: Endoscopy;  Laterality: N/A;    ESOPHAGOGASTRODUODENOSCOPY N/A 12/21/2023    Procedure: EGD (ESOPHAGOGASTRODUODENOSCOPY);  Surgeon: Teo Johnson MD;  Location: [facility];   Service: Endoscopy;  Laterality: N/A;    ESOPHAGOGASTRODUODENOSCOPY N/A 2/22/2024    Procedure: EGD (ESOPHAGOGASTRODUODENOSCOPY);  Surgeon: Teo Johnson MD;  Location: [facility];  Service: Endoscopy;  Laterality: N/A;    EXCISION OF GANGLION OF WRIST Right 09/15/2022    Procedure: EXCISION, GANGLION CYST, WRIST;  Surgeon: Christen Marshall MD;  Location: [facility] OR;  Service: Orthopedics;  Laterality: Right;    FLEXOR TENDON REPAIR Right 09/15/2022    Procedure: FLEXOR TENOSYNOVECTOMY;  Surgeon: Christen Marshall MD;  Location: [facility] OR;  Service: Orthopedics;  Laterality: Right;    HAND ARTHROTOMY Right 09/15/2022    Procedure: ARTHROTOMY, HAND RADIOCARPAL;  Surgeon: Christen Marshall MD;  Location: [facility] OR;  Service: Orthopedics;  Laterality: Right;  Radiocarpal    INCISION, CONTRACTURE, BLADDER NECK, TRANSURETHRAL N/A 11/8/2024    Procedure: INCISION, CONTRACTURE, BLADDER NECK, TRANSURETHRAL;  Surgeon: Jay Walter MD;  Location: [facility] OR;  Service: Urology;  Laterality: N/A;  1 hour    INJECTION OF JOINT Right 11/29/2023    Procedure: right hip IA injection and Right GTB injection;  Surgeon: Lorraine Patel DO;  Location: [facility] PAIN MANAGEMENT;  Service: Pain Management;  Laterality: Right;    INJECTION OF STEROID Left 09/15/2022    Procedure: INJECTION, STEROID LEFT WRIST AND LEFT LATERAL ELBOW;  Surgeon: Christen Marshall MD;  Location: [facility] OR;  Service: Orthopedics;  Laterality: Left;  Left Carpal Tunnel CSI    LASER LITHOTRIPSY Left 7/5/2023    Procedure: LITHOTRIPSY, USING LASER;  Surgeon: Chuckie Hall MD;  Location: [facility] OR 1ST FLR;  Service: Urology;  Laterality: Left;    LUMBAR LAMINECTOMY  2010    LUMBAR LAMINECTOMY WITH DISCECTOMY Left 09/20/2021    Procedure: LAMINECTOMY, SPINE, LUMBAR, WITH DISCECTOMY;  Surgeon: Naseem Mcnamara DO;  Location: [facility] OR 2ND FLR;  Service: Neurosurgery;  Laterality: Left;  MIS L3-4    NASAL SEPTOPLASTY N/A 7/15/2024    Procedure: SEPTOPLASTY, NOSE;  Surgeon:  Melody Liu MD;  Location: [facility] OR;  Service: ENT;  Laterality: N/A;    PH MONITORING, ESOPHAGUS, WIRELESS, (OFF REFLUX MEDS) N/A 12/21/2023    Procedure: PH MONITORING, ESOPHAGUS, WIRELESS, (OFF REFLUX MEDS);  Surgeon: Teo Johnson MD;  Location: [facility] ENDO;  Service: Endoscopy;  Laterality: N/A;    RECONSTRUCTION OF LIGAMENT Left 12/5/2023    Procedure: RECONSTRUCTION, LIGAMENT LATERAL COLLATERAL;  Surgeon: Christen Marshall MD;  Location: [facility] OR;  Service: Orthopedics;  Laterality: Left;    REPAIR OF EXTENSOR TENDON Left 12/5/2023    Procedure: REPAIR, TENDON, EXTENSOR;  Surgeon: Christen Marshall MD;  Location: [facility] OR;  Service: Orthopedics;  Laterality: Left;    ROBOT-ASSISTED REPAIR OF HIATAL HERNIA N/A 3/14/2024    Procedure: ROBOTIC REPAIR, HERNIA, HIATAL;  Surgeon: Cody Winn MD;  Location: [facility] OR;  Service: General;  Laterality: N/A;    TYMPANOSTOMY TUBE PLACEMENT      URETERAL STENT PLACEMENT Left 7/5/2023    Procedure: INSERTION, STENT, URETER;  Surgeon: Chuckie Hall MD;  Location: [facility] OR [address] [place] [address];  Service: Urology;  Laterality: Left;    URETEROSCOPIC REMOVAL OF URETERIC CALCULUS Left 7/5/2023    Procedure: REMOVAL, CALCULUS, URETER, URETEROSCOPIC;  Surgeon: Chuckie Hall MD;  Location: [facility] OR [address] [place] [address];  Service: Urology;  Laterality: Left;    URETEROSCOPY Left 7/5/2023    Procedure: URETEROSCOPY;  Surgeon: Chuckie Hall MD;  Location: [facility] OR [address] [place] [address];  Service: Urology;  Laterality: Left;     Anesthesia Assessment: Preoperative EQUATION    Planned Procedure: Procedure(s) (LRB):  ORIF, SMALL FINGER (Right)  Requested Anesthesia Type:Regional  Surgeon: Christen Marshall MD  Service: Orthopedics  Known or anticipated Date of Surgery:3/11/2025    Surgeon notes: reviewed    Electronic QUestionnaire Assessment completed via nurse interview with patient.      Triage considerations:     The patient has no apparent active cardiac condition (No unstable coronary Syndrome such as  "severe unstable angina or recent [<1 month] myocardial infarction, decompensated CHF, severe valvular   disease or significant arrhythmia)    Previous anesthesia records:GETA, MAC, Nerve block for post-op pain, and Difficult airway    Last PCP note: within 3 months   Subspecialty notes: Dermatology, Neurosurgery, Ortho, Pain Management, Psychiatry, Urology    Other important co-morbidities: DM2, GERD, HLD, Obesity, and LUBA   EKG 5/7/24  Vent. Rate : 074 BPM     Atrial Rate : 074 BPM      P-R Int : 142 ms          QRS Dur : 090 ms       QT Int : 392 ms       P-R-T Axes : -16 -35 -25 degrees      QTc Int : 435 ms     Normal sinus rhythm   Left axis deviation   Abnormal ECG   When compared with ECG of 09-JUN-2023 14:12,   Inverted T waves have replaced nonspecific T wave abnormality in Inferior   leads   Confirmed by Isra Torres MD ([id number]) on 5/7/2024 1:53:07 PM       Tests already available:  Results have been reviewed.             Instructions given. (See in Nurse's note) Pre op medication instructions sent via portal message.  RN called pt [phone] to instruct in holding Mounjaro.  Pt states he last took his Mounjaro this past Tuesday.  RN messaged S[place]et to inform.    Optimization:  Anesthesia Preop Clinic Assessment not Indicated    Medical Opinion Indicated: no       Sub-specialist consult indicated: no      Plan:   No pre op medical clearance requested.    Navigation:  Straight Line to surgery.               No tests, anesthesia preop clinic visit, or consult required.    Ht: 5'5"  Wt: 104.3 (230 lb)  BMI: 38.27    Anesthesia Plan  Type of Anesthesia, risks & benefits discussed:    Anesthesia Type: Gen ETT, Gen Supraglottic Airway, Gen Natural Airway, Regional  Intra-op Monitoring Plan: Standard ASA Monitors  Post Op Pain Control Plan: multimodal analgesia, peripheral nerve block and IV/PO Opioids PRN  Induction:  IV  Airway Plan: Video  Informed Consent: Informed consent signed with the Patient and all " parties understand the risks and agree with anesthesia plan.  All questions answered.   ASA Score: 2  Day of Surgery Review of History & Physical: H&P Update referred to the surgeon/provider.  Anesthesia Plan Notes: Mounjaro last taken last Tuesday, > 1 wk ago   Prednisone hx, allergy injections postponed     Preoperative evaluation completed by chart review, updated DOS after patient evaluation and physical examination.    Discussed: general anesthesia with native airway vs supraglottic airway vs ett, regional block with catheter placement for postoperative pain control. Discussed risks associated with regional block to include but not be limited to: bleeding, infection, or even nerve injury. Discussed risks associated with general anesthesia to include but not be limited to: dental or lip injury, post operative nausea and vomiting, cardiac arrest, stroke, or even death.     Patient voiced understanding and elects to proceed.       Ready For Surgery From Anesthesia Perspective.     .         [1]   Patient Active Problem List  Diagnosis    Gastroesophageal reflux disease    Renal calculi    Mild intermittent asthma without complication    ADHD (attention deficit hyperactivity disorder), inattentive type    LUBA (obstructive sleep apnea)    Hyperlipidemia associated with type 2 diabetes mellitus    Hypertriglyceridemia    SHEIKH (nonalcoholic steatohepatitis)    Hepatosplenomegaly    History of multiple allergies    Type 2 diabetes mellitus without complications    Vitamin D insufficiency    Restless leg    Chronic pain    Lumbar radiculopathy, chronic    S/P lumbar discectomy    Obesity (BMI 30-39.9)    Cervical spondylosis    History of melanoma in situ    Anxiety    Mood Disorder Unspecifed:  Depressed THO also has some mood elevation  (8of 13 on MDQ): ex: irritability, more self confident, thoughts race. more active)     Insomnia    Pain of left lower extremity    Neck pain    Chronic low back pain    Hypertrophy of  both inferior nasal turbinates    Other health problem within the family:  Aug 2024 dad surgery small intestine    Lower urinary tract symptoms (LUTS)    Postoperative fever    Voiding dysfunction    Cough    Exacerbation of asthma    S/P cervical spinal fusion   [2]   Social History  Socioeconomic History    Marital status:    Tobacco Use    Smoking status: Former     Current packs/day: 0.00     Types: Cigarettes     Quit date: 2018     Years since quittin.9     Passive exposure: Never    Smokeless tobacco: Never   Substance and Sexual Activity    Alcohol use: Yes     Alcohol/week: 3.0 standard drinks of alcohol     Types: 3 Drinks containing 0.5 oz of alcohol per week    Drug use: No    Sexual activity: Yes     Partners: Male     Social Drivers of Health     Financial Resource Strain: Low Risk  (2025)    Overall Financial Resource Strain (CARDIA)     Difficulty of Paying Living Expenses: Not hard at all   Food Insecurity: No Food Insecurity (2025)    Hunger Vital Sign     Worried About Running Out of Food in the Last Year: Never true     Ran Out of Food in the Last Year: Never true   Transportation Needs: No Transportation Needs (2023)    PRAPARE - Transportation     Lack of Transportation (Medical): No     Lack of Transportation (Non-Medical): No   Physical Activity: Sufficiently Active (2025)    Exercise Vital Sign     Days of Exercise per Week: 4 days     Minutes of Exercise per Session: 40 min   Stress: Stress Concern Present (2025)    [employer] [place] [employer] - Occupational Stress Questionnaire     Feeling of Stress : Very much   Housing Stability: Low Risk  (2023)    Housing Stability Vital Sign     Unable to Pay for Housing in the Last Year: No     Number of Places Lived in the Last Year: 1     Unstable Housing in the Last Year: No

## 2025-03-12 NOTE — TELEPHONE ENCOUNTER
Spoke to patient to inform them of their surgery arrival time (5 am), MIRANDA, [address] [facility] [address]:  Verbalized understanding of instructions for pre-wash, and reviewed NPO after midnight.   You may drink gatorade and water only up to 2 hours prior to arrival, NOTHING ELSE.  Confirmed call in regards to medication instructions from anesthesia.   confirmed  Confirmed patient was NOT using a rideshare service for surgery arrival or  transportation.     Confirmed no new wounds or abrasions on operative extremity.  none      [place] Pre-Op and PACU Visiting Policy  · Each patient will be allowed 2 visitors with 1 visitor at a time. Only 1 swap out allowed.  · Pediatric patients: Both parents are allowed if present.  · Post-Op: If there is more than 1 visitor, the person that is the main caregiver will need to be available for d/c instructions should visit 2nd, and stay with the patient until d/c.  All visitors must be accompanied in and out with an employee.

## 2025-03-13 ENCOUNTER — HOSPITAL ENCOUNTER (OUTPATIENT)
Facility: HOSPITAL | Age: 45
Discharge: HOME | End: 2025-03-13
Attending: ORTHOPAEDIC SURGERY | Admitting: ORTHOPAEDIC SURGERY
Payer: COMMERCIAL

## 2025-03-13 ENCOUNTER — ANESTHESIA (OUTPATIENT)
Dept: SURGERY | Facility: HOSPITAL | Age: 45
End: 2025-03-13
Payer: COMMERCIAL

## 2025-03-13 ENCOUNTER — PATIENT MESSAGE (OUTPATIENT)
Dept: SURGERY | Facility: HOSPITAL | Age: 45
End: 2025-03-13
Payer: COMMERCIAL

## 2025-03-13 VITALS
OXYGEN SATURATION: 98 % | DIASTOLIC BLOOD PRESSURE: 69 MMHG | RESPIRATION RATE: 20 BRPM | WEIGHT: 225 LBS | BODY MASS INDEX: 37.49 KG/M2 | HEIGHT: 65 IN | TEMPERATURE: 98 F | HEART RATE: 70 BPM | SYSTOLIC BLOOD PRESSURE: 116 MMHG

## 2025-03-13 DIAGNOSIS — M79.641 RIGHT HAND PAIN: ICD-10-CM

## 2025-03-13 DIAGNOSIS — S62.626A CLOSED DISPLACED FRACTURE OF MIDDLE PHALANX OF RIGHT LITTLE FINGER, INITIAL ENCOUNTER: Primary | ICD-10-CM

## 2025-03-13 PROCEDURE — 37000008 HC ANESTHESIA 1ST 15 MINUTES: Performed by: ORTHOPAEDIC SURGERY

## 2025-03-13 PROCEDURE — 25000003 PHARM REV CODE 250: Performed by: STUDENT IN AN ORGANIZED HEALTH CARE EDUCATION/TRAINING PROGRAM

## 2025-03-13 PROCEDURE — 63600175 PHARM REV CODE 636 W HCPCS: Performed by: NURSE ANESTHETIST, CERTIFIED REGISTERED

## 2025-03-13 PROCEDURE — 94761 N-INVAS EAR/PLS OXIMETRY MLT: CPT

## 2025-03-13 PROCEDURE — 25000003 PHARM REV CODE 250: Performed by: ORTHOPAEDIC SURGERY

## 2025-03-13 PROCEDURE — 25000003 PHARM REV CODE 250: Performed by: NURSE ANESTHETIST, CERTIFIED REGISTERED

## 2025-03-13 PROCEDURE — C1713 ANCHOR/SCREW BN/BN,TIS/BN: HCPCS | Performed by: ORTHOPAEDIC SURGERY

## 2025-03-13 PROCEDURE — 27201423 OPTIME MED/SURG SUP & DEVICES STERILE SUPPLY: Performed by: ORTHOPAEDIC SURGERY

## 2025-03-13 PROCEDURE — 36000709 HC OR TIME LEV III EA ADD 15 MIN: Performed by: ORTHOPAEDIC SURGERY

## 2025-03-13 PROCEDURE — 36000708 HC OR TIME LEV III 1ST 15 MIN: Performed by: ORTHOPAEDIC SURGERY

## 2025-03-13 PROCEDURE — 63600175 PHARM REV CODE 636 W HCPCS: Performed by: ORTHOPAEDIC SURGERY

## 2025-03-13 PROCEDURE — 71000033 HC RECOVERY, INTIAL HOUR: Performed by: ORTHOPAEDIC SURGERY

## 2025-03-13 PROCEDURE — 82962 GLUCOSE BLOOD TEST: CPT | Performed by: ORTHOPAEDIC SURGERY

## 2025-03-13 PROCEDURE — 25000003 PHARM REV CODE 250

## 2025-03-13 PROCEDURE — C1769 GUIDE WIRE: HCPCS | Performed by: ORTHOPAEDIC SURGERY

## 2025-03-13 PROCEDURE — 99900035 HC TECH TIME PER 15 MIN (STAT)

## 2025-03-13 PROCEDURE — 63600175 PHARM REV CODE 636 W HCPCS: Performed by: STUDENT IN AN ORGANIZED HEALTH CARE EDUCATION/TRAINING PROGRAM

## 2025-03-13 PROCEDURE — 26735 TREAT FINGER FRACTURE EACH: CPT | Mod: F9,,, | Performed by: ORTHOPAEDIC SURGERY

## 2025-03-13 PROCEDURE — 25000003 PHARM REV CODE 250: Performed by: PHYSICIAN ASSISTANT

## 2025-03-13 PROCEDURE — 71000015 HC POSTOP RECOV 1ST HR: Performed by: ORTHOPAEDIC SURGERY

## 2025-03-13 PROCEDURE — 37000009 HC ANESTHESIA EA ADD 15 MINS: Performed by: ORTHOPAEDIC SURGERY

## 2025-03-13 RX ORDER — KETAMINE HCL IN 0.9 % NACL 50 MG/5 ML
SYRINGE (ML) INTRAVENOUS
Status: DISCONTINUED | OUTPATIENT
Start: 2025-03-13 | End: 2025-03-13

## 2025-03-13 RX ORDER — FENTANYL CITRATE 50 UG/ML
25 INJECTION, SOLUTION INTRAMUSCULAR; INTRAVENOUS EVERY 5 MIN PRN
Status: COMPLETED | OUTPATIENT
Start: 2025-03-13 | End: 2025-03-13

## 2025-03-13 RX ORDER — DEXMEDETOMIDINE HYDROCHLORIDE 100 UG/ML
INJECTION, SOLUTION INTRAVENOUS
Status: DISCONTINUED | OUTPATIENT
Start: 2025-03-13 | End: 2025-03-13

## 2025-03-13 RX ORDER — DEXAMETHASONE SODIUM PHOSPHATE 4 MG/ML
INJECTION, SOLUTION INTRA-ARTICULAR; INTRALESIONAL; INTRAMUSCULAR; INTRAVENOUS; SOFT TISSUE
Status: DISCONTINUED | OUTPATIENT
Start: 2025-03-13 | End: 2025-03-13

## 2025-03-13 RX ORDER — BACITRACIN ZINC 500 UNIT/G
OINTMENT (GRAM) TOPICAL
Status: DISCONTINUED | OUTPATIENT
Start: 2025-03-13 | End: 2025-03-13 | Stop reason: HOSPADM

## 2025-03-13 RX ORDER — HYDROCODONE BITARTRATE AND ACETAMINOPHEN 5; 325 MG/1; MG/1
1 TABLET ORAL EVERY 4 HOURS PRN
Refills: 0 | Status: CANCELLED | OUTPATIENT
Start: 2025-03-13

## 2025-03-13 RX ORDER — CEFAZOLIN SODIUM 1 G/3ML
INJECTION, POWDER, FOR SOLUTION INTRAMUSCULAR; INTRAVENOUS
Status: DISCONTINUED | OUTPATIENT
Start: 2025-03-13 | End: 2025-03-13

## 2025-03-13 RX ORDER — CELECOXIB 200 MG/1
400 CAPSULE ORAL
Status: COMPLETED | OUTPATIENT
Start: 2025-03-13 | End: 2025-03-13

## 2025-03-13 RX ORDER — OXYCODONE HYDROCHLORIDE 5 MG/1
5 TABLET ORAL
Status: DISCONTINUED | OUTPATIENT
Start: 2025-03-13 | End: 2025-03-13 | Stop reason: HOSPADM

## 2025-03-13 RX ORDER — PROPOFOL 10 MG/ML
VIAL (ML) INTRAVENOUS CONTINUOUS PRN
Status: DISCONTINUED | OUTPATIENT
Start: 2025-03-13 | End: 2025-03-13

## 2025-03-13 RX ORDER — BUPIVACAINE HYDROCHLORIDE 2.5 MG/ML
INJECTION, SOLUTION EPIDURAL; INFILTRATION; INTRACAUDAL; PERINEURAL
Status: DISCONTINUED | OUTPATIENT
Start: 2025-03-13 | End: 2025-03-13 | Stop reason: HOSPADM

## 2025-03-13 RX ORDER — CEFAZOLIN 2 G/1
2 INJECTION, POWDER, FOR SOLUTION INTRAMUSCULAR; INTRAVENOUS
Status: DISCONTINUED | OUTPATIENT
Start: 2025-03-13 | End: 2025-03-13 | Stop reason: HOSPADM

## 2025-03-13 RX ORDER — LIDOCAINE HYDROCHLORIDE 10 MG/ML
INJECTION, SOLUTION INTRAVENOUS
Status: DISCONTINUED | OUTPATIENT
Start: 2025-03-13 | End: 2025-03-13

## 2025-03-13 RX ORDER — MIDAZOLAM HYDROCHLORIDE 1 MG/ML
INJECTION INTRAMUSCULAR; INTRAVENOUS
Status: DISCONTINUED | OUTPATIENT
Start: 2025-03-13 | End: 2025-03-13

## 2025-03-13 RX ORDER — MUPIROCIN 20 MG/G
OINTMENT TOPICAL
Status: DISCONTINUED | OUTPATIENT
Start: 2025-03-13 | End: 2025-03-13 | Stop reason: HOSPADM

## 2025-03-13 RX ORDER — ONDANSETRON HYDROCHLORIDE 2 MG/ML
INJECTION, SOLUTION INTRAVENOUS
Status: DISCONTINUED | OUTPATIENT
Start: 2025-03-13 | End: 2025-03-13

## 2025-03-13 RX ORDER — HALOPERIDOL LACTATE 5 MG/ML
0.5 INJECTION, SOLUTION INTRAMUSCULAR EVERY 10 MIN PRN
Status: DISCONTINUED | OUTPATIENT
Start: 2025-03-13 | End: 2025-03-13 | Stop reason: HOSPADM

## 2025-03-13 RX ORDER — OXYCODONE AND ACETAMINOPHEN 5; 325 MG/1; MG/1
1 TABLET ORAL EVERY 6 HOURS PRN
Qty: 30 TABLET | Refills: 0 | Status: SHIPPED | OUTPATIENT
Start: 2025-03-13

## 2025-03-13 RX ORDER — LIDOCAINE HYDROCHLORIDE 10 MG/ML
INJECTION, SOLUTION INFILTRATION; PERINEURAL
Status: DISCONTINUED | OUTPATIENT
Start: 2025-03-13 | End: 2025-03-13 | Stop reason: HOSPADM

## 2025-03-13 RX ORDER — MUPIROCIN 20 MG/G
OINTMENT TOPICAL 2 TIMES DAILY
Status: CANCELLED | OUTPATIENT
Start: 2025-03-13 | End: 2025-03-18

## 2025-03-13 RX ORDER — ACETAMINOPHEN 500 MG
1000 TABLET ORAL
Status: COMPLETED | OUTPATIENT
Start: 2025-03-13 | End: 2025-03-13

## 2025-03-13 RX ADMIN — LIDOCAINE HYDROCHLORIDE 100 MG: 10 INJECTION, SOLUTION INTRAVENOUS at 07:03

## 2025-03-13 RX ADMIN — DEXAMETHASONE SODIUM PHOSPHATE 4 MG: 4 INJECTION, SOLUTION INTRAMUSCULAR; INTRAVENOUS at 07:03

## 2025-03-13 RX ADMIN — OXYCODONE 5 MG: 5 TABLET ORAL at 08:03

## 2025-03-13 RX ADMIN — PROPOFOL 100 MCG/KG/MIN: 10 INJECTION, EMULSION INTRAVENOUS at 07:03

## 2025-03-13 RX ADMIN — ACETAMINOPHEN 1000 MG: 500 TABLET ORAL at 05:03

## 2025-03-13 RX ADMIN — SODIUM CHLORIDE: 9 INJECTION, SOLUTION INTRAVENOUS at 06:03

## 2025-03-13 RX ADMIN — Medication 10 MG: at 07:03

## 2025-03-13 RX ADMIN — ONDANSETRON 4 MG: 2 INJECTION INTRAMUSCULAR; INTRAVENOUS at 07:03

## 2025-03-13 RX ADMIN — FENTANYL CITRATE 25 MCG: 50 INJECTION INTRAMUSCULAR; INTRAVENOUS at 08:03

## 2025-03-13 RX ADMIN — HALOPERIDOL LACTATE 0.5 MG: 5 INJECTION, SOLUTION INTRAMUSCULAR at 09:03

## 2025-03-13 RX ADMIN — Medication 30 MG: at 07:03

## 2025-03-13 RX ADMIN — CEFAZOLIN 2 G: 330 INJECTION, POWDER, FOR SOLUTION INTRAMUSCULAR; INTRAVENOUS at 07:03

## 2025-03-13 RX ADMIN — DEXMEDETOMIDINE 25 MCG: 100 INJECTION, SOLUTION, CONCENTRATE INTRAVENOUS at 07:03

## 2025-03-13 RX ADMIN — CELECOXIB 400 MG: 200 CAPSULE ORAL at 05:03

## 2025-03-13 RX ADMIN — MUPIROCIN: 20 OINTMENT TOPICAL at 05:03

## 2025-03-13 RX ADMIN — FENTANYL CITRATE 25 MCG: 50 INJECTION INTRAMUSCULAR; INTRAVENOUS at 09:03

## 2025-03-13 RX ADMIN — MIDAZOLAM HYDROCHLORIDE 2 MG: 1 INJECTION, SOLUTION INTRAMUSCULAR; INTRAVENOUS at 06:03

## 2025-03-13 NOTE — BRIEF OP NOTE
[facility] - Surgery ([facility])  Surgery Department  Operative Note    SUMMARY     Date of Procedure: 3/13/2025     Procedure: Procedure(s) (LRB):  ORIF, SMALL FINGER (Right)     Surgeons and Role:     * Christen Marshall MD - Primary    Assisting Surgeon: None    Pre-Operative Diagnosis: right small finger middle phalanx fracture displaced    Post-Operative Diagnosis: right small finger middle phalanx fracture displaced    Anesthesia: Local MAC      Estimated Blood Loss (EBL): * No values recorded between 3/13/2025  6:50 AM and 3/13/2025  8:08 AM *           Implants: * No implants in log *    Specimens:   Specimen (24h ago, onward)      None                    Condition: Good    Disposition: PACU - hemodynamically stable.    Attestation: Op Note Attestation: I was physically present and scrubbed for the entire procedure.

## 2025-03-13 NOTE — OP NOTE
[facility])  Surgery Department  Operative Note    SUMMARY     Date of Procedure: 3/13/2025     Procedure:   1. Open reduction internal fixation of right small finger middle phalanx fracture, cpt 07211.     Surgeons and Role:     * Christen Marshall MD - Primary    Assisting Surgeon: None    Pre-Operative Diagnosis: closed displaced fracture right small finger middle phalanx      Post-Operative Diagnosis: closed displaced fracture right small finger middle phalanx    Anesthesia: Local MAC    Indication for Procedure: 43 yo male who sustained a comminuted and displaced fracture of the right small finger middle phalanx. Risks and benefits of the procedure were discussed with the patient and informed consent was obtained.    Description of the Findings of the Procedure: The patient was seen in the preoperative holding area and the right small finger was marked. The patient was taken to the OR, placed supine on the table, and a padded hand table was used.  After MAC was administered without difficulty, a time-out procedure was performed identifying the patient, the operative site and the procedure to be performed. A tourniquet was placed on the arm and the upper extremity was prepped and draped in standard sterile fashion. The upper extremity was exsanguinated with an Esmarch bandage, and the tourniquet was inflated to 250 mm Hg.      A 15 blade scalpel was used to make a 1 cm incision on the lateral right small finger middle phalanx.  Littler scissors were used to dissect down to the extensor tendon.  The extensor tendon was sharply incised as well as the periosteum.  The fracture site was reduced.  X-ray was used to confirm reduction.  A k-wire provisionally secured the reduction and was overdrilled.  A 1.7 x 11 mm Medartis headless compression screw was inserted, there was good reduction and compression of the fracture site.  X-ray was used to confirm screw placement, screw length as well as fracture  reduction, this was satisfactory.  The neurovascular bundles were identified and protected throughout the case.  Finger range of motion was full.  The wound was copiously irrigated with normal saline.  3-0 Prolene was used to repair the skin in a horizontal mattress fashion.  Adaptic, 4 x 4, cast padding a ulnar gutter splint and Coban were used to dress the wrist. The tourniquet was deflated and the hand and finger was pink and well-perfused.  The patient tolerated the procedure well.  He was taken awake and alert to the recovery room.    Complications: No    Estimated Blood Loss (EBL): minimal           Implants:   Implant Name Type Inv. Item Serial No.  Lot No. LRB No. Used Action   1.7 CANNULATED COMPRESSION SCREW 11 MM, FULLY THREADED      Right 1 Implanted   0.6 K-WIRE, TROCAR, 100MM, 1/PKG.      Right 1 Implanted and Explanted       Specimens:   Specimen (24h ago, onward)      None                    Condition: Good    Disposition: PACU - hemodynamically stable.    Attestation: I was present and scrubbed for the entire procedure.

## 2025-03-13 NOTE — PLAN OF CARE
Pre op complete. Waiting on site rajinder, anesthesia consent, update H&P.   at bedside. Pt resting comfortably with all questions addressed at this time and call light in reach. Belongings locked in locker

## 2025-03-13 NOTE — DISCHARGE INSTRUCTIONS
HAND SURGERY - Care of the Hand after Surgery       Post-Op Care  It is important to follow your orthopaedic surgeon's instructions carefully after you return home. You should ask   someone to check on you that evening. The protocols described here are general in nature. Every person and   every surgery is different so the information given here is for guidance only. If you have questions you should   contact us.     Day of Surgery    You were place in a plaster splint today to protect the surgical area during healing. Do not remove the plaster splint until you are seen by Occupational Therapy, Lori Crawford PA-C or Dr. Marshall for your first postop visit    The hand and fingers may be numb for quite some time after surgery. This is due to the anesthetic block used at the time of surgery for pain control.   If you have an arm sling you may remove the sling at your convenience once you regain control of your arm from the anesthetic block.     Begin taking liquids and food as soon as you can. You should always eat some solid food, a sandwich or light meal, a little while before taking your pain meds.     Day 3  Things are much the same on the third day after your surgery. Usually you have less pain and feel like doing more.    Showering: It is important to keep the incision absolutely dry while showering or bathing (use two plastic bags over your hand) or a shower bag.   If you feel that the pain medication you were given after surgery is stronger than you really need you can reduce the dose, take it less frequently or switch to ibuprofen or Tylenol. If you received antibiotics they should be taken until the entire prescription is completed.    Day 7  Occupational Therapy will see you between this time. Please let us know if you are not scheduled [phone]    Day 12-13  We will see you back after your surgery to review with you what was done in surgery and will talk about rehab and answer any questions you may  "have.       HAND SURGERY  Driving: You can drive if you are comfortable and have regained full finger movements and if you have sufficient power to control the vehicle.   Return to work: Timing of your return to work is variable according to your occupation and specific surgery. We should discuss this at your follow up visit if not already discussed prior.  Elevation: Hand elevation is important to prevent swelling and stiffness of the fingers. One minute of leaving your hand dangling negates four hours of keeping it elevated. Please remember not to walk with your hand hanging down or to sit with your hand resting in your lap. It is fine, however, to lower your hand for light use and you should get back to normal light activities as soon as possible as guided by common sense.         HAND SURGERY - Common Concerns and Frequently Asked Questions    When to Call the Doctor  Pain, burning, or numbness of the fingers or the back of the hand not relieved by elevation of the arm  Pale or cold finger; bluish nail beds  Red line or streak going up the arm  Excessive swelling  Fever over 100.3ºF  Pain unrelieved by pain medication    Tips for one armed living  It helps to have...   In the shower   Plastic bags and rubber bands to cover bandages - the bags that newspapers come in are good to cover the hand and wrist. Otherwise small trash can liners will do. Use two at a time.   Bottle sponge (soft sponge on a long stick) - for the armpit of your "good" hand.   Shower brush   A hair brush in the shower will help you to wash your hair.   Cotton omar cloth bathrobe - to dry your back.    In the bathroom   Toothpaste, shampoo, etc. in flip-top or pump (not screw top) dispensers.   Consider an electric razor.   Flossers (dental floss on a "Y" shaped handle).    In the kitchen   Dycem mat (rubber jar opener mat) - to help open jars, but also keep things from sliding around while you are working on them.    Double suction cup " "pads ("little Octopus") - to hold items while you use or wash them.   Electric can opener with a lid magnet strong enough to hold the can in the air - for one handed use.   In the bedroom   Back scratcher.   Large sleeve shirts and tops.   Put away clothing which buttons, fastens or snaps in the back or which uses drawstrings.    Sports bra or a camisole instead of a bra.   [employer] nylons can be pulled on one handed - most others can't.   A "wash and wear" haircut.     "

## 2025-03-13 NOTE — DISCHARGE SUMMARY
[facility] - Surgery (Hospital)  Discharge Note  Short Stay    Procedure(s) (LRB):  ORIF, SMALL FINGER (Right)      OUTCOME: Patient tolerated treatment/procedure well without complication and is now ready for discharge.    DISPOSITION: Home or Self Care    FINAL DIAGNOSIS:  right small finger middle phalanx fracture    FOLLOWUP: In clinic    DISCHARGE INSTRUCTIONS:    Discharge Procedure Orders   Diet general     Keep surgical extremity elevated     Ice to affected area     Lifting restrictions     No driving, operating heavy equipment or signing legal documents while taking pain medication.     Leave dressing on - Keep it clean, dry, and intact until clinic visit     Call MD for:  temperature >100.4     Call MD for:  persistent nausea and vomiting     Call MD for:  severe uncontrolled pain     Call MD for:  difficulty breathing, headache or visual disturbances     Call MD for:  redness, tenderness, or signs of infection (pain, swelling, redness, odor or green/yellow discharge around incision site)     Call MD for:  hives     Call MD for:  persistent dizziness or light-headedness     Call MD for:  extreme fatigue

## 2025-03-13 NOTE — TRANSFER OF CARE
"Anesthesia Transfer of Care Note    Patient: Hermelindo Rizviinger III    Procedure(s) Performed: Procedure(s) (LRB):  ORIF, SMALL FINGER (Right)    Patient location: [facility]    Anesthesia Type: general    Transport from OR: Transported from OR on room air with adequate spontaneous ventilation. Transported from OR on 6-10 L/min O2 by face mask with adequate spontaneous ventilation    Post pain: adequate analgesia    Post assessment: no apparent anesthetic complications and tolerated procedure well    Post vital signs: stable    Level of consciousness: awake    Nausea/Vomiting: no nausea/vomiting    Complications: none    Transfer of care protocol was followed      Last vitals: Visit Vitals  BP (!) 146/83 (BP Location: Left arm, Patient Position: Lying)   Pulse 85   Temp 36.6 °C (97.8 °F) (Tympanic)   Resp 15   Ht 5' 5" (1.651 m)   Wt 102.1 kg (225 lb)   SpO2 96%   BMI 37.44 kg/m²     "

## 2025-03-13 NOTE — PLAN OF CARE
VSS.  Patient tolerating oral liquids without difficulty.   No apparent s&s of distress noted at this time, no complaints voiced at this time.   Discharge instructions reviewed with patient/family/friend with good verbal feedback received.   Post op medications delivered at [facility].  Patient ready for discharge.

## 2025-03-14 LAB — POCT GLUCOSE: 92 MG/DL (ref 70–110)

## 2025-03-14 NOTE — ANESTHESIA POSTPROCEDURE EVALUATION
Anesthesia Post Evaluation    Patient: Hermelindo Rizviinger III    Procedure(s) Performed: Procedure(s) (LRB):  ORIF, SMALL FINGER (Right)    Final Anesthesia Type: general (native airway)      Patient location during evaluation: PACU  Patient participation: Yes- Able to Participate  Level of consciousness: awake and alert and oriented  Post-procedure vital signs: reviewed and stable  Pain management: adequate  Airway patency: patent    PONV status at discharge: No PONV  Anesthetic complications: no      Cardiovascular status: blood pressure returned to baseline  Respiratory status: unassisted, spontaneous ventilation and room air  Hydration status: euvolemic  Follow-up not needed.              Vitals Value Taken Time   /69 03/13/25 09:15   Temp 36.4 °C (97.6 °F) 03/13/25 09:45   Pulse 70 03/13/25 08:52   Resp 20 03/13/25 09:15   SpO2 98 % 03/13/25 09:15         Event Time   Out of Recovery 08:59:00         Pain/Shani Score: Pain Rating Prior to Med Admin: 7 (3/13/2025  9:08 AM)  Shani Score: 10 (3/13/2025  8:15 AM)

## 2025-03-18 ENCOUNTER — CLINICAL SUPPORT (OUTPATIENT)
Dept: ALLERGY | Facility: CLINIC | Age: 45
End: 2025-03-18
Payer: COMMERCIAL

## 2025-03-18 ENCOUNTER — DOCUMENTATION ONLY (OUTPATIENT)
Dept: ALLERGY | Facility: CLINIC | Age: 45
End: 2025-03-18
Payer: COMMERCIAL

## 2025-03-18 DIAGNOSIS — J30.9 CHRONIC ALLERGIC RHINITIS: Primary | ICD-10-CM

## 2025-03-18 PROCEDURE — 95117 IMMUNOTHERAPY INJECTIONS: CPT | Mod: S$GLB,,, | Performed by: ALLERGY & IMMUNOLOGY

## 2025-03-18 PROCEDURE — 99999 PR PBB SHADOW E&M-EST. PATIENT-LVL I: CPT | Mod: PBBFAC,,,

## 2025-03-18 NOTE — PROGRESS NOTES
Pt. Here today for new vials maintenance split dose, will get 0.25 ml in left arm wait 30 minutes , then in right arm will be given 0.25 ml and wait additional 30 minutes.      Pt. Did well today with split dose, no problems noted or reported.

## 2025-03-19 ENCOUNTER — TELEPHONE (OUTPATIENT)
Dept: ORTHOPEDICS | Facility: CLINIC | Age: 45
End: 2025-03-19
Payer: COMMERCIAL

## 2025-03-19 NOTE — TELEPHONE ENCOUNTER
LVM    Notified patient to stop at [facility] - 1st floor check in [address] [facility] [address] 30 minutes prior to your appointment time on 3/24/2025 with Lori Crawford PA-C for x-rays.     Made them aware that this is not a scheduled xray appointment and they might be running behind as they are considered a walk-in xray.    Verbalized the Following:  *Please arrive at your informed time above, if you are more than 15 Minutes late to your appointment with Lori Crawford PA-C we will have to reschedule your appointment. This will allow you to be seen in a timely manor and be conscious to other patients being seen that same day*     Esther Tate MA  [facility]  P: [phone]  F: [phone]

## 2025-03-20 ENCOUNTER — CLINICAL SUPPORT (OUTPATIENT)
Dept: REHABILITATION | Facility: HOSPITAL | Age: 45
End: 2025-03-20
Payer: COMMERCIAL

## 2025-03-20 DIAGNOSIS — M25.641 DECREASED RANGE OF MOTION OF FINGER OF RIGHT HAND: Primary | ICD-10-CM

## 2025-03-20 DIAGNOSIS — R29.898 REDUCED HAND STRENGTH: ICD-10-CM

## 2025-03-20 PROCEDURE — 97760 ORTHOTIC MGMT&TRAING 1ST ENC: CPT

## 2025-03-20 PROCEDURE — 97530 THERAPEUTIC ACTIVITIES: CPT

## 2025-03-20 PROCEDURE — 97165 OT EVAL LOW COMPLEX 30 MIN: CPT

## 2025-03-20 PROCEDURE — L3933 FO W/O JOINTS CF: HCPCS

## 2025-03-20 NOTE — PATIENT INSTRUCTIONS
"[facility] - OCCUPATIONAL THERAPY  HOME EXERCISE PROGRAM     Complete the following exercises with 10 repetitions each, 4x/day.     AROM: DIP Flexion / Extension  Pinch middle knuckle to prevent bending. Bend end knuckle until stretch is felt.   Hold 3 seconds. Relax. Straighten finger as far as possible.    AROM: PIP Flexion / Extension  Pinch bottom knuckle  to prevent bending. Actively bend middle knuckle until stretch is felt.   Hold 3 seconds. Relax. Straighten finger as far as possible.        AROM: Isolated MCP Flexion / Extension ("Wave")   Bend only your large, bottom knuckles. Hold 3 seconds. Keep the tips of your fingers straight. Straighten fingers.    AROM: Isolated IPJ Flexion / Extension ("Hook")  Bend only your middle and end knuckles. Hold 3 seconds.   Straighten your fingers.     AROM: MCP and PIP Flexion / Extension ("Straight Fist")  Bend your bottom and middle knuckles, keeping the tips of your fingers straight. Try to touch the pads of your fingers on your palm. Hold 3 seconds. Straighten your fingers.     AROM: Composite Flexion / Extension ("Full Fist")  Bend every joint in your hand into a fist. Hold 3 seconds. Straighten your fingers.     AROM: Composte Extension ("Finger Lifts")  Lift your finger off of the table one at a time. Hold 3 seconds. Relax your finger.    AROM: Abduction / Adduction  With hand flat on table, spread all fingers apart, then bring them together as close as possible.        "

## 2025-03-20 NOTE — PROGRESS NOTES
Outpatient Rehab    Occupational Therapy Evaluation    Patient Name: Hermelindo Garza III  MRN: [id number]  YOB: 1980  Encounter Date: 3/20/2025    Therapy Diagnosis:   Encounter Diagnoses   Name Primary?    Decreased range of motion of finger of right hand Yes    Reduced hand strength      Physician: Christen Marshall MD    Physician Orders: Eval and Treat  Medical Diagnosis: Closed nondisplaced fracture of middle phalanx of right little finger, initial encounter    Visit # / Visits Authorized: 1 / 1  Date of Evaluation:  3/20/2025   Insurance Authorization Period: 3/7/2025 to 3/7/2026  Plan of Care Certification:  3/20/2025 to 6/13/25 (12 weeks).      Time In: 0100   Time Out: 0158  Total Time: 58 min   Total Billable Time: 18 min   Total Untimed Units:  1  Charges Billed/# of units:    x 1    Intake Outcome Measure for FOTO Survey    Therapist reviewed FOTO scores for Hermelindo Garza III on 3/20/2025.   FOTO report - see Media section or FOTO account episode details.     Intake Score: 62%    Precautions  Right Upper Extremity Weight-Bearing Status: Non-weight-bearing    Standard, diabetes    Subjective   History of Present Illness  Hermelindo is a 44 y.o. male who reports to occupational therapy with a chief concern of R small finger stiffness following Fx treated operatively.     The patient reports a medical diagnosis of S62.656A (ICD-10-CM) - Closed nondisplaced fracture of middle phalanx of right little finger, initial encounter. The patient has experienced this issue since 02/20/25. Patient reports a surgery of R small finger Middle Phalanx Fx ORIF. Surgery occurred on 03/13/25. Diagnostic tests related to this condition: X-ray.   X-Ray Details: Reviewed    Dominant Hand: Right  History of Present Condition/Illness: He fell during the last week of February and injured his right small finger. He reports he wrapped it himself but didn't see urgent care until 1 month later. He was  referred to orthopedics and surgical intervention was warranted.     Activities of Daily Living  Social history was obtained from Patient.    General Prior Level of Function Comments: Independent with ADLs/IADLs  General Current Level of Function Comments: Mod I with ADLs/IADLs favoring L hand  Patient Roles: Caregiver for pet  Patient Responsibilities: Yard work, Personal ADL, Shopping, Laundry, Meal prep, Health management, Home management, Community mobility, Driving, Financial management        Pain     Patient reports a current pain level of 0/10. Pain at best is reported as 0/10. Pain at worst is reported as 5/10.   Location: R hand  Clinical Progression (since onset): Stable  Pain Qualities: Aching, Other (Comment)  Other Pain Qualities: Tingling  Pain-Relieving Factors: Medications - over-the-counter, Rest  Pain-Aggravating Factors: Movement         Treatment History  Treatments  Previously Received Treatments: No  Currently Receiving Treatments: No    Living Arrangements  Living Situation  Housing: Home independently  Living Arrangements: Family members  Support Systems: Family members        Employment  Patient reports: Does the patient's condition impact their ability to work? Yes  Employment Status: Employed full-time   Computer work       Past Medical History/Physical Systems Review:   Hermelindo Garza III  has a past medical history of ADD (attention deficit disorder), Allergy, Anxiety, Asthma, Cervical disc disorder with radiculopathy, unspecified cervical region, Cervical radiculopathy, Cervical spondylosis with myelopathy, Eczema, Esophageal ulcer, GERD (gastroesophageal reflux disease), Glaucoma, Kidney stones, Lumbar disc disease, Lumbar herniated disc, Malignant melanoma of skin, unspecified, Melanoma, LUBA (obstructive sleep apnea), Radiculopathy, lumbar region, Renal calculi, and Type 2 diabetes mellitus without complication, without long-term current use of insulin.    Hermelindo ELIAS  Stretzinger III  has a past surgical history that includes Lumbar laminectomy (2010); Esophagogastroduodenoscopy (01/22/2019); Colonoscopy (01/22/2019); Appendectomy (2006); Epidural steroid injection (N/A, 02/10/2021); Esophagogastroduodenoscopy (N/A, 05/18/2021); Lumbar laminectomy with discectomy (Left, 09/20/2021); Carpal tunnel release (Right, 09/15/2022); Excision of ganglion of wrist (Right, 09/15/2022); Hand Arthrotomy (Right, 09/15/2022); Injection of steroid (Left, 09/15/2022); Flexor tendon repair (Right, 09/15/2022); Tympanostomy tube placement; Epidural steroid injection into cervical spine (N/A, 6/28/2023); Cystoscopy (7/5/2023); Ureteral stent placement (Left, 7/5/2023); Ureteroscopy (Left, 7/5/2023); Laser lithotripsy (Left, 7/5/2023); Ureteroscopic removal of ureteric calculus (Left, 7/5/2023); Correction of hammer toe (Left, 8/14/2023); Epidural steroid injection into lumbar spine (N/A, 9/27/2023); Epidural steroid injection into cervical spine (N/A, 11/3/2023); Injection of joint (Right, 11/29/2023); Reconstruction of ligament (Left, 12/5/2023); Repair of extensor tendon (Left, 12/5/2023); Esophagogastroduodenoscopy (N/A, 12/21/2023); ph monitoring, esophagus, wireless, (off reflux meds) (N/A, 12/21/2023); Colonoscopy (N/A, 12/21/2023); Esophagogastroduodenoscopy (N/A, 2/22/2024); Epidural steroid injection into lumbar spine (N/A, 3/6/2024); Robot-assisted repair of hiatal hernia (N/A, 3/14/2024); Epidural steroid injection into cervical spine (N/A, 4/17/2024); Epidural steroid injection into lumbar spine (N/A, 6/3/2024); Cautery of turbinates (Bilateral, 7/15/2024); Nasal septoplasty (N/A, 7/15/2024); Epidural steroid injection into lumbar spine (N/A, 8/26/2024); Anterior cervical discectomy w/ fusion (N/A, 9/12/2024); Cystoscopy (N/A, 9/26/2024); incision, contracture, bladder neck, transurethral (N/A, 11/8/2024); Epidural steroid injection into lumbar spine (N/A, 12/2/2024); and Open reduction  and internal fixation (ORIF) of injury of finger (Right, 3/13/2025).    Hermelindo has a current medication list which includes the following prescription(s): acetaminophen, albuterol, albuterol, albuterol, atorvastatin, blood sugar diagnostic, blood-glucose meter, bupropion, clindamycin, clobetasol, dexmethylphenidate, docusate sodium, ergocalciferol (vitamin d2), fluocinolone acetonide oil, fluocinonide, fluorouracil, fluticasone-salmeterol 100-50 mcg/dose, hydrocodone-acetaminophen, hydrocortisone, fiber gummies, ketoconazole, lancets, latanoprost, levocetirizine, loperamide, [START ON 3/28/2025] lorazepam, ondansetron, ondansetron, oxycodone-acetaminophen, prednisone, pregabalin, ropinirole, tamsulosin, tamsulosin, mounjaro, ultra thin lancets, and urea, and the following Facility-Administered Medications: 0.9% nacl and oxymetazoline.    Review of patient's allergies indicates:  No Known Allergies     Objective   Orthosis Details  In this visit, actions taken with the first orthosis variety included Fabrication, Orthosis education, Skin check, Fitting check, and Dressing change. Number issued of this variety was 1.         Orthosis Laterality: Right  Fabrication Status: Custom  Orthosis Type: Static  Orthosis Design: Digit - Other (Comment) finger based gutter orthosis with IPs in ext ()                 Orthosis Purpose  Purpose of the patient's orthosis is to Protect injured or repaired area, Prevent loss of range of motion, and Reduce pain.      Orthosis Education  Orthosis education was provided to Patient. The education recipient's understanding level was Demonstrates understanding and Verbalizes understanding. Provided instruction to wear the orthosis Remove for exercise and At all times.   Also provided education regarding Clinic contact information, Don/doff, Edema management, Orthosis care, Wound care, Skin checks, Orthosis purpose, Precautions, Home exercise program, and Hygiene with orthosis. Pt  instructed on wound dressing with adaptic touch and tubigauze. Given extra supplies for home use.         Upper Extremity Sensation  General Upper Extremity Sensation  Impaired: Right  Right Upper Extremity Sensation  Intact: Sharp/Dull Discrimination, Kinesthesia, Proprioception, and Hot/Cold Discrimination  Diminished: Light Touch  Right Upper Extremity Sensation Stocking Glove Pattern: No                  Right Hand Digit Swelling   Proximal Phalanx IP PIP Middle Phalanx DIP Distal Phalanx   Thumb     N/A N/A N/A     Index   N/A           Middle   N/A           Ring   N/A           Little 7.5 cm N/A 7.6 cm 6.8 cm           Left Hand Digit Swelling   Proximal Phalanx IP PIP Middle Phalanx DIP Distal Phalanx   Thumb     N/A N/A N/A     Index   N/A           Middle   N/A           Ring   N/A           Little 6.6 cm N/A 6.5 cm 5.8 cm                 Digit 5 - Little Finger Active Range of Motion  Right Little Finger   Extension (deg) Flexion (deg) Pain   MCP 0 70     PIP -23 70 Yes   DIP -10 25 Yes   SURESH: 132    Left Little Finger   Extension (deg) Flexion (deg) Pain   MCP 0 80     PIP 0 95     DIP 0 75     SURESH: 250                       Treatment:  Therapeutic Activity  TA 1: Pt educated on HEP consisting of gentle digit AROM as tolerated.  TA 2: Reviewed NWB precautions; no heavy lifting, forceful gripping, or weightbearing.    Time Entry(in minutes):  OT Evaluation (Low) Time Entry: 30  Orthotic Management Training Time Entry: 8  Therapeutic Activity Time Entry: 10    Assessment & Plan   Assessment  Hermelindo presents with a condition of Low complexity.   Presentation of Symptoms: Stable  Will Comorbidities Impact Care: No       ADL Limitations : Bathing/showering, Feeding, Toileting and toilet hygiene, Personal hygiene and grooming, Dressing  IADL Limitations: Care of pets, Driving, Financial management, Grocery/shopping, Health management and maintenance, Home establishment and management, Meal preparation and  cleanup, Communication management  Rest and Sleep Limitations: Disrupted sleep pattern  Work Limitations: Job performance  Leisure Limitations: Leisure participation  Functional Limitations: Activity tolerance, Carrying objects, Fine motor coordination, Pain with ADLs/IADLs, Manipulating objects, Range of motion         Personal Factors Affecting Prognosis: Pain, Schedule       Anticipated Need for Modification: None  Evaluation/Treatment Response: Patient responded to treatment well, Patient limited by pain  Patient Goal for Therapy (OT): maximize R hand function  Prognosis: Good  Assessment Details: Hermelindo Garza III is a 44 y.o. male referred to outpatient occupational therapy and presents with a medical diagnosis of closed nondisplaced fracture of middle phalanx of right little finger. Patient presents with the following therapy deficits: Decreased ROM, Decreased  strength, Decreased muscle strength, Decreased functional hand use, Increased pain, Edema, Joint Stiffness, Scar Adhesions, Diminished/Impaired Sensation, and Diminished/Impaired Coordination and demonstrates limitations as described in the chart below. Following medical record review it is determined that pt will benefit from occupational therapy services in order to maximize pain free and/or functional use of right hand. The following goals were discussed with the patient and patient is in agreement with them as to be addressed in the treatment plan. The patient's rehab potential is Good.      Plan  From an occupational therapy perspective, the patient would benefit from: Skilled Rehab Services    Planned therapy interventions include: Therapeutic exercise, Therapeutic activities, Neuromuscular re-education, Manual therapy, ADLs/IADLs, Wound care, and Orthotic management and training.    Planned modalities to include: Contrast bath, Cryotherapy (cold pack), Electrical stimulation - attended, Electrical stimulation - passive/unattended,  Fluidotherapy, Paraffin bath, Iontophoresis, Thermotherapy (hot pack), Ultrasound, and Whirlpool.        Visit Frequency: 3 times Per Week for 12 Weeks.  Other/tapered frequency details: Reduce to 1-2 x weekly when appropriate    This plan was discussed with Patient.   Discussion participants: Agreed Upon Plan of Care             Patient's spiritual, cultural, and educational needs considered and patient agreeable to plan of care and goals.     Education  Education was done with Patient. The patient's learning style includes Demonstration and Listening. The patient Demonstrates understanding and Verbalizes understanding.         Pt was advised to perform exercises free of pain, and to stop performing them if pain occurs. Patient/Family Education: role of OT, goals for OT, scheduling/cancellations - pt verbalized understanding. Discussed insurance limitations with patient.        Goals:   Active       Occupational Therapy       Occupational Therapy Goal (Progressing)       Start:  03/20/25    Expected End:  06/12/25       Goals:   The following goals were discussed with the patient and patient is in agreement with them as to be addressed in the treatment plan.   Long Term Goals (LTGs)  LTG #1: Pt will report a pain level of 1-3 out of 10 at worst with daily hand use.   LTG #2: Pt will demo improved FOTO score by 5-10 points.   LTG #3: Pt will return to prior level of function for IADLs and household management.     Short Term Goals (STGs)  STG #1: Pt will report a pain level of 4 out of 10 at worst with daily hand use.  STG #2: Pt will report/demo [place] with ADLs.  STG #3: Pt will demonstrate independence with issued HEP, orthosis wear, and modalities for pain/symptom management.  STG #4: Pt will demo improved R SF SURESH by 30-50 degrees needed to aid with grasping.  STG #5: Decrease edema of R SF by 0.2-0.5 cm to increase joint mobility/flexibility for hand/arm use.  STG #6: Pt will increase right hand   strength by at least 5-10 lbs needed to open jars and carry groceries.                    Connie Vasquez, OTR/L, CHT

## 2025-03-24 ENCOUNTER — TELEPHONE (OUTPATIENT)
Dept: OPHTHALMOLOGY | Facility: CLINIC | Age: 45
End: 2025-03-24
Payer: COMMERCIAL

## 2025-03-24 ENCOUNTER — OFFICE VISIT (OUTPATIENT)
Dept: ORTHOPEDICS | Facility: CLINIC | Age: 45
End: 2025-03-24
Payer: COMMERCIAL

## 2025-03-24 ENCOUNTER — HOSPITAL ENCOUNTER (OUTPATIENT)
Dept: RADIOLOGY | Facility: HOSPITAL | Age: 45
Discharge: HOME | End: 2025-03-24
Payer: COMMERCIAL

## 2025-03-24 DIAGNOSIS — M79.644 FINGER PAIN, RIGHT: Primary | ICD-10-CM

## 2025-03-24 DIAGNOSIS — S62.626D CLOSED DISPLACED FRACTURE OF MIDDLE PHALANX OF RIGHT LITTLE FINGER WITH ROUTINE HEALING, SUBSEQUENT ENCOUNTER: ICD-10-CM

## 2025-03-24 DIAGNOSIS — M79.644 FINGER PAIN, RIGHT: ICD-10-CM

## 2025-03-24 PROCEDURE — 99024 POSTOP FOLLOW-UP VISIT: CPT | Mod: S$GLB,,,

## 2025-03-24 PROCEDURE — 99999 PR PBB SHADOW E&M-EST. PATIENT-LVL IV: CPT | Mod: PBBFAC,,,

## 2025-03-24 PROCEDURE — 73140 X-RAY EXAM OF FINGER(S): CPT | Mod: TC,RT

## 2025-03-24 PROCEDURE — 3072F LOW RISK FOR RETINOPATHY: CPT | Mod: CPTII,S$GLB,,

## 2025-03-24 PROCEDURE — 1160F RVW MEDS BY RX/DR IN RCRD: CPT | Mod: CPTII,S$GLB,,

## 2025-03-24 PROCEDURE — 73140 X-RAY EXAM OF FINGER(S): CPT | Mod: 26,RT,, | Performed by: RADIOLOGY

## 2025-03-24 PROCEDURE — 1159F MED LIST DOCD IN RCRD: CPT | Mod: CPTII,S$GLB,,

## 2025-03-24 NOTE — TELEPHONE ENCOUNTER
I spoke with pt he will call back next month to get his August appointment scheduled with Dr. Castro only can schedule 4 months out.

## 2025-03-24 NOTE — PROGRESS NOTES
[facility]  Hand and Upper Extremity  Post Op Visit  SUBJECTIVE:  Hermelindo Garza III presents for post-operative evaluation of   Encounter Diagnoses   Name Primary?    Finger pain, right Yes    Closed displaced fracture of middle phalanx of right little finger with routine healing, subsequent encounter      History of Present Illness:  The patient is now 11 days status post open reduction internal fixation of right small finger middle phalanx fracture by Dr. Marshall on 3/13/2025. Overall the patient reports doing well post-operatively. The patient reports appropriate post-operative soreness with well controlled overall pain. Patient is wearing his custom orthosis and working on range of motion. Going to [place] tomorrow. Patient has tremor in small finger which was present prior to surgery. Patient denies redness, warmth, or drainage from surgical site. He denies fever, chills, or sweats since the surgery.     Vitals:    03/24/25 1045   PainSc: 0-No pain   PainLoc: Finger      OBJECTIVE:  Physical Exam:    Vital signs are stable, patient is afebrile.  AA&O x 4.  NAD.  HEENT: NCAT, sclera nonicteric  Lungs: Respirations are equal and unlabored.  CV: 2+ bilateral upper and lower extremity pulses.  MSK: The wound is healing well with no erythema, warmth, or drainage. Incision is clean, dry, and intact. There are no clinical signs or symptoms of infection present. All sutures removed today without difficulty. Improved motion to right small finger. Neurovascularly intact.     Diagnostics Review:  Imaging: X-rays AP, lateral, oblique right small finger taken today are independently reviewed by me and shows interval internal fixation of middle phalanx fracture in anatomic alignment with stable hardware.    Assessment & Plan: Status post above, doing well.   Post-op instructions reviewed and provided to patient including wound care, scar massage, lifting restrictions, and HEP range of motion  exercises  Continue custom extension gutter orthosis to right small finger full time, remove for hygiene and exercises only  Continue formal OT  Continue with gentle active range of motion exercises; no strengthening until 6 weeks post-op   Light use of right hand. No lifting, pulling, pushing, gripping with right hand/right small finger  All sutures removed today in clinic. Covered with bacitracin and band-aid. Discussed wound care and signs of infection.   Educated patient on scar massage, begin once incision fully healed.   F/U 4 weeks with repeat x-rays right small finger or sooner for any post-op problems  Call with any questions/concerns in the interim    Lori Crawford PA-C  Hand and Upper Extremity

## 2025-03-29 ENCOUNTER — PATIENT MESSAGE (OUTPATIENT)
Dept: ORTHOPEDICS | Facility: CLINIC | Age: 45
End: 2025-03-29
Payer: COMMERCIAL

## 2025-04-03 ENCOUNTER — CLINICAL SUPPORT (OUTPATIENT)
Dept: REHABILITATION | Facility: HOSPITAL | Age: 45
End: 2025-04-03
Payer: COMMERCIAL

## 2025-04-03 DIAGNOSIS — R29.898 REDUCED HAND STRENGTH: ICD-10-CM

## 2025-04-03 DIAGNOSIS — M25.641 DECREASED RANGE OF MOTION OF FINGER OF RIGHT HAND: Primary | ICD-10-CM

## 2025-04-03 PROCEDURE — 97022 WHIRLPOOL THERAPY: CPT

## 2025-04-03 PROCEDURE — 97530 THERAPEUTIC ACTIVITIES: CPT

## 2025-04-03 PROCEDURE — 97110 THERAPEUTIC EXERCISES: CPT

## 2025-04-03 NOTE — PROGRESS NOTES
Outpatient Rehab    Occupational Therapy Visit    Patient Name: Hermelindo Garza III  MRN: [id number]  YOB: 1980  Encounter Date: 4/3/2025    Therapy Diagnosis:   Encounter Diagnoses   Name Primary?    Decreased range of motion of finger of right hand Yes    Reduced hand strength      Physician: Christen Marshall MD    Physician Orders: Eval and Treat  Medical Diagnosis: Closed nondisplaced fracture of middle phalanx of right little finger, initial encounter    Visit # / Visits Authorized: 1 / 12 (Visit #2)  Insurance Authorization Period: 3/20/2025 to 12/31/2025  Date of Evaluation: 3/20/2025   Plan of Care Certification: 3/20/2025 to 6/13/25 (12 weeks)     Time In: 0300   Time Out: 0344  Total Time: 44   Total Billable Time: 44     Subjective   No new complaints..  Pain reported as 0/10. R SF    Objective         Upper Extremity Sensation  General Upper Extremity Sensation  Impaired: Right  Right Upper Extremity Sensation  Intact: Sharp/Dull Discrimination, Kinesthesia, Proprioception, and Hot/Cold Discrimination  Diminished: Light Touch  Right Upper Extremity Sensation Stocking Glove Pattern: No     Right Hand Digit Swelling    Proximal Phalanx IP PIP Middle Phalanx DIP Distal Phalanx   Thumb     N/A N/A N/A     Index   N/A           Middle   N/A           Ring   N/A           Little 7.5 cm N/A 7.6 cm 6.8 cm             Left Hand Digit Swelling    Proximal Phalanx IP PIP Middle Phalanx DIP Distal Phalanx   Thumb     N/A N/A N/A     Index   N/A           Middle   N/A           Ring   N/A           Little 6.6 cm N/A 6.5 cm 5.8 cm           Digit 5 - Little Finger Active Range of Motion  Right Little Finger    Extension (deg) Flexion (deg) Pain   MCP 0 70     PIP -23 70 Yes   DIP -10 25 Yes   SURESH: 132     Left Little Finger    Extension (deg) Flexion (deg) Pain   MCP 0 80     PIP 0 95     DIP 0 75     SURESH: 250    Treatment:     Supervised modalities after being cleared for contradictions:  Fluidotherapy - 115 degrees and 50 speed, to R hand/wrist for 12 minutes to increase blood flow and circulation, desensitization and sensory re-education, pain management, and increased tissue extensibility. Pt instructed on performing active range of motion exercises while receiving heat to increase active motion.     Manual therapy techniques: Soft tissue Mobilization were applied to the: R hand for 3 minutes, including:  - Performed scar massage to decrease adhesions and improve tensile glide.     Therapeutic exercises to develop endurance, ROM, and flexibility for 33 minutes, including:  - Digit/wrist AROM in fluidotherapy x 12 min  - Digit add/abd, isolated EDM x 20 reps each  - DIP/PIP jt blocking of R SF x 15 reps each  - TGEs x 20+ reps  - Comp digit and wrist flexion x 15 reps    Therapeutic activities to improve functional performance for 8 minutes, including:  - Octyi x 2 min  - Towel walking x 2 min  - Issued tubigauze digit sleeves x 6  - Applied foam padding to orthosis interior     Time Entry(in minutes):  Whirlpool Time Entry: 12  Manual Therapy Time Entry: 3  Therapeutic Activity Time Entry: 8  Therapeutic Exercise Time Entry: 33    Assessment & Plan   Assessment: Pt returns for his first follow-up visit this date. He was out of town last week. He participated well and endorses good adherence to HEP. ROM is steadily improving; most limited at IP joints. Will progress as tolerated and per protocol.  Evaluation/Treatment Tolerance: Patient tolerated treatment well    Patient will continue to benefit from skilled outpatient occupational therapy to address the deficits listed in the problem list box on initial evaluation, provide pt/family education and to maximize pt's level of independence in the home and community environment.     Patient's spiritual, cultural, and educational needs considered and patient agreeable to plan of care and goals.     Education  Education was done with Patient. The  patient's learning style includes Demonstration and Listening. The patient Demonstrates understanding and Verbalizes understanding.         Continue splint wear as instructed and prior HEP provided.     Plan: Continue skilled occupational therapy with individualized plan of care focusing on maximizing functional use of patient's right hand. Progress as tolerated and per protocol.    Goals:   Active       Occupational Therapy       Occupational Therapy Goal (Progressing)       Start:  03/20/25    Expected End:  06/12/25       Goals:   The following goals were discussed with the patient and patient is in agreement with them as to be addressed in the treatment plan.   Long Term Goals (LTGs)  LTG #1: Pt will report a pain level of 1-3 out of 10 at worst with daily hand use.   LTG #2: Pt will demo improved FOTO score by 5-10 points.   LTG #3: Pt will return to prior level of function for IADLs and household management.     Short Term Goals (STGs)  STG #1: Pt will report a pain level of 4 out of 10 at worst with daily hand use.  STG #2: Pt will report/demo [place] with ADLs.  STG #3: Pt will demonstrate independence with issued HEP, orthosis wear, and modalities for pain/symptom management.  STG #4: Pt will demo improved R SF SURESH by 30-50 degrees needed to aid with grasping.  STG #5: Decrease edema of R SF by 0.2-0.5 cm to increase joint mobility/flexibility for hand/arm use.  STG #6: Pt will increase right hand  strength by at least 5-10 lbs needed to open jars and carry groceries.                  Connie Vasquez, OTR/L, CHT

## 2025-04-04 ENCOUNTER — PATIENT MESSAGE (OUTPATIENT)
Dept: INTERNAL MEDICINE | Facility: CLINIC | Age: 45
End: 2025-04-04
Payer: COMMERCIAL

## 2025-04-06 DIAGNOSIS — M54.12 CERVICAL RADICULOPATHY: ICD-10-CM

## 2025-04-06 DIAGNOSIS — M54.16 LUMBAR RADICULOPATHY, CHRONIC: Primary | ICD-10-CM

## 2025-04-06 DIAGNOSIS — M50.10 CERVICAL DISC DISORDER WITH RADICULOPATHY, UNSPECIFIED CERVICAL REGION: ICD-10-CM

## 2025-04-06 DIAGNOSIS — M54.12 CERVICAL RADICULITIS: ICD-10-CM

## 2025-04-06 DIAGNOSIS — M54.16 LUMBAR RADICULOPATHY: ICD-10-CM

## 2025-04-07 ENCOUNTER — PATIENT MESSAGE (OUTPATIENT)
Dept: PAIN MEDICINE | Facility: CLINIC | Age: 45
End: 2025-04-07
Payer: COMMERCIAL

## 2025-04-07 ENCOUNTER — CLINICAL SUPPORT (OUTPATIENT)
Dept: REHABILITATION | Facility: HOSPITAL | Age: 45
End: 2025-04-07
Payer: COMMERCIAL

## 2025-04-07 DIAGNOSIS — R29.898 REDUCED HAND STRENGTH: ICD-10-CM

## 2025-04-07 DIAGNOSIS — M25.641 DECREASED RANGE OF MOTION OF FINGER OF RIGHT HAND: Primary | ICD-10-CM

## 2025-04-07 PROCEDURE — 97110 THERAPEUTIC EXERCISES: CPT

## 2025-04-07 PROCEDURE — 97022 WHIRLPOOL THERAPY: CPT

## 2025-04-07 PROCEDURE — 97530 THERAPEUTIC ACTIVITIES: CPT

## 2025-04-07 RX ORDER — PREGABALIN 200 MG/1
200 CAPSULE ORAL 2 TIMES DAILY
Qty: 60 CAPSULE | Refills: 5 | Status: SHIPPED | OUTPATIENT
Start: 2025-04-07 | End: 2025-10-04

## 2025-04-07 NOTE — PROGRESS NOTES
Outpatient Rehab    Occupational Therapy Visit    Patient Name: Hermelindo Garza III  MRN: [id number]  YOB: 1980  Encounter Date: 4/7/2025    Therapy Diagnosis:   Encounter Diagnoses   Name Primary?    Decreased range of motion of finger of right hand Yes    Reduced hand strength      Physician: Christen Marshall MD    Physician Orders: Eval and Treat  Medical Diagnosis: Closed nondisplaced fracture of middle phalanx of right little finger, initial encounter    Visit # / Visits Authorized: 2 / 12 (Visit #3)  Insurance Authorization Period: 3/20/2025 to 12/31/2025  Date of Evaluation: 3/20/2025   Plan of Care Certification: 3/20/2025 to 6/13/25 (12 weeks)     Time In: 0955   Time Out: 1035  Total Time: 40   Total Billable Time: 40    Precautions  Right Upper Extremity Weight-Bearing Status: Non-weight-bearing  Standard, Diabetes      Subjective   No pain upon arrival..  Pain reported as 0/10. R SF    Objective         Upper Extremity Sensation  General Upper Extremity Sensation  Impaired: Right  Right Upper Extremity Sensation  Intact: Sharp/Dull Discrimination, Kinesthesia, Proprioception, and Hot/Cold Discrimination  Diminished: Light Touch  Right Upper Extremity Sensation Stocking Glove Pattern: No     Right Hand Digit Swelling    Proximal Phalanx IP PIP Middle Phalanx DIP Distal Phalanx   Thumb     N/A N/A N/A     Index   N/A           Middle   N/A           Ring   N/A           Little 7.5 cm N/A 7.6 cm 6.8 cm             Left Hand Digit Swelling    Proximal Phalanx IP PIP Middle Phalanx DIP Distal Phalanx   Thumb     N/A N/A N/A     Index   N/A           Middle   N/A           Ring   N/A           Little 6.6 cm N/A 6.5 cm 5.8 cm           Digit 5 - Little Finger Active Range of Motion  Right Little Finger    Extension (deg) Flexion (deg) Pain   MCP 0 70     PIP -23 70 Yes   DIP -10 25 Yes   SURESH: 132     Digit 5 - Little Finger Active Range of Motion  Right Little Finger    Extension (deg) Flexion  (deg) Pain   MCP 0 90 (+20)     PIP -5 (+18) 80 (+10)    DIP -5 (+5) 43 (+18)    SURESH: 203 (+71)     Left Little Finger    Extension (deg) Flexion (deg) Pain   MCP 0 80     PIP 0 95     DIP 0 75     SURESH: 250    Treatment:     Supervised modalities after being cleared for contradictions: Fluidotherapy - 115 degrees and 50 speed, to R hand/wrist for 15 minutes to increase blood flow and circulation, desensitization and sensory re-education, pain management, and increased tissue extensibility. Pt instructed on performing active range of motion exercises while receiving heat to increase active motion.     Manual therapy techniques: Soft tissue Mobilization were applied to the: R hand for 3 minutes, including:  - Performed scar massage to decrease adhesions and improve tensile glide.     Therapeutic exercises to develop endurance, ROM, and flexibility for 29 minutes, including:  - Updated objective measurements, see above.   - Digit/wrist AROM in fluidotherapy x 15 min  - Digit add/abd, isolated EDM x 15 reps each  - DIP/PIP jt blocking of R SF x 15 reps each  - TGEs x 20+ reps  - Comp digit and wrist flexion x 15 reps    Therapeutic activities to improve functional performance for 8 minutes, including:  - Octyi x 2 min  - [place]  ulnar scoop x 7 reps  - Large pom pom ulnar scoop x 1 container   - Issued tubigauze digit sleeves x 2    Time Entry(in minutes):  [place] Time Entry: 15  Manual Therapy Time Entry: 3  Therapeutic Activity Time Entry: 8  Therapeutic Exercise Time Entry: 29    Assessment & Plan   Assessment: Pt participated well and endorses good adherence to HEP. Pt demo 71 deg improvement in R SF SURESH by end of session, per formal assessment. Will progress as tolerated and per protocol.  Evaluation/Treatment Tolerance: Patient tolerated treatment well, Patient limited by fatigue, Patient limited by pain    Patient will continue to benefit from skilled outpatient occupational therapy to address the  deficits listed in the problem list box on initial evaluation, provide pt/family education and to maximize pt's level of independence in the home and community environment.     Patient's spiritual, cultural, and educational needs considered and patient agreeable to plan of care and goals.     Education  Education was done with Patient. The patient's learning style includes Demonstration and Listening. The patient Demonstrates understanding and Verbalizes understanding.         Continue splint wear as instructed and prior HEP provided.       Plan: Continue skilled occupational therapy with individualized plan of care focusing on maximizing functional use of patient's right hand. Progress as tolerated and per protocol.    Goals:   Active       Occupational Therapy       Occupational Therapy Goal (Progressing)       Start:  03/20/25    Expected End:  06/12/25       Goals:   The following goals were discussed with the patient and patient is in agreement with them as to be addressed in the treatment plan.   Long Term Goals (LTGs)  LTG #1: Pt will report a pain level of 1-3 out of 10 at worst with daily hand use.   LTG #2: Pt will demo improved FOTO score by 5-10 points.   LTG #3: Pt will return to prior level of function for IADLs and household management.     Short Term Goals (STGs)  STG #1: Pt will report a pain level of 4 out of 10 at worst with daily hand use.  STG #2: Pt will report/demo [place] with ADLs.  STG #3: Pt will demonstrate independence with issued HEP, orthosis wear, and modalities for pain/symptom management.  STG #4: Pt will demo improved R SF SURESH by 30-50 degrees needed to aid with grasping. MET  STG #5: Decrease edema of R SF by 0.2-0.5 cm to increase joint mobility/flexibility for hand/arm use.  STG #6: Pt will increase right hand  strength by at least 5-10 lbs needed to open jars and carry groceries.                  Connie Vasquez, OTR/L, CHT       18-Apr-2021

## 2025-04-10 ENCOUNTER — OFFICE VISIT (OUTPATIENT)
Dept: DERMATOLOGY | Facility: CLINIC | Age: 45
End: 2025-04-10
Payer: COMMERCIAL

## 2025-04-10 DIAGNOSIS — D22.9 NEVUS: ICD-10-CM

## 2025-04-10 DIAGNOSIS — D48.5 NEOPLASM OF UNCERTAIN BEHAVIOR OF SKIN: Primary | ICD-10-CM

## 2025-04-10 PROCEDURE — 11103 TANGNTL BX SKIN EA SEP/ADDL: CPT | Mod: S$GLB,,, | Performed by: DERMATOLOGY

## 2025-04-10 PROCEDURE — 1159F MED LIST DOCD IN RCRD: CPT | Mod: CPTII,S$GLB,, | Performed by: DERMATOLOGY

## 2025-04-10 PROCEDURE — 3072F LOW RISK FOR RETINOPATHY: CPT | Mod: CPTII,S$GLB,, | Performed by: DERMATOLOGY

## 2025-04-10 PROCEDURE — 99212 OFFICE O/P EST SF 10 MIN: CPT | Mod: 25,S$GLB,, | Performed by: DERMATOLOGY

## 2025-04-10 PROCEDURE — 11102 TANGNTL BX SKIN SINGLE LES: CPT | Mod: S$GLB,,, | Performed by: DERMATOLOGY

## 2025-04-10 PROCEDURE — 99999 PR PBB SHADOW E&M-EST. PATIENT-LVL V: CPT | Mod: PBBFAC,,, | Performed by: DERMATOLOGY

## 2025-04-10 PROCEDURE — 1160F RVW MEDS BY RX/DR IN RCRD: CPT | Mod: CPTII,S$GLB,, | Performed by: DERMATOLOGY

## 2025-04-10 NOTE — PROGRESS NOTES
Subjective:      Patient ID:  Hermelindo Garza III is a 44 y.o. male who presents for   Chief Complaint   Patient presents with    Follow-up     Recheck - feet, nevus to back, rash      Pt here today for f/u - recheck: feet, nevus to back, rash. Lesioj on left ankle is still present and now raised    Pt states rash has now resolved. No itching. Prev tx with CLN wash.    Follow-up      Review of Systems   Skin:  Negative for itching and rash.       Objective:   Physical Exam   Constitutional: He appears well-developed and well-nourished. No distress.   Neurological: He is alert and oriented to person, place, and time. He is not disoriented.   Psychiatric: He has a normal mood and affect.   Skin:   Areas Examined (abnormalities noted in diagram):   Head / Face Inspection Performed  Back Inspection Performed  RLE Inspected  LLE Inspection Performed            Diagram Legend     Erythematous scaling macule/papule c/w actinic keratosis       Vascular papule c/w angioma      Pigmented verrucoid papule/plaque c/w seborrheic keratosis      Yellow umbilicated papule c/w sebaceous hyperplasia      Irregularly shaped tan macule c/w lentigo     1-2 mm smooth white papules consistent with Milia      Movable subcutaneous cyst with punctum c/w epidermal inclusion cyst      Subcutaneous movable cyst c/w pilar cyst      Firm pink to brown papule c/w dermatofibroma      Pedunculated fleshy papule(s) c/w skin tag(s)      Evenly pigmented macule c/w junctional nevus     Mildly variegated pigmented, slightly irregular-bordered macule c/w mildly atypical nevus      Flesh colored to evenly pigmented papule c/w intradermal nevus       Pink pearly papule/plaque c/w basal cell carcinoma      Erythematous hyperkeratotic cursted plaque c/w SCC      Surgical scar with no sign of skin cancer recurrence      Open and closed comedones      Inflammatory papules and pustules      Verrucoid papule consistent consistent with wart      Erythematous eczematous patches and plaques     Dystrophic onycholytic nail with subungual debris c/w onychomycosis     Umbilicated papule    Erythematous-base heme-crusted tan verrucoid plaque consistent with inflamed seborrheic keratosis     Erythematous Silvery Scaling Plaque c/w Psoriasis     See annotation                    Assessment / Plan:      Pathology Orders:       Normal Orders This Visit    Specimen to Pathology, Dermatology     Questions:    Procedure Type: Dermatology and skin neoplasms    Number of Specimens: 2    ------------------------: -------------------------    Spec 1 Procedure: Shave Biopsy    Spec 1 Clinical Impression: r/o atypical nevus    Spec 1 Source: left mid back    ------------------------: -------------------------    Spec 2 Procedure: Shave Biopsy    Spec 2 Clinical Impression: r/o traumatized nevus v other    Spec 2 Source: left medial ankle    Clinical Information: see EPIC    Clinical History: see EPIC    Specimen Source: Skin    Release to patient: Immediate    Send normal result to authorizing provider's In Basket if patient is active on MyChart: Yes          Neoplasm of uncertain behavior of skin x 2   Shave biopsy procedure note:    Shave biopsy performed after verbal consent including risk of infection, scar, recurrence, need for additional treatment of site. Area prepped with alcohol, anesthetized with approximately 1.0cc of 1% lidocaine with epinephrine. Lesional tissue shaved with razor blade. Hemostasis achieved with application of aluminum chloride followed by hyfrecation. No complications. Dressing applied. Wound care explained.    -     Specimen to Pathology, Dermatology    Nevus- back unchanged from last OV  Discussed ABCDE's of nevi.  Monitor for new mole or moles that are becoming bigger, darker, irritated, or developing irregular borders. Brochure provided. Instructed patient to observe lesion(s) for changes and follow up in clinic if changes are noted. Patient  to monitor skin at home for new or changing lesions.                Follow up in about 6 months (around 10/10/2025) for TBSE.

## 2025-04-14 ENCOUNTER — CLINICAL SUPPORT (OUTPATIENT)
Dept: ALLERGY | Facility: CLINIC | Age: 45
End: 2025-04-14
Payer: COMMERCIAL

## 2025-04-14 DIAGNOSIS — J30.9 CHRONIC ALLERGIC RHINITIS: Primary | ICD-10-CM

## 2025-04-14 PROCEDURE — 99999 PR PBB SHADOW E&M-EST. PATIENT-LVL I: CPT | Mod: PBBFAC,,,

## 2025-04-14 PROCEDURE — 95117 IMMUNOTHERAPY INJECTIONS: CPT | Mod: S$GLB,,, | Performed by: STUDENT IN AN ORGANIZED HEALTH CARE EDUCATION/TRAINING PROGRAM

## 2025-04-15 ENCOUNTER — OFFICE VISIT (OUTPATIENT)
Dept: PAIN MEDICINE | Facility: CLINIC | Age: 45
End: 2025-04-15
Payer: COMMERCIAL

## 2025-04-15 ENCOUNTER — PATIENT MESSAGE (OUTPATIENT)
Dept: NEUROSURGERY | Facility: CLINIC | Age: 45
End: 2025-04-15
Payer: COMMERCIAL

## 2025-04-15 ENCOUNTER — CLINICAL SUPPORT (OUTPATIENT)
Dept: REHABILITATION | Facility: HOSPITAL | Age: 45
End: 2025-04-15
Payer: COMMERCIAL

## 2025-04-15 DIAGNOSIS — G89.4 CHRONIC PAIN SYNDROME: ICD-10-CM

## 2025-04-15 DIAGNOSIS — M51.362 DEGENERATION OF INTERVERTEBRAL DISC OF LUMBAR REGION WITH DISCOGENIC BACK PAIN AND LOWER EXTREMITY PAIN: ICD-10-CM

## 2025-04-15 DIAGNOSIS — M25.641 DECREASED RANGE OF MOTION OF FINGER OF RIGHT HAND: Primary | ICD-10-CM

## 2025-04-15 DIAGNOSIS — M54.16 LUMBAR RADICULOPATHY, CHRONIC: Primary | ICD-10-CM

## 2025-04-15 DIAGNOSIS — R29.898 REDUCED HAND STRENGTH: ICD-10-CM

## 2025-04-15 PROCEDURE — 98006 SYNCH AUDIO-VIDEO EST MOD 30: CPT | Mod: 95,,, | Performed by: NURSE PRACTITIONER

## 2025-04-15 PROCEDURE — 1159F MED LIST DOCD IN RCRD: CPT | Mod: CPTII,95,, | Performed by: NURSE PRACTITIONER

## 2025-04-15 PROCEDURE — 1160F RVW MEDS BY RX/DR IN RCRD: CPT | Mod: CPTII,95,, | Performed by: NURSE PRACTITIONER

## 2025-04-15 PROCEDURE — G2211 COMPLEX E/M VISIT ADD ON: HCPCS | Mod: 95,,, | Performed by: NURSE PRACTITIONER

## 2025-04-15 PROCEDURE — 97110 THERAPEUTIC EXERCISES: CPT

## 2025-04-15 PROCEDURE — 97022 WHIRLPOOL THERAPY: CPT

## 2025-04-15 PROCEDURE — 3072F LOW RISK FOR RETINOPATHY: CPT | Mod: CPTII,95,, | Performed by: NURSE PRACTITIONER

## 2025-04-15 NOTE — PROGRESS NOTES
[facility]  Interventional [facility] -  Established Patient Follow-up  telemedicine Encounter    Telemedicine Bundle:  The patient location is: patient's home  The chief complaint leading to consultation is: Neck Pain, Pain, Foot Pain (Left big toe, and ball of his foot. ), Low-back Pain, and Leg Pain  Visit type: Virtual visit with synchronous audio and video  Total time spent with patient: 20 minutes   Each patient to whom he or she provides medical services by telemedicine is:    (1) informed of the relationship between the physician and patient and the respective role of any other health care provider with respect to management of the patient  (2) notified that he or she may decline to receive medical services by telemedicine and may withdraw from such care at any time.        Referred by: Dr. Suzi Sanches   Reason for referral: * No diagnoses found *     CC:   Chief Complaint   Patient presents with    Neck Pain    Pain    Foot Pain     Left big toe, and ball of his foot.     Low-back Pain    Leg Pain         9/5/2024    11:21 AM 5/30/2024     8:40 AM 3/28/2024     1:12 PM   Last 3 PDI Scores   Pain Disability Index (PDI) 36 30 36     Interval History 04/15/2025:  44-year-old male presents virtually for a follow-up appointment he is reporting returning low back and bilateral leg pain he would like patient considered for repeat lumbar NELI she has a history of lumbar NELI targeting L5-S1 which significantly reduced his symptoms by 60 70%.  He reports worst pain on the left radiating into his left toe.  Additionally he reported pain in the  area between knuckles (hand and middle knuckle) of my left middle finger.  He reports pain during the day but then a lot more during the night and in the morning, where my hand is weak then it fades. He is status post C5-6 and C6-7 ACDF on 9/12/24 with Dr. Mcnamara.  He did reach out to Neurosurgery regarding his hand pain the etiology is unclear at this point for now we will  focus on his low back and  leg pain.      Interval history 12/12/2024  44-year-old male presents for a virtual follow up appointment today he is status post a lumbar NELI targeting L5-S1 on 12/02/2024 patient is reporting 60 70% relief of his symptoms with improvement in functionality.  Today patient denies any new pain denies any profound weakness denies any bowel bladder dysfunction at this time patient is status post C5-6 and C6-7 ACDF on 9/12/24 with Dr. Mcnamara.  Patient reports recovering well from previous neck surgery.       Interval Update 9/05/2024: Patient return to clinic for follow up on low back pain.  He is status post a lumbar NELI targeting L4-5 on 08/26/2024 that provided 50 60% relief of his symptoms.  He does still complain of left lower extremity pain to his left toe which is positional.  He is scheduled for upcoming anterior cervical fusion surgery with Dr. Mcnamara on 09/12/2024.  He denies any profound weakness denies bowel or bladder dysfunction denies any saddle anesthesia at this time.    Interval history 06/25/2024  44-year-old male presents virtually he has status post a lumbar NELI targeting L4-5 on 06/03/2024 reporting 70-80% relief of his low back pain,  he reports intermittent numbness in his left toe.  His primary concern is his chronic neck pain with radiculopathy.  He has previously been given cervical NELI targeting C7-T1 with relief however his symptoms continue and are disrupting his quality of life.  He has a follow up with Dr. Shen later this month to discuss surgical decompression surgery for his chronic neck and radiculopathy pain.  He he did report intermittent weakness in his arms and dropping things      Interval update 05/30/24:  43-year-old male that presents today in clinic with 4 weeks of worsening right neck pain that radiates into the right  arm and down into the right 4th and 5th digits of the hand.   He also reports new tremor in the right hand.  No recent falls or  trauma.  Patient reports pain is worse with lying on the right side and with range of motion of the neck.   He has a history of right-sided neck pain but symptoms into the arm and hand tremor are new. He is taking Gabapentin 300 mg QAM and 900 mg QHS with minimal relief.  He is requesting to switch to Lyrica.   He also requests a refill of his Flexeril. He rates his pain 5/10.       Interval History 4/30/2024:  43-year-old male that presents virtually for a follow-up appointment he is status post a cervical NELI targeting C7-T1 on 04/17/2024 that provided significant relief in his symptoms 70-90%.  He is known to our clinic and has been previously provided this injection which has helped significantly.  Is now reporting low back pain that has gradually increased he did reach out to his previous surgeon Dr. Shen  ordered a recent CT lumbar.  He denies any bowel bladder dysfunction denies any saddle anesthesia denies any recent trauma.  He has been provided a lumbar NELI targeting L4-5 for low back and radicular symptoms, we discussed that we may need to repeat this in the future pending CT lumbar scan.      Interval update 03/28/24:   Patient returns today for post procedure follow up and follow up of his neck and back pain.  He is s/p  Lumbar Interlaminar Epidural Steroid Injection L4/L5 on 03/06/2024.  He reports 70-80% relief of his low back pain and radicular symptoms.  Today he reports his cervical radicular symptoms are starting to returned.  He previously underwent cervical epidural steroid injection at C7/T1 on 11/03/2023 with significant improvement in his symptoms.  He reports almost 5 months of pain relief following a cervical epidural.  He would like to repeat this procedure.  In the meantime he is increased his gabapentin to 300 mg in the morning and 600 mg in the evening.  He does feel like this has helped with his pain.  He was also increased his exercise and has lost weight which he feels has been  beneficial as well.      Interval update 12/19/23  Patient returns today for follow up.  He is status post right GTB and right intra-articular hip injections on 11/29/2023 and notes improvement in his GTB and hip pain.  Today he complains of pain extending from the area overlying the right hip joint and extending down the anterolateral portion of the thigh.  This pain is worse with activity.  Pain improves with lying flat.  He rates his pain 7/10 today.      Of note, patient recently underwent left lateral collateral ligament reconstruction for left radial collateral ligament tear on 12/05/2023 with Dr. Marshall.    11/13/23 Pt returns today complaining of right hip and right leg pain. Pain is located over the lateral right hip and radiates into the groin.  Pain started approximately 1 month ago. Pain has not improved with stretching and HEP.  He states his hip pain is worse than his lower back pain for which he is scheduled to have an NELI at the end of the month. He would like to address the hip pain first.      Subjective:   Hermelindo Garza III is a 44 y.o. male who presents complaining of both neck and back pain.  Today he reports his neck pain is most bothersome.  He reports muscle tightness and pain in the left trapezius and shoulder area.  He reports pain that radiates into the bilateral upper extremities with numbness and tingling.  He has been evaluated by Dr. Sierra with Neurosurgery who noted patient chris  candidate for ACDFs in he progresses.  Pt reports undergoing a cervical epidural steroid injection with Dr. Escamilla 02/10/2021 on with several months of relief.  He also reports low back pain.  He is s/p Left L3-4 hemilaminotomy, medial facetectomy, and foraminotomy for microdiscectomy with Dr Mcnamara on 9/19/2021.  He reports his back pain improved after surgery.  Patient reports he is previously completed physical therapy and is now actively continuing with his exercise program in the gym.  He also  reports trying to lose weight and reports intentional  weight loss of approximately 55 pounds since 2020.    Location: back, shoulder, and neck   Onset: 2 years  Current Pain Score: 5/10  Daily Pain of Range: 5-6/10  Quality: Aching, Tight, Tingling, Deep, Numb, Electric, Hot, and Cold  Radiation: neck and back  Worsened by: lying down, sitting, standing for more than 3 minutes, and walking for more than 6 minutes  Improved by: medications    Patient denies night fever/night sweats, urinary incontinence, bowel incontinence, significant weight loss, significant motor weakness, and loss of sensations.    Previous Interventions:  -12/02/2024 Lumbar Interlaminar Epidural Steroid Injection L5/S1  60-80%  - 08/26/2024 Lumbar Interlaminar Epidural Steroid Injection L4/L5 50-60%  -06/03/2024 Lumbar Interlaminar Epidural Steroid Injection L4/L5 (veer LEFT) 70-80%  - 04/17/2024:Cervical Interlaminar Epidural Steroid Injection C7/T1  -03/06/2024 Lumbar Interlaminar Epidural Steroid Injection L4/L5  - 11/03/2023 Cervical Interlaminar Epidural Steroid Injection C7/T1 5 months of pain relief  -9/27/2023  Lumbar Interlaminar Epidural Steroid Injection L4/L5   - 06/28/2023 - Cervical Interlaminar Epidural Steroid Injection C7/T1   - 02/10/2021 -  C6/7 CERVICAL EPIDURAL STEROID INJECTION - Dr Escamilla - 4 months of pain relief.   - 11/29/2023 -  right GTB and right intra-articular hip injections - 40% relief  - 03/06/2024 - lumbar interlaminar steroid injection at L4/L5 -80% pain relief    Previous Therapies:  PT/OT: yes   Chiropractor:   HEP: yes  Relevant Surgery: yes   -9/12/24 s/p C5-6 and C6-7 ACDF Dr. Mcnamara    - Reports L4/L5 surgery at age 31 yo   - 09/19/21 - Left MIS L3-4 hemilaminotomy, medial facetectomy, and foraminotomy for microdiscectomy with Dr Mcnamara.   Previous Medications:   - NSAIDS: Meloxicam  - Muscle Relaxants: Robaxin, Flexeril  - TCAs:   - SNRIs:   - Topicals:   - Anticonvulsants: Lyrica - reports RL sxs so  stopped; Gabapentin   - Opioids:   - Adjuvants: Tylenol    Current Pain Medications:  Gabapentin 300 mg QAM and 900 mg QHS  Meloxicam  Tylenol    Flexeril      Review of Systems:  Review of Systems   Musculoskeletal:  Positive for back pain and neck pain.       GENERAL:  No weight loss, malaise or fevers. +obesity +DM  HEENT:   No recent changes in vision or hearing  NECK:  No difficulty with swallowing. No stridor.   RESPIRATORY:  Negative for cough, wheezing or shortness of breath, patient denies any recent URI. +Asthma  CARDIOVASCULAR:  Negative for chest pain, leg swelling or palpitations.  GI/:  Negative for abdominal discomfort, blood in stools or black stools or change in bowel habits.   MUSCULOSKELETAL:  See HPI.  SKIN:  Negative for lesions, rash, and itching.  PSYCH:  No mood disorder or recent psychosocial stressors.  +anxiety +ADD  HEMATOLOGY/LYMPHOLOGY:  Negative for prolonged bleeding, bruising easily or swollen nodes.  Patient is not currently taking any anti-coagulants  NEURO:   No history of headaches, syncope, paralysis, seizures or tremors.  All other reviewed and negative other than HPI.    History:  Current medications, allergies, medical history, surgical history,   family history, and social history were reviewed in the chart as marked.    Full Medication List:    Current Outpatient Medications:     acetaminophen (TYLENOL) 650 MG TbSR, Take 1,300 mg by mouth daily as needed., Disp: , Rfl:     albuterol (PROVENTIL) 2.5 mg /3 mL (0.083 %) nebulizer solution, Take 3 mLs (2.5 mg total) by nebulization every 6 (six) hours as needed for Wheezing or Shortness of Breath. Rescue, Disp: 75 mL, Rfl: 3    albuterol (PROVENTIL/VENTOLIN HFA) 90 mcg/actuation inhaler, INHALE 2 PUFFS INTO THE LUNGS EVERY 6 HOURS AS NEEDED WHEEZING, Disp: 25.5 g, Rfl: 3    albuterol (VENTOLIN HFA) 90 mcg/actuation inhaler, Inhale 2 puffs into the lungs every 6 (six) hours as needed for Wheezing. Rescue, Disp: 8.5 g, Rfl: 0     atorvastatin (LIPITOR) 20 MG tablet, Take 1 tablet (20 mg total) by mouth once daily., Disp: 90 tablet, Rfl: 3    blood sugar diagnostic (ONETOUCH VERIO TEST STRIPS) Strp, Use to test blood glucose one (1) time a day,, Disp: 100 strip, Rfl: 3    blood-glucose meter Misc, use as directed, Disp: 1 each, Rfl: 0    buPROPion (WELLBUTRIN XL) 300 MG 24 hr tablet, Take 1 tablet (300 mg total) by mouth once daily., Disp: 90 tablet, Rfl: 1    clindamycin (CLEOCIN T) 1 % external solution, Apply to the affected area on buttock, under abdomen , left ear, right jaw 1-2 times a day as needed for flare, Disp: 60 mL, Rfl: 3    clobetasoL (TEMOVATE) 0.05 % cream, Apply to affected area 2 (two) times daily as needed for flare, Disp: 30 g, Rfl: 1    dexmethylphenidate (FOCALIN) 10 MG tablet, Take 1 tablet (10 mg total) by mouth 2 (two) times daily., Disp: 60 tablet, Rfl: 0    docusate sodium (COLACE) 100 MG capsule, Take 1 capsule (100 mg total) by mouth 2 (two) times daily., Disp: 30 capsule, Rfl: 1    ergocalciferol, vitamin D2, (VITAMIN D ORAL), Take 1 tablet by mouth every other day., Disp: , Rfl:     fluocinolone acetonide oiL (DERMOTIC OIL) 0.01 % Drop, Place 3 drops in ear(s) 2 (two) times daily., Disp: 20 mL, Rfl: 3    fluocinonide (LIDEX) 0.05 % external solution, apply to affected area of scalp daily as needed for itching, scaling, Disp: 60 mL, Rfl: 3    fluorouraciL (EFUDEX) 5 % cream, Apply topically to the affected area once daily., Disp: 40 g, Rfl: 2    fluticasone-salmeterol diskus inhaler 100-50 mcg, Inhale 1 puff into the lungs 2 (two) times daily. Controller, Disp: 60 each, Rfl: 11    HYDROcodone-acetaminophen (NORCO) 5-325 mg per tablet, Take 1 tablet by mouth every 6 (six) hours as needed for Pain., Disp: 30 tablet, Rfl: 0    hydrocortisone 2.5 % cream, APPLY EXTERNALLY TO THE AFFECTED AREA TWICE DAILY FOR 10 DAYS, Disp: 30 g, Rfl: 0    inulin (FIBER GUMMIES) 2 gram Chew, Take by mouth once daily. 2 gummies a  day, Disp: , Rfl:     ketoconazole (NIZORAL) 2 % cream, Apply 1 application  topically 2 (two) times daily. Apply to affected area up to 2 weeks, Disp: 30 g, Rfl: 2    lancets (ONETOUCH DELICA PLUS LANCET) 33 gauge Misc, Use to test blood glucose one (1) time a day,, Disp: 100 each, Rfl: 5    latanoprost 0.005 % ophthalmic solution, Place 1 drop into both eyes every evening., Disp: 7.5 mL, Rfl: 3    levocetirizine (XYZAL) 5 MG tablet, Take 1 tablet (5 mg total) by mouth every evening., Disp: 30 tablet, Rfl: 11    loperamide (IMODIUM) 2 mg capsule, Take 1 capsule (2 mg total) by mouth 4 (four) times daily as needed for Diarrhea., Disp: 20 capsule, Rfl: 0    LORazepam (ATIVAN) 0.5 MG tablet, Take 1 tablet (0.5 mg total) by mouth daily as needed (SIGNIFICANT ANXIETY)., Disp: 30 tablet, Rfl: 0    ondansetron (ZOFRAN) 8 MG tablet, Take 1 tablet (8 mg total) by mouth every 8 (eight) hours as needed for Nausea., Disp: 30 tablet, Rfl: 0    ondansetron (ZOFRAN-ODT) 8 MG TbDL, Dissolve 1 tablet (8 mg total) on the tongue every 8 (eight) hours as needed (nausea)., Disp: 30 tablet, Rfl: 3    oxyCODONE-acetaminophen (PERCOCET) 5-325 mg per tablet, Take 1 tablet by mouth every 6 (six) hours as needed for Pain., Disp: 30 tablet, Rfl: 0    predniSONE (DELTASONE) 10 MG tablet, Take 4 tablets by mouth on day 1, then 3 tablets on day 2, then 2 tablets on day 3, then 1 tablet on  day 4, Disp: 10 tablet, Rfl: 0    pregabalin (LYRICA) 200 MG Cap, Take 1 capsule (200 mg total) by mouth 2 (two) times daily., Disp: 60 capsule, Rfl: 5    rOPINIRole (REQUIP) 0.25 MG tablet, TAKE 1 TABLET(0.25 MG) BY MOUTH EVERY EVENING, Disp: 90 tablet, Rfl: 2    tamsulosin (FLOMAX) 0.4 mg Cap, Take 1 capsule (0.4 mg total) by mouth once daily., Disp: 30 capsule, Rfl: 11    tamsulosin (FLOMAX) 0.4 mg Cap, Take 2 capsules (0.8 mg total) by mouth once daily., Disp: 60 capsule, Rfl: 11    tirzepatide (MOUNJARO) 12.5 mg/0.5 mL PnIj, Inject 12.5 mg into the skin  every 7 days., Disp: 2 mL, Rfl: 5    ULTRA THIN LANCETS 30 gauge Misc, USE TO TEST BLOOD SUGAR EVERY DAY AS DIRECTED, Disp: 100 each, Rfl: 3    urea (CARMOL) 40 % Crea, Apply topically once daily., Disp: 225 g, Rfl: 2    Current Facility-Administered Medications:     oxymetazoline 0.05 % nasal spray 1 spray, 1 spray, Each Nostril, Q4H PRN, Melody Liu MD    Facility-Administered Medications Ordered in Other Visits:     0.9%  NaCl infusion, 500 mL, Intravenous, Continuous, Kolton Terrell MD     Allergies:  Patient has no known allergies.     Medical History:   has a past medical history of ADD (attention deficit disorder) (05/09/2012), Allergy, Anxiety (04/12/2023), Asthma (05/09/2012), Cervical disc disorder with radiculopathy, unspecified cervical region (09/14/2021), Cervical radiculopathy (05/22/2023), Cervical spondylosis with myelopathy (06/29/2022), Eczema, Esophageal ulcer, GERD (gastroesophageal reflux disease) (05/09/2012), Glaucoma, Kidney stones (05/2014), Lumbar disc disease (05/09/2012), Lumbar herniated disc (05/09/2012), Malignant melanoma of skin, unspecified (01/06/2022), Melanoma (01/2022), LUBA (obstructive sleep apnea), Radiculopathy, lumbar region (09/14/2021), Renal calculi (05/09/2012), and Type 2 diabetes mellitus without complication, without long-term current use of insulin (11/16/2020).    Surgical History:   has a past surgical history that includes Lumbar laminectomy (2010); Esophagogastroduodenoscopy (01/22/2019); Colonoscopy (01/22/2019); Appendectomy (2006); Epidural steroid injection (N/A, 02/10/2021); Esophagogastroduodenoscopy (N/A, 05/18/2021); Lumbar laminectomy with discectomy (Left, 09/20/2021); Carpal tunnel release (Right, 09/15/2022); Excision of ganglion of wrist (Right, 09/15/2022); Hand Arthrotomy (Right, 09/15/2022); Injection of steroid (Left, 09/15/2022); Flexor tendon repair (Right, 09/15/2022); Tympanostomy tube placement; Epidural steroid injection into cervical  spine (N/A, 6/28/2023); Cystoscopy (7/5/2023); Ureteral stent placement (Left, 7/5/2023); Ureteroscopy (Left, 7/5/2023); Laser lithotripsy (Left, 7/5/2023); Ureteroscopic removal of ureteric calculus (Left, 7/5/2023); Correction of hammer toe (Left, 8/14/2023); Epidural steroid injection into lumbar spine (N/A, 9/27/2023); Epidural steroid injection into cervical spine (N/A, 11/3/2023); Injection of joint (Right, 11/29/2023); Reconstruction of ligament (Left, 12/5/2023); Repair of extensor tendon (Left, 12/5/2023); Esophagogastroduodenoscopy (N/A, 12/21/2023); ph monitoring, esophagus, wireless, (off reflux meds) (N/A, 12/21/2023); Colonoscopy (N/A, 12/21/2023); Esophagogastroduodenoscopy (N/A, 2/22/2024); Epidural steroid injection into lumbar spine (N/A, 3/6/2024); Robot-assisted repair of hiatal hernia (N/A, 3/14/2024); Epidural steroid injection into cervical spine (N/A, 4/17/2024); Epidural steroid injection into lumbar spine (N/A, 6/3/2024); Cautery of turbinates (Bilateral, 7/15/2024); Nasal septoplasty (N/A, 7/15/2024); Epidural steroid injection into lumbar spine (N/A, 8/26/2024); Anterior cervical discectomy w/ fusion (N/A, 9/12/2024); Cystoscopy (N/A, 9/26/2024); incision, contracture, bladder neck, transurethral (N/A, 11/8/2024); Epidural steroid injection into lumbar spine (N/A, 12/2/2024); and Open reduction and internal fixation (ORIF) of injury of finger (Right, 3/13/2025).    Family History:  family history includes Allergic rhinitis in his father, mother, sister, and sister; Asthma in his father, sister, and sister; Brain cancer in his maternal grandmother; Breast cancer in his paternal grandmother; Chronic back pain in his father; Diabetes in his maternal grandfather; MARTITA disease in his father; Hypertension in his mother; Melanoma in his father; No Known Problems in his brother, maternal aunt, maternal uncle, paternal aunt, paternal grandfather, and paternal uncle; Sleep apnea in his father and  mother; Transient ischemic attack in his mother.    Social History:   reports that he quit smoking about 7 years ago. His smoking use included cigarettes. He has never been exposed to tobacco smoke. He has never used smokeless tobacco. He reports current alcohol use of about 3.0 standard drinks of alcohol per week. He reports that he does not use drugs.    Physical Exam:  There were no vitals filed for this visit.      PHYSICAL EXAMINATION:  GEN: No acute distress. Calm, comfortable  HENT: Normocephalic, atraumatic, moist mucous membranes  EYE: Anicteric sclera, non-injected.   CV: Non-diaphoretic.   RESP: Breathing comfortably. Chest expansion symmetric.  PSYCH: Pleasant mood and appropriate affect. Recent and remote memory intact.       GENERAL: Well appearing, in no acute distress, alert and oriented x3.  PSYCH:  Mood and affect appropriate.  SKIN: Skin color, texture, turgor normal, no rashes or lesions.  HEAD/FACE:  Normocephalic, atraumatic. Cranial nerves grossly intact.  NECK:  Mildly decreased ROM secondary to pain. + pain to palpation over the cervical paraspinous muscles and bilateral trapezius muscles.  Spurling positive on the right.   CV: RRR with palpation of the radial artery.  PULM: No evidence of respiratory difficulty, symmetric chest rise.  GI:  Soft and non-distended.  MSK: No obvious deformities, edema, or skin discoloration.  No atrophy or tone abnormalities are noted.   NEURO: + tremor noted in the right hand, particularly the 4th and 5th digit.  Bilateral upper and lower extremity coordination and strength is symmetric.  No loss of sensation is noted.  MENTAL STATUS: A x O x 3, good concentration, speech is fluent and goal directed  MOTOR: 5/5 in all muscle groups  GAIT: normal.ambulates unassisted.       Imaging:  XR CERVICAL SPINE AP LAT WITH FLEX EXTEN     CLINICAL HISTORY:  Other spondylosis with myelopathy, cervical region     TECHNIQUE:  Three views of the cervical spine plus flexion  and extension views were performed.     COMPARISON:  September 8, 2022     FINDINGS:  As before, suboptimal visualization of the cervicothoracic junction due to superimposition of shoulder and chest wall soft tissues.  Straightening of normal cervical lordosis.  No acute fractures.  Unremarkable predental space.  No widening prevertebral soft tissues.  No anterior or retrolisthesis.  Flexion and extension views demonstrate no instability.  Reconfirmed areas of anterior longitudinal ligament calcification adjacent to C4-C5 and C5-C6 disc.  Reconfirmed findings of uncovertebral spurring and endplate osteophytes at preserved C4-C5 level and mildly narrowed C5-C6 level and mild to moderately narrowed C6-C7 level.  Some stable scattered mild facet arthropathic changes.  Intact odontoid tip and unremarkable C1-C2 articulation.  Cervical spine findings do not appear significantly changed to earlier exam.     Electronically signed by: Giovanny Busby  Date:                                            12/13/2022      MRI CERVICAL SPINE WITHOUT CONTRAST     CLINICAL HISTORY:  Arm pain, paresthesias, clumsiness;.  Cervical disc disorder with radiculopathy, unspecified cervical region     TECHNIQUE:  Multiplanar, multisequence MR images of the cervical spine were acquired without the administration of contrast.     COMPARISON:  Cervical spine MRI 01/12/2021     FINDINGS:  Straightening of the normal cervical lordosis.  No spondylolisthesis.     No compression fractures.  No marrow replacing lesions.     Multilevel degenerative changes with disc desiccation and disc space narrowing, described in detail below.  No bone marrow edema.     Visualized structures in the posterior fossa are unremarkable. The cervical spinal cord is unremarkable.     Paraspinal soft tissues are unremarkable.     SIGNIFICANT FINDINGS BY LEVEL:     C2-3: Small disc osteophyte complex with central annular fissure.  No canal or foraminal stenosis.     C3-4:  Disc osteophyte complex with superimposed central extrusion.  Mild canal stenosis with partial effacement of the thecal sac.  Mild bilateral foraminal stenosis.     C4-5: Disc osteophyte complex.  Moderate canal stenosis with near complete effacement of the thecal sac and flattening of the cervical cord.  Moderate bilateral foraminal stenosis.     C5-6: Disc osteophyte complex, eccentric to the right.  Severe canal stenosis with complete effacement of the thecal sac and flattening of the cervical cord.  Severe right and moderate left foraminal stenosis.     C6-7: Disc osteophyte complex.  Moderate canal stenosis.  Moderate bilateral foraminal stenosis.     C7-T1: Disc osteophyte complex with superimposed right foraminal extrusion.  Mild canal stenosis.  Mild right foraminal stenosis.     Impression:     Multilevel degenerative changes, most advanced at C5-6 where there is severe canal stenosis and severe right and moderate left foraminal stenosis.  Overall, degenerative changes have progressed from 01/12/2021.     This report was flagged in Epic as abnormal.     Electronically signed by: Horace Quiroz  Date:                                            06/16/2022      XR LUMBAR SPINE AP AND LAT WITH FLEX/EXT     CLINICAL HISTORY:  Other specified postprocedural states     TECHNIQUE:  AP and lateral views as well as lateral flexion and extension images are performed through the lumbar spine.     COMPARISON:  Non 06/16/2022 MRI of the lumbar spine e     FINDINGS:  Patient had previous a L3/L4 hemilaminectomy on the left side.     Mild DJD and mild lumbar scoliosis.  The lumbar intervertebral disc spaces are slightly narrowed.  Few mm L2/L3 and L3/L4 retrolisthesis noted.  No fracture or dislocation.  No bone destruction identified     Electronically signed by: Xander Rubio MD  Date:                                            12/13/2022    MRI LUMBAR SPINE WITHOUT CONTRAST     CLINICAL HISTORY:  Lumbar radiculopathy,  symptoms persist with conservative treatment;Low back pain, prior surgery, new symptoms; Dorsalgia, unspecified     TECHNIQUE:  Multiplanar, multisequence MR images were acquired from the thoracolumbar junction to the sacrum without the administration of contrast.     COMPARISON:  MRI lumbar spine 09/19/2021     FINDINGS:  Sequences are degraded by patient motion artifact.     Transitional lumbosacral anatomy with lumbarization of S1.     Postoperative changes from left L3-4 hemilaminectomy and micro discectomy.  Mild protrusion of the thecal sac and cauda equina nerve roots into the laminectomy defect.  No fluid collections in the operative bed.     No spondylolisthesis.     No compression fractures.  Small fat containing lesion in the L3 vertebral body, likely a hemangioma.     Multilevel degenerative changes with disc desiccation and disc space narrowing, described in detail below.  No significant bone marrow edema.     The conus medullaris has a normal appearance and terminates at the L2 level.  Cauda equina nerve roots are unremarkable.     Paraspinal muscle atrophy.     SIGNIFICANT FINDINGS BY LEVEL:     T12-L1: Mild disc bulge.  Minimal canal stenosis.  No foraminal stenosis.     L1-2: Left facet arthropathy.  No canal or foraminal stenosis.     L2-3: Disc bulge.  Bilateral facet arthropathy and ligamentum flavum thickening.  Mild canal stenosis.  Mild left foraminal stenosis.     L3-4: Residual disc bulge versus granulation tissue.  The previous disc fragment migrating inferiorly is no longer seen.  Bilateral facet arthropathy and ligamentum flavum thickening.  Mild canal stenosis with narrowing of both subarticular zones and abutment of the left descending L4 nerve root.  Mild right and moderate left foraminal stenosis.     L4-5: Disc bulge.  Bilateral facet arthropathy and ligamentum flavum thickening.  Mild canal stenosis.  Moderate bilateral foraminal stenosis.     L5-S1: Disc bulge.  Bilateral facet  arthropathy and ligamentum flavum thickening.  Mild canal stenosis.  Moderate bilateral foraminal stenosis.     Impression:     Postoperative changes from left L3-4 hemilaminectomy and micro discectomy.  The previous disc fragment migrating inferiorly is no longer seen.  There is a residual disc bulge/granulation tissue contributing to mild canal stenosis and moderate left foraminal stenosis.     Small meningocele at the laminectomy defect.     Degenerative changes elsewhere in the spine resulting in mild canal stenosis and moderate foraminal stenosis at multiple levels as described above.        Electronically signed by: Horace Quiroz  Date:                                            06/16/2022    EXAMINATION:  MRI LUMBAR SPINE WITHOUT CONTRAST     CLINICAL HISTORY:  Low back pain, prior surgery, new symptoms; Dorsalgia, unspecified     TECHNIQUE:  Multiplanar, multisequence MR images were acquired from the thoracolumbar junction to the sacrum without the administration of contrast.     COMPARISON:  MRI lumbar spine 06/16/2023     FINDINGS:  Postoperative change left L3-L4 hemilaminectomy and discectomy.  Mild protrusion of the thecal sac and cauda equina nerve roots into the laminectomy defect similar to prior.     Alignment: Normal.     Vertebrae: Endplate degenerative change L5-S1.  Small may angioma L3 vertebral body.  No acute fracture.  No infiltrative marrow process.     Discs: Multilevel disc height loss and desiccation most pronounced at L5-S1.     Cord: Normal.  Conus terminates at L1.     Soft tissue structures are unremarkable.  Limited evaluation of the retroperitoneal organs demonstrates no significant abnormalities.  Paraspinal musculature demonstrates normal bulk and signal intensity.     Degenerative findings:     T12-L1: Mild circumferential disc bulge no neural foraminal narrowing or spinal canal stenosis.     L1-L2: No neural foraminal narrowing or spinal canal stenosis.     L2-L3: Circumferential  disc bulge.  Bilateral facet arthropathy.  No neural foraminal narrowing.  Mild spinal canal stenosis.     L3-L4: Postop change.  Circumferential disc bulge.  Bilateral facet arthropathy.  Moderate left and mild right neural foraminal narrowing.  Mild spinal canal stenosis     L4-L5: Circumferential disc bulge.  Bilateral facet arthropathy.  Moderate right and severe left neural foraminal narrowing.  Mild to moderate spinal canal stenosis.     L5-S1: Circumferential disc bulge.  Bilateral facet arthropathy.  Moderate bilateral neural foraminal narrowing.  Mild spinal canal stenosis.     Impression:     1. Postoperative change left L3-L4 hemilaminectomy and discectomy.  2. Multilevel degenerative change of the lumbar spine as above not significantly changed from prior MRI 06/16/2023, noting severe left foraminal narrowing at L4-5.     Electronically signed by resident: Hammad Quinn  Date:                                            10/23/2023  Labs:  BMP  Lab Results   Component Value Date     10/24/2024    K 4.2 10/24/2024     10/24/2024    CO2 27 10/24/2024    BUN 17 10/24/2024    CREATININE 0.8 10/24/2024    CALCIUM 9.6 10/24/2024    ANIONGAP 8 10/24/2024    EGFRNORACEVR >60.0 10/24/2024     Lab Results   Component Value Date    ALT 22 10/24/2024    AST 19 10/24/2024    ALKPHOS 101 10/24/2024    BILITOT 0.6 10/24/2024     Lab Results   Component Value Date    WBC 8.10 09/15/2024    HGB 14.1 09/15/2024    HCT 41.5 09/15/2024    MCV 91 09/15/2024     09/15/2024         Assessment:  Problem List Items Addressed This Visit    None        04/20/2032-  Hermelindo CURT Rafaelgregkelby SMALLWOOD is a 44 y.o. male who  has a past medical history of ADD (attention deficit disorder) (05/09/2012), Allergy, Anxiety (04/12/2023), Asthma (05/09/2012), Cervical disc disorder with radiculopathy, unspecified cervical region (09/14/2021), Cervical radiculopathy (05/22/2023), Cervical spondylosis with myelopathy (06/29/2022),  Eczema, Esophageal ulcer, GERD (gastroesophageal reflux disease) (05/09/2012), Glaucoma, Kidney stones (05/2014), Lumbar disc disease (05/09/2012), Lumbar herniated disc (05/09/2012), Malignant melanoma of skin, unspecified (01/06/2022), Melanoma (01/2022), LUBA (obstructive sleep apnea), Radiculopathy, lumbar region (09/14/2021), Renal calculi (05/09/2012), and Type 2 diabetes mellitus without complication, without long-term current use of insulin (11/16/2020).  By history and examination this patient has chronicneck pain with bilateral radiculopathy as well as low back pain s/p 9/19/2021 Left L3-4 hemilaminotomy, medial facetectomy, and foraminotomy for microdiscectomy with Dr Mcnamara with some improvement. The underlying cause cause is facet arthritis, degenerative disc disease, foraminal stenosis, central canal stenosis, muscle dysfunction, and deconditioning.  MRI of the cervical spine was significant for multilevel degenerative changes, most advanced at C5-6 where there is severe canal stenosis and severe right and moderate left foraminal stenosis. MRI of the Lumbar spine post surgery was significant for residual disc bulge/granulation tissue contributing to mild canal stenosis and moderate left foraminal stenosis at L3/L4 and degenerative changes elsewhere in the spine resulting in mild canal stenosis and moderate foraminal stenosis at multiple levels.  We discussed the underlying diagnoses and multiple treatment options including non-opioid medications, interventional procedures, physical therapy, home exercise, core muscle enhancement, and weight loss.  He previously underwent a cervical steroid injection with several months of relief.  Plan for repeat of cervical epidural steroid injection.  The risks and benefits of each treatment option were discussed and all questions were answered.        11/13/2023 - Hermelindo Garza III presents for evaluation of right hip pain. His exam was consistent with Right  GTB bursitis and right hip joint pain.  I will schedule him for a right GTB and Right intraarticular hip injection and defer the LESI for now.  Pt tearful and anxious throughout exam.  He is established with a Psychiatrist.  We discussed referral Psychology for possible CBT and assistance with coping strategies for chronic pain.     12/19/2023 - Hermelindo Garza III presents today for follow-up.  He is status post  right GTB and right intra-articular hip injections on 11/29/2023 and notes improvement in his GTB and hip pain.  Today he complains of pain extending from the area overlying the right hip joint and extending down the anterolateral portion of the thigh  in the distribution of the tensor fascia ashtyn.   Patient has multiple pain generators  as well as concurrent anxiety and mood disorder.  I feel that he would be an excellent candidate for our functional restoration program.  Discussed with patient today during visit.  I will place a referral to the functional restoration program.   I also discussed with the patient that it would be a good to have him see Rheumatology given his extensive pain history, as perhaps they can offer their expertise.     03/28/2024 - Hermelindo Garza III presents today for follow up.  He was status post lumbar epidural steroid injection at L4/L5 on 03/06/2024 with 70-80% relief of his low back symptoms.  His cervical radicular symptoms have started to return.  Most recent cervical epidural gave him over 5 months of pain relief.  He would like to repeat the procedure.  I will schedule from a repeat cervical epidural steroid injection.  I will also have the patient increase his gabapentin to 300 mg 4 times daily.  He will let me know when he needs a refill of the medication.      04/30/2024-Hermelindo Garza III is a 44 y.o. male who  has a past medical history of ADD (attention deficit disorder) (05/09/2012), Allergy, Anxiety (04/12/2023), Asthma (05/09/2012), Cervical  disc disorder with radiculopathy, unspecified cervical region (09/14/2021), Cervical radiculopathy (05/22/2023), Cervical spondylosis with myelopathy (06/29/2022), Eczema, Esophageal ulcer, GERD (gastroesophageal reflux disease) (05/09/2012), Glaucoma, Kidney stones (05/2014), Lumbar disc disease (05/09/2012), Lumbar herniated disc (05/09/2012), Malignant melanoma of skin, unspecified (01/06/2022), Melanoma (01/2022), LUBA (obstructive sleep apnea), Radiculopathy, lumbar region (09/14/2021), Renal calculi (05/09/2012), and Type 2 diabetes mellitus without complication, without long-term current use of insulin (11/16/2020).  By history and examination this patient has chronicneck pain with bilateral radiculopathy in the distribution of C5 and C6.  The underlying cause cause is facet arthritis, degenerative disc disease, foraminal stenosis, and central canal stenosis.  Pathology is confirmed by imaging.  We discussed the underlying diagnoses and multiple treatment options including non-opioid medications, interventional procedures, physical therapy, home exercise, core muscle enhancement, activity modification, and weight loss.  The risks and benefits of each treatment option were discussed and all questions were answered.    He was recently provided a cervical NELI that provided him excellent relief he did reach out to Neurosurgery a new CT lumbar was ordered to rule out any new pathology.  The patient will follow up with our office to possibly repeat injections in the future.    05/30/2024 - Hermelindo Garza III  presents today with worsening right-sided cervical radicular symptoms over the last 4 weeks.  Given the worsening of his symptoms and development of new tremor in the right upper extremity, I am obtaining an updated MRI of the cervical spine.   He is established with Dr. Mcnamara with Neurosurgery, and at his last visit it was noted that he is a candidate for cervical surgery if his symptoms progressed in  the areas of nerve root compression.  I am recommending that he follow up with Dr. Mcnamara after updated imaging is obtained.  I am happy to repeat a cervical epidural for this patient, but I am concerned about the number of steroid injections he has required for both his back and neck pain thus far.  We discussed that these steroids can affect blood sugar, blood pressure and over time can cause bone weakening.  In the meantime the patient would like to try Lyrica again.  We will discontinue gabapentin and I will prescribe him Lyrica 150 mg b.I.d. Referral placed to Neurology to assist with workup of new onset right upper extremity tremor.    06/25/2024- 44-year-old male with a history of chronic low back and neck pain with radiculopathy symptoms in both his arms and legs.  We recently provided him a lumbar NELI targeting L4-5 that resulted in 70 80% relief and improvement in his functionality.  His worst pain at this point is his neck pain with radiculopathy.  He and I discussed that he will revisit cervical decompressive surgery with Dr. Mcnamara  in a few weeks as his symptoms have continued and on some level gotten a little worse.  He denies any profound weakness denies any bowel or bladder dysfunction at this time.  Discussed with patient that he can reach out to me at any point for support and for any problems moving forward.    09/05/2024-Hermelindo Garza III is a 44 y.o. male who  has a past medical history of ADD (attention deficit disorder) (05/09/2012), Allergy, Anxiety (04/12/2023), Asthma (05/09/2012), Cervical disc disorder with radiculopathy, unspecified cervical region (09/14/2021), Cervical radiculopathy (05/22/2023), Cervical spondylosis with myelopathy (06/29/2022), Eczema, Esophageal ulcer, GERD (gastroesophageal reflux disease) (05/09/2012), Glaucoma, Kidney stones (05/2014), Lumbar disc disease (05/09/2012), Lumbar herniated disc (05/09/2012), Malignant melanoma of skin, unspecified (01/06/2022),  Melanoma (01/2022), LUBA (obstructive sleep apnea), Radiculopathy, lumbar region (09/14/2021), Renal calculi (05/09/2012), and Type 2 diabetes mellitus without complication, without long-term current use of insulin (11/16/2020).  By history and examination this patient has chronic low back pain with radiculopathy.  The underlying cause cause is facet arthritis, degenerative disc disease, foraminal stenosis, muscles strain, and deconditioning.  Pathology is confirmed by imaging.  We discussed the underlying diagnoses and multiple treatment options including non-opioid medications, interventional procedures, physical therapy, core muscle enhancement, activity modification, and weight loss.  The risks and benefits of each treatment option were discussed and all questions were answered.      12/12/2024-Hermelindo Garza III is a 44 y.o. male who  has a past medical history of ADD (attention deficit disorder) (05/09/2012), Allergy, Anxiety (04/12/2023), Asthma (05/09/2012), Cervical disc disorder with radiculopathy, unspecified cervical region (09/14/2021), Cervical radiculopathy (05/22/2023), Cervical spondylosis with myelopathy (06/29/2022), Eczema, Esophageal ulcer, GERD (gastroesophageal reflux disease) (05/09/2012), Glaucoma, Kidney stones (05/2014), Lumbar disc disease (05/09/2012), Lumbar herniated disc (05/09/2012), Malignant melanoma of skin, unspecified (01/06/2022), Melanoma (01/2022), LUBA (obstructive sleep apnea), Radiculopathy, lumbar region (09/14/2021), Renal calculi (05/09/2012), and Type 2 diabetes mellitus without complication, without long-term current use of insulin (11/16/2020).  By history and examination this patient has chronic low back pain with radiculopathy.  The underlying cause cause is degenerative disc disease, foraminal stenosis, central canal stenosis, muscle dysfunction, muscles strain, and deconditioning.  Pathology is confirmed by imaging.  We discussed the underlying diagnoses and  multiple treatment options including non-opioid medications, interventional procedures, physical therapy, home exercise, core muscle enhancement, activity modification, and weight loss.  The risks and benefits of each treatment option were discussed and all questions were answered.        04/15[birth year]0790-42-dctu-old male with a history of chronic low back with radiculopathy left greater than right.  Underlying cause it is degenerative disc disease, foraminal stenosis, central canal stenosis muscle dysfunction muscle strain and deconditioning.  Patient and I discussed underlying diagnosis of multiple treatment options.  See plan agreed upon below.  The risks and benefits of each treatment option were discussed all questions were answered.    Treatment Plan:   Procedures:  s/f a lumbar NELI targeting L5-S1 steer left   PT/OT/HEP: I have stressed the importance of physical activity and a home exercise plan to help with pain and improve health.  He is previously completed physical therapy.  I feel the patient would significantly benefit from enrollment in the functional restoration program.  Referral placed.    Medications:  - Continue Lyrica 150 mg b.i.d.. one in am and one pill at night no refills requested.    - continue with meloxicam 15 mg q.d.   - continue with p.r.n. Tylenol   -  Reviewed and consistent with medication use as prescribed.  Imaging: reviewed and discussed   Recommend pt continue to follow up with Dr. Mcnamara (neurosurgery)  Follow Up:  2-3 weeks p injection for ongoing care    Hung Prieto NP-C  Interventional Pain Management      Disclaimer: This note was partly generated using dictation software which may occasionally result in transcription errors.

## 2025-04-15 NOTE — H&P (VIEW-ONLY)
[facility]  Inter[facility] -  Established Patient Follow-up  telemedicine Encounter    Telemedicine Bundle:  The patient location is: patient's home  The chief complaint leading to consultation is: Neck Pain, Pain, Foot Pain (Left big toe, and ball of his foot. ), Low-back Pain, and Leg Pain  Visit type: Virtual visit with synchronous audio and video  Total time spent with patient: 20 minutes   Each patient to whom he or she provides medical services by telemedicine is:    (1) informed of the relationship between the physician and patient and the respective role of any other health care provider with respect to management of the patient  (2) notified that he or she may decline to receive medical services by telemedicine and may withdraw from such care at any time.        Referred by: Dr. Suzi Sanches   Reason for referral: * No diagnoses found *     CC:   Chief Complaint   Patient presents with    Neck Pain    Pain    Foot Pain     Left big toe, and ball of his foot.     Low-back Pain    Leg Pain         9/5/2024    11:21 AM 5/30/2024     8:40 AM 3/28/2024     1:12 PM   Last 3 PDI Scores   Pain Disability Index (PDI) 36 30 36     Interval History 04/15/2025:  44-year-old male presents virtually for a follow-up appointment he is reporting returning low back and bilateral leg pain he would like patient considered for repeat lumbar NELI she has a history of lumbar NELI targeting L5-S1 which significantly reduced his symptoms by 60 70%.  He reports worst pain on the left radiating into his left toe.  Additionally he reported pain in the  area between knuckles (hand and middle knuckle) of my left middle finger.  He reports pain during the day but then a lot more during the night and in the morning, where my hand is weak then it fades. He is status post C5-6 and C6-7 ACDF on 9/12/24 with Dr. Mcnamara.  He did reach out to Neurosurgery regarding his hand pain the etiology is unclear at this point for now we will  focus on his low back and  leg pain.      Interval history 12/12/2024  44-year-old male presents for a virtual follow up appointment today he is status post a lumbar NELI targeting L5-S1 on 12/02/2024 patient is reporting 60 70% relief of his symptoms with improvement in functionality.  Today patient denies any new pain denies any profound weakness denies any bowel bladder dysfunction at this time patient is status post C5-6 and C6-7 ACDF on 9/12/24 with Dr. Mcnamara.  Patient reports recovering well from previous neck surgery.       Interval Update 9/05/2024: Patient return to clinic for follow up on low back pain.  He is status post a lumbar NELI targeting L4-5 on 08/26/2024 that provided 50 60% relief of his symptoms.  He does still complain of left lower extremity pain to his left toe which is positional.  He is scheduled for upcoming anterior cervical fusion surgery with Dr. Mcnamara on 09/12/2024.  He denies any profound weakness denies bowel or bladder dysfunction denies any saddle anesthesia at this time.    Interval history 06/25/2024  44-year-old male presents virtually he has status post a lumbar NELI targeting L4-5 on 06/03/2024 reporting 70-80% relief of his low back pain,  he reports intermittent numbness in his left toe.  His primary concern is his chronic neck pain with radiculopathy.  He has previously been given cervical NELI targeting C7-T1 with relief however his symptoms continue and are disrupting his quality of life.  He has a follow up with Dr. Shen later this month to discuss surgical decompression surgery for his chronic neck and radiculopathy pain.  He he did report intermittent weakness in his arms and dropping things      Interval update 05/30/24:  43-year-old male that presents today in clinic with 4 weeks of worsening right neck pain that radiates into the right  arm and down into the right 4th and 5th digits of the hand.   He also reports new tremor in the right hand.  No recent falls or  trauma.  Patient reports pain is worse with lying on the right side and with range of motion of the neck.   He has a history of right-sided neck pain but symptoms into the arm and hand tremor are new. He is taking Gabapentin 300 mg QAM and 900 mg QHS with minimal relief.  He is requesting to switch to Lyrica.   He also requests a refill of his Flexeril. He rates his pain 5/10.       Interval History 4/30/2024:  43-year-old male that presents virtually for a follow-up appointment he is status post a cervical NELI targeting C7-T1 on 04/17/2024 that provided significant relief in his symptoms 70-90%.  He is known to our clinic and has been previously provided this injection which has helped significantly.  Is now reporting low back pain that has gradually increased he did reach out to his previous surgeon Dr. Shen  ordered a recent CT lumbar.  He denies any bowel bladder dysfunction denies any saddle anesthesia denies any recent trauma.  He has been provided a lumbar NELI targeting L4-5 for low back and radicular symptoms, we discussed that we may need to repeat this in the future pending CT lumbar scan.      Interval update 03/28/24:   Patient returns today for post procedure follow up and follow up of his neck and back pain.  He is s/p  Lumbar Interlaminar Epidural Steroid Injection L4/L5 on 03/06/2024.  He reports 70-80% relief of his low back pain and radicular symptoms.  Today he reports his cervical radicular symptoms are starting to returned.  He previously underwent cervical epidural steroid injection at C7/T1 on 11/03/2023 with significant improvement in his symptoms.  He reports almost 5 months of pain relief following a cervical epidural.  He would like to repeat this procedure.  In the meantime he is increased his gabapentin to 300 mg in the morning and 600 mg in the evening.  He does feel like this has helped with his pain.  He was also increased his exercise and has lost weight which he feels has been  beneficial as well.      Interval update 12/19/23  Patient returns today for follow up.  He is status post right GTB and right intra-articular hip injections on 11/29/2023 and notes improvement in his GTB and hip pain.  Today he complains of pain extending from the area overlying the right hip joint and extending down the anterolateral portion of the thigh.  This pain is worse with activity.  Pain improves with lying flat.  He rates his pain 7/10 today.      Of note, patient recently underwent left lateral collateral ligament reconstruction for left radial collateral ligament tear on 12/05/2023 with Dr. Marshall.    11/13/23 Pt returns today complaining of right hip and right leg pain. Pain is located over the lateral right hip and radiates into the groin.  Pain started approximately 1 month ago. Pain has not improved with stretching and HEP.  He states his hip pain is worse than his lower back pain for which he is scheduled to have an NELI at the end of the month. He would like to address the hip pain first.      Subjective:   Hermelindo Garza III is a 44 y.o. male who presents complaining of both neck and back pain.  Today he reports his neck pain is most bothersome.  He reports muscle tightness and pain in the left trapezius and shoulder area.  He reports pain that radiates into the bilateral upper extremities with numbness and tingling.  He has been evaluated by Dr. Sierra with Neurosurgery who noted patient chris  candidate for ACDFs in he progresses.  Pt reports undergoing a cervical epidural steroid injection with Dr. Escamilla 02/10/2021 on with several months of relief.  He also reports low back pain.  He is s/p Left L3-4 hemilaminotomy, medial facetectomy, and foraminotomy for microdiscectomy with Dr Mcnamara on 9/19/2021.  He reports his back pain improved after surgery.  Patient reports he is previously completed physical therapy and is now actively continuing with his exercise program in the gym.  He also  reports trying to lose weight and reports intentional  weight loss of approximately 55 pounds since 2020.    Location: back, shoulder, and neck   Onset: 2 years  Current Pain Score: 5/10  Daily Pain of Range: 5-6/10  Quality: Aching, Tight, Tingling, Deep, Numb, Electric, Hot, and Cold  Radiation: neck and back  Worsened by: lying down, sitting, standing for more than 3 minutes, and walking for more than 6 minutes  Improved by: medications    Patient denies night fever/night sweats, urinary incontinence, bowel incontinence, significant weight loss, significant motor weakness, and loss of sensations.    Previous Interventions:  -12/02/2024 Lumbar Interlaminar Epidural Steroid Injection L5/S1  60-80%  - 08/26/2024 Lumbar Interlaminar Epidural Steroid Injection L4/L5 50-60%  -06/03/2024 Lumbar Interlaminar Epidural Steroid Injection L4/L5 (veer LEFT) 70-80%  - 04/17/2024:Cervical Interlaminar Epidural Steroid Injection C7/T1  -03/06/2024 Lumbar Interlaminar Epidural Steroid Injection L4/L5  - 11/03/2023 Cervical Interlaminar Epidural Steroid Injection C7/T1 5 months of pain relief  -9/27/2023  Lumbar Interlaminar Epidural Steroid Injection L4/L5   - 06/28/2023 - Cervical Interlaminar Epidural Steroid Injection C7/T1   - 02/10/2021 -  C6/7 CERVICAL EPIDURAL STEROID INJECTION - Dr Escamilla - 4 months of pain relief.   - 11/29/2023 -  right GTB and right intra-articular hip injections - 40% relief  - 03/06/2024 - lumbar interlaminar steroid injection at L4/L5 -80% pain relief    Previous Therapies:  PT/OT: yes   Chiropractor:   HEP: yes  Relevant Surgery: yes   -9/12/24 s/p C5-6 and C6-7 ACDF Dr. Mcnamara    - Reports L4/L5 surgery at age 29 yo   - 09/19/21 - Left MIS L3-4 hemilaminotomy, medial facetectomy, and foraminotomy for microdiscectomy with Dr Mcnamara.   Previous Medications:   - NSAIDS: Meloxicam  - Muscle Relaxants: Robaxin, Flexeril  - TCAs:   - SNRIs:   - Topicals:   - Anticonvulsants: Lyrica - reports RL sxs so  stopped; Gabapentin   - Opioids:   - Adjuvants: Tylenol    Current Pain Medications:  Gabapentin 300 mg QAM and 900 mg QHS  Meloxicam  Tylenol    Flexeril      Review of Systems:  Review of Systems   Musculoskeletal:  Positive for back pain and neck pain.       GENERAL:  No weight loss, malaise or fevers. +obesity +DM  HEENT:   No recent changes in vision or hearing  NECK:  No difficulty with swallowing. No stridor.   RESPIRATORY:  Negative for cough, wheezing or shortness of breath, patient denies any recent URI. +Asthma  CARDIOVASCULAR:  Negative for chest pain, leg swelling or palpitations.  GI/:  Negative for abdominal discomfort, blood in stools or black stools or change in bowel habits.   MUSCULOSKELETAL:  See HPI.  SKIN:  Negative for lesions, rash, and itching.  PSYCH:  No mood disorder or recent psychosocial stressors.  +anxiety +ADD  HEMATOLOGY/LYMPHOLOGY:  Negative for prolonged bleeding, bruising easily or swollen nodes.  Patient is not currently taking any anti-coagulants  NEURO:   No history of headaches, syncope, paralysis, seizures or tremors.  All other reviewed and negative other than HPI.    History:  Current medications, allergies, medical history, surgical history,   family history, and social history were reviewed in the chart as marked.    Full Medication List:    Current Outpatient Medications:     acetaminophen (TYLENOL) 650 MG TbSR, Take 1,300 mg by mouth daily as needed., Disp: , Rfl:     albuterol (PROVENTIL) 2.5 mg /3 mL (0.083 %) nebulizer solution, Take 3 mLs (2.5 mg total) by nebulization every 6 (six) hours as needed for Wheezing or Shortness of Breath. Rescue, Disp: 75 mL, Rfl: 3    albuterol (PROVENTIL/VENTOLIN HFA) 90 mcg/actuation inhaler, INHALE 2 PUFFS INTO THE LUNGS EVERY 6 HOURS AS NEEDED WHEEZING, Disp: 25.5 g, Rfl: 3    albuterol (VENTOLIN HFA) 90 mcg/actuation inhaler, Inhale 2 puffs into the lungs every 6 (six) hours as needed for Wheezing. Rescue, Disp: 8.5 g, Rfl: 0     atorvastatin (LIPITOR) 20 MG tablet, Take 1 tablet (20 mg total) by mouth once daily., Disp: 90 tablet, Rfl: 3    blood sugar diagnostic (ONETOUCH VERIO TEST STRIPS) Strp, Use to test blood glucose one (1) time a day,, Disp: 100 strip, Rfl: 3    blood-glucose meter Misc, use as directed, Disp: 1 each, Rfl: 0    buPROPion (WELLBUTRIN XL) 300 MG 24 hr tablet, Take 1 tablet (300 mg total) by mouth once daily., Disp: 90 tablet, Rfl: 1    clindamycin (CLEOCIN T) 1 % external solution, Apply to the affected area on buttock, under abdomen , left ear, right jaw 1-2 times a day as needed for flare, Disp: 60 mL, Rfl: 3    clobetasoL (TEMOVATE) 0.05 % cream, Apply to affected area 2 (two) times daily as needed for flare, Disp: 30 g, Rfl: 1    dexmethylphenidate (FOCALIN) 10 MG tablet, Take 1 tablet (10 mg total) by mouth 2 (two) times daily., Disp: 60 tablet, Rfl: 0    docusate sodium (COLACE) 100 MG capsule, Take 1 capsule (100 mg total) by mouth 2 (two) times daily., Disp: 30 capsule, Rfl: 1    ergocalciferol, vitamin D2, (VITAMIN D ORAL), Take 1 tablet by mouth every other day., Disp: , Rfl:     fluocinolone acetonide oiL (DERMOTIC OIL) 0.01 % Drop, Place 3 drops in ear(s) 2 (two) times daily., Disp: 20 mL, Rfl: 3    fluocinonide (LIDEX) 0.05 % external solution, apply to affected area of scalp daily as needed for itching, scaling, Disp: 60 mL, Rfl: 3    fluorouraciL (EFUDEX) 5 % cream, Apply topically to the affected area once daily., Disp: 40 g, Rfl: 2    fluticasone-salmeterol diskus inhaler 100-50 mcg, Inhale 1 puff into the lungs 2 (two) times daily. Controller, Disp: 60 each, Rfl: 11    HYDROcodone-acetaminophen (NORCO) 5-325 mg per tablet, Take 1 tablet by mouth every 6 (six) hours as needed for Pain., Disp: 30 tablet, Rfl: 0    hydrocortisone 2.5 % cream, APPLY EXTERNALLY TO THE AFFECTED AREA TWICE DAILY FOR 10 DAYS, Disp: 30 g, Rfl: 0    inulin (FIBER GUMMIES) 2 gram Chew, Take by mouth once daily. 2 gummies a  day, Disp: , Rfl:     ketoconazole (NIZORAL) 2 % cream, Apply 1 application  topically 2 (two) times daily. Apply to affected area up to 2 weeks, Disp: 30 g, Rfl: 2    lancets (ONETOUCH DELICA PLUS LANCET) 33 gauge Misc, Use to test blood glucose one (1) time a day,, Disp: 100 each, Rfl: 5    latanoprost 0.005 % ophthalmic solution, Place 1 drop into both eyes every evening., Disp: 7.5 mL, Rfl: 3    levocetirizine (XYZAL) 5 MG tablet, Take 1 tablet (5 mg total) by mouth every evening., Disp: 30 tablet, Rfl: 11    loperamide (IMODIUM) 2 mg capsule, Take 1 capsule (2 mg total) by mouth 4 (four) times daily as needed for Diarrhea., Disp: 20 capsule, Rfl: 0    LORazepam (ATIVAN) 0.5 MG tablet, Take 1 tablet (0.5 mg total) by mouth daily as needed (SIGNIFICANT ANXIETY)., Disp: 30 tablet, Rfl: 0    ondansetron (ZOFRAN) 8 MG tablet, Take 1 tablet (8 mg total) by mouth every 8 (eight) hours as needed for Nausea., Disp: 30 tablet, Rfl: 0    ondansetron (ZOFRAN-ODT) 8 MG TbDL, Dissolve 1 tablet (8 mg total) on the tongue every 8 (eight) hours as needed (nausea)., Disp: 30 tablet, Rfl: 3    oxyCODONE-acetaminophen (PERCOCET) 5-325 mg per tablet, Take 1 tablet by mouth every 6 (six) hours as needed for Pain., Disp: 30 tablet, Rfl: 0    predniSONE (DELTASONE) 10 MG tablet, Take 4 tablets by mouth on day 1, then 3 tablets on day 2, then 2 tablets on day 3, then 1 tablet on  day 4, Disp: 10 tablet, Rfl: 0    pregabalin (LYRICA) 200 MG Cap, Take 1 capsule (200 mg total) by mouth 2 (two) times daily., Disp: 60 capsule, Rfl: 5    rOPINIRole (REQUIP) 0.25 MG tablet, TAKE 1 TABLET(0.25 MG) BY MOUTH EVERY EVENING, Disp: 90 tablet, Rfl: 2    tamsulosin (FLOMAX) 0.4 mg Cap, Take 1 capsule (0.4 mg total) by mouth once daily., Disp: 30 capsule, Rfl: 11    tamsulosin (FLOMAX) 0.4 mg Cap, Take 2 capsules (0.8 mg total) by mouth once daily., Disp: 60 capsule, Rfl: 11    tirzepatide (MOUNJARO) 12.5 mg/0.5 mL PnIj, Inject 12.5 mg into the skin  every 7 days., Disp: 2 mL, Rfl: 5    ULTRA THIN LANCETS 30 gauge Misc, USE TO TEST BLOOD SUGAR EVERY DAY AS DIRECTED, Disp: 100 each, Rfl: 3    urea (CARMOL) 40 % Crea, Apply topically once daily., Disp: 225 g, Rfl: 2    Current Facility-Administered Medications:     oxymetazoline 0.05 % nasal spray 1 spray, 1 spray, Each Nostril, Q4H PRN, Melody Liu MD    Facility-Administered Medications Ordered in Other Visits:     0.9%  NaCl infusion, 500 mL, Intravenous, Continuous, Kolton Terrell MD     Allergies:  Patient has no known allergies.     Medical History:   has a past medical history of ADD (attention deficit disorder) (05/09/2012), Allergy, Anxiety (04/12/2023), Asthma (05/09/2012), Cervical disc disorder with radiculopathy, unspecified cervical region (09/14/2021), Cervical radiculopathy (05/22/2023), Cervical spondylosis with myelopathy (06/29/2022), Eczema, Esophageal ulcer, GERD (gastroesophageal reflux disease) (05/09/2012), Glaucoma, Kidney stones (05/2014), Lumbar disc disease (05/09/2012), Lumbar herniated disc (05/09/2012), Malignant melanoma of skin, unspecified (01/06/2022), Melanoma (01/2022), LUBA (obstructive sleep apnea), Radiculopathy, lumbar region (09/14/2021), Renal calculi (05/09/2012), and Type 2 diabetes mellitus without complication, without long-term current use of insulin (11/16/2020).    Surgical History:   has a past surgical history that includes Lumbar laminectomy (2010); Esophagogastroduodenoscopy (01/22/2019); Colonoscopy (01/22/2019); Appendectomy (2006); Epidural steroid injection (N/A, 02/10/2021); Esophagogastroduodenoscopy (N/A, 05/18/2021); Lumbar laminectomy with discectomy (Left, 09/20/2021); Carpal tunnel release (Right, 09/15/2022); Excision of ganglion of wrist (Right, 09/15/2022); Hand Arthrotomy (Right, 09/15/2022); Injection of steroid (Left, 09/15/2022); Flexor tendon repair (Right, 09/15/2022); Tympanostomy tube placement; Epidural steroid injection into cervical  spine (N/A, 6/28/2023); Cystoscopy (7/5/2023); Ureteral stent placement (Left, 7/5/2023); Ureteroscopy (Left, 7/5/2023); Laser lithotripsy (Left, 7/5/2023); Ureteroscopic removal of ureteric calculus (Left, 7/5/2023); Correction of hammer toe (Left, 8/14/2023); Epidural steroid injection into lumbar spine (N/A, 9/27/2023); Epidural steroid injection into cervical spine (N/A, 11/3/2023); Injection of joint (Right, 11/29/2023); Reconstruction of ligament (Left, 12/5/2023); Repair of extensor tendon (Left, 12/5/2023); Esophagogastroduodenoscopy (N/A, 12/21/2023); ph monitoring, esophagus, wireless, (off reflux meds) (N/A, 12/21/2023); Colonoscopy (N/A, 12/21/2023); Esophagogastroduodenoscopy (N/A, 2/22/2024); Epidural steroid injection into lumbar spine (N/A, 3/6/2024); Robot-assisted repair of hiatal hernia (N/A, 3/14/2024); Epidural steroid injection into cervical spine (N/A, 4/17/2024); Epidural steroid injection into lumbar spine (N/A, 6/3/2024); Cautery of turbinates (Bilateral, 7/15/2024); Nasal septoplasty (N/A, 7/15/2024); Epidural steroid injection into lumbar spine (N/A, 8/26/2024); Anterior cervical discectomy w/ fusion (N/A, 9/12/2024); Cystoscopy (N/A, 9/26/2024); incision, contracture, bladder neck, transurethral (N/A, 11/8/2024); Epidural steroid injection into lumbar spine (N/A, 12/2/2024); and Open reduction and internal fixation (ORIF) of injury of finger (Right, 3/13/2025).    Family History:  family history includes Allergic rhinitis in his father, mother, sister, and sister; Asthma in his father, sister, and sister; Brain cancer in his maternal grandmother; Breast cancer in his paternal grandmother; Chronic back pain in his father; Diabetes in his maternal grandfather; MARTITA disease in his father; Hypertension in his mother; Melanoma in his father; No Known Problems in his brother, maternal aunt, maternal uncle, paternal aunt, paternal grandfather, and paternal uncle; Sleep apnea in his father and  mother; Transient ischemic attack in his mother.    Social History:   reports that he quit smoking about 7 years ago. His smoking use included cigarettes. He has never been exposed to tobacco smoke. He has never used smokeless tobacco. He reports current alcohol use of about 3.0 standard drinks of alcohol per week. He reports that he does not use drugs.    Physical Exam:  There were no vitals filed for this visit.      PHYSICAL EXAMINATION:  GEN: No acute distress. Calm, comfortable  HENT: Normocephalic, atraumatic, moist mucous membranes  EYE: Anicteric sclera, non-injected.   CV: Non-diaphoretic.   RESP: Breathing comfortably. Chest expansion symmetric.  PSYCH: Pleasant mood and appropriate affect. Recent and remote memory intact.       GENERAL: Well appearing, in no acute distress, alert and oriented x3.  PSYCH:  Mood and affect appropriate.  SKIN: Skin color, texture, turgor normal, no rashes or lesions.  HEAD/FACE:  Normocephalic, atraumatic. Cranial nerves grossly intact.  NECK:  Mildly decreased ROM secondary to pain. + pain to palpation over the cervical paraspinous muscles and bilateral trapezius muscles.  Spurling positive on the right.   CV: RRR with palpation of the radial artery.  PULM: No evidence of respiratory difficulty, symmetric chest rise.  GI:  Soft and non-distended.  MSK: No obvious deformities, edema, or skin discoloration.  No atrophy or tone abnormalities are noted.   NEURO: + tremor noted in the right hand, particularly the 4th and 5th digit.  Bilateral upper and lower extremity coordination and strength is symmetric.  No loss of sensation is noted.  MENTAL STATUS: A x O x 3, good concentration, speech is fluent and goal directed  MOTOR: 5/5 in all muscle groups  GAIT: normal.ambulates unassisted.       Imaging:  XR CERVICAL SPINE AP LAT WITH FLEX EXTEN     CLINICAL HISTORY:  Other spondylosis with myelopathy, cervical region     TECHNIQUE:  Three views of the cervical spine plus flexion  and extension views were performed.     COMPARISON:  September 8, 2022     FINDINGS:  As before, suboptimal visualization of the cervicothoracic junction due to superimposition of shoulder and chest wall soft tissues.  Straightening of normal cervical lordosis.  No acute fractures.  Unremarkable predental space.  No widening prevertebral soft tissues.  No anterior or retrolisthesis.  Flexion and extension views demonstrate no instability.  Reconfirmed areas of anterior longitudinal ligament calcification adjacent to C4-C5 and C5-C6 disc.  Reconfirmed findings of uncovertebral spurring and endplate osteophytes at preserved C4-C5 level and mildly narrowed C5-C6 level and mild to moderately narrowed C6-C7 level.  Some stable scattered mild facet arthropathic changes.  Intact odontoid tip and unremarkable C1-C2 articulation.  Cervical spine findings do not appear significantly changed to earlier exam.     Electronically signed by: Giovanny Busby  Date:                                            12/13/2022      MRI CERVICAL SPINE WITHOUT CONTRAST     CLINICAL HISTORY:  Arm pain, paresthesias, clumsiness;.  Cervical disc disorder with radiculopathy, unspecified cervical region     TECHNIQUE:  Multiplanar, multisequence MR images of the cervical spine were acquired without the administration of contrast.     COMPARISON:  Cervical spine MRI 01/12/2021     FINDINGS:  Straightening of the normal cervical lordosis.  No spondylolisthesis.     No compression fractures.  No marrow replacing lesions.     Multilevel degenerative changes with disc desiccation and disc space narrowing, described in detail below.  No bone marrow edema.     Visualized structures in the posterior fossa are unremarkable. The cervical spinal cord is unremarkable.     Paraspinal soft tissues are unremarkable.     SIGNIFICANT FINDINGS BY LEVEL:     C2-3: Small disc osteophyte complex with central annular fissure.  No canal or foraminal stenosis.     C3-4:  Disc osteophyte complex with superimposed central extrusion.  Mild canal stenosis with partial effacement of the thecal sac.  Mild bilateral foraminal stenosis.     C4-5: Disc osteophyte complex.  Moderate canal stenosis with near complete effacement of the thecal sac and flattening of the cervical cord.  Moderate bilateral foraminal stenosis.     C5-6: Disc osteophyte complex, eccentric to the right.  Severe canal stenosis with complete effacement of the thecal sac and flattening of the cervical cord.  Severe right and moderate left foraminal stenosis.     C6-7: Disc osteophyte complex.  Moderate canal stenosis.  Moderate bilateral foraminal stenosis.     C7-T1: Disc osteophyte complex with superimposed right foraminal extrusion.  Mild canal stenosis.  Mild right foraminal stenosis.     Impression:     Multilevel degenerative changes, most advanced at C5-6 where there is severe canal stenosis and severe right and moderate left foraminal stenosis.  Overall, degenerative changes have progressed from 01/12/2021.     This report was flagged in Epic as abnormal.     Electronically signed by: Horace Quiroz  Date:                                            06/16/2022      XR LUMBAR SPINE AP AND LAT WITH FLEX/EXT     CLINICAL HISTORY:  Other specified postprocedural states     TECHNIQUE:  AP and lateral views as well as lateral flexion and extension images are performed through the lumbar spine.     COMPARISON:  Non 06/16/2022 MRI of the lumbar spine e     FINDINGS:  Patient had previous a L3/L4 hemilaminectomy on the left side.     Mild DJD and mild lumbar scoliosis.  The lumbar intervertebral disc spaces are slightly narrowed.  Few mm L2/L3 and L3/L4 retrolisthesis noted.  No fracture or dislocation.  No bone destruction identified     Electronically signed by: Xander Rubio MD  Date:                                            12/13/2022    MRI LUMBAR SPINE WITHOUT CONTRAST     CLINICAL HISTORY:  Lumbar radiculopathy,  symptoms persist with conservative treatment;Low back pain, prior surgery, new symptoms; Dorsalgia, unspecified     TECHNIQUE:  Multiplanar, multisequence MR images were acquired from the thoracolumbar junction to the sacrum without the administration of contrast.     COMPARISON:  MRI lumbar spine 09/19/2021     FINDINGS:  Sequences are degraded by patient motion artifact.     Transitional lumbosacral anatomy with lumbarization of S1.     Postoperative changes from left L3-4 hemilaminectomy and micro discectomy.  Mild protrusion of the thecal sac and cauda equina nerve roots into the laminectomy defect.  No fluid collections in the operative bed.     No spondylolisthesis.     No compression fractures.  Small fat containing lesion in the L3 vertebral body, likely a hemangioma.     Multilevel degenerative changes with disc desiccation and disc space narrowing, described in detail below.  No significant bone marrow edema.     The conus medullaris has a normal appearance and terminates at the L2 level.  Cauda equina nerve roots are unremarkable.     Paraspinal muscle atrophy.     SIGNIFICANT FINDINGS BY LEVEL:     T12-L1: Mild disc bulge.  Minimal canal stenosis.  No foraminal stenosis.     L1-2: Left facet arthropathy.  No canal or foraminal stenosis.     L2-3: Disc bulge.  Bilateral facet arthropathy and ligamentum flavum thickening.  Mild canal stenosis.  Mild left foraminal stenosis.     L3-4: Residual disc bulge versus granulation tissue.  The previous disc fragment migrating inferiorly is no longer seen.  Bilateral facet arthropathy and ligamentum flavum thickening.  Mild canal stenosis with narrowing of both subarticular zones and abutment of the left descending L4 nerve root.  Mild right and moderate left foraminal stenosis.     L4-5: Disc bulge.  Bilateral facet arthropathy and ligamentum flavum thickening.  Mild canal stenosis.  Moderate bilateral foraminal stenosis.     L5-S1: Disc bulge.  Bilateral facet  arthropathy and ligamentum flavum thickening.  Mild canal stenosis.  Moderate bilateral foraminal stenosis.     Impression:     Postoperative changes from left L3-4 hemilaminectomy and micro discectomy.  The previous disc fragment migrating inferiorly is no longer seen.  There is a residual disc bulge/granulation tissue contributing to mild canal stenosis and moderate left foraminal stenosis.     Small meningocele at the laminectomy defect.     Degenerative changes elsewhere in the spine resulting in mild canal stenosis and moderate foraminal stenosis at multiple levels as described above.        Electronically signed by: Horace Quiroz  Date:                                            06/16/2022    EXAMINATION:  MRI LUMBAR SPINE WITHOUT CONTRAST     CLINICAL HISTORY:  Low back pain, prior surgery, new symptoms; Dorsalgia, unspecified     TECHNIQUE:  Multiplanar, multisequence MR images were acquired from the thoracolumbar junction to the sacrum without the administration of contrast.     COMPARISON:  MRI lumbar spine 06/16/2023     FINDINGS:  Postoperative change left L3-L4 hemilaminectomy and discectomy.  Mild protrusion of the thecal sac and cauda equina nerve roots into the laminectomy defect similar to prior.     Alignment: Normal.     Vertebrae: Endplate degenerative change L5-S1.  Small may angioma L3 vertebral body.  No acute fracture.  No infiltrative marrow process.     Discs: Multilevel disc height loss and desiccation most pronounced at L5-S1.     Cord: Normal.  Conus terminates at L1.     Soft tissue structures are unremarkable.  Limited evaluation of the retroperitoneal organs demonstrates no significant abnormalities.  Paraspinal musculature demonstrates normal bulk and signal intensity.     Degenerative findings:     T12-L1: Mild circumferential disc bulge no neural foraminal narrowing or spinal canal stenosis.     L1-L2: No neural foraminal narrowing or spinal canal stenosis.     L2-L3: Circumferential  disc bulge.  Bilateral facet arthropathy.  No neural foraminal narrowing.  Mild spinal canal stenosis.     L3-L4: Postop change.  Circumferential disc bulge.  Bilateral facet arthropathy.  Moderate left and mild right neural foraminal narrowing.  Mild spinal canal stenosis     L4-L5: Circumferential disc bulge.  Bilateral facet arthropathy.  Moderate right and severe left neural foraminal narrowing.  Mild to moderate spinal canal stenosis.     L5-S1: Circumferential disc bulge.  Bilateral facet arthropathy.  Moderate bilateral neural foraminal narrowing.  Mild spinal canal stenosis.     Impression:     1. Postoperative change left L3-L4 hemilaminectomy and discectomy.  2. Multilevel degenerative change of the lumbar spine as above not significantly changed from prior MRI 06/16/2023, noting severe left foraminal narrowing at L4-5.     Electronically signed by resident: Hammad Quinn  Date:                                            10/23/2023  Labs:  BMP  Lab Results   Component Value Date     10/24/2024    K 4.2 10/24/2024     10/24/2024    CO2 27 10/24/2024    BUN 17 10/24/2024    CREATININE 0.8 10/24/2024    CALCIUM 9.6 10/24/2024    ANIONGAP 8 10/24/2024    EGFRNORACEVR >60.0 10/24/2024     Lab Results   Component Value Date    ALT 22 10/24/2024    AST 19 10/24/2024    ALKPHOS 101 10/24/2024    BILITOT 0.6 10/24/2024     Lab Results   Component Value Date    WBC 8.10 09/15/2024    HGB 14.1 09/15/2024    HCT 41.5 09/15/2024    MCV 91 09/15/2024     09/15/2024         Assessment:  Problem List Items Addressed This Visit    None        04/20/2032-  Hermelindo CURT Rafaelgregkelby SMALLWOOD is a 44 y.o. male who  has a past medical history of ADD (attention deficit disorder) (05/09/2012), Allergy, Anxiety (04/12/2023), Asthma (05/09/2012), Cervical disc disorder with radiculopathy, unspecified cervical region (09/14/2021), Cervical radiculopathy (05/22/2023), Cervical spondylosis with myelopathy (06/29/2022),  Eczema, Esophageal ulcer, GERD (gastroesophageal reflux disease) (05/09/2012), Glaucoma, Kidney stones (05/2014), Lumbar disc disease (05/09/2012), Lumbar herniated disc (05/09/2012), Malignant melanoma of skin, unspecified (01/06/2022), Melanoma (01/2022), LUBA (obstructive sleep apnea), Radiculopathy, lumbar region (09/14/2021), Renal calculi (05/09/2012), and Type 2 diabetes mellitus without complication, without long-term current use of insulin (11/16/2020).  By history and examination this patient has chronicneck pain with bilateral radiculopathy as well as low back pain s/p 9/19/2021 Left L3-4 hemilaminotomy, medial facetectomy, and foraminotomy for microdiscectomy with Dr Mcnamara with some improvement. The underlying cause cause is facet arthritis, degenerative disc disease, foraminal stenosis, central canal stenosis, muscle dysfunction, and deconditioning.  MRI of the cervical spine was significant for multilevel degenerative changes, most advanced at C5-6 where there is severe canal stenosis and severe right and moderate left foraminal stenosis. MRI of the Lumbar spine post surgery was significant for residual disc bulge/granulation tissue contributing to mild canal stenosis and moderate left foraminal stenosis at L3/L4 and degenerative changes elsewhere in the spine resulting in mild canal stenosis and moderate foraminal stenosis at multiple levels.  We discussed the underlying diagnoses and multiple treatment options including non-opioid medications, interventional procedures, physical therapy, home exercise, core muscle enhancement, and weight loss.  He previously underwent a cervical steroid injection with several months of relief.  Plan for repeat of cervical epidural steroid injection.  The risks and benefits of each treatment option were discussed and all questions were answered.        11/13/2023 - Hermelindo Garza III presents for evaluation of right hip pain. His exam was consistent with Right  GTB bursitis and right hip joint pain.  I will schedule him for a right GTB and Right intraarticular hip injection and defer the LESI for now.  Pt tearful and anxious throughout exam.  He is established with a Psychiatrist.  We discussed referral Psychology for possible CBT and assistance with coping strategies for chronic pain.     12/19/2023 - Hermelindo Garza III presents today for follow-up.  He is status post  right GTB and right intra-articular hip injections on 11/29/2023 and notes improvement in his GTB and hip pain.  Today he complains of pain extending from the area overlying the right hip joint and extending down the anterolateral portion of the thigh  in the distribution of the tensor fascia ashtyn.   Patient has multiple pain generators  as well as concurrent anxiety and mood disorder.  I feel that he would be an excellent candidate for our functional restoration program.  Discussed with patient today during visit.  I will place a referral to the functional restoration program.   I also discussed with the patient that it would be a good to have him see Rheumatology given his extensive pain history, as perhaps they can offer their expertise.     03/28/2024 - Hermelindo Garza III presents today for follow up.  He was status post lumbar epidural steroid injection at L4/L5 on 03/06/2024 with 70-80% relief of his low back symptoms.  His cervical radicular symptoms have started to return.  Most recent cervical epidural gave him over 5 months of pain relief.  He would like to repeat the procedure.  I will schedule from a repeat cervical epidural steroid injection.  I will also have the patient increase his gabapentin to 300 mg 4 times daily.  He will let me know when he needs a refill of the medication.      04/30/2024-Hermelindo Garza III is a 44 y.o. male who  has a past medical history of ADD (attention deficit disorder) (05/09/2012), Allergy, Anxiety (04/12/2023), Asthma (05/09/2012), Cervical  disc disorder with radiculopathy, unspecified cervical region (09/14/2021), Cervical radiculopathy (05/22/2023), Cervical spondylosis with myelopathy (06/29/2022), Eczema, Esophageal ulcer, GERD (gastroesophageal reflux disease) (05/09/2012), Glaucoma, Kidney stones (05/2014), Lumbar disc disease (05/09/2012), Lumbar herniated disc (05/09/2012), Malignant melanoma of skin, unspecified (01/06/2022), Melanoma (01/2022), LUBA (obstructive sleep apnea), Radiculopathy, lumbar region (09/14/2021), Renal calculi (05/09/2012), and Type 2 diabetes mellitus without complication, without long-term current use of insulin (11/16/2020).  By history and examination this patient has chronicneck pain with bilateral radiculopathy in the distribution of C5 and C6.  The underlying cause cause is facet arthritis, degenerative disc disease, foraminal stenosis, and central canal stenosis.  Pathology is confirmed by imaging.  We discussed the underlying diagnoses and multiple treatment options including non-opioid medications, interventional procedures, physical therapy, home exercise, core muscle enhancement, activity modification, and weight loss.  The risks and benefits of each treatment option were discussed and all questions were answered.    He was recently provided a cervical NELI that provided him excellent relief he did reach out to Neurosurgery a new CT lumbar was ordered to rule out any new pathology.  The patient will follow up with our office to possibly repeat injections in the future.    05/30/2024 - Hermelindo Garza III  presents today with worsening right-sided cervical radicular symptoms over the last 4 weeks.  Given the worsening of his symptoms and development of new tremor in the right upper extremity, I am obtaining an updated MRI of the cervical spine.   He is established with Dr. Mcnamara with Neurosurgery, and at his last visit it was noted that he is a candidate for cervical surgery if his symptoms progressed in  the areas of nerve root compression.  I am recommending that he follow up with Dr. Mcnamara after updated imaging is obtained.  I am happy to repeat a cervical epidural for this patient, but I am concerned about the number of steroid injections he has required for both his back and neck pain thus far.  We discussed that these steroids can affect blood sugar, blood pressure and over time can cause bone weakening.  In the meantime the patient would like to try Lyrica again.  We will discontinue gabapentin and I will prescribe him Lyrica 150 mg b.I.d. Referral placed to Neurology to assist with workup of new onset right upper extremity tremor.    06/25/2024- 44-year-old male with a history of chronic low back and neck pain with radiculopathy symptoms in both his arms and legs.  We recently provided him a lumbar NELI targeting L4-5 that resulted in 70 80% relief and improvement in his functionality.  His worst pain at this point is his neck pain with radiculopathy.  He and I discussed that he will revisit cervical decompressive surgery with Dr. Mcnamara  in a few weeks as his symptoms have continued and on some level gotten a little worse.  He denies any profound weakness denies any bowel or bladder dysfunction at this time.  Discussed with patient that he can reach out to me at any point for support and for any problems moving forward.    09/05/2024-Hermelindo Garza III is a 44 y.o. male who  has a past medical history of ADD (attention deficit disorder) (05/09/2012), Allergy, Anxiety (04/12/2023), Asthma (05/09/2012), Cervical disc disorder with radiculopathy, unspecified cervical region (09/14/2021), Cervical radiculopathy (05/22/2023), Cervical spondylosis with myelopathy (06/29/2022), Eczema, Esophageal ulcer, GERD (gastroesophageal reflux disease) (05/09/2012), Glaucoma, Kidney stones (05/2014), Lumbar disc disease (05/09/2012), Lumbar herniated disc (05/09/2012), Malignant melanoma of skin, unspecified (01/06/2022),  Melanoma (01/2022), LUBA (obstructive sleep apnea), Radiculopathy, lumbar region (09/14/2021), Renal calculi (05/09/2012), and Type 2 diabetes mellitus without complication, without long-term current use of insulin (11/16/2020).  By history and examination this patient has chronic low back pain with radiculopathy.  The underlying cause cause is facet arthritis, degenerative disc disease, foraminal stenosis, muscles strain, and deconditioning.  Pathology is confirmed by imaging.  We discussed the underlying diagnoses and multiple treatment options including non-opioid medications, interventional procedures, physical therapy, core muscle enhancement, activity modification, and weight loss.  The risks and benefits of each treatment option were discussed and all questions were answered.      12/12/2024-Hermelindo Garza III is a 44 y.o. male who  has a past medical history of ADD (attention deficit disorder) (05/09/2012), Allergy, Anxiety (04/12/2023), Asthma (05/09/2012), Cervical disc disorder with radiculopathy, unspecified cervical region (09/14/2021), Cervical radiculopathy (05/22/2023), Cervical spondylosis with myelopathy (06/29/2022), Eczema, Esophageal ulcer, GERD (gastroesophageal reflux disease) (05/09/2012), Glaucoma, Kidney stones (05/2014), Lumbar disc disease (05/09/2012), Lumbar herniated disc (05/09/2012), Malignant melanoma of skin, unspecified (01/06/2022), Melanoma (01/2022), LUBA (obstructive sleep apnea), Radiculopathy, lumbar region (09/14/2021), Renal calculi (05/09/2012), and Type 2 diabetes mellitus without complication, without long-term current use of insulin (11/16/2020).  By history and examination this patient has chronic low back pain with radiculopathy.  The underlying cause cause is degenerative disc disease, foraminal stenosis, central canal stenosis, muscle dysfunction, muscles strain, and deconditioning.  Pathology is confirmed by imaging.  We discussed the underlying diagnoses and  multiple treatment options including non-opioid medications, interventional procedures, physical therapy, home exercise, core muscle enhancement, activity modification, and weight loss.  The risks and benefits of each treatment option were discussed and all questions were answered.        [id number]old male with a history of chronic low back with radiculopathy left greater than right.  Underlying cause it is degenerative disc disease, foraminal stenosis, central canal stenosis muscle dysfunction muscle strain and deconditioning.  Patient and I discussed underlying diagnosis of multiple treatment options.  See plan agreed upon below.  The risks and benefits of each treatment option were discussed all questions were answered.    Treatment Plan:   Procedures:  s/f a lumbar NELI targeting L5-S1 steer left   PT/OT/HEP: I have stressed the importance of physical activity and a home exercise plan to help with pain and improve health.  He is previously completed physical therapy.  I feel the patient would significantly benefit from enrollment in the functional restoration program.  Referral placed.    Medications:  - Continue Lyrica 150 mg b.i.d.. one in am and one pill at night no refills requested.    - continue with meloxicam 15 mg q.d.   - continue with p.r.n. Tylenol   -  Reviewed and consistent with medication use as prescribed.  Imaging: reviewed and discussed   Recommend pt continue to follow up with Dr. Mcnamara (neurosurgery)  Follow Up:  2-3 weeks p injection for ongoing care    Hung Prieto NP-C  Interventional Pain Management      Disclaimer: This note was partly generated using dictation software which may occasionally result in transcription errors.

## 2025-04-15 NOTE — PROGRESS NOTES
"Outpatient Rehab    Occupational Therapy Visit    Patient Name: Hermelindo Garza III  MRN: [id number]  YOB: 1980  Encounter Date: 4/15/2025    Therapy Diagnosis:   Encounter Diagnoses   Name Primary?    Decreased range of motion of finger of right hand Yes    Reduced hand strength      Physician: Christen Marshall MD    Physician Orders: Eval and Treat  Medical Diagnosis: Closed nondisplaced fracture of middle phalanx of right little finger, initial encounter    Visit # / Visits Authorized: 3 / 12 (Visit #4)  Insurance Authorization Period: 3/20/2025 to 12/31/2025  Date of Evaluation: 3/20/2025   Plan of Care Certification: 3/20/2025 to 6/13/25 (12 weeks)     Time In: 0852   Time Out: 0925  Total Time: 33   Total Billable Time: 33    Precautions  Right Upper Extremity Weight-Bearing Status: Non-weight-bearing  Standard, Diabetes      Subjective   "It's just sore.".  Pain reported as 1/10. R SF    Objective         Upper Extremity Sensation  General Upper Extremity Sensation  Impaired: Right  Right Upper Extremity Sensation  Intact: Sharp/Dull Discrimination, Kinesthesia, Proprioception, and Hot/Cold Discrimination  Diminished: Light Touch  Right Upper Extremity Sensation Stocking Glove Pattern: No     Right Hand Digit Swelling    Proximal Phalanx IP PIP Middle Phalanx DIP Distal Phalanx   Thumb     N/A N/A N/A     Index   N/A           Middle   N/A           Ring   N/A           Little 7.5 cm N/A 7.6 cm 6.8 cm             Left Hand Digit Swelling    Proximal Phalanx IP PIP Middle Phalanx DIP Distal Phalanx   Thumb     N/A N/A N/A     Index   N/A           Middle   N/A           Ring   N/A           Little 6.6 cm N/A 6.5 cm 5.8 cm           Digit 5 - Little Finger Active Range of Motion  Right Little Finger    Extension (deg) Flexion (deg) Pain   MCP 0 70     PIP -23 70 Yes   DIP -10 25 Yes   SURESH: 132     Digit 5 - Little Finger Active Range of Motion  Right Little Finger    Extension (deg) Flexion " (deg) Pain   MCP 0 90 (+20)     PIP -5 (+18) 80 (+10)    DIP -5 (+5) 43 (+18)    SURESH: 203 (+71)     Left Little Finger    Extension (deg) Flexion (deg) Pain   MCP 0 80     PIP 0 95     DIP 0 75     SURESH: 250    Treatment:     Supervised modalities after being cleared for contradictions: Fluidotherapy - 115 degrees and 50 speed, to R hand/wrist for 12 minutes to increase blood flow and circulation, desensitization and sensory re-education, pain management, and increased tissue extensibility. Pt instructed on performing active range of motion exercises while receiving heat to increase active motion.     Therapeutic exercises to develop endurance, ROM, and flexibility for 25 minutes, including:  - Digit/wrist AROM in fluidotherapy x 12 min  - Digit add/abd, isolated EDM x 15 reps each   - DIP/PIP jt blocking of R SF x 15 reps each  - TGEs x 15+ reps  - Hook to full fist with AAROM x 20 reps   - Comp digit and wrist flexion x 15 reps  - Reverse joint blocking 2 x 10 reps     Therapeutic activities to improve functional performance for 8 minutes, including:  - Octyi x 2 min  - [place]  ulnar scoop x 1 container  - Dowel hook roll x 2 min with pen   - Issued tubigauze digit sleeves x 5    Time Entry(in minutes):  [place] Time Entry: 12  Therapeutic Activity Time Entry: 8  Therapeutic Exercise Time Entry: 25    Assessment & Plan   Assessment: Pt participated well and endorses good adherence to HEP. Pt demo 71 deg improvement in R SF SURESH per formal assessment. He demo slight ext lag to PIP jt and tolerated reverse jt blocks fairly. Will progress as tolerated and per protocol.  Evaluation/Treatment Tolerance: Patient tolerated treatment well, Patient limited by fatigue, Patient limited by pain    Patient will continue to benefit from skilled outpatient occupational therapy to address the deficits listed in the problem list box on initial evaluation, provide pt/family education and to maximize pt's level of  independence in the home and community environment.     Patient's spiritual, cultural, and educational needs considered and patient agreeable to plan of care and goals.     Education  Education was done with Patient. The patient's learning style includes Demonstration and Listening. The patient Verbalizes understanding and Demonstrates understanding.         Continue splint wear as instructed and prior HEP provided.         Plan: Continue skilled occupational therapy with individualized plan of care focusing on maximizing functional use of patient's right hand. Progress as tolerated and per protocol.    Goals:   Active       Occupational Therapy       Occupational Therapy Goal (Progressing)       Start:  03/20/25    Expected End:  06/12/25       Goals:   The following goals were discussed with the patient and patient is in agreement with them as to be addressed in the treatment plan.   Long Term Goals (LTGs)  LTG #1: Pt will report a pain level of 1-3 out of 10 at worst with daily hand use.   LTG #2: Pt will demo improved FOTO score by 5-10 points.   LTG #3: Pt will return to prior level of function for IADLs and household management.     Short Term Goals (STGs)  STG #1: Pt will report a pain level of 4 out of 10 at worst with daily hand use.  STG #2: Pt will report/demo [place] with ADLs.  STG #3: Pt will demonstrate independence with issued HEP, orthosis wear, and modalities for pain/symptom management.  STG #4: Pt will demo improved R SF SURESH by 30-50 degrees needed to aid with grasping. MET  STG #5: Decrease edema of R SF by 0.2-0.5 cm to increase joint mobility/flexibility for hand/arm use.  STG #6: Pt will increase right hand  strength by at least 5-10 lbs needed to open jars and carry groceries.                  Connie Vasquez, OTR/L, CHT

## 2025-04-15 NOTE — PROGRESS NOTES
Hermelindo Garza III presents for post-operative evaluation of   Encounter Diagnoses   Name Primary?    Post-operative state     Closed displaced fracture of middle phalanx of right little finger with routine healing, subsequent encounter     Trigger ring finger of left hand Yes    Ganglion cyst of tendon sheath of left hand    . The patient is now 5 weeks 4 days s/p Right small finger.  Overall the patient reports doing well.  The patient reports appropriate postoperative soreness with well controlled overall pain.     Of note, he reports left ring trigger finger that started about a year ago but began becoming painful and at night about 1 month ago.    OT: Connie Vasquez, [place]  4/15/25 - Pt participated well and endorses good adherence to HEP. Pt demo 71 deg improvement in R SF SURESH per formal assessment. He demo slight ext lag to PIP jt and tolerated reverse jt blocks fairly. Will progress as tolerated and per protocol.       Vitals:    04/21/25 1114   PainSc:   2   PainLoc: Hand       PE:    AA&O x 4.  NAD  HEENT:  NCAT, sclera nonicteric  Lungs:  Respirations are equal and unlabored.  CV:  2+ bilateral upper and lower extremity pulses.  MSK: Physical Exam          RUE:  Right small PIPJ -15/95, The incision is well healed.  Decreased AROM right small finger, otherwise full wrist and finger motion.  Neurovascularly intact and has 5/5 thenar and intrinsic musculature strength.       LUE: left ring finger: TTP over A1 pulley, palpable nodule, triggering with active flexion, ganglion of tendon sheath, 5/5 thenar and intrinsic musculature strength.  Otherwise, full range of motion hands, wrists and elbows RUE. NVI     Diagnostic studies and other clinical records review:  X-rays AP, lateral and oblique right small finger and left hand taken today are independently reviewed by me and shows well healed proximal phalanx fracture..    Assessment & Plan:      Status post above, doing well  Continue with range of  motion; strengthening at 6 weeks  F/U 6 weeks with new xrays right small finger  Call with any questions/concerns in the interim    The patient and I had a thorough discussion today. We discussed the working diagnosis as well as several other potential alternative diagnoses. Treatment options were discussed, both conservative and surgical. Conservative treatment options would include things such as activity modifications, workplace modifications, a period of rest, oral vs topical OTC and prescription anti-inflammatory medications, occupational therapy, splinting/bracing, immobilization, corticosteroid injections, and others. Surgical options were discussed as well.    -I have offered him a selective injection. I have explained the risks, benefits, and alternatives of the procedure in detail.  The patient voices understanding and all questions have been answered. The patient agrees to proceed as planned. So after a sterile prep of the skin in the normal fashion the left ring flexor tendon sheath injected using a 25 gauge needle with a combination of 1cc 1% plain lidocaine and 40 mg of methylprednisolone.  The patient is cautioned and immediate relief of pain is secondary to the local anesthetic and will be temporary.  After the anesthetic wears off there may be a increase in pain that may last for a few hours or a few days and they should use ice to help alleviate this flair up of pain. Patient tolerated the procedure well.    Will call in 2 weeks to follow up for steroid injection efficacy or sooner for any worsening of symptoms  Call with any questions/concerns in the interim                Christen Marshall MD    Please be aware that this note has been generated with the assistance of iXpert voice-to-text.  Please excuse any spelling or grammatical errors.  This note was generated with the assistance of ambient listening technology. Verbal consent was obtained by the patient and accompanying visitor(s) for the  recording of patient appointment to facilitate this note. I attest to having reviewed and edited the generated note for accuracy, though some syntax or spelling errors may persist. Please contact the author of this note for any clarification.

## 2025-04-16 ENCOUNTER — TELEPHONE (OUTPATIENT)
Dept: PAIN MEDICINE | Facility: CLINIC | Age: 45
End: 2025-04-16
Payer: COMMERCIAL

## 2025-04-16 DIAGNOSIS — M54.16 LUMBAR RADICULOPATHY, CHRONIC: Primary | ICD-10-CM

## 2025-04-16 NOTE — TELEPHONE ENCOUNTER
----- Message from JUAN CARLOS Syed sent at 4/15/2025  2:22 PM CDT -----  Regarding: Order for VIET ARCHER III    Patient Name: VIET ARCHER III([id number])  Sex: Male  : 1980      PCP: MATT GAMEZ    Center: [facility]     Level of Service:26560     PA OFFICE/OUTPT VISIT, EST, LEVL IV, 30-39 MIN    Types of orders made on 04/15/2025: Procedure Request    Order Date:4/15/2025  Ordering User:LEONARD DIETZ [[id number]]  Encounter Provider:Leonard Dietz FNP [[id number]]  Authorizing Provider: Leonard Dietz FNP [[id number]]  Supervising Provider:KELSEY ESCOBAR [[id number]]  Type of Supervision:Collaborating Physician  Department:[facility] PAIN MANAGEMENT[[id number]]    Common Order Information  Procedure -> Epidural Injection (specify level) Cmt: L4-5 steer left    Pre-op Diagnosis -> Spinal stenosis at L4-L5 level       -> Lumbar radiculopathy       -> DDD (degenerative disc disease), lumbar     Order Specific Information  Order: Procedure Request Order for Pain Management [Custom: [id number]]  Order #:          [id number]: 1 FUTURE    Priority: Routine  Class: Clinic Performed    Future Order Information      Expires on:04/15/2026            Expected by:04/15/2025                   Associated Diagnoses      M54.16 Lumbar radiculopathy, chronic      G89.4 Chronic pain syndrome      M51.362 Degeneration of intervertebral disc of lumbar region with       discogenic back pain and lower extremity pain      Physician -> Amanda         Is patient on anti-coagulants? -> No         Facility Name: -> [place]         Follow-up: -> 2 weeks           Priority: Routine  Class: Clinic Performed    Future Order Information      Expires on:04/15/2026            Expected by:04/15/2025                   Associated Diagnoses      M54.16 Lumbar radiculopathy, chronic      G89.4 Chronic pain syndrome      M51.362 Degeneration of intervertebral disc of lumbar region with       discogenic back pain and lower extremity pain       Procedure -> Epidural Injection (specify level) Cmt: L4-5 steer left        Physician -> Gelter         Is patient on anti-coagulants? -> No         Pre-op Diagnosis -> Spinal stenosis at L4-L5 level           -> Lumbar radiculopathy           -> DDD (degenerative disc disease), lumbar         Facility Name: -> [place]         Follow-up: -> 2 weeks

## 2025-04-17 ENCOUNTER — TELEPHONE (OUTPATIENT)
Dept: ORTHOPEDICS | Facility: CLINIC | Age: 45
End: 2025-04-17
Payer: COMMERCIAL

## 2025-04-17 NOTE — TELEPHONE ENCOUNTER
Patient communication     Notified patient to stop at [place] Location - 1st floor check in [address] [facility] [address] 30 minutes prior to your appointment time on 4/21/25 with Dr. Marshall for x-rays.     Made them aware that this is not a scheduled xray appointment and they might be running behind as they are considered a walk-in xray.    Verbalized the Following:  *Please arrive at your informed time above, if you are more than 15 Minutes late to your appointment with Dr. Marshall we will have to reschedule your appointment. This will allow you to be seen in a timely manor and be conscious to other patients being seen that same day*

## 2025-04-21 ENCOUNTER — OFFICE VISIT (OUTPATIENT)
Dept: ORTHOPEDICS | Facility: CLINIC | Age: 45
End: 2025-04-21
Payer: COMMERCIAL

## 2025-04-21 ENCOUNTER — PATIENT MESSAGE (OUTPATIENT)
Dept: ORTHOPEDICS | Facility: CLINIC | Age: 45
End: 2025-04-21

## 2025-04-21 ENCOUNTER — HOSPITAL ENCOUNTER (OUTPATIENT)
Dept: RADIOLOGY | Facility: HOSPITAL | Age: 45
Discharge: HOME | End: 2025-04-21
Attending: ORTHOPAEDIC SURGERY
Payer: COMMERCIAL

## 2025-04-21 DIAGNOSIS — S62.626D CLOSED DISPLACED FRACTURE OF MIDDLE PHALANX OF RIGHT LITTLE FINGER WITH ROUTINE HEALING, SUBSEQUENT ENCOUNTER: ICD-10-CM

## 2025-04-21 DIAGNOSIS — M79.645 FINGER PAIN, LEFT: ICD-10-CM

## 2025-04-21 DIAGNOSIS — M67.442 GANGLION CYST OF TENDON SHEATH OF LEFT HAND: ICD-10-CM

## 2025-04-21 DIAGNOSIS — Z98.890 POST-OPERATIVE STATE: ICD-10-CM

## 2025-04-21 DIAGNOSIS — M65.342 TRIGGER RING FINGER OF LEFT HAND: Primary | ICD-10-CM

## 2025-04-21 PROCEDURE — 1160F RVW MEDS BY RX/DR IN RCRD: CPT | Mod: CPTII,S$GLB,, | Performed by: ORTHOPAEDIC SURGERY

## 2025-04-21 PROCEDURE — 20550 NJX 1 TENDON SHEATH/LIGAMENT: CPT | Mod: 79,LT,S$GLB, | Performed by: ORTHOPAEDIC SURGERY

## 2025-04-21 PROCEDURE — 99999 PR PBB SHADOW E&M-EST. PATIENT-LVL IV: CPT | Mod: PBBFAC,,, | Performed by: ORTHOPAEDIC SURGERY

## 2025-04-21 PROCEDURE — 73140 X-RAY EXAM OF FINGER(S): CPT | Mod: 26,LT,, | Performed by: RADIOLOGY

## 2025-04-21 PROCEDURE — 73140 X-RAY EXAM OF FINGER(S): CPT | Mod: TC,LT

## 2025-04-21 PROCEDURE — 3066F NEPHROPATHY DOC TX: CPT | Mod: CPTII,S$GLB,, | Performed by: ORTHOPAEDIC SURGERY

## 2025-04-21 PROCEDURE — 99214 OFFICE O/P EST MOD 30 MIN: CPT | Mod: 24,25,S$GLB, | Performed by: ORTHOPAEDIC SURGERY

## 2025-04-21 PROCEDURE — 3072F LOW RISK FOR RETINOPATHY: CPT | Mod: CPTII,S$GLB,, | Performed by: ORTHOPAEDIC SURGERY

## 2025-04-21 PROCEDURE — 3044F HG A1C LEVEL LT 7.0%: CPT | Mod: CPTII,S$GLB,, | Performed by: ORTHOPAEDIC SURGERY

## 2025-04-21 PROCEDURE — 73140 X-RAY EXAM OF FINGER(S): CPT | Mod: 26,RT,, | Performed by: RADIOLOGY

## 2025-04-21 PROCEDURE — 99024 POSTOP FOLLOW-UP VISIT: CPT | Mod: S$GLB,,, | Performed by: ORTHOPAEDIC SURGERY

## 2025-04-21 PROCEDURE — 73140 X-RAY EXAM OF FINGER(S): CPT | Mod: TC,RT

## 2025-04-21 PROCEDURE — 1159F MED LIST DOCD IN RCRD: CPT | Mod: CPTII,S$GLB,, | Performed by: ORTHOPAEDIC SURGERY

## 2025-04-21 PROCEDURE — 3061F NEG MICROALBUMINURIA REV: CPT | Mod: CPTII,S$GLB,, | Performed by: ORTHOPAEDIC SURGERY

## 2025-04-21 RX ADMIN — METHYLPREDNISOLONE ACETATE 40 MG: 40 INJECTION, SUSPENSION INTRA-ARTICULAR; INTRALESIONAL; INTRAMUSCULAR; SOFT TISSUE at 11:04

## 2025-04-21 NOTE — LETTER
[facility]  [facility] [place] [address]  Phone: [phone]  Fax: [phone]   Hermelindo Garza III was seen in clinic 04/21/2025.  The following updated OT orders apply to their ongoing care.    - can begin strengthening  - LMB splint     I appreciate your assistance in their rehabilitation.  Please don't hesitate to call or reach out with questions/concerns.    Sincerely,        Christen Marshall MD  Hand & Wrist Orthopaedic Surgery  04/21/2025

## 2025-04-22 ENCOUNTER — PATIENT MESSAGE (OUTPATIENT)
Dept: PSYCHIATRY | Facility: CLINIC | Age: 45
End: 2025-04-22
Payer: COMMERCIAL

## 2025-04-22 ENCOUNTER — PATIENT MESSAGE (OUTPATIENT)
Dept: DERMATOLOGY | Facility: CLINIC | Age: 45
End: 2025-04-22
Payer: COMMERCIAL

## 2025-04-23 ENCOUNTER — CLINICAL SUPPORT (OUTPATIENT)
Dept: REHABILITATION | Facility: HOSPITAL | Age: 45
End: 2025-04-23
Payer: COMMERCIAL

## 2025-04-23 ENCOUNTER — RESULTS FOLLOW-UP (OUTPATIENT)
Dept: DERMATOLOGY | Facility: CLINIC | Age: 45
End: 2025-04-23

## 2025-04-23 ENCOUNTER — LAB VISIT (OUTPATIENT)
Dept: LAB | Facility: HOSPITAL | Age: 45
End: 2025-04-23
Attending: HOSPITALIST
Payer: COMMERCIAL

## 2025-04-23 DIAGNOSIS — M25.641 DECREASED RANGE OF MOTION OF FINGER OF RIGHT HAND: Primary | ICD-10-CM

## 2025-04-23 DIAGNOSIS — Z00.00 ANNUAL PHYSICAL EXAM: ICD-10-CM

## 2025-04-23 DIAGNOSIS — E11.69 HYPERLIPIDEMIA ASSOCIATED WITH TYPE 2 DIABETES MELLITUS: ICD-10-CM

## 2025-04-23 DIAGNOSIS — E78.5 HYPERLIPIDEMIA ASSOCIATED WITH TYPE 2 DIABETES MELLITUS: ICD-10-CM

## 2025-04-23 DIAGNOSIS — G47.33 OSA (OBSTRUCTIVE SLEEP APNEA): ICD-10-CM

## 2025-04-23 DIAGNOSIS — J45.20 MILD INTERMITTENT ASTHMA WITHOUT COMPLICATION: ICD-10-CM

## 2025-04-23 DIAGNOSIS — E11.9 TYPE 2 DIABETES MELLITUS WITHOUT COMPLICATION, WITHOUT LONG-TERM CURRENT USE OF INSULIN: ICD-10-CM

## 2025-04-23 DIAGNOSIS — R29.898 REDUCED HAND STRENGTH: ICD-10-CM

## 2025-04-23 LAB
ABSOLUTE EOSINOPHIL (OHS): 0.2 K/UL
ABSOLUTE MONOCYTE (OHS): 0.57 K/UL (ref 0.3–1)
ABSOLUTE NEUTROPHIL COUNT (OHS): 2.99 K/UL (ref 1.8–7.7)
ALBUMIN SERPL BCP-MCNC: 4.2 G/DL (ref 3.5–5.2)
ALP SERPL-CCNC: 94 UNIT/L (ref 40–150)
ALT SERPL W/O P-5'-P-CCNC: 30 UNIT/L (ref 10–44)
ANION GAP (OHS): 12 MMOL/L (ref 8–16)
AST SERPL-CCNC: 22 UNIT/L (ref 11–45)
BASOPHILS # BLD AUTO: 0.07 K/UL
BASOPHILS NFR BLD AUTO: 1.1 %
BILIRUB SERPL-MCNC: 0.6 MG/DL (ref 0.1–1)
BUN SERPL-MCNC: 13 MG/DL (ref 6–20)
CALCIUM SERPL-MCNC: 9.5 MG/DL (ref 8.7–10.5)
CHLORIDE SERPL-SCNC: 105 MMOL/L (ref 95–110)
CHOLEST SERPL-MCNC: 172 MG/DL (ref 120–199)
CHOLEST/HDLC SERPL: 3.5 {RATIO} (ref 2–5)
CO2 SERPL-SCNC: 24 MMOL/L (ref 23–29)
CREAT SERPL-MCNC: 0.8 MG/DL (ref 0.5–1.4)
EAG (OHS): 100 MG/DL (ref 68–131)
ERYTHROCYTE [DISTWIDTH] IN BLOOD BY AUTOMATED COUNT: 13.6 % (ref 11.5–14.5)
GFR SERPLBLD CREATININE-BSD FMLA CKD-EPI: >60 ML/MIN/1.73/M2
GLUCOSE SERPL-MCNC: 84 MG/DL (ref 70–110)
HBA1C MFR BLD: 5.1 % (ref 4–5.6)
HCT VFR BLD AUTO: 46.9 % (ref 40–54)
HDLC SERPL-MCNC: 49 MG/DL (ref 40–75)
HDLC SERPL: 28.5 % (ref 20–50)
HGB BLD-MCNC: 15 GM/DL (ref 14–18)
IMM GRANULOCYTES # BLD AUTO: 0.02 K/UL (ref 0–0.04)
IMM GRANULOCYTES NFR BLD AUTO: 0.3 % (ref 0–0.5)
LDLC SERPL CALC-MCNC: 89.8 MG/DL (ref 63–159)
LYMPHOCYTES # BLD AUTO: 2.49 K/UL (ref 1–4.8)
MCH RBC QN AUTO: 29.6 PG (ref 27–31)
MCHC RBC AUTO-ENTMCNC: 32 G/DL (ref 32–36)
MCV RBC AUTO: 93 FL (ref 82–98)
NONHDLC SERPL-MCNC: 123 MG/DL
NUCLEATED RBC (/100WBC) (OHS): 0 /100 WBC
PLATELET # BLD AUTO: 182 K/UL (ref 150–450)
PMV BLD AUTO: 10.5 FL (ref 9.2–12.9)
POTASSIUM SERPL-SCNC: 4.2 MMOL/L (ref 3.5–5.1)
PROT SERPL-MCNC: 7 GM/DL (ref 6–8.4)
RBC # BLD AUTO: 5.06 M/UL (ref 4.6–6.2)
RELATIVE EOSINOPHIL (OHS): 3.2 %
RELATIVE LYMPHOCYTE (OHS): 39.3 % (ref 18–48)
RELATIVE MONOCYTE (OHS): 9 % (ref 4–15)
RELATIVE NEUTROPHIL (OHS): 47.1 % (ref 38–73)
SODIUM SERPL-SCNC: 141 MMOL/L (ref 136–145)
TRIGL SERPL-MCNC: 166 MG/DL (ref 30–150)
TSH SERPL-ACNC: 1.8 UIU/ML (ref 0.4–4)
WBC # BLD AUTO: 6.34 K/UL (ref 3.9–12.7)

## 2025-04-23 PROCEDURE — 80061 LIPID PANEL: CPT

## 2025-04-23 PROCEDURE — 85025 COMPLETE CBC W/AUTO DIFF WBC: CPT

## 2025-04-23 PROCEDURE — 97110 THERAPEUTIC EXERCISES: CPT

## 2025-04-23 PROCEDURE — 36415 COLL VENOUS BLD VENIPUNCTURE: CPT

## 2025-04-23 PROCEDURE — 84443 ASSAY THYROID STIM HORMONE: CPT

## 2025-04-23 PROCEDURE — 83036 HEMOGLOBIN GLYCOSYLATED A1C: CPT

## 2025-04-23 PROCEDURE — 82040 ASSAY OF SERUM ALBUMIN: CPT

## 2025-04-23 PROCEDURE — 97022 WHIRLPOOL THERAPY: CPT

## 2025-04-23 NOTE — PROGRESS NOTES
Pt has been diagnosed with moderately atypical nevus. Please schedule f/u in 6 months. Thanks, Dr. Jean

## 2025-04-23 NOTE — PROGRESS NOTES
"Outpatient Rehab    Occupational Therapy Visit    Patient Name: Hermelindo Garza III  MRN: [id number]  YOB: 1980  Encounter Date: 4/23/2025    Therapy Diagnosis:   Encounter Diagnoses   Name Primary?    Decreased range of motion of finger of right hand Yes    Reduced hand strength      Physician: Christen Marshall MD    Physician Orders: Eval and Treat  Medical Diagnosis: Closed nondisplaced fracture of middle phalanx of right little finger, initial encounter    Visit # / Visits Authorized: 4 / 12 (Visit #5)  Insurance Authorization Period: 3/20/2025 to 12/31/2025  Date of Evaluation: 3/20/2025   Plan of Care Certification: 3/20/2025 to 6/13/25 (12 weeks)     Time In: 0955   Time Out: 1038  Total Time: 43   Total Billable Time: 43    Precautions  Standard, Diabetes      Subjective   "It's just sore." He was cleared for strengthening and discharge of orthosis..  Pain reported as 1/10. R SF    Objective         Upper Extremity Sensation  General Upper Extremity Sensation  Impaired: Right  Right Upper Extremity Sensation  Intact: Sharp/Dull Discrimination, Kinesthesia, Proprioception, and Hot/Cold Discrimination  Diminished: Light Touch  Right Upper Extremity Sensation Stocking Glove Pattern: No     Right Hand Digit Swelling    Proximal Phalanx IP PIP Middle Phalanx DIP Distal Phalanx   Thumb     N/A N/A N/A     Index   N/A           Middle   N/A           Ring   N/A           Little 7.5 cm N/A 7.6 cm 6.8 cm             Left Hand Digit Swelling    Proximal Phalanx IP PIP Middle Phalanx DIP Distal Phalanx   Thumb     N/A N/A N/A     Index   N/A           Middle   N/A           Ring   N/A           Little 6.6 cm N/A 6.5 cm 5.8 cm           Digit 5 - Little Finger Active Range of Motion  Right Little Finger    Extension (deg) Flexion (deg) Pain   MCP 0 70     PIP -23 70 Yes   DIP -10 25 Yes   SURESH: 132     Digit 5 - Little Finger Active Range of Motion  Right Little Finger    Extension (deg) Flexion (deg) " Pain   MCP 0 90 (+20)     PIP -5 (+18) 80 (+10)    DIP -5 (+5) 43 (+18)    SURESH: 203 (+71)     Left Little Finger    Extension (deg) Flexion (deg) Pain   MCP 0 80     PIP 0 95     DIP 0 75     SURESH: 250     Strength (Dynamometer)  Measured in pounds to POP.   4/23/2025 4/23/2025    Left Right   Rung  108     Treatment:     Supervised modalities after being cleared for contradictions: Fluidotherapy - 115 degrees and 50 speed, to R hand/wrist for 12 minutes to increase blood flow and circulation, desensitization and sensory re-education, pain management, and increased tissue extensibility. Pt instructed on performing active range of motion exercises while receiving heat to increase active motion.     Therapeutic exercises to develop endurance, ROM, and flexibility for 43 minutes, including:  - Updated objective measurements, see above.   - Digit/wrist AROM in fluidotherapy x 12 min  - Hook stretch on tabletop 10 reps x 5 sec holds   - Digit PROM x 5 min flex/ext   - Resisted digit add/abd, isolated EDM x 15 reps each   - DIP/PIP jt blocking of R SF x 15 reps each   - Hook to full fist with AAROM x 20 reps   - Comp digit and wrist flexion x 10 reps  - Reverse joint blocking 2 x 10 reps   - Gripping yellow putty x 2 min  - EDC ext with yellow band x 15 reps  - Issued LMB dynamic spring extension splint size A to facilitate PIP ext and minimize ext lag. Pt instructed to wear for 15-30 min 3 x daily, increase as tolerated by 15 min increments for goal of 1 hour 3 x daily. Pt educated on purpose, donning/doffing, wear, care, and precautions.     Therapeutic activities to improve functional performance for 0 minutes, including:  NT:  - Octyi x 2 min  - [place]  ulnar scoop x 1 container  - Dowel hook roll x 2 min with pen   - Issued tubigauze digit sleeves x 5    Assessment & Plan   Assessment: Pt participated well and endorses good adherence to HEP. He demo slight ext lag to PIP jt but overall ROM is WFLs.  Baseline strength measurements taken today demonstrate min deficits in  of affected, right hand. Initiated  strengthening with no issue and min c/o pain. Will progress as tolerated and per protocol.  Evaluation/Treatment Tolerance: Patient tolerated treatment well, Patient limited by fatigue, Patient limited by pain    Patient will continue to benefit from skilled outpatient occupational therapy to address the deficits listed in the problem list box on initial evaluation, provide pt/family education and to maximize pt's level of independence in the home and community environment.     Patient's spiritual, cultural, and educational needs considered and patient agreeable to plan of care and goals.     Education  Education was done with Patient. The patient's learning style includes Demonstration, Listening, and Pictures/video. The patient Verbalizes understanding and Demonstrates understanding.         HEP in place, added light hand strengthening and LMB wear.      Plan: Continue skilled occupational therapy with individualized plan of care focusing on maximizing functional use of patient's right hand. Progress as tolerated and per protocol. Issue FOTO and measure.    Goals:   Active       Occupational Therapy       Occupational Therapy Goal (Progressing)       Start:  03/20/25    Expected End:  06/12/25       Goals:   The following goals were discussed with the patient and patient is in agreement with them as to be addressed in the treatment plan.   Long Term Goals (LTGs)  LTG #1: Pt will report a pain level of 1-3 out of 10 at worst with daily hand use.   LTG #2: Pt will demo improved FOTO score by 5-10 points.   LTG #3: Pt will return to prior level of function for IADLs and household management.     Short Term Goals (STGs)  STG #1: Pt will report a pain level of 4 out of 10 at worst with daily hand use. MET  STG #2: Pt will report/demo [place] with ADLs. MET  STG #3: Pt will demonstrate independence  with issued HEP, orthosis wear, and modalities for pain/symptom management. MET, ongoing  STG #4: Pt will demo improved R SF SURESH by 30-50 degrees needed to aid with grasping. MET  STG #5: Decrease edema of R SF by 0.2-0.5 cm to increase joint mobility/flexibility for hand/arm use.  STG #6: Pt will increase right hand  strength by at least 5-10 lbs needed to open jars and carry groceries.                  Connie Vasquez, OTR/L, CHT

## 2025-04-24 RX ORDER — LEVOCETIRIZINE DIHYDROCHLORIDE 5 MG/1
5 TABLET, FILM COATED ORAL NIGHTLY
Qty: 30 TABLET | Refills: 11 | Status: SHIPPED | OUTPATIENT
Start: 2025-04-24 | End: 2026-04-24

## 2025-04-28 ENCOUNTER — OFFICE VISIT (OUTPATIENT)
Dept: INTERNAL MEDICINE | Facility: CLINIC | Age: 45
End: 2025-04-28
Payer: COMMERCIAL

## 2025-04-28 VITALS
BODY MASS INDEX: 38.86 KG/M2 | OXYGEN SATURATION: 99 % | HEART RATE: 72 BPM | SYSTOLIC BLOOD PRESSURE: 122 MMHG | RESPIRATION RATE: 16 BRPM | WEIGHT: 233.25 LBS | HEIGHT: 65 IN | DIASTOLIC BLOOD PRESSURE: 74 MMHG | TEMPERATURE: 97 F

## 2025-04-28 DIAGNOSIS — E78.5 HYPERLIPIDEMIA ASSOCIATED WITH TYPE 2 DIABETES MELLITUS: ICD-10-CM

## 2025-04-28 DIAGNOSIS — E11.69 HYPERLIPIDEMIA ASSOCIATED WITH TYPE 2 DIABETES MELLITUS: ICD-10-CM

## 2025-04-28 DIAGNOSIS — R68.82 LOW LIBIDO: ICD-10-CM

## 2025-04-28 DIAGNOSIS — E66.9 OBESITY (BMI 30-39.9): ICD-10-CM

## 2025-04-28 DIAGNOSIS — G47.33 OSA (OBSTRUCTIVE SLEEP APNEA): ICD-10-CM

## 2025-04-28 DIAGNOSIS — J45.20 MILD INTERMITTENT ASTHMA WITHOUT COMPLICATION: ICD-10-CM

## 2025-04-28 DIAGNOSIS — E11.9 TYPE 2 DIABETES MELLITUS WITHOUT COMPLICATION, WITHOUT LONG-TERM CURRENT USE OF INSULIN: ICD-10-CM

## 2025-04-28 DIAGNOSIS — K75.81 NASH (NONALCOHOLIC STEATOHEPATITIS): ICD-10-CM

## 2025-04-28 DIAGNOSIS — F41.9 ANXIETY: ICD-10-CM

## 2025-04-28 DIAGNOSIS — Z00.00 ANNUAL PHYSICAL EXAM: Primary | ICD-10-CM

## 2025-04-28 PROCEDURE — 3044F HG A1C LEVEL LT 7.0%: CPT | Mod: CPTII,S$GLB,, | Performed by: HOSPITALIST

## 2025-04-28 PROCEDURE — 3061F NEG MICROALBUMINURIA REV: CPT | Mod: CPTII,S$GLB,, | Performed by: HOSPITALIST

## 2025-04-28 PROCEDURE — 3008F BODY MASS INDEX DOCD: CPT | Mod: CPTII,S$GLB,, | Performed by: HOSPITALIST

## 2025-04-28 PROCEDURE — 3072F LOW RISK FOR RETINOPATHY: CPT | Mod: CPTII,S$GLB,, | Performed by: HOSPITALIST

## 2025-04-28 PROCEDURE — 3074F SYST BP LT 130 MM HG: CPT | Mod: CPTII,S$GLB,, | Performed by: HOSPITALIST

## 2025-04-28 PROCEDURE — 3066F NEPHROPATHY DOC TX: CPT | Mod: CPTII,S$GLB,, | Performed by: HOSPITALIST

## 2025-04-28 PROCEDURE — 99396 PREV VISIT EST AGE 40-64: CPT | Mod: S$GLB,,, | Performed by: HOSPITALIST

## 2025-04-28 PROCEDURE — 1159F MED LIST DOCD IN RCRD: CPT | Mod: CPTII,S$GLB,, | Performed by: HOSPITALIST

## 2025-04-28 PROCEDURE — 1160F RVW MEDS BY RX/DR IN RCRD: CPT | Mod: CPTII,S$GLB,, | Performed by: HOSPITALIST

## 2025-04-28 PROCEDURE — 3078F DIAST BP <80 MM HG: CPT | Mod: CPTII,S$GLB,, | Performed by: HOSPITALIST

## 2025-04-28 PROCEDURE — 99999 PR PBB SHADOW E&M-EST. PATIENT-LVL V: CPT | Mod: PBBFAC,,, | Performed by: HOSPITALIST

## 2025-04-28 RX ORDER — LORAZEPAM 0.5 MG/1
.5-1 TABLET ORAL DAILY PRN
Qty: 60 TABLET | Refills: 0 | Status: SHIPPED | OUTPATIENT
Start: 2025-04-28

## 2025-04-28 RX ORDER — ATORVASTATIN CALCIUM 20 MG/1
20 TABLET, FILM COATED ORAL DAILY
Qty: 90 TABLET | Refills: 2 | Status: SHIPPED | OUTPATIENT
Start: 2025-04-28

## 2025-04-28 RX ORDER — TIRZEPATIDE 15 MG/.5ML
15 INJECTION, SOLUTION SUBCUTANEOUS
Qty: 2 ML | Refills: 5 | Status: SHIPPED | OUTPATIENT
Start: 2025-04-28

## 2025-04-28 NOTE — TELEPHONE ENCOUNTER
No care due was identified.  [facility] Embedded Care Due Messages. Reference number: [id number].   4/28/2025 11:15:51 AM CDT

## 2025-04-28 NOTE — TELEPHONE ENCOUNTER
Refill Decision Note   Hermelindo Garza  is requesting a refill authorization.  Brief Assessment and Rationale for Refill:  Approve     Medication Therapy Plan:        Comments:     Note composed:11:29 AM 04/28/2025

## 2025-04-28 NOTE — PROGRESS NOTES
Subjective:     @Patient ID: Hermelindo Garza III is a 44 y.o. male.    Chief Complaint: Follow-up (6 month)    HPI    43 yo male with DM2, HLD, obesity, GERD, SHEIKH, LUBA, ADHD, asthma, spondylosis, hx of melanoma in situ presents for       DM2:mounjaro 12.5 mg weekly.   HLD: lipitor 20 mg qday  LUBA: uses cpap  GERD: reports since hiatal hernia repair. Symptoms have improved.   Cervical spondylosis with myelopathy: continues to have chronic neck pain with radiation down both arms (now L side increased) also endorses weakness. Does see a spine surgeon.  S/p procedure and doing better   ADHD: on focalin. Sees psychiatry.   Anxiety: no longer on Lexapro, lamictal and focalin Seeing psychiatry.         DIABETES AND  WEIGHT MANAGEMENT:  He requests an increase from current Mounjaro 12.5 mg to 15 mg dosage. He reports occasional nausea with current medication. He notes weight gain due to decreased exercise capacity following finger fracture, though previously achieved weight in 220's.    MUSCULOSKELETAL:  He reports lower back pain and toe pain. He expresses concern about the pain potentially worsening and wants to address it proactively to prevent it from becoming more severe or causing shooting pains.    PSYCHIATRIC:  He discontinued psychotropic medications due to feeling in a daze. He uses Lorazepam for sleep difficulties and anxiety symptoms, particularly when experiencing concurrent back pain.    RESPIRATORY:  He reports good response to albuterol nebulizer treatments and albuterol inhaler, with the inhaler providing significant symptom improvement. Advair is effective for respiratory symptoms.    SEXUAL HEALTH:  He reports decreased libido and sexual desire in recent months without erectile dysfunction.    MEDICAL HISTORY:  History of melanoma    LABS:  Metabolic panel, kidney function, liver function, and blood counts were normal with no anemia. Triglycerides improved from 203 to 166 but remain elevated. HDL  "increased and was within normal range. LDL was within normal range but slightly increased. Thyroid function was normal. A1C was well controlled. Urinalysis was normal without proteinuria.        Lipid disorders/ASCVD risk (ages >/= 45 or >/= 20 if increased risk ): ordered  Eye exam:  utd 3/26/24      Vaccines:   Influenza (yearly) utd    Tetanus (every 10 yrs - 1st tdap) utd 2019   Covid19: due for booster  Pna 20: utd      Exercise: exercises regularly       ROS:    Male Genitourinary: +decreased libido  Musculoskeletal: +back pain, +limb pain  Psychiatric: +anxiety, +sleep difficulty           Review of Systems  Past medical history, surgical history, and family medical history reviewed and updated as appropriate.    Medications and allergies reviewed.     Objective:     Vitals:    04/28/25 0756   BP: 122/74   BP Location: Left arm   Patient Position: Sitting   Pulse: 72   Resp: 16   Temp: 96.5 °F (35.8 °C)   TempSrc: Skin   SpO2: 99%   Weight: 105.8 kg (233 lb 4 oz)   Height: 5' 5" (1.651 m)     Body mass index is 38.81 kg/m².  Physical Exam  Vitals reviewed.   Constitutional:       General: He is not in acute distress.     Appearance: He is well-developed.   HENT:      Head: Normocephalic and atraumatic.      Right Ear: Tympanic membrane normal.      Left Ear: Tympanic membrane normal.      Mouth/Throat:      Mouth: Mucous membranes are moist.      Pharynx: No oropharyngeal exudate.   Eyes:      General:         Right eye: No discharge.         Left eye: No discharge.      Conjunctiva/sclera: Conjunctivae normal.   Cardiovascular:      Rate and Rhythm: Normal rate and regular rhythm.      Heart sounds: No murmur heard.     No friction rub.   Pulmonary:      Effort: Pulmonary effort is normal.      Breath sounds: Normal breath sounds.   Abdominal:      General: Bowel sounds are normal. There is no distension.      Palpations: Abdomen is soft.      Tenderness: There is no abdominal tenderness.   Musculoskeletal:  "        General: Normal range of motion.      Cervical back: Normal range of motion and neck supple.      Right lower leg: No edema.      Left lower leg: No edema.   Lymphadenopathy:      Cervical: No cervical adenopathy.   Skin:     General: Skin is warm and dry.   Neurological:      Mental Status: He is alert and oriented to person, place, and time.   Psychiatric:         Mood and Affect: Mood normal.         Behavior: Behavior normal.         Lab Results   Component Value Date    WBC 6.34 04/23/2025    HGB 15.0 04/23/2025    HCT 46.9 04/23/2025     04/23/2025    CHOL 172 04/23/2025    TRIG 166 (H) 04/23/2025    HDL 49 04/23/2025    ALT 30 04/23/2025    AST 22 04/23/2025     04/23/2025    K 4.2 04/23/2025     04/23/2025    CREATININE 0.8 04/23/2025    BUN 13 04/23/2025    CO2 24 04/23/2025    TSH 1.801 04/23/2025    PSA 0.18 10/05/2022    INR 1.0 08/30/2024    HGBA1C 5.1 04/23/2025       Assessment:     1. Annual physical exam    2. Type 2 diabetes mellitus without complication, without long-term current use of insulin    3. Hyperlipidemia associated with type 2 diabetes mellitus    4. Anxiety    5. Obesity (BMI 30-39.9)    6. Mild intermittent asthma without complication    7. SHEIKH (nonalcoholic steatohepatitis)    8. LUBA (obstructive sleep apnea)    9. Low libido      Plan:   Hermelindo was seen today for follow-up.    Diagnoses and all orders for this visit:    Annual physical exam  - labs reviewed in clinic     Type 2 diabetes mellitus without complication, without long-term current use of insulin  -     Hemoglobin A1C; Future  -     Comprehensive Metabolic Panel; Future  -     Lipid Panel; Future  -     tirzepatide (MOUNJARO) 15 mg/0.5 mL PnIj; Inject 15 mg into the skin every 7 days.    Hyperlipidemia associated with type 2 diabetes mellitus  -     Hemoglobin A1C; Future  -     Comprehensive Metabolic Panel; Future  -     Lipid Panel; Future    Anxiety  Noted the patient's use of Lorazepam for  anxiety and sleep issues.   Acknowledged the patient's previous use of CBT techniques to manage anxiety.   Continued prescribing Lorazepam 0.5 mg for anxiety and sleep, with the option to take 2 tablets if needed.  -     LORazepam (ATIVAN) 0.5 MG tablet; Take 1-2 tablets (0.5-1 mg total) by mouth daily as needed (SIGNIFICANT ANXIETY).    Obesity (BMI 30-39.9)  - chronic. Will monitor as GLP-1 dose is increased     Mild intermittent asthma without complication  - Stable. Continue home meds     SHEIKH (nonalcoholic steatohepatitis)  - stable. Continue to monitor     LUBA (obstructive sleep apnea)  - stable. Continue cpap     Low libido   Noted the patient's report of decreased libido.   Recommend checking testosterone levels with a urologist.          This note was generated with the assistance of ambient listening technology. Verbal consent was obtained by the patient and accompanying visitor(s) for the recording of patient appointment to facilitate this note. I attest to having reviewed and edited the generated note for accuracy, though some syntax or spelling errors may persist. Please contact the author of this note for any clarification.        Suzi Sanches MD  Internal Medicine    4/28/2025

## 2025-04-30 ENCOUNTER — TELEPHONE (OUTPATIENT)
Dept: PAIN MEDICINE | Facility: CLINIC | Age: 45
End: 2025-04-30
Payer: COMMERCIAL

## 2025-04-30 ENCOUNTER — PATIENT MESSAGE (OUTPATIENT)
Dept: OPHTHALMOLOGY | Facility: CLINIC | Age: 45
End: 2025-04-30
Payer: COMMERCIAL

## 2025-05-01 ENCOUNTER — PATIENT MESSAGE (OUTPATIENT)
Dept: PODIATRY | Facility: CLINIC | Age: 45
End: 2025-05-01
Payer: COMMERCIAL

## 2025-05-01 RX ORDER — METHYLPREDNISOLONE ACETATE 40 MG/ML
40 INJECTION, SUSPENSION INTRA-ARTICULAR; INTRALESIONAL; INTRAMUSCULAR; SOFT TISSUE
Status: DISCONTINUED | OUTPATIENT
Start: 2025-04-21 | End: 2025-05-01 | Stop reason: HOSPADM

## 2025-05-01 NOTE — PROCEDURES
Tendon Sheath    Date/Time: 4/21/2025 11:30 AM    Performed by: Christen Marshall MD  Authorized by: Christen Marshall MD    Consent Done?:  Yes (Verbal)  Indications:  Pain  Timeout: prior to procedure the correct patient, procedure, and site was verified    Prep: patient was prepped and draped in usual sterile fashion      Local anesthesia used?: Yes    Anesthesia:  Local infiltration  Local anesthetic:  Lidocaine 1% without epinephrine  Anesthetic total (ml):  1    Location:  Ring finger  Site:  L ring flexor tendon sheath  Ultrasonic guidance for needle placement?: No    Needle size:  25 G  Approach:  Volar  Medications:  40 mg methylPREDNISolone acetate 40 mg/mL  Patient tolerance:  Patient tolerated the procedure well with no immediate complications

## 2025-05-02 ENCOUNTER — TELEPHONE (OUTPATIENT)
Dept: PAIN MEDICINE | Facility: HOSPITAL | Age: 45
End: 2025-05-02
Payer: COMMERCIAL

## 2025-05-05 ENCOUNTER — TELEPHONE (OUTPATIENT)
Dept: PAIN MEDICINE | Facility: HOSPITAL | Age: 45
End: 2025-05-05
Payer: COMMERCIAL

## 2025-05-05 ENCOUNTER — OFFICE VISIT (OUTPATIENT)
Dept: PODIATRY | Facility: CLINIC | Age: 45
End: 2025-05-05
Payer: COMMERCIAL

## 2025-05-05 VITALS
BODY MASS INDEX: 38.89 KG/M2 | WEIGHT: 233.44 LBS | DIASTOLIC BLOOD PRESSURE: 78 MMHG | HEART RATE: 76 BPM | SYSTOLIC BLOOD PRESSURE: 115 MMHG | HEIGHT: 65 IN

## 2025-05-05 DIAGNOSIS — M79.2 NEUROGENIC PAIN OF LEFT FOOT: Primary | ICD-10-CM

## 2025-05-05 PROCEDURE — 3044F HG A1C LEVEL LT 7.0%: CPT | Mod: CPTII,S$GLB,, | Performed by: STUDENT IN AN ORGANIZED HEALTH CARE EDUCATION/TRAINING PROGRAM

## 2025-05-05 PROCEDURE — 99214 OFFICE O/P EST MOD 30 MIN: CPT | Mod: S$GLB,,, | Performed by: STUDENT IN AN ORGANIZED HEALTH CARE EDUCATION/TRAINING PROGRAM

## 2025-05-05 PROCEDURE — 3078F DIAST BP <80 MM HG: CPT | Mod: CPTII,S$GLB,, | Performed by: STUDENT IN AN ORGANIZED HEALTH CARE EDUCATION/TRAINING PROGRAM

## 2025-05-05 PROCEDURE — 1159F MED LIST DOCD IN RCRD: CPT | Mod: CPTII,S$GLB,, | Performed by: STUDENT IN AN ORGANIZED HEALTH CARE EDUCATION/TRAINING PROGRAM

## 2025-05-05 PROCEDURE — 3074F SYST BP LT 130 MM HG: CPT | Mod: CPTII,S$GLB,, | Performed by: STUDENT IN AN ORGANIZED HEALTH CARE EDUCATION/TRAINING PROGRAM

## 2025-05-05 PROCEDURE — 99999 PR PBB SHADOW E&M-EST. PATIENT-LVL IV: CPT | Mod: PBBFAC,,, | Performed by: STUDENT IN AN ORGANIZED HEALTH CARE EDUCATION/TRAINING PROGRAM

## 2025-05-05 PROCEDURE — 3008F BODY MASS INDEX DOCD: CPT | Mod: CPTII,S$GLB,, | Performed by: STUDENT IN AN ORGANIZED HEALTH CARE EDUCATION/TRAINING PROGRAM

## 2025-05-05 PROCEDURE — 3061F NEG MICROALBUMINURIA REV: CPT | Mod: CPTII,S$GLB,, | Performed by: STUDENT IN AN ORGANIZED HEALTH CARE EDUCATION/TRAINING PROGRAM

## 2025-05-05 PROCEDURE — 3066F NEPHROPATHY DOC TX: CPT | Mod: CPTII,S$GLB,, | Performed by: STUDENT IN AN ORGANIZED HEALTH CARE EDUCATION/TRAINING PROGRAM

## 2025-05-05 PROCEDURE — 3072F LOW RISK FOR RETINOPATHY: CPT | Mod: CPTII,S$GLB,, | Performed by: STUDENT IN AN ORGANIZED HEALTH CARE EDUCATION/TRAINING PROGRAM

## 2025-05-05 RX ORDER — PREGABALIN 300 MG/1
300 CAPSULE ORAL 2 TIMES DAILY
Qty: 60 CAPSULE | Refills: 5 | Status: SHIPPED | OUTPATIENT
Start: 2025-05-05 | End: 2025-11-03

## 2025-05-05 NOTE — PROGRESS NOTES
Subjective:     Patient    Hermelindo Garza III is a 44 y.o. male.    Problem    07/31/23: Presents with left 2nd toe that rubs on 1st toe causing blisters, callusing, and pain at medial left 2nd toe. He has attempted to resolve this issue by wearing bandages and toe spacers/sleeves but the issue has progressed over time and he continues to have pain and skin breakdown at the left 2nd toe. Also has wide feet and difficulty finding appropriately fitting footwear. He is now considering surgery to correct the left 2nd toe. He is also now having pain in the ball of the foot on the left. Denies numbness, tingling, burning or radiating pain in the feet.     08/21/23: Presents 1 week s/p L 2nd toe arthroplasty and ostectomy. Pain improved significantly, does throb when he walks a lot. No major issues.     08/28/23: Presents 2 weeks s/p L 2nd toe arthroplasty and ostectomy. Having minor numbness at base of left 2nd toe. Also reports no pain at left 4th MTPJ and no pain at left lateralfoot 5th metatarsal base area since surgery. No major concerns, would like to get back to physical activities.     10/10/23: Presents with puffy discolored left 2nd toe, painful, feels like it may be infected. Has been wearing a toe sleeve on the toe since surgery.     07/08/24: Returns for callusing at both 1st toes plantarmedially; the calluses become painful and also cause adjacent blistering, overall limiting his footwear selection and physical activity level. All issues at 2nd toe have resolved, he is satisfied with the surgery.     05/05/25: Returns for updated diabetic foot exam and pain in the left forefoot. Also with numbness, tingling, burning in both feet. Known back issues which causes symptoms into the feet. Has a back injection coming up soon. On pregabalin 400 mg/day which does improve his nerve symptoms, no side effects.      History    History obtained from patient and review of medical records.     Past Medical  History:   Diagnosis Date    ADD (attention deficit disorder) 05/09/2012    Allergy     Anxiety 04/12/2023    Asthma 05/09/2012    Cervical disc disorder with radiculopathy, unspecified cervical region 09/14/2021    Cervical radiculopathy 05/22/2023    Cervical spondylosis with myelopathy 06/29/2022    Eczema     Esophageal ulcer     GERD (gastroesophageal reflux disease) 05/09/2012    Glaucoma     Kidney stones 05/2014    Lumbar disc disease 05/09/2012    Lumbar herniated disc 05/09/2012    Malignant melanoma of skin, unspecified 01/06/2022    in situ right mid back    Melanoma 01/2022    in situ R mid back     LUBA (obstructive sleep apnea)     Radiculopathy, lumbar region 09/14/2021    Renal calculi 05/09/2012    Type 2 diabetes mellitus without complication, without long-term current use of insulin 11/16/2020       Past Surgical History:   Procedure Laterality Date    ANTERIOR CERVICAL DISCECTOMY W/ FUSION N/A 9/12/2024    Procedure: DISCECTOMY, SPINE, CERVICAL, ANTERIOR APPROACH, WITH FUSION C5-6, C6-7;  Surgeon: Naseem Mcnamara DO;  Location: [address];  Service: Neurosurgery;  Laterality: N/A;    APPENDECTOMY  2006    CARPAL TUNNEL RELEASE Right 09/15/2022    Procedure: RELEASE, CARPAL TUNNEL;  Surgeon: Christen Marshall MD;  Location: [facility] OR;  Service: Orthopedics;  Laterality: Right;    CAUTERY OF TURBINATES Bilateral 7/15/2024    Procedure: CAUTERIZATION, MUCOSA, NASAL TURBINATE;  Surgeon: Melody Liu MD;  Location: [facility] OR;  Service: ENT;  Laterality: Bilateral;    COLONOSCOPY  01/22/2019    internal hemorrhoids, o/w normal - repeat at age 52    COLONOSCOPY N/A 12/21/2023    Procedure: COLONOSCOPY;  Surgeon: Teo Johnson MD;  Location: [facility] ENDO;  Service: Endoscopy;  Laterality: N/A;    CORRECTION OF HAMMER TOE Left 8/14/2023    Procedure: CORRECTION, HAMMER TOE--  basic foot tray as well as a Reese microfree/microchoice tray. I also asked Sherry to bring an MIS mati.;  Surgeon: Wong  Ankush BARRETT DPM;  [facility];  Service: Podiatry;  Laterality: Left;  mackey drill, sagittal saw, foot set    CYSTOSCOPY  7/5/2023    Procedure: CYSTOSCOPY;  Surgeon: Chuckie Hall MD;  [facility];  Service: Urology;;    CYSTOSCOPY N/A 9/26/2024    Procedure: CYSTOSCOPY;  Surgeon: Jay Walter MD;  [facility];  Service: Urology;  Laterality: N/A;  30 minutes    EPIDURAL STEROID INJECTION N/A 02/10/2021    Procedure: CERVICAL C6/7 NELI DIRECT REFERRAL;  Surgeon: Michi Escamilla MD;  Location: [facility] PAIN MGT;  Service: Pain Management;  Laterality: N/A;  NEEDS CONSENT    EPIDURAL STEROID INJECTION INTO CERVICAL SPINE N/A 6/28/2023    Procedure: Injection-steroid-epidural-cervical C7-T1;  Surgeon: Lorraine Patel DO;  [facility];  Service: Pain Management;  Laterality: N/A;  diabetic    EPIDURAL STEROID INJECTION INTO CERVICAL SPINE N/A 11/3/2023    Procedure: cervical NELI C7-T1;  Surgeon: Kolton Terrell MD;  [facility]: [facility];  Service: Pain Management;  Laterality: N/A;  diabetic    EPIDURAL STEROID INJECTION INTO CERVICAL SPINE N/A 4/17/2024    Procedure: VENECIA C7/T1;  Surgeon: Lorraine Patel DO;  Location: [facility];  Service: Pain Management;  Laterality: N/A;  20mins- Ozempic 7d- no ac    EPIDURAL STEROID INJECTION INTO LUMBAR SPINE N/A 9/27/2023    Procedure: L4-5 NELI;  Surgeon: Tirso Pickering MD;  [facility];  Service: Pain Management;  Laterality: N/A;  15 mins    EPIDURAL STEROID INJECTION INTO LUMBAR SPINE N/A 3/6/2024    Procedure: L4-5 IL NELI;  Surgeon: Lorraine Patel DO;  Location: [facility];  Service: Pain Management;  Laterality: N/A;  20 mins    EPIDURAL STEROID INJECTION INTO LUMBAR SPINE N/A 6/3/2024    Procedure: NELI L4-5;  Surgeon: Lorraine Patel DO;  Location: [facility] PAIN MANAGEMENT;  Service: Pain Management;  Laterality: N/A;  20mins-no ac Ozempic 7d    EPIDURAL  STEROID INJECTION INTO LUMBAR SPINE N/A 8/26/2024    Procedure: L4-5 NELI Veer Left;  Surgeon: Lorraine Patel DO;  Location: [facility] PAIN MANAGEMENT;  Service: Pain Management;  Laterality: N/A;  20 mins No AC - Ozempic 7 days    EPIDURAL STEROID INJECTION INTO LUMBAR SPINE N/A 12/2/2024    Procedure: NELI L5-S1;  Surgeon: Lorraine Patel DO;  Location: [facility] PAIN MANAGEMENT;  Service: Pain Management;  Laterality: N/A;  no ac    ESOPHAGOGASTRODUODENOSCOPY  01/22/2019    LA grade B esophagitis; esophageal stenosis- dilated; gastritis; H pylori pathology negative    ESOPHAGOGASTRODUODENOSCOPY N/A 05/18/2021    Procedure: EGD (ESOPHAGOGASTRODUODENOSCOPY);  Surgeon: Mairana Hector MD;  Location: [facility];  Service: Endoscopy;  Laterality: N/A;    ESOPHAGOGASTRODUODENOSCOPY N/A 12/21/2023    Procedure: EGD (ESOPHAGOGASTRODUODENOSCOPY);  Surgeon: Teo Johnson MD;  Location: [facility];  Service: Endoscopy;  Laterality: N/A;    ESOPHAGOGASTRODUODENOSCOPY N/A 2/22/2024    Procedure: EGD (ESOPHAGOGASTRODUODENOSCOPY);  Surgeon: Teo Johnson MD;  Location: [facility];  Service: Endoscopy;  Laterality: N/A;    EXCISION OF GANGLION OF WRIST Right 09/15/2022    Procedure: EXCISION, GANGLION CYST, WRIST;  Surgeon: Christen Marshall MD;  Location: [facility];  Service: Orthopedics;  Laterality: Right;    FLEXOR TENDON REPAIR Right 09/15/2022    Procedure: FLEXOR TENOSYNOVECTOMY;  Surgeon: Christen Marshall MD;  Location: [facility];  Service: Orthopedics;  Laterality: Right;    HAND ARTHROTOMY Right 09/15/2022    Procedure: ARTHROTOMY, HAND RADIOCARPAL;  Surgeon: Christen Marshall MD;  Location: [facility];  Service: Orthopedics;  Laterality: Right;  Radiocarpal    INCISION, CONTRACTURE, BLADDER NECK, TRANSURETHRAL N/A 11/8/2024    Procedure: INCISION, CONTRACTURE, BLADDER NECK, TRANSURETHRAL;  Surgeon: Jay Walter MD;  Location: [facility];  Service: Urology;  Laterality: N/A;  1 hour    INJECTION OF JOINT Right  11/29/2023    Procedure: right hip IA injection and Right GTB injection;  Surgeon: Lorraine Patel DO;  Location: [facility] PAIN MANAGEMENT;  Service: Pain Management;  Laterality: Right;    INJECTION OF STEROID Left 09/15/2022    Procedure: INJECTION, STEROID LEFT WRIST AND LEFT LATERAL ELBOW;  Surgeon: Christen Marshall MD;  Location: [facility] OR;  Service: Orthopedics;  Laterality: Left;  Left Carpal Tunnel CSI    LASER LITHOTRIPSY Left 7/5/2023    Procedure: LITHOTRIPSY, USING LASER;  Surgeon: Chuckie Hall MD;  Location: [facility] OR 1ST FLR;  Service: Urology;  Laterality: Left;    LUMBAR LAMINECTOMY  2010    LUMBAR LAMINECTOMY WITH DISCECTOMY Left 09/20/2021    Procedure: LAMINECTOMY, SPINE, LUMBAR, WITH DISCECTOMY;  Surgeon: Naseem Mcnamara DO;  Location: [facility] OR 2ND FLR;  Service: Neurosurgery;  Laterality: Left;  MIS L3-4    NASAL SEPTOPLASTY N/A 7/15/2024    Procedure: SEPTOPLASTY, NOSE;  Surgeon: Melody Liu MD;  Location: [facility] OR;  Service: ENT;  Laterality: N/A;    OPEN REDUCTION AND INTERNAL FIXATION (ORIF) OF INJURY OF FINGER Right 3/13/2025    Procedure: ORIF, SMALL FINGER;  Surgeon: Christen Marshall MD;  Location: [facility] OR;  Service: Orthopedics;  Laterality: Right;    PH MONITORING, ESOPHAGUS, WIRELESS, (OFF REFLUX MEDS) N/A 12/21/2023    Procedure: PH MONITORING, ESOPHAGUS, WIRELESS, (OFF REFLUX MEDS);  Surgeon: Teo Johnson MD;  Location: [facility] ENDO;  Service: Endoscopy;  Laterality: N/A;    RECONSTRUCTION OF LIGAMENT Left 12/5/2023    Procedure: RECONSTRUCTION, LIGAMENT LATERAL COLLATERAL;  Surgeon: Christen Marshlal MD;  Location: [facility] OR;  Service: Orthopedics;  Laterality: Left;    REPAIR OF EXTENSOR TENDON Left 12/5/2023    Procedure: REPAIR, TENDON, EXTENSOR;  Surgeon: Christen Marshall MD;  Location: [facility] OR;  Service: Orthopedics;  Laterality: Left;    ROBOT-ASSISTED REPAIR OF HIATAL HERNIA N/A 3/14/2024    Procedure: ROBOTIC REPAIR, HERNIA, HIATAL;  Surgeon: Cody Winn MD;   Location: [facility] OR;  Service: General;  Laterality: N/A;    TYMPANOSTOMY TUBE PLACEMENT      URETERAL STENT PLACEMENT Left 7/5/2023    Procedure: INSERTION, STENT, URETER;  Surgeon: Chuckie Hall MD;  Location: [facility] OR [address] [place] [address];  Service: Urology;  Laterality: Left;    URETEROSCOPIC REMOVAL OF URETERIC CALCULUS Left 7/5/2023    Procedure: REMOVAL, CALCULUS, URETER, URETEROSCOPIC;  Surgeon: Chuckie Hall MD;  Location: [facility] OR [address] [place] [address];  Service: Urology;  Laterality: Left;    URETEROSCOPY Left 7/5/2023    Procedure: URETEROSCOPY;  Surgeon: Chuckie Hall MD;  Location: [facility] OR [address] [place] [address];  Service: Urology;  Laterality: Left;        Objective:     Vitals  Wt Readings from Last 1 Encounters:   05/05/25 105.9 kg (233 lb 7.5 oz)     Temp Readings from Last 1 Encounters:   04/28/25 96.5 °F (35.8 °C) (Skin)     BP Readings from Last 1 Encounters:   05/05/25 115/78     Pulse Readings from Last 1 Encounters:   05/05/25 76       Dermatological Exam    Skin:  Pedal hair growth, skin color, and skin texture normal on right; plantarmedial 1st toe callus  Pedal hair growth, skin color, and skin texture normal on left; plantarmedial 1st toe callus    Nails:  All nails normal in length, thickness, color    Vascular Exam    Arteries:  Posterior tibial artery palpable on right  Dorsalis pedis artery palpable on right  Posterior tibial artery palpable on left  Dorsalis pedis artery palpable on left    Veins:  Superficial veins unremarkable on right  Superficial veins unremarkable on left    Swelling:  None on right  None on left    Neurological Exam    Landenberg touch test:  6/6 sites sensed, light touch intact     Tinel Sign:  + at all major ankle and foot nerves bilaterally     Musculoskeletal Exam    Footwear:  Casual on right  Casual on left    Gait Exam:   Ambulatory Status: Ambulatory  Gait: Antalgic  Assistive Devices: None    Foot Progression Angle:  Normal on right  Normal on left      Right Lower Extremity Additional  Findings:  Functional hallux limitus  Right foot and ankle function, strength, and range of motion unremarkable except as noted above.     Left Lower Extremity Additional Findings:  Functional hallux limitus   Left foot and ankle function, strength, and range of motion unremarkable except as noted above.         Imaging and Other Tests    Imagin22 left foot X ray: mild skew foot with varus deformities at 1st-4th MTPJs, valgus deformity at 2nd DIPJ consistent with clinical findings.     Independently reviewed and interpreted imaging, findings are as follows: N/A     Assessment:     Encounter Diagnosis   Name Primary?    Neurogenic pain of left foot Yes            Plan:     I counseled the patient on his conditions, their implications and medical management.     Bilateral hallux limitus and dry skin causing callusing, pain: chronic stable  -Previously debrided calluses.   -Urea and fluorouracil creams to calluses daily.   -Recommended synthetic/athletic sock or 2 layer sock system.   -Recommended footwear with high stack height and forefoot rockers.   -If not improving consider distal-medial plantar fascia releases for hallux limitus.      Neurogenic pain left foot: chronic exacerbated  -Suspect spinal origin.   -Increase pregabalin to 600 mg/day.         Return to clinic PRN.

## 2025-05-06 ENCOUNTER — PATIENT MESSAGE (OUTPATIENT)
Dept: INTERNAL MEDICINE | Facility: CLINIC | Age: 45
End: 2025-05-06
Payer: COMMERCIAL

## 2025-05-06 ENCOUNTER — CLINICAL SUPPORT (OUTPATIENT)
Dept: REHABILITATION | Facility: HOSPITAL | Age: 45
End: 2025-05-06
Payer: COMMERCIAL

## 2025-05-06 DIAGNOSIS — R29.898 REDUCED HAND STRENGTH: ICD-10-CM

## 2025-05-06 DIAGNOSIS — M25.641 DECREASED RANGE OF MOTION OF FINGER OF RIGHT HAND: Primary | ICD-10-CM

## 2025-05-06 PROCEDURE — 97022 WHIRLPOOL THERAPY: CPT

## 2025-05-06 PROCEDURE — 97110 THERAPEUTIC EXERCISES: CPT

## 2025-05-06 NOTE — PROGRESS NOTES
"Outpatient Rehab    Occupational Therapy Visit/Discharge Summary    Patient Name: Hermelindo Garza III  MRN: [id number]  YOB: 1980  Encounter Date: 5/6/2025    Therapy Diagnosis:   Encounter Diagnoses   Name Primary?    Decreased range of motion of finger of right hand Yes    Reduced hand strength      Physician: Christen Marshall MD    Physician Orders: Eval and Treat  Medical Diagnosis: Closed nondisplaced fracture of middle phalanx of right little finger, initial encounter    Visit # / Visits Authorized: 5 / 12 (Visit #6)  Insurance Authorization Period: 3/20/2025 to 12/31/2025  Date of Evaluation: 3/20/2025   Plan of Care Certification: 3/20/2025 to 6/13/25 (12 weeks)     Time In: 0855   Time Out: 0935  Total Time: 40   Total Billable Time: 40    Precautions  Standard, Diabetes      Intake Outcome Measure for FOTO Survey    Therapist reviewed FOTO scores for Hermelindo Garza III on 5/6/2025.   FOTO report - see Media section or FOTO account episode details.     Intake Score:  62%  Survey 2:  74%    Subjective   "I can do everything".  Pain reported as 0/10. R SF    Objective         Upper Extremity Sensation  General Upper Extremity Sensation  Impaired: Right  Right Upper Extremity Sensation  Intact: Sharp/Dull Discrimination, Kinesthesia, Proprioception, and Hot/Cold Discrimination  Diminished: Light Touch  Right Upper Extremity Sensation Stocking Glove Pattern: No     Right Hand Digit Swelling    Proximal Phalanx IP PIP Middle Phalanx DIP Distal Phalanx   Thumb     N/A N/A N/A     Index   N/A           Middle   N/A           Ring   N/A           Little 7.5 cm N/A 7.6 cm 6.8 cm             Left Hand Digit Swelling    Proximal Phalanx IP PIP Middle Phalanx DIP Distal Phalanx   Thumb     N/A N/A N/A     Index   N/A           Middle   N/A           Ring   N/A           Little 6.6 cm N/A 6.5 cm 5.8 cm           Digit 5 - Little Finger Active Range of Motion  Right Little Finger    Extension " (deg) Flexion (deg) Pain   MCP 0 70     PIP -23 70 Yes   DIP -10 25 Yes   SURESH: 132     Digit 5 - Little Finger Active Range of Motion  Right Little Finger    Extension (deg) Flexion (deg) Pain   MCP 0 90 (+20)     PIP -5 (+18) 80 (+10)    DIP -5 (+5) 43 (+18)    SURESH: 203 (+71)     Left Little Finger    Extension (deg) Flexion (deg) Pain   MCP 0 80     PIP 0 95     DIP 0 75     SURESH: 250     Strength (Dynamometer)  Measured in pounds to POP.   4/23/2025 4/23/2025 5/6/2025    Left Right Right   Rung  108 110     Treatment:     Supervised modalities after being cleared for contradictions: Fluidotherapy - 115 degrees and 50 speed, to R hand/wrist for 12 minutes to increase blood flow and circulation, desensitization and sensory re-education, pain management, and increased tissue extensibility. Pt instructed on performing active range of motion exercises while receiving heat to increase active motion.     Therapeutic exercises to develop endurance, ROM, and flexibility for 40 minutes, including:  - Updated objective measurements, see above.   - Digit/wrist AROM in fluidotherapy x 12 min  - Hook stretch on tabletop 10 reps x 5 sec holds   - Resisted digit add/abd, isolated EDM x 15 reps each   - PIP jt blocking of R SF x 20 reps each   - Hook to full fist with AAROM x 20 reps   - Reverse joint blocking x 10 reps   - EDC ext with red band 2 x 15 reps     Assessment & Plan   Assessment: Hermelindo had good tolerance to treatment this date. Pt has attended outpatient OT services since 3/20/2025 (6 weeks, 5 days) and has met all goals at this time. Pt has made significant gains with therapy, demonstrating return to PLOF with ADLs and IADLs, with improved ROM and strength and decreased edema and pain. He is independent with HEP and is appropriate for discharge from OT services at this time. Discharge reason: Patient has met all of his/her goals.  Evaluation/Treatment Tolerance: Patient tolerated treatment well      Patient will continue to benefit from skilled outpatient occupational therapy to address the deficits listed in the problem list box on initial evaluation, provide pt/family education and to maximize pt's level of independence in the home and community environment.     Patient's spiritual, cultural, and educational needs considered and patient agreeable to plan of care and goals.     Education  Education was done with Patient. The patient's learning style includes Demonstration and Listening. The patient Demonstrates understanding and Verbalizes understanding.         HEP in place    Plan: This patient is discharged from Outpatient Occupational Therapy Services.    Goals:   Resolved       Occupational Therapy       Occupational Therapy Goal (Met)       Start:  03/20/25    Expected End:  06/12/25    Resolved:  05/06/25    Goals:   The following goals were discussed with the patient and patient is in agreement with them as to be addressed in the treatment plan.   Long Term Goals (LTGs)  LTG #1: Pt will report a pain level of 1-3 out of 10 at worst with daily hand use. MET  LTG #2: Pt will demo improved FOTO score by 5-10 points. MET  LTG #3: Pt will return to prior level of function for IADLs and household management. MET    Short Term Goals (STGs)  STG #1: Pt will report a pain level of 4 out of 10 at worst with daily hand use. MET  STG #2: Pt will report/demo [place] with ADLs. MET  STG #3: Pt will demonstrate independence with issued HEP, orthosis wear, and modalities for pain/symptom management. MET  STG #4: Pt will demo improved R SF SURESH by 30-50 degrees needed to aid with grasping. MET  STG #5: Decrease edema of R SF by 0.2-0.5 cm to increase joint mobility/flexibility for hand/arm use. MET  STG #6: Pt will increase right hand  strength by at least 5-10 lbs needed to open jars and carry groceries. MET                 Connie Vasquez, OTR/L, CHT

## 2025-05-07 ENCOUNTER — HOSPITAL ENCOUNTER (OUTPATIENT)
Facility: HOSPITAL | Age: 45
Discharge: HOME | End: 2025-05-07
Attending: STUDENT IN AN ORGANIZED HEALTH CARE EDUCATION/TRAINING PROGRAM | Admitting: STUDENT IN AN ORGANIZED HEALTH CARE EDUCATION/TRAINING PROGRAM
Payer: COMMERCIAL

## 2025-05-07 VITALS
WEIGHT: 235 LBS | HEIGHT: 65 IN | SYSTOLIC BLOOD PRESSURE: 126 MMHG | BODY MASS INDEX: 39.15 KG/M2 | TEMPERATURE: 98 F | DIASTOLIC BLOOD PRESSURE: 67 MMHG | HEART RATE: 71 BPM | OXYGEN SATURATION: 96 % | RESPIRATION RATE: 16 BRPM

## 2025-05-07 DIAGNOSIS — G89.29 CHRONIC PAIN: ICD-10-CM

## 2025-05-07 DIAGNOSIS — M54.16 LUMBAR RADICULOPATHY, CHRONIC: Primary | ICD-10-CM

## 2025-05-07 LAB — POCT GLUCOSE: 101 MG/DL (ref 70–110)

## 2025-05-07 PROCEDURE — 62323 NJX INTERLAMINAR LMBR/SAC: CPT | Mod: ,,, | Performed by: STUDENT IN AN ORGANIZED HEALTH CARE EDUCATION/TRAINING PROGRAM

## 2025-05-07 PROCEDURE — 63600175 PHARM REV CODE 636 W HCPCS: Performed by: STUDENT IN AN ORGANIZED HEALTH CARE EDUCATION/TRAINING PROGRAM

## 2025-05-07 PROCEDURE — 25500020 PHARM REV CODE 255: Performed by: STUDENT IN AN ORGANIZED HEALTH CARE EDUCATION/TRAINING PROGRAM

## 2025-05-07 PROCEDURE — 82962 GLUCOSE BLOOD TEST: CPT | Performed by: STUDENT IN AN ORGANIZED HEALTH CARE EDUCATION/TRAINING PROGRAM

## 2025-05-07 PROCEDURE — 62323 NJX INTERLAMINAR LMBR/SAC: CPT | Performed by: STUDENT IN AN ORGANIZED HEALTH CARE EDUCATION/TRAINING PROGRAM

## 2025-05-07 RX ORDER — LIDOCAINE HYDROCHLORIDE 10 MG/ML
INJECTION, SOLUTION EPIDURAL; INFILTRATION; INTRACAUDAL; PERINEURAL
Status: DISCONTINUED | OUTPATIENT
Start: 2025-05-07 | End: 2025-05-07 | Stop reason: HOSPADM

## 2025-05-07 RX ORDER — LIDOCAINE HYDROCHLORIDE 20 MG/ML
INJECTION, SOLUTION EPIDURAL; INFILTRATION; INTRACAUDAL; PERINEURAL
Status: DISCONTINUED | OUTPATIENT
Start: 2025-05-07 | End: 2025-05-07 | Stop reason: HOSPADM

## 2025-05-07 RX ORDER — DEXAMETHASONE SODIUM PHOSPHATE 10 MG/ML
INJECTION, SOLUTION INTRA-ARTICULAR; INTRALESIONAL; INTRAMUSCULAR; INTRAVENOUS; SOFT TISSUE
Status: DISCONTINUED | OUTPATIENT
Start: 2025-05-07 | End: 2025-05-07 | Stop reason: HOSPADM

## 2025-05-07 RX ORDER — SODIUM CHLORIDE 9 MG/ML
INJECTION, SOLUTION INTRAVENOUS CONTINUOUS
Status: DISCONTINUED | OUTPATIENT
Start: 2025-05-07 | End: 2025-05-07 | Stop reason: HOSPADM

## 2025-05-07 RX ORDER — FENTANYL CITRATE 50 UG/ML
INJECTION, SOLUTION INTRAMUSCULAR; INTRAVENOUS
Status: DISCONTINUED | OUTPATIENT
Start: 2025-05-07 | End: 2025-05-07 | Stop reason: HOSPADM

## 2025-05-07 RX ORDER — MIDAZOLAM HYDROCHLORIDE 1 MG/ML
INJECTION INTRAMUSCULAR; INTRAVENOUS
Status: DISCONTINUED | OUTPATIENT
Start: 2025-05-07 | End: 2025-05-07 | Stop reason: HOSPADM

## 2025-05-07 NOTE — PLAN OF CARE
Pt meets all criteria for Phase II. Discharge instructions provided to the patient. Pt verbalized understanding of the instructions. VSS. No concerns voiced. Ambulating without difficulty. Discharged from facility via wheelchair . Pt confirmed to have all belongings.

## 2025-05-07 NOTE — OP NOTE
Lumbar Interlaminar Epidural Steroid Injection under Fluoroscopic Guidance    The procedure, risks, benefits, and options were discussed with the patient. There are no contraindications to the procedure. The patent expressed understanding and agreed to the procedure. Informed written consent was obtained prior to the start of the procedure and can be found in the patient's chart.    PATIENT NAME: Hermelindo Garza III   MRN: [id number]     DATE OF PROCEDURE: 05/07/2025    PROCEDURE: Lumbar Interlaminar Epidural Steroid Injection L4/L5 under Fluoroscopic Guidance    PRE-OP DIAGNOSIS: Lumbar radiculopathy, chronic [M54.16] Lumbar radiculopathy [M54.16]      POST-OP DIAGNOSIS: Same    PHYSICIAN: Lorraine Patel DO    ASSISTANTS: None     MEDICATIONS INJECTED: Preservative-free Decadron 10mg with 4cc of Lidocaine 1% MPF and preservative free normal saline    LOCAL ANESTHETIC INJECTED: Xylocaine 2%     SEDATION: Versed 2mg and Fentanyl 100mcg                                                                                                                                                                                     Conscious sedation ordered by M.D. Patient re-evaluation prior to administration of conscious sedation. No changes noted in patient's status from initial evaluation. The patient's vital signs were monitored by RN and patient remained hemodynamically stable throughout the procedure.    Event Time In   Sedation Start 0909   Sedation End 0912       ESTIMATED BLOOD LOSS: None    COMPLICATIONS: None    TECHNIQUE: Time-out was performed to identify the patient and procedure to be performed. With the patient laying in a prone position, the surgical area was prepped and draped in the usual sterile fashion using ChloraPrep and a fenestrated drape. The level was determined under fluoroscopy guidance. Skin anesthesia was achieved by injecting Lidocaine 2% over the injection site. The interlaminar space was then  approached with a 20 gauge,  5 inch Tuohy needle that was introduced under fluoroscopic guidance in the AP, lateral and/or contralateral oblique imaging. Once the Ligamentum flavum was encountered loss of resistance to saline was used to enter the epidural space. With positive loss of resistance and negative aspiration for CSF or Blood, contrast dye Omnipaque (300mg/mL) was injected to confirm placement and there was no vascular runoff. 5 mL of the medication mixture listed above was injected slowly. Displacement of the radio opaque contrast after injection of the medication confirmed that the medication went into the area of the epidural space. The needles were removed and bleeding was nil. A sterile dressing was applied. No specimens collected. The patient tolerated the procedure well.       The patient was monitored after the procedure in the recovery area. They were given post-procedure and discharge instructions to follow at home. The patient was discharged in a stable condition.    Lorraine Patel DO

## 2025-05-07 NOTE — DISCHARGE INSTRUCTIONS
[facility] [place]  Dr. Lorraine Patel  Messaging service # [phone]    POST-PROCEDURE INSTRUCTIONS:    Today you had an injection that included a steroid medications.  The steroid may or may not have been mixed with a local anesthetic when it was injected.   If the injection was in the neck, you may feel some pressure, numbness, or slight weakness in the arm after the procedure for a short period of time (this is a normal response), if this persists for longer than 1 day please contact our office or go to the emergency room.  If the injection was in the low back, you may feel some pressure, numbness, or slight weakness in the leg after the procedure for a short period of time (this is a normal response), if this persists for longer than 1 day please contact our office or go to the emergency room.  You may get side effects from the steroid.  This is not uncommon.  Symptoms include: elevated blood sugar, elevated blood pressure, headache, flushing, nausea, insomnia.  These symptoms are transient and will resolve within 1-3 days.  If symptoms last longer than this please contact our office or head to the emergency room.  Steroid medications can take anywhere from 3-14 days to take effect (rarely longer).  You may notice that your pain worsens for a short period of time after the injection, this would not be unusual due to the pressure and trauma from the needle.    If you do not have a follow up appointment scheduled, please contact my office (or the office of the physician who referred you for the procedure) to get a post-procedure follow up scheduled 2-4 weeks after the procedure.  This can be done as a virtual visit if that is more convenient for you.      What you need to do:    Keep a record of your response to the injection you had today.    How much relief did you get?   When did the relief start and how long did it last?  Were you able to decrease the use of any of your pain  medications?  Were you able to increase your level of activity?  How long did the relief last?    What to watch out for:    If you experience any of the following symptoms after your procedure, please notify the messaging service immediately (see above for contact information):   fever (increased oral temperature)   bleeding or swelling at the injection site,    drainage, rash or redness at the injection site    possible signs of infection    increased pain at the injection site   worsening of your usual pain   severe headache   new or worsening numbness    new arm and/or leg weakness, or    changes in bowel and/or bladder function: urinating or defecating on yourself and not knowing that you did it.    PLEASE FOLLOW ALL INSTRUCTIONS CAREFULLY     Do not engage in strenuous activity (e.g., lifting or pushing heavy objects or repeated bending) for 24 hours.     Do not take a bath, swim or use Jacuzzi for 24 hours after procedure. (A shower is fine).   Remove any Band-Aids when you get home.    Use cold/ice, as needed for comfort.  We recommend the use of cold therapy alternating on for 20 minutes, off for 20 minutes.    Do not apply direct heat (heating pad or heat packs) to the injection site for 24 hours.     Resume your usual medications, unless instructed otherwise by your Pain Physician.     If you are on warfarin (Coumadin) or other blood thinner, resume this medication as instructed by your prescribing Physician.    IF AT ANY POINT YOU ARE VERY CONCERNED ABOUT YOUR SYMPTOMS, PLEASE GO TO THE EMERGENCY ROOM.    If you develop worsening pain, weakness, numbness, lose bowel or bladder control (i.e., having an accident where you did not even know you had to go to the bathroom and suddenly noticed you soiled yourself), saddle anesthesia (a loss of sensation restricted to the area of the buttocks, anus and between the legs -- i.e., those parts of your body that would touch a saddle if you were sitting on one) you  need to go immediately to the emergency department for evaluation and treatment.    ----------------------------------------------------------------------------------------------------------------------------------------------------------------  If you received Sedation please read the following instructions:  POST SEDATION INSTRUCTIONS    Today you received intravenous medication (also known as sedation) that was used to help you relax and/or decrease discomfort during your procedure. This medication will be acting in your body for the next 24 hours, so you might feel a little tired or sleepy. This feeling will slowly wear off.   Common side effects associated with these medications include: drowsiness, dizziness, sleepiness, confusion, feeling excited, difficulty remembering things, lack of steadiness with walking or balance, loss of fine muscle control, slowed reflexes, difficulty focusing, and blurred vision.  Some over-the-counter and prescription medications (e.g., muscle relaxants, opioids, mood-altering medications, sedatives/hypnotics, antihistamines) can interact with the intravenous medication you received and cause an increased risk of the side effects listed above in addition to other potentially life threatening side effects. Use extreme caution if you are taking such medications, and consult with your Pain Physician or prescribing physician if you have any questions.  For the next 12-24 hours:    DO NOT--Drive a car, operate machinery or power tools   DO NOT--Drink any alcoholic beverages (not even beer), they may dangerously increase the risk of side effects.    DO NOT--Make any important legal or business decisions or sign important documents.  We advise you to have someone to assist you at home. Move slowly and carefully. Do not make sudden changes in position. Be aware of dizziness or light-headedness and move accordingly.   If you seek medical treatment within 24 hours, let the nurse or doctor  caring for you know that you have received the above medications. If you have any questions or concerns related to your sedation or treatment today please contact us.

## 2025-05-07 NOTE — DISCHARGE SUMMARY
Discharge Note  Short Stay      SUMMARY     Admit Date: 5/7/2025    Attending Physician: Lorraine Patel      Discharge Physician: Lorraine Patel      Discharge Date: 5/7/2025 9:14 AM    Procedure(s) (LRB):  L4-5 steer left NELI (N/A)    Final Diagnosis: Lumbar radiculopathy, chronic [M54.16]    Disposition: Home or self care    Patient Instructions:   Current Discharge Medication List        CONTINUE these medications which have NOT CHANGED    Details   !! albuterol (PROVENTIL/VENTOLIN HFA) 90 mcg/actuation inhaler INHALE 2 PUFFS INTO THE LUNGS EVERY 6 HOURS AS NEEDED WHEEZING  Qty: 25.5 g, Refills: 3    Associated Diagnoses: Mild intermittent asthma without complication      atorvastatin (LIPITOR) 20 MG tablet Take 1 tablet (20 mg total) by mouth once daily.  Qty: 90 tablet, Refills: 2    Associated Diagnoses: Hyperlipidemia associated with type 2 diabetes mellitus      ergocalciferol, vitamin D2, (VITAMIN D ORAL) Take 1 tablet by mouth every other day.      fluticasone-salmeterol diskus inhaler 100-50 mcg Inhale 1 puff into the lungs 2 (two) times daily. Controller  Qty: 60 each, Refills: 11    Associated Diagnoses: Mild persistent asthma without complication      inulin (FIBER GUMMIES) 2 gram Chew Take by mouth once daily. 2 gummies a day      latanoprost 0.005 % ophthalmic solution Place 1 drop into both eyes every evening.  Qty: 7.5 mL, Refills: 3    Associated Diagnoses: Ocular hypertension, bilateral      levocetirizine (XYZAL) 5 MG tablet Take 1 tablet (5 mg total) by mouth every evening.  Qty: 30 tablet, Refills: 11      LORazepam (ATIVAN) 0.5 MG tablet Take 1-2 tablets (0.5-1 mg total) by mouth daily as needed (SIGNIFICANT ANXIETY).  Qty: 60 tablet, Refills: 0    Associated Diagnoses: Anxiety      ondansetron (ZOFRAN-ODT) 8 MG TbDL Dissolve 1 tablet (8 mg total) on the tongue every 8 (eight) hours as needed (nausea).  Qty: 30 tablet, Refills: 3      pregabalin (LYRICA) 300 MG Cap Take 1 capsule (300  mg total) by mouth 2 (two) times daily.  Qty: 60 capsule, Refills: 5    Associated Diagnoses: Neurogenic pain of left foot      rOPINIRole (REQUIP) 0.25 MG tablet TAKE 1 TABLET(0.25 MG) BY MOUTH EVERY EVENING  Qty: 90 tablet, Refills: 2    Associated Diagnoses: Restless leg      tamsulosin (FLOMAX) 0.4 mg Cap Take 2 capsules (0.8 mg total) by mouth once daily.  Qty: 60 capsule, Refills: 11      albuterol (PROVENTIL) 2.5 mg /3 mL (0.083 %) nebulizer solution Take 3 mLs (2.5 mg total) by nebulization every 6 (six) hours as needed for Wheezing or Shortness of Breath. Rescue  Qty: 75 mL, Refills: 3      !! albuterol (VENTOLIN HFA) 90 mcg/actuation inhaler Inhale 2 puffs into the lungs every 6 (six) hours as needed for Wheezing. Rescue  Qty: 8.5 g, Refills: 0    Associated Diagnoses: Exacerbation of asthma, unspecified asthma severity, unspecified whether persistent      blood sugar diagnostic (ONETOUCH VERIO TEST STRIPS) Strp Use to test blood glucose one (1) time a day,  Qty: 100 strip, Refills: 3    Comments: to be used with insurance-preferred brand of glucometer/supplies.  Associated Diagnoses: Diabetes mellitus without complication      blood-glucose meter Misc use as directed  Qty: 1 each, Refills: 0      fluocinonide (LIDEX) 0.05 % external solution apply to affected area of scalp daily as needed for itching, scaling  Qty: 60 mL, Refills: 3    Associated Diagnoses: Seborrheic dermatitis, unspecified      hydrocortisone 2.5 % cream APPLY EXTERNALLY TO THE AFFECTED AREA TWICE DAILY FOR 10 DAYS  Qty: 30 g, Refills: 0    Associated Diagnoses: Allergic contact dermatitis due to plant      ketoconazole (NIZORAL) 2 % cream Apply 1 application  topically 2 (two) times daily. Apply to affected area up to 2 weeks  Qty: 30 g, Refills: 2    Associated Diagnoses: Seborrheic dermatitis of scalp      !! lancets (ONETOUCH DELICA PLUS LANCET) 33 gauge Misc Use to test blood glucose one (1) time a day,  Qty: 100 each, Refills: 5       tirzepatide (MOUNJARO) 15 mg/0.5 mL PnIj Inject 15 mg into the skin every 7 days.  Qty: 2 mL, Refills: 5    Associated Diagnoses: Type 2 diabetes mellitus without complication, without long-term current use of insulin      !! ULTRA THIN LANCETS 30 gauge Misc USE TO TEST BLOOD SUGAR EVERY DAY AS DIRECTED  Qty: 100 each, Refills: 3    Associated Diagnoses: Diabetes mellitus without complication      urea (CARMOL) 40 % Crea Apply topically once daily.  Qty: 225 g, Refills: 2    Associated Diagnoses: Xerosis of skin; Corn or callus       !! - Potential duplicate medications found. Please discuss with provider.              Discharge Diagnosis: Lumbar radiculopathy, chronic [M54.16]  Condition on Discharge: Stable with no complications to procedure   Diet on Discharge: Same as before.  Activity: as per instruction sheet.  Discharge to: Home with a responsible adult.  Follow up: 2-4 weeks       Please call my office or pager at [phone] if experienced any weakness or loss of sensation, fever > 101.5, pain uncontrolled with oral medications, persistent nausea/vomiting/or diarrhea, redness or drainage from the incisions, or any other worrisome concerns. If physician on call was not reached or could not communicate with our office for any reason please go to the nearest emergency department

## 2025-05-08 ENCOUNTER — OFFICE VISIT (OUTPATIENT)
Dept: INTERNAL MEDICINE | Facility: CLINIC | Age: 45
End: 2025-05-08
Payer: COMMERCIAL

## 2025-05-08 VITALS — WEIGHT: 235 LBS | HEIGHT: 65 IN | BODY MASS INDEX: 39.15 KG/M2

## 2025-05-08 DIAGNOSIS — R51.9 ACUTE NONINTRACTABLE HEADACHE, UNSPECIFIED HEADACHE TYPE: Primary | ICD-10-CM

## 2025-05-08 PROCEDURE — 3061F NEG MICROALBUMINURIA REV: CPT | Mod: CPTII,95,, | Performed by: NURSE PRACTITIONER

## 2025-05-08 PROCEDURE — 1160F RVW MEDS BY RX/DR IN RCRD: CPT | Mod: CPTII,95,, | Performed by: NURSE PRACTITIONER

## 2025-05-08 PROCEDURE — 3044F HG A1C LEVEL LT 7.0%: CPT | Mod: CPTII,95,, | Performed by: NURSE PRACTITIONER

## 2025-05-08 PROCEDURE — 1159F MED LIST DOCD IN RCRD: CPT | Mod: CPTII,95,, | Performed by: NURSE PRACTITIONER

## 2025-05-08 PROCEDURE — 3066F NEPHROPATHY DOC TX: CPT | Mod: CPTII,95,, | Performed by: NURSE PRACTITIONER

## 2025-05-08 PROCEDURE — 98005 SYNCH AUDIO-VIDEO EST LOW 20: CPT | Mod: 95,,, | Performed by: NURSE PRACTITIONER

## 2025-05-08 PROCEDURE — 3072F LOW RISK FOR RETINOPATHY: CPT | Mod: CPTII,95,, | Performed by: NURSE PRACTITIONER

## 2025-05-08 RX ORDER — BUTALBITAL, ACETAMINOPHEN AND CAFFEINE 50; 325; 40 MG/1; MG/1; MG/1
1-2 TABLET ORAL EVERY 6 HOURS PRN
Qty: 30 TABLET | Refills: 0 | Status: SHIPPED | OUTPATIENT
Start: 2025-05-08 | End: 2025-06-07

## 2025-05-08 NOTE — PROGRESS NOTES
Subjective:       Patient ID: Hermelindo Garza III is a 44 y.o. male.    Chief Complaint: Headache    Visit type: audiovisual    History of Present Illness    CHIEF COMPLAINT:  Mr. Garza presents today for evaluation of headaches.    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 Pain is located in brow area, characterized by throbbing sensation that causes eyes to close, with area feeling bruised to touch during episodes. Associated symptoms include photophobia requiring darkened room and screen settings, sensitivity to noise, and occasional nausea without vomiting. Episodes typically resolve within 2-4 hours.    VISION HISTORY:  He gets regular eye exams every 6 months. He previously used reading glasses intermittently but has discontinued use. He currently uses blue light filtering glasses.    MEDICAL HISTORY:  He has a history of asthma and high cholesterol. He denies history of high blood pressure. He has history of spinal fusion.    CAFFEINE SENSITIVITY:  He recently developed increased sensitivity to caffeine, experiencing hand tremors with two shots of caffeine. He has reduced his intake to half a shot in protein shakes to avoid these symptoms.      ROS:  Head: +headaches  Eyes: +photophobia  Gastrointestinal: +nausea, -vomiting        The patient location is: [place]    Review of patient's allergies indicates:  No Known Allergies    Medication List with Changes/Refills   New Medications    BUTALBITAL-ACETAMINOPHEN-CAFFEINE -40 MG (FIORICET, ESGIC) -40 MG PER TABLET    Take 1-2 tablets by mouth every 6 (six) hours as needed for Headaches.   Current Medications    ALBUTEROL (PROVENTIL) 2.5 MG /3 ML (0.083 %) NEBULIZER SOLUTION    Take 3 mLs (2.5 mg total) by nebulization every 6 (six) hours as needed for Wheezing or Shortness of Breath. Rescue    ALBUTEROL (PROVENTIL/VENTOLIN HFA) 90 MCG/ACTUATION  INHALER    INHALE 2 PUFFS INTO THE LUNGS EVERY 6 HOURS AS NEEDED WHEEZING    ALBUTEROL (VENTOLIN HFA) 90 MCG/ACTUATION INHALER    Inhale 2 puffs into the lungs every 6 (six) hours as needed for Wheezing. Rescue    ATORVASTATIN (LIPITOR) 20 MG TABLET    Take 1 tablet (20 mg total) by mouth once daily.    BLOOD SUGAR DIAGNOSTIC (ONETOUCH VERIO TEST STRIPS) STRP    Use to test blood glucose one (1) time a day,    BLOOD-GLUCOSE METER MISC    use as directed    ERGOCALCIFEROL, VITAMIN D2, (VITAMIN D ORAL)    Take 1 tablet by mouth every other day.    FLUOCINONIDE (LIDEX) 0.05 % EXTERNAL SOLUTION    apply to affected area of scalp daily as needed for itching, scaling    FLUTICASONE-SALMETEROL DISKUS INHALER 100-50 MCG    Inhale 1 puff into the lungs 2 (two) times daily. Controller    HYDROCORTISONE 2.5 % CREAM    APPLY EXTERNALLY TO THE AFFECTED AREA TWICE DAILY FOR 10 DAYS    INULIN (FIBER GUMMIES) 2 GRAM CHEW    Take by mouth once daily. 2 gummies a day    KETOCONAZOLE (NIZORAL) 2 % CREAM    Apply 1 application  topically 2 (two) times daily. Apply to affected area up to 2 weeks    LANCETS (ONETOUCH DELICA PLUS LANCET) 33 GAUGE MISC    Use to test blood glucose one (1) time a day,    LATANOPROST 0.005 % OPHTHALMIC SOLUTION    Place 1 drop into both eyes every evening.    LEVOCETIRIZINE (XYZAL) 5 MG TABLET    Take 1 tablet (5 mg total) by mouth every evening.    LORAZEPAM (ATIVAN) 0.5 MG TABLET    Take 1-2 tablets (0.5-1 mg total) by mouth daily as needed (SIGNIFICANT ANXIETY).    ONDANSETRON (ZOFRAN-ODT) 8 MG TBDL    Dissolve 1 tablet (8 mg total) on the tongue every 8 (eight) hours as needed (nausea).    PREGABALIN (LYRICA) 300 MG CAP    Take 1 capsule (300 mg total) by mouth 2 (two) times daily.    ROPINIROLE (REQUIP) 0.25 MG TABLET    TAKE 1 TABLET(0.25 MG) BY MOUTH EVERY EVENING    TAMSULOSIN (FLOMAX) 0.4 MG CAP    Take 2 capsules (0.8 mg total) by mouth once daily.    TIRZEPATIDE (MOUNJARO) 15 MG/0.5 ML PNIJ     "Inject 15 mg into the skin every 7 days.    ULTRA THIN LANCETS 30 GAUGE MISC    USE TO TEST BLOOD SUGAR EVERY DAY AS DIRECTED    UREA (CARMOL) 40 % CREA    Apply topically once daily.     Objective:   Ht 5' 5" (1.651 m)   Wt 106.6 kg (235 lb 0.2 oz)   BMI 39.11 kg/m²     Physical Exam  Vitals reviewed: Exam Limited due to Video Consultation.   Constitutional:       General: He is not in acute distress.  HENT:      Head: Normocephalic.        Nose:      Comments: (Yes/No) discharge noted from nose. Provider directed patient to palpate maxillary and frontal sinuses (with/without) reports of tenderness.     Mouth/Throat:      Comments: Patient asked to open mouth in view of camera. Oropharynx observed on camera to be (non-erythematous/erythematous) and (with/without) exudate. Tonsils (were/were) not enlarged.   Neurological:      Mental Status: He is alert and oriented to person, place, and time.       Assessment and Plan:     Assessment & Plan    Suspect headaches are triggered by computer screen use, potentially related to eye fatigue, screen settings, or blue light exposure.  Started Fioricet as initial treatment: 1-2 tablets every 6 hours as needed, with initial recommendation to start with 1 tablet due to caffeine sensitivity. Prescribed limited quantity (30) to assess efficacy. Can take additional acetaminophen if needed. Chose Fioricet over Excedrin due to history of spinal fusion and need to avoid aspirin. Sent prescription to [facility].  Reviewed potential link between caffeine intake/withdrawal and headaches.    1. Acute nonintractable headache, unspecified headache type (Primary)    - butalbital-acetaminophen-caffeine -40 mg (FIORICET, ESGIC) -40 mg per tablet; Take 1-2 tablets by mouth every 6 (six) hours as needed for Headaches.  Dispense: 30 tablet; Refill: 0        Patient to follow up should symptoms worsen or not improve with medication.      Face to Face time with " patient: 8  10 minutes of total time spent on the encounter, which includes face to face time and non-face to face time preparing to see the patient (eg, review of tests), Obtaining and/or reviewing separately obtained history, Documenting clinical information in the electronic or other health record, Independently interpreting results (not separately reported) and communicating results to the patient/family/caregiver, or Care coordination (not separately reported).     Each patient to whom he or she provides medical services by telemedicine is:  (1) informed of the relationship between the physician and patient and the respective role of any other health care provider with respect to management of the patient; and (2) notified that he or she may decline to receive medical services by telemedicine and may withdraw from such care at any time.    This note was generated with the assistance of ambient listening technology. Verbal consent was obtained by the patient and accompanying visitor(s) for the recording of patient appointment to facilitate this note. I attest to having reviewed and edited the generated note for accuracy, though some syntax or spelling errors may persist. Please contact the author of this note for any clarification.

## 2025-05-11 ENCOUNTER — PATIENT MESSAGE (OUTPATIENT)
Dept: UROLOGY | Facility: CLINIC | Age: 45
End: 2025-05-11
Payer: COMMERCIAL

## 2025-05-12 ENCOUNTER — CLINICAL SUPPORT (OUTPATIENT)
Dept: ALLERGY | Facility: CLINIC | Age: 45
End: 2025-05-12
Payer: COMMERCIAL

## 2025-05-12 ENCOUNTER — PATIENT MESSAGE (OUTPATIENT)
Dept: SURGERY | Facility: CLINIC | Age: 45
End: 2025-05-12
Payer: COMMERCIAL

## 2025-05-12 DIAGNOSIS — J30.9 CHRONIC ALLERGIC RHINITIS: Primary | ICD-10-CM

## 2025-05-12 PROCEDURE — 99999 PR PBB SHADOW E&M-EST. PATIENT-LVL I: CPT | Mod: PBBFAC,,,

## 2025-05-12 PROCEDURE — 95117 IMMUNOTHERAPY INJECTIONS: CPT | Mod: S$GLB,,, | Performed by: STUDENT IN AN ORGANIZED HEALTH CARE EDUCATION/TRAINING PROGRAM

## 2025-05-15 DIAGNOSIS — J45.20 MILD INTERMITTENT ASTHMA WITHOUT COMPLICATION: ICD-10-CM

## 2025-05-15 RX ORDER — ALBUTEROL SULFATE 90 UG/1
INHALANT RESPIRATORY (INHALATION)
Qty: 54 G | Refills: 3 | Status: SHIPPED | OUTPATIENT
Start: 2025-05-15

## 2025-05-15 NOTE — TELEPHONE ENCOUNTER
Refill Decision Note   Hermelindo Garza  is requesting a refill authorization.    Brief Assessment and Rationale for Refill:   Approve       Medication Therapy Plan:  Overridden by Suzi Sanches MD on Jan 6, 2025 12:06 PM      Alert overridden per protocol: Yes   Comments:     Note composed:1:52 PM 05/15/2025

## 2025-05-15 NOTE — TELEPHONE ENCOUNTER
No care due was identified.  [facility] Embedded Care Due Messages. Reference number: [id number].   5/15/2025 10:53:34 AM CDT

## 2025-05-21 ENCOUNTER — OFFICE VISIT (OUTPATIENT)
Dept: URGENT CARE | Facility: CLINIC | Age: 45
End: 2025-05-21
Payer: COMMERCIAL

## 2025-05-21 VITALS
HEART RATE: 91 BPM | BODY MASS INDEX: 39.15 KG/M2 | RESPIRATION RATE: 16 BRPM | OXYGEN SATURATION: 96 % | TEMPERATURE: 98 F | WEIGHT: 235 LBS | DIASTOLIC BLOOD PRESSURE: 76 MMHG | SYSTOLIC BLOOD PRESSURE: 112 MMHG | HEIGHT: 65 IN

## 2025-05-21 DIAGNOSIS — J06.9 VIRAL URI WITH COUGH: Primary | ICD-10-CM

## 2025-05-21 DIAGNOSIS — R05.9 COUGH, UNSPECIFIED TYPE: ICD-10-CM

## 2025-05-21 LAB
CTP QC/QA: YES
SARS-COV+SARS-COV-2 AG RESP QL IA.RAPID: NEGATIVE

## 2025-05-21 PROCEDURE — 99213 OFFICE O/P EST LOW 20 MIN: CPT | Mod: S$GLB,,, | Performed by: SURGERY

## 2025-05-21 PROCEDURE — 87811 SARS-COV-2 COVID19 W/OPTIC: CPT | Mod: QW,S$GLB,, | Performed by: SURGERY

## 2025-05-21 RX ORDER — PROMETHAZINE HYDROCHLORIDE AND DEXTROMETHORPHAN HYDROBROMIDE 6.25; 15 MG/5ML; MG/5ML
5 SYRUP ORAL EVERY 4 HOURS PRN
Qty: 180 ML | Refills: 0 | Status: SHIPPED | OUTPATIENT
Start: 2025-05-21 | End: 2025-05-28

## 2025-05-21 NOTE — PROGRESS NOTES
"Subjective:      Patient ID: Hermelindo Garza III is a 44 y.o. male.    Vitals:  height is 5' 5" (1.651 m) and weight is 106.6 kg (235 lb 0.2 oz). His oral temperature is 98 °F (36.7 °C). His blood pressure is 112/76 and his pulse is 91. His respiration is 16 and oxygen saturation is 96%.     Chief Complaint: Cough    This pt complains of cough x 3 days. S/S: congestion and diarrhea. No nausea, vomiting, or fever.     Cough  This is a new problem. The current episode started in the past 7 days. The problem has been unchanged. The problem occurs every few minutes. Associated symptoms include nasal congestion. Pertinent negatives include no chills, headaches or sore throat.       Constitution: Negative for chills.   HENT:  Negative for sore throat.    Respiratory:  Positive for cough.    Neurological:  Negative for headaches.      Objective:     Physical Exam   Constitutional: He is oriented to person, place, and time. He appears well-developed. He is cooperative.  Non-toxic appearance. He does not appear ill. No distress.   HENT:   Head: Normocephalic and atraumatic.   Ears:   Right Ear: Hearing, external ear and ear canal normal. Tympanic membrane is bulging.   Left Ear: Hearing, external ear and ear canal normal. Tympanic membrane is bulging.   Nose: Mucosal edema and rhinorrhea present. No nasal deformity. No epistaxis. Right sinus exhibits no maxillary sinus tenderness and no frontal sinus tenderness. Left sinus exhibits no maxillary sinus tenderness and no frontal sinus tenderness.   Mouth/Throat: Uvula is midline, oropharynx is clear and moist and mucous membranes are normal. No trismus in the jaw. Normal dentition. No uvula swelling. No oropharyngeal exudate, posterior oropharyngeal edema or posterior oropharyngeal erythema.   Eyes: Conjunctivae and lids are normal. No scleral icterus.   Neck: Trachea normal and phonation normal. Neck supple. No edema present. No erythema present. No neck rigidity " present.   Cardiovascular: Normal rate, regular rhythm, normal heart sounds and normal pulses.   Pulmonary/Chest: Effort normal and breath sounds normal. No respiratory distress. He has no decreased breath sounds. He has no wheezes. He has no rhonchi. He has no rales.   Dry cough         Comments: Dry cough    Abdominal: Normal appearance.   Musculoskeletal: Normal range of motion.         General: No deformity. Normal range of motion.   Neurological: He is alert and oriented to person, place, and time. He exhibits normal muscle tone. Coordination normal.   Skin: Skin is warm, dry, intact, not diaphoretic and not pale.   Psychiatric: His speech is normal and behavior is normal. Judgment and thought content normal.   Nursing note and vitals reviewed.      Assessment:     1. Viral URI with cough    2. Cough, unspecified type        Plan:       Viral URI with cough    Cough, unspecified type  -     SARS Coronavirus 2 Antigen, POCT Manual Read  -     promethazine-dextromethorphan (PROMETHAZINE-DM) 6.25-15 mg/5 mL Syrp; Take 5 mLs by mouth every 4 (four) hours as needed (cough).  Dispense: 180 mL; Refill: 0      Results for orders placed or performed in visit on 05/21/25   SARS Coronavirus 2 Antigen, POCT Manual Read    Collection Time: 05/21/25  6:03 PM   Result Value Ref Range    SARS Coronavirus 2 Antigen Negative Negative, Presumptive Negative     Acceptable Yes      *Note: Due to a large number of results and/or encounters for the requested time period, some results have not been displayed. A complete set of results can be found in Results Review.

## 2025-05-29 ENCOUNTER — PATIENT MESSAGE (OUTPATIENT)
Dept: ALLERGY | Facility: CLINIC | Age: 45
End: 2025-05-29
Payer: COMMERCIAL

## 2025-06-09 ENCOUNTER — CLINICAL SUPPORT (OUTPATIENT)
Dept: ALLERGY | Facility: CLINIC | Age: 45
End: 2025-06-09
Payer: COMMERCIAL

## 2025-06-09 DIAGNOSIS — J30.9 CHRONIC ALLERGIC RHINITIS: Primary | ICD-10-CM

## 2025-06-09 PROCEDURE — 99999 PR PBB SHADOW E&M-EST. PATIENT-LVL I: CPT | Mod: PBBFAC,,,

## 2025-06-09 PROCEDURE — 95117 IMMUNOTHERAPY INJECTIONS: CPT | Mod: S$GLB,,, | Performed by: STUDENT IN AN ORGANIZED HEALTH CARE EDUCATION/TRAINING PROGRAM

## 2025-06-10 DIAGNOSIS — F41.9 ANXIETY: ICD-10-CM

## 2025-06-10 NOTE — TELEPHONE ENCOUNTER
Refill Routing Note   Medication(s) are not appropriate for processing by [facility] for the following reason(s):        Outside of protocol    [facility] action(s):  Route               Appointments  past 12m or future 3m with PCP    Date Provider   Last Visit   4/28/2025 Suzi Sanches MD   Next Visit   10/28/2025 Suzi Sanches MD   ED visits in past 90 days: 0        Note composed:5:11 PM 06/10/2025

## 2025-06-10 NOTE — TELEPHONE ENCOUNTER
No care due was identified.  [facility] Embedded Care Due Messages. Reference number: [id number].   6/10/2025 4:46:37 PM CDT

## 2025-06-14 RX ORDER — LORAZEPAM 0.5 MG/1
.5-1 TABLET ORAL DAILY PRN
Qty: 60 TABLET | Refills: 0 | Status: SHIPPED | OUTPATIENT
Start: 2025-06-14

## 2025-06-23 ENCOUNTER — HOSPITAL ENCOUNTER (EMERGENCY)
Facility: HOSPITAL | Age: 45
Discharge: HOME | End: 2025-06-23
Attending: STUDENT IN AN ORGANIZED HEALTH CARE EDUCATION/TRAINING PROGRAM
Payer: COMMERCIAL

## 2025-06-23 VITALS
SYSTOLIC BLOOD PRESSURE: 111 MMHG | HEIGHT: 65 IN | HEART RATE: 100 BPM | BODY MASS INDEX: 38.32 KG/M2 | WEIGHT: 230 LBS | DIASTOLIC BLOOD PRESSURE: 63 MMHG | TEMPERATURE: 99 F | OXYGEN SATURATION: 95 % | RESPIRATION RATE: 18 BRPM

## 2025-06-23 DIAGNOSIS — K52.9 GASTROENTERITIS: Primary | ICD-10-CM

## 2025-06-23 LAB
ABSOLUTE EOSINOPHIL (OHS): 0.05 K/UL
ABSOLUTE MONOCYTE (OHS): 0.46 K/UL (ref 0.3–1)
ABSOLUTE NEUTROPHIL COUNT (OHS): 9.24 K/UL (ref 1.8–7.7)
ALBUMIN SERPL BCP-MCNC: 4.6 G/DL (ref 3.5–5.2)
ALP SERPL-CCNC: 116 UNIT/L (ref 40–150)
ALT SERPL W/O P-5'-P-CCNC: 46 UNIT/L (ref 10–44)
ANION GAP (OHS): 11 MMOL/L (ref 8–16)
AST SERPL-CCNC: 32 UNIT/L (ref 11–45)
BASOPHILS # BLD AUTO: 0.04 K/UL
BASOPHILS NFR BLD AUTO: 0.4 %
BILIRUB SERPL-MCNC: 0.6 MG/DL (ref 0.1–1)
BILIRUB UR QL STRIP.AUTO: NEGATIVE
BUN SERPL-MCNC: 17 MG/DL (ref 6–20)
CALCIUM SERPL-MCNC: 9.1 MG/DL (ref 8.7–10.5)
CHLORIDE SERPL-SCNC: 113 MMOL/L (ref 95–110)
CLARITY UR: CLEAR
CO2 SERPL-SCNC: 19 MMOL/L (ref 23–29)
COLOR UR AUTO: YELLOW
CREAT SERPL-MCNC: 0.7 MG/DL (ref 0.5–1.4)
ERYTHROCYTE [DISTWIDTH] IN BLOOD BY AUTOMATED COUNT: 12.9 % (ref 11.5–14.5)
GFR SERPLBLD CREATININE-BSD FMLA CKD-EPI: >60 ML/MIN/1.73/M2
GLUCOSE SERPL-MCNC: 114 MG/DL (ref 70–110)
GLUCOSE UR QL STRIP: NEGATIVE
HCT VFR BLD AUTO: 50.8 % (ref 40–54)
HCV AB SERPL QL IA: NORMAL
HGB BLD-MCNC: 17.2 GM/DL (ref 14–18)
HGB UR QL STRIP: NEGATIVE
HIV 1+2 AB+HIV1 P24 AG SERPL QL IA: NORMAL
HOLD SPECIMEN: NORMAL
HOLD SPECIMEN: NORMAL
IMM GRANULOCYTES # BLD AUTO: 0.03 K/UL (ref 0–0.04)
IMM GRANULOCYTES NFR BLD AUTO: 0.3 % (ref 0–0.5)
KETONES UR QL STRIP: ABNORMAL
LEUKOCYTE ESTERASE UR QL STRIP: NEGATIVE
LIPASE SERPL-CCNC: 40 U/L (ref 4–60)
LYMPHOCYTES # BLD AUTO: 0.79 K/UL (ref 1–4.8)
MCH RBC QN AUTO: 30.7 PG (ref 27–31)
MCHC RBC AUTO-ENTMCNC: 33.9 G/DL (ref 32–36)
MCV RBC AUTO: 91 FL (ref 82–98)
NITRITE UR QL STRIP: NEGATIVE
NUCLEATED RBC (/100WBC) (OHS): 0 /100 WBC
PH UR STRIP: 6 [PH]
PLATELET # BLD AUTO: 193 K/UL (ref 150–450)
PMV BLD AUTO: 9.8 FL (ref 9.2–12.9)
POTASSIUM SERPL-SCNC: 4 MMOL/L (ref 3.5–5.1)
PROT SERPL-MCNC: 7.5 GM/DL (ref 6–8.4)
PROT UR QL STRIP: ABNORMAL
RBC # BLD AUTO: 5.61 M/UL (ref 4.6–6.2)
RELATIVE EOSINOPHIL (OHS): 0.5 %
RELATIVE LYMPHOCYTE (OHS): 7.4 % (ref 18–48)
RELATIVE MONOCYTE (OHS): 4.3 % (ref 4–15)
RELATIVE NEUTROPHIL (OHS): 87.1 % (ref 38–73)
SODIUM SERPL-SCNC: 143 MMOL/L (ref 136–145)
SP GR UR STRIP: 1.03
UROBILINOGEN UR STRIP-ACNC: NEGATIVE EU/DL
WBC # BLD AUTO: 10.61 K/UL (ref 3.9–12.7)

## 2025-06-23 PROCEDURE — 82962 GLUCOSE BLOOD TEST: CPT

## 2025-06-23 PROCEDURE — 85025 COMPLETE CBC W/AUTO DIFF WBC: CPT | Performed by: EMERGENCY MEDICINE

## 2025-06-23 PROCEDURE — 63600175 PHARM REV CODE 636 W HCPCS: Performed by: STUDENT IN AN ORGANIZED HEALTH CARE EDUCATION/TRAINING PROGRAM

## 2025-06-23 PROCEDURE — 80053 COMPREHEN METABOLIC PANEL: CPT | Performed by: EMERGENCY MEDICINE

## 2025-06-23 PROCEDURE — 81003 URINALYSIS AUTO W/O SCOPE: CPT | Performed by: STUDENT IN AN ORGANIZED HEALTH CARE EDUCATION/TRAINING PROGRAM

## 2025-06-23 PROCEDURE — 96361 HYDRATE IV INFUSION ADD-ON: CPT

## 2025-06-23 PROCEDURE — 83690 ASSAY OF LIPASE: CPT | Performed by: STUDENT IN AN ORGANIZED HEALTH CARE EDUCATION/TRAINING PROGRAM

## 2025-06-23 PROCEDURE — 93010 ELECTROCARDIOGRAM REPORT: CPT | Mod: ,,, | Performed by: INTERNAL MEDICINE

## 2025-06-23 PROCEDURE — 96375 TX/PRO/DX INJ NEW DRUG ADDON: CPT

## 2025-06-23 PROCEDURE — 99284 EMERGENCY DEPT VISIT MOD MDM: CPT | Mod: 25

## 2025-06-23 PROCEDURE — 87389 HIV-1 AG W/HIV-1&-2 AB AG IA: CPT | Performed by: PHYSICIAN ASSISTANT

## 2025-06-23 PROCEDURE — 25000003 PHARM REV CODE 250: Performed by: STUDENT IN AN ORGANIZED HEALTH CARE EDUCATION/TRAINING PROGRAM

## 2025-06-23 PROCEDURE — 96365 THER/PROPH/DIAG IV INF INIT: CPT

## 2025-06-23 PROCEDURE — 93005 ELECTROCARDIOGRAM TRACING: CPT

## 2025-06-23 PROCEDURE — 63600175 PHARM REV CODE 636 W HCPCS: Performed by: EMERGENCY MEDICINE

## 2025-06-23 PROCEDURE — 86803 HEPATITIS C AB TEST: CPT | Performed by: PHYSICIAN ASSISTANT

## 2025-06-23 RX ORDER — ONDANSETRON 4 MG/1
4 TABLET, FILM COATED ORAL EVERY 6 HOURS
Qty: 12 TABLET | Refills: 0 | Status: SHIPPED | OUTPATIENT
Start: 2025-06-23

## 2025-06-23 RX ORDER — DICYCLOMINE HYDROCHLORIDE 20 MG/1
20 TABLET ORAL 2 TIMES DAILY
Qty: 10 TABLET | Refills: 0 | Status: SHIPPED | OUTPATIENT
Start: 2025-06-23 | End: 2025-06-28

## 2025-06-23 RX ORDER — ONDANSETRON HYDROCHLORIDE 2 MG/ML
4 INJECTION, SOLUTION INTRAVENOUS
Status: COMPLETED | OUTPATIENT
Start: 2025-06-23 | End: 2025-06-23

## 2025-06-23 RX ORDER — DICYCLOMINE HYDROCHLORIDE 10 MG/1
20 CAPSULE ORAL
Status: COMPLETED | OUTPATIENT
Start: 2025-06-23 | End: 2025-06-23

## 2025-06-23 RX ORDER — ACETAMINOPHEN 500 MG
1000 TABLET ORAL
Status: COMPLETED | OUTPATIENT
Start: 2025-06-23 | End: 2025-06-23

## 2025-06-23 RX ADMIN — SODIUM CHLORIDE, POTASSIUM CHLORIDE, SODIUM LACTATE AND CALCIUM CHLORIDE 1000 ML: 600; 310; 30; 20 INJECTION, SOLUTION INTRAVENOUS at 06:06

## 2025-06-23 RX ADMIN — ACETAMINOPHEN 1000 MG: 500 TABLET ORAL at 08:06

## 2025-06-23 RX ADMIN — PROMETHAZINE HYDROCHLORIDE 6.25 MG: 25 INJECTION INTRAMUSCULAR; INTRAVENOUS at 08:06

## 2025-06-23 RX ADMIN — ONDANSETRON 4 MG: 2 INJECTION INTRAMUSCULAR; INTRAVENOUS at 06:06

## 2025-06-23 RX ADMIN — DICYCLOMINE HYDROCHLORIDE 20 MG: 10 CAPSULE ORAL at 08:06

## 2025-06-23 NOTE — Clinical Note
"Hermelindo Sow" Rafaelgregkelby was seen and treated in our emergency department on 6/23/2025.  He may return to work on 06/26/2025.       If you have any questions or concerns, please don't hesitate to call.      Leslie Harvey MD"

## 2025-06-23 NOTE — FIRST PROVIDER EVALUATION
Medical screening examination initiated.  I have conducted a focused provider triage encounter, findings are as follows:    Brief history of present illness:  nausea, vomiting, diarrhea.  H/o diabetes.      There were no vitals filed for this visit.    Pertinent physical exam:  uncomfortable    Brief workup plan:  labs and fluids    Preliminary workup initiated; this workup will be continued and followed by the physician or advanced practice provider that is assigned to the patient when roomed.

## 2025-06-23 NOTE — PROVIDER PROGRESS NOTES - EMERGENCY DEPT.
Encounter Date: 6/23/2025    ED Physician Progress Notes         EKG - STEMI Decision  Initial Reading: No STEMI present.

## 2025-06-24 LAB
OHS QRS DURATION: 68 MS
OHS QTC CALCULATION: 425 MS
POCT GLUCOSE: 108 MG/DL (ref 70–110)
POCT GLUCOSE: 122 MG/DL (ref 70–110)

## 2025-06-24 NOTE — ED PROVIDER NOTES
Encounter Date: 6/23/2025       History     Chief Complaint   Patient presents with    Vomiting     Type 2 diabetic, vomiting,diarrhea,      HPI  Patient is a 45-year-old male with history of ADD, anxiety, asthma, cervical radiculopathy, GERD, LUBA, type 2 diabetes here for nausea, vomiting and diarrhea.  Onset of symptoms this afternoon after eating sushi for lunch.  He also states that he was recently on a family trip, multiple family members had vomiting and diarrhea.  He states that he has had multiple episodes of loose, watery, nonbloody diarrhea.  He has had multiple episodes of nonbloody, nonbilious emesis with associated dry heaving and nausea.  He has had some mild stomach cramping and feels like his stomach is burning.  No chest pain, shortness breath, fever, URI symptoms, urinary symptoms.    Review of patient's allergies indicates:  No Known Allergies  Past Medical History:   Diagnosis Date    ADD (attention deficit disorder) 05/09/2012    Allergy     Anxiety 04/12/2023    Asthma 05/09/2012    Cervical disc disorder with radiculopathy, unspecified cervical region 09/14/2021    Cervical radiculopathy 05/22/2023    Cervical spondylosis with myelopathy 06/29/2022    Eczema     Esophageal ulcer     GERD (gastroesophageal reflux disease) 05/09/2012    Glaucoma     Kidney stones 05/2014    Lumbar disc disease 05/09/2012    Lumbar herniated disc 05/09/2012    Malignant melanoma of skin, unspecified 01/06/2022    in situ right mid back    Melanoma 01/2022    in situ R mid back     LUBA (obstructive sleep apnea)     Radiculopathy, lumbar region 09/14/2021    Renal calculi 05/09/2012    Type 2 diabetes mellitus without complication, without long-term current use of insulin 11/16/2020     Past Surgical History:   Procedure Laterality Date    ANTERIOR CERVICAL DISCECTOMY W/ FUSION N/A 9/12/2024    Procedure: DISCECTOMY, SPINE, CERVICAL, ANTERIOR APPROACH, WITH FUSION C5-6, C6-7;  Surgeon: Naseem Mcnamara DO;   Location: [facility] OR 2ND FLR;  Service: Neurosurgery;  Laterality: N/A;    APPENDECTOMY  2006    CARPAL TUNNEL RELEASE Right 09/15/2022    Procedure: RELEASE, CARPAL TUNNEL;  Surgeon: Christen Marshall MD;  Location: [facility] OR;  Service: Orthopedics;  Laterality: Right;    CAUTERY OF TURBINATES Bilateral 7/15/2024    Procedure: CAUTERIZATION, MUCOSA, NASAL TURBINATE;  Surgeon: Melody Liu MD;  Location: [facility] OR;  Service: ENT;  Laterality: Bilateral;    COLONOSCOPY  01/22/2019    internal hemorrhoids, o/w normal - repeat at age 52    COLONOSCOPY N/A 12/21/2023    Procedure: COLONOSCOPY;  Surgeon: Teo Johnson MD;  Location: [facility];  Service: Endoscopy;  Laterality: N/A;    CORRECTION OF HAMMER TOE Left 8/14/2023    Procedure: CORRECTION, HAMMER TOE--  basic foot tray as well as a Reese microfree/microchoice tray. I also asked Sherry to bring an MIS mati.;  Surgeon: Ankush Back DPM;  Location: [facility] OR;  Service: Podiatry;  Laterality: Left;  reese drill, sagittal saw, foot set    CYSTOSCOPY  7/5/2023    Procedure: CYSTOSCOPY;  Surgeon: Chuckie Hall MD;  Location: [facility] 1ST FLR;  Service: Urology;;    CYSTOSCOPY N/A 9/26/2024    Procedure: CYSTOSCOPY;  Surgeon: Jay Walter MD;  Location: [facility] OR;  Service: Urology;  Laterality: N/A;  30 minutes    EPIDURAL STEROID INJECTION N/A 02/10/2021    Procedure: CERVICAL C6/7 NELI DIRECT REFERRAL;  Surgeon: Michi Escamilla MD;  Location: [facility] PAIN MGT;  Service: Pain Management;  Laterality: N/A;  NEEDS CONSENT    EPIDURAL STEROID INJECTION INTO CERVICAL SPINE N/A 6/28/2023    Procedure: Injection-steroid-epidural-cervical C7-T1;  Surgeon: Lorraine Patel DO;  Location: [facility] PAIN MANAGEMENT;  Service: Pain Management;  Laterality: N/A;  diabetic    EPIDURAL STEROID INJECTION INTO CERVICAL SPINE N/A 11/3/2023    Procedure: cervical NELI C7-T1;  Surgeon: Kolton Terrell MD;  Location: [facility] PAIN MANAGEMENT;  Service: Pain Management;  Laterality: N/A;   diabetic    EPIDURAL STEROID INJECTION INTO CERVICAL SPINE N/A 4/17/2024    Procedure: VENECIA C7/T1;  Surgeon: Lorraine Patel DO;  Location: [facility] PAIN MANAGEMENT;  Service: Pain Management;  Laterality: N/A;  20mins- Ozempic 7d- no ac    EPIDURAL STEROID INJECTION INTO LUMBAR SPINE N/A 9/27/2023    Procedure: L4-5 NELI;  Surgeon: Tirso Pickering MD;  Location: [facility] PAIN MANAGEMENT;  Service: Pain Management;  Laterality: N/A;  15 mins    EPIDURAL STEROID INJECTION INTO LUMBAR SPINE N/A 3/6/2024    Procedure: L4-5 IL NELI;  Surgeon: Lorraine Patel DO;  Location: [facility] PAIN MANAGEMENT;  Service: Pain Management;  Laterality: N/A;  20 mins    EPIDURAL STEROID INJECTION INTO LUMBAR SPINE N/A 6/3/2024    Procedure: NELI L4-5;  Surgeon: Lorraine Patel DO;  Location: [facility] PAIN MANAGEMENT;  Service: Pain Management;  Laterality: N/A;  20mins-no ac Ozempic 7d    EPIDURAL STEROID INJECTION INTO LUMBAR SPINE N/A 8/26/2024    Procedure: L4-5 NELI Veer Left;  Surgeon: Lorraine Patel DO;  Location: [facility] PAIN MANAGEMENT;  Service: Pain Management;  Laterality: N/A;  20 mins No AC - Ozempic 7 days    EPIDURAL STEROID INJECTION INTO LUMBAR SPINE N/A 12/2/2024    Procedure: NELI L5-S1;  Surgeon: Lorraine Patel DO;  Location: [facility] PAIN MANAGEMENT;  Service: Pain Management;  Laterality: N/A;  no ac    EPIDURAL STEROID INJECTION INTO LUMBAR SPINE N/A 5/7/2025    Procedure: L4-5 steer left NELI;  Surgeon: Lorraine Patel DO;  Location: [facility] PAIN MANAGEMENT;  Service: Pain Management;  Laterality: N/A;  Mounjaro 7 days    ESOPHAGOGASTRODUODENOSCOPY  01/22/2019    LA grade B esophagitis; esophageal stenosis- dilated; gastritis; H pylori pathology negative    ESOPHAGOGASTRODUODENOSCOPY N/A 05/18/2021    Procedure: EGD (ESOPHAGOGASTRODUODENOSCOPY);  Surgeon: Mariana Hector MD;  Location: [facility];  Service: Endoscopy;  Laterality: N/A;    ESOPHAGOGASTRODUODENOSCOPY N/A 12/21/2023    Procedure: EGD  (ESOPHAGOGASTRODUODENOSCOPY);  Surgeon: Teo Johnson MD;  Location: [facility] ENDO;  Service: Endoscopy;  Laterality: N/A;    ESOPHAGOGASTRODUODENOSCOPY N/A 2/22/2024    Procedure: EGD (ESOPHAGOGASTRODUODENOSCOPY);  Surgeon: Teo Johnson MD;  Location: [facility] ENDO;  Service: Endoscopy;  Laterality: N/A;    EXCISION OF GANGLION OF WRIST Right 09/15/2022    Procedure: EXCISION, GANGLION CYST, WRIST;  Surgeon: Christen Marshall MD;  Location: [facility] OR;  Service: Orthopedics;  Laterality: Right;    FLEXOR TENDON REPAIR Right 09/15/2022    Procedure: FLEXOR TENOSYNOVECTOMY;  Surgeon: Christen Marshall MD;  Location: [facility] OR;  Service: Orthopedics;  Laterality: Right;    HAND ARTHROTOMY Right 09/15/2022    Procedure: ARTHROTOMY, HAND RADIOCARPAL;  Surgeon: Christen Marshall MD;  Location: [facility] OR;  Service: Orthopedics;  Laterality: Right;  Radiocarpal    INCISION, CONTRACTURE, BLADDER NECK, TRANSURETHRAL N/A 11/8/2024    Procedure: INCISION, CONTRACTURE, BLADDER NECK, TRANSURETHRAL;  Surgeon: Jay Walter MD;  [facility];  Service: Urology;  Laterality: N/A;  1 hour    INJECTION OF JOINT Right 11/29/2023    Procedure: right hip IA injection and Right GTB injection;  Surgeon: Lorraine Patel DO;  [facility];  Service: Pain Management;  Laterality: Right;    INJECTION OF STEROID Left 09/15/2022    Procedure: INJECTION, STEROID LEFT WRIST AND LEFT LATERAL ELBOW;  Surgeon: Christen Marshall MD;  Location: [facility] OR;  Service: Orthopedics;  Laterality: Left;  Left Carpal Tunnel CSI    LASER LITHOTRIPSY Left 7/5/2023    Procedure: LITHOTRIPSY, USING LASER;  Surgeon: Chuckie Hall MD;  Location: [facility] OR 1ST FLR;  Service: Urology;  Laterality: Left;    LUMBAR LAMINECTOMY  2010    LUMBAR LAMINECTOMY WITH DISCECTOMY Left 09/20/2021    Procedure: LAMINECTOMY, SPINE, LUMBAR, WITH DISCECTOMY;  Surgeon: Naseem Mcnamara DO;  Location: [facility] OR 2ND FLR;  Service: Neurosurgery;  Laterality: Left;  MIS  L3-4    NASAL SEPTOPLASTY N/A 7/15/2024    Procedure: SEPTOPLASTY, NOSE;  Surgeon: Melody Liu MD;  Location: [facility] OR;  Service: ENT;  Laterality: N/A;    OPEN REDUCTION AND INTERNAL FIXATION (ORIF) OF INJURY OF FINGER Right 3/13/2025    Procedure: ORIF, SMALL FINGER;  Surgeon: Christen Marshall MD;  Location: [facility] OR;  Service: Orthopedics;  Laterality: Right;    PH MONITORING, ESOPHAGUS, WIRELESS, (OFF REFLUX MEDS) N/A 12/21/2023    Procedure: PH MONITORING, ESOPHAGUS, WIRELESS, (OFF REFLUX MEDS);  Surgeon: Teo Johnson MD;  Location: [facility] ENDO;  Service: Endoscopy;  Laterality: N/A;    RECONSTRUCTION OF LIGAMENT Left 12/5/2023    Procedure: RECONSTRUCTION, LIGAMENT LATERAL COLLATERAL;  Surgeon: Christen Marshall MD;  Location: [facility] OR;  Service: Orthopedics;  Laterality: Left;    REPAIR OF EXTENSOR TENDON Left 12/5/2023    Procedure: REPAIR, TENDON, EXTENSOR;  Surgeon: Christen Marshall MD;  Location: [facility] OR;  Service: Orthopedics;  Laterality: Left;    ROBOT-ASSISTED REPAIR OF HIATAL HERNIA N/A 3/14/2024    Procedure: ROBOTIC REPAIR, HERNIA, HIATAL;  Surgeon: Cody Winn MD;  Location: [facility] OR;  Service: General;  Laterality: N/A;    TYMPANOSTOMY TUBE PLACEMENT      URETERAL STENT PLACEMENT Left 7/5/2023    Procedure: INSERTION, STENT, URETER;  Surgeon: Chuckie Hall MD;  Location: [address];  Service: Urology;  Laterality: Left;    URETEROSCOPIC REMOVAL OF URETERIC CALCULUS Left 7/5/2023    Procedure: REMOVAL, CALCULUS, URETER, URETEROSCOPIC;  Surgeon: Chuckie Hall MD;  [facility];  Service: Urology;  Laterality: Left;    URETEROSCOPY Left 7/5/2023    Procedure: URETEROSCOPY;  Surgeon: Chuckie Hall MD;  [facility];  Service: Urology;  Laterality: Left;     Family History   Problem Relation Name Age of Onset    Allergic rhinitis Mother      Hypertension Mother      Transient ischemic attack Mother      Sleep apnea Mother      Allergic rhinitis  Father      Asthma Father      Chronic back pain Father      MARTITA disease Father      Sleep apnea Father      Melanoma Father      Allergic rhinitis Sister      Asthma Sister      Allergic rhinitis Sister      Asthma Sister      No Known Problems Brother      No Known Problems Maternal Aunt      No Known Problems Maternal Uncle      No Known Problems Paternal Aunt      No Known Problems Paternal Uncle      Brain cancer Maternal Grandmother      Diabetes Maternal Grandfather      Breast cancer Paternal Grandmother      No Known Problems Paternal Grandfather      Amblyopia Neg Hx      Blindness Neg Hx      Cancer Neg Hx      Cataracts Neg Hx      Glaucoma Neg Hx      Macular degeneration Neg Hx      Retinal detachment Neg Hx      Strabismus Neg Hx      Stroke Neg Hx      Thyroid disease Neg Hx      Allergies Neg Hx      Angioedema Neg Hx       Social History[1]  Review of Systems  A full review of systems was obtained, see HPI for pertinent positives.    Physical Exam     Initial Vitals [06/23/25 1747]   BP Pulse Resp Temp SpO2   133/77 (!) 112 20 98 °F (36.7 °C) 98 %      MAP       --         Physical Exam  Constitutional: No acute distress, nontoxic  HENT: Normocephalic, atraumatic, membranes dry  Respiratory: Non-labored, lungs clear  Cardiovascular: Well perfused, mildly tachycardic, regular rhythm  Gastrointestinal: Soft, non-tender, non-distended  Integumentary: Warm and dry  Musculoskeletal: No deformity  Neurological: Awake and alert, normal motor  Psychiatric: Cooperative     ED Course   Procedures  Labs Reviewed   COMPREHENSIVE METABOLIC PANEL - Abnormal       Result Value    Sodium 143      Potassium 4.0      Chloride 113 (*)     CO2 19 (*)     Glucose 114 (*)     BUN 17      Creatinine 0.7      Calcium 9.1      Protein Total 7.5      Albumin 4.6      Bilirubin Total 0.6            AST 32      ALT 46 (*)     Anion Gap 11      eGFR >60     CBC WITH DIFFERENTIAL - Abnormal    WBC 10.61      RBC 5.61       HGB 17.2      HCT 50.8      MCV 91      MCH 30.7      MCHC 33.9      RDW 12.9      Platelet Count 193      MPV 9.8      Nucleated RBC 0      Neut % 87.1 (*)     Lymph % 7.4 (*)     Mono % 4.3      Eos % 0.5      Basophil % 0.4      Imm Grans % 0.3      Neut # 9.24 (*)     Lymph # 0.79 (*)     Mono # 0.46      Eos # 0.05      Baso # 0.04      Imm Grans # 0.03     URINALYSIS, REFLEX TO URINE CULTURE - Abnormal    Color, UA Yellow      Appearance, UA Clear      pH, UA 6.0      Spec Grav UA 1.030      Protein, UA Trace (*)     Glucose, UA Negative      Ketones, UA Trace (*)     Bilirubin, UA Negative      Blood, UA Negative      Nitrites, UA Negative      Urobilinogen, UA Negative      Leukocyte Esterase, UA Negative     HEPATITIS C ANTIBODY - Normal    Hep C Ab Interp Non-Reactive     HIV 1 / 2 ANTIBODY - Normal    HIV 1/2 Ag/Ab Non-Reactive     LIPASE - Normal    Lipase Level 40     CBC W/ AUTO DIFFERENTIAL    Narrative:     The following orders were created for panel order CBC auto differential.  Procedure                               Abnormality         Status                     ---------                               -----------         ------                     CBC with Differential[[id number]]       Abnormal            Final result                 Please view results for these tests on the individual orders.   HEP C VIRUS HOLD SPECIMEN    Extra Tube Hold for add-ons.     GREY TOP URINE HOLD   POCT GLUCOSE MONITORING CONTINUOUS          Imaging Results    None          Medications   lactated ringers bolus 1,000 mL (0 mLs Intravenous Stopped 6/23/25 2001)   ondansetron injection 4 mg (4 mg Intravenous Given 6/23/25 1853)   promethazine (PHENERGAN) 6.25 mg in 0.9% NaCl 50 mL IVPB (0 mg Intravenous Stopped 6/23/25 2106)   dicyclomine capsule 20 mg (20 mg Oral Given 6/23/25 2023)   acetaminophen tablet 1,000 mg (1,000 mg Oral Given 6/23/25 2029)     Medical Decision Making  The patient is a 45-year-old male here  for nausea, vomiting and diarrhea.  Onset earlier this afternoon.  He was eating a sushi lunch just prior to onset.  He also has known sick contacts recently with similar symptoms.  He is mildly tachycardic and appears slightly dry on exam.  Overall the rest of his vitals are reassuring and he appears nontoxic.  Abdominal exam reassuring.  Differential includes but not limited to viral gastroenteritis, pancreatitis, hepatitis.  Will check labs and hydrate well and treat symptomatically.    Labs reviewed and overall reassuring.  Suspect gastroenteritis given positive sick contacts with similar symptoms.  Patient feeling better after symptomatic treatment.  He is tolerating p.o. intake well.  He was counseled on supportive care for home as well as return precautions.  He was advised to follow up with his primary care doctor.    Amount and/or Complexity of Data Reviewed  Labs:  Decision-making details documented in ED Course.    Risk  OTC drugs.  Prescription drug management.               ED Course as of 06/23/25 2123 Mon Jun 23, 2025 1940 WBC: 10.61 [NN]   1940 EKG with sinus tachycardia, rate 116, no STEMI [NN]   2052 Tolerating p.o. intake [NN]      ED Course User Index  [NN] Leslie Harvey MD                           Clinical Impression:  Final diagnoses:  [K52.9] Gastroenteritis (Primary)          ED Disposition Condition    Discharge Stable          ED Prescriptions       Medication Sig Dispense Start Date End Date Auth. Provider    ondansetron (ZOFRAN) 4 MG tablet Take 1 tablet (4 mg total) by mouth every 6 (six) hours. 12 tablet 6/23/2025 -- Leslie Harvey MD    dicyclomine (BENTYL) 20 mg tablet Take 1 tablet (20 mg total) by mouth 2 (two) times daily. for 5 days 10 tablet 6/23/2025 6/28/2025 Leslie Harvey MD          Follow-up Information       Follow up With Specialties Details Why Contact Info    Suzi Sanches MD Internal Medicine Schedule an appointment as soon as possible for a  visit   [address]  [place] [address]  [phone]      [facility] Emergency Dept Emergency Medicine  As needed, If symptoms worsen [address]  [facility] [address] [phone]                   [1]   Social History  Tobacco Use    Smoking status: Former     Current packs/day: 0.00     Types: Cigarettes     Quit date: 2018     Years since quittin.2     Passive exposure: Never    Smokeless tobacco: Never   Substance Use Topics    Alcohol use: Yes     Alcohol/week: 3.0 standard drinks of alcohol     Types: 3 Drinks containing 0.5 oz of alcohol per week    Drug use: No        Leslie Harvey MD  25 [id number]

## 2025-06-24 NOTE — ED TRIAGE NOTES
Hermelindo Garza III, a 45 y.o. male presents to the ED chief complaint of excessive diarrhea, nausea, and vomiting as of today after 1100. Pt describes diarrhea as extremely watery but also states vomiting is more of a dry heave. Denies fever, states CP and SOB are related to dry heaving at 6/10 on a numeric pain scale. Pt reports prescription of Tirzepitide.     Triage note:  Chief Complaint   Patient presents with    Vomiting     Type 2 diabetic, vomiting,diarrhea,      Review of patient's allergies indicates:  No Known Allergies  Past Medical History:   Diagnosis Date    ADD (attention deficit disorder) 05/09/2012    Allergy     Anxiety 04/12/2023    Asthma 05/09/2012    Cervical disc disorder with radiculopathy, unspecified cervical region 09/14/2021    Cervical radiculopathy 05/22/2023    Cervical spondylosis with myelopathy 06/29/2022    Eczema     Esophageal ulcer     GERD (gastroesophageal reflux disease) 05/09/2012    Glaucoma     Kidney stones 05/2014    Lumbar disc disease 05/09/2012    Lumbar herniated disc 05/09/2012    Malignant melanoma of skin, unspecified 01/06/2022    in situ right mid back    Melanoma 01/2022    in situ R mid back     LUBA (obstructive sleep apnea)     Radiculopathy, lumbar region 09/14/2021    Renal calculi 05/09/2012    Type 2 diabetes mellitus without complication, without long-term current use of insulin 11/16/2020

## 2025-06-24 NOTE — DISCHARGE INSTRUCTIONS
You were seen today for nausea, vomiting and diarrhea.  Your workup today was overall reassuring.  You were hydrated with IV fluids.  You have been prescribed Bentyl to help with any stomach cramping and Zofran to help with nausea and vomiting.  Please follow up with the primary care doctor within the next week.  In the meantime, you should make sure that you are hydrating well at home.  Please return to the emergency department for any worsening abdominal pain, fever, nausea and vomiting that has not controlled with the Zofran at home, signs of dehydration or any other worsening symptoms or concerns.

## 2025-06-26 ENCOUNTER — PATIENT MESSAGE (OUTPATIENT)
Dept: INTERNAL MEDICINE | Facility: CLINIC | Age: 45
End: 2025-06-26
Payer: COMMERCIAL

## 2025-06-26 DIAGNOSIS — J45.20 MILD INTERMITTENT ASTHMA WITHOUT COMPLICATION: ICD-10-CM

## 2025-06-26 DIAGNOSIS — Z88.9 HISTORY OF MULTIPLE ALLERGIES: ICD-10-CM

## 2025-06-27 RX ORDER — MONTELUKAST SODIUM 10 MG/1
10 TABLET ORAL NIGHTLY
Qty: 90 TABLET | Refills: 3 | OUTPATIENT
Start: 2025-06-27

## 2025-06-27 NOTE — TELEPHONE ENCOUNTER
"Pt requesting a refill on Singulair states that he administered what he had left and it helped relieve symptoms; as of 2024 the med is inactive on med list reasons state "reorder" & "error"    LOV with Suzi Sanches MD , 4/28/2025 visit note states  "RESPIRATORY:  He reports good response to albuterol nebulizer treatments and albuterol inhaler, with the inhaler providing significant symptom improvement. Advair is effective for respiratory symptoms."    "

## 2025-06-27 NOTE — TELEPHONE ENCOUNTER
No care due was identified.  [facility] Embedded Care Due Messages. Reference number: [id number].   6/27/2025 8:42:47 AM CDT

## 2025-06-30 DIAGNOSIS — G25.81 RESTLESS LEG: ICD-10-CM

## 2025-06-30 RX ORDER — ROPINIROLE 0.25 MG/1
TABLET, FILM COATED ORAL
Qty: 90 TABLET | Refills: 2 | Status: SHIPPED | OUTPATIENT
Start: 2025-06-30

## 2025-06-30 NOTE — TELEPHONE ENCOUNTER
No care due was identified.  [facility] Embedded Care Due Messages. Reference number: [id number].   6/30/2025 10:24:32 AM CDT

## 2025-07-02 ENCOUNTER — TELEPHONE (OUTPATIENT)
Dept: UROLOGY | Facility: CLINIC | Age: 45
End: 2025-07-02
Payer: COMMERCIAL

## 2025-07-02 ENCOUNTER — OFFICE VISIT (OUTPATIENT)
Dept: UROLOGY | Facility: CLINIC | Age: 45
End: 2025-07-02
Payer: COMMERCIAL

## 2025-07-02 ENCOUNTER — PATIENT MESSAGE (OUTPATIENT)
Dept: INTERNAL MEDICINE | Facility: CLINIC | Age: 45
End: 2025-07-02
Payer: COMMERCIAL

## 2025-07-02 VITALS
HEART RATE: 101 BPM | SYSTOLIC BLOOD PRESSURE: 109 MMHG | BODY MASS INDEX: 38.37 KG/M2 | WEIGHT: 230.63 LBS | DIASTOLIC BLOOD PRESSURE: 73 MMHG

## 2025-07-02 DIAGNOSIS — N32.0 BLADDER NECK OBSTRUCTION: ICD-10-CM

## 2025-07-02 DIAGNOSIS — N39.8 VOIDING DYSFUNCTION: Primary | ICD-10-CM

## 2025-07-02 DIAGNOSIS — R68.82 LOW LIBIDO: Primary | ICD-10-CM

## 2025-07-02 PROCEDURE — 3072F LOW RISK FOR RETINOPATHY: CPT | Mod: CPTII,S$GLB,, | Performed by: UROLOGY

## 2025-07-02 PROCEDURE — G2211 COMPLEX E/M VISIT ADD ON: HCPCS | Mod: S$GLB,,, | Performed by: UROLOGY

## 2025-07-02 PROCEDURE — 3061F NEG MICROALBUMINURIA REV: CPT | Mod: CPTII,S$GLB,, | Performed by: UROLOGY

## 2025-07-02 PROCEDURE — 3066F NEPHROPATHY DOC TX: CPT | Mod: CPTII,S$GLB,, | Performed by: UROLOGY

## 2025-07-02 PROCEDURE — 3078F DIAST BP <80 MM HG: CPT | Mod: CPTII,S$GLB,, | Performed by: UROLOGY

## 2025-07-02 PROCEDURE — 99999 PR PBB SHADOW E&M-EST. PATIENT-LVL IV: CPT | Mod: PBBFAC,,, | Performed by: UROLOGY

## 2025-07-02 PROCEDURE — 3074F SYST BP LT 130 MM HG: CPT | Mod: CPTII,S$GLB,, | Performed by: UROLOGY

## 2025-07-02 PROCEDURE — 99214 OFFICE O/P EST MOD 30 MIN: CPT | Mod: S$GLB,,, | Performed by: UROLOGY

## 2025-07-02 PROCEDURE — 3008F BODY MASS INDEX DOCD: CPT | Mod: CPTII,S$GLB,, | Performed by: UROLOGY

## 2025-07-02 PROCEDURE — 1159F MED LIST DOCD IN RCRD: CPT | Mod: CPTII,S$GLB,, | Performed by: UROLOGY

## 2025-07-02 PROCEDURE — 3044F HG A1C LEVEL LT 7.0%: CPT | Mod: CPTII,S$GLB,, | Performed by: UROLOGY

## 2025-07-02 RX ORDER — TAMSULOSIN HYDROCHLORIDE 0.4 MG/1
0.8 CAPSULE ORAL DAILY
Qty: 180 CAPSULE | Refills: 3 | Status: SHIPPED | OUTPATIENT
Start: 2025-07-02 | End: 2026-07-02

## 2025-07-02 NOTE — PROGRESS NOTES
[facility]      Return Bladder Neck Obstruction Note    7/2/2025    Referred by:  No ref. provider found    History of Present Illness    CHIEF COMPLAINT:  Patient presents for a follow-up visit after a transurethral incision of the bladder neck and to discuss urinary symptoms and medication management.    HPI:  Patient, a 45-year-old male, underwent a transurethral incision of the bladder neck in November 2024 for voiding dysfunction and primary bladder neck obstruction. Prior to the procedure, he had been treated with an alpha blocker, which was ineffective. Post-operatively, he had improvement in urine flow, nocturia, and urinary frequency. He also reports a more normal urge to urinate, in contrast to his pre-operative state when he felt strong urges but had minimal output.    He was continued on Tamsulosin (Flomax) post-operatively and reports retrograde ejaculation as a side effect. At a previous visit, he was instructed to discontinue Flomax and restart if symptoms recurred. He did have a return of symptoms and resumed taking Flomax. He now feels that his condition has changed, possibly due to slower healing, and is considering reducing his dosage.    Currently, he reports urinating adequately and getting up at night only once, if at all, which he considers normal for himself. He states that sometimes he still has difficulty initiating urination unless he has a strong urge, particularly with the initial stream. He also notes that he has lost approximately 40 or 50 lbs since his last visit a few months ago, which he attributes to receiving injections (likely referring to weight loss medication).    He denies frequent nighttime urination, difficulty urinating, or strong urges with minimal output.    MEDICATIONS:  Patient is on Tamsulosin (Flomax) for voiding dysfunction and bladder neck obstruction, which causes retrograde ejaculation as a side effect. He temporarily discontinued Tamsulosin in  the past but restarted it when symptoms recurred.    MEDICAL HISTORY:  Patient has a history of primary bladder neck obstruction and voiding dysfunction, both conditions present prior to November 2024. He also has a history of kidney stones.    SURGICAL HISTORY:  In November 2024, the patient underwent a transurethral incision of the bladder neck to address primary bladder neck obstruction and voiding dysfunction. The procedure resulted in improved flow, reduced nocturia and urinary frequency, and a more normal urge to urinate. However, retrograde ejaculation occurred as a side effect.    TEST RESULTS:  During the patient's last visit, his post-void residual was measured at 26 mL.          A review of 10+ systems was conducted with pertinent positive and negative findings documented in HPI with all other systems reviewed and negative.    Past medical, family, surgical and social history reviewed as documented in chart with pertinent positive medical, family, surgical and social history detailed in HPI.    Exam Findings:    Physical Exam    General: No acute distress. Nontoxic appearing.  HENT: Normocephalic. Atraumatic.  Respiratory: Normal respiratory effort. No conversational dyspnea. No audible wheezing.  Abdomen: No obvious distension.  Skin: No visible abnormalities.  Extremities: No edema upper extremities. No edema lower extremities.  Neurological: Alert and oriented x3. Normal speech.  Psychiatric: Normal mood. Normal affect. No evidence of SI.          Assessment/Plan:    Assessment & Plan    Underwent transurethral incision of bladder neck in November 2024 for primary bladder neck obstruction after alpha blocker treatment was ineffective.  Post-procedure, experienced improvement in flow, nocturia, urinary frequency, and more normal urge to urinate.  Continued Tamsulosin post-op, which resulted in retrograde ejaculation. Recent attempt to discontinue Flomax led to symptom recurrence, prompting restart of  medication.  Current urinary symptoms are well-controlled with once nightly nocturia and improved frequency.  Patient reports difficulty initiating urination at times.    BLADDER-NECK OBSTRUCTION:  1. Explained mechanism of retrograde ejaculation in relation to bladder neck incision.  2. Discussed that retrograde ejaculation is a known side effect of the procedure.    RETROGRADE EJACULATION:  1. Explained mechanism of retrograde ejaculation in relation to bladder neck incision and Flomax use.  2. Discussed that retrograde ejaculation is a known side effect of both the procedure and medication.  3. Discontinue Flomax (tamsulosin) and monitor for symptom recurrence.  4. If still on Flomax at next year's appointment, prescription renewal will be addressed.  5. If off Flomax at next year's appointment, return on an as-needed basis.    PLAN SUMMARY:  1. Discontinue Flomax (tamsulosin)  3. Follow-up in one year to reassess symptoms and medication needs  4. Return on an as-needed basis if off Flomax at next year's appointment              Visit complexity today is associated with medical care services that are part of the ongoing care related to the single serious and/or complex condition of bladder neck obstruction and voiding dysfunction. A longitudinal relationship exists or is being developed between the patient and this practitioner for the care of this condition.

## 2025-07-02 NOTE — TELEPHONE ENCOUNTER
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   Recommend checking testosterone levels with a urologist."    "

## 2025-07-03 ENCOUNTER — LAB VISIT (OUTPATIENT)
Dept: LAB | Facility: HOSPITAL | Age: 45
End: 2025-07-03
Attending: HOSPITALIST
Payer: COMMERCIAL

## 2025-07-03 DIAGNOSIS — E11.9 TYPE 2 DIABETES MELLITUS WITHOUT COMPLICATION, WITHOUT LONG-TERM CURRENT USE OF INSULIN: ICD-10-CM

## 2025-07-03 DIAGNOSIS — E78.5 HYPERLIPIDEMIA ASSOCIATED WITH TYPE 2 DIABETES MELLITUS: ICD-10-CM

## 2025-07-03 DIAGNOSIS — E11.69 HYPERLIPIDEMIA ASSOCIATED WITH TYPE 2 DIABETES MELLITUS: ICD-10-CM

## 2025-07-03 DIAGNOSIS — R68.82 LOW LIBIDO: ICD-10-CM

## 2025-07-03 LAB
ALBUMIN SERPL BCP-MCNC: 4.4 G/DL (ref 3.5–5.2)
ALP SERPL-CCNC: 128 UNIT/L (ref 40–150)
ALT SERPL W/O P-5'-P-CCNC: 42 UNIT/L (ref 10–44)
ANION GAP (OHS): 9 MMOL/L (ref 8–16)
AST SERPL-CCNC: 24 UNIT/L (ref 11–45)
BILIRUB SERPL-MCNC: 0.5 MG/DL (ref 0.1–1)
BUN SERPL-MCNC: 13 MG/DL (ref 6–20)
CALCIUM SERPL-MCNC: 9.6 MG/DL (ref 8.7–10.5)
CHLORIDE SERPL-SCNC: 106 MMOL/L (ref 95–110)
CHOLEST SERPL-MCNC: 117 MG/DL (ref 120–199)
CHOLEST/HDLC SERPL: 3.4 {RATIO} (ref 2–5)
CO2 SERPL-SCNC: 26 MMOL/L (ref 23–29)
CREAT SERPL-MCNC: 0.7 MG/DL (ref 0.5–1.4)
EAG (OHS): 94 MG/DL (ref 68–131)
GFR SERPLBLD CREATININE-BSD FMLA CKD-EPI: >60 ML/MIN/1.73/M2
GLUCOSE SERPL-MCNC: 81 MG/DL (ref 70–110)
HBA1C MFR BLD: 4.9 % (ref 4–5.6)
HDLC SERPL-MCNC: 34 MG/DL (ref 40–75)
HDLC SERPL: 29.1 % (ref 20–50)
LDLC SERPL CALC-MCNC: 42.8 MG/DL (ref 63–159)
NONHDLC SERPL-MCNC: 83 MG/DL
POTASSIUM SERPL-SCNC: 3.8 MMOL/L (ref 3.5–5.1)
PROT SERPL-MCNC: 7.3 GM/DL (ref 6–8.4)
SODIUM SERPL-SCNC: 141 MMOL/L (ref 136–145)
TESTOST SERPL-MCNC: 565 NG/DL (ref 304–1227)
TRIGL SERPL-MCNC: 201 MG/DL (ref 30–150)

## 2025-07-03 PROCEDURE — 84403 ASSAY OF TOTAL TESTOSTERONE: CPT

## 2025-07-03 PROCEDURE — 82040 ASSAY OF SERUM ALBUMIN: CPT

## 2025-07-03 PROCEDURE — 36415 COLL VENOUS BLD VENIPUNCTURE: CPT

## 2025-07-03 PROCEDURE — 83036 HEMOGLOBIN GLYCOSYLATED A1C: CPT

## 2025-07-03 PROCEDURE — 80061 LIPID PANEL: CPT

## 2025-07-07 ENCOUNTER — PATIENT MESSAGE (OUTPATIENT)
Dept: INTERNAL MEDICINE | Facility: CLINIC | Age: 45
End: 2025-07-07
Payer: COMMERCIAL

## 2025-07-07 ENCOUNTER — CLINICAL SUPPORT (OUTPATIENT)
Dept: ALLERGY | Facility: CLINIC | Age: 45
End: 2025-07-07
Payer: COMMERCIAL

## 2025-07-07 DIAGNOSIS — R68.82 LOW LIBIDO: Primary | ICD-10-CM

## 2025-07-07 DIAGNOSIS — J30.9 CHRONIC ALLERGIC RHINITIS: Primary | ICD-10-CM

## 2025-07-07 PROCEDURE — 99999 PR PBB SHADOW E&M-EST. PATIENT-LVL I: CPT | Mod: PBBFAC,,,

## 2025-07-07 PROCEDURE — 95117 IMMUNOTHERAPY INJECTIONS: CPT | Mod: S$GLB,,, | Performed by: STUDENT IN AN ORGANIZED HEALTH CARE EDUCATION/TRAINING PROGRAM

## 2025-07-15 ENCOUNTER — PATIENT MESSAGE (OUTPATIENT)
Dept: INTERNAL MEDICINE | Facility: CLINIC | Age: 45
End: 2025-07-15
Payer: COMMERCIAL

## 2025-07-15 DIAGNOSIS — L23.7 ALLERGIC CONTACT DERMATITIS DUE TO PLANT: ICD-10-CM

## 2025-07-15 DIAGNOSIS — R51.9 ACUTE NONINTRACTABLE HEADACHE, UNSPECIFIED HEADACHE TYPE: ICD-10-CM

## 2025-07-15 RX ORDER — ONDANSETRON 8 MG/1
8 TABLET, ORALLY DISINTEGRATING ORAL EVERY 8 HOURS PRN
Qty: 30 TABLET | Refills: 3 | Status: SHIPPED | OUTPATIENT
Start: 2025-07-15

## 2025-07-15 NOTE — TELEPHONE ENCOUNTER
"Pt requesting a refill on Fioricet    5/8/2025 pt had a visit with PENG Chan NP virtually for headache, prescribed Fioricet visit note states   "Started Fioricet as initial treatment: 1-2 tablets every 6 hours as needed, with initial recommendation to start with 1 tablet due to caffeine sensitivity. Prescribed limited quantity (30) to assess efficacy. Can take additional acetaminophen if needed. Chose Fioricet over Excedrin due to history of spinal fusion and need to avoid aspirin. Sent prescription to [facility].  Reviewed potential link between caffeine intake/withdrawal and headaches."    LOV with Suzi Sanches MD , 4/28/2025    "

## 2025-07-15 NOTE — TELEPHONE ENCOUNTER
No care due was identified.  [facility] Embedded Care Due Messages. Reference number: [id number].   7/15/2025 1:14:54 PM CDT

## 2025-07-15 NOTE — TELEPHONE ENCOUNTER
No care due was identified.  [facility] Embedded Care Due Messages. Reference number: [id number].   7/15/2025 5:11:38 PM CDT

## 2025-07-15 NOTE — TELEPHONE ENCOUNTER
No care due was identified.  [facility] Embedded Care Due Messages. Reference number: [id number].   7/15/2025 1:24:54 PM CDT

## 2025-07-16 RX ORDER — BUTALBITAL, ACETAMINOPHEN AND CAFFEINE 50; 325; 40 MG/1; MG/1; MG/1
1-2 TABLET ORAL EVERY 6 HOURS PRN
Qty: 30 TABLET | Refills: 2 | Status: SHIPPED | OUTPATIENT
Start: 2025-07-16 | End: 2025-08-15

## 2025-07-16 RX ORDER — HYDROCORTISONE 25 MG/G
CREAM TOPICAL
Qty: 30 G | Refills: 0 | Status: SHIPPED | OUTPATIENT
Start: 2025-07-16

## 2025-07-16 NOTE — TELEPHONE ENCOUNTER
Refill Routing Note   Medication(s) are not appropriate for processing by [facility] for the following reason(s):        Due for refill >6 months ago    [facility] action(s):  Defer             Appointments  past 12m or future 3m with PCP    Date Provider   Last Visit   4/28/2025 Suzi Sanches MD   Next Visit   7/15/2025 Suzi Sanches MD   ED visits in past 90 days: 1        Note composed:11:21 PM 07/15/2025

## 2025-07-25 ENCOUNTER — PATIENT MESSAGE (OUTPATIENT)
Dept: ALLERGY | Facility: CLINIC | Age: 45
End: 2025-07-25
Payer: COMMERCIAL

## 2025-07-25 ENCOUNTER — PATIENT MESSAGE (OUTPATIENT)
Dept: NEUROSURGERY | Facility: CLINIC | Age: 45
End: 2025-07-25
Payer: COMMERCIAL

## 2025-07-25 ENCOUNTER — PATIENT MESSAGE (OUTPATIENT)
Dept: PODIATRY | Facility: CLINIC | Age: 45
End: 2025-07-25
Payer: COMMERCIAL

## 2025-07-25 ENCOUNTER — TELEPHONE (OUTPATIENT)
Dept: PODIATRY | Facility: CLINIC | Age: 45
End: 2025-07-25
Payer: COMMERCIAL

## 2025-08-04 ENCOUNTER — CLINICAL SUPPORT (OUTPATIENT)
Dept: ALLERGY | Facility: CLINIC | Age: 45
End: 2025-08-04
Payer: COMMERCIAL

## 2025-08-04 DIAGNOSIS — J30.9 CHRONIC ALLERGIC RHINITIS: Primary | ICD-10-CM

## 2025-08-04 PROCEDURE — 99999 PR PBB SHADOW E&M-EST. PATIENT-LVL I: CPT | Mod: PBBFAC,,,

## 2025-08-04 PROCEDURE — 95117 IMMUNOTHERAPY INJECTIONS: CPT | Mod: S$GLB,,, | Performed by: STUDENT IN AN ORGANIZED HEALTH CARE EDUCATION/TRAINING PROGRAM

## 2025-08-05 ENCOUNTER — OFFICE VISIT (OUTPATIENT)
Dept: PODIATRY | Facility: CLINIC | Age: 45
End: 2025-08-05
Payer: COMMERCIAL

## 2025-08-05 DIAGNOSIS — M77.42 METATARSALGIA OF LEFT FOOT: ICD-10-CM

## 2025-08-05 DIAGNOSIS — G89.29 CHRONIC PAIN IN LEFT FOOT: Primary | ICD-10-CM

## 2025-08-05 DIAGNOSIS — M79.672 CHRONIC PAIN IN LEFT FOOT: Primary | ICD-10-CM

## 2025-08-05 PROCEDURE — 3061F NEG MICROALBUMINURIA REV: CPT | Mod: CPTII,95,, | Performed by: STUDENT IN AN ORGANIZED HEALTH CARE EDUCATION/TRAINING PROGRAM

## 2025-08-05 PROCEDURE — 3066F NEPHROPATHY DOC TX: CPT | Mod: CPTII,95,, | Performed by: STUDENT IN AN ORGANIZED HEALTH CARE EDUCATION/TRAINING PROGRAM

## 2025-08-05 PROCEDURE — 98006 SYNCH AUDIO-VIDEO EST MOD 30: CPT | Mod: 95,,, | Performed by: STUDENT IN AN ORGANIZED HEALTH CARE EDUCATION/TRAINING PROGRAM

## 2025-08-05 PROCEDURE — 3072F LOW RISK FOR RETINOPATHY: CPT | Mod: CPTII,95,, | Performed by: STUDENT IN AN ORGANIZED HEALTH CARE EDUCATION/TRAINING PROGRAM

## 2025-08-05 PROCEDURE — 3044F HG A1C LEVEL LT 7.0%: CPT | Mod: CPTII,95,, | Performed by: STUDENT IN AN ORGANIZED HEALTH CARE EDUCATION/TRAINING PROGRAM

## 2025-08-05 NOTE — PROGRESS NOTES
The patient location is: [place]  The chief complaint leading to consultation is: painful diabetic neuropathy, radiculopathy, left foot metatarsalgia    Visit type: audiovisual    Face to Face time with patient: 8 min  15 minutes of total time spent on the encounter, which includes face to face time and non-face to face time preparing to see the patient (eg, review of tests), Obtaining and/or reviewing separately obtained history, Documenting clinical information in the electronic or other health record, Independently interpreting results (not separately reported) and communicating results to the patient/family/caregiver, or Care coordination (not separately reported).         Each patient to whom he or she provides medical services by telemedicine is:  (1) informed of the relationship between the physician and patient and the respective role of any other health care provider with respect to management of the patient; and (2) notified that he or she may decline to receive medical services by telemedicine and may withdraw from such care at any time.    Notes:     Returns for mixed neuropathy BLE in setting of diabetes and chronic back issues. Was having numbness, tingling, burning in both feet. Was going to have a spinal injection after last visit. Was on 400 mg/day pregabalin which we increased to 600 mg/day. Did have significant improvement in back and foot pain and neuropathy symptoms with the pregabalin increase without side effects. Having ongoing left forefoot pain near bases of left 3rd and 4th toes, thinks there may be something else going on in the foot at that area other than the radiculopathy since it feels different and did not improve with the pregabalin adjustment.      -Continue pregabalin 600 mg/day.   -Ordered X ray and MRI left foot.     RTC in 1 month to review imaging

## 2025-08-06 ENCOUNTER — PATIENT MESSAGE (OUTPATIENT)
Dept: PODIATRY | Facility: CLINIC | Age: 45
End: 2025-08-06
Payer: COMMERCIAL

## 2025-08-09 ENCOUNTER — PATIENT MESSAGE (OUTPATIENT)
Dept: UROLOGY | Facility: CLINIC | Age: 45
End: 2025-08-09
Payer: COMMERCIAL

## 2025-08-11 ENCOUNTER — PATIENT MESSAGE (OUTPATIENT)
Dept: PAIN MEDICINE | Facility: CLINIC | Age: 45
End: 2025-08-11
Payer: COMMERCIAL

## 2025-08-11 ENCOUNTER — LAB VISIT (OUTPATIENT)
Dept: LAB | Facility: HOSPITAL | Age: 45
End: 2025-08-11
Attending: HOSPITALIST
Payer: COMMERCIAL

## 2025-08-11 DIAGNOSIS — R68.82 LOW LIBIDO: ICD-10-CM

## 2025-08-11 LAB — TESTOST SERPL-MCNC: 622 NG/DL (ref 304–1227)

## 2025-08-11 PROCEDURE — 84403 ASSAY OF TOTAL TESTOSTERONE: CPT

## 2025-08-11 PROCEDURE — 36415 COLL VENOUS BLD VENIPUNCTURE: CPT

## 2025-08-14 ENCOUNTER — OFFICE VISIT (OUTPATIENT)
Dept: PAIN MEDICINE | Facility: CLINIC | Age: 45
End: 2025-08-14
Payer: COMMERCIAL

## 2025-08-14 ENCOUNTER — RESULTS FOLLOW-UP (OUTPATIENT)
Dept: INTERNAL MEDICINE | Facility: CLINIC | Age: 45
End: 2025-08-14
Payer: COMMERCIAL

## 2025-08-14 DIAGNOSIS — M51.362 DEGENERATION OF INTERVERTEBRAL DISC OF LUMBAR REGION WITH DISCOGENIC BACK PAIN AND LOWER EXTREMITY PAIN: ICD-10-CM

## 2025-08-14 DIAGNOSIS — M54.16 LUMBAR RADICULOPATHY, CHRONIC: Primary | ICD-10-CM

## 2025-08-14 DIAGNOSIS — G89.4 CHRONIC PAIN SYNDROME: ICD-10-CM

## 2025-08-14 DIAGNOSIS — M54.9 DORSALGIA, UNSPECIFIED: ICD-10-CM

## 2025-08-18 ENCOUNTER — HOSPITAL ENCOUNTER (OUTPATIENT)
Dept: RADIOLOGY | Facility: HOSPITAL | Age: 45
Discharge: HOME | End: 2025-08-18
Attending: STUDENT IN AN ORGANIZED HEALTH CARE EDUCATION/TRAINING PROGRAM
Payer: COMMERCIAL

## 2025-08-18 DIAGNOSIS — M77.42 METATARSALGIA OF LEFT FOOT: ICD-10-CM

## 2025-08-18 DIAGNOSIS — G89.29 CHRONIC PAIN IN LEFT FOOT: ICD-10-CM

## 2025-08-18 DIAGNOSIS — M79.672 CHRONIC PAIN IN LEFT FOOT: ICD-10-CM

## 2025-08-18 PROCEDURE — 73630 X-RAY EXAM OF FOOT: CPT | Mod: TC,LT

## 2025-08-18 PROCEDURE — 73630 X-RAY EXAM OF FOOT: CPT | Mod: 26,LT,, | Performed by: RADIOLOGY

## 2025-08-19 ENCOUNTER — PATIENT MESSAGE (OUTPATIENT)
Dept: PAIN MEDICINE | Facility: CLINIC | Age: 45
End: 2025-08-19
Payer: COMMERCIAL

## 2025-08-21 ENCOUNTER — HOSPITAL ENCOUNTER (OUTPATIENT)
Dept: RADIOLOGY | Facility: HOSPITAL | Age: 45
Discharge: HOME | End: 2025-08-21
Attending: NEUROLOGICAL SURGERY
Payer: COMMERCIAL

## 2025-08-21 DIAGNOSIS — Z98.1 S/P CERVICAL SPINAL FUSION: ICD-10-CM

## 2025-08-21 PROCEDURE — 72125 CT NECK SPINE W/O DYE: CPT | Mod: TC

## 2025-08-21 PROCEDURE — 72125 CT NECK SPINE W/O DYE: CPT | Mod: 26,,, | Performed by: RADIOLOGY

## 2025-08-22 ENCOUNTER — HOSPITAL ENCOUNTER (OUTPATIENT)
Dept: RADIOLOGY | Facility: HOSPITAL | Age: 45
Discharge: HOME | End: 2025-08-22
Attending: NURSE PRACTITIONER
Payer: COMMERCIAL

## 2025-08-22 DIAGNOSIS — M54.16 LUMBAR RADICULOPATHY, CHRONIC: ICD-10-CM

## 2025-08-22 DIAGNOSIS — G89.4 CHRONIC PAIN SYNDROME: ICD-10-CM

## 2025-08-22 DIAGNOSIS — M51.362 DEGENERATION OF INTERVERTEBRAL DISC OF LUMBAR REGION WITH DISCOGENIC BACK PAIN AND LOWER EXTREMITY PAIN: ICD-10-CM

## 2025-08-22 PROCEDURE — 72148 MRI LUMBAR SPINE W/O DYE: CPT | Mod: TC

## 2025-08-22 PROCEDURE — 72148 MRI LUMBAR SPINE W/O DYE: CPT | Mod: 26,,, | Performed by: RADIOLOGY

## 2025-08-26 ENCOUNTER — OFFICE VISIT (OUTPATIENT)
Dept: OPHTHALMOLOGY | Facility: CLINIC | Age: 45
End: 2025-08-26
Payer: COMMERCIAL

## 2025-08-26 ENCOUNTER — CLINICAL SUPPORT (OUTPATIENT)
Dept: OPHTHALMOLOGY | Facility: CLINIC | Age: 45
End: 2025-08-26
Payer: COMMERCIAL

## 2025-08-26 ENCOUNTER — TELEPHONE (OUTPATIENT)
Dept: PAIN MEDICINE | Facility: CLINIC | Age: 45
End: 2025-08-26
Payer: COMMERCIAL

## 2025-08-26 ENCOUNTER — OFFICE VISIT (OUTPATIENT)
Dept: PAIN MEDICINE | Facility: CLINIC | Age: 45
End: 2025-08-26
Payer: COMMERCIAL

## 2025-08-26 DIAGNOSIS — M54.16 LUMBAR RADICULOPATHY: Primary | ICD-10-CM

## 2025-08-26 DIAGNOSIS — M51.361 DEGENERATION OF INTERVERTEBRAL DISC OF LUMBAR REGION WITH LOWER EXTREMITY PAIN: ICD-10-CM

## 2025-08-26 DIAGNOSIS — M51.362 DEGENERATION OF INTERVERTEBRAL DISC OF LUMBAR REGION WITH DISCOGENIC BACK PAIN AND LOWER EXTREMITY PAIN: ICD-10-CM

## 2025-08-26 DIAGNOSIS — M25.559 HIP PAIN, UNSPECIFIED LATERALITY: ICD-10-CM

## 2025-08-26 DIAGNOSIS — H02.88B MEIBOMIAN GLAND DYSFUNCTION (MGD) OF UPPER AND LOWER LIDS OF BOTH EYES: ICD-10-CM

## 2025-08-26 DIAGNOSIS — H40.023 OPEN ANGLE WITH BORDERLINE FINDINGS AND HIGH GLAUCOMA RISK IN BOTH EYES: Primary | ICD-10-CM

## 2025-08-26 DIAGNOSIS — H02.88A MEIBOMIAN GLAND DYSFUNCTION (MGD) OF UPPER AND LOWER LIDS OF BOTH EYES: ICD-10-CM

## 2025-08-26 DIAGNOSIS — L71.8 OCULAR ROSACEA: ICD-10-CM

## 2025-08-26 DIAGNOSIS — H40.053 BORDERLINE GLAUCOMA OF BOTH EYES WITH OCULAR HYPERTENSION: ICD-10-CM

## 2025-08-26 DIAGNOSIS — E11.9 TYPE 2 DIABETES MELLITUS WITHOUT OPHTHALMIC MANIFESTATIONS: ICD-10-CM

## 2025-08-26 PROCEDURE — 3044F HG A1C LEVEL LT 7.0%: CPT | Mod: CPTII,95,, | Performed by: NURSE PRACTITIONER

## 2025-08-26 PROCEDURE — 3066F NEPHROPATHY DOC TX: CPT | Mod: CPTII,S$GLB,, | Performed by: OPHTHALMOLOGY

## 2025-08-26 PROCEDURE — 2023F DILAT RTA XM W/O RTNOPTHY: CPT | Mod: CPTII,S$GLB,, | Performed by: OPHTHALMOLOGY

## 2025-08-26 PROCEDURE — 1160F RVW MEDS BY RX/DR IN RCRD: CPT | Mod: CPTII,95,, | Performed by: NURSE PRACTITIONER

## 2025-08-26 PROCEDURE — G2211 COMPLEX E/M VISIT ADD ON: HCPCS | Mod: 95,,, | Performed by: NURSE PRACTITIONER

## 2025-08-26 PROCEDURE — 92133 CPTRZD OPH DX IMG PST SGM ON: CPT | Mod: S$GLB,,, | Performed by: OPHTHALMOLOGY

## 2025-08-26 PROCEDURE — 1160F RVW MEDS BY RX/DR IN RCRD: CPT | Mod: CPTII,S$GLB,, | Performed by: OPHTHALMOLOGY

## 2025-08-26 PROCEDURE — 3066F NEPHROPATHY DOC TX: CPT | Mod: CPTII,95,, | Performed by: NURSE PRACTITIONER

## 2025-08-26 PROCEDURE — 99999 PR PBB SHADOW E&M-EST. PATIENT-LVL II: CPT | Mod: PBBFAC,,, | Performed by: OPHTHALMOLOGY

## 2025-08-26 PROCEDURE — 1159F MED LIST DOCD IN RCRD: CPT | Mod: CPTII,S$GLB,, | Performed by: OPHTHALMOLOGY

## 2025-08-26 PROCEDURE — 99214 OFFICE O/P EST MOD 30 MIN: CPT | Mod: S$GLB,,, | Performed by: OPHTHALMOLOGY

## 2025-08-26 PROCEDURE — 98006 SYNCH AUDIO-VIDEO EST MOD 30: CPT | Mod: 95,,, | Performed by: NURSE PRACTITIONER

## 2025-08-26 PROCEDURE — 3044F HG A1C LEVEL LT 7.0%: CPT | Mod: CPTII,S$GLB,, | Performed by: OPHTHALMOLOGY

## 2025-08-26 PROCEDURE — 1159F MED LIST DOCD IN RCRD: CPT | Mod: CPTII,95,, | Performed by: NURSE PRACTITIONER

## 2025-08-26 PROCEDURE — 3061F NEG MICROALBUMINURIA REV: CPT | Mod: CPTII,95,, | Performed by: NURSE PRACTITIONER

## 2025-08-26 PROCEDURE — 3061F NEG MICROALBUMINURIA REV: CPT | Mod: CPTII,S$GLB,, | Performed by: OPHTHALMOLOGY

## 2025-08-26 PROCEDURE — 92083 EXTENDED VISUAL FIELD XM: CPT | Mod: S$GLB,,, | Performed by: OPHTHALMOLOGY

## 2025-08-27 ENCOUNTER — OFFICE VISIT (OUTPATIENT)
Dept: NEUROSURGERY | Facility: CLINIC | Age: 45
End: 2025-08-27
Payer: COMMERCIAL

## 2025-08-27 ENCOUNTER — PATIENT MESSAGE (OUTPATIENT)
Dept: INTERNAL MEDICINE | Facility: CLINIC | Age: 45
End: 2025-08-27
Payer: COMMERCIAL

## 2025-08-27 DIAGNOSIS — G89.29 CHRONIC LOW BACK PAIN, UNSPECIFIED BACK PAIN LATERALITY, UNSPECIFIED WHETHER SCIATICA PRESENT: ICD-10-CM

## 2025-08-27 DIAGNOSIS — Z98.1 S/P CERVICAL SPINAL FUSION: Primary | ICD-10-CM

## 2025-08-27 DIAGNOSIS — M54.50 CHRONIC LOW BACK PAIN, UNSPECIFIED BACK PAIN LATERALITY, UNSPECIFIED WHETHER SCIATICA PRESENT: ICD-10-CM

## 2025-08-27 DIAGNOSIS — Z98.890 S/P LUMBAR DISCECTOMY: ICD-10-CM

## 2025-08-27 PROCEDURE — 98006 SYNCH AUDIO-VIDEO EST MOD 30: CPT | Mod: 95,,, | Performed by: NEUROLOGICAL SURGERY

## 2025-08-27 PROCEDURE — 1159F MED LIST DOCD IN RCRD: CPT | Mod: CPTII,95,, | Performed by: NEUROLOGICAL SURGERY

## 2025-08-27 PROCEDURE — 3066F NEPHROPATHY DOC TX: CPT | Mod: CPTII,95,, | Performed by: NEUROLOGICAL SURGERY

## 2025-08-27 PROCEDURE — 3044F HG A1C LEVEL LT 7.0%: CPT | Mod: CPTII,95,, | Performed by: NEUROLOGICAL SURGERY

## 2025-08-27 PROCEDURE — 3061F NEG MICROALBUMINURIA REV: CPT | Mod: CPTII,95,, | Performed by: NEUROLOGICAL SURGERY

## 2025-08-27 PROCEDURE — 1160F RVW MEDS BY RX/DR IN RCRD: CPT | Mod: CPTII,95,, | Performed by: NEUROLOGICAL SURGERY

## 2025-09-03 ENCOUNTER — CLINICAL SUPPORT (OUTPATIENT)
Dept: ALLERGY | Facility: CLINIC | Age: 45
End: 2025-09-03
Payer: COMMERCIAL

## 2025-09-03 ENCOUNTER — TELEPHONE (OUTPATIENT)
Dept: PAIN MEDICINE | Facility: CLINIC | Age: 45
End: 2025-09-03
Payer: COMMERCIAL

## 2025-09-03 ENCOUNTER — HOSPITAL ENCOUNTER (OUTPATIENT)
Dept: RADIOLOGY | Facility: HOSPITAL | Age: 45
Discharge: HOME | End: 2025-09-03
Attending: STUDENT IN AN ORGANIZED HEALTH CARE EDUCATION/TRAINING PROGRAM
Payer: COMMERCIAL

## 2025-09-03 DIAGNOSIS — J30.9 CHRONIC ALLERGIC RHINITIS: Primary | ICD-10-CM

## 2025-09-03 DIAGNOSIS — G89.29 CHRONIC PAIN IN LEFT FOOT: ICD-10-CM

## 2025-09-03 DIAGNOSIS — M79.672 CHRONIC PAIN IN LEFT FOOT: ICD-10-CM

## 2025-09-03 DIAGNOSIS — M77.42 METATARSALGIA OF LEFT FOOT: ICD-10-CM

## 2025-09-03 PROCEDURE — 95117 IMMUNOTHERAPY INJECTIONS: CPT | Mod: S$GLB,,, | Performed by: STUDENT IN AN ORGANIZED HEALTH CARE EDUCATION/TRAINING PROGRAM

## 2025-09-03 PROCEDURE — 99999 PR PBB SHADOW E&M-EST. PATIENT-LVL I: CPT | Mod: PBBFAC,,,

## 2025-09-03 PROCEDURE — 73718 MRI LOWER EXTREMITY W/O DYE: CPT | Mod: 26,LT,, | Performed by: RADIOLOGY

## 2025-09-03 PROCEDURE — 73718 MRI LOWER EXTREMITY W/O DYE: CPT | Mod: TC,LT

## 2025-09-05 ENCOUNTER — OFFICE VISIT (OUTPATIENT)
Dept: PODIATRY | Facility: CLINIC | Age: 45
End: 2025-09-05
Payer: COMMERCIAL

## 2025-09-05 DIAGNOSIS — M79.672 CHRONIC PAIN IN LEFT FOOT: Primary | ICD-10-CM

## 2025-09-05 DIAGNOSIS — G89.29 CHRONIC PAIN IN LEFT FOOT: Primary | ICD-10-CM

## 2025-09-05 DIAGNOSIS — M77.42 METATARSALGIA OF LEFT FOOT: ICD-10-CM

## 2025-09-05 DIAGNOSIS — M79.2 NEUROGENIC PAIN OF LEFT FOOT: ICD-10-CM

## 2025-09-05 DIAGNOSIS — S99.922S INJURY OF PLANTAR PLATE, LEFT, SEQUELA: ICD-10-CM

## (undated) DEVICE — GAUZE SPONGE PEANUT STRL

## (undated) DEVICE — PAD PINK TRENDELENBURG POS XL

## (undated) DEVICE — DRESSING XEROFORM NONADH 1X8IN

## (undated) DEVICE — DRAPE TOP 53X102IN

## (undated) DEVICE — DRESSING XEROFORM FOIL PK 1X8

## (undated) DEVICE — SLING ARM LARGE FOAM STRAP

## (undated) DEVICE — TUBING SUC UNIV W/CONN 12FT

## (undated) DEVICE — BUR BONE CUT MICRO TPS 3X3.8MM

## (undated) DEVICE — PADDING CAST 4IN SPECIALIST

## (undated) DEVICE — DRESSING N ADH OIL EMUL 3X3

## (undated) DEVICE — TRAY CYSTO BASIN OMC

## (undated) DEVICE — DRAPE THREE-QTR REINF 53X77IN

## (undated) DEVICE — DRESSING TELFA N ADH 3X8

## (undated) DEVICE — SOL NACL STRL BOTTLE 1000ML

## (undated) DEVICE — TOURNIQUET SB QC DP 18X4IN

## (undated) DEVICE — SEE L#120831

## (undated) DEVICE — GLOVE SENSICARE PI SURG 8

## (undated) DEVICE — ELECTRODE REM PLYHSV RETURN 9

## (undated) DEVICE — TUBING INSUFFLATION W/LUER LOK

## (undated) DEVICE — GLOVE SENSICARE PI MICRO 8

## (undated) DEVICE — NDL ECLIPSE SAFETY 18GX1-1/2IN

## (undated) DEVICE — SPONGE NEURO 1/2 X 2

## (undated) DEVICE — GLOVE BIOGEL SKINSENSE PI 8.0

## (undated) DEVICE — SYR 10CC LUER LOCK

## (undated) DEVICE — DRAPE C-ARM MINI DISP

## (undated) DEVICE — SUT ETHILON 3-0 PS2 18 BLK

## (undated) DEVICE — GLOVE BIOGEL SKINSENSE PI 7.0

## (undated) DEVICE — CONTAINER MULTIPURPOSE/SPECIME

## (undated) DEVICE — CATH POLLACK OPEN-END FLEXI-TI

## (undated) DEVICE — PACK ECLIPSE BASIC III SURG

## (undated) DEVICE — TRAY FOLEY 16FR INFECTION CONT

## (undated) DEVICE — GOWN SMARTGOWN LVL4 X-LONG XL

## (undated) DEVICE — TRAY NEURO OMC

## (undated) DEVICE — SLING ARM X-LARGE FOAM STRAP

## (undated) DEVICE — Device

## (undated) DEVICE — BLADE SURG #15 CARBON STEEL

## (undated) DEVICE — BAG URO DRAIN

## (undated) DEVICE — SET Y-TYPE TUR IRRIGATION

## (undated) DEVICE — DRAPE T EXTRM SURG 121X128X90

## (undated) DEVICE — SEE MEDLINE ITEM 157150

## (undated) DEVICE — TOWEL OR XRAY BLUE 17X26IN

## (undated) DEVICE — DRAPE STERI INSTRUMENT 1018

## (undated) DEVICE — SPLINT PLASTER FS 5IN X 30IN

## (undated) DEVICE — FIBER QUANTA OPT SDT 272UM

## (undated) DEVICE — SOL IRR WATER STRL 3000 ML

## (undated) DEVICE — GOWN POLY REINF BRTH SLV LG

## (undated) DEVICE — GOWN ECLIPSE REINF LVL4 TWL XL

## (undated) DEVICE — SUPPORT ULNA NERVE PROTECTOR

## (undated) DEVICE — SPONGE COTTON TRAY 4X4IN

## (undated) DEVICE — COVER MAYO STND XL 30X57IN

## (undated) DEVICE — STAPLER SKIN PROXIMATE WIDE

## (undated) DEVICE — SOL IRRI STRL WATER 1000ML

## (undated) DEVICE — CORD FOR BIPOLAR FORCEPS 12

## (undated) DEVICE — SYS CLSR WND ENDOSCP XL

## (undated) DEVICE — SUT VICRYL PLUS 3-0 FS1 27

## (undated) DEVICE — KNEE FIBERTAK DISPOSABLE KIT

## (undated) DEVICE — FORCEP STRAIGHT DISP

## (undated) DEVICE — TOWEL OR DISP STRL BLUE 4/PK

## (undated) DEVICE — PACK CYSTOSCOPY III SIRUS

## (undated) DEVICE — DRAPE T THYROID STERILE

## (undated) DEVICE — KIT SURGIFLO HEMOSTATIC MATRIX

## (undated) DEVICE — DRESSING MEPILEX BORDER 4 X 4

## (undated) DEVICE — SOL NACL IRR 1000ML BTL

## (undated) DEVICE — COVER LIGHT HANDLE 80/CA

## (undated) DEVICE — SUT VICRYL PLUS 3-0 SH 18IN

## (undated) DEVICE — GLOVE BIOGEL PI MICRO INDIC 7

## (undated) DEVICE — NDL SPINAL 18GX3.5 SPINOCAN

## (undated) DEVICE — BNDG COFLEX FOAM LF2 ST 3X5YD

## (undated) DEVICE — PACK EENT SURG II ECLIPSE

## (undated) DEVICE — SET IRR URLGY 2LINE UNIV SPIKE

## (undated) DEVICE — CORD BIPOLAR 12 FOOT

## (undated) DEVICE — SUT VICRYL PLUS 2-0 CT1 18

## (undated) DEVICE — EXTRACTOR TIPLESS 2.4FRX1115CM

## (undated) DEVICE — DURAPREP SURG SCRUB 26ML

## (undated) DEVICE — MANIFOLD 4 PORT

## (undated) DEVICE — ADAPTER HOSE 10FT 8MM

## (undated) DEVICE — SEE MEDLINE ITEM 157117

## (undated) DEVICE — SEE MEDLINE ITEM 156905

## (undated) DEVICE — DRESSING AQUACEL FOAM 5 X 5

## (undated) DEVICE — CATH MALECOT 12FR 6/BX

## (undated) DEVICE — CATH SUCTION 10FR

## (undated) DEVICE — SUT VICRYL PLUS 0 CT1 18IN

## (undated) DEVICE — SUT MONOCRYL 4-0 PS-1 UND

## (undated) DEVICE — TUBE FRAZIER 5MM 2FT SOFT TIP

## (undated) DEVICE — DRAPE C ARM 42 X 120 10/BX

## (undated) DEVICE — KNIFE CARPAL TUNNEL

## (undated) DEVICE — PAD CAST SPECIALIST STRL 3

## (undated) DEVICE — PACK ECLIPSE SET-UP W/O DRAPE

## (undated) DEVICE — TRAY SKIN SCRUB WET PREMIUM

## (undated) DEVICE — DRAPE OPMI STERILE

## (undated) DEVICE — KIT MAJOR SINGLE BASIN

## (undated) DEVICE — DRAPE C-ARMOR EQUIPMENT COVER

## (undated) DEVICE — SUT MCRYL PLUS 4-0 PS2 27IN

## (undated) DEVICE — BLADE INFERIOR TURBINATE 2MM

## (undated) DEVICE — PACK CYSTO

## (undated) DEVICE — GLOVE SENSICARE PI MICRO 7.5

## (undated) DEVICE — SUT D SPECIAL 4-0 PDS SH

## (undated) DEVICE — IMMOBILIZER HAND ALUMINUM LRG

## (undated) DEVICE — DRAPE INSTR MAGNETIC 10X16IN

## (undated) DEVICE — CONTAINER SPECIMEN STRL 4OZ

## (undated) DEVICE — GLOVE SURGICAL LATEX SZ 7

## (undated) DEVICE — DRAPE STERI-DRAPE 1000 17X11IN

## (undated) DEVICE — DRAPE C-ARM ELAS CLIP 42X120IN

## (undated) DEVICE — SOL IRRIGATION WATER 3000ML

## (undated) DEVICE — ADHESIVE DERMABOND ADVANCED

## (undated) DEVICE — NDL SAFETY 25G X 1.5 ECLIPSE

## (undated) DEVICE — GLOVE SURGICAL LATEX SZ 6.5

## (undated) DEVICE — GUIDE WIRE MOTION .035 X 150CM

## (undated) DEVICE — SUT VLOC BLU GS-22 SZ0 18IN

## (undated) DEVICE — PADDING CAST SPECIALIST 3X4YD

## (undated) DEVICE — FRAZIER 18FR

## (undated) DEVICE — SUT PDS II 3-0 FS-1 CLEAR

## (undated) DEVICE — DRESSING TRANS 4X4 TEGADERM

## (undated) DEVICE — CARTRIDGE OIL

## (undated) DEVICE — SPONGE PATTY SURGICAL .5X3IN

## (undated) DEVICE — KIT COLLECTION E SWAB REGULAR

## (undated) DEVICE — SUT VICRYL PLUS 2-0

## (undated) DEVICE — SOL IRR SOD CHL .9% POUR

## (undated) DEVICE — SOL NACL 0.9% INJ 500ML BG

## (undated) DEVICE — NDL 22GA X1 1/2 REG BEVEL

## (undated) DEVICE — SUT CTD VICRYL 3-0 CR/SH

## (undated) DEVICE — BLADE SURG STAINLESS STEEL #15

## (undated) DEVICE — SUT PROLENE 3-0 FS-2 18

## (undated) DEVICE — SYS SEE SHARP SCOPE ANTIFOG

## (undated) DEVICE — BANDAGE ADHESIVE

## (undated) DEVICE — NDL 18GA X1 1/2 REG BEVEL

## (undated) DEVICE — SEE MEDLINE ITEM 157131

## (undated) DEVICE — OBTURATOR BLADELESS 8MM XI CLR

## (undated) DEVICE — DRESSING ABSRBNT ISLAND 3.6X8

## (undated) DEVICE — COVER CAMERA OPERATING ROOM

## (undated) DEVICE — SEAL UNIVERSAL 5MM-8MM XI

## (undated) DEVICE — GAUZE SPONGE 4X4 12PLY

## (undated) DEVICE — NDL BLUNT FILL 18G 1.5IN

## (undated) DEVICE — LOOP CUTTING BPLR 24/26F .30MM

## (undated) DEVICE — PAD PREP CUFFED NS 24X48IN

## (undated) DEVICE — GOWN X-LG STERILE BACK

## (undated) DEVICE — PACK CYSTOSCOPY II ECLIPSE

## (undated) DEVICE — STOCKINETTE DBL PLY ST 4X

## (undated) DEVICE — PACK SURGERY START

## (undated) DEVICE — POSITIONER HEEL FOAM CONVOLTD

## (undated) DEVICE — DIFFUSER

## (undated) DEVICE — DRAPE INCISE IOBAN 2 23X17IN

## (undated) DEVICE — DRESSING LEUKOPLAST FLEX 1X3IN

## (undated) DEVICE — DRAPE ARM DAVINCI XI

## (undated) DEVICE — SUT BLU GS-22 0 3.5M 23CM 9IN

## (undated) DEVICE — MARKER SKIN STND TIP BLUE BARR

## (undated) DEVICE — SEE MEDLINE ITEM 146292

## (undated) DEVICE — BANDAGE MATRIX HK LOOP 4IN 5YD

## (undated) DEVICE — GLOVE BIOGEL PI MICRO SZ 7

## (undated) DEVICE — DRAPE COLUMN DAVINCI XI

## (undated) DEVICE — SOCKINETTE DOUBLE PLY 4X48IN

## (undated) DEVICE — GLOVE SURG BIOGEL LATEX SZ 7.5

## (undated) DEVICE — BANDAGE ROLL COTTN 4.5INX4.1YD

## (undated) DEVICE — BNDG COFLEX FOAM LF2 ST 4X5YD

## (undated) DEVICE — POSITIONER HEAD ADULT

## (undated) DEVICE — NDL HYPO REG 25G X 1 1/2

## (undated) DEVICE — GLOVE SENSICARE PI GRN 8

## (undated) DEVICE — GAUZE CNFRM STRL 4INX4.1YD

## (undated) DEVICE — SYR 30CC LUER LOCK

## (undated) DEVICE — SEE MEDLINE ITEM 157148

## (undated) DEVICE — TOURNIQUET 2 PORT YELLOW24X4IN

## (undated) DEVICE — COVER TIP CURVED SCISSORS XI

## (undated) DEVICE — MARKER SKIN RULER STERILE

## (undated) DEVICE — SEALER VESSEL EXTEND

## (undated) DEVICE — PIN DISTRACTION DISP 14MM
Type: IMPLANTABLE DEVICE | Site: SPINE CERVICAL | Status: NON-FUNCTIONAL
Removed: 2024-09-12

## (undated) DEVICE — SOL 9P NACL IRR PIC IL

## (undated) DEVICE — SET CYSTO IRRIGATION UNIV SPIK

## (undated) DEVICE — ELECTRODE BLADE INSULATED 1 IN

## (undated) DEVICE — COVER OVERHEAD SURG LT BLUE

## (undated) DEVICE — KIT SPINAL PATIENT CARE JACK

## (undated) DEVICE — NDL SAFETY 22G X 1.5 ECLIPSE

## (undated) DEVICE — GAUZE AVANT SPNG 4PLY STRL 4X4